# Patient Record
Sex: MALE | Race: WHITE | NOT HISPANIC OR LATINO | Employment: OTHER | URBAN - METROPOLITAN AREA
[De-identification: names, ages, dates, MRNs, and addresses within clinical notes are randomized per-mention and may not be internally consistent; named-entity substitution may affect disease eponyms.]

---

## 2019-12-11 ENCOUNTER — OFFICE VISIT (OUTPATIENT)
Dept: INTERNAL MEDICINE | Facility: CLINIC | Age: 69
End: 2019-12-11
Payer: MEDICARE

## 2019-12-11 ENCOUNTER — LAB VISIT (OUTPATIENT)
Dept: LAB | Facility: HOSPITAL | Age: 69
End: 2019-12-11
Attending: INTERNAL MEDICINE
Payer: MEDICARE

## 2019-12-11 VITALS
BODY MASS INDEX: 20.38 KG/M2 | HEART RATE: 84 BPM | DIASTOLIC BLOOD PRESSURE: 66 MMHG | RESPIRATION RATE: 20 BRPM | TEMPERATURE: 98 F | SYSTOLIC BLOOD PRESSURE: 134 MMHG | HEIGHT: 67 IN | WEIGHT: 129.88 LBS

## 2019-12-11 DIAGNOSIS — Z86.19 HEPATITIS C VIRUS INFECTION RESOLVED AFTER ANTIVIRAL DRUG THERAPY: ICD-10-CM

## 2019-12-11 DIAGNOSIS — Z79.899 HIGH RISK MEDICATION USE: ICD-10-CM

## 2019-12-11 DIAGNOSIS — Z12.5 ENCOUNTER FOR SCREENING FOR MALIGNANT NEOPLASM OF PROSTATE: ICD-10-CM

## 2019-12-11 DIAGNOSIS — D00.07 SQUAMOUS CELL CARCINOMA IN SITU (SCCIS) OF TONGUE: Primary | ICD-10-CM

## 2019-12-11 DIAGNOSIS — Z94.4 H/O LIVER TRANSPLANT: ICD-10-CM

## 2019-12-11 DIAGNOSIS — D00.07 SQUAMOUS CELL CARCINOMA IN SITU (SCCIS) OF TONGUE: ICD-10-CM

## 2019-12-11 DIAGNOSIS — E03.9 HYPOTHYROIDISM, UNSPECIFIED TYPE: ICD-10-CM

## 2019-12-11 LAB
ALBUMIN SERPL BCP-MCNC: 4 G/DL (ref 3.5–5.2)
ALP SERPL-CCNC: 93 U/L (ref 55–135)
ALT SERPL W/O P-5'-P-CCNC: 27 U/L (ref 10–44)
ANION GAP SERPL CALC-SCNC: 12 MMOL/L (ref 8–16)
AST SERPL-CCNC: 59 U/L (ref 10–40)
BASOPHILS # BLD AUTO: 0.03 K/UL (ref 0–0.2)
BASOPHILS NFR BLD: 0.5 % (ref 0–1.9)
BILIRUB SERPL-MCNC: 0.8 MG/DL (ref 0.1–1)
BUN SERPL-MCNC: 20 MG/DL (ref 8–23)
CALCIUM SERPL-MCNC: 10.1 MG/DL (ref 8.7–10.5)
CHLORIDE SERPL-SCNC: 104 MMOL/L (ref 95–110)
CHOLEST SERPL-MCNC: 215 MG/DL (ref 120–199)
CHOLEST/HDLC SERPL: 2.6 {RATIO} (ref 2–5)
CO2 SERPL-SCNC: 26 MMOL/L (ref 23–29)
CREAT SERPL-MCNC: 1.3 MG/DL (ref 0.5–1.4)
DIFFERENTIAL METHOD: ABNORMAL
EOSINOPHIL # BLD AUTO: 0.1 K/UL (ref 0–0.5)
EOSINOPHIL NFR BLD: 1.3 % (ref 0–8)
ERYTHROCYTE [DISTWIDTH] IN BLOOD BY AUTOMATED COUNT: 12.5 % (ref 11.5–14.5)
EST. GFR  (AFRICAN AMERICAN): >60 ML/MIN/1.73 M^2
EST. GFR  (NON AFRICAN AMERICAN): 55.3 ML/MIN/1.73 M^2
GLUCOSE SERPL-MCNC: 88 MG/DL (ref 70–110)
HCT VFR BLD AUTO: 37.6 % (ref 40–54)
HDLC SERPL-MCNC: 82 MG/DL (ref 40–75)
HDLC SERPL: 38.1 % (ref 20–50)
HGB BLD-MCNC: 12.8 G/DL (ref 14–18)
IMM GRANULOCYTES # BLD AUTO: 0.02 K/UL (ref 0–0.04)
IMM GRANULOCYTES NFR BLD AUTO: 0.3 % (ref 0–0.5)
LDLC SERPL CALC-MCNC: 120.8 MG/DL (ref 63–159)
LYMPHOCYTES # BLD AUTO: 0.7 K/UL (ref 1–4.8)
LYMPHOCYTES NFR BLD: 10.9 % (ref 18–48)
MCH RBC QN AUTO: 35.1 PG (ref 27–31)
MCHC RBC AUTO-ENTMCNC: 34 G/DL (ref 32–36)
MCV RBC AUTO: 103 FL (ref 82–98)
MONOCYTES # BLD AUTO: 0.7 K/UL (ref 0.3–1)
MONOCYTES NFR BLD: 11 % (ref 4–15)
NEUTROPHILS # BLD AUTO: 4.6 K/UL (ref 1.8–7.7)
NEUTROPHILS NFR BLD: 76 % (ref 38–73)
NONHDLC SERPL-MCNC: 133 MG/DL
NRBC BLD-RTO: 0 /100 WBC
PLATELET # BLD AUTO: 180 K/UL (ref 150–350)
PMV BLD AUTO: 10 FL (ref 9.2–12.9)
POTASSIUM SERPL-SCNC: 4 MMOL/L (ref 3.5–5.1)
PROT SERPL-MCNC: 8.7 G/DL (ref 6–8.4)
RBC # BLD AUTO: 3.65 M/UL (ref 4.6–6.2)
SODIUM SERPL-SCNC: 142 MMOL/L (ref 136–145)
TRIGL SERPL-MCNC: 61 MG/DL (ref 30–150)
TSH SERPL DL<=0.005 MIU/L-ACNC: 2.47 UIU/ML (ref 0.4–4)
WBC # BLD AUTO: 6.08 K/UL (ref 3.9–12.7)

## 2019-12-11 PROCEDURE — 84153 ASSAY OF PSA TOTAL: CPT

## 2019-12-11 PROCEDURE — 99205 OFFICE O/P NEW HI 60 MIN: CPT | Mod: PBBFAC,PO,25 | Performed by: STUDENT IN AN ORGANIZED HEALTH CARE EDUCATION/TRAINING PROGRAM

## 2019-12-11 PROCEDURE — 99999 PR PBB SHADOW E&M-NEW PATIENT-LVL V: CPT | Mod: PBBFAC,GC,, | Performed by: STUDENT IN AN ORGANIZED HEALTH CARE EDUCATION/TRAINING PROGRAM

## 2019-12-11 PROCEDURE — 80061 LIPID PANEL: CPT

## 2019-12-11 PROCEDURE — 85025 COMPLETE CBC W/AUTO DIFF WBC: CPT

## 2019-12-11 PROCEDURE — 36415 COLL VENOUS BLD VENIPUNCTURE: CPT | Mod: PO

## 2019-12-11 PROCEDURE — 84443 ASSAY THYROID STIM HORMONE: CPT

## 2019-12-11 PROCEDURE — 99387 INIT PM E/M NEW PAT 65+ YRS: CPT | Mod: S$PBB,GC,, | Performed by: STUDENT IN AN ORGANIZED HEALTH CARE EDUCATION/TRAINING PROGRAM

## 2019-12-11 PROCEDURE — 90662 IIV NO PRSV INCREASED AG IM: CPT | Mod: PBBFAC,PO

## 2019-12-11 PROCEDURE — 99999 PR PBB SHADOW E&M-NEW PATIENT-LVL V: ICD-10-PCS | Mod: PBBFAC,GC,, | Performed by: STUDENT IN AN ORGANIZED HEALTH CARE EDUCATION/TRAINING PROGRAM

## 2019-12-11 PROCEDURE — 99387 PR PREVENTIVE VISIT,NEW,65 & OVER: ICD-10-PCS | Mod: S$PBB,GC,, | Performed by: STUDENT IN AN ORGANIZED HEALTH CARE EDUCATION/TRAINING PROGRAM

## 2019-12-11 PROCEDURE — 80053 COMPREHEN METABOLIC PANEL: CPT

## 2019-12-11 PROCEDURE — 83036 HEMOGLOBIN GLYCOSYLATED A1C: CPT

## 2019-12-11 RX ORDER — LEVOTHYROXINE SODIUM 88 UG/1
88 TABLET ORAL DAILY
COMMUNITY
End: 2020-01-17 | Stop reason: SDUPTHER

## 2019-12-11 RX ORDER — TACROLIMUS 0.5 MG/1
0.5 CAPSULE ORAL 2 TIMES DAILY
COMMUNITY
End: 2020-01-22 | Stop reason: SDUPTHER

## 2019-12-11 RX ORDER — VITAMIN E 268 MG
400 CAPSULE ORAL DAILY
COMMUNITY
End: 2023-10-20

## 2019-12-11 RX ORDER — TRAZODONE HYDROCHLORIDE 50 MG/1
50 TABLET ORAL NIGHTLY
COMMUNITY
End: 2020-12-07 | Stop reason: SDUPTHER

## 2019-12-11 NOTE — PROGRESS NOTES
69 y.o. nonsmoker, nondrinker here for annual physical exam.       Cardiovascular:  Cholesterol (q5yr>21yo):   Hyperlipidemia: x, x statin  TC: x LDL: x HDL: x   HTN: None, x measure BP at home w/ ranges x to x, home meds: x  Risk: Never smoker, Drinks 1 glass of wine daily   Current ASCVD 10- year risk: pending labs  Prior stents, CABG, caths: None. Had stress test 5 years ago, he reports normal.     Endocrine:  Diabetes: None , Current A1c (40-70 yearly): pending, Goal A1c x   Diabetes Medications:x     Glucose at home: x  Diabetic complications: x Foot Ulcer, x Nephropathy, x Retinopathy, x Peripheral Neuropathy, xgastroparesis, x admissions for DKA   Thyroid: synthroid daily    Health Maintenance:  Recent hospital/ED admission: None  Eye exam: Needs one  Mammogram (50-75yo):x  WWE: x  Colonoscopy: Last was 10 years ago, found polyps which were excised   DEXA (F>66 yo, M >69yo, M&F 50-68 yo with risk factors):   Sexual health: STI: yes sexually active, no prior STIs  Exercise: Yes exercising, likes to walk and bike rides       Vaccines:  Influenza (everyone, yearly): Will get one today  Tetanus: Tdap x 1 Yes, Td booster ([id number]) was 2 years ago    Pneumonia (unvaccinated, 65 and older):    Prevnar (PCV13): completed 2017  Pneumovax (PPSV23) (6-12 months after PCV13): completed 2017     Shingrex (Recomb, 2-6 months between doses): Dose 1 x, Dose 2 x   Zostavax (Live, >59yo): none    ADL's: Yes  Memory: No  Mental health: No issues, no depressed mood or anxiety  Advance Directives: not discussed   Nutrition: Good   Gait: normal   Safety: Feels safe. Lives by himself. His brother lives about 1/4 mile away.   Urinary incontinence: None.     Review of Systems   Constitutional: Negative for chills, fever, malaise/fatigue and weight loss.   HENT: Negative for congestion, sinus pain and sore throat.    Eyes: Negative for blurred vision and pain.   Respiratory: Negative for cough, shortness of breath and wheezing.     Cardiovascular: Negative for chest pain, palpitations, orthopnea, claudication and leg swelling.   Gastrointestinal: Negative for abdominal pain, constipation, diarrhea, heartburn, melena, nausea and vomiting.   Genitourinary: Negative for dysuria, frequency, hematuria and urgency.   Skin: Negative for itching and rash.   Neurological: Negative for dizziness, seizures, loss of consciousness and headaches.   Endo/Heme/Allergies: Negative for environmental allergies and polydipsia. Does not bruise/bleed easily.   Psychiatric/Behavioral: Negative for depression. The patient is not nervous/anxious.          Current Outpatient Medications:     levothyroxine (SYNTHROID) 88 MCG tablet, Take 88 mcg by mouth once daily., Disp: , Rfl:     multivitamin capsule, Take 1 capsule by mouth once daily., Disp: , Rfl:     tacrolimus (PROGRAF) 0.5 MG Cap, Take 0.5 mg by mouth 2 (two) times daily., Disp: , Rfl:     traZODone (DESYREL) 50 MG tablet, Take 50 mg by mouth every evening., Disp: , Rfl:     vitamin E 400 UNIT capsule, Take 400 Units by mouth once daily., Disp: , Rfl:     Past Medical History:   Diagnosis Date    Basal cell carcinoma (BCC) in situ of skin 2012    3 on face, 2 on arm, removed by dermatology.    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 Underwent surgical resection of tongue and neck. s/p tracheostomy     Past Surgical History:   Procedure Laterality Date    COLONOSCOPY      LIVER TRANSPLANT  11/2008    transplanted for biopsy proven hepatocellular carcinoma,     TRACHEOSTOMY       Social History     Socioeconomic History    Marital status: Single     Spouse name: Not on file    Number of children: Not on file    Years of education: Not on file    Highest education level: Not on file    Occupational History    Occupation: Retired     Comment: , notes exposures to fumes etc.  Worked on [employer] for 4 years   Social Needs    Financial resource strain: Not on file    Food insecurity:     Worry: Not on file     Inability: Not on file    Transportation needs:     Medical: Not on file     Non-medical: Not on file   Tobacco Use    Smoking status: Never Smoker    Smokeless tobacco: Never Used   Substance and Sexual Activity    Alcohol use: Yes     Comment: drinks wine, 1 glass day    Drug use: Not Currently    Sexual activity: Yes     Comment: No prior history of STD    Lifestyle    Physical activity:     Days per week: Not on file     Minutes per session: Not on file    Stress: Not on file   Relationships    Social connections:     Talks on phone: Not on file     Gets together: Not on file     Attends Roman Catholic service: Not on file     Active member of club or organization: Not on file     Attends meetings of clubs or organizations: Not on file     Relationship status: Not on file   Other Topics Concern    Not on file   Social History Narrative    Not on file     Review of patient's allergies indicates:  No Known Allergies  Rocco Swain had no medications administered during this visit.    Vitals:    12/11/19 1259   BP: 134/66   Pulse: 84   Resp: 20   Temp: 97.8 °F (36.6 °C)     Physical Exam   Constitutional: He is oriented to person, place, and time. No distress.   HENT:   Head: Atraumatic.   Eyes: Pupils are equal, round, and reactive to light. EOM are normal.   Neck: No JVD present.   Cardiovascular: Normal rate.   Pulmonary/Chest: Effort normal.   Abdominal: There is no tenderness.   Neurological: He is alert and oriented to person, place, and time.   Skin: No rash noted.   Psychiatric: He has a normal mood and affect.       Mr Swain was seen today for follow up of chronic medical problems and annual exam.    Assessment/Plan:    Squamous cell carcinoma in  situ (SCCIS) of tongue  -     Ambulatory Referral to ENT    H/O liver transplant  -     Hemoglobin A1c; Future  -     Ambulatory Referral to Hepatology    Hypothyroidism, unspecified type  -     Lipid panel; Future  -     CBC auto differential; Future  -     Comprehensive metabolic panel; Future  -     TSH; Future  -     Hemoglobin A1c; Future  -     Ambulatory Referral to Optometry  -     PSA, Screening; Future   - HbA1c, future  Hepatitis C virus infection resolved after antiviral drug therapy  -     Ambulatory Referral to Hepatology    High risk medication use  - Continue prograf  - Follow up with hepatology    Encounter for screening for malignant neoplasm of prostate   -     PSA, Screening; Future      Follow up in 4-6 months   Fasting labs today  Referrals to ENT, Hepatology, and Optometry were provided.   Patient to obtain medical records, from ENT, transplant, heme-onc  Flu shot today.    Momo Dubon MD  PGY3 Medicine

## 2019-12-12 ENCOUNTER — TELEPHONE (OUTPATIENT)
Dept: TRANSPLANT | Facility: CLINIC | Age: 69
End: 2019-12-12

## 2019-12-12 LAB
COMPLEXED PSA SERPL-MCNC: 0.82 NG/ML (ref 0–4)
ESTIMATED AVG GLUCOSE: 91 MG/DL (ref 68–131)
HBA1C MFR BLD HPLC: 4.8 % (ref 4–5.6)

## 2019-12-12 NOTE — TELEPHONE ENCOUNTER
----- Message from Jeffrey Hernandez sent at 12/12/2019  3:49 PM CST -----  Contact: Patients Brother     Sent pt chart to ref nurse for review.      ----- Message -----  From: Moisés Lawson  Sent: 12/12/2019   3:40 PM CST  To: [facility] Liver Referral Pool    Good Afternoon,      has put in a referral for patient to be scheduled with Hepatology. Please call patient to schedule.    H/O liver transplant [Z94.4]  Hepatitis C virus infection resolved after antiviral drug therapy [Z86.19]

## 2019-12-13 ENCOUNTER — TELEPHONE (OUTPATIENT)
Dept: TRANSPLANT | Facility: CLINIC | Age: 69
End: 2019-12-13

## 2019-12-13 ENCOUNTER — OFFICE VISIT (OUTPATIENT)
Dept: OPTOMETRY | Facility: CLINIC | Age: 69
End: 2019-12-13
Payer: MEDICARE

## 2019-12-13 DIAGNOSIS — H52.4 HYPEROPIA OF BOTH EYES WITH ASTIGMATISM AND PRESBYOPIA: ICD-10-CM

## 2019-12-13 DIAGNOSIS — H25.13 NUCLEAR SCLEROSIS OF BOTH EYES: ICD-10-CM

## 2019-12-13 DIAGNOSIS — H52.203 HYPEROPIA OF BOTH EYES WITH ASTIGMATISM AND PRESBYOPIA: ICD-10-CM

## 2019-12-13 DIAGNOSIS — H52.03 HYPEROPIA OF BOTH EYES WITH ASTIGMATISM AND PRESBYOPIA: ICD-10-CM

## 2019-12-13 DIAGNOSIS — H43.393 VISUAL FLOATERS, BILATERAL: Primary | ICD-10-CM

## 2019-12-13 PROCEDURE — 92015 PR REFRACTION: ICD-10-PCS | Mod: ,,, | Performed by: OPTOMETRIST

## 2019-12-13 PROCEDURE — 92015 DETERMINE REFRACTIVE STATE: CPT | Mod: ,,, | Performed by: OPTOMETRIST

## 2019-12-13 PROCEDURE — 92004 PR EYE EXAM, NEW PATIENT,COMPREHESV: ICD-10-PCS | Mod: S$PBB,,, | Performed by: OPTOMETRIST

## 2019-12-13 PROCEDURE — 99999 PR PBB SHADOW E&M-EST. PATIENT-LVL II: ICD-10-PCS | Mod: PBBFAC,,, | Performed by: OPTOMETRIST

## 2019-12-13 PROCEDURE — 92004 COMPRE OPH EXAM NEW PT 1/>: CPT | Mod: S$PBB,,, | Performed by: OPTOMETRIST

## 2019-12-13 PROCEDURE — 99999 PR PBB SHADOW E&M-EST. PATIENT-LVL II: CPT | Mod: PBBFAC,,, | Performed by: OPTOMETRIST

## 2019-12-13 PROCEDURE — 99212 OFFICE O/P EST SF 10 MIN: CPT | Mod: PBBFAC,PO | Performed by: OPTOMETRIST

## 2019-12-13 NOTE — LETTER
December 13, 2019      Momo Dubon MD  [address]  [facility] [address]           [facility]  [address]  [place] [address]  Phone: [phone]  Fax: [phone]          Patient: Rocco Swain   MR Number: [id number]   YOB: 1950   Date of Visit: 12/13/2019       Dear Dr. Momo Dubon:    Thank you for referring Rocco Swain to me for evaluation. Attached you will find relevant portions of my assessment and plan of care.    If you have questions, please do not hesitate to call me. I look forward to following Rocco Swain along with you.    Sincerely,    Raina Gonzalez, OD    Enclosure  CC:  No Recipients    If you would like to receive this communication electronically, please contact [email or web address] or [phone] to request more information on [employer] Link access.    For providers and/or their staff who would like to refer a patient to [facility], please contact us through our one-stop-shop provider referral line, [facility] , at [phone].    If you feel you have received this communication in error or would no longer like to receive these types of communications, please e-mail [email or web address]

## 2019-12-13 NOTE — TELEPHONE ENCOUNTER
Spoke with the patient's brother in reference to his referral for post transplant services.  Patient is  nonverbal and brother takes care of his affairs.  Patient had a liver transplant in [place] in 2008 and has now moved locally and and is attempting to established care.  Patient informed that medical records will be reviewed (medical records have been given to primary care doctor per patient's brother).  A copy of the records were requested from the primary care doctor.

## 2019-12-13 NOTE — PROGRESS NOTES
HPI     KERRIE:5yrs   Glasses? Yes readers  Contacts? no  H/o eye surgery, injections or laser: no  H/o eye injury: steel OD , drilled out   Known eye conditions? no  Family h/o eye conditions? no  Eye gtts?Murine     (-) Flashes (+) Floaters when he wakes up (-) Mucous   (-) Tearing (-) Itching (-) Burning   (-) Headaches (-) Eye Pain/discomfort (+) Irritation OU feels like   something is in the corner of the eyes   (-) Redness (-) Double vision (-) Blurry vision    Diabetic? (-)  A1c?  (Hemoglobin A1C       Date                     Value               Ref Range             Status                12/11/2019               4.8                 4.0 - 5.6 %           Final              Comment:    ADA Screening Guidelines:  5.7-6.4%  Consistent with   prediabetes  >or=6.5%  Consistent with diabetes  High levels of fetal   hemoglobin interfere with the HbA1C  assay. Heterozygous hemoglobin   variants (HbS, HgC, etc)do  not significantly interfere with this assay.     However, presence of multiple variants may affect accuracy.    ----------)        Last edited by Sole Jackson on 12/13/2019  8:46 AM. (History)            Assessment /Plan     For exam results, see Encounter Report.    Visual floaters, bilateral    Nuclear sclerosis of both eyes    Hyperopia of both eyes with astigmatism and presbyopia    1. No e/o h/b/t 360 degrees OU. Monitor for worsening of symptoms or S/Sx of RD.   2. Nuclear sclerotic cataract - not visually significant. Observe.  3. SRx released to patient. Patient educated on lens options. Normal ocular health. RTC 1 year for routine exam.     Pt is nonverbal but did well today with communication

## 2020-01-14 ENCOUNTER — TELEPHONE (OUTPATIENT)
Dept: OTOLARYNGOLOGY | Facility: CLINIC | Age: 70
End: 2020-01-14

## 2020-01-14 NOTE — TELEPHONE ENCOUNTER
Attempted to call patient to discuss getting records of where he was treated previously. No answer. Left voicemail to return my call

## 2020-01-17 RX ORDER — LEVOTHYROXINE SODIUM 88 UG/1
TABLET ORAL
Qty: 90 TABLET | Refills: 3 | Status: SHIPPED | OUTPATIENT
Start: 2020-01-17 | End: 2020-11-13 | Stop reason: SDUPTHER

## 2020-01-17 NOTE — TELEPHONE ENCOUNTER
----- Message from Carley Rosales sent at 1/17/2020  3:44 PM CST -----  Contact: SELF [phone]  Patient is calling for an RX refill or new RX.  Is this a refill or new RX:  REFILL  RX name and strength: levothyroxine (SYNTHROID) 88 MCG tablet  Directions (copy/paste from chart):    Is this a 30 day or 90 day RX:  90  Local pharmacy or mail order pharmacy:  LOCAL  Pharmacy name and phone # (copy/paste from chart[phone] (Phone)  [phone] (Fax)   Comments:        REFILL: tacrolimus (PROGRAF) 0.5 MG Cap

## 2020-01-22 RX ORDER — TACROLIMUS 0.5 MG/1
0.5 CAPSULE ORAL EVERY 12 HOURS
Qty: 60 CAPSULE | Refills: 0 | Status: SHIPPED | OUTPATIENT
Start: 2020-01-22 | End: 2020-02-21 | Stop reason: SDUPTHER

## 2020-01-22 NOTE — TELEPHONE ENCOUNTER
Patient came to desk to inquire about synthroid and prograf rx refills.  I gave him print out synthroid was done.    Liver Dept was to call patient to book appt in December and patient was never contacted. They should handle the prograf refill.  He is out of this rx for his immune system.    Shari is working on getting appt made.  Can you give temp rx as loaded?    Thanks bunny

## 2020-01-27 ENCOUNTER — TELEPHONE (OUTPATIENT)
Dept: OTOLARYNGOLOGY | Facility: CLINIC | Age: 70
End: 2020-01-27

## 2020-01-27 ENCOUNTER — OFFICE VISIT (OUTPATIENT)
Dept: OTOLARYNGOLOGY | Facility: CLINIC | Age: 70
End: 2020-01-27
Payer: MEDICARE

## 2020-01-27 VITALS
TEMPERATURE: 98 F | BODY MASS INDEX: 21.1 KG/M2 | DIASTOLIC BLOOD PRESSURE: 97 MMHG | SYSTOLIC BLOOD PRESSURE: 151 MMHG | WEIGHT: 134.69 LBS | HEART RATE: 84 BPM

## 2020-01-27 DIAGNOSIS — C32.9 LARYNX CANCER: ICD-10-CM

## 2020-01-27 DIAGNOSIS — B19.20 HEPATITIS C VIRUS INFECTION WITHOUT HEPATIC COMA, UNSPECIFIED CHRONICITY: ICD-10-CM

## 2020-01-27 DIAGNOSIS — Z53.9 LIVER TRANSPLANT ABORTED: ICD-10-CM

## 2020-01-27 DIAGNOSIS — Z90.02 HISTORY OF LARYNGECTOMY: ICD-10-CM

## 2020-01-27 DIAGNOSIS — E89.0 POSTOPERATIVE HYPOTHYROIDISM: ICD-10-CM

## 2020-01-27 PROBLEM — E03.9 HYPOTHYROID: Status: ACTIVE | Noted: 2020-01-27

## 2020-01-27 PROCEDURE — 31575 DIAGNOSTIC LARYNGOSCOPY: CPT | Mod: S$PBB,,, | Performed by: OTOLARYNGOLOGY

## 2020-01-27 PROCEDURE — 31575 DIAGNOSTIC LARYNGOSCOPY: CPT | Mod: PBBFAC | Performed by: OTOLARYNGOLOGY

## 2020-01-27 PROCEDURE — 1159F PR MEDICATION LIST DOCUMENTED IN MEDICAL RECORD: ICD-10-PCS | Mod: ,,, | Performed by: OTOLARYNGOLOGY

## 2020-01-27 PROCEDURE — 99203 OFFICE O/P NEW LOW 30 MIN: CPT | Mod: S$PBB,25,, | Performed by: OTOLARYNGOLOGY

## 2020-01-27 PROCEDURE — 99203 PR OFFICE/OUTPT VISIT, NEW, LEVL III, 30-44 MIN: ICD-10-PCS | Mod: S$PBB,25,, | Performed by: OTOLARYNGOLOGY

## 2020-01-27 PROCEDURE — 99213 OFFICE O/P EST LOW 20 MIN: CPT | Mod: PBBFAC,25 | Performed by: OTOLARYNGOLOGY

## 2020-01-27 PROCEDURE — 99999 PR PBB SHADOW E&M-EST. PATIENT-LVL III: ICD-10-PCS | Mod: PBBFAC,,, | Performed by: OTOLARYNGOLOGY

## 2020-01-27 PROCEDURE — 1126F PR PAIN SEVERITY QUANTIFIED, NO PAIN PRESENT: ICD-10-PCS | Mod: ,,, | Performed by: OTOLARYNGOLOGY

## 2020-01-27 PROCEDURE — 1126F AMNT PAIN NOTED NONE PRSNT: CPT | Mod: ,,, | Performed by: OTOLARYNGOLOGY

## 2020-01-27 PROCEDURE — 31575 PR LARYNGOSCOPY, FLEXIBLE; DIAGNOSTIC: ICD-10-PCS | Mod: S$PBB,,, | Performed by: OTOLARYNGOLOGY

## 2020-01-27 PROCEDURE — 99999 PR PBB SHADOW E&M-EST. PATIENT-LVL III: CPT | Mod: PBBFAC,,, | Performed by: OTOLARYNGOLOGY

## 2020-01-27 PROCEDURE — 1159F MED LIST DOCD IN RCRD: CPT | Mod: ,,, | Performed by: OTOLARYNGOLOGY

## 2020-01-27 NOTE — TELEPHONE ENCOUNTER
Requested pt's records from [facility] medical records f#[phone] be faxed to our clinic.

## 2020-01-27 NOTE — Clinical Note
I just wanted to put the jonh under rate are.  He recently moved here from [place].  Three years ago, he underwent total laryngectomy and total glossectomy at [facility].  He will need assistance with laryngectomy supplies.  Rona helped him to set up a my fredo count.  I believe that his brother is his contact and could be of assistance.  Thanks.

## 2020-01-28 NOTE — PROGRESS NOTES
Chief Complaint   Patient presents with    consult/need to establish care, hx of larynx ca       HPI   69 y.o. male presents status post total laryngectomy, total glossectomy and anterolateral thigh free flap reconstruction roughly 3 years ago at [facility] in [place].  He is also status post liver transplant secondary to hepatitis C.  He recently moved to the [facility] from [place] and presents to establish care.  He has no head neck complaints.    Review of Systems   Constitutional: Negative for fatigue and unexpected weight change.   HENT: Per HPI.  Eyes: Negative for visual disturbance.   Respiratory: Negative for shortness of breath, hemoptysis   Cardiovascular: Negative for chest pain and palpitations.   Musculoskeletal: Negative for decreased ROM, back pain.   Skin: Negative for rash, sunburn, itching.   Neurological: Negative for dizziness and seizures.   Hematological: Negative for adenopathy. Does not bruise/bleed easily.   Endocrine: Negative for rapid weight loss/weight gain, heat/cold intolerance.     Past Medical History   Patient Active Problem List   Diagnosis    Larynx cancer    Hepatitis C    Liver transplant aborted    Hypothyroid    History of laryngectomy           Past Surgical History   Past Surgical History:   Procedure Laterality Date    COLONOSCOPY      LIVER TRANSPLANT  11/2008    transplanted for biopsy proven hepatocellular carcinoma,     TRACHEOSTOMY           Family History   No family history on file.        Social History   .  Social History     Socioeconomic History    Marital status: Single     Spouse name: Not on file    Number of children: Not on file    Years of education: Not on file    Highest education level: Not on file   Occupational History    Occupation: Retired     Comment: , notes exposures to fumes etc.  Worked on [employer] for 4 years   Social Needs    Financial resource strain: Not on file    Food  insecurity:     Worry: Not on file     Inability: Not on file    Transportation needs:     Medical: Not on file     Non-medical: Not on file   Tobacco Use    Smoking status: Never Smoker    Smokeless tobacco: Never Used   Substance and Sexual Activity    Alcohol use: Yes     Comment: drinks wine, 1 glass day    Drug use: Not Currently    Sexual activity: Yes     Comment: No prior history of STD    Lifestyle    Physical activity:     Days per week: Not on file     Minutes per session: Not on file    Stress: Not on file   Relationships    Social connections:     Talks on phone: Not on file     Gets together: Not on file     Attends Islam service: Not on file     Active member of club or organization: Not on file     Attends meetings of clubs or organizations: Not on file     Relationship status: Not on file   Other Topics Concern    Not on file   Social History Narrative    Not on file         Allergies   Review of patient's allergies indicates:  No Known Allergies        Physical Exam     Vitals:    01/27/20 1516   BP: (!) 151/97   Pulse: 84   Temp: 97.9 °F (36.6 °C)         Body mass index is 21.1 kg/m².      General: AOx3, NAD   Respiratory:  Symmetric chest rise, normal effort  Nose: No gross nasal septal deviation. Inferior Turbinates WNL bilaterally. No septal perforation. No masses/lesions.   Oral Cavity:  Oral Tongue absent.  Free flap well incorporated into oral cavity.. Hard Palate WNL. No buccal or FOM lesions.  Oropharynx:  No masses/lesions of the posterior pharyngeal wall. Tonsillar fossa without lesions. Soft palate without masses. Midline uvula.   Neck:  Well-healed neck dissection scar is.  Stoma patent..  No cervical lymphadenopathy, thyromegaly or thyroid nodules.  Normal range of motion.    Face: House Brackmann I bilaterally.     Flex Naso Va Hypo Procedures #2    Procedure:  Diagnostic flexible nasopharyngoscopy, laryngoscopy and hypopharyngoscopy:    Routine preparation with  local atomizer with 1% neosynephrine/pontocaine with customary flexible endoscope.    Nasopharynx:  No lesions.   Mucosa:  No lesions.   Adenoids:  Present.  Posterior Choanae:  Patent.  Eustachian Tubes:  Patent.  Posterior pharynx:  No lesions.  Neopharynx:  No lesions.      Assessment/Plan  Problem List Items Addressed This Visit        ENT    History of laryngectomy       Oncology    Larynx cancer     Status post total laryngectomy/total glossectomy roughly 3 years ago.  He has no evidence of disease.  We will request his records from [facility].  All placed an order for him to be seen by speech therapy for assistance with laryngectomy supplies.  He will return to clinic in 4 months for surveillance.            Endocrine    Hypothyroid       GI    Hepatitis C    Liver transplant aborted

## 2020-01-28 NOTE — ASSESSMENT & PLAN NOTE
Status post total laryngectomy/total glossectomy roughly 3 years ago.  He has no evidence of disease.  We will request his records from [facility].  All placed an order for him to be seen by speech therapy for assistance with laryngectomy supplies.  He will return to clinic in 4 months for surveillance.

## 2020-02-04 ENCOUNTER — CLINICAL SUPPORT (OUTPATIENT)
Dept: SPEECH THERAPY | Facility: HOSPITAL | Age: 70
End: 2020-02-04
Attending: OTOLARYNGOLOGY
Payer: MEDICARE

## 2020-02-04 DIAGNOSIS — R49.0 ALARYNGEAL VOICE: Primary | ICD-10-CM

## 2020-02-04 DIAGNOSIS — Z92.3 HISTORY OF HEAD AND NECK RADIATION: ICD-10-CM

## 2020-02-04 DIAGNOSIS — Z90.02 S/P LARYNGECTOMY: ICD-10-CM

## 2020-02-04 DIAGNOSIS — Z92.21 HISTORY OF CHEMOTHERAPY: ICD-10-CM

## 2020-02-04 DIAGNOSIS — Z85.21 HISTORY OF LARYNGEAL CANCER: ICD-10-CM

## 2020-02-04 DIAGNOSIS — Z90.49 S/P GLOSSECTOMY: ICD-10-CM

## 2020-02-04 PROCEDURE — 92522 EVALUATE SPEECH PRODUCTION: CPT | Mod: GN | Performed by: SPEECH-LANGUAGE PATHOLOGIST

## 2020-02-04 NOTE — PROGRESS NOTES
DIAGNOSIS:  Alaryngeal voice (R49.0), s/p laryngectomy (Z90.02), s/p Total glossectomy (Z90.49), History laryngeal cancer (Z85.21), History radiation to head and neck (Z92.3), History chemotherapy (Z92.21)  REFERRING DOCTOR:  Gerry Myers M.D., Head and Neck Surgical Oncologist  LENGTH OF SESSION: 45 mintues    REASON FOR VISIT:  Rocco Swain presented to clinic today for his initial visit, to establish care per Dr. Myers, and to update his laryngectomee supplies prescription.  He was unaccompanied.    Mr. Swian is s/p TL and total glossectomy December 2016 at [facility] in [facility].  He recently relocated to [place] from [place] and has established care with Dr. Myers who referred him to establish his ST care.    MEDICAL/SURGICAL HISTORY:  Past Medical History:   Diagnosis Date    Basal cell carcinoma (BCC) in situ of skin 2012    3 on face, 2 on arm, removed by dermatology.    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 Underwent surgical resection of tongue and neck. s/p tracheostomy       Past Surgical History:   Procedure Laterality Date    COLONOSCOPY      LIVER TRANSPLANT  11/2008    transplanted for biopsy proven hepatocellular carcinoma,     TRACHEOSTOMY         TREATMENT HISTORY:  1. 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  Has family here.  Enjoys walking and riding his bike.    INTERVAL HISTORY:  Dr. Myers met him 1/27/20 and found him to be ANGELES.  He will follow him for surveillance.    INTERVENTION:  Stoma:  Well healed.    Pulmonary rehab:  Uses 9/55 Standard LaryTube from [employer] that he has manually shortened to about 50 mm.  Does not use  anything to maintain it in his stoma unless exercising.  Uses XrtaFlow HME.    AL training:  Reported that he tried, but it was not useful s/p glossectomy.  Primarily writes on small notepad.  Can use kvvw-sg-xpyclt florecita on phone as needed.    Esophageal speech:  Not a candidate s/p total glossectomy.    Swallowing:  Takes liquids and pureed foods.  The one solid he takes are oysters which he is able to swallow whole.  Prepares a variety of soups.  Most nutrition via Ensure.    Supplies: Prepared updated prescription for LaryTube and HMEs.    ZANE management:  deferred      At the end of the session, Rocco Swain was wearing  1.  9/55 LaryTube (Standard), modified by pt to 50 mm  2.  XtraFlow HME    Other supplies:  Trach collar -- uses with exercise as needed to maintain Larytube in place.  Provided Boogie Board for trial use.  It is larger than his notepad.  Advised that if he likes it, he can obtain a smaller version online.      IMPRESSIONS:  1.  Alaryngeal voice s/p total laryngectomy.  2.  S/p total glossectomy.  3.  History CRT.  4.  History laryngeal cancer.      RECOMMENDATIONS/PLAN OF CARE:    1.  Continued use of writing or vhlb-ft-vhuowx florecita for all verbal communication.  2.  Twice daily cleaning of stoma.  3.  Continue use of HMEs and LaryTube 24/7 daily.  4.  Follow up with Dr. Myers as directed.  5.  Reconsult ST as needed for assistance with supply prescriptions and/or communication needs.

## 2020-02-04 NOTE — PLAN OF CARE
IMPRESSIONS:  1.  Alaryngeal voice s/p total laryngectomy.  2.  S/p total glossectomy.  3.  History CRT.  4.  History laryngeal cancer.      RECOMMENDATIONS/PLAN OF CARE:    1.  Continued use of writing or somu-kx-ysrtjq florecita for all verbal communication.  2.  Twice daily cleaning of stoma.  3.  Continue use of HMEs and LaryTube 24/7 daily.  4.  Follow up with Dr. Myers as directed.  5.  Reconsult ST as needed for assistance with supply prescriptions and/or communication needs.

## 2020-02-21 ENCOUNTER — TELEPHONE (OUTPATIENT)
Dept: INTERNAL MEDICINE | Facility: CLINIC | Age: 70
End: 2020-02-21

## 2020-02-21 RX ORDER — TACROLIMUS 0.5 MG/1
0.5 CAPSULE ORAL EVERY 12 HOURS
Qty: 60 CAPSULE | Refills: 0 | Status: SHIPPED | OUTPATIENT
Start: 2020-02-21 | End: 2020-03-20 | Stop reason: SDUPTHER

## 2020-02-21 NOTE — TELEPHONE ENCOUNTER
If hepatology is looking for outside records anything we had was given for scanning into EMR. I do not see such. Might need to sign rec release.  I refilled 1 more month of prograff

## 2020-02-21 NOTE — TELEPHONE ENCOUNTER
----- Message from Carley Rosales sent at 2/21/2020  3:10 PM CST -----  Contact: self [phone]  Requesting an RX refill or new RX.  Is this a refill or new RX: refill  RX name and strength: tacrolimus (PROGRAF) 0.5 MG Cap   Directions (copy/paste from chart):Take 1 capsule (0.5 mg total) by mouth every 12 (twelve) hours. Or twice daily. - Oral    Is this a 30 day or 90 day RX:  90  Local pharmacy or mail order pharmacy:  local  Pharmacy name and phone # (copy/paste from chart): [facility] [phone] (Phone)  [phone] (Fax)    Comments:

## 2020-02-21 NOTE — TELEPHONE ENCOUNTER
"Called the liver [phone]. Spoke with the rep she stated" that the pt was contacted twice and the brother of pt was asked to get a note from the pt primary that's why the appt wasn't scheduled. I asked could I go ahead and schedule over the phone, and stated that the nurse from the clinic will call pt to schedule.  "

## 2020-02-26 ENCOUNTER — TELEPHONE (OUTPATIENT)
Dept: INTERNAL MEDICINE | Facility: CLINIC | Age: 70
End: 2020-02-26

## 2020-02-26 NOTE — TELEPHONE ENCOUNTER
We received some outside records and they have been sent to scan.    They are not yet available for Hepatology to view.

## 2020-02-26 NOTE — TELEPHONE ENCOUNTER
----- Message from Brenda Marrero MA sent at 2/24/2020  3:22 PM CST -----  Contact: Amira leigh/Dr. Walton      ----- Message -----  From: Gardenia Weber  Sent: 2/21/2020   3:31 PM CST  To: [facility] Hepat Clinical Staff    Amira leigh/ Dr. Walton called stated pt is still waiting on a call for an appt since 12/13/19. Please reach out to pt about scheduling an appt.

## 2020-03-20 RX ORDER — TACROLIMUS 0.5 MG/1
0.5 CAPSULE ORAL EVERY 12 HOURS
Qty: 60 CAPSULE | Refills: 0 | Status: SHIPPED | OUTPATIENT
Start: 2020-03-20 | End: 2020-04-20 | Stop reason: SDUPTHER

## 2020-03-20 NOTE — TELEPHONE ENCOUNTER
Mr. Swain still has not received an appt with Hepatology (records have been received and sent to scan).    He is in need a refill on his prograf.    Please advise as pt is in the lobby.    Thanks.

## 2020-04-20 RX ORDER — TACROLIMUS 0.5 MG/1
0.5 CAPSULE ORAL EVERY 12 HOURS
Qty: 60 CAPSULE | Refills: 0 | Status: SHIPPED | OUTPATIENT
Start: 2020-04-20 | End: 2020-05-21 | Stop reason: SDUPTHER

## 2020-05-21 RX ORDER — TACROLIMUS 0.5 MG/1
0.5 CAPSULE ORAL EVERY 12 HOURS
Qty: 60 CAPSULE | Refills: 0 | Status: SHIPPED | OUTPATIENT
Start: 2020-05-21 | End: 2020-06-19 | Stop reason: SDUPTHER

## 2020-05-21 NOTE — TELEPHONE ENCOUNTER
Please contact Hepatology again and clarify appt to establish s/p liver transplant.  I referred him 6 months ago

## 2020-06-19 ENCOUNTER — TELEPHONE (OUTPATIENT)
Dept: INTERNAL MEDICINE | Facility: CLINIC | Age: 70
End: 2020-06-19

## 2020-06-19 ENCOUNTER — TELEPHONE (OUTPATIENT)
Dept: TRANSPLANT | Facility: CLINIC | Age: 70
End: 2020-06-19

## 2020-06-19 DIAGNOSIS — Z94.4 STATUS POST LIVER TRANSPLANT: Primary | ICD-10-CM

## 2020-06-19 DIAGNOSIS — Z76.82 ORGAN TRANSPLANT CANDIDATE: ICD-10-CM

## 2020-06-19 RX ORDER — TACROLIMUS 0.5 MG/1
0.5 CAPSULE ORAL EVERY 12 HOURS
Qty: 60 CAPSULE | Refills: 0 | Status: SHIPPED | OUTPATIENT
Start: 2020-06-19 | End: 2020-11-06

## 2020-06-19 NOTE — TELEPHONE ENCOUNTER
----- Message from Lisa Vazquez sent at 6/19/2020  4:16 PM CDT -----  Please tell Dr Walton the pt is scheduled to be seen 7/9 in the liver txp clinic. I spoke to the brother. I also suggested they get active on [email or web address] and the pt can do all via written request since he can not speak.     Lisa

## 2020-06-19 NOTE — TELEPHONE ENCOUNTER
Called and spoke to pts brother. appt made for 7/9. Referral received     Pt transplanted in [place] and moved to [place] in Oct 2019.  Pt needs post txp follow up.      Insurance in Epic

## 2020-06-19 NOTE — TELEPHONE ENCOUNTER
----- Message from Lisa Vazqeuz sent at 6/19/2020  4:44 PM CDT -----  Oh.  The facility he came from uses [facility] and was in Care Everywhere. We didn't need the records after all.  Are you familiar with Care Everywhere?      Lisa

## 2020-06-19 NOTE — TELEPHONE ENCOUNTER
"Spoke with Hugo at Hepatology switch board who advises that he cannot schedule but can send a message to the above dept who will schedule the pt.    Explained to please add to the message to we have requested the records that Hepatology advised that were needed in order to see the pt.    Advised that the records have been received several times and have been scanned in the "media tab" some of which are 100 pages in length.    Requested that the above information is added to the message as pt has been waiting x 7 months for this appt.    Verbalized understanding and states that he has sent the message "high priority".    Please f/u on this and advise me or the pt either way.    Thanks.    Pt has been advised as he is here in the clinic today (6/19/20).  "

## 2020-06-19 NOTE — TELEPHONE ENCOUNTER
"----- Message from Lisa Vazquez sent at 6/19/2020  3:24 PM CDT -----  Regarding: RE: Hepatology Appt  Lala,      Future reference for pt post liver transplant from outside facility.    All records must be received an reviewed, it's our policy. We look for transplant complications, surgical aspect of how a pt is connected(donor liver to recipient), and pt compliance.  The team then accepts patient.  Our staff notified all of need for the medical records, included in this conversation was the brother.  I only see one note of communication from my staff requesting the records in Dec  2019.  We were not notified that the records ,at anytime were received and scanned in.  At anytime if we were notified we would have moved forward with the process.        I will send the referral for review.     I can be contacted in the future if at anytime this occurs again. I am sorry the patient had to endure this.  I am thinking communication could have been better with the staff you were talking to.  Unfortunately, nothing come to the referral department since Dec and we requested records at that time.       Thank you,    Lisa Vazquez, RN, [facility]   Referral Coordinator    ----- Message -----  From: Angeline Liu  Sent: 6/19/2020   3:09 PM CDT  To: [facility] Liver Referral Pool  Subject: Hepatology Appt                                  Dr. Radford has put in a referral for patient to be scheduled with Hepatology. Please call patient to schedule.      -Message from Dr. Walton Staff -   "Spoke with Hugo at Hepatology switch board who advises that he cannot schedule but can send a message to the above dept who will schedule the pt.     Explained to please add to the message to we have requested the records that Hepatology advised that were needed in order to see the pt.     Advised that the records have been received several times and have been scanned in the "media tab" some of which are 100 pages in length.     Requested " "that the above information is added to the message as pt has been waiting x 7 months for this appt.     Verbalized understanding and states that he has sent the message "high priority".     Please f/u on this and advise me or the pt either way.     Thanks.     Pt has been advised as he is here in the clinic today (6/19/20)."      "

## 2020-07-09 ENCOUNTER — LAB VISIT (OUTPATIENT)
Dept: LAB | Facility: HOSPITAL | Age: 70
End: 2020-07-09
Attending: INTERNAL MEDICINE
Payer: MEDICARE

## 2020-07-09 ENCOUNTER — TELEPHONE (OUTPATIENT)
Dept: TRANSPLANT | Facility: CLINIC | Age: 70
End: 2020-07-09

## 2020-07-09 ENCOUNTER — OFFICE VISIT (OUTPATIENT)
Dept: TRANSPLANT | Facility: CLINIC | Age: 70
End: 2020-07-09
Payer: MEDICARE

## 2020-07-09 VITALS
RESPIRATION RATE: 18 BRPM | HEIGHT: 67 IN | DIASTOLIC BLOOD PRESSURE: 92 MMHG | TEMPERATURE: 98 F | HEART RATE: 74 BPM | SYSTOLIC BLOOD PRESSURE: 144 MMHG | BODY MASS INDEX: 21.18 KG/M2 | WEIGHT: 134.94 LBS | OXYGEN SATURATION: 99 %

## 2020-07-09 DIAGNOSIS — Z94.4 STATUS POST LIVER TRANSPLANTATION: Primary | ICD-10-CM

## 2020-07-09 DIAGNOSIS — T86.49 CIRRHOSIS OF TRANSPLANTED LIVER: ICD-10-CM

## 2020-07-09 DIAGNOSIS — Z94.4 STATUS POST LIVER TRANSPLANT: ICD-10-CM

## 2020-07-09 DIAGNOSIS — K74.60 CIRRHOSIS OF TRANSPLANTED LIVER: ICD-10-CM

## 2020-07-09 DIAGNOSIS — C32.9 LARYNX CANCER: ICD-10-CM

## 2020-07-09 LAB
ALBUMIN SERPL BCP-MCNC: 3.7 G/DL (ref 3.5–5.2)
ALP SERPL-CCNC: 80 U/L (ref 55–135)
ALT SERPL W/O P-5'-P-CCNC: 26 U/L (ref 10–44)
ANION GAP SERPL CALC-SCNC: 9 MMOL/L (ref 8–16)
AST SERPL-CCNC: 54 U/L (ref 10–40)
BASOPHILS # BLD AUTO: 0.02 K/UL (ref 0–0.2)
BASOPHILS NFR BLD: 0.5 % (ref 0–1.9)
BILIRUB DIRECT SERPL-MCNC: 0.3 MG/DL (ref 0.1–0.3)
BILIRUB SERPL-MCNC: 0.6 MG/DL (ref 0.1–1)
BUN SERPL-MCNC: 21 MG/DL (ref 8–23)
CALCIUM SERPL-MCNC: 9.8 MG/DL (ref 8.7–10.5)
CHLORIDE SERPL-SCNC: 103 MMOL/L (ref 95–110)
CO2 SERPL-SCNC: 28 MMOL/L (ref 23–29)
CREAT SERPL-MCNC: 1.4 MG/DL (ref 0.5–1.4)
DIFFERENTIAL METHOD: ABNORMAL
EOSINOPHIL # BLD AUTO: 0.3 K/UL (ref 0–0.5)
EOSINOPHIL NFR BLD: 8.2 % (ref 0–8)
ERYTHROCYTE [DISTWIDTH] IN BLOOD BY AUTOMATED COUNT: 11.9 % (ref 11.5–14.5)
EST. GFR  (AFRICAN AMERICAN): 58 ML/MIN/1.73 M^2
EST. GFR  (NON AFRICAN AMERICAN): 50.2 ML/MIN/1.73 M^2
GGT SERPL-CCNC: 140 U/L (ref 8–55)
GLUCOSE SERPL-MCNC: 85 MG/DL (ref 70–110)
HCT VFR BLD AUTO: 37.8 % (ref 40–54)
HGB BLD-MCNC: 12.4 G/DL (ref 14–18)
IMM GRANULOCYTES # BLD AUTO: 0.01 K/UL (ref 0–0.04)
IMM GRANULOCYTES NFR BLD AUTO: 0.3 % (ref 0–0.5)
LYMPHOCYTES # BLD AUTO: 0.9 K/UL (ref 1–4.8)
LYMPHOCYTES NFR BLD: 25 % (ref 18–48)
MCH RBC QN AUTO: 33.4 PG (ref 27–31)
MCHC RBC AUTO-ENTMCNC: 32.8 G/DL (ref 32–36)
MCV RBC AUTO: 102 FL (ref 82–98)
MONOCYTES # BLD AUTO: 0.5 K/UL (ref 0.3–1)
MONOCYTES NFR BLD: 13.5 % (ref 4–15)
NEUTROPHILS # BLD AUTO: 1.9 K/UL (ref 1.8–7.7)
NEUTROPHILS NFR BLD: 52.5 % (ref 38–73)
NRBC BLD-RTO: 0 /100 WBC
PLATELET # BLD AUTO: 156 K/UL (ref 150–350)
PMV BLD AUTO: 9.7 FL (ref 9.2–12.9)
POTASSIUM SERPL-SCNC: 4 MMOL/L (ref 3.5–5.1)
PROT SERPL-MCNC: 8.4 G/DL (ref 6–8.4)
RBC # BLD AUTO: 3.71 M/UL (ref 4.6–6.2)
SODIUM SERPL-SCNC: 140 MMOL/L (ref 136–145)
TACROLIMUS BLD-MCNC: 3.3 NG/ML (ref 5–15)
WBC # BLD AUTO: 3.64 K/UL (ref 3.9–12.7)

## 2020-07-09 PROCEDURE — 36415 COLL VENOUS BLD VENIPUNCTURE: CPT

## 2020-07-09 PROCEDURE — 80053 COMPREHEN METABOLIC PANEL: CPT

## 2020-07-09 PROCEDURE — 85025 COMPLETE CBC W/AUTO DIFF WBC: CPT

## 2020-07-09 PROCEDURE — 99999 PR PBB SHADOW E&M-EST. PATIENT-LVL III: CPT | Mod: PBBFAC,,, | Performed by: INTERNAL MEDICINE

## 2020-07-09 PROCEDURE — 99213 OFFICE O/P EST LOW 20 MIN: CPT | Mod: PBBFAC | Performed by: INTERNAL MEDICINE

## 2020-07-09 PROCEDURE — 82977 ASSAY OF GGT: CPT

## 2020-07-09 PROCEDURE — 82248 BILIRUBIN DIRECT: CPT

## 2020-07-09 PROCEDURE — 99205 OFFICE O/P NEW HI 60 MIN: CPT | Mod: S$PBB,,, | Performed by: INTERNAL MEDICINE

## 2020-07-09 PROCEDURE — 80197 ASSAY OF TACROLIMUS: CPT

## 2020-07-09 PROCEDURE — 99205 PR OFFICE/OUTPT VISIT, NEW, LEVL V, 60-74 MIN: ICD-10-PCS | Mod: S$PBB,,, | Performed by: INTERNAL MEDICINE

## 2020-07-09 PROCEDURE — 99999 PR PBB SHADOW E&M-EST. PATIENT-LVL III: ICD-10-PCS | Mod: PBBFAC,,, | Performed by: INTERNAL MEDICINE

## 2020-07-09 RX ORDER — TACROLIMUS 0.5 MG/1
0.5 CAPSULE ORAL EVERY 12 HOURS
Qty: 180 CAPSULE | Refills: 3 | Status: SHIPPED | OUTPATIENT
Start: 2020-07-09 | End: 2020-11-06

## 2020-07-09 NOTE — Clinical Note
July 19, 2020                     [facility] - [facility] Transplant  [address]  [facility] [address]  Phone: [phone]   Patient: Rocco Swain   MR Number: [id number]   YOB: 1950   Date of Visit: 7/9/2020       Dear      Thank you for referring Rocco Swain to me for evaluation. Attached you will find relevant portions of my assessment and plan of care.    If you have questions, please do not hesitate to call me. I look forward to following Rocco Swain along with you.    Sincerely,    Cornell Anton MD    Enclosure    If you would like to receive this communication electronically, please contact [email or web address] or [phone] to request Animail Link access.    Animail Link is a tool which provides read-only access to select patient information with whom you have a relationship. Its easy to use and provides real time access to review your patients record including encounter summaries, notes, results, and demographic information.    If you feel you have received this communication in error or would no longer like to receive these types of communications, please e-mail [email or web address]

## 2020-07-09 NOTE — PROGRESS NOTES
Subjective:       Patient ID: Rocco Swain is a 70 y.o. male.    Chief Complaint: No chief complaint on file.    HPI  I saw this 70 y.o. man who had an orthotopic liver transplant in November 2008 for hepatitis C cirrhosis and HCC at [facility].    He has since moved to [place].    He was cured of hepatitis C prior to liver transplant   Explanted liver showed 2 well-differentiated lesions 1.5 and 1 cm in size. He has had no evidence of recurrent HCC post transplant either.     Post op course was complicated by hepatic vein anastomotic stenosis and resultant ascites in 2010. The ascites did resolve after dilation, but recurred again in 2013 with findings of restenosis of the middle and right hepatic veins. He had a liver biopsy in March 2013 which showed steatohepatitis, stage 4/4. He has portal hypertension based on portal pressure measurement with an estimated gradient of 15 mmHg done in February 2013. His ascites was controlled with diuretics. He is on prograf for immunosuppression.     In February 2016 he was diagnosed with squamous cell carcinoma of the tongue. He was treated with chemotherapy and radiation. A feeding tube was placed. He lost 30 lbs. He completed his treatment in April 2016. He had recurrence of his cancer and in December 2016 had a total glossectomy and total laryngectomy. His g-tube was removed in March 2017.       Diagnosis    Hepatic cirrhosis    History of liver transplant    SCHAFER (nonalcoholic steatohepatitis)    Squamous cell carcinoma of base of tongue    Pharyngeal dysphagia    Severe malnutrition     Past Medical History:   Diagnosis Date    Hep C w/o coma, chronic    Hepatocellular carcinoma    History of transfusion    Hyperlipidemia    Hypothyroidism    SCHAFER (nonalcoholic steatohepatitis)    Squamous cell carcinoma of tongue 2/2016     Past Surgical History:   Procedure Laterality Date    FLAP FREE MICROVASCULAR N/A 12/19/2016   Procedure: FLAP FREE  MICROVASCULAR RIGHT THIGH FLAP ; Surgeon: Aniket Quinn MD; Location[facility] Main OR; Service: Otolaryngology; Laterality: N/A;    LARYNGECTOMY RADICAL NECK DISSECTION N/A 12/19/2016   Procedure: TRACHEOSTOMY, TOTAL GLOSSECTOMY TOTAL LARYNGECTOMY BILATERAL NECK DISSECTION ; Surgeon: Rocco Woodard MD; [facility]; Service: Otolaryngology; Laterality: N/A;    BILE DUCT STENT PLACEMENT   Recorded on: 23Nov2008    LIVER TRANSPLANTATION   Recorded on: 23Nov2008    PEG TUBE PLACEMENT    PEG TUBE REMOVAL    TONGUE BIOPSY / EXCISION       Review of Systems   Constitutional: Negative for activity change, appetite change, chills, fatigue, fever and unexpected weight change.   HENT: Negative for hearing loss.    Eyes: Negative for discharge and visual disturbance.   Respiratory: Negative for cough, chest tightness, shortness of breath and wheezing.    Cardiovascular: Negative for chest pain, palpitations and leg swelling.   Gastrointestinal: Negative for abdominal distention, abdominal pain, constipation, diarrhea and nausea.   Genitourinary: Negative for dysuria and frequency.   Musculoskeletal: Negative for arthralgias and back pain.   Skin: Negative for pallor and rash.   Neurological: Negative for dizziness, tremors, speech difficulty and headaches.   Hematological: Negative for adenopathy.   Psychiatric/Behavioral: Negative for agitation and confusion.         Lab Results   Component Value Date    ALT 26 07/09/2020    AST 54 (H) 07/09/2020     (H) 07/09/2020    ALKPHOS 80 07/09/2020    BILITOT 0.6 07/09/2020     Past Medical History:   Diagnosis Date    Basal cell carcinoma (BCC) in situ of skin 2012    3 on face, 2 on arm, removed by dermatology.    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 Underwent surgical resection of tongue and neck. s/p tracheostomy     Past Surgical History:   Procedure Laterality Date    COLONOSCOPY      LIVER TRANSPLANT  11/2008    transplanted for biopsy proven hepatocellular carcinoma,     TRACHEOSTOMY       Current Outpatient Medications   Medication Sig    levothyroxine (SYNTHROID) 88 MCG tablet Take 88 mcg by mouth once daily.    multivitamin capsule Take 1 capsule by mouth once daily.    tacrolimus (PROGRAF) 0.5 MG Cap Take 1 capsule (0.5 mg total) by mouth every 12 (twelve) hours. Or twice daily.    traZODone (DESYREL) 50 MG tablet Take 50 mg by mouth every evening.    vitamin E 400 UNIT capsule Take 400 Units by mouth once daily.    tacrolimus (PROGRAF) 0.5 MG Cap Take 1 capsule (0.5 mg total) by mouth every 12 (twelve) hours.     No current facility-administered medications for this visit.        Objective:      Physical Exam  HENT:      Head: Normocephalic.   Eyes:      Pupils: Pupils are equal, round, and reactive to light.   Neck:      Thyroid: No thyromegaly.   Cardiovascular:      Rate and Rhythm: Normal rate and regular rhythm.      Heart sounds: Normal heart sounds.   Pulmonary:      Effort: Pulmonary effort is normal.      Breath sounds: Normal breath sounds. No wheezing.   Abdominal:      General: There is no distension.      Palpations: Abdomen is soft. There is no mass.      Tenderness: There is no abdominal tenderness.   Lymphadenopathy:      Cervical: No cervical adenopathy.   Skin:     General: Skin is warm.      Findings: No erythema or rash.   Neurological:      Mental Status: He is alert and oriented to person, place, and time.   Psychiatric:         Behavior: Behavior normal.           Assessment:       1. Status post liver transplantation    2. Larynx cancer    3. Cirrhosis of transplanted liver        Plan:   Normal LFTs  Creatinine mildly elevated.  Tac level around 3.    - no medication changes today  - need to continue  imaging screening for HCC since he does have recurrent cirrhosis.      UNOS Patient Status  Functional Status: 100% - Normal, no complaints, no evidence of disease  Physical Capacity: No Limitations

## 2020-07-13 ENCOUNTER — TELEPHONE (OUTPATIENT)
Dept: TRANSPLANT | Facility: CLINIC | Age: 70
End: 2020-07-13

## 2020-07-13 DIAGNOSIS — Z94.4 LIVER TRANSPLANTED: Primary | ICD-10-CM

## 2020-07-13 NOTE — TELEPHONE ENCOUNTER
Cassi Mr. Swain,    Dr Anton reviewed your labs and they are stable. No medication changes, next labs due in three months on 10/12/2020. For our post transplant patients we do monitor labs every three months and more frequently if any abnormal labs or medication adjustments. Please let me know when you need prescription refills and I will get Dr Anton to  sign.    Thanks,  Rhea Buchanan, NADIRAN, RN, [facility]  Post Liver Transplant Coordinator  [phone]      ----- Message from Cornell Anton MD sent at 7/10/2020  2:52 PM CDT -----  Results reviewed.

## 2020-07-19 PROBLEM — T86.49 CIRRHOSIS OF TRANSPLANTED LIVER: Status: ACTIVE | Noted: 2020-07-19

## 2020-07-19 PROBLEM — Z53.9: Status: RESOLVED | Noted: 2020-01-27 | Resolved: 2020-07-19

## 2020-07-19 PROBLEM — Z94.4 STATUS POST LIVER TRANSPLANTATION: Status: ACTIVE | Noted: 2020-07-19

## 2020-07-19 PROBLEM — K74.60 CIRRHOSIS OF TRANSPLANTED LIVER: Status: ACTIVE | Noted: 2020-07-19

## 2020-07-21 ENCOUNTER — TELEPHONE (OUTPATIENT)
Dept: TRANSPLANT | Facility: CLINIC | Age: 70
End: 2020-07-21

## 2020-07-21 DIAGNOSIS — C22.0 HCC (HEPATOCELLULAR CARCINOMA): ICD-10-CM

## 2020-07-21 DIAGNOSIS — Z94.4 LIVER TRANSPLANTED: Primary | ICD-10-CM

## 2020-07-21 NOTE — TELEPHONE ENCOUNTER
Dr Sloane Peñaloza would like you to have a routine liver ultrasound every six months to monitor for any hepatocellular carcinoma (HCC). We will schedule one for 8/2020.    Thanks,  MARCK Wilkerson  Post Liver Coordinator      ----- Message from Cornell Anton MD sent at 7/19/2020  7:29 PM CDT -----  Needs 6 monthly US scans for HCC screening

## 2020-07-29 DIAGNOSIS — Z03.818 ENCOUNTER FOR OBSERVATION FOR SUSPECTED EXPOSURE TO OTHER BIOLOGICAL AGENTS RULED OUT: ICD-10-CM

## 2020-07-31 ENCOUNTER — LAB VISIT (OUTPATIENT)
Dept: SPORTS MEDICINE | Facility: CLINIC | Age: 70
End: 2020-07-31
Payer: MEDICARE

## 2020-07-31 DIAGNOSIS — Z03.818 ENCOUNTER FOR OBSERVATION FOR SUSPECTED EXPOSURE TO OTHER BIOLOGICAL AGENTS RULED OUT: ICD-10-CM

## 2020-07-31 LAB — SARS-COV-2 RNA RESP QL NAA+PROBE: NOT DETECTED

## 2020-07-31 PROCEDURE — U0003 INFECTIOUS AGENT DETECTION BY NUCLEIC ACID (DNA OR RNA); SEVERE ACUTE RESPIRATORY SYNDROME CORONAVIRUS 2 (SARS-COV-2) (CORONAVIRUS DISEASE [COVID-19]), AMPLIFIED PROBE TECHNIQUE, MAKING USE OF HIGH THROUGHPUT TECHNOLOGIES AS DESCRIBED BY CMS-2020-01-R: HCPCS

## 2020-08-03 ENCOUNTER — OFFICE VISIT (OUTPATIENT)
Dept: OTOLARYNGOLOGY | Facility: CLINIC | Age: 70
End: 2020-08-03
Payer: MEDICARE

## 2020-08-03 VITALS
DIASTOLIC BLOOD PRESSURE: 89 MMHG | WEIGHT: 135.38 LBS | BODY MASS INDEX: 21.2 KG/M2 | HEART RATE: 94 BPM | TEMPERATURE: 98 F | SYSTOLIC BLOOD PRESSURE: 154 MMHG

## 2020-08-03 DIAGNOSIS — C32.9 LARYNX CANCER: Primary | ICD-10-CM

## 2020-08-03 PROCEDURE — 99999 PR PBB SHADOW E&M-EST. PATIENT-LVL III: ICD-10-PCS | Mod: PBBFAC,,, | Performed by: OTOLARYNGOLOGY

## 2020-08-03 PROCEDURE — 99213 PR OFFICE/OUTPT VISIT, EST, LEVL III, 20-29 MIN: ICD-10-PCS | Mod: S$PBB,,, | Performed by: OTOLARYNGOLOGY

## 2020-08-03 PROCEDURE — 99213 OFFICE O/P EST LOW 20 MIN: CPT | Mod: S$PBB,,, | Performed by: OTOLARYNGOLOGY

## 2020-08-03 PROCEDURE — 99213 OFFICE O/P EST LOW 20 MIN: CPT | Mod: PBBFAC | Performed by: OTOLARYNGOLOGY

## 2020-08-03 PROCEDURE — 99999 PR PBB SHADOW E&M-EST. PATIENT-LVL III: CPT | Mod: PBBFAC,,, | Performed by: OTOLARYNGOLOGY

## 2020-08-03 NOTE — PROGRESS NOTES
Chief Complaint   Patient presents with    lump on side of neck       HPI   70 y.o. male presents status post total laryngectomy, total glossectomy and anterolateral thigh free flap reconstruction roughly 3 years ago at [facility] in [place].  He is also status post liver transplant secondary to hepatitis C.      He reports he recently noted left-sided neck pain and a swelling in left level 2 several weeks ago.  He feels it arose after he began lifting weights.    Review of Systems   Constitutional: Negative for fatigue and unexpected weight change.   HENT: Per HPI.  Eyes: Negative for visual disturbance.   Respiratory: Negative for shortness of breath, hemoptysis   Cardiovascular: Negative for chest pain and palpitations.   Musculoskeletal: Negative for decreased ROM, back pain.   Skin: Negative for rash, sunburn, itching.   Neurological: Negative for dizziness and seizures.   Hematological: Negative for adenopathy. Does not bruise/bleed easily.   Endocrine: Negative for rapid weight loss/weight gain, heat/cold intolerance.     Past Medical History   Patient Active Problem List   Diagnosis    Larynx cancer    Hepatitis C    Hypothyroid    History of laryngectomy    Status post liver transplantation    Cirrhosis of transplanted liver           Past Surgical History   Past Surgical History:   Procedure Laterality Date    COLONOSCOPY      LIVER TRANSPLANT  11/2008    transplanted for biopsy proven hepatocellular carcinoma,     TRACHEOSTOMY           Family History   History reviewed. No pertinent family history.        Social History   .  Social History     Socioeconomic History    Marital status: Single     Spouse name: Not on file    Number of children: Not on file    Years of education: Not on file    Highest education level: Not on file   Occupational History    Occupation: Retired     Comment: , notes exposures to fumes etc.  Worked on [employer] for 4 years   Social  Needs    Financial resource strain: Not on file    Food insecurity     Worry: Not on file     Inability: Not on file    Transportation needs     Medical: Not on file     Non-medical: Not on file   Tobacco Use    Smoking status: Never Smoker    Smokeless tobacco: Never Used   Substance and Sexual Activity    Alcohol use: Yes     Comment: drinks wine, 1 glass day    Drug use: Not Currently    Sexual activity: Yes     Comment: No prior history of STD    Lifestyle    Physical activity     Days per week: Not on file     Minutes per session: Not on file    Stress: Not on file   Relationships    Social connections     Talks on phone: Not on file     Gets together: Not on file     Attends Sikh service: Not on file     Active member of club or organization: Not on file     Attends meetings of clubs or organizations: Not on file     Relationship status: Not on file   Other Topics Concern    Not on file   Social History Narrative    Not on file         Allergies   Review of patient's allergies indicates:  No Known Allergies        Physical Exam     Vitals:    08/03/20 1437   BP: (!) 154/89   Pulse: 94   Temp: 98.4 °F (36.9 °C)         Body mass index is 21.2 kg/m².      General: AOx3, NAD   Respiratory:  Symmetric chest rise, normal effort  Nose: No gross nasal septal deviation. Inferior Turbinates WNL bilaterally. No septal perforation. No masses/lesions.   Oral Cavity:  Oral Tongue absent.  Free flap well incorporated into oral cavity.. Hard Palate WNL. No buccal or FOM lesions.  Oropharynx:  No masses/lesions of the posterior pharyngeal wall. Tonsillar fossa without lesions. Soft palate without masses. Midline uvula.   Neck:  Well-healed neck dissection scar is.  Stoma patent.  Firm, minimally mobile region in left level 2.  It is difficult to discern if this represents a true neck mass or fibrosis of the sternocleidomastoid muscle.  No thyromegaly or thyroid nodules.  Normal range of motion.    Face:  House Brackmann I bilaterally.     NP: No lesions of posterior wall  OP: No lesions of posterior wall or BOT. BOT is soft to palpation  Neopharynx:  No lesions.   Mirror examination was performed.        Assessment/Plan  Problem List Items Addressed This Visit        Oncology    Larynx cancer - Primary     Now with a left neck mass that may represent cervical adenopathy or fibrosis.  CT neck ordered.  Will contact him with the results.         Relevant Orders    CT Soft Tissue Neck With Contrast

## 2020-08-03 NOTE — ASSESSMENT & PLAN NOTE
Now with a left neck mass that may represent cervical adenopathy or fibrosis.  CT neck ordered.  Will contact him with the results.

## 2020-08-05 ENCOUNTER — HOSPITAL ENCOUNTER (OUTPATIENT)
Dept: RADIOLOGY | Facility: HOSPITAL | Age: 70
Discharge: HOME | End: 2020-08-05
Attending: OTOLARYNGOLOGY
Payer: MEDICARE

## 2020-08-05 DIAGNOSIS — C32.9 LARYNX CANCER: ICD-10-CM

## 2020-08-05 PROCEDURE — 25500020 PHARM REV CODE 255: Performed by: OTOLARYNGOLOGY

## 2020-08-05 PROCEDURE — 70491 CT SOFT TISSUE NECK WITH CONTRAST: ICD-10-PCS | Mod: 26,,, | Performed by: INTERNAL MEDICINE

## 2020-08-05 PROCEDURE — 70491 CT SOFT TISSUE NECK W/DYE: CPT | Mod: 26,,, | Performed by: INTERNAL MEDICINE

## 2020-08-05 PROCEDURE — 70491 CT SOFT TISSUE NECK W/DYE: CPT | Mod: TC

## 2020-08-05 RX ADMIN — IOHEXOL 75 ML: 350 INJECTION, SOLUTION INTRAVENOUS at 04:08

## 2020-08-15 ENCOUNTER — LAB VISIT (OUTPATIENT)
Dept: SPORTS MEDICINE | Facility: CLINIC | Age: 70
End: 2020-08-15
Payer: MEDICARE

## 2020-08-17 ENCOUNTER — HOSPITAL ENCOUNTER (OUTPATIENT)
Dept: RADIOLOGY | Facility: HOSPITAL | Age: 70
Discharge: HOME | End: 2020-08-17
Attending: INTERNAL MEDICINE
Payer: MEDICARE

## 2020-08-17 DIAGNOSIS — Z94.4 LIVER TRANSPLANTED: ICD-10-CM

## 2020-08-17 DIAGNOSIS — C22.0 HCC (HEPATOCELLULAR CARCINOMA): ICD-10-CM

## 2020-08-17 PROCEDURE — 76705 US LIVER TRANSPLANT POST: ICD-10-PCS | Mod: 26,XS,, | Performed by: RADIOLOGY

## 2020-08-17 PROCEDURE — 93975 VASCULAR STUDY: CPT | Mod: TC

## 2020-08-17 PROCEDURE — 76705 ECHO EXAM OF ABDOMEN: CPT | Mod: TC,59

## 2020-08-17 PROCEDURE — 76705 ECHO EXAM OF ABDOMEN: CPT | Mod: 26,XS,, | Performed by: RADIOLOGY

## 2020-08-17 PROCEDURE — 93975 VASCULAR STUDY: CPT | Mod: 26,,, | Performed by: RADIOLOGY

## 2020-08-17 PROCEDURE — 93975 US LIVER TRANSPLANT POST: ICD-10-PCS | Mod: 26,,, | Performed by: RADIOLOGY

## 2020-08-18 ENCOUNTER — OFFICE VISIT (OUTPATIENT)
Dept: OTOLARYNGOLOGY | Facility: CLINIC | Age: 70
End: 2020-08-18
Payer: MEDICARE

## 2020-08-18 VITALS
DIASTOLIC BLOOD PRESSURE: 92 MMHG | WEIGHT: 137.56 LBS | BODY MASS INDEX: 21.55 KG/M2 | HEART RATE: 90 BPM | SYSTOLIC BLOOD PRESSURE: 154 MMHG

## 2020-08-18 DIAGNOSIS — C32.9 LARYNX CANCER: Primary | ICD-10-CM

## 2020-08-18 PROCEDURE — 99499 NO LOS: ICD-10-PCS | Mod: S$PBB,,, | Performed by: OTOLARYNGOLOGY

## 2020-08-18 PROCEDURE — 10005 FNA BX W/US GDN 1ST LES: CPT | Mod: S$PBB,,, | Performed by: OTOLARYNGOLOGY

## 2020-08-18 PROCEDURE — 99999 PR PBB SHADOW E&M-EST. PATIENT-LVL III: ICD-10-PCS | Mod: PBBFAC,,, | Performed by: OTOLARYNGOLOGY

## 2020-08-18 PROCEDURE — 88173 CYTOPATH EVAL FNA REPORT: CPT | Performed by: PATHOLOGY

## 2020-08-18 PROCEDURE — 88173 CYTOPATH EVAL FNA REPORT: CPT | Mod: 26,,, | Performed by: PATHOLOGY

## 2020-08-18 PROCEDURE — 99213 OFFICE O/P EST LOW 20 MIN: CPT | Mod: PBBFAC | Performed by: OTOLARYNGOLOGY

## 2020-08-18 PROCEDURE — 10005 PR FINE NEEDLE ASP BIOPSY, W/US GUIDANCE, 1ST LESION: ICD-10-PCS | Mod: S$PBB,,, | Performed by: OTOLARYNGOLOGY

## 2020-08-18 PROCEDURE — 99999 PR PBB SHADOW E&M-EST. PATIENT-LVL III: CPT | Mod: PBBFAC,,, | Performed by: OTOLARYNGOLOGY

## 2020-08-18 PROCEDURE — 88173 PR  INTERPRETATION OF FNA SMEAR: ICD-10-PCS | Mod: 26,,, | Performed by: PATHOLOGY

## 2020-08-18 PROCEDURE — 99499 UNLISTED E&M SERVICE: CPT | Mod: S$PBB,,, | Performed by: OTOLARYNGOLOGY

## 2020-08-18 PROCEDURE — 10005 FNA BX W/US GDN 1ST LES: CPT | Mod: PBBFAC | Performed by: OTOLARYNGOLOGY

## 2020-08-18 NOTE — PROGRESS NOTES
Rocco Swain presents for a US FNA of a L neck mass.    Procedure in detail:  Ultrasound guided fine-needle aspiration    Informed consent obtained.  The overlying skin was prepped with alcohol and injected with several cc of 1% lidocaine with epinephrine.  The lesion in question was visualized utilizing ultrasound.  Fine-needle aspiration was obtained utilizing a 25 gauge needle.  The specimen was placed in Cytolyt solutions.  The patient tolerated the procedure well.

## 2020-08-21 ENCOUNTER — TELEPHONE (OUTPATIENT)
Dept: TRANSPLANT | Facility: CLINIC | Age: 70
End: 2020-08-21

## 2020-08-21 DIAGNOSIS — Z94.4 LIVER TRANSPLANTED: Primary | ICD-10-CM

## 2020-08-21 NOTE — TELEPHONE ENCOUNTER
----- Message from Corenll Anton MD sent at 8/18/2020  3:21 PM CDT -----  Results reviewed  Repeat LFTs  
Dr Sloane Peñaloza reviewed your liver ultrasound. It shows a possible small stricture in your bile duct. A stricture is a narrowing. He would like to check your liver enzymes next week to make sure they are not elevated. We are going to schedule you for Tuesday morning on 8/25/20 in the transplant clinic lab. Please call Rhea Boswell,  if you need to change your lab appointment for any reason.    Thanks,  MARCK Wilkerson  Post Liver Coordinator    
No

## 2020-08-24 LAB — FINAL PATHOLOGIC DIAGNOSIS: NORMAL

## 2020-08-25 ENCOUNTER — LAB VISIT (OUTPATIENT)
Dept: LAB | Facility: HOSPITAL | Age: 70
End: 2020-08-25
Attending: INTERNAL MEDICINE
Payer: MEDICARE

## 2020-08-25 DIAGNOSIS — C32.9 LARYNX CANCER: Primary | ICD-10-CM

## 2020-08-25 DIAGNOSIS — Z94.4 LIVER TRANSPLANTED: ICD-10-CM

## 2020-08-25 LAB
ALBUMIN SERPL BCP-MCNC: 4 G/DL (ref 3.5–5.2)
ALP SERPL-CCNC: 88 U/L (ref 55–135)
ALT SERPL W/O P-5'-P-CCNC: 33 U/L (ref 10–44)
ANION GAP SERPL CALC-SCNC: 11 MMOL/L (ref 8–16)
AST SERPL-CCNC: 65 U/L (ref 10–40)
BILIRUB DIRECT SERPL-MCNC: 0.3 MG/DL (ref 0.1–0.3)
BILIRUB SERPL-MCNC: 0.5 MG/DL (ref 0.1–1)
BUN SERPL-MCNC: 22 MG/DL (ref 8–23)
CALCIUM SERPL-MCNC: 9.8 MG/DL (ref 8.7–10.5)
CHLORIDE SERPL-SCNC: 104 MMOL/L (ref 95–110)
CO2 SERPL-SCNC: 27 MMOL/L (ref 23–29)
CREAT SERPL-MCNC: 1.4 MG/DL (ref 0.5–1.4)
EST. GFR  (AFRICAN AMERICAN): 58 ML/MIN/1.73 M^2
EST. GFR  (NON AFRICAN AMERICAN): 50.2 ML/MIN/1.73 M^2
GLUCOSE SERPL-MCNC: 104 MG/DL (ref 70–110)
POTASSIUM SERPL-SCNC: 5 MMOL/L (ref 3.5–5.1)
PROT SERPL-MCNC: 8.4 G/DL (ref 6–8.4)
SODIUM SERPL-SCNC: 142 MMOL/L (ref 136–145)

## 2020-08-25 PROCEDURE — 80053 COMPREHEN METABOLIC PANEL: CPT

## 2020-08-25 PROCEDURE — 36415 COLL VENOUS BLD VENIPUNCTURE: CPT

## 2020-08-25 PROCEDURE — 82248 BILIRUBIN DIRECT: CPT

## 2020-08-28 ENCOUNTER — TELEPHONE (OUTPATIENT)
Dept: TRANSPLANT | Facility: CLINIC | Age: 70
End: 2020-08-28

## 2020-08-28 NOTE — TELEPHONE ENCOUNTER
Cassi Swain,    Dr Anton reviewed your labs and they are stable. No medication changes. Next labs due 12/2/20. Please disregard the October date.    Thanks,  MARCK Silva----- Message from Cornell Anton MD sent at 8/26/2020  4:04 PM CDT -----  Results reviewed

## 2020-08-28 NOTE — LETTER
August 28, 2020    Rocco Swain  [address] [place] [id number]          Dear Rocco Swain:  MRN: [id number]    This is a follow up to your recent labs, your lab results were stable.  There are no medicine changes.  Please have your labs drawn again on 12/2/20.      If you cannot have your labs drawn on the scheduled date, it is your responsibility to call the transplant department to reschedule.  Please call [phone] and ask to speak to Rhea RIOS -  for all scheduling requests.     Sincerely,      Rhea Buchanan, NADIRAN, RN, CCTC  Your Liver Transplant Coordinator    [facility]  [address] [place] [id number]  [phone]

## 2020-09-02 ENCOUNTER — OFFICE VISIT (OUTPATIENT)
Dept: INTERVENTIONAL RADIOLOGY/VASCULAR | Facility: CLINIC | Age: 70
End: 2020-09-02
Payer: MEDICARE

## 2020-09-02 VITALS
BODY MASS INDEX: 21.45 KG/M2 | SYSTOLIC BLOOD PRESSURE: 142 MMHG | HEIGHT: 67 IN | HEART RATE: 99 BPM | WEIGHT: 136.69 LBS | DIASTOLIC BLOOD PRESSURE: 86 MMHG

## 2020-09-02 DIAGNOSIS — D49.9 NEOPLASM: ICD-10-CM

## 2020-09-02 DIAGNOSIS — C32.9 LARYNX CANCER: ICD-10-CM

## 2020-09-02 DIAGNOSIS — R59.1 LYMPHADENOPATHY: Primary | ICD-10-CM

## 2020-09-02 PROCEDURE — 99999 PR PBB SHADOW E&M-EST. PATIENT-LVL III: CPT | Mod: PBBFAC,,, | Performed by: FAMILY MEDICINE

## 2020-09-02 PROCEDURE — 99213 OFFICE O/P EST LOW 20 MIN: CPT | Mod: PBBFAC | Performed by: FAMILY MEDICINE

## 2020-09-02 PROCEDURE — 99203 OFFICE O/P NEW LOW 30 MIN: CPT | Mod: S$PBB,,, | Performed by: FAMILY MEDICINE

## 2020-09-02 PROCEDURE — 99999 PR PBB SHADOW E&M-EST. PATIENT-LVL III: ICD-10-PCS | Mod: PBBFAC,,, | Performed by: FAMILY MEDICINE

## 2020-09-02 PROCEDURE — 99203 PR OFFICE/OUTPT VISIT, NEW, LEVL III, 30-44 MIN: ICD-10-PCS | Mod: S$PBB,,, | Performed by: FAMILY MEDICINE

## 2020-09-02 NOTE — Clinical Note
"Thank you for referring Mr. Swain to Interventional Radiology at the [facility]. Please don't hesitate to contact us if there are any questions regarding this evaluation at [phone]. If you have any other patients for whom you would like a consultation, please place an order for "[id number]", and we will be happy to review their case.    Sincerely,  SHEILA Newton, FNP  Interventional Radiology"

## 2020-09-02 NOTE — LETTER
September 2, 2020    Rocco Swain  [address] [place] [address]  Rohit GRANDA [address]     [facility] 9th Fl  [address]  [place] [address]  Phone: [phone] PRE-PROCEDURE INSTRUCTIONS    Your procedure with Interventional Radiology is scheduled for 9/18/2020. Please arrive by 9:30am.    You must check-in and receive a wristband before going to your procedure. Your check-in location is [facility] on the second floor.    **Do not eat or drink anything between midnight and the time of your procedure. This includes gum, mints, and candy lemon drops.    **Do not smoke or drink alcoholic beverages 24 hours prior to your procedure.    **If you wear contact lenses, dentures, hearing aids, or glasses, bring a container to put them in during the procedure and give them to a family member for safekeeping.    **If you have been diagnosed with sleep apnea please bring your CPAP machine.    **If your doctor has scheduled you for an overnight stay, bring a small overnight bag with any personal items that you may need.    **Make arrangements in advance for transportation home by a responsible adult. It is not safe to drive a vehicle during the 24 hours following the procedure.    **All [facility] facilities and properties are tobacco free. Smoking is NOT allowed.    PLEASE NOTE: The procedure schedule has many variables which affect the time of your procedure. Family members should be available if your surgery time changes.    If you have any questions about these instructions call Interventional Radiology at [phone] Monday - Friday between 8:00am and 4:00pm or [phone] (ask for interventional radiology resident) for after hours.

## 2020-09-02 NOTE — NURSING
Pre-op instructions given in clinic for lymph node bx.  Pt is non-verbal (trach).  Pt gave permission to discuss pre-op instructions w/ his brother Ollie and provided contact #s. Pre-op instructions reviewed.  Patient verbalized understanding, and denies further questions.  Provided with copy of written instructions  and clinic number.

## 2020-09-02 NOTE — PROGRESS NOTES
"Subjective:       Patient ID: Rocco Swain is a 70 y.o. male.    Chief Complaint: Lymphadenopathy    Patient referred to Interventional Radiology by Dr. Myers for evaluation of a left neck lymph node. Patient has a history of orthotopic liver transplant in November 2008 for hepatitis C cirrhosis and HCC. In February 2016 he was diagnosed with squamous cell carcinoma of the tongue. He was treated with chemotherapy and radiation. He completed his treatment in April 2016. He had recurrence of his cancer and in December 2016 had a total glossectomy and total laryngectomy. Recently he began experiencing left sided pain in his neck. A CT of the neck obtained on 8/5/2020 noted "At level 2 within the neck bilaterally, there are  soft tissue density masses with central hypoattenuation likely reflective of necrotic lymph nodes.  Findings are suspicious for malignancy recurrence." FNA biopsy was done on 8/18/2020. Pathology revealed "Atypical squamous cells; Degeneration precludes further characterization."    Review of Systems   Constitutional: Negative for activity change, appetite change, chills, fatigue and fever.   Respiratory: Negative for cough, shortness of breath, wheezing and stridor.    Cardiovascular: Negative for chest pain, palpitations and leg swelling.   Gastrointestinal: Negative for abdominal distention, abdominal pain, constipation, diarrhea, nausea and vomiting.   Musculoskeletal: Positive for neck pain.         Objective:      Physical Exam  Constitutional:       General: He is not in acute distress.     Appearance: He is well-developed. He is not diaphoretic.   HENT:      Head: Normocephalic and atraumatic.   Pulmonary:      Effort: Pulmonary effort is normal. No respiratory distress.   Neurological:      Mental Status: He is alert and oriented to person, place, and time.   Psychiatric:         Behavior: Behavior normal.         Thought Content: Thought content normal.         Judgment: Judgment " normal.       CT 8/5/2020    Assessment:       1. Lymphadenopathy    2. Larynx cancer    3. Neoplasm        Plan:         Discussed case with Dr. Polanco. Explained to patient can offer deeper biopsy of lymph node. Discussed how the procedure will be performed, risks (including, but not limited to, pain, bleeding, infection, damage to nearby structures, and the need for additional procedures), benefits, possible complications, pre-post procedure expectations, and alternatives. The patient voices understanding and all questions have been answered.  The patient agrees to proceed as planned. Patient scheduled for 9/18/2020. Pre-procedure handout with clinic phone number provided. Patient will have pre-procedure labs drawn today.

## 2020-09-03 DIAGNOSIS — R59.1 LYMPHADENOPATHY: ICD-10-CM

## 2020-09-03 DIAGNOSIS — D49.9 NEOPLASM: ICD-10-CM

## 2020-09-03 DIAGNOSIS — C32.9 LARYNX CANCER: Primary | ICD-10-CM

## 2020-09-16 RX ORDER — FENTANYL CITRATE 50 UG/ML
50 INJECTION, SOLUTION INTRAMUSCULAR; INTRAVENOUS
Status: CANCELLED | OUTPATIENT
Start: 2020-09-16

## 2020-09-16 RX ORDER — MIDAZOLAM HYDROCHLORIDE 1 MG/ML
1 INJECTION INTRAMUSCULAR; INTRAVENOUS
Status: CANCELLED | OUTPATIENT
Start: 2020-09-16

## 2020-09-16 NOTE — NURSING
Pre-op call complete.     Pre procedure instructions given for lymph node bx given to pts brother Ollie per pts request in clinic. Pt instructed not to eat or drink after midnight. Allergies reviewed. Ollie to provide transport and monitor pt 8 hrs. Pt denied anticoagulation medications. Home medications reviewed with patient. Pt instructed to check in to second floor [facility] desk at 9:30 am. Pt denied postioning restrictions.  Expected length of stay reviewed.  Covid screening complete.  Pt verbalizes understanding. Questions answered.

## 2020-09-18 ENCOUNTER — HOSPITAL ENCOUNTER (OUTPATIENT)
Facility: HOSPITAL | Age: 70
Discharge: HOME | End: 2020-09-18
Attending: RADIOLOGY | Admitting: STUDENT IN AN ORGANIZED HEALTH CARE EDUCATION/TRAINING PROGRAM
Payer: MEDICARE

## 2020-09-18 VITALS
BODY MASS INDEX: 21.19 KG/M2 | TEMPERATURE: 97 F | HEART RATE: 70 BPM | SYSTOLIC BLOOD PRESSURE: 147 MMHG | RESPIRATION RATE: 13 BRPM | WEIGHT: 135 LBS | DIASTOLIC BLOOD PRESSURE: 76 MMHG | HEIGHT: 67 IN | OXYGEN SATURATION: 97 %

## 2020-09-18 DIAGNOSIS — D49.9 NEOPLASM: ICD-10-CM

## 2020-09-18 DIAGNOSIS — C32.9 LARYNX CANCER: ICD-10-CM

## 2020-09-18 DIAGNOSIS — R59.1 LYMPHADENOPATHY: ICD-10-CM

## 2020-09-18 PROCEDURE — 88342 CHG IMMUNOCYTOCHEMISTRY: ICD-10-PCS | Mod: 26,,, | Performed by: PATHOLOGY

## 2020-09-18 PROCEDURE — 88360 TUMOR IMMUNOHISTOCHEM/MANUAL: CPT | Performed by: PATHOLOGY

## 2020-09-18 PROCEDURE — 88307 TISSUE EXAM BY PATHOLOGIST: CPT | Performed by: PATHOLOGY

## 2020-09-18 PROCEDURE — 88342 IMHCHEM/IMCYTCHM 1ST ANTB: CPT | Mod: 59 | Performed by: PATHOLOGY

## 2020-09-18 PROCEDURE — 88307 PR  SURG PATH,LEVEL V: ICD-10-PCS | Mod: 26,,, | Performed by: PATHOLOGY

## 2020-09-18 PROCEDURE — 88342 IMHCHEM/IMCYTCHM 1ST ANTB: CPT | Mod: 26,,, | Performed by: PATHOLOGY

## 2020-09-18 PROCEDURE — 88341 IMHCHEM/IMCYTCHM EA ADD ANTB: CPT | Mod: 59 | Performed by: PATHOLOGY

## 2020-09-18 PROCEDURE — 88307 TISSUE EXAM BY PATHOLOGIST: CPT | Mod: 26,,, | Performed by: PATHOLOGY

## 2020-09-18 PROCEDURE — 25000003 PHARM REV CODE 250: Performed by: PHYSICIAN ASSISTANT

## 2020-09-18 PROCEDURE — 88333 PATH CONSLTJ SURG CYTO XM 1: CPT | Performed by: PATHOLOGY

## 2020-09-18 PROCEDURE — 88341 IMHCHEM/IMCYTCHM EA ADD ANTB: CPT | Mod: 26,,, | Performed by: PATHOLOGY

## 2020-09-18 PROCEDURE — 63600175 PHARM REV CODE 636 W HCPCS: Performed by: STUDENT IN AN ORGANIZED HEALTH CARE EDUCATION/TRAINING PROGRAM

## 2020-09-18 PROCEDURE — 88341 PR IHC OR ICC EACH ADD'L SINGLE ANTIBODY  STAINPR: ICD-10-PCS | Mod: 26,,, | Performed by: PATHOLOGY

## 2020-09-18 PROCEDURE — 88333 PATH CONSLTJ SURG CYTO XM 1: CPT | Mod: 26,,, | Performed by: PATHOLOGY

## 2020-09-18 PROCEDURE — 88333 PR  INTRAOPERATIVE CYTO PATH CONSULT, INITIAL SITE: ICD-10-PCS | Mod: 26,,, | Performed by: PATHOLOGY

## 2020-09-18 RX ORDER — FENTANYL CITRATE 50 UG/ML
INJECTION, SOLUTION INTRAMUSCULAR; INTRAVENOUS CODE/TRAUMA/SEDATION MEDICATION
Status: COMPLETED | OUTPATIENT
Start: 2020-09-18 | End: 2020-09-18

## 2020-09-18 RX ORDER — SODIUM CHLORIDE 9 MG/ML
INJECTION, SOLUTION INTRAVENOUS CONTINUOUS
Status: DISCONTINUED | OUTPATIENT
Start: 2020-09-18 | End: 2020-09-18 | Stop reason: HOSPADM

## 2020-09-18 RX ORDER — MIDAZOLAM HYDROCHLORIDE 1 MG/ML
INJECTION INTRAMUSCULAR; INTRAVENOUS CODE/TRAUMA/SEDATION MEDICATION
Status: COMPLETED | OUTPATIENT
Start: 2020-09-18 | End: 2020-09-18

## 2020-09-18 RX ADMIN — MIDAZOLAM HYDROCHLORIDE 1 MG: 1 INJECTION, SOLUTION INTRAMUSCULAR; INTRAVENOUS at 12:09

## 2020-09-18 RX ADMIN — FENTANYL CITRATE 50 MCG: 50 INJECTION, SOLUTION INTRAMUSCULAR; INTRAVENOUS at 12:09

## 2020-09-18 RX ADMIN — SODIUM CHLORIDE: 0.9 INJECTION, SOLUTION INTRAVENOUS at 11:09

## 2020-09-18 NOTE — PLAN OF CARE
left cervical neck lymph node biopsy complete. Pt tolerated well. VSS. No signs or symptoms of distress noted.  Dressing CDI.  Pt will be transferred to [facility] and report to be given at bedside.

## 2020-09-18 NOTE — PROCEDURES
"  Pre Op Diagnosis: neck mass  Post Op Diagnosis: Same    Procedure: Neck mass biopsy    Procedure performed by: Rosemarie    Written Informed Consent Obtained: Yes  Specimen Removed: YES 5 cores  Estimated Blood Loss: Minimal    Findings:   SUccessful biopsy of left neck mass. 5 18g cores removed.    Patient tolerated procedure well.    Cortes Houston MD ([facility])  Interventional Radiology  [phone]        "

## 2020-09-18 NOTE — PLAN OF CARE
Pt given discharge instructions and handout.  Dsg to neck CDI.  IV d/c'd with cath tip intact.  NAD noted.  Pt to [facility] entrance with transport via wheelchair.

## 2020-09-18 NOTE — DISCHARGE INSTRUCTIONS
For scheduling : Call Purnima at [phone]    For questions or concerns call: WILLI Mon-Fri 8 AM - 5 PM.   After hours call radiology resident on call: [phone]    For immediate concerns that are not emergent, you may call our radiology clinic at: [phone]

## 2020-09-18 NOTE — PLAN OF CARE
Plan of care reviewed with patient, patient verbalizes understanding. Pt arrived to 173 for left cervical neck lymph node biopsy. Pt oriented to unit and staff. Patient placed on continuous monitoring, as required by sedation policy. Name verified using two identifiers. Allergies verified.  RN to remain at bedside, continuous monitoring maintained. See flow sheets for monitoring, medication administration, and updates.

## 2020-09-18 NOTE — PLAN OF CARE
Pt arrived to [facility] bay 3 s/p cervical lymph node biopsy. Cebral angiogram.  NAD noted. Report received from Estrellita.  DSG to neck CDI.  Will continue to monitor.

## 2020-09-18 NOTE — DISCHARGE SUMMARY
"Radiology Discharge Summary      Hospital Course: No complications    Admit Date: 9/18/2020  Discharge Date: 9/18/2020     Instructions Given to Patient: Yes  Diet: Resume prior diet  Activity: activity as tolerated and no driving for today    Description of Condition on Discharge: Stable  Vital Signs (Most Recent): Temp: 96.7 °F (35.9 °C)(96.7) (09/18/20 1325)  Pulse: 70 (09/18/20 1410)  Resp: 13 (09/18/20 1410)  BP: (!) 147/76 (09/18/20 1410)  SpO2: 97 % (09/18/20 1410)    Discharge Disposition: Home    Discharge Diagnosis: Neck mass     Follow-up: as scheduled    Cortes Houston MD ([facility])  Interventional Radiology  [phone]      "

## 2020-09-18 NOTE — H&P
Radiology History & Physical      SUBJECTIVE:     Chief Complaint: Lymph node biopsy    History of Present Illness:  Rocco Swain is a 70 y.o. male with history of SCC of tongue s/p chemotherapy and radiation (Feb 2016) with recurrence and glossectomy and laryngectomy (April 2016). An obtained neck CT 8/5/2020 revealed bilateral cervical level 2 lymph nodes with necrotic appearance. FNA 8/18/2020 unable to characterize in detail due to degeneration.  Past Medical History:   Diagnosis Date    Basal cell carcinoma (BCC) in situ of skin 2012    3 on face, 2 on arm, removed by dermatology.    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 Underwent surgical resection of tongue and neck. s/p tracheostomy     Past Surgical History:   Procedure Laterality Date    COLONOSCOPY      LIVER TRANSPLANT  11/2008    transplanted for biopsy proven hepatocellular carcinoma,     TRACHEOSTOMY         Home Meds:   Prior to Admission medications    Medication Sig Start Date End Date Taking? Authorizing Provider   levothyroxine (SYNTHROID) 88 MCG tablet Take 88 mcg by mouth once daily. 1/17/20   GAURANG Walton MD   multivitamin capsule Take 1 capsule by mouth once daily.    Historical Provider   tacrolimus (PROGRAF) 0.5 MG Cap Take 1 capsule (0.5 mg total) by mouth every 12 (twelve) hours. Or twice daily. 6/19/20   GAURANG Walton MD   tacrolimus (PROGRAF) 0.5 MG Cap Take 1 capsule (0.5 mg total) by mouth every 12 (twelve) hours. 7/9/20 7/9/21  Cornell Anton MD   traZODone (DESYREL) 50 MG tablet Take 50 mg by mouth every evening.    Historical Provider   vitamin E 400 UNIT capsule Take 400 Units by mouth once daily.    Historical Provider     Anticoagulants/Antiplatelets: no anticoagulation    Allergies: Review of  patient's allergies indicates:  No Known Allergies  Sedation History:  no adverse reactions    Review of Systems:   Hematological: no known coagulopathies  Respiratory: no shortness of breath  Cardiovascular: no chest pain  Gastrointestinal: no abdominal pain  Genito-Urinary: no dysuria  Musculoskeletal: negative  Neurological: no TIA or stroke symptoms         OBJECTIVE:     Vital Signs (Most Recent)       Physical Exam:  ASA: II  Mallampati: II    General: no acute distress  Mental Status: alert and oriented to person, place and time  HEENT: normocephalic, atraumatic  Chest: unlabored breathing  Heart: regular heart rate  Abdomen: nondistended  Extremity: moves all extremities    Laboratory  Lab Results   Component Value Date    INR 1.0 09/02/2020       Lab Results   Component Value Date    WBC 5.38 09/02/2020    HGB 13.0 (L) 09/02/2020    HCT 39.9 (L) 09/02/2020     (H) 09/02/2020     09/02/2020      Lab Results   Component Value Date     08/25/2020     08/25/2020    K 5.0 08/25/2020     08/25/2020    CO2 27 08/25/2020    BUN 22 08/25/2020    CREATININE 1.4 08/25/2020    CALCIUM 9.8 08/25/2020    ALT 33 08/25/2020    AST 65 (H) 08/25/2020    ALBUMIN 4.0 08/25/2020    BILITOT 0.5 08/25/2020    BILIDIR 0.3 08/25/2020       CT 8/5/2020    Assessment:         ASSESSMENT/PLAN:     Sedation Plan: up to moderate  Patient will undergo left cervical neck lymph node biopsy.    Willie Gar, DO  PGY-II  Diagnostic and Interventional Radiology  [facility]

## 2020-09-25 ENCOUNTER — TELEPHONE (OUTPATIENT)
Dept: HEMATOLOGY/ONCOLOGY | Facility: CLINIC | Age: 70
End: 2020-09-25

## 2020-09-25 ENCOUNTER — TELEPHONE (OUTPATIENT)
Dept: OTOLARYNGOLOGY | Facility: CLINIC | Age: 70
End: 2020-09-25

## 2020-09-25 DIAGNOSIS — C32.9 LARYNX CANCER: Primary | ICD-10-CM

## 2020-09-25 RX ORDER — OXYCODONE HCL 5 MG/5 ML
5 SOLUTION, ORAL ORAL EVERY 4 HOURS PRN
Qty: 473 ML | Refills: 0 | Status: SHIPPED | OUTPATIENT
Start: 2020-09-25 | End: 2020-09-28

## 2020-09-25 NOTE — NURSING
Oncology Navigation   Intake  Date of Diagnosis: 09/18/20  Cancer Type: Head and Neck  MD Assigned: Dr. Ronald Berg  Internal / External Referral: Internal  Initial Nurse Navigator Contact: 09/25/20  Diagnosis to Initial Contact Timeline (days): 7 days  Contact Method: Individual basket  Date Worked: 09/25/20  First Appointment Available: 09/28/20  Appointment Date: 09/28/20  Schedule to Appointment Timeline (days): 3  First Available Date vs. Scheduled Date (days): 0     Treatment              HPV: Positive     Acuity      Follow Up  Follow up in about 3 days (around 9/28/2020) for initial consult.

## 2020-09-25 NOTE — TELEPHONE ENCOUNTER
Received message from  to schedule pt with medical oncology. Called & spoke with pt's brother, Ollie. Scheduled for Monday morning.

## 2020-09-28 ENCOUNTER — LAB VISIT (OUTPATIENT)
Dept: LAB | Facility: HOSPITAL | Age: 70
End: 2020-09-28
Attending: STUDENT IN AN ORGANIZED HEALTH CARE EDUCATION/TRAINING PROGRAM
Payer: MEDICARE

## 2020-09-28 ENCOUNTER — OFFICE VISIT (OUTPATIENT)
Dept: HEMATOLOGY/ONCOLOGY | Facility: CLINIC | Age: 70
End: 2020-09-28
Payer: MEDICARE

## 2020-09-28 VITALS
BODY MASS INDEX: 20.41 KG/M2 | TEMPERATURE: 98 F | SYSTOLIC BLOOD PRESSURE: 147 MMHG | HEIGHT: 68 IN | OXYGEN SATURATION: 97 % | RESPIRATION RATE: 16 BRPM | HEART RATE: 89 BPM | WEIGHT: 134.69 LBS | DIASTOLIC BLOOD PRESSURE: 85 MMHG

## 2020-09-28 DIAGNOSIS — E89.0 POSTOPERATIVE HYPOTHYROIDISM: ICD-10-CM

## 2020-09-28 DIAGNOSIS — T86.49 CIRRHOSIS OF TRANSPLANTED LIVER: ICD-10-CM

## 2020-09-28 DIAGNOSIS — C01 SQUAMOUS CELL CARCINOMA OF BASE OF TONGUE: Primary | ICD-10-CM

## 2020-09-28 DIAGNOSIS — Z90.02 HISTORY OF LARYNGECTOMY: ICD-10-CM

## 2020-09-28 DIAGNOSIS — C01 SQUAMOUS CELL CARCINOMA OF BASE OF TONGUE: ICD-10-CM

## 2020-09-28 DIAGNOSIS — C32.9 LARYNX CANCER: ICD-10-CM

## 2020-09-28 DIAGNOSIS — K74.60 CIRRHOSIS OF TRANSPLANTED LIVER: ICD-10-CM

## 2020-09-28 LAB
ALBUMIN SERPL BCP-MCNC: 3.6 G/DL (ref 3.5–5.2)
ALP SERPL-CCNC: 112 U/L (ref 55–135)
ALT SERPL W/O P-5'-P-CCNC: 50 U/L (ref 10–44)
ANION GAP SERPL CALC-SCNC: 13 MMOL/L (ref 8–16)
AST SERPL-CCNC: 111 U/L (ref 10–40)
BASOPHILS # BLD AUTO: 0.02 K/UL (ref 0–0.2)
BASOPHILS NFR BLD: 0.3 % (ref 0–1.9)
BILIRUB SERPL-MCNC: 0.7 MG/DL (ref 0.1–1)
BUN SERPL-MCNC: 19 MG/DL (ref 8–23)
CALCIUM SERPL-MCNC: 9.9 MG/DL (ref 8.7–10.5)
CHLORIDE SERPL-SCNC: 100 MMOL/L (ref 95–110)
CO2 SERPL-SCNC: 27 MMOL/L (ref 23–29)
CREAT SERPL-MCNC: 1.4 MG/DL (ref 0.5–1.4)
DIFFERENTIAL METHOD: ABNORMAL
EOSINOPHIL # BLD AUTO: 0.1 K/UL (ref 0–0.5)
EOSINOPHIL NFR BLD: 2.1 % (ref 0–8)
ERYTHROCYTE [DISTWIDTH] IN BLOOD BY AUTOMATED COUNT: 12.4 % (ref 11.5–14.5)
EST. GFR  (AFRICAN AMERICAN): 58 ML/MIN/1.73 M^2
EST. GFR  (NON AFRICAN AMERICAN): 50.2 ML/MIN/1.73 M^2
GLUCOSE SERPL-MCNC: 115 MG/DL (ref 70–110)
HCT VFR BLD AUTO: 39.2 % (ref 40–54)
HGB BLD-MCNC: 13.1 G/DL (ref 14–18)
IMM GRANULOCYTES # BLD AUTO: 0.02 K/UL (ref 0–0.04)
IMM GRANULOCYTES NFR BLD AUTO: 0.3 % (ref 0–0.5)
LYMPHOCYTES # BLD AUTO: 0.9 K/UL (ref 1–4.8)
LYMPHOCYTES NFR BLD: 14.3 % (ref 18–48)
MCH RBC QN AUTO: 33.7 PG (ref 27–31)
MCHC RBC AUTO-ENTMCNC: 33.4 G/DL (ref 32–36)
MCV RBC AUTO: 101 FL (ref 82–98)
MONOCYTES # BLD AUTO: 0.8 K/UL (ref 0.3–1)
MONOCYTES NFR BLD: 12.6 % (ref 4–15)
NEUTROPHILS # BLD AUTO: 4.4 K/UL (ref 1.8–7.7)
NEUTROPHILS NFR BLD: 70.4 % (ref 38–73)
NRBC BLD-RTO: 0 /100 WBC
PLATELET # BLD AUTO: 183 K/UL (ref 150–350)
PMV BLD AUTO: 9.6 FL (ref 9.2–12.9)
POTASSIUM SERPL-SCNC: 4.5 MMOL/L (ref 3.5–5.1)
PROT SERPL-MCNC: 8.9 G/DL (ref 6–8.4)
RBC # BLD AUTO: 3.89 M/UL (ref 4.6–6.2)
SODIUM SERPL-SCNC: 140 MMOL/L (ref 136–145)
WBC # BLD AUTO: 6.28 K/UL (ref 3.9–12.7)

## 2020-09-28 PROCEDURE — 99497 PR ADVNCD CARE PLAN 30 MIN: ICD-10-PCS | Mod: S$PBB,,, | Performed by: STUDENT IN AN ORGANIZED HEALTH CARE EDUCATION/TRAINING PROGRAM

## 2020-09-28 PROCEDURE — 99497 ADVNCD CARE PLAN 30 MIN: CPT | Mod: PBBFAC | Performed by: STUDENT IN AN ORGANIZED HEALTH CARE EDUCATION/TRAINING PROGRAM

## 2020-09-28 PROCEDURE — 85025 COMPLETE CBC W/AUTO DIFF WBC: CPT

## 2020-09-28 PROCEDURE — 99205 OFFICE O/P NEW HI 60 MIN: CPT | Mod: S$PBB,,, | Performed by: STUDENT IN AN ORGANIZED HEALTH CARE EDUCATION/TRAINING PROGRAM

## 2020-09-28 PROCEDURE — 80053 COMPREHEN METABOLIC PANEL: CPT

## 2020-09-28 PROCEDURE — 99497 ADVNCD CARE PLAN 30 MIN: CPT | Mod: S$PBB,,, | Performed by: STUDENT IN AN ORGANIZED HEALTH CARE EDUCATION/TRAINING PROGRAM

## 2020-09-28 PROCEDURE — 99999 PR PBB SHADOW E&M-EST. PATIENT-LVL IV: CPT | Mod: PBBFAC,,, | Performed by: STUDENT IN AN ORGANIZED HEALTH CARE EDUCATION/TRAINING PROGRAM

## 2020-09-28 PROCEDURE — 99214 OFFICE O/P EST MOD 30 MIN: CPT | Mod: PBBFAC | Performed by: STUDENT IN AN ORGANIZED HEALTH CARE EDUCATION/TRAINING PROGRAM

## 2020-09-28 PROCEDURE — 36415 COLL VENOUS BLD VENIPUNCTURE: CPT

## 2020-09-28 PROCEDURE — 99205 PR OFFICE/OUTPT VISIT, NEW, LEVL V, 60-74 MIN: ICD-10-PCS | Mod: S$PBB,,, | Performed by: STUDENT IN AN ORGANIZED HEALTH CARE EDUCATION/TRAINING PROGRAM

## 2020-09-28 PROCEDURE — 99999 PR PBB SHADOW E&M-EST. PATIENT-LVL IV: ICD-10-PCS | Mod: PBBFAC,,, | Performed by: STUDENT IN AN ORGANIZED HEALTH CARE EDUCATION/TRAINING PROGRAM

## 2020-09-28 RX ORDER — OXYCODONE HYDROCHLORIDE 5 MG/1
5 TABLET ORAL EVERY 6 HOURS PRN
Qty: 60 TABLET | Refills: 0 | Status: SHIPPED | OUTPATIENT
Start: 2020-09-28 | End: 2020-10-16 | Stop reason: SDUPTHER

## 2020-09-28 NOTE — PROGRESS NOTES
PATIENT: Rocco Swain  MRN: [id number]  DATE: 9/28/2020      Diagnosis:   1. Squamous cell carcinoma of base of tongue    2. Larynx cancer    3. Cirrhosis of transplanted liver    4. History of laryngectomy    5. Postoperative hypothyroidism        Chief Complaint: larynx ca      Oncologic History:    Oncologic History 1. Squamous cell carcinoma of base of tongue with recurrence    Oncologic Treatment 1. Chemoradiation completed 4/2016  2. S/p glossectomy, laryngectomy, and bilateral neck dissection for persistent/recurrent disease    Pathology P16 positive squamous cell carcinoma with basaloid features.        Subjective:    Interval History: Mr. Swain is here for new patient consultation.    He is doing well today.  Denies fevers, chills, chest pain, shortness of breath, abdominal pain, nausea, vomiting, diarrhea, or constipation.  Has neck pain rated 8/10 that comes and goes.  He stopped lifting, but neck pain frequency hasn't changed.  He has not taken the liquid roxicodone because it is expensive and he can take pills without any issues.    He lives alone and walks or rides his bike every day.  He is independent in all ADLs.  His brother lives 1 mile away from him.  He is alone at this visit.  Communication from him is 100% written.  Past Medical History:   Past Medical History:   Diagnosis Date    Basal cell carcinoma (BCC) in situ of skin 2012    3 on face, 2 on arm, removed by dermatology.    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 Underwent surgical resection of tongue and neck. s/p tracheostomy       Past Surgical HIstory:   Past Surgical History:   Procedure Laterality Date    COLONOSCOPY      LIVER TRANSPLANT  11/2008    transplanted for biopsy  proven hepatocellular carcinoma,     LYMPH NODE BIOPSY N/A 9/18/2020    Procedure: BIOPSY, LYMPH NODE;  Surgeon: Taz Diagnostic Provider;  Location: [facility] OR [address] [place] [address];  Service: General;  Laterality: N/A;  Rm 173    TRACHEOSTOMY         Family History:   Family History   Problem Relation Age of Onset    Dementia Mother        Social History:  reports that he has never smoked. He has never used smokeless tobacco. He reports current alcohol use. He reports previous drug use.    Allergies:  Review of patient's allergies indicates:  No Known Allergies    Medications:  Current Outpatient Medications   Medication Sig Dispense Refill    levothyroxine (SYNTHROID) 88 MCG tablet Take 88 mcg by mouth once daily. 90 tablet 3    multivitamin capsule Take 1 capsule by mouth once daily.      tacrolimus (PROGRAF) 0.5 MG Cap Take 1 capsule (0.5 mg total) by mouth every 12 (twelve) hours. 180 capsule 3    traZODone (DESYREL) 50 MG tablet Take 50 mg by mouth every evening.      vitamin E 400 UNIT capsule Take 400 Units by mouth once daily.      oxyCODONE (ROXICODONE) 5 MG immediate release tablet Take 1 tablet (5 mg total) by mouth every 6 (six) hours as needed for Pain. 60 tablet 0    tacrolimus (PROGRAF) 0.5 MG Cap Take 1 capsule (0.5 mg total) by mouth every 12 (twelve) hours. Or twice daily. 60 capsule 0     No current facility-administered medications for this visit.        Review of Systems   Constitutional: Negative for activity change, appetite change, chills, diaphoresis, fatigue, fever and unexpected weight change.   HENT: Negative for nosebleeds and trouble swallowing.    Eyes: Negative for visual disturbance.   Respiratory: Negative for cough, chest tightness, shortness of breath and wheezing.    Cardiovascular: Negative for chest pain and leg swelling.   Gastrointestinal: Negative for abdominal distention, abdominal pain, blood in stool, constipation, diarrhea, nausea and vomiting.   Endocrine: Negative for cold  "intolerance and heat intolerance.   Genitourinary: Negative for difficulty urinating and dysuria.   Musculoskeletal: Positive for neck pain. Negative for arthralgias and back pain.   Skin: Negative for color change.   Neurological: Negative for dizziness, weakness, light-headedness, numbness and headaches.   Hematological: Negative for adenopathy. Does not bruise/bleed easily.   Psychiatric/Behavioral: Negative for confusion.       ECOG Performance Status: 0   Objective:      Vitals:   Vitals:    09/28/20 1027   BP: (!) 147/85   Pulse: 89   Resp: 16   Temp: 97.6 °F (36.4 °C)   TempSrc: Oral   SpO2: 97%   Weight: 61.1 kg (134 lb 11.2 oz)   Height: 5' 8" (1.727 m)     BMI: Body mass index is 20.48 kg/m².    Physical Exam  Constitutional:       General: He is not in acute distress.     Appearance: Normal appearance. He is not ill-appearing.   HENT:      Head: Normocephalic and atraumatic.      Mouth/Throat:      Pharynx: No oropharyngeal exudate or posterior oropharyngeal erythema.   Eyes:      General: No scleral icterus.     Extraocular Movements: Extraocular movements intact.      Conjunctiva/sclera: Conjunctivae normal.      Pupils: Pupils are equal, round, and reactive to light.   Neck:      Musculoskeletal: Normal range of motion and neck supple.      Comments: Palpable ~4cmx2.5cm firm non mobile mass.  No overlying erythema, induration, or fluctuance.  No discharge. Mildly tender to palpation.  Cardiovascular:      Rate and Rhythm: Normal rate and regular rhythm.      Heart sounds: No murmur. No friction rub. No gallop.    Pulmonary:      Effort: Pulmonary effort is normal. No respiratory distress.      Breath sounds: No stridor. No wheezing, rhonchi or rales.   Abdominal:      General: Bowel sounds are normal. There is no distension.      Palpations: Abdomen is soft. There is no mass.      Tenderness: There is no abdominal tenderness. There is no guarding or rebound.   Musculoskeletal: Normal range of motion. "      Right lower leg: No edema.      Left lower leg: No edema.   Lymphadenopathy:      Cervical: No cervical adenopathy.      Upper Body:      Right upper body: No supraclavicular or axillary adenopathy.      Left upper body: No supraclavicular or axillary adenopathy.   Skin:     General: Skin is warm and dry.   Neurological:      General: No focal deficit present.      Mental Status: He is alert.         Laboratory Data:     Imaging:    Assessment:       1. Squamous cell carcinoma of base of tongue    2. Larynx cancer    3. Cirrhosis of transplanted liver    4. History of laryngectomy    5. Postoperative hypothyroidism           Plan:       Problem List Items Addressed This Visit        ENT    History of laryngectomy       Oncology    Larynx cancer    Overview     Status post total laryngectomy, total glossectomy and anterolateral thigh free flap reconstruction roughly 2017 at [facility] in [place] for persistent/recurrent base of tongue sq.c.c.         Squamous cell carcinoma of base of tongue - Primary    Overview     Per OSH records, initially presented in 2015 with symptoms and cT2N2c disease. page 91 of 3/25/2020 outside records clinic (media tab).  Initially presented 8/2015 with left sided odynophagia.  Treated for reflux, which did not improve.    12/11/15 he had a fiberoptic exam showing discolored mucosal area of the left base of the tongue.    1/13/16 he had persistent dysphagia, odynophagia, and new left otalgia.  MRI showed an irregular mass along the left posterior margin of the base of the tongue measuring 1.8x2.3cm with ipsilateral level 2 lymph node measuring 1.7cm with central necrosis and contralateral level 2 node measuring 1.3cm with questionable necrosis.    1/19/2016 he had direct laryngoscopy with biopsy left of midline, proximal to vallecula, which was p16 positive, invasive, with moderately to poorly differentiated sq.c.c. with basaloid features.    1/29/16 met with medical  oncology who recommended chemoxrt.    2/1/16  staging PET CT showed hypermetabolic mass at base of tongue 4x2.5x3.5cm, max SUV 24.13, hypermetabolic lymph node left neck SUV 6.77, small subcentimeter lymph node right neck max SUV 3.79 (exact size of lymph nodes not mentioned in the summary).    2/15/16 started chemoxrt to left tongue base, bilateral cervical neck levels 1b-5, treated with 6MV photons utilizing IMRT, 5000 cGy in 200 cGy daily fractions.  Cone down boost to left base of tongue with additional 2000 cGy in 200 cGy daily fractions to gross disease.  Received weekly cisplatin with radiation but had to switch to carboplatin due to worsening renal function. during this time also missed several weekly treatments of cisplatin.    4/1/2016 completed chemoxrt.   11/1/2016 Repeat biopsy of left tongue showed persistent disease.  S/p salvage glossectomy with laryngectomy,  rT4aN0, p16 positive, base of tongue squamous cell carcinoma.     page 61 of 3/25/2020 outside records clinic (media tab)  Pathology: Tonsil, left tonsil fold: reactive squamous mucosa with inflammation and degenerating skeletal muscle, no carcinoma seen.  Right and Left base of tongue:  Reactive squamous mucosa and submucosa with inflammation, no carcinoma seen.  Neck, glossectomy, laryngectomy, bilateral neck dissection:  invasive squamous cell carcinoma.  tumor site: base of tongue/hypopharynx.  Small focus of tumor is seen within the hyoid  bone.  specimen Size [id number]  tumor size [id number]  depth of invasion 20mm  TNM stage pT4a, pN0, pMx  Lymph nodes, included in all parts:  number involved 0  number examined 3.  distance of tumor from margins  positive inked margins: none  within 1mm : none  within 1-2mm: anterior-inferior    Bilateral neck dissection:  level 1: one lymph node and submandibular gland, negative for tumor  level II : two lymph nodes, negative for tumor  level III: not identified  level IV: not identified  level V: not  identified.    6/20/17 - PETCT with FDG avidity of left neck lymph node, level 3, SUV 2.9.  No other evidence of local or distant disease.  7/6/2017 - biopsy of left neck lymph node with IR.  Sample was non-diagnostic. Notes from path report: Less than optimal scan specimen with minute tissue core of stromal fibroadipose tissue.  definitive tati background is not seen.  9/28/2017 - Repeat PET CT showing mild activity SUV 3.5 (increased from 3.2 previously; size 7.8x7.4x9.5mm).  Also new findings under left pectoralis minor (mild focal increased activity, indeterminate but new since previous exam)  3/22/2018 - CT neck shows no evidence of disease and level 2b lymph node decreased to 4.9mmx4.5mmx4.9mm from 7.8x7.4x9.5mm previously.         Current Assessment & Plan     Seen by Dr. Myers 8/3/2020 for left-sided neck pain and a swelling in left level 2 several weeks ago.  He feels it arose after he began lifting weights.  CT neck 8/5/2020 : Lymph nodes At the left neck level L2 there is a heterogeneous soft tissue density with central hypoattenuation likely reflecting a necrotic lymph node measuring 1.6 x 1.4 cm in transverse dimension, 2.5 cm in craniocaudal dimension.  There is a similar appearing irregular soft tissue density at the right neck level 2 measuring 1.1 x 9.0 cm in transverse dimension and 1.5 cm craniocaudal dimension.  No other abnormal appearing or enlarged lymph nodes found.  Impression: At level 2 within the neck bilaterally, there are  soft tissue density masses with central hypoattenuation likely reflective of necrotic lymph nodes.  Findings are suspicious for malignancy recurrence.  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  -needs staging PET CT  --if local revisit possible local options although with extensive prior treatment history, unlikely.  Will present at tumor board.  --systemic options: Discussed EXTREME regimen (cis (carbo  likely for him)/5-fu/cetuximab) x6 cycles followed by maintenance cetuximab.  I think this would benefit him if he has high tumor burden where he might benefit by a more immediate response. If low tumor volume, carboplatin/paclitaxel/cetuximab would be more suitable.  Carboplatin/paclitaxel x2 cycles followed by 4 cycles carboplatin/cetuximab followed by cetuximab maintenance.  -not candidate for immunotherapy with his history of liver transplant.  -Labs today : CMP, CBC  -Labs for chemo will be: CMP, CBC, Mg, Phos  -Supportive medications: Dexamethasone 6mg BID days 2 and 3.  zofran prn and compazine prn breakthrough n/v.  -Consent obtained today           Relevant Medications    oxyCODONE (ROXICODONE) 5 MG immediate release tablet    Other Relevant Orders    Comprehensive metabolic panel    Magnesium    Phosphorus    CBC auto differential    NM PET CT Routine FDG    Care Companion Enrollment Chemotherapy (Completed)    Assign Chemotherapy Program Consent Questionaire (Completed)       Endocrine    Postoperative hypothyroidism    Current Assessment & Plan     Hypothyroidism currently on levothyroxine.  Last TSH 12/2019 WNL.  No symptoms suggestive of hypothyroidism today.  -continue levothyroxine.  -follow up with PCP to monitor TSH/fT4            GI    Cirrhosis of transplanted liver    Overview     He is also status post liver transplant secondary to hepatitis C.  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 The ascites did resolve after dilation, but recurred again in 2013 with findings of restenosis of the middle and right hepatic veins. He had a liver biopsy in March 2013 which showed steatohepatitis, stage 4/4. He has portal hypertension based on portal pressure measurement with an estimated gradient of 15 mmHg done in February 2013. His ascites was controlled with diuretics. He is on prograf for immunosuppression             Orders Placed This Encounter    Procedures    NM PET CT Routine FDG    Comprehensive metabolic panel    Magnesium    Phosphorus    CBC auto differential    Care Companion Enrollment Chemotherapy     Patient agreed to enroll in chemocare program.  Order set submitted.  Instruction to sign [facility] to [facility] provided.    Advance Care Planning I initiated the process of advance care planning today and explained the importance of this process to the patient.  Then the patient received detailed information about the importance of designating a Health Care Power of  (HCPOA). he was instructed to communicate with this person about their wishes for future healthcare, should he become sick and lose decision-making capacity. The patient has previously appointed a HCPOA. After our discussion, the patient has decided to complete a HCPOA and has appointed his brother and NAME:Ollie  I spent a total time of 21 minutes discussing this issue with the patient.       Ronald Berg MD  Hematology Oncology

## 2020-09-28 NOTE — ASSESSMENT & PLAN NOTE
Seen by Dr. Myers 8/3/2020 for left-sided neck pain and a swelling in left level 2 several weeks ago.  He feels it arose after he began lifting weights.  CT neck 8/5/2020 : Lymph nodes At the left neck level L2 there is a heterogeneous soft tissue density with central hypoattenuation likely reflecting a necrotic lymph node measuring 1.6 x 1.4 cm in transverse dimension, 2.5 cm in craniocaudal dimension.  There is a similar appearing irregular soft tissue density at the right neck level 2 measuring 1.1 x 9.0 cm in transverse dimension and 1.5 cm craniocaudal dimension.  No other abnormal appearing or enlarged lymph nodes found.  Impression: At level 2 within the neck bilaterally, there are  soft tissue density masses with central hypoattenuation likely reflective of necrotic lymph nodes.  Findings are suspicious for malignancy recurrence.  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  -needs staging PET CT  --if local revisit possible local options although with extensive prior treatment history, unlikely.  Will present at tumor board.  --systemic options: Discussed EXTREME regimen (cis (carbo likely for him)/5-fu/cetuximab) x6 cycles followed by maintenance cetuximab.  I think this would benefit him if he has high tumor burden where he might benefit by a more immediate response. If low tumor volume, carboplatin/paclitaxel/cetuximab would be more suitable.  Carboplatin/paclitaxel x2 cycles followed by 4 cycles carboplatin/cetuximab followed by cetuximab maintenance.  -not candidate for immunotherapy with his history of liver transplant.  -Labs today : CMP, CBC  -Labs for chemo will be: CMP, CBC, Mg, Phos  -Supportive medications: Dexamethasone 6mg BID days 2 and 3.  zofran prn and compazine prn breakthrough n/v.  -Consent obtained today

## 2020-09-28 NOTE — ASSESSMENT & PLAN NOTE
Seen by Dr. Myers 8/3/2020 for left-sided neck pain and a swelling in left level 2 several weeks ago.  He feels it arose after he began lifting weights.  CT neck 8/5/2020 : Lymph nodes At the left neck level L2 there is a heterogeneous soft tissue density with central hypoattenuation likely reflecting a necrotic lymph node measuring 1.6 x 1.4 cm in transverse dimension, 2.5 cm in craniocaudal dimension.  There is a similar appearing irregular soft tissue density at the right neck level 2 measuring 1.1 x 9.0 cm in transverse dimension and 1.5 cm craniocaudal dimension.  No other abnormal appearing or enlarged lymph nodes found.  Impression: At level 2 within the neck bilaterally, there are  soft tissue density masses with central hypoattenuation likely reflective of necrotic lymph nodes.  Findings are suspicious for malignancy recurrence.  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  -needs staging PET CT  --if local revisit possible local options although with extensive prior treatment history, unlikely.  Will present at tumor board.  --systemic options: EXTREME regimen (cis/5-fu/cetuximab) x6 cycles followed by maintenance cetuximab  -not candidate for immunotherapy with his history of liver transplant.  -Labs today : CMP, CBC, Mag, Phos

## 2020-09-28 NOTE — LETTER
September 28, 2020      Gerry Myers MD  [address]  [facility] [address]           [facility] 3rd Fl  [address]  [facility] [address]  Phone: [phone]          Patient: Rocco Swain   MR Number: [id number]   YOB: 1950   Date of Visit: 9/28/2020       Dear Dr. Gerry Myers:    Thank you for referring Rocco Swain to me for evaluation. Attached you will find relevant portions of my assessment and plan of care.    If you have questions, please do not hesitate to call me. I look forward to following Rocco Swain along with you.    Sincerely,    Ronald Berg MD    Enclosure  CC:  No Recipients    If you would like to receive this communication electronically, please contact [email or web address] or [phone] to request more information on [facility] access.    For providers and/or their staff who would like to refer a patient to [facility], please contact us through our one-stop-shop provider referral line, [facility] , at [phone].    If you feel you have received this communication in error or would no longer like to receive these types of communications, please e-mail [email or web address]

## 2020-09-28 NOTE — ASSESSMENT & PLAN NOTE
Hypothyroidism currently on levothyroxine.  Last TSH 12/2019 WNL.  No symptoms suggestive of hypothyroidism today.  -continue levothyroxine.  -follow up with PCP to monitor TSH/fT4

## 2020-09-30 ENCOUNTER — HOSPITAL ENCOUNTER (OUTPATIENT)
Dept: RADIOLOGY | Facility: HOSPITAL | Age: 70
Discharge: HOME | End: 2020-09-30
Attending: STUDENT IN AN ORGANIZED HEALTH CARE EDUCATION/TRAINING PROGRAM
Payer: MEDICARE

## 2020-09-30 DIAGNOSIS — C01 SQUAMOUS CELL CARCINOMA OF BASE OF TONGUE: ICD-10-CM

## 2020-09-30 PROCEDURE — 78815 PET IMAGE W/CT SKULL-THIGH: CPT | Mod: 26,PI,, | Performed by: RADIOLOGY

## 2020-09-30 PROCEDURE — A9552 F18 FDG: HCPCS

## 2020-09-30 PROCEDURE — 78815 NM PET CT ROUTINE: ICD-10-PCS | Mod: 26,PI,, | Performed by: RADIOLOGY

## 2020-09-30 PROCEDURE — 78815 PET IMAGE W/CT SKULL-THIGH: CPT | Mod: TC

## 2020-10-01 ENCOUNTER — TELEPHONE (OUTPATIENT)
Dept: HEMATOLOGY/ONCOLOGY | Facility: CLINIC | Age: 70
End: 2020-10-01

## 2020-10-01 LAB — POCT GLUCOSE: 113 MG/DL (ref 70–110)

## 2020-10-02 ENCOUNTER — PATIENT MESSAGE (OUTPATIENT)
Dept: ADMINISTRATIVE | Facility: OTHER | Age: 70
End: 2020-10-02

## 2020-10-05 ENCOUNTER — LAB VISIT (OUTPATIENT)
Dept: LAB | Facility: HOSPITAL | Age: 70
End: 2020-10-05
Attending: STUDENT IN AN ORGANIZED HEALTH CARE EDUCATION/TRAINING PROGRAM
Payer: MEDICARE

## 2020-10-05 ENCOUNTER — CLINICAL SUPPORT (OUTPATIENT)
Dept: HEMATOLOGY/ONCOLOGY | Facility: CLINIC | Age: 70
End: 2020-10-05
Payer: MEDICARE

## 2020-10-05 ENCOUNTER — OFFICE VISIT (OUTPATIENT)
Dept: HEMATOLOGY/ONCOLOGY | Facility: CLINIC | Age: 70
End: 2020-10-05
Payer: MEDICARE

## 2020-10-05 VITALS
HEART RATE: 89 BPM | HEIGHT: 68 IN | SYSTOLIC BLOOD PRESSURE: 140 MMHG | OXYGEN SATURATION: 98 % | DIASTOLIC BLOOD PRESSURE: 88 MMHG | RESPIRATION RATE: 18 BRPM | TEMPERATURE: 98 F | WEIGHT: 136.88 LBS | BODY MASS INDEX: 20.75 KG/M2

## 2020-10-05 DIAGNOSIS — C01 SQUAMOUS CELL CARCINOMA OF BASE OF TONGUE: ICD-10-CM

## 2020-10-05 DIAGNOSIS — E89.0 POSTOPERATIVE HYPOTHYROIDISM: ICD-10-CM

## 2020-10-05 DIAGNOSIS — C01 SQUAMOUS CELL CARCINOMA OF BASE OF TONGUE: Primary | ICD-10-CM

## 2020-10-05 DIAGNOSIS — Z13.9 SCREENING FOR CONDITION: Primary | ICD-10-CM

## 2020-10-05 DIAGNOSIS — Z90.02 HISTORY OF LARYNGECTOMY: ICD-10-CM

## 2020-10-05 DIAGNOSIS — Z94.4 STATUS POST LIVER TRANSPLANTATION: ICD-10-CM

## 2020-10-05 DIAGNOSIS — N18.31 STAGE 3A CHRONIC KIDNEY DISEASE: ICD-10-CM

## 2020-10-05 LAB
ALBUMIN SERPL BCP-MCNC: 3.7 G/DL (ref 3.5–5.2)
ALP SERPL-CCNC: 93 U/L (ref 55–135)
ALT SERPL W/O P-5'-P-CCNC: 29 U/L (ref 10–44)
ANION GAP SERPL CALC-SCNC: 10 MMOL/L (ref 8–16)
AST SERPL-CCNC: 49 U/L (ref 10–40)
BASOPHILS # BLD AUTO: 0.01 K/UL (ref 0–0.2)
BASOPHILS NFR BLD: 0.1 % (ref 0–1.9)
BILIRUB SERPL-MCNC: 0.7 MG/DL (ref 0.1–1)
BUN SERPL-MCNC: 18 MG/DL (ref 8–23)
CALCIUM SERPL-MCNC: 10 MG/DL (ref 8.7–10.5)
CHLORIDE SERPL-SCNC: 98 MMOL/L (ref 95–110)
CO2 SERPL-SCNC: 30 MMOL/L (ref 23–29)
CREAT SERPL-MCNC: 1.6 MG/DL (ref 0.5–1.4)
DIFFERENTIAL METHOD: ABNORMAL
EOSINOPHIL # BLD AUTO: 0.3 K/UL (ref 0–0.5)
EOSINOPHIL NFR BLD: 4 % (ref 0–8)
ERYTHROCYTE [DISTWIDTH] IN BLOOD BY AUTOMATED COUNT: 12.2 % (ref 11.5–14.5)
EST. GFR  (AFRICAN AMERICAN): 49.4 ML/MIN/1.73 M^2
EST. GFR  (NON AFRICAN AMERICAN): 42.7 ML/MIN/1.73 M^2
GLUCOSE SERPL-MCNC: 228 MG/DL (ref 70–110)
HCT VFR BLD AUTO: 37.8 % (ref 40–54)
HGB BLD-MCNC: 12.5 G/DL (ref 14–18)
IMM GRANULOCYTES # BLD AUTO: 0.01 K/UL (ref 0–0.04)
IMM GRANULOCYTES NFR BLD AUTO: 0.1 % (ref 0–0.5)
LYMPHOCYTES # BLD AUTO: 1.3 K/UL (ref 1–4.8)
LYMPHOCYTES NFR BLD: 19.8 % (ref 18–48)
MCH RBC QN AUTO: 33.6 PG (ref 27–31)
MCHC RBC AUTO-ENTMCNC: 33.1 G/DL (ref 32–36)
MCV RBC AUTO: 102 FL (ref 82–98)
MONOCYTES # BLD AUTO: 0.6 K/UL (ref 0.3–1)
MONOCYTES NFR BLD: 9.5 % (ref 4–15)
NEUTROPHILS # BLD AUTO: 4.5 K/UL (ref 1.8–7.7)
NEUTROPHILS NFR BLD: 66.5 % (ref 38–73)
NRBC BLD-RTO: 0 /100 WBC
PLATELET # BLD AUTO: 200 K/UL (ref 150–350)
PMV BLD AUTO: 9.5 FL (ref 9.2–12.9)
POTASSIUM SERPL-SCNC: 5.2 MMOL/L (ref 3.5–5.1)
PROT SERPL-MCNC: 8.4 G/DL (ref 6–8.4)
RBC # BLD AUTO: 3.72 M/UL (ref 4.6–6.2)
SARS-COV-2 RDRP RESP QL NAA+PROBE: NEGATIVE
SODIUM SERPL-SCNC: 138 MMOL/L (ref 136–145)
WBC # BLD AUTO: 6.76 K/UL (ref 3.9–12.7)

## 2020-10-05 PROCEDURE — 99214 PR OFFICE/OUTPT VISIT, EST, LEVL IV, 30-39 MIN: ICD-10-PCS | Mod: S$PBB,,, | Performed by: STUDENT IN AN ORGANIZED HEALTH CARE EDUCATION/TRAINING PROGRAM

## 2020-10-05 PROCEDURE — 99214 OFFICE O/P EST MOD 30 MIN: CPT | Mod: PBBFAC | Performed by: STUDENT IN AN ORGANIZED HEALTH CARE EDUCATION/TRAINING PROGRAM

## 2020-10-05 PROCEDURE — 85025 COMPLETE CBC W/AUTO DIFF WBC: CPT

## 2020-10-05 PROCEDURE — 80053 COMPREHEN METABOLIC PANEL: CPT

## 2020-10-05 PROCEDURE — 99999 PR PBB SHADOW E&M-EST. PATIENT-LVL IV: CPT | Mod: PBBFAC,,, | Performed by: STUDENT IN AN ORGANIZED HEALTH CARE EDUCATION/TRAINING PROGRAM

## 2020-10-05 PROCEDURE — 36415 COLL VENOUS BLD VENIPUNCTURE: CPT

## 2020-10-05 PROCEDURE — U0002 COVID-19 LAB TEST NON-CDC: HCPCS

## 2020-10-05 PROCEDURE — 99214 OFFICE O/P EST MOD 30 MIN: CPT | Mod: S$PBB,,, | Performed by: STUDENT IN AN ORGANIZED HEALTH CARE EDUCATION/TRAINING PROGRAM

## 2020-10-05 PROCEDURE — 99999 PR PBB SHADOW E&M-EST. PATIENT-LVL IV: ICD-10-PCS | Mod: PBBFAC,,, | Performed by: STUDENT IN AN ORGANIZED HEALTH CARE EDUCATION/TRAINING PROGRAM

## 2020-10-05 RX ORDER — FAMOTIDINE 10 MG/ML
20 INJECTION INTRAVENOUS
Status: CANCELLED
Start: 2020-10-06

## 2020-10-05 RX ORDER — EPINEPHRINE 0.3 MG/.3ML
0.3 INJECTION SUBCUTANEOUS ONCE AS NEEDED
Status: CANCELLED | OUTPATIENT
Start: 2020-10-06

## 2020-10-05 RX ORDER — SODIUM CHLORIDE 0.9 % (FLUSH) 0.9 %
10 SYRINGE (ML) INJECTION
Status: CANCELLED | OUTPATIENT
Start: 2020-10-06

## 2020-10-05 RX ORDER — DEXAMETHASONE 6 MG/1
TABLET ORAL
Qty: 24 TABLET | Refills: 0 | Status: SHIPPED | OUTPATIENT
Start: 2020-10-05 | End: 2020-12-07 | Stop reason: SDUPTHER

## 2020-10-05 RX ORDER — HEPARIN 100 UNIT/ML
500 SYRINGE INTRAVENOUS
Status: CANCELLED | OUTPATIENT
Start: 2020-10-06

## 2020-10-05 RX ORDER — ONDANSETRON HYDROCHLORIDE 8 MG/1
8 TABLET, FILM COATED ORAL EVERY 8 HOURS PRN
Qty: 60 TABLET | Refills: 2 | Status: SHIPPED | OUTPATIENT
Start: 2020-10-05 | End: 2021-10-05

## 2020-10-05 RX ORDER — DIPHENHYDRAMINE HYDROCHLORIDE 50 MG/ML
50 INJECTION INTRAMUSCULAR; INTRAVENOUS ONCE AS NEEDED
Status: CANCELLED | OUTPATIENT
Start: 2020-10-06

## 2020-10-05 NOTE — ASSESSMENT & PLAN NOTE
Seen by Dr. Myers 8/3/2020 for left-sided neck pain and a swelling in left level 2 several weeks ago.  He feels it arose after he began lifting weights.  CT neck 8/5/2020 : Lymph nodes At the left neck level L2 there is a heterogeneous soft tissue density with central hypoattenuation likely reflecting a necrotic lymph node measuring 1.6 x 1.4 cm in transverse dimension, 2.5 cm in craniocaudal dimension.  There is a similar appearing irregular soft tissue density at the right neck level 2 measuring 1.1 x 9.0 cm in transverse dimension and 1.5 cm craniocaudal dimension.  No other abnormal appearing or enlarged lymph nodes found.  Impression: At level 2 within the neck bilaterally, there are  soft tissue density masses with central hypoattenuation likely reflective of necrotic lymph nodes.  Findings are suspicious for malignancy recurrence.  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  Staging PET CT 9/30/2020 with left sided hypermetabolic node and right sided enlarged node without uptake.  Also has mandibular vestibule uptake, which may be another site of disease. Discussed at tumor board.  He is not a surgical or radiation candidate.  -For low tumor volume, plan for carboplatin/paclitaxel/cetuximab.  Carboplatin/paclitaxel x2 cycles followed by 4 cycles carboplatin/cetuximab followed by cetuximab maintenance.  -not candidate for immunotherapy with his history of liver transplant.  -Labs for chemo will be: CMP, CBC, Mg, Phos  -Supportive medications: Dexamethasone 6mg BID days 2 and 3 for cycles 1-6 when he is receiving carboplatin.  zofran prn.  If needed, will prescribe compazine prn breakthrough n/v.  For pain, oxycodone prn.  He currently uses 2-3x/day.  Pain adequately controlled for him, which is about a 6 out of 10.  Bowel regimen reviewed and currently he only needs stool softeners.  -Proceed with cycle 1 10/6/20.  -Return to clinic prior to cycle 2.

## 2020-10-05 NOTE — Clinical Note
1. Cancel erbitux c1d8 and c1d15 appointments.  2. Follow up with me 10/26 with cmp, cbc.  2. Schedule chemo c2d1 10/27.  Thanks -e

## 2020-10-05 NOTE — PROGRESS NOTES
PATIENT: Rocco Swain  MRN: [id number]  DATE: 10/5/2020      Diagnosis:   1. Squamous cell carcinoma of base of tongue    2. History of laryngectomy    3. Postoperative hypothyroidism    4. Status post liver transplantation    5. Stage 3a chronic kidney disease        Chief Complaint: Screening for condition, Squamous cell carcinoma of base of tongue, Neck Pain, and chronic back pain      Oncologic History:    Oncologic History 1. Squamous cell carcinoma of base of tongue with recurrence    Oncologic Treatment 1. Chemoradiation completed 4/2016  2. S/p glossectomy, laryngectomy, and bilateral neck dissection for persistent/recurrent disease    Pathology P16 positive squamous cell carcinoma with basaloid features.        Subjective:    Interval History: Mr. Swain is here for new patient consultation.    He is doing well today.  Denies fevers, chills, chest pain, shortness of breath, abdominal pain, nausea, vomiting, diarrhea.  Since starting oxycodone, his pain improved from 8/10 to 6/10, and he thinks 6/10 is tolerable.  He takes oxycodone about 2-3x/day. He treats his opioid induced constipation with stool softeners.  He is walking but has stopped bike riding.    He is alone at this visit.  Communication from him is 100% written.  Past Medical History:   Past Medical History:   Diagnosis Date    Basal cell carcinoma (BCC) in situ of skin 2012    3 on face, 2 on arm, removed by dermatology.    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 Underwent surgical resection of tongue and neck. s/p tracheostomy       Past Surgical HIstory:   Past Surgical History:   Procedure Laterality Date    COLONOSCOPY      LIVER TRANSPLANT  11/2008    transplanted for biopsy proven  hepatocellular carcinoma,     LYMPH NODE BIOPSY N/A 9/18/2020    Procedure: BIOPSY, LYMPH NODE;  Surgeon: Taz Diagnostic Provider;  Location: [facility] OR [address] [place] [address];  Service: General;  Laterality: N/A;  Rm 173    TRACHEOSTOMY         Family History:   Family History   Problem Relation Age of Onset    Dementia Mother        Social History:  reports that he has never smoked. He has never used smokeless tobacco. He reports current alcohol use. He reports previous drug use.    Allergies:  Review of patient's allergies indicates:  No Known Allergies    Medications:  Current Outpatient Medications   Medication Sig Dispense Refill    levothyroxine (SYNTHROID) 88 MCG tablet Take 88 mcg by mouth once daily. 90 tablet 3    multivitamin capsule Take 1 capsule by mouth once daily.      oxyCODONE (ROXICODONE) 5 MG immediate release tablet Take 1 tablet (5 mg total) by mouth every 6 (six) hours as needed for Pain. 60 tablet 0    tacrolimus (PROGRAF) 0.5 MG Cap Take 1 capsule (0.5 mg total) by mouth every 12 (twelve) hours. Or twice daily. 60 capsule 0    tacrolimus (PROGRAF) 0.5 MG Cap Take 1 capsule (0.5 mg total) by mouth every 12 (twelve) hours. 180 capsule 3    traZODone (DESYREL) 50 MG tablet Take 50 mg by mouth every evening.      vitamin E 400 UNIT capsule Take 400 Units by mouth once daily.      dexAMETHasone (DECADRON) 6 MG tablet Take 1 tablet twice per day on day 2 and day 3 after chemo. 24 tablet 0    ondansetron (ZOFRAN) 8 MG tablet Take 1 tablet (8 mg total) by mouth every 8 (eight) hours as needed for Nausea. 60 tablet 2     No current facility-administered medications for this visit.        Review of Systems   Constitutional: Negative for activity change, appetite change, chills, diaphoresis, fatigue, fever and unexpected weight change.   HENT: Negative for nosebleeds and trouble swallowing.    Eyes: Negative for visual disturbance.   Respiratory: Negative for cough, chest tightness, shortness of breath and  "wheezing.    Cardiovascular: Negative for chest pain and leg swelling.   Gastrointestinal: Negative for abdominal distention, abdominal pain, blood in stool, constipation, diarrhea, nausea and vomiting.   Endocrine: Negative for cold intolerance and heat intolerance.   Genitourinary: Negative for difficulty urinating and dysuria.   Musculoskeletal: Positive for neck pain (pain radiates between both sides of his neck under his jaw.). Negative for arthralgias and back pain.   Skin: Negative for color change.   Neurological: Negative for dizziness, weakness, light-headedness, numbness and headaches.   Hematological: Negative for adenopathy. Does not bruise/bleed easily.   Psychiatric/Behavioral: Negative for confusion.       ECOG Performance Status: 0   Objective:      Vitals:   Vitals:    10/05/20 0940   BP: (!) 140/88   BP Location: Left arm   Patient Position: Sitting   BP Method: Medium (Automatic)   Pulse: 89   Resp: 18   Temp: 97.7 °F (36.5 °C)   TempSrc: Oral   SpO2: 98%   Weight: 62.1 kg (136 lb 14.5 oz)   Height: 5' 8" (1.727 m)     BMI: Body mass index is 20.82 kg/m².    Physical Exam  Constitutional:       General: He is not in acute distress.     Appearance: Normal appearance. He is not ill-appearing.   HENT:      Head: Normocephalic and atraumatic.      Mouth/Throat:      Pharynx: No oropharyngeal exudate or posterior oropharyngeal erythema.   Eyes:      General: No scleral icterus.     Extraocular Movements: Extraocular movements intact.      Conjunctiva/sclera: Conjunctivae normal.      Pupils: Pupils are equal, round, and reactive to light.   Neck:      Musculoskeletal: Normal range of motion and neck supple.      Comments: Left neck: Palpable ~4cmx2.5cm firm non mobile mass.  No overlying erythema, induration, or fluctuance.  No discharge. Not tender to palpation.  Right neck: No discrete palpable mass. Firm skin.  Non-tender.  Cardiovascular:      Rate and Rhythm: Normal rate and regular rhythm.      " Heart sounds: No murmur. No friction rub. No gallop.    Pulmonary:      Effort: Pulmonary effort is normal. No respiratory distress.      Breath sounds: No stridor. No wheezing, rhonchi or rales.   Abdominal:      General: Bowel sounds are normal. There is no distension.      Palpations: Abdomen is soft. There is no mass.      Tenderness: There is no abdominal tenderness. There is no guarding or rebound.   Musculoskeletal: Normal range of motion.      Right lower leg: No edema.      Left lower leg: No edema.   Lymphadenopathy:      Upper Body:      Right upper body: No supraclavicular or axillary adenopathy.      Left upper body: No supraclavicular or axillary adenopathy.   Skin:     General: Skin is warm and dry.   Neurological:      General: No focal deficit present.      Mental Status: He is alert.         Laboratory Data:   Recent Results (from the past 168 hour(s))   Comprehensive metabolic panel    Collection Time: 09/28/20 11:52 AM   Result Value Ref Range    Sodium 140 136 - 145 mmol/L    Potassium 4.5 3.5 - 5.1 mmol/L    Chloride 100 95 - 110 mmol/L    CO2 27 23 - 29 mmol/L    Glucose 115 (H) 70 - 110 mg/dL    BUN, Bld 19 8 - 23 mg/dL    Creatinine 1.4 0.5 - 1.4 mg/dL    Calcium 9.9 8.7 - 10.5 mg/dL    Total Protein 8.9 (H) 6.0 - 8.4 g/dL    Albumin 3.6 3.5 - 5.2 g/dL    Total Bilirubin 0.7 0.1 - 1.0 mg/dL    Alkaline Phosphatase 112 55 - 135 U/L     (H) 10 - 40 U/L    ALT 50 (H) 10 - 44 U/L    Anion Gap 13 8 - 16 mmol/L    eGFR if African American 58.0 (A) >60 mL/min/1.73 m^2    eGFR if non African American 50.2 (A) >60 mL/min/1.73 m^2   CBC auto differential    Collection Time: 09/28/20 11:52 AM   Result Value Ref Range    WBC 6.28 3.90 - 12.70 K/uL    RBC 3.89 (L) 4.60 - 6.20 M/uL    Hemoglobin 13.1 (L) 14.0 - 18.0 g/dL    Hematocrit 39.2 (L) 40.0 - 54.0 %    Mean Corpuscular Volume 101 (H) 82 - 98 fL    Mean Corpuscular Hemoglobin 33.7 (H) 27.0 - 31.0 pg    Mean Corpuscular Hemoglobin Conc 33.4  32.0 - 36.0 g/dL    RDW 12.4 11.5 - 14.5 %    Platelets 183 150 - 350 K/uL    MPV 9.6 9.2 - 12.9 fL    Immature Granulocytes 0.3 0.0 - 0.5 %    Gran # (ANC) 4.4 1.8 - 7.7 K/uL    Immature Grans (Abs) 0.02 0.00 - 0.04 K/uL    Lymph # 0.9 (L) 1.0 - 4.8 K/uL    Mono # 0.8 0.3 - 1.0 K/uL    Eos # 0.1 0.0 - 0.5 K/uL    Baso # 0.02 0.00 - 0.20 K/uL    nRBC 0 0 /100 WBC    Gran% 70.4 38.0 - 73.0 %    Lymph% 14.3 (L) 18.0 - 48.0 %    Mono% 12.6 4.0 - 15.0 %    Eosinophil% 2.1 0.0 - 8.0 %    Basophil% 0.3 0.0 - 1.9 %    Differential Method Automated    POCT glucose    Collection Time: 09/30/20  2:06 PM   Result Value Ref Range    POCT Glucose 113 (H) 70 - 110 mg/dL   CBC auto differential    Collection Time: 10/05/20  9:02 AM   Result Value Ref Range    WBC 6.76 3.90 - 12.70 K/uL    RBC 3.72 (L) 4.60 - 6.20 M/uL    Hemoglobin 12.5 (L) 14.0 - 18.0 g/dL    Hematocrit 37.8 (L) 40.0 - 54.0 %    Mean Corpuscular Volume 102 (H) 82 - 98 fL    Mean Corpuscular Hemoglobin 33.6 (H) 27.0 - 31.0 pg    Mean Corpuscular Hemoglobin Conc 33.1 32.0 - 36.0 g/dL    RDW 12.2 11.5 - 14.5 %    Platelets 200 150 - 350 K/uL    MPV 9.5 9.2 - 12.9 fL    Immature Granulocytes 0.1 0.0 - 0.5 %    Gran # (ANC) 4.5 1.8 - 7.7 K/uL    Immature Grans (Abs) 0.01 0.00 - 0.04 K/uL    Lymph # 1.3 1.0 - 4.8 K/uL    Mono # 0.6 0.3 - 1.0 K/uL    Eos # 0.3 0.0 - 0.5 K/uL    Baso # 0.01 0.00 - 0.20 K/uL    nRBC 0 0 /100 WBC    Gran% 66.5 38.0 - 73.0 %    Lymph% 19.8 18.0 - 48.0 %    Mono% 9.5 4.0 - 15.0 %    Eosinophil% 4.0 0.0 - 8.0 %    Basophil% 0.1 0.0 - 1.9 %    Differential Method Automated            Imaging:    Assessment:       1. Squamous cell carcinoma of base of tongue    2. History of laryngectomy    3. Postoperative hypothyroidism    4. Status post liver transplantation    5. Stage 3a chronic kidney disease           Plan:       Problem List Items Addressed This Visit        ENT    History of laryngectomy       Renal/    Stage 3a chronic kidney  disease    Current Assessment & Plan     Has CKD stage IIIa, creatinine baseline is ~ 1.4.  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  -monitor cmp prior to each cycle            Oncology    Squamous cell carcinoma of base of tongue - Primary    Overview     Per OSH records, initially presented in 2015 with symptoms and cT2N2c disease. page 91 of 3/25/2020 outside records clinic (media tab).  Initially presented 8/2015 with left sided odynophagia.  Treated for reflux, which did not improve.    12/11/15 he had a fiberoptic exam showing discolored mucosal area of the left base of the tongue.    1/13/16 he had persistent dysphagia, odynophagia, and new left otalgia.  MRI showed an irregular mass along the left posterior margin of the base of the tongue measuring 1.8x2.3cm with ipsilateral level 2 lymph node measuring 1.7cm with central necrosis and contralateral level 2 node measuring 1.3cm with questionable necrosis.    1/19/2016 he had direct laryngoscopy with biopsy left of midline, proximal to vallecula, which was p16 positive, invasive, with moderately to poorly differentiated sq.c.c. with basaloid features.    1/29/16 met with medical oncology who recommended chemoxrt.    2/1/16  staging PET CT showed hypermetabolic mass at base of tongue 4x2.5x3.5cm, max SUV 24.13, hypermetabolic lymph node left neck SUV 6.77, small subcentimeter lymph node right neck max SUV 3.79 (exact size of lymph nodes not mentioned in the summary).    2/15/16 started chemoxrt to left tongue base, bilateral cervical neck levels 1b-5, treated with 6MV photons utilizing IMRT, 5000 cGy in 200 cGy daily fractions.  Cone down boost to left base of tongue with additional 2000 cGy in 200 cGy daily fractions to gross disease.  Received weekly cisplatin with radiation but had to switch to carboplatin due to worsening renal function. during this time also missed several weekly treatments of cisplatin.    4/1/2016 completed chemoxrt.    11/1/2016 Repeat biopsy of left tongue showed persistent disease.  S/p salvage glossectomy with laryngectomy,  rT4aN0, p16 positive, base of tongue squamous cell carcinoma.     page 61 of 3/25/2020 outside records clinic (media tab)  Pathology: Tonsil, left tonsil fold: reactive squamous mucosa with inflammation and degenerating skeletal muscle, no carcinoma seen.  Right and Left base of tongue:  Reactive squamous mucosa and submucosa with inflammation, no carcinoma seen.  Neck, glossectomy, laryngectomy, bilateral neck dissection:  invasive squamous cell carcinoma.  tumor site: base of tongue/hypopharynx.  Small focus of tumor is seen within the hyoid  bone.  specimen Size [id number]  tumor size [id number]  depth of invasion 20mm  TNM stage pT4a, pN0, pMx  Lymph nodes, included in all parts:  number involved 0  number examined 3.  distance of tumor from margins  positive inked margins: none  within 1mm : none  within 1-2mm: anterior-inferior    Bilateral neck dissection:  level 1: one lymph node and submandibular gland, negative for tumor  level II : two lymph nodes, negative for tumor  level III: not identified  level IV: not identified  level V: not identified.    6/20/17 - PETCT with FDG avidity of left neck lymph node, level 3, SUV 2.9.  No other evidence of local or distant disease.  7/6/2017 - biopsy of left neck lymph node with IR.  Sample was non-diagnostic. Notes from path report: Less than optimal scan specimen with minute tissue core of stromal fibroadipose tissue.  definitive tati background is not seen.  9/28/2017 - Repeat PET CT showing mild activity SUV 3.5 (increased from 3.2 previously; size 7.8x7.4x9.5mm).  Also new findings under left pectoralis minor (mild focal increased activity, indeterminate but new since previous exam)  3/22/2018 - CT neck shows no evidence of disease and level 2b lymph node decreased to 4.9mmx4.5mmx4.9mm from 7.8x7.4x9.5mm previously.         Current Assessment & Plan      Seen by Dr. Myers 8/3/2020 for left-sided neck pain and a swelling in left level 2 several weeks ago.  He feels it arose after he began lifting weights.  CT neck 8/5/2020 : Lymph nodes At the left neck level L2 there is a heterogeneous soft tissue density with central hypoattenuation likely reflecting a necrotic lymph node measuring 1.6 x 1.4 cm in transverse dimension, 2.5 cm in craniocaudal dimension.  There is a similar appearing irregular soft tissue density at the right neck level 2 measuring 1.1 x 9.0 cm in transverse dimension and 1.5 cm craniocaudal dimension.  No other abnormal appearing or enlarged lymph nodes found.  Impression: At level 2 within the neck bilaterally, there are  soft tissue density masses with central hypoattenuation likely reflective of necrotic lymph nodes.  Findings are suspicious for malignancy recurrence.  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  Staging PET CT 9/30/2020 with left sided hypermetabolic node and right sided enlarged node without uptake.  Also has mandibular vestibule uptake, which may be another site of disease. Discussed at tumor board.  He is not a surgical or radiation candidate.  -For low tumor volume, plan for carboplatin/paclitaxel/cetuximab.  Carboplatin/paclitaxel x2 cycles followed by 4 cycles carboplatin/cetuximab followed by cetuximab maintenance.  -not candidate for immunotherapy with his history of liver transplant.  -Labs for chemo will be: CMP, CBC, Mg, Phos  -Supportive medications: Dexamethasone 6mg BID days 2 and 3 for cycles 1-6 when he is receiving carboplatin.  zofran prn.  If needed, will prescribe compazine prn breakthrough n/v.  For pain, oxycodone prn.  He currently uses 2-3x/day.  Pain adequately controlled for him, which is about a 6 out of 10.  Bowel regimen reviewed and currently he only needs stool softeners.  -Proceed with cycle 1 10/6/20.  -Return to clinic prior to cycle 2.            Relevant Medications    dexAMETHasone (DECADRON) 6 MG tablet    ondansetron (ZOFRAN) 8 MG tablet       Endocrine    Postoperative hypothyroidism    Current Assessment & Plan     Hypothyroidism currently on levothyroxine.  Last TSH 12/2019 WNL.  No symptoms suggestive of hypothyroidism today.  -continue levothyroxine.  -follow up with PCP to monitor TSH/fT4            GI    Status post liver transplantation    Relevant Medications    dexAMETHasone (DECADRON) 6 MG tablet        No orders of the defined types were placed in this encounter.      Advance Care Planning I initiated the process of advance care planning at his initial visit and explained the importance of this process to the patient.  Then the patient received detailed information about the importance of designating a Health Care Power of  (HCPOA). he was instructed to communicate with this person about their wishes for future healthcare, should he become sick and lose decision-making capacity. The patient has previously appointed a HCPOA. After our discussion, the patient has decided to complete a HCPOA and has appointed his brother and NAME:Ollie He returned the ACP packet to be uploaded.        Ronald Berg MD  Hematology Oncology

## 2020-10-05 NOTE — ASSESSMENT & PLAN NOTE
Has CKD stage IIIa, creatinine baseline is ~ 1.4.  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  -monitor cmp prior to each cycle

## 2020-10-06 ENCOUNTER — INFUSION (OUTPATIENT)
Dept: INFUSION THERAPY | Facility: HOSPITAL | Age: 70
End: 2020-10-06
Attending: INTERNAL MEDICINE
Payer: MEDICARE

## 2020-10-06 VITALS
DIASTOLIC BLOOD PRESSURE: 88 MMHG | RESPIRATION RATE: 18 BRPM | WEIGHT: 136.88 LBS | TEMPERATURE: 97 F | BODY MASS INDEX: 20.75 KG/M2 | HEIGHT: 68 IN | HEART RATE: 76 BPM | SYSTOLIC BLOOD PRESSURE: 161 MMHG

## 2020-10-06 DIAGNOSIS — Z94.4 LIVER TRANSPLANTED: Primary | ICD-10-CM

## 2020-10-06 DIAGNOSIS — C01 SQUAMOUS CELL CARCINOMA OF BASE OF TONGUE: Primary | ICD-10-CM

## 2020-10-06 PROCEDURE — 63600175 PHARM REV CODE 636 W HCPCS: Performed by: STUDENT IN AN ORGANIZED HEALTH CARE EDUCATION/TRAINING PROGRAM

## 2020-10-06 PROCEDURE — 63600175 PHARM REV CODE 636 W HCPCS: Performed by: INTERNAL MEDICINE

## 2020-10-06 PROCEDURE — S0028 INJECTION, FAMOTIDINE, 20 MG: HCPCS | Performed by: STUDENT IN AN ORGANIZED HEALTH CARE EDUCATION/TRAINING PROGRAM

## 2020-10-06 PROCEDURE — 25000003 PHARM REV CODE 250: Performed by: STUDENT IN AN ORGANIZED HEALTH CARE EDUCATION/TRAINING PROGRAM

## 2020-10-06 PROCEDURE — 96413 CHEMO IV INFUSION 1 HR: CPT

## 2020-10-06 PROCEDURE — 96417 CHEMO IV INFUS EACH ADDL SEQ: CPT

## 2020-10-06 PROCEDURE — 96367 TX/PROPH/DG ADDL SEQ IV INF: CPT

## 2020-10-06 PROCEDURE — 96375 TX/PRO/DX INJ NEW DRUG ADDON: CPT

## 2020-10-06 PROCEDURE — 96415 CHEMO IV INFUSION ADDL HR: CPT

## 2020-10-06 PROCEDURE — 25000003 PHARM REV CODE 250: Performed by: INTERNAL MEDICINE

## 2020-10-06 RX ORDER — EPINEPHRINE 0.3 MG/.3ML
0.3 INJECTION SUBCUTANEOUS ONCE AS NEEDED
Status: DISCONTINUED | OUTPATIENT
Start: 2020-10-06 | End: 2020-10-06 | Stop reason: HOSPADM

## 2020-10-06 RX ORDER — SODIUM CHLORIDE 0.9 % (FLUSH) 0.9 %
10 SYRINGE (ML) INJECTION
Status: DISCONTINUED | OUTPATIENT
Start: 2020-10-06 | End: 2020-10-06 | Stop reason: HOSPADM

## 2020-10-06 RX ORDER — FAMOTIDINE 10 MG/ML
20 INJECTION INTRAVENOUS
Status: COMPLETED | OUTPATIENT
Start: 2020-10-06 | End: 2020-10-06

## 2020-10-06 RX ORDER — DIPHENHYDRAMINE HYDROCHLORIDE 50 MG/ML
50 INJECTION INTRAMUSCULAR; INTRAVENOUS ONCE AS NEEDED
Status: DISCONTINUED | OUTPATIENT
Start: 2020-10-06 | End: 2020-10-06 | Stop reason: HOSPADM

## 2020-10-06 RX ORDER — HEPARIN 100 UNIT/ML
500 SYRINGE INTRAVENOUS
Status: DISCONTINUED | OUTPATIENT
Start: 2020-10-06 | End: 2020-10-06 | Stop reason: HOSPADM

## 2020-10-06 RX ADMIN — FAMOTIDINE 20 MG: 10 INJECTION INTRAVENOUS at 08:10

## 2020-10-06 RX ADMIN — SODIUM CHLORIDE: 0.9 INJECTION, SOLUTION INTRAVENOUS at 08:10

## 2020-10-06 RX ADMIN — APREPITANT 130 MG: 130 INJECTION, EMULSION INTRAVENOUS at 09:10

## 2020-10-06 RX ADMIN — CARBOPLATIN 310 MG: 10 INJECTION INTRAVENOUS at 01:10

## 2020-10-06 RX ADMIN — PACLITAXEL 300 MG: 6 INJECTION, SOLUTION INTRAVENOUS at 09:10

## 2020-10-06 RX ADMIN — DIPHENHYDRAMINE HYDROCHLORIDE 50 MG: 50 INJECTION, SOLUTION INTRAMUSCULAR; INTRAVENOUS at 08:10

## 2020-10-06 RX ADMIN — DEXAMETHASONE SODIUM PHOSPHATE: 4 INJECTION, SOLUTION INTRA-ARTICULAR; INTRALESIONAL; INTRAMUSCULAR; INTRAVENOUS; SOFT TISSUE at 08:10

## 2020-10-06 NOTE — PLAN OF CARE
Pt tolerated Taxol/Carbo without complications. VSS. No s/s of reaction. Pt non verbal but able to write needs on paper.  Instructed to contact MD with any questions. PIV removed and AVS given to patient.

## 2020-10-14 ENCOUNTER — TELEPHONE (OUTPATIENT)
Dept: HEMATOLOGY/ONCOLOGY | Facility: CLINIC | Age: 70
End: 2020-10-14

## 2020-10-14 NOTE — TELEPHONE ENCOUNTER
Spoke with brother. He notes his brother's trach being infected. He can't answer questions from nurse about redness, drainage, odor, fever/chills, etc, as the patient is not with him.  He will reach out to the patient to obtain this information---  Nurse briefly spoke with dr borden---will defer to dr houser's team to address issues, as the patient may need to be seen in clinic, cultures, abx.    Message routed to dr houser's team

## 2020-10-14 NOTE — TELEPHONE ENCOUNTER
"----- Message from Jennifer Hernandez sent at 10/14/2020 12:06 PM CDT -----  Consult/Advisory:    Name Of Caller: Ollie Brynn (brother)  Contact Preference?: [phone]    What is the nature of the call?:  Pt has an infection in his trachea.  Please contact to discuss     Additional Notes:  "Thank you for all that you do for our patients'"           "

## 2020-10-15 ENCOUNTER — PATIENT MESSAGE (OUTPATIENT)
Dept: OTOLARYNGOLOGY | Facility: CLINIC | Age: 70
End: 2020-10-15

## 2020-10-15 ENCOUNTER — HOSPITAL ENCOUNTER (OUTPATIENT)
Dept: RADIOLOGY | Facility: HOSPITAL | Age: 70
Discharge: HOME | End: 2020-10-15
Attending: NURSE PRACTITIONER
Payer: MEDICARE

## 2020-10-15 ENCOUNTER — OFFICE VISIT (OUTPATIENT)
Dept: OTOLARYNGOLOGY | Facility: CLINIC | Age: 70
End: 2020-10-15
Payer: MEDICARE

## 2020-10-15 VITALS
DIASTOLIC BLOOD PRESSURE: 82 MMHG | HEIGHT: 68 IN | BODY MASS INDEX: 20.61 KG/M2 | HEART RATE: 94 BPM | WEIGHT: 136 LBS | SYSTOLIC BLOOD PRESSURE: 131 MMHG

## 2020-10-15 DIAGNOSIS — C32.9 LARYNX CANCER: ICD-10-CM

## 2020-10-15 DIAGNOSIS — R04.2 HEMOPTYSIS: ICD-10-CM

## 2020-10-15 DIAGNOSIS — C01 SQUAMOUS CELL CARCINOMA OF BASE OF TONGUE: ICD-10-CM

## 2020-10-15 DIAGNOSIS — R04.2 HEMOPTYSIS: Primary | ICD-10-CM

## 2020-10-15 PROCEDURE — 99999 PR PBB SHADOW E&M-EST. PATIENT-LVL III: CPT | Mod: PBBFAC,,, | Performed by: NURSE PRACTITIONER

## 2020-10-15 PROCEDURE — 99999 PR PBB SHADOW E&M-EST. PATIENT-LVL III: ICD-10-PCS | Mod: PBBFAC,,, | Performed by: NURSE PRACTITIONER

## 2020-10-15 PROCEDURE — 99213 PR OFFICE/OUTPT VISIT, EST, LEVL III, 20-29 MIN: ICD-10-PCS | Mod: S$PBB,,, | Performed by: NURSE PRACTITIONER

## 2020-10-15 PROCEDURE — 99213 OFFICE O/P EST LOW 20 MIN: CPT | Mod: S$PBB,,, | Performed by: NURSE PRACTITIONER

## 2020-10-15 PROCEDURE — 71046 X-RAY EXAM CHEST 2 VIEWS: CPT | Mod: TC

## 2020-10-15 PROCEDURE — 71046 XR CHEST PA AND LATERAL: ICD-10-PCS | Mod: 26,,, | Performed by: RADIOLOGY

## 2020-10-15 PROCEDURE — 71046 X-RAY EXAM CHEST 2 VIEWS: CPT | Mod: 26,,, | Performed by: RADIOLOGY

## 2020-10-15 PROCEDURE — 99213 OFFICE O/P EST LOW 20 MIN: CPT | Mod: PBBFAC,25 | Performed by: NURSE PRACTITIONER

## 2020-10-15 NOTE — PROGRESS NOTES
Chief Complaint   Patient presents with    Other     redness and bleeding in lizet tube       HPI   70 y.o. male returns for a follow-up visit.  He reports that several days ago he developed bloody drainage from his stoma.  He states the blood is bright red and he frequently has clots.  He has also noticed some irritated mucosa in his mouth.  He denies any pain, cough, fever, or chills.  He did begin immunotherapy earlier in the month.  No other complaints.    Review of Systems   Constitutional: Negative for fatigue and unexpected weight change.   HENT: Per HPI.  Eyes: Negative for visual disturbance.   Respiratory: Negative for shortness of breath, hemoptysis   Cardiovascular: Negative for chest pain and palpitations.   Musculoskeletal: Negative for decreased ROM, back pain.   Skin: Negative for rash, sunburn, itching.   Neurological: Negative for dizziness and seizures.   Hematological: Negative for adenopathy. Does not bruise/bleed easily.   Endocrine: Negative for rapid weight loss/weight gain, heat/cold intolerance.     Past Medical History   Patient Active Problem List   Diagnosis    Larynx cancer    Hepatitis C    Postoperative hypothyroidism    History of laryngectomy    Status post liver transplantation    Cirrhosis of transplanted liver    Squamous cell carcinoma of base of tongue    Stage 3a chronic kidney disease           Past Surgical History   Past Surgical History:   Procedure Laterality Date    COLONOSCOPY      LIVER TRANSPLANT  11/2008    transplanted for biopsy proven hepatocellular carcinoma,     LYMPH NODE BIOPSY N/A 9/18/2020    Procedure: BIOPSY, LYMPH NODE;  Surgeon: Dosc Diagnostic Provider;  Location: [facility] OR [address] [place] [address];  Service: General;  Laterality: N/A;  Rm 173    TRACHEOSTOMY           Family History   Family History   Problem Relation Age of Onset    Dementia Mother            Social History   .  Social History     Socioeconomic History    Marital status: Single     Spouse  name: Not on file    Number of children: Not on file    Years of education: Not on file    Highest education level: Not on file   Occupational History    Occupation: Retired     Comment: , notes exposures to fumes etc.  Worked on [employer] for 4 years   Social Needs    Financial resource strain: Not on file    Food insecurity     Worry: Not on file     Inability: Not on file    Transportation needs     Medical: Not on file     Non-medical: Not on file   Tobacco Use    Smoking status: Never Smoker    Smokeless tobacco: Never Used   Substance and Sexual Activity    Alcohol use: Yes     Comment: drinks wine, 1 glass day    Drug use: Not Currently    Sexual activity: Yes     Comment: No prior history of STD    Lifestyle    Physical activity     Days per week: Not on file     Minutes per session: Not on file    Stress: Not on file   Relationships    Social connections     Talks on phone: Not on file     Gets together: Not on file     Attends [employer]ism service: Not on file     Active member of club or organization: Not on file     Attends meetings of clubs or organizations: Not on file     Relationship status: Not on file   Other Topics Concern    Not on file   Social History Narrative    Not on file         Allergies   Review of patient's allergies indicates:  No Known Allergies        Physical Exam     Vitals:    10/15/20 1402   BP: 131/82   Pulse: 94         Body mass index is 20.68 kg/m².      General: AOx3, NAD   Respiratory:  Symmetric chest rise, normal effort  Nose: No gross nasal septal deviation. Inferior Turbinates WNL bilaterally. No septal perforation. No masses/lesions.   Oral Cavity:  Oral Tongue absent.  Free flap well incorporated into oral cavity.. Hard Palate WNL. No buccal or FOM lesions.  Oropharynx:  No masses/lesions of the posterior pharyngeal wall. Tonsillar fossa without lesions. Soft palate without masses. Midline uvula.   Neck:  Well-healed neck  dissection scar is.  Stoma patent.  Dried mucosa to tracheal wall. Dried blood noted. No evidence of infection  Face: House Brackmann I bilaterally.         Assessment/Plan  Problem List Items Addressed This Visit        Oncology    Larynx cancer     No evidence of tracheitis.  We discussed that his tracheal mucosa appears dry and irritated.  This is possibly due to immunotherapy.  We discussed keeping his stoma least by wearing HMEs regularly and using a humidifier at night.  Questions answered.  Return to clinic as needed.         Squamous cell carcinoma of base of tongue      Other Visit Diagnoses     Hemoptysis    -  Primary    Relevant Orders    X-Ray Chest PA And Lateral (Completed)

## 2020-10-15 NOTE — ASSESSMENT & PLAN NOTE
No evidence of tracheitis.  We discussed that his tracheal mucosa appears dry and irritated.  This is possibly due to immunotherapy.  We discussed keeping his stoma least by wearing HMEs regularly and using a humidifier at night.  Questions answered.  Return to clinic as needed.

## 2020-10-16 ENCOUNTER — PATIENT MESSAGE (OUTPATIENT)
Dept: OTOLARYNGOLOGY | Facility: CLINIC | Age: 70
End: 2020-10-16

## 2020-10-16 DIAGNOSIS — C01 SQUAMOUS CELL CARCINOMA OF BASE OF TONGUE: ICD-10-CM

## 2020-10-16 DIAGNOSIS — M79.5 FOREIGN BODY (FB) IN SOFT TISSUE: Primary | ICD-10-CM

## 2020-10-16 RX ORDER — OXYCODONE HYDROCHLORIDE 5 MG/1
5 TABLET ORAL EVERY 8 HOURS PRN
Qty: 90 TABLET | Refills: 0 | Status: SHIPPED | OUTPATIENT
Start: 2020-10-16 | End: 2020-11-16 | Stop reason: SDUPTHER

## 2020-10-16 NOTE — TELEPHONE ENCOUNTER
----- Message from Karina Samayoa sent at 10/16/2020  2:27 PM CDT -----  Regarding: advice/refill  Type:  Needs Medical Advice    Who Called: Patient's brother  Reason for call: Would like to know if patient can get a flu shot  Would the patient rather a call back or a response via [facility]? callback  Best Call Back Number: [phone]  Additional Information:    Type:  RX Refill Request    Who Called: Patient's brother  Refill or New Rx:refill  RX Name and Strength:oxyCODONE (ROXICODONE) 5 MG immediate release tablet  How is the patient currently taking it? (ex. 1XDay):as needed  Is this a 30 day or 90 day RX:30  Preferred Pharmacy with phone number:[facility]  Local or Mail Order:local  Ordering Provider:above provider  Would the patient rather a call back or a response via [facility]? callback  Best Call Back Number:[phone]  Additional Information:

## 2020-10-21 ENCOUNTER — OFFICE VISIT (OUTPATIENT)
Dept: SURGERY | Facility: CLINIC | Age: 70
End: 2020-10-21
Payer: MEDICARE

## 2020-10-21 VITALS
SYSTOLIC BLOOD PRESSURE: 143 MMHG | DIASTOLIC BLOOD PRESSURE: 70 MMHG | TEMPERATURE: 98 F | BODY MASS INDEX: 20.68 KG/M2 | WEIGHT: 136 LBS | HEART RATE: 90 BPM | OXYGEN SATURATION: 95 %

## 2020-10-21 DIAGNOSIS — M79.5 FOREIGN BODY (FB) IN SOFT TISSUE: ICD-10-CM

## 2020-10-21 PROCEDURE — 99999 PR PBB SHADOW E&M-EST. PATIENT-LVL III: ICD-10-PCS | Mod: PBBFAC,,, | Performed by: SURGERY

## 2020-10-21 PROCEDURE — 99203 OFFICE O/P NEW LOW 30 MIN: CPT | Mod: S$PBB,,, | Performed by: SURGERY

## 2020-10-21 PROCEDURE — 99213 OFFICE O/P EST LOW 20 MIN: CPT | Mod: PBBFAC | Performed by: SURGERY

## 2020-10-21 PROCEDURE — 99999 PR PBB SHADOW E&M-EST. PATIENT-LVL III: CPT | Mod: PBBFAC,,, | Performed by: SURGERY

## 2020-10-21 PROCEDURE — 99203 PR OFFICE/OUTPT VISIT, NEW, LEVL III, 30-44 MIN: ICD-10-PCS | Mod: S$PBB,,, | Performed by: SURGERY

## 2020-10-21 NOTE — LETTER
November 9, 2020      Purnima West, NP  [address]  [facility] [address]           [facility]  [address]  [facility] [address]  Phone: [phone]          Patient: Rocco Swain   MR Number: [id number]   YOB: 1950   Date of Visit: 10/21/2020       Dear Purnima West:    Thank you for referring Rocco Swain to me for evaluation. Attached you will find relevant portions of my assessment and plan of care.    If you have questions, please do not hesitate to call me. I look forward to following Rocco Swain along with you.    Sincerely,    Hugo Sanchez MD    Enclosure  CC:  No Recipients    If you would like to receive this communication electronically, please contact [email or web address] or [phone] to request more information on [employer] Link access.    For providers and/or their staff who would like to refer a patient to [facility], please contact us through our one-stop-shop provider referral line, [facility], at [phone].    If you feel you have received this communication in error or would no longer like to receive these types of communications, please e-mail [email or web address]

## 2020-10-26 ENCOUNTER — OFFICE VISIT (OUTPATIENT)
Dept: HEMATOLOGY/ONCOLOGY | Facility: CLINIC | Age: 70
End: 2020-10-26
Payer: MEDICARE

## 2020-10-26 ENCOUNTER — LAB VISIT (OUTPATIENT)
Dept: LAB | Facility: HOSPITAL | Age: 70
End: 2020-10-26
Attending: STUDENT IN AN ORGANIZED HEALTH CARE EDUCATION/TRAINING PROGRAM
Payer: MEDICARE

## 2020-10-26 VITALS
DIASTOLIC BLOOD PRESSURE: 95 MMHG | HEART RATE: 87 BPM | TEMPERATURE: 98 F | WEIGHT: 130.94 LBS | HEIGHT: 68 IN | SYSTOLIC BLOOD PRESSURE: 128 MMHG | BODY MASS INDEX: 19.84 KG/M2 | RESPIRATION RATE: 18 BRPM | OXYGEN SATURATION: 98 %

## 2020-10-26 DIAGNOSIS — C01 SQUAMOUS CELL CARCINOMA OF BASE OF TONGUE: ICD-10-CM

## 2020-10-26 DIAGNOSIS — E87.5 HYPERKALEMIA: ICD-10-CM

## 2020-10-26 DIAGNOSIS — E89.0 POSTOPERATIVE HYPOTHYROIDISM: ICD-10-CM

## 2020-10-26 DIAGNOSIS — N18.31 STAGE 3A CHRONIC KIDNEY DISEASE: ICD-10-CM

## 2020-10-26 DIAGNOSIS — Z94.4 LIVER TRANSPLANTED: ICD-10-CM

## 2020-10-26 LAB
ALBUMIN SERPL BCP-MCNC: 3.5 G/DL (ref 3.5–5.2)
ALBUMIN SERPL BCP-MCNC: 3.5 G/DL (ref 3.5–5.2)
ALP SERPL-CCNC: 101 U/L (ref 55–135)
ALP SERPL-CCNC: 101 U/L (ref 55–135)
ALT SERPL W/O P-5'-P-CCNC: 29 U/L (ref 10–44)
ALT SERPL W/O P-5'-P-CCNC: 29 U/L (ref 10–44)
ANION GAP SERPL CALC-SCNC: 14 MMOL/L (ref 8–16)
ANION GAP SERPL CALC-SCNC: 14 MMOL/L (ref 8–16)
AST SERPL-CCNC: 49 U/L (ref 10–40)
AST SERPL-CCNC: 49 U/L (ref 10–40)
BASOPHILS # BLD AUTO: 0.03 K/UL (ref 0–0.2)
BASOPHILS # BLD AUTO: 0.03 K/UL (ref 0–0.2)
BASOPHILS NFR BLD: 0.5 % (ref 0–1.9)
BASOPHILS NFR BLD: 0.5 % (ref 0–1.9)
BILIRUB DIRECT SERPL-MCNC: 0.3 MG/DL (ref 0.1–0.3)
BILIRUB SERPL-MCNC: 0.7 MG/DL (ref 0.1–1)
BILIRUB SERPL-MCNC: 0.7 MG/DL (ref 0.1–1)
BUN SERPL-MCNC: 14 MG/DL (ref 8–23)
BUN SERPL-MCNC: 14 MG/DL (ref 8–23)
CALCIUM SERPL-MCNC: 9.4 MG/DL (ref 8.7–10.5)
CALCIUM SERPL-MCNC: 9.4 MG/DL (ref 8.7–10.5)
CHLORIDE SERPL-SCNC: 103 MMOL/L (ref 95–110)
CHLORIDE SERPL-SCNC: 103 MMOL/L (ref 95–110)
CO2 SERPL-SCNC: 25 MMOL/L (ref 23–29)
CO2 SERPL-SCNC: 25 MMOL/L (ref 23–29)
CREAT SERPL-MCNC: 1.7 MG/DL (ref 0.5–1.4)
CREAT SERPL-MCNC: 1.7 MG/DL (ref 0.5–1.4)
DIFFERENTIAL METHOD: ABNORMAL
DIFFERENTIAL METHOD: ABNORMAL
EOSINOPHIL # BLD AUTO: 0.4 K/UL (ref 0–0.5)
EOSINOPHIL # BLD AUTO: 0.4 K/UL (ref 0–0.5)
EOSINOPHIL NFR BLD: 6.4 % (ref 0–8)
EOSINOPHIL NFR BLD: 6.4 % (ref 0–8)
ERYTHROCYTE [DISTWIDTH] IN BLOOD BY AUTOMATED COUNT: 12.9 % (ref 11.5–14.5)
ERYTHROCYTE [DISTWIDTH] IN BLOOD BY AUTOMATED COUNT: 12.9 % (ref 11.5–14.5)
EST. GFR  (AFRICAN AMERICAN): 45.9 ML/MIN/1.73 M^2
EST. GFR  (AFRICAN AMERICAN): 45.9 ML/MIN/1.73 M^2
EST. GFR  (NON AFRICAN AMERICAN): 39.7 ML/MIN/1.73 M^2
EST. GFR  (NON AFRICAN AMERICAN): 39.7 ML/MIN/1.73 M^2
GLUCOSE SERPL-MCNC: 122 MG/DL (ref 70–110)
GLUCOSE SERPL-MCNC: 122 MG/DL (ref 70–110)
HCT VFR BLD AUTO: 38.2 % (ref 40–54)
HCT VFR BLD AUTO: 38.2 % (ref 40–54)
HGB BLD-MCNC: 13 G/DL (ref 14–18)
HGB BLD-MCNC: 13 G/DL (ref 14–18)
IMM GRANULOCYTES # BLD AUTO: 0.03 K/UL (ref 0–0.04)
IMM GRANULOCYTES # BLD AUTO: 0.03 K/UL (ref 0–0.04)
IMM GRANULOCYTES NFR BLD AUTO: 0.5 % (ref 0–0.5)
IMM GRANULOCYTES NFR BLD AUTO: 0.5 % (ref 0–0.5)
LYMPHOCYTES # BLD AUTO: 1.6 K/UL (ref 1–4.8)
LYMPHOCYTES # BLD AUTO: 1.6 K/UL (ref 1–4.8)
LYMPHOCYTES NFR BLD: 26.9 % (ref 18–48)
LYMPHOCYTES NFR BLD: 26.9 % (ref 18–48)
MAGNESIUM SERPL-MCNC: 1.6 MG/DL (ref 1.6–2.6)
MCH RBC QN AUTO: 34.8 PG (ref 27–31)
MCH RBC QN AUTO: 34.8 PG (ref 27–31)
MCHC RBC AUTO-ENTMCNC: 34 G/DL (ref 32–36)
MCHC RBC AUTO-ENTMCNC: 34 G/DL (ref 32–36)
MCV RBC AUTO: 102 FL (ref 82–98)
MCV RBC AUTO: 102 FL (ref 82–98)
MONOCYTES # BLD AUTO: 0.7 K/UL (ref 0.3–1)
MONOCYTES # BLD AUTO: 0.7 K/UL (ref 0.3–1)
MONOCYTES NFR BLD: 11.3 % (ref 4–15)
MONOCYTES NFR BLD: 11.3 % (ref 4–15)
NEUTROPHILS # BLD AUTO: 3.3 K/UL (ref 1.8–7.7)
NEUTROPHILS # BLD AUTO: 3.3 K/UL (ref 1.8–7.7)
NEUTROPHILS NFR BLD: 54.4 % (ref 38–73)
NEUTROPHILS NFR BLD: 54.4 % (ref 38–73)
NRBC BLD-RTO: 0 /100 WBC
NRBC BLD-RTO: 0 /100 WBC
PHOSPHATE SERPL-MCNC: 3.4 MG/DL (ref 2.7–4.5)
PLATELET # BLD AUTO: 257 K/UL (ref 150–350)
PLATELET # BLD AUTO: 257 K/UL (ref 150–350)
PMV BLD AUTO: 8.6 FL (ref 9.2–12.9)
PMV BLD AUTO: 8.6 FL (ref 9.2–12.9)
POTASSIUM SERPL-SCNC: 5.3 MMOL/L (ref 3.5–5.1)
POTASSIUM SERPL-SCNC: 5.3 MMOL/L (ref 3.5–5.1)
PROT SERPL-MCNC: 7.7 G/DL (ref 6–8.4)
PROT SERPL-MCNC: 7.7 G/DL (ref 6–8.4)
RBC # BLD AUTO: 3.74 M/UL (ref 4.6–6.2)
RBC # BLD AUTO: 3.74 M/UL (ref 4.6–6.2)
SODIUM SERPL-SCNC: 142 MMOL/L (ref 136–145)
SODIUM SERPL-SCNC: 142 MMOL/L (ref 136–145)
TACROLIMUS BLD-MCNC: 4.1 NG/ML (ref 5–15)
WBC # BLD AUTO: 6.09 K/UL (ref 3.9–12.7)
WBC # BLD AUTO: 6.09 K/UL (ref 3.9–12.7)

## 2020-10-26 PROCEDURE — 99214 OFFICE O/P EST MOD 30 MIN: CPT | Mod: S$PBB,,, | Performed by: STUDENT IN AN ORGANIZED HEALTH CARE EDUCATION/TRAINING PROGRAM

## 2020-10-26 PROCEDURE — 83735 ASSAY OF MAGNESIUM: CPT

## 2020-10-26 PROCEDURE — 80053 COMPREHEN METABOLIC PANEL: CPT

## 2020-10-26 PROCEDURE — 85025 COMPLETE CBC W/AUTO DIFF WBC: CPT

## 2020-10-26 PROCEDURE — 99214 OFFICE O/P EST MOD 30 MIN: CPT | Mod: PBBFAC | Performed by: STUDENT IN AN ORGANIZED HEALTH CARE EDUCATION/TRAINING PROGRAM

## 2020-10-26 PROCEDURE — 99999 PR PBB SHADOW E&M-EST. PATIENT-LVL IV: CPT | Mod: PBBFAC,,, | Performed by: STUDENT IN AN ORGANIZED HEALTH CARE EDUCATION/TRAINING PROGRAM

## 2020-10-26 PROCEDURE — 99999 PR PBB SHADOW E&M-EST. PATIENT-LVL IV: ICD-10-PCS | Mod: PBBFAC,,, | Performed by: STUDENT IN AN ORGANIZED HEALTH CARE EDUCATION/TRAINING PROGRAM

## 2020-10-26 PROCEDURE — 80197 ASSAY OF TACROLIMUS: CPT

## 2020-10-26 PROCEDURE — 99214 PR OFFICE/OUTPT VISIT, EST, LEVL IV, 30-39 MIN: ICD-10-PCS | Mod: S$PBB,,, | Performed by: STUDENT IN AN ORGANIZED HEALTH CARE EDUCATION/TRAINING PROGRAM

## 2020-10-26 PROCEDURE — 82248 BILIRUBIN DIRECT: CPT

## 2020-10-26 PROCEDURE — 84100 ASSAY OF PHOSPHORUS: CPT

## 2020-10-26 PROCEDURE — 36415 COLL VENOUS BLD VENIPUNCTURE: CPT

## 2020-10-26 RX ORDER — EPINEPHRINE 0.3 MG/.3ML
0.3 INJECTION SUBCUTANEOUS ONCE AS NEEDED
Status: CANCELLED | OUTPATIENT
Start: 2020-10-27

## 2020-10-26 RX ORDER — HEPARIN 100 UNIT/ML
500 SYRINGE INTRAVENOUS
Status: CANCELLED | OUTPATIENT
Start: 2020-10-27

## 2020-10-26 RX ORDER — SODIUM CHLORIDE 0.9 % (FLUSH) 0.9 %
10 SYRINGE (ML) INJECTION
Status: CANCELLED | OUTPATIENT
Start: 2020-10-27

## 2020-10-26 RX ORDER — CYPROHEPTADINE HYDROCHLORIDE 4 MG/1
4 TABLET ORAL 3 TIMES DAILY
Qty: 90 TABLET | Refills: 1 | Status: SHIPPED | OUTPATIENT
Start: 2020-10-26 | End: 2020-12-28 | Stop reason: SDDI

## 2020-10-26 RX ORDER — DIPHENHYDRAMINE HYDROCHLORIDE 50 MG/ML
50 INJECTION INTRAMUSCULAR; INTRAVENOUS ONCE AS NEEDED
Status: CANCELLED | OUTPATIENT
Start: 2020-10-27

## 2020-10-26 RX ORDER — FAMOTIDINE 10 MG/ML
20 INJECTION INTRAVENOUS
Status: CANCELLED
Start: 2020-10-27

## 2020-10-26 RX ORDER — LOPERAMIDE HYDROCHLORIDE 2 MG/1
2 CAPSULE ORAL 4 TIMES DAILY PRN
Qty: 30 CAPSULE | Refills: 1 | Status: SHIPPED | OUTPATIENT
Start: 2020-10-26 | End: 2021-08-09 | Stop reason: SDUPTHER

## 2020-10-26 NOTE — PROGRESS NOTES
PATIENT: Rocco Swain  MRN: [id number]  DATE: 10/26/2020      Diagnosis:   1. Squamous cell carcinoma of base of tongue    2. Stage 3a chronic kidney disease    3. Postoperative hypothyroidism    4. Hyperkalemia        Chief Complaint: Screening for condition      Oncologic History:    Oncologic History 1. Squamous cell carcinoma of base of tongue with recurrence    Oncologic Treatment 1. Chemoradiation completed 4/2016  2. S/p glossectomy, laryngectomy, and bilateral neck dissection for persistent/recurrent disease    Pathology P16 positive squamous cell carcinoma with basaloid features.        Subjective:    Interval History: Mr. Swain is here for follow up prior to cycle 2.    Side effects of cycle 1: fatigue, low appetite, small volume diarrhea, numbness/tingling, and myalgias.  Symptoms resolved after 1 week.  Diarrhea started 3 days ago, is watery, but small volume.  No associated abdominal pain.  It is controlled with OTC anti-diarrheals.  Denies fevers, chills, shortness of breath, or cough.  He denies nausea and only has low appetite.  Is making home made soup and drinking ensure but that is most of his daily intake.    Pain is well controlled with his oxycodone.    He is alone at this visit.  Communication from him is 100% written.  Past Medical History:   Past Medical History:   Diagnosis Date    Basal cell carcinoma (BCC) in situ of skin 2012    3 on face, 2 on arm, removed by dermatology.    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 Underwent surgical resection of tongue and neck. s/p tracheostomy       Past Surgical HIstory:   Past Surgical History:   Procedure Laterality Date    COLONOSCOPY      LIVER TRANSPLANT  11/2008    transplanted for  biopsy proven hepatocellular carcinoma,     LYMPH NODE BIOPSY N/A 9/18/2020    Procedure: BIOPSY, LYMPH NODE;  Surgeon: Taz Diagnostic Provider;  Location: [facility] OR [address] [place] [address];  Service: General;  Laterality: N/A;  Rm 173    TRACHEOSTOMY         Family History:   Family History   Problem Relation Age of Onset    Dementia Mother        Social History:  reports that he has never smoked. He has never used smokeless tobacco. He reports current alcohol use. He reports previous drug use.    Allergies:  Review of patient's allergies indicates:  No Known Allergies    Medications:  Current Outpatient Medications   Medication Sig Dispense Refill    dexAMETHasone (DECADRON) 6 MG tablet Take 1 tablet twice per day on day 2 and day 3 after chemo. 24 tablet 0    levothyroxine (SYNTHROID) 88 MCG tablet Take 88 mcg by mouth once daily. 90 tablet 3    multivitamin capsule Take 1 capsule by mouth once daily.      ondansetron (ZOFRAN) 8 MG tablet Take 1 tablet (8 mg total) by mouth every 8 (eight) hours as needed for Nausea. 60 tablet 2    oxyCODONE (ROXICODONE) 5 MG immediate release tablet Take 1 tablet (5 mg total) by mouth every 8 (eight) hours as needed for Pain. 90 tablet 0    tacrolimus (PROGRAF) 0.5 MG Cap Take 1 capsule (0.5 mg total) by mouth every 12 (twelve) hours. Or twice daily. 60 capsule 0    tacrolimus (PROGRAF) 0.5 MG Cap Take 1 capsule (0.5 mg total) by mouth every 12 (twelve) hours. 180 capsule 3    traZODone (DESYREL) 50 MG tablet Take 50 mg by mouth every evening.      vitamin E 400 UNIT capsule Take 400 Units by mouth once daily.      cyproheptadine (PERIACTIN) 4 mg tablet Take 1 tablet (4 mg total) by mouth 3 (three) times daily. 90 tablet 1    loperamide (IMODIUM) 2 mg capsule Take 1 capsule (2 mg total) by mouth 4 (four) times daily as needed for Diarrhea. 30 capsule 1     No current facility-administered medications for this visit.        Review of Systems   Constitutional: Positive for appetite  "change and fatigue. Negative for activity change, chills, diaphoresis, fever and unexpected weight change.   HENT: Negative for nosebleeds and trouble swallowing.    Eyes: Negative for visual disturbance.   Respiratory: Negative for cough, chest tightness, shortness of breath and wheezing.    Cardiovascular: Negative for chest pain and leg swelling.   Gastrointestinal: Positive for diarrhea. Negative for abdominal distention, abdominal pain, blood in stool, constipation, nausea and vomiting.   Endocrine: Negative for cold intolerance and heat intolerance.   Genitourinary: Negative for difficulty urinating and dysuria.   Musculoskeletal: Positive for myalgias and neck pain (pain radiates between both sides of his neck under his jaw.). Negative for arthralgias and back pain.   Skin: Negative for color change.   Neurological: Positive for numbness. Negative for dizziness, weakness, light-headedness and headaches.   Hematological: Negative for adenopathy. Does not bruise/bleed easily.   Psychiatric/Behavioral: Negative for confusion.       ECOG Performance Status: 0   Objective:      Vitals:   Vitals:    10/26/20 1037   BP: (!) 128/95   BP Location: Left arm   Patient Position: Sitting   BP Method: Medium (Automatic)   Pulse: 87   Resp: 18   Temp: 98.1 °F (36.7 °C)   TempSrc: Oral   SpO2: 98%   Weight: 59.4 kg (130 lb 15.3 oz)   Height: 5' 8" (1.727 m)     BMI: Body mass index is 19.91 kg/m².    Physical Exam  Constitutional:       General: He is not in acute distress.     Appearance: Normal appearance. He is not ill-appearing.   HENT:      Head: Normocephalic and atraumatic.      Mouth/Throat:      Pharynx: No oropharyngeal exudate or posterior oropharyngeal erythema.   Eyes:      General: No scleral icterus.     Extraocular Movements: Extraocular movements intact.      Conjunctiva/sclera: Conjunctivae normal.      Pupils: Pupils are equal, round, and reactive to light.   Neck:      Musculoskeletal: Normal range of " motion and neck supple.      Comments: Left neck: Palpable ~4cmx2.5cm firm non mobile mass.  No overlying erythema, induration, or fluctuance.  No discharge. Not tender to palpation.  Right neck: No discrete palpable mass. Firm skin.  Non-tender.  Cardiovascular:      Rate and Rhythm: Normal rate and regular rhythm.      Heart sounds: No murmur. No friction rub. No gallop.    Pulmonary:      Effort: Pulmonary effort is normal. No respiratory distress.      Breath sounds: No stridor. No wheezing, rhonchi or rales.   Abdominal:      General: Bowel sounds are normal. There is no distension.      Palpations: Abdomen is soft. There is no mass.      Tenderness: There is no abdominal tenderness. There is no guarding or rebound.   Musculoskeletal: Normal range of motion.      Right lower leg: No edema.      Left lower leg: No edema.   Lymphadenopathy:      Upper Body:      Right upper body: No supraclavicular or axillary adenopathy.      Left upper body: No supraclavicular or axillary adenopathy.   Skin:     General: Skin is warm and dry.   Neurological:      General: No focal deficit present.      Mental Status: He is alert.         Laboratory Data:   Recent Results (from the past 168 hour(s))   CBC auto differential    Collection Time: 10/26/20  9:43 AM   Result Value Ref Range    WBC 6.09 3.90 - 12.70 K/uL    RBC 3.74 (L) 4.60 - 6.20 M/uL    Hemoglobin 13.0 (L) 14.0 - 18.0 g/dL    Hematocrit 38.2 (L) 40.0 - 54.0 %    Mean Corpuscular Volume 102 (H) 82 - 98 fL    Mean Corpuscular Hemoglobin 34.8 (H) 27.0 - 31.0 pg    Mean Corpuscular Hemoglobin Conc 34.0 32.0 - 36.0 g/dL    RDW 12.9 11.5 - 14.5 %    Platelets 257 150 - 350 K/uL    MPV 8.6 (L) 9.2 - 12.9 fL    Immature Granulocytes 0.5 0.0 - 0.5 %    Gran # (ANC) 3.3 1.8 - 7.7 K/uL    Immature Grans (Abs) 0.03 0.00 - 0.04 K/uL    Lymph # 1.6 1.0 - 4.8 K/uL    Mono # 0.7 0.3 - 1.0 K/uL    Eos # 0.4 0.0 - 0.5 K/uL    Baso # 0.03 0.00 - 0.20 K/uL    nRBC 0 0 /100 WBC    Gran%  54.4 38.0 - 73.0 %    Lymph% 26.9 18.0 - 48.0 %    Mono% 11.3 4.0 - 15.0 %    Eosinophil% 6.4 0.0 - 8.0 %    Basophil% 0.5 0.0 - 1.9 %    Differential Method Automated    Comprehensive metabolic panel    Collection Time: 10/26/20  9:43 AM   Result Value Ref Range    Sodium 142 136 - 145 mmol/L    Potassium 5.3 (H) 3.5 - 5.1 mmol/L    Chloride 103 95 - 110 mmol/L    CO2 25 23 - 29 mmol/L    Glucose 122 (H) 70 - 110 mg/dL    BUN, Bld 14 8 - 23 mg/dL    Creatinine 1.7 (H) 0.5 - 1.4 mg/dL    Calcium 9.4 8.7 - 10.5 mg/dL    Total Protein 7.7 6.0 - 8.4 g/dL    Albumin 3.5 3.5 - 5.2 g/dL    Total Bilirubin 0.7 0.1 - 1.0 mg/dL    Alkaline Phosphatase 101 55 - 135 U/L    AST 49 (H) 10 - 40 U/L    ALT 29 10 - 44 U/L    Anion Gap 14 8 - 16 mmol/L    eGFR if African American 45.9 (A) >60 mL/min/1.73 m^2    eGFR if non  39.7 (A) >60 mL/min/1.73 m^2   Bilirubin, direct    Collection Time: 10/26/20  9:43 AM   Result Value Ref Range    Bilirubin, Direct 0.3 0.1 - 0.3 mg/dL   Tacrolimus level    Collection Time: 10/26/20  9:43 AM   Result Value Ref Range    Tacrolimus Lvl 4.1 (L) 5.0 - 15.0 ng/mL   Comprehensive metabolic panel    Collection Time: 10/26/20  9:43 AM   Result Value Ref Range    Sodium 142 136 - 145 mmol/L    Potassium 5.3 (H) 3.5 - 5.1 mmol/L    Chloride 103 95 - 110 mmol/L    CO2 25 23 - 29 mmol/L    Glucose 122 (H) 70 - 110 mg/dL    BUN, Bld 14 8 - 23 mg/dL    Creatinine 1.7 (H) 0.5 - 1.4 mg/dL    Calcium 9.4 8.7 - 10.5 mg/dL    Total Protein 7.7 6.0 - 8.4 g/dL    Albumin 3.5 3.5 - 5.2 g/dL    Total Bilirubin 0.7 0.1 - 1.0 mg/dL    Alkaline Phosphatase 101 55 - 135 U/L    AST 49 (H) 10 - 40 U/L    ALT 29 10 - 44 U/L    Anion Gap 14 8 - 16 mmol/L    eGFR if African American 45.9 (A) >60 mL/min/1.73 m^2    eGFR if non  39.7 (A) >60 mL/min/1.73 m^2   Magnesium    Collection Time: 10/26/20  9:43 AM   Result Value Ref Range    Magnesium 1.6 1.6 - 2.6 mg/dL   Phosphorus    Collection Time:  10/26/20  9:43 AM   Result Value Ref Range    Phosphorus 3.4 2.7 - 4.5 mg/dL   CBC auto differential    Collection Time: 10/26/20  9:43 AM   Result Value Ref Range    WBC 6.09 3.90 - 12.70 K/uL    RBC 3.74 (L) 4.60 - 6.20 M/uL    Hemoglobin 13.0 (L) 14.0 - 18.0 g/dL    Hematocrit 38.2 (L) 40.0 - 54.0 %    Mean Corpuscular Volume 102 (H) 82 - 98 fL    Mean Corpuscular Hemoglobin 34.8 (H) 27.0 - 31.0 pg    Mean Corpuscular Hemoglobin Conc 34.0 32.0 - 36.0 g/dL    RDW 12.9 11.5 - 14.5 %    Platelets 257 150 - 350 K/uL    MPV 8.6 (L) 9.2 - 12.9 fL    Immature Granulocytes 0.5 0.0 - 0.5 %    Gran # (ANC) 3.3 1.8 - 7.7 K/uL    Immature Grans (Abs) 0.03 0.00 - 0.04 K/uL    Lymph # 1.6 1.0 - 4.8 K/uL    Mono # 0.7 0.3 - 1.0 K/uL    Eos # 0.4 0.0 - 0.5 K/uL    Baso # 0.03 0.00 - 0.20 K/uL    nRBC 0 0 /100 WBC    Gran% 54.4 38.0 - 73.0 %    Lymph% 26.9 18.0 - 48.0 %    Mono% 11.3 4.0 - 15.0 %    Eosinophil% 6.4 0.0 - 8.0 %    Basophil% 0.5 0.0 - 1.9 %    Differential Method Automated            Imaging:    Assessment:       1. Squamous cell carcinoma of base of tongue    2. Stage 3a chronic kidney disease    3. Postoperative hypothyroidism    4. Hyperkalemia           Plan:       Problem List Items Addressed This Visit        Renal/    Stage 3a chronic kidney disease    Current Assessment & Plan     Has CKD stage IIIa, creatinine baseline is ~ 1.4.  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  Currently Cr ~1.7.  Suspect rise may be related to dehydration and could be worsened with tacrolimus.  Current tacrolimus level 4.1.  -Additional IVF with cycle 2.  -monitor cmp prior to each cycle.         Relevant Orders    NM PET CT Routine FDG    Hyperkalemia    Current Assessment & Plan     Mild hyperkalemia noted on labs.  Of note, patient has poor appetite and has been mostly drinking home made soup or ensure at home for his calories.  He does have small volume diarrhea as a side effect of his chemo.  Hyperkalemia may be  due to combination of low oral intake and renal dysfunction.  -Additional IVF with chemo            Oncology    Squamous cell carcinoma of base of tongue    Overview     Per OSH records, initially presented in 2015 with symptoms and cT2N2c disease. page 91 of 3/25/2020 outside records clinic (media tab).  Initially presented 8/2015 with left sided odynophagia.  Treated for reflux, which did not improve.    12/11/15 he had a fiberoptic exam showing discolored mucosal area of the left base of the tongue.    1/13/16 he had persistent dysphagia, odynophagia, and new left otalgia.  MRI showed an irregular mass along the left posterior margin of the base of the tongue measuring 1.8x2.3cm with ipsilateral level 2 lymph node measuring 1.7cm with central necrosis and contralateral level 2 node measuring 1.3cm with questionable necrosis.    1/19/2016 he had direct laryngoscopy with biopsy left of midline, proximal to vallecula, which was p16 positive, invasive, with moderately to poorly differentiated sq.c.c. with basaloid features.    1/29/16 met with medical oncology who recommended chemoxrt.    2/1/16  staging PET CT showed hypermetabolic mass at base of tongue 4x2.5x3.5cm, max SUV 24.13, hypermetabolic lymph node left neck SUV 6.77, small subcentimeter lymph node right neck max SUV 3.79 (exact size of lymph nodes not mentioned in the summary).    2/15/16 started chemoxrt to left tongue base, bilateral cervical neck levels 1b-5, treated with 6MV photons utilizing IMRT, 5000 cGy in 200 cGy daily fractions.  Cone down boost to left base of tongue with additional 2000 cGy in 200 cGy daily fractions to gross disease.  Received weekly cisplatin with radiation but had to switch to carboplatin due to worsening renal function. during this time also missed several weekly treatments of cisplatin.    4/1/2016 completed chemoxrt.   11/1/2016 Repeat biopsy of left tongue showed persistent disease.  S/p salvage glossectomy with  laryngectomy,  rT4aN0, p16 positive, base of tongue squamous cell carcinoma.     page 61 of 3/25/2020 outside records clinic (media tab)  Pathology: Tonsil, left tonsil fold: reactive squamous mucosa with inflammation and degenerating skeletal muscle, no carcinoma seen.  Right and Left base of tongue:  Reactive squamous mucosa and submucosa with inflammation, no carcinoma seen.  Neck, glossectomy, laryngectomy, bilateral neck dissection:  invasive squamous cell carcinoma.  tumor site: base of tongue/hypopharynx.  Small focus of tumor is seen within the hyoid  bone.  specimen Size [id number]  tumor size [id number]  depth of invasion 20mm  TNM stage pT4a, pN0, pMx  Lymph nodes, included in all parts:  number involved 0  number examined 3.  distance of tumor from margins  positive inked margins: none  within 1mm : none  within 1-2mm: anterior-inferior    Bilateral neck dissection:  level 1: one lymph node and submandibular gland, negative for tumor  level II : two lymph nodes, negative for tumor  level III: not identified  level IV: not identified  level V: not identified.    6/20/17 - PETCT with FDG avidity of left neck lymph node, level 3, SUV 2.9.  No other evidence of local or distant disease.  7/6/2017 - biopsy of left neck lymph node with IR.  Sample was non-diagnostic. Notes from path report: Less than optimal scan specimen with minute tissue core of stromal fibroadipose tissue.  definitive tati background is not seen.  9/28/2017 - Repeat PET CT showing mild activity SUV 3.5 (increased from 3.2 previously; size 7.8x7.4x9.5mm).  Also new findings under left pectoralis minor (mild focal increased activity, indeterminate but new since previous exam)  3/22/2018 - CT neck shows no evidence of disease and level 2b lymph node decreased to 4.9mmx4.5mmx4.9mm from 7.8x7.4x9.5mm previously.         Current Assessment & Plan     Seen by Dr. Myers 8/3/2020 for left-sided neck pain and a swelling in left level 2 several weeks  ago.  He feels it arose after he began lifting weights.  CT neck 8/5/2020 : Lymph nodes At the left neck level L2 there is a heterogeneous soft tissue density with central hypoattenuation likely reflecting a necrotic lymph node measuring 1.6 x 1.4 cm in transverse dimension, 2.5 cm in craniocaudal dimension.  There is a similar appearing irregular soft tissue density at the right neck level 2 measuring 1.1 x 9.0 cm in transverse dimension and 1.5 cm craniocaudal dimension.  No other abnormal appearing or enlarged lymph nodes found.  Impression: At level 2 within the neck bilaterally, there are  soft tissue density masses with central hypoattenuation likely reflective of necrotic lymph nodes.  Findings are suspicious for malignancy recurrence.  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  Staging PET CT 9/30/2020 with left sided hypermetabolic node and right sided enlarged node without uptake.  Also has mandibular vestibule uptake, which may be another site of disease. Discussed at tumor board.  He is not a surgical or radiation candidate.  -For low tumor volume, plan for carboplatin/paclitaxel/cetuximab.  Carboplatin/paclitaxel x2 cycles followed by 4 cycles carboplatin/cetuximab followed by cetuximab maintenance.  -not candidate for immunotherapy with his history of liver transplant.  -Labs for chemo will be: CMP, CBC, Mg, Phos  -Supportive medications: Dexamethasone 6mg BID days 2 and 3 for cycles 1-6 when he is receiving carboplatin.  zofran prn.  If needed, will prescribe compazine prn breakthrough n/v.  For pain, oxycodone prn.  He currently uses 2-3x/day.  Pain adequately controlled for him, which is about a 6 out of 10.  Bowel regimen reviewed and currently he only needs stool softeners.  Added periactin TID for poor appetite.  If this does not work, he may need nutrition consult.  -Proceed with cycle 2 10/26/20.  --Added fluids due to his poor appetite  and mild hyperkalemia.  -Repeat PET CT in 3 weeks (cannot take IV contrast due to renal dysfunction).  -Return to clinic prior to cycle 3.         Relevant Medications    cyproheptadine (PERIACTIN) 4 mg tablet    loperamide (IMODIUM) 2 mg capsule    Other Relevant Orders    NM PET CT Routine FDG       Endocrine    Postoperative hypothyroidism    Current Assessment & Plan     Hypothyroidism currently on levothyroxine.  Last TSH 12/2019 WNL.  No symptoms suggestive of hypothyroidism today.  -continue levothyroxine.  -follow up with PCP to monitor TSH/fT4             Orders Placed This Encounter   Procedures    NM PET CT Routine FDG       Advance Care Planning I initiated the process of advance care planning at his initial visit and explained the importance of this process to the patient.  Then the patient received detailed information about the importance of designating a Health Care Power of  (HCPOA). he was instructed to communicate with this person about their wishes for future healthcare, should he become sick and lose decision-making capacity. The patient has previously appointed a HCPOA. After our discussion, the patient has decided to complete a HCPOA and has appointed his brother and NAME:Ollie Berg MD  Hematology Oncology

## 2020-10-26 NOTE — ASSESSMENT & PLAN NOTE
Has CKD stage IIIa, creatinine baseline is ~ 1.4.  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  Currently Cr ~1.7.  Suspect rise may be related to dehydration and could be worsened with tacrolimus.  Current tacrolimus level 4.1.  -Additional IVF with cycle 2.  -monitor cmp prior to each cycle.

## 2020-10-26 NOTE — ASSESSMENT & PLAN NOTE
Seen by Dr. Myers 8/3/2020 for left-sided neck pain and a swelling in left level 2 several weeks ago.  He feels it arose after he began lifting weights.  CT neck 8/5/2020 : Lymph nodes At the left neck level L2 there is a heterogeneous soft tissue density with central hypoattenuation likely reflecting a necrotic lymph node measuring 1.6 x 1.4 cm in transverse dimension, 2.5 cm in craniocaudal dimension.  There is a similar appearing irregular soft tissue density at the right neck level 2 measuring 1.1 x 9.0 cm in transverse dimension and 1.5 cm craniocaudal dimension.  No other abnormal appearing or enlarged lymph nodes found.  Impression: At level 2 within the neck bilaterally, there are  soft tissue density masses with central hypoattenuation likely reflective of necrotic lymph nodes.  Findings are suspicious for malignancy recurrence.  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  Staging PET CT 9/30/2020 with left sided hypermetabolic node and right sided enlarged node without uptake.  Also has mandibular vestibule uptake, which may be another site of disease. Discussed at tumor board.  He is not a surgical or radiation candidate.  -For low tumor volume, plan for carboplatin/paclitaxel/cetuximab.  Carboplatin/paclitaxel x2 cycles followed by 4 cycles carboplatin/cetuximab followed by cetuximab maintenance.  -not candidate for immunotherapy with his history of liver transplant.  -Labs for chemo will be: CMP, CBC, Mg, Phos  -Supportive medications: Dexamethasone 6mg BID days 2 and 3 for cycles 1-6 when he is receiving carboplatin.  zofran prn.  If needed, will prescribe compazine prn breakthrough n/v.  For pain, oxycodone prn.  He currently uses 2-3x/day.  Pain adequately controlled for him, which is about a 6 out of 10.  Bowel regimen reviewed and currently he only needs stool softeners.  Added periactin TID for poor appetite.  If this does not work, he may  need nutrition consult.  -Proceed with cycle 2 10/26/20.  --Added fluids due to his poor appetite and mild hyperkalemia.  -Repeat PET CT in 3 weeks (cannot take IV contrast due to renal dysfunction).  -Return to clinic prior to cycle 3.

## 2020-10-26 NOTE — Clinical Note
1.  11/16 needs cmp, cbc, then PET CT  2. Follow up with me 11/17 to review scan and then cycle 3 day 1 chemo after.  3. Schedule c3d8 11/24, c3d15 12/1.  4. I added fluids (1liter NS) onto 10/27 chemo that may need insurance auth/approval. Thanks -e

## 2020-10-26 NOTE — ASSESSMENT & PLAN NOTE
Mild hyperkalemia noted on labs.  Of note, patient has poor appetite and has been mostly drinking home made soup or ensure at home for his calories.  He does have small volume diarrhea as a side effect of his chemo.  Hyperkalemia may be due to combination of low oral intake and renal dysfunction.  -Additional IVF with chemo

## 2020-10-27 ENCOUNTER — INFUSION (OUTPATIENT)
Dept: INFUSION THERAPY | Facility: HOSPITAL | Age: 70
End: 2020-10-27
Attending: INTERNAL MEDICINE
Payer: MEDICARE

## 2020-10-27 VITALS
SYSTOLIC BLOOD PRESSURE: 113 MMHG | TEMPERATURE: 98 F | DIASTOLIC BLOOD PRESSURE: 66 MMHG | OXYGEN SATURATION: 98 % | HEART RATE: 73 BPM | RESPIRATION RATE: 17 BRPM

## 2020-10-27 DIAGNOSIS — C01 SQUAMOUS CELL CARCINOMA OF BASE OF TONGUE: Primary | ICD-10-CM

## 2020-10-27 PROCEDURE — 25000003 PHARM REV CODE 250: Performed by: STUDENT IN AN ORGANIZED HEALTH CARE EDUCATION/TRAINING PROGRAM

## 2020-10-27 PROCEDURE — 96413 CHEMO IV INFUSION 1 HR: CPT

## 2020-10-27 PROCEDURE — 96417 CHEMO IV INFUS EACH ADDL SEQ: CPT

## 2020-10-27 PROCEDURE — 96415 CHEMO IV INFUSION ADDL HR: CPT

## 2020-10-27 PROCEDURE — 96375 TX/PRO/DX INJ NEW DRUG ADDON: CPT

## 2020-10-27 PROCEDURE — 63600175 PHARM REV CODE 636 W HCPCS: Performed by: STUDENT IN AN ORGANIZED HEALTH CARE EDUCATION/TRAINING PROGRAM

## 2020-10-27 PROCEDURE — 96367 TX/PROPH/DG ADDL SEQ IV INF: CPT

## 2020-10-27 PROCEDURE — 96361 HYDRATE IV INFUSION ADD-ON: CPT

## 2020-10-27 RX ORDER — DIPHENHYDRAMINE HYDROCHLORIDE 50 MG/ML
50 INJECTION INTRAMUSCULAR; INTRAVENOUS ONCE AS NEEDED
Status: DISCONTINUED | OUTPATIENT
Start: 2020-10-27 | End: 2020-10-27 | Stop reason: HOSPADM

## 2020-10-27 RX ORDER — FAMOTIDINE 10 MG/ML
20 INJECTION INTRAVENOUS
Status: COMPLETED | OUTPATIENT
Start: 2020-10-27 | End: 2020-10-27

## 2020-10-27 RX ORDER — EPINEPHRINE 0.3 MG/.3ML
0.3 INJECTION SUBCUTANEOUS ONCE AS NEEDED
Status: DISCONTINUED | OUTPATIENT
Start: 2020-10-27 | End: 2020-10-27 | Stop reason: HOSPADM

## 2020-10-27 RX ORDER — SODIUM CHLORIDE 0.9 % (FLUSH) 0.9 %
10 SYRINGE (ML) INJECTION
Status: DISCONTINUED | OUTPATIENT
Start: 2020-10-27 | End: 2020-10-27 | Stop reason: HOSPADM

## 2020-10-27 RX ORDER — HEPARIN 100 UNIT/ML
500 SYRINGE INTRAVENOUS
Status: DISCONTINUED | OUTPATIENT
Start: 2020-10-27 | End: 2020-10-27 | Stop reason: HOSPADM

## 2020-10-27 RX ADMIN — APREPITANT 130 MG: 130 INJECTION, EMULSION INTRAVENOUS at 08:10

## 2020-10-27 RX ADMIN — DIPHENHYDRAMINE HYDROCHLORIDE 50 MG: 50 INJECTION INTRAMUSCULAR; INTRAVENOUS at 08:10

## 2020-10-27 RX ADMIN — PACLITAXEL 300 MG: 6 INJECTION, SOLUTION INTRAVENOUS at 09:10

## 2020-10-27 RX ADMIN — PALONOSETRON: 0.25 INJECTION, SOLUTION INTRAVENOUS at 08:10

## 2020-10-27 RX ADMIN — CARBOPLATIN 300 MG: 10 INJECTION INTRAVENOUS at 12:10

## 2020-10-27 RX ADMIN — FAMOTIDINE 20 MG: 10 INJECTION INTRAVENOUS at 08:10

## 2020-10-27 RX ADMIN — SODIUM CHLORIDE 1000 ML: 0.9 INJECTION, SOLUTION INTRAVENOUS at 08:10

## 2020-10-27 NOTE — PLAN OF CARE
Pt tolerated treatment well. Had no complaints. Pt requested I call brother for due to inability to speak and unable to send text. PIV removed with catheter intact. Hemostasis achieved. Pt ambulatory from clinic in [facility].

## 2020-10-27 NOTE — PLAN OF CARE
Pt ambulatory to clinic alone this morning for Carbo/Taxol infusion C2D1. Pt has a trach with covering, non-verbal due to oral/pharyngeal resection with tongue removal. Pt communicates with writing pad. Denies any sig complaints, minimal pain to neck post surgical incision. PIV started without difficulty. NS infusing.

## 2020-11-02 ENCOUNTER — TELEPHONE (OUTPATIENT)
Dept: TRANSPLANT | Facility: CLINIC | Age: 70
End: 2020-11-02

## 2020-11-02 NOTE — TELEPHONE ENCOUNTER
Labs reviewed and stable. No med changes. Next labs due 1/25/21. Letter mailed.----- Message from Cornell Anton MD sent at 10/27/2020 11:32 AM CDT -----  Results reviewed

## 2020-11-02 NOTE — LETTER
November 2, 2020    Rocco Swain  [address] [place] [address] [place] [address]          Dear Rocco Swain:  MRN: [id number]    This is a follow up to your recent labs, your lab results were stable.  There are no medicine changes.  Please have your labs drawn again on 1/25/2021.      If you cannot have your labs drawn on the scheduled date, it is your responsibility to call the transplant department to reschedule.  Please call [phone] and ask to speak to Rhea RIOS -  for all scheduling requests.     Sincerely,      Rhea Buchanan, NADIRAN, RN, CCTC  Your Liver Transplant Coordinator    [facility]  [address] [place] [id number]  [phone]

## 2020-11-06 ENCOUNTER — PATIENT MESSAGE (OUTPATIENT)
Dept: TRANSPLANT | Facility: CLINIC | Age: 70
End: 2020-11-06

## 2020-11-06 DIAGNOSIS — Z94.4 STATUS POST LIVER TRANSPLANTATION: ICD-10-CM

## 2020-11-06 RX ORDER — TACROLIMUS 0.5 MG/1
0.5 CAPSULE ORAL DAILY
Qty: 90 CAPSULE | Refills: 3 | Status: SHIPPED | OUTPATIENT
Start: 2020-11-06 | End: 2021-01-06 | Stop reason: DRUGHIGH

## 2020-11-06 NOTE — TELEPHONE ENCOUNTER
----- Message from Cornell Anton MD sent at 11/6/2020 10:56 AM CST -----  Regarding: FW: tacrolimus goal  I think we can drop his tac dose to 0.5 mg daily    ----- Message -----  From: Ronald Berg MD  Sent: 10/26/2020  12:43 PM CST  To: Cornell Anton MD  Subject: tacrolimus goal                                  Hi,  Just wanted to touch base and make sure I'm not missing anything.  I was wondering what his tacrolimus goal range should be.  His creatinine is slightly higher than it usually is at 1.7  (base I would say is about 1.4).    Thanks in advance.    -E

## 2020-11-06 NOTE — TELEPHONE ENCOUNTER
Called patient spoke to his brother about he change below, I also sent message to patient via [employer].    ----- Message from Cornell Anton MD sent at 11/6/2020 10:56 AM CST -----  Regarding: FW: tacrolimus goal  I think we can drop his tac dose to 0.5 mg daily    ----- Message -----  From: Ronald Berg MD  Sent: 10/26/2020  12:43 PM CST  To: Cornell Anton MD  Subject: tacrolimus goal                                  Hi,  Just wanted to touch base and make sure I'm not missing anything.  I was wondering what his tacrolimus goal range should be.  His creatinine is slightly higher than it usually is at 1.7  (base I would say is about 1.4).    Thanks in advance.    -E

## 2020-11-09 NOTE — PROGRESS NOTES
History & Physical    SUBJECTIVE:     History of Present Illness:  Patient is a 70 y.o. male presents with possible foreign body in abdominal wall. Patient is asymptomatic.  Possible FB seen on plain radiograph    No chief complaint on file.      Review of patient's allergies indicates:  No Known Allergies    Current Outpatient Medications   Medication Sig Dispense Refill    cyproheptadine (PERIACTIN) 4 mg tablet Take 1 tablet (4 mg total) by mouth 3 (three) times daily. 90 tablet 1    dexAMETHasone (DECADRON) 6 MG tablet Take 1 tablet twice per day on day 2 and day 3 after chemo. 24 tablet 0    levothyroxine (SYNTHROID) 88 MCG tablet Take 88 mcg by mouth once daily. 90 tablet 3    loperamide (IMODIUM) 2 mg capsule Take 1 capsule (2 mg total) by mouth 4 (four) times daily as needed for Diarrhea. 30 capsule 1    multivitamin capsule Take 1 capsule by mouth once daily.      ondansetron (ZOFRAN) 8 MG tablet Take 1 tablet (8 mg total) by mouth every 8 (eight) hours as needed for Nausea. 60 tablet 2    oxyCODONE (ROXICODONE) 5 MG immediate release tablet Take 1 tablet (5 mg total) by mouth every 8 (eight) hours as needed for Pain. 90 tablet 0    tacrolimus (PROGRAF) 0.5 MG Cap Take 1 capsule (0.5 mg total) by mouth once daily. 90 capsule 3    traZODone (DESYREL) 50 MG tablet Take 50 mg by mouth every evening.      vitamin E 400 UNIT capsule Take 400 Units by mouth once daily.       No current facility-administered medications for this visit.        Past Medical History:   Diagnosis Date    Basal cell carcinoma (BCC) in situ of skin 2012    3 on face, 2 on arm, removed by dermatology.    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 Underwent surgical resection of  tongue and neck. s/p tracheostomy     Past Surgical History:   Procedure Laterality Date    COLONOSCOPY      LIVER TRANSPLANT  11/2008    transplanted for biopsy proven hepatocellular carcinoma,     LYMPH NODE BIOPSY N/A 9/18/2020    Procedure: BIOPSY, LYMPH NODE;  Surgeon: T[facility] Diagnostic Provider;  Location: [facility] OR [address] [place] [address];  Service: General;  Laterality: N/A;  Rm 173    TRACHEOSTOMY       Family History   Problem Relation Age of Onset    Dementia Mother      Social History     Tobacco Use    Smoking status: Never Smoker    Smokeless tobacco: Never Used   Substance Use Topics    Alcohol use: Yes     Comment: drinks wine, 1 glass day    Drug use: Not Currently        Review of Systems:  Review of Systems   Constitutional: Negative.    HENT: Negative.    Eyes: Negative.    Respiratory: Negative.    Gastrointestinal: Negative.    Genitourinary: Negative.    Musculoskeletal: Negative.    Neurological: Negative.    Hematological: Negative.    Psychiatric/Behavioral: Negative.        OBJECTIVE:     Vital Signs (Most Recent)  Temp: 97.6 °F (36.4 °C) (10/21/20 1035)  Pulse: 90 (10/21/20 1035)  BP: (!) 143/70 (10/21/20 1035)  SpO2: 95 % (10/21/20 1035)     61.7 kg (136 lb)     Physical Exam:  Physical Exam  Constitutional:       Appearance: Normal appearance.   HENT:      Head: Normocephalic and atraumatic.      Nose: Nose normal.   Eyes:      Extraocular Movements: Extraocular movements intact.      Pupils: Pupils are equal, round, and reactive to light.   Neck:      Musculoskeletal: Normal range of motion and neck supple.   Cardiovascular:      Pulses: Normal pulses.      Heart sounds: Normal heart sounds.   Pulmonary:      Effort: Pulmonary effort is normal.      Breath sounds: Normal breath sounds.   Abdominal:      General: Abdomen is flat.      Palpations: Abdomen is soft.      Comments: No palpable FB or other abnormality   Musculoskeletal: Normal range of motion.   Skin:     General: Skin is warm.    Neurological:      General: No focal deficit present.      Mental Status: He is alert and oriented to person, place, and time.   Psychiatric:         Mood and Affect: Mood normal.         Behavior: Behavior normal.         Laboratory  noen    Diagnostic Results:  X-Ray: Reviewed  possible FB abdominal wall    ASSESSMENT/PLAN:     Asymptomatic possible small FB abdominal wall    PLAN:Plan     observation

## 2020-11-10 ENCOUNTER — LAB VISIT (OUTPATIENT)
Dept: LAB | Facility: HOSPITAL | Age: 70
End: 2020-11-10
Attending: STUDENT IN AN ORGANIZED HEALTH CARE EDUCATION/TRAINING PROGRAM
Payer: MEDICARE

## 2020-11-10 DIAGNOSIS — Z94.4 LIVER TRANSPLANTED: ICD-10-CM

## 2020-11-10 DIAGNOSIS — C01 SQUAMOUS CELL CARCINOMA OF BASE OF TONGUE: ICD-10-CM

## 2020-11-10 LAB
ALBUMIN SERPL BCP-MCNC: 3.4 G/DL (ref 3.5–5.2)
ALP SERPL-CCNC: 88 U/L (ref 55–135)
ALT SERPL W/O P-5'-P-CCNC: 27 U/L (ref 10–44)
ANION GAP SERPL CALC-SCNC: 11 MMOL/L (ref 8–16)
ANISOCYTOSIS BLD QL SMEAR: SLIGHT
AST SERPL-CCNC: 41 U/L (ref 10–40)
BASO STIPL BLD QL SMEAR: ABNORMAL
BASOPHILS # BLD AUTO: 0.01 K/UL (ref 0–0.2)
BASOPHILS NFR BLD: 0.5 % (ref 0–1.9)
BILIRUB SERPL-MCNC: 0.6 MG/DL (ref 0.1–1)
BUN SERPL-MCNC: 13 MG/DL (ref 8–23)
CALCIUM SERPL-MCNC: 9.4 MG/DL (ref 8.7–10.5)
CHLORIDE SERPL-SCNC: 106 MMOL/L (ref 95–110)
CO2 SERPL-SCNC: 26 MMOL/L (ref 23–29)
CREAT SERPL-MCNC: 1.3 MG/DL (ref 0.5–1.4)
DIFFERENTIAL METHOD: ABNORMAL
EOSINOPHIL # BLD AUTO: 0.1 K/UL (ref 0–0.5)
EOSINOPHIL NFR BLD: 3 % (ref 0–8)
ERYTHROCYTE [DISTWIDTH] IN BLOOD BY AUTOMATED COUNT: 13.6 % (ref 11.5–14.5)
EST. GFR  (AFRICAN AMERICAN): >60 ML/MIN/1.73 M^2
EST. GFR  (NON AFRICAN AMERICAN): 54.9 ML/MIN/1.73 M^2
GLUCOSE SERPL-MCNC: 106 MG/DL (ref 70–110)
HCT VFR BLD AUTO: 30.7 % (ref 40–54)
HGB BLD-MCNC: 10.3 G/DL (ref 14–18)
IMM GRANULOCYTES # BLD AUTO: 0.01 K/UL (ref 0–0.04)
IMM GRANULOCYTES NFR BLD AUTO: 0.5 % (ref 0–0.5)
LYMPHOCYTES # BLD AUTO: 1.3 K/UL (ref 1–4.8)
LYMPHOCYTES NFR BLD: 62.4 % (ref 18–48)
MAGNESIUM SERPL-MCNC: 1.7 MG/DL (ref 1.6–2.6)
MCH RBC QN AUTO: 33.8 PG (ref 27–31)
MCHC RBC AUTO-ENTMCNC: 33.6 G/DL (ref 32–36)
MCV RBC AUTO: 101 FL (ref 82–98)
MONOCYTES # BLD AUTO: 0.4 K/UL (ref 0.3–1)
MONOCYTES NFR BLD: 19.3 % (ref 4–15)
NEUTROPHILS # BLD AUTO: 0.3 K/UL (ref 1.8–7.7)
NEUTROPHILS NFR BLD: 14.3 % (ref 38–73)
NRBC BLD-RTO: 0 /100 WBC
PHOSPHATE SERPL-MCNC: 3.4 MG/DL (ref 2.7–4.5)
PLATELET # BLD AUTO: 177 K/UL (ref 150–350)
PLATELET BLD QL SMEAR: ABNORMAL
PMV BLD AUTO: 9.4 FL (ref 9.2–12.9)
POLYCHROMASIA BLD QL SMEAR: ABNORMAL
POTASSIUM SERPL-SCNC: 5 MMOL/L (ref 3.5–5.1)
PROT SERPL-MCNC: 7.3 G/DL (ref 6–8.4)
RBC # BLD AUTO: 3.05 M/UL (ref 4.6–6.2)
SODIUM SERPL-SCNC: 143 MMOL/L (ref 136–145)
TACROLIMUS BLD-MCNC: 1.6 NG/ML (ref 5–15)
WBC # BLD AUTO: 2.02 K/UL (ref 3.9–12.7)

## 2020-11-10 PROCEDURE — 84100 ASSAY OF PHOSPHORUS: CPT

## 2020-11-10 PROCEDURE — 80053 COMPREHEN METABOLIC PANEL: CPT

## 2020-11-10 PROCEDURE — 36415 COLL VENOUS BLD VENIPUNCTURE: CPT

## 2020-11-10 PROCEDURE — 80197 ASSAY OF TACROLIMUS: CPT

## 2020-11-10 PROCEDURE — 83735 ASSAY OF MAGNESIUM: CPT

## 2020-11-10 PROCEDURE — 85025 COMPLETE CBC W/AUTO DIFF WBC: CPT

## 2020-11-11 ENCOUNTER — TELEPHONE (OUTPATIENT)
Dept: TRANSPLANT | Facility: CLINIC | Age: 70
End: 2020-11-11

## 2020-11-11 DIAGNOSIS — Z94.4 STATUS POST LIVER TRANSPLANTATION: Primary | ICD-10-CM

## 2020-11-11 NOTE — TELEPHONE ENCOUNTER
Pt notified via portal of stable labs and that no medication changes are needed. Repeat labs due 12/7/20.     ----- Message from Cornell Anton MD sent at 11/10/2020  4:59 PM CST -----  Results reviewed

## 2020-11-13 ENCOUNTER — PATIENT MESSAGE (OUTPATIENT)
Dept: INTERNAL MEDICINE | Facility: CLINIC | Age: 70
End: 2020-11-13

## 2020-11-13 RX ORDER — LEVOTHYROXINE SODIUM 88 UG/1
TABLET ORAL
Qty: 90 TABLET | Refills: 3 | Status: SHIPPED | OUTPATIENT
Start: 2020-11-13 | End: 2021-08-17

## 2020-11-16 ENCOUNTER — OFFICE VISIT (OUTPATIENT)
Dept: HEMATOLOGY/ONCOLOGY | Facility: CLINIC | Age: 70
End: 2020-11-16
Payer: MEDICARE

## 2020-11-16 ENCOUNTER — HOSPITAL ENCOUNTER (OUTPATIENT)
Dept: RADIOLOGY | Facility: HOSPITAL | Age: 70
Discharge: HOME | End: 2020-11-16
Attending: STUDENT IN AN ORGANIZED HEALTH CARE EDUCATION/TRAINING PROGRAM
Payer: MEDICARE

## 2020-11-16 VITALS
TEMPERATURE: 98 F | HEIGHT: 68 IN | HEART RATE: 86 BPM | DIASTOLIC BLOOD PRESSURE: 79 MMHG | SYSTOLIC BLOOD PRESSURE: 146 MMHG | WEIGHT: 131.81 LBS | BODY MASS INDEX: 19.98 KG/M2

## 2020-11-16 DIAGNOSIS — Z94.4 STATUS POST LIVER TRANSPLANTATION: ICD-10-CM

## 2020-11-16 DIAGNOSIS — C01 SQUAMOUS CELL CARCINOMA OF BASE OF TONGUE: Primary | ICD-10-CM

## 2020-11-16 DIAGNOSIS — N18.31 STAGE 3A CHRONIC KIDNEY DISEASE: ICD-10-CM

## 2020-11-16 DIAGNOSIS — E89.0 POSTOPERATIVE HYPOTHYROIDISM: ICD-10-CM

## 2020-11-16 DIAGNOSIS — C01 SQUAMOUS CELL CARCINOMA OF BASE OF TONGUE: ICD-10-CM

## 2020-11-16 DIAGNOSIS — I70.0 ATHEROSCLEROSIS OF ABDOMINAL AORTA: ICD-10-CM

## 2020-11-16 LAB — POCT GLUCOSE: 160 MG/DL (ref 70–110)

## 2020-11-16 PROCEDURE — 99213 OFFICE O/P EST LOW 20 MIN: CPT | Mod: PBBFAC,25 | Performed by: STUDENT IN AN ORGANIZED HEALTH CARE EDUCATION/TRAINING PROGRAM

## 2020-11-16 PROCEDURE — 78815 NM PET CT ROUTINE: ICD-10-PCS | Mod: 26,PS,, | Performed by: RADIOLOGY

## 2020-11-16 PROCEDURE — A9552 F18 FDG: HCPCS

## 2020-11-16 PROCEDURE — 99215 OFFICE O/P EST HI 40 MIN: CPT | Mod: S$PBB,,, | Performed by: STUDENT IN AN ORGANIZED HEALTH CARE EDUCATION/TRAINING PROGRAM

## 2020-11-16 PROCEDURE — 78815 PET IMAGE W/CT SKULL-THIGH: CPT | Mod: 26,PS,, | Performed by: RADIOLOGY

## 2020-11-16 PROCEDURE — 99999 PR PBB SHADOW E&M-EST. PATIENT-LVL III: CPT | Mod: PBBFAC,,, | Performed by: STUDENT IN AN ORGANIZED HEALTH CARE EDUCATION/TRAINING PROGRAM

## 2020-11-16 PROCEDURE — 78815 PET IMAGE W/CT SKULL-THIGH: CPT | Mod: TC

## 2020-11-16 PROCEDURE — 99999 PR PBB SHADOW E&M-EST. PATIENT-LVL III: ICD-10-PCS | Mod: PBBFAC,,, | Performed by: STUDENT IN AN ORGANIZED HEALTH CARE EDUCATION/TRAINING PROGRAM

## 2020-11-16 PROCEDURE — 99215 PR OFFICE/OUTPT VISIT, EST, LEVL V, 40-54 MIN: ICD-10-PCS | Mod: S$PBB,,, | Performed by: STUDENT IN AN ORGANIZED HEALTH CARE EDUCATION/TRAINING PROGRAM

## 2020-11-16 RX ORDER — OXYCODONE HYDROCHLORIDE 5 MG/1
5 TABLET ORAL EVERY 8 HOURS PRN
Qty: 90 TABLET | Refills: 0 | Status: SHIPPED | OUTPATIENT
Start: 2020-11-16 | End: 2020-12-18 | Stop reason: SDUPTHER

## 2020-11-16 NOTE — ASSESSMENT & PLAN NOTE
Seen by Dr. Myers 8/3/2020 for left-sided neck pain and a swelling in left level 2 several weeks ago.  He feels it arose after he began lifting weights.  CT neck 8/5/2020 : Lymph nodes At the left neck level L2 there is a heterogeneous soft tissue density with central hypoattenuation likely reflecting a necrotic lymph node measuring 1.6 x 1.4 cm in transverse dimension, 2.5 cm in craniocaudal dimension.  There is a similar appearing irregular soft tissue density at the right neck level 2 measuring 1.1 x 9.0 cm in transverse dimension and 1.5 cm craniocaudal dimension.  No other abnormal appearing or enlarged lymph nodes found.  Impression: At level 2 within the neck bilaterally, there are  soft tissue density masses with central hypoattenuation likely reflective of necrotic lymph nodes.  Findings are suspicious for malignancy recurrence.  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  Staging PET CT 9/30/2020 with left sided hypermetabolic node and right sided enlarged node without uptake.  Also has mandibular vestibule uptake, which may be another site of disease. Discussed at tumor board.  He is not a surgical or radiation candidate.  -For low tumor volume, plan for carboplatin/paclitaxel/cetuximab.  Carboplatin/paclitaxel x2 cycles followed by 4 cycles carboplatin/cetuximab followed by cetuximab maintenance.  -not candidate for immunotherapy with his history of liver transplant.  -Labs for chemo will be: CMP, CBC, Mg, Phos  -Supportive medications: Dexamethasone 6mg BID days 2 and 3 for cycles 1-6 when he is receiving carboplatin.  zofran prn.  If needed, will prescribe compazine prn breakthrough n/v.  For pain, oxycodone prn.  He currently uses 2-3x/day.  Pain adequately controlled for him, which is about a 6 out of 10.  Bowel regimen reviewed and currently he only needs stool softeners.    -Continue periactin TID for poor appetite.  If this does not work, he  may need nutrition consult.  -Preliminary personal review of PET CT shows response to therapy with reduced SUV of primary lesion.  Pending official read.   --If recovers from neutropenia, will be able to proceed with cycle 3 11/17/20.  --Patient prefers for labs to be drawn with peripheral IV access placement.  -Return to clinic prior to cycle 4.

## 2020-11-16 NOTE — ASSESSMENT & PLAN NOTE
Has CKD stage IIIa, creatinine baseline is ~ 1.4.  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  Currently Cr ~1.3.  -monitor cmp prior to each cycle.

## 2020-11-16 NOTE — Clinical Note
1. Cycle 4 day 1= 12/8, d8= 12/15, d15=12/22.  2. Follow up with me 12/7.  3. Patient prefers lab draws from infusion center on 12/8 (clinic collect cmp, cbc, mg) prior to cycle 4, day 1 (he needs these drawn 1 hour prior to chemo appointment).  Thanks -e

## 2020-11-16 NOTE — PROGRESS NOTES
PATIENT: Rocco Swain  MRN: [id number]  DATE: 11/16/2020      Diagnosis:   1. Squamous cell carcinoma of base of tongue    2. Atherosclerosis of abdominal aorta    3. Postoperative hypothyroidism    4. Stage 3a chronic kidney disease    5. Status post liver transplantation        Chief Complaint: Squamous cell carcinoma of base of tongue      Oncologic History:    Oncologic History 1. Squamous cell carcinoma of base of tongue with recurrence    Oncologic Treatment 1. Chemoradiation completed 4/2016  2. S/p glossectomy, laryngectomy, and bilateral neck dissection for persistent/recurrent disease    Pathology P16 positive squamous cell carcinoma with basaloid features.        Subjective:    Interval History: Mr. Swain is here for follow up prior to cycle 2.    Side effects of cycle 2: fatigue, no diarrhea, numbness and tingling (resolved last week).  Denies fevers, chills, shortness of breath, cough, abdominal pain, or nausea.      Pain is well controlled with his oxycodone.    He is alone at this visit.  Communication from him is 100% written.  Past Medical History:   Past Medical History:   Diagnosis Date    Basal cell carcinoma (BCC) in situ of skin 2012    3 on face, 2 on arm, removed by dermatology.    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 Underwent surgical resection of tongue and neck. s/p tracheostomy       Past Surgical HIstory:   Past Surgical History:   Procedure Laterality Date    COLONOSCOPY      LIVER TRANSPLANT  11/2008    transplanted for biopsy proven hepatocellular carcinoma,     LYMPH NODE BIOPSY N/A 9/18/2020    Procedure: BIOPSY, LYMPH NODE;  Surgeon: [facility] Diagnostic Provider;  Location: [facility] OR [address] [place] [address];  Service: General;  Laterality:  N/A;  Rm 173    TRACHEOSTOMY         Family History:   Family History   Problem Relation Age of Onset    Dementia Mother        Social History:  reports that he has never smoked. He has never used smokeless tobacco. He reports current alcohol use. He reports previous drug use.    Allergies:  Review of patient's allergies indicates:  No Known Allergies    Medications:  Current Outpatient Medications   Medication Sig Dispense Refill    cyproheptadine (PERIACTIN) 4 mg tablet Take 1 tablet (4 mg total) by mouth 3 (three) times daily. 90 tablet 1    dexAMETHasone (DECADRON) 6 MG tablet Take 1 tablet twice per day on day 2 and day 3 after chemo. 24 tablet 0    levothyroxine (SYNTHROID) 88 MCG tablet Take 88 mcg by mouth once daily. 90 tablet 3    loperamide (IMODIUM) 2 mg capsule Take 1 capsule (2 mg total) by mouth 4 (four) times daily as needed for Diarrhea. 30 capsule 1    multivitamin capsule Take 1 capsule by mouth once daily.      oxyCODONE (ROXICODONE) 5 MG immediate release tablet Take 1 tablet (5 mg total) by mouth every 8 (eight) hours as needed for Pain. 90 tablet 0    tacrolimus (PROGRAF) 0.5 MG Cap Take 1 capsule (0.5 mg total) by mouth once daily. 90 capsule 3    traZODone (DESYREL) 50 MG tablet Take 50 mg by mouth every evening.      vitamin E 400 UNIT capsule Take 400 Units by mouth once daily.      ondansetron (ZOFRAN) 8 MG tablet Take 1 tablet (8 mg total) by mouth every 8 (eight) hours as needed for Nausea. (Patient not taking: Reported on 11/16/2020) 60 tablet 2     No current facility-administered medications for this visit.        Review of Systems   Constitutional: Positive for fatigue. Negative for activity change, appetite change, chills, diaphoresis, fever and unexpected weight change.   HENT: Negative for nosebleeds and trouble swallowing.    Eyes: Negative for visual disturbance.   Respiratory: Negative for cough, chest tightness, shortness of breath and wheezing.    Cardiovascular:  "Negative for chest pain and leg swelling.   Gastrointestinal: Negative for abdominal distention, abdominal pain, blood in stool, constipation, diarrhea, nausea and vomiting.   Endocrine: Negative for cold intolerance and heat intolerance.   Genitourinary: Negative for difficulty urinating and dysuria.   Musculoskeletal: Positive for myalgias and neck pain (pain radiates between both sides of his neck under his jaw.). Negative for arthralgias and back pain.   Skin: Negative for color change.   Neurological: Positive for numbness. Negative for dizziness, weakness, light-headedness and headaches.   Hematological: Negative for adenopathy. Does not bruise/bleed easily.   Psychiatric/Behavioral: Negative for confusion.       ECOG Performance Status: 0   Objective:      Vitals:   Vitals:    11/16/20 1507   BP: (!) 146/79   Pulse: 86   Temp: 98.1 °F (36.7 °C)   TempSrc: Oral   Weight: 59.8 kg (131 lb 13.4 oz)   Height: 5' 8" (1.727 m)     BMI: Body mass index is 20.05 kg/m².    Physical Exam  Constitutional:       General: He is not in acute distress.     Appearance: Normal appearance. He is not ill-appearing.   HENT:      Head: Normocephalic and atraumatic.      Mouth/Throat:      Pharynx: No oropharyngeal exudate or posterior oropharyngeal erythema.   Eyes:      General: No scleral icterus.     Extraocular Movements: Extraocular movements intact.      Conjunctiva/sclera: Conjunctivae normal.      Pupils: Pupils are equal, round, and reactive to light.   Neck:      Musculoskeletal: Normal range of motion and neck supple.      Comments: Left neck: No palpable mass on today's exam.  Cardiovascular:      Rate and Rhythm: Normal rate and regular rhythm.      Heart sounds: No murmur. No friction rub. No gallop.    Pulmonary:      Effort: Pulmonary effort is normal. No respiratory distress.      Breath sounds: No stridor. No wheezing, rhonchi or rales.   Abdominal:      General: Bowel sounds are normal. There is no distension. "      Palpations: Abdomen is soft. There is no mass.      Tenderness: There is no abdominal tenderness. There is no guarding or rebound.   Musculoskeletal: Normal range of motion.      Right lower leg: No edema.      Left lower leg: No edema.   Lymphadenopathy:      Upper Body:      Right upper body: No supraclavicular or axillary adenopathy.      Left upper body: No supraclavicular or axillary adenopathy.   Skin:     General: Skin is warm and dry.   Neurological:      General: No focal deficit present.      Mental Status: He is alert.         Laboratory Data:   Recent Results (from the past 168 hour(s))   Comprehensive metabolic panel    Collection Time: 11/10/20  8:10 AM   Result Value Ref Range    Sodium 143 136 - 145 mmol/L    Potassium 5.0 3.5 - 5.1 mmol/L    Chloride 106 95 - 110 mmol/L    CO2 26 23 - 29 mmol/L    Glucose 106 70 - 110 mg/dL    BUN 13 8 - 23 mg/dL    Creatinine 1.3 0.5 - 1.4 mg/dL    Calcium 9.4 8.7 - 10.5 mg/dL    Total Protein 7.3 6.0 - 8.4 g/dL    Albumin 3.4 (L) 3.5 - 5.2 g/dL    Total Bilirubin 0.6 0.1 - 1.0 mg/dL    Alkaline Phosphatase 88 55 - 135 U/L    AST 41 (H) 10 - 40 U/L    ALT 27 10 - 44 U/L    Anion Gap 11 8 - 16 mmol/L    eGFR if African American >60.0 >60 mL/min/1.73 m^2    eGFR if non African American 54.9 (A) >60 mL/min/1.73 m^2   Magnesium    Collection Time: 11/10/20  8:10 AM   Result Value Ref Range    Magnesium 1.7 1.6 - 2.6 mg/dL   Phosphorus    Collection Time: 11/10/20  8:10 AM   Result Value Ref Range    Phosphorus 3.4 2.7 - 4.5 mg/dL   Tacrolimus level    Collection Time: 11/10/20  8:25 AM   Result Value Ref Range    Tacrolimus Lvl 1.6 (L) 5.0 - 15.0 ng/mL   CBC auto differential    Collection Time: 11/10/20  8:25 AM   Result Value Ref Range    WBC 2.02 (L) 3.90 - 12.70 K/uL    RBC 3.05 (L) 4.60 - 6.20 M/uL    Hemoglobin 10.3 (L) 14.0 - 18.0 g/dL    Hematocrit 30.7 (L) 40.0 - 54.0 %     (H) 82 - 98 fL    MCH 33.8 (H) 27.0 - 31.0 pg    MCHC 33.6 32.0 - 36.0 g/dL     RDW 13.6 11.5 - 14.5 %    Platelets 177 150 - 350 K/uL    MPV 9.4 9.2 - 12.9 fL    Immature Granulocytes 0.5 0.0 - 0.5 %    Gran # (ANC) 0.3 (L) 1.8 - 7.7 K/uL    Immature Grans (Abs) 0.01 0.00 - 0.04 K/uL    Lymph # 1.3 1.0 - 4.8 K/uL    Mono # 0.4 0.3 - 1.0 K/uL    Eos # 0.1 0.0 - 0.5 K/uL    Baso # 0.01 0.00 - 0.20 K/uL    nRBC 0 0 /100 WBC    Gran % 14.3 (L) 38.0 - 73.0 %    Lymph % 62.4 (H) 18.0 - 48.0 %    Mono % 19.3 (H) 4.0 - 15.0 %    Eosinophil % 3.0 0.0 - 8.0 %    Basophil % 0.5 0.0 - 1.9 %    Platelet Estimate Appears normal     Aniso Slight     Poly Occasional     Basophilic Stippling Occasional     Differential Method Automated    POCT glucose    Collection Time: 11/16/20  1:33 PM   Result Value Ref Range    POCT Glucose 160 (H) 70 - 110 mg/dL           Imaging:    Assessment:       1. Squamous cell carcinoma of base of tongue    2. Atherosclerosis of abdominal aorta    3. Postoperative hypothyroidism    4. Stage 3a chronic kidney disease    5. Status post liver transplantation           Plan:       Problem List Items Addressed This Visit        Cardiac/Vascular    Atherosclerosis of abdominal aorta    Overview     MRI Abdomen 3/2019 ([facility])         Current Assessment & Plan     Follow up with PCP            Renal/    Stage 3a chronic kidney disease    Current Assessment & Plan     Has CKD stage IIIa, creatinine baseline is ~ 1.4.  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  Currently Cr ~1.3.  -monitor cmp prior to each cycle.            Oncology    Squamous cell carcinoma of base of tongue - Primary    Overview     Per OSH records, initially presented in 2015 with symptoms and cT2N2c disease. page 91 of 3/25/2020 outside records clinic (media tab).  Initially presented 8/2015 with left sided odynophagia.  Treated for reflux, which did not improve.    12/11/15 he had a fiberoptic exam showing discolored mucosal area of the left base of the tongue.    1/13/16 he had persistent  dysphagia, odynophagia, and new left otalgia.  MRI showed an irregular mass along the left posterior margin of the base of the tongue measuring 1.8x2.3cm with ipsilateral level 2 lymph node measuring 1.7cm with central necrosis and contralateral level 2 node measuring 1.3cm with questionable necrosis.    1/19/2016 he had direct laryngoscopy with biopsy left of midline, proximal to vallecula, which was p16 positive, invasive, with moderately to poorly differentiated sq.c.c. with basaloid features.    1/29/16 met with medical oncology who recommended chemoxrt.    2/1/16  staging PET CT showed hypermetabolic mass at base of tongue 4x2.5x3.5cm, max SUV 24.13, hypermetabolic lymph node left neck SUV 6.77, small subcentimeter lymph node right neck max SUV 3.79 (exact size of lymph nodes not mentioned in the summary).    2/15/16 started chemoxrt to left tongue base, bilateral cervical neck levels 1b-5, treated with 6MV photons utilizing IMRT, 5000 cGy in 200 cGy daily fractions.  Cone down boost to left base of tongue with additional 2000 cGy in 200 cGy daily fractions to gross disease.  Received weekly cisplatin with radiation but had to switch to carboplatin due to worsening renal function. during this time also missed several weekly treatments of cisplatin.    4/1/2016 completed chemoxrt.   11/1/2016 Repeat biopsy of left tongue showed persistent disease.  S/p salvage glossectomy with laryngectomy,  rT4aN0, p16 positive, base of tongue squamous cell carcinoma.     page 61 of 3/25/2020 outside records clinic (media tab)  Pathology: Tonsil, left tonsil fold: reactive squamous mucosa with inflammation and degenerating skeletal muscle, no carcinoma seen.  Right and Left base of tongue:  Reactive squamous mucosa and submucosa with inflammation, no carcinoma seen.  Neck, glossectomy, laryngectomy, bilateral neck dissection:  invasive squamous cell carcinoma.  tumor site: base of tongue/hypopharynx.  Small focus of tumor is  seen within the hyoid  bone.  specimen Size [id number]  tumor size 4.9y1n5yd  depth of invasion 20mm  TNM stage pT4a, pN0, pMx  Lymph nodes, included in all parts:  number involved 0  number examined 3.  distance of tumor from margins  positive inked margins: none  within 1mm : none  within 1-2mm: anterior-inferior    Bilateral neck dissection:  level 1: one lymph node and submandibular gland, negative for tumor  level II : two lymph nodes, negative for tumor  level III: not identified  level IV: not identified  level V: not identified.    6/20/17 - PETCT with FDG avidity of left neck lymph node, level 3, SUV 2.9.  No other evidence of local or distant disease.  7/6/2017 - biopsy of left neck lymph node with IR.  Sample was non-diagnostic. Notes from path report: Less than optimal scan specimen with minute tissue core of stromal fibroadipose tissue.  definitive tati background is not seen.  9/28/2017 - Repeat PET CT showing mild activity SUV 3.5 (increased from 3.2 previously; size 7.8x7.4x9.5mm).  Also new findings under left pectoralis minor (mild focal increased activity, indeterminate but new since previous exam)  3/22/2018 - CT neck shows no evidence of disease and level 2b lymph node decreased to 4.9mmx4.5mmx4.9mm from 7.8x7.4x9.5mm previously.         Current Assessment & Plan     Seen by Dr. Myers 8/3/2020 for left-sided neck pain and a swelling in left level 2 several weeks ago.  He feels it arose after he began lifting weights.  CT neck 8/5/2020 : Lymph nodes At the left neck level L2 there is a heterogeneous soft tissue density with central hypoattenuation likely reflecting a necrotic lymph node measuring 1.6 x 1.4 cm in transverse dimension, 2.5 cm in craniocaudal dimension.  There is a similar appearing irregular soft tissue density at the right neck level 2 measuring 1.1 x 9.0 cm in transverse dimension and 1.5 cm craniocaudal dimension.  No other abnormal appearing or enlarged lymph nodes found.   Impression: At level 2 within the neck bilaterally, there are  soft tissue density masses with central hypoattenuation likely reflective of necrotic lymph nodes.  Findings are suspicious for malignancy recurrence.  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  Staging PET CT 9/30/2020 with left sided hypermetabolic node and right sided enlarged node without uptake.  Also has mandibular vestibule uptake, which may be another site of disease. Discussed at tumor board.  He is not a surgical or radiation candidate.  -For low tumor volume, plan for carboplatin/paclitaxel/cetuximab.  Carboplatin/paclitaxel x2 cycles followed by 4 cycles carboplatin/cetuximab followed by cetuximab maintenance.  -not candidate for immunotherapy with his history of liver transplant.  -Labs for chemo will be: CMP, CBC, Mg, Phos  -Supportive medications: Dexamethasone 6mg BID days 2 and 3 for cycles 1-6 when he is receiving carboplatin.  zofran prn.  If needed, will prescribe compazine prn breakthrough n/v.  For pain, oxycodone prn.  He currently uses 2-3x/day.  Pain adequately controlled for him, which is about a 6 out of 10.  Bowel regimen reviewed and currently he only needs stool softeners.    -Continue periactin TID for poor appetite.  If this does not work, he may need nutrition consult.  -Preliminary personal review of PET CT shows response to therapy with reduced SUV of primary lesion.  Pending official read.   --If recovers from neutropenia, will be able to proceed with cycle 3 11/17/20.  --Patient prefers for labs to be drawn with peripheral IV access placement.  -Return to clinic prior to cycle 4.         Relevant Medications    oxyCODONE (ROXICODONE) 5 MG immediate release tablet    Other Relevant Orders    Comprehensive Metabolic Panel    CBC auto differential    Magnesium       Endocrine    Postoperative hypothyroidism    Current Assessment & Plan     Hypothyroidism currently  on levothyroxine.  Last TSH 12/2019 WNL.  No symptoms suggestive of hypothyroidism today.  -continue levothyroxine.  -follow up with PCP to monitor TSH/fT4            GI    Status post liver transplantation    Current Assessment & Plan     S/p liver transplant, currently on tacrolimus 0.5mg qday.  -follows with hepatology             Orders Placed This Encounter   Procedures    Comprehensive Metabolic Panel    CBC auto differential    Magnesium       Advance Care Planning I initiated the process of advance care planning at his initial visit and explained the importance of this process to the patient.  Then the patient received detailed information about the importance of designating a Health Care Power of  (HCPOA). he was instructed to communicate with this person about their wishes for future healthcare, should he become sick and lose decision-making capacity. The patient has previously appointed a HCPOA. After our discussion, the patient has decided to complete a HCPOA and has appointed his brother and NAME:Ollie Berg MD  Hematology Oncology

## 2020-11-17 ENCOUNTER — INFUSION (OUTPATIENT)
Dept: INFUSION THERAPY | Facility: HOSPITAL | Age: 70
End: 2020-11-17
Attending: INTERNAL MEDICINE
Payer: MEDICARE

## 2020-11-17 VITALS
TEMPERATURE: 98 F | RESPIRATION RATE: 18 BRPM | SYSTOLIC BLOOD PRESSURE: 140 MMHG | HEART RATE: 80 BPM | DIASTOLIC BLOOD PRESSURE: 71 MMHG

## 2020-11-17 DIAGNOSIS — C01 SQUAMOUS CELL CARCINOMA OF BASE OF TONGUE: Primary | ICD-10-CM

## 2020-11-17 LAB
ALBUMIN SERPL BCP-MCNC: 3.4 G/DL (ref 3.5–5.2)
ALP SERPL-CCNC: 89 U/L (ref 55–135)
ALT SERPL W/O P-5'-P-CCNC: 17 U/L (ref 10–44)
ANION GAP SERPL CALC-SCNC: 11 MMOL/L (ref 8–16)
AST SERPL-CCNC: 32 U/L (ref 10–40)
BASOPHILS # BLD AUTO: 0.01 K/UL (ref 0–0.2)
BASOPHILS NFR BLD: 0.3 % (ref 0–1.9)
BILIRUB SERPL-MCNC: 0.6 MG/DL (ref 0.1–1)
BUN SERPL-MCNC: 15 MG/DL (ref 8–23)
CALCIUM SERPL-MCNC: 9.2 MG/DL (ref 8.7–10.5)
CHLORIDE SERPL-SCNC: 103 MMOL/L (ref 95–110)
CO2 SERPL-SCNC: 27 MMOL/L (ref 23–29)
CREAT SERPL-MCNC: 1.2 MG/DL (ref 0.5–1.4)
DIFFERENTIAL METHOD: ABNORMAL
EOSINOPHIL # BLD AUTO: 0.1 K/UL (ref 0–0.5)
EOSINOPHIL NFR BLD: 1.6 % (ref 0–8)
ERYTHROCYTE [DISTWIDTH] IN BLOOD BY AUTOMATED COUNT: 15.7 % (ref 11.5–14.5)
EST. GFR  (AFRICAN AMERICAN): >60 ML/MIN/1.73 M^2
EST. GFR  (NON AFRICAN AMERICAN): >60 ML/MIN/1.73 M^2
GLUCOSE SERPL-MCNC: 120 MG/DL (ref 70–110)
HCT VFR BLD AUTO: 29.3 % (ref 40–54)
HGB BLD-MCNC: 10 G/DL (ref 14–18)
IMM GRANULOCYTES # BLD AUTO: 0.01 K/UL (ref 0–0.04)
IMM GRANULOCYTES NFR BLD AUTO: 0.3 % (ref 0–0.5)
LYMPHOCYTES # BLD AUTO: 0.8 K/UL (ref 1–4.8)
LYMPHOCYTES NFR BLD: 20.8 % (ref 18–48)
MAGNESIUM SERPL-MCNC: 1.8 MG/DL (ref 1.6–2.6)
MCH RBC QN AUTO: 35.3 PG (ref 27–31)
MCHC RBC AUTO-ENTMCNC: 34.1 G/DL (ref 32–36)
MCV RBC AUTO: 104 FL (ref 82–98)
MONOCYTES # BLD AUTO: 0.4 K/UL (ref 0.3–1)
MONOCYTES NFR BLD: 10.7 % (ref 4–15)
NEUTROPHILS # BLD AUTO: 2.5 K/UL (ref 1.8–7.7)
NEUTROPHILS NFR BLD: 66.3 % (ref 38–73)
NRBC BLD-RTO: 0 /100 WBC
PLATELET # BLD AUTO: 168 K/UL (ref 150–350)
PMV BLD AUTO: 9.2 FL (ref 9.2–12.9)
POTASSIUM SERPL-SCNC: 3.7 MMOL/L (ref 3.5–5.1)
PROT SERPL-MCNC: 7.3 G/DL (ref 6–8.4)
RBC # BLD AUTO: 2.83 M/UL (ref 4.6–6.2)
SODIUM SERPL-SCNC: 141 MMOL/L (ref 136–145)
WBC # BLD AUTO: 3.75 K/UL (ref 3.9–12.7)

## 2020-11-17 PROCEDURE — 85025 COMPLETE CBC W/AUTO DIFF WBC: CPT

## 2020-11-17 PROCEDURE — 25000003 PHARM REV CODE 250: Performed by: INTERNAL MEDICINE

## 2020-11-17 PROCEDURE — 25000003 PHARM REV CODE 250: Performed by: STUDENT IN AN ORGANIZED HEALTH CARE EDUCATION/TRAINING PROGRAM

## 2020-11-17 PROCEDURE — 80053 COMPREHEN METABOLIC PANEL: CPT

## 2020-11-17 PROCEDURE — 96367 TX/PROPH/DG ADDL SEQ IV INF: CPT

## 2020-11-17 PROCEDURE — 96417 CHEMO IV INFUS EACH ADDL SEQ: CPT

## 2020-11-17 PROCEDURE — 96415 CHEMO IV INFUSION ADDL HR: CPT

## 2020-11-17 PROCEDURE — 96375 TX/PRO/DX INJ NEW DRUG ADDON: CPT

## 2020-11-17 PROCEDURE — 96413 CHEMO IV INFUSION 1 HR: CPT

## 2020-11-17 PROCEDURE — 63600175 PHARM REV CODE 636 W HCPCS: Performed by: INTERNAL MEDICINE

## 2020-11-17 PROCEDURE — 83735 ASSAY OF MAGNESIUM: CPT

## 2020-11-17 PROCEDURE — 63600175 PHARM REV CODE 636 W HCPCS: Performed by: STUDENT IN AN ORGANIZED HEALTH CARE EDUCATION/TRAINING PROGRAM

## 2020-11-17 RX ORDER — EPINEPHRINE 0.3 MG/.3ML
0.3 INJECTION SUBCUTANEOUS ONCE AS NEEDED
Status: CANCELLED | OUTPATIENT
Start: 2020-12-01

## 2020-11-17 RX ORDER — EPINEPHRINE 0.3 MG/.3ML
0.3 INJECTION SUBCUTANEOUS ONCE AS NEEDED
Status: DISCONTINUED | OUTPATIENT
Start: 2020-11-17 | End: 2020-11-17 | Stop reason: HOSPADM

## 2020-11-17 RX ORDER — SODIUM CHLORIDE 0.9 % (FLUSH) 0.9 %
10 SYRINGE (ML) INJECTION
Status: DISCONTINUED | OUTPATIENT
Start: 2020-11-17 | End: 2020-11-17 | Stop reason: HOSPADM

## 2020-11-17 RX ORDER — DIPHENHYDRAMINE HYDROCHLORIDE 50 MG/ML
50 INJECTION INTRAMUSCULAR; INTRAVENOUS ONCE AS NEEDED
Status: DISCONTINUED | OUTPATIENT
Start: 2020-11-17 | End: 2020-11-17 | Stop reason: HOSPADM

## 2020-11-17 RX ORDER — DIPHENHYDRAMINE HYDROCHLORIDE 50 MG/ML
50 INJECTION INTRAMUSCULAR; INTRAVENOUS ONCE AS NEEDED
Status: CANCELLED | OUTPATIENT
Start: 2020-11-24

## 2020-11-17 RX ORDER — SODIUM CHLORIDE 0.9 % (FLUSH) 0.9 %
10 SYRINGE (ML) INJECTION
Status: CANCELLED | OUTPATIENT
Start: 2020-12-01

## 2020-11-17 RX ORDER — EPINEPHRINE 0.3 MG/.3ML
0.3 INJECTION SUBCUTANEOUS ONCE AS NEEDED
Status: CANCELLED | OUTPATIENT
Start: 2020-11-24

## 2020-11-17 RX ORDER — HEPARIN 100 UNIT/ML
500 SYRINGE INTRAVENOUS
Status: DISCONTINUED | OUTPATIENT
Start: 2020-11-17 | End: 2020-11-17 | Stop reason: HOSPADM

## 2020-11-17 RX ORDER — HEPARIN 100 UNIT/ML
500 SYRINGE INTRAVENOUS
Status: CANCELLED | OUTPATIENT
Start: 2020-11-24

## 2020-11-17 RX ORDER — SODIUM CHLORIDE 0.9 % (FLUSH) 0.9 %
10 SYRINGE (ML) INJECTION
Status: CANCELLED | OUTPATIENT
Start: 2020-11-24

## 2020-11-17 RX ORDER — HEPARIN 100 UNIT/ML
500 SYRINGE INTRAVENOUS
Status: CANCELLED | OUTPATIENT
Start: 2020-12-01

## 2020-11-17 RX ORDER — DIPHENHYDRAMINE HYDROCHLORIDE 50 MG/ML
50 INJECTION INTRAMUSCULAR; INTRAVENOUS ONCE AS NEEDED
Status: CANCELLED | OUTPATIENT
Start: 2020-12-01

## 2020-11-17 RX ADMIN — DIPHENHYDRAMINE HYDROCHLORIDE 50 MG: 50 INJECTION, SOLUTION INTRAMUSCULAR; INTRAVENOUS at 09:11

## 2020-11-17 RX ADMIN — APREPITANT 130 MG: 130 INJECTION, EMULSION INTRAVENOUS at 09:11

## 2020-11-17 RX ADMIN — DEXAMETHASONE SODIUM PHOSPHATE: 4 INJECTION, SOLUTION INTRA-ARTICULAR; INTRALESIONAL; INTRAMUSCULAR; INTRAVENOUS; SOFT TISSUE at 09:11

## 2020-11-17 RX ADMIN — SODIUM CHLORIDE: 9 INJECTION, SOLUTION INTRAVENOUS at 09:11

## 2020-11-17 RX ADMIN — CARBOPLATIN 370 MG: 10 INJECTION INTRAVENOUS at 01:11

## 2020-11-17 RX ADMIN — CETUXIMAB 684 MG: 2 SOLUTION INTRAVENOUS at 10:11

## 2020-11-17 NOTE — PLAN OF CARE
Erbitux education given to patient. Patient tolerated Erbitux and Carbo with no complications. VSS. Pt instructed to call MD with any problems. NAD. Pt discharged home independently.

## 2020-11-24 ENCOUNTER — INFUSION (OUTPATIENT)
Dept: INFUSION THERAPY | Facility: HOSPITAL | Age: 70
End: 2020-11-24
Attending: INTERNAL MEDICINE
Payer: MEDICARE

## 2020-11-24 VITALS
TEMPERATURE: 98 F | HEIGHT: 68 IN | RESPIRATION RATE: 18 BRPM | WEIGHT: 131.81 LBS | SYSTOLIC BLOOD PRESSURE: 109 MMHG | BODY MASS INDEX: 19.98 KG/M2 | HEART RATE: 75 BPM | DIASTOLIC BLOOD PRESSURE: 65 MMHG

## 2020-11-24 DIAGNOSIS — C01 SQUAMOUS CELL CARCINOMA OF BASE OF TONGUE: Primary | ICD-10-CM

## 2020-11-24 PROCEDURE — 96367 TX/PROPH/DG ADDL SEQ IV INF: CPT

## 2020-11-24 PROCEDURE — 25000003 PHARM REV CODE 250: Performed by: INTERNAL MEDICINE

## 2020-11-24 PROCEDURE — 25000003 PHARM REV CODE 250: Performed by: STUDENT IN AN ORGANIZED HEALTH CARE EDUCATION/TRAINING PROGRAM

## 2020-11-24 PROCEDURE — 63600175 PHARM REV CODE 636 W HCPCS: Mod: JG | Performed by: STUDENT IN AN ORGANIZED HEALTH CARE EDUCATION/TRAINING PROGRAM

## 2020-11-24 PROCEDURE — 63600175 PHARM REV CODE 636 W HCPCS: Performed by: INTERNAL MEDICINE

## 2020-11-24 PROCEDURE — 96413 CHEMO IV INFUSION 1 HR: CPT

## 2020-11-24 RX ORDER — HEPARIN 100 UNIT/ML
500 SYRINGE INTRAVENOUS
Status: DISCONTINUED | OUTPATIENT
Start: 2020-11-24 | End: 2020-11-24 | Stop reason: HOSPADM

## 2020-11-24 RX ORDER — SODIUM CHLORIDE 0.9 % (FLUSH) 0.9 %
10 SYRINGE (ML) INJECTION
Status: DISCONTINUED | OUTPATIENT
Start: 2020-11-24 | End: 2020-11-24 | Stop reason: HOSPADM

## 2020-11-24 RX ORDER — DIPHENHYDRAMINE HYDROCHLORIDE 50 MG/ML
50 INJECTION INTRAMUSCULAR; INTRAVENOUS ONCE AS NEEDED
Status: DISCONTINUED | OUTPATIENT
Start: 2020-11-24 | End: 2020-11-24 | Stop reason: HOSPADM

## 2020-11-24 RX ORDER — EPINEPHRINE 0.3 MG/.3ML
0.3 INJECTION SUBCUTANEOUS ONCE AS NEEDED
Status: DISCONTINUED | OUTPATIENT
Start: 2020-11-24 | End: 2020-11-24 | Stop reason: HOSPADM

## 2020-11-24 RX ADMIN — SODIUM CHLORIDE: 9 INJECTION, SOLUTION INTRAVENOUS at 01:11

## 2020-11-24 RX ADMIN — DIPHENHYDRAMINE HYDROCHLORIDE 50 MG: 50 INJECTION, SOLUTION INTRAMUSCULAR; INTRAVENOUS at 01:11

## 2020-11-24 RX ADMIN — CETUXIMAB 400 MG: 2 SOLUTION INTRAVENOUS at 01:11

## 2020-11-24 NOTE — PLAN OF CARE
Tolerated Erbitux well.  Observed pt 1 hr post infusion, no reactions noted.  No questions or concerns at this time.  Ambulated off unit in NAD.

## 2020-12-01 ENCOUNTER — INFUSION (OUTPATIENT)
Dept: INFUSION THERAPY | Facility: HOSPITAL | Age: 70
End: 2020-12-01
Attending: INTERNAL MEDICINE
Payer: MEDICARE

## 2020-12-01 VITALS
HEART RATE: 93 BPM | OXYGEN SATURATION: 97 % | SYSTOLIC BLOOD PRESSURE: 140 MMHG | TEMPERATURE: 98 F | WEIGHT: 131.38 LBS | RESPIRATION RATE: 18 BRPM | HEIGHT: 68 IN | BODY MASS INDEX: 19.91 KG/M2 | DIASTOLIC BLOOD PRESSURE: 74 MMHG

## 2020-12-01 DIAGNOSIS — C01 SQUAMOUS CELL CARCINOMA OF BASE OF TONGUE: Primary | ICD-10-CM

## 2020-12-01 DIAGNOSIS — L27.0 DRUG-INDUCED SKIN RASH: Primary | ICD-10-CM

## 2020-12-01 PROCEDURE — 25000003 PHARM REV CODE 250: Performed by: STUDENT IN AN ORGANIZED HEALTH CARE EDUCATION/TRAINING PROGRAM

## 2020-12-01 PROCEDURE — 96367 TX/PROPH/DG ADDL SEQ IV INF: CPT

## 2020-12-01 PROCEDURE — 96413 CHEMO IV INFUSION 1 HR: CPT

## 2020-12-01 PROCEDURE — 63600175 PHARM REV CODE 636 W HCPCS: Performed by: STUDENT IN AN ORGANIZED HEALTH CARE EDUCATION/TRAINING PROGRAM

## 2020-12-01 RX ORDER — HEPARIN 100 UNIT/ML
500 SYRINGE INTRAVENOUS
Status: DISCONTINUED | OUTPATIENT
Start: 2020-12-01 | End: 2020-12-01 | Stop reason: HOSPADM

## 2020-12-01 RX ORDER — SODIUM CHLORIDE 0.9 % (FLUSH) 0.9 %
10 SYRINGE (ML) INJECTION
Status: DISCONTINUED | OUTPATIENT
Start: 2020-12-01 | End: 2020-12-01 | Stop reason: HOSPADM

## 2020-12-01 RX ORDER — DOXYCYCLINE 100 MG/1
100 CAPSULE ORAL DAILY
Qty: 28 CAPSULE | Refills: 0 | Status: SHIPPED | OUTPATIENT
Start: 2020-12-01 | End: 2020-12-28 | Stop reason: SDUPTHER

## 2020-12-01 RX ORDER — METRONIDAZOLE 7.5 MG/G
GEL TOPICAL 2 TIMES DAILY
Qty: 45 G | Refills: 1 | Status: SHIPPED | OUTPATIENT
Start: 2020-12-01 | End: 2021-01-19

## 2020-12-01 RX ADMIN — DIPHENHYDRAMINE HYDROCHLORIDE 50 MG: 50 INJECTION INTRAMUSCULAR; INTRAVENOUS at 01:12

## 2020-12-01 RX ADMIN — SODIUM CHLORIDE: 9 INJECTION, SOLUTION INTRAVENOUS at 01:12

## 2020-12-01 RX ADMIN — CETUXIMAB 400 MG: 2 SOLUTION INTRAVENOUS at 01:12

## 2020-12-01 NOTE — PLAN OF CARE
Patient complained of itching face rash from Erbitux infusion and states that he is taking steroids. Dr. Guillory notified of patient complaining of itching facial rash, new orders for antibiotics and skin ointment noted. Patient notified of new prescription. Patient meets parameters for treatment today. Patient tolerated Erbitux chemotherapy and monitored for 1 hour post infusion with no complications. Patient instructed to call clinic with any problems or concerns. Patient verbalize understanding. AVS given and patient discharged home.     Problem: Adult Inpatient Plan of Care  Goal: Plan of Care Review  Outcome: Ongoing, Progressing  Goal: Patient-Specific Goal (Individualization)  Outcome: Ongoing, Progressing  Goal: Absence of Hospital-Acquired Illness or Injury  Outcome: Ongoing, Progressing  Goal: Optimal Comfort and Wellbeing  Outcome: Ongoing, Progressing  Goal: Readiness for Transition of Care  Outcome: Ongoing, Progressing  Goal: Rounds/Family Conference  Outcome: Ongoing, Progressing     Problem: Anemia (Chemotherapy Effects)  Goal: Anemia Symptom Improvement  Outcome: Ongoing, Progressing     Problem: Urinary Bleeding Risk or Actual (Chemotherapy Effects)  Goal: Absence of Hematuria  Outcome: Ongoing, Progressing     Problem: Nausea and Vomiting (Chemotherapy Effects)  Goal: Fluid and Electrolyte Balance  Outcome: Ongoing, Progressing     Problem: Neurotoxicity (Chemotherapy Effects)  Goal: Neurotoxicity Symptom Control  Outcome: Ongoing, Progressing

## 2020-12-07 ENCOUNTER — TELEPHONE (OUTPATIENT)
Dept: HEMATOLOGY/ONCOLOGY | Facility: CLINIC | Age: 70
End: 2020-12-07

## 2020-12-07 ENCOUNTER — TELEPHONE (OUTPATIENT)
Dept: TRANSPLANT | Facility: CLINIC | Age: 70
End: 2020-12-07

## 2020-12-07 ENCOUNTER — LAB VISIT (OUTPATIENT)
Dept: LAB | Facility: HOSPITAL | Age: 70
End: 2020-12-07
Attending: INTERNAL MEDICINE
Payer: MEDICARE

## 2020-12-07 ENCOUNTER — OFFICE VISIT (OUTPATIENT)
Dept: HEMATOLOGY/ONCOLOGY | Facility: CLINIC | Age: 70
End: 2020-12-07
Payer: MEDICARE

## 2020-12-07 VITALS
SYSTOLIC BLOOD PRESSURE: 134 MMHG | HEART RATE: 97 BPM | WEIGHT: 132.06 LBS | BODY MASS INDEX: 20.01 KG/M2 | RESPIRATION RATE: 18 BRPM | HEIGHT: 68 IN | DIASTOLIC BLOOD PRESSURE: 73 MMHG | TEMPERATURE: 98 F | OXYGEN SATURATION: 99 %

## 2020-12-07 DIAGNOSIS — Z94.4 STATUS POST LIVER TRANSPLANTATION: ICD-10-CM

## 2020-12-07 DIAGNOSIS — C01 SQUAMOUS CELL CARCINOMA OF BASE OF TONGUE: Primary | ICD-10-CM

## 2020-12-07 DIAGNOSIS — C01 SQUAMOUS CELL CARCINOMA OF BASE OF TONGUE: ICD-10-CM

## 2020-12-07 DIAGNOSIS — G47.09 OTHER INSOMNIA: ICD-10-CM

## 2020-12-07 PROBLEM — E87.5 HYPERKALEMIA: Status: RESOLVED | Noted: 2020-10-26 | Resolved: 2020-12-07

## 2020-12-07 LAB
ALBUMIN SERPL BCP-MCNC: 3.6 G/DL (ref 3.5–5.2)
ALP SERPL-CCNC: 82 U/L (ref 55–135)
ALT SERPL W/O P-5'-P-CCNC: 30 U/L (ref 10–44)
ANION GAP SERPL CALC-SCNC: 13 MMOL/L (ref 8–16)
AST SERPL-CCNC: 38 U/L (ref 10–40)
BASOPHILS # BLD AUTO: 0.01 K/UL (ref 0–0.2)
BASOPHILS NFR BLD: 0.2 % (ref 0–1.9)
BILIRUB SERPL-MCNC: 1 MG/DL (ref 0.1–1)
BUN SERPL-MCNC: 18 MG/DL (ref 8–23)
CALCIUM SERPL-MCNC: 9.3 MG/DL (ref 8.7–10.5)
CHLORIDE SERPL-SCNC: 103 MMOL/L (ref 95–110)
CO2 SERPL-SCNC: 24 MMOL/L (ref 23–29)
CREAT SERPL-MCNC: 1.3 MG/DL (ref 0.5–1.4)
DIFFERENTIAL METHOD: ABNORMAL
EOSINOPHIL # BLD AUTO: 0 K/UL (ref 0–0.5)
EOSINOPHIL NFR BLD: 0.6 % (ref 0–8)
ERYTHROCYTE [DISTWIDTH] IN BLOOD BY AUTOMATED COUNT: 16.4 % (ref 11.5–14.5)
EST. GFR  (AFRICAN AMERICAN): >60 ML/MIN/1.73 M^2
EST. GFR  (NON AFRICAN AMERICAN): 54.9 ML/MIN/1.73 M^2
GLUCOSE SERPL-MCNC: 156 MG/DL (ref 70–110)
HCT VFR BLD AUTO: 30.8 % (ref 40–54)
HGB BLD-MCNC: 10.6 G/DL (ref 14–18)
IMM GRANULOCYTES # BLD AUTO: 0.02 K/UL (ref 0–0.04)
IMM GRANULOCYTES NFR BLD AUTO: 0.3 % (ref 0–0.5)
LYMPHOCYTES # BLD AUTO: 1 K/UL (ref 1–4.8)
LYMPHOCYTES NFR BLD: 14.8 % (ref 18–48)
MCH RBC QN AUTO: 36.1 PG (ref 27–31)
MCHC RBC AUTO-ENTMCNC: 34.4 G/DL (ref 32–36)
MCV RBC AUTO: 105 FL (ref 82–98)
MONOCYTES # BLD AUTO: 0.5 K/UL (ref 0.3–1)
MONOCYTES NFR BLD: 7.5 % (ref 4–15)
NEUTROPHILS # BLD AUTO: 4.9 K/UL (ref 1.8–7.7)
NEUTROPHILS NFR BLD: 76.6 % (ref 38–73)
NRBC BLD-RTO: 0 /100 WBC
PLATELET # BLD AUTO: 129 K/UL (ref 150–350)
PMV BLD AUTO: 10.1 FL (ref 9.2–12.9)
POTASSIUM SERPL-SCNC: 4 MMOL/L (ref 3.5–5.1)
PROT SERPL-MCNC: 7.7 G/DL (ref 6–8.4)
RBC # BLD AUTO: 2.94 M/UL (ref 4.6–6.2)
SODIUM SERPL-SCNC: 140 MMOL/L (ref 136–145)
TACROLIMUS BLD-MCNC: 1.7 NG/ML (ref 5–15)
WBC # BLD AUTO: 6.44 K/UL (ref 3.9–12.7)

## 2020-12-07 PROCEDURE — 99999 PR PBB SHADOW E&M-EST. PATIENT-LVL IV: CPT | Mod: PBBFAC,,, | Performed by: STUDENT IN AN ORGANIZED HEALTH CARE EDUCATION/TRAINING PROGRAM

## 2020-12-07 PROCEDURE — 83735 ASSAY OF MAGNESIUM: CPT

## 2020-12-07 PROCEDURE — 99215 OFFICE O/P EST HI 40 MIN: CPT | Mod: S$PBB,,, | Performed by: STUDENT IN AN ORGANIZED HEALTH CARE EDUCATION/TRAINING PROGRAM

## 2020-12-07 PROCEDURE — 99214 OFFICE O/P EST MOD 30 MIN: CPT | Mod: PBBFAC | Performed by: STUDENT IN AN ORGANIZED HEALTH CARE EDUCATION/TRAINING PROGRAM

## 2020-12-07 PROCEDURE — 85025 COMPLETE CBC W/AUTO DIFF WBC: CPT

## 2020-12-07 PROCEDURE — 84100 ASSAY OF PHOSPHORUS: CPT

## 2020-12-07 PROCEDURE — 80053 COMPREHEN METABOLIC PANEL: CPT

## 2020-12-07 PROCEDURE — 99999 PR PBB SHADOW E&M-EST. PATIENT-LVL IV: ICD-10-PCS | Mod: PBBFAC,,, | Performed by: STUDENT IN AN ORGANIZED HEALTH CARE EDUCATION/TRAINING PROGRAM

## 2020-12-07 PROCEDURE — 80197 ASSAY OF TACROLIMUS: CPT

## 2020-12-07 PROCEDURE — 36415 COLL VENOUS BLD VENIPUNCTURE: CPT

## 2020-12-07 PROCEDURE — 99215 PR OFFICE/OUTPT VISIT, EST, LEVL V, 40-54 MIN: ICD-10-PCS | Mod: S$PBB,,, | Performed by: STUDENT IN AN ORGANIZED HEALTH CARE EDUCATION/TRAINING PROGRAM

## 2020-12-07 RX ORDER — HEPARIN 100 UNIT/ML
500 SYRINGE INTRAVENOUS
Status: CANCELLED | OUTPATIENT
Start: 2020-12-15

## 2020-12-07 RX ORDER — TRAZODONE HYDROCHLORIDE 50 MG/1
50 TABLET ORAL NIGHTLY
Qty: 90 TABLET | Refills: 3 | Status: SHIPPED | OUTPATIENT
Start: 2020-12-07 | End: 2021-09-09

## 2020-12-07 RX ORDER — EPINEPHRINE 0.3 MG/.3ML
0.3 INJECTION SUBCUTANEOUS ONCE AS NEEDED
Status: CANCELLED | OUTPATIENT
Start: 2020-12-22

## 2020-12-07 RX ORDER — EPINEPHRINE 0.3 MG/.3ML
0.3 INJECTION SUBCUTANEOUS ONCE AS NEEDED
Status: CANCELLED | OUTPATIENT
Start: 2020-12-08

## 2020-12-07 RX ORDER — SODIUM CHLORIDE 0.9 % (FLUSH) 0.9 %
10 SYRINGE (ML) INJECTION
Status: CANCELLED | OUTPATIENT
Start: 2020-12-15

## 2020-12-07 RX ORDER — SODIUM CHLORIDE 0.9 % (FLUSH) 0.9 %
10 SYRINGE (ML) INJECTION
Status: CANCELLED | OUTPATIENT
Start: 2020-12-08

## 2020-12-07 RX ORDER — DIPHENHYDRAMINE HYDROCHLORIDE 50 MG/ML
50 INJECTION INTRAMUSCULAR; INTRAVENOUS ONCE AS NEEDED
Status: CANCELLED | OUTPATIENT
Start: 2020-12-22

## 2020-12-07 RX ORDER — DIPHENHYDRAMINE HYDROCHLORIDE 50 MG/ML
50 INJECTION INTRAMUSCULAR; INTRAVENOUS ONCE AS NEEDED
Status: CANCELLED | OUTPATIENT
Start: 2020-12-15

## 2020-12-07 RX ORDER — HEPARIN 100 UNIT/ML
500 SYRINGE INTRAVENOUS
Status: CANCELLED | OUTPATIENT
Start: 2020-12-08

## 2020-12-07 RX ORDER — HEPARIN 100 UNIT/ML
500 SYRINGE INTRAVENOUS
Status: CANCELLED | OUTPATIENT
Start: 2020-12-22

## 2020-12-07 RX ORDER — DEXAMETHASONE 6 MG/1
TABLET ORAL
Qty: 24 TABLET | Refills: 0 | Status: SHIPPED | OUTPATIENT
Start: 2020-12-07 | End: 2021-04-27 | Stop reason: ALTCHOICE

## 2020-12-07 RX ORDER — EPINEPHRINE 0.3 MG/.3ML
0.3 INJECTION SUBCUTANEOUS ONCE AS NEEDED
Status: CANCELLED | OUTPATIENT
Start: 2020-12-15

## 2020-12-07 RX ORDER — SODIUM CHLORIDE 0.9 % (FLUSH) 0.9 %
10 SYRINGE (ML) INJECTION
Status: CANCELLED | OUTPATIENT
Start: 2020-12-22

## 2020-12-07 RX ORDER — DIPHENHYDRAMINE HYDROCHLORIDE 50 MG/ML
50 INJECTION INTRAMUSCULAR; INTRAVENOUS ONCE AS NEEDED
Status: CANCELLED | OUTPATIENT
Start: 2020-12-08

## 2020-12-07 NOTE — ASSESSMENT & PLAN NOTE
Seen by Dr. Myers 8/3/2020 for left-sided neck pain and a swelling in left level 2 several weeks ago.  He feels it arose after he began lifting weights.  CT neck 8/5/2020 : Lymph nodes At the left neck level L2 there is a heterogeneous soft tissue density with central hypoattenuation likely reflecting a necrotic lymph node measuring 1.6 x 1.4 cm in transverse dimension, 2.5 cm in craniocaudal dimension.  There is a similar appearing irregular soft tissue density at the right neck level 2 measuring 1.1 x 9.0 cm in transverse dimension and 1.5 cm craniocaudal dimension.  No other abnormal appearing or enlarged lymph nodes found.  Impression: At level 2 within the neck bilaterally, there are  soft tissue density masses with central hypoattenuation likely reflective of necrotic lymph nodes.  Findings are suspicious for malignancy recurrence.  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  Staging PET CT 9/30/2020 with left sided hypermetabolic node and right sided enlarged node without uptake.  Also has mandibular vestibule uptake, which may be another site of disease. Discussed at tumor board.  He is not a surgical or radiation candidate.  -For low tumor volume, plan for carboplatin/paclitaxel/cetuximab.  Carboplatin/paclitaxel x2 cycles followed by 4 cycles carboplatin/cetuximab followed by cetuximab maintenance.  -not candidate for immunotherapy with his history of liver transplant.  -Labs for chemo will be: CMP, CBC, Mg, Phos  -Supportive medications: Dexamethasone 6mg BID days 2 and 3 for cycles 1-6 when he is receiving carboplatin.  zofran prn.  If needed, will prescribe compazine prn breakthrough n/v.  For pain, oxycodone prn.  He currently uses 2-3x/day.  Pain adequately controlled for him, which is about a 6 out of 10.  Bowel regimen reviewed and currently he only needs stool softeners.    -Continue periactin TID.  Weight is stable.  -Grade 2 skin rash with  cetuximab. Treated with doxycycline and topical metronidazole and rash is resolving.  Will require slower cetuximab infusions indefinitely.  --Patient prefers for labs to be drawn with peripheral IV access placement.  -Return to clinic prior to cycle 5 (12/29/20, 1/5/21, 1/12/21).

## 2020-12-07 NOTE — Clinical Note
1. Return to clinic 12/28/20.   2. Schedule cycle 5 (12/29/20, 1/5/21, 1/12/21) [2 hour cetuximab infusion].  3. Labs 12/29/20 at least one hour prior to his chemo. He likes labs drawn the same time he gets peripheral IV.  Thanks -e

## 2020-12-07 NOTE — TELEPHONE ENCOUNTER
Pt notified via portal of stable labs and that no medication changes are needed. Repeat labs due 1/4/21.     ----- Message from Cornell Anton MD sent at 12/7/2020  3:24 PM CST -----  Monthly is okay    ----- Message -----  From: Jackie Johnson RN  Sent: 12/7/2020   3:02 PM CST  To: Cornell Anton MD    How often should he have lab given his cancer treatment and low tac level?  Monthly?    Jackie  ----- Message -----  From: Cornell Anton MD  Sent: 12/7/2020   2:59 PM CST  To: [facility] Post-Liver Transplant Clinical    Results reviewed

## 2020-12-07 NOTE — TELEPHONE ENCOUNTER
----- Message from Ronald Berg MD sent at 12/7/2020 10:40 AM CST -----  1. Return to clinic 12/28/20.   2. Schedule cycle 5 (12/29/20, 1/5/21, 1/12/21) [2 hour cetuximab infusion].  3. Labs 12/29/20 at least one hour prior to his chemo. He likes labs drawn the same time he gets peripheral IV.  Thanks -e

## 2020-12-07 NOTE — PROGRESS NOTES
PATIENT: Rocco Swain  MRN: [id number]  DATE: 12/7/2020      Diagnosis:   1. Squamous cell carcinoma of base of tongue    2. Other insomnia        Chief Complaint: Squamous cell carcinoma of base of tongue and Neck Pain      Oncologic History:    Oncologic History 1. Squamous cell carcinoma of base of tongue with recurrence    Oncologic Treatment 1. Chemoradiation completed 4/2016  2. S/p glossectomy, laryngectomy, and bilateral neck dissection for persistent/recurrent disease    Pathology P16 positive squamous cell carcinoma with basaloid features.        Subjective:    Interval History: Mr. Swain is here for follow up prior to cycle 4.    Side effects of cycle 3: Pruritic and acneiform rash on face and scalp with cetuximab.  He reports it is much improved since receiving antibiotics and topical metronidazole.  Denies any fevers, chills, or cough.  Denies any numbness with chemotherapy.  Denies abdominal pain, nausea, vomiting, or diarrhea.    Overall, left neck pain is much improved.  Pain is well controlled with his oxycodone.  His appetite and weight are stable.  He is able to swallow liquid food and pills without difficulty.    He needs refill on trazodone, which he takes nightly prn to help him sleep.      He is alone at this visit.  Communication from him is 100% written.  Past Medical History:   Past Medical History:   Diagnosis Date    Basal cell carcinoma (BCC) in situ of skin 2012    3 on face, 2 on arm, removed by dermatology.    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 Underwent surgical resection of tongue and neck. s/p tracheostomy       Past Surgical HIstory:   Past Surgical History:   Procedure Laterality Date    COLONOSCOPY      LIVER  TRANSPLANT  11/2008    transplanted for biopsy proven hepatocellular carcinoma,     LYMPH NODE BIOPSY N/A 9/18/2020    Procedure: BIOPSY, LYMPH NODE;  Surgeon: [facility] Provider;  Location: [facility] OR [address] [place] [address];  Service: General;  Laterality: N/A;  Rm 173    TRACHEOSTOMY         Family History:   Family History   Problem Relation Age of Onset    Dementia Mother        Social History:  reports that he has never smoked. He has never used smokeless tobacco. He reports current alcohol use. He reports previous drug use.    Allergies:  Review of patient's allergies indicates:  No Known Allergies    Medications:  Current Outpatient Medications   Medication Sig Dispense Refill    cyproheptadine (PERIACTIN) 4 mg tablet Take 1 tablet (4 mg total) by mouth 3 (three) times daily. 90 tablet 1    dexAMETHasone (DECADRON) 6 MG tablet Take 1 tablet twice per day on day 2 and day 3 after chemo. 24 tablet 0    doxycycline (VIBRAMYCIN) 100 MG Cap Take 1 capsule (100 mg total) by mouth once daily. 28 capsule 0    levothyroxine (SYNTHROID) 88 MCG tablet Take 88 mcg by mouth once daily. 90 tablet 3    loperamide (IMODIUM) 2 mg capsule Take 1 capsule (2 mg total) by mouth 4 (four) times daily as needed for Diarrhea. 30 capsule 1    metroNIDAZOLE (METROGEL) 0.75 % gel Apply topically 2 (two) times daily. 45 g 1    multivitamin capsule Take 1 capsule by mouth once daily.      oxyCODONE (ROXICODONE) 5 MG immediate release tablet Take 1 tablet (5 mg total) by mouth every 8 (eight) hours as needed for Pain. 90 tablet 0    tacrolimus (PROGRAF) 0.5 MG Cap Take 1 capsule (0.5 mg total) by mouth once daily. 90 capsule 3    traZODone (DESYREL) 50 MG tablet Take 1 tablet (50 mg total) by mouth every evening. 90 tablet 3    vitamin E 400 UNIT capsule Take 400 Units by mouth once daily.      ondansetron (ZOFRAN) 8 MG tablet Take 1 tablet (8 mg total) by mouth every 8 (eight) hours as needed for Nausea. (Patient not taking: Reported  "on 11/16/2020) 60 tablet 2     No current facility-administered medications for this visit.        Review of Systems   Constitutional: Positive for fatigue. Negative for activity change, appetite change, chills, diaphoresis, fever and unexpected weight change.   HENT: Negative for nosebleeds and trouble swallowing.    Eyes: Negative for visual disturbance.   Respiratory: Negative for cough, chest tightness, shortness of breath and wheezing.    Cardiovascular: Negative for chest pain and leg swelling.   Gastrointestinal: Negative for abdominal distention, abdominal pain, blood in stool, constipation, diarrhea, nausea and vomiting.   Endocrine: Negative for cold intolerance and heat intolerance.   Genitourinary: Negative for difficulty urinating and dysuria.   Musculoskeletal: Positive for myalgias and neck pain (pain radiates between both sides of his neck under his jaw.). Negative for arthralgias and back pain.   Skin: Negative for color change.   Neurological: Negative for dizziness, weakness, light-headedness, numbness and headaches.   Hematological: Negative for adenopathy. Does not bruise/bleed easily.   Psychiatric/Behavioral: Negative for confusion.       ECOG Performance Status: 0   Objective:      Vitals:   Vitals:    12/07/20 0909   BP: 134/73   BP Location: Left arm   Patient Position: Sitting   BP Method: Medium (Automatic)   Pulse: 97   Resp: 18   Temp: 97.7 °F (36.5 °C)   TempSrc: Oral   SpO2: 99%   Weight: 59.9 kg (132 lb 0.9 oz)   Height: 5' 8" (1.727 m)     BMI: Body mass index is 20.08 kg/m².    Physical Exam  Constitutional:       General: He is not in acute distress.     Appearance: Normal appearance. He is not ill-appearing.   HENT:      Head: Normocephalic and atraumatic.      Mouth/Throat:      Pharynx: No oropharyngeal exudate or posterior oropharyngeal erythema.   Eyes:      General: No scleral icterus.     Extraocular Movements: Extraocular movements intact.      Conjunctiva/sclera: " Conjunctivae normal.      Pupils: Pupils are equal, round, and reactive to light.   Neck:      Musculoskeletal: Normal range of motion and neck supple.      Comments: Left neck: No palpable mass on today's exam.  Cardiovascular:      Rate and Rhythm: Normal rate and regular rhythm.      Heart sounds: No murmur. No friction rub. No gallop.    Pulmonary:      Effort: Pulmonary effort is normal. No respiratory distress.      Breath sounds: No stridor. No wheezing, rhonchi or rales.   Abdominal:      General: Bowel sounds are normal. There is no distension.      Palpations: Abdomen is soft. There is no mass.      Tenderness: There is no abdominal tenderness. There is no guarding or rebound.   Musculoskeletal: Normal range of motion.      Right lower leg: No edema.      Left lower leg: No edema.   Lymphadenopathy:      Upper Body:      Right upper body: No supraclavicular or axillary adenopathy.      Left upper body: No supraclavicular or axillary adenopathy.   Skin:     General: Skin is warm and dry.   Neurological:      General: No focal deficit present.      Mental Status: He is alert.         Laboratory Data:   Recent Results (from the past 168 hour(s))   CBC Auto Differential    Collection Time: 12/07/20  9:09 AM   Result Value Ref Range    WBC 6.44 3.90 - 12.70 K/uL    RBC 2.94 (L) 4.60 - 6.20 M/uL    Hemoglobin 10.6 (L) 14.0 - 18.0 g/dL    Hematocrit 30.8 (L) 40.0 - 54.0 %     (H) 82 - 98 fL    MCH 36.1 (H) 27.0 - 31.0 pg    MCHC 34.4 32.0 - 36.0 g/dL    RDW 16.4 (H) 11.5 - 14.5 %    Platelets 129 (L) 150 - 350 K/uL    MPV 10.1 9.2 - 12.9 fL    Immature Granulocytes 0.3 0.0 - 0.5 %    Gran # (ANC) 4.9 1.8 - 7.7 K/uL    Immature Grans (Abs) 0.02 0.00 - 0.04 K/uL    Lymph # 1.0 1.0 - 4.8 K/uL    Mono # 0.5 0.3 - 1.0 K/uL    Eos # 0.0 0.0 - 0.5 K/uL    Baso # 0.01 0.00 - 0.20 K/uL    nRBC 0 0 /100 WBC    Gran % 76.6 (H) 38.0 - 73.0 %    Lymph % 14.8 (L) 18.0 - 48.0 %    Mono % 7.5 4.0 - 15.0 %    Eosinophil %  0.6 0.0 - 8.0 %    Basophil % 0.2 0.0 - 1.9 %    Differential Method Automated    Comprehensive Metabolic Panel    Collection Time: 12/07/20  9:09 AM   Result Value Ref Range    Sodium 140 136 - 145 mmol/L    Potassium 4.0 3.5 - 5.1 mmol/L    Chloride 103 95 - 110 mmol/L    CO2 24 23 - 29 mmol/L    Glucose 156 (H) 70 - 110 mg/dL    BUN 18 8 - 23 mg/dL    Creatinine 1.3 0.5 - 1.4 mg/dL    Calcium 9.3 8.7 - 10.5 mg/dL    Total Protein 7.7 6.0 - 8.4 g/dL    Albumin 3.6 3.5 - 5.2 g/dL    Total Bilirubin 1.0 0.1 - 1.0 mg/dL    Alkaline Phosphatase 82 55 - 135 U/L    AST 38 10 - 40 U/L    ALT 30 10 - 44 U/L    Anion Gap 13 8 - 16 mmol/L    eGFR if African American >60.0 >60 mL/min/1.73 m^2    eGFR if non African American 54.9 (A) >60 mL/min/1.73 m^2           Imaging:    Assessment:       1. Squamous cell carcinoma of base of tongue    2. Other insomnia           Plan:       Problem List Items Addressed This Visit        Oncology    Squamous cell carcinoma of base of tongue - Primary    Overview     Per OSH records, initially presented in 2015 with symptoms and cT2N2c disease. page 91 of 3/25/2020 outside records clinic (media tab).  Initially presented 8/2015 with left sided odynophagia.  Treated for reflux, which did not improve.    12/11/15 he had a fiberoptic exam showing discolored mucosal area of the left base of the tongue.    1/13/16 he had persistent dysphagia, odynophagia, and new left otalgia.  MRI showed an irregular mass along the left posterior margin of the base of the tongue measuring 1.8x2.3cm with ipsilateral level 2 lymph node measuring 1.7cm with central necrosis and contralateral level 2 node measuring 1.3cm with questionable necrosis.    1/19/2016 he had direct laryngoscopy with biopsy left of midline, proximal to vallecula, which was p16 positive, invasive, with moderately to poorly differentiated sq.c.c. with basaloid features.    1/29/16 met with medical oncology who recommended chemoxrt.     2/1/16  staging PET CT showed hypermetabolic mass at base of tongue 4x2.5x3.5cm, max SUV 24.13, hypermetabolic lymph node left neck SUV 6.77, small subcentimeter lymph node right neck max SUV 3.79 (exact size of lymph nodes not mentioned in the summary).    2/15/16 started chemoxrt to left tongue base, bilateral cervical neck levels 1b-5, treated with 6MV photons utilizing IMRT, 5000 cGy in 200 cGy daily fractions.  Cone down boost to left base of tongue with additional 2000 cGy in 200 cGy daily fractions to gross disease.  Received weekly cisplatin with radiation but had to switch to carboplatin due to worsening renal function. during this time also missed several weekly treatments of cisplatin.    4/1/2016 completed chemoxrt.   11/1/2016 Repeat biopsy of left tongue showed persistent disease.  S/p salvage glossectomy with laryngectomy,  rT4aN0, p16 positive, base of tongue squamous cell carcinoma.     page 61 of 3/25/2020 outside records clinic (media tab)  Pathology: Tonsil, left tonsil fold: reactive squamous mucosa with inflammation and degenerating skeletal muscle, no carcinoma seen.  Right and Left base of tongue:  Reactive squamous mucosa and submucosa with inflammation, no carcinoma seen.  Neck, glossectomy, laryngectomy, bilateral neck dissection:  invasive squamous cell carcinoma.  tumor site: base of tongue/hypopharynx.  Small focus of tumor is seen within the hyoid  bone.  specimen Size [id number]  tumor size [id number]  depth of invasion 20mm  TNM stage pT4a, pN0, pMx  Lymph nodes, included in all parts:  number involved 0  number examined 3.  distance of tumor from margins  positive inked margins: none  within 1mm : none  within 1-2mm: anterior-inferior    Bilateral neck dissection:  level 1: one lymph node and submandibular gland, negative for tumor  level II : two lymph nodes, negative for tumor  level III: not identified  level IV: not identified  level V: not identified.    6/20/17 - PETCT with  FDG avidity of left neck lymph node, level 3, SUV 2.9.  No other evidence of local or distant disease.  7/6/2017 - biopsy of left neck lymph node with IR.  Sample was non-diagnostic. Notes from path report: Less than optimal scan specimen with minute tissue core of stromal fibroadipose tissue.  definitive tati background is not seen.  9/28/2017 - Repeat PET CT showing mild activity SUV 3.5 (increased from 3.2 previously; size 7.8x7.4x9.5mm).  Also new findings under left pectoralis minor (mild focal increased activity, indeterminate but new since previous exam)  3/22/2018 - CT neck shows no evidence of disease and level 2b lymph node decreased to 4.9mmx4.5mmx4.9mm from 7.8x7.4x9.5mm previously.         Current Assessment & Plan     Seen by Dr. Myers 8/3/2020 for left-sided neck pain and a swelling in left level 2 several weeks ago.  He feels it arose after he began lifting weights.  CT neck 8/5/2020 : Lymph nodes At the left neck level L2 there is a heterogeneous soft tissue density with central hypoattenuation likely reflecting a necrotic lymph node measuring 1.6 x 1.4 cm in transverse dimension, 2.5 cm in craniocaudal dimension.  There is a similar appearing irregular soft tissue density at the right neck level 2 measuring 1.1 x 9.0 cm in transverse dimension and 1.5 cm craniocaudal dimension.  No other abnormal appearing or enlarged lymph nodes found.  Impression: At level 2 within the neck bilaterally, there are  soft tissue density masses with central hypoattenuation likely reflective of necrotic lymph nodes.  Findings are suspicious for malignancy recurrence.  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  Staging PET CT 9/30/2020 with left sided hypermetabolic node and right sided enlarged node without uptake.  Also has mandibular vestibule uptake, which may be another site of disease. Discussed at tumor board.  He is not a surgical or radiation  candidate.  -For low tumor volume, plan for carboplatin/paclitaxel/cetuximab.  Carboplatin/paclitaxel x2 cycles followed by 4 cycles carboplatin/cetuximab followed by cetuximab maintenance.  -not candidate for immunotherapy with his history of liver transplant.  -Labs for chemo will be: CMP, CBC, Mg, Phos  -Supportive medications: Dexamethasone 6mg BID days 2 and 3 for cycles 1-6 when he is receiving carboplatin.  zofran prn.  If needed, will prescribe compazine prn breakthrough n/v.  For pain, oxycodone prn.  He currently uses 2-3x/day.  Pain adequately controlled for him, which is about a 6 out of 10.  Bowel regimen reviewed and currently he only needs stool softeners.    -Continue periactin TID.  Weight is stable.  -Grade 2 skin rash with cetuximab. Treated with doxycycline and topical metronidazole and rash is resolving.  Will require slower cetuximab infusions indefinitely.  --Patient prefers for labs to be drawn with peripheral IV access placement.  -Return to clinic prior to cycle 5 (12/29/20, 1/5/21, 1/12/21).           Other Visit Diagnoses     Other insomnia        Relevant Medications    traZODone (DESYREL) 50 MG tablet        No orders of the defined types were placed in this encounter.      Advance Care Planning I initiated the process of advance care planning at his initial visit and explained the importance of this process to the patient.  Then the patient received detailed information about the importance of designating a Health Care Power of  (HCPOA). he was instructed to communicate with this person about their wishes for future healthcare, should he become sick and lose decision-making capacity. The patient has previously appointed a HCPOA. After our discussion, the patient has decided to complete a HCPOA and has appointed his brother and NAME:Ollie Berg MD  Hematology Oncology

## 2020-12-08 ENCOUNTER — INFUSION (OUTPATIENT)
Dept: INFUSION THERAPY | Facility: HOSPITAL | Age: 70
End: 2020-12-08
Attending: INTERNAL MEDICINE
Payer: MEDICARE

## 2020-12-08 VITALS
BODY MASS INDEX: 20.01 KG/M2 | WEIGHT: 132.06 LBS | HEART RATE: 89 BPM | HEIGHT: 68 IN | TEMPERATURE: 99 F | SYSTOLIC BLOOD PRESSURE: 123 MMHG | DIASTOLIC BLOOD PRESSURE: 57 MMHG | RESPIRATION RATE: 18 BRPM

## 2020-12-08 DIAGNOSIS — N18.31 STAGE 3A CHRONIC KIDNEY DISEASE: ICD-10-CM

## 2020-12-08 DIAGNOSIS — Z94.4 STATUS POST LIVER TRANSPLANTATION: ICD-10-CM

## 2020-12-08 DIAGNOSIS — C01 SQUAMOUS CELL CARCINOMA OF BASE OF TONGUE: Primary | ICD-10-CM

## 2020-12-08 LAB
MAGNESIUM SERPL-MCNC: 1.4 MG/DL (ref 1.6–2.6)
PHOSPHATE SERPL-MCNC: 3.2 MG/DL (ref 2.7–4.5)

## 2020-12-08 PROCEDURE — 63600175 PHARM REV CODE 636 W HCPCS: Performed by: STUDENT IN AN ORGANIZED HEALTH CARE EDUCATION/TRAINING PROGRAM

## 2020-12-08 PROCEDURE — 96415 CHEMO IV INFUSION ADDL HR: CPT

## 2020-12-08 PROCEDURE — 96367 TX/PROPH/DG ADDL SEQ IV INF: CPT

## 2020-12-08 PROCEDURE — 96413 CHEMO IV INFUSION 1 HR: CPT

## 2020-12-08 PROCEDURE — 25000003 PHARM REV CODE 250: Performed by: INTERNAL MEDICINE

## 2020-12-08 PROCEDURE — 96417 CHEMO IV INFUS EACH ADDL SEQ: CPT

## 2020-12-08 PROCEDURE — 25000003 PHARM REV CODE 250: Performed by: STUDENT IN AN ORGANIZED HEALTH CARE EDUCATION/TRAINING PROGRAM

## 2020-12-08 PROCEDURE — 63600175 PHARM REV CODE 636 W HCPCS: Performed by: INTERNAL MEDICINE

## 2020-12-08 PROCEDURE — 96376 TX/PRO/DX INJ SAME DRUG ADON: CPT

## 2020-12-08 RX ORDER — HEPARIN 100 UNIT/ML
500 SYRINGE INTRAVENOUS
Status: CANCELLED | OUTPATIENT
Start: 2020-12-08

## 2020-12-08 RX ORDER — DIPHENHYDRAMINE HYDROCHLORIDE 50 MG/ML
50 INJECTION INTRAMUSCULAR; INTRAVENOUS ONCE AS NEEDED
Status: DISCONTINUED | OUTPATIENT
Start: 2020-12-08 | End: 2020-12-08 | Stop reason: HOSPADM

## 2020-12-08 RX ORDER — HEPARIN 100 UNIT/ML
500 SYRINGE INTRAVENOUS
Status: DISCONTINUED | OUTPATIENT
Start: 2020-12-08 | End: 2020-12-08 | Stop reason: HOSPADM

## 2020-12-08 RX ORDER — SODIUM CHLORIDE 0.9 % (FLUSH) 0.9 %
10 SYRINGE (ML) INJECTION
Status: DISCONTINUED | OUTPATIENT
Start: 2020-12-08 | End: 2020-12-08 | Stop reason: HOSPADM

## 2020-12-08 RX ORDER — EPINEPHRINE 0.3 MG/.3ML
0.3 INJECTION SUBCUTANEOUS ONCE AS NEEDED
Status: DISCONTINUED | OUTPATIENT
Start: 2020-12-08 | End: 2020-12-08 | Stop reason: HOSPADM

## 2020-12-08 RX ORDER — TACROLIMUS 0.5 MG/1
0.5 CAPSULE ORAL
COMMUNITY
End: 2021-01-19 | Stop reason: DRUGHIGH

## 2020-12-08 RX ORDER — MULTIVITAMIN
1 TABLET ORAL
COMMUNITY
End: 2021-02-09 | Stop reason: SDUPTHER

## 2020-12-08 RX ORDER — LEVOTHYROXINE SODIUM 88 UG/1
88 TABLET ORAL
COMMUNITY
End: 2021-02-09 | Stop reason: SDUPTHER

## 2020-12-08 RX ORDER — TRAZODONE HYDROCHLORIDE 50 MG/1
TABLET ORAL
COMMUNITY
Start: 2020-04-14 | End: 2021-04-27 | Stop reason: SDUPTHER

## 2020-12-08 RX ORDER — SODIUM CHLORIDE 0.9 % (FLUSH) 0.9 %
10 SYRINGE (ML) INJECTION
Status: CANCELLED | OUTPATIENT
Start: 2020-12-08

## 2020-12-08 RX ORDER — IBUPROFEN 200 MG
200 TABLET ORAL
COMMUNITY
End: 2023-10-20

## 2020-12-08 RX ADMIN — DIPHENHYDRAMINE HYDROCHLORIDE 50 MG: 50 INJECTION, SOLUTION INTRAMUSCULAR; INTRAVENOUS at 10:12

## 2020-12-08 RX ADMIN — APREPITANT 130 MG: 130 INJECTION, EMULSION INTRAVENOUS at 10:12

## 2020-12-08 RX ADMIN — DEXAMETHASONE SODIUM PHOSPHATE 0.25 MG: 4 INJECTION, SOLUTION INTRA-ARTICULAR; INTRALESIONAL; INTRAMUSCULAR; INTRAVENOUS; SOFT TISSUE at 10:12

## 2020-12-08 RX ADMIN — CARBOPLATIN 350 MG: 10 INJECTION INTRAVENOUS at 01:12

## 2020-12-08 RX ADMIN — CETUXIMAB 400 MG: 2 SOLUTION INTRAVENOUS at 10:12

## 2020-12-08 NOTE — NURSING
Pt arrived for labs from port.  Pt does not have a port and had labs drawn yesterday.  Clinic states needed to add on a phos and mag.  I called the lab and spoke to Joel, states he can add mag and phos onto yesterday labs--notified Tarsha Gallagher in clinic. Also report to [facility] infusion nurse.  Pt now going to infusion chair for chemo.  Notified  Bailey to fix future lab appts.

## 2020-12-15 ENCOUNTER — INFUSION (OUTPATIENT)
Dept: INFUSION THERAPY | Facility: HOSPITAL | Age: 70
End: 2020-12-15
Attending: INTERNAL MEDICINE
Payer: MEDICARE

## 2020-12-15 VITALS
BODY MASS INDEX: 20.01 KG/M2 | RESPIRATION RATE: 18 BRPM | TEMPERATURE: 98 F | WEIGHT: 132.06 LBS | HEIGHT: 68 IN | SYSTOLIC BLOOD PRESSURE: 117 MMHG | HEART RATE: 75 BPM | OXYGEN SATURATION: 100 % | DIASTOLIC BLOOD PRESSURE: 68 MMHG

## 2020-12-15 DIAGNOSIS — C01 SQUAMOUS CELL CARCINOMA OF BASE OF TONGUE: Primary | ICD-10-CM

## 2020-12-15 PROCEDURE — 96413 CHEMO IV INFUSION 1 HR: CPT

## 2020-12-15 PROCEDURE — 96367 TX/PROPH/DG ADDL SEQ IV INF: CPT

## 2020-12-15 PROCEDURE — 63600175 PHARM REV CODE 636 W HCPCS: Performed by: INTERNAL MEDICINE

## 2020-12-15 PROCEDURE — 25000003 PHARM REV CODE 250: Performed by: STUDENT IN AN ORGANIZED HEALTH CARE EDUCATION/TRAINING PROGRAM

## 2020-12-15 PROCEDURE — 25000003 PHARM REV CODE 250: Performed by: INTERNAL MEDICINE

## 2020-12-15 PROCEDURE — 96415 CHEMO IV INFUSION ADDL HR: CPT

## 2020-12-15 PROCEDURE — 63600175 PHARM REV CODE 636 W HCPCS: Mod: JG | Performed by: STUDENT IN AN ORGANIZED HEALTH CARE EDUCATION/TRAINING PROGRAM

## 2020-12-15 RX ORDER — SODIUM CHLORIDE 0.9 % (FLUSH) 0.9 %
10 SYRINGE (ML) INJECTION
Status: DISCONTINUED | OUTPATIENT
Start: 2020-12-15 | End: 2020-12-15 | Stop reason: HOSPADM

## 2020-12-15 RX ORDER — EPINEPHRINE 0.3 MG/.3ML
0.3 INJECTION SUBCUTANEOUS ONCE AS NEEDED
Status: DISCONTINUED | OUTPATIENT
Start: 2020-12-15 | End: 2020-12-15 | Stop reason: HOSPADM

## 2020-12-15 RX ORDER — DIPHENHYDRAMINE HYDROCHLORIDE 50 MG/ML
50 INJECTION INTRAMUSCULAR; INTRAVENOUS ONCE AS NEEDED
Status: DISCONTINUED | OUTPATIENT
Start: 2020-12-15 | End: 2020-12-15 | Stop reason: HOSPADM

## 2020-12-15 RX ORDER — HEPARIN 100 UNIT/ML
500 SYRINGE INTRAVENOUS
Status: DISCONTINUED | OUTPATIENT
Start: 2020-12-15 | End: 2020-12-15 | Stop reason: HOSPADM

## 2020-12-15 RX ADMIN — DIPHENHYDRAMINE HYDROCHLORIDE 50 MG: 50 INJECTION, SOLUTION INTRAMUSCULAR; INTRAVENOUS at 12:12

## 2020-12-15 RX ADMIN — CETUXIMAB 400 MG: 2 SOLUTION INTRAVENOUS at 12:12

## 2020-12-15 RX ADMIN — SODIUM CHLORIDE: 9 INJECTION, SOLUTION INTRAVENOUS at 12:12

## 2020-12-15 NOTE — PLAN OF CARE
Tolerated Erbitux well. Observed pt 1 hour post infusion, no reactions noted.  No questions or concerns at this time.  Ambulated off unit in NAD.

## 2020-12-18 DIAGNOSIS — C01 SQUAMOUS CELL CARCINOMA OF BASE OF TONGUE: ICD-10-CM

## 2020-12-18 RX ORDER — OXYCODONE HYDROCHLORIDE 5 MG/1
5 TABLET ORAL EVERY 8 HOURS PRN
Qty: 90 TABLET | Refills: 0 | Status: SHIPPED | OUTPATIENT
Start: 2020-12-18 | End: 2021-01-19 | Stop reason: SDUPTHER

## 2020-12-18 NOTE — TELEPHONE ENCOUNTER
----- Message from Josefina Silva sent at 12/18/2020  3:57 PM CST -----  Refill or New Rx: Refill    RX Name and Strength: oxyCODONE (ROXICODONE) 5 MG immediate release tablet    Preferred Pharmacy with phone number:    [id number] - [place] [address] AT [facility] & [address]  [place] [address]  Phone: [phone] Fax: [phone]        Additional Information:

## 2020-12-22 ENCOUNTER — INFUSION (OUTPATIENT)
Dept: INFUSION THERAPY | Facility: HOSPITAL | Age: 70
End: 2020-12-22
Attending: INTERNAL MEDICINE
Payer: MEDICARE

## 2020-12-22 VITALS
RESPIRATION RATE: 18 BRPM | HEART RATE: 79 BPM | OXYGEN SATURATION: 98 % | BODY MASS INDEX: 20.01 KG/M2 | SYSTOLIC BLOOD PRESSURE: 107 MMHG | WEIGHT: 132.06 LBS | DIASTOLIC BLOOD PRESSURE: 57 MMHG | TEMPERATURE: 98 F | HEIGHT: 68 IN

## 2020-12-22 DIAGNOSIS — C01 SQUAMOUS CELL CARCINOMA OF BASE OF TONGUE: Primary | ICD-10-CM

## 2020-12-22 PROCEDURE — 96367 TX/PROPH/DG ADDL SEQ IV INF: CPT

## 2020-12-22 PROCEDURE — 63600175 PHARM REV CODE 636 W HCPCS: Performed by: INTERNAL MEDICINE

## 2020-12-22 PROCEDURE — 63600175 PHARM REV CODE 636 W HCPCS: Mod: JG | Performed by: STUDENT IN AN ORGANIZED HEALTH CARE EDUCATION/TRAINING PROGRAM

## 2020-12-22 PROCEDURE — 25000003 PHARM REV CODE 250: Performed by: INTERNAL MEDICINE

## 2020-12-22 PROCEDURE — 25000003 PHARM REV CODE 250: Performed by: STUDENT IN AN ORGANIZED HEALTH CARE EDUCATION/TRAINING PROGRAM

## 2020-12-22 PROCEDURE — 96413 CHEMO IV INFUSION 1 HR: CPT

## 2020-12-22 RX ORDER — EPINEPHRINE 0.3 MG/.3ML
0.3 INJECTION SUBCUTANEOUS ONCE AS NEEDED
Status: DISCONTINUED | OUTPATIENT
Start: 2020-12-22 | End: 2020-12-22 | Stop reason: HOSPADM

## 2020-12-22 RX ORDER — SODIUM CHLORIDE 0.9 % (FLUSH) 0.9 %
10 SYRINGE (ML) INJECTION
Status: DISCONTINUED | OUTPATIENT
Start: 2020-12-22 | End: 2020-12-22 | Stop reason: HOSPADM

## 2020-12-22 RX ORDER — DIPHENHYDRAMINE HYDROCHLORIDE 50 MG/ML
50 INJECTION INTRAMUSCULAR; INTRAVENOUS ONCE AS NEEDED
Status: DISCONTINUED | OUTPATIENT
Start: 2020-12-22 | End: 2020-12-22 | Stop reason: HOSPADM

## 2020-12-22 RX ORDER — HEPARIN 100 UNIT/ML
500 SYRINGE INTRAVENOUS
Status: DISCONTINUED | OUTPATIENT
Start: 2020-12-22 | End: 2020-12-22 | Stop reason: HOSPADM

## 2020-12-22 RX ADMIN — CETUXIMAB 400 MG: 2 SOLUTION INTRAVENOUS at 12:12

## 2020-12-22 RX ADMIN — DIPHENHYDRAMINE HYDROCHLORIDE 50 MG: 50 INJECTION, SOLUTION INTRAMUSCULAR; INTRAVENOUS at 11:12

## 2020-12-22 RX ADMIN — SODIUM CHLORIDE: 9 INJECTION, SOLUTION INTRAVENOUS at 11:12

## 2020-12-22 NOTE — PLAN OF CARE
Tolerted erbitux well.  Observed pt 1 hour post infusion, no reactions noted.  No questions or concerns at this time.  Ambulated off unit in NAD.

## 2020-12-28 ENCOUNTER — OFFICE VISIT (OUTPATIENT)
Dept: HEMATOLOGY/ONCOLOGY | Facility: CLINIC | Age: 70
End: 2020-12-28
Payer: MEDICARE

## 2020-12-28 ENCOUNTER — LAB VISIT (OUTPATIENT)
Dept: LAB | Facility: HOSPITAL | Age: 70
End: 2020-12-28
Attending: STUDENT IN AN ORGANIZED HEALTH CARE EDUCATION/TRAINING PROGRAM
Payer: MEDICARE

## 2020-12-28 VITALS
BODY MASS INDEX: 19.51 KG/M2 | SYSTOLIC BLOOD PRESSURE: 126 MMHG | HEART RATE: 104 BPM | DIASTOLIC BLOOD PRESSURE: 72 MMHG | HEIGHT: 68 IN | TEMPERATURE: 98 F | WEIGHT: 128.75 LBS

## 2020-12-28 DIAGNOSIS — C01 SQUAMOUS CELL CARCINOMA OF BASE OF TONGUE: ICD-10-CM

## 2020-12-28 DIAGNOSIS — D63.8 ANEMIA, CHRONIC DISEASE: ICD-10-CM

## 2020-12-28 DIAGNOSIS — E03.2 HYPOTHYROIDISM DUE TO MEDICAMENTS AND OTHER EXOGENOUS SUBSTANCES: ICD-10-CM

## 2020-12-28 DIAGNOSIS — C01 SQUAMOUS CELL CARCINOMA OF BASE OF TONGUE: Primary | ICD-10-CM

## 2020-12-28 DIAGNOSIS — N18.31 STAGE 3A CHRONIC KIDNEY DISEASE: ICD-10-CM

## 2020-12-28 DIAGNOSIS — E46 PROTEIN-CALORIE MALNUTRITION, UNSPECIFIED SEVERITY: ICD-10-CM

## 2020-12-28 DIAGNOSIS — L27.0 DRUG-INDUCED SKIN RASH: ICD-10-CM

## 2020-12-28 LAB
ALBUMIN SERPL BCP-MCNC: 3.6 G/DL (ref 3.5–5.2)
ALP SERPL-CCNC: 72 U/L (ref 55–135)
ALT SERPL W/O P-5'-P-CCNC: 26 U/L (ref 10–44)
ANION GAP SERPL CALC-SCNC: 10 MMOL/L (ref 8–16)
AST SERPL-CCNC: 35 U/L (ref 10–40)
BILIRUB SERPL-MCNC: 0.7 MG/DL (ref 0.1–1)
BUN SERPL-MCNC: 20 MG/DL (ref 8–23)
CALCIUM SERPL-MCNC: 9.4 MG/DL (ref 8.7–10.5)
CHLORIDE SERPL-SCNC: 102 MMOL/L (ref 95–110)
CO2 SERPL-SCNC: 28 MMOL/L (ref 23–29)
CREAT SERPL-MCNC: 1.4 MG/DL (ref 0.5–1.4)
ERYTHROCYTE [DISTWIDTH] IN BLOOD BY AUTOMATED COUNT: 15.8 % (ref 11.5–14.5)
EST. GFR  (AFRICAN AMERICAN): 58 ML/MIN/1.73 M^2
EST. GFR  (NON AFRICAN AMERICAN): 50.2 ML/MIN/1.73 M^2
GLUCOSE SERPL-MCNC: 186 MG/DL (ref 70–110)
HCT VFR BLD AUTO: 25 % (ref 40–54)
HGB BLD-MCNC: 8.6 G/DL (ref 14–18)
IMM GRANULOCYTES # BLD AUTO: 0.02 K/UL (ref 0–0.04)
MAGNESIUM SERPL-MCNC: 1.4 MG/DL (ref 1.6–2.6)
MCH RBC QN AUTO: 37.2 PG (ref 27–31)
MCHC RBC AUTO-ENTMCNC: 34.4 G/DL (ref 32–36)
MCV RBC AUTO: 108 FL (ref 82–98)
NEUTROPHILS # BLD AUTO: 2.4 K/UL (ref 1.8–7.7)
PLATELET # BLD AUTO: 171 K/UL (ref 150–350)
PMV BLD AUTO: 9.8 FL (ref 9.2–12.9)
POTASSIUM SERPL-SCNC: 3.9 MMOL/L (ref 3.5–5.1)
PROT SERPL-MCNC: 7.4 G/DL (ref 6–8.4)
RBC # BLD AUTO: 2.31 M/UL (ref 4.6–6.2)
SODIUM SERPL-SCNC: 140 MMOL/L (ref 136–145)
WBC # BLD AUTO: 4.41 K/UL (ref 3.9–12.7)

## 2020-12-28 PROCEDURE — 99215 OFFICE O/P EST HI 40 MIN: CPT | Mod: S$PBB,,, | Performed by: STUDENT IN AN ORGANIZED HEALTH CARE EDUCATION/TRAINING PROGRAM

## 2020-12-28 PROCEDURE — 99999 PR PBB SHADOW E&M-EST. PATIENT-LVL III: ICD-10-PCS | Mod: PBBFAC,,, | Performed by: STUDENT IN AN ORGANIZED HEALTH CARE EDUCATION/TRAINING PROGRAM

## 2020-12-28 PROCEDURE — 85027 COMPLETE CBC AUTOMATED: CPT

## 2020-12-28 PROCEDURE — 99999 PR PBB SHADOW E&M-EST. PATIENT-LVL III: CPT | Mod: PBBFAC,,, | Performed by: STUDENT IN AN ORGANIZED HEALTH CARE EDUCATION/TRAINING PROGRAM

## 2020-12-28 PROCEDURE — 99213 OFFICE O/P EST LOW 20 MIN: CPT | Mod: PBBFAC | Performed by: STUDENT IN AN ORGANIZED HEALTH CARE EDUCATION/TRAINING PROGRAM

## 2020-12-28 PROCEDURE — 80053 COMPREHEN METABOLIC PANEL: CPT

## 2020-12-28 PROCEDURE — 36415 COLL VENOUS BLD VENIPUNCTURE: CPT

## 2020-12-28 PROCEDURE — 99215 PR OFFICE/OUTPT VISIT, EST, LEVL V, 40-54 MIN: ICD-10-PCS | Mod: S$PBB,,, | Performed by: STUDENT IN AN ORGANIZED HEALTH CARE EDUCATION/TRAINING PROGRAM

## 2020-12-28 PROCEDURE — 83735 ASSAY OF MAGNESIUM: CPT

## 2020-12-28 RX ORDER — HEPARIN 100 UNIT/ML
500 SYRINGE INTRAVENOUS
Status: CANCELLED | OUTPATIENT
Start: 2021-01-12

## 2020-12-28 RX ORDER — SODIUM CHLORIDE 0.9 % (FLUSH) 0.9 %
10 SYRINGE (ML) INJECTION
Status: CANCELLED | OUTPATIENT
Start: 2021-01-12

## 2020-12-28 RX ORDER — HEPARIN 100 UNIT/ML
500 SYRINGE INTRAVENOUS
Status: CANCELLED | OUTPATIENT
Start: 2021-01-05

## 2020-12-28 RX ORDER — EPINEPHRINE 0.3 MG/.3ML
0.3 INJECTION SUBCUTANEOUS ONCE AS NEEDED
Status: CANCELLED | OUTPATIENT
Start: 2020-12-29

## 2020-12-28 RX ORDER — DIPHENHYDRAMINE HYDROCHLORIDE 50 MG/ML
50 INJECTION INTRAMUSCULAR; INTRAVENOUS ONCE AS NEEDED
Status: CANCELLED | OUTPATIENT
Start: 2021-01-05

## 2020-12-28 RX ORDER — DIPHENHYDRAMINE HYDROCHLORIDE 50 MG/ML
50 INJECTION INTRAMUSCULAR; INTRAVENOUS ONCE AS NEEDED
Status: CANCELLED | OUTPATIENT
Start: 2021-01-12

## 2020-12-28 RX ORDER — EPINEPHRINE 0.3 MG/.3ML
0.3 INJECTION SUBCUTANEOUS ONCE AS NEEDED
Status: CANCELLED | OUTPATIENT
Start: 2021-01-05

## 2020-12-28 RX ORDER — SODIUM CHLORIDE 0.9 % (FLUSH) 0.9 %
10 SYRINGE (ML) INJECTION
Status: CANCELLED | OUTPATIENT
Start: 2021-01-05

## 2020-12-28 RX ORDER — DIPHENHYDRAMINE HYDROCHLORIDE 50 MG/ML
50 INJECTION INTRAMUSCULAR; INTRAVENOUS ONCE AS NEEDED
Status: CANCELLED | OUTPATIENT
Start: 2020-12-29

## 2020-12-28 RX ORDER — EPINEPHRINE 0.3 MG/.3ML
0.3 INJECTION SUBCUTANEOUS ONCE AS NEEDED
Status: CANCELLED | OUTPATIENT
Start: 2021-01-12

## 2020-12-28 RX ORDER — DOXYCYCLINE 100 MG/1
100 CAPSULE ORAL DAILY
Qty: 28 CAPSULE | Refills: 0 | Status: SHIPPED | OUTPATIENT
Start: 2020-12-28 | End: 2021-01-19 | Stop reason: SDUPTHER

## 2020-12-28 RX ORDER — HEPARIN 100 UNIT/ML
500 SYRINGE INTRAVENOUS
Status: CANCELLED | OUTPATIENT
Start: 2020-12-29

## 2020-12-28 RX ORDER — SODIUM CHLORIDE 0.9 % (FLUSH) 0.9 %
10 SYRINGE (ML) INJECTION
Status: CANCELLED | OUTPATIENT
Start: 2020-12-29

## 2020-12-28 NOTE — ASSESSMENT & PLAN NOTE
Has CKD stage IIIa, creatinine baseline is ~ 1.4.  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  Currently Cr ~1.4.  -monitor cmp prior to each cycle.

## 2020-12-28 NOTE — PROGRESS NOTES
PATIENT: Rocco Swain  MRN: [id number]  DATE: 12/28/2020      Diagnosis:   1. Squamous cell carcinoma of base of tongue    2. Drug-induced skin rash    3. Anemia, chronic disease    4. 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 Protein-calorie malnutrition, unspecified severity     6. Stage 3a chronic kidney disease        Chief Complaint: Squamous cell carcinoma of base of tongue      Oncologic History:    Oncologic History 1. Squamous cell carcinoma of base of tongue with recurrence    Oncologic Treatment 1. Chemoradiation completed 4/2016  2. S/p glossectomy, laryngectomy, and bilateral neck dissection for persistent/recurrent disease    Pathology P16 positive squamous cell carcinoma with basaloid features.        Subjective:    Interval History: Mr. Swain is here for follow up prior to cycle 5.    Side effects of cycle 3:  Still has rash on face and scalp but improved compared to last visit.  Pruritis is very mild and acne is still visible.  Denies any fevers, chills, or cough.  Denies any numbness with chemotherapy.  Denies abdominal pain, nausea, vomiting, or diarrhea.  Appetite is low and he is losing weight.  He says he has early satiety these days but he is taking in at least 3-4 ensure per day.  He knows he is full because if he bends over after he eats, sometimes that is enough to trigger him to vomit.  He also has had orthostatic symptoms when changing from seated to standing position so these days he knows to take it slow.    Overall, left neck pain is stable and pain is well controlled with his oxycodone.    He needs refill on trazodone, which he takes nightly prn to help him sleep.      He is alone at this visit.  Communication from him is 100% written.  Past Medical History:   Past Medical History:   Diagnosis Date    Basal cell carcinoma (BCC) in situ of skin 2012    3 on face, 2 on arm, removed by dermatology.    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 Underwent surgical resection of tongue and neck. s/p tracheostomy       Past Surgical HIstory:   Past Surgical History:   Procedure Laterality Date    COLONOSCOPY      LIVER TRANSPLANT  11/2008    transplanted for biopsy proven hepatocellular carcinoma,     LYMPH NODE BIOPSY N/A 9/18/2020    Procedure: BIOPSY, LYMPH NODE;  Surgeon: [facility] Provider;  Location: [facility] OR [address] [place] [address];  Service: General;  Laterality: N/A;  Rm 173    TRACHEOSTOMY         Family History:   Family History   Problem Relation Age of Onset    Dementia Mother        Social History:  reports that he has never smoked. He has never used smokeless tobacco. He reports current alcohol use. He reports previous drug use.    Allergies:  Review of patient's allergies indicates:  No Known Allergies    Medications:  Current Outpatient Medications   Medication Sig Dispense Refill    dexAMETHasone (DECADRON) 6 MG tablet Take 1 tablet twice per day on day 2 and day 3 after chemo. 24 tablet 0    doxycycline (VIBRAMYCIN) 100 MG Cap Take 1 capsule (100 mg total) by mouth once daily. 28 capsule 0    ibuprofen (MOTRIN IB) 200 MG tablet Take 200 mg by mouth.      levothyroxine (SYNTHROID) 88 MCG tablet Take 88 mcg by mouth once daily. 90 tablet 3    loperamide (IMODIUM) 2 mg capsule Take 1 capsule (2 mg total) by mouth 4 (four) times daily as needed for Diarrhea. 30 capsule 1    metroNIDAZOLE (METROGEL) 0.75 % gel Apply topically 2 (two) times daily. 45 g 1    multivit-min/FA/lycopen/lutein (CENTRUM SILVER MEN ORAL) Take 1 tablet by mouth.      multivitamin (THERAGRAN) per tablet Take 1 tablet by mouth.      multivitamin capsule Take 1 capsule by mouth once daily.      ondansetron (ZOFRAN) 8 MG tablet Take  1 tablet (8 mg total) by mouth every 8 (eight) hours as needed for Nausea. 60 tablet 2    oxyCODONE (ROXICODONE) 5 MG immediate release tablet Take 1 tablet (5 mg total) by mouth every 8 (eight) hours as needed for Pain. 90 tablet 0    tacrolimus (PROGRAF) 0.5 MG Cap Take 1 capsule (0.5 mg total) by mouth once daily. 90 capsule 3    tacrolimus (PROGRAF) 0.5 MG Cap Take 0.5 mg by mouth.      traZODone (DESYREL) 50 MG tablet Take 1 tablet (50 mg total) by mouth every evening. 90 tablet 3    vitamin E 400 UNIT capsule Take 400 Units by mouth once daily.      water Liqd 150 mL with MILK OF MAGNESIA 400 mg/5 mL Susp 400 mg, diphenhydrAMINE 12.5 mg/5 mL Elix 60 mg, nystatin 100,000 unit/mL Susp 500,000 Units Take by mouth.      levothyroxine (SYNTHROID) 88 MCG tablet Take 88 mcg by mouth.      traZODone (DESYREL) 50 MG tablet TAKE 1 TABLET BY MOUTH AT BEDTIME       No current facility-administered medications for this visit.        Review of Systems   Constitutional: Positive for appetite change, fatigue and unexpected weight change. Negative for activity change, chills, diaphoresis and fever.   HENT: Negative for nosebleeds and trouble swallowing.    Eyes: Negative for visual disturbance.   Respiratory: Negative for cough, chest tightness, shortness of breath and wheezing.    Cardiovascular: Negative for chest pain and leg swelling.   Gastrointestinal: Negative for abdominal distention, abdominal pain, blood in stool, constipation, diarrhea, nausea and vomiting.   Endocrine: Negative for cold intolerance and heat intolerance.   Genitourinary: Negative for difficulty urinating and dysuria.   Musculoskeletal: Positive for myalgias and neck pain (pain radiates between both sides of his neck under his jaw.). Negative for arthralgias and back pain.   Skin: Negative for color change.   Neurological: Positive for light-headedness. Negative for dizziness, weakness, numbness and headaches.   Hematological: Negative for  "adenopathy. Does not bruise/bleed easily.   Psychiatric/Behavioral: Negative for confusion.       ECOG Performance Status:    1  Objective:      Vitals:   Vitals:    12/28/20 1114   BP: 126/72   Pulse: 104   Temp: 98.1 °F (36.7 °C)   TempSrc: Temporal   Weight: 58.4 kg (128 lb 12 oz)   Height: 5' 8" (1.727 m)     BMI: Body mass index is 19.58 kg/m².    Physical Exam  Constitutional:       General: He is not in acute distress.     Appearance: Normal appearance. He is not ill-appearing.   HENT:      Head: Normocephalic and atraumatic.      Mouth/Throat:      Pharynx: No oropharyngeal exudate or posterior oropharyngeal erythema.   Eyes:      General: No scleral icterus.     Extraocular Movements: Extraocular movements intact.      Conjunctiva/sclera: Conjunctivae normal.      Pupils: Pupils are equal, round, and reactive to light.   Neck:      Musculoskeletal: Normal range of motion and neck supple.      Comments: Left neck: No palpable mass on today's exam.  Cardiovascular:      Rate and Rhythm: Normal rate and regular rhythm.      Heart sounds: No murmur. No friction rub. No gallop.    Pulmonary:      Effort: Pulmonary effort is normal. No respiratory distress.      Breath sounds: No stridor. No wheezing, rhonchi or rales.   Abdominal:      General: Bowel sounds are normal. There is no distension.      Palpations: Abdomen is soft. There is no mass.      Tenderness: There is no abdominal tenderness. There is no guarding or rebound.   Musculoskeletal: Normal range of motion.      Right lower leg: No edema.      Left lower leg: No edema.   Lymphadenopathy:      Upper Body:      Right upper body: No supraclavicular or axillary adenopathy.      Left upper body: No supraclavicular or axillary adenopathy.   Skin:     General: Skin is warm and dry.   Neurological:      General: No focal deficit present.      Mental Status: He is alert.         Laboratory Data:   Recent Results (from the past 168 hour(s))   CBC Oncology    " Collection Time: 12/28/20 10:42 AM   Result Value Ref Range    WBC 4.41 3.90 - 12.70 K/uL    RBC 2.31 (L) 4.60 - 6.20 M/uL    Hemoglobin 8.6 (L) 14.0 - 18.0 g/dL    Hematocrit 25.0 (L) 40.0 - 54.0 %     (H) 82 - 98 fL    MCH 37.2 (H) 27.0 - 31.0 pg    MCHC 34.4 32.0 - 36.0 g/dL    RDW 15.8 (H) 11.5 - 14.5 %    Platelets 171 150 - 350 K/uL    MPV 9.8 9.2 - 12.9 fL    Gran # (ANC) 2.4 1.8 - 7.7 K/uL    Immature Grans (Abs) 0.02 0.00 - 0.04 K/uL   Comprehensive Metabolic Panel    Collection Time: 12/28/20 10:42 AM   Result Value Ref Range    Sodium 140 136 - 145 mmol/L    Potassium 3.9 3.5 - 5.1 mmol/L    Chloride 102 95 - 110 mmol/L    CO2 28 23 - 29 mmol/L    Glucose 186 (H) 70 - 110 mg/dL    BUN 20 8 - 23 mg/dL    Creatinine 1.4 0.5 - 1.4 mg/dL    Calcium 9.4 8.7 - 10.5 mg/dL    Total Protein 7.4 6.0 - 8.4 g/dL    Albumin 3.6 3.5 - 5.2 g/dL    Total Bilirubin 0.7 0.1 - 1.0 mg/dL    Alkaline Phosphatase 72 55 - 135 U/L    AST 35 10 - 40 U/L    ALT 26 10 - 44 U/L    Anion Gap 10 8 - 16 mmol/L    eGFR if African American 58.0 (A) >60 mL/min/1.73 m^2    eGFR if non African American 50.2 (A) >60 mL/min/1.73 m^2   Magnesium    Collection Time: 12/28/20 10:42 AM   Result Value Ref Range    Magnesium 1.4 (L) 1.6 - 2.6 mg/dL           Imaging:    Assessment:       1. Squamous cell carcinoma of base of tongue    2. Drug-induced skin rash    3. Anemia, chronic disease    4. 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 Protein-calorie malnutrition, unspecified severity     6. Stage 3a chronic kidney disease           Plan:       Problem List Items Addressed This Visit        Renal/    Stage 3a chronic kidney disease    Current Assessment & Plan     Has CKD stage IIIa, creatinine baseline is ~ 1.4.  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  Currently Cr ~1.4.  -monitor cmp prior to each cycle.            Oncology    Squamous cell carcinoma of base of tongue - Primary    Overview      Per OSH records, initially presented in 2015 with symptoms and cT2N2c disease. page 91 of 3/25/2020 outside records clinic (media tab).  Initially presented 8/2015 with left sided odynophagia.  Treated for reflux, which did not improve.    12/11/15 he had a fiberoptic exam showing discolored mucosal area of the left base of the tongue.    1/13/16 he had persistent dysphagia, odynophagia, and new left otalgia.  MRI showed an irregular mass along the left posterior margin of the base of the tongue measuring 1.8x2.3cm with ipsilateral level 2 lymph node measuring 1.7cm with central necrosis and contralateral level 2 node measuring 1.3cm with questionable necrosis.    1/19/2016 he had direct laryngoscopy with biopsy left of midline, proximal to vallecula, which was p16 positive, invasive, with moderately to poorly differentiated sq.c.c. with basaloid features.    1/29/16 met with medical oncology who recommended chemoxrt.    2/1/16  staging PET CT showed hypermetabolic mass at base of tongue 4x2.5x3.5cm, max SUV 24.13, hypermetabolic lymph node left neck SUV 6.77, small subcentimeter lymph node right neck max SUV 3.79 (exact size of lymph nodes not mentioned in the summary).    2/15/16 started chemoxrt to left tongue base, bilateral cervical neck levels 1b-5, treated with 6MV photons utilizing IMRT, 5000 cGy in 200 cGy daily fractions.  Cone down boost to left base of tongue with additional 2000 cGy in 200 cGy daily fractions to gross disease.  Received weekly cisplatin with radiation but had to switch to carboplatin due to worsening renal function. during this time also missed several weekly treatments of cisplatin.    4/1/2016 completed chemoxrt.   11/1/2016 Repeat biopsy of left tongue showed persistent disease.  S/p salvage glossectomy with laryngectomy,  rT4aN0, p16 positive, base of tongue squamous cell carcinoma.     page 61 of 3/25/2020 outside records clinic (media tab)  Pathology: Tonsil, left tonsil fold:  reactive squamous mucosa with inflammation and degenerating skeletal muscle, no carcinoma seen.  Right and Left base of tongue:  Reactive squamous mucosa and submucosa with inflammation, no carcinoma seen.  Neck, glossectomy, laryngectomy, bilateral neck dissection:  invasive squamous cell carcinoma.  tumor site: base of tongue/hypopharynx.  Small focus of tumor is seen within the hyoid  bone.  specimen Size [id number]  tumor size [id number]  depth of invasion 20mm  TNM stage pT4a, pN0, pMx  Lymph nodes, included in all parts:  number involved 0  number examined 3.  distance of tumor from margins  positive inked margins: none  within 1mm : none  within 1-2mm: anterior-inferior    Bilateral neck dissection:  level 1: one lymph node and submandibular gland, negative for tumor  level II : two lymph nodes, negative for tumor  level III: not identified  level IV: not identified  level V: not identified.    6/20/17 - PETCT with FDG avidity of left neck lymph node, level 3, SUV 2.9.  No other evidence of local or distant disease.  7/6/2017 - biopsy of left neck lymph node with IR.  Sample was non-diagnostic. Notes from path report: Less than optimal scan specimen with minute tissue core of stromal fibroadipose tissue.  definitive tati background is not seen.  9/28/2017 - Repeat PET CT showing mild activity SUV 3.5 (increased from 3.2 previously; size 7.8x7.4x9.5mm).  Also new findings under left pectoralis minor (mild focal increased activity, indeterminate but new since previous exam)  3/22/2018 - CT neck shows no evidence of disease and level 2b lymph node decreased to 4.9mmx4.5mmx4.9mm from 7.8x7.4x9.5mm previously.         Current Assessment & Plan     Seen by Dr. Myers 8/3/2020 for left-sided neck pain and a swelling in left level 2 several weeks ago.  He feels it arose after he began lifting weights.  CT neck 8/5/2020 : Lymph nodes At the left neck level L2 there is a heterogeneous soft tissue density with central  hypoattenuation likely reflecting a necrotic lymph node measuring 1.6 x 1.4 cm in transverse dimension, 2.5 cm in craniocaudal dimension.  There is a similar appearing irregular soft tissue density at the right neck level 2 measuring 1.1 x 9.0 cm in transverse dimension and 1.5 cm craniocaudal dimension.  No other abnormal appearing or enlarged lymph nodes found.  Impression: At level 2 within the neck bilaterally, there are  soft tissue density masses with central hypoattenuation likely reflective of necrotic lymph nodes.  Findings are suspicious for malignancy recurrence.  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  Staging PET CT 9/30/2020 with left sided hypermetabolic node and right sided enlarged node without uptake.  Also has mandibular vestibule uptake, which may be another site of disease. Discussed at tumor board.  He is not a surgical or radiation candidate.  -For low tumor volume, plan for carboplatin/paclitaxel/cetuximab.  Carboplatin/paclitaxel x2 cycles followed by 4 cycles carboplatin/cetuximab followed by cetuximab maintenance.  -not candidate for immunotherapy with his history of liver transplant.  -Labs for chemo will be: CMP, CBC, Mg, Phos  -Supportive medications: Dexamethasone 6mg BID days 2 and 3 for cycles 1-6 when he is receiving carboplatin.  zofran prn.  If needed, will prescribe compazine prn breakthrough n/v.  For pain, oxycodone prn.  He currently uses 2-3x/day.  Pain adequately controlled for him, which is about a 6 out of 10.  Bowel regimen reviewed and currently he only needs stool softeners.    -Continue periactin TID.  Weight is stable.  -Grade 2 skin rash with cetuximab. Treated with doxycycline and topical metronidazole and rash is resolving.  Will require slower cetuximab infusions indefinitely.  --Skin rash still visible, Grade 1-2. Continue doxycycline and metronidazole.  --Patient prefers for labs to be drawn with  peripheral IV access placement.  -Proceed with cycle 5.  -Return to clinic prior to cycle 6 (1/19/20, 1/26/21, 2/2/21).           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  Then the patient received detailed information about the importance of designating a Health Care Power of  (HCPOA). he was instructed to communicate with this person about their wishes for future healthcare, should he become sick and lose decision-making capacity. The patient has previously appointed a HCPOA. After our discussion, the patient has decided to complete a HCPOA and has appointed his brother and NAME:Ollie Berg MD  Hematology Oncology

## 2020-12-28 NOTE — ASSESSMENT & PLAN NOTE
Seen by Dr. Myers 8/3/2020 for left-sided neck pain and a swelling in left level 2 several weeks ago.  He feels it arose after he began lifting weights.  CT neck 8/5/2020 : Lymph nodes At the left neck level L2 there is a heterogeneous soft tissue density with central hypoattenuation likely reflecting a necrotic lymph node measuring 1.6 x 1.4 cm in transverse dimension, 2.5 cm in craniocaudal dimension.  There is a similar appearing irregular soft tissue density at the right neck level 2 measuring 1.1 x 9.0 cm in transverse dimension and 1.5 cm craniocaudal dimension.  No other abnormal appearing or enlarged lymph nodes found.  Impression: At level 2 within the neck bilaterally, there are  soft tissue density masses with central hypoattenuation likely reflective of necrotic lymph nodes.  Findings are suspicious for malignancy recurrence.  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  Staging PET CT 9/30/2020 with left sided hypermetabolic node and right sided enlarged node without uptake.  Also has mandibular vestibule uptake, which may be another site of disease. Discussed at tumor board.  He is not a surgical or radiation candidate.  -For low tumor volume, plan for carboplatin/paclitaxel/cetuximab.  Carboplatin/paclitaxel x2 cycles followed by 4 cycles carboplatin/cetuximab followed by cetuximab maintenance.  -not candidate for immunotherapy with his history of liver transplant.  -Labs for chemo will be: CMP, CBC, Mg, Phos  -Supportive medications: Dexamethasone 6mg BID days 2 and 3 for cycles 1-6 when he is receiving carboplatin.  zofran prn.  If needed, will prescribe compazine prn breakthrough n/v.  For pain, oxycodone prn.  He currently uses 2-3x/day.  Pain adequately controlled for him, which is about a 6 out of 10.  Bowel regimen reviewed and currently he only needs stool softeners.    -Continue periactin TID.  Weight is stable.  -Grade 2 skin rash with  cetuximab. Treated with doxycycline and topical metronidazole and rash is resolving.  Will require slower cetuximab infusions indefinitely.  --Skin rash still visible, Grade 1-2. Continue doxycycline and metronidazole.  --Patient prefers for labs to be drawn with peripheral IV access placement.  -Proceed with cycle 5.  -Return to clinic prior to cycle 6 (1/19/20, 1/26/21, 2/2/21).

## 2020-12-28 NOTE — Clinical Note
1. Add lab draw with infusion 5th floor 12/29 before his chemo for labs: tsh, vitamin b12, folate, tibc iron, ferritin, and soluble transferrin.  2. Change 1/21 appointment to 1/12 (infusion).  3. Follow up with me 1/18 with cmp, cbc.  4. Cycle 5 1/19, 1/26, 2/2.  Thanks -e

## 2020-12-29 ENCOUNTER — INFUSION (OUTPATIENT)
Dept: INFUSION THERAPY | Facility: HOSPITAL | Age: 70
End: 2020-12-29
Attending: INTERNAL MEDICINE
Payer: MEDICARE

## 2020-12-29 ENCOUNTER — INFUSION (OUTPATIENT)
Dept: INFUSION THERAPY | Facility: HOSPITAL | Age: 70
End: 2020-12-29
Attending: STUDENT IN AN ORGANIZED HEALTH CARE EDUCATION/TRAINING PROGRAM
Payer: MEDICARE

## 2020-12-29 VITALS
HEART RATE: 77 BPM | DIASTOLIC BLOOD PRESSURE: 63 MMHG | SYSTOLIC BLOOD PRESSURE: 108 MMHG | TEMPERATURE: 98 F | RESPIRATION RATE: 18 BRPM

## 2020-12-29 DIAGNOSIS — N18.31 STAGE 3A CHRONIC KIDNEY DISEASE: ICD-10-CM

## 2020-12-29 DIAGNOSIS — E46 PROTEIN-CALORIE MALNUTRITION, UNSPECIFIED SEVERITY: ICD-10-CM

## 2020-12-29 DIAGNOSIS — C01 SQUAMOUS CELL CARCINOMA OF BASE OF TONGUE: Primary | ICD-10-CM

## 2020-12-29 DIAGNOSIS — D63.8 ANEMIA, CHRONIC DISEASE: Primary | ICD-10-CM

## 2020-12-29 DIAGNOSIS — E03.2 HYPOTHYROIDISM DUE TO MEDICAMENTS AND OTHER EXOGENOUS SUBSTANCES: ICD-10-CM

## 2020-12-29 DIAGNOSIS — C01 SQUAMOUS CELL CARCINOMA OF BASE OF TONGUE: ICD-10-CM

## 2020-12-29 LAB
FERRITIN SERPL-MCNC: 464 NG/ML (ref 20–300)
FOLATE SERPL-MCNC: 17.2 NG/ML (ref 4–24)
IRON SERPL-MCNC: 238 UG/DL (ref 45–160)
SATURATED IRON: 67 % (ref 20–50)
T4 FREE SERPL-MCNC: 1.12 NG/DL (ref 0.71–1.51)
TOTAL IRON BINDING CAPACITY: 354 UG/DL (ref 250–450)
TRANSFERRIN SERPL-MCNC: 239 MG/DL (ref 200–375)
TSH SERPL DL<=0.005 MIU/L-ACNC: 18.66 UIU/ML (ref 0.4–4)
VIT B12 SERPL-MCNC: 446 PG/ML (ref 210–950)

## 2020-12-29 PROCEDURE — 63600175 PHARM REV CODE 636 W HCPCS: Performed by: STUDENT IN AN ORGANIZED HEALTH CARE EDUCATION/TRAINING PROGRAM

## 2020-12-29 PROCEDURE — 96367 TX/PROPH/DG ADDL SEQ IV INF: CPT

## 2020-12-29 PROCEDURE — 84238 ASSAY NONENDOCRINE RECEPTOR: CPT

## 2020-12-29 PROCEDURE — 96417 CHEMO IV INFUS EACH ADDL SEQ: CPT

## 2020-12-29 PROCEDURE — 96413 CHEMO IV INFUSION 1 HR: CPT

## 2020-12-29 PROCEDURE — 25000003 PHARM REV CODE 250: Performed by: STUDENT IN AN ORGANIZED HEALTH CARE EDUCATION/TRAINING PROGRAM

## 2020-12-29 PROCEDURE — 84443 ASSAY THYROID STIM HORMONE: CPT

## 2020-12-29 PROCEDURE — 63600175 PHARM REV CODE 636 W HCPCS: Performed by: INTERNAL MEDICINE

## 2020-12-29 PROCEDURE — 83540 ASSAY OF IRON: CPT

## 2020-12-29 PROCEDURE — 84439 ASSAY OF FREE THYROXINE: CPT

## 2020-12-29 PROCEDURE — 82607 VITAMIN B-12: CPT

## 2020-12-29 PROCEDURE — 96415 CHEMO IV INFUSION ADDL HR: CPT

## 2020-12-29 PROCEDURE — 82728 ASSAY OF FERRITIN: CPT

## 2020-12-29 PROCEDURE — 25000003 PHARM REV CODE 250: Performed by: INTERNAL MEDICINE

## 2020-12-29 PROCEDURE — 82746 ASSAY OF FOLIC ACID SERUM: CPT

## 2020-12-29 PROCEDURE — 96375 TX/PRO/DX INJ NEW DRUG ADDON: CPT

## 2020-12-29 RX ORDER — EPINEPHRINE 0.3 MG/.3ML
0.3 INJECTION SUBCUTANEOUS ONCE AS NEEDED
Status: DISCONTINUED | OUTPATIENT
Start: 2020-12-29 | End: 2020-12-29 | Stop reason: HOSPADM

## 2020-12-29 RX ORDER — SODIUM CHLORIDE 0.9 % (FLUSH) 0.9 %
10 SYRINGE (ML) INJECTION
Status: CANCELLED | OUTPATIENT
Start: 2020-12-29

## 2020-12-29 RX ORDER — SODIUM CHLORIDE 0.9 % (FLUSH) 0.9 %
10 SYRINGE (ML) INJECTION
Status: DISCONTINUED | OUTPATIENT
Start: 2020-12-29 | End: 2020-12-29 | Stop reason: HOSPADM

## 2020-12-29 RX ORDER — HEPARIN 100 UNIT/ML
500 SYRINGE INTRAVENOUS
Status: CANCELLED | OUTPATIENT
Start: 2020-12-29

## 2020-12-29 RX ORDER — HEPARIN 100 UNIT/ML
500 SYRINGE INTRAVENOUS
Status: DISCONTINUED | OUTPATIENT
Start: 2020-12-29 | End: 2020-12-29 | Stop reason: HOSPADM

## 2020-12-29 RX ORDER — DIPHENHYDRAMINE HYDROCHLORIDE 50 MG/ML
50 INJECTION INTRAMUSCULAR; INTRAVENOUS ONCE AS NEEDED
Status: DISCONTINUED | OUTPATIENT
Start: 2020-12-29 | End: 2020-12-29 | Stop reason: HOSPADM

## 2020-12-29 RX ADMIN — APREPITANT 130 MG: 130 INJECTION, EMULSION INTRAVENOUS at 02:12

## 2020-12-29 RX ADMIN — DIPHENHYDRAMINE HYDROCHLORIDE 50 MG: 50 INJECTION, SOLUTION INTRAMUSCULAR; INTRAVENOUS at 01:12

## 2020-12-29 RX ADMIN — CARBOPLATIN 335 MG: 10 INJECTION INTRAVENOUS at 05:12

## 2020-12-29 RX ADMIN — DEXAMETHASONE SODIUM PHOSPHATE 0.25 MG: 4 INJECTION, SOLUTION INTRA-ARTICULAR; INTRALESIONAL; INTRAMUSCULAR; INTRAVENOUS; SOFT TISSUE at 01:12

## 2020-12-29 RX ADMIN — CETUXIMAB 427.6 MG: 2 SOLUTION INTRAVENOUS at 02:12

## 2020-12-29 NOTE — NURSING
Per Dr. Berg patient is to have Erbitux administered over 2 hours indefinitly due to Grade 2 skin rash. See Dr. Berg's  note of 12/28/20.

## 2020-12-29 NOTE — NURSING
Pt does not have Port (appt note stated). PIV started for Chemo appt. Labs drawn peripherally and sent off to lab.pt discharged to next appt.

## 2020-12-30 LAB — STFR SERPL-MCNC: 2.6 MG/L (ref 1.8–4.6)

## 2021-01-01 NOTE — TELEPHONE ENCOUNTER
----- Message from Debo Amaya sent at 10/14/2020  1:41 PM CDT -----  Regarding: PT  Contact: PT's Brother Ollie  PT's brother Ollie returned your call. Please call back     Callback: [phone]     Coping Well

## 2021-01-04 ENCOUNTER — LAB VISIT (OUTPATIENT)
Dept: LAB | Facility: HOSPITAL | Age: 71
End: 2021-01-04
Attending: INTERNAL MEDICINE
Payer: MEDICARE

## 2021-01-04 DIAGNOSIS — Z94.4 STATUS POST LIVER TRANSPLANTATION: ICD-10-CM

## 2021-01-04 LAB
ALBUMIN SERPL BCP-MCNC: 3.5 G/DL (ref 3.5–5.2)
ALP SERPL-CCNC: 65 U/L (ref 55–135)
ALT SERPL W/O P-5'-P-CCNC: 29 U/L (ref 10–44)
ANION GAP SERPL CALC-SCNC: 13 MMOL/L (ref 8–16)
AST SERPL-CCNC: 43 U/L (ref 10–40)
BASOPHILS # BLD AUTO: 0.02 K/UL (ref 0–0.2)
BASOPHILS NFR BLD: 0.3 % (ref 0–1.9)
BILIRUB SERPL-MCNC: 1 MG/DL (ref 0.1–1)
BUN SERPL-MCNC: 23 MG/DL (ref 8–23)
CALCIUM SERPL-MCNC: 8.9 MG/DL (ref 8.7–10.5)
CHLORIDE SERPL-SCNC: 105 MMOL/L (ref 95–110)
CO2 SERPL-SCNC: 24 MMOL/L (ref 23–29)
CREAT SERPL-MCNC: 1.4 MG/DL (ref 0.5–1.4)
DIFFERENTIAL METHOD: ABNORMAL
EOSINOPHIL # BLD AUTO: 0 K/UL (ref 0–0.5)
EOSINOPHIL NFR BLD: 0.5 % (ref 0–8)
ERYTHROCYTE [DISTWIDTH] IN BLOOD BY AUTOMATED COUNT: 15.9 % (ref 11.5–14.5)
EST. GFR  (AFRICAN AMERICAN): 58 ML/MIN/1.73 M^2
EST. GFR  (NON AFRICAN AMERICAN): 50.2 ML/MIN/1.73 M^2
GLUCOSE SERPL-MCNC: 134 MG/DL (ref 70–110)
HCT VFR BLD AUTO: 25.1 % (ref 40–54)
HGB BLD-MCNC: 8.9 G/DL (ref 14–18)
IMM GRANULOCYTES # BLD AUTO: 0.07 K/UL (ref 0–0.04)
IMM GRANULOCYTES NFR BLD AUTO: 1.2 % (ref 0–0.5)
LYMPHOCYTES # BLD AUTO: 1.2 K/UL (ref 1–4.8)
LYMPHOCYTES NFR BLD: 20 % (ref 18–48)
MCH RBC QN AUTO: 38.4 PG (ref 27–31)
MCHC RBC AUTO-ENTMCNC: 35.5 G/DL (ref 32–36)
MCV RBC AUTO: 108 FL (ref 82–98)
MONOCYTES # BLD AUTO: 0.6 K/UL (ref 0.3–1)
MONOCYTES NFR BLD: 10.2 % (ref 4–15)
NEUTROPHILS # BLD AUTO: 3.9 K/UL (ref 1.8–7.7)
NEUTROPHILS NFR BLD: 67.8 % (ref 38–73)
NRBC BLD-RTO: 0 /100 WBC
PLATELET # BLD AUTO: 219 K/UL (ref 150–350)
PMV BLD AUTO: 9.6 FL (ref 9.2–12.9)
POTASSIUM SERPL-SCNC: 5.3 MMOL/L (ref 3.5–5.1)
PROT SERPL-MCNC: 7.2 G/DL (ref 6–8.4)
RBC # BLD AUTO: 2.32 M/UL (ref 4.6–6.2)
SODIUM SERPL-SCNC: 142 MMOL/L (ref 136–145)
TACROLIMUS BLD-MCNC: <1.5 NG/ML (ref 5–15)
WBC # BLD AUTO: 5.76 K/UL (ref 3.9–12.7)

## 2021-01-04 PROCEDURE — 36415 COLL VENOUS BLD VENIPUNCTURE: CPT

## 2021-01-04 PROCEDURE — 80053 COMPREHEN METABOLIC PANEL: CPT

## 2021-01-04 PROCEDURE — 80197 ASSAY OF TACROLIMUS: CPT

## 2021-01-04 PROCEDURE — 85025 COMPLETE CBC W/AUTO DIFF WBC: CPT

## 2021-01-05 ENCOUNTER — INFUSION (OUTPATIENT)
Dept: INFUSION THERAPY | Facility: HOSPITAL | Age: 71
End: 2021-01-05
Attending: STUDENT IN AN ORGANIZED HEALTH CARE EDUCATION/TRAINING PROGRAM
Payer: MEDICARE

## 2021-01-05 VITALS
HEIGHT: 68 IN | TEMPERATURE: 98 F | WEIGHT: 128.31 LBS | RESPIRATION RATE: 18 BRPM | BODY MASS INDEX: 19.45 KG/M2 | DIASTOLIC BLOOD PRESSURE: 60 MMHG | SYSTOLIC BLOOD PRESSURE: 99 MMHG | HEART RATE: 81 BPM

## 2021-01-05 DIAGNOSIS — C01 SQUAMOUS CELL CARCINOMA OF BASE OF TONGUE: Primary | ICD-10-CM

## 2021-01-05 PROCEDURE — 96367 TX/PROPH/DG ADDL SEQ IV INF: CPT

## 2021-01-05 PROCEDURE — 25000003 PHARM REV CODE 250: Performed by: STUDENT IN AN ORGANIZED HEALTH CARE EDUCATION/TRAINING PROGRAM

## 2021-01-05 PROCEDURE — 96413 CHEMO IV INFUSION 1 HR: CPT

## 2021-01-05 PROCEDURE — 63600175 PHARM REV CODE 636 W HCPCS: Mod: JG | Performed by: STUDENT IN AN ORGANIZED HEALTH CARE EDUCATION/TRAINING PROGRAM

## 2021-01-05 RX ORDER — DIPHENHYDRAMINE HYDROCHLORIDE 50 MG/ML
50 INJECTION INTRAMUSCULAR; INTRAVENOUS ONCE AS NEEDED
Status: DISCONTINUED | OUTPATIENT
Start: 2021-01-05 | End: 2021-01-05 | Stop reason: HOSPADM

## 2021-01-05 RX ORDER — SODIUM CHLORIDE 0.9 % (FLUSH) 0.9 %
10 SYRINGE (ML) INJECTION
Status: DISCONTINUED | OUTPATIENT
Start: 2021-01-05 | End: 2021-01-05 | Stop reason: HOSPADM

## 2021-01-05 RX ORDER — HEPARIN 100 UNIT/ML
500 SYRINGE INTRAVENOUS
Status: DISCONTINUED | OUTPATIENT
Start: 2021-01-05 | End: 2021-01-05 | Stop reason: HOSPADM

## 2021-01-05 RX ORDER — EPINEPHRINE 0.3 MG/.3ML
0.3 INJECTION SUBCUTANEOUS ONCE AS NEEDED
Status: DISCONTINUED | OUTPATIENT
Start: 2021-01-05 | End: 2021-01-05 | Stop reason: HOSPADM

## 2021-01-05 RX ADMIN — SODIUM CHLORIDE: 9 INJECTION, SOLUTION INTRAVENOUS at 01:01

## 2021-01-05 RX ADMIN — DIPHENHYDRAMINE HYDROCHLORIDE 50 MG: 50 INJECTION, SOLUTION INTRAMUSCULAR; INTRAVENOUS at 01:01

## 2021-01-05 RX ADMIN — CETUXIMAB 427.6 MG: 2 SOLUTION INTRAVENOUS at 02:01

## 2021-01-06 DIAGNOSIS — Z94.4 STATUS POST LIVER TRANSPLANTATION: ICD-10-CM

## 2021-01-06 RX ORDER — TACROLIMUS 0.5 MG/1
0.5 CAPSULE ORAL EVERY 12 HOURS
Qty: 180 CAPSULE | Refills: 3 | Status: SHIPPED | OUTPATIENT
Start: 2021-01-06 | End: 2021-01-07 | Stop reason: SDUPTHER

## 2021-01-07 ENCOUNTER — PATIENT MESSAGE (OUTPATIENT)
Dept: TRANSPLANT | Facility: CLINIC | Age: 71
End: 2021-01-07

## 2021-01-07 DIAGNOSIS — Z94.4 STATUS POST LIVER TRANSPLANTATION: ICD-10-CM

## 2021-01-08 ENCOUNTER — PATIENT MESSAGE (OUTPATIENT)
Dept: TRANSPLANT | Facility: CLINIC | Age: 71
End: 2021-01-08

## 2021-01-08 RX ORDER — TACROLIMUS 0.5 MG/1
0.5 CAPSULE ORAL EVERY 12 HOURS
Qty: 180 CAPSULE | Refills: 3 | Status: SHIPPED | OUTPATIENT
Start: 2021-01-08 | End: 2021-07-16 | Stop reason: SDUPTHER

## 2021-01-11 ENCOUNTER — HOSPITAL ENCOUNTER (OUTPATIENT)
Dept: RADIOLOGY | Facility: HOSPITAL | Age: 71
Discharge: HOME | End: 2021-01-11
Attending: INTERNAL MEDICINE
Payer: MEDICARE

## 2021-01-11 DIAGNOSIS — Z94.4 STATUS POST LIVER TRANSPLANTATION: ICD-10-CM

## 2021-01-11 PROCEDURE — 93975 US LIVER TRANSPLANT POST: ICD-10-PCS | Mod: 26,,, | Performed by: RADIOLOGY

## 2021-01-11 PROCEDURE — 76705 ECHO EXAM OF ABDOMEN: CPT | Mod: 26,XS,, | Performed by: RADIOLOGY

## 2021-01-11 PROCEDURE — 93975 VASCULAR STUDY: CPT | Mod: TC

## 2021-01-11 PROCEDURE — 93975 VASCULAR STUDY: CPT | Mod: 26,,, | Performed by: RADIOLOGY

## 2021-01-11 PROCEDURE — 76705 US LIVER TRANSPLANT POST: ICD-10-PCS | Mod: 26,XS,, | Performed by: RADIOLOGY

## 2021-01-12 ENCOUNTER — INFUSION (OUTPATIENT)
Dept: INFUSION THERAPY | Facility: HOSPITAL | Age: 71
End: 2021-01-12
Attending: STUDENT IN AN ORGANIZED HEALTH CARE EDUCATION/TRAINING PROGRAM
Payer: MEDICARE

## 2021-01-12 ENCOUNTER — TELEPHONE (OUTPATIENT)
Dept: TRANSPLANT | Facility: CLINIC | Age: 71
End: 2021-01-12

## 2021-01-12 VITALS
WEIGHT: 127.88 LBS | HEART RATE: 90 BPM | HEIGHT: 62 IN | RESPIRATION RATE: 18 BRPM | SYSTOLIC BLOOD PRESSURE: 106 MMHG | BODY MASS INDEX: 23.53 KG/M2 | DIASTOLIC BLOOD PRESSURE: 74 MMHG | TEMPERATURE: 97 F

## 2021-01-12 DIAGNOSIS — C01 SQUAMOUS CELL CARCINOMA OF BASE OF TONGUE: Primary | ICD-10-CM

## 2021-01-12 PROCEDURE — 96415 CHEMO IV INFUSION ADDL HR: CPT

## 2021-01-12 PROCEDURE — 63600175 PHARM REV CODE 636 W HCPCS: Performed by: STUDENT IN AN ORGANIZED HEALTH CARE EDUCATION/TRAINING PROGRAM

## 2021-01-12 PROCEDURE — 96367 TX/PROPH/DG ADDL SEQ IV INF: CPT

## 2021-01-12 PROCEDURE — 96413 CHEMO IV INFUSION 1 HR: CPT

## 2021-01-12 PROCEDURE — A4216 STERILE WATER/SALINE, 10 ML: HCPCS | Performed by: STUDENT IN AN ORGANIZED HEALTH CARE EDUCATION/TRAINING PROGRAM

## 2021-01-12 PROCEDURE — 25000003 PHARM REV CODE 250: Performed by: STUDENT IN AN ORGANIZED HEALTH CARE EDUCATION/TRAINING PROGRAM

## 2021-01-12 RX ORDER — HEPARIN 100 UNIT/ML
500 SYRINGE INTRAVENOUS
Status: DISCONTINUED | OUTPATIENT
Start: 2021-01-12 | End: 2021-01-12 | Stop reason: HOSPADM

## 2021-01-12 RX ORDER — EPINEPHRINE 0.3 MG/.3ML
0.3 INJECTION SUBCUTANEOUS ONCE AS NEEDED
Status: DISCONTINUED | OUTPATIENT
Start: 2021-01-12 | End: 2021-01-12 | Stop reason: HOSPADM

## 2021-01-12 RX ORDER — SODIUM CHLORIDE 0.9 % (FLUSH) 0.9 %
10 SYRINGE (ML) INJECTION
Status: DISCONTINUED | OUTPATIENT
Start: 2021-01-12 | End: 2021-01-12 | Stop reason: HOSPADM

## 2021-01-12 RX ORDER — DIPHENHYDRAMINE HYDROCHLORIDE 50 MG/ML
50 INJECTION INTRAMUSCULAR; INTRAVENOUS ONCE AS NEEDED
Status: DISCONTINUED | OUTPATIENT
Start: 2021-01-12 | End: 2021-01-12 | Stop reason: HOSPADM

## 2021-01-12 RX ADMIN — DIPHENHYDRAMINE HYDROCHLORIDE 50 MG: 50 INJECTION, SOLUTION INTRAMUSCULAR; INTRAVENOUS at 01:01

## 2021-01-12 RX ADMIN — CETUXIMAB 400 MG: 2 SOLUTION INTRAVENOUS at 01:01

## 2021-01-12 RX ADMIN — Medication 10 ML: at 04:01

## 2021-01-12 RX ADMIN — SODIUM CHLORIDE: 0.9 INJECTION, SOLUTION INTRAVENOUS at 01:01

## 2021-01-19 ENCOUNTER — INFUSION (OUTPATIENT)
Dept: INFUSION THERAPY | Facility: HOSPITAL | Age: 71
End: 2021-01-19
Attending: STUDENT IN AN ORGANIZED HEALTH CARE EDUCATION/TRAINING PROGRAM
Payer: MEDICARE

## 2021-01-19 ENCOUNTER — OFFICE VISIT (OUTPATIENT)
Dept: HEMATOLOGY/ONCOLOGY | Facility: CLINIC | Age: 71
End: 2021-01-19
Payer: MEDICARE

## 2021-01-19 ENCOUNTER — TELEPHONE (OUTPATIENT)
Dept: TRANSPLANT | Facility: CLINIC | Age: 71
End: 2021-01-19

## 2021-01-19 VITALS
WEIGHT: 126.31 LBS | TEMPERATURE: 98 F | BODY MASS INDEX: 19.14 KG/M2 | RESPIRATION RATE: 18 BRPM | HEIGHT: 68 IN | HEART RATE: 78 BPM | OXYGEN SATURATION: 100 % | SYSTOLIC BLOOD PRESSURE: 122 MMHG | DIASTOLIC BLOOD PRESSURE: 68 MMHG

## 2021-01-19 VITALS
RESPIRATION RATE: 18 BRPM | DIASTOLIC BLOOD PRESSURE: 61 MMHG | HEART RATE: 100 BPM | SYSTOLIC BLOOD PRESSURE: 103 MMHG | HEIGHT: 68 IN | OXYGEN SATURATION: 100 % | WEIGHT: 126.31 LBS | BODY MASS INDEX: 19.14 KG/M2

## 2021-01-19 DIAGNOSIS — Z94.4 STATUS POST LIVER TRANSPLANTATION: ICD-10-CM

## 2021-01-19 DIAGNOSIS — C01 SQUAMOUS CELL CARCINOMA OF BASE OF TONGUE: Primary | ICD-10-CM

## 2021-01-19 DIAGNOSIS — L27.0 DRUG-INDUCED SKIN RASH: ICD-10-CM

## 2021-01-19 DIAGNOSIS — C01 SQUAMOUS CELL CARCINOMA OF BASE OF TONGUE: ICD-10-CM

## 2021-01-19 PROCEDURE — 99215 OFFICE O/P EST HI 40 MIN: CPT | Mod: S$PBB,,, | Performed by: STUDENT IN AN ORGANIZED HEALTH CARE EDUCATION/TRAINING PROGRAM

## 2021-01-19 PROCEDURE — 99215 PR OFFICE/OUTPT VISIT, EST, LEVL V, 40-54 MIN: ICD-10-PCS | Mod: S$PBB,,, | Performed by: STUDENT IN AN ORGANIZED HEALTH CARE EDUCATION/TRAINING PROGRAM

## 2021-01-19 PROCEDURE — 96413 CHEMO IV INFUSION 1 HR: CPT

## 2021-01-19 PROCEDURE — 63600175 PHARM REV CODE 636 W HCPCS: Mod: TB | Performed by: STUDENT IN AN ORGANIZED HEALTH CARE EDUCATION/TRAINING PROGRAM

## 2021-01-19 PROCEDURE — 99214 OFFICE O/P EST MOD 30 MIN: CPT | Mod: PBBFAC | Performed by: STUDENT IN AN ORGANIZED HEALTH CARE EDUCATION/TRAINING PROGRAM

## 2021-01-19 PROCEDURE — 96375 TX/PRO/DX INJ NEW DRUG ADDON: CPT

## 2021-01-19 PROCEDURE — 99999 PR PBB SHADOW E&M-EST. PATIENT-LVL IV: CPT | Mod: PBBFAC,,, | Performed by: STUDENT IN AN ORGANIZED HEALTH CARE EDUCATION/TRAINING PROGRAM

## 2021-01-19 PROCEDURE — 96367 TX/PROPH/DG ADDL SEQ IV INF: CPT

## 2021-01-19 PROCEDURE — 96417 CHEMO IV INFUS EACH ADDL SEQ: CPT

## 2021-01-19 PROCEDURE — 25000003 PHARM REV CODE 250: Performed by: STUDENT IN AN ORGANIZED HEALTH CARE EDUCATION/TRAINING PROGRAM

## 2021-01-19 PROCEDURE — 99999 PR PBB SHADOW E&M-EST. PATIENT-LVL IV: ICD-10-PCS | Mod: PBBFAC,,, | Performed by: STUDENT IN AN ORGANIZED HEALTH CARE EDUCATION/TRAINING PROGRAM

## 2021-01-19 RX ORDER — DIPHENHYDRAMINE HYDROCHLORIDE 50 MG/ML
50 INJECTION INTRAMUSCULAR; INTRAVENOUS ONCE AS NEEDED
Status: CANCELLED | OUTPATIENT
Start: 2021-02-02

## 2021-01-19 RX ORDER — DIPHENHYDRAMINE HYDROCHLORIDE 50 MG/ML
50 INJECTION INTRAMUSCULAR; INTRAVENOUS ONCE AS NEEDED
Status: CANCELLED | OUTPATIENT
Start: 2021-01-19

## 2021-01-19 RX ORDER — OXYCODONE HYDROCHLORIDE 5 MG/1
5 TABLET ORAL EVERY 8 HOURS PRN
Qty: 90 TABLET | Refills: 0 | Status: SHIPPED | OUTPATIENT
Start: 2021-01-19 | End: 2021-02-09 | Stop reason: SDUPTHER

## 2021-01-19 RX ORDER — HEPARIN 100 UNIT/ML
500 SYRINGE INTRAVENOUS
Status: CANCELLED | OUTPATIENT
Start: 2021-02-02

## 2021-01-19 RX ORDER — HEPARIN 100 UNIT/ML
500 SYRINGE INTRAVENOUS
Status: DISCONTINUED | OUTPATIENT
Start: 2021-01-19 | End: 2021-01-19 | Stop reason: HOSPADM

## 2021-01-19 RX ORDER — EPINEPHRINE 0.3 MG/.3ML
0.3 INJECTION SUBCUTANEOUS ONCE AS NEEDED
Status: CANCELLED | OUTPATIENT
Start: 2021-01-26

## 2021-01-19 RX ORDER — METRONIDAZOLE 7.5 MG/G
GEL TOPICAL 2 TIMES DAILY
Qty: 45 G | Refills: 1 | Status: SHIPPED | OUTPATIENT
Start: 2021-01-19 | End: 2021-06-11 | Stop reason: SDUPTHER

## 2021-01-19 RX ORDER — HEPARIN 100 UNIT/ML
500 SYRINGE INTRAVENOUS
Status: CANCELLED | OUTPATIENT
Start: 2021-01-19

## 2021-01-19 RX ORDER — HEPARIN 100 UNIT/ML
500 SYRINGE INTRAVENOUS
Status: CANCELLED | OUTPATIENT
Start: 2021-01-26

## 2021-01-19 RX ORDER — SODIUM CHLORIDE 0.9 % (FLUSH) 0.9 %
10 SYRINGE (ML) INJECTION
Status: CANCELLED | OUTPATIENT
Start: 2021-01-19

## 2021-01-19 RX ORDER — MAGNESIUM SULFATE HEPTAHYDRATE 40 MG/ML
2 INJECTION, SOLUTION INTRAVENOUS ONCE
Status: COMPLETED | OUTPATIENT
Start: 2021-01-19 | End: 2021-01-19

## 2021-01-19 RX ORDER — SODIUM CHLORIDE 0.9 % (FLUSH) 0.9 %
10 SYRINGE (ML) INJECTION
Status: CANCELLED | OUTPATIENT
Start: 2021-01-26

## 2021-01-19 RX ORDER — SODIUM CHLORIDE 0.9 % (FLUSH) 0.9 %
10 SYRINGE (ML) INJECTION
Status: DISCONTINUED | OUTPATIENT
Start: 2021-01-19 | End: 2021-01-19 | Stop reason: HOSPADM

## 2021-01-19 RX ORDER — EPINEPHRINE 0.3 MG/.3ML
0.3 INJECTION SUBCUTANEOUS ONCE AS NEEDED
Status: CANCELLED | OUTPATIENT
Start: 2021-01-19

## 2021-01-19 RX ORDER — DIPHENHYDRAMINE HYDROCHLORIDE 50 MG/ML
50 INJECTION INTRAMUSCULAR; INTRAVENOUS ONCE AS NEEDED
Status: CANCELLED | OUTPATIENT
Start: 2021-01-26

## 2021-01-19 RX ORDER — DOXYCYCLINE 100 MG/1
100 CAPSULE ORAL DAILY
Qty: 28 CAPSULE | Refills: 0 | Status: SHIPPED | OUTPATIENT
Start: 2021-01-19 | End: 2021-03-02

## 2021-01-19 RX ORDER — DEXAMETHASONE 6 MG/1
TABLET ORAL
Qty: 24 TABLET | Refills: 0 | Status: CANCELLED | OUTPATIENT
Start: 2021-01-19

## 2021-01-19 RX ORDER — SODIUM CHLORIDE 0.9 % (FLUSH) 0.9 %
10 SYRINGE (ML) INJECTION
Status: CANCELLED | OUTPATIENT
Start: 2021-02-02

## 2021-01-19 RX ORDER — EPINEPHRINE 0.3 MG/.3ML
0.3 INJECTION SUBCUTANEOUS ONCE AS NEEDED
Status: CANCELLED | OUTPATIENT
Start: 2021-02-02

## 2021-01-19 RX ORDER — POTASSIUM CHLORIDE 750 MG/1
40 TABLET, EXTENDED RELEASE ORAL ONCE
Status: CANCELLED
Start: 2021-01-26

## 2021-01-19 RX ADMIN — CETUXIMAB 400 MG: 2 SOLUTION INTRAVENOUS at 04:01

## 2021-01-19 RX ADMIN — APREPITANT 130 MG: 130 INJECTION, EMULSION INTRAVENOUS at 02:01

## 2021-01-19 RX ADMIN — MAGNESIUM SULFATE HEPTAHYDRATE 2 G: 40 INJECTION, SOLUTION INTRAVENOUS at 02:01

## 2021-01-19 RX ADMIN — DIPHENHYDRAMINE HYDROCHLORIDE 50 MG: 50 INJECTION, SOLUTION INTRAMUSCULAR; INTRAVENOUS at 03:01

## 2021-01-19 RX ADMIN — PALONOSETRON HYDROCHLORIDE: 0.25 INJECTION INTRAVENOUS at 03:01

## 2021-01-19 RX ADMIN — SODIUM CHLORIDE: 0.9 INJECTION, SOLUTION INTRAVENOUS at 02:01

## 2021-01-19 RX ADMIN — CARBOPLATIN 320 MG: 10 INJECTION INTRAVENOUS at 05:01

## 2021-01-26 ENCOUNTER — INFUSION (OUTPATIENT)
Dept: INFUSION THERAPY | Facility: HOSPITAL | Age: 71
End: 2021-01-26
Payer: MEDICARE

## 2021-01-26 VITALS
WEIGHT: 124.88 LBS | TEMPERATURE: 98 F | BODY MASS INDEX: 18.92 KG/M2 | HEART RATE: 86 BPM | HEIGHT: 68 IN | RESPIRATION RATE: 18 BRPM | SYSTOLIC BLOOD PRESSURE: 119 MMHG | DIASTOLIC BLOOD PRESSURE: 60 MMHG

## 2021-01-26 DIAGNOSIS — C01 SQUAMOUS CELL CARCINOMA OF BASE OF TONGUE: Primary | ICD-10-CM

## 2021-01-26 PROCEDURE — 96413 CHEMO IV INFUSION 1 HR: CPT

## 2021-01-26 PROCEDURE — 63600175 PHARM REV CODE 636 W HCPCS: Performed by: STUDENT IN AN ORGANIZED HEALTH CARE EDUCATION/TRAINING PROGRAM

## 2021-01-26 PROCEDURE — 96415 CHEMO IV INFUSION ADDL HR: CPT

## 2021-01-26 PROCEDURE — 96367 TX/PROPH/DG ADDL SEQ IV INF: CPT

## 2021-01-26 PROCEDURE — 25000003 PHARM REV CODE 250: Performed by: STUDENT IN AN ORGANIZED HEALTH CARE EDUCATION/TRAINING PROGRAM

## 2021-01-26 RX ORDER — HEPARIN 100 UNIT/ML
500 SYRINGE INTRAVENOUS
Status: DISCONTINUED | OUTPATIENT
Start: 2021-01-26 | End: 2021-01-26 | Stop reason: HOSPADM

## 2021-01-26 RX ORDER — POTASSIUM CHLORIDE 20 MEQ/1
40 TABLET, EXTENDED RELEASE ORAL ONCE
Status: COMPLETED | OUTPATIENT
Start: 2021-01-26 | End: 2021-01-26

## 2021-01-26 RX ORDER — EPINEPHRINE 0.3 MG/.3ML
0.3 INJECTION SUBCUTANEOUS ONCE AS NEEDED
Status: DISCONTINUED | OUTPATIENT
Start: 2021-01-26 | End: 2021-01-26 | Stop reason: HOSPADM

## 2021-01-26 RX ORDER — SODIUM CHLORIDE 0.9 % (FLUSH) 0.9 %
10 SYRINGE (ML) INJECTION
Status: DISCONTINUED | OUTPATIENT
Start: 2021-01-26 | End: 2021-01-26 | Stop reason: HOSPADM

## 2021-01-26 RX ORDER — DIPHENHYDRAMINE HYDROCHLORIDE 50 MG/ML
50 INJECTION INTRAMUSCULAR; INTRAVENOUS ONCE AS NEEDED
Status: DISCONTINUED | OUTPATIENT
Start: 2021-01-26 | End: 2021-01-26 | Stop reason: HOSPADM

## 2021-01-26 RX ADMIN — CETUXIMAB 427.6 MG: 2 SOLUTION INTRAVENOUS at 02:01

## 2021-01-26 RX ADMIN — DIPHENHYDRAMINE HYDROCHLORIDE 50 MG: 50 INJECTION INTRAMUSCULAR; INTRAVENOUS at 02:01

## 2021-01-26 RX ADMIN — SODIUM CHLORIDE: 0.9 INJECTION, SOLUTION INTRAVENOUS at 02:01

## 2021-01-26 RX ADMIN — POTASSIUM CHLORIDE 40 MEQ: 1500 TABLET, EXTENDED RELEASE ORAL at 02:01

## 2021-02-02 ENCOUNTER — INFUSION (OUTPATIENT)
Dept: INFUSION THERAPY | Facility: HOSPITAL | Age: 71
End: 2021-02-02
Attending: STUDENT IN AN ORGANIZED HEALTH CARE EDUCATION/TRAINING PROGRAM
Payer: MEDICARE

## 2021-02-02 VITALS
TEMPERATURE: 98 F | HEART RATE: 67 BPM | DIASTOLIC BLOOD PRESSURE: 70 MMHG | SYSTOLIC BLOOD PRESSURE: 111 MMHG | RESPIRATION RATE: 18 BRPM

## 2021-02-02 DIAGNOSIS — C01 SQUAMOUS CELL CARCINOMA OF BASE OF TONGUE: Primary | ICD-10-CM

## 2021-02-02 PROCEDURE — 96415 CHEMO IV INFUSION ADDL HR: CPT

## 2021-02-02 PROCEDURE — 96413 CHEMO IV INFUSION 1 HR: CPT

## 2021-02-02 PROCEDURE — 96367 TX/PROPH/DG ADDL SEQ IV INF: CPT

## 2021-02-02 PROCEDURE — 63600175 PHARM REV CODE 636 W HCPCS: Performed by: STUDENT IN AN ORGANIZED HEALTH CARE EDUCATION/TRAINING PROGRAM

## 2021-02-02 PROCEDURE — 25000003 PHARM REV CODE 250: Performed by: STUDENT IN AN ORGANIZED HEALTH CARE EDUCATION/TRAINING PROGRAM

## 2021-02-02 RX ORDER — DIPHENHYDRAMINE HYDROCHLORIDE 50 MG/ML
50 INJECTION INTRAMUSCULAR; INTRAVENOUS ONCE AS NEEDED
Status: DISCONTINUED | OUTPATIENT
Start: 2021-02-02 | End: 2021-02-02 | Stop reason: HOSPADM

## 2021-02-02 RX ORDER — HEPARIN 100 UNIT/ML
500 SYRINGE INTRAVENOUS
Status: DISCONTINUED | OUTPATIENT
Start: 2021-02-02 | End: 2021-02-02 | Stop reason: HOSPADM

## 2021-02-02 RX ORDER — EPINEPHRINE 0.3 MG/.3ML
0.3 INJECTION SUBCUTANEOUS ONCE AS NEEDED
Status: DISCONTINUED | OUTPATIENT
Start: 2021-02-02 | End: 2021-02-02 | Stop reason: HOSPADM

## 2021-02-02 RX ORDER — SODIUM CHLORIDE 0.9 % (FLUSH) 0.9 %
10 SYRINGE (ML) INJECTION
Status: DISCONTINUED | OUTPATIENT
Start: 2021-02-02 | End: 2021-02-02 | Stop reason: HOSPADM

## 2021-02-02 RX ADMIN — CETUXIMAB 400 MG: 2 SOLUTION INTRAVENOUS at 12:02

## 2021-02-02 RX ADMIN — DIPHENHYDRAMINE HYDROCHLORIDE 50 MG: 50 INJECTION, SOLUTION INTRAMUSCULAR; INTRAVENOUS at 12:02

## 2021-02-02 RX ADMIN — SODIUM CHLORIDE: 9 INJECTION, SOLUTION INTRAVENOUS at 12:02

## 2021-02-08 ENCOUNTER — HOSPITAL ENCOUNTER (OUTPATIENT)
Dept: RADIOLOGY | Facility: HOSPITAL | Age: 71
Discharge: HOME | End: 2021-02-08
Attending: STUDENT IN AN ORGANIZED HEALTH CARE EDUCATION/TRAINING PROGRAM
Payer: MEDICARE

## 2021-02-08 DIAGNOSIS — C01 SQUAMOUS CELL CARCINOMA OF BASE OF TONGUE: ICD-10-CM

## 2021-02-08 LAB — POCT GLUCOSE: 156 MG/DL (ref 70–110)

## 2021-02-08 PROCEDURE — 78815 PET IMAGE W/CT SKULL-THIGH: CPT | Mod: 26,PS,, | Performed by: RADIOLOGY

## 2021-02-08 PROCEDURE — 78815 NM PET CT ROUTINE: ICD-10-PCS | Mod: 26,PS,, | Performed by: RADIOLOGY

## 2021-02-08 PROCEDURE — 78815 PET IMAGE W/CT SKULL-THIGH: CPT | Mod: TC

## 2021-02-09 ENCOUNTER — TELEPHONE (OUTPATIENT)
Dept: TRANSPLANT | Facility: CLINIC | Age: 71
End: 2021-02-09

## 2021-02-09 ENCOUNTER — OFFICE VISIT (OUTPATIENT)
Dept: HEMATOLOGY/ONCOLOGY | Facility: CLINIC | Age: 71
End: 2021-02-09
Payer: MEDICARE

## 2021-02-09 ENCOUNTER — INFUSION (OUTPATIENT)
Dept: INFUSION THERAPY | Facility: HOSPITAL | Age: 71
End: 2021-02-09
Attending: STUDENT IN AN ORGANIZED HEALTH CARE EDUCATION/TRAINING PROGRAM
Payer: MEDICARE

## 2021-02-09 VITALS
DIASTOLIC BLOOD PRESSURE: 65 MMHG | SYSTOLIC BLOOD PRESSURE: 102 MMHG | TEMPERATURE: 98 F | HEART RATE: 82 BPM | RESPIRATION RATE: 18 BRPM

## 2021-02-09 VITALS
SYSTOLIC BLOOD PRESSURE: 109 MMHG | HEIGHT: 68 IN | RESPIRATION RATE: 20 BRPM | DIASTOLIC BLOOD PRESSURE: 59 MMHG | HEART RATE: 95 BPM | OXYGEN SATURATION: 100 % | BODY MASS INDEX: 19.12 KG/M2 | WEIGHT: 126.13 LBS

## 2021-02-09 DIAGNOSIS — N18.31 STAGE 3A CHRONIC KIDNEY DISEASE: ICD-10-CM

## 2021-02-09 DIAGNOSIS — C01 SQUAMOUS CELL CARCINOMA OF BASE OF TONGUE: ICD-10-CM

## 2021-02-09 DIAGNOSIS — C01 SQUAMOUS CELL CARCINOMA OF BASE OF TONGUE: Primary | ICD-10-CM

## 2021-02-09 DIAGNOSIS — T86.49 CIRRHOSIS OF TRANSPLANTED LIVER: ICD-10-CM

## 2021-02-09 DIAGNOSIS — E89.0 POSTOPERATIVE HYPOTHYROIDISM: ICD-10-CM

## 2021-02-09 DIAGNOSIS — Z94.4 STATUS POST LIVER TRANSPLANTATION: ICD-10-CM

## 2021-02-09 DIAGNOSIS — E46 PROTEIN-CALORIE MALNUTRITION, UNSPECIFIED SEVERITY: ICD-10-CM

## 2021-02-09 DIAGNOSIS — C77.0 SECONDARY MALIGNANT NEOPLASM OF CERVICAL LYMPH NODE: ICD-10-CM

## 2021-02-09 DIAGNOSIS — I70.0 ATHEROSCLEROSIS OF ABDOMINAL AORTA: ICD-10-CM

## 2021-02-09 DIAGNOSIS — K74.60 CIRRHOSIS OF TRANSPLANTED LIVER: ICD-10-CM

## 2021-02-09 DIAGNOSIS — C32.9 LARYNX CANCER: ICD-10-CM

## 2021-02-09 DIAGNOSIS — D53.9 MACROCYTIC ANEMIA: ICD-10-CM

## 2021-02-09 LAB
FERRITIN SERPL-MCNC: 417 NG/ML (ref 20–300)
FOLATE SERPL-MCNC: 29.2 NG/ML (ref 4–24)
IRON SERPL-MCNC: 172 UG/DL (ref 45–160)
PREALB SERPL-MCNC: 23 MG/DL (ref 20–43)
SATURATED IRON: 48 % (ref 20–50)
T4 FREE SERPL-MCNC: 1.37 NG/DL (ref 0.71–1.51)
TOTAL IRON BINDING CAPACITY: 357 UG/DL (ref 250–450)
TRANSFERRIN SERPL-MCNC: 241 MG/DL (ref 200–375)
TSH SERPL DL<=0.005 MIU/L-ACNC: 10.49 UIU/ML (ref 0.4–4)
VIT B12 SERPL-MCNC: 605 PG/ML (ref 210–950)

## 2021-02-09 PROCEDURE — 82525 ASSAY OF COPPER: CPT

## 2021-02-09 PROCEDURE — 82728 ASSAY OF FERRITIN: CPT

## 2021-02-09 PROCEDURE — 82746 ASSAY OF FOLIC ACID SERUM: CPT

## 2021-02-09 PROCEDURE — 82668 ASSAY OF ERYTHROPOIETIN: CPT

## 2021-02-09 PROCEDURE — 99999 PR PBB SHADOW E&M-EST. PATIENT-LVL IV: CPT | Mod: PBBFAC,,, | Performed by: STUDENT IN AN ORGANIZED HEALTH CARE EDUCATION/TRAINING PROGRAM

## 2021-02-09 PROCEDURE — 99214 OFFICE O/P EST MOD 30 MIN: CPT | Mod: PBBFAC,25 | Performed by: STUDENT IN AN ORGANIZED HEALTH CARE EDUCATION/TRAINING PROGRAM

## 2021-02-09 PROCEDURE — 96413 CHEMO IV INFUSION 1 HR: CPT

## 2021-02-09 PROCEDURE — 99999 PR PBB SHADOW E&M-EST. PATIENT-LVL IV: ICD-10-PCS | Mod: PBBFAC,,, | Performed by: STUDENT IN AN ORGANIZED HEALTH CARE EDUCATION/TRAINING PROGRAM

## 2021-02-09 PROCEDURE — 82607 VITAMIN B-12: CPT

## 2021-02-09 PROCEDURE — 99215 PR OFFICE/OUTPT VISIT, EST, LEVL V, 40-54 MIN: ICD-10-PCS | Mod: S$PBB,,, | Performed by: STUDENT IN AN ORGANIZED HEALTH CARE EDUCATION/TRAINING PROGRAM

## 2021-02-09 PROCEDURE — 96415 CHEMO IV INFUSION ADDL HR: CPT

## 2021-02-09 PROCEDURE — 25000003 PHARM REV CODE 250: Performed by: STUDENT IN AN ORGANIZED HEALTH CARE EDUCATION/TRAINING PROGRAM

## 2021-02-09 PROCEDURE — 99215 OFFICE O/P EST HI 40 MIN: CPT | Mod: S$PBB,,, | Performed by: STUDENT IN AN ORGANIZED HEALTH CARE EDUCATION/TRAINING PROGRAM

## 2021-02-09 PROCEDURE — 63600175 PHARM REV CODE 636 W HCPCS: Mod: JG | Performed by: STUDENT IN AN ORGANIZED HEALTH CARE EDUCATION/TRAINING PROGRAM

## 2021-02-09 PROCEDURE — 83540 ASSAY OF IRON: CPT

## 2021-02-09 PROCEDURE — 36415 COLL VENOUS BLD VENIPUNCTURE: CPT

## 2021-02-09 PROCEDURE — 84134 ASSAY OF PREALBUMIN: CPT

## 2021-02-09 PROCEDURE — 96367 TX/PROPH/DG ADDL SEQ IV INF: CPT

## 2021-02-09 PROCEDURE — 84439 ASSAY OF FREE THYROXINE: CPT

## 2021-02-09 PROCEDURE — 84443 ASSAY THYROID STIM HORMONE: CPT

## 2021-02-09 RX ORDER — MAGNESIUM SULFATE HEPTAHYDRATE 40 MG/ML
2 INJECTION, SOLUTION INTRAVENOUS ONCE
Status: CANCELLED
Start: 2021-02-16

## 2021-02-09 RX ORDER — SODIUM CHLORIDE 0.9 % (FLUSH) 0.9 %
10 SYRINGE (ML) INJECTION
Status: CANCELLED | OUTPATIENT
Start: 2021-02-09

## 2021-02-09 RX ORDER — EPINEPHRINE 0.3 MG/.3ML
0.3 INJECTION SUBCUTANEOUS ONCE AS NEEDED
Status: DISCONTINUED | OUTPATIENT
Start: 2021-02-09 | End: 2021-02-09 | Stop reason: HOSPADM

## 2021-02-09 RX ORDER — HEPARIN 100 UNIT/ML
500 SYRINGE INTRAVENOUS
Status: CANCELLED | OUTPATIENT
Start: 2021-02-16

## 2021-02-09 RX ORDER — OXYCODONE HYDROCHLORIDE 5 MG/1
5 TABLET ORAL EVERY 8 HOURS PRN
Qty: 90 TABLET | Refills: 0 | Status: SHIPPED | OUTPATIENT
Start: 2021-02-16 | End: 2021-03-15 | Stop reason: SDUPTHER

## 2021-02-09 RX ORDER — HEPARIN 100 UNIT/ML
500 SYRINGE INTRAVENOUS
Status: CANCELLED | OUTPATIENT
Start: 2021-02-23

## 2021-02-09 RX ORDER — EPINEPHRINE 0.3 MG/.3ML
0.3 INJECTION SUBCUTANEOUS ONCE AS NEEDED
Status: CANCELLED | OUTPATIENT
Start: 2021-02-16

## 2021-02-09 RX ORDER — DIPHENHYDRAMINE HYDROCHLORIDE 50 MG/ML
50 INJECTION INTRAMUSCULAR; INTRAVENOUS ONCE AS NEEDED
Status: CANCELLED | OUTPATIENT
Start: 2021-02-16

## 2021-02-09 RX ORDER — SODIUM CHLORIDE 0.9 % (FLUSH) 0.9 %
10 SYRINGE (ML) INJECTION
Status: DISCONTINUED | OUTPATIENT
Start: 2021-02-09 | End: 2021-02-09 | Stop reason: HOSPADM

## 2021-02-09 RX ORDER — DIPHENHYDRAMINE HYDROCHLORIDE 50 MG/ML
50 INJECTION INTRAMUSCULAR; INTRAVENOUS ONCE AS NEEDED
Status: DISCONTINUED | OUTPATIENT
Start: 2021-02-09 | End: 2021-02-09 | Stop reason: HOSPADM

## 2021-02-09 RX ORDER — SODIUM CHLORIDE 0.9 % (FLUSH) 0.9 %
10 SYRINGE (ML) INJECTION
Status: CANCELLED | OUTPATIENT
Start: 2021-02-23

## 2021-02-09 RX ORDER — DIPHENHYDRAMINE HYDROCHLORIDE 50 MG/ML
50 INJECTION INTRAMUSCULAR; INTRAVENOUS ONCE AS NEEDED
Status: CANCELLED | OUTPATIENT
Start: 2021-02-09

## 2021-02-09 RX ORDER — HEPARIN 100 UNIT/ML
500 SYRINGE INTRAVENOUS
Status: CANCELLED | OUTPATIENT
Start: 2021-02-09

## 2021-02-09 RX ORDER — HEPARIN 100 UNIT/ML
500 SYRINGE INTRAVENOUS
Status: DISCONTINUED | OUTPATIENT
Start: 2021-02-09 | End: 2021-02-09 | Stop reason: HOSPADM

## 2021-02-09 RX ORDER — SODIUM CHLORIDE 0.9 % (FLUSH) 0.9 %
10 SYRINGE (ML) INJECTION
Status: CANCELLED | OUTPATIENT
Start: 2021-02-16

## 2021-02-09 RX ORDER — DIPHENHYDRAMINE HYDROCHLORIDE 50 MG/ML
50 INJECTION INTRAMUSCULAR; INTRAVENOUS ONCE AS NEEDED
Status: CANCELLED | OUTPATIENT
Start: 2021-02-23

## 2021-02-09 RX ORDER — EPINEPHRINE 0.3 MG/.3ML
0.3 INJECTION SUBCUTANEOUS ONCE AS NEEDED
Status: CANCELLED | OUTPATIENT
Start: 2021-02-23

## 2021-02-09 RX ORDER — EPINEPHRINE 0.3 MG/.3ML
0.3 INJECTION SUBCUTANEOUS ONCE AS NEEDED
Status: CANCELLED | OUTPATIENT
Start: 2021-02-09

## 2021-02-09 RX ADMIN — CETUXIMAB 400 MG: 2 SOLUTION INTRAVENOUS at 11:02

## 2021-02-09 RX ADMIN — SODIUM CHLORIDE: 0.9 INJECTION, SOLUTION INTRAVENOUS at 11:02

## 2021-02-09 RX ADMIN — DIPHENHYDRAMINE HYDROCHLORIDE 50 MG: 50 INJECTION, SOLUTION INTRAMUSCULAR; INTRAVENOUS at 11:02

## 2021-02-11 ENCOUNTER — IMMUNIZATION (OUTPATIENT)
Dept: PHARMACY | Facility: CLINIC | Age: 71
End: 2021-02-11
Payer: MEDICARE

## 2021-02-11 DIAGNOSIS — Z23 NEED FOR VACCINATION: Primary | ICD-10-CM

## 2021-02-11 DIAGNOSIS — E89.0 POSTOPERATIVE HYPOTHYROIDISM: Primary | ICD-10-CM

## 2021-02-11 LAB
COPPER SERPL-MCNC: 1538 UG/L (ref 665–1480)
EPO SERPL-ACNC: 197.9 MIU/ML (ref 2.6–18.5)

## 2021-02-17 ENCOUNTER — CLINICAL SUPPORT (OUTPATIENT)
Dept: HEMATOLOGY/ONCOLOGY | Facility: CLINIC | Age: 71
End: 2021-02-17
Payer: MEDICARE

## 2021-02-17 ENCOUNTER — INFUSION (OUTPATIENT)
Dept: INFUSION THERAPY | Facility: HOSPITAL | Age: 71
End: 2021-02-17
Attending: STUDENT IN AN ORGANIZED HEALTH CARE EDUCATION/TRAINING PROGRAM
Payer: MEDICARE

## 2021-02-17 VITALS
SYSTOLIC BLOOD PRESSURE: 111 MMHG | WEIGHT: 126.13 LBS | BODY MASS INDEX: 19.12 KG/M2 | HEIGHT: 68 IN | RESPIRATION RATE: 16 BRPM | OXYGEN SATURATION: 99 % | TEMPERATURE: 98 F | DIASTOLIC BLOOD PRESSURE: 55 MMHG | HEART RATE: 72 BPM

## 2021-02-17 VITALS — WEIGHT: 126 LBS | BODY MASS INDEX: 19.1 KG/M2 | HEIGHT: 68 IN

## 2021-02-17 DIAGNOSIS — C01 SQUAMOUS CELL CARCINOMA OF BASE OF TONGUE: Primary | ICD-10-CM

## 2021-02-17 DIAGNOSIS — Z71.3 NUTRITIONAL COUNSELING: Primary | ICD-10-CM

## 2021-02-17 DIAGNOSIS — E46 PROTEIN-CALORIE MALNUTRITION, UNSPECIFIED SEVERITY: ICD-10-CM

## 2021-02-17 DIAGNOSIS — C01 SQUAMOUS CELL CARCINOMA OF BASE OF TONGUE: ICD-10-CM

## 2021-02-17 PROCEDURE — 96366 THER/PROPH/DIAG IV INF ADDON: CPT

## 2021-02-17 PROCEDURE — 99212 OFFICE O/P EST SF 10 MIN: CPT | Mod: PBBFAC | Performed by: DIETITIAN, REGISTERED

## 2021-02-17 PROCEDURE — 97802 MEDICAL NUTRITION INDIV IN: CPT | Mod: PBBFAC | Performed by: DIETITIAN, REGISTERED

## 2021-02-17 PROCEDURE — 99999 PR PBB SHADOW E&M-EST. PATIENT-LVL II: CPT | Mod: PBBFAC,,, | Performed by: DIETITIAN, REGISTERED

## 2021-02-17 PROCEDURE — 63600175 PHARM REV CODE 636 W HCPCS: Performed by: STUDENT IN AN ORGANIZED HEALTH CARE EDUCATION/TRAINING PROGRAM

## 2021-02-17 PROCEDURE — 99999 PR PBB SHADOW E&M-EST. PATIENT-LVL II: ICD-10-PCS | Mod: PBBFAC,,, | Performed by: DIETITIAN, REGISTERED

## 2021-02-17 PROCEDURE — 25000003 PHARM REV CODE 250: Performed by: STUDENT IN AN ORGANIZED HEALTH CARE EDUCATION/TRAINING PROGRAM

## 2021-02-17 PROCEDURE — 96367 TX/PROPH/DG ADDL SEQ IV INF: CPT

## 2021-02-17 PROCEDURE — 96413 CHEMO IV INFUSION 1 HR: CPT

## 2021-02-17 RX ORDER — EPINEPHRINE 0.3 MG/.3ML
0.3 INJECTION SUBCUTANEOUS ONCE AS NEEDED
Status: DISCONTINUED | OUTPATIENT
Start: 2021-02-17 | End: 2021-02-17 | Stop reason: HOSPADM

## 2021-02-17 RX ORDER — DIPHENHYDRAMINE HYDROCHLORIDE 50 MG/ML
50 INJECTION INTRAMUSCULAR; INTRAVENOUS ONCE AS NEEDED
Status: DISCONTINUED | OUTPATIENT
Start: 2021-02-17 | End: 2021-02-17 | Stop reason: HOSPADM

## 2021-02-17 RX ORDER — SODIUM CHLORIDE 0.9 % (FLUSH) 0.9 %
10 SYRINGE (ML) INJECTION
Status: DISCONTINUED | OUTPATIENT
Start: 2021-02-17 | End: 2021-02-17 | Stop reason: HOSPADM

## 2021-02-17 RX ORDER — MAGNESIUM SULFATE HEPTAHYDRATE 40 MG/ML
2 INJECTION, SOLUTION INTRAVENOUS ONCE
Status: COMPLETED | OUTPATIENT
Start: 2021-02-17 | End: 2021-02-17

## 2021-02-17 RX ORDER — HEPARIN 100 UNIT/ML
500 SYRINGE INTRAVENOUS
Status: DISCONTINUED | OUTPATIENT
Start: 2021-02-17 | End: 2021-02-17 | Stop reason: HOSPADM

## 2021-02-17 RX ADMIN — DIPHENHYDRAMINE HYDROCHLORIDE 50 MG: 50 INJECTION, SOLUTION INTRAMUSCULAR; INTRAVENOUS at 01:02

## 2021-02-17 RX ADMIN — MAGNESIUM SULFATE HEPTAHYDRATE 2 G: 40 INJECTION, SOLUTION INTRAVENOUS at 11:02

## 2021-02-17 RX ADMIN — CETUXIMAB 400 MG: 2 SOLUTION INTRAVENOUS at 02:02

## 2021-02-23 ENCOUNTER — INFUSION (OUTPATIENT)
Dept: INFUSION THERAPY | Facility: HOSPITAL | Age: 71
End: 2021-02-23
Payer: MEDICARE

## 2021-02-23 VITALS
SYSTOLIC BLOOD PRESSURE: 120 MMHG | OXYGEN SATURATION: 99 % | RESPIRATION RATE: 18 BRPM | HEART RATE: 98 BPM | TEMPERATURE: 98 F | DIASTOLIC BLOOD PRESSURE: 65 MMHG

## 2021-02-23 DIAGNOSIS — C01 SQUAMOUS CELL CARCINOMA OF BASE OF TONGUE: Primary | ICD-10-CM

## 2021-02-23 PROCEDURE — 63600175 PHARM REV CODE 636 W HCPCS: Mod: JG | Performed by: STUDENT IN AN ORGANIZED HEALTH CARE EDUCATION/TRAINING PROGRAM

## 2021-02-23 PROCEDURE — 96413 CHEMO IV INFUSION 1 HR: CPT

## 2021-02-23 PROCEDURE — 96415 CHEMO IV INFUSION ADDL HR: CPT

## 2021-02-23 PROCEDURE — 96367 TX/PROPH/DG ADDL SEQ IV INF: CPT

## 2021-02-23 PROCEDURE — 25000003 PHARM REV CODE 250: Performed by: STUDENT IN AN ORGANIZED HEALTH CARE EDUCATION/TRAINING PROGRAM

## 2021-02-23 RX ORDER — SODIUM CHLORIDE 0.9 % (FLUSH) 0.9 %
10 SYRINGE (ML) INJECTION
Status: DISCONTINUED | OUTPATIENT
Start: 2021-02-23 | End: 2021-02-23 | Stop reason: HOSPADM

## 2021-02-23 RX ORDER — DIPHENHYDRAMINE HYDROCHLORIDE 50 MG/ML
50 INJECTION INTRAMUSCULAR; INTRAVENOUS ONCE AS NEEDED
Status: DISCONTINUED | OUTPATIENT
Start: 2021-02-23 | End: 2021-02-23 | Stop reason: HOSPADM

## 2021-02-23 RX ORDER — EPINEPHRINE 0.3 MG/.3ML
0.3 INJECTION SUBCUTANEOUS ONCE AS NEEDED
Status: DISCONTINUED | OUTPATIENT
Start: 2021-02-23 | End: 2021-02-23 | Stop reason: HOSPADM

## 2021-02-23 RX ORDER — HEPARIN 100 UNIT/ML
500 SYRINGE INTRAVENOUS
Status: DISCONTINUED | OUTPATIENT
Start: 2021-02-23 | End: 2021-02-23 | Stop reason: HOSPADM

## 2021-02-23 RX ADMIN — DIPHENHYDRAMINE HYDROCHLORIDE 50 MG: 50 INJECTION, SOLUTION INTRAMUSCULAR; INTRAVENOUS at 11:02

## 2021-02-23 RX ADMIN — CETUXIMAB 400 MG: 2 SOLUTION INTRAVENOUS at 11:02

## 2021-02-23 RX ADMIN — SODIUM CHLORIDE: 9 INJECTION, SOLUTION INTRAVENOUS at 11:02

## 2021-03-02 ENCOUNTER — TELEPHONE (OUTPATIENT)
Dept: SURGERY | Facility: CLINIC | Age: 71
End: 2021-03-02

## 2021-03-02 ENCOUNTER — TELEPHONE (OUTPATIENT)
Dept: HEMATOLOGY/ONCOLOGY | Facility: CLINIC | Age: 71
End: 2021-03-02

## 2021-03-02 ENCOUNTER — INFUSION (OUTPATIENT)
Dept: INFUSION THERAPY | Facility: HOSPITAL | Age: 71
End: 2021-03-02
Attending: INTERNAL MEDICINE
Payer: MEDICARE

## 2021-03-02 ENCOUNTER — OFFICE VISIT (OUTPATIENT)
Dept: HEMATOLOGY/ONCOLOGY | Facility: CLINIC | Age: 71
End: 2021-03-02
Payer: MEDICARE

## 2021-03-02 VITALS
HEART RATE: 78 BPM | RESPIRATION RATE: 18 BRPM | SYSTOLIC BLOOD PRESSURE: 145 MMHG | DIASTOLIC BLOOD PRESSURE: 64 MMHG | TEMPERATURE: 98 F

## 2021-03-02 VITALS
HEIGHT: 68 IN | DIASTOLIC BLOOD PRESSURE: 68 MMHG | RESPIRATION RATE: 18 BRPM | BODY MASS INDEX: 19.51 KG/M2 | SYSTOLIC BLOOD PRESSURE: 105 MMHG | OXYGEN SATURATION: 100 % | WEIGHT: 128.75 LBS | HEART RATE: 90 BPM

## 2021-03-02 DIAGNOSIS — D84.821 IMMUNODEFICIENCY DUE TO DRUGS: ICD-10-CM

## 2021-03-02 DIAGNOSIS — J43.2 CENTRILOBULAR EMPHYSEMA: ICD-10-CM

## 2021-03-02 DIAGNOSIS — D53.9 MACROCYTIC ANEMIA: ICD-10-CM

## 2021-03-02 DIAGNOSIS — C32.9 LARYNX CANCER: ICD-10-CM

## 2021-03-02 DIAGNOSIS — C01 SQUAMOUS CELL CARCINOMA OF BASE OF TONGUE: Primary | ICD-10-CM

## 2021-03-02 DIAGNOSIS — Z79.899 IMMUNODEFICIENCY DUE TO DRUGS: ICD-10-CM

## 2021-03-02 DIAGNOSIS — C77.0 SECONDARY MALIGNANT NEOPLASM OF CERVICAL LYMPH NODE: ICD-10-CM

## 2021-03-02 DIAGNOSIS — L27.0 DRUG-INDUCED SKIN RASH: ICD-10-CM

## 2021-03-02 DIAGNOSIS — E89.0 POSTOPERATIVE HYPOTHYROIDISM: ICD-10-CM

## 2021-03-02 DIAGNOSIS — Z93.0 TRACHEOSTOMY STATUS: ICD-10-CM

## 2021-03-02 PROCEDURE — 25000003 PHARM REV CODE 250: Performed by: STUDENT IN AN ORGANIZED HEALTH CARE EDUCATION/TRAINING PROGRAM

## 2021-03-02 PROCEDURE — 25000003 PHARM REV CODE 250: Performed by: INTERNAL MEDICINE

## 2021-03-02 PROCEDURE — 63600175 PHARM REV CODE 636 W HCPCS: Mod: JG | Performed by: STUDENT IN AN ORGANIZED HEALTH CARE EDUCATION/TRAINING PROGRAM

## 2021-03-02 PROCEDURE — 63600175 PHARM REV CODE 636 W HCPCS: Performed by: INTERNAL MEDICINE

## 2021-03-02 PROCEDURE — 96413 CHEMO IV INFUSION 1 HR: CPT

## 2021-03-02 PROCEDURE — 99215 PR OFFICE/OUTPT VISIT, EST, LEVL V, 40-54 MIN: ICD-10-PCS | Mod: S$PBB,,, | Performed by: STUDENT IN AN ORGANIZED HEALTH CARE EDUCATION/TRAINING PROGRAM

## 2021-03-02 PROCEDURE — 99999 PR PBB SHADOW E&M-EST. PATIENT-LVL IV: ICD-10-PCS | Mod: PBBFAC,,, | Performed by: STUDENT IN AN ORGANIZED HEALTH CARE EDUCATION/TRAINING PROGRAM

## 2021-03-02 PROCEDURE — 96367 TX/PROPH/DG ADDL SEQ IV INF: CPT

## 2021-03-02 PROCEDURE — 99215 OFFICE O/P EST HI 40 MIN: CPT | Mod: S$PBB,,, | Performed by: STUDENT IN AN ORGANIZED HEALTH CARE EDUCATION/TRAINING PROGRAM

## 2021-03-02 PROCEDURE — 99214 OFFICE O/P EST MOD 30 MIN: CPT | Mod: PBBFAC,25 | Performed by: STUDENT IN AN ORGANIZED HEALTH CARE EDUCATION/TRAINING PROGRAM

## 2021-03-02 PROCEDURE — 99999 PR PBB SHADOW E&M-EST. PATIENT-LVL IV: CPT | Mod: PBBFAC,,, | Performed by: STUDENT IN AN ORGANIZED HEALTH CARE EDUCATION/TRAINING PROGRAM

## 2021-03-02 RX ORDER — EPINEPHRINE 0.3 MG/.3ML
0.3 INJECTION SUBCUTANEOUS ONCE AS NEEDED
Status: DISCONTINUED | OUTPATIENT
Start: 2021-03-02 | End: 2021-03-02 | Stop reason: HOSPADM

## 2021-03-02 RX ORDER — SODIUM CHLORIDE 0.9 % (FLUSH) 0.9 %
10 SYRINGE (ML) INJECTION
Status: CANCELLED | OUTPATIENT
Start: 2021-03-02

## 2021-03-02 RX ORDER — DIPHENHYDRAMINE HYDROCHLORIDE 50 MG/ML
50 INJECTION INTRAMUSCULAR; INTRAVENOUS ONCE AS NEEDED
Status: DISCONTINUED | OUTPATIENT
Start: 2021-03-02 | End: 2021-03-02 | Stop reason: HOSPADM

## 2021-03-02 RX ORDER — EPINEPHRINE 0.3 MG/.3ML
0.3 INJECTION SUBCUTANEOUS ONCE AS NEEDED
Status: CANCELLED | OUTPATIENT
Start: 2021-03-02

## 2021-03-02 RX ORDER — DIPHENHYDRAMINE HYDROCHLORIDE 50 MG/ML
50 INJECTION INTRAMUSCULAR; INTRAVENOUS ONCE AS NEEDED
Status: CANCELLED | OUTPATIENT
Start: 2021-03-02

## 2021-03-02 RX ORDER — HEPARIN 100 UNIT/ML
500 SYRINGE INTRAVENOUS
Status: CANCELLED | OUTPATIENT
Start: 2021-03-02

## 2021-03-02 RX ORDER — DOXYCYCLINE 100 MG/1
100 CAPSULE ORAL EVERY 12 HOURS
Qty: 60 CAPSULE | Refills: 0 | Status: SHIPPED | OUTPATIENT
Start: 2021-03-02 | End: 2021-05-11

## 2021-03-02 RX ADMIN — DIPHENHYDRAMINE HYDROCHLORIDE 50 MG: 50 INJECTION, SOLUTION INTRAMUSCULAR; INTRAVENOUS at 10:03

## 2021-03-02 RX ADMIN — CETUXIMAB 400 MG: 2 SOLUTION INTRAVENOUS at 11:03

## 2021-03-02 RX ADMIN — SODIUM CHLORIDE: 9 INJECTION, SOLUTION INTRAVENOUS at 10:03

## 2021-03-05 ENCOUNTER — LAB VISIT (OUTPATIENT)
Dept: INTERNAL MEDICINE | Facility: CLINIC | Age: 71
End: 2021-03-05
Payer: MEDICARE

## 2021-03-05 ENCOUNTER — TELEPHONE (OUTPATIENT)
Dept: SURGERY | Facility: CLINIC | Age: 71
End: 2021-03-05

## 2021-03-05 DIAGNOSIS — C32.9 LARYNX CANCER: Primary | ICD-10-CM

## 2021-03-05 DIAGNOSIS — Z01.818 PRE-OP TESTING: ICD-10-CM

## 2021-03-05 PROCEDURE — U0003 INFECTIOUS AGENT DETECTION BY NUCLEIC ACID (DNA OR RNA); SEVERE ACUTE RESPIRATORY SYNDROME CORONAVIRUS 2 (SARS-COV-2) (CORONAVIRUS DISEASE [COVID-19]), AMPLIFIED PROBE TECHNIQUE, MAKING USE OF HIGH THROUGHPUT TECHNOLOGIES AS DESCRIBED BY CMS-2020-01-R: HCPCS | Performed by: SURGERY

## 2021-03-05 PROCEDURE — U0005 INFEC AGEN DETEC AMPLI PROBE: HCPCS | Performed by: SURGERY

## 2021-03-05 RX ORDER — LIDOCAINE HYDROCHLORIDE 10 MG/ML
1 INJECTION, SOLUTION EPIDURAL; INFILTRATION; INTRACAUDAL; PERINEURAL ONCE
Status: CANCELLED | OUTPATIENT
Start: 2021-03-05 | End: 2021-03-05

## 2021-03-05 RX ORDER — SODIUM CHLORIDE 9 MG/ML
INJECTION, SOLUTION INTRAVENOUS CONTINUOUS
Status: CANCELLED | OUTPATIENT
Start: 2021-03-05

## 2021-03-05 RX ORDER — CEFAZOLIN SODIUM 2 G/50ML
2 SOLUTION INTRAVENOUS
Status: CANCELLED | OUTPATIENT
Start: 2021-03-08

## 2021-03-06 LAB — SARS-COV-2 RNA RESP QL NAA+PROBE: NOT DETECTED

## 2021-03-08 ENCOUNTER — ANESTHESIA EVENT (OUTPATIENT)
Dept: SURGERY | Facility: HOSPITAL | Age: 71
End: 2021-03-08
Payer: MEDICARE

## 2021-03-08 ENCOUNTER — ANESTHESIA (OUTPATIENT)
Dept: SURGERY | Facility: HOSPITAL | Age: 71
End: 2021-03-08
Payer: MEDICARE

## 2021-03-08 ENCOUNTER — HOSPITAL ENCOUNTER (OUTPATIENT)
Facility: HOSPITAL | Age: 71
Discharge: HOME | End: 2021-03-08
Attending: SURGERY | Admitting: SURGERY
Payer: MEDICARE

## 2021-03-08 VITALS
TEMPERATURE: 98 F | RESPIRATION RATE: 20 BRPM | BODY MASS INDEX: 19.46 KG/M2 | WEIGHT: 128 LBS | OXYGEN SATURATION: 100 % | DIASTOLIC BLOOD PRESSURE: 68 MMHG | SYSTOLIC BLOOD PRESSURE: 132 MMHG | HEART RATE: 83 BPM

## 2021-03-08 DIAGNOSIS — J43.2 CENTRILOBULAR EMPHYSEMA: Primary | ICD-10-CM

## 2021-03-08 DIAGNOSIS — C32.9 LARYNX CANCER: ICD-10-CM

## 2021-03-08 PROCEDURE — 77001 CHG FLUOROGUIDE CNTRL VEN ACCESS,PLACE,REPLACE,REMOVE: ICD-10-PCS | Mod: 26,GC,, | Performed by: SURGERY

## 2021-03-08 PROCEDURE — 36561 INSERT TUNNELED CV CATH: CPT | Mod: RT,GC,, | Performed by: SURGERY

## 2021-03-08 PROCEDURE — 63600175 PHARM REV CODE 636 W HCPCS: Performed by: NURSE ANESTHETIST, CERTIFIED REGISTERED

## 2021-03-08 PROCEDURE — 37000008 HC ANESTHESIA 1ST 15 MINUTES: Performed by: SURGERY

## 2021-03-08 PROCEDURE — 37000009 HC ANESTHESIA EA ADD 15 MINS: Performed by: SURGERY

## 2021-03-08 PROCEDURE — 77001 FLUOROGUIDE FOR VEIN DEVICE: CPT | Mod: 26,GC,, | Performed by: SURGERY

## 2021-03-08 PROCEDURE — 36000707: Performed by: SURGERY

## 2021-03-08 PROCEDURE — D9220A PRA ANESTHESIA: Mod: ANES,,, | Performed by: ANESTHESIOLOGY

## 2021-03-08 PROCEDURE — 63600175 PHARM REV CODE 636 W HCPCS: Performed by: SURGERY

## 2021-03-08 PROCEDURE — 71000044 HC DOSC ROUTINE RECOVERY FIRST HOUR: Performed by: SURGERY

## 2021-03-08 PROCEDURE — C1788 PORT, INDWELLING, IMP: HCPCS | Performed by: SURGERY

## 2021-03-08 PROCEDURE — 25000003 PHARM REV CODE 250: Performed by: SURGERY

## 2021-03-08 PROCEDURE — 36561 PR INSERT TUNNELED CV CATH WITH PORT: ICD-10-PCS | Mod: RT,GC,, | Performed by: SURGERY

## 2021-03-08 PROCEDURE — 25000003 PHARM REV CODE 250: Performed by: NURSE ANESTHETIST, CERTIFIED REGISTERED

## 2021-03-08 PROCEDURE — D9220A PRA ANESTHESIA: ICD-10-PCS | Mod: CRNA,,, | Performed by: NURSE ANESTHETIST, CERTIFIED REGISTERED

## 2021-03-08 PROCEDURE — D9220A PRA ANESTHESIA: ICD-10-PCS | Mod: ANES,,, | Performed by: ANESTHESIOLOGY

## 2021-03-08 PROCEDURE — 36000706: Performed by: SURGERY

## 2021-03-08 PROCEDURE — 71000015 HC POSTOP RECOV 1ST HR: Performed by: SURGERY

## 2021-03-08 PROCEDURE — D9220A PRA ANESTHESIA: Mod: CRNA,,, | Performed by: NURSE ANESTHETIST, CERTIFIED REGISTERED

## 2021-03-08 DEVICE — KIT POWERPORT SINGLE 8FR: Type: IMPLANTABLE DEVICE | Site: NECK | Status: FUNCTIONAL

## 2021-03-08 RX ORDER — MIDAZOLAM HYDROCHLORIDE 1 MG/ML
INJECTION, SOLUTION INTRAMUSCULAR; INTRAVENOUS
Status: DISCONTINUED | OUTPATIENT
Start: 2021-03-08 | End: 2021-03-08

## 2021-03-08 RX ORDER — OXYCODONE AND ACETAMINOPHEN 5; 325 MG/1; MG/1
1 TABLET ORAL EVERY 4 HOURS PRN
Qty: 5 TABLET | Refills: 0 | Status: SHIPPED | OUTPATIENT
Start: 2021-03-08 | End: 2021-04-27 | Stop reason: SDUPTHER

## 2021-03-08 RX ORDER — OXYCODONE HYDROCHLORIDE 5 MG/1
10 TABLET ORAL EVERY 4 HOURS PRN
Status: DISCONTINUED | OUTPATIENT
Start: 2021-03-08 | End: 2021-03-08 | Stop reason: HOSPADM

## 2021-03-08 RX ORDER — FENTANYL CITRATE 50 UG/ML
INJECTION, SOLUTION INTRAMUSCULAR; INTRAVENOUS
Status: DISCONTINUED | OUTPATIENT
Start: 2021-03-08 | End: 2021-03-08

## 2021-03-08 RX ORDER — LIDOCAINE HYDROCHLORIDE 10 MG/ML
INJECTION, SOLUTION EPIDURAL; INFILTRATION; INTRACAUDAL; PERINEURAL
Status: DISCONTINUED | OUTPATIENT
Start: 2021-03-08 | End: 2021-03-08 | Stop reason: HOSPADM

## 2021-03-08 RX ORDER — PROPOFOL 10 MG/ML
VIAL (ML) INTRAVENOUS CONTINUOUS PRN
Status: DISCONTINUED | OUTPATIENT
Start: 2021-03-08 | End: 2021-03-08

## 2021-03-08 RX ORDER — HEPARIN 100 UNIT/ML
SYRINGE INTRAVENOUS
Status: DISCONTINUED | OUTPATIENT
Start: 2021-03-08 | End: 2021-03-08 | Stop reason: HOSPADM

## 2021-03-08 RX ORDER — SODIUM CHLORIDE 9 MG/ML
INJECTION, SOLUTION INTRAVENOUS CONTINUOUS
Status: DISCONTINUED | OUTPATIENT
Start: 2021-03-08 | End: 2021-03-08 | Stop reason: HOSPADM

## 2021-03-08 RX ORDER — LIDOCAINE HYDROCHLORIDE 10 MG/ML
1 INJECTION, SOLUTION EPIDURAL; INFILTRATION; INTRACAUDAL; PERINEURAL ONCE
Status: COMPLETED | OUTPATIENT
Start: 2021-03-08 | End: 2021-03-08

## 2021-03-08 RX ORDER — LIDOCAINE HYDROCHLORIDE 20 MG/ML
INJECTION, SOLUTION EPIDURAL; INFILTRATION; INTRACAUDAL; PERINEURAL
Status: DISCONTINUED | OUTPATIENT
Start: 2021-03-08 | End: 2021-03-08

## 2021-03-08 RX ORDER — FENTANYL CITRATE 50 UG/ML
25 INJECTION, SOLUTION INTRAMUSCULAR; INTRAVENOUS EVERY 5 MIN PRN
Status: DISCONTINUED | OUTPATIENT
Start: 2021-03-08 | End: 2021-03-08 | Stop reason: HOSPADM

## 2021-03-08 RX ORDER — PROPOFOL 10 MG/ML
VIAL (ML) INTRAVENOUS
Status: DISCONTINUED | OUTPATIENT
Start: 2021-03-08 | End: 2021-03-08

## 2021-03-08 RX ORDER — SODIUM CHLORIDE 0.9 % (FLUSH) 0.9 %
10 SYRINGE (ML) INJECTION
Status: DISCONTINUED | OUTPATIENT
Start: 2021-03-08 | End: 2021-03-08 | Stop reason: HOSPADM

## 2021-03-08 RX ORDER — PHENYLEPHRINE HCL IN 0.9% NACL 1 MG/10 ML
SYRINGE (ML) INTRAVENOUS
Status: DISCONTINUED | OUTPATIENT
Start: 2021-03-08 | End: 2021-03-08

## 2021-03-08 RX ORDER — ONDANSETRON 2 MG/ML
INJECTION INTRAMUSCULAR; INTRAVENOUS
Status: DISCONTINUED | OUTPATIENT
Start: 2021-03-08 | End: 2021-03-08

## 2021-03-08 RX ORDER — OXYCODONE HYDROCHLORIDE 5 MG/1
5 TABLET ORAL EVERY 4 HOURS PRN
Status: DISCONTINUED | OUTPATIENT
Start: 2021-03-08 | End: 2021-03-08 | Stop reason: HOSPADM

## 2021-03-08 RX ORDER — KETAMINE HCL IN 0.9 % NACL 50 MG/5 ML
SYRINGE (ML) INTRAVENOUS
Status: DISCONTINUED | OUTPATIENT
Start: 2021-03-08 | End: 2021-03-08

## 2021-03-08 RX ORDER — ONDANSETRON 2 MG/ML
4 INJECTION INTRAMUSCULAR; INTRAVENOUS ONCE AS NEEDED
Status: DISCONTINUED | OUTPATIENT
Start: 2021-03-08 | End: 2021-03-08 | Stop reason: HOSPADM

## 2021-03-08 RX ORDER — CEFAZOLIN SODIUM 1 G/3ML
2 INJECTION, POWDER, FOR SOLUTION INTRAMUSCULAR; INTRAVENOUS
Status: COMPLETED | OUTPATIENT
Start: 2021-03-08 | End: 2021-03-08

## 2021-03-08 RX ORDER — PROCHLORPERAZINE EDISYLATE 5 MG/ML
5 INJECTION INTRAMUSCULAR; INTRAVENOUS EVERY 30 MIN PRN
Status: DISCONTINUED | OUTPATIENT
Start: 2021-03-08 | End: 2021-03-08 | Stop reason: HOSPADM

## 2021-03-08 RX ADMIN — LIDOCAINE HYDROCHLORIDE 80 MG: 20 INJECTION, SOLUTION EPIDURAL; INFILTRATION; INTRACAUDAL at 09:03

## 2021-03-08 RX ADMIN — Medication 150 MCG: at 09:03

## 2021-03-08 RX ADMIN — FENTANYL CITRATE 50 MCG: 50 INJECTION INTRAMUSCULAR; INTRAVENOUS at 09:03

## 2021-03-08 RX ADMIN — ONDANSETRON 4 MG: 2 INJECTION INTRAMUSCULAR; INTRAVENOUS at 10:03

## 2021-03-08 RX ADMIN — PROPOFOL 75 MCG/KG/MIN: 10 INJECTION, EMULSION INTRAVENOUS at 10:03

## 2021-03-08 RX ADMIN — CEFAZOLIN 2 G: 330 INJECTION, POWDER, FOR SOLUTION INTRAMUSCULAR; INTRAVENOUS at 10:03

## 2021-03-08 RX ADMIN — Medication 150 MCG: at 10:03

## 2021-03-08 RX ADMIN — PROPOFOL 50 MG: 10 INJECTION, EMULSION INTRAVENOUS at 10:03

## 2021-03-08 RX ADMIN — GLYCOPYRROLATE 0.2 MG: 0.2 INJECTION, SOLUTION INTRAMUSCULAR; INTRAVITREAL at 10:03

## 2021-03-08 RX ADMIN — Medication 100 MCG: at 10:03

## 2021-03-08 RX ADMIN — PROPOFOL 100 MG: 10 INJECTION, EMULSION INTRAVENOUS at 09:03

## 2021-03-08 RX ADMIN — MIDAZOLAM HYDROCHLORIDE 2 MG: 1 INJECTION, SOLUTION INTRAMUSCULAR; INTRAVENOUS at 09:03

## 2021-03-08 RX ADMIN — SODIUM CHLORIDE: 0.9 INJECTION, SOLUTION INTRAVENOUS at 09:03

## 2021-03-08 RX ADMIN — Medication 10 MG: at 10:03

## 2021-03-08 RX ADMIN — LIDOCAINE HYDROCHLORIDE 2 MG: 10 INJECTION, SOLUTION EPIDURAL; INFILTRATION; INTRACAUDAL; PERINEURAL at 09:03

## 2021-03-09 ENCOUNTER — INFUSION (OUTPATIENT)
Dept: INFUSION THERAPY | Facility: HOSPITAL | Age: 71
End: 2021-03-09
Attending: INTERNAL MEDICINE
Payer: MEDICARE

## 2021-03-09 VITALS
RESPIRATION RATE: 18 BRPM | SYSTOLIC BLOOD PRESSURE: 135 MMHG | HEIGHT: 68 IN | HEART RATE: 84 BPM | DIASTOLIC BLOOD PRESSURE: 68 MMHG | BODY MASS INDEX: 19.51 KG/M2 | TEMPERATURE: 97 F | WEIGHT: 128.75 LBS

## 2021-03-09 DIAGNOSIS — C01 SQUAMOUS CELL CARCINOMA OF BASE OF TONGUE: Primary | ICD-10-CM

## 2021-03-09 PROCEDURE — 25000003 PHARM REV CODE 250: Performed by: STUDENT IN AN ORGANIZED HEALTH CARE EDUCATION/TRAINING PROGRAM

## 2021-03-09 PROCEDURE — 63600175 PHARM REV CODE 636 W HCPCS: Performed by: INTERNAL MEDICINE

## 2021-03-09 PROCEDURE — 96413 CHEMO IV INFUSION 1 HR: CPT

## 2021-03-09 PROCEDURE — 25000003 PHARM REV CODE 250: Performed by: INTERNAL MEDICINE

## 2021-03-09 PROCEDURE — 63600175 PHARM REV CODE 636 W HCPCS: Performed by: STUDENT IN AN ORGANIZED HEALTH CARE EDUCATION/TRAINING PROGRAM

## 2021-03-09 PROCEDURE — 96367 TX/PROPH/DG ADDL SEQ IV INF: CPT

## 2021-03-09 PROCEDURE — 96415 CHEMO IV INFUSION ADDL HR: CPT

## 2021-03-09 RX ORDER — EPINEPHRINE 0.3 MG/.3ML
0.3 INJECTION SUBCUTANEOUS ONCE AS NEEDED
Status: CANCELLED | OUTPATIENT
Start: 2021-03-09

## 2021-03-09 RX ORDER — HEPARIN 100 UNIT/ML
500 SYRINGE INTRAVENOUS
Status: DISCONTINUED | OUTPATIENT
Start: 2021-03-09 | End: 2021-03-23 | Stop reason: HOSPADM

## 2021-03-09 RX ORDER — SODIUM CHLORIDE 0.9 % (FLUSH) 0.9 %
10 SYRINGE (ML) INJECTION
Status: DISCONTINUED | OUTPATIENT
Start: 2021-03-09 | End: 2021-03-23 | Stop reason: HOSPADM

## 2021-03-09 RX ORDER — DIPHENHYDRAMINE HYDROCHLORIDE 50 MG/ML
50 INJECTION INTRAMUSCULAR; INTRAVENOUS ONCE AS NEEDED
Status: DISCONTINUED | OUTPATIENT
Start: 2021-03-09 | End: 2021-03-23 | Stop reason: HOSPADM

## 2021-03-09 RX ORDER — EPINEPHRINE 0.3 MG/.3ML
0.3 INJECTION SUBCUTANEOUS ONCE AS NEEDED
Status: DISCONTINUED | OUTPATIENT
Start: 2021-03-09 | End: 2021-03-23 | Stop reason: HOSPADM

## 2021-03-09 RX ORDER — HEPARIN 100 UNIT/ML
500 SYRINGE INTRAVENOUS
Status: CANCELLED | OUTPATIENT
Start: 2021-03-09

## 2021-03-09 RX ORDER — SODIUM CHLORIDE 0.9 % (FLUSH) 0.9 %
10 SYRINGE (ML) INJECTION
Status: CANCELLED | OUTPATIENT
Start: 2021-03-09

## 2021-03-09 RX ORDER — MAGNESIUM SULFATE HEPTAHYDRATE 40 MG/ML
2 INJECTION, SOLUTION INTRAVENOUS ONCE
Status: CANCELLED
Start: 2021-03-09 | End: 2021-03-09

## 2021-03-09 RX ORDER — DIPHENHYDRAMINE HYDROCHLORIDE 50 MG/ML
50 INJECTION INTRAMUSCULAR; INTRAVENOUS ONCE AS NEEDED
Status: CANCELLED | OUTPATIENT
Start: 2021-03-09

## 2021-03-09 RX ORDER — MAGNESIUM SULFATE HEPTAHYDRATE 40 MG/ML
2 INJECTION, SOLUTION INTRAVENOUS ONCE
Status: COMPLETED | OUTPATIENT
Start: 2021-03-09 | End: 2021-03-09

## 2021-03-09 RX ADMIN — SODIUM CHLORIDE: 0.9 INJECTION, SOLUTION INTRAVENOUS at 01:03

## 2021-03-09 RX ADMIN — CETUXIMAB 427.6 MG: 2 SOLUTION INTRAVENOUS at 02:03

## 2021-03-09 RX ADMIN — DIPHENHYDRAMINE HYDROCHLORIDE 50 MG: 50 INJECTION, SOLUTION INTRAMUSCULAR; INTRAVENOUS at 02:03

## 2021-03-09 RX ADMIN — MAGNESIUM SULFATE HEPTAHYDRATE 2 G: 40 INJECTION, SOLUTION INTRAVENOUS at 01:03

## 2021-03-10 DIAGNOSIS — Z94.4 LIVER TRANSPLANTED: ICD-10-CM

## 2021-03-10 DIAGNOSIS — K74.60 HEPATIC CIRRHOSIS, UNSPECIFIED HEPATIC CIRRHOSIS TYPE, UNSPECIFIED WHETHER ASCITES PRESENT: Primary | ICD-10-CM

## 2021-03-10 DIAGNOSIS — Z85.05 PERSONAL HISTORY OF MALIGNANT NEOPLASM OF LIVER: ICD-10-CM

## 2021-03-11 ENCOUNTER — IMMUNIZATION (OUTPATIENT)
Dept: PHARMACY | Facility: CLINIC | Age: 71
End: 2021-03-11
Payer: MEDICARE

## 2021-03-11 DIAGNOSIS — Z23 NEED FOR VACCINATION: Primary | ICD-10-CM

## 2021-03-15 DIAGNOSIS — C01 SQUAMOUS CELL CARCINOMA OF BASE OF TONGUE: ICD-10-CM

## 2021-03-15 RX ORDER — OXYCODONE HYDROCHLORIDE 5 MG/1
5 TABLET ORAL EVERY 8 HOURS PRN
Qty: 90 TABLET | Refills: 0 | Status: SHIPPED | OUTPATIENT
Start: 2021-03-15 | End: 2021-04-16 | Stop reason: SDUPTHER

## 2021-03-16 ENCOUNTER — INFUSION (OUTPATIENT)
Dept: INFUSION THERAPY | Facility: HOSPITAL | Age: 71
End: 2021-03-16
Payer: MEDICARE

## 2021-03-16 VITALS
BODY MASS INDEX: 19.45 KG/M2 | WEIGHT: 128.31 LBS | SYSTOLIC BLOOD PRESSURE: 118 MMHG | RESPIRATION RATE: 18 BRPM | DIASTOLIC BLOOD PRESSURE: 75 MMHG | HEIGHT: 68 IN | HEART RATE: 78 BPM

## 2021-03-16 DIAGNOSIS — C01 SQUAMOUS CELL CARCINOMA OF BASE OF TONGUE: ICD-10-CM

## 2021-03-16 DIAGNOSIS — C01 SQUAMOUS CELL CARCINOMA OF BASE OF TONGUE: Primary | ICD-10-CM

## 2021-03-16 PROCEDURE — 63600175 PHARM REV CODE 636 W HCPCS: Mod: JG | Performed by: INTERNAL MEDICINE

## 2021-03-16 PROCEDURE — 25000003 PHARM REV CODE 250: Performed by: INTERNAL MEDICINE

## 2021-03-16 PROCEDURE — 96413 CHEMO IV INFUSION 1 HR: CPT

## 2021-03-16 PROCEDURE — 96415 CHEMO IV INFUSION ADDL HR: CPT

## 2021-03-16 PROCEDURE — 96367 TX/PROPH/DG ADDL SEQ IV INF: CPT

## 2021-03-16 RX ORDER — DIPHENHYDRAMINE HYDROCHLORIDE 50 MG/ML
50 INJECTION INTRAMUSCULAR; INTRAVENOUS ONCE AS NEEDED
Status: DISCONTINUED | OUTPATIENT
Start: 2021-03-16 | End: 2021-03-16 | Stop reason: HOSPADM

## 2021-03-16 RX ORDER — EPINEPHRINE 0.3 MG/.3ML
0.3 INJECTION SUBCUTANEOUS ONCE AS NEEDED
Status: CANCELLED | OUTPATIENT
Start: 2021-03-16

## 2021-03-16 RX ORDER — HEPARIN 100 UNIT/ML
500 SYRINGE INTRAVENOUS
Status: DISCONTINUED | OUTPATIENT
Start: 2021-03-16 | End: 2021-03-16 | Stop reason: HOSPADM

## 2021-03-16 RX ORDER — DIPHENHYDRAMINE HYDROCHLORIDE 50 MG/ML
50 INJECTION INTRAMUSCULAR; INTRAVENOUS ONCE AS NEEDED
Status: CANCELLED | OUTPATIENT
Start: 2021-03-16

## 2021-03-16 RX ORDER — SODIUM CHLORIDE 0.9 % (FLUSH) 0.9 %
10 SYRINGE (ML) INJECTION
Status: CANCELLED | OUTPATIENT
Start: 2021-03-16

## 2021-03-16 RX ORDER — MAGNESIUM SULFATE HEPTAHYDRATE 40 MG/ML
2 INJECTION, SOLUTION INTRAVENOUS ONCE
Status: COMPLETED | OUTPATIENT
Start: 2021-03-16 | End: 2021-03-16

## 2021-03-16 RX ORDER — EPINEPHRINE 0.3 MG/.3ML
0.3 INJECTION SUBCUTANEOUS ONCE AS NEEDED
Status: DISCONTINUED | OUTPATIENT
Start: 2021-03-16 | End: 2021-03-16 | Stop reason: HOSPADM

## 2021-03-16 RX ORDER — OXYCODONE HYDROCHLORIDE 5 MG/1
5 TABLET ORAL EVERY 8 HOURS PRN
Qty: 90 TABLET | Refills: 0 | Status: CANCELLED | OUTPATIENT
Start: 2021-03-16

## 2021-03-16 RX ORDER — MAGNESIUM SULFATE HEPTAHYDRATE 40 MG/ML
2 INJECTION, SOLUTION INTRAVENOUS ONCE
Status: CANCELLED
Start: 2021-03-16 | End: 2021-03-16

## 2021-03-16 RX ORDER — HEPARIN 100 UNIT/ML
500 SYRINGE INTRAVENOUS
Status: CANCELLED | OUTPATIENT
Start: 2021-03-16

## 2021-03-16 RX ORDER — SODIUM CHLORIDE 0.9 % (FLUSH) 0.9 %
10 SYRINGE (ML) INJECTION
Status: DISCONTINUED | OUTPATIENT
Start: 2021-03-16 | End: 2021-03-16 | Stop reason: HOSPADM

## 2021-03-16 RX ADMIN — SODIUM CHLORIDE: 0.9 INJECTION, SOLUTION INTRAVENOUS at 11:03

## 2021-03-16 RX ADMIN — DIPHENHYDRAMINE HYDROCHLORIDE 50 MG: 50 INJECTION INTRAMUSCULAR; INTRAVENOUS at 12:03

## 2021-03-16 RX ADMIN — CETUXIMAB 427.6 MG: 2 SOLUTION INTRAVENOUS at 12:03

## 2021-03-16 RX ADMIN — MAGNESIUM SULFATE HEPTAHYDRATE 2 G: 40 INJECTION, SOLUTION INTRAVENOUS at 11:03

## 2021-03-16 RX ADMIN — HEPARIN 500 UNITS: 100 SYRINGE at 03:03

## 2021-03-22 ENCOUNTER — TELEPHONE (OUTPATIENT)
Dept: TRANSPLANT | Facility: CLINIC | Age: 71
End: 2021-03-22

## 2021-03-22 ENCOUNTER — LAB VISIT (OUTPATIENT)
Dept: LAB | Facility: HOSPITAL | Age: 71
End: 2021-03-22
Attending: INTERNAL MEDICINE
Payer: MEDICARE

## 2021-03-22 DIAGNOSIS — Z94.4 STATUS POST LIVER TRANSPLANTATION: ICD-10-CM

## 2021-03-22 DIAGNOSIS — Z94.4 LIVER TRANSPLANTED: ICD-10-CM

## 2021-03-22 DIAGNOSIS — K74.60 HEPATIC CIRRHOSIS, UNSPECIFIED HEPATIC CIRRHOSIS TYPE, UNSPECIFIED WHETHER ASCITES PRESENT: ICD-10-CM

## 2021-03-22 DIAGNOSIS — Z85.05 PERSONAL HISTORY OF MALIGNANT NEOPLASM OF LIVER: ICD-10-CM

## 2021-03-22 LAB
AFP SERPL-MCNC: 1.8 NG/ML (ref 0–8.4)
ALBUMIN SERPL BCP-MCNC: 3.5 G/DL (ref 3.5–5.2)
ALP SERPL-CCNC: 76 U/L (ref 55–135)
ALT SERPL W/O P-5'-P-CCNC: 17 U/L (ref 10–44)
ANION GAP SERPL CALC-SCNC: 13 MMOL/L (ref 8–16)
AST SERPL-CCNC: 50 U/L (ref 10–40)
BASOPHILS # BLD AUTO: 0.02 K/UL (ref 0–0.2)
BASOPHILS NFR BLD: 0.4 % (ref 0–1.9)
BILIRUB SERPL-MCNC: 0.7 MG/DL (ref 0.1–1)
BUN SERPL-MCNC: 12 MG/DL (ref 8–23)
CALCIUM SERPL-MCNC: 9.1 MG/DL (ref 8.7–10.5)
CHLORIDE SERPL-SCNC: 104 MMOL/L (ref 95–110)
CO2 SERPL-SCNC: 26 MMOL/L (ref 23–29)
CREAT SERPL-MCNC: 1.4 MG/DL (ref 0.5–1.4)
DIFFERENTIAL METHOD: ABNORMAL
EOSINOPHIL # BLD AUTO: 0.4 K/UL (ref 0–0.5)
EOSINOPHIL NFR BLD: 7.3 % (ref 0–8)
ERYTHROCYTE [DISTWIDTH] IN BLOOD BY AUTOMATED COUNT: 11.3 % (ref 11.5–14.5)
EST. GFR  (AFRICAN AMERICAN): 57.6 ML/MIN/1.73 M^2
EST. GFR  (NON AFRICAN AMERICAN): 49.8 ML/MIN/1.73 M^2
GLUCOSE SERPL-MCNC: 95 MG/DL (ref 70–110)
HCT VFR BLD AUTO: 36.2 % (ref 40–54)
HGB BLD-MCNC: 12.3 G/DL (ref 14–18)
IMM GRANULOCYTES # BLD AUTO: 0.02 K/UL (ref 0–0.04)
IMM GRANULOCYTES NFR BLD AUTO: 0.4 % (ref 0–0.5)
LYMPHOCYTES # BLD AUTO: 1.3 K/UL (ref 1–4.8)
LYMPHOCYTES NFR BLD: 26.5 % (ref 18–48)
MCH RBC QN AUTO: 37.7 PG (ref 27–31)
MCHC RBC AUTO-ENTMCNC: 34 G/DL (ref 32–36)
MCV RBC AUTO: 111 FL (ref 82–98)
MONOCYTES # BLD AUTO: 0.6 K/UL (ref 0.3–1)
MONOCYTES NFR BLD: 11.9 % (ref 4–15)
NEUTROPHILS # BLD AUTO: 2.7 K/UL (ref 1.8–7.7)
NEUTROPHILS NFR BLD: 53.5 % (ref 38–73)
NRBC BLD-RTO: 0 /100 WBC
PLATELET # BLD AUTO: 171 K/UL (ref 150–350)
PMV BLD AUTO: 10.5 FL (ref 9.2–12.9)
POTASSIUM SERPL-SCNC: 3.5 MMOL/L (ref 3.5–5.1)
PROT SERPL-MCNC: 7.8 G/DL (ref 6–8.4)
RBC # BLD AUTO: 3.26 M/UL (ref 4.6–6.2)
SODIUM SERPL-SCNC: 143 MMOL/L (ref 136–145)
TACROLIMUS BLD-MCNC: 3.1 NG/ML (ref 5–15)
WBC # BLD AUTO: 5.06 K/UL (ref 3.9–12.7)

## 2021-03-22 PROCEDURE — 80053 COMPREHEN METABOLIC PANEL: CPT | Performed by: INTERNAL MEDICINE

## 2021-03-22 PROCEDURE — 80197 ASSAY OF TACROLIMUS: CPT | Performed by: INTERNAL MEDICINE

## 2021-03-22 PROCEDURE — 82105 ALPHA-FETOPROTEIN SERUM: CPT | Performed by: INTERNAL MEDICINE

## 2021-03-22 PROCEDURE — 85025 COMPLETE CBC W/AUTO DIFF WBC: CPT | Performed by: INTERNAL MEDICINE

## 2021-03-22 PROCEDURE — 36415 COLL VENOUS BLD VENIPUNCTURE: CPT | Performed by: INTERNAL MEDICINE

## 2021-03-23 ENCOUNTER — INFUSION (OUTPATIENT)
Dept: INFUSION THERAPY | Facility: HOSPITAL | Age: 71
End: 2021-03-23
Payer: MEDICARE

## 2021-03-23 VITALS
TEMPERATURE: 98 F | SYSTOLIC BLOOD PRESSURE: 101 MMHG | HEART RATE: 86 BPM | RESPIRATION RATE: 18 BRPM | DIASTOLIC BLOOD PRESSURE: 58 MMHG

## 2021-03-23 DIAGNOSIS — C01 SQUAMOUS CELL CARCINOMA OF BASE OF TONGUE: Primary | ICD-10-CM

## 2021-03-23 PROCEDURE — 96415 CHEMO IV INFUSION ADDL HR: CPT

## 2021-03-23 PROCEDURE — 96413 CHEMO IV INFUSION 1 HR: CPT

## 2021-03-23 PROCEDURE — A4216 STERILE WATER/SALINE, 10 ML: HCPCS | Performed by: STUDENT IN AN ORGANIZED HEALTH CARE EDUCATION/TRAINING PROGRAM

## 2021-03-23 PROCEDURE — 96365 THER/PROPH/DIAG IV INF INIT: CPT

## 2021-03-23 PROCEDURE — 25000003 PHARM REV CODE 250: Performed by: STUDENT IN AN ORGANIZED HEALTH CARE EDUCATION/TRAINING PROGRAM

## 2021-03-23 PROCEDURE — 63600175 PHARM REV CODE 636 W HCPCS: Performed by: STUDENT IN AN ORGANIZED HEALTH CARE EDUCATION/TRAINING PROGRAM

## 2021-03-23 PROCEDURE — 96367 TX/PROPH/DG ADDL SEQ IV INF: CPT

## 2021-03-23 RX ORDER — MAGNESIUM SULFATE HEPTAHYDRATE 40 MG/ML
2 INJECTION, SOLUTION INTRAVENOUS ONCE
Status: COMPLETED | OUTPATIENT
Start: 2021-03-23 | End: 2021-03-23

## 2021-03-23 RX ORDER — HEPARIN 100 UNIT/ML
500 SYRINGE INTRAVENOUS
Status: DISCONTINUED | OUTPATIENT
Start: 2021-03-23 | End: 2021-03-23 | Stop reason: HOSPADM

## 2021-03-23 RX ORDER — SODIUM CHLORIDE 0.9 % (FLUSH) 0.9 %
10 SYRINGE (ML) INJECTION
Status: DISCONTINUED | OUTPATIENT
Start: 2021-03-23 | End: 2021-03-23 | Stop reason: HOSPADM

## 2021-03-23 RX ORDER — EPINEPHRINE 0.3 MG/.3ML
0.3 INJECTION SUBCUTANEOUS ONCE AS NEEDED
Status: DISCONTINUED | OUTPATIENT
Start: 2021-03-23 | End: 2021-03-23 | Stop reason: HOSPADM

## 2021-03-23 RX ORDER — DIPHENHYDRAMINE HYDROCHLORIDE 50 MG/ML
50 INJECTION INTRAMUSCULAR; INTRAVENOUS ONCE AS NEEDED
Status: DISCONTINUED | OUTPATIENT
Start: 2021-03-23 | End: 2021-03-23 | Stop reason: HOSPADM

## 2021-03-23 RX ADMIN — DIPHENHYDRAMINE HYDROCHLORIDE 50 MG: 50 INJECTION INTRAMUSCULAR; INTRAVENOUS at 11:03

## 2021-03-23 RX ADMIN — HEPARIN 500 UNITS: 100 SYRINGE at 02:03

## 2021-03-23 RX ADMIN — Medication 10 ML: at 11:03

## 2021-03-23 RX ADMIN — CETUXIMAB 400 MG: 2 SOLUTION INTRAVENOUS at 11:03

## 2021-03-23 RX ADMIN — MAGNESIUM SULFATE HEPTAHYDRATE 2 G: 40 INJECTION, SOLUTION INTRAVENOUS at 01:03

## 2021-03-23 RX ADMIN — SODIUM CHLORIDE: 0.9 INJECTION, SOLUTION INTRAVENOUS at 11:03

## 2021-03-23 RX ADMIN — Medication 10 ML: at 02:03

## 2021-03-26 DIAGNOSIS — C01 SQUAMOUS CELL CARCINOMA OF BASE OF TONGUE: Primary | ICD-10-CM

## 2021-03-26 RX ORDER — LIDOCAINE AND PRILOCAINE 25; 25 MG/G; MG/G
CREAM TOPICAL
Qty: 30 G | Refills: 2 | Status: SHIPPED | OUTPATIENT
Start: 2021-03-26 | End: 2023-10-20

## 2021-03-29 ENCOUNTER — TELEPHONE (OUTPATIENT)
Dept: HEMATOLOGY/ONCOLOGY | Facility: CLINIC | Age: 71
End: 2021-03-29

## 2021-03-30 ENCOUNTER — OFFICE VISIT (OUTPATIENT)
Dept: HEMATOLOGY/ONCOLOGY | Facility: CLINIC | Age: 71
End: 2021-03-30
Payer: MEDICARE

## 2021-03-30 ENCOUNTER — INFUSION (OUTPATIENT)
Dept: INFUSION THERAPY | Facility: HOSPITAL | Age: 71
End: 2021-03-30
Attending: STUDENT IN AN ORGANIZED HEALTH CARE EDUCATION/TRAINING PROGRAM
Payer: MEDICARE

## 2021-03-30 ENCOUNTER — INFUSION (OUTPATIENT)
Dept: INFUSION THERAPY | Facility: HOSPITAL | Age: 71
End: 2021-03-30
Payer: MEDICARE

## 2021-03-30 VITALS
RESPIRATION RATE: 18 BRPM | DIASTOLIC BLOOD PRESSURE: 67 MMHG | SYSTOLIC BLOOD PRESSURE: 103 MMHG | BODY MASS INDEX: 18.64 KG/M2 | HEART RATE: 84 BPM | TEMPERATURE: 98 F | HEIGHT: 68 IN | WEIGHT: 123 LBS | OXYGEN SATURATION: 99 %

## 2021-03-30 VITALS
HEART RATE: 74 BPM | TEMPERATURE: 98 F | OXYGEN SATURATION: 98 % | WEIGHT: 123.88 LBS | HEIGHT: 68 IN | DIASTOLIC BLOOD PRESSURE: 70 MMHG | BODY MASS INDEX: 18.77 KG/M2 | SYSTOLIC BLOOD PRESSURE: 121 MMHG | RESPIRATION RATE: 18 BRPM

## 2021-03-30 DIAGNOSIS — C01 SQUAMOUS CELL CARCINOMA OF BASE OF TONGUE: Primary | ICD-10-CM

## 2021-03-30 DIAGNOSIS — N18.31 STAGE 3A CHRONIC KIDNEY DISEASE: ICD-10-CM

## 2021-03-30 DIAGNOSIS — C77.0 SECONDARY MALIGNANT NEOPLASM OF CERVICAL LYMPH NODE: ICD-10-CM

## 2021-03-30 DIAGNOSIS — D53.9 MACROCYTIC ANEMIA: ICD-10-CM

## 2021-03-30 LAB
ALBUMIN SERPL BCP-MCNC: 3.3 G/DL (ref 3.5–5.2)
ALP SERPL-CCNC: 67 U/L (ref 55–135)
ALT SERPL W/O P-5'-P-CCNC: 15 U/L (ref 10–44)
ANION GAP SERPL CALC-SCNC: 10 MMOL/L (ref 8–16)
AST SERPL-CCNC: 36 U/L (ref 10–40)
BASOPHILS # BLD AUTO: 0.02 K/UL (ref 0–0.2)
BASOPHILS NFR BLD: 0.4 % (ref 0–1.9)
BILIRUB SERPL-MCNC: 0.5 MG/DL (ref 0.1–1)
BUN SERPL-MCNC: 14 MG/DL (ref 8–23)
CALCIUM SERPL-MCNC: 8.9 MG/DL (ref 8.7–10.5)
CHLORIDE SERPL-SCNC: 105 MMOL/L (ref 95–110)
CO2 SERPL-SCNC: 26 MMOL/L (ref 23–29)
CREAT SERPL-MCNC: 1.4 MG/DL (ref 0.5–1.4)
DIFFERENTIAL METHOD: ABNORMAL
EOSINOPHIL # BLD AUTO: 0.4 K/UL (ref 0–0.5)
EOSINOPHIL NFR BLD: 9.1 % (ref 0–8)
ERYTHROCYTE [DISTWIDTH] IN BLOOD BY AUTOMATED COUNT: 10.9 % (ref 11.5–14.5)
EST. GFR  (AFRICAN AMERICAN): 57.6 ML/MIN/1.73 M^2
EST. GFR  (NON AFRICAN AMERICAN): 49.8 ML/MIN/1.73 M^2
GLUCOSE SERPL-MCNC: 116 MG/DL (ref 70–110)
HCT VFR BLD AUTO: 33.3 % (ref 40–54)
HGB BLD-MCNC: 11.4 G/DL (ref 14–18)
IMM GRANULOCYTES # BLD AUTO: 0.01 K/UL (ref 0–0.04)
IMM GRANULOCYTES NFR BLD AUTO: 0.2 % (ref 0–0.5)
LYMPHOCYTES # BLD AUTO: 1 K/UL (ref 1–4.8)
LYMPHOCYTES NFR BLD: 20.9 % (ref 18–48)
MAGNESIUM SERPL-MCNC: 1 MG/DL (ref 1.6–2.6)
MCH RBC QN AUTO: 36.3 PG (ref 27–31)
MCHC RBC AUTO-ENTMCNC: 34.2 G/DL (ref 32–36)
MCV RBC AUTO: 106 FL (ref 82–98)
MONOCYTES # BLD AUTO: 0.6 K/UL (ref 0.3–1)
MONOCYTES NFR BLD: 11.8 % (ref 4–15)
NEUTROPHILS # BLD AUTO: 2.8 K/UL (ref 1.8–7.7)
NEUTROPHILS NFR BLD: 57.6 % (ref 38–73)
NRBC BLD-RTO: 0 /100 WBC
PLATELET # BLD AUTO: 165 K/UL (ref 150–450)
PMV BLD AUTO: 10.2 FL (ref 9.2–12.9)
POTASSIUM SERPL-SCNC: 3.3 MMOL/L (ref 3.5–5.1)
PROT SERPL-MCNC: 7.3 G/DL (ref 6–8.4)
RBC # BLD AUTO: 3.14 M/UL (ref 4.6–6.2)
SODIUM SERPL-SCNC: 141 MMOL/L (ref 136–145)
WBC # BLD AUTO: 4.84 K/UL (ref 3.9–12.7)

## 2021-03-30 PROCEDURE — 96367 TX/PROPH/DG ADDL SEQ IV INF: CPT

## 2021-03-30 PROCEDURE — 99215 PR OFFICE/OUTPT VISIT, EST, LEVL V, 40-54 MIN: ICD-10-PCS | Mod: S$PBB,,, | Performed by: STUDENT IN AN ORGANIZED HEALTH CARE EDUCATION/TRAINING PROGRAM

## 2021-03-30 PROCEDURE — 96413 CHEMO IV INFUSION 1 HR: CPT

## 2021-03-30 PROCEDURE — 99215 OFFICE O/P EST HI 40 MIN: CPT | Mod: S$PBB,,, | Performed by: STUDENT IN AN ORGANIZED HEALTH CARE EDUCATION/TRAINING PROGRAM

## 2021-03-30 PROCEDURE — A4216 STERILE WATER/SALINE, 10 ML: HCPCS | Performed by: STUDENT IN AN ORGANIZED HEALTH CARE EDUCATION/TRAINING PROGRAM

## 2021-03-30 PROCEDURE — 83735 ASSAY OF MAGNESIUM: CPT | Performed by: STUDENT IN AN ORGANIZED HEALTH CARE EDUCATION/TRAINING PROGRAM

## 2021-03-30 PROCEDURE — 99214 OFFICE O/P EST MOD 30 MIN: CPT | Mod: PBBFAC,25 | Performed by: STUDENT IN AN ORGANIZED HEALTH CARE EDUCATION/TRAINING PROGRAM

## 2021-03-30 PROCEDURE — 99999 PR PBB SHADOW E&M-EST. PATIENT-LVL IV: CPT | Mod: PBBFAC,,, | Performed by: STUDENT IN AN ORGANIZED HEALTH CARE EDUCATION/TRAINING PROGRAM

## 2021-03-30 PROCEDURE — 80053 COMPREHEN METABOLIC PANEL: CPT | Performed by: STUDENT IN AN ORGANIZED HEALTH CARE EDUCATION/TRAINING PROGRAM

## 2021-03-30 PROCEDURE — 99999 PR PBB SHADOW E&M-EST. PATIENT-LVL IV: ICD-10-PCS | Mod: PBBFAC,,, | Performed by: STUDENT IN AN ORGANIZED HEALTH CARE EDUCATION/TRAINING PROGRAM

## 2021-03-30 PROCEDURE — 25000003 PHARM REV CODE 250: Performed by: STUDENT IN AN ORGANIZED HEALTH CARE EDUCATION/TRAINING PROGRAM

## 2021-03-30 PROCEDURE — 63600175 PHARM REV CODE 636 W HCPCS: Performed by: STUDENT IN AN ORGANIZED HEALTH CARE EDUCATION/TRAINING PROGRAM

## 2021-03-30 PROCEDURE — 85025 COMPLETE CBC W/AUTO DIFF WBC: CPT | Performed by: STUDENT IN AN ORGANIZED HEALTH CARE EDUCATION/TRAINING PROGRAM

## 2021-03-30 RX ORDER — SODIUM CHLORIDE 0.9 % (FLUSH) 0.9 %
10 SYRINGE (ML) INJECTION
Status: CANCELLED | OUTPATIENT
Start: 2021-03-30

## 2021-03-30 RX ORDER — HEPARIN 100 UNIT/ML
500 SYRINGE INTRAVENOUS
Status: DISCONTINUED | OUTPATIENT
Start: 2021-03-30 | End: 2021-03-30 | Stop reason: HOSPADM

## 2021-03-30 RX ORDER — EPINEPHRINE 0.3 MG/.3ML
0.3 INJECTION SUBCUTANEOUS ONCE AS NEEDED
Status: CANCELLED | OUTPATIENT
Start: 2021-03-30

## 2021-03-30 RX ORDER — SODIUM CHLORIDE 0.9 % (FLUSH) 0.9 %
10 SYRINGE (ML) INJECTION
Status: DISCONTINUED | OUTPATIENT
Start: 2021-03-30 | End: 2021-03-30 | Stop reason: HOSPADM

## 2021-03-30 RX ORDER — POTASSIUM CHLORIDE 20 MEQ/1
40 TABLET, EXTENDED RELEASE ORAL
Status: CANCELLED
Start: 2021-03-30 | End: 2021-03-30

## 2021-03-30 RX ORDER — DIPHENHYDRAMINE HYDROCHLORIDE 50 MG/ML
50 INJECTION INTRAMUSCULAR; INTRAVENOUS ONCE AS NEEDED
Status: CANCELLED | OUTPATIENT
Start: 2021-03-30

## 2021-03-30 RX ORDER — HEPARIN 100 UNIT/ML
500 SYRINGE INTRAVENOUS
Status: CANCELLED | OUTPATIENT
Start: 2021-03-30

## 2021-03-30 RX ORDER — MAGNESIUM SULFATE HEPTAHYDRATE 40 MG/ML
2 INJECTION, SOLUTION INTRAVENOUS ONCE
Status: COMPLETED | OUTPATIENT
Start: 2021-03-30 | End: 2021-03-30

## 2021-03-30 RX ORDER — DIPHENHYDRAMINE HYDROCHLORIDE 50 MG/ML
50 INJECTION INTRAMUSCULAR; INTRAVENOUS ONCE AS NEEDED
Status: DISCONTINUED | OUTPATIENT
Start: 2021-03-30 | End: 2021-03-30 | Stop reason: HOSPADM

## 2021-03-30 RX ORDER — POTASSIUM CHLORIDE 20 MEQ/1
40 TABLET, EXTENDED RELEASE ORAL
Status: COMPLETED | OUTPATIENT
Start: 2021-03-30 | End: 2021-03-30

## 2021-03-30 RX ORDER — MAGNESIUM SULFATE HEPTAHYDRATE 40 MG/ML
2 INJECTION, SOLUTION INTRAVENOUS ONCE
Status: CANCELLED
Start: 2021-03-30 | End: 2021-03-30

## 2021-03-30 RX ORDER — EPINEPHRINE 0.3 MG/.3ML
0.3 INJECTION SUBCUTANEOUS ONCE AS NEEDED
Status: DISCONTINUED | OUTPATIENT
Start: 2021-03-30 | End: 2021-03-30 | Stop reason: HOSPADM

## 2021-03-30 RX ADMIN — Medication 10 ML: at 08:03

## 2021-03-30 RX ADMIN — MAGNESIUM SULFATE HEPTAHYDRATE 2 G: 40 INJECTION, SOLUTION INTRAVENOUS at 10:03

## 2021-03-30 RX ADMIN — DIPHENHYDRAMINE HYDROCHLORIDE 50 MG: 50 INJECTION, SOLUTION INTRAMUSCULAR; INTRAVENOUS at 11:03

## 2021-03-30 RX ADMIN — Medication 10 ML: at 12:03

## 2021-03-30 RX ADMIN — HEPARIN 500 UNITS: 100 SYRINGE at 12:03

## 2021-03-30 RX ADMIN — POTASSIUM CHLORIDE 40 MEQ: 1500 TABLET, EXTENDED RELEASE ORAL at 10:03

## 2021-03-30 RX ADMIN — SODIUM CHLORIDE: 0.9 INJECTION, SOLUTION INTRAVENOUS at 09:03

## 2021-03-30 RX ADMIN — HEPARIN 500 UNITS: 100 SYRINGE at 08:03

## 2021-03-30 RX ADMIN — CETUXIMAB 400 MG: 2 SOLUTION INTRAVENOUS at 11:03

## 2021-04-06 ENCOUNTER — INFUSION (OUTPATIENT)
Dept: INFUSION THERAPY | Facility: HOSPITAL | Age: 71
End: 2021-04-06
Payer: MEDICARE

## 2021-04-06 VITALS
HEIGHT: 68 IN | WEIGHT: 123.44 LBS | SYSTOLIC BLOOD PRESSURE: 137 MMHG | RESPIRATION RATE: 18 BRPM | DIASTOLIC BLOOD PRESSURE: 72 MMHG | BODY MASS INDEX: 18.71 KG/M2 | HEART RATE: 80 BPM

## 2021-04-06 DIAGNOSIS — C01 SQUAMOUS CELL CARCINOMA OF BASE OF TONGUE: Primary | ICD-10-CM

## 2021-04-06 PROCEDURE — 25000003 PHARM REV CODE 250: Performed by: STUDENT IN AN ORGANIZED HEALTH CARE EDUCATION/TRAINING PROGRAM

## 2021-04-06 PROCEDURE — 96413 CHEMO IV INFUSION 1 HR: CPT

## 2021-04-06 PROCEDURE — 96367 TX/PROPH/DG ADDL SEQ IV INF: CPT

## 2021-04-06 PROCEDURE — A4216 STERILE WATER/SALINE, 10 ML: HCPCS | Performed by: STUDENT IN AN ORGANIZED HEALTH CARE EDUCATION/TRAINING PROGRAM

## 2021-04-06 PROCEDURE — 63600175 PHARM REV CODE 636 W HCPCS: Performed by: STUDENT IN AN ORGANIZED HEALTH CARE EDUCATION/TRAINING PROGRAM

## 2021-04-06 RX ORDER — SODIUM CHLORIDE 0.9 % (FLUSH) 0.9 %
10 SYRINGE (ML) INJECTION
Status: DISCONTINUED | OUTPATIENT
Start: 2021-04-06 | End: 2021-04-06 | Stop reason: HOSPADM

## 2021-04-06 RX ORDER — DIPHENHYDRAMINE HYDROCHLORIDE 50 MG/ML
50 INJECTION INTRAMUSCULAR; INTRAVENOUS ONCE AS NEEDED
Status: DISCONTINUED | OUTPATIENT
Start: 2021-04-06 | End: 2021-04-06 | Stop reason: HOSPADM

## 2021-04-06 RX ORDER — HEPARIN 100 UNIT/ML
500 SYRINGE INTRAVENOUS
Status: CANCELLED | OUTPATIENT
Start: 2021-04-13

## 2021-04-06 RX ORDER — MAGNESIUM SULFATE HEPTAHYDRATE 40 MG/ML
2 INJECTION, SOLUTION INTRAVENOUS ONCE
Status: COMPLETED | OUTPATIENT
Start: 2021-04-06 | End: 2021-04-06

## 2021-04-06 RX ORDER — HEPARIN 100 UNIT/ML
500 SYRINGE INTRAVENOUS
Status: DISCONTINUED | OUTPATIENT
Start: 2021-04-06 | End: 2021-04-06 | Stop reason: HOSPADM

## 2021-04-06 RX ORDER — EPINEPHRINE 0.3 MG/.3ML
0.3 INJECTION SUBCUTANEOUS ONCE AS NEEDED
Status: DISCONTINUED | OUTPATIENT
Start: 2021-04-06 | End: 2021-04-06 | Stop reason: HOSPADM

## 2021-04-06 RX ORDER — SODIUM CHLORIDE 0.9 % (FLUSH) 0.9 %
10 SYRINGE (ML) INJECTION
Status: CANCELLED | OUTPATIENT
Start: 2021-04-13

## 2021-04-06 RX ORDER — MAGNESIUM SULFATE HEPTAHYDRATE 40 MG/ML
2 INJECTION, SOLUTION INTRAVENOUS ONCE
Status: CANCELLED
Start: 2021-04-13 | End: 2021-04-13

## 2021-04-06 RX ORDER — DIPHENHYDRAMINE HYDROCHLORIDE 50 MG/ML
50 INJECTION INTRAMUSCULAR; INTRAVENOUS ONCE AS NEEDED
Status: CANCELLED | OUTPATIENT
Start: 2021-04-13

## 2021-04-06 RX ORDER — EPINEPHRINE 0.3 MG/.3ML
0.3 INJECTION SUBCUTANEOUS ONCE AS NEEDED
Status: CANCELLED | OUTPATIENT
Start: 2021-04-13

## 2021-04-06 RX ADMIN — CETUXIMAB 400 MG: 2 SOLUTION INTRAVENOUS at 11:04

## 2021-04-06 RX ADMIN — HEPARIN 500 UNITS: 100 SYRINGE at 02:04

## 2021-04-06 RX ADMIN — MAGNESIUM SULFATE HEPTAHYDRATE 2 G: 40 INJECTION, SOLUTION INTRAVENOUS at 12:04

## 2021-04-06 RX ADMIN — Medication 10 ML: at 02:04

## 2021-04-06 RX ADMIN — SODIUM CHLORIDE: 0.9 INJECTION, SOLUTION INTRAVENOUS at 11:04

## 2021-04-13 ENCOUNTER — INFUSION (OUTPATIENT)
Dept: INFUSION THERAPY | Facility: HOSPITAL | Age: 71
End: 2021-04-13
Payer: MEDICARE

## 2021-04-13 VITALS
WEIGHT: 123.44 LBS | OXYGEN SATURATION: 99 % | DIASTOLIC BLOOD PRESSURE: 76 MMHG | RESPIRATION RATE: 16 BRPM | HEART RATE: 80 BPM | HEIGHT: 68 IN | SYSTOLIC BLOOD PRESSURE: 132 MMHG | BODY MASS INDEX: 18.71 KG/M2 | TEMPERATURE: 98 F

## 2021-04-13 DIAGNOSIS — C01 SQUAMOUS CELL CARCINOMA OF BASE OF TONGUE: Primary | ICD-10-CM

## 2021-04-13 PROCEDURE — 96413 CHEMO IV INFUSION 1 HR: CPT

## 2021-04-13 PROCEDURE — 96366 THER/PROPH/DIAG IV INF ADDON: CPT

## 2021-04-13 PROCEDURE — 25000003 PHARM REV CODE 250: Performed by: STUDENT IN AN ORGANIZED HEALTH CARE EDUCATION/TRAINING PROGRAM

## 2021-04-13 PROCEDURE — 96365 THER/PROPH/DIAG IV INF INIT: CPT

## 2021-04-13 PROCEDURE — 96367 TX/PROPH/DG ADDL SEQ IV INF: CPT

## 2021-04-13 PROCEDURE — 96415 CHEMO IV INFUSION ADDL HR: CPT

## 2021-04-13 PROCEDURE — 63600175 PHARM REV CODE 636 W HCPCS: Performed by: STUDENT IN AN ORGANIZED HEALTH CARE EDUCATION/TRAINING PROGRAM

## 2021-04-13 PROCEDURE — A4216 STERILE WATER/SALINE, 10 ML: HCPCS | Performed by: STUDENT IN AN ORGANIZED HEALTH CARE EDUCATION/TRAINING PROGRAM

## 2021-04-13 RX ORDER — SODIUM CHLORIDE 0.9 % (FLUSH) 0.9 %
10 SYRINGE (ML) INJECTION
Status: DISCONTINUED | OUTPATIENT
Start: 2021-04-13 | End: 2021-04-13 | Stop reason: HOSPADM

## 2021-04-13 RX ORDER — HEPARIN 100 UNIT/ML
500 SYRINGE INTRAVENOUS
Status: DISCONTINUED | OUTPATIENT
Start: 2021-04-13 | End: 2021-04-13 | Stop reason: HOSPADM

## 2021-04-13 RX ORDER — EPINEPHRINE 0.3 MG/.3ML
0.3 INJECTION SUBCUTANEOUS ONCE AS NEEDED
Status: DISCONTINUED | OUTPATIENT
Start: 2021-04-13 | End: 2021-04-13 | Stop reason: HOSPADM

## 2021-04-13 RX ORDER — MAGNESIUM SULFATE HEPTAHYDRATE 40 MG/ML
2 INJECTION, SOLUTION INTRAVENOUS ONCE
Status: COMPLETED | OUTPATIENT
Start: 2021-04-13 | End: 2021-04-13

## 2021-04-13 RX ORDER — DIPHENHYDRAMINE HYDROCHLORIDE 50 MG/ML
50 INJECTION INTRAMUSCULAR; INTRAVENOUS ONCE AS NEEDED
Status: DISCONTINUED | OUTPATIENT
Start: 2021-04-13 | End: 2021-04-13 | Stop reason: HOSPADM

## 2021-04-13 RX ADMIN — CETUXIMAB 427.6 MG: 2 SOLUTION INTRAVENOUS at 02:04

## 2021-04-13 RX ADMIN — Medication 10 ML: at 04:04

## 2021-04-13 RX ADMIN — MAGNESIUM SULFATE HEPTAHYDRATE 2 G: 40 INJECTION, SOLUTION INTRAVENOUS at 11:04

## 2021-04-13 RX ADMIN — SODIUM CHLORIDE: 0.9 INJECTION, SOLUTION INTRAVENOUS at 11:04

## 2021-04-13 RX ADMIN — HEPARIN 500 UNITS: 100 SYRINGE at 04:04

## 2021-04-13 RX ADMIN — DIPHENHYDRAMINE HYDROCHLORIDE 50 MG: 50 INJECTION, SOLUTION INTRAMUSCULAR; INTRAVENOUS at 02:04

## 2021-04-15 DIAGNOSIS — C01 SQUAMOUS CELL CARCINOMA OF BASE OF TONGUE: ICD-10-CM

## 2021-04-20 ENCOUNTER — INFUSION (OUTPATIENT)
Dept: INFUSION THERAPY | Facility: HOSPITAL | Age: 71
End: 2021-04-20
Payer: MEDICARE

## 2021-04-20 VITALS — RESPIRATION RATE: 18 BRPM | HEART RATE: 95 BPM | DIASTOLIC BLOOD PRESSURE: 69 MMHG | SYSTOLIC BLOOD PRESSURE: 133 MMHG

## 2021-04-20 DIAGNOSIS — C01 SQUAMOUS CELL CARCINOMA OF BASE OF TONGUE: Primary | ICD-10-CM

## 2021-04-20 DIAGNOSIS — C77.0 SECONDARY MALIGNANT NEOPLASM OF CERVICAL LYMPH NODE: ICD-10-CM

## 2021-04-20 LAB
ALBUMIN SERPL BCP-MCNC: 3.3 G/DL (ref 3.5–5.2)
ALP SERPL-CCNC: 62 U/L (ref 55–135)
ALT SERPL W/O P-5'-P-CCNC: 11 U/L (ref 10–44)
ANION GAP SERPL CALC-SCNC: 9 MMOL/L (ref 8–16)
AST SERPL-CCNC: 34 U/L (ref 10–40)
BILIRUB SERPL-MCNC: 0.5 MG/DL (ref 0.1–1)
BUN SERPL-MCNC: 13 MG/DL (ref 8–23)
CALCIUM SERPL-MCNC: 8.8 MG/DL (ref 8.7–10.5)
CHLORIDE SERPL-SCNC: 103 MMOL/L (ref 95–110)
CO2 SERPL-SCNC: 26 MMOL/L (ref 23–29)
CREAT SERPL-MCNC: 1.4 MG/DL (ref 0.5–1.4)
EST. GFR  (AFRICAN AMERICAN): 57.6 ML/MIN/1.73 M^2
EST. GFR  (NON AFRICAN AMERICAN): 49.8 ML/MIN/1.73 M^2
GLUCOSE SERPL-MCNC: 129 MG/DL (ref 70–110)
MAGNESIUM SERPL-MCNC: 1.1 MG/DL (ref 1.6–2.6)
POTASSIUM SERPL-SCNC: 3.5 MMOL/L (ref 3.5–5.1)
PROT SERPL-MCNC: 7.5 G/DL (ref 6–8.4)
SODIUM SERPL-SCNC: 138 MMOL/L (ref 136–145)

## 2021-04-20 PROCEDURE — 96367 TX/PROPH/DG ADDL SEQ IV INF: CPT

## 2021-04-20 PROCEDURE — 36415 COLL VENOUS BLD VENIPUNCTURE: CPT | Performed by: STUDENT IN AN ORGANIZED HEALTH CARE EDUCATION/TRAINING PROGRAM

## 2021-04-20 PROCEDURE — 63600175 PHARM REV CODE 636 W HCPCS: Performed by: STUDENT IN AN ORGANIZED HEALTH CARE EDUCATION/TRAINING PROGRAM

## 2021-04-20 PROCEDURE — 25000003 PHARM REV CODE 250: Performed by: STUDENT IN AN ORGANIZED HEALTH CARE EDUCATION/TRAINING PROGRAM

## 2021-04-20 PROCEDURE — 83735 ASSAY OF MAGNESIUM: CPT | Performed by: STUDENT IN AN ORGANIZED HEALTH CARE EDUCATION/TRAINING PROGRAM

## 2021-04-20 PROCEDURE — 80053 COMPREHEN METABOLIC PANEL: CPT | Performed by: STUDENT IN AN ORGANIZED HEALTH CARE EDUCATION/TRAINING PROGRAM

## 2021-04-20 PROCEDURE — 96413 CHEMO IV INFUSION 1 HR: CPT

## 2021-04-20 PROCEDURE — A4216 STERILE WATER/SALINE, 10 ML: HCPCS | Performed by: STUDENT IN AN ORGANIZED HEALTH CARE EDUCATION/TRAINING PROGRAM

## 2021-04-20 PROCEDURE — 96415 CHEMO IV INFUSION ADDL HR: CPT

## 2021-04-20 RX ORDER — SODIUM CHLORIDE 0.9 % (FLUSH) 0.9 %
10 SYRINGE (ML) INJECTION
Status: CANCELLED | OUTPATIENT
Start: 2021-04-20

## 2021-04-20 RX ORDER — DIPHENHYDRAMINE HYDROCHLORIDE 50 MG/ML
50 INJECTION INTRAMUSCULAR; INTRAVENOUS ONCE AS NEEDED
Status: DISCONTINUED | OUTPATIENT
Start: 2021-04-20 | End: 2021-04-20 | Stop reason: HOSPADM

## 2021-04-20 RX ORDER — HEPARIN 100 UNIT/ML
500 SYRINGE INTRAVENOUS
Status: CANCELLED | OUTPATIENT
Start: 2021-04-20

## 2021-04-20 RX ORDER — SODIUM CHLORIDE 0.9 % (FLUSH) 0.9 %
10 SYRINGE (ML) INJECTION
Status: DISCONTINUED | OUTPATIENT
Start: 2021-04-20 | End: 2021-04-20 | Stop reason: HOSPADM

## 2021-04-20 RX ORDER — MAGNESIUM SULFATE HEPTAHYDRATE 40 MG/ML
2 INJECTION, SOLUTION INTRAVENOUS ONCE
Status: COMPLETED | OUTPATIENT
Start: 2021-04-20 | End: 2021-04-20

## 2021-04-20 RX ORDER — HEPARIN 100 UNIT/ML
500 SYRINGE INTRAVENOUS
Status: DISCONTINUED | OUTPATIENT
Start: 2021-04-20 | End: 2021-04-20 | Stop reason: HOSPADM

## 2021-04-20 RX ORDER — EPINEPHRINE 0.3 MG/.3ML
0.3 INJECTION SUBCUTANEOUS ONCE AS NEEDED
Status: DISCONTINUED | OUTPATIENT
Start: 2021-04-20 | End: 2021-04-20 | Stop reason: HOSPADM

## 2021-04-20 RX ADMIN — DIPHENHYDRAMINE HYDROCHLORIDE 50 MG: 50 INJECTION INTRAMUSCULAR; INTRAVENOUS at 12:04

## 2021-04-20 RX ADMIN — MAGNESIUM SULFATE HEPTAHYDRATE 2 G: 40 INJECTION, SOLUTION INTRAVENOUS at 11:04

## 2021-04-20 RX ADMIN — CETUXIMAB 427.6 MG: 2 SOLUTION INTRAVENOUS at 01:04

## 2021-04-20 RX ADMIN — HEPARIN 500 UNITS: 100 SYRINGE at 10:04

## 2021-04-20 RX ADMIN — SODIUM CHLORIDE: 0.9 INJECTION, SOLUTION INTRAVENOUS at 11:04

## 2021-04-20 RX ADMIN — HEPARIN 500 UNITS: 100 SYRINGE at 03:04

## 2021-04-20 RX ADMIN — Medication 10 ML: at 10:04

## 2021-04-27 ENCOUNTER — INFUSION (OUTPATIENT)
Dept: INFUSION THERAPY | Facility: HOSPITAL | Age: 71
End: 2021-04-27
Payer: MEDICARE

## 2021-04-27 ENCOUNTER — OFFICE VISIT (OUTPATIENT)
Dept: HEMATOLOGY/ONCOLOGY | Facility: CLINIC | Age: 71
End: 2021-04-27
Payer: MEDICARE

## 2021-04-27 ENCOUNTER — INFUSION (OUTPATIENT)
Dept: INFUSION THERAPY | Facility: HOSPITAL | Age: 71
End: 2021-04-27
Attending: STUDENT IN AN ORGANIZED HEALTH CARE EDUCATION/TRAINING PROGRAM
Payer: MEDICARE

## 2021-04-27 VITALS
HEART RATE: 84 BPM | HEIGHT: 68 IN | RESPIRATION RATE: 18 BRPM | SYSTOLIC BLOOD PRESSURE: 131 MMHG | SYSTOLIC BLOOD PRESSURE: 122 MMHG | OXYGEN SATURATION: 100 % | DIASTOLIC BLOOD PRESSURE: 74 MMHG | HEART RATE: 91 BPM | BODY MASS INDEX: 18.55 KG/M2 | TEMPERATURE: 98 F | OXYGEN SATURATION: 100 % | WEIGHT: 122.38 LBS | DIASTOLIC BLOOD PRESSURE: 73 MMHG

## 2021-04-27 DIAGNOSIS — N18.31 STAGE 3A CHRONIC KIDNEY DISEASE: ICD-10-CM

## 2021-04-27 DIAGNOSIS — K12.30 MUCOSITIS: ICD-10-CM

## 2021-04-27 DIAGNOSIS — C01 SQUAMOUS CELL CARCINOMA OF BASE OF TONGUE: Primary | ICD-10-CM

## 2021-04-27 DIAGNOSIS — E83.42 HYPOMAGNESEMIA: ICD-10-CM

## 2021-04-27 DIAGNOSIS — D63.1 ANEMIA IN STAGE 3A CHRONIC KIDNEY DISEASE: ICD-10-CM

## 2021-04-27 DIAGNOSIS — N18.31 ANEMIA IN STAGE 3A CHRONIC KIDNEY DISEASE: ICD-10-CM

## 2021-04-27 DIAGNOSIS — C77.0 SECONDARY MALIGNANT NEOPLASM OF CERVICAL LYMPH NODE: ICD-10-CM

## 2021-04-27 LAB
ALBUMIN SERPL BCP-MCNC: 3.2 G/DL (ref 3.5–5.2)
ALP SERPL-CCNC: 62 U/L (ref 55–135)
ALT SERPL W/O P-5'-P-CCNC: 16 U/L (ref 10–44)
ANION GAP SERPL CALC-SCNC: 11 MMOL/L (ref 8–16)
AST SERPL-CCNC: 41 U/L (ref 10–40)
BASOPHILS # BLD AUTO: 0.02 K/UL (ref 0–0.2)
BASOPHILS NFR BLD: 0.5 % (ref 0–1.9)
BILIRUB SERPL-MCNC: 0.6 MG/DL (ref 0.1–1)
BUN SERPL-MCNC: 13 MG/DL (ref 8–23)
CALCIUM SERPL-MCNC: 8.9 MG/DL (ref 8.7–10.5)
CHLORIDE SERPL-SCNC: 104 MMOL/L (ref 95–110)
CO2 SERPL-SCNC: 26 MMOL/L (ref 23–29)
CREAT SERPL-MCNC: 1.4 MG/DL (ref 0.5–1.4)
DIFFERENTIAL METHOD: ABNORMAL
EOSINOPHIL # BLD AUTO: 0.2 K/UL (ref 0–0.5)
EOSINOPHIL NFR BLD: 4.8 % (ref 0–8)
ERYTHROCYTE [DISTWIDTH] IN BLOOD BY AUTOMATED COUNT: 11.1 % (ref 11.5–14.5)
EST. GFR  (AFRICAN AMERICAN): 57.6 ML/MIN/1.73 M^2
EST. GFR  (NON AFRICAN AMERICAN): 49.8 ML/MIN/1.73 M^2
GLUCOSE SERPL-MCNC: 149 MG/DL (ref 70–110)
HCT VFR BLD AUTO: 31.1 % (ref 40–54)
HGB BLD-MCNC: 11.2 G/DL (ref 14–18)
IMM GRANULOCYTES # BLD AUTO: 0.02 K/UL (ref 0–0.04)
IMM GRANULOCYTES NFR BLD AUTO: 0.5 % (ref 0–0.5)
LYMPHOCYTES # BLD AUTO: 0.7 K/UL (ref 1–4.8)
LYMPHOCYTES NFR BLD: 16 % (ref 18–48)
MAGNESIUM SERPL-MCNC: 1.1 MG/DL (ref 1.6–2.6)
MCH RBC QN AUTO: 34.5 PG (ref 27–31)
MCHC RBC AUTO-ENTMCNC: 36 G/DL (ref 32–36)
MCV RBC AUTO: 96 FL (ref 82–98)
MONOCYTES # BLD AUTO: 0.4 K/UL (ref 0.3–1)
MONOCYTES NFR BLD: 9.4 % (ref 4–15)
NEUTROPHILS # BLD AUTO: 2.8 K/UL (ref 1.8–7.7)
NEUTROPHILS NFR BLD: 68.8 % (ref 38–73)
NRBC BLD-RTO: 0 /100 WBC
PLATELET # BLD AUTO: 172 K/UL (ref 150–450)
PMV BLD AUTO: 9.3 FL (ref 9.2–12.9)
POTASSIUM SERPL-SCNC: 3.3 MMOL/L (ref 3.5–5.1)
PROT SERPL-MCNC: 7.2 G/DL (ref 6–8.4)
RBC # BLD AUTO: 3.25 M/UL (ref 4.6–6.2)
SODIUM SERPL-SCNC: 141 MMOL/L (ref 136–145)
WBC # BLD AUTO: 4.13 K/UL (ref 3.9–12.7)

## 2021-04-27 PROCEDURE — 83735 ASSAY OF MAGNESIUM: CPT | Performed by: STUDENT IN AN ORGANIZED HEALTH CARE EDUCATION/TRAINING PROGRAM

## 2021-04-27 PROCEDURE — 63600175 PHARM REV CODE 636 W HCPCS: Performed by: STUDENT IN AN ORGANIZED HEALTH CARE EDUCATION/TRAINING PROGRAM

## 2021-04-27 PROCEDURE — 80053 COMPREHEN METABOLIC PANEL: CPT | Performed by: STUDENT IN AN ORGANIZED HEALTH CARE EDUCATION/TRAINING PROGRAM

## 2021-04-27 PROCEDURE — 99999 PR PBB SHADOW E&M-EST. PATIENT-LVL IV: ICD-10-PCS | Mod: PBBFAC,,, | Performed by: STUDENT IN AN ORGANIZED HEALTH CARE EDUCATION/TRAINING PROGRAM

## 2021-04-27 PROCEDURE — 85025 COMPLETE CBC W/AUTO DIFF WBC: CPT | Performed by: STUDENT IN AN ORGANIZED HEALTH CARE EDUCATION/TRAINING PROGRAM

## 2021-04-27 PROCEDURE — 99215 PR OFFICE/OUTPT VISIT, EST, LEVL V, 40-54 MIN: ICD-10-PCS | Mod: S$PBB,,, | Performed by: STUDENT IN AN ORGANIZED HEALTH CARE EDUCATION/TRAINING PROGRAM

## 2021-04-27 PROCEDURE — 96366 THER/PROPH/DIAG IV INF ADDON: CPT

## 2021-04-27 PROCEDURE — 25000003 PHARM REV CODE 250: Performed by: STUDENT IN AN ORGANIZED HEALTH CARE EDUCATION/TRAINING PROGRAM

## 2021-04-27 PROCEDURE — 99214 OFFICE O/P EST MOD 30 MIN: CPT | Mod: PBBFAC,25 | Performed by: STUDENT IN AN ORGANIZED HEALTH CARE EDUCATION/TRAINING PROGRAM

## 2021-04-27 PROCEDURE — 96365 THER/PROPH/DIAG IV INF INIT: CPT

## 2021-04-27 PROCEDURE — A4216 STERILE WATER/SALINE, 10 ML: HCPCS | Performed by: STUDENT IN AN ORGANIZED HEALTH CARE EDUCATION/TRAINING PROGRAM

## 2021-04-27 PROCEDURE — 99999 PR PBB SHADOW E&M-EST. PATIENT-LVL IV: CPT | Mod: PBBFAC,,, | Performed by: STUDENT IN AN ORGANIZED HEALTH CARE EDUCATION/TRAINING PROGRAM

## 2021-04-27 PROCEDURE — 99215 OFFICE O/P EST HI 40 MIN: CPT | Mod: S$PBB,,, | Performed by: STUDENT IN AN ORGANIZED HEALTH CARE EDUCATION/TRAINING PROGRAM

## 2021-04-27 RX ORDER — SODIUM CHLORIDE 0.9 % (FLUSH) 0.9 %
10 SYRINGE (ML) INJECTION
Status: CANCELLED | OUTPATIENT
Start: 2021-04-27

## 2021-04-27 RX ORDER — LORAZEPAM/0.9% SODIUM CHLORIDE 100MG/0.1L
2 PLASTIC BAG, INJECTION (ML) INTRAVENOUS
Status: CANCELLED | OUTPATIENT
Start: 2021-04-27

## 2021-04-27 RX ORDER — MAGNESIUM SULFATE HEPTAHYDRATE 40 MG/ML
4 INJECTION, SOLUTION INTRAVENOUS ONCE
Status: DISCONTINUED | OUTPATIENT
Start: 2021-04-27 | End: 2021-04-27

## 2021-04-27 RX ORDER — SODIUM CHLORIDE 0.9 % (FLUSH) 0.9 %
10 SYRINGE (ML) INJECTION
Status: DISCONTINUED | OUTPATIENT
Start: 2021-04-27 | End: 2021-04-27 | Stop reason: HOSPADM

## 2021-04-27 RX ORDER — SODIUM CHLORIDE 0.9 % (FLUSH) 0.9 %
10 SYRINGE (ML) INJECTION
Status: DISCONTINUED | OUTPATIENT
Start: 2021-04-27 | End: 2021-04-27

## 2021-04-27 RX ORDER — HEPARIN 100 UNIT/ML
500 SYRINGE INTRAVENOUS
Status: DISCONTINUED | OUTPATIENT
Start: 2021-04-27 | End: 2021-04-27 | Stop reason: HOSPADM

## 2021-04-27 RX ORDER — MAGNESIUM SULFATE HEPTAHYDRATE 40 MG/ML
4 INJECTION, SOLUTION INTRAVENOUS
Status: COMPLETED | OUTPATIENT
Start: 2021-04-27 | End: 2021-04-27

## 2021-04-27 RX ORDER — HEPARIN 100 UNIT/ML
500 SYRINGE INTRAVENOUS
Status: DISCONTINUED | OUTPATIENT
Start: 2021-04-27 | End: 2021-04-27

## 2021-04-27 RX ORDER — MAGNESIUM SULFATE/D5W 2 G/50 ML
4 INTRAVENOUS SOLUTION, PIGGYBACK (ML) INTRAVENOUS ONCE
Status: CANCELLED
Start: 2021-04-27

## 2021-04-27 RX ORDER — HEPARIN 100 UNIT/ML
500 SYRINGE INTRAVENOUS
Status: CANCELLED | OUTPATIENT
Start: 2021-04-27

## 2021-04-27 RX ADMIN — Medication 10 ML: at 08:04

## 2021-04-27 RX ADMIN — MAGNESIUM SULFATE HEPTAHYDRATE 2 G: 40 INJECTION, SOLUTION INTRAVENOUS at 10:04

## 2021-04-27 RX ADMIN — HEPARIN 500 UNITS: 100 SYRINGE at 01:04

## 2021-04-27 RX ADMIN — HEPARIN 500 UNITS: 100 SYRINGE at 08:04

## 2021-04-27 RX ADMIN — Medication 10 ML: at 01:04

## 2021-04-27 RX ADMIN — SODIUM CHLORIDE: 0.9 INJECTION, SOLUTION INTRAVENOUS at 10:04

## 2021-04-27 RX ADMIN — MAGNESIUM SULFATE HEPTAHYDRATE 2 G: 40 INJECTION, SOLUTION INTRAVENOUS at 12:04

## 2021-04-29 ENCOUNTER — PATIENT MESSAGE (OUTPATIENT)
Dept: HEMATOLOGY/ONCOLOGY | Facility: CLINIC | Age: 71
End: 2021-04-29

## 2021-04-30 ENCOUNTER — TELEPHONE (OUTPATIENT)
Dept: OTOLARYNGOLOGY | Facility: CLINIC | Age: 71
End: 2021-04-30

## 2021-05-03 ENCOUNTER — INFUSION (OUTPATIENT)
Dept: INFUSION THERAPY | Facility: HOSPITAL | Age: 71
End: 2021-05-03
Attending: INTERNAL MEDICINE
Payer: MEDICARE

## 2021-05-03 ENCOUNTER — OFFICE VISIT (OUTPATIENT)
Dept: OTOLARYNGOLOGY | Facility: CLINIC | Age: 71
End: 2021-05-03
Payer: MEDICARE

## 2021-05-03 ENCOUNTER — HOSPITAL ENCOUNTER (OUTPATIENT)
Dept: RADIOLOGY | Facility: HOSPITAL | Age: 71
Discharge: HOME | End: 2021-05-03
Attending: STUDENT IN AN ORGANIZED HEALTH CARE EDUCATION/TRAINING PROGRAM
Payer: MEDICARE

## 2021-05-03 VITALS
BODY MASS INDEX: 18.44 KG/M2 | HEART RATE: 45 BPM | DIASTOLIC BLOOD PRESSURE: 63 MMHG | WEIGHT: 121.25 LBS | SYSTOLIC BLOOD PRESSURE: 99 MMHG

## 2021-05-03 DIAGNOSIS — C01 SQUAMOUS CELL CARCINOMA OF BASE OF TONGUE: Primary | ICD-10-CM

## 2021-05-03 DIAGNOSIS — N18.31 STAGE 3A CHRONIC KIDNEY DISEASE: ICD-10-CM

## 2021-05-03 DIAGNOSIS — C77.0 SECONDARY MALIGNANT NEOPLASM OF CERVICAL LYMPH NODE: ICD-10-CM

## 2021-05-03 DIAGNOSIS — B19.20 HEPATITIS C VIRUS INFECTION WITHOUT HEPATIC COMA, UNSPECIFIED CHRONICITY: ICD-10-CM

## 2021-05-03 DIAGNOSIS — C01 SQUAMOUS CELL CARCINOMA OF BASE OF TONGUE: ICD-10-CM

## 2021-05-03 DIAGNOSIS — Z94.4 STATUS POST LIVER TRANSPLANTATION: Primary | ICD-10-CM

## 2021-05-03 LAB
ALBUMIN SERPL BCP-MCNC: 3.3 G/DL (ref 3.5–5.2)
ALP SERPL-CCNC: 62 U/L (ref 55–135)
ALT SERPL W/O P-5'-P-CCNC: 16 U/L (ref 10–44)
ANION GAP SERPL CALC-SCNC: 12 MMOL/L (ref 8–16)
AST SERPL-CCNC: 37 U/L (ref 10–40)
BASOPHILS # BLD AUTO: 0.01 K/UL (ref 0–0.2)
BASOPHILS NFR BLD: 0.3 % (ref 0–1.9)
BILIRUB SERPL-MCNC: 0.6 MG/DL (ref 0.1–1)
BUN SERPL-MCNC: 12 MG/DL (ref 8–23)
CALCIUM SERPL-MCNC: 9.2 MG/DL (ref 8.7–10.5)
CHLORIDE SERPL-SCNC: 103 MMOL/L (ref 95–110)
CO2 SERPL-SCNC: 25 MMOL/L (ref 23–29)
CREAT SERPL-MCNC: 1.4 MG/DL (ref 0.5–1.4)
DIFFERENTIAL METHOD: ABNORMAL
EOSINOPHIL # BLD AUTO: 0.3 K/UL (ref 0–0.5)
EOSINOPHIL NFR BLD: 6.9 % (ref 0–8)
ERYTHROCYTE [DISTWIDTH] IN BLOOD BY AUTOMATED COUNT: 11.5 % (ref 11.5–14.5)
EST. GFR  (AFRICAN AMERICAN): 57.6 ML/MIN/1.73 M^2
EST. GFR  (NON AFRICAN AMERICAN): 49.8 ML/MIN/1.73 M^2
GLUCOSE SERPL-MCNC: 114 MG/DL (ref 70–110)
HCT VFR BLD AUTO: 33.7 % (ref 40–54)
HGB BLD-MCNC: 11.4 G/DL (ref 14–18)
IMM GRANULOCYTES # BLD AUTO: 0.01 K/UL (ref 0–0.04)
IMM GRANULOCYTES NFR BLD AUTO: 0.3 % (ref 0–0.5)
LYMPHOCYTES # BLD AUTO: 0.7 K/UL (ref 1–4.8)
LYMPHOCYTES NFR BLD: 20.4 % (ref 18–48)
MCH RBC QN AUTO: 34 PG (ref 27–31)
MCHC RBC AUTO-ENTMCNC: 33.8 G/DL (ref 32–36)
MCV RBC AUTO: 101 FL (ref 82–98)
MONOCYTES # BLD AUTO: 0.3 K/UL (ref 0.3–1)
MONOCYTES NFR BLD: 9.4 % (ref 4–15)
NEUTROPHILS # BLD AUTO: 2.3 K/UL (ref 1.8–7.7)
NEUTROPHILS NFR BLD: 62.7 % (ref 38–73)
NRBC BLD-RTO: 0 /100 WBC
PLATELET # BLD AUTO: 177 K/UL (ref 150–450)
PMV BLD AUTO: 9.8 FL (ref 9.2–12.9)
POCT GLUCOSE: 95 MG/DL (ref 70–110)
POTASSIUM SERPL-SCNC: 3.5 MMOL/L (ref 3.5–5.1)
PROT SERPL-MCNC: 7.3 G/DL (ref 6–8.4)
RBC # BLD AUTO: 3.35 M/UL (ref 4.6–6.2)
SODIUM SERPL-SCNC: 140 MMOL/L (ref 136–145)
TACROLIMUS BLD-MCNC: 2.3 NG/ML (ref 5–15)
WBC # BLD AUTO: 3.63 K/UL (ref 3.9–12.7)

## 2021-05-03 PROCEDURE — 85025 COMPLETE CBC W/AUTO DIFF WBC: CPT | Performed by: INTERNAL MEDICINE

## 2021-05-03 PROCEDURE — 80053 COMPREHEN METABOLIC PANEL: CPT | Performed by: INTERNAL MEDICINE

## 2021-05-03 PROCEDURE — 63600175 PHARM REV CODE 636 W HCPCS: Performed by: STUDENT IN AN ORGANIZED HEALTH CARE EDUCATION/TRAINING PROGRAM

## 2021-05-03 PROCEDURE — 99999 PR PBB SHADOW E&M-EST. PATIENT-LVL III: CPT | Mod: PBBFAC,,, | Performed by: OTOLARYNGOLOGY

## 2021-05-03 PROCEDURE — 78815 NM PET CT ROUTINE: ICD-10-PCS | Mod: 26,PS,, | Performed by: RADIOLOGY

## 2021-05-03 PROCEDURE — 99213 PR OFFICE/OUTPT VISIT, EST, LEVL III, 20-29 MIN: ICD-10-PCS | Mod: S$PBB,,, | Performed by: OTOLARYNGOLOGY

## 2021-05-03 PROCEDURE — 99213 OFFICE O/P EST LOW 20 MIN: CPT | Mod: S$PBB,,, | Performed by: OTOLARYNGOLOGY

## 2021-05-03 PROCEDURE — 99999 PR PBB SHADOW E&M-EST. PATIENT-LVL III: ICD-10-PCS | Mod: PBBFAC,,, | Performed by: OTOLARYNGOLOGY

## 2021-05-03 PROCEDURE — A4216 STERILE WATER/SALINE, 10 ML: HCPCS | Performed by: STUDENT IN AN ORGANIZED HEALTH CARE EDUCATION/TRAINING PROGRAM

## 2021-05-03 PROCEDURE — 36591 DRAW BLOOD OFF VENOUS DEVICE: CPT

## 2021-05-03 PROCEDURE — 99213 OFFICE O/P EST LOW 20 MIN: CPT | Mod: PBBFAC,25 | Performed by: OTOLARYNGOLOGY

## 2021-05-03 PROCEDURE — 78815 PET IMAGE W/CT SKULL-THIGH: CPT | Mod: 26,PS,, | Performed by: RADIOLOGY

## 2021-05-03 PROCEDURE — 80197 ASSAY OF TACROLIMUS: CPT | Performed by: INTERNAL MEDICINE

## 2021-05-03 PROCEDURE — 25000003 PHARM REV CODE 250: Performed by: STUDENT IN AN ORGANIZED HEALTH CARE EDUCATION/TRAINING PROGRAM

## 2021-05-03 PROCEDURE — 78815 PET IMAGE W/CT SKULL-THIGH: CPT | Mod: TC,PS

## 2021-05-03 PROCEDURE — 84425 ASSAY OF VITAMIN B-1: CPT | Performed by: STUDENT IN AN ORGANIZED HEALTH CARE EDUCATION/TRAINING PROGRAM

## 2021-05-03 RX ORDER — HEPARIN 100 UNIT/ML
500 SYRINGE INTRAVENOUS
Status: CANCELLED | OUTPATIENT
Start: 2021-05-03

## 2021-05-03 RX ORDER — HEPARIN 100 UNIT/ML
500 SYRINGE INTRAVENOUS
Status: DISCONTINUED | OUTPATIENT
Start: 2021-05-03 | End: 2021-05-03 | Stop reason: HOSPADM

## 2021-05-03 RX ORDER — SODIUM CHLORIDE 0.9 % (FLUSH) 0.9 %
10 SYRINGE (ML) INJECTION
Status: CANCELLED | OUTPATIENT
Start: 2021-05-03

## 2021-05-03 RX ORDER — SODIUM CHLORIDE 0.9 % (FLUSH) 0.9 %
10 SYRINGE (ML) INJECTION
Status: DISCONTINUED | OUTPATIENT
Start: 2021-05-03 | End: 2021-05-03 | Stop reason: HOSPADM

## 2021-05-03 RX ADMIN — HEPARIN 500 UNITS: 100 SYRINGE at 08:05

## 2021-05-03 RX ADMIN — Medication 10 ML: at 08:05

## 2021-05-06 ENCOUNTER — TELEPHONE (OUTPATIENT)
Dept: TRANSPLANT | Facility: CLINIC | Age: 71
End: 2021-05-06

## 2021-05-06 LAB — VIT B1 BLD-MCNC: 59 UG/L (ref 38–122)

## 2021-05-07 ENCOUNTER — INFUSION (OUTPATIENT)
Dept: INFUSION THERAPY | Facility: HOSPITAL | Age: 71
End: 2021-05-07
Payer: MEDICARE

## 2021-05-07 VITALS
TEMPERATURE: 98 F | HEIGHT: 68 IN | WEIGHT: 121.5 LBS | HEART RATE: 88 BPM | RESPIRATION RATE: 16 BRPM | OXYGEN SATURATION: 100 % | DIASTOLIC BLOOD PRESSURE: 69 MMHG | SYSTOLIC BLOOD PRESSURE: 133 MMHG | BODY MASS INDEX: 18.41 KG/M2

## 2021-05-07 DIAGNOSIS — E83.42 HYPOMAGNESEMIA: Primary | ICD-10-CM

## 2021-05-07 PROCEDURE — A4216 STERILE WATER/SALINE, 10 ML: HCPCS | Performed by: STUDENT IN AN ORGANIZED HEALTH CARE EDUCATION/TRAINING PROGRAM

## 2021-05-07 PROCEDURE — 25000003 PHARM REV CODE 250: Performed by: STUDENT IN AN ORGANIZED HEALTH CARE EDUCATION/TRAINING PROGRAM

## 2021-05-07 PROCEDURE — 63600175 PHARM REV CODE 636 W HCPCS: Performed by: STUDENT IN AN ORGANIZED HEALTH CARE EDUCATION/TRAINING PROGRAM

## 2021-05-07 PROCEDURE — 96365 THER/PROPH/DIAG IV INF INIT: CPT

## 2021-05-07 RX ORDER — SODIUM CHLORIDE 0.9 % (FLUSH) 0.9 %
10 SYRINGE (ML) INJECTION
Status: DISCONTINUED | OUTPATIENT
Start: 2021-05-07 | End: 2021-05-07 | Stop reason: HOSPADM

## 2021-05-07 RX ORDER — HEPARIN 100 UNIT/ML
500 SYRINGE INTRAVENOUS
Status: DISCONTINUED | OUTPATIENT
Start: 2021-05-07 | End: 2021-05-07 | Stop reason: HOSPADM

## 2021-05-07 RX ORDER — MAGNESIUM SULFATE HEPTAHYDRATE 40 MG/ML
2 INJECTION, SOLUTION INTRAVENOUS
Status: DISPENSED | OUTPATIENT
Start: 2021-05-07 | End: 2021-05-07

## 2021-05-07 RX ADMIN — MAGNESIUM SULFATE HEPTAHYDRATE 2 G: 40 INJECTION, SOLUTION INTRAVENOUS at 03:05

## 2021-05-07 RX ADMIN — HEPARIN 500 UNITS: 100 SYRINGE at 04:05

## 2021-05-07 RX ADMIN — Medication 10 ML: at 04:05

## 2021-05-11 ENCOUNTER — OFFICE VISIT (OUTPATIENT)
Dept: OTOLARYNGOLOGY | Facility: CLINIC | Age: 71
End: 2021-05-11
Payer: MEDICARE

## 2021-05-11 ENCOUNTER — INFUSION (OUTPATIENT)
Dept: INFUSION THERAPY | Facility: HOSPITAL | Age: 71
End: 2021-05-11
Attending: INTERNAL MEDICINE
Payer: MEDICARE

## 2021-05-11 ENCOUNTER — OFFICE VISIT (OUTPATIENT)
Dept: HEMATOLOGY/ONCOLOGY | Facility: CLINIC | Age: 71
End: 2021-05-11
Payer: MEDICARE

## 2021-05-11 ENCOUNTER — INFUSION (OUTPATIENT)
Dept: INFUSION THERAPY | Facility: HOSPITAL | Age: 71
End: 2021-05-11
Payer: MEDICARE

## 2021-05-11 VITALS
DIASTOLIC BLOOD PRESSURE: 73 MMHG | RESPIRATION RATE: 16 BRPM | HEART RATE: 66 BPM | TEMPERATURE: 98 F | SYSTOLIC BLOOD PRESSURE: 122 MMHG

## 2021-05-11 VITALS
WEIGHT: 123 LBS | DIASTOLIC BLOOD PRESSURE: 73 MMHG | HEART RATE: 66 BPM | SYSTOLIC BLOOD PRESSURE: 122 MMHG | BODY MASS INDEX: 18.7 KG/M2

## 2021-05-11 VITALS
DIASTOLIC BLOOD PRESSURE: 64 MMHG | WEIGHT: 123.25 LBS | OXYGEN SATURATION: 99 % | HEIGHT: 68 IN | TEMPERATURE: 98 F | SYSTOLIC BLOOD PRESSURE: 116 MMHG | BODY MASS INDEX: 18.68 KG/M2 | HEART RATE: 94 BPM | RESPIRATION RATE: 18 BRPM

## 2021-05-11 DIAGNOSIS — C01 SQUAMOUS CELL CARCINOMA OF BASE OF TONGUE: ICD-10-CM

## 2021-05-11 DIAGNOSIS — K13.0 LIP LESION: ICD-10-CM

## 2021-05-11 DIAGNOSIS — K12.32 DRUG-INDUCED MUCOSITIS: Primary | ICD-10-CM

## 2021-05-11 DIAGNOSIS — C01 SQUAMOUS CELL CARCINOMA OF BASE OF TONGUE: Primary | ICD-10-CM

## 2021-05-11 DIAGNOSIS — C32.9 LARYNX CANCER: Primary | ICD-10-CM

## 2021-05-11 DIAGNOSIS — K12.30 MUCOSITIS: Primary | ICD-10-CM

## 2021-05-11 DIAGNOSIS — N18.31 STAGE 3A CHRONIC KIDNEY DISEASE: ICD-10-CM

## 2021-05-11 LAB
ALBUMIN SERPL BCP-MCNC: 3.4 G/DL (ref 3.5–5.2)
ALP SERPL-CCNC: 64 U/L (ref 55–135)
ALT SERPL W/O P-5'-P-CCNC: 14 U/L (ref 10–44)
ANION GAP SERPL CALC-SCNC: 10 MMOL/L (ref 8–16)
AST SERPL-CCNC: 38 U/L (ref 10–40)
BASOPHILS # BLD AUTO: 0.01 K/UL (ref 0–0.2)
BASOPHILS NFR BLD: 0.3 % (ref 0–1.9)
BILIRUB SERPL-MCNC: 0.6 MG/DL (ref 0.1–1)
BUN SERPL-MCNC: 15 MG/DL (ref 8–23)
CALCIUM SERPL-MCNC: 9.3 MG/DL (ref 8.7–10.5)
CHLORIDE SERPL-SCNC: 103 MMOL/L (ref 95–110)
CO2 SERPL-SCNC: 26 MMOL/L (ref 23–29)
CREAT SERPL-MCNC: 1.5 MG/DL (ref 0.5–1.4)
DIFFERENTIAL METHOD: ABNORMAL
EOSINOPHIL # BLD AUTO: 0.2 K/UL (ref 0–0.5)
EOSINOPHIL NFR BLD: 6.5 % (ref 0–8)
ERYTHROCYTE [DISTWIDTH] IN BLOOD BY AUTOMATED COUNT: 11.7 % (ref 11.5–14.5)
EST. GFR  (AFRICAN AMERICAN): 53 ML/MIN/1.73 M^2
EST. GFR  (NON AFRICAN AMERICAN): 45.9 ML/MIN/1.73 M^2
GLUCOSE SERPL-MCNC: 115 MG/DL (ref 70–110)
HCT VFR BLD AUTO: 33.9 % (ref 40–54)
HGB BLD-MCNC: 11.5 G/DL (ref 14–18)
IMM GRANULOCYTES # BLD AUTO: 0.01 K/UL (ref 0–0.04)
IMM GRANULOCYTES NFR BLD AUTO: 0.3 % (ref 0–0.5)
LYMPHOCYTES # BLD AUTO: 0.9 K/UL (ref 1–4.8)
LYMPHOCYTES NFR BLD: 23.5 % (ref 18–48)
MAGNESIUM SERPL-MCNC: 1.2 MG/DL (ref 1.6–2.6)
MCH RBC QN AUTO: 33.8 PG (ref 27–31)
MCHC RBC AUTO-ENTMCNC: 33.9 G/DL (ref 32–36)
MCV RBC AUTO: 100 FL (ref 82–98)
MONOCYTES # BLD AUTO: 0.5 K/UL (ref 0.3–1)
MONOCYTES NFR BLD: 12.1 % (ref 4–15)
NEUTROPHILS # BLD AUTO: 2.1 K/UL (ref 1.8–7.7)
NEUTROPHILS NFR BLD: 57.3 % (ref 38–73)
NRBC BLD-RTO: 0 /100 WBC
PLATELET # BLD AUTO: 164 K/UL (ref 150–450)
PMV BLD AUTO: 10 FL (ref 9.2–12.9)
POTASSIUM SERPL-SCNC: 3.7 MMOL/L (ref 3.5–5.1)
PROT SERPL-MCNC: 7.6 G/DL (ref 6–8.4)
RBC # BLD AUTO: 3.4 M/UL (ref 4.6–6.2)
SODIUM SERPL-SCNC: 139 MMOL/L (ref 136–145)
WBC # BLD AUTO: 3.71 K/UL (ref 3.9–12.7)

## 2021-05-11 PROCEDURE — 83735 ASSAY OF MAGNESIUM: CPT | Performed by: STUDENT IN AN ORGANIZED HEALTH CARE EDUCATION/TRAINING PROGRAM

## 2021-05-11 PROCEDURE — A4216 STERILE WATER/SALINE, 10 ML: HCPCS | Performed by: STUDENT IN AN ORGANIZED HEALTH CARE EDUCATION/TRAINING PROGRAM

## 2021-05-11 PROCEDURE — 88364 INSITU HYBRIDIZATION (FISH): CPT | Performed by: PATHOLOGY

## 2021-05-11 PROCEDURE — 88350 IMFLUOR EA ADDL 1ANTB STN PX: CPT | Performed by: PATHOLOGY

## 2021-05-11 PROCEDURE — 25000003 PHARM REV CODE 250: Performed by: STUDENT IN AN ORGANIZED HEALTH CARE EDUCATION/TRAINING PROGRAM

## 2021-05-11 PROCEDURE — 88312 PR  SPECIAL STAINS,GROUP I: ICD-10-PCS | Mod: 26,,, | Performed by: PATHOLOGY

## 2021-05-11 PROCEDURE — 88341 IMHCHEM/IMCYTCHM EA ADD ANTB: CPT | Performed by: PATHOLOGY

## 2021-05-11 PROCEDURE — 99213 OFFICE O/P EST LOW 20 MIN: CPT | Mod: PBBFAC,25 | Performed by: STUDENT IN AN ORGANIZED HEALTH CARE EDUCATION/TRAINING PROGRAM

## 2021-05-11 PROCEDURE — 96367 TX/PROPH/DG ADDL SEQ IV INF: CPT | Mod: 59

## 2021-05-11 PROCEDURE — 99213 OFFICE O/P EST LOW 20 MIN: CPT | Mod: PBBFAC,25,27 | Performed by: OTOLARYNGOLOGY

## 2021-05-11 PROCEDURE — 99999 PR PBB SHADOW E&M-EST. PATIENT-LVL III: ICD-10-PCS | Mod: PBBFAC,,, | Performed by: STUDENT IN AN ORGANIZED HEALTH CARE EDUCATION/TRAINING PROGRAM

## 2021-05-11 PROCEDURE — 88342 IMHCHEM/IMCYTCHM 1ST ANTB: CPT | Performed by: PATHOLOGY

## 2021-05-11 PROCEDURE — 36415 COLL VENOUS BLD VENIPUNCTURE: CPT | Performed by: STUDENT IN AN ORGANIZED HEALTH CARE EDUCATION/TRAINING PROGRAM

## 2021-05-11 PROCEDURE — 88305 TISSUE EXAM BY PATHOLOGIST: CPT | Performed by: PATHOLOGY

## 2021-05-11 PROCEDURE — 88365 INSITU HYBRIDIZATION (FISH): CPT | Mod: 26,,, | Performed by: PATHOLOGY

## 2021-05-11 PROCEDURE — 40490 BIOPSY OF LIP: CPT | Mod: S$PBB,,, | Performed by: OTOLARYNGOLOGY

## 2021-05-11 PROCEDURE — 99213 PR OFFICE/OUTPT VISIT, EST, LEVL III, 20-29 MIN: ICD-10-PCS | Mod: 25,S$PBB,, | Performed by: OTOLARYNGOLOGY

## 2021-05-11 PROCEDURE — 96366 THER/PROPH/DIAG IV INF ADDON: CPT

## 2021-05-11 PROCEDURE — 99999 PR PBB SHADOW E&M-EST. PATIENT-LVL III: ICD-10-PCS | Mod: PBBFAC,,, | Performed by: OTOLARYNGOLOGY

## 2021-05-11 PROCEDURE — 88365 INSITU HYBRIDIZATION (FISH): CPT | Performed by: PATHOLOGY

## 2021-05-11 PROCEDURE — 88305 TISSUE EXAM BY PATHOLOGIST: CPT | Mod: 26,,, | Performed by: PATHOLOGY

## 2021-05-11 PROCEDURE — 88342 IMHCHEM/IMCYTCHM 1ST ANTB: CPT | Mod: 26,,, | Performed by: PATHOLOGY

## 2021-05-11 PROCEDURE — 40490 BIOPSY OF LIP: CPT | Mod: PBBFAC | Performed by: OTOLARYNGOLOGY

## 2021-05-11 PROCEDURE — 85025 COMPLETE CBC W/AUTO DIFF WBC: CPT | Performed by: STUDENT IN AN ORGANIZED HEALTH CARE EDUCATION/TRAINING PROGRAM

## 2021-05-11 PROCEDURE — 88312 SPECIAL STAINS GROUP 1: CPT | Mod: 26,,, | Performed by: PATHOLOGY

## 2021-05-11 PROCEDURE — 88341 PR IHC OR ICC EACH ADD'L SINGLE ANTIBODY  STAINPR: ICD-10-PCS | Mod: 26,,, | Performed by: PATHOLOGY

## 2021-05-11 PROCEDURE — 88342 CHG IMMUNOCYTOCHEMISTRY: ICD-10-PCS | Mod: 26,,, | Performed by: PATHOLOGY

## 2021-05-11 PROCEDURE — 99215 OFFICE O/P EST HI 40 MIN: CPT | Mod: S$PBB,,, | Performed by: STUDENT IN AN ORGANIZED HEALTH CARE EDUCATION/TRAINING PROGRAM

## 2021-05-11 PROCEDURE — 88342 IMHCHEM/IMCYTCHM 1ST ANTB: CPT | Mod: 59 | Performed by: PATHOLOGY

## 2021-05-11 PROCEDURE — 99999 PR PBB SHADOW E&M-EST. PATIENT-LVL III: CPT | Mod: PBBFAC,,, | Performed by: OTOLARYNGOLOGY

## 2021-05-11 PROCEDURE — 88346 IMFLUOR 1ST 1ANTB STAIN PX: CPT | Mod: 59 | Performed by: PATHOLOGY

## 2021-05-11 PROCEDURE — 84425 ASSAY OF VITAMIN B-1: CPT | Performed by: STUDENT IN AN ORGANIZED HEALTH CARE EDUCATION/TRAINING PROGRAM

## 2021-05-11 PROCEDURE — 88365 PR  TISSUE HYBRIDIZATION: ICD-10-PCS | Mod: 26,,, | Performed by: PATHOLOGY

## 2021-05-11 PROCEDURE — 96413 CHEMO IV INFUSION 1 HR: CPT

## 2021-05-11 PROCEDURE — 80053 COMPREHEN METABOLIC PANEL: CPT | Performed by: STUDENT IN AN ORGANIZED HEALTH CARE EDUCATION/TRAINING PROGRAM

## 2021-05-11 PROCEDURE — 99999 PR PBB SHADOW E&M-EST. PATIENT-LVL III: CPT | Mod: PBBFAC,,, | Performed by: STUDENT IN AN ORGANIZED HEALTH CARE EDUCATION/TRAINING PROGRAM

## 2021-05-11 PROCEDURE — 99215 PR OFFICE/OUTPT VISIT, EST, LEVL V, 40-54 MIN: ICD-10-PCS | Mod: S$PBB,,, | Performed by: STUDENT IN AN ORGANIZED HEALTH CARE EDUCATION/TRAINING PROGRAM

## 2021-05-11 PROCEDURE — 99213 OFFICE O/P EST LOW 20 MIN: CPT | Mod: 25,S$PBB,, | Performed by: OTOLARYNGOLOGY

## 2021-05-11 PROCEDURE — 63600175 PHARM REV CODE 636 W HCPCS: Performed by: STUDENT IN AN ORGANIZED HEALTH CARE EDUCATION/TRAINING PROGRAM

## 2021-05-11 PROCEDURE — 40490 PR BIOPSY OF LIP: ICD-10-PCS | Mod: S$PBB,,, | Performed by: OTOLARYNGOLOGY

## 2021-05-11 PROCEDURE — 88305 TISSUE EXAM BY PATHOLOGIST: ICD-10-PCS | Mod: 26,,, | Performed by: PATHOLOGY

## 2021-05-11 PROCEDURE — 88312 SPECIAL STAINS GROUP 1: CPT | Mod: 59 | Performed by: PATHOLOGY

## 2021-05-11 PROCEDURE — 88341 IMHCHEM/IMCYTCHM EA ADD ANTB: CPT | Mod: 26,,, | Performed by: PATHOLOGY

## 2021-05-11 RX ORDER — DIPHENHYDRAMINE HYDROCHLORIDE 50 MG/ML
50 INJECTION INTRAMUSCULAR; INTRAVENOUS ONCE AS NEEDED
Status: CANCELLED | OUTPATIENT
Start: 2021-05-25

## 2021-05-11 RX ORDER — EPINEPHRINE 0.3 MG/.3ML
0.3 INJECTION SUBCUTANEOUS ONCE AS NEEDED
Status: CANCELLED | OUTPATIENT
Start: 2021-05-18

## 2021-05-11 RX ORDER — SODIUM CHLORIDE 0.9 % (FLUSH) 0.9 %
10 SYRINGE (ML) INJECTION
Status: DISCONTINUED | OUTPATIENT
Start: 2021-05-11 | End: 2021-05-11 | Stop reason: HOSPADM

## 2021-05-11 RX ORDER — HEPARIN 100 UNIT/ML
500 SYRINGE INTRAVENOUS
Status: DISCONTINUED | OUTPATIENT
Start: 2021-05-11 | End: 2021-05-11 | Stop reason: HOSPADM

## 2021-05-11 RX ORDER — MAGNESIUM SULFATE/D5W 2 G/50 ML
4 INTRAVENOUS SOLUTION, PIGGYBACK (ML) INTRAVENOUS ONCE
Status: CANCELLED
Start: 2021-05-18 | End: 2021-05-18

## 2021-05-11 RX ORDER — SODIUM CHLORIDE 0.9 % (FLUSH) 0.9 %
10 SYRINGE (ML) INJECTION
Status: CANCELLED | OUTPATIENT
Start: 2021-05-25

## 2021-05-11 RX ORDER — EPINEPHRINE 0.3 MG/.3ML
0.3 INJECTION SUBCUTANEOUS ONCE AS NEEDED
Status: CANCELLED | OUTPATIENT
Start: 2021-05-11

## 2021-05-11 RX ORDER — SODIUM CHLORIDE 0.9 % (FLUSH) 0.9 %
10 SYRINGE (ML) INJECTION
Status: CANCELLED | OUTPATIENT
Start: 2021-05-11

## 2021-05-11 RX ORDER — DEXAMETHASONE 0.5 MG/5ML
1 SOLUTION ORAL EVERY 12 HOURS
Qty: 200 ML | Refills: 0 | Status: SHIPPED | OUTPATIENT
Start: 2021-05-11 | End: 2021-05-21

## 2021-05-11 RX ORDER — SODIUM CHLORIDE 0.9 % (FLUSH) 0.9 %
10 SYRINGE (ML) INJECTION
Status: CANCELLED | OUTPATIENT
Start: 2021-05-18

## 2021-05-11 RX ORDER — MAGNESIUM SULFATE HEPTAHYDRATE 40 MG/ML
4 INJECTION, SOLUTION INTRAVENOUS ONCE
Status: COMPLETED | OUTPATIENT
Start: 2021-05-11 | End: 2021-05-11

## 2021-05-11 RX ORDER — DIPHENHYDRAMINE HYDROCHLORIDE 50 MG/ML
50 INJECTION INTRAMUSCULAR; INTRAVENOUS ONCE AS NEEDED
Status: DISCONTINUED | OUTPATIENT
Start: 2021-05-11 | End: 2021-05-11 | Stop reason: HOSPADM

## 2021-05-11 RX ORDER — DIPHENHYDRAMINE HYDROCHLORIDE 50 MG/ML
50 INJECTION INTRAMUSCULAR; INTRAVENOUS ONCE AS NEEDED
Status: CANCELLED | OUTPATIENT
Start: 2021-05-11

## 2021-05-11 RX ORDER — MAGNESIUM SULFATE/D5W 2 G/50 ML
4 INTRAVENOUS SOLUTION, PIGGYBACK (ML) INTRAVENOUS ONCE
Status: CANCELLED
Start: 2021-05-25 | End: 2021-05-25

## 2021-05-11 RX ORDER — EPINEPHRINE 0.3 MG/.3ML
0.3 INJECTION SUBCUTANEOUS ONCE AS NEEDED
Status: DISCONTINUED | OUTPATIENT
Start: 2021-05-11 | End: 2021-05-11 | Stop reason: HOSPADM

## 2021-05-11 RX ORDER — OXYCODONE HYDROCHLORIDE 5 MG/1
5 TABLET ORAL EVERY 6 HOURS PRN
Qty: 120 TABLET | Refills: 0 | Status: SHIPPED | OUTPATIENT
Start: 2021-05-11 | End: 2021-05-31 | Stop reason: SDUPTHER

## 2021-05-11 RX ORDER — HEPARIN 100 UNIT/ML
500 SYRINGE INTRAVENOUS
Status: CANCELLED | OUTPATIENT
Start: 2021-05-11

## 2021-05-11 RX ORDER — MAGNESIUM SULFATE/D5W 2 G/50 ML
4 INTRAVENOUS SOLUTION, PIGGYBACK (ML) INTRAVENOUS ONCE
Status: CANCELLED | OUTPATIENT
Start: 2021-05-11 | End: 2021-05-11

## 2021-05-11 RX ORDER — EPINEPHRINE 0.3 MG/.3ML
0.3 INJECTION SUBCUTANEOUS ONCE AS NEEDED
Status: CANCELLED | OUTPATIENT
Start: 2021-05-25

## 2021-05-11 RX ORDER — HEPARIN 100 UNIT/ML
500 SYRINGE INTRAVENOUS
Status: CANCELLED | OUTPATIENT
Start: 2021-05-25

## 2021-05-11 RX ORDER — DIPHENHYDRAMINE HYDROCHLORIDE 50 MG/ML
50 INJECTION INTRAMUSCULAR; INTRAVENOUS ONCE AS NEEDED
Status: CANCELLED | OUTPATIENT
Start: 2021-05-18

## 2021-05-11 RX ORDER — HEPARIN 100 UNIT/ML
500 SYRINGE INTRAVENOUS
Status: CANCELLED | OUTPATIENT
Start: 2021-05-18

## 2021-05-11 RX ADMIN — HEPARIN 500 UNITS: 100 SYRINGE at 02:05

## 2021-05-11 RX ADMIN — SODIUM CHLORIDE: 0.9 INJECTION, SOLUTION INTRAVENOUS at 10:05

## 2021-05-11 RX ADMIN — Medication 10 ML: at 02:05

## 2021-05-11 RX ADMIN — MAGNESIUM SULFATE HEPTAHYDRATE 4 G: 40 INJECTION, SOLUTION INTRAVENOUS at 12:05

## 2021-05-11 RX ADMIN — HEPARIN 500 UNITS: 100 SYRINGE at 08:05

## 2021-05-11 RX ADMIN — Medication 10 ML: at 08:05

## 2021-05-11 RX ADMIN — CETUXIMAB 342 MG: 2 SOLUTION INTRAVENOUS at 10:05

## 2021-05-11 RX ADMIN — DIPHENHYDRAMINE HYDROCHLORIDE 50 MG: 50 INJECTION, SOLUTION INTRAMUSCULAR; INTRAVENOUS at 10:05

## 2021-05-13 PROBLEM — K13.0 LIP LESION: Status: ACTIVE | Noted: 2021-05-13

## 2021-05-14 LAB — VIT B1 BLD-MCNC: 57 UG/L (ref 38–122)

## 2021-05-18 ENCOUNTER — INFUSION (OUTPATIENT)
Dept: INFUSION THERAPY | Facility: HOSPITAL | Age: 71
End: 2021-05-18
Payer: MEDICARE

## 2021-05-18 VITALS
RESPIRATION RATE: 18 BRPM | DIASTOLIC BLOOD PRESSURE: 76 MMHG | SYSTOLIC BLOOD PRESSURE: 126 MMHG | HEART RATE: 83 BPM | TEMPERATURE: 98 F

## 2021-05-18 DIAGNOSIS — C01 SQUAMOUS CELL CARCINOMA OF BASE OF TONGUE: Primary | ICD-10-CM

## 2021-05-18 PROCEDURE — 96367 TX/PROPH/DG ADDL SEQ IV INF: CPT

## 2021-05-18 PROCEDURE — 63600175 PHARM REV CODE 636 W HCPCS: Performed by: STUDENT IN AN ORGANIZED HEALTH CARE EDUCATION/TRAINING PROGRAM

## 2021-05-18 PROCEDURE — 25000003 PHARM REV CODE 250: Performed by: STUDENT IN AN ORGANIZED HEALTH CARE EDUCATION/TRAINING PROGRAM

## 2021-05-18 PROCEDURE — 96415 CHEMO IV INFUSION ADDL HR: CPT

## 2021-05-18 PROCEDURE — 96413 CHEMO IV INFUSION 1 HR: CPT

## 2021-05-18 RX ORDER — MAGNESIUM SULFATE HEPTAHYDRATE 40 MG/ML
4 INJECTION, SOLUTION INTRAVENOUS ONCE
Status: COMPLETED | OUTPATIENT
Start: 2021-05-18 | End: 2021-05-18

## 2021-05-18 RX ORDER — DIPHENHYDRAMINE HYDROCHLORIDE 50 MG/ML
50 INJECTION INTRAMUSCULAR; INTRAVENOUS ONCE AS NEEDED
Status: DISCONTINUED | OUTPATIENT
Start: 2021-05-18 | End: 2021-05-18 | Stop reason: HOSPADM

## 2021-05-18 RX ORDER — HEPARIN 100 UNIT/ML
500 SYRINGE INTRAVENOUS
Status: DISCONTINUED | OUTPATIENT
Start: 2021-05-18 | End: 2021-05-18 | Stop reason: HOSPADM

## 2021-05-18 RX ORDER — EPINEPHRINE 0.3 MG/.3ML
0.3 INJECTION SUBCUTANEOUS ONCE AS NEEDED
Status: DISCONTINUED | OUTPATIENT
Start: 2021-05-18 | End: 2021-05-18 | Stop reason: HOSPADM

## 2021-05-18 RX ORDER — SODIUM CHLORIDE 0.9 % (FLUSH) 0.9 %
10 SYRINGE (ML) INJECTION
Status: DISCONTINUED | OUTPATIENT
Start: 2021-05-18 | End: 2021-05-18 | Stop reason: HOSPADM

## 2021-05-18 RX ADMIN — CETUXIMAB 342 MG: 2 SOLUTION INTRAVENOUS at 11:05

## 2021-05-18 RX ADMIN — SODIUM CHLORIDE: 0.9 INJECTION, SOLUTION INTRAVENOUS at 09:05

## 2021-05-18 RX ADMIN — DIPHENHYDRAMINE HYDROCHLORIDE 50 MG: 50 INJECTION, SOLUTION INTRAMUSCULAR; INTRAVENOUS at 11:05

## 2021-05-18 RX ADMIN — HEPARIN 500 UNITS: 100 SYRINGE at 01:05

## 2021-05-18 RX ADMIN — MAGNESIUM SULFATE HEPTAHYDRATE 4 G: 40 INJECTION, SOLUTION INTRAVENOUS at 09:05

## 2021-05-25 ENCOUNTER — INFUSION (OUTPATIENT)
Dept: INFUSION THERAPY | Facility: HOSPITAL | Age: 71
End: 2021-05-25
Payer: MEDICARE

## 2021-05-25 VITALS
DIASTOLIC BLOOD PRESSURE: 74 MMHG | RESPIRATION RATE: 18 BRPM | HEIGHT: 68 IN | SYSTOLIC BLOOD PRESSURE: 134 MMHG | BODY MASS INDEX: 18.09 KG/M2 | WEIGHT: 119.38 LBS | TEMPERATURE: 98 F | HEART RATE: 62 BPM

## 2021-05-25 DIAGNOSIS — C01 SQUAMOUS CELL CARCINOMA OF BASE OF TONGUE: Primary | ICD-10-CM

## 2021-05-25 PROCEDURE — 96366 THER/PROPH/DIAG IV INF ADDON: CPT

## 2021-05-25 PROCEDURE — 63600175 PHARM REV CODE 636 W HCPCS: Mod: JG | Performed by: STUDENT IN AN ORGANIZED HEALTH CARE EDUCATION/TRAINING PROGRAM

## 2021-05-25 PROCEDURE — 25000003 PHARM REV CODE 250: Performed by: STUDENT IN AN ORGANIZED HEALTH CARE EDUCATION/TRAINING PROGRAM

## 2021-05-25 PROCEDURE — A4216 STERILE WATER/SALINE, 10 ML: HCPCS | Performed by: STUDENT IN AN ORGANIZED HEALTH CARE EDUCATION/TRAINING PROGRAM

## 2021-05-25 PROCEDURE — 96367 TX/PROPH/DG ADDL SEQ IV INF: CPT

## 2021-05-25 PROCEDURE — 96413 CHEMO IV INFUSION 1 HR: CPT

## 2021-05-25 RX ORDER — SODIUM CHLORIDE 0.9 % (FLUSH) 0.9 %
10 SYRINGE (ML) INJECTION
Status: DISCONTINUED | OUTPATIENT
Start: 2021-05-25 | End: 2021-05-25 | Stop reason: HOSPADM

## 2021-05-25 RX ORDER — HEPARIN 100 UNIT/ML
500 SYRINGE INTRAVENOUS
Status: DISCONTINUED | OUTPATIENT
Start: 2021-05-25 | End: 2021-05-25 | Stop reason: HOSPADM

## 2021-05-25 RX ORDER — DIPHENHYDRAMINE HYDROCHLORIDE 50 MG/ML
50 INJECTION INTRAMUSCULAR; INTRAVENOUS ONCE AS NEEDED
Status: DISCONTINUED | OUTPATIENT
Start: 2021-05-25 | End: 2021-05-25 | Stop reason: HOSPADM

## 2021-05-25 RX ORDER — EPINEPHRINE 0.3 MG/.3ML
0.3 INJECTION SUBCUTANEOUS ONCE AS NEEDED
Status: DISCONTINUED | OUTPATIENT
Start: 2021-05-25 | End: 2021-05-25 | Stop reason: HOSPADM

## 2021-05-25 RX ORDER — MAGNESIUM SULFATE HEPTAHYDRATE 40 MG/ML
4 INJECTION, SOLUTION INTRAVENOUS ONCE
Status: COMPLETED | OUTPATIENT
Start: 2021-05-25 | End: 2021-05-25

## 2021-05-25 RX ADMIN — Medication 10 ML: at 01:05

## 2021-05-25 RX ADMIN — CETUXIMAB 342 MG: 2 SOLUTION INTRAVENOUS at 09:05

## 2021-05-25 RX ADMIN — SODIUM CHLORIDE: 0.9 INJECTION, SOLUTION INTRAVENOUS at 09:05

## 2021-05-25 RX ADMIN — DIPHENHYDRAMINE HYDROCHLORIDE 50 MG: 50 INJECTION, SOLUTION INTRAMUSCULAR; INTRAVENOUS at 09:05

## 2021-05-25 RX ADMIN — MAGNESIUM SULFATE HEPTAHYDRATE 2 G: 40 INJECTION, SOLUTION INTRAVENOUS at 10:05

## 2021-05-25 RX ADMIN — HEPARIN 500 UNITS: 100 SYRINGE at 01:05

## 2021-05-31 ENCOUNTER — OFFICE VISIT (OUTPATIENT)
Dept: HEMATOLOGY/ONCOLOGY | Facility: CLINIC | Age: 71
End: 2021-05-31
Payer: MEDICARE

## 2021-05-31 ENCOUNTER — INFUSION (OUTPATIENT)
Dept: INFUSION THERAPY | Facility: HOSPITAL | Age: 71
End: 2021-05-31
Attending: STUDENT IN AN ORGANIZED HEALTH CARE EDUCATION/TRAINING PROGRAM
Payer: MEDICARE

## 2021-05-31 ENCOUNTER — PATIENT MESSAGE (OUTPATIENT)
Dept: OTOLARYNGOLOGY | Facility: CLINIC | Age: 71
End: 2021-05-31

## 2021-05-31 VITALS
HEART RATE: 92 BPM | WEIGHT: 119.69 LBS | TEMPERATURE: 98 F | RESPIRATION RATE: 18 BRPM | HEIGHT: 68 IN | SYSTOLIC BLOOD PRESSURE: 139 MMHG | DIASTOLIC BLOOD PRESSURE: 73 MMHG | BODY MASS INDEX: 18.14 KG/M2

## 2021-05-31 VITALS
HEIGHT: 68 IN | BODY MASS INDEX: 18.14 KG/M2 | RESPIRATION RATE: 20 BRPM | OXYGEN SATURATION: 100 % | DIASTOLIC BLOOD PRESSURE: 63 MMHG | SYSTOLIC BLOOD PRESSURE: 117 MMHG | WEIGHT: 119.69 LBS | HEART RATE: 106 BPM | TEMPERATURE: 98 F

## 2021-05-31 DIAGNOSIS — C77.0 SECONDARY MALIGNANT NEOPLASM OF CERVICAL LYMPH NODE: ICD-10-CM

## 2021-05-31 DIAGNOSIS — C01 SQUAMOUS CELL CARCINOMA OF BASE OF TONGUE: Primary | ICD-10-CM

## 2021-05-31 DIAGNOSIS — N18.31 STAGE 3A CHRONIC KIDNEY DISEASE: ICD-10-CM

## 2021-05-31 LAB
ALBUMIN SERPL BCP-MCNC: 3.4 G/DL (ref 3.5–5.2)
ALP SERPL-CCNC: 75 U/L (ref 55–135)
ALT SERPL W/O P-5'-P-CCNC: 18 U/L (ref 10–44)
ANION GAP SERPL CALC-SCNC: 14 MMOL/L (ref 8–16)
AST SERPL-CCNC: 40 U/L (ref 10–40)
BASOPHILS # BLD AUTO: 0.02 K/UL (ref 0–0.2)
BASOPHILS NFR BLD: 0.3 % (ref 0–1.9)
BILIRUB SERPL-MCNC: 0.9 MG/DL (ref 0.1–1)
BUN SERPL-MCNC: 16 MG/DL (ref 8–23)
CALCIUM SERPL-MCNC: 9.6 MG/DL (ref 8.7–10.5)
CHLORIDE SERPL-SCNC: 104 MMOL/L (ref 95–110)
CO2 SERPL-SCNC: 24 MMOL/L (ref 23–29)
CREAT SERPL-MCNC: 1.5 MG/DL (ref 0.5–1.4)
DIFFERENTIAL METHOD: ABNORMAL
EOSINOPHIL # BLD AUTO: 0.2 K/UL (ref 0–0.5)
EOSINOPHIL NFR BLD: 3.1 % (ref 0–8)
ERYTHROCYTE [DISTWIDTH] IN BLOOD BY AUTOMATED COUNT: 13.2 % (ref 11.5–14.5)
EST. GFR  (AFRICAN AMERICAN): 53 ML/MIN/1.73 M^2
EST. GFR  (NON AFRICAN AMERICAN): 45.9 ML/MIN/1.73 M^2
GLUCOSE SERPL-MCNC: 116 MG/DL (ref 70–110)
HCT VFR BLD AUTO: 34.9 % (ref 40–54)
HGB BLD-MCNC: 12.2 G/DL (ref 14–18)
IMM GRANULOCYTES # BLD AUTO: 0.02 K/UL (ref 0–0.04)
IMM GRANULOCYTES NFR BLD AUTO: 0.3 % (ref 0–0.5)
LYMPHOCYTES # BLD AUTO: 1 K/UL (ref 1–4.8)
LYMPHOCYTES NFR BLD: 15.6 % (ref 18–48)
MAGNESIUM SERPL-MCNC: 1.1 MG/DL (ref 1.6–2.6)
MCH RBC QN AUTO: 33.9 PG (ref 27–31)
MCHC RBC AUTO-ENTMCNC: 35 G/DL (ref 32–36)
MCV RBC AUTO: 97 FL (ref 82–98)
MONOCYTES # BLD AUTO: 0.6 K/UL (ref 0.3–1)
MONOCYTES NFR BLD: 9.4 % (ref 4–15)
NEUTROPHILS # BLD AUTO: 4.4 K/UL (ref 1.8–7.7)
NEUTROPHILS NFR BLD: 71.3 % (ref 38–73)
NRBC BLD-RTO: 0 /100 WBC
PLATELET # BLD AUTO: 144 K/UL (ref 150–450)
PMV BLD AUTO: 9.8 FL (ref 9.2–12.9)
POTASSIUM SERPL-SCNC: 3.6 MMOL/L (ref 3.5–5.1)
PROT SERPL-MCNC: 7.5 G/DL (ref 6–8.4)
RBC # BLD AUTO: 3.6 M/UL (ref 4.6–6.2)
SODIUM SERPL-SCNC: 142 MMOL/L (ref 136–145)
WBC # BLD AUTO: 6.16 K/UL (ref 3.9–12.7)

## 2021-05-31 PROCEDURE — 63600175 PHARM REV CODE 636 W HCPCS: Performed by: STUDENT IN AN ORGANIZED HEALTH CARE EDUCATION/TRAINING PROGRAM

## 2021-05-31 PROCEDURE — 83735 ASSAY OF MAGNESIUM: CPT | Performed by: STUDENT IN AN ORGANIZED HEALTH CARE EDUCATION/TRAINING PROGRAM

## 2021-05-31 PROCEDURE — 25000003 PHARM REV CODE 250: Performed by: STUDENT IN AN ORGANIZED HEALTH CARE EDUCATION/TRAINING PROGRAM

## 2021-05-31 PROCEDURE — 96413 CHEMO IV INFUSION 1 HR: CPT

## 2021-05-31 PROCEDURE — 99999 PR PBB SHADOW E&M-EST. PATIENT-LVL III: CPT | Mod: PBBFAC,,, | Performed by: STUDENT IN AN ORGANIZED HEALTH CARE EDUCATION/TRAINING PROGRAM

## 2021-05-31 PROCEDURE — 99215 OFFICE O/P EST HI 40 MIN: CPT | Mod: S$PBB,,, | Performed by: STUDENT IN AN ORGANIZED HEALTH CARE EDUCATION/TRAINING PROGRAM

## 2021-05-31 PROCEDURE — 99215 PR OFFICE/OUTPT VISIT, EST, LEVL V, 40-54 MIN: ICD-10-PCS | Mod: S$PBB,,, | Performed by: STUDENT IN AN ORGANIZED HEALTH CARE EDUCATION/TRAINING PROGRAM

## 2021-05-31 PROCEDURE — 96365 THER/PROPH/DIAG IV INF INIT: CPT

## 2021-05-31 PROCEDURE — 85025 COMPLETE CBC W/AUTO DIFF WBC: CPT | Performed by: STUDENT IN AN ORGANIZED HEALTH CARE EDUCATION/TRAINING PROGRAM

## 2021-05-31 PROCEDURE — 96366 THER/PROPH/DIAG IV INF ADDON: CPT

## 2021-05-31 PROCEDURE — 96367 TX/PROPH/DG ADDL SEQ IV INF: CPT

## 2021-05-31 PROCEDURE — 99213 OFFICE O/P EST LOW 20 MIN: CPT | Mod: PBBFAC | Performed by: STUDENT IN AN ORGANIZED HEALTH CARE EDUCATION/TRAINING PROGRAM

## 2021-05-31 PROCEDURE — 80053 COMPREHEN METABOLIC PANEL: CPT | Performed by: STUDENT IN AN ORGANIZED HEALTH CARE EDUCATION/TRAINING PROGRAM

## 2021-05-31 PROCEDURE — A4216 STERILE WATER/SALINE, 10 ML: HCPCS | Performed by: STUDENT IN AN ORGANIZED HEALTH CARE EDUCATION/TRAINING PROGRAM

## 2021-05-31 PROCEDURE — 96415 CHEMO IV INFUSION ADDL HR: CPT

## 2021-05-31 PROCEDURE — 99999 PR PBB SHADOW E&M-EST. PATIENT-LVL III: ICD-10-PCS | Mod: PBBFAC,,, | Performed by: STUDENT IN AN ORGANIZED HEALTH CARE EDUCATION/TRAINING PROGRAM

## 2021-05-31 RX ORDER — HEPARIN 100 UNIT/ML
500 SYRINGE INTRAVENOUS
Status: CANCELLED | OUTPATIENT
Start: 2021-05-31

## 2021-05-31 RX ORDER — HEPARIN 100 UNIT/ML
500 SYRINGE INTRAVENOUS
Status: DISCONTINUED | OUTPATIENT
Start: 2021-05-31 | End: 2021-05-31 | Stop reason: HOSPADM

## 2021-05-31 RX ORDER — SODIUM CHLORIDE 0.9 % (FLUSH) 0.9 %
10 SYRINGE (ML) INJECTION
Status: CANCELLED | OUTPATIENT
Start: 2021-05-31

## 2021-05-31 RX ORDER — SODIUM CHLORIDE 0.9 % (FLUSH) 0.9 %
10 SYRINGE (ML) INJECTION
Status: DISCONTINUED | OUTPATIENT
Start: 2021-05-31 | End: 2021-05-31 | Stop reason: HOSPADM

## 2021-05-31 RX ORDER — MAGNESIUM SULFATE HEPTAHYDRATE 40 MG/ML
2 INJECTION, SOLUTION INTRAVENOUS ONCE
Status: CANCELLED
Start: 2021-05-31 | End: 2021-05-31

## 2021-05-31 RX ORDER — EPINEPHRINE 0.3 MG/.3ML
0.3 INJECTION SUBCUTANEOUS ONCE AS NEEDED
Status: CANCELLED | OUTPATIENT
Start: 2021-05-31

## 2021-05-31 RX ORDER — DIPHENHYDRAMINE HYDROCHLORIDE 50 MG/ML
50 INJECTION INTRAMUSCULAR; INTRAVENOUS ONCE AS NEEDED
Status: CANCELLED | OUTPATIENT
Start: 2021-05-31

## 2021-05-31 RX ORDER — DOXYCYCLINE 100 MG/1
100 CAPSULE ORAL DAILY
Qty: 30 CAPSULE | Refills: 3 | Status: SHIPPED | OUTPATIENT
Start: 2021-05-31 | End: 2021-10-14

## 2021-05-31 RX ORDER — DOXYCYCLINE 100 MG/1
100 CAPSULE ORAL DAILY
Qty: 30 CAPSULE | Refills: 3 | Status: SHIPPED | OUTPATIENT
Start: 2021-05-31 | End: 2021-05-31 | Stop reason: SDUPTHER

## 2021-05-31 RX ORDER — MAGNESIUM SULFATE HEPTAHYDRATE 40 MG/ML
2 INJECTION, SOLUTION INTRAVENOUS ONCE
Status: COMPLETED | OUTPATIENT
Start: 2021-05-31 | End: 2021-05-31

## 2021-05-31 RX ORDER — TIZANIDINE 2 MG/1
2 TABLET ORAL NIGHTLY PRN
Qty: 30 TABLET | Refills: 0 | Status: SHIPPED | OUTPATIENT
Start: 2021-05-31 | End: 2021-09-23 | Stop reason: SDUPTHER

## 2021-05-31 RX ORDER — DIPHENHYDRAMINE HYDROCHLORIDE 50 MG/ML
50 INJECTION INTRAMUSCULAR; INTRAVENOUS ONCE AS NEEDED
Status: DISCONTINUED | OUTPATIENT
Start: 2021-05-31 | End: 2021-05-31 | Stop reason: HOSPADM

## 2021-05-31 RX ORDER — OXYCODONE HYDROCHLORIDE 5 MG/1
5 TABLET ORAL EVERY 6 HOURS PRN
Qty: 120 TABLET | Refills: 0 | Status: SHIPPED | OUTPATIENT
Start: 2021-06-11 | End: 2021-07-09 | Stop reason: SDUPTHER

## 2021-05-31 RX ORDER — EPINEPHRINE 0.3 MG/.3ML
0.3 INJECTION SUBCUTANEOUS ONCE AS NEEDED
Status: DISCONTINUED | OUTPATIENT
Start: 2021-05-31 | End: 2021-05-31 | Stop reason: HOSPADM

## 2021-05-31 RX ADMIN — CETUXIMAB 400 MG: 2 SOLUTION INTRAVENOUS at 01:05

## 2021-05-31 RX ADMIN — Medication 10 ML: at 03:05

## 2021-05-31 RX ADMIN — HEPARIN 500 UNITS: 100 SYRINGE at 03:05

## 2021-05-31 RX ADMIN — SODIUM CHLORIDE: 0.9 INJECTION, SOLUTION INTRAVENOUS at 11:05

## 2021-05-31 RX ADMIN — HEPARIN 500 UNITS: 100 SYRINGE at 08:05

## 2021-05-31 RX ADMIN — DIPHENHYDRAMINE HYDROCHLORIDE 50 MG: 50 INJECTION, SOLUTION INTRAMUSCULAR; INTRAVENOUS at 01:05

## 2021-05-31 RX ADMIN — MAGNESIUM SULFATE HEPTAHYDRATE 2 G: 40 INJECTION, SOLUTION INTRAVENOUS at 11:05

## 2021-05-31 RX ADMIN — Medication 10 ML: at 08:05

## 2021-06-07 ENCOUNTER — INFUSION (OUTPATIENT)
Dept: INFUSION THERAPY | Facility: HOSPITAL | Age: 71
End: 2021-06-07
Payer: MEDICARE

## 2021-06-07 VITALS
BODY MASS INDEX: 18.11 KG/M2 | DIASTOLIC BLOOD PRESSURE: 77 MMHG | RESPIRATION RATE: 18 BRPM | WEIGHT: 119.5 LBS | HEIGHT: 68 IN | HEART RATE: 71 BPM | TEMPERATURE: 98 F | SYSTOLIC BLOOD PRESSURE: 123 MMHG

## 2021-06-07 DIAGNOSIS — C01 SQUAMOUS CELL CARCINOMA OF BASE OF TONGUE: Primary | ICD-10-CM

## 2021-06-07 PROCEDURE — 96375 TX/PRO/DX INJ NEW DRUG ADDON: CPT

## 2021-06-07 PROCEDURE — 25000003 PHARM REV CODE 250: Performed by: STUDENT IN AN ORGANIZED HEALTH CARE EDUCATION/TRAINING PROGRAM

## 2021-06-07 PROCEDURE — 96415 CHEMO IV INFUSION ADDL HR: CPT

## 2021-06-07 PROCEDURE — 63600175 PHARM REV CODE 636 W HCPCS: Performed by: STUDENT IN AN ORGANIZED HEALTH CARE EDUCATION/TRAINING PROGRAM

## 2021-06-07 PROCEDURE — 96413 CHEMO IV INFUSION 1 HR: CPT

## 2021-06-07 RX ORDER — HEPARIN 100 UNIT/ML
500 SYRINGE INTRAVENOUS
Status: DISCONTINUED | OUTPATIENT
Start: 2021-06-07 | End: 2021-06-07 | Stop reason: HOSPADM

## 2021-06-07 RX ORDER — DIPHENHYDRAMINE HYDROCHLORIDE 50 MG/ML
50 INJECTION INTRAMUSCULAR; INTRAVENOUS ONCE AS NEEDED
Status: DISCONTINUED | OUTPATIENT
Start: 2021-06-07 | End: 2021-06-07 | Stop reason: HOSPADM

## 2021-06-07 RX ORDER — MAGNESIUM SULFATE HEPTAHYDRATE 40 MG/ML
4 INJECTION, SOLUTION INTRAVENOUS ONCE
Status: COMPLETED | OUTPATIENT
Start: 2021-06-07 | End: 2021-06-07

## 2021-06-07 RX ORDER — EPINEPHRINE 0.3 MG/.3ML
0.3 INJECTION SUBCUTANEOUS ONCE AS NEEDED
Status: DISCONTINUED | OUTPATIENT
Start: 2021-06-07 | End: 2021-06-07 | Stop reason: HOSPADM

## 2021-06-07 RX ORDER — SODIUM CHLORIDE 0.9 % (FLUSH) 0.9 %
10 SYRINGE (ML) INJECTION
Status: DISCONTINUED | OUTPATIENT
Start: 2021-06-07 | End: 2021-06-07 | Stop reason: HOSPADM

## 2021-06-07 RX ADMIN — SODIUM CHLORIDE: 0.9 INJECTION, SOLUTION INTRAVENOUS at 11:06

## 2021-06-07 RX ADMIN — HEPARIN 500 UNITS: 100 SYRINGE at 03:06

## 2021-06-07 RX ADMIN — MAGNESIUM SULFATE HEPTAHYDRATE 4 G: 40 INJECTION, SOLUTION INTRAVENOUS at 11:06

## 2021-06-07 RX ADMIN — CETUXIMAB 400 MG: 2 SOLUTION INTRAVENOUS at 01:06

## 2021-06-07 RX ADMIN — DIPHENHYDRAMINE HYDROCHLORIDE 50 MG: 50 INJECTION, SOLUTION INTRAMUSCULAR; INTRAVENOUS at 01:06

## 2021-06-09 RX ORDER — DIPHENHYDRAMINE HYDROCHLORIDE 50 MG/ML
50 INJECTION INTRAMUSCULAR; INTRAVENOUS ONCE AS NEEDED
Status: CANCELLED | OUTPATIENT
Start: 2021-06-14

## 2021-06-09 RX ORDER — HEPARIN 100 UNIT/ML
500 SYRINGE INTRAVENOUS
Status: CANCELLED | OUTPATIENT
Start: 2021-06-14

## 2021-06-09 RX ORDER — MAGNESIUM SULFATE/D5W 2 G/50 ML
4 INTRAVENOUS SOLUTION, PIGGYBACK (ML) INTRAVENOUS ONCE
Status: CANCELLED
Start: 2021-06-14 | End: 2021-06-14

## 2021-06-09 RX ORDER — SODIUM CHLORIDE 0.9 % (FLUSH) 0.9 %
10 SYRINGE (ML) INJECTION
Status: CANCELLED | OUTPATIENT
Start: 2021-06-14

## 2021-06-09 RX ORDER — EPINEPHRINE 0.3 MG/.3ML
0.3 INJECTION SUBCUTANEOUS ONCE AS NEEDED
Status: CANCELLED | OUTPATIENT
Start: 2021-06-14

## 2021-06-14 ENCOUNTER — INFUSION (OUTPATIENT)
Dept: INFUSION THERAPY | Facility: HOSPITAL | Age: 71
End: 2021-06-14
Payer: MEDICARE

## 2021-06-14 VITALS
RESPIRATION RATE: 18 BRPM | TEMPERATURE: 98 F | HEART RATE: 70 BPM | SYSTOLIC BLOOD PRESSURE: 145 MMHG | DIASTOLIC BLOOD PRESSURE: 70 MMHG | OXYGEN SATURATION: 96 %

## 2021-06-14 DIAGNOSIS — C01 SQUAMOUS CELL CARCINOMA OF BASE OF TONGUE: Primary | ICD-10-CM

## 2021-06-14 PROCEDURE — 96413 CHEMO IV INFUSION 1 HR: CPT

## 2021-06-14 PROCEDURE — 96415 CHEMO IV INFUSION ADDL HR: CPT

## 2021-06-14 PROCEDURE — 63600175 PHARM REV CODE 636 W HCPCS: Performed by: STUDENT IN AN ORGANIZED HEALTH CARE EDUCATION/TRAINING PROGRAM

## 2021-06-14 PROCEDURE — 25000003 PHARM REV CODE 250: Performed by: STUDENT IN AN ORGANIZED HEALTH CARE EDUCATION/TRAINING PROGRAM

## 2021-06-14 PROCEDURE — 96367 TX/PROPH/DG ADDL SEQ IV INF: CPT

## 2021-06-14 RX ORDER — DIPHENHYDRAMINE HYDROCHLORIDE 50 MG/ML
50 INJECTION INTRAMUSCULAR; INTRAVENOUS ONCE AS NEEDED
Status: DISCONTINUED | OUTPATIENT
Start: 2021-06-14 | End: 2021-06-14 | Stop reason: HOSPADM

## 2021-06-14 RX ORDER — MAGNESIUM SULFATE HEPTAHYDRATE 40 MG/ML
2 INJECTION, SOLUTION INTRAVENOUS
Status: DISPENSED | OUTPATIENT
Start: 2021-06-14 | End: 2021-06-14

## 2021-06-14 RX ORDER — HEPARIN 100 UNIT/ML
500 SYRINGE INTRAVENOUS
Status: DISCONTINUED | OUTPATIENT
Start: 2021-06-14 | End: 2021-06-14 | Stop reason: HOSPADM

## 2021-06-14 RX ORDER — EPINEPHRINE 0.3 MG/.3ML
0.3 INJECTION SUBCUTANEOUS ONCE AS NEEDED
Status: DISCONTINUED | OUTPATIENT
Start: 2021-06-14 | End: 2021-06-14 | Stop reason: HOSPADM

## 2021-06-14 RX ORDER — SODIUM CHLORIDE 0.9 % (FLUSH) 0.9 %
10 SYRINGE (ML) INJECTION
Status: DISCONTINUED | OUTPATIENT
Start: 2021-06-14 | End: 2021-06-14 | Stop reason: HOSPADM

## 2021-06-14 RX ADMIN — SODIUM CHLORIDE: 9 INJECTION, SOLUTION INTRAVENOUS at 11:06

## 2021-06-14 RX ADMIN — DIPHENHYDRAMINE HYDROCHLORIDE 50 MG: 50 INJECTION, SOLUTION INTRAMUSCULAR; INTRAVENOUS at 12:06

## 2021-06-14 RX ADMIN — HEPARIN 500 UNITS: 100 SYRINGE at 02:06

## 2021-06-14 RX ADMIN — MAGNESIUM SULFATE HEPTAHYDRATE 2 G: 40 INJECTION, SOLUTION INTRAVENOUS at 11:06

## 2021-06-14 RX ADMIN — CETUXIMAB 400 MG: 2 SOLUTION INTRAVENOUS at 12:06

## 2021-06-21 ENCOUNTER — INFUSION (OUTPATIENT)
Dept: INFUSION THERAPY | Facility: HOSPITAL | Age: 71
End: 2021-06-21
Payer: MEDICARE

## 2021-06-21 VITALS
RESPIRATION RATE: 18 BRPM | HEART RATE: 72 BPM | OXYGEN SATURATION: 97 % | TEMPERATURE: 98 F | SYSTOLIC BLOOD PRESSURE: 104 MMHG | DIASTOLIC BLOOD PRESSURE: 59 MMHG

## 2021-06-21 DIAGNOSIS — C22.0 HCC (HEPATOCELLULAR CARCINOMA): ICD-10-CM

## 2021-06-21 DIAGNOSIS — Z94.4 LIVER TRANSPLANTED: Primary | ICD-10-CM

## 2021-06-21 DIAGNOSIS — C01 SQUAMOUS CELL CARCINOMA OF BASE OF TONGUE: Primary | ICD-10-CM

## 2021-06-21 PROCEDURE — 96365 THER/PROPH/DIAG IV INF INIT: CPT

## 2021-06-21 PROCEDURE — 96367 TX/PROPH/DG ADDL SEQ IV INF: CPT

## 2021-06-21 PROCEDURE — 63600175 PHARM REV CODE 636 W HCPCS: Performed by: STUDENT IN AN ORGANIZED HEALTH CARE EDUCATION/TRAINING PROGRAM

## 2021-06-21 PROCEDURE — A4216 STERILE WATER/SALINE, 10 ML: HCPCS | Performed by: STUDENT IN AN ORGANIZED HEALTH CARE EDUCATION/TRAINING PROGRAM

## 2021-06-21 PROCEDURE — 96366 THER/PROPH/DIAG IV INF ADDON: CPT

## 2021-06-21 PROCEDURE — 96413 CHEMO IV INFUSION 1 HR: CPT

## 2021-06-21 PROCEDURE — 25000003 PHARM REV CODE 250: Performed by: STUDENT IN AN ORGANIZED HEALTH CARE EDUCATION/TRAINING PROGRAM

## 2021-06-21 RX ORDER — HEPARIN 100 UNIT/ML
500 SYRINGE INTRAVENOUS
Status: DISCONTINUED | OUTPATIENT
Start: 2021-06-21 | End: 2021-06-21 | Stop reason: HOSPADM

## 2021-06-21 RX ORDER — SODIUM CHLORIDE 0.9 % (FLUSH) 0.9 %
10 SYRINGE (ML) INJECTION
Status: DISCONTINUED | OUTPATIENT
Start: 2021-06-21 | End: 2021-06-21 | Stop reason: HOSPADM

## 2021-06-21 RX ORDER — DIPHENHYDRAMINE HYDROCHLORIDE 50 MG/ML
50 INJECTION INTRAMUSCULAR; INTRAVENOUS ONCE AS NEEDED
Status: DISCONTINUED | OUTPATIENT
Start: 2021-06-21 | End: 2021-06-21 | Stop reason: HOSPADM

## 2021-06-21 RX ORDER — MAGNESIUM SULFATE HEPTAHYDRATE 40 MG/ML
4 INJECTION, SOLUTION INTRAVENOUS ONCE
Status: COMPLETED | OUTPATIENT
Start: 2021-06-21 | End: 2021-06-21

## 2021-06-21 RX ORDER — EPINEPHRINE 0.3 MG/.3ML
0.3 INJECTION SUBCUTANEOUS ONCE AS NEEDED
Status: DISCONTINUED | OUTPATIENT
Start: 2021-06-21 | End: 2021-06-21 | Stop reason: HOSPADM

## 2021-06-21 RX ADMIN — Medication 10 ML: at 03:06

## 2021-06-21 RX ADMIN — HEPARIN 500 UNITS: 100 SYRINGE at 03:06

## 2021-06-21 RX ADMIN — MAGNESIUM SULFATE HEPTAHYDRATE 4 G: 40 INJECTION, SOLUTION INTRAVENOUS at 11:06

## 2021-06-21 RX ADMIN — CETUXIMAB 400 MG: 2 SOLUTION INTRAVENOUS at 02:06

## 2021-06-21 RX ADMIN — SODIUM CHLORIDE: 0.9 INJECTION, SOLUTION INTRAVENOUS at 11:06

## 2021-06-21 RX ADMIN — DIPHENHYDRAMINE HYDROCHLORIDE 50 MG: 50 INJECTION, SOLUTION INTRAMUSCULAR; INTRAVENOUS at 01:06

## 2021-06-21 RX ADMIN — Medication 10 ML: at 11:06

## 2021-06-21 NOTE — PROGRESS NOTES
COVID Screening Specimen Collected    POSTIVE for the following symptoms:  None    NEGATIVE for the following symptoms:  Fever  Cough  Shortness of breath  Difficulty breathing  GI symptoms such as diarrhea or nausea  Loss of taste  Loss of smell    Patient was given:  1.Instructions for Patients Awaiting COVID-19 Test Results  2. CDC: What to do if you are sick with coronavirus disease 2019 (COVID-19)   5-Fu Counseling: 5-Fluorouracil Counseling:  I discussed with the patient the risks of 5-fluorouracil including but not limited to erythema, scaling, itching, weeping, crusting, and pain.

## 2021-06-28 ENCOUNTER — INFUSION (OUTPATIENT)
Dept: INFUSION THERAPY | Facility: HOSPITAL | Age: 71
End: 2021-06-28
Attending: STUDENT IN AN ORGANIZED HEALTH CARE EDUCATION/TRAINING PROGRAM
Payer: MEDICARE

## 2021-06-28 ENCOUNTER — OFFICE VISIT (OUTPATIENT)
Dept: HEMATOLOGY/ONCOLOGY | Facility: CLINIC | Age: 71
End: 2021-06-28
Payer: MEDICARE

## 2021-06-28 ENCOUNTER — INFUSION (OUTPATIENT)
Dept: INFUSION THERAPY | Facility: HOSPITAL | Age: 71
End: 2021-06-28
Payer: MEDICARE

## 2021-06-28 VITALS
WEIGHT: 120.81 LBS | BODY MASS INDEX: 18.31 KG/M2 | RESPIRATION RATE: 20 BRPM | HEART RATE: 94 BPM | HEIGHT: 68 IN | SYSTOLIC BLOOD PRESSURE: 118 MMHG | DIASTOLIC BLOOD PRESSURE: 58 MMHG | OXYGEN SATURATION: 98 %

## 2021-06-28 VITALS — SYSTOLIC BLOOD PRESSURE: 125 MMHG | DIASTOLIC BLOOD PRESSURE: 70 MMHG | HEART RATE: 72 BPM | RESPIRATION RATE: 18 BRPM

## 2021-06-28 DIAGNOSIS — N18.31 STAGE 3A CHRONIC KIDNEY DISEASE: ICD-10-CM

## 2021-06-28 DIAGNOSIS — E89.0 POSTOPERATIVE HYPOTHYROIDISM: ICD-10-CM

## 2021-06-28 DIAGNOSIS — C77.0 SECONDARY MALIGNANT NEOPLASM OF CERVICAL LYMPH NODE: ICD-10-CM

## 2021-06-28 DIAGNOSIS — C01 SQUAMOUS CELL CARCINOMA OF BASE OF TONGUE: Primary | ICD-10-CM

## 2021-06-28 DIAGNOSIS — D63.1 ANEMIA IN STAGE 3A CHRONIC KIDNEY DISEASE: ICD-10-CM

## 2021-06-28 DIAGNOSIS — N18.31 ANEMIA IN STAGE 3A CHRONIC KIDNEY DISEASE: ICD-10-CM

## 2021-06-28 DIAGNOSIS — E83.42 HYPOMAGNESEMIA: ICD-10-CM

## 2021-06-28 LAB
ALBUMIN SERPL BCP-MCNC: 3.4 G/DL (ref 3.5–5.2)
ALP SERPL-CCNC: 62 U/L (ref 55–135)
ALT SERPL W/O P-5'-P-CCNC: 13 U/L (ref 10–44)
ANION GAP SERPL CALC-SCNC: 13 MMOL/L (ref 8–16)
AST SERPL-CCNC: 31 U/L (ref 10–40)
BASOPHILS # BLD AUTO: 0.02 K/UL (ref 0–0.2)
BASOPHILS NFR BLD: 0.4 % (ref 0–1.9)
BILIRUB SERPL-MCNC: 0.7 MG/DL (ref 0.1–1)
BUN SERPL-MCNC: 17 MG/DL (ref 8–23)
CALCIUM SERPL-MCNC: 9.5 MG/DL (ref 8.7–10.5)
CHLORIDE SERPL-SCNC: 104 MMOL/L (ref 95–110)
CO2 SERPL-SCNC: 24 MMOL/L (ref 23–29)
CREAT SERPL-MCNC: 1.4 MG/DL (ref 0.5–1.4)
DIFFERENTIAL METHOD: ABNORMAL
EOSINOPHIL # BLD AUTO: 0.1 K/UL (ref 0–0.5)
EOSINOPHIL NFR BLD: 2 % (ref 0–8)
ERYTHROCYTE [DISTWIDTH] IN BLOOD BY AUTOMATED COUNT: 13.6 % (ref 11.5–14.5)
EST. GFR  (AFRICAN AMERICAN): 57.6 ML/MIN/1.73 M^2
EST. GFR  (NON AFRICAN AMERICAN): 49.8 ML/MIN/1.73 M^2
GLUCOSE SERPL-MCNC: 139 MG/DL (ref 70–110)
HCT VFR BLD AUTO: 33.2 % (ref 40–54)
HGB BLD-MCNC: 11.7 G/DL (ref 14–18)
IMM GRANULOCYTES # BLD AUTO: 0.02 K/UL (ref 0–0.04)
IMM GRANULOCYTES NFR BLD AUTO: 0.4 % (ref 0–0.5)
LYMPHOCYTES # BLD AUTO: 0.9 K/UL (ref 1–4.8)
LYMPHOCYTES NFR BLD: 17.3 % (ref 18–48)
MAGNESIUM SERPL-MCNC: 1.1 MG/DL (ref 1.6–2.6)
MCH RBC QN AUTO: 33.4 PG (ref 27–31)
MCHC RBC AUTO-ENTMCNC: 35.2 G/DL (ref 32–36)
MCV RBC AUTO: 95 FL (ref 82–98)
MONOCYTES # BLD AUTO: 0.6 K/UL (ref 0.3–1)
MONOCYTES NFR BLD: 11.3 % (ref 4–15)
NEUTROPHILS # BLD AUTO: 3.4 K/UL (ref 1.8–7.7)
NEUTROPHILS NFR BLD: 68.6 % (ref 38–73)
NRBC BLD-RTO: 0 /100 WBC
PLATELET # BLD AUTO: 153 K/UL (ref 150–450)
PMV BLD AUTO: 9.7 FL (ref 9.2–12.9)
POTASSIUM SERPL-SCNC: 3.7 MMOL/L (ref 3.5–5.1)
PROT SERPL-MCNC: 7.1 G/DL (ref 6–8.4)
RBC # BLD AUTO: 3.5 M/UL (ref 4.6–6.2)
SODIUM SERPL-SCNC: 141 MMOL/L (ref 136–145)
WBC # BLD AUTO: 4.97 K/UL (ref 3.9–12.7)

## 2021-06-28 PROCEDURE — 99215 OFFICE O/P EST HI 40 MIN: CPT | Mod: S$PBB,,, | Performed by: STUDENT IN AN ORGANIZED HEALTH CARE EDUCATION/TRAINING PROGRAM

## 2021-06-28 PROCEDURE — 99999 PR PBB SHADOW E&M-EST. PATIENT-LVL IV: CPT | Mod: PBBFAC,,, | Performed by: STUDENT IN AN ORGANIZED HEALTH CARE EDUCATION/TRAINING PROGRAM

## 2021-06-28 PROCEDURE — 85025 COMPLETE CBC W/AUTO DIFF WBC: CPT | Performed by: STUDENT IN AN ORGANIZED HEALTH CARE EDUCATION/TRAINING PROGRAM

## 2021-06-28 PROCEDURE — 96365 THER/PROPH/DIAG IV INF INIT: CPT

## 2021-06-28 PROCEDURE — 96413 CHEMO IV INFUSION 1 HR: CPT

## 2021-06-28 PROCEDURE — A4216 STERILE WATER/SALINE, 10 ML: HCPCS | Performed by: STUDENT IN AN ORGANIZED HEALTH CARE EDUCATION/TRAINING PROGRAM

## 2021-06-28 PROCEDURE — 96367 TX/PROPH/DG ADDL SEQ IV INF: CPT

## 2021-06-28 PROCEDURE — 63600175 PHARM REV CODE 636 W HCPCS: Performed by: STUDENT IN AN ORGANIZED HEALTH CARE EDUCATION/TRAINING PROGRAM

## 2021-06-28 PROCEDURE — 25000003 PHARM REV CODE 250: Performed by: STUDENT IN AN ORGANIZED HEALTH CARE EDUCATION/TRAINING PROGRAM

## 2021-06-28 PROCEDURE — 80053 COMPREHEN METABOLIC PANEL: CPT | Performed by: STUDENT IN AN ORGANIZED HEALTH CARE EDUCATION/TRAINING PROGRAM

## 2021-06-28 PROCEDURE — 83735 ASSAY OF MAGNESIUM: CPT | Performed by: STUDENT IN AN ORGANIZED HEALTH CARE EDUCATION/TRAINING PROGRAM

## 2021-06-28 PROCEDURE — 99999 PR PBB SHADOW E&M-EST. PATIENT-LVL IV: ICD-10-PCS | Mod: PBBFAC,,, | Performed by: STUDENT IN AN ORGANIZED HEALTH CARE EDUCATION/TRAINING PROGRAM

## 2021-06-28 PROCEDURE — 99215 PR OFFICE/OUTPT VISIT, EST, LEVL V, 40-54 MIN: ICD-10-PCS | Mod: S$PBB,,, | Performed by: STUDENT IN AN ORGANIZED HEALTH CARE EDUCATION/TRAINING PROGRAM

## 2021-06-28 PROCEDURE — 99214 OFFICE O/P EST MOD 30 MIN: CPT | Mod: PBBFAC,25 | Performed by: STUDENT IN AN ORGANIZED HEALTH CARE EDUCATION/TRAINING PROGRAM

## 2021-06-28 PROCEDURE — 96415 CHEMO IV INFUSION ADDL HR: CPT

## 2021-06-28 RX ORDER — HEPARIN 100 UNIT/ML
500 SYRINGE INTRAVENOUS
Status: CANCELLED | OUTPATIENT
Start: 2021-06-28

## 2021-06-28 RX ORDER — SODIUM CHLORIDE 0.9 % (FLUSH) 0.9 %
10 SYRINGE (ML) INJECTION
Status: CANCELLED | OUTPATIENT
Start: 2021-06-28

## 2021-06-28 RX ORDER — SODIUM CHLORIDE 0.9 % (FLUSH) 0.9 %
10 SYRINGE (ML) INJECTION
Status: DISCONTINUED | OUTPATIENT
Start: 2021-06-28 | End: 2021-06-28 | Stop reason: HOSPADM

## 2021-06-28 RX ORDER — HEPARIN 100 UNIT/ML
500 SYRINGE INTRAVENOUS
Status: DISCONTINUED | OUTPATIENT
Start: 2021-06-28 | End: 2021-06-28 | Stop reason: HOSPADM

## 2021-06-28 RX ORDER — MAGNESIUM SULFATE HEPTAHYDRATE 40 MG/ML
2 INJECTION, SOLUTION INTRAVENOUS ONCE
Status: CANCELLED
Start: 2021-06-28 | End: 2021-06-28

## 2021-06-28 RX ORDER — MAGNESIUM SULFATE HEPTAHYDRATE 40 MG/ML
2 INJECTION, SOLUTION INTRAVENOUS ONCE
Status: COMPLETED | OUTPATIENT
Start: 2021-06-28 | End: 2021-06-28

## 2021-06-28 RX ORDER — DIPHENHYDRAMINE HYDROCHLORIDE 50 MG/ML
50 INJECTION INTRAMUSCULAR; INTRAVENOUS ONCE AS NEEDED
Status: DISCONTINUED | OUTPATIENT
Start: 2021-06-28 | End: 2021-06-28 | Stop reason: HOSPADM

## 2021-06-28 RX ORDER — DIPHENHYDRAMINE HYDROCHLORIDE 50 MG/ML
50 INJECTION INTRAMUSCULAR; INTRAVENOUS ONCE AS NEEDED
Status: CANCELLED | OUTPATIENT
Start: 2021-06-28

## 2021-06-28 RX ORDER — EPINEPHRINE 0.3 MG/.3ML
0.3 INJECTION SUBCUTANEOUS ONCE AS NEEDED
Status: CANCELLED | OUTPATIENT
Start: 2021-06-28

## 2021-06-28 RX ORDER — EPINEPHRINE 0.3 MG/.3ML
0.3 INJECTION SUBCUTANEOUS ONCE AS NEEDED
Status: DISCONTINUED | OUTPATIENT
Start: 2021-06-28 | End: 2021-06-28 | Stop reason: HOSPADM

## 2021-06-28 RX ADMIN — CETUXIMAB 342 MG: 2 SOLUTION INTRAVENOUS at 12:06

## 2021-06-28 RX ADMIN — HEPARIN 500 UNITS: 100 SYRINGE at 02:06

## 2021-06-28 RX ADMIN — Medication 10 ML: at 08:06

## 2021-06-28 RX ADMIN — MAGNESIUM SULFATE HEPTAHYDRATE 2 G: 40 INJECTION, SOLUTION INTRAVENOUS at 10:06

## 2021-06-28 RX ADMIN — HEPARIN 500 UNITS: 100 SYRINGE at 08:06

## 2021-06-28 RX ADMIN — DIPHENHYDRAMINE HYDROCHLORIDE 50 MG: 50 INJECTION INTRAMUSCULAR; INTRAVENOUS at 11:06

## 2021-06-28 RX ADMIN — Medication 10 ML: at 02:06

## 2021-06-29 DIAGNOSIS — E89.0 POSTOPERATIVE HYPOTHYROIDISM: Primary | ICD-10-CM

## 2021-07-05 RX ORDER — SODIUM CHLORIDE 0.9 % (FLUSH) 0.9 %
10 SYRINGE (ML) INJECTION
Status: CANCELLED | OUTPATIENT
Start: 2021-07-20

## 2021-07-05 RX ORDER — HEPARIN 100 UNIT/ML
500 SYRINGE INTRAVENOUS
Status: CANCELLED | OUTPATIENT
Start: 2021-07-13

## 2021-07-05 RX ORDER — DIPHENHYDRAMINE HYDROCHLORIDE 50 MG/ML
50 INJECTION INTRAMUSCULAR; INTRAVENOUS ONCE AS NEEDED
Status: CANCELLED | OUTPATIENT
Start: 2021-07-06

## 2021-07-05 RX ORDER — HEPARIN 100 UNIT/ML
500 SYRINGE INTRAVENOUS
Status: CANCELLED | OUTPATIENT
Start: 2021-07-20

## 2021-07-05 RX ORDER — SODIUM CHLORIDE 0.9 % (FLUSH) 0.9 %
10 SYRINGE (ML) INJECTION
Status: CANCELLED | OUTPATIENT
Start: 2021-07-13

## 2021-07-05 RX ORDER — EPINEPHRINE 0.3 MG/.3ML
0.3 INJECTION SUBCUTANEOUS ONCE AS NEEDED
Status: CANCELLED | OUTPATIENT
Start: 2021-07-20

## 2021-07-05 RX ORDER — EPINEPHRINE 0.3 MG/.3ML
0.3 INJECTION SUBCUTANEOUS ONCE AS NEEDED
Status: CANCELLED | OUTPATIENT
Start: 2021-07-13

## 2021-07-05 RX ORDER — EPINEPHRINE 0.3 MG/.3ML
0.3 INJECTION SUBCUTANEOUS ONCE AS NEEDED
Status: CANCELLED | OUTPATIENT
Start: 2021-07-06

## 2021-07-05 RX ORDER — SODIUM CHLORIDE 0.9 % (FLUSH) 0.9 %
10 SYRINGE (ML) INJECTION
Status: CANCELLED | OUTPATIENT
Start: 2021-07-06

## 2021-07-05 RX ORDER — HEPARIN 100 UNIT/ML
500 SYRINGE INTRAVENOUS
Status: CANCELLED | OUTPATIENT
Start: 2021-07-06

## 2021-07-05 RX ORDER — DIPHENHYDRAMINE HYDROCHLORIDE 50 MG/ML
50 INJECTION INTRAMUSCULAR; INTRAVENOUS ONCE AS NEEDED
Status: CANCELLED | OUTPATIENT
Start: 2021-07-20

## 2021-07-05 RX ORDER — DIPHENHYDRAMINE HYDROCHLORIDE 50 MG/ML
50 INJECTION INTRAMUSCULAR; INTRAVENOUS ONCE AS NEEDED
Status: CANCELLED | OUTPATIENT
Start: 2021-07-13

## 2021-07-06 ENCOUNTER — INFUSION (OUTPATIENT)
Dept: INFUSION THERAPY | Facility: HOSPITAL | Age: 71
End: 2021-07-06
Payer: MEDICARE

## 2021-07-06 VITALS
RESPIRATION RATE: 18 BRPM | DIASTOLIC BLOOD PRESSURE: 77 MMHG | OXYGEN SATURATION: 99 % | SYSTOLIC BLOOD PRESSURE: 143 MMHG | HEART RATE: 74 BPM | TEMPERATURE: 98 F

## 2021-07-06 DIAGNOSIS — C01 SQUAMOUS CELL CARCINOMA OF BASE OF TONGUE: Primary | ICD-10-CM

## 2021-07-06 PROCEDURE — A4216 STERILE WATER/SALINE, 10 ML: HCPCS | Performed by: STUDENT IN AN ORGANIZED HEALTH CARE EDUCATION/TRAINING PROGRAM

## 2021-07-06 PROCEDURE — 96413 CHEMO IV INFUSION 1 HR: CPT

## 2021-07-06 PROCEDURE — 96367 TX/PROPH/DG ADDL SEQ IV INF: CPT

## 2021-07-06 PROCEDURE — 96366 THER/PROPH/DIAG IV INF ADDON: CPT

## 2021-07-06 PROCEDURE — 96415 CHEMO IV INFUSION ADDL HR: CPT

## 2021-07-06 PROCEDURE — 25000003 PHARM REV CODE 250: Performed by: STUDENT IN AN ORGANIZED HEALTH CARE EDUCATION/TRAINING PROGRAM

## 2021-07-06 PROCEDURE — 63600175 PHARM REV CODE 636 W HCPCS: Performed by: STUDENT IN AN ORGANIZED HEALTH CARE EDUCATION/TRAINING PROGRAM

## 2021-07-06 RX ORDER — DIPHENHYDRAMINE HYDROCHLORIDE 50 MG/ML
50 INJECTION INTRAMUSCULAR; INTRAVENOUS ONCE AS NEEDED
Status: DISCONTINUED | OUTPATIENT
Start: 2021-07-06 | End: 2021-07-06 | Stop reason: HOSPADM

## 2021-07-06 RX ORDER — EPINEPHRINE 0.3 MG/.3ML
0.3 INJECTION SUBCUTANEOUS ONCE AS NEEDED
Status: DISCONTINUED | OUTPATIENT
Start: 2021-07-06 | End: 2021-07-06 | Stop reason: HOSPADM

## 2021-07-06 RX ORDER — SODIUM CHLORIDE 0.9 % (FLUSH) 0.9 %
10 SYRINGE (ML) INJECTION
Status: DISCONTINUED | OUTPATIENT
Start: 2021-07-06 | End: 2021-07-06 | Stop reason: HOSPADM

## 2021-07-06 RX ORDER — HEPARIN 100 UNIT/ML
500 SYRINGE INTRAVENOUS
Status: DISCONTINUED | OUTPATIENT
Start: 2021-07-06 | End: 2021-07-06 | Stop reason: HOSPADM

## 2021-07-06 RX ORDER — MAGNESIUM SULFATE HEPTAHYDRATE 40 MG/ML
2 INJECTION, SOLUTION INTRAVENOUS ONCE
Status: COMPLETED | OUTPATIENT
Start: 2021-07-06 | End: 2021-07-06

## 2021-07-06 RX ADMIN — SODIUM CHLORIDE: 0.9 INJECTION, SOLUTION INTRAVENOUS at 07:07

## 2021-07-06 RX ADMIN — HEPARIN 500 UNITS: 100 SYRINGE at 12:07

## 2021-07-06 RX ADMIN — Medication 10 ML: at 12:07

## 2021-07-06 RX ADMIN — CETUXIMAB 342 MG: 2 SOLUTION INTRAVENOUS at 10:07

## 2021-07-06 RX ADMIN — DIPHENHYDRAMINE HYDROCHLORIDE 50 MG: 50 INJECTION INTRAMUSCULAR; INTRAVENOUS at 10:07

## 2021-07-06 RX ADMIN — MAGNESIUM SULFATE HEPTAHYDRATE 2 G: 40 INJECTION, SOLUTION INTRAVENOUS at 07:07

## 2021-07-09 DIAGNOSIS — C01 SQUAMOUS CELL CARCINOMA OF BASE OF TONGUE: ICD-10-CM

## 2021-07-09 RX ORDER — OXYCODONE HYDROCHLORIDE 5 MG/1
5 TABLET ORAL EVERY 6 HOURS PRN
Qty: 120 TABLET | Refills: 0 | Status: SHIPPED | OUTPATIENT
Start: 2021-07-09 | End: 2021-08-11 | Stop reason: SDUPTHER

## 2021-07-13 ENCOUNTER — INFUSION (OUTPATIENT)
Dept: INFUSION THERAPY | Facility: HOSPITAL | Age: 71
End: 2021-07-13
Payer: MEDICARE

## 2021-07-13 VITALS
HEIGHT: 68 IN | SYSTOLIC BLOOD PRESSURE: 113 MMHG | DIASTOLIC BLOOD PRESSURE: 55 MMHG | WEIGHT: 118.19 LBS | BODY MASS INDEX: 17.91 KG/M2 | RESPIRATION RATE: 18 BRPM | HEART RATE: 106 BPM | TEMPERATURE: 98 F

## 2021-07-13 DIAGNOSIS — C01 SQUAMOUS CELL CARCINOMA OF BASE OF TONGUE: Primary | ICD-10-CM

## 2021-07-13 PROCEDURE — 96415 CHEMO IV INFUSION ADDL HR: CPT

## 2021-07-13 PROCEDURE — 96367 TX/PROPH/DG ADDL SEQ IV INF: CPT

## 2021-07-13 PROCEDURE — 63600175 PHARM REV CODE 636 W HCPCS: Mod: JG | Performed by: STUDENT IN AN ORGANIZED HEALTH CARE EDUCATION/TRAINING PROGRAM

## 2021-07-13 PROCEDURE — 96413 CHEMO IV INFUSION 1 HR: CPT

## 2021-07-13 PROCEDURE — 96366 THER/PROPH/DIAG IV INF ADDON: CPT

## 2021-07-13 PROCEDURE — A4216 STERILE WATER/SALINE, 10 ML: HCPCS | Performed by: STUDENT IN AN ORGANIZED HEALTH CARE EDUCATION/TRAINING PROGRAM

## 2021-07-13 PROCEDURE — 25000003 PHARM REV CODE 250: Performed by: STUDENT IN AN ORGANIZED HEALTH CARE EDUCATION/TRAINING PROGRAM

## 2021-07-13 RX ORDER — HEPARIN 100 UNIT/ML
500 SYRINGE INTRAVENOUS
Status: DISCONTINUED | OUTPATIENT
Start: 2021-07-13 | End: 2021-07-13 | Stop reason: HOSPADM

## 2021-07-13 RX ORDER — SODIUM CHLORIDE 0.9 % (FLUSH) 0.9 %
10 SYRINGE (ML) INJECTION
Status: DISCONTINUED | OUTPATIENT
Start: 2021-07-13 | End: 2021-07-13 | Stop reason: HOSPADM

## 2021-07-13 RX ORDER — MAGNESIUM SULFATE HEPTAHYDRATE 40 MG/ML
2 INJECTION, SOLUTION INTRAVENOUS ONCE
Status: COMPLETED | OUTPATIENT
Start: 2021-07-13 | End: 2021-07-13

## 2021-07-13 RX ORDER — EPINEPHRINE 0.3 MG/.3ML
0.3 INJECTION SUBCUTANEOUS ONCE AS NEEDED
Status: DISCONTINUED | OUTPATIENT
Start: 2021-07-13 | End: 2021-07-13 | Stop reason: HOSPADM

## 2021-07-13 RX ORDER — DIPHENHYDRAMINE HYDROCHLORIDE 50 MG/ML
50 INJECTION INTRAMUSCULAR; INTRAVENOUS ONCE AS NEEDED
Status: DISCONTINUED | OUTPATIENT
Start: 2021-07-13 | End: 2021-07-13 | Stop reason: HOSPADM

## 2021-07-13 RX ADMIN — HEPARIN 500 UNITS: 100 SYRINGE at 12:07

## 2021-07-13 RX ADMIN — Medication 10 ML: at 12:07

## 2021-07-13 RX ADMIN — MAGNESIUM SULFATE HEPTAHYDRATE 2 G: 40 INJECTION, SOLUTION INTRAVENOUS at 09:07

## 2021-07-13 RX ADMIN — SODIUM CHLORIDE: 0.9 INJECTION, SOLUTION INTRAVENOUS at 09:07

## 2021-07-13 RX ADMIN — CETUXIMAB 342 MG: 2 SOLUTION INTRAVENOUS at 10:07

## 2021-07-13 RX ADMIN — DIPHENHYDRAMINE HYDROCHLORIDE 50 MG: 50 INJECTION INTRAMUSCULAR; INTRAVENOUS at 09:07

## 2021-07-16 DIAGNOSIS — Z94.4 STATUS POST LIVER TRANSPLANTATION: ICD-10-CM

## 2021-07-19 DIAGNOSIS — Z94.4 STATUS POST LIVER TRANSPLANTATION: ICD-10-CM

## 2021-07-19 RX ORDER — TACROLIMUS 0.5 MG/1
0.5 CAPSULE ORAL EVERY 12 HOURS
Qty: 180 CAPSULE | Refills: 3 | Status: CANCELLED | OUTPATIENT
Start: 2021-07-19 | End: 2022-07-19

## 2021-07-20 ENCOUNTER — INFUSION (OUTPATIENT)
Dept: INFUSION THERAPY | Facility: HOSPITAL | Age: 71
End: 2021-07-20
Payer: MEDICARE

## 2021-07-20 VITALS
HEART RATE: 73 BPM | BODY MASS INDEX: 18.35 KG/M2 | DIASTOLIC BLOOD PRESSURE: 65 MMHG | TEMPERATURE: 98 F | SYSTOLIC BLOOD PRESSURE: 124 MMHG | HEIGHT: 68 IN | WEIGHT: 121.06 LBS | RESPIRATION RATE: 18 BRPM

## 2021-07-20 DIAGNOSIS — C01 SQUAMOUS CELL CARCINOMA OF BASE OF TONGUE: Primary | ICD-10-CM

## 2021-07-20 PROCEDURE — 63600175 PHARM REV CODE 636 W HCPCS: Mod: JG | Performed by: STUDENT IN AN ORGANIZED HEALTH CARE EDUCATION/TRAINING PROGRAM

## 2021-07-20 PROCEDURE — 25000003 PHARM REV CODE 250: Performed by: STUDENT IN AN ORGANIZED HEALTH CARE EDUCATION/TRAINING PROGRAM

## 2021-07-20 PROCEDURE — 96365 THER/PROPH/DIAG IV INF INIT: CPT

## 2021-07-20 PROCEDURE — 96367 TX/PROPH/DG ADDL SEQ IV INF: CPT

## 2021-07-20 PROCEDURE — 96413 CHEMO IV INFUSION 1 HR: CPT

## 2021-07-20 PROCEDURE — 96415 CHEMO IV INFUSION ADDL HR: CPT

## 2021-07-20 RX ORDER — DIPHENHYDRAMINE HYDROCHLORIDE 50 MG/ML
50 INJECTION INTRAMUSCULAR; INTRAVENOUS ONCE AS NEEDED
Status: DISCONTINUED | OUTPATIENT
Start: 2021-07-20 | End: 2021-07-20 | Stop reason: HOSPADM

## 2021-07-20 RX ORDER — SODIUM CHLORIDE 0.9 % (FLUSH) 0.9 %
10 SYRINGE (ML) INJECTION
Status: DISCONTINUED | OUTPATIENT
Start: 2021-07-20 | End: 2021-07-20 | Stop reason: HOSPADM

## 2021-07-20 RX ORDER — MAGNESIUM SULFATE HEPTAHYDRATE 40 MG/ML
2 INJECTION, SOLUTION INTRAVENOUS ONCE
Status: COMPLETED | OUTPATIENT
Start: 2021-07-20 | End: 2021-07-20

## 2021-07-20 RX ORDER — TACROLIMUS 0.5 MG/1
0.5 CAPSULE ORAL EVERY 12 HOURS
Qty: 180 CAPSULE | Refills: 3 | Status: SHIPPED | OUTPATIENT
Start: 2021-07-20 | End: 2022-05-18 | Stop reason: SDUPTHER

## 2021-07-20 RX ORDER — EPINEPHRINE 0.3 MG/.3ML
0.3 INJECTION SUBCUTANEOUS ONCE AS NEEDED
Status: DISCONTINUED | OUTPATIENT
Start: 2021-07-20 | End: 2021-07-20 | Stop reason: HOSPADM

## 2021-07-20 RX ORDER — HEPARIN 100 UNIT/ML
500 SYRINGE INTRAVENOUS
Status: DISCONTINUED | OUTPATIENT
Start: 2021-07-20 | End: 2021-07-20 | Stop reason: HOSPADM

## 2021-07-20 RX ADMIN — MAGNESIUM SULFATE HEPTAHYDRATE 2 G: 40 INJECTION, SOLUTION INTRAVENOUS at 09:07

## 2021-07-20 RX ADMIN — DIPHENHYDRAMINE HYDROCHLORIDE 50 MG: 50 INJECTION INTRAMUSCULAR; INTRAVENOUS at 09:07

## 2021-07-20 RX ADMIN — CETUXIMAB 342 MG: 2 SOLUTION INTRAVENOUS at 10:07

## 2021-07-20 RX ADMIN — SODIUM CHLORIDE: 0.9 INJECTION, SOLUTION INTRAVENOUS at 09:07

## 2021-07-23 ENCOUNTER — INFUSION (OUTPATIENT)
Dept: INFUSION THERAPY | Facility: HOSPITAL | Age: 71
End: 2021-07-23
Attending: STUDENT IN AN ORGANIZED HEALTH CARE EDUCATION/TRAINING PROGRAM
Payer: MEDICARE

## 2021-07-23 ENCOUNTER — HOSPITAL ENCOUNTER (OUTPATIENT)
Dept: RADIOLOGY | Facility: HOSPITAL | Age: 71
Discharge: HOME | End: 2021-07-23
Attending: STUDENT IN AN ORGANIZED HEALTH CARE EDUCATION/TRAINING PROGRAM
Payer: MEDICARE

## 2021-07-23 DIAGNOSIS — N18.31 STAGE 3A CHRONIC KIDNEY DISEASE: ICD-10-CM

## 2021-07-23 DIAGNOSIS — C01 SQUAMOUS CELL CARCINOMA OF BASE OF TONGUE: Primary | ICD-10-CM

## 2021-07-23 DIAGNOSIS — C01 SQUAMOUS CELL CARCINOMA OF BASE OF TONGUE: ICD-10-CM

## 2021-07-23 DIAGNOSIS — C77.0 SECONDARY MALIGNANT NEOPLASM OF CERVICAL LYMPH NODE: ICD-10-CM

## 2021-07-23 DIAGNOSIS — E89.0 POSTOPERATIVE HYPOTHYROIDISM: ICD-10-CM

## 2021-07-23 LAB
ALBUMIN SERPL BCP-MCNC: 3.7 G/DL (ref 3.5–5.2)
ALP SERPL-CCNC: 74 U/L (ref 55–135)
ALT SERPL W/O P-5'-P-CCNC: 16 U/L (ref 10–44)
ANION GAP SERPL CALC-SCNC: 13 MMOL/L (ref 8–16)
AST SERPL-CCNC: 43 U/L (ref 10–40)
BASOPHILS # BLD AUTO: 0.01 K/UL (ref 0–0.2)
BASOPHILS NFR BLD: 0.2 % (ref 0–1.9)
BILIRUB SERPL-MCNC: 1 MG/DL (ref 0.1–1)
BUN SERPL-MCNC: 12 MG/DL (ref 8–23)
CALCIUM SERPL-MCNC: 9.6 MG/DL (ref 8.7–10.5)
CHLORIDE SERPL-SCNC: 107 MMOL/L (ref 95–110)
CO2 SERPL-SCNC: 23 MMOL/L (ref 23–29)
CREAT SERPL-MCNC: 1.5 MG/DL (ref 0.5–1.4)
DIFFERENTIAL METHOD: ABNORMAL
EOSINOPHIL # BLD AUTO: 0.2 K/UL (ref 0–0.5)
EOSINOPHIL NFR BLD: 3.5 % (ref 0–8)
ERYTHROCYTE [DISTWIDTH] IN BLOOD BY AUTOMATED COUNT: 13.1 % (ref 11.5–14.5)
EST. GFR  (AFRICAN AMERICAN): 53 ML/MIN/1.73 M^2
EST. GFR  (NON AFRICAN AMERICAN): 45.9 ML/MIN/1.73 M^2
GLUCOSE SERPL-MCNC: 97 MG/DL (ref 70–110)
HCT VFR BLD AUTO: 35.3 % (ref 40–54)
HGB BLD-MCNC: 12.4 G/DL (ref 14–18)
IMM GRANULOCYTES # BLD AUTO: 0.02 K/UL (ref 0–0.04)
IMM GRANULOCYTES NFR BLD AUTO: 0.4 % (ref 0–0.5)
LYMPHOCYTES # BLD AUTO: 0.8 K/UL (ref 1–4.8)
LYMPHOCYTES NFR BLD: 16.6 % (ref 18–48)
MAGNESIUM SERPL-MCNC: 1 MG/DL (ref 1.6–2.6)
MCH RBC QN AUTO: 33 PG (ref 27–31)
MCHC RBC AUTO-ENTMCNC: 35.1 G/DL (ref 32–36)
MCV RBC AUTO: 94 FL (ref 82–98)
MONOCYTES # BLD AUTO: 0.5 K/UL (ref 0.3–1)
MONOCYTES NFR BLD: 10.6 % (ref 4–15)
NEUTROPHILS # BLD AUTO: 3.1 K/UL (ref 1.8–7.7)
NEUTROPHILS NFR BLD: 68.7 % (ref 38–73)
NRBC BLD-RTO: 0 /100 WBC
PLATELET # BLD AUTO: 149 K/UL (ref 150–450)
PMV BLD AUTO: 9.7 FL (ref 9.2–12.9)
POCT GLUCOSE: 105 MG/DL (ref 70–110)
POTASSIUM SERPL-SCNC: 3.5 MMOL/L (ref 3.5–5.1)
PROT SERPL-MCNC: 7.8 G/DL (ref 6–8.4)
RBC # BLD AUTO: 3.76 M/UL (ref 4.6–6.2)
SODIUM SERPL-SCNC: 143 MMOL/L (ref 136–145)
T4 FREE SERPL-MCNC: 1.3 NG/DL (ref 0.71–1.51)
TSH SERPL DL<=0.005 MIU/L-ACNC: 0.23 UIU/ML (ref 0.4–4)
WBC # BLD AUTO: 4.52 K/UL (ref 3.9–12.7)

## 2021-07-23 PROCEDURE — 83735 ASSAY OF MAGNESIUM: CPT | Performed by: STUDENT IN AN ORGANIZED HEALTH CARE EDUCATION/TRAINING PROGRAM

## 2021-07-23 PROCEDURE — 84443 ASSAY THYROID STIM HORMONE: CPT | Performed by: STUDENT IN AN ORGANIZED HEALTH CARE EDUCATION/TRAINING PROGRAM

## 2021-07-23 PROCEDURE — 25000003 PHARM REV CODE 250: Performed by: STUDENT IN AN ORGANIZED HEALTH CARE EDUCATION/TRAINING PROGRAM

## 2021-07-23 PROCEDURE — 63600175 PHARM REV CODE 636 W HCPCS: Performed by: STUDENT IN AN ORGANIZED HEALTH CARE EDUCATION/TRAINING PROGRAM

## 2021-07-23 PROCEDURE — 84439 ASSAY OF FREE THYROXINE: CPT | Performed by: STUDENT IN AN ORGANIZED HEALTH CARE EDUCATION/TRAINING PROGRAM

## 2021-07-23 PROCEDURE — 80053 COMPREHEN METABOLIC PANEL: CPT | Performed by: STUDENT IN AN ORGANIZED HEALTH CARE EDUCATION/TRAINING PROGRAM

## 2021-07-23 PROCEDURE — 78815 NM PET CT ROUTINE: ICD-10-PCS | Mod: 26,PS,GC, | Performed by: RADIOLOGY

## 2021-07-23 PROCEDURE — 36591 DRAW BLOOD OFF VENOUS DEVICE: CPT

## 2021-07-23 PROCEDURE — 78815 PET IMAGE W/CT SKULL-THIGH: CPT | Mod: 26,PS,GC, | Performed by: RADIOLOGY

## 2021-07-23 PROCEDURE — 85025 COMPLETE CBC W/AUTO DIFF WBC: CPT | Performed by: STUDENT IN AN ORGANIZED HEALTH CARE EDUCATION/TRAINING PROGRAM

## 2021-07-23 PROCEDURE — 78815 PET IMAGE W/CT SKULL-THIGH: CPT | Mod: TC,PS

## 2021-07-23 PROCEDURE — A4216 STERILE WATER/SALINE, 10 ML: HCPCS | Performed by: STUDENT IN AN ORGANIZED HEALTH CARE EDUCATION/TRAINING PROGRAM

## 2021-07-23 RX ORDER — SODIUM CHLORIDE 0.9 % (FLUSH) 0.9 %
10 SYRINGE (ML) INJECTION
Status: CANCELLED | OUTPATIENT
Start: 2021-07-23

## 2021-07-23 RX ORDER — DOXYCYCLINE HYCLATE 20 MG
20 TABLET ORAL 2 TIMES DAILY WITH MEALS
COMMUNITY
Start: 2021-05-13 | End: 2021-10-14

## 2021-07-23 RX ORDER — SULFACETAMIDE SODIUM, SULFUR 100; 50 MG/G; MG/G
EMULSION TOPICAL
COMMUNITY
Start: 2021-03-05

## 2021-07-23 RX ORDER — AZELAIC ACID 0.15 G/G
GEL TOPICAL
COMMUNITY
Start: 2021-03-05 | End: 2022-08-22 | Stop reason: SDUPTHER

## 2021-07-23 RX ORDER — SODIUM CHLORIDE 0.9 % (FLUSH) 0.9 %
10 SYRINGE (ML) INJECTION
Status: DISCONTINUED | OUTPATIENT
Start: 2021-07-23 | End: 2021-07-23 | Stop reason: HOSPADM

## 2021-07-23 RX ORDER — HEPARIN 100 UNIT/ML
500 SYRINGE INTRAVENOUS
Status: CANCELLED | OUTPATIENT
Start: 2021-07-23

## 2021-07-23 RX ORDER — HEPARIN 100 UNIT/ML
500 SYRINGE INTRAVENOUS
Status: DISCONTINUED | OUTPATIENT
Start: 2021-07-23 | End: 2021-07-23 | Stop reason: HOSPADM

## 2021-07-23 RX ADMIN — Medication 10 ML: at 08:07

## 2021-07-23 RX ADMIN — HEPARIN 500 UNITS: 100 SYRINGE at 08:07

## 2021-07-26 ENCOUNTER — HOSPITAL ENCOUNTER (OUTPATIENT)
Dept: RADIOLOGY | Facility: HOSPITAL | Age: 71
Discharge: HOME | End: 2021-07-26
Attending: INTERNAL MEDICINE
Payer: MEDICARE

## 2021-07-26 DIAGNOSIS — Z94.4 STATUS POST LIVER TRANSPLANTATION: ICD-10-CM

## 2021-07-26 PROCEDURE — 93975 VASCULAR STUDY: CPT | Mod: TC

## 2021-07-26 PROCEDURE — 76705 ECHO EXAM OF ABDOMEN: CPT | Mod: 26,XS,, | Performed by: INTERNAL MEDICINE

## 2021-07-26 PROCEDURE — 93975 VASCULAR STUDY: CPT | Mod: 26,,, | Performed by: INTERNAL MEDICINE

## 2021-07-26 PROCEDURE — 93975 US LIVER TRANSPLANT POST: ICD-10-PCS | Mod: 26,,, | Performed by: INTERNAL MEDICINE

## 2021-07-26 PROCEDURE — 76705 US LIVER TRANSPLANT POST: ICD-10-PCS | Mod: 26,XS,, | Performed by: INTERNAL MEDICINE

## 2021-07-27 ENCOUNTER — OFFICE VISIT (OUTPATIENT)
Dept: HEMATOLOGY/ONCOLOGY | Facility: CLINIC | Age: 71
End: 2021-07-27
Payer: MEDICARE

## 2021-07-27 ENCOUNTER — INFUSION (OUTPATIENT)
Dept: INFUSION THERAPY | Facility: HOSPITAL | Age: 71
End: 2021-07-27
Payer: MEDICARE

## 2021-07-27 VITALS
RESPIRATION RATE: 18 BRPM | DIASTOLIC BLOOD PRESSURE: 64 MMHG | TEMPERATURE: 98 F | HEART RATE: 70 BPM | SYSTOLIC BLOOD PRESSURE: 118 MMHG

## 2021-07-27 VITALS
OXYGEN SATURATION: 99 % | WEIGHT: 119.5 LBS | DIASTOLIC BLOOD PRESSURE: 63 MMHG | HEART RATE: 50 BPM | SYSTOLIC BLOOD PRESSURE: 113 MMHG | BODY MASS INDEX: 18.11 KG/M2 | HEIGHT: 68 IN | RESPIRATION RATE: 18 BRPM

## 2021-07-27 DIAGNOSIS — C01 SQUAMOUS CELL CARCINOMA OF BASE OF TONGUE: Primary | ICD-10-CM

## 2021-07-27 DIAGNOSIS — C77.0 SECONDARY MALIGNANT NEOPLASM OF CERVICAL LYMPH NODE: ICD-10-CM

## 2021-07-27 PROCEDURE — 99215 OFFICE O/P EST HI 40 MIN: CPT | Mod: S$PBB,,, | Performed by: STUDENT IN AN ORGANIZED HEALTH CARE EDUCATION/TRAINING PROGRAM

## 2021-07-27 PROCEDURE — 25000003 PHARM REV CODE 250: Performed by: INTERNAL MEDICINE

## 2021-07-27 PROCEDURE — 96415 CHEMO IV INFUSION ADDL HR: CPT

## 2021-07-27 PROCEDURE — 63600175 PHARM REV CODE 636 W HCPCS: Performed by: STUDENT IN AN ORGANIZED HEALTH CARE EDUCATION/TRAINING PROGRAM

## 2021-07-27 PROCEDURE — 63600175 PHARM REV CODE 636 W HCPCS: Performed by: INTERNAL MEDICINE

## 2021-07-27 PROCEDURE — 96413 CHEMO IV INFUSION 1 HR: CPT

## 2021-07-27 PROCEDURE — 25000003 PHARM REV CODE 250: Performed by: STUDENT IN AN ORGANIZED HEALTH CARE EDUCATION/TRAINING PROGRAM

## 2021-07-27 PROCEDURE — 96367 TX/PROPH/DG ADDL SEQ IV INF: CPT

## 2021-07-27 PROCEDURE — 99999 PR PBB SHADOW E&M-EST. PATIENT-LVL IV: CPT | Mod: PBBFAC,,, | Performed by: STUDENT IN AN ORGANIZED HEALTH CARE EDUCATION/TRAINING PROGRAM

## 2021-07-27 PROCEDURE — 99214 OFFICE O/P EST MOD 30 MIN: CPT | Mod: PBBFAC,25 | Performed by: STUDENT IN AN ORGANIZED HEALTH CARE EDUCATION/TRAINING PROGRAM

## 2021-07-27 PROCEDURE — 99215 PR OFFICE/OUTPT VISIT, EST, LEVL V, 40-54 MIN: ICD-10-PCS | Mod: S$PBB,,, | Performed by: STUDENT IN AN ORGANIZED HEALTH CARE EDUCATION/TRAINING PROGRAM

## 2021-07-27 PROCEDURE — 99999 PR PBB SHADOW E&M-EST. PATIENT-LVL IV: ICD-10-PCS | Mod: PBBFAC,,, | Performed by: STUDENT IN AN ORGANIZED HEALTH CARE EDUCATION/TRAINING PROGRAM

## 2021-07-27 PROCEDURE — A4216 STERILE WATER/SALINE, 10 ML: HCPCS | Performed by: STUDENT IN AN ORGANIZED HEALTH CARE EDUCATION/TRAINING PROGRAM

## 2021-07-27 RX ORDER — DIPHENHYDRAMINE HYDROCHLORIDE 50 MG/ML
50 INJECTION INTRAMUSCULAR; INTRAVENOUS ONCE AS NEEDED
Status: DISCONTINUED | OUTPATIENT
Start: 2021-07-27 | End: 2021-07-27 | Stop reason: HOSPADM

## 2021-07-27 RX ORDER — MAGNESIUM SULFATE HEPTAHYDRATE 40 MG/ML
2 INJECTION, SOLUTION INTRAVENOUS ONCE
Status: COMPLETED | OUTPATIENT
Start: 2021-07-27 | End: 2021-07-27

## 2021-07-27 RX ORDER — EPINEPHRINE 0.3 MG/.3ML
0.3 INJECTION SUBCUTANEOUS ONCE AS NEEDED
Status: DISCONTINUED | OUTPATIENT
Start: 2021-07-27 | End: 2021-07-27 | Stop reason: HOSPADM

## 2021-07-27 RX ORDER — SODIUM CHLORIDE 0.9 % (FLUSH) 0.9 %
10 SYRINGE (ML) INJECTION
Status: DISCONTINUED | OUTPATIENT
Start: 2021-07-27 | End: 2021-07-27 | Stop reason: HOSPADM

## 2021-07-27 RX ORDER — HEPARIN 100 UNIT/ML
500 SYRINGE INTRAVENOUS
Status: DISCONTINUED | OUTPATIENT
Start: 2021-07-27 | End: 2021-07-27 | Stop reason: HOSPADM

## 2021-07-27 RX ADMIN — DIPHENHYDRAMINE HYDROCHLORIDE 50 MG: 50 INJECTION, SOLUTION INTRAMUSCULAR; INTRAVENOUS at 10:07

## 2021-07-27 RX ADMIN — HEPARIN 500 UNITS: 100 SYRINGE at 01:07

## 2021-07-27 RX ADMIN — MAGNESIUM SULFATE HEPTAHYDRATE 2 G: 40 INJECTION, SOLUTION INTRAVENOUS at 09:07

## 2021-07-27 RX ADMIN — SODIUM CHLORIDE: 0.9 INJECTION, SOLUTION INTRAVENOUS at 09:07

## 2021-07-27 RX ADMIN — Medication 10 ML: at 01:07

## 2021-07-27 RX ADMIN — CETUXIMAB 400 MG: 2 SOLUTION INTRAVENOUS at 10:07

## 2021-08-02 ENCOUNTER — TELEPHONE (OUTPATIENT)
Dept: TRANSPLANT | Facility: CLINIC | Age: 71
End: 2021-08-02

## 2021-08-02 ENCOUNTER — TELEPHONE (OUTPATIENT)
Dept: HEMATOLOGY/ONCOLOGY | Facility: CLINIC | Age: 71
End: 2021-08-02

## 2021-08-09 DIAGNOSIS — C01 SQUAMOUS CELL CARCINOMA OF BASE OF TONGUE: ICD-10-CM

## 2021-08-09 DIAGNOSIS — Z94.4 STATUS POST LIVER TRANSPLANTATION: Primary | ICD-10-CM

## 2021-08-09 RX ORDER — LOPERAMIDE HYDROCHLORIDE 2 MG/1
2 CAPSULE ORAL 4 TIMES DAILY PRN
Qty: 30 CAPSULE | Refills: 1 | Status: SHIPPED | OUTPATIENT
Start: 2021-08-09 | End: 2022-01-27 | Stop reason: SDUPTHER

## 2021-08-09 RX ORDER — OXYCODONE HYDROCHLORIDE 5 MG/1
5 TABLET ORAL EVERY 6 HOURS PRN
Qty: 120 TABLET | Refills: 0 | Status: CANCELLED | OUTPATIENT
Start: 2021-08-09

## 2021-08-10 ENCOUNTER — INFUSION (OUTPATIENT)
Dept: INFUSION THERAPY | Facility: HOSPITAL | Age: 71
End: 2021-08-10
Payer: MEDICARE

## 2021-08-10 VITALS — DIASTOLIC BLOOD PRESSURE: 63 MMHG | RESPIRATION RATE: 18 BRPM | SYSTOLIC BLOOD PRESSURE: 98 MMHG | HEART RATE: 100 BPM

## 2021-08-10 DIAGNOSIS — C01 SQUAMOUS CELL CARCINOMA OF BASE OF TONGUE: Primary | ICD-10-CM

## 2021-08-10 PROCEDURE — 63600175 PHARM REV CODE 636 W HCPCS: Performed by: INTERNAL MEDICINE

## 2021-08-10 PROCEDURE — 96413 CHEMO IV INFUSION 1 HR: CPT

## 2021-08-10 PROCEDURE — 25000003 PHARM REV CODE 250: Performed by: INTERNAL MEDICINE

## 2021-08-10 PROCEDURE — 96415 CHEMO IV INFUSION ADDL HR: CPT

## 2021-08-10 PROCEDURE — 96367 TX/PROPH/DG ADDL SEQ IV INF: CPT

## 2021-08-10 RX ORDER — EPINEPHRINE 0.3 MG/.3ML
0.3 INJECTION SUBCUTANEOUS ONCE AS NEEDED
Status: CANCELLED | OUTPATIENT
Start: 2021-08-10

## 2021-08-10 RX ORDER — MAGNESIUM SULFATE HEPTAHYDRATE 40 MG/ML
2 INJECTION, SOLUTION INTRAVENOUS ONCE
Status: CANCELLED
Start: 2021-08-10 | End: 2021-08-10

## 2021-08-10 RX ORDER — SODIUM CHLORIDE 0.9 % (FLUSH) 0.9 %
10 SYRINGE (ML) INJECTION
Status: DISCONTINUED | OUTPATIENT
Start: 2021-08-10 | End: 2021-08-10 | Stop reason: HOSPADM

## 2021-08-10 RX ORDER — SODIUM CHLORIDE 0.9 % (FLUSH) 0.9 %
10 SYRINGE (ML) INJECTION
Status: CANCELLED | OUTPATIENT
Start: 2021-08-10

## 2021-08-10 RX ORDER — HEPARIN 100 UNIT/ML
500 SYRINGE INTRAVENOUS
Status: CANCELLED | OUTPATIENT
Start: 2021-08-10

## 2021-08-10 RX ORDER — DIPHENHYDRAMINE HYDROCHLORIDE 50 MG/ML
50 INJECTION INTRAMUSCULAR; INTRAVENOUS ONCE AS NEEDED
Status: DISCONTINUED | OUTPATIENT
Start: 2021-08-10 | End: 2021-08-10 | Stop reason: HOSPADM

## 2021-08-10 RX ORDER — MAGNESIUM SULFATE HEPTAHYDRATE 40 MG/ML
2 INJECTION, SOLUTION INTRAVENOUS ONCE
Status: COMPLETED | OUTPATIENT
Start: 2021-08-10 | End: 2021-08-10

## 2021-08-10 RX ORDER — DIPHENHYDRAMINE HYDROCHLORIDE 50 MG/ML
50 INJECTION INTRAMUSCULAR; INTRAVENOUS ONCE AS NEEDED
Status: CANCELLED | OUTPATIENT
Start: 2021-08-10

## 2021-08-10 RX ORDER — HEPARIN 100 UNIT/ML
500 SYRINGE INTRAVENOUS
Status: DISCONTINUED | OUTPATIENT
Start: 2021-08-10 | End: 2021-08-10 | Stop reason: HOSPADM

## 2021-08-10 RX ORDER — EPINEPHRINE 0.3 MG/.3ML
0.3 INJECTION SUBCUTANEOUS ONCE AS NEEDED
Status: DISCONTINUED | OUTPATIENT
Start: 2021-08-10 | End: 2021-08-10 | Stop reason: HOSPADM

## 2021-08-10 RX ADMIN — HEPARIN 500 UNITS: 100 SYRINGE at 02:08

## 2021-08-10 RX ADMIN — SODIUM CHLORIDE: 0.9 INJECTION, SOLUTION INTRAVENOUS at 11:08

## 2021-08-10 RX ADMIN — DIPHENHYDRAMINE HYDROCHLORIDE 50 MG: 50 INJECTION, SOLUTION INTRAMUSCULAR; INTRAVENOUS at 12:08

## 2021-08-10 RX ADMIN — MAGNESIUM SULFATE IN WATER 2 G: 40 INJECTION, SOLUTION INTRAVENOUS at 11:08

## 2021-08-10 RX ADMIN — CETUXIMAB 400 MG: 2 SOLUTION INTRAVENOUS at 12:08

## 2021-08-11 ENCOUNTER — PATIENT MESSAGE (OUTPATIENT)
Dept: HEMATOLOGY/ONCOLOGY | Facility: CLINIC | Age: 71
End: 2021-08-11

## 2021-08-11 DIAGNOSIS — C01 SQUAMOUS CELL CARCINOMA OF BASE OF TONGUE: ICD-10-CM

## 2021-08-12 ENCOUNTER — PATIENT MESSAGE (OUTPATIENT)
Dept: HEMATOLOGY/ONCOLOGY | Facility: CLINIC | Age: 71
End: 2021-08-12

## 2021-08-12 RX ORDER — OXYCODONE HYDROCHLORIDE 5 MG/1
5 TABLET ORAL EVERY 6 HOURS PRN
Qty: 120 TABLET | Refills: 0 | OUTPATIENT
Start: 2021-08-12

## 2021-08-12 RX ORDER — OXYCODONE HYDROCHLORIDE 5 MG/1
5 TABLET ORAL EVERY 6 HOURS PRN
Qty: 120 TABLET | Refills: 0 | Status: SHIPPED | OUTPATIENT
Start: 2021-08-12 | End: 2021-09-09 | Stop reason: SDUPTHER

## 2021-08-17 ENCOUNTER — INFUSION (OUTPATIENT)
Dept: INFUSION THERAPY | Facility: HOSPITAL | Age: 71
End: 2021-08-17
Payer: MEDICARE

## 2021-08-17 VITALS
DIASTOLIC BLOOD PRESSURE: 73 MMHG | TEMPERATURE: 97 F | BODY MASS INDEX: 18.19 KG/M2 | HEART RATE: 68 BPM | SYSTOLIC BLOOD PRESSURE: 139 MMHG | WEIGHT: 120 LBS | RESPIRATION RATE: 18 BRPM | OXYGEN SATURATION: 100 % | HEIGHT: 68 IN

## 2021-08-17 DIAGNOSIS — C01 SQUAMOUS CELL CARCINOMA OF BASE OF TONGUE: Primary | ICD-10-CM

## 2021-08-17 PROCEDURE — 96367 TX/PROPH/DG ADDL SEQ IV INF: CPT

## 2021-08-17 PROCEDURE — 96413 CHEMO IV INFUSION 1 HR: CPT

## 2021-08-17 PROCEDURE — 63600175 PHARM REV CODE 636 W HCPCS: Mod: JG | Performed by: STUDENT IN AN ORGANIZED HEALTH CARE EDUCATION/TRAINING PROGRAM

## 2021-08-17 PROCEDURE — 96366 THER/PROPH/DIAG IV INF ADDON: CPT

## 2021-08-17 PROCEDURE — 25000003 PHARM REV CODE 250: Performed by: STUDENT IN AN ORGANIZED HEALTH CARE EDUCATION/TRAINING PROGRAM

## 2021-08-17 PROCEDURE — A4216 STERILE WATER/SALINE, 10 ML: HCPCS | Performed by: STUDENT IN AN ORGANIZED HEALTH CARE EDUCATION/TRAINING PROGRAM

## 2021-08-17 PROCEDURE — 96415 CHEMO IV INFUSION ADDL HR: CPT

## 2021-08-17 PROCEDURE — 96365 THER/PROPH/DIAG IV INF INIT: CPT

## 2021-08-17 RX ORDER — HEPARIN 100 UNIT/ML
500 SYRINGE INTRAVENOUS
Status: DISCONTINUED | OUTPATIENT
Start: 2021-08-17 | End: 2021-08-17 | Stop reason: HOSPADM

## 2021-08-17 RX ORDER — SODIUM CHLORIDE 0.9 % (FLUSH) 0.9 %
10 SYRINGE (ML) INJECTION
Status: DISCONTINUED | OUTPATIENT
Start: 2021-08-17 | End: 2021-08-17 | Stop reason: HOSPADM

## 2021-08-17 RX ORDER — MAGNESIUM SULFATE HEPTAHYDRATE 40 MG/ML
2 INJECTION, SOLUTION INTRAVENOUS ONCE
Status: COMPLETED | OUTPATIENT
Start: 2021-08-17 | End: 2021-08-17

## 2021-08-17 RX ORDER — DIPHENHYDRAMINE HYDROCHLORIDE 50 MG/ML
50 INJECTION INTRAMUSCULAR; INTRAVENOUS ONCE AS NEEDED
Status: DISCONTINUED | OUTPATIENT
Start: 2021-08-17 | End: 2021-08-17 | Stop reason: HOSPADM

## 2021-08-17 RX ORDER — EPINEPHRINE 0.3 MG/.3ML
0.3 INJECTION SUBCUTANEOUS ONCE AS NEEDED
Status: DISCONTINUED | OUTPATIENT
Start: 2021-08-17 | End: 2021-08-17 | Stop reason: HOSPADM

## 2021-08-17 RX ADMIN — CETUXIMAB 400 MG: 2 SOLUTION INTRAVENOUS at 11:08

## 2021-08-17 RX ADMIN — SODIUM CHLORIDE, PRESERVATIVE FREE 10 ML: 5 INJECTION INTRAVENOUS at 09:08

## 2021-08-17 RX ADMIN — SODIUM CHLORIDE, PRESERVATIVE FREE 10 ML: 5 INJECTION INTRAVENOUS at 01:08

## 2021-08-17 RX ADMIN — HEPARIN 500 UNITS: 100 SYRINGE at 01:08

## 2021-08-17 RX ADMIN — DIPHENHYDRAMINE HYDROCHLORIDE 50 MG: 50 INJECTION, SOLUTION INTRAMUSCULAR; INTRAVENOUS at 11:08

## 2021-08-17 RX ADMIN — SODIUM CHLORIDE: 0.9 INJECTION, SOLUTION INTRAVENOUS at 09:08

## 2021-08-17 RX ADMIN — MAGNESIUM SULFATE IN WATER 2 G: 40 INJECTION, SOLUTION INTRAVENOUS at 09:08

## 2021-08-23 ENCOUNTER — INFUSION (OUTPATIENT)
Dept: INFUSION THERAPY | Facility: HOSPITAL | Age: 71
End: 2021-08-23
Attending: STUDENT IN AN ORGANIZED HEALTH CARE EDUCATION/TRAINING PROGRAM
Payer: MEDICARE

## 2021-08-23 DIAGNOSIS — N18.31 STAGE 3A CHRONIC KIDNEY DISEASE: ICD-10-CM

## 2021-08-23 DIAGNOSIS — C01 SQUAMOUS CELL CARCINOMA OF BASE OF TONGUE: Primary | ICD-10-CM

## 2021-08-23 LAB
ALBUMIN SERPL BCP-MCNC: 3.4 G/DL (ref 3.5–5.2)
ALP SERPL-CCNC: 57 U/L (ref 55–135)
ALT SERPL W/O P-5'-P-CCNC: 15 U/L (ref 10–44)
ANION GAP SERPL CALC-SCNC: 12 MMOL/L (ref 8–16)
AST SERPL-CCNC: 32 U/L (ref 10–40)
BASOPHILS # BLD AUTO: 0.01 K/UL (ref 0–0.2)
BASOPHILS NFR BLD: 0.2 % (ref 0–1.9)
BILIRUB SERPL-MCNC: 0.6 MG/DL (ref 0.1–1)
BUN SERPL-MCNC: 16 MG/DL (ref 8–23)
CALCIUM SERPL-MCNC: 9 MG/DL (ref 8.7–10.5)
CHLORIDE SERPL-SCNC: 106 MMOL/L (ref 95–110)
CO2 SERPL-SCNC: 24 MMOL/L (ref 23–29)
CREAT SERPL-MCNC: 1.4 MG/DL (ref 0.5–1.4)
DIFFERENTIAL METHOD: ABNORMAL
EOSINOPHIL # BLD AUTO: 0.1 K/UL (ref 0–0.5)
EOSINOPHIL NFR BLD: 1.1 % (ref 0–8)
ERYTHROCYTE [DISTWIDTH] IN BLOOD BY AUTOMATED COUNT: 12.7 % (ref 11.5–14.5)
EST. GFR  (AFRICAN AMERICAN): 57.6 ML/MIN/1.73 M^2
EST. GFR  (NON AFRICAN AMERICAN): 49.8 ML/MIN/1.73 M^2
GLUCOSE SERPL-MCNC: 166 MG/DL (ref 70–110)
HCT VFR BLD AUTO: 32.8 % (ref 40–54)
HGB BLD-MCNC: 11.3 G/DL (ref 14–18)
IMM GRANULOCYTES # BLD AUTO: 0.01 K/UL (ref 0–0.04)
IMM GRANULOCYTES NFR BLD AUTO: 0.2 % (ref 0–0.5)
LYMPHOCYTES # BLD AUTO: 0.6 K/UL (ref 1–4.8)
LYMPHOCYTES NFR BLD: 13.4 % (ref 18–48)
MAGNESIUM SERPL-MCNC: 1 MG/DL (ref 1.6–2.6)
MCH RBC QN AUTO: 33.8 PG (ref 27–31)
MCHC RBC AUTO-ENTMCNC: 34.5 G/DL (ref 32–36)
MCV RBC AUTO: 98 FL (ref 82–98)
MONOCYTES # BLD AUTO: 0.4 K/UL (ref 0.3–1)
MONOCYTES NFR BLD: 8.7 % (ref 4–15)
NEUTROPHILS # BLD AUTO: 3.6 K/UL (ref 1.8–7.7)
NEUTROPHILS NFR BLD: 76.4 % (ref 38–73)
NRBC BLD-RTO: 0 /100 WBC
PLATELET # BLD AUTO: 162 K/UL (ref 150–450)
PMV BLD AUTO: 9.9 FL (ref 9.2–12.9)
POTASSIUM SERPL-SCNC: 3.6 MMOL/L (ref 3.5–5.1)
PROT SERPL-MCNC: 6.9 G/DL (ref 6–8.4)
RBC # BLD AUTO: 3.34 M/UL (ref 4.6–6.2)
SODIUM SERPL-SCNC: 142 MMOL/L (ref 136–145)
WBC # BLD AUTO: 4.71 K/UL (ref 3.9–12.7)

## 2021-08-23 PROCEDURE — 80053 COMPREHEN METABOLIC PANEL: CPT | Performed by: STUDENT IN AN ORGANIZED HEALTH CARE EDUCATION/TRAINING PROGRAM

## 2021-08-23 PROCEDURE — 25000003 PHARM REV CODE 250: Performed by: STUDENT IN AN ORGANIZED HEALTH CARE EDUCATION/TRAINING PROGRAM

## 2021-08-23 PROCEDURE — 83735 ASSAY OF MAGNESIUM: CPT | Performed by: STUDENT IN AN ORGANIZED HEALTH CARE EDUCATION/TRAINING PROGRAM

## 2021-08-23 PROCEDURE — 36591 DRAW BLOOD OFF VENOUS DEVICE: CPT

## 2021-08-23 PROCEDURE — 85025 COMPLETE CBC W/AUTO DIFF WBC: CPT | Performed by: STUDENT IN AN ORGANIZED HEALTH CARE EDUCATION/TRAINING PROGRAM

## 2021-08-23 PROCEDURE — 63600175 PHARM REV CODE 636 W HCPCS: Performed by: STUDENT IN AN ORGANIZED HEALTH CARE EDUCATION/TRAINING PROGRAM

## 2021-08-23 PROCEDURE — A4216 STERILE WATER/SALINE, 10 ML: HCPCS | Performed by: STUDENT IN AN ORGANIZED HEALTH CARE EDUCATION/TRAINING PROGRAM

## 2021-08-23 RX ORDER — HEPARIN 100 UNIT/ML
500 SYRINGE INTRAVENOUS
Status: DISCONTINUED | OUTPATIENT
Start: 2021-08-23 | End: 2021-08-23 | Stop reason: HOSPADM

## 2021-08-23 RX ORDER — SODIUM CHLORIDE 0.9 % (FLUSH) 0.9 %
10 SYRINGE (ML) INJECTION
Status: DISCONTINUED | OUTPATIENT
Start: 2021-08-23 | End: 2021-08-23 | Stop reason: HOSPADM

## 2021-08-23 RX ORDER — SODIUM CHLORIDE 0.9 % (FLUSH) 0.9 %
10 SYRINGE (ML) INJECTION
Status: CANCELLED | OUTPATIENT
Start: 2021-08-23

## 2021-08-23 RX ORDER — HEPARIN 100 UNIT/ML
500 SYRINGE INTRAVENOUS
Status: CANCELLED | OUTPATIENT
Start: 2021-08-23

## 2021-08-23 RX ADMIN — Medication 10 ML: at 10:08

## 2021-08-23 RX ADMIN — HEPARIN 500 UNITS: 100 SYRINGE at 10:08

## 2021-08-24 ENCOUNTER — PATIENT MESSAGE (OUTPATIENT)
Dept: TRANSPLANT | Facility: CLINIC | Age: 71
End: 2021-08-24

## 2021-08-24 ENCOUNTER — OFFICE VISIT (OUTPATIENT)
Dept: HEMATOLOGY/ONCOLOGY | Facility: CLINIC | Age: 71
End: 2021-08-24
Payer: MEDICARE

## 2021-08-24 ENCOUNTER — TELEPHONE (OUTPATIENT)
Dept: OTOLARYNGOLOGY | Facility: CLINIC | Age: 71
End: 2021-08-24

## 2021-08-24 VITALS
BODY MASS INDEX: 17.98 KG/M2 | SYSTOLIC BLOOD PRESSURE: 112 MMHG | HEART RATE: 90 BPM | HEIGHT: 68 IN | TEMPERATURE: 98 F | WEIGHT: 118.63 LBS | DIASTOLIC BLOOD PRESSURE: 69 MMHG | RESPIRATION RATE: 17 BRPM | OXYGEN SATURATION: 98 %

## 2021-08-24 DIAGNOSIS — C77.0 SECONDARY MALIGNANT NEOPLASM OF CERVICAL LYMPH NODE: ICD-10-CM

## 2021-08-24 DIAGNOSIS — K12.31 MUCOSITIS DUE TO CHEMOTHERAPY: ICD-10-CM

## 2021-08-24 DIAGNOSIS — C01 SQUAMOUS CELL CARCINOMA OF BASE OF TONGUE: Primary | ICD-10-CM

## 2021-08-24 DIAGNOSIS — C32.9 LARYNX CANCER: ICD-10-CM

## 2021-08-24 PROCEDURE — 99999 PR PBB SHADOW E&M-EST. PATIENT-LVL IV: CPT | Mod: PBBFAC,,, | Performed by: STUDENT IN AN ORGANIZED HEALTH CARE EDUCATION/TRAINING PROGRAM

## 2021-08-24 PROCEDURE — 99215 OFFICE O/P EST HI 40 MIN: CPT | Mod: S$PBB,,, | Performed by: STUDENT IN AN ORGANIZED HEALTH CARE EDUCATION/TRAINING PROGRAM

## 2021-08-24 PROCEDURE — 99417 PROLNG OP E/M EACH 15 MIN: CPT | Mod: S$PBB,,, | Performed by: STUDENT IN AN ORGANIZED HEALTH CARE EDUCATION/TRAINING PROGRAM

## 2021-08-24 PROCEDURE — 99999 PR PBB SHADOW E&M-EST. PATIENT-LVL IV: ICD-10-PCS | Mod: PBBFAC,,, | Performed by: STUDENT IN AN ORGANIZED HEALTH CARE EDUCATION/TRAINING PROGRAM

## 2021-08-24 PROCEDURE — 99215 PR OFFICE/OUTPT VISIT, EST, LEVL V, 40-54 MIN: ICD-10-PCS | Mod: S$PBB,,, | Performed by: STUDENT IN AN ORGANIZED HEALTH CARE EDUCATION/TRAINING PROGRAM

## 2021-08-24 PROCEDURE — 99417 PR PROLONGED SVC, OUTPT, W/WO DIRECT PT CONTACT,  EA ADDTL 15 MIN: ICD-10-PCS | Mod: S$PBB,,, | Performed by: STUDENT IN AN ORGANIZED HEALTH CARE EDUCATION/TRAINING PROGRAM

## 2021-08-24 PROCEDURE — 99214 OFFICE O/P EST MOD 30 MIN: CPT | Mod: PBBFAC | Performed by: STUDENT IN AN ORGANIZED HEALTH CARE EDUCATION/TRAINING PROGRAM

## 2021-08-24 RX ORDER — LIDOCAINE HYDROCHLORIDE 20 MG/ML
SOLUTION OROPHARYNGEAL EVERY 8 HOURS PRN
Qty: 100 ML | Refills: 3 | Status: SHIPPED | OUTPATIENT
Start: 2021-08-24

## 2021-09-09 ENCOUNTER — INFUSION (OUTPATIENT)
Dept: INFUSION THERAPY | Facility: HOSPITAL | Age: 71
End: 2021-09-09
Attending: STUDENT IN AN ORGANIZED HEALTH CARE EDUCATION/TRAINING PROGRAM
Payer: MEDICARE

## 2021-09-09 ENCOUNTER — OFFICE VISIT (OUTPATIENT)
Dept: HEMATOLOGY/ONCOLOGY | Facility: CLINIC | Age: 71
End: 2021-09-09
Payer: MEDICARE

## 2021-09-09 VITALS
TEMPERATURE: 98 F | HEART RATE: 100 BPM | WEIGHT: 119.5 LBS | OXYGEN SATURATION: 99 % | BODY MASS INDEX: 18.11 KG/M2 | RESPIRATION RATE: 16 BRPM | HEIGHT: 68 IN | DIASTOLIC BLOOD PRESSURE: 68 MMHG | SYSTOLIC BLOOD PRESSURE: 145 MMHG

## 2021-09-09 DIAGNOSIS — Z94.4 STATUS POST LIVER TRANSPLANTATION: ICD-10-CM

## 2021-09-09 DIAGNOSIS — E89.0 POSTOPERATIVE HYPOTHYROIDISM: ICD-10-CM

## 2021-09-09 DIAGNOSIS — N18.31 STAGE 3A CHRONIC KIDNEY DISEASE: ICD-10-CM

## 2021-09-09 DIAGNOSIS — C77.0 SECONDARY MALIGNANT NEOPLASM OF CERVICAL LYMPH NODE: ICD-10-CM

## 2021-09-09 DIAGNOSIS — D63.1 ANEMIA IN STAGE 3A CHRONIC KIDNEY DISEASE: ICD-10-CM

## 2021-09-09 DIAGNOSIS — C01 SQUAMOUS CELL CARCINOMA OF BASE OF TONGUE: Primary | ICD-10-CM

## 2021-09-09 DIAGNOSIS — C01 SQUAMOUS CELL CARCINOMA OF BASE OF TONGUE: ICD-10-CM

## 2021-09-09 DIAGNOSIS — N18.31 STAGE 3A CHRONIC KIDNEY DISEASE: Primary | ICD-10-CM

## 2021-09-09 DIAGNOSIS — N18.31 ANEMIA IN STAGE 3A CHRONIC KIDNEY DISEASE: ICD-10-CM

## 2021-09-09 LAB
ALBUMIN SERPL BCP-MCNC: 3.6 G/DL (ref 3.5–5.2)
ALP SERPL-CCNC: 70 U/L (ref 55–135)
ALT SERPL W/O P-5'-P-CCNC: 14 U/L (ref 10–44)
ANION GAP SERPL CALC-SCNC: 11 MMOL/L (ref 8–16)
AST SERPL-CCNC: 37 U/L (ref 10–40)
BASOPHILS # BLD AUTO: 0.02 K/UL (ref 0–0.2)
BASOPHILS NFR BLD: 0.4 % (ref 0–1.9)
BILIRUB SERPL-MCNC: 0.8 MG/DL (ref 0.1–1)
BUN SERPL-MCNC: 16 MG/DL (ref 8–23)
CALCIUM SERPL-MCNC: 9.1 MG/DL (ref 8.7–10.5)
CHLORIDE SERPL-SCNC: 102 MMOL/L (ref 95–110)
CO2 SERPL-SCNC: 27 MMOL/L (ref 23–29)
CREAT SERPL-MCNC: 1.3 MG/DL (ref 0.5–1.4)
DIFFERENTIAL METHOD: ABNORMAL
EOSINOPHIL # BLD AUTO: 0.1 K/UL (ref 0–0.5)
EOSINOPHIL NFR BLD: 2.2 % (ref 0–8)
ERYTHROCYTE [DISTWIDTH] IN BLOOD BY AUTOMATED COUNT: 12.6 % (ref 11.5–14.5)
EST. GFR  (AFRICAN AMERICAN): >60 ML/MIN/1.73 M^2
EST. GFR  (NON AFRICAN AMERICAN): 54.5 ML/MIN/1.73 M^2
GLUCOSE SERPL-MCNC: 128 MG/DL (ref 70–110)
HCT VFR BLD AUTO: 34.6 % (ref 40–54)
HGB BLD-MCNC: 12.3 G/DL (ref 14–18)
IMM GRANULOCYTES # BLD AUTO: 0.01 K/UL (ref 0–0.04)
IMM GRANULOCYTES NFR BLD AUTO: 0.2 % (ref 0–0.5)
LYMPHOCYTES # BLD AUTO: 0.9 K/UL (ref 1–4.8)
LYMPHOCYTES NFR BLD: 16.7 % (ref 18–48)
MAGNESIUM SERPL-MCNC: 1 MG/DL (ref 1.6–2.6)
MCH RBC QN AUTO: 33.5 PG (ref 27–31)
MCHC RBC AUTO-ENTMCNC: 35.5 G/DL (ref 32–36)
MCV RBC AUTO: 94 FL (ref 82–98)
MONOCYTES # BLD AUTO: 0.5 K/UL (ref 0.3–1)
MONOCYTES NFR BLD: 10.4 % (ref 4–15)
NEUTROPHILS # BLD AUTO: 3.6 K/UL (ref 1.8–7.7)
NEUTROPHILS NFR BLD: 70.1 % (ref 38–73)
NRBC BLD-RTO: 0 /100 WBC
PHOSPHATE SERPL-MCNC: 3.1 MG/DL (ref 2.7–4.5)
PLATELET # BLD AUTO: 161 K/UL (ref 150–450)
PMV BLD AUTO: 9.7 FL (ref 9.2–12.9)
POTASSIUM SERPL-SCNC: 3.6 MMOL/L (ref 3.5–5.1)
PROT SERPL-MCNC: 7.4 G/DL (ref 6–8.4)
RBC # BLD AUTO: 3.67 M/UL (ref 4.6–6.2)
SODIUM SERPL-SCNC: 140 MMOL/L (ref 136–145)
WBC # BLD AUTO: 5.08 K/UL (ref 3.9–12.7)

## 2021-09-09 PROCEDURE — A4216 STERILE WATER/SALINE, 10 ML: HCPCS | Performed by: STUDENT IN AN ORGANIZED HEALTH CARE EDUCATION/TRAINING PROGRAM

## 2021-09-09 PROCEDURE — 80053 COMPREHEN METABOLIC PANEL: CPT | Performed by: STUDENT IN AN ORGANIZED HEALTH CARE EDUCATION/TRAINING PROGRAM

## 2021-09-09 PROCEDURE — 99999 PR PBB SHADOW E&M-EST. PATIENT-LVL IV: ICD-10-PCS | Mod: PBBFAC,,, | Performed by: STUDENT IN AN ORGANIZED HEALTH CARE EDUCATION/TRAINING PROGRAM

## 2021-09-09 PROCEDURE — 99215 OFFICE O/P EST HI 40 MIN: CPT | Mod: S$PBB,,, | Performed by: STUDENT IN AN ORGANIZED HEALTH CARE EDUCATION/TRAINING PROGRAM

## 2021-09-09 PROCEDURE — 36591 DRAW BLOOD OFF VENOUS DEVICE: CPT

## 2021-09-09 PROCEDURE — 99214 OFFICE O/P EST MOD 30 MIN: CPT | Mod: PBBFAC | Performed by: STUDENT IN AN ORGANIZED HEALTH CARE EDUCATION/TRAINING PROGRAM

## 2021-09-09 PROCEDURE — 99999 PR PBB SHADOW E&M-EST. PATIENT-LVL IV: CPT | Mod: PBBFAC,,, | Performed by: STUDENT IN AN ORGANIZED HEALTH CARE EDUCATION/TRAINING PROGRAM

## 2021-09-09 PROCEDURE — 99215 PR OFFICE/OUTPT VISIT, EST, LEVL V, 40-54 MIN: ICD-10-PCS | Mod: S$PBB,,, | Performed by: STUDENT IN AN ORGANIZED HEALTH CARE EDUCATION/TRAINING PROGRAM

## 2021-09-09 PROCEDURE — 84100 ASSAY OF PHOSPHORUS: CPT | Performed by: STUDENT IN AN ORGANIZED HEALTH CARE EDUCATION/TRAINING PROGRAM

## 2021-09-09 PROCEDURE — 85025 COMPLETE CBC W/AUTO DIFF WBC: CPT | Performed by: STUDENT IN AN ORGANIZED HEALTH CARE EDUCATION/TRAINING PROGRAM

## 2021-09-09 PROCEDURE — 63600175 PHARM REV CODE 636 W HCPCS: Performed by: STUDENT IN AN ORGANIZED HEALTH CARE EDUCATION/TRAINING PROGRAM

## 2021-09-09 PROCEDURE — 83735 ASSAY OF MAGNESIUM: CPT | Performed by: STUDENT IN AN ORGANIZED HEALTH CARE EDUCATION/TRAINING PROGRAM

## 2021-09-09 PROCEDURE — 25000003 PHARM REV CODE 250: Performed by: STUDENT IN AN ORGANIZED HEALTH CARE EDUCATION/TRAINING PROGRAM

## 2021-09-09 RX ORDER — EPINEPHRINE 0.3 MG/.3ML
0.3 INJECTION SUBCUTANEOUS ONCE AS NEEDED
Status: CANCELLED | OUTPATIENT
Start: 2021-09-13

## 2021-09-09 RX ORDER — SODIUM CHLORIDE 0.9 % (FLUSH) 0.9 %
10 SYRINGE (ML) INJECTION
Status: CANCELLED | OUTPATIENT
Start: 2021-09-17

## 2021-09-09 RX ORDER — DIPHENHYDRAMINE HYDROCHLORIDE 50 MG/ML
50 INJECTION INTRAMUSCULAR; INTRAVENOUS ONCE AS NEEDED
Status: CANCELLED | OUTPATIENT
Start: 2021-09-13

## 2021-09-09 RX ORDER — OXYCODONE HYDROCHLORIDE 5 MG/1
5 TABLET ORAL EVERY 6 HOURS PRN
Qty: 120 TABLET | Refills: 0 | Status: SHIPPED | OUTPATIENT
Start: 2021-09-09 | End: 2021-09-30

## 2021-09-09 RX ORDER — DEXAMETHASONE 4 MG/1
TABLET ORAL
Qty: 16 TABLET | Refills: 0 | Status: SHIPPED | OUTPATIENT
Start: 2021-09-09 | End: 2021-09-30

## 2021-09-09 RX ORDER — HEPARIN 100 UNIT/ML
500 SYRINGE INTRAVENOUS
Status: CANCELLED | OUTPATIENT
Start: 2021-09-13

## 2021-09-09 RX ORDER — SODIUM CHLORIDE 0.9 % (FLUSH) 0.9 %
10 SYRINGE (ML) INJECTION
Status: CANCELLED | OUTPATIENT
Start: 2021-09-13

## 2021-09-09 RX ORDER — SODIUM CHLORIDE 0.9 % (FLUSH) 0.9 %
10 SYRINGE (ML) INJECTION
Status: CANCELLED | OUTPATIENT
Start: 2021-09-09

## 2021-09-09 RX ORDER — OXYCODONE HYDROCHLORIDE 5 MG/1
5 TABLET ORAL EVERY 6 HOURS PRN
Qty: 120 TABLET | Refills: 0 | Status: CANCELLED | OUTPATIENT
Start: 2021-09-09

## 2021-09-09 RX ORDER — MAGNESIUM SULFATE HEPTAHYDRATE 40 MG/ML
4 INJECTION, SOLUTION INTRAVENOUS ONCE
Status: CANCELLED
Start: 2021-09-17 | End: 2021-09-17

## 2021-09-09 RX ORDER — HEPARIN 100 UNIT/ML
500 SYRINGE INTRAVENOUS
Status: CANCELLED | OUTPATIENT
Start: 2021-09-17

## 2021-09-09 RX ORDER — HEPARIN 100 UNIT/ML
500 SYRINGE INTRAVENOUS
Status: CANCELLED | OUTPATIENT
Start: 2021-09-09

## 2021-09-09 RX ORDER — HEPARIN 100 UNIT/ML
500 SYRINGE INTRAVENOUS
Status: DISCONTINUED | OUTPATIENT
Start: 2021-09-09 | End: 2021-09-09 | Stop reason: HOSPADM

## 2021-09-09 RX ORDER — SODIUM CHLORIDE 0.9 % (FLUSH) 0.9 %
10 SYRINGE (ML) INJECTION
Status: DISCONTINUED | OUTPATIENT
Start: 2021-09-09 | End: 2021-09-09 | Stop reason: HOSPADM

## 2021-09-09 RX ADMIN — Medication 10 ML: at 08:09

## 2021-09-09 RX ADMIN — HEPARIN 500 UNITS: 100 SYRINGE at 08:09

## 2021-09-13 ENCOUNTER — INFUSION (OUTPATIENT)
Dept: INFUSION THERAPY | Facility: HOSPITAL | Age: 71
End: 2021-09-13
Payer: MEDICARE

## 2021-09-13 VITALS
SYSTOLIC BLOOD PRESSURE: 149 MMHG | TEMPERATURE: 98 F | WEIGHT: 119.25 LBS | RESPIRATION RATE: 16 BRPM | HEART RATE: 73 BPM | HEIGHT: 68 IN | DIASTOLIC BLOOD PRESSURE: 81 MMHG | BODY MASS INDEX: 18.07 KG/M2

## 2021-09-13 DIAGNOSIS — C77.0 SECONDARY MALIGNANT NEOPLASM OF CERVICAL LYMPH NODE: Primary | ICD-10-CM

## 2021-09-13 DIAGNOSIS — C01 SQUAMOUS CELL CARCINOMA OF BASE OF TONGUE: ICD-10-CM

## 2021-09-13 PROCEDURE — 96416 CHEMO PROLONG INFUSE W/PUMP: CPT

## 2021-09-13 PROCEDURE — 96375 TX/PRO/DX INJ NEW DRUG ADDON: CPT

## 2021-09-13 PROCEDURE — 25000003 PHARM REV CODE 250: Performed by: STUDENT IN AN ORGANIZED HEALTH CARE EDUCATION/TRAINING PROGRAM

## 2021-09-13 PROCEDURE — 96413 CHEMO IV INFUSION 1 HR: CPT

## 2021-09-13 PROCEDURE — 63600175 PHARM REV CODE 636 W HCPCS: Mod: JG | Performed by: STUDENT IN AN ORGANIZED HEALTH CARE EDUCATION/TRAINING PROGRAM

## 2021-09-13 PROCEDURE — 96417 CHEMO IV INFUS EACH ADDL SEQ: CPT

## 2021-09-13 PROCEDURE — 96367 TX/PROPH/DG ADDL SEQ IV INF: CPT

## 2021-09-13 RX ORDER — EPINEPHRINE 0.3 MG/.3ML
0.3 INJECTION SUBCUTANEOUS ONCE AS NEEDED
Status: DISCONTINUED | OUTPATIENT
Start: 2021-09-13 | End: 2021-09-13 | Stop reason: HOSPADM

## 2021-09-13 RX ORDER — DIPHENHYDRAMINE HYDROCHLORIDE 50 MG/ML
50 INJECTION INTRAMUSCULAR; INTRAVENOUS ONCE AS NEEDED
Status: DISCONTINUED | OUTPATIENT
Start: 2021-09-13 | End: 2021-09-13 | Stop reason: HOSPADM

## 2021-09-13 RX ORDER — SODIUM CHLORIDE 0.9 % (FLUSH) 0.9 %
10 SYRINGE (ML) INJECTION
Status: DISCONTINUED | OUTPATIENT
Start: 2021-09-13 | End: 2021-09-13 | Stop reason: HOSPADM

## 2021-09-13 RX ORDER — HEPARIN 100 UNIT/ML
500 SYRINGE INTRAVENOUS
Status: DISCONTINUED | OUTPATIENT
Start: 2021-09-13 | End: 2021-09-13 | Stop reason: HOSPADM

## 2021-09-13 RX ADMIN — FLUOROURACIL 6440 MG: 50 INJECTION, SOLUTION INTRAVENOUS at 03:09

## 2021-09-13 RX ADMIN — SODIUM CHLORIDE: 9 INJECTION, SOLUTION INTRAVENOUS at 01:09

## 2021-09-13 RX ADMIN — APREPITANT 130 MG: 130 INJECTION, EMULSION INTRAVENOUS at 02:09

## 2021-09-13 RX ADMIN — CARBOPLATIN 320 MG: 10 INJECTION INTRAVENOUS at 02:09

## 2021-09-13 RX ADMIN — PALONOSETRON: 0.25 INJECTION, SOLUTION INTRAVENOUS at 02:09

## 2021-09-13 RX ADMIN — SODIUM CHLORIDE 200 MG: 9 INJECTION, SOLUTION INTRAVENOUS at 01:09

## 2021-09-16 DIAGNOSIS — C01 SQUAMOUS CELL CARCINOMA OF BASE OF TONGUE: Primary | ICD-10-CM

## 2021-09-17 ENCOUNTER — INFUSION (OUTPATIENT)
Dept: INFUSION THERAPY | Facility: HOSPITAL | Age: 71
End: 2021-09-17
Payer: MEDICARE

## 2021-09-17 VITALS — DIASTOLIC BLOOD PRESSURE: 74 MMHG | HEART RATE: 92 BPM | RESPIRATION RATE: 18 BRPM | SYSTOLIC BLOOD PRESSURE: 127 MMHG

## 2021-09-17 DIAGNOSIS — C77.0 SECONDARY MALIGNANT NEOPLASM OF CERVICAL LYMPH NODE: ICD-10-CM

## 2021-09-17 DIAGNOSIS — C01 SQUAMOUS CELL CARCINOMA OF BASE OF TONGUE: ICD-10-CM

## 2021-09-17 DIAGNOSIS — E83.42 HYPOMAGNESEMIA: Primary | ICD-10-CM

## 2021-09-17 PROCEDURE — 96366 THER/PROPH/DIAG IV INF ADDON: CPT

## 2021-09-17 PROCEDURE — 63600175 PHARM REV CODE 636 W HCPCS: Performed by: STUDENT IN AN ORGANIZED HEALTH CARE EDUCATION/TRAINING PROGRAM

## 2021-09-17 PROCEDURE — 96365 THER/PROPH/DIAG IV INF INIT: CPT

## 2021-09-17 RX ORDER — MAGNESIUM SULFATE HEPTAHYDRATE 40 MG/ML
4 INJECTION, SOLUTION INTRAVENOUS ONCE
Status: COMPLETED | OUTPATIENT
Start: 2021-09-17 | End: 2021-09-17

## 2021-09-17 RX ORDER — HEPARIN 100 UNIT/ML
500 SYRINGE INTRAVENOUS
Status: DISCONTINUED | OUTPATIENT
Start: 2021-09-17 | End: 2021-09-17 | Stop reason: HOSPADM

## 2021-09-17 RX ORDER — SODIUM CHLORIDE 0.9 % (FLUSH) 0.9 %
10 SYRINGE (ML) INJECTION
Status: DISCONTINUED | OUTPATIENT
Start: 2021-09-17 | End: 2021-09-17 | Stop reason: HOSPADM

## 2021-09-17 RX ADMIN — HEPARIN 500 UNITS: 100 SYRINGE at 04:09

## 2021-09-17 RX ADMIN — MAGNESIUM SULFATE IN WATER 4 G: 40 INJECTION, SOLUTION INTRAVENOUS at 02:09

## 2021-09-23 DIAGNOSIS — C01 SQUAMOUS CELL CARCINOMA OF BASE OF TONGUE: ICD-10-CM

## 2021-09-23 DIAGNOSIS — C77.0 SECONDARY MALIGNANT NEOPLASM OF CERVICAL LYMPH NODE: ICD-10-CM

## 2021-09-24 RX ORDER — TIZANIDINE 2 MG/1
2 TABLET ORAL NIGHTLY PRN
Qty: 30 TABLET | Refills: 0 | Status: SHIPPED | OUTPATIENT
Start: 2021-09-24 | End: 2022-02-06 | Stop reason: SDUPTHER

## 2021-09-30 ENCOUNTER — DOCUMENTATION ONLY (OUTPATIENT)
Dept: HEMATOLOGY/ONCOLOGY | Facility: CLINIC | Age: 71
End: 2021-09-30

## 2021-09-30 ENCOUNTER — TELEPHONE (OUTPATIENT)
Dept: TRANSPLANT | Facility: CLINIC | Age: 71
End: 2021-09-30

## 2021-09-30 ENCOUNTER — LAB VISIT (OUTPATIENT)
Dept: LAB | Facility: HOSPITAL | Age: 71
End: 2021-09-30
Attending: STUDENT IN AN ORGANIZED HEALTH CARE EDUCATION/TRAINING PROGRAM
Payer: MEDICARE

## 2021-09-30 ENCOUNTER — OFFICE VISIT (OUTPATIENT)
Dept: HEMATOLOGY/ONCOLOGY | Facility: CLINIC | Age: 71
End: 2021-09-30
Payer: MEDICARE

## 2021-09-30 VITALS
WEIGHT: 119.5 LBS | RESPIRATION RATE: 18 BRPM | HEART RATE: 83 BPM | OXYGEN SATURATION: 100 % | TEMPERATURE: 99 F | SYSTOLIC BLOOD PRESSURE: 139 MMHG | HEIGHT: 68 IN | BODY MASS INDEX: 18.11 KG/M2 | DIASTOLIC BLOOD PRESSURE: 68 MMHG

## 2021-09-30 DIAGNOSIS — C01 SQUAMOUS CELL CARCINOMA OF BASE OF TONGUE: Primary | ICD-10-CM

## 2021-09-30 DIAGNOSIS — N18.31 STAGE 3A CHRONIC KIDNEY DISEASE: ICD-10-CM

## 2021-09-30 DIAGNOSIS — E03.2 HYPOTHYROIDISM DUE TO MEDICAMENTS AND OTHER EXOGENOUS SUBSTANCES: ICD-10-CM

## 2021-09-30 DIAGNOSIS — D61.810 PANCYTOPENIA DUE TO CHEMOTHERAPY: ICD-10-CM

## 2021-09-30 DIAGNOSIS — C01 SQUAMOUS CELL CARCINOMA OF BASE OF TONGUE: ICD-10-CM

## 2021-09-30 DIAGNOSIS — K12.31 MUCOSITIS DUE TO CHEMOTHERAPY: ICD-10-CM

## 2021-09-30 LAB
ALBUMIN SERPL BCP-MCNC: 3.1 G/DL (ref 3.5–5.2)
ALP SERPL-CCNC: 72 U/L (ref 55–135)
ALT SERPL W/O P-5'-P-CCNC: 12 U/L (ref 10–44)
ANION GAP SERPL CALC-SCNC: 10 MMOL/L (ref 8–16)
ANISOCYTOSIS BLD QL SMEAR: SLIGHT
AST SERPL-CCNC: 28 U/L (ref 10–40)
BASOPHILS NFR BLD: 0 % (ref 0–1.9)
BILIRUB SERPL-MCNC: 0.3 MG/DL (ref 0.1–1)
BUN SERPL-MCNC: 10 MG/DL (ref 8–23)
CALCIUM SERPL-MCNC: 10 MG/DL (ref 8.7–10.5)
CHLORIDE SERPL-SCNC: 97 MMOL/L (ref 95–110)
CO2 SERPL-SCNC: 31 MMOL/L (ref 23–29)
CREAT SERPL-MCNC: 1.5 MG/DL (ref 0.5–1.4)
DIFFERENTIAL METHOD: ABNORMAL
EOSINOPHIL NFR BLD: 0 % (ref 0–8)
ERYTHROCYTE [DISTWIDTH] IN BLOOD BY AUTOMATED COUNT: 11.8 % (ref 11.5–14.5)
EST. GFR  (AFRICAN AMERICAN): 53 ML/MIN/1.73 M^2
EST. GFR  (NON AFRICAN AMERICAN): 45.9 ML/MIN/1.73 M^2
GLUCOSE SERPL-MCNC: 107 MG/DL (ref 70–110)
HCT VFR BLD AUTO: 26 % (ref 40–54)
HGB BLD-MCNC: 9.2 G/DL (ref 14–18)
IMM GRANULOCYTES # BLD AUTO: ABNORMAL K/UL (ref 0–0.04)
IMM GRANULOCYTES NFR BLD AUTO: ABNORMAL % (ref 0–0.5)
LYMPHOCYTES NFR BLD: 63 % (ref 18–48)
MAGNESIUM SERPL-MCNC: 1.4 MG/DL (ref 1.6–2.6)
MCH RBC QN AUTO: 34.1 PG (ref 27–31)
MCHC RBC AUTO-ENTMCNC: 35.4 G/DL (ref 32–36)
MCV RBC AUTO: 96 FL (ref 82–98)
MONOCYTES NFR BLD: 14 % (ref 4–15)
NEUTROPHILS NFR BLD: 23 % (ref 38–73)
NRBC BLD-RTO: 0 /100 WBC
PLATELET # BLD AUTO: 91 K/UL (ref 150–450)
PLATELET BLD QL SMEAR: ABNORMAL
PMV BLD AUTO: 9.9 FL (ref 9.2–12.9)
POTASSIUM SERPL-SCNC: 4.1 MMOL/L (ref 3.5–5.1)
PROT SERPL-MCNC: 6.8 G/DL (ref 6–8.4)
RBC # BLD AUTO: 2.7 M/UL (ref 4.6–6.2)
SODIUM SERPL-SCNC: 138 MMOL/L (ref 136–145)
WBC # BLD AUTO: 1.8 K/UL (ref 3.9–12.7)

## 2021-09-30 PROCEDURE — 99215 PR OFFICE/OUTPT VISIT, EST, LEVL V, 40-54 MIN: ICD-10-PCS | Mod: S$PBB,,, | Performed by: STUDENT IN AN ORGANIZED HEALTH CARE EDUCATION/TRAINING PROGRAM

## 2021-09-30 PROCEDURE — 85007 BL SMEAR W/DIFF WBC COUNT: CPT | Performed by: STUDENT IN AN ORGANIZED HEALTH CARE EDUCATION/TRAINING PROGRAM

## 2021-09-30 PROCEDURE — 83735 ASSAY OF MAGNESIUM: CPT | Performed by: STUDENT IN AN ORGANIZED HEALTH CARE EDUCATION/TRAINING PROGRAM

## 2021-09-30 PROCEDURE — 36415 COLL VENOUS BLD VENIPUNCTURE: CPT | Performed by: STUDENT IN AN ORGANIZED HEALTH CARE EDUCATION/TRAINING PROGRAM

## 2021-09-30 PROCEDURE — 85027 COMPLETE CBC AUTOMATED: CPT | Performed by: STUDENT IN AN ORGANIZED HEALTH CARE EDUCATION/TRAINING PROGRAM

## 2021-09-30 PROCEDURE — 99215 OFFICE O/P EST HI 40 MIN: CPT | Mod: S$PBB,,, | Performed by: STUDENT IN AN ORGANIZED HEALTH CARE EDUCATION/TRAINING PROGRAM

## 2021-09-30 PROCEDURE — 80053 COMPREHEN METABOLIC PANEL: CPT | Performed by: STUDENT IN AN ORGANIZED HEALTH CARE EDUCATION/TRAINING PROGRAM

## 2021-09-30 PROCEDURE — 99999 PR PBB SHADOW E&M-EST. PATIENT-LVL IV: CPT | Mod: PBBFAC,,, | Performed by: STUDENT IN AN ORGANIZED HEALTH CARE EDUCATION/TRAINING PROGRAM

## 2021-09-30 PROCEDURE — 99999 PR PBB SHADOW E&M-EST. PATIENT-LVL IV: ICD-10-PCS | Mod: PBBFAC,,, | Performed by: STUDENT IN AN ORGANIZED HEALTH CARE EDUCATION/TRAINING PROGRAM

## 2021-09-30 PROCEDURE — 99214 OFFICE O/P EST MOD 30 MIN: CPT | Mod: PBBFAC | Performed by: STUDENT IN AN ORGANIZED HEALTH CARE EDUCATION/TRAINING PROGRAM

## 2021-09-30 RX ORDER — OXYCODONE HYDROCHLORIDE 10 MG/1
10 TABLET ORAL EVERY 6 HOURS PRN
Qty: 120 TABLET | Refills: 0 | Status: SHIPPED | OUTPATIENT
Start: 2021-09-30 | End: 2021-10-15 | Stop reason: SDUPTHER

## 2021-09-30 RX ORDER — OXYCODONE HYDROCHLORIDE 10 MG/1
5 TABLET ORAL EVERY 6 HOURS PRN
Qty: 120 TABLET | Refills: 0 | Status: SHIPPED | OUTPATIENT
Start: 2021-09-30 | End: 2021-09-30

## 2021-10-01 PROBLEM — D61.810 PANCYTOPENIA DUE TO CHEMOTHERAPY: Status: ACTIVE | Noted: 2021-10-01

## 2021-10-04 ENCOUNTER — OFFICE VISIT (OUTPATIENT)
Dept: TRANSPLANT | Facility: CLINIC | Age: 71
End: 2021-10-04
Payer: MEDICARE

## 2021-10-04 ENCOUNTER — HOSPITAL ENCOUNTER (OUTPATIENT)
Dept: RADIOLOGY | Facility: HOSPITAL | Age: 71
Discharge: HOME | End: 2021-10-04
Attending: INTERNAL MEDICINE
Payer: MEDICARE

## 2021-10-04 VITALS
RESPIRATION RATE: 16 BRPM | HEIGHT: 68 IN | SYSTOLIC BLOOD PRESSURE: 128 MMHG | WEIGHT: 117.94 LBS | TEMPERATURE: 97 F | DIASTOLIC BLOOD PRESSURE: 69 MMHG | HEART RATE: 85 BPM | BODY MASS INDEX: 17.88 KG/M2 | OXYGEN SATURATION: 96 %

## 2021-10-04 DIAGNOSIS — Z94.4 STATUS POST LIVER TRANSPLANTATION: Primary | ICD-10-CM

## 2021-10-04 DIAGNOSIS — Z94.4 STATUS POST LIVER TRANSPLANTATION: ICD-10-CM

## 2021-10-04 DIAGNOSIS — N18.31 STAGE 3A CHRONIC KIDNEY DISEASE: ICD-10-CM

## 2021-10-04 DIAGNOSIS — C01 SQUAMOUS CELL CARCINOMA OF BASE OF TONGUE: ICD-10-CM

## 2021-10-04 DIAGNOSIS — Z93.0 TRACHEOSTOMY STATUS: ICD-10-CM

## 2021-10-04 PROCEDURE — 99214 OFFICE O/P EST MOD 30 MIN: CPT | Mod: PBBFAC,25 | Performed by: INTERNAL MEDICINE

## 2021-10-04 PROCEDURE — 93975 VASCULAR STUDY: CPT | Mod: 26,,, | Performed by: STUDENT IN AN ORGANIZED HEALTH CARE EDUCATION/TRAINING PROGRAM

## 2021-10-04 PROCEDURE — 99999 PR PBB SHADOW E&M-EST. PATIENT-LVL IV: CPT | Mod: PBBFAC,,, | Performed by: INTERNAL MEDICINE

## 2021-10-04 PROCEDURE — 99215 PR OFFICE/OUTPT VISIT, EST, LEVL V, 40-54 MIN: ICD-10-PCS | Mod: S$PBB,,, | Performed by: INTERNAL MEDICINE

## 2021-10-04 PROCEDURE — 76705 ECHO EXAM OF ABDOMEN: CPT | Mod: 26,59,, | Performed by: STUDENT IN AN ORGANIZED HEALTH CARE EDUCATION/TRAINING PROGRAM

## 2021-10-04 PROCEDURE — 99215 OFFICE O/P EST HI 40 MIN: CPT | Mod: S$PBB,,, | Performed by: INTERNAL MEDICINE

## 2021-10-04 PROCEDURE — 76705 US LIVER TRANSPLANT POST: ICD-10-PCS | Mod: 26,59,, | Performed by: STUDENT IN AN ORGANIZED HEALTH CARE EDUCATION/TRAINING PROGRAM

## 2021-10-04 PROCEDURE — 99999 PR PBB SHADOW E&M-EST. PATIENT-LVL IV: ICD-10-PCS | Mod: PBBFAC,,, | Performed by: INTERNAL MEDICINE

## 2021-10-04 PROCEDURE — 93975 VASCULAR STUDY: CPT | Mod: TC

## 2021-10-04 PROCEDURE — 93975 US LIVER TRANSPLANT POST: ICD-10-PCS | Mod: 26,,, | Performed by: STUDENT IN AN ORGANIZED HEALTH CARE EDUCATION/TRAINING PROGRAM

## 2021-10-07 ENCOUNTER — TELEPHONE (OUTPATIENT)
Dept: HEMATOLOGY/ONCOLOGY | Facility: CLINIC | Age: 71
End: 2021-10-07

## 2021-10-14 ENCOUNTER — OFFICE VISIT (OUTPATIENT)
Dept: HEMATOLOGY/ONCOLOGY | Facility: CLINIC | Age: 71
End: 2021-10-14
Payer: MEDICARE

## 2021-10-14 ENCOUNTER — LAB VISIT (OUTPATIENT)
Dept: LAB | Facility: HOSPITAL | Age: 71
End: 2021-10-14
Attending: STUDENT IN AN ORGANIZED HEALTH CARE EDUCATION/TRAINING PROGRAM
Payer: MEDICARE

## 2021-10-14 VITALS
TEMPERATURE: 98 F | WEIGHT: 117 LBS | RESPIRATION RATE: 18 BRPM | HEIGHT: 64 IN | BODY MASS INDEX: 19.97 KG/M2 | DIASTOLIC BLOOD PRESSURE: 64 MMHG | SYSTOLIC BLOOD PRESSURE: 120 MMHG | OXYGEN SATURATION: 100 % | HEART RATE: 89 BPM

## 2021-10-14 DIAGNOSIS — E89.0 POSTOPERATIVE HYPOTHYROIDISM: ICD-10-CM

## 2021-10-14 DIAGNOSIS — K12.31 MUCOSITIS DUE TO CHEMOTHERAPY: ICD-10-CM

## 2021-10-14 DIAGNOSIS — C01 SQUAMOUS CELL CARCINOMA OF BASE OF TONGUE: ICD-10-CM

## 2021-10-14 DIAGNOSIS — C01 SQUAMOUS CELL CARCINOMA OF BASE OF TONGUE: Primary | ICD-10-CM

## 2021-10-14 DIAGNOSIS — D61.810 PANCYTOPENIA DUE TO CHEMOTHERAPY: ICD-10-CM

## 2021-10-14 DIAGNOSIS — Z94.4 STATUS POST LIVER TRANSPLANTATION: ICD-10-CM

## 2021-10-14 DIAGNOSIS — D63.1 ANEMIA IN STAGE 3A CHRONIC KIDNEY DISEASE: ICD-10-CM

## 2021-10-14 DIAGNOSIS — C77.0 SECONDARY MALIGNANT NEOPLASM OF CERVICAL LYMPH NODE: ICD-10-CM

## 2021-10-14 DIAGNOSIS — N18.31 ANEMIA IN STAGE 3A CHRONIC KIDNEY DISEASE: ICD-10-CM

## 2021-10-14 DIAGNOSIS — N18.31 STAGE 3A CHRONIC KIDNEY DISEASE: ICD-10-CM

## 2021-10-14 LAB
ALBUMIN SERPL BCP-MCNC: 3.3 G/DL (ref 3.5–5.2)
ALP SERPL-CCNC: 85 U/L (ref 55–135)
ALT SERPL W/O P-5'-P-CCNC: 14 U/L (ref 10–44)
ANION GAP SERPL CALC-SCNC: 14 MMOL/L (ref 8–16)
AST SERPL-CCNC: 35 U/L (ref 10–40)
BASOPHILS # BLD AUTO: 0.02 K/UL (ref 0–0.2)
BASOPHILS NFR BLD: 0.4 % (ref 0–1.9)
BILIRUB SERPL-MCNC: 0.4 MG/DL (ref 0.1–1)
BUN SERPL-MCNC: 11 MG/DL (ref 8–23)
CALCIUM SERPL-MCNC: 9.7 MG/DL (ref 8.7–10.5)
CHLORIDE SERPL-SCNC: 104 MMOL/L (ref 95–110)
CO2 SERPL-SCNC: 21 MMOL/L (ref 23–29)
CREAT SERPL-MCNC: 1.5 MG/DL (ref 0.5–1.4)
DIFFERENTIAL METHOD: ABNORMAL
EOSINOPHIL # BLD AUTO: 0 K/UL (ref 0–0.5)
EOSINOPHIL NFR BLD: 0.2 % (ref 0–8)
ERYTHROCYTE [DISTWIDTH] IN BLOOD BY AUTOMATED COUNT: 16 % (ref 11.5–14.5)
EST. GFR  (AFRICAN AMERICAN): 53 ML/MIN/1.73 M^2
EST. GFR  (NON AFRICAN AMERICAN): 45.9 ML/MIN/1.73 M^2
GLUCOSE SERPL-MCNC: 110 MG/DL (ref 70–110)
HCT VFR BLD AUTO: 31.2 % (ref 40–54)
HGB BLD-MCNC: 10.4 G/DL (ref 14–18)
IMM GRANULOCYTES # BLD AUTO: 0.03 K/UL (ref 0–0.04)
IMM GRANULOCYTES NFR BLD AUTO: 0.7 % (ref 0–0.5)
LYMPHOCYTES # BLD AUTO: 1.3 K/UL (ref 1–4.8)
LYMPHOCYTES NFR BLD: 29.1 % (ref 18–48)
MCH RBC QN AUTO: 33.8 PG (ref 27–31)
MCHC RBC AUTO-ENTMCNC: 33.3 G/DL (ref 32–36)
MCV RBC AUTO: 101 FL (ref 82–98)
MONOCYTES # BLD AUTO: 0.6 K/UL (ref 0.3–1)
MONOCYTES NFR BLD: 13.9 % (ref 4–15)
NEUTROPHILS # BLD AUTO: 2.5 K/UL (ref 1.8–7.7)
NEUTROPHILS NFR BLD: 55.7 % (ref 38–73)
NRBC BLD-RTO: 0 /100 WBC
PLATELET # BLD AUTO: 214 K/UL (ref 150–450)
PMV BLD AUTO: 9.5 FL (ref 9.2–12.9)
POTASSIUM SERPL-SCNC: 4.6 MMOL/L (ref 3.5–5.1)
PROT SERPL-MCNC: 7.4 G/DL (ref 6–8.4)
RBC # BLD AUTO: 3.08 M/UL (ref 4.6–6.2)
SODIUM SERPL-SCNC: 139 MMOL/L (ref 136–145)
WBC # BLD AUTO: 4.54 K/UL (ref 3.9–12.7)

## 2021-10-14 PROCEDURE — 99999 PR PBB SHADOW E&M-EST. PATIENT-LVL IV: ICD-10-PCS | Mod: PBBFAC,,, | Performed by: STUDENT IN AN ORGANIZED HEALTH CARE EDUCATION/TRAINING PROGRAM

## 2021-10-14 PROCEDURE — 80053 COMPREHEN METABOLIC PANEL: CPT | Performed by: STUDENT IN AN ORGANIZED HEALTH CARE EDUCATION/TRAINING PROGRAM

## 2021-10-14 PROCEDURE — 99214 OFFICE O/P EST MOD 30 MIN: CPT | Mod: PBBFAC | Performed by: STUDENT IN AN ORGANIZED HEALTH CARE EDUCATION/TRAINING PROGRAM

## 2021-10-14 PROCEDURE — 99999 PR PBB SHADOW E&M-EST. PATIENT-LVL IV: CPT | Mod: PBBFAC,,, | Performed by: STUDENT IN AN ORGANIZED HEALTH CARE EDUCATION/TRAINING PROGRAM

## 2021-10-14 PROCEDURE — 36415 COLL VENOUS BLD VENIPUNCTURE: CPT | Performed by: STUDENT IN AN ORGANIZED HEALTH CARE EDUCATION/TRAINING PROGRAM

## 2021-10-14 PROCEDURE — 99215 OFFICE O/P EST HI 40 MIN: CPT | Mod: S$PBB,,, | Performed by: STUDENT IN AN ORGANIZED HEALTH CARE EDUCATION/TRAINING PROGRAM

## 2021-10-14 PROCEDURE — 85025 COMPLETE CBC W/AUTO DIFF WBC: CPT | Performed by: STUDENT IN AN ORGANIZED HEALTH CARE EDUCATION/TRAINING PROGRAM

## 2021-10-14 PROCEDURE — 99215 PR OFFICE/OUTPT VISIT, EST, LEVL V, 40-54 MIN: ICD-10-PCS | Mod: S$PBB,,, | Performed by: STUDENT IN AN ORGANIZED HEALTH CARE EDUCATION/TRAINING PROGRAM

## 2021-10-14 RX ORDER — TACROLIMUS 0.5 MG/1
0.5 CAPSULE ORAL EVERY 12 HOURS
Qty: 180 CAPSULE | Refills: 3 | Status: SHIPPED | OUTPATIENT
Start: 2021-10-14 | End: 2022-05-18 | Stop reason: SDUPTHER

## 2021-10-15 RX ORDER — OXYCODONE HYDROCHLORIDE 10 MG/1
10 TABLET ORAL EVERY 6 HOURS PRN
Qty: 120 TABLET | Refills: 0 | Status: SHIPPED | OUTPATIENT
Start: 2021-10-28 | End: 2021-12-01 | Stop reason: SDUPTHER

## 2021-10-18 ENCOUNTER — INFUSION (OUTPATIENT)
Dept: INFUSION THERAPY | Facility: HOSPITAL | Age: 71
End: 2021-10-18
Payer: MEDICARE

## 2021-10-18 VITALS
DIASTOLIC BLOOD PRESSURE: 63 MMHG | SYSTOLIC BLOOD PRESSURE: 134 MMHG | RESPIRATION RATE: 16 BRPM | TEMPERATURE: 98 F | HEART RATE: 86 BPM

## 2021-10-18 DIAGNOSIS — E89.0 POSTOPERATIVE HYPOTHYROIDISM: Primary | ICD-10-CM

## 2021-10-18 DIAGNOSIS — C01 SQUAMOUS CELL CARCINOMA OF BASE OF TONGUE: ICD-10-CM

## 2021-10-18 DIAGNOSIS — C77.0 SECONDARY MALIGNANT NEOPLASM OF CERVICAL LYMPH NODE: ICD-10-CM

## 2021-10-18 LAB
T4 FREE SERPL-MCNC: 1.13 NG/DL (ref 0.71–1.51)
TSH SERPL DL<=0.005 MIU/L-ACNC: 0.14 UIU/ML (ref 0.4–4)

## 2021-10-18 PROCEDURE — 84443 ASSAY THYROID STIM HORMONE: CPT | Performed by: STUDENT IN AN ORGANIZED HEALTH CARE EDUCATION/TRAINING PROGRAM

## 2021-10-18 PROCEDURE — 96413 CHEMO IV INFUSION 1 HR: CPT

## 2021-10-18 PROCEDURE — 63600175 PHARM REV CODE 636 W HCPCS: Mod: JG | Performed by: STUDENT IN AN ORGANIZED HEALTH CARE EDUCATION/TRAINING PROGRAM

## 2021-10-18 PROCEDURE — 25000003 PHARM REV CODE 250: Performed by: STUDENT IN AN ORGANIZED HEALTH CARE EDUCATION/TRAINING PROGRAM

## 2021-10-18 PROCEDURE — A4216 STERILE WATER/SALINE, 10 ML: HCPCS | Performed by: STUDENT IN AN ORGANIZED HEALTH CARE EDUCATION/TRAINING PROGRAM

## 2021-10-18 PROCEDURE — 84439 ASSAY OF FREE THYROXINE: CPT | Performed by: STUDENT IN AN ORGANIZED HEALTH CARE EDUCATION/TRAINING PROGRAM

## 2021-10-18 RX ORDER — HEPARIN 100 UNIT/ML
500 SYRINGE INTRAVENOUS
Status: DISCONTINUED | OUTPATIENT
Start: 2021-10-18 | End: 2021-10-18 | Stop reason: HOSPADM

## 2021-10-18 RX ORDER — SODIUM CHLORIDE 0.9 % (FLUSH) 0.9 %
10 SYRINGE (ML) INJECTION
Status: DISCONTINUED | OUTPATIENT
Start: 2021-10-18 | End: 2021-10-18 | Stop reason: HOSPADM

## 2021-10-18 RX ADMIN — Medication 10 ML: at 11:10

## 2021-10-18 RX ADMIN — SODIUM CHLORIDE: 9 INJECTION, SOLUTION INTRAVENOUS at 09:10

## 2021-10-18 RX ADMIN — HEPARIN 500 UNITS: 100 SYRINGE at 11:10

## 2021-10-18 RX ADMIN — SODIUM CHLORIDE 200 MG: 9 INJECTION, SOLUTION INTRAVENOUS at 10:10

## 2021-10-22 ENCOUNTER — IMMUNIZATION (OUTPATIENT)
Dept: INTERNAL MEDICINE | Facility: CLINIC | Age: 71
End: 2021-10-22
Payer: MEDICARE

## 2021-10-22 DIAGNOSIS — Z23 NEED FOR VACCINATION: Primary | ICD-10-CM

## 2021-10-22 PROCEDURE — 0013A COVID-19, MRNA, LNP-S, PF, 100 MCG/0.5 ML DOSE VACCINE: CPT | Mod: PBBFAC,PO

## 2021-10-22 PROCEDURE — 91301 COVID-19, MRNA, LNP-S, PF, 100 MCG/0.5 ML DOSE VACCINE: CPT | Mod: PBBFAC,PO

## 2021-11-01 ENCOUNTER — LAB VISIT (OUTPATIENT)
Dept: LAB | Facility: HOSPITAL | Age: 71
End: 2021-11-01
Attending: INTERNAL MEDICINE
Payer: MEDICARE

## 2021-11-01 DIAGNOSIS — Z94.4 STATUS POST LIVER TRANSPLANTATION: ICD-10-CM

## 2021-11-01 LAB
ALBUMIN SERPL BCP-MCNC: 3.2 G/DL (ref 3.5–5.2)
ALP SERPL-CCNC: 76 U/L (ref 55–135)
ALT SERPL W/O P-5'-P-CCNC: 11 U/L (ref 10–44)
ANION GAP SERPL CALC-SCNC: 10 MMOL/L (ref 8–16)
AST SERPL-CCNC: 35 U/L (ref 10–40)
BASOPHILS # BLD AUTO: 0.02 K/UL (ref 0–0.2)
BASOPHILS NFR BLD: 0.6 % (ref 0–1.9)
BILIRUB SERPL-MCNC: 0.5 MG/DL (ref 0.1–1)
BUN SERPL-MCNC: 11 MG/DL (ref 8–23)
CALCIUM SERPL-MCNC: 9.5 MG/DL (ref 8.7–10.5)
CHLORIDE SERPL-SCNC: 104 MMOL/L (ref 95–110)
CO2 SERPL-SCNC: 28 MMOL/L (ref 23–29)
CREAT SERPL-MCNC: 1.3 MG/DL (ref 0.5–1.4)
DIFFERENTIAL METHOD: ABNORMAL
EOSINOPHIL # BLD AUTO: 0.4 K/UL (ref 0–0.5)
EOSINOPHIL NFR BLD: 9.7 % (ref 0–8)
ERYTHROCYTE [DISTWIDTH] IN BLOOD BY AUTOMATED COUNT: 15.2 % (ref 11.5–14.5)
EST. GFR  (AFRICAN AMERICAN): >60 ML/MIN/1.73 M^2
EST. GFR  (NON AFRICAN AMERICAN): 54.5 ML/MIN/1.73 M^2
GLUCOSE SERPL-MCNC: 96 MG/DL (ref 70–110)
HCT VFR BLD AUTO: 33.1 % (ref 40–54)
HGB BLD-MCNC: 11.1 G/DL (ref 14–18)
IMM GRANULOCYTES # BLD AUTO: 0.01 K/UL (ref 0–0.04)
IMM GRANULOCYTES NFR BLD AUTO: 0.3 % (ref 0–0.5)
LYMPHOCYTES # BLD AUTO: 0.8 K/UL (ref 1–4.8)
LYMPHOCYTES NFR BLD: 22.9 % (ref 18–48)
MCH RBC QN AUTO: 34.5 PG (ref 27–31)
MCHC RBC AUTO-ENTMCNC: 33.5 G/DL (ref 32–36)
MCV RBC AUTO: 103 FL (ref 82–98)
MONOCYTES # BLD AUTO: 0.5 K/UL (ref 0.3–1)
MONOCYTES NFR BLD: 13 % (ref 4–15)
NEUTROPHILS # BLD AUTO: 1.9 K/UL (ref 1.8–7.7)
NEUTROPHILS NFR BLD: 53.5 % (ref 38–73)
NRBC BLD-RTO: 0 /100 WBC
PLATELET # BLD AUTO: 146 K/UL (ref 150–450)
PMV BLD AUTO: 9.6 FL (ref 9.2–12.9)
POTASSIUM SERPL-SCNC: 4.3 MMOL/L (ref 3.5–5.1)
PROT SERPL-MCNC: 7.1 G/DL (ref 6–8.4)
RBC # BLD AUTO: 3.22 M/UL (ref 4.6–6.2)
SODIUM SERPL-SCNC: 142 MMOL/L (ref 136–145)
TACROLIMUS BLD-MCNC: 2.6 NG/ML (ref 5–15)
WBC # BLD AUTO: 3.62 K/UL (ref 3.9–12.7)

## 2021-11-01 PROCEDURE — 80053 COMPREHEN METABOLIC PANEL: CPT | Performed by: INTERNAL MEDICINE

## 2021-11-01 PROCEDURE — 36415 COLL VENOUS BLD VENIPUNCTURE: CPT | Performed by: INTERNAL MEDICINE

## 2021-11-01 PROCEDURE — 80197 ASSAY OF TACROLIMUS: CPT | Performed by: INTERNAL MEDICINE

## 2021-11-01 PROCEDURE — 85025 COMPLETE CBC W/AUTO DIFF WBC: CPT | Performed by: INTERNAL MEDICINE

## 2021-11-03 ENCOUNTER — TELEPHONE (OUTPATIENT)
Dept: TRANSPLANT | Facility: CLINIC | Age: 71
End: 2021-11-03
Payer: MEDICARE

## 2021-11-03 DIAGNOSIS — T86.49 CIRRHOSIS OF TRANSPLANTED LIVER: ICD-10-CM

## 2021-11-03 DIAGNOSIS — Z94.4 STATUS POST LIVER TRANSPLANTATION: Primary | ICD-10-CM

## 2021-11-03 DIAGNOSIS — C22.0 HCC (HEPATOCELLULAR CARCINOMA): ICD-10-CM

## 2021-11-03 DIAGNOSIS — K74.60 CIRRHOSIS OF TRANSPLANTED LIVER: ICD-10-CM

## 2021-11-08 ENCOUNTER — TELEPHONE (OUTPATIENT)
Dept: HEMATOLOGY/ONCOLOGY | Facility: CLINIC | Age: 71
End: 2021-11-08
Payer: MEDICARE

## 2021-11-12 ENCOUNTER — OFFICE VISIT (OUTPATIENT)
Dept: HEMATOLOGY/ONCOLOGY | Facility: CLINIC | Age: 71
End: 2021-11-12
Payer: MEDICARE

## 2021-11-12 ENCOUNTER — LAB VISIT (OUTPATIENT)
Dept: LAB | Facility: HOSPITAL | Age: 71
End: 2021-11-12
Attending: STUDENT IN AN ORGANIZED HEALTH CARE EDUCATION/TRAINING PROGRAM
Payer: MEDICARE

## 2021-11-12 ENCOUNTER — INFUSION (OUTPATIENT)
Dept: INFUSION THERAPY | Facility: HOSPITAL | Age: 71
End: 2021-11-12
Attending: STUDENT IN AN ORGANIZED HEALTH CARE EDUCATION/TRAINING PROGRAM
Payer: MEDICARE

## 2021-11-12 VITALS
DIASTOLIC BLOOD PRESSURE: 77 MMHG | RESPIRATION RATE: 16 BRPM | WEIGHT: 117.5 LBS | HEART RATE: 76 BPM | OXYGEN SATURATION: 100 % | TEMPERATURE: 99 F | BODY MASS INDEX: 17.81 KG/M2 | HEIGHT: 68 IN | SYSTOLIC BLOOD PRESSURE: 132 MMHG

## 2021-11-12 VITALS
BODY MASS INDEX: 17.81 KG/M2 | WEIGHT: 117.5 LBS | HEIGHT: 68 IN | DIASTOLIC BLOOD PRESSURE: 71 MMHG | SYSTOLIC BLOOD PRESSURE: 141 MMHG | HEART RATE: 83 BPM | TEMPERATURE: 98 F

## 2021-11-12 DIAGNOSIS — C01 SQUAMOUS CELL CARCINOMA OF BASE OF TONGUE: ICD-10-CM

## 2021-11-12 DIAGNOSIS — E89.0 POSTOPERATIVE HYPOTHYROIDISM: ICD-10-CM

## 2021-11-12 DIAGNOSIS — C77.0 SECONDARY MALIGNANT NEOPLASM OF CERVICAL LYMPH NODE: Primary | ICD-10-CM

## 2021-11-12 LAB
ALBUMIN SERPL BCP-MCNC: 3.6 G/DL (ref 3.5–5.2)
ALP SERPL-CCNC: 69 U/L (ref 55–135)
ALT SERPL W/O P-5'-P-CCNC: 13 U/L (ref 10–44)
ANION GAP SERPL CALC-SCNC: 10 MMOL/L (ref 8–16)
AST SERPL-CCNC: 31 U/L (ref 10–40)
BASOPHILS # BLD AUTO: 0.04 K/UL (ref 0–0.2)
BASOPHILS NFR BLD: 0.7 % (ref 0–1.9)
BILIRUB SERPL-MCNC: 0.6 MG/DL (ref 0.1–1)
BUN SERPL-MCNC: 11 MG/DL (ref 8–23)
CALCIUM SERPL-MCNC: 9.8 MG/DL (ref 8.7–10.5)
CHLORIDE SERPL-SCNC: 101 MMOL/L (ref 95–110)
CO2 SERPL-SCNC: 30 MMOL/L (ref 23–29)
CREAT SERPL-MCNC: 1.4 MG/DL (ref 0.5–1.4)
DIFFERENTIAL METHOD: ABNORMAL
EOSINOPHIL # BLD AUTO: 0.8 K/UL (ref 0–0.5)
EOSINOPHIL NFR BLD: 12.8 % (ref 0–8)
ERYTHROCYTE [DISTWIDTH] IN BLOOD BY AUTOMATED COUNT: 14 % (ref 11.5–14.5)
EST. GFR  (AFRICAN AMERICAN): 57.6 ML/MIN/1.73 M^2
EST. GFR  (NON AFRICAN AMERICAN): 49.8 ML/MIN/1.73 M^2
GLUCOSE SERPL-MCNC: 118 MG/DL (ref 70–110)
HCT VFR BLD AUTO: 35.3 % (ref 40–54)
HGB BLD-MCNC: 11.6 G/DL (ref 14–18)
IMM GRANULOCYTES # BLD AUTO: 0.04 K/UL (ref 0–0.04)
IMM GRANULOCYTES NFR BLD AUTO: 0.7 % (ref 0–0.5)
LYMPHOCYTES # BLD AUTO: 1.7 K/UL (ref 1–4.8)
LYMPHOCYTES NFR BLD: 27.1 % (ref 18–48)
MCH RBC QN AUTO: 34.3 PG (ref 27–31)
MCHC RBC AUTO-ENTMCNC: 32.9 G/DL (ref 32–36)
MCV RBC AUTO: 104 FL (ref 82–98)
MONOCYTES # BLD AUTO: 0.7 K/UL (ref 0.3–1)
MONOCYTES NFR BLD: 11.3 % (ref 4–15)
NEUTROPHILS # BLD AUTO: 2.9 K/UL (ref 1.8–7.7)
NEUTROPHILS NFR BLD: 47.4 % (ref 38–73)
NRBC BLD-RTO: 0 /100 WBC
PLATELET # BLD AUTO: 159 K/UL (ref 150–450)
PMV BLD AUTO: 9.7 FL (ref 9.2–12.9)
POTASSIUM SERPL-SCNC: 4.4 MMOL/L (ref 3.5–5.1)
PROT SERPL-MCNC: 7.5 G/DL (ref 6–8.4)
RBC # BLD AUTO: 3.38 M/UL (ref 4.6–6.2)
SODIUM SERPL-SCNC: 141 MMOL/L (ref 136–145)
TSH SERPL DL<=0.005 MIU/L-ACNC: 0.64 UIU/ML (ref 0.4–4)
WBC # BLD AUTO: 6.09 K/UL (ref 3.9–12.7)

## 2021-11-12 PROCEDURE — 85025 COMPLETE CBC W/AUTO DIFF WBC: CPT | Performed by: STUDENT IN AN ORGANIZED HEALTH CARE EDUCATION/TRAINING PROGRAM

## 2021-11-12 PROCEDURE — 25000003 PHARM REV CODE 250: Performed by: STUDENT IN AN ORGANIZED HEALTH CARE EDUCATION/TRAINING PROGRAM

## 2021-11-12 PROCEDURE — 80053 COMPREHEN METABOLIC PANEL: CPT | Performed by: STUDENT IN AN ORGANIZED HEALTH CARE EDUCATION/TRAINING PROGRAM

## 2021-11-12 PROCEDURE — 99999 PR PBB SHADOW E&M-EST. PATIENT-LVL IV: CPT | Mod: PBBFAC,,, | Performed by: STUDENT IN AN ORGANIZED HEALTH CARE EDUCATION/TRAINING PROGRAM

## 2021-11-12 PROCEDURE — 36415 COLL VENOUS BLD VENIPUNCTURE: CPT | Performed by: STUDENT IN AN ORGANIZED HEALTH CARE EDUCATION/TRAINING PROGRAM

## 2021-11-12 PROCEDURE — 96413 CHEMO IV INFUSION 1 HR: CPT

## 2021-11-12 PROCEDURE — 84443 ASSAY THYROID STIM HORMONE: CPT | Performed by: STUDENT IN AN ORGANIZED HEALTH CARE EDUCATION/TRAINING PROGRAM

## 2021-11-12 PROCEDURE — A4216 STERILE WATER/SALINE, 10 ML: HCPCS | Performed by: STUDENT IN AN ORGANIZED HEALTH CARE EDUCATION/TRAINING PROGRAM

## 2021-11-12 PROCEDURE — 99999 PR PBB SHADOW E&M-EST. PATIENT-LVL IV: ICD-10-PCS | Mod: PBBFAC,,, | Performed by: STUDENT IN AN ORGANIZED HEALTH CARE EDUCATION/TRAINING PROGRAM

## 2021-11-12 PROCEDURE — 99214 OFFICE O/P EST MOD 30 MIN: CPT | Mod: PBBFAC,25 | Performed by: STUDENT IN AN ORGANIZED HEALTH CARE EDUCATION/TRAINING PROGRAM

## 2021-11-12 PROCEDURE — 63600175 PHARM REV CODE 636 W HCPCS: Performed by: STUDENT IN AN ORGANIZED HEALTH CARE EDUCATION/TRAINING PROGRAM

## 2021-11-12 PROCEDURE — 99215 OFFICE O/P EST HI 40 MIN: CPT | Mod: S$PBB,,, | Performed by: STUDENT IN AN ORGANIZED HEALTH CARE EDUCATION/TRAINING PROGRAM

## 2021-11-12 PROCEDURE — 99215 PR OFFICE/OUTPT VISIT, EST, LEVL V, 40-54 MIN: ICD-10-PCS | Mod: S$PBB,,, | Performed by: STUDENT IN AN ORGANIZED HEALTH CARE EDUCATION/TRAINING PROGRAM

## 2021-11-12 RX ORDER — SODIUM CHLORIDE 0.9 % (FLUSH) 0.9 %
10 SYRINGE (ML) INJECTION
Status: CANCELLED | OUTPATIENT
Start: 2021-11-12

## 2021-11-12 RX ORDER — HEPARIN 100 UNIT/ML
500 SYRINGE INTRAVENOUS
Status: CANCELLED | OUTPATIENT
Start: 2021-11-12

## 2021-11-12 RX ORDER — SODIUM CHLORIDE 0.9 % (FLUSH) 0.9 %
10 SYRINGE (ML) INJECTION
Status: DISCONTINUED | OUTPATIENT
Start: 2021-11-12 | End: 2021-11-12 | Stop reason: HOSPADM

## 2021-11-12 RX ORDER — HEPARIN 100 UNIT/ML
500 SYRINGE INTRAVENOUS
Status: DISCONTINUED | OUTPATIENT
Start: 2021-11-12 | End: 2021-11-12 | Stop reason: HOSPADM

## 2021-11-12 RX ADMIN — Medication 10 ML: at 10:11

## 2021-11-12 RX ADMIN — SODIUM CHLORIDE 200 MG: 9 INJECTION, SOLUTION INTRAVENOUS at 09:11

## 2021-11-12 RX ADMIN — SODIUM CHLORIDE: 0.9 INJECTION, SOLUTION INTRAVENOUS at 09:11

## 2021-11-12 RX ADMIN — HEPARIN 500 UNITS: 100 SYRINGE at 10:11

## 2021-12-01 ENCOUNTER — INFUSION (OUTPATIENT)
Dept: INFUSION THERAPY | Facility: HOSPITAL | Age: 71
End: 2021-12-01
Attending: STUDENT IN AN ORGANIZED HEALTH CARE EDUCATION/TRAINING PROGRAM
Payer: MEDICARE

## 2021-12-01 ENCOUNTER — HOSPITAL ENCOUNTER (OUTPATIENT)
Dept: RADIOLOGY | Facility: HOSPITAL | Age: 71
Discharge: HOME | End: 2021-12-01
Attending: STUDENT IN AN ORGANIZED HEALTH CARE EDUCATION/TRAINING PROGRAM
Payer: MEDICARE

## 2021-12-01 DIAGNOSIS — C01 SQUAMOUS CELL CARCINOMA OF BASE OF TONGUE: Primary | ICD-10-CM

## 2021-12-01 DIAGNOSIS — C77.0 SECONDARY MALIGNANT NEOPLASM OF CERVICAL LYMPH NODE: ICD-10-CM

## 2021-12-01 DIAGNOSIS — C01 SQUAMOUS CELL CARCINOMA OF BASE OF TONGUE: ICD-10-CM

## 2021-12-01 DIAGNOSIS — N18.31 STAGE 3A CHRONIC KIDNEY DISEASE: ICD-10-CM

## 2021-12-01 LAB
ALBUMIN SERPL BCP-MCNC: 3.3 G/DL (ref 3.5–5.2)
ALP SERPL-CCNC: 83 U/L (ref 55–135)
ALT SERPL W/O P-5'-P-CCNC: 12 U/L (ref 10–44)
ANION GAP SERPL CALC-SCNC: 10 MMOL/L (ref 8–16)
AST SERPL-CCNC: 40 U/L (ref 10–40)
BASOPHILS # BLD AUTO: 0.02 K/UL (ref 0–0.2)
BASOPHILS NFR BLD: 0.7 % (ref 0–1.9)
BILIRUB SERPL-MCNC: 0.5 MG/DL (ref 0.1–1)
BUN SERPL-MCNC: 11 MG/DL (ref 8–23)
CALCIUM SERPL-MCNC: 9.1 MG/DL (ref 8.7–10.5)
CHLORIDE SERPL-SCNC: 109 MMOL/L (ref 95–110)
CO2 SERPL-SCNC: 25 MMOL/L (ref 23–29)
CREAT SERPL-MCNC: 1.2 MG/DL (ref 0.5–1.4)
DIFFERENTIAL METHOD: ABNORMAL
EOSINOPHIL # BLD AUTO: 0.3 K/UL (ref 0–0.5)
EOSINOPHIL NFR BLD: 9.7 % (ref 0–8)
ERYTHROCYTE [DISTWIDTH] IN BLOOD BY AUTOMATED COUNT: 12.3 % (ref 11.5–14.5)
EST. GFR  (AFRICAN AMERICAN): >60 ML/MIN/1.73 M^2
EST. GFR  (NON AFRICAN AMERICAN): >60 ML/MIN/1.73 M^2
GLUCOSE SERPL-MCNC: 91 MG/DL (ref 70–110)
HCT VFR BLD AUTO: 32 % (ref 40–54)
HGB BLD-MCNC: 11 G/DL (ref 14–18)
IMM GRANULOCYTES # BLD AUTO: 0.01 K/UL (ref 0–0.04)
IMM GRANULOCYTES NFR BLD AUTO: 0.3 % (ref 0–0.5)
LYMPHOCYTES # BLD AUTO: 0.8 K/UL (ref 1–4.8)
LYMPHOCYTES NFR BLD: 28.5 % (ref 18–48)
MCH RBC QN AUTO: 35.1 PG (ref 27–31)
MCHC RBC AUTO-ENTMCNC: 34.4 G/DL (ref 32–36)
MCV RBC AUTO: 102 FL (ref 82–98)
MONOCYTES # BLD AUTO: 0.4 K/UL (ref 0.3–1)
MONOCYTES NFR BLD: 12.5 % (ref 4–15)
NEUTROPHILS # BLD AUTO: 1.4 K/UL (ref 1.8–7.7)
NEUTROPHILS NFR BLD: 48.3 % (ref 38–73)
NRBC BLD-RTO: 0 /100 WBC
PLATELET # BLD AUTO: 133 K/UL (ref 150–450)
PMV BLD AUTO: 9.6 FL (ref 9.2–12.9)
POCT GLUCOSE: 81 MG/DL (ref 70–110)
POTASSIUM SERPL-SCNC: 3.6 MMOL/L (ref 3.5–5.1)
PROT SERPL-MCNC: 6.9 G/DL (ref 6–8.4)
RBC # BLD AUTO: 3.13 M/UL (ref 4.6–6.2)
SODIUM SERPL-SCNC: 144 MMOL/L (ref 136–145)
WBC # BLD AUTO: 2.88 K/UL (ref 3.9–12.7)

## 2021-12-01 PROCEDURE — 78815 PET IMAGE W/CT SKULL-THIGH: CPT | Mod: TC,PS

## 2021-12-01 PROCEDURE — 80053 COMPREHEN METABOLIC PANEL: CPT | Performed by: STUDENT IN AN ORGANIZED HEALTH CARE EDUCATION/TRAINING PROGRAM

## 2021-12-01 PROCEDURE — 36591 DRAW BLOOD OFF VENOUS DEVICE: CPT

## 2021-12-01 PROCEDURE — 78815 PET IMAGE W/CT SKULL-THIGH: CPT | Mod: 26,PS,GC, | Performed by: STUDENT IN AN ORGANIZED HEALTH CARE EDUCATION/TRAINING PROGRAM

## 2021-12-01 PROCEDURE — 25000003 PHARM REV CODE 250: Performed by: STUDENT IN AN ORGANIZED HEALTH CARE EDUCATION/TRAINING PROGRAM

## 2021-12-01 PROCEDURE — 85025 COMPLETE CBC W/AUTO DIFF WBC: CPT | Performed by: STUDENT IN AN ORGANIZED HEALTH CARE EDUCATION/TRAINING PROGRAM

## 2021-12-01 PROCEDURE — 78815 NM PET CT ROUTINE: ICD-10-PCS | Mod: 26,PS,GC, | Performed by: STUDENT IN AN ORGANIZED HEALTH CARE EDUCATION/TRAINING PROGRAM

## 2021-12-01 PROCEDURE — 63600175 PHARM REV CODE 636 W HCPCS: Performed by: STUDENT IN AN ORGANIZED HEALTH CARE EDUCATION/TRAINING PROGRAM

## 2021-12-01 PROCEDURE — A4216 STERILE WATER/SALINE, 10 ML: HCPCS | Performed by: STUDENT IN AN ORGANIZED HEALTH CARE EDUCATION/TRAINING PROGRAM

## 2021-12-01 RX ORDER — HEPARIN 100 UNIT/ML
500 SYRINGE INTRAVENOUS
Status: DISCONTINUED | OUTPATIENT
Start: 2021-12-01 | End: 2021-12-01 | Stop reason: HOSPADM

## 2021-12-01 RX ORDER — SODIUM CHLORIDE 0.9 % (FLUSH) 0.9 %
10 SYRINGE (ML) INJECTION
Status: DISCONTINUED | OUTPATIENT
Start: 2021-12-01 | End: 2021-12-01 | Stop reason: HOSPADM

## 2021-12-01 RX ORDER — HEPARIN 100 UNIT/ML
500 SYRINGE INTRAVENOUS
Status: CANCELLED | OUTPATIENT
Start: 2021-12-01

## 2021-12-01 RX ORDER — SODIUM CHLORIDE 0.9 % (FLUSH) 0.9 %
10 SYRINGE (ML) INJECTION
Status: CANCELLED | OUTPATIENT
Start: 2021-12-01

## 2021-12-01 RX ORDER — OXYCODONE HYDROCHLORIDE 10 MG/1
10 TABLET ORAL EVERY 6 HOURS PRN
Qty: 120 TABLET | Refills: 0 | Status: SHIPPED | OUTPATIENT
Start: 2021-12-01 | End: 2021-12-30 | Stop reason: SDUPTHER

## 2021-12-01 RX ADMIN — HEPARIN 500 UNITS: 100 SYRINGE at 08:12

## 2021-12-01 RX ADMIN — Medication 10 ML: at 08:12

## 2021-12-02 ENCOUNTER — OFFICE VISIT (OUTPATIENT)
Dept: HEMATOLOGY/ONCOLOGY | Facility: CLINIC | Age: 71
End: 2021-12-02
Payer: MEDICARE

## 2021-12-02 VITALS
SYSTOLIC BLOOD PRESSURE: 118 MMHG | DIASTOLIC BLOOD PRESSURE: 68 MMHG | BODY MASS INDEX: 18.07 KG/M2 | HEART RATE: 92 BPM | HEIGHT: 68 IN | WEIGHT: 119.25 LBS

## 2021-12-02 DIAGNOSIS — K12.31 MUCOSITIS DUE TO CHEMOTHERAPY: ICD-10-CM

## 2021-12-02 DIAGNOSIS — C77.0 SECONDARY MALIGNANT NEOPLASM OF CERVICAL LYMPH NODE: Primary | ICD-10-CM

## 2021-12-02 DIAGNOSIS — E89.0 POSTOPERATIVE HYPOTHYROIDISM: ICD-10-CM

## 2021-12-02 DIAGNOSIS — N18.31 STAGE 3A CHRONIC KIDNEY DISEASE: ICD-10-CM

## 2021-12-02 DIAGNOSIS — C01 SQUAMOUS CELL CARCINOMA OF BASE OF TONGUE: ICD-10-CM

## 2021-12-02 DIAGNOSIS — N18.31 ANEMIA IN STAGE 3A CHRONIC KIDNEY DISEASE: ICD-10-CM

## 2021-12-02 DIAGNOSIS — D63.1 ANEMIA IN STAGE 3A CHRONIC KIDNEY DISEASE: ICD-10-CM

## 2021-12-02 DIAGNOSIS — E03.2 HYPOTHYROIDISM DUE TO MEDICAMENTS AND OTHER EXOGENOUS SUBSTANCES: ICD-10-CM

## 2021-12-02 PROCEDURE — 99999 PR PBB SHADOW E&M-EST. PATIENT-LVL III: ICD-10-PCS | Mod: PBBFAC,,, | Performed by: STUDENT IN AN ORGANIZED HEALTH CARE EDUCATION/TRAINING PROGRAM

## 2021-12-02 PROCEDURE — 99215 PR OFFICE/OUTPT VISIT, EST, LEVL V, 40-54 MIN: ICD-10-PCS | Mod: S$PBB,,, | Performed by: STUDENT IN AN ORGANIZED HEALTH CARE EDUCATION/TRAINING PROGRAM

## 2021-12-02 PROCEDURE — 99213 OFFICE O/P EST LOW 20 MIN: CPT | Mod: PBBFAC | Performed by: STUDENT IN AN ORGANIZED HEALTH CARE EDUCATION/TRAINING PROGRAM

## 2021-12-02 PROCEDURE — 99999 PR PBB SHADOW E&M-EST. PATIENT-LVL III: CPT | Mod: PBBFAC,,, | Performed by: STUDENT IN AN ORGANIZED HEALTH CARE EDUCATION/TRAINING PROGRAM

## 2021-12-02 PROCEDURE — 99215 OFFICE O/P EST HI 40 MIN: CPT | Mod: S$PBB,,, | Performed by: STUDENT IN AN ORGANIZED HEALTH CARE EDUCATION/TRAINING PROGRAM

## 2021-12-23 ENCOUNTER — INFUSION (OUTPATIENT)
Dept: INFUSION THERAPY | Facility: HOSPITAL | Age: 71
End: 2021-12-23
Payer: MEDICARE

## 2021-12-23 ENCOUNTER — INFUSION (OUTPATIENT)
Dept: INFUSION THERAPY | Facility: HOSPITAL | Age: 71
End: 2021-12-23
Attending: STUDENT IN AN ORGANIZED HEALTH CARE EDUCATION/TRAINING PROGRAM
Payer: MEDICARE

## 2021-12-23 VITALS
RESPIRATION RATE: 16 BRPM | SYSTOLIC BLOOD PRESSURE: 127 MMHG | WEIGHT: 117.06 LBS | BODY MASS INDEX: 17.74 KG/M2 | DIASTOLIC BLOOD PRESSURE: 75 MMHG | HEART RATE: 81 BPM | HEIGHT: 68 IN | TEMPERATURE: 99 F

## 2021-12-23 DIAGNOSIS — C01 SQUAMOUS CELL CARCINOMA OF BASE OF TONGUE: Primary | ICD-10-CM

## 2021-12-23 DIAGNOSIS — N18.31 STAGE 3A CHRONIC KIDNEY DISEASE: ICD-10-CM

## 2021-12-23 DIAGNOSIS — C77.0 SECONDARY MALIGNANT NEOPLASM OF CERVICAL LYMPH NODE: Primary | ICD-10-CM

## 2021-12-23 DIAGNOSIS — E03.2 HYPOTHYROIDISM DUE TO MEDICAMENTS AND OTHER EXOGENOUS SUBSTANCES: ICD-10-CM

## 2021-12-23 DIAGNOSIS — C01 SQUAMOUS CELL CARCINOMA OF BASE OF TONGUE: ICD-10-CM

## 2021-12-23 LAB
ALBUMIN SERPL BCP-MCNC: 3.6 G/DL (ref 3.5–5.2)
ALP SERPL-CCNC: 81 U/L (ref 55–135)
ALT SERPL W/O P-5'-P-CCNC: 14 U/L (ref 10–44)
ANION GAP SERPL CALC-SCNC: 10 MMOL/L (ref 8–16)
AST SERPL-CCNC: 39 U/L (ref 10–40)
BASOPHILS # BLD AUTO: 0.01 K/UL (ref 0–0.2)
BASOPHILS NFR BLD: 0.2 % (ref 0–1.9)
BILIRUB SERPL-MCNC: 0.5 MG/DL (ref 0.1–1)
BUN SERPL-MCNC: 13 MG/DL (ref 8–23)
CALCIUM SERPL-MCNC: 9.6 MG/DL (ref 8.7–10.5)
CHLORIDE SERPL-SCNC: 104 MMOL/L (ref 95–110)
CO2 SERPL-SCNC: 27 MMOL/L (ref 23–29)
CREAT SERPL-MCNC: 1.2 MG/DL (ref 0.5–1.4)
DIFFERENTIAL METHOD: ABNORMAL
EOSINOPHIL # BLD AUTO: 0.2 K/UL (ref 0–0.5)
EOSINOPHIL NFR BLD: 3.9 % (ref 0–8)
ERYTHROCYTE [DISTWIDTH] IN BLOOD BY AUTOMATED COUNT: 11.7 % (ref 11.5–14.5)
EST. GFR  (AFRICAN AMERICAN): >60 ML/MIN/1.73 M^2
EST. GFR  (NON AFRICAN AMERICAN): >60 ML/MIN/1.73 M^2
GLUCOSE SERPL-MCNC: 101 MG/DL (ref 70–110)
HCT VFR BLD AUTO: 34.1 % (ref 40–54)
HGB BLD-MCNC: 11.7 G/DL (ref 14–18)
IMM GRANULOCYTES # BLD AUTO: 0.01 K/UL (ref 0–0.04)
IMM GRANULOCYTES NFR BLD AUTO: 0.2 % (ref 0–0.5)
LYMPHOCYTES # BLD AUTO: 0.9 K/UL (ref 1–4.8)
LYMPHOCYTES NFR BLD: 19.8 % (ref 18–48)
MCH RBC QN AUTO: 34.1 PG (ref 27–31)
MCHC RBC AUTO-ENTMCNC: 34.3 G/DL (ref 32–36)
MCV RBC AUTO: 99 FL (ref 82–98)
MONOCYTES # BLD AUTO: 0.5 K/UL (ref 0.3–1)
MONOCYTES NFR BLD: 11.8 % (ref 4–15)
NEUTROPHILS # BLD AUTO: 2.8 K/UL (ref 1.8–7.7)
NEUTROPHILS NFR BLD: 64.1 % (ref 38–73)
NRBC BLD-RTO: 0 /100 WBC
PLATELET # BLD AUTO: 144 K/UL (ref 150–450)
PMV BLD AUTO: 9.6 FL (ref 9.2–12.9)
POTASSIUM SERPL-SCNC: 3.8 MMOL/L (ref 3.5–5.1)
PROT SERPL-MCNC: 7.4 G/DL (ref 6–8.4)
RBC # BLD AUTO: 3.43 M/UL (ref 4.6–6.2)
SODIUM SERPL-SCNC: 141 MMOL/L (ref 136–145)
T4 FREE SERPL-MCNC: 1.4 NG/DL (ref 0.71–1.51)
TSH SERPL DL<=0.005 MIU/L-ACNC: 0.19 UIU/ML (ref 0.4–4)
WBC # BLD AUTO: 4.4 K/UL (ref 3.9–12.7)

## 2021-12-23 PROCEDURE — 25000003 PHARM REV CODE 250: Performed by: STUDENT IN AN ORGANIZED HEALTH CARE EDUCATION/TRAINING PROGRAM

## 2021-12-23 PROCEDURE — 63600175 PHARM REV CODE 636 W HCPCS: Performed by: STUDENT IN AN ORGANIZED HEALTH CARE EDUCATION/TRAINING PROGRAM

## 2021-12-23 PROCEDURE — 96367 TX/PROPH/DG ADDL SEQ IV INF: CPT

## 2021-12-23 PROCEDURE — 96365 THER/PROPH/DIAG IV INF INIT: CPT

## 2021-12-23 PROCEDURE — A4216 STERILE WATER/SALINE, 10 ML: HCPCS | Performed by: STUDENT IN AN ORGANIZED HEALTH CARE EDUCATION/TRAINING PROGRAM

## 2021-12-23 PROCEDURE — 84443 ASSAY THYROID STIM HORMONE: CPT | Performed by: STUDENT IN AN ORGANIZED HEALTH CARE EDUCATION/TRAINING PROGRAM

## 2021-12-23 PROCEDURE — 85025 COMPLETE CBC W/AUTO DIFF WBC: CPT | Performed by: STUDENT IN AN ORGANIZED HEALTH CARE EDUCATION/TRAINING PROGRAM

## 2021-12-23 PROCEDURE — 96416 CHEMO PROLONG INFUSE W/PUMP: CPT

## 2021-12-23 PROCEDURE — 96375 TX/PRO/DX INJ NEW DRUG ADDON: CPT

## 2021-12-23 PROCEDURE — 80053 COMPREHEN METABOLIC PANEL: CPT | Performed by: STUDENT IN AN ORGANIZED HEALTH CARE EDUCATION/TRAINING PROGRAM

## 2021-12-23 PROCEDURE — 84439 ASSAY OF FREE THYROXINE: CPT | Performed by: STUDENT IN AN ORGANIZED HEALTH CARE EDUCATION/TRAINING PROGRAM

## 2021-12-23 PROCEDURE — 96413 CHEMO IV INFUSION 1 HR: CPT

## 2021-12-23 PROCEDURE — 96417 CHEMO IV INFUS EACH ADDL SEQ: CPT

## 2021-12-23 RX ORDER — SODIUM CHLORIDE 0.9 % (FLUSH) 0.9 %
10 SYRINGE (ML) INJECTION
Status: DISCONTINUED | OUTPATIENT
Start: 2021-12-23 | End: 2021-12-23 | Stop reason: HOSPADM

## 2021-12-23 RX ORDER — DIPHENHYDRAMINE HYDROCHLORIDE 50 MG/ML
50 INJECTION INTRAMUSCULAR; INTRAVENOUS ONCE AS NEEDED
Status: CANCELLED | OUTPATIENT
Start: 2021-12-23

## 2021-12-23 RX ORDER — EPINEPHRINE 0.3 MG/.3ML
0.3 INJECTION SUBCUTANEOUS ONCE AS NEEDED
Status: CANCELLED | OUTPATIENT
Start: 2021-12-23

## 2021-12-23 RX ORDER — HEPARIN 100 UNIT/ML
500 SYRINGE INTRAVENOUS
Status: DISCONTINUED | OUTPATIENT
Start: 2021-12-23 | End: 2021-12-23 | Stop reason: HOSPADM

## 2021-12-23 RX ORDER — DIPHENHYDRAMINE HYDROCHLORIDE 50 MG/ML
50 INJECTION INTRAMUSCULAR; INTRAVENOUS ONCE AS NEEDED
Status: DISCONTINUED | OUTPATIENT
Start: 2021-12-23 | End: 2021-12-23 | Stop reason: HOSPADM

## 2021-12-23 RX ORDER — SODIUM CHLORIDE 0.9 % (FLUSH) 0.9 %
10 SYRINGE (ML) INJECTION
Status: CANCELLED | OUTPATIENT
Start: 2021-12-23

## 2021-12-23 RX ORDER — EPINEPHRINE 0.3 MG/.3ML
0.3 INJECTION SUBCUTANEOUS ONCE AS NEEDED
Status: DISCONTINUED | OUTPATIENT
Start: 2021-12-23 | End: 2021-12-23 | Stop reason: HOSPADM

## 2021-12-23 RX ORDER — DEXAMETHASONE 4 MG/1
4 TABLET ORAL EVERY 12 HOURS
Qty: 4 TABLET | Refills: 0 | Status: SHIPPED | OUTPATIENT
Start: 2021-12-23 | End: 2021-12-25

## 2021-12-23 RX ORDER — HEPARIN 100 UNIT/ML
500 SYRINGE INTRAVENOUS
Status: CANCELLED | OUTPATIENT
Start: 2021-12-23

## 2021-12-23 RX ADMIN — SODIUM CHLORIDE 200 MG: 9 INJECTION, SOLUTION INTRAVENOUS at 11:12

## 2021-12-23 RX ADMIN — CARBOPLATIN 335 MG: 10 INJECTION INTRAVENOUS at 11:12

## 2021-12-23 RX ADMIN — APREPITANT 130 MG: 130 INJECTION, EMULSION INTRAVENOUS at 10:12

## 2021-12-23 RX ADMIN — PALONOSETRON HYDROCHLORIDE 0.25 MG: 0.25 INJECTION, SOLUTION INTRAVENOUS at 10:12

## 2021-12-23 RX ADMIN — HEPARIN 500 UNITS: 100 SYRINGE at 09:12

## 2021-12-23 RX ADMIN — FLUOROURACIL 6440 MG: 50 INJECTION, SOLUTION INTRAVENOUS at 12:12

## 2021-12-23 RX ADMIN — Medication 10 ML: at 09:12

## 2021-12-23 RX ADMIN — SODIUM CHLORIDE: 9 INJECTION, SOLUTION INTRAVENOUS at 10:12

## 2021-12-27 ENCOUNTER — INFUSION (OUTPATIENT)
Dept: INFUSION THERAPY | Facility: HOSPITAL | Age: 71
End: 2021-12-27
Payer: MEDICARE

## 2021-12-27 VITALS
TEMPERATURE: 98 F | RESPIRATION RATE: 18 BRPM | SYSTOLIC BLOOD PRESSURE: 140 MMHG | HEART RATE: 78 BPM | OXYGEN SATURATION: 100 % | DIASTOLIC BLOOD PRESSURE: 70 MMHG

## 2021-12-27 DIAGNOSIS — C77.0 SECONDARY MALIGNANT NEOPLASM OF CERVICAL LYMPH NODE: Primary | ICD-10-CM

## 2021-12-27 DIAGNOSIS — C01 SQUAMOUS CELL CARCINOMA OF BASE OF TONGUE: ICD-10-CM

## 2021-12-27 PROCEDURE — 63600175 PHARM REV CODE 636 W HCPCS: Performed by: STUDENT IN AN ORGANIZED HEALTH CARE EDUCATION/TRAINING PROGRAM

## 2021-12-27 PROCEDURE — 99211 OFF/OP EST MAY X REQ PHY/QHP: CPT

## 2021-12-27 PROCEDURE — 25000003 PHARM REV CODE 250: Performed by: STUDENT IN AN ORGANIZED HEALTH CARE EDUCATION/TRAINING PROGRAM

## 2021-12-27 PROCEDURE — A4216 STERILE WATER/SALINE, 10 ML: HCPCS | Performed by: STUDENT IN AN ORGANIZED HEALTH CARE EDUCATION/TRAINING PROGRAM

## 2021-12-27 RX ORDER — SODIUM CHLORIDE 0.9 % (FLUSH) 0.9 %
10 SYRINGE (ML) INJECTION
Status: DISCONTINUED | OUTPATIENT
Start: 2021-12-27 | End: 2021-12-27 | Stop reason: HOSPADM

## 2021-12-27 RX ORDER — HEPARIN 100 UNIT/ML
500 SYRINGE INTRAVENOUS
Status: DISCONTINUED | OUTPATIENT
Start: 2021-12-27 | End: 2021-12-27 | Stop reason: HOSPADM

## 2021-12-27 RX ADMIN — Medication 10 ML: at 12:12

## 2021-12-27 RX ADMIN — HEPARIN 500 UNITS: 100 SYRINGE at 12:12

## 2021-12-30 DIAGNOSIS — C01 SQUAMOUS CELL CARCINOMA OF BASE OF TONGUE: ICD-10-CM

## 2021-12-30 RX ORDER — OXYCODONE HYDROCHLORIDE 10 MG/1
10 TABLET ORAL EVERY 6 HOURS PRN
Qty: 120 TABLET | Refills: 0 | Status: SHIPPED | OUTPATIENT
Start: 2021-12-30 | End: 2022-01-27 | Stop reason: SDUPTHER

## 2022-01-12 DIAGNOSIS — L27.0 DRUG-INDUCED SKIN RASH: ICD-10-CM

## 2022-01-12 DIAGNOSIS — C01 SQUAMOUS CELL CARCINOMA OF BASE OF TONGUE: ICD-10-CM

## 2022-01-13 RX ORDER — METRONIDAZOLE 7.5 MG/G
GEL TOPICAL 2 TIMES DAILY
Qty: 45 G | Refills: 1 | Status: SHIPPED | OUTPATIENT
Start: 2022-01-13 | End: 2022-08-22 | Stop reason: SDUPTHER

## 2022-01-24 ENCOUNTER — TELEPHONE (OUTPATIENT)
Dept: INTERNAL MEDICINE | Facility: CLINIC | Age: 72
End: 2022-01-24
Payer: MEDICARE

## 2022-01-24 DIAGNOSIS — Z12.5 PROSTATE CANCER SCREENING: Primary | ICD-10-CM

## 2022-01-24 DIAGNOSIS — E03.9 HYPOTHYROIDISM, UNSPECIFIED TYPE: ICD-10-CM

## 2022-01-24 NOTE — TELEPHONE ENCOUNTER
Pt has medicare.    Unable to link diagnosis for routine lab orders.    Please review and sign pended orders.    Thank you.

## 2022-01-27 ENCOUNTER — INFUSION (OUTPATIENT)
Dept: INFUSION THERAPY | Facility: HOSPITAL | Age: 72
End: 2022-01-27
Payer: MEDICARE

## 2022-01-27 ENCOUNTER — INFUSION (OUTPATIENT)
Dept: INFUSION THERAPY | Facility: HOSPITAL | Age: 72
End: 2022-01-27
Attending: STUDENT IN AN ORGANIZED HEALTH CARE EDUCATION/TRAINING PROGRAM
Payer: MEDICARE

## 2022-01-27 ENCOUNTER — OFFICE VISIT (OUTPATIENT)
Dept: HEMATOLOGY/ONCOLOGY | Facility: CLINIC | Age: 72
End: 2022-01-27
Payer: MEDICARE

## 2022-01-27 VITALS
WEIGHT: 120.38 LBS | HEART RATE: 83 BPM | BODY MASS INDEX: 18.24 KG/M2 | OXYGEN SATURATION: 99 % | DIASTOLIC BLOOD PRESSURE: 62 MMHG | RESPIRATION RATE: 16 BRPM | SYSTOLIC BLOOD PRESSURE: 115 MMHG | HEIGHT: 68 IN | TEMPERATURE: 98 F

## 2022-01-27 DIAGNOSIS — E89.0 POSTOPERATIVE HYPOTHYROIDISM: ICD-10-CM

## 2022-01-27 DIAGNOSIS — N18.31 STAGE 3A CHRONIC KIDNEY DISEASE: ICD-10-CM

## 2022-01-27 DIAGNOSIS — T45.1X5A CHEMOTHERAPY INDUCED DIARRHEA: ICD-10-CM

## 2022-01-27 DIAGNOSIS — I70.0 ATHEROSCLEROSIS OF ABDOMINAL AORTA: ICD-10-CM

## 2022-01-27 DIAGNOSIS — C01 SQUAMOUS CELL CARCINOMA OF BASE OF TONGUE: ICD-10-CM

## 2022-01-27 DIAGNOSIS — C01 SQUAMOUS CELL CARCINOMA OF BASE OF TONGUE: Primary | ICD-10-CM

## 2022-01-27 DIAGNOSIS — Z79.899 IMMUNODEFICIENCY DUE TO DRUGS: ICD-10-CM

## 2022-01-27 DIAGNOSIS — K74.60 CIRRHOSIS OF TRANSPLANTED LIVER: ICD-10-CM

## 2022-01-27 DIAGNOSIS — C77.0 SECONDARY MALIGNANT NEOPLASM OF CERVICAL LYMPH NODE: Primary | ICD-10-CM

## 2022-01-27 DIAGNOSIS — D63.1 ANEMIA IN STAGE 3A CHRONIC KIDNEY DISEASE: ICD-10-CM

## 2022-01-27 DIAGNOSIS — N18.31 ANEMIA IN STAGE 3A CHRONIC KIDNEY DISEASE: ICD-10-CM

## 2022-01-27 DIAGNOSIS — Z93.0 TRACHEOSTOMY STATUS: ICD-10-CM

## 2022-01-27 DIAGNOSIS — J43.2 CENTRILOBULAR EMPHYSEMA: ICD-10-CM

## 2022-01-27 DIAGNOSIS — C77.0 SECONDARY MALIGNANT NEOPLASM OF CERVICAL LYMPH NODE: ICD-10-CM

## 2022-01-27 DIAGNOSIS — Z94.4 STATUS POST LIVER TRANSPLANTATION: ICD-10-CM

## 2022-01-27 DIAGNOSIS — D61.810 PANCYTOPENIA DUE TO CHEMOTHERAPY: ICD-10-CM

## 2022-01-27 DIAGNOSIS — D84.821 IMMUNODEFICIENCY DUE TO DRUGS: ICD-10-CM

## 2022-01-27 DIAGNOSIS — T86.49 CIRRHOSIS OF TRANSPLANTED LIVER: ICD-10-CM

## 2022-01-27 DIAGNOSIS — K52.1 CHEMOTHERAPY INDUCED DIARRHEA: ICD-10-CM

## 2022-01-27 LAB
ALBUMIN SERPL BCP-MCNC: 3.3 G/DL (ref 3.5–5.2)
ALP SERPL-CCNC: 65 U/L (ref 55–135)
ALT SERPL W/O P-5'-P-CCNC: 10 U/L (ref 10–44)
ANION GAP SERPL CALC-SCNC: 9 MMOL/L (ref 8–16)
AST SERPL-CCNC: 32 U/L (ref 10–40)
BASOPHILS # BLD AUTO: 0.02 K/UL (ref 0–0.2)
BASOPHILS NFR BLD: 0.5 % (ref 0–1.9)
BILIRUB SERPL-MCNC: 0.5 MG/DL (ref 0.1–1)
BUN SERPL-MCNC: 14 MG/DL (ref 8–23)
CALCIUM SERPL-MCNC: 9.9 MG/DL (ref 8.7–10.5)
CHLORIDE SERPL-SCNC: 104 MMOL/L (ref 95–110)
CO2 SERPL-SCNC: 27 MMOL/L (ref 23–29)
CREAT SERPL-MCNC: 1.3 MG/DL (ref 0.5–1.4)
DIFFERENTIAL METHOD: ABNORMAL
EOSINOPHIL # BLD AUTO: 0 K/UL (ref 0–0.5)
EOSINOPHIL NFR BLD: 1 % (ref 0–8)
ERYTHROCYTE [DISTWIDTH] IN BLOOD BY AUTOMATED COUNT: 14.2 % (ref 11.5–14.5)
EST. GFR  (AFRICAN AMERICAN): >60 ML/MIN/1.73 M^2
EST. GFR  (NON AFRICAN AMERICAN): 54.5 ML/MIN/1.73 M^2
GLUCOSE SERPL-MCNC: 102 MG/DL (ref 70–110)
HCT VFR BLD AUTO: 30.9 % (ref 40–54)
HGB BLD-MCNC: 10.4 G/DL (ref 14–18)
IMM GRANULOCYTES # BLD AUTO: 0.04 K/UL (ref 0–0.04)
IMM GRANULOCYTES NFR BLD AUTO: 1 % (ref 0–0.5)
LYMPHOCYTES # BLD AUTO: 1 K/UL (ref 1–4.8)
LYMPHOCYTES NFR BLD: 24.8 % (ref 18–48)
MCH RBC QN AUTO: 33.5 PG (ref 27–31)
MCHC RBC AUTO-ENTMCNC: 33.7 G/DL (ref 32–36)
MCV RBC AUTO: 100 FL (ref 82–98)
MONOCYTES # BLD AUTO: 0.6 K/UL (ref 0.3–1)
MONOCYTES NFR BLD: 15.7 % (ref 4–15)
NEUTROPHILS # BLD AUTO: 2.3 K/UL (ref 1.8–7.7)
NEUTROPHILS NFR BLD: 57 % (ref 38–73)
NRBC BLD-RTO: 0 /100 WBC
PLATELET # BLD AUTO: 204 K/UL (ref 150–450)
PMV BLD AUTO: 9.8 FL (ref 9.2–12.9)
POTASSIUM SERPL-SCNC: 4 MMOL/L (ref 3.5–5.1)
PROT SERPL-MCNC: 7.8 G/DL (ref 6–8.4)
RBC # BLD AUTO: 3.1 M/UL (ref 4.6–6.2)
SODIUM SERPL-SCNC: 140 MMOL/L (ref 136–145)
WBC # BLD AUTO: 4.07 K/UL (ref 3.9–12.7)

## 2022-01-27 PROCEDURE — 99215 OFFICE O/P EST HI 40 MIN: CPT | Mod: S$PBB,,, | Performed by: STUDENT IN AN ORGANIZED HEALTH CARE EDUCATION/TRAINING PROGRAM

## 2022-01-27 PROCEDURE — 99215 PR OFFICE/OUTPT VISIT, EST, LEVL V, 40-54 MIN: ICD-10-PCS | Mod: S$PBB,,, | Performed by: STUDENT IN AN ORGANIZED HEALTH CARE EDUCATION/TRAINING PROGRAM

## 2022-01-27 PROCEDURE — 63600175 PHARM REV CODE 636 W HCPCS: Performed by: STUDENT IN AN ORGANIZED HEALTH CARE EDUCATION/TRAINING PROGRAM

## 2022-01-27 PROCEDURE — A4216 STERILE WATER/SALINE, 10 ML: HCPCS | Performed by: STUDENT IN AN ORGANIZED HEALTH CARE EDUCATION/TRAINING PROGRAM

## 2022-01-27 PROCEDURE — 99213 OFFICE O/P EST LOW 20 MIN: CPT | Mod: PBBFAC,25 | Performed by: STUDENT IN AN ORGANIZED HEALTH CARE EDUCATION/TRAINING PROGRAM

## 2022-01-27 PROCEDURE — 96365 THER/PROPH/DIAG IV INF INIT: CPT

## 2022-01-27 PROCEDURE — 99999 PR PBB SHADOW E&M-EST. PATIENT-LVL III: CPT | Mod: PBBFAC,,, | Performed by: STUDENT IN AN ORGANIZED HEALTH CARE EDUCATION/TRAINING PROGRAM

## 2022-01-27 PROCEDURE — 25000003 PHARM REV CODE 250: Performed by: STUDENT IN AN ORGANIZED HEALTH CARE EDUCATION/TRAINING PROGRAM

## 2022-01-27 PROCEDURE — 96417 CHEMO IV INFUS EACH ADDL SEQ: CPT

## 2022-01-27 PROCEDURE — 96375 TX/PRO/DX INJ NEW DRUG ADDON: CPT

## 2022-01-27 PROCEDURE — 99999 PR PBB SHADOW E&M-EST. PATIENT-LVL III: ICD-10-PCS | Mod: PBBFAC,,, | Performed by: STUDENT IN AN ORGANIZED HEALTH CARE EDUCATION/TRAINING PROGRAM

## 2022-01-27 PROCEDURE — 96416 CHEMO PROLONG INFUSE W/PUMP: CPT

## 2022-01-27 PROCEDURE — 96413 CHEMO IV INFUSION 1 HR: CPT

## 2022-01-27 PROCEDURE — 85025 COMPLETE CBC W/AUTO DIFF WBC: CPT | Performed by: STUDENT IN AN ORGANIZED HEALTH CARE EDUCATION/TRAINING PROGRAM

## 2022-01-27 PROCEDURE — 96367 TX/PROPH/DG ADDL SEQ IV INF: CPT

## 2022-01-27 PROCEDURE — 80053 COMPREHEN METABOLIC PANEL: CPT | Performed by: STUDENT IN AN ORGANIZED HEALTH CARE EDUCATION/TRAINING PROGRAM

## 2022-01-27 RX ORDER — DIPHENHYDRAMINE HYDROCHLORIDE 50 MG/ML
50 INJECTION INTRAMUSCULAR; INTRAVENOUS ONCE AS NEEDED
Status: CANCELLED | OUTPATIENT
Start: 2022-01-27

## 2022-01-27 RX ORDER — HEPARIN 100 UNIT/ML
500 SYRINGE INTRAVENOUS
Status: DISCONTINUED | OUTPATIENT
Start: 2022-01-27 | End: 2022-01-27 | Stop reason: HOSPADM

## 2022-01-27 RX ORDER — DIPHENOXYLATE HYDROCHLORIDE AND ATROPINE SULFATE 2.5; .025 MG/1; MG/1
1 TABLET ORAL 4 TIMES DAILY PRN
Qty: 30 TABLET | Refills: 0 | Status: SHIPPED | OUTPATIENT
Start: 2022-01-27 | End: 2023-01-27

## 2022-01-27 RX ORDER — SODIUM CHLORIDE 0.9 % (FLUSH) 0.9 %
10 SYRINGE (ML) INJECTION
Status: CANCELLED | OUTPATIENT
Start: 2022-01-31

## 2022-01-27 RX ORDER — EPINEPHRINE 0.3 MG/.3ML
0.3 INJECTION SUBCUTANEOUS ONCE AS NEEDED
Status: DISCONTINUED | OUTPATIENT
Start: 2022-01-27 | End: 2022-01-27 | Stop reason: HOSPADM

## 2022-01-27 RX ORDER — LOPERAMIDE HYDROCHLORIDE 2 MG/1
2 CAPSULE ORAL 4 TIMES DAILY PRN
Qty: 30 CAPSULE | Refills: 1 | Status: ON HOLD | OUTPATIENT
Start: 2022-01-27 | End: 2023-08-24 | Stop reason: ALTCHOICE

## 2022-01-27 RX ORDER — SODIUM CHLORIDE 0.9 % (FLUSH) 0.9 %
10 SYRINGE (ML) INJECTION
Status: DISCONTINUED | OUTPATIENT
Start: 2022-01-27 | End: 2022-01-27 | Stop reason: HOSPADM

## 2022-01-27 RX ORDER — SODIUM CHLORIDE 0.9 % (FLUSH) 0.9 %
10 SYRINGE (ML) INJECTION
Status: CANCELLED | OUTPATIENT
Start: 2022-01-27

## 2022-01-27 RX ORDER — EPINEPHRINE 0.3 MG/.3ML
0.3 INJECTION SUBCUTANEOUS ONCE AS NEEDED
Status: CANCELLED | OUTPATIENT
Start: 2022-01-27

## 2022-01-27 RX ORDER — HEPARIN 100 UNIT/ML
500 SYRINGE INTRAVENOUS
Status: CANCELLED | OUTPATIENT
Start: 2022-01-27

## 2022-01-27 RX ORDER — DIPHENHYDRAMINE HYDROCHLORIDE 50 MG/ML
50 INJECTION INTRAMUSCULAR; INTRAVENOUS ONCE AS NEEDED
Status: DISCONTINUED | OUTPATIENT
Start: 2022-01-27 | End: 2022-01-27 | Stop reason: HOSPADM

## 2022-01-27 RX ORDER — HEPARIN 100 UNIT/ML
500 SYRINGE INTRAVENOUS
Status: CANCELLED | OUTPATIENT
Start: 2022-01-31

## 2022-01-27 RX ORDER — OXYCODONE HYDROCHLORIDE 10 MG/1
10 TABLET ORAL EVERY 6 HOURS PRN
Qty: 120 TABLET | Refills: 0 | Status: SHIPPED | OUTPATIENT
Start: 2022-02-03 | End: 2022-03-02 | Stop reason: SDUPTHER

## 2022-01-27 RX ADMIN — Medication 10 ML: at 08:01

## 2022-01-27 RX ADMIN — FLUOROURACIL 6480 MG: 50 INJECTION, SOLUTION INTRAVENOUS at 12:01

## 2022-01-27 RX ADMIN — SODIUM CHLORIDE: 9 INJECTION, SOLUTION INTRAVENOUS at 10:01

## 2022-01-27 RX ADMIN — CARBOPLATIN 325 MG: 10 INJECTION INTRAVENOUS at 11:01

## 2022-01-27 RX ADMIN — HEPARIN 500 UNITS: 100 SYRINGE at 08:01

## 2022-01-27 RX ADMIN — SODIUM CHLORIDE 200 MG: 9 INJECTION, SOLUTION INTRAVENOUS at 11:01

## 2022-01-27 RX ADMIN — APREPITANT 130 MG: 130 INJECTION, EMULSION INTRAVENOUS at 10:01

## 2022-01-27 NOTE — PLAN OF CARE
Pt arrived AAOx3, VSS labs reviewed and orders signed by Dr Berg in appt. Pt tolerated Keytruda/ Carbo without inciden. Pt discharged with 5FO Cadd pump infusing into port with descending volume noted. He will return Monday at 1200.

## 2022-01-27 NOTE — PROGRESS NOTES
PATIENT: Rocco Swain  MRN: [id number]  DATE: 1/27/2022      Diagnosis:   1. Squamous cell carcinoma of base of tongue    2. Chemotherapy induced diarrhea    3. Secondary malignant neoplasm of cervical lymph node    4. Cirrhosis of transplanted liver    5. Pancytopenia due to chemotherapy    6. Tracheostomy status    7. Atherosclerosis of abdominal aorta    8. Centrilobular emphysema    9. Status post liver transplantation    10. Stage 3a chronic kidney disease    11. Immunodeficiency due to drugs    12. Anemia in stage 3a chronic kidney disease        Chief Complaint: Secondary malignant neoplasm of cervical lymph node      Oncologic History:    Oncologic History 1. Squamous cell carcinoma of base of tongue with recurrence    Oncologic Treatment 1. Chemoradiation completed 4/2016  2. S/p glossectomy, laryngectomy, and bilateral neck dissection for persistent/recurrent disease  3. 10/6/2020-8/24/21 PCC  4. 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        Subjective:    Interval History: Mr. Swain is here for follow up    Side effects with last cycle: diarrhea and mucositis.  Did not get udenyca due to bad day with diarrhea specifically for that day.  Otherwise he says it was manageable with loperamide.  He feels neck pain is well managed with his current pain regimen and that the left neck mass feels smaller.  He is having issues with swallowing and feels that his throat is getting narrower. Pills are difficult to swallow and he uses a plastic tool to push food down his throat when it is stuck.  He eats foods of soft texture (ie. Mashed potatoes).    Overall he feels well and is ready for chemotherapy.      He is alone at this visit.  Communication from him is 100% written.  Past Medical History:   Past Medical History:   Diagnosis Date    Basal cell carcinoma (BCC) in situ of skin 2012     3 on face, 2 on arm, removed by dermatology.    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 Underwent surgical resection of tongue and neck. s/p tracheostomy       Past Surgical HIstory:   Past Surgical History:   Procedure Laterality Date    COLONOSCOPY      INSERTION OF VENOUS ACCESS PORT Right 3/8/2021    Procedure: INSERTION, VENOUS ACCESS PORT;  Surgeon: Jesus Rendon MD;  Location: [facility] OR [address] [place] [address];  Service: General;  Laterality: Right;    LIVER TRANSPLANT  11/2008    transplanted for biopsy proven hepatocellular carcinoma,     LYMPH NODE BIOPSY N/A 9/18/2020    Procedure: BIOPSY, LYMPH NODE;  Surgeon: Taz Diagnostic Provider;  Location: [facility] OR [address] [place] [address];  Service: General;  Laterality: N/A;  Rm 173    TRACHEOSTOMY         Family History:   Family History   Problem Relation Age of Onset    Dementia Mother        Social History:  reports that he has never smoked. He has never used smokeless tobacco. He reports current alcohol use. He reports previous drug use.    Allergies:  Review of patient's allergies indicates:  No Known Allergies    Medications:  Current Outpatient Medications   Medication Sig Dispense Refill    azelaic acid (AZELEX) 15 % gel APPLY TOPICALLY TO AFFECTED AREA IN THE MORNING      ibuprofen (ADVIL,MOTRIN) 200 MG tablet Take 200 mg by mouth.      levothyroxine (SYNTHROID) 88 MCG tablet TAKE 1 TABLET BY MOUTH ONCE DAILY 90 tablet 3    LIDOcaine HCl 2% (LIDOCAINE VISCOUS) 2 % Soln Swish and spit 15 mls every 8 (eight) hours as needed (mouth sore). 100 mL 3    LIDOcaine-prilocaine (EMLA) cream Apply generously to port site 30-60 min prior to chemo and then cover with saran wrap. 30 g 2    magic mouthwash diphen/antac/lidoc/nysta Take  10 mLs by mouth 4 (four) times daily. 120 mL 4    multivit-min/FA/lycopen/lutein (CENTRUM SILVER MEN ORAL) Take 1 tablet by mouth.      multivitamin capsule Take 1 capsule by mouth once daily.      tacrolimus (PROGRAF) 0.5 MG Cap Take 1 capsule (0.5 mg total) by mouth every 12 (twelve) hours. 60 capsule 11    tacrolimus (PROGRAF) 0.5 MG Cap Take 1 capsule (0.5 mg total) by mouth every 12 (twelve) hours. 180 capsule 3    tacrolimus (PROGRAF) 0.5 MG Cap Take 1 capsule (0.5 mg total) by mouth every 12 (twelve) hours. 180 capsule 3    traZODone (DESYREL) 50 MG tablet TAKE 1 TABLET BY MOUTH IN  THE EVENING 90 tablet 3    vitamin E 400 UNIT capsule Take 400 Units by mouth once daily.      diphenoxylate-atropine 2.5-0.025 mg (LOMOTIL) 2.5-0.025 mg per tablet Take 1 tablet by mouth 4 (four) times daily as needed for Diarrhea (use when loperamide/imodium does not work). 30 tablet 0    loperamide (IMODIUM) 2 mg capsule Take 1 capsule (2 mg total) by mouth 4 (four) times daily as needed for Diarrhea. 30 capsule 1    metroNIDAZOLE (METROGEL) 0.75 % gel Apply topically to affected area 2 (two) times daily. (Patient not taking: Reported on 1/27/2022) 45 g 1    [START ON 2/3/2022] oxyCODONE (ROXICODONE) 10 mg Tab immediate release tablet Take 1 tablet (10 mg total) by mouth every 6 (six) hours as needed for Pain. 120 tablet 0    sulfacetamide sodium-sulfur 10-5 % (w/w) Clsr USE TO WASH AFFECTED AREA DAILY      tiZANidine (ZANAFLEX) 2 MG tablet Take 1 tablet (2 mg total) by mouth nightly as needed (neck muscle strain). (Patient not taking: Reported on 1/27/2022) 30 tablet 0     No current facility-administered medications for this visit.     Facility-Administered Medications Ordered in Other Visits   Medication Dose Route Frequency Provider Last Rate Last Admin    alteplase injection 2 mg  2 mg Intra-Catheter PRN Ronald Berg MD        diphenhydrAMINE injection 50 mg  50 mg Intravenous Once PRN Ronald Berg MD         EPINEPHrine (EPIPEN) 0.3 mg/0.3 mL pen injection 0.3 mg  0.3 mg Intramuscular Once PRN Ronald Berg MD        fluorouraciL 1,000 mg/m2/day = 6,480 mg in sodium chloride 0.9% 240 mL chemo infusion  1,000 mg/m2/day (Treatment Plan Recorded) Intravenous over 96 hr Ronald Berg MD   6,480 mg at 01/27/22 1218    heparin, porcine (PF) 100 unit/mL injection flush 500 Units  500 Units Intravenous PRN Ronald Berg MD        heparin, porcine (PF) 100 unit/mL injection flush 500 Units  500 Units Intravenous PRN Ronald Berg MD        hydrocortisone sodium succinate injection 100 mg  100 mg Intravenous Once PRN Ronald Berg MD        sodium chloride 0.9% flush 10 mL  10 mL Intravenous PRN Ronald Berg MD        sodium chloride 0.9% flush 10 mL  10 mL Intravenous PRN Ronald Berg MD           Review of Systems   Constitutional: Negative for activity change, appetite change, chills, diaphoresis, fatigue, fever and unexpected weight change.   HENT: Positive for mouth sores and trouble swallowing. Negative for nosebleeds.    Eyes: Negative for visual disturbance.   Respiratory: Negative for cough, chest tightness, shortness of breath and wheezing.    Cardiovascular: Negative for chest pain and leg swelling.   Gastrointestinal: Positive for diarrhea. Negative for abdominal distention, abdominal pain, blood in stool, constipation, nausea and vomiting.   Endocrine: Negative for cold intolerance and heat intolerance.   Genitourinary: Negative for difficulty urinating and dysuria.   Musculoskeletal: Positive for myalgias and neck pain (pain radiates between both sides of his neck under his jaw.). Negative for arthralgias and back pain.   Skin: Negative for color change and rash.   Neurological: Negative for dizziness, weakness, light-headedness, numbness and headaches.   Hematological: Negative for adenopathy. Does not bruise/bleed easily.   Psychiatric/Behavioral: Negative for confusion.       ECOG Performance Status:      ECOG SCORE   "  1 - Restricted in strenuous activity-ambulatory and able to carry out work of a light nature         Objective:      Vitals:   Vitals:    01/27/22 0904   BP: 115/62   BP Location: Left arm   Patient Position: Sitting   BP Method: Medium (Automatic)   Pulse: 83   Resp: 16   Temp: 97.8 °F (36.6 °C)   TempSrc: Oral   SpO2: 99%   Weight: 54.6 kg (120 lb 5.9 oz)   Height: 5' 8" (1.727 m)     BMI: Body mass index is 18.3 kg/m².  Wt Readings from Last 10 Encounters:   01/27/22 54.6 kg (120 lb 5.9 oz)   12/23/21 53.1 kg (117 lb 1 oz)   12/02/21 54.1 kg (119 lb 4.3 oz)   11/12/21 53.3 kg (117 lb 8.1 oz)   11/12/21 53.3 kg (117 lb 8.1 oz)   10/14/21 53.1 kg (117 lb)   10/04/21 53.5 kg (117 lb 15.1 oz)   09/30/21 54.2 kg (119 lb 7.8 oz)   09/13/21 54.1 kg (119 lb 4.3 oz)   09/09/21 54.2 kg (119 lb 7.8 oz)       Physical Exam  Constitutional:       General: He is not in acute distress.     Appearance: Normal appearance. He is not ill-appearing.   HENT:      Head: Normocephalic and atraumatic.      Mouth/Throat:      Mouth: Mucous membranes are moist.      Pharynx: No oropharyngeal exudate or posterior oropharyngeal erythema.      Comments: Mucositis and lower lip blisters completely resolved  Eyes:      General: No scleral icterus.     Extraocular Movements: Extraocular movements intact.      Conjunctiva/sclera: Conjunctivae normal.      Pupils: Pupils are equal, round, and reactive to light.   Cardiovascular:      Rate and Rhythm: Normal rate and regular rhythm.      Heart sounds: No murmur heard.  No friction rub. No gallop.    Pulmonary:      Effort: Pulmonary effort is normal. No respiratory distress.      Breath sounds: No stridor. No wheezing, rhonchi or rales.   Chest:   Breasts:      Right: No axillary adenopathy or supraclavicular adenopathy.      Left: No axillary adenopathy or supraclavicular adenopathy.       Abdominal:      General: Bowel sounds are normal. There is no distension.      Palpations: Abdomen is " soft. There is no mass.      Tenderness: There is no abdominal tenderness. There is no guarding or rebound.   Musculoskeletal:         General: Normal range of motion.      Cervical back: Normal range of motion and neck supple. No tenderness.      Right lower leg: No edema.      Left lower leg: No edema.   Lymphadenopathy:      Cervical: Cervical adenopathy (smaller/flattened left neck adenopathy) present.      Upper Body:      Right upper body: No supraclavicular or axillary adenopathy.      Left upper body: No supraclavicular or axillary adenopathy.   Skin:     General: Skin is warm and dry.   Neurological:      General: No focal deficit present.      Mental Status: He is alert.         Laboratory Data:   Recent Results (from the past 168 hour(s))   CBC Auto Differential    Collection Time: 01/27/22  8:42 AM   Result Value Ref Range    WBC 4.07 3.90 - 12.70 K/uL    RBC 3.10 (L) 4.60 - 6.20 M/uL    Hemoglobin 10.4 (L) 14.0 - 18.0 g/dL    Hematocrit 30.9 (L) 40.0 - 54.0 %     (H) 82 - 98 fL    MCH 33.5 (H) 27.0 - 31.0 pg    MCHC 33.7 32.0 - 36.0 g/dL    RDW 14.2 11.5 - 14.5 %    Platelets 204 150 - 450 K/uL    MPV 9.8 9.2 - 12.9 fL    Immature Granulocytes 1.0 (H) 0.0 - 0.5 %    Gran # (ANC) 2.3 1.8 - 7.7 K/uL    Immature Grans (Abs) 0.04 0.00 - 0.04 K/uL    Lymph # 1.0 1.0 - 4.8 K/uL    Mono # 0.6 0.3 - 1.0 K/uL    Eos # 0.0 0.0 - 0.5 K/uL    Baso # 0.02 0.00 - 0.20 K/uL    nRBC 0 0 /100 WBC    Gran % 57.0 38.0 - 73.0 %    Lymph % 24.8 18.0 - 48.0 %    Mono % 15.7 (H) 4.0 - 15.0 %    Eosinophil % 1.0 0.0 - 8.0 %    Basophil % 0.5 0.0 - 1.9 %    Differential Method Automated    Comprehensive Metabolic Panel    Collection Time: 01/27/22  8:42 AM   Result Value Ref Range    Sodium 140 136 - 145 mmol/L    Potassium 4.0 3.5 - 5.1 mmol/L    Chloride 104 95 - 110 mmol/L    CO2 27 23 - 29 mmol/L    Glucose 102 70 - 110 mg/dL    BUN 14 8 - 23 mg/dL    Creatinine 1.3 0.5 - 1.4 mg/dL    Calcium 9.9 8.7 - 10.5 mg/dL     Total Protein 7.8 6.0 - 8.4 g/dL    Albumin 3.3 (L) 3.5 - 5.2 g/dL    Total Bilirubin 0.5 0.1 - 1.0 mg/dL    Alkaline Phosphatase 65 55 - 135 U/L    AST 32 10 - 40 U/L    ALT 10 10 - 44 U/L    Anion Gap 9 8 - 16 mmol/L    eGFR if African American >60.0 >60 mL/min/1.73 m^2    eGFR if non African American 54.5 (A) >60 mL/min/1.73 m^2           Imaging:         Assessment:       1. Squamous cell carcinoma of base of tongue    2. Chemotherapy induced diarrhea    3. Secondary malignant neoplasm of cervical lymph node    4. Cirrhosis of transplanted liver    5. Pancytopenia due to chemotherapy    6. Tracheostomy status    7. Atherosclerosis of abdominal aorta    8. Centrilobular emphysema    9. Status post liver transplantation    10. Stage 3a chronic kidney disease    11. Immunodeficiency due to drugs    12. Anemia in stage 3a chronic kidney disease           Plan:       Problem List Items Addressed This Visit        ENT    Tracheostomy status    Overview     Tracheostomy present.  No acute issues at this time.  -continue to monitor            Pulmonary    Centrilobular emphysema    Overview     PET CT findings with centrilobular emphysematous changes.  Not currently symptomatic. Lungs clear on exam.  -continue to monitor            Cardiac/Vascular    Atherosclerosis of abdominal aorta    Overview     MRI Abdomen 3/2019 ([facility])         Current Assessment & Plan     bp adequately controlled. Asymptomatic.  -continue medical management            Renal/    Stage 3a chronic kidney disease    Current Assessment & Plan     Creatinine within his baseline range  -continue monitoring with [facility]            Oncology    Squamous cell carcinoma of base of tongue - Primary    Overview     Per OSH records, initially presented in 2015 with symptoms and cT2N2c disease. page 91 of 3/25/2020 outside records clinic (media tab).  Initially presented 8/2015 with left sided odynophagia.  Treated for reflux, which did not improve.     12/11/15 he had a fiberoptic exam showing discolored mucosal area of the left base of the tongue.    1/13/16 he had persistent dysphagia, odynophagia, and new left otalgia.  MRI showed an irregular mass along the left posterior margin of the base of the tongue measuring 1.8x2.3cm with ipsilateral level 2 lymph node measuring 1.7cm with central necrosis and contralateral level 2 node measuring 1.3cm with questionable necrosis.    1/19/2016 he had direct laryngoscopy with biopsy left of midline, proximal to vallecula, which was p16 positive, invasive, with moderately to poorly differentiated sq.c.c. with basaloid features.    1/29/16 met with medical oncology who recommended chemoxrt.    2/1/16  staging PET CT showed hypermetabolic mass at base of tongue 4x2.5x3.5cm, max SUV 24.13, hypermetabolic lymph node left neck SUV 6.77, small subcentimeter lymph node right neck max SUV 3.79 (exact size of lymph nodes not mentioned in the summary).    2/15/16 started chemoxrt to left tongue base, bilateral cervical neck levels 1b-5, treated with 6MV photons utilizing IMRT, 5000 cGy in 200 cGy daily fractions.  Cone down boost to left base of tongue with additional 2000 cGy in 200 cGy daily fractions to gross disease.  Received weekly cisplatin with radiation but had to switch to carboplatin due to worsening renal function. during this time also missed several weekly treatments of cisplatin.    4/1/2016 completed chemoxrt.   11/1/2016 Repeat biopsy of left tongue showed persistent disease.  S/p salvage glossectomy with laryngectomy,  rT4aN0, p16 positive, base of tongue squamous cell carcinoma.     page 61 of 3/25/2020 outside records clinic (media tab)  Pathology: Tonsil, left tonsil fold: reactive squamous mucosa with inflammation and degenerating skeletal muscle, no carcinoma seen.  Right and Left base of tongue:  Reactive squamous mucosa and submucosa with inflammation, no carcinoma seen.  Neck, glossectomy, laryngectomy,  bilateral neck dissection:  invasive squamous cell carcinoma.  tumor site: base of tongue/hypopharynx.  Small focus of tumor is seen within the hyoid  bone.  specimen Size [id number]  tumor size [id number]  depth of invasion 20mm  TNM stage pT4a, pN0, pMx  Lymph nodes, included in all parts:  number involved 0  number examined 3.  distance of tumor from margins  positive inked margins: none  within 1mm : none  within 1-2mm: anterior-inferior    Bilateral neck dissection:  level 1: one lymph node and submandibular gland, negative for tumor  level II : two lymph nodes, negative for tumor  level III: not identified  level IV: not identified  level V: not identified.    6/20/17 - PETCT with FDG avidity of left neck lymph node, level 3, SUV 2.9.  No other evidence of local or distant disease.  7/6/2017 - biopsy of left neck lymph node with IR.  Sample was non-diagnostic. Notes from path report: Less than optimal scan specimen with minute tissue core of stromal fibroadipose tissue.  definitive tati background is not seen.  9/28/2017 - Repeat PET CT showing mild activity SUV 3.5 (increased from 3.2 previously; size 7.8x7.4x9.5mm).  Also new findings under left pectoralis minor (mild focal increased activity, indeterminate but new since previous exam)  3/22/2018 - CT neck shows no evidence of disease and level 2b lymph node decreased to 4.9mmx4.5mmx4.9mm from 7.8x7.4x9.5mm previously.    Seen by Dr. Myers 8/3/2020 for left-sided neck pain and a swelling in left level 2 several weeks ago.  He feels it arose after he began lifting weights.  CT neck 8/5/2020 : Lymph nodes At the left neck level L2 there is a heterogeneous soft tissue density with central hypoattenuation likely reflecting a necrotic lymph node measuring 1.6 x 1.4 cm in transverse dimension, 2.5 cm in craniocaudal dimension.  There is a similar appearing irregular soft tissue density at the right neck level 2 measuring 1.1 x 9.0 cm in transverse dimension and  1.5 cm craniocaudal dimension.  No other abnormal appearing or enlarged lymph nodes found.  Impression: At level 2 within the neck bilaterally, there are  soft tissue density masses with central hypoattenuation likely reflective of necrotic lymph nodes.  Findings are suspicious for malignancy recurrence.  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  Staging PET CT 9/30/2020 with left sided hypermetabolic node and right sided enlarged node without uptake.  Also has mandibular vestibule uptake, which may be another site of disease. Discussed at tumor board.  He is not a surgical or radiation candidate.  -Started on PCC 10/6/2020 followed by maintenance cetuximab until 8/24/21 (discontinued due to progression of disease; continued increase in SUV uptake, no new lesions).         Current Assessment & Plan     He experienced expected side effects of diarrhea and mucositis with the last cycle (of note, he did not get udenyca due to diarrhea that day).  Physical exam with interval decrease and flattening of left neck mass.  -for his dysphagia, he needs to follow up with SLP  -labs reviewed; ok to proceed with treatment  --reviewed with patient to get gcsf.  Also ok to use imodium and lomotil prn diarrhea.  -port draw labs prior to chemo (carboplatin/5-FU/pembrolizumab) and then follow up with me prior to treatment.  -per transplant, continue current dose of tacrolimus  -Supportive medications: zofran prn.  If needed, will prescribe compazine prn breakthrough n/v. For neck pain, oxycodone prn, continue dose 10 mg. Pain adequately controlled for him, which is about a 5 out of 10. tizanadine prn muscle aches.  Bowel regimen reviewed and currently he only needs stool softeners.  Continue periactin TID.   --Skin rash resolved  --Patient has port and EMLA cream.         Relevant Medications    loperamide (IMODIUM) 2 mg capsule    oxyCODONE (ROXICODONE) 10 mg Tab immediate  release tablet (Start on 2/3/2022)    Secondary malignant neoplasm of cervical lymph node    Overview     See squamous cell carcinoma         Pancytopenia due to chemotherapy    Current Assessment & Plan     Currently resolved.  -monitor cbc and need gcsf with this cycle            GI    Status post liver transplantation    Current Assessment & Plan     S/p liver transplant, currently on tacrolimus  -continue tacrolimus  -continue follow up with transplant team         Cirrhosis of transplanted liver    Overview     He is also status post liver transplant secondary to hepatitis C.  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 The ascites did resolve after dilation, but recurred again in 2013 with findings of restenosis of the middle and right hepatic veins. He had a liver biopsy in March 2013 which showed steatohepatitis, stage 4/4. He has portal hypertension based on portal pressure measurement with an estimated gradient of 15 mmHg done in February 2013. His ascites was controlled with diuretics. He is on prograf for immunosuppression            Other    Anemia in stage 3a chronic kidney disease    Current Assessment & Plan     Stable, asymptomatic  -continue to monitor with cbc         Immunodeficiency due to drugs    Overview     S/p liver transplant and is currently on tacrolimus.    -management per liver transplant team.  -monitor CBC monthly  -gcsf to prevent chemo induced neutropenia           Other Visit Diagnoses     Chemotherapy induced diarrhea        Relevant Medications    diphenoxylate-atropine 2.5-0.025 mg (LOMOTIL) 2.5-0.025 mg per tablet        No orders of the defined types were placed in this encounter.      Advance Care Planning I initiated the process of advance care planning at his initial visit and explained the importance of this process to the patient.  Then the patient received detailed information about the importance of designating a  Health Care Power of  (HCPOA). he was instructed to communicate with this person about their wishes for future healthcare, should he become sick and lose decision-making capacity. The patient has previously appointed a HCPOA. After our discussion, the patient has decided to complete a HCPOA and has appointed his brother and NAME:Ollie Berg MD  Hematology Oncology

## 2022-01-27 NOTE — ASSESSMENT & PLAN NOTE
S/p liver transplant, currently on tacrolimus  -continue tacrolimus  -continue follow up with transplant team

## 2022-01-27 NOTE — NURSING
Patient's PAC accessed for lab draw. Line flushed, heparinized, and needle left in place for scheduled treatment.  Patient tolerated procedure well.  Specimens sent to lab.

## 2022-01-27 NOTE — ASSESSMENT & PLAN NOTE
He experienced expected side effects of diarrhea and mucositis with the last cycle (of note, he did not get udenyca due to diarrhea that day).  Physical exam with interval decrease and flattening of left neck mass.  -for his dysphagia, he needs to follow up with SLP  -labs reviewed; ok to proceed with treatment  --reviewed with patient to get gcsf.  Also ok to use imodium and lomotil prn diarrhea.  -port draw labs prior to chemo (carboplatin/5-FU/pembrolizumab) and then follow up with me prior to treatment.  -per transplant, continue current dose of tacrolimus  -Supportive medications: zofran prn.  If needed, will prescribe compazine prn breakthrough n/v. For neck pain, oxycodone prn, continue dose 10 mg. Pain adequately controlled for him, which is about a 5 out of 10. tizanadine prn muscle aches.  Bowel regimen reviewed and currently he only needs stool softeners.  Continue periactin TID.   --Skin rash resolved  --Patient has port and EMLA cream.

## 2022-01-27 NOTE — Clinical Note
When to follow up: 3/3 prior to chemo Labs needed: port draw 3/3 CBC, Comprehensive Metabolic Panel, and TSH  Chemotherapy Regimen:  (3/7 pump disconnect, 3/8 udenyca) 3/3/2022      CARBOplatin (PARAPLATIN) 325 mg in sodium chloride 0.9% 500 mL chemo infusion      fluorouraciL 1,000 mg/m2/day = 6,480 mg in sodium chloride 0.9% 240 mL chemo infusion Provider: Morena

## 2022-01-28 ENCOUNTER — TELEPHONE (OUTPATIENT)
Dept: SPEECH THERAPY | Facility: HOSPITAL | Age: 72
End: 2022-01-28
Payer: MEDICARE

## 2022-01-28 DIAGNOSIS — R13.10 DYSPHAGIA, UNSPECIFIED TYPE: ICD-10-CM

## 2022-01-28 DIAGNOSIS — C01 SQUAMOUS CELL CARCINOMA OF BASE OF TONGUE: Primary | ICD-10-CM

## 2022-01-31 ENCOUNTER — INFUSION (OUTPATIENT)
Dept: INFUSION THERAPY | Facility: HOSPITAL | Age: 72
End: 2022-01-31
Attending: STUDENT IN AN ORGANIZED HEALTH CARE EDUCATION/TRAINING PROGRAM
Payer: MEDICARE

## 2022-01-31 VITALS
OXYGEN SATURATION: 98 % | DIASTOLIC BLOOD PRESSURE: 62 MMHG | HEART RATE: 88 BPM | TEMPERATURE: 98 F | RESPIRATION RATE: 16 BRPM | SYSTOLIC BLOOD PRESSURE: 114 MMHG

## 2022-01-31 DIAGNOSIS — C01 SQUAMOUS CELL CARCINOMA OF BASE OF TONGUE: ICD-10-CM

## 2022-01-31 DIAGNOSIS — C77.0 SECONDARY MALIGNANT NEOPLASM OF CERVICAL LYMPH NODE: Primary | ICD-10-CM

## 2022-01-31 PROCEDURE — 63600175 PHARM REV CODE 636 W HCPCS: Performed by: STUDENT IN AN ORGANIZED HEALTH CARE EDUCATION/TRAINING PROGRAM

## 2022-01-31 PROCEDURE — A4216 STERILE WATER/SALINE, 10 ML: HCPCS | Performed by: STUDENT IN AN ORGANIZED HEALTH CARE EDUCATION/TRAINING PROGRAM

## 2022-01-31 PROCEDURE — 25000003 PHARM REV CODE 250: Performed by: STUDENT IN AN ORGANIZED HEALTH CARE EDUCATION/TRAINING PROGRAM

## 2022-01-31 PROCEDURE — 99211 OFF/OP EST MAY X REQ PHY/QHP: CPT

## 2022-01-31 RX ORDER — SODIUM CHLORIDE 0.9 % (FLUSH) 0.9 %
10 SYRINGE (ML) INJECTION
Status: DISCONTINUED | OUTPATIENT
Start: 2022-01-31 | End: 2022-01-31 | Stop reason: HOSPADM

## 2022-01-31 RX ORDER — HEPARIN 100 UNIT/ML
500 SYRINGE INTRAVENOUS
Status: DISCONTINUED | OUTPATIENT
Start: 2022-01-31 | End: 2022-01-31 | Stop reason: HOSPADM

## 2022-01-31 RX ADMIN — HEPARIN 500 UNITS: 100 SYRINGE at 01:01

## 2022-01-31 RX ADMIN — Medication 10 ML: at 01:01

## 2022-01-31 NOTE — NURSING
Pt here for pump d/c.  Assessment complete and VSS. No new symptoms since pt was placed on pump. Flushed PAC with NS and heparin prior to de-accessing.  No questions or concerns.  Pt ambulated out of unit unassisted.

## 2022-02-01 ENCOUNTER — INFUSION (OUTPATIENT)
Dept: INFUSION THERAPY | Facility: HOSPITAL | Age: 72
End: 2022-02-01
Attending: STUDENT IN AN ORGANIZED HEALTH CARE EDUCATION/TRAINING PROGRAM
Payer: MEDICARE

## 2022-02-01 DIAGNOSIS — C77.0 SECONDARY MALIGNANT NEOPLASM OF CERVICAL LYMPH NODE: Primary | ICD-10-CM

## 2022-02-01 DIAGNOSIS — C01 SQUAMOUS CELL CARCINOMA OF BASE OF TONGUE: ICD-10-CM

## 2022-02-01 PROCEDURE — 63600175 PHARM REV CODE 636 W HCPCS: Mod: TB | Performed by: STUDENT IN AN ORGANIZED HEALTH CARE EDUCATION/TRAINING PROGRAM

## 2022-02-01 PROCEDURE — 96372 THER/PROPH/DIAG INJ SC/IM: CPT

## 2022-02-01 RX ADMIN — PEGFILGRASTIM-CBQV 6 MG: 6 INJECTION, SOLUTION SUBCUTANEOUS at 02:02

## 2022-02-01 NOTE — NURSING
Pt arrived for Udenyca.  Pt tolerated injection SQ to RLA.  This is pt's 1st Udenyca, medication information given and reviewed with pt, states understanding.  Pt discharged to home.

## 2022-02-03 ENCOUNTER — CLINICAL SUPPORT (OUTPATIENT)
Dept: SPEECH THERAPY | Facility: HOSPITAL | Age: 72
End: 2022-02-03
Attending: STUDENT IN AN ORGANIZED HEALTH CARE EDUCATION/TRAINING PROGRAM
Payer: MEDICARE

## 2022-02-03 DIAGNOSIS — Z92.3 HISTORY OF HEAD AND NECK RADIATION: ICD-10-CM

## 2022-02-03 DIAGNOSIS — Z90.49 S/P GLOSSECTOMY: ICD-10-CM

## 2022-02-03 DIAGNOSIS — R13.12 DYSPHAGIA, OROPHARYNGEAL: Primary | ICD-10-CM

## 2022-02-03 DIAGNOSIS — R13.10 DYSPHAGIA, UNSPECIFIED TYPE: ICD-10-CM

## 2022-02-03 DIAGNOSIS — Z92.21 HISTORY OF CHEMOTHERAPY: ICD-10-CM

## 2022-02-03 DIAGNOSIS — C01 SQUAMOUS CELL CARCINOMA OF BASE OF TONGUE: ICD-10-CM

## 2022-02-03 DIAGNOSIS — Z90.02 S/P LARYNGECTOMY: ICD-10-CM

## 2022-02-03 PROCEDURE — 92610 EVALUATE SWALLOWING FUNCTION: CPT | Mod: GN | Performed by: SPEECH-LANGUAGE PATHOLOGIST

## 2022-02-03 NOTE — TELEPHONE ENCOUNTER
The only labs he will need from us will be--psa, u/a, lipids----entered  Other labs up to date with oncology.

## 2022-02-03 NOTE — PROGRESS NOTES
REFERRING PHYSICIAN:  Ronald Berg M.D.,Medical Oncologist  LENGTH OF VISIT:  1 hour    REASON FOR REFERRAL:  Rocco Swain, age 71, was referred by Dr. Ronald eBrg, medical oncologist, for assessment re: dysphagia.  He was unaccompanied.    Mr. 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  He has had a recurrence in his L neck and is currently in palliative therapy with Dr. Berg.  He is also s/p liver transplant secondary to Hep C.    Mr. Swain reported that he currently has an infusion 1x/month over the course of 5 days and typically has about a week of mucositis and lip sores afterwards.    He reported that after his TL and total glossectomy in 2016, he was able to consume small bits of sliced meats (e.g., ham, turkey) which he guided to the posterior oral cavity then washed down.  Since his recurrence, he can only eat pureed foods and liquids.  Pills also stick in his throat (indicated the submental area and central neck).  He reported that he has lost about 15 lbs over 18 months.  He takes 4 Ensures (350-tracey)/day (1400 calories) plus consumes milk shakes, smoothies (with fruit and protein which he makes), ice cream, yogurt, and OJ.  Other foods include mashed potatoes and soups.  He is unsure of how many calories he make take per day.    MEDICAL HISTORY:  Past Medical History:   Diagnosis Date    Basal cell carcinoma (BCC) in situ of skin 2012    3 on face, 2 on arm, removed by dermatology.    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 Underwent surgical resection of tongue and neck. s/p tracheostomy       SURGICAL HISTORY:  Past  Surgical History:   Procedure Laterality Date    COLONOSCOPY      INSERTION OF VENOUS ACCESS PORT Right 3/8/2021    Procedure: INSERTION, VENOUS ACCESS PORT;  Surgeon: Jesus Rendon MD;  [facility];  Service: General;  Laterality: Right;    LIVER TRANSPLANT  11/2008    transplanted for biopsy proven hepatocellular carcinoma,     LYMPH NODE BIOPSY N/A 9/18/2020    Procedure: BIOPSY, LYMPH NODE;  Surgeon: Taz Diagnostic Provider;  [facility]: [facility] OR [facility];  Service: General;  Laterality: N/A;  Rm 173    TRACHEOSTOMY         ONCOLOGIC and TREATMENT HISTORY of problem for which patient is referred:  Oncology History   Squamous cell carcinoma of base of tongue   9/28/2020 Initial Diagnosis    Squamous cell carcinoma of base of tongue     10/6/2020 - 8/17/2021 Chemotherapy    Treatment Summary   Plan Name: OP HEAD NECK CARBOPLATIN PACLITAXEL C1-2 FOLLOWED BY CETUXIMAB CARBOPLATIN C3-6 FOLLOWED BY CETUXIMAB MAINTENANCE WEEKLY  Treatment Goal: Control  Status: Inactive  Start Date: 10/6/2020  End Date: 8/17/2021  Provider: Ronald Berg MD  Chemotherapy: cetuximab (ERBITUX) 100 mg/50 mL chemo infusion 684 mg, 400 mg/m2 = 684 mg (100 % of original dose 400 mg/m2), Intravenous, Clinic[facility] 1 time, 29 of 38 cycles  Dose modification: 500 mg/m2 (original dose 400 mg/m2, Cycle 3), 250 mg/m2 (original dose 400 mg/m2, Cycle 3), 400 mg/m2 (original dose 400 mg/m2, Cycle 3), 250 mg/m2 (original dose 250 mg/m2, Cycle 7), 200 mg/m2 (original dose 250 mg/m2, Cycle 18), 200 mg/m2 (original dose 250 mg/m2, Cycle 19), 250 mg/m2 (original dose 250 mg/m2, Cycle 4), 200 mg/m2 (original dose 250 mg/m2, Cycle 25), 200 mg/m2 (original dose 250 mg/m2, Cycle 28)  Administration: 684 mg (11/17/2020), 400 mg (11/24/2020), 400 mg (2/9/2021), 400 mg (2/17/2021), 427.6 mg (3/9/2021), 400 mg (3/30/2021), 400 mg (3/23/2021), 400 mg (4/6/2021), 427.6 mg (4/13/2021), 427.6 mg (4/20/2021), 342 mg (5/11/2021), 342 mg (5/18/2021), 342  mg (5/25/2021), 400 mg (5/31/2021), 400 mg (6/7/2021), 400 mg (6/14/2021), 400 mg (12/8/2020), 427.6 mg (12/29/2020), 400 mg (12/1/2020), 400 mg (12/15/2020), 400 mg (12/22/2020), 427.6 mg (1/5/2021), 400 mg (1/12/2021), 400 mg (1/19/2021), 427.6 mg (1/26/2021), 400 mg (2/2/2021), 400 mg (2/23/2021), 400 mg (3/2/2021), 427.6 mg (3/16/2021), 400 mg (6/21/2021), 342 mg (6/28/2021), 342 mg (7/6/2021), 342 mg (7/13/2021), 342 mg (7/20/2021), 400 mg (7/27/2021), 400 mg (8/10/2021), 400 mg (8/17/2021)  CARBOplatin (PARAPLATIN) 310 mg in sodium chloride 0.9% 500 mL chemo infusion, 310 mg (92.2 % of original dose 334.5 mg), Intravenous, Clinic/[facility] 1 time, 6 of 6 cycles  Dose modification:   (original dose 334.5 mg, Cycle 1)  Administration: 310 mg (10/6/2020), 370 mg (11/17/2020), 300 mg (10/27/2020), 350 mg (12/8/2020), 335 mg (12/29/2020), 320 mg (1/19/2021)  PACLitaxeL (TAXOL) 175 mg/m2 = 300 mg in sodium chloride 0.9% 500 mL chemo infusion, 175 mg/m2 = 300 mg (100 % of original dose 175 mg/m2), Intravenous, Clinic/[facility] 1 time, 2 of 2 cycles  Dose modification: 175 mg/m2 (original dose 175 mg/m2, Cycle 1)  Administration: 300 mg (10/6/2020), 300 mg (10/27/2020)     4/29/2021 Tumor Conference       His case was discussed at the Multidisciplinary Head and Neck Team Planning Meeting.    Representatives from Medical Oncology, Radiation Oncology, Head and Neck Surgical Oncology, Psychosocial Oncology, and Speech and Language Pathology discussed the case with the following recommendations:    1) biopsy         7/29/2021 Tumor Conference       His case was discussed at the Multidisciplinary Head and Neck Team Planning Meeting.    Representatives from Medical Oncology, Radiation Oncology, Head and Neck Surgical Oncology, Psychosocial Oncology, and Speech and Language Pathology discussed the case with the following recommendations:    1) Head and neck clinic follow up  2) consider Keytruda (discuss with transplant)  3) consider  palliative referral         9/13/2021 -  Chemotherapy    Treatment Summary   Plan Name: OP HEAD NECK PEMBROLIZUMAB CARBOPLATIN FLUOROURACIL (C1 ONLY RECEIVED) FOLLOWED BY PEMBROLIZUMAB MAINTENANCE  Treatment Goal: Palliative  Status: Active  Start Date: 9/13/2021  End Date: 12/14/2023 (Planned)  Provider: Ronald Berg MD  Chemotherapy: CARBOplatin (PARAPLATIN) 320 mg in sodium chloride 0.9% 500 mL chemo infusion, 320 mg (100 % of original dose 320.5 mg), Intravenous, Clinic/[facility] 1 time, 3 of 6 cycles  Dose modification:   (original dose 320.5 mg, Cycle 1)  Administration: 320 mg (9/13/2021), 335 mg (12/23/2021), 325 mg (1/27/2022)  fluorouraciL 1,000 mg/m2/day = 6,440 mg in sodium chloride 0.9% 240 mL chemo infusion, 1,000 mg/m2/day = 6,440 mg, Intravenous, Over 96 hours, 3 of 6 cycles  Administration: 6,440 mg (9/13/2021), 6,440 mg (12/23/2021), 6,480 mg (1/27/2022)  pembrolizumab (KEYTRUDA) 200 mg in sodium chloride 0.9% 108 mL infusion, 200 mg, Intravenous, Clinic/[facility] 1 time, 5 of 35 cycles  Administration: 200 mg (9/13/2021), 200 mg (10/18/2021), 200 mg (11/12/2021), 200 mg (12/23/2021), 200 mg (1/27/2022)     Secondary malignant neoplasm of cervical lymph node   2/9/2021 Initial Diagnosis    Secondary malignant neoplasm of cervical lymph node     9/13/2021 -  Chemotherapy    Treatment Summary   Plan Name: OP HEAD NECK PEMBROLIZUMAB CARBOPLATIN FLUOROURACIL (C1 ONLY RECEIVED) FOLLOWED BY PEMBROLIZUMAB MAINTENANCE  Treatment Goal: Palliative  Status: Active  Start Date: 9/13/2021  End Date: 12/14/2023 (Planned)  Provider: Ronald Berg MD  Chemotherapy: CARBOplatin (PARAPLATIN) 320 mg in sodium chloride 0.9% 500 mL chemo infusion, 320 mg (100 % of original dose 320.5 mg), Intravenous, Clinic/[facility] 1 time, 3 of 6 cycles  Dose modification:   (original dose 320.5 mg, Cycle 1)  Administration: 320 mg (9/13/2021), 335 mg (12/23/2021), 325 mg (1/27/2022)  fluorouraciL 1,000 mg/m2/day = 6,440 mg in sodium chloride 0.9% 240  mL chemo infusion, 1,000 mg/m2/day = 6,440 mg, Intravenous, Over 96 hours, 3 of 6 cycles  Administration: 6,440 mg (2021), 6,440 mg (2021), 6,480 mg (2022)  pembrolizumab (KEYTRUDA) 200 mg in sodium chloride 0.9% 108 mL infusion, 200 mg, Intravenous, Clinic/[facility] 1 time, 5 of 35 cycles  Administration: 200 mg (2021), 200 mg (10/18/2021), 200 mg (2021), 200 mg (2021), 200 mg (2022)           Possible side effects related to treatment:  Xerostomia:   Negative; feels he has excess saliva.    Irritation:  Positive for spicy things and hot temperature foods.  Also with episodic mucositis as above after an infusion.  Changes in taste: Naturally reduced after TL and TG, but reported that he perceives taste a little and it is normal (no altered taste).  Radiation fibrosis: Present, particularly in submental area and bilateral necks.  Lymphedema:  Negative.    SWALLOWING HISTORY:  As above.    Met with Nutritionist Lina Goncalves about a year ago (21) and was advised to target the followin7661-1661 Kcals (30-35kcal/kg)  68 g protein (1.2g/kg)   3390-5510 mL fluid (30-35mL/kg)  He is unsure if he is meeting this.    FAMILY HISTORY:  Family History   Problem Relation Age of Onset    Dementia Mother        SOCIAL HISTORY:  Mr. Swain was living in [place] at the time of his early treatment and surgery in 2016.  He moved to [facility], having lived her for about 10 years earlier in his life; he has family here.  He is single, lives in [place], and is retired from iron work per the EMR.    Tobacco use:  Never smoker per EMR.  ETOH use:  Yes, 1 glass wine/day per EMR.    BEHAVIOR:  Mr. Swain remained a very pleasant man whose affect and social interaction were appropriate for situation and setting.  He was fully cooperative for the assessment. Results are considered indicative of his current levels of speech and swallowing functioning.    HEARING:  Subjectively, within normal  limits.    ASSESSMENT:  Oral Peripheral:     Mandible: Subjectively, within normal limits for swallowing purposes.   Lips:  Symmetric with normal functioning for rounding, spreading, and occluding.   Tongue:  Absent s/p total glossectomy.    Velum and Hard Palate:  within normal limits.   Reflexes:  Gag: n/a Swallow: reduced.   Oropharynx: within normal limits     Speech:  Able to generate brief bursts of quiet esophageal sound, but no words.  He initiated topic of trying to develop speech after dysphagia is addressed.  Provided education re: limitations of this without a tongue, but did not rule it out.  Communicates via writing as well as some gestures.    Swallowing:  No neck muscles movement able to be appreciated on palpation.  To swallow liquids, he took a bolus, put his head back, then performed an open mouth maneuver that likely help created negative pressure and progressed the bolus.   He used a similar pattern to swallow thin puree, but some oral residue persisted.  He took a very small bolus of liquid, held his head face down and shook it to move the water around in his mouth, the put is back and swallowed as above to clear the oral residue.   He declined to try a cracker saying that he would have to digitally remove oral residue with his fingers.    Voice/Resonance:  Alaryngeal.  Able to create brief and quiet esophageal sound as above.  No velopharyngeal dysfunction suspected.    COUNSELING:  Advised Mr. Swain that I would like to perform a MBSS with him to assess what is happening when he is attempting solids, in particular.  Possible etiologies for his dysphagia include, but are not limited to, stenosis and mass effect.  He was amenable to this.      Additionally, I suggested that he consult with Dr. Myers re: what may be done to help him manage his secretions better.      IMPRESSIONS:   This 70 yo man appears to present with   1.  Relatively new onset pharyngeal dysphagia (different from his  original post-TL/TG baseline) characterized by difficulty progressing solid and medication boluses.  2.  Difficulty managing saliva.  3.  Recurrence of SCC; currently in palliative treatment with Dr. Berg.  4.  History TL and TG 12/2016.  5.  History CRT.  6.  History BOT cancer.    RECOMMENDATIONS/PLAN OF CARE:  It is felt that Mr. Swain would benefit from  1.  Continuation of his current pureed consistency diet with thin liquids.  2.  MBSS; will request order from Dr. Berg.  3.  Follow up with Dr. Myers re: options to facilitate improvement management of saliva.

## 2022-02-04 DIAGNOSIS — C01 SQUAMOUS CELL CARCINOMA OF BASE OF TONGUE: ICD-10-CM

## 2022-02-04 DIAGNOSIS — R13.10 DYSPHAGIA, UNSPECIFIED TYPE: Primary | ICD-10-CM

## 2022-02-04 NOTE — PLAN OF CARE
IMPRESSIONS:   This 70 yo man appears to present with   1.  Relatively new onset pharyngeal dysphagia (different from his original post-TL/TG baseline) characterized by difficulty progressing solid and medication boluses.  2.  Difficulty managing saliva.  3.  Recurrence of SCC; currently in palliative treatment with Dr. Berg.  4.  History TL and TG 12/2016.  5.  History CRT.  6.  History BOT cancer.    RECOMMENDATIONS/PLAN OF CARE:  It is felt that Mr. Swain would benefit from  1.  Continuation of his current pureed consistency diet with thin liquids.  2.  MBSS; will request order from Dr. Berg.  3.  Follow up with Dr. Myers re: options to facilitate improvement management of saliva.

## 2022-02-06 DIAGNOSIS — C01 SQUAMOUS CELL CARCINOMA OF BASE OF TONGUE: ICD-10-CM

## 2022-02-06 DIAGNOSIS — C77.0 SECONDARY MALIGNANT NEOPLASM OF CERVICAL LYMPH NODE: ICD-10-CM

## 2022-02-07 ENCOUNTER — TELEPHONE (OUTPATIENT)
Dept: SPEECH THERAPY | Facility: HOSPITAL | Age: 72
End: 2022-02-07
Payer: MEDICARE

## 2022-02-07 RX ORDER — TIZANIDINE 2 MG/1
2 TABLET ORAL NIGHTLY PRN
Qty: 30 TABLET | Refills: 0 | Status: SHIPPED | OUTPATIENT
Start: 2022-02-07 | End: 2023-03-23 | Stop reason: SDUPTHER

## 2022-02-07 NOTE — TELEPHONE ENCOUNTER
Spoke with pt brother to inform of mbss 2/23@2pm. Informed to fast 2hrs before test; 2nd fl radiology, states understanding.

## 2022-02-14 ENCOUNTER — PATIENT MESSAGE (OUTPATIENT)
Dept: TRANSPLANT | Facility: CLINIC | Age: 72
End: 2022-02-14
Payer: MEDICARE

## 2022-02-14 ENCOUNTER — HOSPITAL ENCOUNTER (OUTPATIENT)
Dept: RADIOLOGY | Facility: HOSPITAL | Age: 72
Discharge: HOME | End: 2022-02-14
Attending: INTERNAL MEDICINE
Payer: MEDICARE

## 2022-02-14 ENCOUNTER — PATIENT MESSAGE (OUTPATIENT)
Dept: OTOLARYNGOLOGY | Facility: CLINIC | Age: 72
End: 2022-02-14
Payer: MEDICARE

## 2022-02-14 DIAGNOSIS — K74.60 CIRRHOSIS OF TRANSPLANTED LIVER: ICD-10-CM

## 2022-02-14 DIAGNOSIS — C22.0 HCC (HEPATOCELLULAR CARCINOMA): ICD-10-CM

## 2022-02-14 DIAGNOSIS — Z94.4 STATUS POST LIVER TRANSPLANTATION: ICD-10-CM

## 2022-02-14 DIAGNOSIS — T86.49 CIRRHOSIS OF TRANSPLANTED LIVER: ICD-10-CM

## 2022-02-14 PROCEDURE — 76705 US LIVER TRANSPLANT POST: ICD-10-PCS | Mod: 26,XS,, | Performed by: RADIOLOGY

## 2022-02-14 PROCEDURE — 76705 ECHO EXAM OF ABDOMEN: CPT | Mod: 26,XS,, | Performed by: RADIOLOGY

## 2022-02-14 PROCEDURE — 93976 US LIVER TRANSPLANT POST: ICD-10-PCS | Mod: 26,,, | Performed by: RADIOLOGY

## 2022-02-14 PROCEDURE — 93976 VASCULAR STUDY: CPT | Mod: 26,,, | Performed by: RADIOLOGY

## 2022-02-14 PROCEDURE — 93976 VASCULAR STUDY: CPT | Mod: TC

## 2022-02-14 PROCEDURE — 76705 ECHO EXAM OF ABDOMEN: CPT | Mod: TC

## 2022-02-16 ENCOUNTER — PATIENT MESSAGE (OUTPATIENT)
Dept: TRANSPLANT | Facility: CLINIC | Age: 72
End: 2022-02-16
Payer: MEDICARE

## 2022-02-16 ENCOUNTER — TELEPHONE (OUTPATIENT)
Dept: TRANSPLANT | Facility: CLINIC | Age: 72
End: 2022-02-16
Payer: MEDICARE

## 2022-02-16 NOTE — TELEPHONE ENCOUNTER
Letter sent to patient stating: Your Liver Ultrasound and labs have been reviewed by your Transplant physician, no action required. Next labs due 5/16/22        ----- Message from Cornell Anton MD sent at 2/16/2022  3:01 PM CST -----  Results reviewed

## 2022-02-16 NOTE — LETTER
February 16, 2022    Rocco Swain  [address] [place] [address] [place] [address]          Dear Rocco Swain:  MRN: [id number]    This is a follow up to your recent labs, your lab results were stable.  There are no medicine changes.  Please have your labs drawn again on 5/16/22.      If you cannot have your labs drawn on the scheduled date, it is your responsibility to call the transplant department to reschedule.  Please call [phone] and ask to speak to Rhea RIOS   for all scheduling requests.     Sincerely,    Shari WADDELLN, RN      Your Liver Transplant Coordinator    [facility]  [address] [place] [id number]  [phone]

## 2022-02-18 DIAGNOSIS — Z94.4 STATUS POST LIVER TRANSPLANTATION: Primary | ICD-10-CM

## 2022-02-18 DIAGNOSIS — C22.0 HCC (HEPATOCELLULAR CARCINOMA): ICD-10-CM

## 2022-02-18 DIAGNOSIS — T86.49 CIRRHOSIS OF TRANSPLANTED LIVER: ICD-10-CM

## 2022-02-18 DIAGNOSIS — K74.60 CIRRHOSIS OF TRANSPLANTED LIVER: ICD-10-CM

## 2022-02-21 ENCOUNTER — OFFICE VISIT (OUTPATIENT)
Dept: OTOLARYNGOLOGY | Facility: CLINIC | Age: 72
End: 2022-02-21
Payer: MEDICARE

## 2022-02-21 ENCOUNTER — TELEPHONE (OUTPATIENT)
Dept: TRANSPLANT | Facility: CLINIC | Age: 72
End: 2022-02-21
Payer: MEDICARE

## 2022-02-21 ENCOUNTER — PATIENT MESSAGE (OUTPATIENT)
Dept: TRANSPLANT | Facility: CLINIC | Age: 72
End: 2022-02-21
Payer: MEDICARE

## 2022-02-21 VITALS
WEIGHT: 120 LBS | HEART RATE: 88 BPM | DIASTOLIC BLOOD PRESSURE: 78 MMHG | BODY MASS INDEX: 18.25 KG/M2 | SYSTOLIC BLOOD PRESSURE: 113 MMHG

## 2022-02-21 DIAGNOSIS — C01 SQUAMOUS CELL CARCINOMA OF BASE OF TONGUE: Primary | ICD-10-CM

## 2022-02-21 PROCEDURE — 99213 OFFICE O/P EST LOW 20 MIN: CPT | Mod: PBBFAC,25 | Performed by: OTOLARYNGOLOGY

## 2022-02-21 PROCEDURE — 64611 CHEMODENERV SALIV GLANDS: CPT | Mod: PBBFAC | Performed by: OTOLARYNGOLOGY

## 2022-02-21 PROCEDURE — 99213 OFFICE O/P EST LOW 20 MIN: CPT | Mod: 25,S$PBB,, | Performed by: OTOLARYNGOLOGY

## 2022-02-21 PROCEDURE — 76942 PR U/S GUIDANCE FOR NEEDLE GUIDANCE: ICD-10-PCS | Mod: 26,S$PBB,, | Performed by: OTOLARYNGOLOGY

## 2022-02-21 PROCEDURE — 76942 ECHO GUIDE FOR BIOPSY: CPT | Mod: 26,S$PBB,, | Performed by: OTOLARYNGOLOGY

## 2022-02-21 PROCEDURE — 99999 PR PBB SHADOW E&M-EST. PATIENT-LVL III: CPT | Mod: PBBFAC,,, | Performed by: OTOLARYNGOLOGY

## 2022-02-21 PROCEDURE — 64611 CHEMODENERV SALIV GLANDS: CPT | Mod: S$PBB,,, | Performed by: OTOLARYNGOLOGY

## 2022-02-21 PROCEDURE — 99999 PR PBB SHADOW E&M-EST. PATIENT-LVL III: ICD-10-PCS | Mod: PBBFAC,,, | Performed by: OTOLARYNGOLOGY

## 2022-02-21 PROCEDURE — 64611 PR CHEMODENERVATION PAROTID/SUBMANDIBULAR SALIVARY GLANDS,BILATERAL: ICD-10-PCS | Mod: S$PBB,,, | Performed by: OTOLARYNGOLOGY

## 2022-02-21 PROCEDURE — 99213 PR OFFICE/OUTPT VISIT, EST, LEVL III, 20-29 MIN: ICD-10-PCS | Mod: 25,S$PBB,, | Performed by: OTOLARYNGOLOGY

## 2022-02-21 NOTE — PROGRESS NOTES
Chief Complaint   Patient presents with    excessive saliva/possible botox       HPI   71 y.o. male presents status post total laryngectomy, total glossectomy and anterolateral thigh free flap reconstruction several years ago at [facility] in [place].  He is also status post liver transplant secondary to hepatitis C.      He is currently receiving palliative chemotherapy for metastatic disease in his neck.  He presents today to discuss his options regarding secretion management.  He reports a several year history of dysphagia.  More recently, he feels as if his secretions are excessive and difficult to clear.  No other complaints.        Review of Systems   Constitutional: Negative for fatigue and unexpected weight change.   HENT: Per HPI.  Eyes: Negative for visual disturbance.   Respiratory: Negative for shortness of breath, hemoptysis   Cardiovascular: Negative for chest pain and palpitations.   Musculoskeletal: Negative for decreased ROM, back pain.   Skin: Negative for rash, sunburn, itching.   Neurological: Negative for dizziness and seizures.   Hematological: Negative for adenopathy. Does not bruise/bleed easily.   Endocrine: Negative for rapid weight loss/weight gain, heat/cold intolerance.     Past Medical History   Patient Active Problem List   Diagnosis    Larynx cancer    Hepatitis C    Postoperative hypothyroidism    History of laryngectomy    Status post liver transplantation    Cirrhosis of transplanted liver    Squamous cell carcinoma of base of tongue    Stage 3a chronic kidney disease    Atherosclerosis of abdominal aorta    Anemia in stage 3a chronic kidney disease    Secondary malignant neoplasm of cervical lymph node    Immunodeficiency due to drugs    Tracheostomy status    Centrilobular emphysema    Hypomagnesemia    Lip lesion    Mucositis due to chemotherapy    Pancytopenia due to chemotherapy           Past Surgical History   Past Surgical History:   Procedure  Laterality Date    COLONOSCOPY      INSERTION OF VENOUS ACCESS PORT Right 3/8/2021    Procedure: INSERTION, VENOUS ACCESS PORT;  Surgeon: Jesus Rendon MD;  [facility];  Service: General;  Laterality: Right;    LIVER TRANSPLANT  11/2008    transplanted for biopsy proven hepatocellular carcinoma,     LYMPH NODE BIOPSY N/A 9/18/2020    Procedure: BIOPSY, LYMPH NODE;  Surgeon: Taz Diagnostic Provider;  Location: [facility] OR 2ND FLR;  Service: General;  Laterality: N/A;  Rm 173    TRACHEOSTOMY           Family History   Family History   Problem Relation Age of Onset    Dementia Mother            Social History   .  Social History     Socioeconomic History    Marital status: Single   Occupational History    Occupation: Retired     Comment: , notes exposures to fumes etc.  Worked on [employer] for 4 years   Tobacco Use    Smoking status: Never Smoker    Smokeless tobacco: Never Used   Substance and Sexual Activity    Alcohol use: Yes     Comment: drinks wine, 1 glass day    Drug use: Not Currently    Sexual activity: Yes     Comment: No prior history of STD          Allergies   Review of patient's allergies indicates:  No Known Allergies        Physical Exam     Vitals:    02/21/22 0932   BP: 113/78   Pulse: 88         Body mass index is 18.25 kg/m².      General: AOx3, NAD   Respiratory:  Symmetric chest rise, normal effort  Nose: No gross nasal septal deviation. Inferior Turbinates WNL bilaterally. No septal perforation. No masses/lesions.   Oral Cavity:  Oral Tongue absent.  Free flap well incorporated into oral cavity.. Hard Palate WNL. No buccal or FOM lesions.  Oropharynx:  No masses/lesions of the posterior pharyngeal wall. Tonsillar fossa without lesions. Soft palate without masses. Midline uvula.   Neck:  Well-healed neck dissection scar is.  Stoma patent.  No significant adenopathy.  No thyromegaly or thyroid nodules.  Normal range of motion.    Face: House  Brackmann I bilaterally.     NP: No lesions of posterior wall  OP: No lesions of posterior wall or BOT. BOT is soft to palpation  Eliud pharynx:  Difficult to visualize.  Mirror examination was performed.      Procedure:  Ultrasound-guided injection of Botox into the bilateral parotid glands.  Under ultrasound guidance, 25 units of Botox were injected into each parotid gland.  He tolerated the procedure well.      Assessment/Plan  Problem List Items Addressed This Visit        Oncology    Squamous cell carcinoma of base of tongue - Primary     With excessive saliva secondary to treatment related dysphagia.  Botox administered to bilateral parotid glands today.  He is return p.r.n.

## 2022-02-21 NOTE — TELEPHONE ENCOUNTER
Patient made aware of below message from Txp physician, writer added on [facility] labs to patients lab apt he has on 3/2      ----- Message from Cornell Anton MD sent at 2/18/2022  9:16 AM CST -----  Okay- if he is on chemo, I don't mind a very low level/dose  Let's keep monitoring every 2 weeks    ----- Message -----  From: Shari Stark RN  Sent: 2/14/2022   3:59 PM CST  To: Cornell Anton MD    Received this feedback from patient when I checked to see if he had missed any tacro doses leading up to his labs:  My dosage is 0.5 . after my last chemo therapy I was also given a shot of udenyca to boost my white blood cells. 1st time I've had this shot. Maybe has something to do with tacromulus level?

## 2022-02-21 NOTE — ASSESSMENT & PLAN NOTE
With excessive saliva secondary to treatment related dysphagia.  Botox administered to bilateral parotid glands today.  He is return p.r.n.

## 2022-02-22 DIAGNOSIS — D84.9 IMMUNOSUPPRESSED STATUS: ICD-10-CM

## 2022-02-23 ENCOUNTER — CLINICAL SUPPORT (OUTPATIENT)
Dept: SPEECH THERAPY | Facility: HOSPITAL | Age: 72
End: 2022-02-23
Attending: STUDENT IN AN ORGANIZED HEALTH CARE EDUCATION/TRAINING PROGRAM
Payer: MEDICARE

## 2022-02-23 ENCOUNTER — HOSPITAL ENCOUNTER (OUTPATIENT)
Dept: RADIOLOGY | Facility: HOSPITAL | Age: 72
Discharge: HOME | End: 2022-02-23
Attending: STUDENT IN AN ORGANIZED HEALTH CARE EDUCATION/TRAINING PROGRAM
Payer: MEDICARE

## 2022-02-23 DIAGNOSIS — C01 SQUAMOUS CELL CARCINOMA OF BASE OF TONGUE: ICD-10-CM

## 2022-02-23 DIAGNOSIS — R13.12 DYSPHAGIA, OROPHARYNGEAL: Primary | ICD-10-CM

## 2022-02-23 DIAGNOSIS — R13.10 DYSPHAGIA, UNSPECIFIED TYPE: ICD-10-CM

## 2022-02-23 DIAGNOSIS — Z90.49 S/P GLOSSECTOMY: ICD-10-CM

## 2022-02-23 DIAGNOSIS — Z90.02 S/P LARYNGECTOMY: ICD-10-CM

## 2022-02-23 PROCEDURE — A9698 NON-RAD CONTRAST MATERIALNOC: HCPCS | Performed by: STUDENT IN AN ORGANIZED HEALTH CARE EDUCATION/TRAINING PROGRAM

## 2022-02-23 PROCEDURE — 74230 X-RAY XM SWLNG FUNCJ C+: CPT | Mod: 26,,, | Performed by: STUDENT IN AN ORGANIZED HEALTH CARE EDUCATION/TRAINING PROGRAM

## 2022-02-23 PROCEDURE — 92611 MOTION FLUOROSCOPY/SWALLOW: CPT | Mod: GN | Performed by: SPEECH-LANGUAGE PATHOLOGIST

## 2022-02-23 PROCEDURE — 25500020 PHARM REV CODE 255: Performed by: STUDENT IN AN ORGANIZED HEALTH CARE EDUCATION/TRAINING PROGRAM

## 2022-02-23 PROCEDURE — 74230 X-RAY XM SWLNG FUNCJ C+: CPT | Mod: TC

## 2022-02-23 PROCEDURE — 74230 FL MODIFIED BARIUM SWALLOW SPEECH STUDY: ICD-10-PCS | Mod: 26,,, | Performed by: STUDENT IN AN ORGANIZED HEALTH CARE EDUCATION/TRAINING PROGRAM

## 2022-02-23 RX ADMIN — BARIUM SULFATE 40 ML: 0.81 POWDER, FOR SUSPENSION ORAL at 02:02

## 2022-02-23 NOTE — PROGRESS NOTES
MODIFIED BARIUM SWALLOW STUDY    REASON FOR REFERRAL:  Rocco Swain, age 71, was referred by Dr. Ronald Berg, medical oncologist, for a Modified Barium Swallow Study to rule out aspiration, assess his overall swallowing function, and determine safest consistencies for oral intake. He was unaccompanied.    Mr. 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 in [place].  He has had a recurrence in his L neck and is currently in palliative therapy with Dr. Berg.  He is also s/p liver transplant secondary to Hep C.    Mr. Swain reported that he currently has an infusion 1x/month over the course of 5 days and typically has about a week of mucositis and lip sores afterwards.    When he presented in my clinic 2/3/22, Mr. Swain reported that after his TL and total glossectomy in 2016, he was able to consume small bits of sliced meats (e.g., ham, turkey) which he guided to the posterior oral cavity then washed down.  Since his recurrence, he can only eat pureed foods and liquids.  Pills also stick in his throat (indicated the submental area and central neck).  He reported that he has lost about 15 lbs over 18 months.  He takes 4 Ensures (350-tracey)/day (1400 calories) plus consumes milk shakes, smoothies (with fruit and protein which he makes), ice cream, yogurt, and OJ.  Other foods include mashed potatoes and soups.  He is unsure of how many calories he make take per day.    Today, he presented with an Yankauer suction head with something like dental floss attached as a tether and reported that he uses it to push food down when it is stuck in his throat.  He wraps the string around a finger when he uses it as a safety measure.      MEDICAL HISTORY:  Past Medical History:   Diagnosis Date    Basal cell carcinoma (BCC) in situ of skin 2012    3 on face, 2 on arm, removed by dermatology.    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 Underwent surgical resection of tongue and neck. s/p tracheostomy        SURGICAL HISTORY:  Past Surgical History:   Procedure Laterality Date    COLONOSCOPY      INSERTION OF VENOUS ACCESS PORT Right 3/8/2021    Procedure: INSERTION, VENOUS ACCESS PORT;  Surgeon: Jesus Rendon MD;  Location: [facility] OR [address] [place] [address];  Service: General;  Laterality: Right;    LIVER TRANSPLANT  11/2008    transplanted for biopsy proven hepatocellular carcinoma,     LYMPH NODE BIOPSY N/A 9/18/2020    Procedure: BIOPSY, LYMPH NODE;  [facility];  Location: [facility] OR [address] [place] [address];  Service: General;  Laterality: N/A;  Rm 173    TRACHEOSTOMY         ONCOLOGIC and TREATMENT HISTORY of problem for which patient is referred:  Oncology History   Squamous cell carcinoma of base of tongue   9/28/2020 Initial Diagnosis     Squamous cell carcinoma of base of tongue      10/6/2020 - 8/17/2021 Chemotherapy     Treatment Summary   Plan Name: OP HEAD NECK CARBOPLATIN PACLITAXEL C1-2 FOLLOWED BY CETUXIMAB CARBOPLATIN C3-6 FOLLOWED BY CETUXIMAB MAINTENANCE WEEKLY  Treatment Goal: Control  Status: Inactive  Start Date: 10/6/2020  End Date: 8/17/2021  Provider: Ronald Berg MD  Chemotherapy: cetuximab (ERBITUX) 100 mg/50 mL chemo infusion 684 mg, 400 mg/m2 = 684 mg (100 % of original dose 400 mg/m2), Intravenous, Clinic/HOD 1 time, 29 of 38 cycles  Dose modification: 500 mg/m2 (original dose 400 mg/m2, Cycle 3), 250 mg/m2 (original dose 400 mg/m2, Cycle 3), 400 mg/m2 (original dose 400 mg/m2, Cycle 3), 250 mg/m2 (original dose 250 mg/m2, Cycle 7), 200 mg/m2 (original dose 250 mg/m2, Cycle 18), 200 mg/m2 (original dose 250 mg/m2, Cycle 19), 250 mg/m2 (original  dose 250 mg/m2, Cycle 4), 200 mg/m2 (original dose 250 mg/m2, Cycle 25), 200 mg/m2 (original dose 250 mg/m2, Cycle 28)  Administration: 684 mg (11/17/2020), 400 mg (11/24/2020), 400 mg (2/9/2021), 400 mg (2/17/2021), 427.6 mg (3/9/2021), 400 mg (3/30/2021), 400 mg (3/23/2021), 400 mg (4/6/2021), 427.6 mg (4/13/2021), 427.6 mg (4/20/2021), 342 mg (5/11/2021), 342 mg (5/18/2021), 342 mg (5/25/2021), 400 mg (5/31/2021), 400 mg (6/7/2021), 400 mg (6/14/2021), 400 mg (12/8/2020), 427.6 mg (12/29/2020), 400 mg (12/1/2020), 400 mg (12/15/2020), 400 mg (12/22/2020), 427.6 mg (1/5/2021), 400 mg (1/12/2021), 400 mg (1/19/2021), 427.6 mg (1/26/2021), 400 mg (2/2/2021), 400 mg (2/23/2021), 400 mg (3/2/2021), 427.6 mg (3/16/2021), 400 mg (6/21/2021), 342 mg (6/28/2021), 342 mg (7/6/2021), 342 mg (7/13/2021), 342 mg (7/20/2021), 400 mg (7/27/2021), 400 mg (8/10/2021), 400 mg (8/17/2021)  CARBOplatin (PARAPLATIN) 310 mg in sodium chloride 0.9% 500 mL chemo infusion, 310 mg (92.2 % of original dose 334.5 mg), Intravenous, Clinic/hospitals 1 time, 6 of 6 cycles  Dose modification:   (original dose 334.5 mg, Cycle 1)  Administration: 310 mg (10/6/2020), 370 mg (11/17/2020), 300 mg (10/27/2020), 350 mg (12/8/2020), 335 mg (12/29/2020), 320 mg (1/19/2021)  PACLitaxeL (TAXOL) 175 mg/m2 = 300 mg in sodium chloride 0.9% 500 mL chemo infusion, 175 mg/m2 = 300 mg (100 % of original dose 175 mg/m2), Intravenous, Clinic/HOD 1 time, 2 of 2 cycles  Dose modification: 175 mg/m2 (original dose 175 mg/m2, Cycle 1)  Administration: 300 mg (10/6/2020), 300 mg (10/27/2020)      4/29/2021 Tumor Conference        His case was discussed at the Multidisciplinary Head and Neck Team Planning Meeting.     Representatives from Medical Oncology, Radiation Oncology, Head and Neck Surgical Oncology, Psychosocial Oncology, and Speech and Language Pathology discussed the case with the following recommendations:     1) biopsy            7/29/2021 Tumor Conference         His case was discussed at the Multidisciplinary Head and Neck Team Planning Meeting.     Representatives from Medical Oncology, Radiation Oncology, Head and Neck Surgical Oncology, Psychosocial Oncology, and Speech and Language Pathology discussed the case with the following recommendations:     1) Head and neck clinic follow up  2) consider Keytruda (discuss with transplant)  3) consider palliative referral            9/13/2021 -  Chemotherapy     Treatment Summary   Plan Name: OP HEAD NECK PEMBROLIZUMAB CARBOPLATIN FLUOROURACIL (C1 ONLY RECEIVED) FOLLOWED BY PEMBROLIZUMAB MAINTENANCE  Treatment Goal: Palliative  Status: Active  Start Date: 9/13/2021  End Date: 12/14/2023 (Planned)  Provider: Ronald Berg MD  Chemotherapy: CARBOplatin (PARAPLATIN) 320 mg in sodium chloride 0.9% 500 mL chemo infusion, 320 mg (100 % of original dose 320.5 mg), Intravenous, Clinic/HOD 1 time, 3 of 6 cycles  Dose modification:   (original dose 320.5 mg, Cycle 1)  Administration: 320 mg (9/13/2021), 335 mg (12/23/2021), 325 mg (1/27/2022)  fluorouraciL 1,000 mg/m2/day = 6,440 mg in sodium chloride 0.9% 240 mL chemo infusion, 1,000 mg/m2/day = 6,440 mg, Intravenous, Over 96 hours, 3 of 6 cycles  Administration: 6,440 mg (9/13/2021), 6,440 mg (12/23/2021), 6,480 mg (1/27/2022)  pembrolizumab (KEYTRUDA) 200 mg in sodium chloride 0.9% 108 mL infusion, 200 mg, Intravenous, Clinic/HOD 1 time, 5 of 35 cycles  Administration: 200 mg (9/13/2021), 200 mg (10/18/2021), 200 mg (11/12/2021), 200 mg (12/23/2021), 200 mg (1/27/2022)      Secondary malignant neoplasm of cervical lymph node   2/9/2021 Initial Diagnosis     Secondary malignant neoplasm of cervical lymph node      9/13/2021 -  Chemotherapy     Treatment Summary   Plan Name: OP HEAD NECK PEMBROLIZUMAB CARBOPLATIN FLUOROURACIL (C1 ONLY RECEIVED) FOLLOWED BY PEMBROLIZUMAB MAINTENANCE  Treatment Goal: Palliative  Status: Active  Start Date: 9/13/2021  End Date: 12/14/2023  (Planned)  Provider: Ronald Berg MD  Chemotherapy: CARBOplatin (PARAPLATIN) 320 mg in sodium chloride 0.9% 500 mL chemo infusion, 320 mg (100 % of original dose 320.5 mg), Intravenous, Clinic/HOD 1 time, 3 of 6 cycles  Dose modification:   (original dose 320.5 mg, Cycle 1)  Administration: 320 mg (2021), 335 mg (2021), 325 mg (2022)  fluorouraciL 1,000 mg/m2/day = 6,440 mg in sodium chloride 0.9% 240 mL chemo infusion, 1,000 mg/m2/day = 6,440 mg, Intravenous, Over 96 hours, 3 of 6 cycles  Administration: 6,440 mg (2021), 6,440 mg (2021), 6,480 mg (2022)  pembrolizumab (KEYTRUDA) 200 mg in sodium chloride 0.9% 108 mL infusion, 200 mg, Intravenous, Clinic/HOD 1 time, 5 of 35 cycles  Administration: 200 mg (2021), 200 mg (10/18/2021), 200 mg (2021), 200 mg (2021), 200 mg (2022)               Possible side effects related to treatment (per 2/3/22 visit):  Xerostomia:   Negative; feels he has excess saliva.  Had Botox injections to bilateral parotid glands 22 with Dr. Myers.  Irritation:  Positive for spicy things and hot temperature foods.  Also with episodic mucositis as above after an infusion.  Changes in taste: Naturally reduced after TL and TG, but reported that he perceives taste a little and it is normal (no altered taste).  Radiation fibrosis: Present, particularly in submental area and bilateral necks.  Lymphedema:  Negative.     SWALLOWING HISTORY:  As above.    Met with Nutritionist Lina Goncalves about a year ago (21) and was advised to target the followin6283-5963 Kcals (30-35kcal/kg)  68 g protein (1.2g/kg)   5736-5241 mL fluid (30-35mL/kg)  He is unsure if he is meeting this.     FAMILY HISTORY:  Family History   Problem Relation Age of Onset    Dementia Mother           SOCIAL HISTORY:  Mr. Swain was living in [place] at the time of his early treatment and surgery in 2016.  He moved to [facility], having lived her for about 10 years  earlier in his life; he has family here.  He is single, lives in [place], and is retired from iron work per the EMR.     Tobacco use:  Never smoker per EMR.  ETOH use:  Yes, 1 glass wine/day per EMR.     BEHAVIOR:  Mr. Swain remained a very pleasant man whose affect and social interaction were appropriate for situation and setting.  He was fully cooperative for the assessment. Results are considered indicative of his current levels of speech and swallowing functioning.     HEARING:  Subjectively, within normal limits.    ORAL PERIPHERAL (per 2/3/22 eval; unchanged):              Mandible: Subjectively, within normal limits for swallowing purposes.              Lips:  Symmetric with normal functioning for rounding, spreading, and occluding.              Tongue:  Absent s/p total glossectomy.               Velum and Hard Palate:  within normal limits.              Reflexes:  Gag: n/a     Swallow: reduced.              Oropharynx: within normal limits    Dentition:  Irregular, but sufficient for mastication with aid moving bolus around (in the absence of tongue).    TEST FINDINGS:   Mr. Swain was seen in Radiology with the Radiologist for a Modified Barium Swallow Study.  He was seated on a stool for a left lateral videofluoroscopic view.      Consistencies assessed using radiopaque barium contrast:  thin (single swallows via open cup),   thin puree (1-teaspoon bolus) via spoon,   thick puree (1-teaspoon bolus) via spoon and   solid (half cracker boluses) by Mr. Swain's hand.  NOTE:  Barium tablet was deferred b/c Mr. Swain reported that he crushes or cuts or opens his medications.    Strategies:  Head-tilt back -- promoted use of gravity to progress boluses into and through pharynx.  Forward bend, then head-tilt back -- spontaneous maneuver performed with denser solids (e.g., thick puree or masticated solids), when liquid wash was taken to facilitate clearance; unclear if there is any significant benefit from  forward bend (chin tuck with bending of torso); may gather some increased force/rate  Liquid wash -- effective in progressing thin puree and thick puree (though with greater number of boluses needed to accomplish clearance), but not effective in clearing masticated solid.    Phases:  Oral:  Mr. Swain was well obtain liquid and strip utensils well with no loss of material from the oral cavity. He accomplished mastication by moving the solid bolus between his arches digitally.  He moved liquid and pureed boluses through the oral cavity with gravity and appropriate transit time. Solid required liquid wash to aid progression into pharynx. There was no pooling of liquids in the mouth interior to his arches, but he had some excess saliva in his labial sulci.  Swallow reflex was absent in the absence of key structures and there was no to minimal evidence of muscle contraction until approximately the level of about C6-7.    Pharyngeal: Oral/BOT and all laryngeal structures were absent.  Boluses moved through the pharyngeal phase with various patterns of clearance dependent on their viscosity, but even liquids were briefly inhibited by a narrowing of the tract at the level of about C6-7 and appeared related, at least in part, to the curvature of the cervical spine.(vs subtle spinal prominence vs both).    - Timely transition from oral cavity into pharynx for liquids and pureed foods with use of gravity alone; delayed progression of solids with gravity alone, but liquid wash paired with head-tilt back facilitated progression.  - Adequate soft palate elevation  - Absent larynx  - Absent BOT  - Absent epiglottis  - N/a laryngeal vestibular closure (absent larynx)  - Absent pharyngeal stripping wave  - Reduced janine-PE segment opening.  -  No pharyngeal residue with thin liquids, minimal with thin or thick puree once primary bolus was cleared, and significant with masticated solid despite use of liquid washes.    Cervical  Esophageal:  Boluses entered the upper esophagus piecemeal due to narrowing at about C6-7.  See images below.      Area of narrowing at about C6-7; thin liquid bolus.  Drained in 1-2 seconds.      Thin puree residue; cleared with liquid wash x2.      Thick puree residue; cleared with liquid wash x1.      Solid residue (S) with remnant of liquid wash (L) after at least 3 attempts to clear with liquid wash.  Unable to progress the solid bolus.      Mr. Swain asked to use his Yankauer tool to progress the solid stasis, but after visualizing where the material was located and how far he would have to insert the Yankauer, clinician was disinclined to allow this during the study out of concerns for safety.  Feel confident that he likely administered this remedy himself once elsewhere as he endorsed that that is what he typically does.    Amy 8-point Penetration-Aspiration Scale:  N/a; no connection between airway and digestive tract s/p TL.      IMPRESSIONS:   This 71 y.o. man appears to present with  1.  Pharyngoesophageal dysphagia characterized by narrowing of the tract at about C6-7 with increasing inhibition of bolus passage as viscosity increased and with poor progression of masticated solids.  2.  History CRT 2016 ([place]).  3.  History TL and TG 2016 ([place]).  4.  Recurrence in his L neck; is currently in palliative therapy with Dr. Berg      RECOMMENDATIONS/PLAN OF CARE:   It is felt that Mr. Rendon would benefit from  1.  Continuation of his pureed and mechanical soft consistency diet with thin liquids using the following strategies and common aspiration precautions, including, but not limited to   A.  Appropriate upright seating for all eating and drinking.   B.  Use of his usual maneuvers to facilitate transport of bolus from oral cavity to pharynx.   C.  Pre-processing mechanical soft and regular masticated solids to pureed or minced forms to facilitate better clearance through  neopharynx and reduce effort involved in mastication (which requires his digital manipulation of boluses and appears less efficient in breaking down solids than pureeing or mincing).  Ideally, this would also reduce the volume of liquid required to progress boluses and inhibit satiation due to liquids rather than solids.   D.  Liquid washes as needed to progress solids.     E.  Avoid dense and/or dry solids that are only broken down via oral mastication.  2.  Continue use of supplemental nutrition to ensure that he is meeting his daily caloric/protein goals per Nutrition.  3.  Clinician will review images with Dr. Myers.  4.  Continued f/u with Dr. Berg as directed.  5.  Repeat MBSS as needed.  6.  Consult ST as needed for assistance with laryngectomy supplies.

## 2022-02-23 NOTE — Clinical Note
Sinan, I put some images in here and I have the disk to show.  I actually think it's the situation of his spine at about C6-7 that is the issue.

## 2022-02-24 NOTE — PLAN OF CARE
IMPRESSIONS:   This 71 y.o. man appears to present with  1.  Pharyngoesophageal dysphagia characterized by narrowing of the tract at about C6-7 with increasing inhibition of bolus passage as viscosity increased and with poor progression of masticated solids.  2.  History CRT 2016 ([place]).  3.  History TL and TG 2016 ([place]).  4.  Recurrence in his L neck; is currently in palliative therapy with Dr. Berg      RECOMMENDATIONS/PLAN OF CARE:   It is felt that Mr. Rendon would benefit from  1.  Continuation of his pureed and mechanical soft consistency diet with thin liquids using the following strategies and common aspiration precautions, including, but not limited to   A.  Appropriate upright seating for all eating and drinking.   B.  Use of his usual maneuvers to facilitate transport of bolus from oral cavity to pharynx.   C.  Pre-processing mechanical soft and regular masticated solids to pureed or minced forms to facilitate better clearance through neopharynx and reduce effort involved in mastication (which requires his digital manipulation of boluses and appears less efficient in breaking down solids than pureeing or mincing).  Ideally, this would also reduce the volume of liquid required to progress boluses and inhibit satiation due to liquids rather than solids.   D.  Liquid washes as needed to progress solids.     E.  Avoid dense and/or dry solids that are only broken down via oral mastication.  2.  Continue use of supplemental nutrition to ensure that he is meeting his daily caloric/protein goals per Nutrition.  3.  Clinician will review images with Dr. Myers.  4.  Continued f/u with Dr. Berg as directed.  5.  Repeat MBSS as needed.  6.  Consult ST as needed for assistance with laryngectomy supplies.

## 2022-02-28 DIAGNOSIS — C01 SQUAMOUS CELL CARCINOMA OF BASE OF TONGUE: Primary | ICD-10-CM

## 2022-03-02 ENCOUNTER — TELEPHONE (OUTPATIENT)
Dept: TRANSPLANT | Facility: CLINIC | Age: 72
End: 2022-03-02
Payer: MEDICARE

## 2022-03-02 ENCOUNTER — LAB VISIT (OUTPATIENT)
Dept: LAB | Facility: HOSPITAL | Age: 72
End: 2022-03-02
Attending: STUDENT IN AN ORGANIZED HEALTH CARE EDUCATION/TRAINING PROGRAM
Payer: MEDICARE

## 2022-03-02 ENCOUNTER — OFFICE VISIT (OUTPATIENT)
Dept: HEMATOLOGY/ONCOLOGY | Facility: CLINIC | Age: 72
End: 2022-03-02
Payer: MEDICARE

## 2022-03-02 ENCOUNTER — PATIENT MESSAGE (OUTPATIENT)
Dept: TRANSPLANT | Facility: CLINIC | Age: 72
End: 2022-03-02
Payer: MEDICARE

## 2022-03-02 VITALS
BODY MASS INDEX: 18.5 KG/M2 | WEIGHT: 121.69 LBS | HEART RATE: 75 BPM | DIASTOLIC BLOOD PRESSURE: 60 MMHG | TEMPERATURE: 98 F | SYSTOLIC BLOOD PRESSURE: 139 MMHG

## 2022-03-02 DIAGNOSIS — Z79.899 IMMUNODEFICIENCY DUE TO DRUGS: ICD-10-CM

## 2022-03-02 DIAGNOSIS — C01 SQUAMOUS CELL CARCINOMA OF BASE OF TONGUE: ICD-10-CM

## 2022-03-02 DIAGNOSIS — N18.31 STAGE 3A CHRONIC KIDNEY DISEASE: ICD-10-CM

## 2022-03-02 DIAGNOSIS — E89.0 POSTOPERATIVE HYPOTHYROIDISM: ICD-10-CM

## 2022-03-02 DIAGNOSIS — C77.0 SECONDARY MALIGNANT NEOPLASM OF CERVICAL LYMPH NODE: ICD-10-CM

## 2022-03-02 DIAGNOSIS — Z94.4 STATUS POST LIVER TRANSPLANTATION: ICD-10-CM

## 2022-03-02 DIAGNOSIS — D63.1 ANEMIA IN STAGE 3A CHRONIC KIDNEY DISEASE: ICD-10-CM

## 2022-03-02 DIAGNOSIS — C01 SQUAMOUS CELL CARCINOMA OF BASE OF TONGUE: Primary | ICD-10-CM

## 2022-03-02 DIAGNOSIS — D84.821 IMMUNODEFICIENCY DUE TO DRUGS: ICD-10-CM

## 2022-03-02 DIAGNOSIS — N18.31 ANEMIA IN STAGE 3A CHRONIC KIDNEY DISEASE: ICD-10-CM

## 2022-03-02 LAB
ALBUMIN SERPL BCP-MCNC: 3.7 G/DL (ref 3.5–5.2)
ALBUMIN SERPL BCP-MCNC: 3.7 G/DL (ref 3.5–5.2)
ALP SERPL-CCNC: 85 U/L (ref 55–135)
ALP SERPL-CCNC: 85 U/L (ref 55–135)
ALT SERPL W/O P-5'-P-CCNC: 11 U/L (ref 10–44)
ALT SERPL W/O P-5'-P-CCNC: 11 U/L (ref 10–44)
ANION GAP SERPL CALC-SCNC: 11 MMOL/L (ref 8–16)
ANION GAP SERPL CALC-SCNC: 11 MMOL/L (ref 8–16)
AST SERPL-CCNC: 35 U/L (ref 10–40)
AST SERPL-CCNC: 35 U/L (ref 10–40)
BASOPHILS # BLD AUTO: 0.04 K/UL (ref 0–0.2)
BASOPHILS # BLD AUTO: 0.04 K/UL (ref 0–0.2)
BASOPHILS NFR BLD: 1 % (ref 0–1.9)
BASOPHILS NFR BLD: 1 % (ref 0–1.9)
BILIRUB SERPL-MCNC: 0.6 MG/DL (ref 0.1–1)
BILIRUB SERPL-MCNC: 0.6 MG/DL (ref 0.1–1)
BUN SERPL-MCNC: 12 MG/DL (ref 8–23)
BUN SERPL-MCNC: 12 MG/DL (ref 8–23)
CALCIUM SERPL-MCNC: 9.9 MG/DL (ref 8.7–10.5)
CALCIUM SERPL-MCNC: 9.9 MG/DL (ref 8.7–10.5)
CHLORIDE SERPL-SCNC: 103 MMOL/L (ref 95–110)
CHLORIDE SERPL-SCNC: 103 MMOL/L (ref 95–110)
CO2 SERPL-SCNC: 26 MMOL/L (ref 23–29)
CO2 SERPL-SCNC: 26 MMOL/L (ref 23–29)
CREAT SERPL-MCNC: 1.4 MG/DL (ref 0.5–1.4)
CREAT SERPL-MCNC: 1.4 MG/DL (ref 0.5–1.4)
DIFFERENTIAL METHOD: ABNORMAL
DIFFERENTIAL METHOD: ABNORMAL
EOSINOPHIL # BLD AUTO: 0.1 K/UL (ref 0–0.5)
EOSINOPHIL # BLD AUTO: 0.1 K/UL (ref 0–0.5)
EOSINOPHIL NFR BLD: 1.3 % (ref 0–8)
EOSINOPHIL NFR BLD: 1.3 % (ref 0–8)
ERYTHROCYTE [DISTWIDTH] IN BLOOD BY AUTOMATED COUNT: 16.3 % (ref 11.5–14.5)
ERYTHROCYTE [DISTWIDTH] IN BLOOD BY AUTOMATED COUNT: 16.3 % (ref 11.5–14.5)
EST. GFR  (AFRICAN AMERICAN): 57.6 ML/MIN/1.73 M^2
EST. GFR  (AFRICAN AMERICAN): 57.6 ML/MIN/1.73 M^2
EST. GFR  (NON AFRICAN AMERICAN): 49.8 ML/MIN/1.73 M^2
EST. GFR  (NON AFRICAN AMERICAN): 49.8 ML/MIN/1.73 M^2
GLUCOSE SERPL-MCNC: 121 MG/DL (ref 70–110)
GLUCOSE SERPL-MCNC: 121 MG/DL (ref 70–110)
HCT VFR BLD AUTO: 30.7 % (ref 40–54)
HCT VFR BLD AUTO: 30.7 % (ref 40–54)
HGB BLD-MCNC: 10.1 G/DL (ref 14–18)
HGB BLD-MCNC: 10.1 G/DL (ref 14–18)
IMM GRANULOCYTES # BLD AUTO: 0.01 K/UL (ref 0–0.04)
IMM GRANULOCYTES # BLD AUTO: 0.01 K/UL (ref 0–0.04)
IMM GRANULOCYTES NFR BLD AUTO: 0.3 % (ref 0–0.5)
IMM GRANULOCYTES NFR BLD AUTO: 0.3 % (ref 0–0.5)
LYMPHOCYTES # BLD AUTO: 1.4 K/UL (ref 1–4.8)
LYMPHOCYTES # BLD AUTO: 1.4 K/UL (ref 1–4.8)
LYMPHOCYTES NFR BLD: 35 % (ref 18–48)
LYMPHOCYTES NFR BLD: 35 % (ref 18–48)
MCH RBC QN AUTO: 35.1 PG (ref 27–31)
MCH RBC QN AUTO: 35.1 PG (ref 27–31)
MCHC RBC AUTO-ENTMCNC: 32.9 G/DL (ref 32–36)
MCHC RBC AUTO-ENTMCNC: 32.9 G/DL (ref 32–36)
MCV RBC AUTO: 107 FL (ref 82–98)
MCV RBC AUTO: 107 FL (ref 82–98)
MONOCYTES # BLD AUTO: 0.8 K/UL (ref 0.3–1)
MONOCYTES # BLD AUTO: 0.8 K/UL (ref 0.3–1)
MONOCYTES NFR BLD: 19.4 % (ref 4–15)
MONOCYTES NFR BLD: 19.4 % (ref 4–15)
NEUTROPHILS # BLD AUTO: 1.7 K/UL (ref 1.8–7.7)
NEUTROPHILS # BLD AUTO: 1.7 K/UL (ref 1.8–7.7)
NEUTROPHILS NFR BLD: 43 % (ref 38–73)
NEUTROPHILS NFR BLD: 43 % (ref 38–73)
NRBC BLD-RTO: 0 /100 WBC
NRBC BLD-RTO: 0 /100 WBC
PLATELET # BLD AUTO: 196 K/UL (ref 150–450)
PLATELET # BLD AUTO: 196 K/UL (ref 150–450)
PMV BLD AUTO: 9.7 FL (ref 9.2–12.9)
PMV BLD AUTO: 9.7 FL (ref 9.2–12.9)
POTASSIUM SERPL-SCNC: 4.6 MMOL/L (ref 3.5–5.1)
POTASSIUM SERPL-SCNC: 4.6 MMOL/L (ref 3.5–5.1)
PROT SERPL-MCNC: 7.7 G/DL (ref 6–8.4)
PROT SERPL-MCNC: 7.7 G/DL (ref 6–8.4)
RBC # BLD AUTO: 2.88 M/UL (ref 4.6–6.2)
RBC # BLD AUTO: 2.88 M/UL (ref 4.6–6.2)
SODIUM SERPL-SCNC: 140 MMOL/L (ref 136–145)
SODIUM SERPL-SCNC: 140 MMOL/L (ref 136–145)
T4 FREE SERPL-MCNC: 1.31 NG/DL (ref 0.71–1.51)
TACROLIMUS BLD-MCNC: 4.8 NG/ML (ref 5–15)
TSH SERPL DL<=0.005 MIU/L-ACNC: 0.27 UIU/ML (ref 0.4–4)
WBC # BLD AUTO: 3.91 K/UL (ref 3.9–12.7)
WBC # BLD AUTO: 3.91 K/UL (ref 3.9–12.7)

## 2022-03-02 PROCEDURE — 99215 PR OFFICE/OUTPT VISIT, EST, LEVL V, 40-54 MIN: ICD-10-PCS | Mod: S$PBB,,, | Performed by: STUDENT IN AN ORGANIZED HEALTH CARE EDUCATION/TRAINING PROGRAM

## 2022-03-02 PROCEDURE — 99215 OFFICE O/P EST HI 40 MIN: CPT | Mod: S$PBB,,, | Performed by: STUDENT IN AN ORGANIZED HEALTH CARE EDUCATION/TRAINING PROGRAM

## 2022-03-02 PROCEDURE — 85025 COMPLETE CBC W/AUTO DIFF WBC: CPT | Performed by: STUDENT IN AN ORGANIZED HEALTH CARE EDUCATION/TRAINING PROGRAM

## 2022-03-02 PROCEDURE — 99999 PR PBB SHADOW E&M-EST. PATIENT-LVL III: CPT | Mod: PBBFAC,,, | Performed by: STUDENT IN AN ORGANIZED HEALTH CARE EDUCATION/TRAINING PROGRAM

## 2022-03-02 PROCEDURE — 99999 PR PBB SHADOW E&M-EST. PATIENT-LVL III: ICD-10-PCS | Mod: PBBFAC,,, | Performed by: STUDENT IN AN ORGANIZED HEALTH CARE EDUCATION/TRAINING PROGRAM

## 2022-03-02 PROCEDURE — 84443 ASSAY THYROID STIM HORMONE: CPT | Performed by: STUDENT IN AN ORGANIZED HEALTH CARE EDUCATION/TRAINING PROGRAM

## 2022-03-02 PROCEDURE — 36415 COLL VENOUS BLD VENIPUNCTURE: CPT | Performed by: STUDENT IN AN ORGANIZED HEALTH CARE EDUCATION/TRAINING PROGRAM

## 2022-03-02 PROCEDURE — 80053 COMPREHEN METABOLIC PANEL: CPT | Performed by: STUDENT IN AN ORGANIZED HEALTH CARE EDUCATION/TRAINING PROGRAM

## 2022-03-02 PROCEDURE — 84439 ASSAY OF FREE THYROXINE: CPT | Performed by: STUDENT IN AN ORGANIZED HEALTH CARE EDUCATION/TRAINING PROGRAM

## 2022-03-02 PROCEDURE — 99213 OFFICE O/P EST LOW 20 MIN: CPT | Mod: PBBFAC | Performed by: STUDENT IN AN ORGANIZED HEALTH CARE EDUCATION/TRAINING PROGRAM

## 2022-03-02 PROCEDURE — 80197 ASSAY OF TACROLIMUS: CPT | Performed by: INTERNAL MEDICINE

## 2022-03-02 RX ORDER — EPINEPHRINE 0.3 MG/.3ML
0.3 INJECTION SUBCUTANEOUS ONCE AS NEEDED
Status: CANCELLED | OUTPATIENT
Start: 2022-03-03

## 2022-03-02 RX ORDER — HEPARIN 100 UNIT/ML
500 SYRINGE INTRAVENOUS
Status: CANCELLED | OUTPATIENT
Start: 2022-03-07

## 2022-03-02 RX ORDER — SODIUM CHLORIDE 0.9 % (FLUSH) 0.9 %
10 SYRINGE (ML) INJECTION
Status: CANCELLED | OUTPATIENT
Start: 2022-03-03

## 2022-03-02 RX ORDER — HEPARIN 100 UNIT/ML
500 SYRINGE INTRAVENOUS
Status: CANCELLED | OUTPATIENT
Start: 2022-03-03

## 2022-03-02 RX ORDER — OXYCODONE HYDROCHLORIDE 10 MG/1
10 TABLET ORAL EVERY 6 HOURS PRN
Qty: 120 TABLET | Refills: 0 | Status: SHIPPED | OUTPATIENT
Start: 2022-03-02 | End: 2022-04-02 | Stop reason: SDUPTHER

## 2022-03-02 RX ORDER — DIPHENHYDRAMINE HYDROCHLORIDE 50 MG/ML
50 INJECTION INTRAMUSCULAR; INTRAVENOUS ONCE AS NEEDED
Status: CANCELLED | OUTPATIENT
Start: 2022-03-03

## 2022-03-02 RX ORDER — SODIUM CHLORIDE 0.9 % (FLUSH) 0.9 %
10 SYRINGE (ML) INJECTION
Status: CANCELLED | OUTPATIENT
Start: 2022-03-07

## 2022-03-02 NOTE — ASSESSMENT & PLAN NOTE
No significant change with diarrhea and mucositis with the prior cycle despite adding gcsf.  Mild neutropenia noted.  Subjective improvement of neck mass size reduction.  -dose reduce carboplatin to AUC4 and 5-FU to 800 mg/m2/day  -follows with SLP for dysphagia and is s/p botox injections to parotid gland for excess saliva.  --can try glycopyrrolate if botox does not work  -labs reviewed; ok to proceed with treatment  --For [employer] [place] will adjust and delay treatment plan if needed.  --reviewed with patient to get gcsf.  Also ok to use imodium and lomotil prn diarrhea.  -port draw labs, scans, and follow up prior to next cycle  -per transplant, continue current dose of tacrolimus  -Supportive medications: zofran prn.  If needed, will prescribe compazine prn breakthrough n/v. For neck pain, oxycodone prn, continue dose 10 mg. Pain adequately controlled for him, which is about a 5 out of 10. tizanadine prn muscle aches.  Bowel regimen reviewed and currently he only needs stool softeners.  Continue periactin TID.   -Patient has port and EMLA cream.

## 2022-03-02 NOTE — PROGRESS NOTES
"PATIENT: Rocco Swain  MRN: [id number]  DATE: 3/2/2022      Diagnosis:   1. Squamous cell carcinoma of base of tongue    2. Secondary malignant neoplasm of cervical lymph node    3. Immunodeficiency due to drugs    4. Anemia in stage 3a chronic kidney disease    5. Stage 3a chronic kidney disease        Chief Complaint: Squamous cell carcinoma of base of tongue      Oncologic History:    Oncologic History 1. Squamous cell carcinoma of base of tongue with recurrence    Oncologic Treatment 1. Chemoradiation completed 4/2016  2. S/p glossectomy, laryngectomy, and bilateral neck dissection for persistent/recurrent disease  3. 10/6/2020-8/24/21 PCC  4. 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        Subjective:    Interval History: Mr. Swain is here for follow up    Side effects with last cycle: still with intermittent diarrhea and mucositis.  Severity of cycle still thought to be "rough" despite GCSF.  Neck pain is controlled; subjectively he feels it continues to shrink.  Was seen by SLP and ENT regarding dysphagia thought to be due to excess saliva.  S/p botox injections to parotid glands.    Overall he feels well and is ready for chemotherapy.    He is alone at this visit.  Communication from him is 100% written.  Past Medical History:   Past Medical History:   Diagnosis Date    Basal cell carcinoma (BCC) in situ of skin 2012    3 on face, 2 on arm, removed by dermatology.    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 Underwent surgical resection of tongue and neck. s/p tracheostomy       Past Surgical HIstory:   Past Surgical History: "   Procedure Laterality Date    COLONOSCOPY      INSERTION OF VENOUS ACCESS PORT Right 3/8/2021    Procedure: INSERTION, VENOUS ACCESS PORT;  Surgeon: Jesus Rendon MD;  [facility];  Service: General;  Laterality: Right;    LIVER TRANSPLANT  11/2008    transplanted for biopsy proven hepatocellular carcinoma,     LYMPH NODE BIOPSY N/A 9/18/2020    Procedure: BIOPSY, LYMPH NODE;  Surgeon: Taz Diagnostic Provider;  Location: [facility] OR 2ND FLR;  Service: General;  Laterality: N/A;  Rm 173    TRACHEOSTOMY         Family History:   Family History   Problem Relation Age of Onset    Dementia Mother        Social History:  reports that he has never smoked. He has never used smokeless tobacco. He reports current alcohol use. He reports previous drug use.    Allergies:  Review of patient's allergies indicates:  No Known Allergies    Medications:  Current Outpatient Medications   Medication Sig Dispense Refill    azelaic acid (AZELEX) 15 % gel APPLY TOPICALLY TO AFFECTED AREA IN THE MORNING      diphenoxylate-atropine 2.5-0.025 mg (LOMOTIL) 2.5-0.025 mg per tablet Take 1 tablet by mouth 4 (four) times daily as needed for Diarrhea (use when loperamide/imodium does not work). 30 tablet 0    ibuprofen (ADVIL,MOTRIN) 200 MG tablet Take 200 mg by mouth.      levothyroxine (SYNTHROID) 88 MCG tablet TAKE 1 TABLET BY MOUTH ONCE DAILY 90 tablet 3    LIDOcaine HCl 2% (LIDOCAINE VISCOUS) 2 % Soln Swish and spit 15 mls every 8 (eight) hours as needed (mouth sore). 100 mL 3    LIDOcaine-prilocaine (EMLA) cream Apply generously to port site 30-60 min prior to chemo and then cover with saran wrap. 30 g 2    loperamide (IMODIUM) 2 mg capsule Take 1 capsule (2 mg total) by mouth 4 (four) times daily as needed for Diarrhea. 30 capsule 1    magic mouthwash diphen/antac/lidoc/nysta Take 10 mLs by mouth 4 (four) times daily. 120 mL 4    metroNIDAZOLE (METROGEL) 0.75 % gel Apply topically to affected area 2 (two)  times daily. (Patient not taking: Reported on 1/27/2022) 45 g 1    multivit-min/FA/lycopen/lutein (CENTRUM SILVER MEN ORAL) Take 1 tablet by mouth.      multivitamin capsule Take 1 capsule by mouth once daily.      oxyCODONE (ROXICODONE) 10 mg Tab immediate release tablet Take 1 tablet (10 mg total) by mouth every 6 (six) hours as needed for Pain. 120 tablet 0    sulfacetamide sodium-sulfur 10-5 % (w/w) Clsr USE TO WASH AFFECTED AREA DAILY      tacrolimus (PROGRAF) 0.5 MG Cap Take 1 capsule (0.5 mg total) by mouth every 12 (twelve) hours. 60 capsule 11    tacrolimus (PROGRAF) 0.5 MG Cap Take 1 capsule (0.5 mg total) by mouth every 12 (twelve) hours. 180 capsule 3    tacrolimus (PROGRAF) 0.5 MG Cap Take 1 capsule (0.5 mg total) by mouth every 12 (twelve) hours. 180 capsule 3    tiZANidine (ZANAFLEX) 2 MG tablet Take 1 tablet (2 mg total) by mouth nightly as needed (neck muscle strain). 30 tablet 0    traZODone (DESYREL) 50 MG tablet TAKE 1 TABLET BY MOUTH IN  THE EVENING 90 tablet 3    vitamin E 400 UNIT capsule Take 400 Units by mouth once daily.       No current facility-administered medications for this visit.     Facility-Administered Medications Ordered in Other Visits   Medication Dose Route Frequency Provider Last Rate Last Admin    heparin, porcine (PF) 100 unit/mL injection flush 500 Units  500 Units Intravenous PRN Ronald Berg MD        sodium chloride 0.9% flush 10 mL  10 mL Intravenous PRN Ronald Berg MD           Review of Systems   Constitutional: Negative for activity change, appetite change, chills, diaphoresis, fatigue, fever and unexpected weight change.   HENT: Positive for mouth sores and trouble swallowing. Negative for nosebleeds.    Eyes: Negative for visual disturbance.   Respiratory: Negative for cough, chest tightness, shortness of breath and wheezing.    Cardiovascular: Negative for chest pain and leg swelling.   Gastrointestinal: Positive for diarrhea. Negative for abdominal  distention, abdominal pain, blood in stool, constipation, nausea and vomiting.   Endocrine: Negative for cold intolerance and heat intolerance.   Genitourinary: Negative for difficulty urinating and dysuria.   Musculoskeletal: Positive for myalgias and neck pain (pain radiates between both sides of his neck under his jaw.). Negative for arthralgias and back pain.   Skin: Negative for color change and rash.   Neurological: Negative for dizziness, weakness, light-headedness, numbness and headaches.   Hematological: Negative for adenopathy. Does not bruise/bleed easily.   Psychiatric/Behavioral: Negative for confusion.       ECOG Performance Status:      ECOG SCORE    1 - Restricted in strenuous activity-ambulatory and able to carry out work of a light nature         Objective:      Vitals:   Vitals:    03/02/22 0826   BP: 139/60   Pulse: 75   Temp: 98.1 °F (36.7 °C)   TempSrc: Temporal   Weight: 55.2 kg (121 lb 11.1 oz)     BMI: Body mass index is 18.5 kg/m².  Wt Readings from Last 10 Encounters:   03/02/22 55.2 kg (121 lb 11.1 oz)   02/21/22 54.4 kg (120 lb)   01/27/22 54.6 kg (120 lb 5.9 oz)   12/23/21 53.1 kg (117 lb 1 oz)   12/02/21 54.1 kg (119 lb 4.3 oz)   11/12/21 53.3 kg (117 lb 8.1 oz)   11/12/21 53.3 kg (117 lb 8.1 oz)   10/14/21 53.1 kg (117 lb)   10/04/21 53.5 kg (117 lb 15.1 oz)   09/30/21 54.2 kg (119 lb 7.8 oz)       Physical Exam  Constitutional:       General: He is not in acute distress.     Appearance: Normal appearance. He is not ill-appearing.   HENT:      Head: Normocephalic and atraumatic.      Mouth/Throat:      Mouth: Mucous membranes are moist.      Pharynx: Posterior oropharyngeal erythema present. No oropharyngeal exudate.   Eyes:      General: No scleral icterus.     Extraocular Movements: Extraocular movements intact.      Conjunctiva/sclera: Conjunctivae normal.      Pupils: Pupils are equal, round, and reactive to light.   Cardiovascular:      Rate and Rhythm: Normal rate and regular  rhythm.      Heart sounds: No murmur heard.    No friction rub. No gallop.   Pulmonary:      Effort: Pulmonary effort is normal. No respiratory distress.      Breath sounds: No stridor. No wheezing, rhonchi or rales.   Chest:   Breasts:      Right: No axillary adenopathy or supraclavicular adenopathy.      Left: No axillary adenopathy or supraclavicular adenopathy.       Abdominal:      General: Bowel sounds are normal. There is no distension.      Palpations: Abdomen is soft. There is no mass.      Tenderness: There is no abdominal tenderness. There is no guarding or rebound.   Musculoskeletal:         General: Normal range of motion.      Cervical back: Normal range of motion and neck supple. No tenderness.      Right lower leg: No edema.      Left lower leg: No edema.   Lymphadenopathy:      Cervical: No cervical adenopathy.      Upper Body:      Right upper body: No supraclavicular or axillary adenopathy.      Left upper body: No supraclavicular or axillary adenopathy.   Skin:     General: Skin is warm and dry.   Neurological:      General: No focal deficit present.      Mental Status: He is alert.         Laboratory Data:   Recent Results (from the past 168 hour(s))   Comprehensive Metabolic Panel    Collection Time: 03/02/22  7:40 AM   Result Value Ref Range    Sodium 140 136 - 145 mmol/L    Potassium 4.6 3.5 - 5.1 mmol/L    Chloride 103 95 - 110 mmol/L    CO2 26 23 - 29 mmol/L    Glucose 121 (H) 70 - 110 mg/dL    BUN 12 8 - 23 mg/dL    Creatinine 1.4 0.5 - 1.4 mg/dL    Calcium 9.9 8.7 - 10.5 mg/dL    Total Protein 7.7 6.0 - 8.4 g/dL    Albumin 3.7 3.5 - 5.2 g/dL    Total Bilirubin 0.6 0.1 - 1.0 mg/dL    Alkaline Phosphatase 85 55 - 135 U/L    AST 35 10 - 40 U/L    ALT 11 10 - 44 U/L    Anion Gap 11 8 - 16 mmol/L    eGFR if African American 57.6 (A) >60 mL/min/1.73 m^2    eGFR if non  49.8 (A) >60 mL/min/1.73 m^2   Comprehensive Metabolic Panel    Collection Time: 03/02/22  7:40 AM   Result  Value Ref Range    Sodium 140 136 - 145 mmol/L    Potassium 4.6 3.5 - 5.1 mmol/L    Chloride 103 95 - 110 mmol/L    CO2 26 23 - 29 mmol/L    Glucose 121 (H) 70 - 110 mg/dL    BUN 12 8 - 23 mg/dL    Creatinine 1.4 0.5 - 1.4 mg/dL    Calcium 9.9 8.7 - 10.5 mg/dL    Total Protein 7.7 6.0 - 8.4 g/dL    Albumin 3.7 3.5 - 5.2 g/dL    Total Bilirubin 0.6 0.1 - 1.0 mg/dL    Alkaline Phosphatase 85 55 - 135 U/L    AST 35 10 - 40 U/L    ALT 11 10 - 44 U/L    Anion Gap 11 8 - 16 mmol/L    eGFR if African American 57.6 (A) >60 mL/min/1.73 m^2    eGFR if non  49.8 (A) >60 mL/min/1.73 m^2   TSH    Collection Time: 03/02/22  7:40 AM   Result Value Ref Range    TSH 0.268 (L) 0.400 - 4.000 uIU/mL   CBC Auto Differential    Collection Time: 03/02/22  8:10 AM   Result Value Ref Range    WBC 3.91 3.90 - 12.70 K/uL    RBC 2.88 (L) 4.60 - 6.20 M/uL    Hemoglobin 10.1 (L) 14.0 - 18.0 g/dL    Hematocrit 30.7 (L) 40.0 - 54.0 %     (H) 82 - 98 fL    MCH 35.1 (H) 27.0 - 31.0 pg    MCHC 32.9 32.0 - 36.0 g/dL    RDW 16.3 (H) 11.5 - 14.5 %    Platelets 196 150 - 450 K/uL    MPV 9.7 9.2 - 12.9 fL    Immature Granulocytes 0.3 0.0 - 0.5 %    Gran # (ANC) 1.7 (L) 1.8 - 7.7 K/uL    Immature Grans (Abs) 0.01 0.00 - 0.04 K/uL    Lymph # 1.4 1.0 - 4.8 K/uL    Mono # 0.8 0.3 - 1.0 K/uL    Eos # 0.1 0.0 - 0.5 K/uL    Baso # 0.04 0.00 - 0.20 K/uL    nRBC 0 0 /100 WBC    Gran % 43.0 38.0 - 73.0 %    Lymph % 35.0 18.0 - 48.0 %    Mono % 19.4 (H) 4.0 - 15.0 %    Eosinophil % 1.3 0.0 - 8.0 %    Basophil % 1.0 0.0 - 1.9 %    Differential Method Automated    CBC Auto Differential    Collection Time: 03/02/22  8:10 AM   Result Value Ref Range    WBC 3.91 3.90 - 12.70 K/uL    RBC 2.88 (L) 4.60 - 6.20 M/uL    Hemoglobin 10.1 (L) 14.0 - 18.0 g/dL    Hematocrit 30.7 (L) 40.0 - 54.0 %     (H) 82 - 98 fL    MCH 35.1 (H) 27.0 - 31.0 pg    MCHC 32.9 32.0 - 36.0 g/dL    RDW 16.3 (H) 11.5 - 14.5 %    Platelets 196 150 - 450 K/uL    MPV 9.7 9.2  - 12.9 fL    Immature Granulocytes 0.3 0.0 - 0.5 %    Gran # (ANC) 1.7 (L) 1.8 - 7.7 K/uL    Immature Grans (Abs) 0.01 0.00 - 0.04 K/uL    Lymph # 1.4 1.0 - 4.8 K/uL    Mono # 0.8 0.3 - 1.0 K/uL    Eos # 0.1 0.0 - 0.5 K/uL    Baso # 0.04 0.00 - 0.20 K/uL    nRBC 0 0 /100 WBC    Gran % 43.0 38.0 - 73.0 %    Lymph % 35.0 18.0 - 48.0 %    Mono % 19.4 (H) 4.0 - 15.0 %    Eosinophil % 1.3 0.0 - 8.0 %    Basophil % 1.0 0.0 - 1.9 %    Differential Method Automated            Imaging:         Assessment:       1. Squamous cell carcinoma of base of tongue    2. Secondary malignant neoplasm of cervical lymph node    3. Immunodeficiency due to drugs    4. Anemia in stage 3a chronic kidney disease    5. Stage 3a chronic kidney disease           Plan:       Problem List Items Addressed This Visit        Renal/    Stage 3a chronic kidney disease    Current Assessment & Plan     Creatinine stable  -monitor cmp              Oncology    Squamous cell carcinoma of base of tongue - Primary    Overview     Per OSH records, initially presented in 2015 with symptoms and cT2N2c disease. page 91 of 3/25/2020 outside records clinic (media tab).  Initially presented 8/2015 with left sided odynophagia.  Treated for reflux, which did not improve.    12/11/15 he had a fiberoptic exam showing discolored mucosal area of the left base of the tongue.    1/13/16 he had persistent dysphagia, odynophagia, and new left otalgia.  MRI showed an irregular mass along the left posterior margin of the base of the tongue measuring 1.8x2.3cm with ipsilateral level 2 lymph node measuring 1.7cm with central necrosis and contralateral level 2 node measuring 1.3cm with questionable necrosis.    1/19/2016 he had direct laryngoscopy with biopsy left of midline, proximal to vallecula, which was p16 positive, invasive, with moderately to poorly differentiated sq.c.c. with basaloid features.    1/29/16 met with medical oncology who recommended chemoxrt.    2/1/16   staging PET CT showed hypermetabolic mass at base of tongue 4x2.5x3.5cm, max SUV 24.13, hypermetabolic lymph node left neck SUV 6.77, small subcentimeter lymph node right neck max SUV 3.79 (exact size of lymph nodes not mentioned in the summary).    2/15/16 started chemoxrt to left tongue base, bilateral cervical neck levels 1b-5, treated with 6MV photons utilizing IMRT, 5000 cGy in 200 cGy daily fractions.  Cone down boost to left base of tongue with additional 2000 cGy in 200 cGy daily fractions to gross disease.  Received weekly cisplatin with radiation but had to switch to carboplatin due to worsening renal function. during this time also missed several weekly treatments of cisplatin.    4/1/2016 completed chemoxrt.   11/1/2016 Repeat biopsy of left tongue showed persistent disease.  S/p salvage glossectomy with laryngectomy,  rT4aN0, p16 positive, base of tongue squamous cell carcinoma.     page 61 of 3/25/2020 outside records clinic (media tab)  Pathology: Tonsil, left tonsil fold: reactive squamous mucosa with inflammation and degenerating skeletal muscle, no carcinoma seen.  Right and Left base of tongue:  Reactive squamous mucosa and submucosa with inflammation, no carcinoma seen.  Neck, glossectomy, laryngectomy, bilateral neck dissection:  invasive squamous cell carcinoma.  tumor site: base of tongue/hypopharynx.  Small focus of tumor is seen within the hyoid  bone.  specimen Size [id number]  tumor size [id number]  depth of invasion 20mm  TNM stage pT4a, pN0, pMx  Lymph nodes, included in all parts:  number involved 0  number examined 3.  distance of tumor from margins  positive inked margins: none  within 1mm : none  within 1-2mm: anterior-inferior    Bilateral neck dissection:  level 1: one lymph node and submandibular gland, negative for tumor  level II : two lymph nodes, negative for tumor  level III: not identified  level IV: not identified  level V: not identified.    6/20/17 - PETCT with FDG avidity  of left neck lymph node, level 3, SUV 2.9.  No other evidence of local or distant disease.  7/6/2017 - biopsy of left neck lymph node with IR.  Sample was non-diagnostic. Notes from path report: Less than optimal scan specimen with minute tissue core of stromal fibroadipose tissue.  definitive tati background is not seen.  9/28/2017 - Repeat PET CT showing mild activity SUV 3.5 (increased from 3.2 previously; size 7.8x7.4x9.5mm).  Also new findings under left pectoralis minor (mild focal increased activity, indeterminate but new since previous exam)  3/22/2018 - CT neck shows no evidence of disease and level 2b lymph node decreased to 4.9mmx4.5mmx4.9mm from 7.8x7.4x9.5mm previously.    Seen by Dr. Myers 8/3/2020 for left-sided neck pain and a swelling in left level 2 several weeks ago.  He feels it arose after he began lifting weights.  CT neck 8/5/2020 : Lymph nodes At the left neck level L2 there is a heterogeneous soft tissue density with central hypoattenuation likely reflecting a necrotic lymph node measuring 1.6 x 1.4 cm in transverse dimension, 2.5 cm in craniocaudal dimension.  There is a similar appearing irregular soft tissue density at the right neck level 2 measuring 1.1 x 9.0 cm in transverse dimension and 1.5 cm craniocaudal dimension.  No other abnormal appearing or enlarged lymph nodes found.  Impression: At level 2 within the neck bilaterally, there are  soft tissue density masses with central hypoattenuation likely reflective of necrotic lymph nodes.  Findings are suspicious for malignancy recurrence.  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  Staging PET CT 9/30/2020 with left sided hypermetabolic node and right sided enlarged node without uptake.  Also has mandibular vestibule uptake, which may be another site of disease. Discussed at tumor board.  He is not a surgical or radiation candidate.  -Started on PCC 10/6/2020 followed by  maintenance cetuximab until 8/24/21 (discontinued due to progression of disease; continued increase in SUV uptake, no new lesions).           Current Assessment & Plan     No significant change with diarrhea and mucositis with the prior cycle despite adding gcsf.  Mild neutropenia noted.  Subjective improvement of neck mass size reduction.  -dose reduce carboplatin to AUC4 and 5-FU to 800 mg/m2/day  -follows with SLP for dysphagia and is s/p botox injections to parotid gland for excess saliva.  --can try glycopyrrolate if botox does not work  -labs reviewed; ok to proceed with treatment  --For [employer] will adjust and delay treatment plan if needed.  --reviewed with patient to get gcsf.  Also ok to use imodium and lomotil prn diarrhea.  -port draw labs prior to chemo (carboplatin/5-FU/pembrolizumab) and then follow up with me prior to treatment.  -per transplant, continue current dose of tacrolimus  -Supportive medications: zofran prn.  If needed, will prescribe compazine prn breakthrough n/v. For neck pain, oxycodone prn, continue dose 10 mg. Pain adequately controlled for him, which is about a 5 out of 10. tizanadine prn muscle aches.  Bowel regimen reviewed and currently he only needs stool softeners.  Continue periactin TID.   -Patient has port and EMLA cream.           Secondary malignant neoplasm of cervical lymph node    Overview     See squamous cell carcinoma              Other    Anemia in stage 3a chronic kidney disease    Current Assessment & Plan     Improved since last visit  -monitor cbc           Immunodeficiency due to drugs    Overview     S/p liver transplant and is currently on tacrolimus.  ANC slightly low but sufficient for treatment  -management per liver transplant team.  -monitor CBC monthly  -gcsf to prevent chemo induced neutropenia               No orders of the defined types were placed in this encounter.      Advance Care Planning I initiated the process of advance care planning at his  initial visit and explained the importance of this process to the patient.  Then the patient received detailed information about the importance of designating a Health Care Power of  (HCPOA). he was instructed to communicate with this person about their wishes for future healthcare, should he become sick and lose decision-making capacity. The patient has previously appointed a HCPOA. After our discussion, the patient has decided to complete a HCPOA and has appointed his brother and NAME:Ollie          Route Chart for Scheduling    Med Onc Chart Routing      Follow up with physician Other. 4/7  (ok to book during hospital service, book at 730 or 8AM) prior to chemo.  needs 4/11 pump disconnect and 4/12 gcsf scheduled too.   Follow up with [facility]    Labs CBC, CMP and TSH   Lab interval: every 3 weeks  3/28 port draw creatinine only, leave access for CT scan 4/7 port draw cmp, cbc, tsh prior to follow up and chemo   Imaging Other   3/28 ct neck after he has labs drawn from port   Pharmacy appointment No pharmacy appointment needed      Other referrals No additional referrals needed         Treatment Plan Information   OP HEAD NECK PEMBROLIZUMAB CARBOPLATIN FLUOROURACIL (C1 ONLY RECEIVED) FOLLOWED BY PEMBROLIZUMAB MAINTENANCE   Ronald Berg MD   Upcoming Treatment Dates - OP HEAD NECK PEMBROLIZUMAB CARBOPLATIN FLUOROURACIL (C1 ONLY RECEIVED) FOLLOWED BY PEMBROLIZUMAB MAINTENANCE    3/3/2022       Chemotherapy       CARBOplatin (PARAPLATIN) 245 mg in sodium chloride 0.9% 500 mL chemo infusion       fluorouraciL 800 mg/m2/day = 5,185 mg in sodium chloride 0.9% 240 mL chemo infusion  4/7/2022       Chemotherapy       CARBOplatin (PARAPLATIN) 325 mg in sodium chloride 0.9% 500 mL chemo infusion       fluorouraciL 1,000 mg/m2/day = 6,480 mg in sodium chloride 0.9% 240 mL chemo infusion  5/12/2022       Chemotherapy       CARBOplatin (PARAPLATIN) 325 mg in sodium chloride 0.9% 500 mL chemo infusion       fluorouraciL 1,000  mg/m2/day = 6,480 mg in sodium chloride 0.9% 240 mL chemo infusion    Supportive Plan Information  IV FLUIDS AND ELECTROLYTES   Ronald Berg MD   Upcoming Treatment Dates - IV FLUIDS AND ELECTROLYTES    No upcoming days in selected categories.    Therapy Plan Information  Flushes  heparin, porcine (PF) 100 unit/mL injection flush 500 Units  500 Units, Intravenous, Every visit  sodium chloride 0.9% flush 10 mL  10 mL, Intravenous, Every visit      Ronald Berg MD  Hematology Oncology

## 2022-03-03 ENCOUNTER — INFUSION (OUTPATIENT)
Dept: INFUSION THERAPY | Facility: HOSPITAL | Age: 72
End: 2022-03-03
Payer: MEDICARE

## 2022-03-03 VITALS
BODY MASS INDEX: 18.44 KG/M2 | TEMPERATURE: 98 F | RESPIRATION RATE: 16 BRPM | OXYGEN SATURATION: 99 % | SYSTOLIC BLOOD PRESSURE: 123 MMHG | DIASTOLIC BLOOD PRESSURE: 64 MMHG | HEART RATE: 84 BPM | WEIGHT: 121.69 LBS | HEIGHT: 68 IN

## 2022-03-03 DIAGNOSIS — C77.0 SECONDARY MALIGNANT NEOPLASM OF CERVICAL LYMPH NODE: Primary | ICD-10-CM

## 2022-03-03 DIAGNOSIS — C01 SQUAMOUS CELL CARCINOMA OF BASE OF TONGUE: ICD-10-CM

## 2022-03-03 PROCEDURE — 96416 CHEMO PROLONG INFUSE W/PUMP: CPT

## 2022-03-03 PROCEDURE — 63600175 PHARM REV CODE 636 W HCPCS: Performed by: STUDENT IN AN ORGANIZED HEALTH CARE EDUCATION/TRAINING PROGRAM

## 2022-03-03 PROCEDURE — 96417 CHEMO IV INFUS EACH ADDL SEQ: CPT

## 2022-03-03 PROCEDURE — 96367 TX/PROPH/DG ADDL SEQ IV INF: CPT

## 2022-03-03 PROCEDURE — 25000003 PHARM REV CODE 250: Performed by: STUDENT IN AN ORGANIZED HEALTH CARE EDUCATION/TRAINING PROGRAM

## 2022-03-03 PROCEDURE — 96413 CHEMO IV INFUSION 1 HR: CPT

## 2022-03-03 PROCEDURE — 96375 TX/PRO/DX INJ NEW DRUG ADDON: CPT

## 2022-03-03 RX ORDER — DIPHENHYDRAMINE HYDROCHLORIDE 50 MG/ML
50 INJECTION INTRAMUSCULAR; INTRAVENOUS ONCE AS NEEDED
Status: DISCONTINUED | OUTPATIENT
Start: 2022-03-03 | End: 2022-03-03 | Stop reason: HOSPADM

## 2022-03-03 RX ORDER — HEPARIN 100 UNIT/ML
500 SYRINGE INTRAVENOUS
Status: DISCONTINUED | OUTPATIENT
Start: 2022-03-03 | End: 2022-03-03 | Stop reason: HOSPADM

## 2022-03-03 RX ORDER — EPINEPHRINE 0.3 MG/.3ML
0.3 INJECTION SUBCUTANEOUS ONCE AS NEEDED
Status: DISCONTINUED | OUTPATIENT
Start: 2022-03-03 | End: 2022-03-03 | Stop reason: HOSPADM

## 2022-03-03 RX ORDER — SODIUM CHLORIDE 0.9 % (FLUSH) 0.9 %
10 SYRINGE (ML) INJECTION
Status: DISCONTINUED | OUTPATIENT
Start: 2022-03-03 | End: 2022-03-03 | Stop reason: HOSPADM

## 2022-03-03 RX ADMIN — PALONOSETRON HYDROCHLORIDE 0.25 MG: 0.25 INJECTION, SOLUTION INTRAVENOUS at 11:03

## 2022-03-03 RX ADMIN — CARBOPLATIN 245 MG: 10 INJECTION INTRAVENOUS at 11:03

## 2022-03-03 RX ADMIN — FLUOROURACIL 5000 MG: 50 INJECTION, SOLUTION INTRAVENOUS at 01:03

## 2022-03-03 RX ADMIN — SODIUM CHLORIDE 200 MG: 9 INJECTION, SOLUTION INTRAVENOUS at 10:03

## 2022-03-03 RX ADMIN — APREPITANT 130 MG: 130 INJECTION, EMULSION INTRAVENOUS at 11:03

## 2022-03-03 RX ADMIN — SODIUM CHLORIDE: 9 INJECTION, SOLUTION INTRAVENOUS at 10:03

## 2022-03-03 NOTE — PLAN OF CARE
1330 Pt tolerated Keytruda/Carbo infusion well today, no complaints or complications. Connected to CADD pump for home infusion, connection sites secured, pump infusing properly at time of discharge. Pt aware to call provider with any questions or concerns, knows to return to clinic at approx 1330 for pump d/c, verbalized understanding. Pt ambulatory from clinic with steady gait, no distress noted.

## 2022-03-07 ENCOUNTER — INFUSION (OUTPATIENT)
Dept: INFUSION THERAPY | Facility: HOSPITAL | Age: 72
End: 2022-03-07
Payer: MEDICARE

## 2022-03-07 VITALS
TEMPERATURE: 98 F | SYSTOLIC BLOOD PRESSURE: 113 MMHG | HEART RATE: 88 BPM | DIASTOLIC BLOOD PRESSURE: 73 MMHG | RESPIRATION RATE: 18 BRPM

## 2022-03-07 DIAGNOSIS — C77.0 SECONDARY MALIGNANT NEOPLASM OF CERVICAL LYMPH NODE: Primary | ICD-10-CM

## 2022-03-07 DIAGNOSIS — C01 SQUAMOUS CELL CARCINOMA OF BASE OF TONGUE: ICD-10-CM

## 2022-03-07 PROCEDURE — 25000003 PHARM REV CODE 250: Performed by: STUDENT IN AN ORGANIZED HEALTH CARE EDUCATION/TRAINING PROGRAM

## 2022-03-07 PROCEDURE — 63600175 PHARM REV CODE 636 W HCPCS: Performed by: STUDENT IN AN ORGANIZED HEALTH CARE EDUCATION/TRAINING PROGRAM

## 2022-03-07 PROCEDURE — A4216 STERILE WATER/SALINE, 10 ML: HCPCS | Performed by: STUDENT IN AN ORGANIZED HEALTH CARE EDUCATION/TRAINING PROGRAM

## 2022-03-07 PROCEDURE — 99211 OFF/OP EST MAY X REQ PHY/QHP: CPT

## 2022-03-07 RX ORDER — SODIUM CHLORIDE 0.9 % (FLUSH) 0.9 %
10 SYRINGE (ML) INJECTION
Status: DISCONTINUED | OUTPATIENT
Start: 2022-03-07 | End: 2022-03-07 | Stop reason: HOSPADM

## 2022-03-07 RX ORDER — HEPARIN 100 UNIT/ML
500 SYRINGE INTRAVENOUS
Status: DISCONTINUED | OUTPATIENT
Start: 2022-03-07 | End: 2022-03-07 | Stop reason: HOSPADM

## 2022-03-07 RX ADMIN — HEPARIN 500 UNITS: 100 SYRINGE at 01:03

## 2022-03-07 RX ADMIN — Medication 10 ML: at 01:03

## 2022-03-07 NOTE — PLAN OF CARE
1320 pt here for pump d/c, infusion complete, port flushed per policy and deaaccessed without issue, no distress noted upon d/c to home

## 2022-03-08 ENCOUNTER — INFUSION (OUTPATIENT)
Dept: INFUSION THERAPY | Facility: HOSPITAL | Age: 72
End: 2022-03-08
Payer: MEDICARE

## 2022-03-08 VITALS
TEMPERATURE: 98 F | SYSTOLIC BLOOD PRESSURE: 131 MMHG | RESPIRATION RATE: 18 BRPM | DIASTOLIC BLOOD PRESSURE: 63 MMHG | HEART RATE: 71 BPM

## 2022-03-08 DIAGNOSIS — C01 SQUAMOUS CELL CARCINOMA OF BASE OF TONGUE: ICD-10-CM

## 2022-03-08 DIAGNOSIS — C77.0 SECONDARY MALIGNANT NEOPLASM OF CERVICAL LYMPH NODE: Primary | ICD-10-CM

## 2022-03-08 PROCEDURE — 96372 THER/PROPH/DIAG INJ SC/IM: CPT

## 2022-03-08 PROCEDURE — 63600175 PHARM REV CODE 636 W HCPCS: Mod: TB | Performed by: STUDENT IN AN ORGANIZED HEALTH CARE EDUCATION/TRAINING PROGRAM

## 2022-03-08 RX ADMIN — PEGFILGRASTIM-CBQV 6 MG: 6 INJECTION, SOLUTION SUBCUTANEOUS at 12:03

## 2022-03-08 NOTE — NURSING
1233  Pt arrived for Udenyca injection, non-verbal but able to answer assessment questions, reports no new complaints or concerns at present and tolerating treatment; injection administered, pt tolerated; discussed possible SE of injection, how to treat, when to contact MD, when to report to ER; AVS declined, pt verbalized understanding of all discussed and when to report next

## 2022-03-18 ENCOUNTER — LAB VISIT (OUTPATIENT)
Dept: LAB | Facility: HOSPITAL | Age: 72
End: 2022-03-18
Attending: INTERNAL MEDICINE
Payer: MEDICARE

## 2022-03-18 DIAGNOSIS — E03.9 HYPOTHYROIDISM, UNSPECIFIED TYPE: ICD-10-CM

## 2022-03-18 LAB
BILIRUB UR QL STRIP: NEGATIVE
CLARITY UR REFRACT.AUTO: CLEAR
COLOR UR AUTO: YELLOW
GLUCOSE UR QL STRIP: NEGATIVE
HGB UR QL STRIP: NEGATIVE
KETONES UR QL STRIP: NEGATIVE
LEUKOCYTE ESTERASE UR QL STRIP: NEGATIVE
NITRITE UR QL STRIP: NEGATIVE
PH UR STRIP: 6 [PH] (ref 5–8)
PROT UR QL STRIP: NEGATIVE
SP GR UR STRIP: 1 (ref 1–1.03)
URN SPEC COLLECT METH UR: NORMAL

## 2022-03-18 PROCEDURE — 81003 URINALYSIS AUTO W/O SCOPE: CPT | Performed by: INTERNAL MEDICINE

## 2022-03-25 ENCOUNTER — OFFICE VISIT (OUTPATIENT)
Dept: INTERNAL MEDICINE | Facility: CLINIC | Age: 72
End: 2022-03-25
Payer: MEDICARE

## 2022-03-25 VITALS
RESPIRATION RATE: 17 BRPM | HEIGHT: 68 IN | DIASTOLIC BLOOD PRESSURE: 62 MMHG | TEMPERATURE: 98 F | BODY MASS INDEX: 17.74 KG/M2 | WEIGHT: 117.06 LBS | HEART RATE: 85 BPM | SYSTOLIC BLOOD PRESSURE: 100 MMHG | OXYGEN SATURATION: 97 %

## 2022-03-25 DIAGNOSIS — M65.30 TRIGGER FINGER OF RIGHT HAND, UNSPECIFIED FINGER: ICD-10-CM

## 2022-03-25 DIAGNOSIS — Z86.010 HISTORY OF COLON POLYPS: ICD-10-CM

## 2022-03-25 DIAGNOSIS — Z86.19 HX OF HEPATITIS C: ICD-10-CM

## 2022-03-25 DIAGNOSIS — Z94.4 STATUS POST LIVER TRANSPLANTATION: ICD-10-CM

## 2022-03-25 DIAGNOSIS — C01 SQUAMOUS CELL CARCINOMA OF BASE OF TONGUE: ICD-10-CM

## 2022-03-25 DIAGNOSIS — T86.49 CIRRHOSIS OF TRANSPLANTED LIVER: ICD-10-CM

## 2022-03-25 DIAGNOSIS — K74.60 CIRRHOSIS OF TRANSPLANTED LIVER: ICD-10-CM

## 2022-03-25 DIAGNOSIS — E03.9 HYPOTHYROIDISM, UNSPECIFIED TYPE: Primary | ICD-10-CM

## 2022-03-25 DIAGNOSIS — Z90.02 HISTORY OF LARYNGECTOMY: ICD-10-CM

## 2022-03-25 DIAGNOSIS — N18.31 STAGE 3A CHRONIC KIDNEY DISEASE: ICD-10-CM

## 2022-03-25 PROCEDURE — 99214 OFFICE O/P EST MOD 30 MIN: CPT | Mod: S$PBB,,, | Performed by: INTERNAL MEDICINE

## 2022-03-25 PROCEDURE — 99214 OFFICE O/P EST MOD 30 MIN: CPT | Mod: PBBFAC,PO | Performed by: INTERNAL MEDICINE

## 2022-03-25 PROCEDURE — 99214 PR OFFICE/OUTPT VISIT, EST, LEVL IV, 30-39 MIN: ICD-10-PCS | Mod: S$PBB,,, | Performed by: INTERNAL MEDICINE

## 2022-03-25 PROCEDURE — 99999 PR PBB SHADOW E&M-EST. PATIENT-LVL IV: ICD-10-PCS | Mod: PBBFAC,,, | Performed by: INTERNAL MEDICINE

## 2022-03-25 PROCEDURE — 99999 PR PBB SHADOW E&M-EST. PATIENT-LVL IV: CPT | Mod: PBBFAC,,, | Performed by: INTERNAL MEDICINE

## 2022-03-25 NOTE — PROGRESS NOTES
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  He is followed by hepatology, Hematology-Oncology  Currently reports things are fairly stable.  He has had some ongoing issues with dry mouth due to lack of saliva production.  He is also followed by ENT.  Weight has been relatively stable.    Current medications:  All medications are noted and reviewed.      Review of systems:  General: no fever, chills, generalized body aches. No unexpected weight loss.  Eyes:  No visual disturbances.  HEENT:  No URI symptoms.  Respiratory:  No cough, no shortness of breath.  Cardiovascular: no chest pain, palpitations, cough, exertional limb pain. No edema.  GI: no nausea, vomiting.  No abdominal pain. No change in bowel habits.  No melena, no hematochezia.  : no dysuria. No change in the color or character of the urine. No urinary frequency.  Musculoskeletal: no joint pain or swelling.  More recently bothered by triggering of his right 4th finger.  Neurologic:  No focal neurological complaints.  No headaches.  Skin:  No rashes or other concerns.  Psych:  No new emotional issues.    Health screenings:  He has not had updated colonoscopy.  He has had the COVID vaccines.  He has had the pneumococcal vaccines.      Physical examination:  GENERAL:  Alert, appropriately groomed, no acute distress.  VS:  Blood pressure 100 over 62  EYES: sclerae white ,nonicteric. PERRL.  HEENT:  Normocephalic. Ear canals and tympanic membranes normal.  Tracheostomy in place.  LUNGS:  Clear to ascultation and normal to percussion.  CARDIOVASCULAR:  Normal heart sounds.  No significant murmur. Carotids full bilaterally without bruit.  Pedal pulses intact .  No abdominal  bruit.  No peripheral extremity edema.  GI: the abdomen is soft, no distension. No masses , tenderness, organomegaly.   : scrotum, testicles and penis normal.   LYMPHATIC:  No axillary, inguinal , cervical adenopathy.  MUSCULOSKELETAL:  Range of motion, stability and strength of the right and left upper and lower extremities normal. No swollen or tender joints  NEUROLOGIC:  DTR's normal. No gross motor or sensory deficits apparent, gait normal.  SKIN:  No rashes.   MS:  Alert, oriented , affect and mood all appropriate      Data:  Reviewed recent lab data all generally reasonable.        Impression:  72-year-old gentleman with a number of chronic medical issues.    Hypothyroidism on replacement therapy.    Status post liver transplant due to hepatitis-C, HCC.  Current cirrhosis.    CKD three.    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    History of hepatitis-C.    History of colon polyps due for updated colonoscopy.    Trigger finger right 4th finger        Plan:  Referral to Hand Orthopedics.  Screening colonoscopy if okay with Hematology Oncology.  Continue all current follow-up in pharmacologic regimens.  Return clinic six months.

## 2022-03-28 ENCOUNTER — HOSPITAL ENCOUNTER (OUTPATIENT)
Dept: RADIOLOGY | Facility: HOSPITAL | Age: 72
Discharge: HOME | End: 2022-03-28
Attending: STUDENT IN AN ORGANIZED HEALTH CARE EDUCATION/TRAINING PROGRAM
Payer: MEDICARE

## 2022-03-28 ENCOUNTER — INFUSION (OUTPATIENT)
Dept: INFUSION THERAPY | Facility: HOSPITAL | Age: 72
End: 2022-03-28
Attending: STUDENT IN AN ORGANIZED HEALTH CARE EDUCATION/TRAINING PROGRAM
Payer: MEDICARE

## 2022-03-28 DIAGNOSIS — N18.31 STAGE 3A CHRONIC KIDNEY DISEASE: ICD-10-CM

## 2022-03-28 DIAGNOSIS — C01 SQUAMOUS CELL CARCINOMA OF BASE OF TONGUE: ICD-10-CM

## 2022-03-28 DIAGNOSIS — C01 SQUAMOUS CELL CARCINOMA OF BASE OF TONGUE: Primary | ICD-10-CM

## 2022-03-28 LAB
CREAT SERPL-MCNC: 1.2 MG/DL (ref 0.5–1.4)
EST. GFR  (AFRICAN AMERICAN): >60 ML/MIN/1.73 M^2
EST. GFR  (NON AFRICAN AMERICAN): 59.6 ML/MIN/1.73 M^2

## 2022-03-28 PROCEDURE — 70491 CT SOFT TISSUE NECK WITH CONTRAST: ICD-10-PCS | Mod: 26,,, | Performed by: RADIOLOGY

## 2022-03-28 PROCEDURE — 70491 CT SOFT TISSUE NECK W/DYE: CPT | Mod: 26,,, | Performed by: RADIOLOGY

## 2022-03-28 PROCEDURE — 82565 ASSAY OF CREATININE: CPT | Performed by: STUDENT IN AN ORGANIZED HEALTH CARE EDUCATION/TRAINING PROGRAM

## 2022-03-28 PROCEDURE — 25500020 PHARM REV CODE 255: Performed by: STUDENT IN AN ORGANIZED HEALTH CARE EDUCATION/TRAINING PROGRAM

## 2022-03-28 PROCEDURE — 63600175 PHARM REV CODE 636 W HCPCS: Performed by: STUDENT IN AN ORGANIZED HEALTH CARE EDUCATION/TRAINING PROGRAM

## 2022-03-28 PROCEDURE — A4216 STERILE WATER/SALINE, 10 ML: HCPCS | Performed by: STUDENT IN AN ORGANIZED HEALTH CARE EDUCATION/TRAINING PROGRAM

## 2022-03-28 PROCEDURE — 36591 DRAW BLOOD OFF VENOUS DEVICE: CPT

## 2022-03-28 PROCEDURE — 70491 CT SOFT TISSUE NECK W/DYE: CPT | Mod: TC

## 2022-03-28 PROCEDURE — 25000003 PHARM REV CODE 250: Performed by: STUDENT IN AN ORGANIZED HEALTH CARE EDUCATION/TRAINING PROGRAM

## 2022-03-28 RX ORDER — HEPARIN 100 UNIT/ML
500 SYRINGE INTRAVENOUS
Status: DISCONTINUED | OUTPATIENT
Start: 2022-03-28 | End: 2022-03-28 | Stop reason: HOSPADM

## 2022-03-28 RX ORDER — HEPARIN 100 UNIT/ML
SYRINGE INTRAVENOUS
Status: DISPENSED
Start: 2022-03-28 | End: 2022-03-28

## 2022-03-28 RX ORDER — HEPARIN 100 UNIT/ML
5 SYRINGE INTRAVENOUS ONCE
Status: COMPLETED | OUTPATIENT
Start: 2022-03-28 | End: 2022-03-28

## 2022-03-28 RX ORDER — SODIUM CHLORIDE 0.9 % (FLUSH) 0.9 %
10 SYRINGE (ML) INJECTION
Status: DISCONTINUED | OUTPATIENT
Start: 2022-03-28 | End: 2022-03-28 | Stop reason: HOSPADM

## 2022-03-28 RX ADMIN — HEPARIN SODIUM (PORCINE) LOCK FLUSH IV SOLN 100 UNIT/ML 500 UNITS: 100 SOLUTION at 11:03

## 2022-03-28 RX ADMIN — Medication 10 ML: at 08:03

## 2022-03-28 RX ADMIN — IOHEXOL 75 ML: 350 INJECTION, SOLUTION INTRAVENOUS at 09:03

## 2022-03-28 NOTE — NURSING
Pt here for lab draw and port access for CT scan. Port flushed and ns locked and power talbert needle left in place for CT scan. Pt tolerated procedure without difficulty.

## 2022-03-28 NOTE — CARE UPDATE
Patient already accessed in infusion clinic prir to arrival to [facility] CT. PAC flushed with 10cc of NS and attached to injector. Patient tolerated exam well and line flushed with Heparin and port de-accessed using sterile technique. Patient ambulated back to [facility] with staff.

## 2022-03-29 ENCOUNTER — PATIENT MESSAGE (OUTPATIENT)
Dept: ADMINISTRATIVE | Facility: HOSPITAL | Age: 72
End: 2022-03-29
Payer: MEDICARE

## 2022-03-29 DIAGNOSIS — Z12.11 SCREENING FOR COLON CANCER: ICD-10-CM

## 2022-03-29 PROBLEM — Z86.010 HISTORY OF COLON POLYPS: Status: ACTIVE | Noted: 2022-03-29

## 2022-03-29 PROBLEM — Z86.19 HX OF HEPATITIS C: Status: ACTIVE | Noted: 2022-03-29

## 2022-03-29 PROBLEM — Z86.0100 HISTORY OF COLON POLYPS: Status: ACTIVE | Noted: 2022-03-29

## 2022-04-02 DIAGNOSIS — C01 SQUAMOUS CELL CARCINOMA OF BASE OF TONGUE: ICD-10-CM

## 2022-04-04 RX ORDER — OXYCODONE HYDROCHLORIDE 10 MG/1
10 TABLET ORAL EVERY 6 HOURS PRN
Qty: 120 TABLET | Refills: 0 | Status: SHIPPED | OUTPATIENT
Start: 2022-04-04 | End: 2022-05-04 | Stop reason: SDUPTHER

## 2022-04-07 ENCOUNTER — OFFICE VISIT (OUTPATIENT)
Dept: HEMATOLOGY/ONCOLOGY | Facility: CLINIC | Age: 72
End: 2022-04-07
Payer: MEDICARE

## 2022-04-07 ENCOUNTER — INFUSION (OUTPATIENT)
Dept: INFUSION THERAPY | Facility: HOSPITAL | Age: 72
End: 2022-04-07
Attending: STUDENT IN AN ORGANIZED HEALTH CARE EDUCATION/TRAINING PROGRAM
Payer: MEDICARE

## 2022-04-07 ENCOUNTER — INFUSION (OUTPATIENT)
Dept: INFUSION THERAPY | Facility: HOSPITAL | Age: 72
End: 2022-04-07
Payer: MEDICARE

## 2022-04-07 VITALS
SYSTOLIC BLOOD PRESSURE: 134 MMHG | DIASTOLIC BLOOD PRESSURE: 63 MMHG | HEIGHT: 68 IN | OXYGEN SATURATION: 97 % | BODY MASS INDEX: 17.72 KG/M2 | TEMPERATURE: 98 F | WEIGHT: 116.88 LBS | RESPIRATION RATE: 20 BRPM | HEART RATE: 81 BPM

## 2022-04-07 VITALS
HEIGHT: 68 IN | TEMPERATURE: 98 F | RESPIRATION RATE: 20 BRPM | SYSTOLIC BLOOD PRESSURE: 144 MMHG | OXYGEN SATURATION: 100 % | WEIGHT: 116.88 LBS | DIASTOLIC BLOOD PRESSURE: 63 MMHG | HEART RATE: 68 BPM | BODY MASS INDEX: 17.72 KG/M2

## 2022-04-07 DIAGNOSIS — C01 SQUAMOUS CELL CARCINOMA OF BASE OF TONGUE: Primary | ICD-10-CM

## 2022-04-07 DIAGNOSIS — C77.0 SECONDARY MALIGNANT NEOPLASM OF CERVICAL LYMPH NODE: Primary | ICD-10-CM

## 2022-04-07 DIAGNOSIS — C77.0 SECONDARY MALIGNANT NEOPLASM OF CERVICAL LYMPH NODE: ICD-10-CM

## 2022-04-07 DIAGNOSIS — N18.31 STAGE 3A CHRONIC KIDNEY DISEASE: ICD-10-CM

## 2022-04-07 DIAGNOSIS — Z79.899 IMMUNODEFICIENCY DUE TO DRUGS: ICD-10-CM

## 2022-04-07 DIAGNOSIS — D63.1 ANEMIA IN STAGE 3A CHRONIC KIDNEY DISEASE: ICD-10-CM

## 2022-04-07 DIAGNOSIS — C01 SQUAMOUS CELL CARCINOMA OF BASE OF TONGUE: ICD-10-CM

## 2022-04-07 DIAGNOSIS — E03.2 HYPOTHYROIDISM DUE TO MEDICAMENTS AND OTHER EXOGENOUS SUBSTANCES: ICD-10-CM

## 2022-04-07 DIAGNOSIS — N18.31 ANEMIA IN STAGE 3A CHRONIC KIDNEY DISEASE: ICD-10-CM

## 2022-04-07 DIAGNOSIS — D61.810 PANCYTOPENIA DUE TO CHEMOTHERAPY: ICD-10-CM

## 2022-04-07 DIAGNOSIS — D84.821 IMMUNODEFICIENCY DUE TO DRUGS: ICD-10-CM

## 2022-04-07 LAB
ALBUMIN SERPL BCP-MCNC: 3.4 G/DL (ref 3.5–5.2)
ALP SERPL-CCNC: 84 U/L (ref 55–135)
ALT SERPL W/O P-5'-P-CCNC: 8 U/L (ref 10–44)
ANION GAP SERPL CALC-SCNC: 10 MMOL/L (ref 8–16)
AST SERPL-CCNC: 30 U/L (ref 10–40)
BASOPHILS # BLD AUTO: 0.02 K/UL (ref 0–0.2)
BASOPHILS NFR BLD: 0.6 % (ref 0–1.9)
BILIRUB SERPL-MCNC: 0.4 MG/DL (ref 0.1–1)
BUN SERPL-MCNC: 17 MG/DL (ref 8–23)
CALCIUM SERPL-MCNC: 9.4 MG/DL (ref 8.7–10.5)
CHLORIDE SERPL-SCNC: 107 MMOL/L (ref 95–110)
CO2 SERPL-SCNC: 26 MMOL/L (ref 23–29)
CREAT SERPL-MCNC: 1.3 MG/DL (ref 0.5–1.4)
DIFFERENTIAL METHOD: ABNORMAL
EOSINOPHIL # BLD AUTO: 0.1 K/UL (ref 0–0.5)
EOSINOPHIL NFR BLD: 3 % (ref 0–8)
ERYTHROCYTE [DISTWIDTH] IN BLOOD BY AUTOMATED COUNT: 13.1 % (ref 11.5–14.5)
EST. GFR  (AFRICAN AMERICAN): >60 ML/MIN/1.73 M^2
EST. GFR  (NON AFRICAN AMERICAN): 54.1 ML/MIN/1.73 M^2
GLUCOSE SERPL-MCNC: 114 MG/DL (ref 70–110)
HCT VFR BLD AUTO: 27.4 % (ref 40–54)
HGB BLD-MCNC: 9.3 G/DL (ref 14–18)
IMM GRANULOCYTES # BLD AUTO: 0.01 K/UL (ref 0–0.04)
IMM GRANULOCYTES NFR BLD AUTO: 0.3 % (ref 0–0.5)
LYMPHOCYTES # BLD AUTO: 0.5 K/UL (ref 1–4.8)
LYMPHOCYTES NFR BLD: 16.3 % (ref 18–48)
MCH RBC QN AUTO: 35.1 PG (ref 27–31)
MCHC RBC AUTO-ENTMCNC: 33.9 G/DL (ref 32–36)
MCV RBC AUTO: 103 FL (ref 82–98)
MONOCYTES # BLD AUTO: 0.5 K/UL (ref 0.3–1)
MONOCYTES NFR BLD: 14.2 % (ref 4–15)
NEUTROPHILS # BLD AUTO: 2.2 K/UL (ref 1.8–7.7)
NEUTROPHILS NFR BLD: 65.6 % (ref 38–73)
NRBC BLD-RTO: 0 /100 WBC
PLATELET # BLD AUTO: 145 K/UL (ref 150–450)
PMV BLD AUTO: 9.2 FL (ref 9.2–12.9)
POTASSIUM SERPL-SCNC: 3.7 MMOL/L (ref 3.5–5.1)
PROT SERPL-MCNC: 7 G/DL (ref 6–8.4)
RBC # BLD AUTO: 2.65 M/UL (ref 4.6–6.2)
SODIUM SERPL-SCNC: 143 MMOL/L (ref 136–145)
T4 FREE SERPL-MCNC: 1.11 NG/DL (ref 0.71–1.51)
TSH SERPL DL<=0.005 MIU/L-ACNC: 0.19 UIU/ML (ref 0.4–4)
WBC # BLD AUTO: 3.31 K/UL (ref 3.9–12.7)

## 2022-04-07 PROCEDURE — 85025 COMPLETE CBC W/AUTO DIFF WBC: CPT | Performed by: STUDENT IN AN ORGANIZED HEALTH CARE EDUCATION/TRAINING PROGRAM

## 2022-04-07 PROCEDURE — A4216 STERILE WATER/SALINE, 10 ML: HCPCS | Performed by: STUDENT IN AN ORGANIZED HEALTH CARE EDUCATION/TRAINING PROGRAM

## 2022-04-07 PROCEDURE — 96413 CHEMO IV INFUSION 1 HR: CPT

## 2022-04-07 PROCEDURE — 99215 OFFICE O/P EST HI 40 MIN: CPT | Mod: S$PBB,,, | Performed by: STUDENT IN AN ORGANIZED HEALTH CARE EDUCATION/TRAINING PROGRAM

## 2022-04-07 PROCEDURE — 99999 PR PBB SHADOW E&M-EST. PATIENT-LVL III: CPT | Mod: PBBFAC,,, | Performed by: STUDENT IN AN ORGANIZED HEALTH CARE EDUCATION/TRAINING PROGRAM

## 2022-04-07 PROCEDURE — 25000003 PHARM REV CODE 250: Performed by: STUDENT IN AN ORGANIZED HEALTH CARE EDUCATION/TRAINING PROGRAM

## 2022-04-07 PROCEDURE — 84439 ASSAY OF FREE THYROXINE: CPT | Performed by: STUDENT IN AN ORGANIZED HEALTH CARE EDUCATION/TRAINING PROGRAM

## 2022-04-07 PROCEDURE — 99999 PR PBB SHADOW E&M-EST. PATIENT-LVL III: ICD-10-PCS | Mod: PBBFAC,,, | Performed by: STUDENT IN AN ORGANIZED HEALTH CARE EDUCATION/TRAINING PROGRAM

## 2022-04-07 PROCEDURE — 99215 PR OFFICE/OUTPT VISIT, EST, LEVL V, 40-54 MIN: ICD-10-PCS | Mod: S$PBB,,, | Performed by: STUDENT IN AN ORGANIZED HEALTH CARE EDUCATION/TRAINING PROGRAM

## 2022-04-07 PROCEDURE — 96375 TX/PRO/DX INJ NEW DRUG ADDON: CPT

## 2022-04-07 PROCEDURE — 96417 CHEMO IV INFUS EACH ADDL SEQ: CPT

## 2022-04-07 PROCEDURE — 99213 OFFICE O/P EST LOW 20 MIN: CPT | Mod: PBBFAC,25 | Performed by: STUDENT IN AN ORGANIZED HEALTH CARE EDUCATION/TRAINING PROGRAM

## 2022-04-07 PROCEDURE — 96367 TX/PROPH/DG ADDL SEQ IV INF: CPT

## 2022-04-07 PROCEDURE — 63600175 PHARM REV CODE 636 W HCPCS: Performed by: STUDENT IN AN ORGANIZED HEALTH CARE EDUCATION/TRAINING PROGRAM

## 2022-04-07 PROCEDURE — 80053 COMPREHEN METABOLIC PANEL: CPT | Performed by: STUDENT IN AN ORGANIZED HEALTH CARE EDUCATION/TRAINING PROGRAM

## 2022-04-07 PROCEDURE — 96416 CHEMO PROLONG INFUSE W/PUMP: CPT

## 2022-04-07 PROCEDURE — 84443 ASSAY THYROID STIM HORMONE: CPT | Performed by: STUDENT IN AN ORGANIZED HEALTH CARE EDUCATION/TRAINING PROGRAM

## 2022-04-07 RX ORDER — DIPHENHYDRAMINE HYDROCHLORIDE 50 MG/ML
50 INJECTION INTRAMUSCULAR; INTRAVENOUS ONCE AS NEEDED
Status: DISCONTINUED | OUTPATIENT
Start: 2022-04-07 | End: 2022-04-07 | Stop reason: HOSPADM

## 2022-04-07 RX ORDER — HEPARIN 100 UNIT/ML
500 SYRINGE INTRAVENOUS
Status: DISCONTINUED | OUTPATIENT
Start: 2022-04-07 | End: 2022-04-07 | Stop reason: HOSPADM

## 2022-04-07 RX ORDER — EPINEPHRINE 0.3 MG/.3ML
0.3 INJECTION SUBCUTANEOUS ONCE AS NEEDED
Status: DISCONTINUED | OUTPATIENT
Start: 2022-04-07 | End: 2022-04-07 | Stop reason: HOSPADM

## 2022-04-07 RX ORDER — SODIUM CHLORIDE 0.9 % (FLUSH) 0.9 %
10 SYRINGE (ML) INJECTION
Status: DISCONTINUED | OUTPATIENT
Start: 2022-04-07 | End: 2022-04-07 | Stop reason: HOSPADM

## 2022-04-07 RX ADMIN — CARBOPLATIN 255 MG: 10 INJECTION INTRAVENOUS at 12:04

## 2022-04-07 RX ADMIN — SODIUM CHLORIDE 200 MG: 9 INJECTION, SOLUTION INTRAVENOUS at 11:04

## 2022-04-07 RX ADMIN — SODIUM CHLORIDE: 0.9 INJECTION, SOLUTION INTRAVENOUS at 10:04

## 2022-04-07 RX ADMIN — PALONOSETRON HYDROCHLORIDE 0.25 MG: 0.25 INJECTION, SOLUTION INTRAVENOUS at 12:04

## 2022-04-07 RX ADMIN — APREPITANT 130 MG: 130 INJECTION, EMULSION INTRAVENOUS at 12:04

## 2022-04-07 RX ADMIN — Medication 10 ML: at 08:04

## 2022-04-07 RX ADMIN — FLUOROURACIL 5000 MG: 50 INJECTION, SOLUTION INTRAVENOUS at 01:04

## 2022-04-07 NOTE — NURSING
Pt here for lab draw from port. Labs drawn, port flushed and ns lock. Needle left in as pt is to go home on a pump. Tolerated procedure without difficulty.

## 2022-04-07 NOTE — PLAN OF CARE
"Pt received Keytruda, Carboplatin and 5FU pump applied. Tolerated well. Pt educated about Keytruda, Carboplatin and 5FU pump (trouble shooting, not getting pump wet and handling pump w/ care. Pt instructed to check pump twice a day to ensure that pump says "RUN" and volume is counting down. Pt acknowleged and demonstrated understanding. Aware of phone # for [employer] pump company. VSS. 5FU pump connected and infusing without difficulty to established ACW port, blood return noted. Port site secured. Pat to return to clinic 4-11-22 for 1345 for pump DC. Pt discharged with no distress noted and ambulated off unit, w/o event, via self, pleased.  "

## 2022-04-08 NOTE — ASSESSMENT & PLAN NOTE
Doing well with treatment.  No more severe diarrhea or mucositis although he reports development of lower extremity edema and dry skin.  3/28/22 CT scan with interval improvement.  -dry skin, including feet likely mild toxicity of 5-FU  --encouraged moisturizer with lotion or cream  -continue with dose reduced chemotherapy with carboplatin AUC4 and 5- mg/m2/day  -labs reviewed; ok to proceed with cycle 7  --cycles are 5 weeks long to allow for recovery  -follow up prior to cycle 8.  -depending on cytopenias may need to transition back to maintenance immunotherapy  -continue supportive medications

## 2022-04-08 NOTE — PROGRESS NOTES
PATIENT: Rocco Swain  MRN: [id number]  DATE: 4/8/2022      Diagnosis:   1. Squamous cell carcinoma of base of tongue    2. Secondary malignant neoplasm of cervical lymph node    3. Pancytopenia due to chemotherapy    4. Immunodeficiency due to drugs    5. Anemia in stage 3a chronic kidney disease        Chief Complaint: Squamous cell carcinoma of base of tongue      Oncologic History:    Oncologic History 1. Squamous cell carcinoma of base of tongue with recurrence    Oncologic Treatment 1. Chemoradiation completed 4/2016  2. S/p glossectomy, laryngectomy, and bilateral neck dissection for persistent/recurrent disease  3. 10/6/2020-8/24/21 PCC  4. 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        Subjective:    Interval History: Mr. Swain is here for follow up    Side effects with last cycle: Dry skin and mild leg swelling.  He denies any severe diarrhea or mucositis.  Neck pain controlled.    Overall he feels well and is ready for chemotherapy.    He is alone at this visit.  Communication from him is 100% written.  Past Medical History:   Past Medical History:   Diagnosis Date    Basal cell carcinoma (BCC) in situ of skin 2012    3 on face, 2 on arm, removed by dermatology.    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 Underwent surgical resection of tongue and neck. s/p tracheostomy       Past Surgical HIstory:   Past Surgical History:   Procedure Laterality Date    COLONOSCOPY      INSERTION OF VENOUS ACCESS PORT Right 3/8/2021    Procedure: INSERTION, VENOUS ACCESS PORT;  Surgeon: Jesus Rendon MD;  Location: [facility] OR [address] [place] [address];   Service: General;  Laterality: Right;    LIVER TRANSPLANT  11/2008    transplanted for biopsy proven hepatocellular carcinoma,     LYMPH NODE BIOPSY N/A 9/18/2020    Procedure: BIOPSY, LYMPH NODE;  Surgeon: Taz Diagnostic Provider;  Location: [facility] OR [address] [place] [address];  Service: General;  Laterality: N/A;  Rm 173    TRACHEOSTOMY         Family History:   Family History   Problem Relation Age of Onset    Dementia Mother        Social History:  reports that he has never smoked. He has never used smokeless tobacco. He reports current alcohol use. He reports previous drug use.    Allergies:  Review of patient's allergies indicates:  No Known Allergies    Medications:  Current Outpatient Medications   Medication Sig Dispense Refill    azelaic acid (AZELEX) 15 % gel APPLY TOPICALLY TO AFFECTED AREA IN THE MORNING      diphenoxylate-atropine 2.5-0.025 mg (LOMOTIL) 2.5-0.025 mg per tablet Take 1 tablet by mouth 4 (four) times daily as needed for Diarrhea (use when loperamide/imodium does not work). 30 tablet 0    ibuprofen (ADVIL,MOTRIN) 200 MG tablet Take 200 mg by mouth.      levothyroxine (SYNTHROID) 88 MCG tablet TAKE 1 TABLET BY MOUTH ONCE DAILY 90 tablet 3    LIDOcaine HCl 2% (LIDOCAINE VISCOUS) 2 % Soln Swish and spit 15 mls every 8 (eight) hours as needed (mouth sore). 100 mL 3    LIDOcaine-prilocaine (EMLA) cream Apply generously to port site 30-60 min prior to chemo and then cover with saran wrap. 30 g 2    loperamide (IMODIUM) 2 mg capsule Take 1 capsule (2 mg total) by mouth 4 (four) times daily as needed for Diarrhea. 30 capsule 1    magic mouthwash diphen/antac/lidoc/nysta Take 10 mLs by mouth 4 (four) times daily. 120 mL 4    multivit-min/FA/lycopen/lutein (CENTRUM SILVER MEN ORAL) Take 1 tablet by mouth.      multivitamin capsule Take 1 capsule by mouth once daily.      oxyCODONE (ROXICODONE) 10 mg Tab immediate release tablet Take 1 tablet (10 mg total) by mouth every 6 (six) hours as needed for Pain.  120 tablet 0    sulfacetamide sodium-sulfur 10-5 % (w/w) Clsr USE TO WASH AFFECTED AREA DAILY      tacrolimus (PROGRAF) 0.5 MG Cap Take 1 capsule (0.5 mg total) by mouth every 12 (twelve) hours. 60 capsule 11    tacrolimus (PROGRAF) 0.5 MG Cap Take 1 capsule (0.5 mg total) by mouth every 12 (twelve) hours. 180 capsule 3    tacrolimus (PROGRAF) 0.5 MG Cap Take 1 capsule (0.5 mg total) by mouth every 12 (twelve) hours. 180 capsule 3    tiZANidine (ZANAFLEX) 2 MG tablet Take 1 tablet (2 mg total) by mouth nightly as needed (neck muscle strain). 30 tablet 0    traZODone (DESYREL) 50 MG tablet TAKE 1 TABLET BY MOUTH IN  THE EVENING 90 tablet 3    vitamin E 400 UNIT capsule Take 400 Units by mouth once daily.      metroNIDAZOLE (METROGEL) 0.75 % gel Apply topically to affected area 2 (two) times daily. (Patient not taking: No sig reported) 45 g 1     No current facility-administered medications for this visit.     Facility-Administered Medications Ordered in Other Visits   Medication Dose Route Frequency Provider Last Rate Last Admin    heparin, porcine (PF) 100 unit/mL injection flush 500 Units  500 Units Intravenous PRN Ronald Berg MD        sodium chloride 0.9% flush 10 mL  10 mL Intravenous PRN Ronald Berg MD           Review of Systems   Constitutional: Negative for activity change, appetite change, chills, diaphoresis, fatigue, fever and unexpected weight change.   HENT: Positive for mouth sores (minimal) and trouble swallowing. Negative for nosebleeds.    Eyes: Negative for visual disturbance.   Respiratory: Negative for cough, chest tightness, shortness of breath and wheezing.    Cardiovascular: Positive for leg swelling. Negative for chest pain.   Gastrointestinal: Negative for abdominal distention, abdominal pain, blood in stool, constipation, diarrhea, nausea and vomiting.   Endocrine: Negative for cold intolerance and heat intolerance.   Genitourinary: Negative for difficulty urinating and dysuria.  "  Musculoskeletal: Positive for myalgias and neck pain (pain radiates between both sides of his neck under his jaw.). Negative for arthralgias and back pain.   Skin: Negative for color change and rash.   Neurological: Negative for dizziness, weakness, light-headedness, numbness and headaches.   Hematological: Negative for adenopathy. Does not bruise/bleed easily.   Psychiatric/Behavioral: Negative for confusion.       ECOG Performance Status:      ECOG SCORE    1 - Restricted in strenuous activity-ambulatory and able to carry out work of a light nature         Objective:      Vitals:   Vitals:    04/07/22 0824   BP: 134/63   BP Location: Right arm   Patient Position: Sitting   BP Method: Medium (Automatic)   Pulse: 81   Resp: 20   Temp: 98.3 °F (36.8 °C)   TempSrc: Oral   SpO2: 97%   Weight: 53 kg (116 lb 13.5 oz)   Height: 5' 8" (1.727 m)     BMI: Body mass index is 17.77 kg/m².  Wt Readings from Last 10 Encounters:   04/07/22 53 kg (116 lb 13.5 oz)   04/07/22 53 kg (116 lb 13.5 oz)   03/25/22 53.1 kg (117 lb 1 oz)   03/03/22 55.2 kg (121 lb 11.1 oz)   03/02/22 55.2 kg (121 lb 11.1 oz)   02/21/22 54.4 kg (120 lb)   01/27/22 54.6 kg (120 lb 5.9 oz)   12/23/21 53.1 kg (117 lb 1 oz)   12/02/21 54.1 kg (119 lb 4.3 oz)   11/12/21 53.3 kg (117 lb 8.1 oz)       Physical Exam  Constitutional:       General: He is not in acute distress.     Appearance: Normal appearance. He is not ill-appearing.   HENT:      Head: Normocephalic and atraumatic.      Mouth/Throat:      Mouth: Mucous membranes are moist.      Pharynx: Posterior oropharyngeal erythema present. No oropharyngeal exudate.   Eyes:      General: No scleral icterus.     Extraocular Movements: Extraocular movements intact.      Conjunctiva/sclera: Conjunctivae normal.      Pupils: Pupils are equal, round, and reactive to light.   Cardiovascular:      Rate and Rhythm: Normal rate and regular rhythm.      Heart sounds: No murmur heard.    No friction rub. No gallop. "   Pulmonary:      Effort: Pulmonary effort is normal. No respiratory distress.      Breath sounds: No stridor. No wheezing, rhonchi or rales.   Chest:   Breasts:      Right: No axillary adenopathy or supraclavicular adenopathy.      Left: No axillary adenopathy or supraclavicular adenopathy.       Abdominal:      General: Bowel sounds are normal. There is no distension.      Palpations: Abdomen is soft. There is no mass.      Tenderness: There is no abdominal tenderness. There is no guarding or rebound.   Musculoskeletal:         General: Normal range of motion.      Cervical back: Normal range of motion and neck supple. No tenderness.      Right lower leg: Edema present.      Left lower leg: Edema present.   Lymphadenopathy:      Cervical: No cervical adenopathy.      Upper Body:      Right upper body: No supraclavicular or axillary adenopathy.      Left upper body: No supraclavicular or axillary adenopathy.   Skin:     General: Skin is warm and dry.   Neurological:      General: No focal deficit present.      Mental Status: He is alert.         Laboratory Data:   Recent Results (from the past 168 hour(s))   CBC Auto Differential    Collection Time: 04/07/22  9:10 AM   Result Value Ref Range    WBC 3.31 (L) 3.90 - 12.70 K/uL    RBC 2.65 (L) 4.60 - 6.20 M/uL    Hemoglobin 9.3 (L) 14.0 - 18.0 g/dL    Hematocrit 27.4 (L) 40.0 - 54.0 %     (H) 82 - 98 fL    MCH 35.1 (H) 27.0 - 31.0 pg    MCHC 33.9 32.0 - 36.0 g/dL    RDW 13.1 11.5 - 14.5 %    Platelets 145 (L) 150 - 450 K/uL    MPV 9.2 9.2 - 12.9 fL    Immature Granulocytes 0.3 0.0 - 0.5 %    Gran # (ANC) 2.2 1.8 - 7.7 K/uL    Immature Grans (Abs) 0.01 0.00 - 0.04 K/uL    Lymph # 0.5 (L) 1.0 - 4.8 K/uL    Mono # 0.5 0.3 - 1.0 K/uL    Eos # 0.1 0.0 - 0.5 K/uL    Baso # 0.02 0.00 - 0.20 K/uL    nRBC 0 0 /100 WBC    Gran % 65.6 38.0 - 73.0 %    Lymph % 16.3 (L) 18.0 - 48.0 %    Mono % 14.2 4.0 - 15.0 %    Eosinophil % 3.0 0.0 - 8.0 %    Basophil % 0.6 0.0 - 1.9 %     Differential Method Automated    Comprehensive Metabolic Panel    Collection Time: 04/07/22  9:10 AM   Result Value Ref Range    Sodium 143 136 - 145 mmol/L    Potassium 3.7 3.5 - 5.1 mmol/L    Chloride 107 95 - 110 mmol/L    CO2 26 23 - 29 mmol/L    Glucose 114 (H) 70 - 110 mg/dL    BUN 17 8 - 23 mg/dL    Creatinine 1.3 0.5 - 1.4 mg/dL    Calcium 9.4 8.7 - 10.5 mg/dL    Total Protein 7.0 6.0 - 8.4 g/dL    Albumin 3.4 (L) 3.5 - 5.2 g/dL    Total Bilirubin 0.4 0.1 - 1.0 mg/dL    Alkaline Phosphatase 84 55 - 135 U/L    AST 30 10 - 40 U/L    ALT 8 (L) 10 - 44 U/L    Anion Gap 10 8 - 16 mmol/L    eGFR if African American >60.0 >60 mL/min/1.73 m^2    eGFR if non  54.1 (A) >60 mL/min/1.73 m^2   TSH    Collection Time: 04/07/22  9:10 AM   Result Value Ref Range    TSH 0.189 (L) 0.400 - 4.000 uIU/mL   T4, Free    Collection Time: 04/07/22  9:10 AM   Result Value Ref Range    Free T4 1.11 0.71 - 1.51 ng/dL           Imaging:     CT Soft Tissue Neck With Contrast    Result Date: 3/28/2022  EXAMINATION: CT SOFT TISSUE NECK WITH CONTRAST CLINICAL HISTORY: locally recurrent head neck cancer, on chemo, monitor treatment response; Malignant neoplasm of base of tongue TECHNIQUE: Axial CT images obtained throughout the region of the neck after the administration of 75 ml omnipaque 350 intravenous contrast. Axial, sagittal and coronal reconstructions were performed. COMPARISON: CT soft tissue neck 08/05/2020, FDG PET-CT 12/01/2021 FINDINGS: History of squamous cell carcinoma base of tongue with cervical metastatic disease.  Operative change of glossectomy, laryngectomy, and bilateral neck dissection with flap reconstruction.  Right-sided chest port with distal catheter tip terminating in the SVC. Soft tissues at the operative bed are stable.  No convincing evidence of locally recurrent mass lesion. Ill-defined soft tissue at the site of previously identified left jugular chain adenopathy is noted however the  appearance is improved from the prior study without discrete mass.  Stable subcentimeter lymph node right upper jugular chain.  No new adenopathy in the neck. There is new edema identified associated with the left levator scapula muscle nonspecific but without discrete mass. Tracheostomy tube in place.  Layering secretions near the distal aspect of the tracheostomy tube. Major vascular structures of the neck and proximal intracranial vascular structures are patent mother than for prior left internal jugular vein presumed resection..  Mild left posterior ethmoid mucosal thickening.  Mastoid air cells are otherwise clear. Similar appearance borderline in size 10 mm right paratracheal lymph nodes. Lung apices are clear.  No osseous destructive process.     Operative changes detailed above for base of tongue squamous cell carcinoma.  No evidence of recurrent mass at the operative bed. Improved appearance of the previously identified left-sided necrotic adenopathy with residual ill-defined soft tissue but prominently improved from the prior study.  Stable subcentimeter right upper jugular chain lymph node.  No new adenopathy since the prior study. There is nonspecific new edema associated with the left levator scapula muscle (axial image 111) without discrete enhancing mass.  Continued follow-up recommended. Stable borderline in size right paratracheal lymph nodes. This report was flagged in Epic as abnormal. Electronically signed by resident: Óscar Foster MD Date:    03/28/2022 Time:    09:38 Electronically signed by: Rocco Daley Date:    03/28/2022 Time:    10:55      Assessment:       1. Squamous cell carcinoma of base of tongue    2. Secondary malignant neoplasm of cervical lymph node    3. Pancytopenia due to chemotherapy    4. Immunodeficiency due to drugs    5. Anemia in stage 3a chronic kidney disease           Plan:       Problem List Items Addressed This Visit        Oncology    Squamous cell carcinoma of  base of tongue - Primary    Overview     Per OSH records, initially presented in 2015 with symptoms and cT2N2c disease. page 91 of 3/25/2020 outside records clinic (media tab).  Initially presented 8/2015 with left sided odynophagia.  Treated for reflux, which did not improve.    12/11/15 he had a fiberoptic exam showing discolored mucosal area of the left base of the tongue.    1/13/16 he had persistent dysphagia, odynophagia, and new left otalgia.  MRI showed an irregular mass along the left posterior margin of the base of the tongue measuring 1.8x2.3cm with ipsilateral level 2 lymph node measuring 1.7cm with central necrosis and contralateral level 2 node measuring 1.3cm with questionable necrosis.    1/19/2016 he had direct laryngoscopy with biopsy left of midline, proximal to vallecula, which was p16 positive, invasive, with moderately to poorly differentiated sq.c.c. with basaloid features.    1/29/16 met with medical oncology who recommended chemoxrt.    2/1/16  staging PET CT showed hypermetabolic mass at base of tongue 4x2.5x3.5cm, max SUV 24.13, hypermetabolic lymph node left neck SUV 6.77, small subcentimeter lymph node right neck max SUV 3.79 (exact size of lymph nodes not mentioned in the summary).    2/15/16 started chemoxrt to left tongue base, bilateral cervical neck levels 1b-5, treated with 6MV photons utilizing IMRT, 5000 cGy in 200 cGy daily fractions.  Cone down boost to left base of tongue with additional 2000 cGy in 200 cGy daily fractions to gross disease.  Received weekly cisplatin with radiation but had to switch to carboplatin due to worsening renal function. during this time also missed several weekly treatments of cisplatin.    4/1/2016 completed chemoxrt.   11/1/2016 Repeat biopsy of left tongue showed persistent disease.  S/p salvage glossectomy with laryngectomy,  rT4aN0, p16 positive, base of tongue squamous cell carcinoma.     page 61 of 3/25/2020 outside records clinic (media  tab)  Pathology: Tonsil, left tonsil fold: reactive squamous mucosa with inflammation and degenerating skeletal muscle, no carcinoma seen.  Right and Left base of tongue:  Reactive squamous mucosa and submucosa with inflammation, no carcinoma seen.  Neck, glossectomy, laryngectomy, bilateral neck dissection:  invasive squamous cell carcinoma.  tumor site: base of tongue/hypopharynx.  Small focus of tumor is seen within the hyoid  bone.  specimen Size [id number]  tumor size [id number]  depth of invasion 20mm  TNM stage pT4a, pN0, pMx  Lymph nodes, included in all parts:  number involved 0  number examined 3.  distance of tumor from margins  positive inked margins: none  within 1mm : none  within 1-2mm: anterior-inferior    Bilateral neck dissection:  level 1: one lymph node and submandibular gland, negative for tumor  level II : two lymph nodes, negative for tumor  level III: not identified  level IV: not identified  level V: not identified.    6/20/17 - PETCT with FDG avidity of left neck lymph node, level 3, SUV 2.9.  No other evidence of local or distant disease.  7/6/2017 - biopsy of left neck lymph node with IR.  Sample was non-diagnostic. Notes from path report: Less than optimal scan specimen with minute tissue core of stromal fibroadipose tissue.  definitive tati background is not seen.  9/28/2017 - Repeat PET CT showing mild activity SUV 3.5 (increased from 3.2 previously; size 7.8x7.4x9.5mm).  Also new findings under left pectoralis minor (mild focal increased activity, indeterminate but new since previous exam)  3/22/2018 - CT neck shows no evidence of disease and level 2b lymph node decreased to 4.9mmx4.5mmx4.9mm from 7.8x7.4x9.5mm previously.    Seen by Dr. Myers 8/3/2020 for left-sided neck pain and a swelling in left level 2 several weeks ago.  He feels it arose after he began lifting weights.  CT neck 8/5/2020 : Lymph nodes At the left neck level L2 there is a heterogeneous soft tissue density with  central hypoattenuation likely reflecting a necrotic lymph node measuring 1.6 x 1.4 cm in transverse dimension, 2.5 cm in craniocaudal dimension.  There is a similar appearing irregular soft tissue density at the right neck level 2 measuring 1.1 x 9.0 cm in transverse dimension and 1.5 cm craniocaudal dimension.  No other abnormal appearing or enlarged lymph nodes found.  Impression: At level 2 within the neck bilaterally, there are  soft tissue density masses with central hypoattenuation likely reflective of necrotic lymph nodes.  Findings are suspicious for malignancy recurrence.  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  Staging PET CT 9/30/2020 with left sided hypermetabolic node and right sided enlarged node without uptake.  Also has mandibular vestibule uptake, which may be another site of disease. Discussed at tumor board.  He is not a surgical or radiation candidate.  -Started on PCC 10/6/2020 followed by maintenance cetuximab until 8/24/21 (discontinued due to progression of disease; continued increase in SUV uptake, no new lesions).  9/2021 started on carbo/5-FU/pembrolizumab; due to toxicity went to pembrolizumab monotherapy starting with cycle 2.  Progression of disease noted after cycle 3 so added chemotherapy back in with cycle 4.           Current Assessment & Plan     Doing well with treatment.  No more severe diarrhea or mucositis although he reports development of lower extremity edema and dry skin.  3/28/22 CT scan with interval improvement.  -dry skin, including feet likely mild toxicity of 5-FU  --encouraged moisturizer with lotion or cream  -continue with dose reduced chemotherapy with carboplatin AUC4 and 5- mg/m2/day  -labs reviewed; ok to proceed with cycle 7  --cycles are 5 weeks long to allow for recovery  -follow up prior to cycle 8.  -depending on cytopenias may need to transition back to maintenance immunotherapy  -continue  supportive medications           Secondary malignant neoplasm of cervical lymph node    Overview     See squamous cell carcinoma           Pancytopenia due to chemotherapy    Current Assessment & Plan     Pancytopenic secondary to chemotherapy. Thrombocytopenia asymptomatic.  -monitor cbc              Other    Anemia in stage 3a chronic kidney disease    Immunodeficiency due to drugs    Overview     S/p liver transplant and is currently on tacrolimus.  Afebrile, ANC sufficient for treatment  -gcsf to prevent chemo induced neutropenia               No orders of the defined types were placed in this encounter.      Advance Care Planning I initiated the process of advance care planning at his initial visit and explained the importance of this process to the patient.  Then the patient received detailed information about the importance of designating a Health Care Power of  (HCPOA). he was instructed to communicate with this person about their wishes for future healthcare, should he become sick and lose decision-making capacity. The patient has previously appointed a HCPOA. After our discussion, the patient has decided to complete a HCPOA and has appointed his brother and NAME:Ollie          Route Chart for Scheduling    Med Onc Chart Routing      Follow up with physician Other. 5/12 prior to chemo   Follow up with [facility]    Labs CBC, CMP, TSH and other   Lab interval: every 3 weeks  5/12 port draw labs cmp, cbc, tsh prior to appointment   Imaging None      Pharmacy appointment No pharmacy appointment needed      Other referrals No additional referrals needed         Treatment Plan Information   OP HEAD NECK PEMBROLIZUMAB CARBOPLATIN FLUOROURACIL (C1 ONLY RECEIVED) FOLLOWED BY PEMBROLIZUMAB MAINTENANCE   Ronald Berg MD   Upcoming Treatment Dates - OP HEAD NECK PEMBROLIZUMAB CARBOPLATIN FLUOROURACIL (C1 ONLY RECEIVED) FOLLOWED BY PEMBROLIZUMAB MAINTENANCE    5/12/2022       Chemotherapy       CARBOplatin (PARAPLATIN)  325 mg in sodium chloride 0.9% 500 mL chemo infusion       fluorouraciL 1,000 mg/m2/day = 6,480 mg in sodium chloride 0.9% 240 mL chemo infusion    Supportive Plan Information  IV FLUIDS AND ELECTROLYTES   Ronald Berg MD   Upcoming Treatment Dates - IV FLUIDS AND ELECTROLYTES    No upcoming days in selected categories.    Therapy Plan Information  Flushes  heparin, porcine (PF) 100 unit/mL injection flush 500 Units  500 Units, Intravenous, Every visit  sodium chloride 0.9% flush 10 mL  10 mL, Intravenous, Every visit      Ronald Berg MD  Hematology Oncology

## 2022-04-11 ENCOUNTER — INFUSION (OUTPATIENT)
Dept: INFUSION THERAPY | Facility: HOSPITAL | Age: 72
End: 2022-04-11
Payer: MEDICARE

## 2022-04-11 VITALS
SYSTOLIC BLOOD PRESSURE: 110 MMHG | DIASTOLIC BLOOD PRESSURE: 62 MMHG | TEMPERATURE: 98 F | RESPIRATION RATE: 18 BRPM | HEART RATE: 99 BPM

## 2022-04-11 DIAGNOSIS — C77.0 SECONDARY MALIGNANT NEOPLASM OF CERVICAL LYMPH NODE: Primary | ICD-10-CM

## 2022-04-11 DIAGNOSIS — C01 SQUAMOUS CELL CARCINOMA OF BASE OF TONGUE: ICD-10-CM

## 2022-04-11 PROCEDURE — A4216 STERILE WATER/SALINE, 10 ML: HCPCS | Performed by: STUDENT IN AN ORGANIZED HEALTH CARE EDUCATION/TRAINING PROGRAM

## 2022-04-11 PROCEDURE — 63600175 PHARM REV CODE 636 W HCPCS: Performed by: STUDENT IN AN ORGANIZED HEALTH CARE EDUCATION/TRAINING PROGRAM

## 2022-04-11 PROCEDURE — 25000003 PHARM REV CODE 250: Performed by: STUDENT IN AN ORGANIZED HEALTH CARE EDUCATION/TRAINING PROGRAM

## 2022-04-11 PROCEDURE — 99211 OFF/OP EST MAY X REQ PHY/QHP: CPT

## 2022-04-11 RX ORDER — HEPARIN 100 UNIT/ML
500 SYRINGE INTRAVENOUS
Status: DISCONTINUED | OUTPATIENT
Start: 2022-04-11 | End: 2022-04-11 | Stop reason: HOSPADM

## 2022-04-11 RX ORDER — SODIUM CHLORIDE 0.9 % (FLUSH) 0.9 %
10 SYRINGE (ML) INJECTION
Status: DISCONTINUED | OUTPATIENT
Start: 2022-04-11 | End: 2022-04-11 | Stop reason: HOSPADM

## 2022-04-11 RX ADMIN — HEPARIN 500 UNITS: 100 SYRINGE at 02:04

## 2022-04-11 RX ADMIN — Medication 10 ML: at 02:04

## 2022-04-11 NOTE — NURSING
1427- Patient arrived ambulatory to the unit for pump dc with infusion to portable pump already complete.  NAD noted, patient has no new complaints.  Port was flushed and heparin locked, then de-accessed.  Patient given AVS on discharge, instructed him to contact MD with any issues or concerns, patient will return to clinic tomorrow for injection.

## 2022-04-12 ENCOUNTER — INFUSION (OUTPATIENT)
Dept: INFUSION THERAPY | Facility: HOSPITAL | Age: 72
End: 2022-04-12
Payer: MEDICARE

## 2022-04-12 DIAGNOSIS — C01 SQUAMOUS CELL CARCINOMA OF BASE OF TONGUE: ICD-10-CM

## 2022-04-12 DIAGNOSIS — C77.0 SECONDARY MALIGNANT NEOPLASM OF CERVICAL LYMPH NODE: Primary | ICD-10-CM

## 2022-04-12 PROCEDURE — 96372 THER/PROPH/DIAG INJ SC/IM: CPT

## 2022-04-12 PROCEDURE — 63600175 PHARM REV CODE 636 W HCPCS: Mod: TB | Performed by: STUDENT IN AN ORGANIZED HEALTH CARE EDUCATION/TRAINING PROGRAM

## 2022-04-12 RX ADMIN — PEGFILGRASTIM-CBQV 6 MG: 6 INJECTION, SOLUTION SUBCUTANEOUS at 11:04

## 2022-04-15 LAB — HEMOCCULT STL QL IA: NEGATIVE

## 2022-04-21 ENCOUNTER — TELEPHONE (OUTPATIENT)
Dept: HEMATOLOGY/ONCOLOGY | Facility: CLINIC | Age: 72
End: 2022-04-21
Payer: MEDICARE

## 2022-04-21 NOTE — TELEPHONE ENCOUNTER
----- Message from Ronald Berg MD sent at 4/20/2022  2:11 PM CDT -----  Regarding: reschedule  Please move port draw labs/follow up to Wednesday 5/11, keep chemo on Thursday 5/12    If he asks why, it's because my clinic will be closed Thursday so we can't do everything on the same day    Thanks  -E

## 2022-04-22 DIAGNOSIS — M79.642 LEFT HAND PAIN: Primary | ICD-10-CM

## 2022-04-26 ENCOUNTER — HOSPITAL ENCOUNTER (OUTPATIENT)
Dept: RADIOLOGY | Facility: HOSPITAL | Age: 72
Discharge: HOME | End: 2022-04-26
Attending: PHYSICIAN ASSISTANT
Payer: MEDICARE

## 2022-04-26 ENCOUNTER — OFFICE VISIT (OUTPATIENT)
Dept: ORTHOPEDICS | Facility: CLINIC | Age: 72
End: 2022-04-26
Payer: MEDICARE

## 2022-04-26 DIAGNOSIS — M79.641 HAND PAIN, RIGHT: ICD-10-CM

## 2022-04-26 DIAGNOSIS — M79.642 LEFT HAND PAIN: ICD-10-CM

## 2022-04-26 DIAGNOSIS — M72.0 DUPUYTREN'S CONTRACTURE OF BOTH HANDS: Primary | ICD-10-CM

## 2022-04-26 PROCEDURE — 99999 PR PBB SHADOW E&M-EST. PATIENT-LVL III: ICD-10-PCS | Mod: PBBFAC,,, | Performed by: PHYSICIAN ASSISTANT

## 2022-04-26 PROCEDURE — 73130 X-RAY EXAM OF HAND: CPT | Mod: 26,50,, | Performed by: RADIOLOGY

## 2022-04-26 PROCEDURE — 99203 PR OFFICE/OUTPT VISIT, NEW, LEVL III, 30-44 MIN: ICD-10-PCS | Mod: S$PBB,,, | Performed by: PHYSICIAN ASSISTANT

## 2022-04-26 PROCEDURE — 99203 OFFICE O/P NEW LOW 30 MIN: CPT | Mod: S$PBB,,, | Performed by: PHYSICIAN ASSISTANT

## 2022-04-26 PROCEDURE — 73130 X-RAY EXAM OF HAND: CPT | Mod: TC,50

## 2022-04-26 PROCEDURE — 99999 PR PBB SHADOW E&M-EST. PATIENT-LVL III: CPT | Mod: PBBFAC,,, | Performed by: PHYSICIAN ASSISTANT

## 2022-04-26 PROCEDURE — 73130 XR HAND COMPLETE 3 VIEWS BILATERAL: ICD-10-PCS | Mod: 26,50,, | Performed by: RADIOLOGY

## 2022-04-26 PROCEDURE — 99213 OFFICE O/P EST LOW 20 MIN: CPT | Mod: PBBFAC | Performed by: PHYSICIAN ASSISTANT

## 2022-04-26 NOTE — PROGRESS NOTES
[facility]  History & Physical  Orthopedics    SUBJECTIVE:      COVID-19 attestation:  This patient was treated during the COVID-19 pandemic.  This was discussed with the patient, they are aware of our current policies and procedures, were given the option of delaying their visit and or switching to a virtual visit, delaying their surgery when applicable, and they elect to proceed.    Chief Complaint: Bilateral hand contractures    Referring Provider: GAURANG Walton MD     History of Present Illness:  Patient is a 72 y.o. right hand dominant male who presents today with complaints of bilateral finger contractures. History of right ring and small dupuytrens release about 15 years ago performed in [place], [place]. He tells me that the surgery helped for about 1 year, then the contractures slowly started to return. Left small finger contracture present for about 15 years. He is not interested in surgery due to the extensive laryngeal surgery and liver transplant.    Patient is non-verbal     The patient is a/an retired.    Onset of symptoms/DOI was 15 years.    Symptoms are aggravated by movement.    Symptoms are alleviated by none.    Symptoms consist of decreased ROM.    The patient rates their pain as a 0/10.    Attempted treatment(s) and/or interventions include activity modifications, rest, surgical intervention.     The patient denies any fevers, chills, N/V, D/C and presents for evaluation.       Past Medical History:   Diagnosis Date    Basal cell carcinoma (BCC) in situ of skin 2012    3 on face, 2 on arm, removed by dermatology.    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 Underwent surgical resection of tongue and neck. s/p tracheostomy      Past Surgical History:   Procedure Laterality Date    COLONOSCOPY      INSERTION OF VENOUS ACCESS PORT Right 3/8/2021    Procedure: INSERTION, VENOUS ACCESS PORT;  Surgeon: Jesus Rendon MD;  Location: [facility] OR [address] [place] [address];  Service: General;  Laterality: Right;    LIVER TRANSPLANT  11/2008    transplanted for biopsy proven hepatocellular carcinoma,     LYMPH NODE BIOPSY N/A 9/18/2020    Procedure: BIOPSY, LYMPH NODE;  Surgeon: Taz Diagnostic Provider;  [facility];  Service: General;  Laterality: N/A;  Rm 173    TRACHEOSTOMY       Review of patient's allergies indicates:  No Known Allergies  Social History     Social History Narrative    Not on file     Family History   Problem Relation Age of Onset    Dementia Mother          Current Outpatient Medications:     azelaic acid (AZELEX) 15 % gel, APPLY TOPICALLY TO AFFECTED AREA IN THE MORNING, Disp: , Rfl:     diphenoxylate-atropine 2.5-0.025 mg (LOMOTIL) 2.5-0.025 mg per tablet, Take 1 tablet by mouth 4 (four) times daily as needed for Diarrhea (use when loperamide/imodium does not work)., Disp: 30 tablet, Rfl: 0    ibuprofen (ADVIL,MOTRIN) 200 MG tablet, Take 200 mg by mouth., Disp: , Rfl:     levothyroxine (SYNTHROID) 88 MCG tablet, TAKE 1 TABLET BY MOUTH ONCE DAILY, Disp: 90 tablet, Rfl: 3    LIDOcaine HCl 2% (LIDOCAINE VISCOUS) 2 % Soln, Swish and spit 15 mls every 8 (eight) hours as needed (mouth sore)., Disp: 100 mL, Rfl: 3    LIDOcaine-prilocaine (EMLA) cream, Apply generously to port site 30-60 min prior to chemo and then cover with saran wrap., Disp: 30 g, Rfl: 2    loperamide (IMODIUM) 2 mg capsule, Take 1 capsule (2 mg total) by mouth 4 (four) times daily as needed for Diarrhea., Disp: 30 capsule, Rfl: 1    magic mouthwash diphen/antac/lidoc/nysta, Take 10 mLs by mouth 4 (four) times daily., Disp: 120 mL, Rfl: 4    metroNIDAZOLE (METROGEL) 0.75 % gel, Apply topically to affected area 2 (two) times daily. (Patient not  taking: No sig reported), Disp: 45 g, Rfl: 1    multivit-min/FA/lycopen/lutein (CENTRUM SILVER MEN ORAL), Take 1 tablet by mouth., Disp: , Rfl:     multivitamin capsule, Take 1 capsule by mouth once daily., Disp: , Rfl:     oxyCODONE (ROXICODONE) 10 mg Tab immediate release tablet, Take 1 tablet (10 mg total) by mouth every 6 (six) hours as needed for Pain., Disp: 120 tablet, Rfl: 0    sulfacetamide sodium-sulfur 10-5 % (w/w) Clsr, USE TO WASH AFFECTED AREA DAILY, Disp: , Rfl:     tacrolimus (PROGRAF) 0.5 MG Cap, Take 1 capsule (0.5 mg total) by mouth every 12 (twelve) hours., Disp: 60 capsule, Rfl: 11    tacrolimus (PROGRAF) 0.5 MG Cap, Take 1 capsule (0.5 mg total) by mouth every 12 (twelve) hours., Disp: 180 capsule, Rfl: 3    tacrolimus (PROGRAF) 0.5 MG Cap, Take 1 capsule (0.5 mg total) by mouth every 12 (twelve) hours., Disp: 180 capsule, Rfl: 3    tiZANidine (ZANAFLEX) 2 MG tablet, Take 1 tablet (2 mg total) by mouth nightly as needed (neck muscle strain)., Disp: 30 tablet, Rfl: 0    traZODone (DESYREL) 50 MG tablet, TAKE 1 TABLET BY MOUTH IN  THE EVENING, Disp: 90 tablet, Rfl: 3    vitamin E 400 UNIT capsule, Take 400 Units by mouth once daily., Disp: , Rfl:   No current facility-administered medications for this visit.    Facility-Administered Medications Ordered in Other Visits:     heparin, porcine (PF) 100 unit/mL injection flush 500 Units, 500 Units, Intravenous, PRN, Ronald Berg MD    sodium chloride 0.9% flush 10 mL, 10 mL, Intravenous, PRN, Ronald Berg MD      Review of Systems:  Constitutional: no fever or chills  Eyes: no visual changes  ENT: no nasal congestion or sore throat  Respiratory: no cough or shortness of breath  Cardiovascular: no chest pain  Gastrointestinal: no nausea or vomiting, tolerating diet  Musculoskeletal: decreased ROM    OBJECTIVE:      Vital Signs (Most Recent):  There were no vitals filed for this visit.  There is no height or weight on file to calculate  BMI.      Physical Exam:  Constitutional: The patient appears well-developed and well-nourished. No distress.   Skin: No lesions appreciated  Head: Normocephalic and atraumatic.   Nose: Nose normal.   Ears: No deformities seen  Eyes: Conjunctivae and EOM are normal.   Neck: No tracheal deviation present.   Cardiovascular: Normal rate and intact distal pulses.    Pulmonary/Chest: Effort normal. No respiratory distress.   Abdominal: There is no guarding.   Neurological: The patient is alert.   Psychiatric: The patient has a normal mood and affect.     Bilateral Hand/Wrist Examination:    Observation/Inspection:  Swelling  none    Deformity  Multiple arthritic joints, contractures noted  Discoloration  none     Scars   Healed surgical    Atrophy  none    HAND/WRIST EXAMINATION:  Finkelstein's Test   Neg  WHAT Test    Neg  Snuff box tenderness   Neg  Edwards's Test    Neg  Hook of Hamate Tenderness  Neg  CMC grind    Neg  Circumduction test   Neg  Right Ring PIP 90 degree contracture, small PIP 45 degree contracture  Left Ring PIP 30 degree contracture, small PIP 45 degree contracture    Neurovascular Exam:  Digits WWP, brisk CR < 3s throughout  NVI motor/LTS to M/R/U nerves, radial pulse 2+    ROM hand full painless fist, see above for contractures    ROM wrist full, painless    ROM elbow full, painless    Abdomen not guarded  Respirations nonlabored  Perfusion intact    Diagnostic Results:     Imaging - I independently viewed the patient's imaging as well as the radiology report.  Xrays of the patient's bilateral hand demonstrate no evidence of any acute fractures or dislocations, positive for significant degenerative changes, and ring and small finger contractures noted bilaterally    EMG - none    ASSESSMENT/PLAN:      72 y.o. yo male with Bilateral hand Dupuytrens contractures    Plan: The patient and I had a thorough discussion today.  We discussed the working diagnosis as well as several other potential  alternative diagnoses.  Treatment options were discussed, both conservative and surgical.  Conservative treatment options would include things such as activity modifications, workplace modifications, a period of rest, oral vs topical OTC and prescription anti-inflammatory medications, occupational therapy, splinting/bracing, immobilization, corticosteroid injections, and others.  Surgical options were discussed as well.     At this time, the patient is not interested in surgery. I discuss the Xiaflex option with him which he is interested in being evaluated for. I will arrange for him to see Dr. Martínez to discuss this option further. I discuss that the right ring contracture may be too advanced for this option.    Should the patient's symptoms worsen, persist, or fail to improve they should return for reevaluation and I would be happy to see them back anytime.           Please do not hesitate to reach out to us via email, phone, or MyChart with any questions, concerns, or feedback.

## 2022-05-04 DIAGNOSIS — C01 SQUAMOUS CELL CARCINOMA OF BASE OF TONGUE: ICD-10-CM

## 2022-05-04 RX ORDER — OXYCODONE HYDROCHLORIDE 10 MG/1
10 TABLET ORAL EVERY 6 HOURS PRN
Qty: 120 TABLET | Refills: 0 | Status: SHIPPED | OUTPATIENT
Start: 2022-05-04 | End: 2022-06-02 | Stop reason: SDUPTHER

## 2022-05-09 ENCOUNTER — INFUSION (OUTPATIENT)
Dept: INFUSION THERAPY | Facility: HOSPITAL | Age: 72
End: 2022-05-09
Attending: INTERNAL MEDICINE
Payer: MEDICARE

## 2022-05-09 ENCOUNTER — PES CALL (OUTPATIENT)
Dept: ADMINISTRATIVE | Facility: CLINIC | Age: 72
End: 2022-05-09
Payer: MEDICARE

## 2022-05-09 ENCOUNTER — OFFICE VISIT (OUTPATIENT)
Dept: HEMATOLOGY/ONCOLOGY | Facility: CLINIC | Age: 72
End: 2022-05-09
Payer: MEDICARE

## 2022-05-09 VITALS
WEIGHT: 115.31 LBS | HEIGHT: 68 IN | BODY MASS INDEX: 17.47 KG/M2 | TEMPERATURE: 98 F | RESPIRATION RATE: 18 BRPM | SYSTOLIC BLOOD PRESSURE: 122 MMHG | OXYGEN SATURATION: 98 % | HEART RATE: 78 BPM | DIASTOLIC BLOOD PRESSURE: 67 MMHG

## 2022-05-09 DIAGNOSIS — Z94.4 STATUS POST LIVER TRANSPLANTATION: Primary | ICD-10-CM

## 2022-05-09 DIAGNOSIS — N18.31 STAGE 3A CHRONIC KIDNEY DISEASE: ICD-10-CM

## 2022-05-09 DIAGNOSIS — C77.0 SECONDARY MALIGNANT NEOPLASM OF CERVICAL LYMPH NODE: ICD-10-CM

## 2022-05-09 DIAGNOSIS — D84.821 IMMUNODEFICIENCY DUE TO DRUGS: ICD-10-CM

## 2022-05-09 DIAGNOSIS — D69.6 THROMBOCYTOPENIA, UNSPECIFIED: ICD-10-CM

## 2022-05-09 DIAGNOSIS — Z79.899 IMMUNODEFICIENCY DUE TO DRUGS: ICD-10-CM

## 2022-05-09 DIAGNOSIS — C22.0 HCC (HEPATOCELLULAR CARCINOMA): ICD-10-CM

## 2022-05-09 DIAGNOSIS — C01 SQUAMOUS CELL CARCINOMA OF BASE OF TONGUE: Primary | ICD-10-CM

## 2022-05-09 DIAGNOSIS — E89.0 POSTOPERATIVE HYPOTHYROIDISM: ICD-10-CM

## 2022-05-09 DIAGNOSIS — E03.2 HYPOTHYROIDISM DUE TO MEDICAMENTS AND OTHER EXOGENOUS SUBSTANCES: ICD-10-CM

## 2022-05-09 LAB
AFP SERPL-MCNC: <2 NG/ML (ref 0–8.4)
ALBUMIN SERPL BCP-MCNC: 3.5 G/DL (ref 3.5–5.2)
ALP SERPL-CCNC: 95 U/L (ref 55–135)
ALT SERPL W/O P-5'-P-CCNC: 12 U/L (ref 10–44)
ANION GAP SERPL CALC-SCNC: 9 MMOL/L (ref 8–16)
AST SERPL-CCNC: 41 U/L (ref 10–40)
BASOPHILS # BLD AUTO: 0.02 K/UL (ref 0–0.2)
BASOPHILS NFR BLD: 0.4 % (ref 0–1.9)
BILIRUB SERPL-MCNC: 0.4 MG/DL (ref 0.1–1)
BUN SERPL-MCNC: 17 MG/DL (ref 8–23)
CALCIUM SERPL-MCNC: 9 MG/DL (ref 8.7–10.5)
CHLORIDE SERPL-SCNC: 102 MMOL/L (ref 95–110)
CO2 SERPL-SCNC: 29 MMOL/L (ref 23–29)
CREAT SERPL-MCNC: 1.3 MG/DL (ref 0.5–1.4)
DIFFERENTIAL METHOD: ABNORMAL
EOSINOPHIL # BLD AUTO: 0.1 K/UL (ref 0–0.5)
EOSINOPHIL NFR BLD: 1 % (ref 0–8)
ERYTHROCYTE [DISTWIDTH] IN BLOOD BY AUTOMATED COUNT: 13.4 % (ref 11.5–14.5)
EST. GFR  (AFRICAN AMERICAN): >60 ML/MIN/1.73 M^2
EST. GFR  (NON AFRICAN AMERICAN): 54.1 ML/MIN/1.73 M^2
GLUCOSE SERPL-MCNC: 104 MG/DL (ref 70–110)
HCT VFR BLD AUTO: 26.8 % (ref 40–54)
HGB BLD-MCNC: 9.1 G/DL (ref 14–18)
IMM GRANULOCYTES # BLD AUTO: 0.01 K/UL (ref 0–0.04)
IMM GRANULOCYTES NFR BLD AUTO: 0.2 % (ref 0–0.5)
LYMPHOCYTES # BLD AUTO: 0.6 K/UL (ref 1–4.8)
LYMPHOCYTES NFR BLD: 11.8 % (ref 18–48)
MCH RBC QN AUTO: 35.3 PG (ref 27–31)
MCHC RBC AUTO-ENTMCNC: 34 G/DL (ref 32–36)
MCV RBC AUTO: 104 FL (ref 82–98)
MONOCYTES # BLD AUTO: 0.5 K/UL (ref 0.3–1)
MONOCYTES NFR BLD: 10.7 % (ref 4–15)
NEUTROPHILS # BLD AUTO: 3.9 K/UL (ref 1.8–7.7)
NEUTROPHILS NFR BLD: 75.9 % (ref 38–73)
NRBC BLD-RTO: 0 /100 WBC
PLATELET # BLD AUTO: 133 K/UL (ref 150–450)
PMV BLD AUTO: 9.7 FL (ref 9.2–12.9)
POTASSIUM SERPL-SCNC: 3.9 MMOL/L (ref 3.5–5.1)
PROT SERPL-MCNC: 6.7 G/DL (ref 6–8.4)
RBC # BLD AUTO: 2.58 M/UL (ref 4.6–6.2)
SODIUM SERPL-SCNC: 140 MMOL/L (ref 136–145)
T4 FREE SERPL-MCNC: 1.11 NG/DL (ref 0.71–1.51)
TSH SERPL DL<=0.005 MIU/L-ACNC: 0.15 UIU/ML (ref 0.4–4)
WBC # BLD AUTO: 5.07 K/UL (ref 3.9–12.7)

## 2022-05-09 PROCEDURE — 99999 PR PBB SHADOW E&M-EST. PATIENT-LVL IV: CPT | Mod: PBBFAC,,, | Performed by: STUDENT IN AN ORGANIZED HEALTH CARE EDUCATION/TRAINING PROGRAM

## 2022-05-09 PROCEDURE — 63600175 PHARM REV CODE 636 W HCPCS: Performed by: STUDENT IN AN ORGANIZED HEALTH CARE EDUCATION/TRAINING PROGRAM

## 2022-05-09 PROCEDURE — 99999 PR PBB SHADOW E&M-EST. PATIENT-LVL IV: ICD-10-PCS | Mod: PBBFAC,,, | Performed by: STUDENT IN AN ORGANIZED HEALTH CARE EDUCATION/TRAINING PROGRAM

## 2022-05-09 PROCEDURE — 84443 ASSAY THYROID STIM HORMONE: CPT | Performed by: STUDENT IN AN ORGANIZED HEALTH CARE EDUCATION/TRAINING PROGRAM

## 2022-05-09 PROCEDURE — 99214 OFFICE O/P EST MOD 30 MIN: CPT | Mod: PBBFAC | Performed by: STUDENT IN AN ORGANIZED HEALTH CARE EDUCATION/TRAINING PROGRAM

## 2022-05-09 PROCEDURE — 99215 PR OFFICE/OUTPT VISIT, EST, LEVL V, 40-54 MIN: ICD-10-PCS | Mod: S$PBB,,, | Performed by: STUDENT IN AN ORGANIZED HEALTH CARE EDUCATION/TRAINING PROGRAM

## 2022-05-09 PROCEDURE — 25000003 PHARM REV CODE 250: Performed by: STUDENT IN AN ORGANIZED HEALTH CARE EDUCATION/TRAINING PROGRAM

## 2022-05-09 PROCEDURE — 36591 DRAW BLOOD OFF VENOUS DEVICE: CPT

## 2022-05-09 PROCEDURE — A4216 STERILE WATER/SALINE, 10 ML: HCPCS | Performed by: STUDENT IN AN ORGANIZED HEALTH CARE EDUCATION/TRAINING PROGRAM

## 2022-05-09 PROCEDURE — 99215 OFFICE O/P EST HI 40 MIN: CPT | Mod: S$PBB,,, | Performed by: STUDENT IN AN ORGANIZED HEALTH CARE EDUCATION/TRAINING PROGRAM

## 2022-05-09 PROCEDURE — 82105 ALPHA-FETOPROTEIN SERUM: CPT | Performed by: INTERNAL MEDICINE

## 2022-05-09 PROCEDURE — 85025 COMPLETE CBC W/AUTO DIFF WBC: CPT | Performed by: INTERNAL MEDICINE

## 2022-05-09 PROCEDURE — 80053 COMPREHEN METABOLIC PANEL: CPT | Performed by: INTERNAL MEDICINE

## 2022-05-09 PROCEDURE — 84439 ASSAY OF FREE THYROXINE: CPT | Performed by: STUDENT IN AN ORGANIZED HEALTH CARE EDUCATION/TRAINING PROGRAM

## 2022-05-09 RX ORDER — HEPARIN 100 UNIT/ML
500 SYRINGE INTRAVENOUS
Status: CANCELLED | OUTPATIENT
Start: 2022-05-12

## 2022-05-09 RX ORDER — DIPHENHYDRAMINE HYDROCHLORIDE 50 MG/ML
50 INJECTION INTRAMUSCULAR; INTRAVENOUS ONCE AS NEEDED
Status: CANCELLED | OUTPATIENT
Start: 2022-05-12

## 2022-05-09 RX ORDER — SODIUM CHLORIDE 0.9 % (FLUSH) 0.9 %
10 SYRINGE (ML) INJECTION
Status: CANCELLED | OUTPATIENT
Start: 2022-05-12

## 2022-05-09 RX ORDER — EPINEPHRINE 0.3 MG/.3ML
0.3 INJECTION SUBCUTANEOUS ONCE AS NEEDED
Status: CANCELLED | OUTPATIENT
Start: 2022-05-12

## 2022-05-09 RX ORDER — SODIUM CHLORIDE 0.9 % (FLUSH) 0.9 %
10 SYRINGE (ML) INJECTION
Status: DISCONTINUED | OUTPATIENT
Start: 2022-05-09 | End: 2022-05-09 | Stop reason: HOSPADM

## 2022-05-09 RX ORDER — HEPARIN 100 UNIT/ML
500 SYRINGE INTRAVENOUS
Status: CANCELLED | OUTPATIENT
Start: 2022-05-16

## 2022-05-09 RX ORDER — HEPARIN 100 UNIT/ML
500 SYRINGE INTRAVENOUS
Status: DISCONTINUED | OUTPATIENT
Start: 2022-05-09 | End: 2022-05-09 | Stop reason: HOSPADM

## 2022-05-09 RX ORDER — SODIUM CHLORIDE 0.9 % (FLUSH) 0.9 %
10 SYRINGE (ML) INJECTION
Status: CANCELLED | OUTPATIENT
Start: 2022-05-16

## 2022-05-09 RX ADMIN — HEPARIN SODIUM (PORCINE) LOCK FLUSH IV SOLN 100 UNIT/ML 500 UNITS: 100 SOLUTION at 01:05

## 2022-05-09 RX ADMIN — SODIUM CHLORIDE, PRESERVATIVE FREE 10 ML: 5 INJECTION INTRAVENOUS at 01:05

## 2022-05-09 NOTE — ASSESSMENT & PLAN NOTE
Grade 1 thrombocytopenia secondary to chemotherapy, asymptomatic. ddx includes liver disease.  -monitor cbc

## 2022-05-09 NOTE — ASSESSMENT & PLAN NOTE
Doing well with treatment.  Chemo side effect such as mucositis are not as severe. Still has alternating diarrhea and constipation, which has not worsened.  -continue with dose reduced chemotherapy with carboplatin AUC4 and 5- mg/m2/day  -labs reviewed; ok to proceed with cycle 8  --cycles are 5 weeks long to allow for recovery  -follow up prior to cycle 9 (pembrolizumab only)  -continue supportive medications

## 2022-05-09 NOTE — PROGRESS NOTES
PATIENT: Rocco Swain  MRN: [id number]  DATE: 5/9/2022      Diagnosis:   1. Thrombocytopenia, unspecified    2. Immunodeficiency due to drugs    3. Squamous cell carcinoma of base of tongue    4. Secondary malignant neoplasm of cervical lymph node    5. Stage 3a chronic kidney disease        Chief Complaint: Squamous cell carcinoma of base of tongue      Oncologic History:    Oncologic History 1. Squamous cell carcinoma of base of tongue with recurrence    Oncologic Treatment 1. Chemoradiation completed 4/2016  2. S/p glossectomy, laryngectomy, and bilateral neck dissection for persistent/recurrent disease  3. 10/6/2020-8/24/21 PCC  4. 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        Subjective:    Interval History: Mr. Swain is here for follow up    Doing well since I last saw him.  Severity of mucositis is less.  No changes to alternating diarrhea or constipation. No new leg swelling or dry skin issues.  Neck pain controlled.    Overall he feels well and is ready for chemotherapy.    He is alone at this visit.  Communication from him is 100% written.  Past Medical History:   Past Medical History:   Diagnosis Date    Basal cell carcinoma (BCC) in situ of skin 2012    3 on face, 2 on arm, removed by dermatology.    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 Underwent surgical resection of tongue and neck. s/p tracheostomy       Past Surgical HIstory:   Past Surgical History:   Procedure Laterality Date    COLONOSCOPY      INSERTION OF VENOUS ACCESS PORT Right 3/8/2021    Procedure: INSERTION, VENOUS ACCESS PORT;  Surgeon: Jesus Rendon,  MD;  Location: [facility] OR [address] [place] [address];  Service: General;  Laterality: Right;    LIVER TRANSPLANT  11/2008    transplanted for biopsy proven hepatocellular carcinoma,     LYMPH NODE BIOPSY N/A 9/18/2020    Procedure: BIOPSY, LYMPH NODE;  Surgeon: Taz Diagnostic Provider;  Location: [facility] OR [address] [place] [address];  Service: General;  Laterality: N/A;  Rm 173    TRACHEOSTOMY         Family History:   Family History   Problem Relation Age of Onset    Dementia Mother        Social History:  reports that he has never smoked. He has never used smokeless tobacco. He reports current alcohol use. He reports previous drug use.    Allergies:  Review of patient's allergies indicates:  No Known Allergies    Medications:  Current Outpatient Medications   Medication Sig Dispense Refill    azelaic acid (AZELEX) 15 % gel APPLY TOPICALLY TO AFFECTED AREA IN THE MORNING      diphenoxylate-atropine 2.5-0.025 mg (LOMOTIL) 2.5-0.025 mg per tablet Take 1 tablet by mouth 4 (four) times daily as needed for Diarrhea (use when loperamide/imodium does not work). 30 tablet 0    ibuprofen (ADVIL,MOTRIN) 200 MG tablet Take 200 mg by mouth.      levothyroxine (SYNTHROID) 88 MCG tablet TAKE 1 TABLET BY MOUTH ONCE DAILY 90 tablet 3    LIDOcaine HCl 2% (LIDOCAINE VISCOUS) 2 % Soln Swish and spit 15 mls every 8 (eight) hours as needed (mouth sore). 100 mL 3    LIDOcaine-prilocaine (EMLA) cream Apply generously to port site 30-60 min prior to chemo and then cover with saran wrap. 30 g 2    loperamide (IMODIUM) 2 mg capsule Take 1 capsule (2 mg total) by mouth 4 (four) times daily as needed for Diarrhea. 30 capsule 1    magic mouthwash diphen/antac/lidoc/nysta Take 10 mLs by mouth 4 (four) times daily. 120 mL 4    metroNIDAZOLE (METROGEL) 0.75 % gel Apply topically to affected area 2 (two) times daily. 45 g 1    multivit-min/FA/lycopen/lutein (CENTRUM SILVER MEN ORAL) Take 1 tablet by mouth.      multivitamin capsule Take 1 capsule by mouth once daily.       oxyCODONE (ROXICODONE) 10 mg Tab immediate release tablet Take 1 tablet (10 mg total) by mouth every 6 (six) hours as needed for Pain. 120 tablet 0    sulfacetamide sodium-sulfur 10-5 % (w/w) Clsr USE TO WASH AFFECTED AREA DAILY      tacrolimus (PROGRAF) 0.5 MG Cap Take 1 capsule (0.5 mg total) by mouth every 12 (twelve) hours. 60 capsule 11    tacrolimus (PROGRAF) 0.5 MG Cap Take 1 capsule (0.5 mg total) by mouth every 12 (twelve) hours. 180 capsule 3    tacrolimus (PROGRAF) 0.5 MG Cap Take 1 capsule (0.5 mg total) by mouth every 12 (twelve) hours. 180 capsule 3    tiZANidine (ZANAFLEX) 2 MG tablet Take 1 tablet (2 mg total) by mouth nightly as needed (neck muscle strain). 30 tablet 0    traZODone (DESYREL) 50 MG tablet TAKE 1 TABLET BY MOUTH IN  THE EVENING 90 tablet 3    vitamin E 400 UNIT capsule Take 400 Units by mouth once daily.       No current facility-administered medications for this visit.     Facility-Administered Medications Ordered in Other Visits   Medication Dose Route Frequency Provider Last Rate Last Admin    heparin, porcine (PF) 100 unit/mL injection flush 500 Units  500 Units Intravenous PRSTEVIE Berg MD        heparin, porcine (PF) 100 unit/mL injection flush 500 Units  500 Units Intravenous PRSTEVIE Berg MD   500 Units at 05/09/22 1350    sodium chloride 0.9% flush 10 mL  10 mL Intravenous PRSTEVIE Berg MD        sodium chloride 0.9% flush 10 mL  10 mL Intravenous PRSTEVIE Berg MD   10 mL at 05/09/22 1350       Review of Systems   Constitutional: Negative for activity change, appetite change, chills, diaphoresis, fatigue, fever and unexpected weight change.   HENT: Positive for mouth sores (minimal) and trouble swallowing. Negative for nosebleeds.    Eyes: Negative for visual disturbance.   Respiratory: Negative for cough, chest tightness, shortness of breath and wheezing.    Cardiovascular: Negative for chest pain and leg swelling.   Gastrointestinal: Negative for  "abdominal distention, abdominal pain, blood in stool, constipation, diarrhea, nausea and vomiting.   Endocrine: Negative for cold intolerance and heat intolerance.   Genitourinary: Negative for difficulty urinating and dysuria.   Musculoskeletal: Positive for myalgias and neck pain (pain radiates between both sides of his neck under his jaw.). Negative for arthralgias and back pain.   Skin: Negative for color change and rash.   Neurological: Negative for dizziness, weakness, light-headedness, numbness and headaches.   Hematological: Negative for adenopathy. Does not bruise/bleed easily.   Psychiatric/Behavioral: Negative for confusion.       ECOG Performance Status:      ECOG SCORE    1 - Restricted in strenuous activity-ambulatory and able to carry out work of a light nature         Objective:      Vitals:   Vitals:    05/09/22 1416   BP: 122/67   BP Location: Right arm   Patient Position: Sitting   BP Method: Large (Automatic)   Pulse: 78   Resp: 18   Temp: 98.4 °F (36.9 °C)   TempSrc: Oral   SpO2: 98%   Weight: 52.3 kg (115 lb 4.8 oz)   Height: 5' 8" (1.727 m)     BMI: Body mass index is 17.53 kg/m².  Wt Readings from Last 10 Encounters:   05/09/22 52.3 kg (115 lb 4.8 oz)   04/07/22 53 kg (116 lb 13.5 oz)   04/07/22 53 kg (116 lb 13.5 oz)   03/25/22 53.1 kg (117 lb 1 oz)   03/03/22 55.2 kg (121 lb 11.1 oz)   03/02/22 55.2 kg (121 lb 11.1 oz)   02/21/22 54.4 kg (120 lb)   01/27/22 54.6 kg (120 lb 5.9 oz)   12/23/21 53.1 kg (117 lb 1 oz)   12/02/21 54.1 kg (119 lb 4.3 oz)       Physical Exam  Constitutional:       General: He is not in acute distress.     Appearance: Normal appearance. He is not ill-appearing.   HENT:      Head: Normocephalic and atraumatic.      Mouth/Throat:      Mouth: Mucous membranes are moist.      Pharynx: No oropharyngeal exudate or posterior oropharyngeal erythema.   Eyes:      General: No scleral icterus.     Extraocular Movements: Extraocular movements intact.      Conjunctiva/sclera: " Conjunctivae normal.      Pupils: Pupils are equal, round, and reactive to light.   Cardiovascular:      Rate and Rhythm: Normal rate and regular rhythm.      Heart sounds: No murmur heard.    No friction rub. No gallop.   Pulmonary:      Effort: Pulmonary effort is normal. No respiratory distress.      Breath sounds: No stridor. No wheezing, rhonchi or rales.   Chest:   Breasts:      Right: No axillary adenopathy or supraclavicular adenopathy.      Left: No axillary adenopathy or supraclavicular adenopathy.       Abdominal:      General: Bowel sounds are normal. There is no distension.      Palpations: Abdomen is soft. There is no mass.      Tenderness: There is no abdominal tenderness. There is no guarding or rebound.   Musculoskeletal:         General: Normal range of motion.      Cervical back: Normal range of motion and neck supple. No tenderness.      Right lower leg: No edema.      Left lower leg: No edema.   Lymphadenopathy:      Cervical: No cervical adenopathy.      Upper Body:      Right upper body: No supraclavicular or axillary adenopathy.      Left upper body: No supraclavicular or axillary adenopathy.   Skin:     General: Skin is warm and dry.   Neurological:      General: No focal deficit present.      Mental Status: He is alert.         Laboratory Data:   Recent Results (from the past 168 hour(s))   CBC Auto Differential    Collection Time: 05/09/22  1:46 PM   Result Value Ref Range    WBC 5.07 3.90 - 12.70 K/uL    RBC 2.58 (L) 4.60 - 6.20 M/uL    Hemoglobin 9.1 (L) 14.0 - 18.0 g/dL    Hematocrit 26.8 (L) 40.0 - 54.0 %     (H) 82 - 98 fL    MCH 35.3 (H) 27.0 - 31.0 pg    MCHC 34.0 32.0 - 36.0 g/dL    RDW 13.4 11.5 - 14.5 %    Platelets 133 (L) 150 - 450 K/uL    MPV 9.7 9.2 - 12.9 fL    Immature Granulocytes 0.2 0.0 - 0.5 %    Gran # (ANC) 3.9 1.8 - 7.7 K/uL    Immature Grans (Abs) 0.01 0.00 - 0.04 K/uL    Lymph # 0.6 (L) 1.0 - 4.8 K/uL    Mono # 0.5 0.3 - 1.0 K/uL    Eos # 0.1 0.0 - 0.5 K/uL     Baso # 0.02 0.00 - 0.20 K/uL    nRBC 0 0 /100 WBC    Gran % 75.9 (H) 38.0 - 73.0 %    Lymph % 11.8 (L) 18.0 - 48.0 %    Mono % 10.7 4.0 - 15.0 %    Eosinophil % 1.0 0.0 - 8.0 %    Basophil % 0.4 0.0 - 1.9 %    Differential Method Automated    Comprehensive Metabolic Panel    Collection Time: 05/09/22  1:46 PM   Result Value Ref Range    Sodium 140 136 - 145 mmol/L    Potassium 3.9 3.5 - 5.1 mmol/L    Chloride 102 95 - 110 mmol/L    CO2 29 23 - 29 mmol/L    Glucose 104 70 - 110 mg/dL    BUN 17 8 - 23 mg/dL    Creatinine 1.3 0.5 - 1.4 mg/dL    Calcium 9.0 8.7 - 10.5 mg/dL    Total Protein 6.7 6.0 - 8.4 g/dL    Albumin 3.5 3.5 - 5.2 g/dL    Total Bilirubin 0.4 0.1 - 1.0 mg/dL    Alkaline Phosphatase 95 55 - 135 U/L    AST 41 (H) 10 - 40 U/L    ALT 12 10 - 44 U/L    Anion Gap 9 8 - 16 mmol/L    eGFR if African American >60.0 >60 mL/min/1.73 m^2    eGFR if non  54.1 (A) >60 mL/min/1.73 m^2   AFP Tumor Marker    Collection Time: 05/09/22  1:46 PM   Result Value Ref Range    AFP <2.0 0.0 - 8.4 ng/mL   TSH    Collection Time: 05/09/22  1:46 PM   Result Value Ref Range    TSH 0.150 (L) 0.400 - 4.000 uIU/mL   T4, Free    Collection Time: 05/09/22  1:46 PM   Result Value Ref Range    Free T4 1.11 0.71 - 1.51 ng/dL           Imaging:     CT Soft Tissue Neck With Contrast    Result Date: 3/28/2022  EXAMINATION: CT SOFT TISSUE NECK WITH CONTRAST CLINICAL HISTORY: locally recurrent head neck cancer, on chemo, monitor treatment response; Malignant neoplasm of base of tongue TECHNIQUE: Axial CT images obtained throughout the region of the neck after the administration of 75 ml omnipaque 350 intravenous contrast. Axial, sagittal and coronal reconstructions were performed. COMPARISON: CT soft tissue neck 08/05/2020, FDG PET-CT 12/01/2021 FINDINGS: History of squamous cell carcinoma base of tongue with cervical metastatic disease.  Operative change of glossectomy, laryngectomy, and bilateral neck dissection with flap  reconstruction.  Right-sided chest port with distal catheter tip terminating in the SVC. Soft tissues at the operative bed are stable.  No convincing evidence of locally recurrent mass lesion. Ill-defined soft tissue at the site of previously identified left jugular chain adenopathy is noted however the appearance is improved from the prior study without discrete mass.  Stable subcentimeter lymph node right upper jugular chain.  No new adenopathy in the neck. There is new edema identified associated with the left levator scapula muscle nonspecific but without discrete mass. Tracheostomy tube in place.  Layering secretions near the distal aspect of the tracheostomy tube. Major vascular structures of the neck and proximal intracranial vascular structures are patent mother than for prior left internal jugular vein presumed resection..  Mild left posterior ethmoid mucosal thickening.  Mastoid air cells are otherwise clear. Similar appearance borderline in size 10 mm right paratracheal lymph nodes. Lung apices are clear.  No osseous destructive process.     Operative changes detailed above for base of tongue squamous cell carcinoma.  No evidence of recurrent mass at the operative bed. Improved appearance of the previously identified left-sided necrotic adenopathy with residual ill-defined soft tissue but prominently improved from the prior study.  Stable subcentimeter right upper jugular chain lymph node.  No new adenopathy since the prior study. There is nonspecific new edema associated with the left levator scapula muscle (axial image 111) without discrete enhancing mass.  Continued follow-up recommended. Stable borderline in size right paratracheal lymph nodes. This report was flagged in Epic as abnormal. Electronically signed by resident: Óscar Foster MD Date:    03/28/2022 Time:    09:38 Electronically signed by: Rocco Daley Date:    03/28/2022 Time:    10:55      Assessment:       1. Thrombocytopenia,  unspecified    2. Immunodeficiency due to drugs    3. Squamous cell carcinoma of base of tongue    4. Secondary malignant neoplasm of cervical lymph node    5. Stage 3a chronic kidney disease           Plan:       Problem List Items Addressed This Visit        Renal/    Stage 3a chronic kidney disease    Current Assessment & Plan     Creatinine stable  -carboplatin renally adjusted              Immunology/Multi System    Immunodeficiency due to drugs    Overview     S/p liver transplant and is currently on tacrolimus.  Afebrile, ANC sufficient for treatment  -gcsf to prevent chemo induced neutropenia              Hematology    Thrombocytopenia, unspecified - Primary    Current Assessment & Plan     Grade 1 thrombocytopenia secondary to chemotherapy, asymptomatic. ddx includes liver disease.  -monitor cbc              Oncology    Squamous cell carcinoma of base of tongue    Overview     Per OSH records, initially presented in 2015 with symptoms and cT2N2c disease. page 91 of 3/25/2020 outside records clinic (media tab).  Initially presented 8/2015 with left sided odynophagia.  Treated for reflux, which did not improve.    12/11/15 he had a fiberoptic exam showing discolored mucosal area of the left base of the tongue.    1/13/16 he had persistent dysphagia, odynophagia, and new left otalgia.  MRI showed an irregular mass along the left posterior margin of the base of the tongue measuring 1.8x2.3cm with ipsilateral level 2 lymph node measuring 1.7cm with central necrosis and contralateral level 2 node measuring 1.3cm with questionable necrosis.    1/19/2016 he had direct laryngoscopy with biopsy left of midline, proximal to vallecula, which was p16 positive, invasive, with moderately to poorly differentiated sq.c.c. with basaloid features.    1/29/16 met with medical oncology who recommended chemoxrt.    2/1/16  staging PET CT showed hypermetabolic mass at base of tongue 4x2.5x3.5cm, max SUV 24.13, hypermetabolic  lymph node left neck SUV 6.77, small subcentimeter lymph node right neck max SUV 3.79 (exact size of lymph nodes not mentioned in the summary).    2/15/16 started chemoxrt to left tongue base, bilateral cervical neck levels 1b-5, treated with 6MV photons utilizing IMRT, 5000 cGy in 200 cGy daily fractions.  Cone down boost to left base of tongue with additional 2000 cGy in 200 cGy daily fractions to gross disease.  Received weekly cisplatin with radiation but had to switch to carboplatin due to worsening renal function. during this time also missed several weekly treatments of cisplatin.    4/1/2016 completed chemoxrt.   11/1/2016 Repeat biopsy of left tongue showed persistent disease.  S/p salvage glossectomy with laryngectomy,  rT4aN0, p16 positive, base of tongue squamous cell carcinoma.     page 61 of 3/25/2020 outside records clinic (media tab)  Pathology: Tonsil, left tonsil fold: reactive squamous mucosa with inflammation and degenerating skeletal muscle, no carcinoma seen.  Right and Left base of tongue:  Reactive squamous mucosa and submucosa with inflammation, no carcinoma seen.  Neck, glossectomy, laryngectomy, bilateral neck dissection:  invasive squamous cell carcinoma.  tumor site: base of tongue/hypopharynx.  Small focus of tumor is seen within the hyoid  bone.  specimen Size [id number]  tumor size [id number]  depth of invasion 20mm  TNM stage pT4a, pN0, pMx  Lymph nodes, included in all parts:  number involved 0  number examined 3.  distance of tumor from margins  positive inked margins: none  within 1mm : none  within 1-2mm: anterior-inferior    Bilateral neck dissection:  level 1: one lymph node and submandibular gland, negative for tumor  level II : two lymph nodes, negative for tumor  level III: not identified  level IV: not identified  level V: not identified.    6/20/17 - PETCT with FDG avidity of left neck lymph node, level 3, SUV 2.9.  No other evidence of local or distant disease.  7/6/2017 -  biopsy of left neck lymph node with IR.  Sample was non-diagnostic. Notes from path report: Less than optimal scan specimen with minute tissue core of stromal fibroadipose tissue.  definitive tati background is not seen.  9/28/2017 - Repeat PET CT showing mild activity SUV 3.5 (increased from 3.2 previously; size 7.8x7.4x9.5mm).  Also new findings under left pectoralis minor (mild focal increased activity, indeterminate but new since previous exam)  3/22/2018 - CT neck shows no evidence of disease and level 2b lymph node decreased to 4.9mmx4.5mmx4.9mm from 7.8x7.4x9.5mm previously.    Seen by Dr. Myers 8/3/2020 for left-sided neck pain and a swelling in left level 2 several weeks ago.  He feels it arose after he began lifting weights.  CT neck 8/5/2020 : Lymph nodes At the left neck level L2 there is a heterogeneous soft tissue density with central hypoattenuation likely reflecting a necrotic lymph node measuring 1.6 x 1.4 cm in transverse dimension, 2.5 cm in craniocaudal dimension.  There is a similar appearing irregular soft tissue density at the right neck level 2 measuring 1.1 x 9.0 cm in transverse dimension and 1.5 cm craniocaudal dimension.  No other abnormal appearing or enlarged lymph nodes found.  Impression: At level 2 within the neck bilaterally, there are  soft tissue density masses with central hypoattenuation likely reflective of necrotic lymph nodes.  Findings are suspicious for malignancy recurrence.  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  Staging PET CT 9/30/2020 with left sided hypermetabolic node and right sided enlarged node without uptake.  Also has mandibular vestibule uptake, which may be another site of disease. Discussed at tumor board.  He is not a surgical or radiation candidate.  -Started on PCC 10/6/2020 followed by maintenance cetuximab until 8/24/21 (discontinued due to progression of disease; continued increase in SUV  uptake, no new lesions).  9/2021 started on carbo/5-FU/pembrolizumab; due to toxicity went to pembrolizumab monotherapy starting with cycle 2.  Progression of disease noted after cycle 3 so added chemotherapy back in with cycle 4.           Current Assessment & Plan     Doing well with treatment.  Chemo side effect such as mucositis are not as severe. Still has alternating diarrhea and constipation, which has not worsened.  -continue with dose reduced chemotherapy with carboplatin AUC4 and 5- mg/m2/day  -labs reviewed; ok to proceed with cycle 8  --cycles are 5 weeks long to allow for recovery  -follow up prior to cycle 9  -depending on cytopenias may need to transition back to maintenance immunotherapy  -continue supportive medications           Secondary malignant neoplasm of cervical lymph node    Overview     See squamous cell carcinoma               No orders of the defined types were placed in this encounter.      Advance Care Planning I initiated the process of advance care planning at his initial visit and explained the importance of this process to the patient.  Then the patient received detailed information about the importance of designating a Health Care Power of  (HCPOA). he was instructed to communicate with this person about their wishes for future healthcare, should he become sick and lose decision-making capacity. The patient has previously appointed a HCPOA. After our discussion, the patient has decided to complete a HCPOA and has appointed his brother and NAME:Ollie          Route Chart for Scheduling    Med Onc Chart Routing      Follow up with physician Other. 6/16 prior to chemo   Follow up with [facility]    Labs CBC, CMP and TSH   Lab interval:  6/14 port draw labs cmp, cbc, tsh prior to scans, leave port accessed for scan   Imaging Other   6/14 ct neck chest   Pharmacy appointment No pharmacy appointment needed      Other referrals No additional referrals needed     6/16/22 chemo  pembrolizumab only    Treatment Plan Information   OP HEAD NECK PEMBROLIZUMAB CARBOPLATIN FLUOROURACIL (C1 ONLY RECEIVED) FOLLOWED BY PEMBROLIZUMAB MAINTENANCE   Ronald Berg MD   Upcoming Treatment Dates - OP HEAD NECK PEMBROLIZUMAB CARBOPLATIN FLUOROURACIL (C1 ONLY RECEIVED) FOLLOWED BY PEMBROLIZUMAB MAINTENANCE    5/12/2022       Chemotherapy       CARBOplatin (PARAPLATIN) 255 mg in sodium chloride 0.9% 500 mL chemo infusion       fluorouraciL 5,000 mg in sodium chloride 0.9% 240 mL chemo infusion    Supportive Plan Information  IV FLUIDS AND ELECTROLYTES   Ronald Berg MD   Upcoming Treatment Dates - IV FLUIDS AND ELECTROLYTES    No upcoming days in selected categories.    Therapy Plan Information  Flushes  heparin, porcine (PF) 100 unit/mL injection flush 500 Units  500 Units, Intravenous, Every visit  sodium chloride 0.9% flush 10 mL  10 mL, Intravenous, Every visit      Ronald Berg MD  Hematology Oncology

## 2022-05-11 ENCOUNTER — TELEPHONE (OUTPATIENT)
Dept: TRANSPLANT | Facility: CLINIC | Age: 72
End: 2022-05-11
Payer: MEDICARE

## 2022-05-11 ENCOUNTER — PATIENT MESSAGE (OUTPATIENT)
Dept: RESEARCH | Facility: CLINIC | Age: 72
End: 2022-05-11
Payer: MEDICARE

## 2022-05-11 NOTE — LETTER
May 11, 2022    Rocco Swain  [address] [place] [address] [place] [address]          Dear Rocco Swain:  MRN: [id number]    This is a follow up to your recent labs, your lab results were stable.  There are no medicine changes.  Please have your labs drawn again on 8/15/22.      If you cannot have your labs drawn on the scheduled date, it is your responsibility to call the transplant department to reschedule.  Please call [phone] and ask to speak to Rhea RIOS   for all scheduling requests.     Sincerely,  Shari WADDELLN, RN          Your Liver Transplant Coordinator    [facility]  [address] [place] [id number]  [phone]

## 2022-05-12 ENCOUNTER — INFUSION (OUTPATIENT)
Dept: INFUSION THERAPY | Facility: HOSPITAL | Age: 72
End: 2022-05-12
Payer: MEDICARE

## 2022-05-12 VITALS
BODY MASS INDEX: 17.53 KG/M2 | HEART RATE: 98 BPM | DIASTOLIC BLOOD PRESSURE: 62 MMHG | WEIGHT: 115.31 LBS | SYSTOLIC BLOOD PRESSURE: 106 MMHG | RESPIRATION RATE: 16 BRPM | TEMPERATURE: 98 F | OXYGEN SATURATION: 98 %

## 2022-05-12 DIAGNOSIS — C77.0 SECONDARY MALIGNANT NEOPLASM OF CERVICAL LYMPH NODE: Primary | ICD-10-CM

## 2022-05-12 DIAGNOSIS — C01 SQUAMOUS CELL CARCINOMA OF BASE OF TONGUE: ICD-10-CM

## 2022-05-12 PROCEDURE — 63600175 PHARM REV CODE 636 W HCPCS: Mod: JG | Performed by: STUDENT IN AN ORGANIZED HEALTH CARE EDUCATION/TRAINING PROGRAM

## 2022-05-12 PROCEDURE — 25000003 PHARM REV CODE 250: Performed by: STUDENT IN AN ORGANIZED HEALTH CARE EDUCATION/TRAINING PROGRAM

## 2022-05-12 PROCEDURE — 96413 CHEMO IV INFUSION 1 HR: CPT

## 2022-05-12 PROCEDURE — 96417 CHEMO IV INFUS EACH ADDL SEQ: CPT

## 2022-05-12 PROCEDURE — 96367 TX/PROPH/DG ADDL SEQ IV INF: CPT

## 2022-05-12 PROCEDURE — 96416 CHEMO PROLONG INFUSE W/PUMP: CPT

## 2022-05-12 PROCEDURE — 96375 TX/PRO/DX INJ NEW DRUG ADDON: CPT

## 2022-05-12 RX ORDER — EPINEPHRINE 0.3 MG/.3ML
0.3 INJECTION SUBCUTANEOUS ONCE AS NEEDED
Status: DISCONTINUED | OUTPATIENT
Start: 2022-05-12 | End: 2022-05-12 | Stop reason: HOSPADM

## 2022-05-12 RX ORDER — DIPHENHYDRAMINE HYDROCHLORIDE 50 MG/ML
50 INJECTION INTRAMUSCULAR; INTRAVENOUS ONCE AS NEEDED
Status: DISCONTINUED | OUTPATIENT
Start: 2022-05-12 | End: 2022-05-12 | Stop reason: HOSPADM

## 2022-05-12 RX ADMIN — APREPITANT 130 MG: 130 INJECTION, EMULSION INTRAVENOUS at 11:05

## 2022-05-12 RX ADMIN — SODIUM CHLORIDE: 9 INJECTION, SOLUTION INTRAVENOUS at 10:05

## 2022-05-12 RX ADMIN — PALONOSETRON HYDROCHLORIDE 0.25 MG: 0.25 INJECTION, SOLUTION INTRAVENOUS at 11:05

## 2022-05-12 RX ADMIN — SODIUM CHLORIDE 200 MG: 9 INJECTION, SOLUTION INTRAVENOUS at 10:05

## 2022-05-12 RX ADMIN — FLUOROURACIL 5000 MG: 50 INJECTION, SOLUTION INTRAVENOUS at 12:05

## 2022-05-12 RX ADMIN — CARBOPLATIN 255 MG: 10 INJECTION INTRAVENOUS at 11:05

## 2022-05-12 NOTE — PLAN OF CARE
Pt here for Keytruda/Carbo/5 FU.  Assessment complete and labs reviewed.  VSS.  Accessed PAC using sterile technique-biopatch in place.  Pt tolerated treatment well, no reaction suspected.  Connect pt to CADD pump; pump infusing without difficulty prior to dc.  Pt to RTC 5/16/22 for pump d/c.  No questions or concerns.  Pt ambulated out of unit unassisted.

## 2022-05-13 ENCOUNTER — TELEPHONE (OUTPATIENT)
Dept: HEMATOLOGY/ONCOLOGY | Facility: CLINIC | Age: 72
End: 2022-05-13
Payer: MEDICARE

## 2022-05-13 DIAGNOSIS — C77.0 SECONDARY MALIGNANT NEOPLASM OF CERVICAL LYMPH NODE: ICD-10-CM

## 2022-05-13 DIAGNOSIS — C01 SQUAMOUS CELL CARCINOMA OF BASE OF TONGUE: Primary | ICD-10-CM

## 2022-05-13 NOTE — TELEPHONE ENCOUNTER
----- Message from Tarsha Gallagher RN sent at 5/13/2022  1:48 PM CDT -----    ----- Message -----  From: Ronald Berg MD  Sent: 5/13/2022   1:22 PM CDT  To: Tarsha Gallagher RN    New order ct without contrast is in, thanks -e  ----- Message -----  From: Tarsha Gallagher RN  Sent: 5/13/2022   1:12 PM CDT  To: Ronald Berg MD      ----- Message -----  From: Renata Campbell  Sent: 5/13/2022  12:57 PM CDT  To: Morena Tello    Good afternoon,  Due to the contrast shortage, I am unable to schedule this pt for his CT scan. How would you like me to proceed?    Dr. Berg  Other. 6/16 prior to chemo    CBC, CMP and TSH   Lab interval:  6/14 port draw labs cmp, cbc, tsh prior to scans, leave port accessed for scan    6/14 ct neck chest        Thanks,  Renata Campbell

## 2022-05-16 ENCOUNTER — PATIENT MESSAGE (OUTPATIENT)
Dept: TRANSPLANT | Facility: CLINIC | Age: 72
End: 2022-05-16
Payer: MEDICARE

## 2022-05-16 ENCOUNTER — INFUSION (OUTPATIENT)
Dept: INFUSION THERAPY | Facility: HOSPITAL | Age: 72
End: 2022-05-16
Payer: MEDICARE

## 2022-05-16 VITALS
TEMPERATURE: 98 F | RESPIRATION RATE: 17 BRPM | DIASTOLIC BLOOD PRESSURE: 68 MMHG | SYSTOLIC BLOOD PRESSURE: 115 MMHG | HEART RATE: 81 BPM

## 2022-05-16 DIAGNOSIS — C01 SQUAMOUS CELL CARCINOMA OF BASE OF TONGUE: ICD-10-CM

## 2022-05-16 DIAGNOSIS — C77.0 SECONDARY MALIGNANT NEOPLASM OF CERVICAL LYMPH NODE: Primary | ICD-10-CM

## 2022-05-16 PROCEDURE — 25000003 PHARM REV CODE 250: Performed by: STUDENT IN AN ORGANIZED HEALTH CARE EDUCATION/TRAINING PROGRAM

## 2022-05-16 PROCEDURE — 63600175 PHARM REV CODE 636 W HCPCS: Performed by: STUDENT IN AN ORGANIZED HEALTH CARE EDUCATION/TRAINING PROGRAM

## 2022-05-16 PROCEDURE — 99211 OFF/OP EST MAY X REQ PHY/QHP: CPT

## 2022-05-16 PROCEDURE — A4216 STERILE WATER/SALINE, 10 ML: HCPCS | Performed by: STUDENT IN AN ORGANIZED HEALTH CARE EDUCATION/TRAINING PROGRAM

## 2022-05-16 RX ORDER — HEPARIN 100 UNIT/ML
500 SYRINGE INTRAVENOUS
Status: DISCONTINUED | OUTPATIENT
Start: 2022-05-16 | End: 2022-05-16 | Stop reason: HOSPADM

## 2022-05-16 RX ORDER — SODIUM CHLORIDE 0.9 % (FLUSH) 0.9 %
10 SYRINGE (ML) INJECTION
Status: DISCONTINUED | OUTPATIENT
Start: 2022-05-16 | End: 2022-05-16 | Stop reason: HOSPADM

## 2022-05-16 RX ADMIN — Medication 10 ML: at 01:05

## 2022-05-16 RX ADMIN — HEPARIN 500 UNITS: 100 SYRINGE at 01:05

## 2022-05-16 NOTE — PLAN OF CARE
Pt arrived AAOx3, VSS, empty 5 FU cadd pump disconnected and port deaccessed. Pt had no complaints and was discharged in stable conditio to home

## 2022-05-17 ENCOUNTER — INFUSION (OUTPATIENT)
Dept: INFUSION THERAPY | Facility: HOSPITAL | Age: 72
End: 2022-05-17
Payer: MEDICARE

## 2022-05-17 DIAGNOSIS — C77.0 SECONDARY MALIGNANT NEOPLASM OF CERVICAL LYMPH NODE: Primary | ICD-10-CM

## 2022-05-17 DIAGNOSIS — C01 SQUAMOUS CELL CARCINOMA OF BASE OF TONGUE: ICD-10-CM

## 2022-05-17 PROCEDURE — 63600175 PHARM REV CODE 636 W HCPCS: Mod: TB | Performed by: STUDENT IN AN ORGANIZED HEALTH CARE EDUCATION/TRAINING PROGRAM

## 2022-05-17 PROCEDURE — 96372 THER/PROPH/DIAG INJ SC/IM: CPT

## 2022-05-17 RX ADMIN — PEGFILGRASTIM-CBQV 6 MG: 6 INJECTION, SOLUTION SUBCUTANEOUS at 01:05

## 2022-05-18 DIAGNOSIS — Z94.4 STATUS POST LIVER TRANSPLANTATION: Primary | ICD-10-CM

## 2022-05-18 NOTE — TELEPHONE ENCOUNTER
Patient made aware of tacro dose increase, follow up labs added to his visit he has already scheduled to do labs on 6/17/22      ----- Message from Cornell Anton MD sent at 5/18/2022  2:40 PM CDT -----  Okay- let's do 1/0.5    ----- Message -----  From: Shari Stark RN  Sent: 5/17/2022   4:52 PM CDT  To: Cornell Anton MD    He is on 0.5mg every 12hrs.

## 2022-05-19 RX ORDER — TACROLIMUS 0.5 MG/1
CAPSULE ORAL
Qty: 270 CAPSULE | Refills: 3 | Status: SHIPPED | OUTPATIENT
Start: 2022-05-19 | End: 2022-05-31

## 2022-05-20 ENCOUNTER — TELEPHONE (OUTPATIENT)
Dept: ORTHOPEDICS | Facility: CLINIC | Age: 72
End: 2022-05-20
Payer: MEDICARE

## 2022-05-20 NOTE — TELEPHONE ENCOUNTER
Spoke to patient to confirm his appointment for Tuesday, May 24th with Dr. Martínez at 8:00 am at [facility]. Patient verbalized understanding of his appointment date, time and location.

## 2022-05-24 ENCOUNTER — OFFICE VISIT (OUTPATIENT)
Dept: ORTHOPEDICS | Facility: CLINIC | Age: 72
End: 2022-05-24
Payer: MEDICARE

## 2022-05-24 ENCOUNTER — DOCUMENTATION ONLY (OUTPATIENT)
Dept: ORTHOPEDICS | Facility: CLINIC | Age: 72
End: 2022-05-24

## 2022-05-24 VITALS
WEIGHT: 115 LBS | SYSTOLIC BLOOD PRESSURE: 121 MMHG | DIASTOLIC BLOOD PRESSURE: 74 MMHG | BODY MASS INDEX: 17.43 KG/M2 | HEIGHT: 68 IN | HEART RATE: 87 BPM

## 2022-05-24 DIAGNOSIS — M72.0 DUPUYTREN'S CONTRACTURE OF BOTH HANDS: Primary | ICD-10-CM

## 2022-05-24 PROCEDURE — 99999 PR PBB SHADOW E&M-EST. PATIENT-LVL III: CPT | Mod: PBBFAC,,, | Performed by: ORTHOPAEDIC SURGERY

## 2022-05-24 PROCEDURE — 99213 OFFICE O/P EST LOW 20 MIN: CPT | Mod: PBBFAC | Performed by: ORTHOPAEDIC SURGERY

## 2022-05-24 PROCEDURE — 99999 PR PBB SHADOW E&M-EST. PATIENT-LVL III: ICD-10-PCS | Mod: PBBFAC,,, | Performed by: ORTHOPAEDIC SURGERY

## 2022-05-24 PROCEDURE — 99214 PR OFFICE/OUTPT VISIT, EST, LEVL IV, 30-39 MIN: ICD-10-PCS | Mod: S$PBB,,, | Performed by: ORTHOPAEDIC SURGERY

## 2022-05-24 PROCEDURE — 99214 OFFICE O/P EST MOD 30 MIN: CPT | Mod: S$PBB,,, | Performed by: ORTHOPAEDIC SURGERY

## 2022-05-24 NOTE — PROGRESS NOTES
Xiaflex orders and pt face sheet information faxed, with confirmation, 05/24/22 to [phone]    Lev

## 2022-05-24 NOTE — PROGRESS NOTES
[facility]  History & Physical  Orthopedics    SUBJECTIVE:      Chief Complaint: bilateral hand contractures    Referring Provider: Munir Cunningham MD     History of Present Illness:  Patient is a 72 y.o. right hand dominant male who presents today with complaints of bilateral hand contractures. Reports surgery on right ring and small fingers for dupuytrens release at OSH >12 years ago. Reports contractures have recurred. Patient communicated via writing as he is unable to speak due to trach.        Past Medical History:   Diagnosis Date    Basal cell carcinoma (BCC) in situ of skin 2012    3 on face, 2 on arm, removed by dermatology.    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 Underwent surgical resection of tongue and neck. s/p tracheostomy     Past Surgical History:   Procedure Laterality Date    COLONOSCOPY      INSERTION OF VENOUS ACCESS PORT Right 3/8/2021    Procedure: INSERTION, VENOUS ACCESS PORT;  Surgeon: Jesus Rendon MD;  Location: [facility] OR [address] [place] [address];  Service: General;  Laterality: Right;    LIVER TRANSPLANT  11/2008    transplanted for biopsy proven hepatocellular carcinoma,     LYMPH NODE BIOPSY N/A 9/18/2020    Procedure: BIOPSY, LYMPH NODE;  Surgeon: [facility] Provider;  Location: [facility] OR [address] [place] [address];  Service: General;  Laterality: N/A;  Rm 173    TRACHEOSTOMY       Review of patient's allergies indicates:  No Known Allergies  Social History     Social History Narrative    Not on file     Family History   Problem Relation Age of Onset    Dementia Mother          Current Outpatient Medications:     azelaic acid (AZELEX) 15 % gel, APPLY TOPICALLY TO AFFECTED AREA IN THE MORNING, Disp: , Rfl:      diphenoxylate-atropine 2.5-0.025 mg (LOMOTIL) 2.5-0.025 mg per tablet, Take 1 tablet by mouth 4 (four) times daily as needed for Diarrhea (use when loperamide/imodium does not work)., Disp: 30 tablet, Rfl: 0    ibuprofen (ADVIL,MOTRIN) 200 MG tablet, Take 200 mg by mouth., Disp: , Rfl:     levothyroxine (SYNTHROID) 88 MCG tablet, TAKE 1 TABLET BY MOUTH ONCE DAILY, Disp: 90 tablet, Rfl: 3    LIDOcaine HCl 2% (LIDOCAINE VISCOUS) 2 % Soln, Swish and spit 15 mls every 8 (eight) hours as needed (mouth sore)., Disp: 100 mL, Rfl: 3    LIDOcaine-prilocaine (EMLA) cream, Apply generously to port site 30-60 min prior to chemo and then cover with saran wrap., Disp: 30 g, Rfl: 2    loperamide (IMODIUM) 2 mg capsule, Take 1 capsule (2 mg total) by mouth 4 (four) times daily as needed for Diarrhea., Disp: 30 capsule, Rfl: 1    magic mouthwash diphen/antac/lidoc/nysta, Take 10 mLs by mouth 4 (four) times daily., Disp: 120 mL, Rfl: 4    metroNIDAZOLE (METROGEL) 0.75 % gel, Apply topically to affected area 2 (two) times daily., Disp: 45 g, Rfl: 1    multivit-min/FA/lycopen/lutein (CENTRUM SILVER MEN ORAL), Take 1 tablet by mouth., Disp: , Rfl:     multivitamin capsule, Take 1 capsule by mouth once daily., Disp: , Rfl:     oxyCODONE (ROXICODONE) 10 mg Tab immediate release tablet, Take 1 tablet (10 mg total) by mouth every 6 (six) hours as needed for Pain., Disp: 120 tablet, Rfl: 0    sulfacetamide sodium-sulfur 10-5 % (w/w) Clsr, USE TO WASH AFFECTED AREA DAILY, Disp: , Rfl:     tacrolimus (PROGRAF) 0.5 MG Cap, Take 2 capsules (1 mg total) by mouth every morning AND 1 capsule (0.5 mg total) every evening., Disp: 270 capsule, Rfl: 3    tiZANidine (ZANAFLEX) 2 MG tablet, Take 1 tablet (2 mg total) by mouth nightly as needed (neck muscle strain)., Disp: 30 tablet, Rfl: 0    traZODone (DESYREL) 50 MG tablet, TAKE 1 TABLET BY MOUTH IN  THE EVENING, Disp: 90 tablet, Rfl: 3    vitamin E 400 UNIT capsule, Take 400 Units by  "mouth once daily., Disp: , Rfl:   No current facility-administered medications for this visit.    Facility-Administered Medications Ordered in Other Visits:     heparin, porcine (PF) 100 unit/mL injection flush 500 Units, 500 Units, Intravenous, PRN, Ronald Berg MD    sodium chloride 0.9% flush 10 mL, 10 mL, Intravenous, PRN, Ronald Berg MD      Review of Systems:  As per HPI otherwise noncontributory    OBJECTIVE:      Vital Signs (Most Recent):  Vitals:    05/24/22 0809   BP: 121/74   Pulse: 87   Weight: 52.2 kg (115 lb)   Height: 5' 8" (1.727 m)     Body mass index is 17.49 kg/m².      Physical Exam:  Constitutional: The patient appears well-developed and well-nourished. No distress.   Skin: No lesions appreciated  Head: Normocephalic and atraumatic.   Nose: Nose normal.   Ears: No deformities seen  Eyes: Conjunctivae and EOM are normal.   Neck: No tracheal deviation present.   Cardiovascular: Normal rate and intact distal pulses.    Pulmonary/Chest: Effort normal. No respiratory distress.   Abdominal: There is no guarding.   Neurological: The patient is alert.   Psychiatric: The patient has a normal mood and affect.     Right Hand/Wrist Examination:    Observation/Inspection:  Swelling  none    Deformity  Present. Right ring 90 degree flexion contracture of PIP as well as bilateral small finger PIP contractures  Discoloration  none     Scars   none    Atrophy  none      HAND/WRIST EXAMINATION:  Finkelstein's Test   Neg  WHAT Test    Neg  Snuff box tenderness   Neg  Edwards's Test    Neg  Hook of Hamate Tenderness  Neg  CMC grind    Neg  Circumduction test   Neg    Neurovascular Exam:  Digits WWP, brisk CR < 3s throughout  NVI motor/LTS to M/R/U nerves, radial pulse 2+  Tinel's Test - Carpal Tunnel  Neg  Tinel's Test - Cubital Tunnel  Neg  Phalen's Test    Neg  Median Nerve Compression Test Neg    ROM hand full, painless    ROM wrist full, painless    ROM elbow full, painless    Abdomen not guarded  Respirations " nonlabored  Perfusion intact    Diagnostic Results:     Imaging - I independently viewed the patient's imaging as well as the radiology report.  Xrays of the patient's right hand  demonstrate no evidence of any acute fractures or dislocations or significant degenerative changes. They do demonstrate contractures of the right ring and small and left small fingers    EMG - none    ASSESSMENT/PLAN:      72 y.o. yo male with right small and ring finger dupuytrens contractures of the right ring and small fingers as well as the left small finger    Plan: Xiaflex. Will have to do his 3 fingers on separate encounters. Discussed this with the patient. He is interested in pursuing this treatment plan. Filled out xiaflex form in clinic today. Return to clinic for injections.

## 2022-05-24 NOTE — PROGRESS NOTES
I have personally taken the history and examined this patient. I agree with the resident's note as stated above.   patient has Dupuytren's that affects his right ring finger significantly possibly the small finger and the left small finger PIP joint the ring finger on the right he actually has a 90 degree contracture MCP PIP he has a history of a Dupuytren's release in [place] many years ago but reformed patient is patient is nonverbal but he could write out questions and answers he has failed the table top test bilaterally left side he has a small finger PIP joint contractures about 30° we discussed Xiaflex and and surgery patient is opting for Xiaflex risks and benefits were explained to the patient in clinic consents were signed in clinic in addition on the right he has a laceration in scar over the small fingers Brad sure if this is Dupuytren's verses scar from the laceration to minimal contracture so we will watch that

## 2022-05-26 ENCOUNTER — TELEPHONE (OUTPATIENT)
Dept: ORTHOPEDICS | Facility: CLINIC | Age: 72
End: 2022-05-26
Payer: MEDICARE

## 2022-05-26 NOTE — TELEPHONE ENCOUNTER
[facility] for Karina informing her that the pt would begin his treatment with the Right hand.   Requested a call back to the [facility] at [phone] with any questions or concerns.      ----- Message from Loretta Burrows sent at 5/26/2022  2:29 PM CDT -----  Name of Who is Calling: Karina (endo sue)         What is the request in detail:Karina is calling to speak to the nurse in regards xiaflex medication and also find out which hand will be injected first. Please advise          Can the clinic reply by [facility]: No          [facility]: [phone] ext [phone]

## 2022-05-31 ENCOUNTER — PATIENT MESSAGE (OUTPATIENT)
Dept: TRANSPLANT | Facility: CLINIC | Age: 72
End: 2022-05-31
Payer: MEDICARE

## 2022-05-31 ENCOUNTER — PATIENT MESSAGE (OUTPATIENT)
Dept: ORTHOPEDICS | Facility: CLINIC | Age: 72
End: 2022-05-31
Payer: MEDICARE

## 2022-05-31 NOTE — TELEPHONE ENCOUNTER
Received below request from patient, scripts split into two /changed to meet his requests:    Rocco Swain  You 35 minutes ago (3:56 PM)          Shari Lui, could you do a 1mg. Scrip at [facility] pharmacy. Guess for 90 days since I test again in August. Thank you.        You  Rocco Swain 13 days ago    MD      Of course, i'll give you an alert that I will be adding our labs to a lab visit you already have so you know.     I just received communication from Dr Anton, he is going to increase your tacrolimus dosage as your liver enzymes are increasing:     Increase to taking 1mg orally every morning but stay at 0.5mg every evening, you may start this tomorrow please.      Please confirm receipt of this message.

## 2022-06-01 RX ORDER — TACROLIMUS 1 MG/1
1 CAPSULE ORAL EVERY MORNING
Qty: 90 CAPSULE | Refills: 3 | Status: SHIPPED | OUTPATIENT
Start: 2022-06-01 | End: 2023-01-09 | Stop reason: SDUPTHER

## 2022-06-01 RX ORDER — TACROLIMUS 0.5 MG/1
0.5 CAPSULE ORAL NIGHTLY
Qty: 90 CAPSULE | Refills: 3
Start: 2022-06-01 | End: 2023-01-09 | Stop reason: SDUPTHER

## 2022-06-02 DIAGNOSIS — C01 SQUAMOUS CELL CARCINOMA OF BASE OF TONGUE: ICD-10-CM

## 2022-06-02 RX ORDER — OXYCODONE HYDROCHLORIDE 10 MG/1
10 TABLET ORAL EVERY 6 HOURS PRN
Qty: 120 TABLET | Refills: 0 | Status: SHIPPED | OUTPATIENT
Start: 2022-06-02 | End: 2022-07-05

## 2022-06-17 ENCOUNTER — INFUSION (OUTPATIENT)
Dept: INFUSION THERAPY | Facility: HOSPITAL | Age: 72
End: 2022-06-17
Attending: STUDENT IN AN ORGANIZED HEALTH CARE EDUCATION/TRAINING PROGRAM
Payer: MEDICARE

## 2022-06-17 ENCOUNTER — HOSPITAL ENCOUNTER (OUTPATIENT)
Dept: RADIOLOGY | Facility: HOSPITAL | Age: 72
Discharge: HOME | End: 2022-06-17
Attending: STUDENT IN AN ORGANIZED HEALTH CARE EDUCATION/TRAINING PROGRAM
Payer: MEDICARE

## 2022-06-17 DIAGNOSIS — C77.0 SECONDARY MALIGNANT NEOPLASM OF CERVICAL LYMPH NODE: ICD-10-CM

## 2022-06-17 DIAGNOSIS — C01 SQUAMOUS CELL CARCINOMA OF BASE OF TONGUE: Primary | ICD-10-CM

## 2022-06-17 DIAGNOSIS — E03.2 HYPOTHYROIDISM DUE TO MEDICAMENTS AND OTHER EXOGENOUS SUBSTANCES: ICD-10-CM

## 2022-06-17 DIAGNOSIS — N18.31 STAGE 3A CHRONIC KIDNEY DISEASE: ICD-10-CM

## 2022-06-17 DIAGNOSIS — Z94.4 STATUS POST LIVER TRANSPLANTATION: ICD-10-CM

## 2022-06-17 DIAGNOSIS — C01 SQUAMOUS CELL CARCINOMA OF BASE OF TONGUE: ICD-10-CM

## 2022-06-17 LAB
ALBUMIN SERPL BCP-MCNC: 3.6 G/DL (ref 3.5–5.2)
ALBUMIN SERPL BCP-MCNC: 3.6 G/DL (ref 3.5–5.2)
ALP SERPL-CCNC: 83 U/L (ref 55–135)
ALP SERPL-CCNC: 83 U/L (ref 55–135)
ALT SERPL W/O P-5'-P-CCNC: 9 U/L (ref 10–44)
ALT SERPL W/O P-5'-P-CCNC: 9 U/L (ref 10–44)
ANION GAP SERPL CALC-SCNC: 10 MMOL/L (ref 8–16)
ANION GAP SERPL CALC-SCNC: 10 MMOL/L (ref 8–16)
AST SERPL-CCNC: 30 U/L (ref 10–40)
AST SERPL-CCNC: 30 U/L (ref 10–40)
BASOPHILS # BLD AUTO: 0.02 K/UL (ref 0–0.2)
BASOPHILS # BLD AUTO: 0.02 K/UL (ref 0–0.2)
BASOPHILS NFR BLD: 0.5 % (ref 0–1.9)
BASOPHILS NFR BLD: 0.5 % (ref 0–1.9)
BILIRUB SERPL-MCNC: 0.5 MG/DL (ref 0.1–1)
BILIRUB SERPL-MCNC: 0.5 MG/DL (ref 0.1–1)
BUN SERPL-MCNC: 14 MG/DL (ref 8–23)
BUN SERPL-MCNC: 14 MG/DL (ref 8–23)
CALCIUM SERPL-MCNC: 9.5 MG/DL (ref 8.7–10.5)
CALCIUM SERPL-MCNC: 9.5 MG/DL (ref 8.7–10.5)
CHLORIDE SERPL-SCNC: 106 MMOL/L (ref 95–110)
CHLORIDE SERPL-SCNC: 106 MMOL/L (ref 95–110)
CO2 SERPL-SCNC: 24 MMOL/L (ref 23–29)
CO2 SERPL-SCNC: 24 MMOL/L (ref 23–29)
CREAT SERPL-MCNC: 1.4 MG/DL (ref 0.5–1.4)
CREAT SERPL-MCNC: 1.4 MG/DL (ref 0.5–1.4)
DIFFERENTIAL METHOD: ABNORMAL
DIFFERENTIAL METHOD: ABNORMAL
EOSINOPHIL # BLD AUTO: 0.1 K/UL (ref 0–0.5)
EOSINOPHIL # BLD AUTO: 0.1 K/UL (ref 0–0.5)
EOSINOPHIL NFR BLD: 1.3 % (ref 0–8)
EOSINOPHIL NFR BLD: 1.3 % (ref 0–8)
ERYTHROCYTE [DISTWIDTH] IN BLOOD BY AUTOMATED COUNT: 14.9 % (ref 11.5–14.5)
ERYTHROCYTE [DISTWIDTH] IN BLOOD BY AUTOMATED COUNT: 14.9 % (ref 11.5–14.5)
EST. GFR  (AFRICAN AMERICAN): 57.2 ML/MIN/1.73 M^2
EST. GFR  (AFRICAN AMERICAN): 57.2 ML/MIN/1.73 M^2
EST. GFR  (NON AFRICAN AMERICAN): 49.5 ML/MIN/1.73 M^2
EST. GFR  (NON AFRICAN AMERICAN): 49.5 ML/MIN/1.73 M^2
GLUCOSE SERPL-MCNC: 130 MG/DL (ref 70–110)
GLUCOSE SERPL-MCNC: 130 MG/DL (ref 70–110)
HCT VFR BLD AUTO: 28.4 % (ref 40–54)
HCT VFR BLD AUTO: 28.4 % (ref 40–54)
HGB BLD-MCNC: 9.8 G/DL (ref 14–18)
HGB BLD-MCNC: 9.8 G/DL (ref 14–18)
IMM GRANULOCYTES # BLD AUTO: 0.01 K/UL (ref 0–0.04)
IMM GRANULOCYTES # BLD AUTO: 0.01 K/UL (ref 0–0.04)
IMM GRANULOCYTES NFR BLD AUTO: 0.3 % (ref 0–0.5)
IMM GRANULOCYTES NFR BLD AUTO: 0.3 % (ref 0–0.5)
LYMPHOCYTES # BLD AUTO: 0.7 K/UL (ref 1–4.8)
LYMPHOCYTES # BLD AUTO: 0.7 K/UL (ref 1–4.8)
LYMPHOCYTES NFR BLD: 17.2 % (ref 18–48)
LYMPHOCYTES NFR BLD: 17.2 % (ref 18–48)
MCH RBC QN AUTO: 36 PG (ref 27–31)
MCH RBC QN AUTO: 36 PG (ref 27–31)
MCHC RBC AUTO-ENTMCNC: 34.5 G/DL (ref 32–36)
MCHC RBC AUTO-ENTMCNC: 34.5 G/DL (ref 32–36)
MCV RBC AUTO: 104 FL (ref 82–98)
MCV RBC AUTO: 104 FL (ref 82–98)
MONOCYTES # BLD AUTO: 0.6 K/UL (ref 0.3–1)
MONOCYTES # BLD AUTO: 0.6 K/UL (ref 0.3–1)
MONOCYTES NFR BLD: 14.2 % (ref 4–15)
MONOCYTES NFR BLD: 14.2 % (ref 4–15)
NEUTROPHILS # BLD AUTO: 2.6 K/UL (ref 1.8–7.7)
NEUTROPHILS # BLD AUTO: 2.6 K/UL (ref 1.8–7.7)
NEUTROPHILS NFR BLD: 66.5 % (ref 38–73)
NEUTROPHILS NFR BLD: 66.5 % (ref 38–73)
NRBC BLD-RTO: 0 /100 WBC
NRBC BLD-RTO: 0 /100 WBC
PLATELET # BLD AUTO: 143 K/UL (ref 150–450)
PLATELET # BLD AUTO: 143 K/UL (ref 150–450)
PMV BLD AUTO: 9.3 FL (ref 9.2–12.9)
PMV BLD AUTO: 9.3 FL (ref 9.2–12.9)
POTASSIUM SERPL-SCNC: 3.8 MMOL/L (ref 3.5–5.1)
POTASSIUM SERPL-SCNC: 3.8 MMOL/L (ref 3.5–5.1)
PROT SERPL-MCNC: 7.2 G/DL (ref 6–8.4)
PROT SERPL-MCNC: 7.2 G/DL (ref 6–8.4)
RBC # BLD AUTO: 2.72 M/UL (ref 4.6–6.2)
RBC # BLD AUTO: 2.72 M/UL (ref 4.6–6.2)
SODIUM SERPL-SCNC: 140 MMOL/L (ref 136–145)
SODIUM SERPL-SCNC: 140 MMOL/L (ref 136–145)
TACROLIMUS BLD-MCNC: 8.4 NG/ML (ref 5–15)
TSH SERPL DL<=0.005 MIU/L-ACNC: 0.5 UIU/ML (ref 0.4–4)
WBC # BLD AUTO: 3.95 K/UL (ref 3.9–12.7)
WBC # BLD AUTO: 3.95 K/UL (ref 3.9–12.7)

## 2022-06-17 PROCEDURE — 80053 COMPREHEN METABOLIC PANEL: CPT | Performed by: STUDENT IN AN ORGANIZED HEALTH CARE EDUCATION/TRAINING PROGRAM

## 2022-06-17 PROCEDURE — 70490 CT SOFT TISSUE NECK W/O DYE: CPT | Mod: 26,,, | Performed by: RADIOLOGY

## 2022-06-17 PROCEDURE — 70490 CT SOFT TISSUE NECK W/O DYE: CPT | Mod: TC

## 2022-06-17 PROCEDURE — 70490 CT SOFT TISSUE NECK WITHOUT CONTRAST: ICD-10-PCS | Mod: 26,,, | Performed by: RADIOLOGY

## 2022-06-17 PROCEDURE — 80197 ASSAY OF TACROLIMUS: CPT | Performed by: INTERNAL MEDICINE

## 2022-06-17 PROCEDURE — 71250 CT CHEST WITHOUT CONTRAST: ICD-10-PCS | Mod: 26,,, | Performed by: RADIOLOGY

## 2022-06-17 PROCEDURE — 63600175 PHARM REV CODE 636 W HCPCS: Performed by: STUDENT IN AN ORGANIZED HEALTH CARE EDUCATION/TRAINING PROGRAM

## 2022-06-17 PROCEDURE — 25000003 PHARM REV CODE 250: Performed by: STUDENT IN AN ORGANIZED HEALTH CARE EDUCATION/TRAINING PROGRAM

## 2022-06-17 PROCEDURE — A4216 STERILE WATER/SALINE, 10 ML: HCPCS | Performed by: STUDENT IN AN ORGANIZED HEALTH CARE EDUCATION/TRAINING PROGRAM

## 2022-06-17 PROCEDURE — 71250 CT THORAX DX C-: CPT | Mod: TC

## 2022-06-17 PROCEDURE — 84443 ASSAY THYROID STIM HORMONE: CPT | Performed by: STUDENT IN AN ORGANIZED HEALTH CARE EDUCATION/TRAINING PROGRAM

## 2022-06-17 PROCEDURE — 85025 COMPLETE CBC W/AUTO DIFF WBC: CPT | Performed by: STUDENT IN AN ORGANIZED HEALTH CARE EDUCATION/TRAINING PROGRAM

## 2022-06-17 PROCEDURE — 71250 CT THORAX DX C-: CPT | Mod: 26,,, | Performed by: RADIOLOGY

## 2022-06-17 PROCEDURE — 36591 DRAW BLOOD OFF VENOUS DEVICE: CPT

## 2022-06-17 RX ORDER — SODIUM CHLORIDE 0.9 % (FLUSH) 0.9 %
10 SYRINGE (ML) INJECTION
Status: DISCONTINUED | OUTPATIENT
Start: 2022-06-17 | End: 2022-06-17 | Stop reason: HOSPADM

## 2022-06-17 RX ORDER — HEPARIN 100 UNIT/ML
500 SYRINGE INTRAVENOUS
Status: DISCONTINUED | OUTPATIENT
Start: 2022-06-17 | End: 2022-06-17 | Stop reason: HOSPADM

## 2022-06-17 RX ADMIN — HEPARIN 500 UNITS: 100 SYRINGE at 09:06

## 2022-06-17 RX ADMIN — Medication 10 ML: at 09:06

## 2022-06-17 NOTE — NURSING
Pt here for labs from PAC.  Accessed PAC with Power Holden for CT scan today.  Dada ordered blood work and sent to lab.  PAC left accessed. No questions or concerns. Pt ambulated out of unit unassisted.

## 2022-06-17 NOTE — NURSING
Pt returned for PAC de-access. PAC not used for CT. Removed Power Holden from PAC. Pt ambulated out of unit unassisted.

## 2022-06-20 ENCOUNTER — PATIENT MESSAGE (OUTPATIENT)
Dept: TRANSPLANT | Facility: CLINIC | Age: 72
End: 2022-06-20
Payer: MEDICARE

## 2022-06-20 ENCOUNTER — TELEPHONE (OUTPATIENT)
Dept: TRANSPLANT | Facility: CLINIC | Age: 72
End: 2022-06-20
Payer: MEDICARE

## 2022-06-20 ENCOUNTER — INFUSION (OUTPATIENT)
Dept: INFUSION THERAPY | Facility: HOSPITAL | Age: 72
End: 2022-06-20
Payer: MEDICARE

## 2022-06-20 ENCOUNTER — OFFICE VISIT (OUTPATIENT)
Dept: HEMATOLOGY/ONCOLOGY | Facility: CLINIC | Age: 72
End: 2022-06-20
Payer: MEDICARE

## 2022-06-20 VITALS
WEIGHT: 117.75 LBS | HEART RATE: 91 BPM | OXYGEN SATURATION: 96 % | HEIGHT: 68 IN | DIASTOLIC BLOOD PRESSURE: 59 MMHG | BODY MASS INDEX: 17.85 KG/M2 | SYSTOLIC BLOOD PRESSURE: 104 MMHG | RESPIRATION RATE: 16 BRPM

## 2022-06-20 VITALS — HEART RATE: 84 BPM | RESPIRATION RATE: 18 BRPM | SYSTOLIC BLOOD PRESSURE: 111 MMHG | DIASTOLIC BLOOD PRESSURE: 72 MMHG

## 2022-06-20 DIAGNOSIS — C77.0 SECONDARY MALIGNANT NEOPLASM OF CERVICAL LYMPH NODE: Primary | ICD-10-CM

## 2022-06-20 DIAGNOSIS — D69.6 THROMBOCYTOPENIA, UNSPECIFIED: ICD-10-CM

## 2022-06-20 DIAGNOSIS — C01 SQUAMOUS CELL CARCINOMA OF BASE OF TONGUE: ICD-10-CM

## 2022-06-20 DIAGNOSIS — C77.0 SECONDARY MALIGNANT NEOPLASM OF CERVICAL LYMPH NODE: ICD-10-CM

## 2022-06-20 DIAGNOSIS — C01 SQUAMOUS CELL CARCINOMA OF BASE OF TONGUE: Primary | ICD-10-CM

## 2022-06-20 DIAGNOSIS — D84.821 IMMUNODEFICIENCY DUE TO DRUGS: ICD-10-CM

## 2022-06-20 DIAGNOSIS — N18.31 STAGE 3A CHRONIC KIDNEY DISEASE: ICD-10-CM

## 2022-06-20 DIAGNOSIS — J43.2 CENTRILOBULAR EMPHYSEMA: ICD-10-CM

## 2022-06-20 DIAGNOSIS — D63.1 ANEMIA IN STAGE 3A CHRONIC KIDNEY DISEASE: ICD-10-CM

## 2022-06-20 DIAGNOSIS — N18.31 ANEMIA IN STAGE 3A CHRONIC KIDNEY DISEASE: ICD-10-CM

## 2022-06-20 DIAGNOSIS — Z79.899 IMMUNODEFICIENCY DUE TO DRUGS: ICD-10-CM

## 2022-06-20 PROBLEM — K12.31 MUCOSITIS DUE TO CHEMOTHERAPY: Status: RESOLVED | Noted: 2021-09-30 | Resolved: 2022-06-20

## 2022-06-20 PROCEDURE — A4216 STERILE WATER/SALINE, 10 ML: HCPCS | Performed by: STUDENT IN AN ORGANIZED HEALTH CARE EDUCATION/TRAINING PROGRAM

## 2022-06-20 PROCEDURE — 99213 OFFICE O/P EST LOW 20 MIN: CPT | Mod: PBBFAC,25 | Performed by: STUDENT IN AN ORGANIZED HEALTH CARE EDUCATION/TRAINING PROGRAM

## 2022-06-20 PROCEDURE — 96413 CHEMO IV INFUSION 1 HR: CPT

## 2022-06-20 PROCEDURE — 99999 PR PBB SHADOW E&M-EST. PATIENT-LVL III: ICD-10-PCS | Mod: PBBFAC,,, | Performed by: STUDENT IN AN ORGANIZED HEALTH CARE EDUCATION/TRAINING PROGRAM

## 2022-06-20 PROCEDURE — 63600175 PHARM REV CODE 636 W HCPCS: Mod: JG | Performed by: STUDENT IN AN ORGANIZED HEALTH CARE EDUCATION/TRAINING PROGRAM

## 2022-06-20 PROCEDURE — 99215 OFFICE O/P EST HI 40 MIN: CPT | Mod: S$PBB,,, | Performed by: STUDENT IN AN ORGANIZED HEALTH CARE EDUCATION/TRAINING PROGRAM

## 2022-06-20 PROCEDURE — 99215 PR OFFICE/OUTPT VISIT, EST, LEVL V, 40-54 MIN: ICD-10-PCS | Mod: S$PBB,,, | Performed by: STUDENT IN AN ORGANIZED HEALTH CARE EDUCATION/TRAINING PROGRAM

## 2022-06-20 PROCEDURE — 25000003 PHARM REV CODE 250: Performed by: STUDENT IN AN ORGANIZED HEALTH CARE EDUCATION/TRAINING PROGRAM

## 2022-06-20 PROCEDURE — 99999 PR PBB SHADOW E&M-EST. PATIENT-LVL III: CPT | Mod: PBBFAC,,, | Performed by: STUDENT IN AN ORGANIZED HEALTH CARE EDUCATION/TRAINING PROGRAM

## 2022-06-20 RX ORDER — HEPARIN 100 UNIT/ML
500 SYRINGE INTRAVENOUS
Status: DISCONTINUED | OUTPATIENT
Start: 2022-06-20 | End: 2022-06-20 | Stop reason: HOSPADM

## 2022-06-20 RX ORDER — SODIUM CHLORIDE 0.9 % (FLUSH) 0.9 %
10 SYRINGE (ML) INJECTION
Status: CANCELLED | OUTPATIENT
Start: 2022-06-20

## 2022-06-20 RX ORDER — HEPARIN 100 UNIT/ML
500 SYRINGE INTRAVENOUS
Status: CANCELLED | OUTPATIENT
Start: 2022-06-20

## 2022-06-20 RX ORDER — SODIUM CHLORIDE 0.9 % (FLUSH) 0.9 %
10 SYRINGE (ML) INJECTION
Status: DISCONTINUED | OUTPATIENT
Start: 2022-06-20 | End: 2022-06-20 | Stop reason: HOSPADM

## 2022-06-20 RX ADMIN — SODIUM CHLORIDE 200 MG: 9 INJECTION, SOLUTION INTRAVENOUS at 10:06

## 2022-06-20 RX ADMIN — HEPARIN 500 UNITS: 100 SYRINGE at 11:06

## 2022-06-20 RX ADMIN — SODIUM CHLORIDE: 9 INJECTION, SOLUTION INTRAVENOUS at 10:06

## 2022-06-20 RX ADMIN — Medication 10 ML: at 11:06

## 2022-06-20 NOTE — ASSESSMENT & PLAN NOTE
6/2022 CT scans reviewed; no evidence of progression.  He has completed 8 cycles of chemotherapy and is ready to move onto maintenance therapy.  -plans to vacation 7/16/22-8/16/22 in [place].  -labs reviewed; ok to proceed with cycle 9  --will submit for 400 mg dose for cycle 10 right before he leaves [facility].  --reviewed with him increased risk for immunotherapy related side effects.  -revert back to 200 mg dose for cycle 11  -labs and follow up in 3 weeks prior to cycle 10.

## 2022-06-20 NOTE — TELEPHONE ENCOUNTER
My chart message sent to patient, no action required with next labs 8/15/22        ----- Message from Cornell Anton MD sent at 6/20/2022  9:22 AM CDT -----  Results reviewed

## 2022-06-20 NOTE — PROGRESS NOTES
PATIENT: Rocco Swain  MRN: [id number]  DATE: 6/20/2022      Diagnosis:   1. Squamous cell carcinoma of base of tongue    2. Secondary malignant neoplasm of cervical lymph node    3. Stage 3a chronic kidney disease    4. Anemia in stage 3a chronic kidney disease    5. Centrilobular emphysema    6. Immunodeficiency due to drugs    7. Thrombocytopenia, unspecified        Chief Complaint: No chief complaint on file.      Oncologic History:    Oncologic History 1. Squamous cell carcinoma of base of tongue with recurrence    Oncologic Treatment 1. Chemoradiation completed 4/2016  2. S/p glossectomy, laryngectomy, and bilateral neck dissection for persistent/recurrent disease  3. 10/6/2020-8/24/21 PCC  4. 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        Subjective:    Interval History: Mr. Swain is here for follow up    He continues to do well.  No mucositis or worsening neck pain.  No new symptoms to report. He plans to go to [place] 7/16/22-8/16/22 for an annual family reunion and to also go [employer].  Neck pain controlled.    He is alone at this visit.  Communication from him is 100% written.  Past Medical History:   Past Medical History:   Diagnosis Date    Basal cell carcinoma (BCC) in situ of skin 2012    3 on face, 2 on arm, removed by dermatology.    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 Underwent surgical resection of tongue and neck. s/p tracheostomy       Past Surgical HIstory:   Past Surgical History:   Procedure Laterality Date    COLONOSCOPY      INSERTION OF VENOUS ACCESS PORT Right 3/8/2021    Procedure: INSERTION, VENOUS  ACCESS PORT;  Surgeon: Jesus Rendon MD;  Location: [facility] OR [address] [place] [address];  Service: General;  Laterality: Right;    LIVER TRANSPLANT  11/2008    transplanted for biopsy proven hepatocellular carcinoma,     LYMPH NODE BIOPSY N/A 9/18/2020    Procedure: BIOPSY, LYMPH NODE;  Surgeon: Taz Diagnostic Provider;  [facility];  Service: General;  Laterality: N/A;  Rm 173    TRACHEOSTOMY         Family History:   Family History   Problem Relation Age of Onset    Dementia Mother        Social History:  reports that he has never smoked. He has never used smokeless tobacco. He reports current alcohol use. He reports previous drug use.    Allergies:  Review of patient's allergies indicates:  No Known Allergies    Medications:  Current Outpatient Medications   Medication Sig Dispense Refill    azelaic acid (AZELEX) 15 % gel APPLY TOPICALLY TO AFFECTED AREA IN THE MORNING      diphenoxylate-atropine 2.5-0.025 mg (LOMOTIL) 2.5-0.025 mg per tablet Take 1 tablet by mouth 4 (four) times daily as needed for Diarrhea (use when loperamide/imodium does not work). 30 tablet 0    ibuprofen (ADVIL,MOTRIN) 200 MG tablet Take 200 mg by mouth.      levothyroxine (SYNTHROID) 88 MCG tablet TAKE 1 TABLET BY MOUTH ONCE DAILY 90 tablet 3    LIDOcaine HCl 2% (LIDOCAINE VISCOUS) 2 % Soln Swish and spit 15 mls every 8 (eight) hours as needed (mouth sore). 100 mL 3    LIDOcaine-prilocaine (EMLA) cream Apply generously to port site 30-60 min prior to chemo and then cover with saran wrap. 30 g 2    loperamide (IMODIUM) 2 mg capsule Take 1 capsule (2 mg total) by mouth 4 (four) times daily as needed for Diarrhea. 30 capsule 1    magic mouthwash diphen/antac/lidoc/nysta Take 10 mLs by mouth 4 (four) times daily. 120 mL 4    metroNIDAZOLE (METROGEL) 0.75 % gel Apply topically to affected area 2 (two) times daily. 45 g 1    multivit-min/FA/lycopen/lutein (CENTRUM SILVER MEN ORAL) Take 1 tablet by mouth.      multivitamin capsule  Take 1 capsule by mouth once daily.      oxyCODONE (ROXICODONE) 10 mg Tab immediate release tablet Take 1 tablet (10 mg total) by mouth every 6 (six) hours as needed for Pain. 120 tablet 0    sulfacetamide sodium-sulfur 10-5 % (w/w) Clsr USE TO WASH AFFECTED AREA DAILY      tacrolimus (PROGRAF) 0.5 MG Cap Take 1 capsule (0.5 mg total) by mouth every evening. Use the 1mg capsule for your morning dose 90 capsule 3    tacrolimus (PROGRAF) 1 MG Cap Take 1 capsule (1 mg total) by mouth every morning. Use the tacrolimus 0.5mg capsule for your evening dose as directed. 90 capsule 3    tiZANidine (ZANAFLEX) 2 MG tablet Take 1 tablet (2 mg total) by mouth nightly as needed (neck muscle strain). 30 tablet 0    traZODone (DESYREL) 50 MG tablet TAKE 1 TABLET BY MOUTH IN  THE EVENING 90 tablet 3    vitamin E 400 UNIT capsule Take 400 Units by mouth once daily.       No current facility-administered medications for this visit.     Facility-Administered Medications Ordered in Other Visits   Medication Dose Route Frequency Provider Last Rate Last Admin    alteplase injection 2 mg  2 mg Intra-Catheter PRN Ronald Berg MD        heparin, porcine (PF) 100 unit/mL injection flush 500 Units  500 Units Intravenous PRSTEVIE Berg MD        heparin, porcine (PF) 100 unit/mL injection flush 500 Units  500 Units Intravenous PRSTEVIE Berg MD   500 Units at 06/20/22 1123    sodium chloride 0.9% flush 10 mL  10 mL Intravenous PRSTEVIE Berg MD        sodium chloride 0.9% flush 10 mL  10 mL Intravenous PRSTEVIE Berg MD   10 mL at 06/20/22 1123       Review of Systems   Constitutional: Negative for activity change, appetite change, chills, diaphoresis, fatigue, fever and unexpected weight change.   HENT: Positive for trouble swallowing. Negative for mouth sores and nosebleeds.    Eyes: Negative for visual disturbance.   Respiratory: Negative for cough, chest tightness, shortness of breath and wheezing.    Cardiovascular: Negative  "for chest pain and leg swelling.   Gastrointestinal: Negative for abdominal distention, abdominal pain, blood in stool, constipation, diarrhea, nausea and vomiting.   Endocrine: Negative for cold intolerance and heat intolerance.   Genitourinary: Negative for difficulty urinating and dysuria.   Musculoskeletal: Positive for myalgias and neck pain (pain radiates between both sides of his neck under his jaw.). Negative for arthralgias and back pain.   Skin: Negative for color change and rash.   Neurological: Negative for dizziness, weakness, light-headedness, numbness and headaches.   Hematological: Negative for adenopathy. Does not bruise/bleed easily.   Psychiatric/Behavioral: Negative for confusion.       ECOG Performance Status:      ECOG SCORE    1 - Restricted in strenuous activity-ambulatory and able to carry out work of a light nature         Objective:      Vitals:   Vitals:    06/20/22 0940   BP: (!) 104/59   Pulse: 91   Resp: 16   SpO2: 96%   Weight: 53.4 kg (117 lb 11.6 oz)   Height: 5' 8" (1.727 m)     BMI: Body mass index is 17.9 kg/m².  Wt Readings from Last 10 Encounters:   06/20/22 53.4 kg (117 lb 11.6 oz)   05/24/22 52.2 kg (115 lb)   05/12/22 52.3 kg (115 lb 4.8 oz)   05/09/22 52.3 kg (115 lb 4.8 oz)   04/07/22 53 kg (116 lb 13.5 oz)   04/07/22 53 kg (116 lb 13.5 oz)   03/25/22 53.1 kg (117 lb 1 oz)   03/03/22 55.2 kg (121 lb 11.1 oz)   03/02/22 55.2 kg (121 lb 11.1 oz)   02/21/22 54.4 kg (120 lb)       Physical Exam  Constitutional:       General: He is not in acute distress.     Appearance: Normal appearance. He is not ill-appearing.   HENT:      Head: Normocephalic and atraumatic.      Mouth/Throat:      Mouth: Mucous membranes are moist.      Pharynx: No oropharyngeal exudate or posterior oropharyngeal erythema.   Eyes:      General: No scleral icterus.     Extraocular Movements: Extraocular movements intact.      Conjunctiva/sclera: Conjunctivae normal.      Pupils: Pupils are equal, round, and " reactive to light.   Cardiovascular:      Rate and Rhythm: Normal rate and regular rhythm.      Heart sounds: No murmur heard.    No friction rub. No gallop.   Pulmonary:      Effort: Pulmonary effort is normal. No respiratory distress.      Breath sounds: No stridor. No wheezing, rhonchi or rales.   Chest:   Breasts:      Right: No axillary adenopathy or supraclavicular adenopathy.      Left: No axillary adenopathy or supraclavicular adenopathy.       Abdominal:      General: Bowel sounds are normal. There is no distension.      Palpations: Abdomen is soft. There is no mass.      Tenderness: There is no abdominal tenderness. There is no guarding or rebound.   Musculoskeletal:         General: Normal range of motion.      Cervical back: Normal range of motion and neck supple. No tenderness.      Right lower leg: No edema.      Left lower leg: No edema.   Lymphadenopathy:      Cervical: No cervical adenopathy.      Upper Body:      Right upper body: No supraclavicular or axillary adenopathy.      Left upper body: No supraclavicular or axillary adenopathy.   Skin:     General: Skin is warm and dry.   Neurological:      General: No focal deficit present.      Mental Status: He is alert.         Laboratory Data:   Recent Results (from the past 168 hour(s))   CBC Auto Differential    Collection Time: 06/17/22  9:17 AM   Result Value Ref Range    WBC 3.95 3.90 - 12.70 K/uL    RBC 2.72 (L) 4.60 - 6.20 M/uL    Hemoglobin 9.8 (L) 14.0 - 18.0 g/dL    Hematocrit 28.4 (L) 40.0 - 54.0 %     (H) 82 - 98 fL    MCH 36.0 (H) 27.0 - 31.0 pg    MCHC 34.5 32.0 - 36.0 g/dL    RDW 14.9 (H) 11.5 - 14.5 %    Platelets 143 (L) 150 - 450 K/uL    MPV 9.3 9.2 - 12.9 fL    Immature Granulocytes 0.3 0.0 - 0.5 %    Gran # (ANC) 2.6 1.8 - 7.7 K/uL    Immature Grans (Abs) 0.01 0.00 - 0.04 K/uL    Lymph # 0.7 (L) 1.0 - 4.8 K/uL    Mono # 0.6 0.3 - 1.0 K/uL    Eos # 0.1 0.0 - 0.5 K/uL    Baso # 0.02 0.00 - 0.20 K/uL    nRBC 0 0 /100 WBC     Gran % 66.5 38.0 - 73.0 %    Lymph % 17.2 (L) 18.0 - 48.0 %    Mono % 14.2 4.0 - 15.0 %    Eosinophil % 1.3 0.0 - 8.0 %    Basophil % 0.5 0.0 - 1.9 %    Differential Method Automated    Comprehensive Metabolic Panel    Collection Time: 06/17/22  9:17 AM   Result Value Ref Range    Sodium 140 136 - 145 mmol/L    Potassium 3.8 3.5 - 5.1 mmol/L    Chloride 106 95 - 110 mmol/L    CO2 24 23 - 29 mmol/L    Glucose 130 (H) 70 - 110 mg/dL    BUN 14 8 - 23 mg/dL    Creatinine 1.4 0.5 - 1.4 mg/dL    Calcium 9.5 8.7 - 10.5 mg/dL    Total Protein 7.2 6.0 - 8.4 g/dL    Albumin 3.6 3.5 - 5.2 g/dL    Total Bilirubin 0.5 0.1 - 1.0 mg/dL    Alkaline Phosphatase 83 55 - 135 U/L    AST 30 10 - 40 U/L    ALT 9 (L) 10 - 44 U/L    Anion Gap 10 8 - 16 mmol/L    eGFR if African American 57.2 (A) >60 mL/min/1.73 m^2    eGFR if non African American 49.5 (A) >60 mL/min/1.73 m^2   Comprehensive Metabolic Panel    Collection Time: 06/17/22  9:17 AM   Result Value Ref Range    Sodium 140 136 - 145 mmol/L    Potassium 3.8 3.5 - 5.1 mmol/L    Chloride 106 95 - 110 mmol/L    CO2 24 23 - 29 mmol/L    Glucose 130 (H) 70 - 110 mg/dL    BUN 14 8 - 23 mg/dL    Creatinine 1.4 0.5 - 1.4 mg/dL    Calcium 9.5 8.7 - 10.5 mg/dL    Total Protein 7.2 6.0 - 8.4 g/dL    Albumin 3.6 3.5 - 5.2 g/dL    Total Bilirubin 0.5 0.1 - 1.0 mg/dL    Alkaline Phosphatase 83 55 - 135 U/L    AST 30 10 - 40 U/L    ALT 9 (L) 10 - 44 U/L    Anion Gap 10 8 - 16 mmol/L    eGFR if African American 57.2 (A) >60 mL/min/1.73 m^2    eGFR if non African American 49.5 (A) >60 mL/min/1.73 m^2   Tacrolimus Level    Collection Time: 06/17/22  9:17 AM   Result Value Ref Range    Tacrolimus Lvl 8.4 5.0 - 15.0 ng/mL   CBC Auto Differential    Collection Time: 06/17/22  9:17 AM   Result Value Ref Range    WBC 3.95 3.90 - 12.70 K/uL    RBC 2.72 (L) 4.60 - 6.20 M/uL    Hemoglobin 9.8 (L) 14.0 - 18.0 g/dL    Hematocrit 28.4 (L) 40.0 - 54.0 %     (H) 82 - 98 fL    MCH 36.0 (H) 27.0 - 31.0 pg     MCHC 34.5 32.0 - 36.0 g/dL    RDW 14.9 (H) 11.5 - 14.5 %    Platelets 143 (L) 150 - 450 K/uL    MPV 9.3 9.2 - 12.9 fL    Immature Granulocytes 0.3 0.0 - 0.5 %    Gran # (ANC) 2.6 1.8 - 7.7 K/uL    Immature Grans (Abs) 0.01 0.00 - 0.04 K/uL    Lymph # 0.7 (L) 1.0 - 4.8 K/uL    Mono # 0.6 0.3 - 1.0 K/uL    Eos # 0.1 0.0 - 0.5 K/uL    Baso # 0.02 0.00 - 0.20 K/uL    nRBC 0 0 /100 WBC    Gran % 66.5 38.0 - 73.0 %    Lymph % 17.2 (L) 18.0 - 48.0 %    Mono % 14.2 4.0 - 15.0 %    Eosinophil % 1.3 0.0 - 8.0 %    Basophil % 0.5 0.0 - 1.9 %    Differential Method Automated    TSH    Collection Time: 06/17/22  9:17 AM   Result Value Ref Range    TSH 0.498 0.400 - 4.000 uIU/mL           Imaging:     CT Soft Tissue Neck WO Contrast    Result Date: 6/17/2022  EXAMINATION: CT SOFT TISSUE NECK WITHOUT CONTRAST CLINICAL HISTORY: recurrent head neck cancer on chemo, eval response.;Malignant neoplasm of base of tongue TECHNIQUE: Low dose axial images, sagittal and coronal reformations were performed from the skull base to the level of the clavicles.  Contrast was not administered. COMPARISON: CT 03/28/2022 and 08/05/2020 and PET CT 12/01/2021 FINDINGS: Study is slightly limited by lack of contrast.  Redemonstration of extensiveoperative change from glossectomy, laryngectomy with myocutaneous flap reconstruction, tracheostomy with tracheal cannula and bilateral neck dissections. Right-side Port-A-Cath again identified No new soft tissue attenuation throughout the operative bed to suggest definite worsening residual recurrent disease.  Progressively reduced soft tissue fullness at site of previous adenopathy of proximal cervical chain.  No evidence for new or increased soft tissue fullness to suggest new or worsening adenopathy. There is no consolidation the visualized lungs with trace interstitial opacity within the posterior aspect of the right upper lobe suggestive for scarring and atelectasis similar to prior..  No evidence for  acute fracture or subluxation cervical spine.     Allowing for noncontrast technique there is no significant change from prior specifically without evidence for soft tissue fullness or definite new adenopathy throughout the neck to suggest worsening residual recurrent disease.  Clinical correlation and further evaluation with PET CT as warranted. Please see above for additional details. Electronically signed by: J Carlos Dutton DO Date:    06/17/2022 Time:    11:35    CT Chest Without Contrast    Result Date: 6/17/2022  EXAMINATION: CT CHEST WITHOUT CONTRAST CLINICAL HISTORY: recurrent head neck cancer on chemo, eval response.; Malignant neoplasm of base of tongue TECHNIQUE: Using low dose technique the chest was surveyed from above the pulmonary apices through the posterior costophrenic angles.  Data was reconstructed for multiplanar images in axial, sagittal and coronal planes and for maximal intensity projection images in the the axial, sagittal and coronal planes. COMPARISON: Chest x-ray 03/08/2021. FINDINGS: Base of Neck: Thyroid not well visualized and is either atrophic or removed.  Postoperative change of tracheostomy placement. Aorta: Left-sided aortic arch with 3 arterial branches. The aorta maintains normal caliber, contour and course. There are mild calcifications of the thoracic aorta. Heart/pericardium: Normal size.  There are severe calcifications of the coronary arteries.  No pericardial effusion or calcification. Pulmonary vasculature: Normal diameter of the pulmonary trunk. Hailey/Mediastinum: No pathologic tati enlargement. Esophagus: No significant abnormality. Thoracic soft tissues: Gynecomastia.  Left chest wall port with tip terminating at the cavoatrial junction. Upper Abdomen: Multiple punctate calcifications within the hepatic parenchyma, suggestive prior granulomatous disease. Bones: Degenerative changes of the spine.  No acute fracture. No suspicious lytic or sclerotic lesion. Airways:  Tracheostomy tube terminates approximately 6.6 cm above the martina.  Airways are patent. Lungs/Pleura: Subpleural reticulations most prominently along the dependent portion of the lungs.  Left lower lobe calcified granuloma.  Part solid nodule in the right upper lobe measuring 0.5 cm (axial series 4, image 190).  Left lower lobe solid pulmonary nodule measuring 0.4 cm (axial series 4, image 29). No focal consolidation. No pleural effusion.  No pneumothorax.  No pleural calcification.     1. Part solid pulmonary nodule in the right upper lobe measuring 0.5 cm and solid pulmonary nodule measuring 0.4 cm in the left lower lobe.  Attention on follow-up. 2. Subpleural reticulations most prominently along the dependent portion the lungs.  Although some of this may represent atelectasis given predominant distribution along the dependent portions of the lung, early fibrotic changes cannot be completely excluded. Electronically signed by resident: Beth Panchal Date:    06/17/2022 Time:    10:07 Electronically signed by: Lee Isbell MD Date:    06/17/2022 Time:    10:23      Assessment:       1. Squamous cell carcinoma of base of tongue    2. Secondary malignant neoplasm of cervical lymph node    3. Stage 3a chronic kidney disease    4. Anemia in stage 3a chronic kidney disease    5. Centrilobular emphysema    6. Immunodeficiency due to drugs    7. Thrombocytopenia, unspecified           Plan:       Problem List Items Addressed This Visit        Pulmonary    Centrilobular emphysema    Overview     PET CT findings with centrilobular emphysematous changes.  Not currently symptomatic. Lungs clear on exam.  -continue to monitor              Renal/    Stage 3a chronic kidney disease    Current Assessment & Plan     Creatinine stable  -monitor cmp              Immunology/Multi System    Immunodeficiency due to drugs    Overview     S/p liver transplant and is currently on tacrolimus.  Afebrile, ANC sufficient for  treatment  -monitor cbc              Hematology    Thrombocytopenia, unspecified    Current Assessment & Plan     Pancytopenic secondary to chemotherapy. Thrombocytopenia asymptomatic.  -monitor cbc              Oncology    Squamous cell carcinoma of base of tongue - Primary    Overview     Per OSH records, initially presented in 2015 with symptoms and cT2N2c disease. page 91 of 3/25/2020 outside records clinic (media tab).  Initially presented 8/2015 with left sided odynophagia.  Treated for reflux, which did not improve.    12/11/15 he had a fiberoptic exam showing discolored mucosal area of the left base of the tongue.    1/13/16 he had persistent dysphagia, odynophagia, and new left otalgia.  MRI showed an irregular mass along the left posterior margin of the base of the tongue measuring 1.8x2.3cm with ipsilateral level 2 lymph node measuring 1.7cm with central necrosis and contralateral level 2 node measuring 1.3cm with questionable necrosis.    1/19/2016 he had direct laryngoscopy with biopsy left of midline, proximal to vallecula, which was p16 positive, invasive, with moderately to poorly differentiated sq.c.c. with basaloid features.    1/29/16 met with medical oncology who recommended chemoxrt.    2/1/16  staging PET CT showed hypermetabolic mass at base of tongue 4x2.5x3.5cm, max SUV 24.13, hypermetabolic lymph node left neck SUV 6.77, small subcentimeter lymph node right neck max SUV 3.79 (exact size of lymph nodes not mentioned in the summary).    2/15/16 started chemoxrt to left tongue base, bilateral cervical neck levels 1b-5, treated with 6MV photons utilizing IMRT, 5000 cGy in 200 cGy daily fractions.  Cone down boost to left base of tongue with additional 2000 cGy in 200 cGy daily fractions to gross disease.  Received weekly cisplatin with radiation but had to switch to carboplatin due to worsening renal function. during this time also missed several weekly treatments of cisplatin.    4/1/2016  completed chemoxrt.   11/1/2016 Repeat biopsy of left tongue showed persistent disease.  S/p salvage glossectomy with laryngectomy,  [id number], p16 positive, base of tongue squamous cell carcinoma.     page 61 of 3/25/2020 outside records clinic (media tab)  Pathology: Tonsil, left tonsil fold: reactive squamous mucosa with inflammation and degenerating skeletal muscle, no carcinoma seen.  Right and Left base of tongue:  Reactive squamous mucosa and submucosa with inflammation, no carcinoma seen.  Neck, glossectomy, laryngectomy, bilateral neck dissection:  invasive squamous cell carcinoma.  tumor site: base of tongue/hypopharynx.  Small focus of tumor is seen within the hyoid  bone.  specimen Size [id number]  tumor size [id number]  depth of invasion 20mm  TNM stage pT4a, pN0, pMx  Lymph nodes, included in all parts:  number involved 0  number examined 3.  distance of tumor from margins  positive inked margins: none  within 1mm : none  within 1-2mm: anterior-inferior    Bilateral neck dissection:  level 1: one lymph node and submandibular gland, negative for tumor  level II : two lymph nodes, negative for tumor  level III: not identified  level IV: not identified  level V: not identified.    6/20/17 - PETCT with FDG avidity of left neck lymph node, level 3, SUV 2.9.  No other evidence of local or distant disease.  7/6/2017 - biopsy of left neck lymph node with IR.  Sample was non-diagnostic. Notes from path report: Less than optimal scan specimen with minute tissue core of stromal fibroadipose tissue.  definitive tati background is not seen.  9/28/2017 - Repeat PET CT showing mild activity SUV 3.5 (increased from 3.2 previously; size 7.8x7.4x9.5mm).  Also new findings under left pectoralis minor (mild focal increased activity, indeterminate but new since previous exam)  3/22/2018 - CT neck shows no evidence of disease and level 2b lymph node decreased to 4.9mmx4.5mmx4.9mm from 7.8x7.4x9.5mm previously.    Seen by   Louis 8/3/2020 for left-sided neck pain and a swelling in left level 2 several weeks ago.  He feels it arose after he began lifting weights.  CT neck 8/5/2020 : Lymph nodes At the left neck level L2 there is a heterogeneous soft tissue density with central hypoattenuation likely reflecting a necrotic lymph node measuring 1.6 x 1.4 cm in transverse dimension, 2.5 cm in craniocaudal dimension.  There is a similar appearing irregular soft tissue density at the right neck level 2 measuring 1.1 x 9.0 cm in transverse dimension and 1.5 cm craniocaudal dimension.  No other abnormal appearing or enlarged lymph nodes found.  Impression: At level 2 within the neck bilaterally, there are  soft tissue density masses with central hypoattenuation likely reflective of necrotic lymph nodes.  Findings are suspicious for malignancy recurrence.  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  Staging PET CT 9/30/2020 with left sided hypermetabolic node and right sided enlarged node without uptake.  Also has mandibular vestibule uptake, which may be another site of disease. Discussed at tumor board.  He is not a surgical or radiation candidate.  -Started on PCC 10/6/2020 followed by maintenance cetuximab until 8/24/21 (discontinued due to progression of disease; continued increase in SUV uptake, no new lesions).  9/2021 started on carbo/5-FU/pembrolizumab; due to toxicity went to pembrolizumab monotherapy starting with cycle 2.  Progression of disease noted after cycle 3 so added chemotherapy back in with cycle 4.           Current Assessment & Plan     6/2022 CT scans reviewed; no evidence of progression.  He has completed 8 cycles of chemotherapy and is ready to move onto maintenance therapy.  -plans to vacation 7/16/22-8/16/22 in [place].  -labs reviewed; ok to proceed with cycle 9  --will submit for 400 mg dose for cycle 10 right before he leaves [facility].  --reviewed with him  increased risk for immunotherapy related side effects.  -revert back to 200 mg dose for cycle 11  -labs and follow up in 3 weeks prior to cycle 10.           Anemia in stage 3a chronic kidney disease    Current Assessment & Plan     Hg last visit may have been an overestimate; hg stable and in his usual range  -monitor cbc           Secondary malignant neoplasm of cervical lymph node    Overview     See squamous cell carcinoma               No orders of the defined types were placed in this encounter.      Advance Care Planning I initiated the process of advance care planning at his initial visit and explained the importance of this process to the patient.  Then the patient received detailed information about the importance of designating a Health Care Power of  (HCPOA). he was instructed to communicate with this person about their wishes for future healthcare, should he become sick and lose decision-making capacity. The patient has previously appointed a HCPOA. After our discussion, the patient has decided to complete a HCPOA and has appointed his brother and NAME:Ollie          Route Chart for Scheduling    Med Onc Chart Routing      Follow up with physician Other. 7/11 prior to chemo (400mg pembro dose needs auth just for one cycle)   Follow up with [facility]    Labs CBC, CMP and TSH   Lab interval:  7/11 port draw labs cmp, cbc, tsh prior to chemo   Imaging None      Pharmacy appointment No pharmacy appointment needed      Other referrals No additional referrals needed     7/11/22 chemo pembrolizumab only    Treatment Plan Information   OP HEAD NECK PEMBROLIZUMAB CARBOPLATIN FLUOROURACIL (C1 ONLY RECEIVED) FOLLOWED BY PEMBROLIZUMAB MAINTENANCE   Ronald Berg MD   Upcoming Treatment Dates - OP HEAD NECK PEMBROLIZUMAB CARBOPLATIN FLUOROURACIL (C1 ONLY RECEIVED) FOLLOWED BY PEMBROLIZUMAB MAINTENANCE    No upcoming days in selected categories.    Supportive Plan Information  IV FLUIDS AND ELECTROLYTES   Ronald Berg  MD   Upcoming Treatment Dates - IV FLUIDS AND ELECTROLYTES    No upcoming days in selected categories.    Therapy Plan Information  Flushes  heparin, porcine (PF) 100 unit/mL injection flush 500 Units  500 Units, Intravenous, Every visit  sodium chloride 0.9% flush 10 mL  10 mL, Intravenous, Every visit      Ronald Berg MD  Hematology Oncology

## 2022-06-20 NOTE — PLAN OF CARE
1130 Pt tolerated Keytruda infusion well today, no complaints or complications,. VSS through duration of treatment. Pt aware to call provider with any questions or concerns and is aware of upcoming appts. Pt ambulatory from clinic with steady gait, no distress noted.

## 2022-06-20 NOTE — PLAN OF CARE
1000 Labs, hx, and medications reviewed, pt meets parameters for treatment today. Assessment completed and plan of care reviewed. Pt verbalized understanding. PAC accessed with no complications. Pt voices no new complaints or concerns, will continue to monitor for safety.

## 2022-06-29 DIAGNOSIS — C01 SQUAMOUS CELL CARCINOMA OF BASE OF TONGUE: Primary | ICD-10-CM

## 2022-06-29 DIAGNOSIS — R53.1 WEAKNESS: ICD-10-CM

## 2022-07-01 DIAGNOSIS — C01 SQUAMOUS CELL CARCINOMA OF BASE OF TONGUE: ICD-10-CM

## 2022-07-01 RX ORDER — OXYCODONE HYDROCHLORIDE 10 MG/1
10 TABLET ORAL EVERY 6 HOURS PRN
Qty: 120 TABLET | Refills: 0 | Status: CANCELLED | OUTPATIENT
Start: 2022-07-01

## 2022-07-08 ENCOUNTER — INFUSION (OUTPATIENT)
Dept: INFUSION THERAPY | Facility: HOSPITAL | Age: 72
End: 2022-07-08
Payer: MEDICARE

## 2022-07-08 DIAGNOSIS — R53.1 WEAKNESS: ICD-10-CM

## 2022-07-08 DIAGNOSIS — C01 SQUAMOUS CELL CARCINOMA OF BASE OF TONGUE: Primary | ICD-10-CM

## 2022-07-08 DIAGNOSIS — N18.31 STAGE 3A CHRONIC KIDNEY DISEASE: ICD-10-CM

## 2022-07-08 LAB
ALBUMIN SERPL BCP-MCNC: 3.6 G/DL (ref 3.5–5.2)
ALP SERPL-CCNC: 77 U/L (ref 55–135)
ALT SERPL W/O P-5'-P-CCNC: 12 U/L (ref 10–44)
ANION GAP SERPL CALC-SCNC: 11 MMOL/L (ref 8–16)
AST SERPL-CCNC: 32 U/L (ref 10–40)
BILIRUB SERPL-MCNC: 0.5 MG/DL (ref 0.1–1)
BUN SERPL-MCNC: 15 MG/DL (ref 8–23)
CALCIUM SERPL-MCNC: 9.7 MG/DL (ref 8.7–10.5)
CHLORIDE SERPL-SCNC: 104 MMOL/L (ref 95–110)
CO2 SERPL-SCNC: 26 MMOL/L (ref 23–29)
CREAT SERPL-MCNC: 1.7 MG/DL (ref 0.5–1.4)
ERYTHROCYTE [DISTWIDTH] IN BLOOD BY AUTOMATED COUNT: 12.3 % (ref 11.5–14.5)
EST. GFR  (AFRICAN AMERICAN): 45.2 ML/MIN/1.73 M^2
EST. GFR  (NON AFRICAN AMERICAN): 39.1 ML/MIN/1.73 M^2
GLUCOSE SERPL-MCNC: 132 MG/DL (ref 70–110)
HCT VFR BLD AUTO: 30.3 % (ref 40–54)
HGB BLD-MCNC: 10.4 G/DL (ref 14–18)
IMM GRANULOCYTES # BLD AUTO: 0.01 K/UL (ref 0–0.04)
MCH RBC QN AUTO: 34.7 PG (ref 27–31)
MCHC RBC AUTO-ENTMCNC: 34.3 G/DL (ref 32–36)
MCV RBC AUTO: 101 FL (ref 82–98)
NEUTROPHILS # BLD AUTO: 3.1 K/UL (ref 1.8–7.7)
PLATELET # BLD AUTO: 129 K/UL (ref 150–450)
PMV BLD AUTO: 9.6 FL (ref 9.2–12.9)
POTASSIUM SERPL-SCNC: 4 MMOL/L (ref 3.5–5.1)
PROT SERPL-MCNC: 7.2 G/DL (ref 6–8.4)
RBC # BLD AUTO: 3 M/UL (ref 4.6–6.2)
SODIUM SERPL-SCNC: 141 MMOL/L (ref 136–145)
TSH SERPL DL<=0.005 MIU/L-ACNC: 0.4 UIU/ML (ref 0.4–4)
WBC # BLD AUTO: 4.44 K/UL (ref 3.9–12.7)

## 2022-07-08 PROCEDURE — 84443 ASSAY THYROID STIM HORMONE: CPT | Performed by: STUDENT IN AN ORGANIZED HEALTH CARE EDUCATION/TRAINING PROGRAM

## 2022-07-08 PROCEDURE — 25000003 PHARM REV CODE 250: Performed by: STUDENT IN AN ORGANIZED HEALTH CARE EDUCATION/TRAINING PROGRAM

## 2022-07-08 PROCEDURE — 36591 DRAW BLOOD OFF VENOUS DEVICE: CPT

## 2022-07-08 PROCEDURE — 85027 COMPLETE CBC AUTOMATED: CPT | Performed by: STUDENT IN AN ORGANIZED HEALTH CARE EDUCATION/TRAINING PROGRAM

## 2022-07-08 PROCEDURE — A4216 STERILE WATER/SALINE, 10 ML: HCPCS | Performed by: STUDENT IN AN ORGANIZED HEALTH CARE EDUCATION/TRAINING PROGRAM

## 2022-07-08 PROCEDURE — 80053 COMPREHEN METABOLIC PANEL: CPT | Performed by: STUDENT IN AN ORGANIZED HEALTH CARE EDUCATION/TRAINING PROGRAM

## 2022-07-08 PROCEDURE — 63600175 PHARM REV CODE 636 W HCPCS: Performed by: STUDENT IN AN ORGANIZED HEALTH CARE EDUCATION/TRAINING PROGRAM

## 2022-07-08 RX ORDER — HEPARIN 100 UNIT/ML
500 SYRINGE INTRAVENOUS
Status: DISCONTINUED | OUTPATIENT
Start: 2022-07-08 | End: 2022-07-08 | Stop reason: HOSPADM

## 2022-07-08 RX ORDER — SODIUM CHLORIDE 0.9 % (FLUSH) 0.9 %
10 SYRINGE (ML) INJECTION
Status: DISCONTINUED | OUTPATIENT
Start: 2022-07-08 | End: 2022-07-08 | Stop reason: HOSPADM

## 2022-07-08 RX ADMIN — HEPARIN 500 UNITS: 100 SYRINGE at 11:07

## 2022-07-08 RX ADMIN — Medication 10 ML: at 11:07

## 2022-07-08 NOTE — NURSING
Pt here for lab draw from PAC.  Accessed PAC using sterile technique.  Dada ordered blood work and sent to lab.  Flushed PAC with NS and heparin prior to de-accessing. No questions or concerns. Pt ambulated out of unit unassisted.

## 2022-07-11 ENCOUNTER — TELEPHONE (OUTPATIENT)
Dept: HEMATOLOGY/ONCOLOGY | Facility: CLINIC | Age: 72
End: 2022-07-11

## 2022-07-11 ENCOUNTER — INFUSION (OUTPATIENT)
Dept: INFUSION THERAPY | Facility: HOSPITAL | Age: 72
End: 2022-07-11
Payer: MEDICARE

## 2022-07-11 ENCOUNTER — OFFICE VISIT (OUTPATIENT)
Dept: HEMATOLOGY/ONCOLOGY | Facility: CLINIC | Age: 72
End: 2022-07-11
Payer: MEDICARE

## 2022-07-11 VITALS
WEIGHT: 117.06 LBS | HEART RATE: 85 BPM | DIASTOLIC BLOOD PRESSURE: 70 MMHG | SYSTOLIC BLOOD PRESSURE: 130 MMHG | RESPIRATION RATE: 20 BRPM | OXYGEN SATURATION: 99 % | BODY MASS INDEX: 17.74 KG/M2 | TEMPERATURE: 98 F | HEIGHT: 68 IN

## 2022-07-11 VITALS
SYSTOLIC BLOOD PRESSURE: 102 MMHG | HEART RATE: 105 BPM | OXYGEN SATURATION: 98 % | DIASTOLIC BLOOD PRESSURE: 61 MMHG | BODY MASS INDEX: 17.8 KG/M2 | WEIGHT: 117.06 LBS | RESPIRATION RATE: 20 BRPM

## 2022-07-11 DIAGNOSIS — Z79.899 IMMUNODEFICIENCY DUE TO DRUGS: ICD-10-CM

## 2022-07-11 DIAGNOSIS — D84.821 IMMUNODEFICIENCY DUE TO DRUGS: ICD-10-CM

## 2022-07-11 DIAGNOSIS — Z94.4 STATUS POST LIVER TRANSPLANTATION: ICD-10-CM

## 2022-07-11 DIAGNOSIS — N17.9 AKI (ACUTE KIDNEY INJURY): Primary | ICD-10-CM

## 2022-07-11 DIAGNOSIS — C01 SQUAMOUS CELL CARCINOMA OF BASE OF TONGUE: ICD-10-CM

## 2022-07-11 DIAGNOSIS — C77.0 SECONDARY MALIGNANT NEOPLASM OF CERVICAL LYMPH NODE: ICD-10-CM

## 2022-07-11 DIAGNOSIS — D69.6 THROMBOCYTOPENIA, UNSPECIFIED: ICD-10-CM

## 2022-07-11 DIAGNOSIS — D61.810 PANCYTOPENIA DUE TO CHEMOTHERAPY: ICD-10-CM

## 2022-07-11 DIAGNOSIS — N18.31 STAGE 3A CHRONIC KIDNEY DISEASE: ICD-10-CM

## 2022-07-11 DIAGNOSIS — C77.0 SECONDARY MALIGNANT NEOPLASM OF CERVICAL LYMPH NODE: Primary | ICD-10-CM

## 2022-07-11 PROCEDURE — A4216 STERILE WATER/SALINE, 10 ML: HCPCS | Performed by: STUDENT IN AN ORGANIZED HEALTH CARE EDUCATION/TRAINING PROGRAM

## 2022-07-11 PROCEDURE — 96413 CHEMO IV INFUSION 1 HR: CPT

## 2022-07-11 PROCEDURE — 99999 PR PBB SHADOW E&M-EST. PATIENT-LVL II: ICD-10-PCS | Mod: PBBFAC,,, | Performed by: STUDENT IN AN ORGANIZED HEALTH CARE EDUCATION/TRAINING PROGRAM

## 2022-07-11 PROCEDURE — 63600175 PHARM REV CODE 636 W HCPCS: Mod: JG | Performed by: STUDENT IN AN ORGANIZED HEALTH CARE EDUCATION/TRAINING PROGRAM

## 2022-07-11 PROCEDURE — 25000003 PHARM REV CODE 250: Performed by: STUDENT IN AN ORGANIZED HEALTH CARE EDUCATION/TRAINING PROGRAM

## 2022-07-11 PROCEDURE — 99999 PR PBB SHADOW E&M-EST. PATIENT-LVL II: CPT | Mod: PBBFAC,,, | Performed by: STUDENT IN AN ORGANIZED HEALTH CARE EDUCATION/TRAINING PROGRAM

## 2022-07-11 PROCEDURE — 99215 OFFICE O/P EST HI 40 MIN: CPT | Mod: S$PBB,,, | Performed by: STUDENT IN AN ORGANIZED HEALTH CARE EDUCATION/TRAINING PROGRAM

## 2022-07-11 PROCEDURE — 99212 OFFICE O/P EST SF 10 MIN: CPT | Mod: PBBFAC,25 | Performed by: STUDENT IN AN ORGANIZED HEALTH CARE EDUCATION/TRAINING PROGRAM

## 2022-07-11 PROCEDURE — 99215 PR OFFICE/OUTPT VISIT, EST, LEVL V, 40-54 MIN: ICD-10-PCS | Mod: S$PBB,,, | Performed by: STUDENT IN AN ORGANIZED HEALTH CARE EDUCATION/TRAINING PROGRAM

## 2022-07-11 RX ORDER — SODIUM CHLORIDE 0.9 % (FLUSH) 0.9 %
10 SYRINGE (ML) INJECTION
Status: DISCONTINUED | OUTPATIENT
Start: 2022-07-11 | End: 2022-07-11 | Stop reason: HOSPADM

## 2022-07-11 RX ORDER — HEPARIN 100 UNIT/ML
500 SYRINGE INTRAVENOUS
Status: DISCONTINUED | OUTPATIENT
Start: 2022-07-11 | End: 2022-07-11 | Stop reason: HOSPADM

## 2022-07-11 RX ORDER — HEPARIN 100 UNIT/ML
500 SYRINGE INTRAVENOUS
Status: CANCELLED | OUTPATIENT
Start: 2022-07-11

## 2022-07-11 RX ORDER — SODIUM CHLORIDE 0.9 % (FLUSH) 0.9 %
10 SYRINGE (ML) INJECTION
Status: CANCELLED | OUTPATIENT
Start: 2022-07-11

## 2022-07-11 RX ADMIN — Medication 10 ML: at 10:07

## 2022-07-11 RX ADMIN — SODIUM CHLORIDE: 0.9 INJECTION, SOLUTION INTRAVENOUS at 09:07

## 2022-07-11 RX ADMIN — HEPARIN 500 UNITS: 100 SYRINGE at 10:07

## 2022-07-11 RX ADMIN — SODIUM CHLORIDE 400 MG: 9 INJECTION, SOLUTION INTRAVENOUS at 09:07

## 2022-07-11 NOTE — ASSESSMENT & PLAN NOTE
Elevated creatinine, TSERING.  Recently had tacrolimus dose adjusted (1mg AM, 0.5mg PM)  -encourage hydration, repeat bmp and check tacro level later this week

## 2022-07-11 NOTE — ASSESSMENT & PLAN NOTE
6/2022 CT scans reviewed; no evidence of progression.  Completed 9 cycles of treatment; no new symptoms since his last treatment.  physical exam left neck softer.  -labs reviewed; ok to proceed with q6w dose  -plans to vacation 7/16/22-8/16/22 in [place].  -labs and follow up with me prior to cycle 11  -revert back to 200 mg dose for cycle 11  --reviewed with him increased risk for immunotherapy related side effects.  -clear for dental procedures

## 2022-07-11 NOTE — PLAN OF CARE
Pt received Keytruda today and tolerated well, without complications. Educated patient about Keytruda (indications, side effects, possible reactions, chemotherapy precautions) and verbalized understanding.  VSS. CW port positive for blood return, saline flushed, Heparin flush instilled to dwell and de accessed prior to DC. Pt DC with no distress noted, ambulated off unit w/o event, via self, pleased.

## 2022-07-11 NOTE — PROGRESS NOTES
PATIENT: Rocco Swain  MRN: [id number]  DATE: 7/11/2022      Diagnosis:   1. TSERING (acute kidney injury)    2. Squamous cell carcinoma of base of tongue    3. Secondary malignant neoplasm of cervical lymph node    4. Immunodeficiency due to drugs    5. Stage 3a chronic kidney disease    6. Pancytopenia due to chemotherapy    7. Status post liver transplantation    8. Thrombocytopenia, unspecified        Chief Complaint: Squamous cell carcinoma of base of tongue      Oncologic History:    Oncologic History 1. Squamous cell carcinoma of base of tongue with recurrence    Oncologic Treatment 1. Chemoradiation completed 4/2016  2. S/p glossectomy, laryngectomy, and bilateral neck dissection for persistent/recurrent disease  3. 10/6/2020-8/24/21 PCC  4. 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        Subjective:    Interval History: Mr. Swain is here for follow up    Since I last saw him, he is doing well.  Neck pain stable.  He has been waking up with runny nose in the morning and experiencing intermittent ear pain (bilaterally). No discharge.  3 weeks ago changed his tacrolimus to 1 mg/0.5mg.      He is alone at this visit.  Communication from him is 100% written.  Past Medical History:   Past Medical History:   Diagnosis Date    Basal cell carcinoma (BCC) in situ of skin 2012    3 on face, 2 on arm, removed by dermatology.    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 Underwent surgical resection of tongue and neck. s/p tracheostomy       Past Surgical HIstory:   Past Surgical History:   Procedure Laterality Date    COLONOSCOPY      INSERTION OF  VENOUS ACCESS PORT Right 3/8/2021    Procedure: INSERTION, VENOUS ACCESS PORT;  Surgeon: Jesus Rendon MD;  [facility];  Service: General;  Laterality: Right;    LIVER TRANSPLANT  11/2008    transplanted for biopsy proven hepatocellular carcinoma,     LYMPH NODE BIOPSY N/A 9/18/2020    Procedure: BIOPSY, LYMPH NODE;  Surgeon: Taz Diagnostic Provider;  Location: [facility] OR 2ND [facility];  Service: General;  Laterality: N/A;  Rm 173    TRACHEOSTOMY         Family History:   Family History   Problem Relation Age of Onset    Dementia Mother        Social History:  reports that he has never smoked. He has never used smokeless tobacco. He reports current alcohol use. He reports previous drug use.    Allergies:  Review of patient's allergies indicates:  No Known Allergies    Medications:  Current Outpatient Medications   Medication Sig Dispense Refill    azelaic acid (AZELEX) 15 % gel APPLY TOPICALLY TO AFFECTED AREA IN THE MORNING      diphenoxylate-atropine 2.5-0.025 mg (LOMOTIL) 2.5-0.025 mg per tablet Take 1 tablet by mouth 4 (four) times daily as needed for Diarrhea (use when loperamide/imodium does not work). 30 tablet 0    ibuprofen (ADVIL,MOTRIN) 200 MG tablet Take 200 mg by mouth.      levothyroxine (SYNTHROID) 88 MCG tablet TAKE 1 TABLET BY MOUTH ONCE DAILY 90 tablet 3    LIDOcaine HCl 2% (LIDOCAINE VISCOUS) 2 % Soln Swish and spit 15 mls every 8 (eight) hours as needed (mouth sore). 100 mL 3    LIDOcaine-prilocaine (EMLA) cream Apply generously to port site 30-60 min prior to chemo and then cover with saran wrap. 30 g 2    loperamide (IMODIUM) 2 mg capsule Take 1 capsule (2 mg total) by mouth 4 (four) times daily as needed for Diarrhea. 30 capsule 1    magic mouthwash diphen/antac/lidoc/nysta Take 10 mLs by mouth 4 (four) times daily. 120 mL 4    metroNIDAZOLE (METROGEL) 0.75 % gel Apply topically to affected area 2 (two) times daily. 45 g 1    multivit-min/FA/lycopen/lutein (CENTRUM  SILVER MEN ORAL) Take 1 tablet by mouth.      multivitamin capsule Take 1 capsule by mouth once daily.      oxyCODONE (ROXICODONE) 10 mg Tab immediate release tablet Take 1 tablet (10 mg total) by mouth every 6 (six) hours as needed for Pain. 120 tablet 0    sulfacetamide sodium-sulfur 10-5 % (w/w) Clsr USE TO WASH AFFECTED AREA DAILY      tacrolimus (PROGRAF) 0.5 MG Cap Take 1 capsule (0.5 mg total) by mouth every evening. Use the 1mg capsule for your morning dose 90 capsule 3    tacrolimus (PROGRAF) 1 MG Cap Take 1 capsule (1 mg total) by mouth every morning. Use the tacrolimus 0.5mg capsule for your evening dose as directed. 90 capsule 3    tiZANidine (ZANAFLEX) 2 MG tablet Take 1 tablet (2 mg total) by mouth nightly as needed (neck muscle strain). 30 tablet 0    traZODone (DESYREL) 50 MG tablet TAKE 1 TABLET BY MOUTH IN  THE EVENING 90 tablet 3    vitamin E 400 UNIT capsule Take 400 Units by mouth once daily.       No current facility-administered medications for this visit.     Facility-Administered Medications Ordered in Other Visits   Medication Dose Route Frequency Provider Last Rate Last Admin    alteplase injection 2 mg  2 mg Intra-Catheter PRN Ronald Berg MD        heparin, porcine (PF) 100 unit/mL injection flush 500 Units  500 Units Intravenous PRN Ronald Berg MD        heparin, porcine (PF) 100 unit/mL injection flush 500 Units  500 Units Intravenous PRN Ronald Berg MD        pembrolizumab (KEYTRUDA) 400 mg in sodium chloride 0.9% 116 mL infusion  400 mg Intravenous 1 time in Clinic/HOD Ronald Berg MD        sodium chloride 0.9% 100 mL flush bag   Intravenous 1 time in Clinic/HOD Ronald Berg MD        sodium chloride 0.9% flush 10 mL  10 mL Intravenous PRN Ronald Berg MD        sodium chloride 0.9% flush 10 mL  10 mL Intravenous PRN Ronald Berg MD           Review of Systems   Constitutional: Negative for activity change, appetite change, chills, diaphoresis, fatigue, fever and  unexpected weight change.   HENT: Positive for ear pain (intermittent) and trouble swallowing. Negative for ear discharge, mouth sores and nosebleeds.    Eyes: Negative for visual disturbance.   Respiratory: Negative for cough, chest tightness, shortness of breath and wheezing.    Cardiovascular: Negative for chest pain and leg swelling.   Gastrointestinal: Negative for abdominal distention, abdominal pain, blood in stool, constipation, diarrhea, nausea and vomiting.   Endocrine: Negative for cold intolerance and heat intolerance.   Genitourinary: Negative for difficulty urinating and dysuria.   Musculoskeletal: Positive for myalgias and neck pain (pain radiates between both sides of his neck under his jaw.). Negative for arthralgias and back pain.   Skin: Negative for color change and rash.   Neurological: Negative for dizziness, weakness, light-headedness, numbness and headaches.   Hematological: Negative for adenopathy. Does not bruise/bleed easily.   Psychiatric/Behavioral: Negative for confusion.       ECOG Performance Status:      ECOG SCORE    1 - Restricted in strenuous activity-ambulatory and able to carry out work of a light nature         Objective:      Vitals:   Vitals:    07/11/22 0824   BP: 102/61   Pulse: 105   Resp: 20   SpO2: 98%   Weight: 53.1 kg (117 lb 1 oz)     BMI: Body mass index is 17.8 kg/m².  Wt Readings from Last 10 Encounters:   07/11/22 53.1 kg (117 lb 1 oz)   07/11/22 53.1 kg (117 lb 1 oz)   06/20/22 53.4 kg (117 lb 11.6 oz)   05/24/22 52.2 kg (115 lb)   05/12/22 52.3 kg (115 lb 4.8 oz)   05/09/22 52.3 kg (115 lb 4.8 oz)   04/07/22 53 kg (116 lb 13.5 oz)   04/07/22 53 kg (116 lb 13.5 oz)   03/25/22 53.1 kg (117 lb 1 oz)   03/03/22 55.2 kg (121 lb 11.1 oz)       Physical Exam  Constitutional:       General: He is not in acute distress.     Appearance: Normal appearance. He is not ill-appearing.   HENT:      Head: Normocephalic and atraumatic.      Mouth/Throat:      Mouth: Mucous  membranes are moist.      Pharynx: No oropharyngeal exudate or posterior oropharyngeal erythema.   Eyes:      General: No scleral icterus.     Extraocular Movements: Extraocular movements intact.      Conjunctiva/sclera: Conjunctivae normal.      Pupils: Pupils are equal, round, and reactive to light.   Cardiovascular:      Rate and Rhythm: Normal rate and regular rhythm.      Heart sounds: No murmur heard.    No friction rub. No gallop.   Pulmonary:      Effort: Pulmonary effort is normal. No respiratory distress.      Breath sounds: No stridor. No wheezing, rhonchi or rales.   Chest:   Breasts:      Right: No axillary adenopathy or supraclavicular adenopathy.      Left: No axillary adenopathy or supraclavicular adenopathy.       Abdominal:      General: Bowel sounds are normal. There is no distension.      Palpations: Abdomen is soft. There is no mass.      Tenderness: There is no abdominal tenderness. There is no guarding or rebound.   Musculoskeletal:         General: Normal range of motion.      Cervical back: Normal range of motion and neck supple. No tenderness.      Right lower leg: No edema.      Left lower leg: No edema.   Lymphadenopathy:      Cervical: No cervical adenopathy.      Upper Body:      Right upper body: No supraclavicular or axillary adenopathy.      Left upper body: No supraclavicular or axillary adenopathy.   Skin:     General: Skin is warm and dry.   Neurological:      General: No focal deficit present.      Mental Status: He is alert.         Laboratory Data:   Recent Results (from the past 168 hour(s))   CBC Oncology    Collection Time: 07/08/22 11:48 AM   Result Value Ref Range    WBC 4.44 3.90 - 12.70 K/uL    RBC 3.00 (L) 4.60 - 6.20 M/uL    Hemoglobin 10.4 (L) 14.0 - 18.0 g/dL    Hematocrit 30.3 (L) 40.0 - 54.0 %     (H) 82 - 98 fL    MCH 34.7 (H) 27.0 - 31.0 pg    MCHC 34.3 32.0 - 36.0 g/dL    RDW 12.3 11.5 - 14.5 %    Platelets 129 (L) 150 - 450 K/uL    MPV 9.6 9.2 - 12.9 fL     Gran # (ANC) 3.1 1.8 - 7.7 K/uL    Immature Grans (Abs) 0.01 0.00 - 0.04 K/uL   CMP    Collection Time: 07/08/22 11:48 AM   Result Value Ref Range    Sodium 141 136 - 145 mmol/L    Potassium 4.0 3.5 - 5.1 mmol/L    Chloride 104 95 - 110 mmol/L    CO2 26 23 - 29 mmol/L    Glucose 132 (H) 70 - 110 mg/dL    BUN 15 8 - 23 mg/dL    Creatinine 1.7 (H) 0.5 - 1.4 mg/dL    Calcium 9.7 8.7 - 10.5 mg/dL    Total Protein 7.2 6.0 - 8.4 g/dL    Albumin 3.6 3.5 - 5.2 g/dL    Total Bilirubin 0.5 0.1 - 1.0 mg/dL    Alkaline Phosphatase 77 55 - 135 U/L    AST 32 10 - 40 U/L    ALT 12 10 - 44 U/L    Anion Gap 11 8 - 16 mmol/L    eGFR if African American 45.2 (A) >60 mL/min/1.73 m^2    eGFR if non  39.1 (A) >60 mL/min/1.73 m^2   TSH    Collection Time: 07/08/22 11:48 AM   Result Value Ref Range    TSH 0.404 0.400 - 4.000 uIU/mL           Imaging:     CT Soft Tissue Neck WO Contrast    Result Date: 6/17/2022  EXAMINATION: CT SOFT TISSUE NECK WITHOUT CONTRAST CLINICAL HISTORY: recurrent head neck cancer on chemo, eval response.;Malignant neoplasm of base of tongue TECHNIQUE: Low dose axial images, sagittal and coronal reformations were performed from the skull base to the level of the clavicles.  Contrast was not administered. COMPARISON: CT 03/28/2022 and 08/05/2020 and PET CT 12/01/2021 FINDINGS: Study is slightly limited by lack of contrast.  Redemonstration of extensiveoperative change from glossectomy, laryngectomy with myocutaneous flap reconstruction, tracheostomy with tracheal cannula and bilateral neck dissections. Right-side Port-A-Cath again identified No new soft tissue attenuation throughout the operative bed to suggest definite worsening residual recurrent disease.  Progressively reduced soft tissue fullness at site of previous adenopathy of proximal cervical chain.  No evidence for new or increased soft tissue fullness to suggest new or worsening adenopathy. There is no consolidation the visualized lungs  with trace interstitial opacity within the posterior aspect of the right upper lobe suggestive for scarring and atelectasis similar to prior..  No evidence for acute fracture or subluxation cervical spine.     Allowing for noncontrast technique there is no significant change from prior specifically without evidence for soft tissue fullness or definite new adenopathy throughout the neck to suggest worsening residual recurrent disease.  Clinical correlation and further evaluation with PET CT as warranted. Please see above for additional details. Electronically signed by: J Carlos Dutton DO Date:    06/17/2022 Time:    11:35    CT Chest Without Contrast    Result Date: 6/17/2022  EXAMINATION: CT CHEST WITHOUT CONTRAST CLINICAL HISTORY: recurrent head neck cancer on chemo, eval response.; Malignant neoplasm of base of tongue TECHNIQUE: Using low dose technique the chest was surveyed from above the pulmonary apices through the posterior costophrenic angles.  Data was reconstructed for multiplanar images in axial, sagittal and coronal planes and for maximal intensity projection images in the the axial, sagittal and coronal planes. COMPARISON: Chest x-ray 03/08/2021. FINDINGS: Base of Neck: Thyroid not well visualized and is either atrophic or removed.  Postoperative change of tracheostomy placement. Aorta: Left-sided aortic arch with 3 arterial branches. The aorta maintains normal caliber, contour and course. There are mild calcifications of the thoracic aorta. Heart/pericardium: Normal size.  There are severe calcifications of the coronary arteries.  No pericardial effusion or calcification. Pulmonary vasculature: Normal diameter of the pulmonary trunk. Hailey/Mediastinum: No pathologic tati enlargement. Esophagus: No significant abnormality. Thoracic soft tissues: Gynecomastia.  Left chest wall port with tip terminating at the cavoatrial junction. Upper Abdomen: Multiple punctate calcifications within the hepatic  parenchyma, suggestive prior granulomatous disease. Bones: Degenerative changes of the spine.  No acute fracture. No suspicious lytic or sclerotic lesion. Airways: Tracheostomy tube terminates approximately 6.6 cm above the martina.  Airways are patent. Lungs/Pleura: Subpleural reticulations most prominently along the dependent portion of the lungs.  Left lower lobe calcified granuloma.  Part solid nodule in the right upper lobe measuring 0.5 cm (axial series 4, image 190).  Left lower lobe solid pulmonary nodule measuring 0.4 cm (axial series 4, image 29). No focal consolidation. No pleural effusion.  No pneumothorax.  No pleural calcification.     1. Part solid pulmonary nodule in the right upper lobe measuring 0.5 cm and solid pulmonary nodule measuring 0.4 cm in the left lower lobe.  Attention on follow-up. 2. Subpleural reticulations most prominently along the dependent portion the lungs.  Although some of this may represent atelectasis given predominant distribution along the dependent portions of the lung, early fibrotic changes cannot be completely excluded. Electronically signed by resident: Beth Panchal Date:    06/17/2022 Time:    10:07 Electronically signed by: Lee Isbell MD Date:    06/17/2022 Time:    10:23      Assessment:       1. TSERING (acute kidney injury)    2. Squamous cell carcinoma of base of tongue    3. Secondary malignant neoplasm of cervical lymph node    4. Immunodeficiency due to drugs    5. Stage 3a chronic kidney disease    6. Pancytopenia due to chemotherapy    7. Status post liver transplantation    8. Thrombocytopenia, unspecified           Plan:       Problem List Items Addressed This Visit        Renal/    Stage 3a chronic kidney disease    Current Assessment & Plan     Elevated creatinine, TSERING.  Recently had tacrolimus dose adjusted (1mg AM, 0.5mg PM)  -encourage hydration, repeat bmp and check tacro level later this week              Immunology/Multi System    Immunodeficiency  due to drugs    Overview     S/p liver transplant and is currently on tacrolimus.  Afebrile, ANC sufficient for treatment  -monitor cbc              Hematology    Thrombocytopenia, unspecified    Current Assessment & Plan     Grade 1, stable, asymptomatic.  -monitor cbc              Oncology    Squamous cell carcinoma of base of tongue    Overview     Per OSH records, initially presented in 2015 with symptoms and cT2N2c disease. page 91 of 3/25/2020 outside records clinic (media tab).  Initially presented 8/2015 with left sided odynophagia.  Treated for reflux, which did not improve.    12/11/15 he had a fiberoptic exam showing discolored mucosal area of the left base of the tongue.    1/13/16 he had persistent dysphagia, odynophagia, and new left otalgia.  MRI showed an irregular mass along the left posterior margin of the base of the tongue measuring 1.8x2.3cm with ipsilateral level 2 lymph node measuring 1.7cm with central necrosis and contralateral level 2 node measuring 1.3cm with questionable necrosis.    1/19/2016 he had direct laryngoscopy with biopsy left of midline, proximal to vallecula, which was p16 positive, invasive, with moderately to poorly differentiated sq.c.c. with basaloid features.    1/29/16 met with medical oncology who recommended chemoxrt.    2/1/16  staging PET CT showed hypermetabolic mass at base of tongue 4x2.5x3.5cm, max SUV 24.13, hypermetabolic lymph node left neck SUV 6.77, small subcentimeter lymph node right neck max SUV 3.79 (exact size of lymph nodes not mentioned in the summary).    2/15/16 started chemoxrt to left tongue base, bilateral cervical neck levels 1b-5, treated with 6MV photons utilizing IMRT, 5000 cGy in 200 cGy daily fractions.  Cone down boost to left base of tongue with additional 2000 cGy in 200 cGy daily fractions to gross disease.  Received weekly cisplatin with radiation but had to switch to carboplatin due to worsening renal function. during this time also  missed several weekly treatments of cisplatin.    4/1/2016 completed chemoxrt.   11/1/2016 Repeat biopsy of left tongue showed persistent disease.  S/p salvage glossectomy with laryngectomy,  [id number], p16 positive, base of tongue squamous cell carcinoma.     page 61 of 3/25/2020 outside records clinic (media tab)  Pathology: Tonsil, left tonsil fold: reactive squamous mucosa with inflammation and degenerating skeletal muscle, no carcinoma seen.  Right and Left base of tongue:  Reactive squamous mucosa and submucosa with inflammation, no carcinoma seen.  Neck, glossectomy, laryngectomy, bilateral neck dissection:  invasive squamous cell carcinoma.  tumor site: base of tongue/hypopharynx.  Small focus of tumor is seen within the hyoid  bone.  specimen Size [id number]  tumor size [id number]  depth of invasion 20mm  TNM stage pT4a, pN0, pMx  Lymph nodes, included in all parts:  number involved 0  number examined 3.  distance of tumor from margins  positive inked margins: none  within 1mm : none  within 1-2mm: anterior-inferior    Bilateral neck dissection:  level 1: one lymph node and submandibular gland, negative for tumor  level II : two lymph nodes, negative for tumor  level III: not identified  level IV: not identified  level V: not identified.    6/20/17 - PETCT with FDG avidity of left neck lymph node, level 3, SUV 2.9.  No other evidence of local or distant disease.  7/6/2017 - biopsy of left neck lymph node with IR.  Sample was non-diagnostic. Notes from path report: Less than optimal scan specimen with minute tissue core of stromal fibroadipose tissue.  definitive tati background is not seen.  9/28/2017 - Repeat PET CT showing mild activity SUV 3.5 (increased from 3.2 previously; size 7.8x7.4x9.5mm).  Also new findings under left pectoralis minor (mild focal increased activity, indeterminate but new since previous exam)  3/22/2018 - CT neck shows no evidence of disease and level 2b lymph node decreased to  4.9mmx4.5mmx4.9mm from 7.8x7.4x9.5mm previously.    Seen by Dr. Myers 8/3/2020 for left-sided neck pain and a swelling in left level 2 several weeks ago.  He feels it arose after he began lifting weights.  CT neck 8/5/2020 : Lymph nodes At the left neck level L2 there is a heterogeneous soft tissue density with central hypoattenuation likely reflecting a necrotic lymph node measuring 1.6 x 1.4 cm in transverse dimension, 2.5 cm in craniocaudal dimension.  There is a similar appearing irregular soft tissue density at the right neck level 2 measuring 1.1 x 9.0 cm in transverse dimension and 1.5 cm craniocaudal dimension.  No other abnormal appearing or enlarged lymph nodes found.  Impression: At level 2 within the neck bilaterally, there are  soft tissue density masses with central hypoattenuation likely reflective of necrotic lymph nodes.  Findings are suspicious for malignancy recurrence.  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  Staging PET CT 9/30/2020 with left sided hypermetabolic node and right sided enlarged node without uptake.  Also has mandibular vestibule uptake, which may be another site of disease. Discussed at tumor board.  He is not a surgical or radiation candidate.  -Started on PCC 10/6/2020 followed by maintenance cetuximab until 8/24/21 (discontinued due to progression of disease; continued increase in SUV uptake, no new lesions).  9/2021 started on carbo/5-FU/pembrolizumab; due to toxicity went to pembrolizumab monotherapy starting with cycle 2.  Progression of disease noted after cycle 3 so added chemotherapy back in with cycle 4.           Current Assessment & Plan     6/2022 CT scans reviewed; no evidence of progression.  Completed 9 cycles of treatment; no new symptoms since his last treatment.  physical exam left neck softer.  -labs reviewed; ok to proceed with q6w dose  -plans to vacation 7/16/22-8/16/22 in [place].  -labs and  follow up with me prior to cycle 11  -revert back to 200 mg dose for cycle 11  --reviewed with him increased risk for immunotherapy related side effects.  -clear for dental procedures           Secondary malignant neoplasm of cervical lymph node    Overview     See squamous cell carcinoma           Pancytopenia due to chemotherapy       GI    Status post liver transplantation    Relevant Orders    TACROLIMUS LEVEL      Other Visit Diagnoses     TSERING (acute kidney injury)    -  Primary    Relevant Orders    BASIC METABOLIC PANEL        Orders Placed This Encounter   Procedures    BASIC METABOLIC PANEL    TACROLIMUS LEVEL       Advance Care Planning I initiated the process of advance care planning at his initial visit and explained the importance of this process to the patient.  Then the patient received detailed information about the importance of designating a Health Care Power of  (HCPOA). he was instructed to communicate with this person about their wishes for future healthcare, should he become sick and lose decision-making capacity. The patient has previously appointed a HCPOA. After our discussion, the patient has decided to complete a HCPOA and has appointed his brother and NAME:Ollie          Route Chart for Scheduling    Med Onc Chart Routing  Urgent    Follow up with physician Other. 8/22 prior to chemo (pembrolizumab only)   Follow up with [facility]    Infusion scheduling note    Injection scheduling note    Labs CBC, CMP and TSH   Lab interval:  7/14 port draw labs tacrolimus level and bmp 8/22 port draw labs cmp, cbc, tsh prior to chemo   Imaging None      Pharmacy appointment No pharmacy appointment needed      Other referrals No additional referrals needed     8/22/22 chemo pembrolizumab only    Treatment Plan Information   OP HEAD NECK PEMBROLIZUMAB CARBOPLATIN FLUOROURACIL (C1 ONLY RECEIVED) FOLLOWED BY PEMBROLIZUMAB MAINTENANCE   Ronald Berg MD   Upcoming Treatment Dates - OP HEAD NECK  PEMBROLIZUMAB CARBOPLATIN FLUOROURACIL (C1 ONLY RECEIVED) FOLLOWED BY PEMBROLIZUMAB MAINTENANCE    8/22/2022       Immunotherapy       pembrolizumab (KEYTRUDA) 200 mg in sodium chloride 0.9% 108 mL infusion  9/12/2022       Immunotherapy       pembrolizumab (KEYTRUDA) 200 mg in sodium chloride 0.9% 108 mL infusion  10/3/2022       Immunotherapy       pembrolizumab (KEYTRUDA) 200 mg in sodium chloride 0.9% 108 mL infusion  10/24/2022       Immunotherapy       pembrolizumab (KEYTRUDA) 200 mg in sodium chloride 0.9% 108 mL infusion    Therapy Plan Information  Flushes  heparin, porcine (PF) 100 unit/mL injection flush 500 Units  500 Units, Intravenous, Every visit  sodium chloride 0.9% flush 10 mL  10 mL, Intravenous, Every visit      Ronald Berg MD  Hematology Oncology

## 2022-07-14 ENCOUNTER — INFUSION (OUTPATIENT)
Dept: INFUSION THERAPY | Facility: HOSPITAL | Age: 72
End: 2022-07-14
Attending: STUDENT IN AN ORGANIZED HEALTH CARE EDUCATION/TRAINING PROGRAM
Payer: MEDICARE

## 2022-07-14 DIAGNOSIS — N17.9 AKI (ACUTE KIDNEY INJURY): Primary | ICD-10-CM

## 2022-07-14 DIAGNOSIS — N18.31 STAGE 3A CHRONIC KIDNEY DISEASE: ICD-10-CM

## 2022-07-14 DIAGNOSIS — Z94.4 STATUS POST LIVER TRANSPLANTATION: ICD-10-CM

## 2022-07-14 LAB
ANION GAP SERPL CALC-SCNC: 8 MMOL/L (ref 8–16)
BUN SERPL-MCNC: 16 MG/DL (ref 8–23)
CALCIUM SERPL-MCNC: 9.4 MG/DL (ref 8.7–10.5)
CHLORIDE SERPL-SCNC: 106 MMOL/L (ref 95–110)
CO2 SERPL-SCNC: 27 MMOL/L (ref 23–29)
CREAT SERPL-MCNC: 1.5 MG/DL (ref 0.5–1.4)
EST. GFR  (AFRICAN AMERICAN): 52.6 ML/MIN/1.73 M^2
EST. GFR  (NON AFRICAN AMERICAN): 45.5 ML/MIN/1.73 M^2
GLUCOSE SERPL-MCNC: 117 MG/DL (ref 70–110)
POTASSIUM SERPL-SCNC: 4.1 MMOL/L (ref 3.5–5.1)
SODIUM SERPL-SCNC: 141 MMOL/L (ref 136–145)
TACROLIMUS BLD-MCNC: 7.4 NG/ML (ref 5–15)

## 2022-07-14 PROCEDURE — 63600175 PHARM REV CODE 636 W HCPCS: Performed by: STUDENT IN AN ORGANIZED HEALTH CARE EDUCATION/TRAINING PROGRAM

## 2022-07-14 PROCEDURE — 25000003 PHARM REV CODE 250: Performed by: STUDENT IN AN ORGANIZED HEALTH CARE EDUCATION/TRAINING PROGRAM

## 2022-07-14 PROCEDURE — A4216 STERILE WATER/SALINE, 10 ML: HCPCS | Performed by: STUDENT IN AN ORGANIZED HEALTH CARE EDUCATION/TRAINING PROGRAM

## 2022-07-14 PROCEDURE — 36591 DRAW BLOOD OFF VENOUS DEVICE: CPT

## 2022-07-14 PROCEDURE — 80048 BASIC METABOLIC PNL TOTAL CA: CPT | Performed by: STUDENT IN AN ORGANIZED HEALTH CARE EDUCATION/TRAINING PROGRAM

## 2022-07-14 PROCEDURE — 80197 ASSAY OF TACROLIMUS: CPT | Performed by: STUDENT IN AN ORGANIZED HEALTH CARE EDUCATION/TRAINING PROGRAM

## 2022-07-14 RX ORDER — SODIUM CHLORIDE 0.9 % (FLUSH) 0.9 %
10 SYRINGE (ML) INJECTION
Status: DISCONTINUED | OUTPATIENT
Start: 2022-07-14 | End: 2022-07-14 | Stop reason: HOSPADM

## 2022-07-14 RX ORDER — HEPARIN 100 UNIT/ML
500 SYRINGE INTRAVENOUS
Status: DISCONTINUED | OUTPATIENT
Start: 2022-07-14 | End: 2022-07-14 | Stop reason: HOSPADM

## 2022-07-14 RX ADMIN — Medication 10 ML: at 11:07

## 2022-07-14 RX ADMIN — HEPARIN SODIUM (PORCINE) LOCK FLUSH IV SOLN 100 UNIT/ML 500 UNITS: 100 SOLUTION at 11:07

## 2022-07-18 ENCOUNTER — PATIENT MESSAGE (OUTPATIENT)
Dept: ORTHOPEDICS | Facility: CLINIC | Age: 72
End: 2022-07-18
Payer: MEDICARE

## 2022-07-19 DIAGNOSIS — M72.0 DUPUYTREN'S CONTRACTURE OF BOTH HANDS: Primary | ICD-10-CM

## 2022-08-01 DIAGNOSIS — C01 SQUAMOUS CELL CARCINOMA OF BASE OF TONGUE: ICD-10-CM

## 2022-08-02 RX ORDER — OXYCODONE HYDROCHLORIDE 10 MG/1
10 TABLET ORAL EVERY 6 HOURS PRN
Qty: 120 TABLET | Refills: 0 | Status: SHIPPED | OUTPATIENT
Start: 2022-08-02 | End: 2022-08-31 | Stop reason: SDUPTHER

## 2022-08-04 DIAGNOSIS — C01 SQUAMOUS CELL CARCINOMA OF BASE OF TONGUE: ICD-10-CM

## 2022-08-15 ENCOUNTER — TELEPHONE (OUTPATIENT)
Dept: TRANSPLANT | Facility: CLINIC | Age: 72
End: 2022-08-15
Payer: MEDICARE

## 2022-08-15 ENCOUNTER — HOSPITAL ENCOUNTER (OUTPATIENT)
Dept: RADIOLOGY | Facility: HOSPITAL | Age: 72
Discharge: HOME | End: 2022-08-15
Attending: INTERNAL MEDICINE
Payer: MEDICARE

## 2022-08-15 DIAGNOSIS — T86.49 CIRRHOSIS OF TRANSPLANTED LIVER: ICD-10-CM

## 2022-08-15 DIAGNOSIS — C22.0 HCC (HEPATOCELLULAR CARCINOMA): Primary | ICD-10-CM

## 2022-08-15 DIAGNOSIS — C22.0 HCC (HEPATOCELLULAR CARCINOMA): ICD-10-CM

## 2022-08-15 DIAGNOSIS — Z94.4 LIVER TRANSPLANTED: ICD-10-CM

## 2022-08-15 DIAGNOSIS — Z94.4 STATUS POST LIVER TRANSPLANTATION: ICD-10-CM

## 2022-08-15 DIAGNOSIS — K74.60 CIRRHOSIS OF TRANSPLANTED LIVER: ICD-10-CM

## 2022-08-15 PROCEDURE — 93976 VASCULAR STUDY: CPT | Mod: TC

## 2022-08-15 PROCEDURE — 93976 US DOPPLER LIVER TRANSPLANT POST (XPD): ICD-10-PCS | Mod: 26,,, | Performed by: RADIOLOGY

## 2022-08-15 PROCEDURE — 76705 ECHO EXAM OF ABDOMEN: CPT | Mod: 26,59,, | Performed by: RADIOLOGY

## 2022-08-15 PROCEDURE — 76705 US DOPPLER LIVER TRANSPLANT POST (XPD): ICD-10-PCS | Mod: 26,59,, | Performed by: RADIOLOGY

## 2022-08-15 PROCEDURE — 93976 VASCULAR STUDY: CPT | Mod: 26,,, | Performed by: RADIOLOGY

## 2022-08-15 PROCEDURE — 76705 ECHO EXAM OF ABDOMEN: CPT | Mod: TC

## 2022-08-15 NOTE — LETTER
August 15, 2022    Rocco Swain  [address] [place] [address] [place] [address]          Dear Rocco Swain:  MRN: [id number]    This is a follow up to your recent labs, your lab results were stable.  There are no medicine changes.  Please have your labs drawn again on 11/7/22.      If you cannot have your labs drawn on the scheduled date, it is your responsibility to call the transplant department to reschedule.  Please call [phone] and ask to speak to Rhea RIOS   for all scheduling requests.     Sincerely,    Shari WADDELLN, RN        Your Liver Transplant Coordinator    [facility]  [address] [place] [id number]  [phone]

## 2022-08-15 NOTE — TELEPHONE ENCOUNTER
Letter sent to patient stating: Your labs have been reviewed by your Transplant physician, no action required. Next labs due 11/7/22      ----- Message from Cornell Anton MD sent at 8/15/2022  2:20 PM CDT -----  Results reviewed

## 2022-08-22 ENCOUNTER — INFUSION (OUTPATIENT)
Dept: INFUSION THERAPY | Facility: HOSPITAL | Age: 72
End: 2022-08-22
Attending: STUDENT IN AN ORGANIZED HEALTH CARE EDUCATION/TRAINING PROGRAM
Payer: MEDICARE

## 2022-08-22 ENCOUNTER — OFFICE VISIT (OUTPATIENT)
Dept: HEMATOLOGY/ONCOLOGY | Facility: CLINIC | Age: 72
End: 2022-08-22
Payer: MEDICARE

## 2022-08-22 VITALS
DIASTOLIC BLOOD PRESSURE: 73 MMHG | TEMPERATURE: 98 F | WEIGHT: 116.19 LBS | DIASTOLIC BLOOD PRESSURE: 83 MMHG | RESPIRATION RATE: 18 BRPM | TEMPERATURE: 98 F | BODY MASS INDEX: 17.61 KG/M2 | HEIGHT: 68 IN | RESPIRATION RATE: 18 BRPM | HEART RATE: 74 BPM | BODY MASS INDEX: 17.61 KG/M2 | SYSTOLIC BLOOD PRESSURE: 125 MMHG | HEART RATE: 84 BPM | OXYGEN SATURATION: 97 % | WEIGHT: 116.19 LBS | HEIGHT: 68 IN | SYSTOLIC BLOOD PRESSURE: 136 MMHG

## 2022-08-22 DIAGNOSIS — L27.0 DRUG-INDUCED SKIN RASH: ICD-10-CM

## 2022-08-22 DIAGNOSIS — C01 SQUAMOUS CELL CARCINOMA OF BASE OF TONGUE: Primary | ICD-10-CM

## 2022-08-22 DIAGNOSIS — D69.6 THROMBOCYTOPENIA: ICD-10-CM

## 2022-08-22 DIAGNOSIS — N18.4 CHRONIC KIDNEY DISEASE (CKD), STAGE IV (SEVERE): ICD-10-CM

## 2022-08-22 DIAGNOSIS — C77.0 SECONDARY MALIGNANT NEOPLASM OF CERVICAL LYMPH NODE: ICD-10-CM

## 2022-08-22 DIAGNOSIS — N18.31 STAGE 3A CHRONIC KIDNEY DISEASE: ICD-10-CM

## 2022-08-22 DIAGNOSIS — C01 SQUAMOUS CELL CARCINOMA OF BASE OF TONGUE: ICD-10-CM

## 2022-08-22 DIAGNOSIS — C77.0 SECONDARY MALIGNANT NEOPLASM OF CERVICAL LYMPH NODE: Primary | ICD-10-CM

## 2022-08-22 DIAGNOSIS — R53.1 WEAKNESS: ICD-10-CM

## 2022-08-22 DIAGNOSIS — C32.9 LARYNX CANCER: ICD-10-CM

## 2022-08-22 LAB
ALBUMIN SERPL BCP-MCNC: 3.7 G/DL (ref 3.5–5.2)
ALP SERPL-CCNC: 79 U/L (ref 55–135)
ALT SERPL W/O P-5'-P-CCNC: 15 U/L (ref 10–44)
ANION GAP SERPL CALC-SCNC: 9 MMOL/L (ref 8–16)
AST SERPL-CCNC: 38 U/L (ref 10–40)
BILIRUB SERPL-MCNC: 0.5 MG/DL (ref 0.1–1)
BUN SERPL-MCNC: 15 MG/DL (ref 8–23)
CALCIUM SERPL-MCNC: 9.7 MG/DL (ref 8.7–10.5)
CHLORIDE SERPL-SCNC: 102 MMOL/L (ref 95–110)
CO2 SERPL-SCNC: 29 MMOL/L (ref 23–29)
CREAT SERPL-MCNC: 1.7 MG/DL (ref 0.5–1.4)
ERYTHROCYTE [DISTWIDTH] IN BLOOD BY AUTOMATED COUNT: 11.9 % (ref 11.5–14.5)
EST. GFR  (NO RACE VARIABLE): 42 ML/MIN/1.73 M^2
GLUCOSE SERPL-MCNC: 141 MG/DL (ref 70–110)
HCT VFR BLD AUTO: 32 % (ref 40–54)
HGB BLD-MCNC: 11.1 G/DL (ref 14–18)
IMM GRANULOCYTES # BLD AUTO: 0.01 K/UL (ref 0–0.04)
MCH RBC QN AUTO: 34.4 PG (ref 27–31)
MCHC RBC AUTO-ENTMCNC: 34.7 G/DL (ref 32–36)
MCV RBC AUTO: 99 FL (ref 82–98)
NEUTROPHILS # BLD AUTO: 3.5 K/UL (ref 1.8–7.7)
PLATELET # BLD AUTO: 123 K/UL (ref 150–450)
PMV BLD AUTO: 9.4 FL (ref 9.2–12.9)
POTASSIUM SERPL-SCNC: 3.9 MMOL/L (ref 3.5–5.1)
PROT SERPL-MCNC: 7.2 G/DL (ref 6–8.4)
RBC # BLD AUTO: 3.23 M/UL (ref 4.6–6.2)
SODIUM SERPL-SCNC: 140 MMOL/L (ref 136–145)
T4 FREE SERPL-MCNC: 1.12 NG/DL (ref 0.71–1.51)
TSH SERPL DL<=0.005 MIU/L-ACNC: 0.22 UIU/ML (ref 0.4–4)
WBC # BLD AUTO: 4.69 K/UL (ref 3.9–12.7)

## 2022-08-22 PROCEDURE — 96413 CHEMO IV INFUSION 1 HR: CPT

## 2022-08-22 PROCEDURE — A4216 STERILE WATER/SALINE, 10 ML: HCPCS | Performed by: STUDENT IN AN ORGANIZED HEALTH CARE EDUCATION/TRAINING PROGRAM

## 2022-08-22 PROCEDURE — 99215 PR OFFICE/OUTPT VISIT, EST, LEVL V, 40-54 MIN: ICD-10-PCS | Mod: S$PBB,,, | Performed by: STUDENT IN AN ORGANIZED HEALTH CARE EDUCATION/TRAINING PROGRAM

## 2022-08-22 PROCEDURE — 84443 ASSAY THYROID STIM HORMONE: CPT | Performed by: STUDENT IN AN ORGANIZED HEALTH CARE EDUCATION/TRAINING PROGRAM

## 2022-08-22 PROCEDURE — 99999 PR PBB SHADOW E&M-EST. PATIENT-LVL IV: ICD-10-PCS | Mod: PBBFAC,,, | Performed by: STUDENT IN AN ORGANIZED HEALTH CARE EDUCATION/TRAINING PROGRAM

## 2022-08-22 PROCEDURE — 99215 OFFICE O/P EST HI 40 MIN: CPT | Mod: S$PBB,,, | Performed by: STUDENT IN AN ORGANIZED HEALTH CARE EDUCATION/TRAINING PROGRAM

## 2022-08-22 PROCEDURE — 99999 PR PBB SHADOW E&M-EST. PATIENT-LVL IV: CPT | Mod: PBBFAC,,, | Performed by: STUDENT IN AN ORGANIZED HEALTH CARE EDUCATION/TRAINING PROGRAM

## 2022-08-22 PROCEDURE — 25000003 PHARM REV CODE 250: Performed by: STUDENT IN AN ORGANIZED HEALTH CARE EDUCATION/TRAINING PROGRAM

## 2022-08-22 PROCEDURE — 85027 COMPLETE CBC AUTOMATED: CPT | Performed by: STUDENT IN AN ORGANIZED HEALTH CARE EDUCATION/TRAINING PROGRAM

## 2022-08-22 PROCEDURE — 63600175 PHARM REV CODE 636 W HCPCS: Mod: JG | Performed by: STUDENT IN AN ORGANIZED HEALTH CARE EDUCATION/TRAINING PROGRAM

## 2022-08-22 PROCEDURE — 84439 ASSAY OF FREE THYROXINE: CPT | Performed by: STUDENT IN AN ORGANIZED HEALTH CARE EDUCATION/TRAINING PROGRAM

## 2022-08-22 PROCEDURE — 80053 COMPREHEN METABOLIC PANEL: CPT | Performed by: STUDENT IN AN ORGANIZED HEALTH CARE EDUCATION/TRAINING PROGRAM

## 2022-08-22 PROCEDURE — 63600175 PHARM REV CODE 636 W HCPCS: Performed by: STUDENT IN AN ORGANIZED HEALTH CARE EDUCATION/TRAINING PROGRAM

## 2022-08-22 PROCEDURE — 99214 OFFICE O/P EST MOD 30 MIN: CPT | Mod: PBBFAC,25 | Performed by: STUDENT IN AN ORGANIZED HEALTH CARE EDUCATION/TRAINING PROGRAM

## 2022-08-22 RX ORDER — HEPARIN 100 UNIT/ML
500 SYRINGE INTRAVENOUS
Status: CANCELLED | OUTPATIENT
Start: 2022-08-22

## 2022-08-22 RX ORDER — HEPARIN 100 UNIT/ML
500 SYRINGE INTRAVENOUS
Status: DISCONTINUED | OUTPATIENT
Start: 2022-08-22 | End: 2022-08-22 | Stop reason: HOSPADM

## 2022-08-22 RX ORDER — SODIUM CHLORIDE 0.9 % (FLUSH) 0.9 %
10 SYRINGE (ML) INJECTION
Status: CANCELLED | OUTPATIENT
Start: 2022-08-22

## 2022-08-22 RX ORDER — SODIUM CHLORIDE 0.9 % (FLUSH) 0.9 %
10 SYRINGE (ML) INJECTION
Status: DISCONTINUED | OUTPATIENT
Start: 2022-08-22 | End: 2022-08-22 | Stop reason: HOSPADM

## 2022-08-22 RX ORDER — METRONIDAZOLE 7.5 MG/G
GEL TOPICAL 2 TIMES DAILY
Qty: 45 G | Refills: 1 | Status: SHIPPED | OUTPATIENT
Start: 2022-08-22 | End: 2023-11-17 | Stop reason: SDUPTHER

## 2022-08-22 RX ORDER — AZELAIC ACID 0.15 G/G
GEL TOPICAL
Qty: 50 G | Refills: 3 | Status: SHIPPED | OUTPATIENT
Start: 2022-08-22 | End: 2023-10-18 | Stop reason: SDUPTHER

## 2022-08-22 RX ADMIN — SODIUM CHLORIDE: 9 INJECTION, SOLUTION INTRAVENOUS at 02:08

## 2022-08-22 RX ADMIN — Medication 10 ML: at 03:08

## 2022-08-22 RX ADMIN — HEPARIN 500 UNITS: 100 SYRINGE at 03:08

## 2022-08-22 RX ADMIN — SODIUM CHLORIDE, PRESERVATIVE FREE 10 ML: 5 INJECTION INTRAVENOUS at 01:08

## 2022-08-22 RX ADMIN — SODIUM CHLORIDE 200 MG: 9 INJECTION, SOLUTION INTRAVENOUS at 02:08

## 2022-08-22 RX ADMIN — HEPARIN 500 UNITS: 100 SYRINGE at 01:08

## 2022-08-22 NOTE — PLAN OF CARE
Problem: Adult Inpatient Plan of Care  Goal: Optimal Comfort and Wellbeing  Intervention: Provide Person-Centered Care  Flowsheets (Taken 8/22/2022 1440)  Trust Relationship/Rapport:   care explained   questions answered   choices provided   questions encouraged   reassurance provided   empathic listening provided   emotional support provided   thoughts/feelings acknowledged

## 2022-08-22 NOTE — PLAN OF CARE
Patient tolerated keytruda well today. Port + blood return present, flushed, hep locked and deaccessed. AVS given. Discharged home, ambulated independently.

## 2022-08-23 ENCOUNTER — OFFICE VISIT (OUTPATIENT)
Dept: ORTHOPEDICS | Facility: CLINIC | Age: 72
End: 2022-08-23
Payer: MEDICARE

## 2022-08-23 VITALS
HEIGHT: 68 IN | SYSTOLIC BLOOD PRESSURE: 132 MMHG | HEART RATE: 76 BPM | WEIGHT: 116 LBS | DIASTOLIC BLOOD PRESSURE: 85 MMHG | BODY MASS INDEX: 17.58 KG/M2

## 2022-08-23 DIAGNOSIS — M72.0 DUPUYTREN'S CONTRACTURE: Primary | ICD-10-CM

## 2022-08-23 PROBLEM — N18.4 CHRONIC KIDNEY DISEASE (CKD), STAGE IV (SEVERE): Status: ACTIVE | Noted: 2020-10-05

## 2022-08-23 PROCEDURE — 99999 PR PBB SHADOW E&M-EST. PATIENT-LVL III: ICD-10-PCS | Mod: PBBFAC,,, | Performed by: ORTHOPAEDIC SURGERY

## 2022-08-23 PROCEDURE — 99499 UNLISTED E&M SERVICE: CPT | Mod: S$PBB,,, | Performed by: ORTHOPAEDIC SURGERY

## 2022-08-23 PROCEDURE — 99999 PR PBB SHADOW E&M-EST. PATIENT-LVL III: CPT | Mod: PBBFAC,,, | Performed by: ORTHOPAEDIC SURGERY

## 2022-08-23 PROCEDURE — 20527 NJX NZM PALMAR FASCIAL CORD: CPT | Mod: PBBFAC,RT | Performed by: ORTHOPAEDIC SURGERY

## 2022-08-23 PROCEDURE — 20527 NJX NZM PALMAR FASCIAL CORD: CPT | Mod: S$PBB,RT,, | Performed by: ORTHOPAEDIC SURGERY

## 2022-08-23 PROCEDURE — 20527 PR INJ DUPUYTREN CORD W/ENZYME: ICD-10-PCS | Mod: S$PBB,RT,, | Performed by: ORTHOPAEDIC SURGERY

## 2022-08-23 PROCEDURE — 99213 OFFICE O/P EST LOW 20 MIN: CPT | Mod: PBBFAC | Performed by: ORTHOPAEDIC SURGERY

## 2022-08-23 PROCEDURE — 99499 NO LOS: ICD-10-PCS | Mod: S$PBB,,, | Performed by: ORTHOPAEDIC SURGERY

## 2022-08-23 NOTE — ASSESSMENT & PLAN NOTE
No worsening of chronic neck pain since his last visit.  Intermittent left sided otalgia.  Tolerated q6w dose well.  -labs reviewed; proceed with cycle 11   -labs, scans, and follow up prior to cycle 12

## 2022-08-23 NOTE — PROGRESS NOTES
PATIENT: Rocco Swain  MRN: [id number]  DATE: 8/23/2022      Diagnosis:   1. Squamous cell carcinoma of base of tongue    2. Drug-induced skin rash    3. Secondary malignant neoplasm of cervical lymph node    4. Larynx cancer    5. Thrombocytopenia    6. Chronic kidney disease (CKD), stage IV (severe)        Chief Complaint: Squamous cell carcinoma of base of tongue      Oncologic History:    Oncologic History 1. Squamous cell carcinoma of base of tongue with recurrence    Oncologic Treatment 1. Chemoradiation completed 4/2016  2. S/p glossectomy, laryngectomy, and bilateral neck dissection for persistent/recurrent disease  3. 10/6/2020-8/24/21 PCC  4. 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        Subjective:    Interval History: Mr. Swain is here for follow up    He is doing well since his last visit. Tolerated q6w dose but is ready for q3w dose again.  No new symptoms. Chronic neck pain and intermittent left sided otalgia unchanged.    He is alone at this visit.  Communication from him is 100% written.  Past Medical History:   Past Medical History:   Diagnosis Date    Basal cell carcinoma (BCC) in situ of skin 2012    3 on face, 2 on arm, removed by dermatology.    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 Underwent surgical resection of tongue and neck. s/p tracheostomy       Past Surgical HIstory:   Past Surgical History:   Procedure Laterality Date    COLONOSCOPY      INSERTION OF VENOUS ACCESS PORT Right 3/8/2021    Procedure: INSERTION, VENOUS ACCESS PORT;  Surgeon: Jesus Rendon MD;  Location: [facility] OR [address] [place] [address];  Service:  General;  Laterality: Right;    LIVER TRANSPLANT  11/2008    transplanted for biopsy proven hepatocellular carcinoma,     LYMPH NODE BIOPSY N/A 9/18/2020    Procedure: BIOPSY, LYMPH NODE;  Surgeon: Taz Diagnostic Provider;  Location: [facility] OR [address] [place] [address];  Service: General;  Laterality: N/A;  Rm 173    TRACHEOSTOMY         Family History:   Family History   Problem Relation Age of Onset    Dementia Mother        Social History:  reports that he has never smoked. He has never used smokeless tobacco. He reports current alcohol use. He reports previous drug use.    Allergies:  Review of patient's allergies indicates:  No Known Allergies    Medications:  Current Outpatient Medications   Medication Sig Dispense Refill    diphenoxylate-atropine 2.5-0.025 mg (LOMOTIL) 2.5-0.025 mg per tablet Take 1 tablet by mouth 4 (four) times daily as needed for Diarrhea (use when loperamide/imodium does not work). 30 tablet 0    ibuprofen (ADVIL,MOTRIN) 200 MG tablet Take 200 mg by mouth.      levothyroxine (SYNTHROID) 88 MCG tablet TAKE 1 TABLET BY MOUTH ONCE DAILY 90 tablet 3    LIDOcaine HCl 2% (LIDOCAINE VISCOUS) 2 % Soln Swish and spit 15 mls every 8 (eight) hours as needed (mouth sore). 100 mL 3    LIDOcaine-prilocaine (EMLA) cream Apply generously to port site 30-60 min prior to chemo and then cover with saran wrap. 30 g 2    loperamide (IMODIUM) 2 mg capsule Take 1 capsule (2 mg total) by mouth 4 (four) times daily as needed for Diarrhea. 30 capsule 1    magic mouthwash diphen/antac/lidoc/nysta Take 10 mLs by mouth 4 (four) times daily. 120 mL 4    multivit-min/FA/lycopen/lutein (CENTRUM SILVER MEN ORAL) Take 1 tablet by mouth.      multivitamin capsule Take 1 capsule by mouth once daily.      oxyCODONE (ROXICODONE) 10 mg Tab immediate release tablet Take 1 tablet (10 mg total) by mouth every 6 (six) hours as needed for Pain. 120 tablet 0    sulfacetamide sodium-sulfur 10-5 % (w/w) Clsr USE TO WASH AFFECTED AREA  DAILY      tacrolimus (PROGRAF) 0.5 MG Cap Take 1 capsule (0.5 mg total) by mouth every evening. Use the 1mg capsule for your morning dose 90 capsule 3    tacrolimus (PROGRAF) 1 MG Cap Take 1 capsule (1 mg total) by mouth every morning. Use the tacrolimus 0.5mg capsule for your evening dose as directed. 90 capsule 3    tiZANidine (ZANAFLEX) 2 MG tablet Take 1 tablet (2 mg total) by mouth nightly as needed (neck muscle strain). 30 tablet 0    traZODone (DESYREL) 50 MG tablet TAKE 1 TABLET BY MOUTH IN  THE EVENING 90 tablet 3    vitamin E 400 UNIT capsule Take 400 Units by mouth once daily.      azelaic acid (AZELEX) 15 % gel APPLY TOPICALLY TO AFFECTED AREA IN THE MORNING 50 g 3    metroNIDAZOLE (METROGEL) 0.75 % gel Apply topically to affected area 2 (two) times daily. 45 g 1     No current facility-administered medications for this visit.     Facility-Administered Medications Ordered in Other Visits   Medication Dose Route Frequency Provider Last Rate Last Admin    heparin, porcine (PF) 100 unit/mL injection flush 500 Units  500 Units Intravenous PRN Ronald Berg MD        sodium chloride 0.9% flush 10 mL  10 mL Intravenous PRN Ronald Berg MD           Review of Systems   Constitutional: Negative for activity change, appetite change, chills, diaphoresis, fatigue, fever and unexpected weight change.   HENT: Positive for ear pain (intermittent) and trouble swallowing. Negative for ear discharge, mouth sores and nosebleeds.    Eyes: Negative for visual disturbance.   Respiratory: Negative for cough, chest tightness, shortness of breath and wheezing.    Cardiovascular: Negative for chest pain and leg swelling.   Gastrointestinal: Negative for abdominal distention, abdominal pain, blood in stool, constipation, diarrhea, nausea and vomiting.   Endocrine: Negative for cold intolerance and heat intolerance.   Genitourinary: Negative for difficulty urinating and dysuria.   Musculoskeletal: Positive for myalgias and  "neck pain (pain radiates between both sides of his neck under his jaw.). Negative for arthralgias and back pain.   Skin: Negative for color change and rash.   Neurological: Negative for dizziness, weakness, light-headedness, numbness and headaches.   Hematological: Negative for adenopathy. Bruises/bleeds easily.   Psychiatric/Behavioral: Negative for confusion.       ECOG Performance Status:      ECOG SCORE    1 - Restricted in strenuous activity-ambulatory and able to carry out work of a light nature         Objective:      Vitals:   Vitals:    08/22/22 1405   BP: 125/73   BP Location: Left arm   Patient Position: Sitting   BP Method: Medium (Automatic)   Pulse: 74   Resp: 18   Temp: 98.3 °F (36.8 °C)   TempSrc: Oral   SpO2: 97%   Weight: 52.7 kg (116 lb 2.9 oz)   Height: 5' 8" (1.727 m)     BMI: Body mass index is 17.67 kg/m².  Wt Readings from Last 10 Encounters:   08/22/22 52.7 kg (116 lb 2.9 oz)   08/22/22 52.7 kg (116 lb 2.9 oz)   07/11/22 53.1 kg (117 lb 1 oz)   07/11/22 53.1 kg (117 lb 1 oz)   06/20/22 53.4 kg (117 lb 11.6 oz)   05/24/22 52.2 kg (115 lb)   05/12/22 52.3 kg (115 lb 4.8 oz)   05/09/22 52.3 kg (115 lb 4.8 oz)   04/07/22 53 kg (116 lb 13.5 oz)   04/07/22 53 kg (116 lb 13.5 oz)       Physical Exam  Constitutional:       General: He is not in acute distress.     Appearance: Normal appearance. He is not ill-appearing.   HENT:      Head: Normocephalic and atraumatic.      Mouth/Throat:      Mouth: Mucous membranes are moist.      Pharynx: No oropharyngeal exudate or posterior oropharyngeal erythema.   Eyes:      General: No scleral icterus.     Extraocular Movements: Extraocular movements intact.      Conjunctiva/sclera: Conjunctivae normal.      Pupils: Pupils are equal, round, and reactive to light.   Cardiovascular:      Rate and Rhythm: Normal rate and regular rhythm.      Heart sounds: No murmur heard.    No friction rub. No gallop.   Pulmonary:      Effort: Pulmonary effort is normal. No " respiratory distress.      Breath sounds: No stridor. No wheezing, rhonchi or rales.   Chest:   Breasts:      Right: No axillary adenopathy or supraclavicular adenopathy.      Left: No axillary adenopathy or supraclavicular adenopathy.       Abdominal:      General: Bowel sounds are normal. There is no distension.      Palpations: Abdomen is soft. There is no mass.      Tenderness: There is no abdominal tenderness. There is no guarding or rebound.   Musculoskeletal:         General: Normal range of motion.      Cervical back: Normal range of motion and neck supple. No tenderness.      Right lower leg: No edema.      Left lower leg: No edema.   Lymphadenopathy:      Cervical: No cervical adenopathy.      Upper Body:      Right upper body: No supraclavicular or axillary adenopathy.      Left upper body: No supraclavicular or axillary adenopathy.   Skin:     General: Skin is warm and dry.   Neurological:      General: No focal deficit present.      Mental Status: He is alert.         Laboratory Data:   Recent Results (from the past 168 hour(s))   CBC Oncology    Collection Time: 08/22/22  1:40 PM   Result Value Ref Range    WBC 4.69 3.90 - 12.70 K/uL    RBC 3.23 (L) 4.60 - 6.20 M/uL    Hemoglobin 11.1 (L) 14.0 - 18.0 g/dL    Hematocrit 32.0 (L) 40.0 - 54.0 %    MCV 99 (H) 82 - 98 fL    MCH 34.4 (H) 27.0 - 31.0 pg    MCHC 34.7 32.0 - 36.0 g/dL    RDW 11.9 11.5 - 14.5 %    Platelets 123 (L) 150 - 450 K/uL    MPV 9.4 9.2 - 12.9 fL    Gran # (ANC) 3.5 1.8 - 7.7 K/uL    Immature Grans (Abs) 0.01 0.00 - 0.04 K/uL   CMP    Collection Time: 08/22/22  1:40 PM   Result Value Ref Range    Sodium 140 136 - 145 mmol/L    Potassium 3.9 3.5 - 5.1 mmol/L    Chloride 102 95 - 110 mmol/L    CO2 29 23 - 29 mmol/L    Glucose 141 (H) 70 - 110 mg/dL    BUN 15 8 - 23 mg/dL    Creatinine 1.7 (H) 0.5 - 1.4 mg/dL    Calcium 9.7 8.7 - 10.5 mg/dL    Total Protein 7.2 6.0 - 8.4 g/dL    Albumin 3.7 3.5 - 5.2 g/dL    Total Bilirubin 0.5 0.1 - 1.0  mg/dL    Alkaline Phosphatase 79 55 - 135 U/L    AST 38 10 - 40 U/L    ALT 15 10 - 44 U/L    Anion Gap 9 8 - 16 mmol/L    eGFR 42.0 (A) >60 mL/min/1.73 m^2   TSH    Collection Time: 08/22/22  1:40 PM   Result Value Ref Range    TSH 0.216 (L) 0.400 - 4.000 uIU/mL   T4, Free    Collection Time: 08/22/22  1:40 PM   Result Value Ref Range    Free T4 1.12 0.71 - 1.51 ng/dL           Imaging:     CT Soft Tissue Neck WO Contrast    Result Date: 6/17/2022  EXAMINATION: CT SOFT TISSUE NECK WITHOUT CONTRAST CLINICAL HISTORY: recurrent head neck cancer on chemo, eval response.;Malignant neoplasm of base of tongue TECHNIQUE: Low dose axial images, sagittal and coronal reformations were performed from the skull base to the level of the clavicles.  Contrast was not administered. COMPARISON: CT 03/28/2022 and 08/05/2020 and PET CT 12/01/2021 FINDINGS: Study is slightly limited by lack of contrast.  Redemonstration of extensiveoperative change from glossectomy, laryngectomy with myocutaneous flap reconstruction, tracheostomy with tracheal cannula and bilateral neck dissections. Right-side Port-A-Cath again identified No new soft tissue attenuation throughout the operative bed to suggest definite worsening residual recurrent disease.  Progressively reduced soft tissue fullness at site of previous adenopathy of proximal cervical chain.  No evidence for new or increased soft tissue fullness to suggest new or worsening adenopathy. There is no consolidation the visualized lungs with trace interstitial opacity within the posterior aspect of the right upper lobe suggestive for scarring and atelectasis similar to prior..  No evidence for acute fracture or subluxation cervical spine.     Allowing for noncontrast technique there is no significant change from prior specifically without evidence for soft tissue fullness or definite new adenopathy throughout the neck to suggest worsening residual recurrent disease.  Clinical correlation and  further evaluation with PET CT as warranted. Please see above for additional details. Electronically signed by: J Carlos uDtton DO Date:    06/17/2022 Time:    11:35    CT Chest Without Contrast    Result Date: 6/17/2022  EXAMINATION: CT CHEST WITHOUT CONTRAST CLINICAL HISTORY: recurrent head neck cancer on chemo, eval response.; Malignant neoplasm of base of tongue TECHNIQUE: Using low dose technique the chest was surveyed from above the pulmonary apices through the posterior costophrenic angles.  Data was reconstructed for multiplanar images in axial, sagittal and coronal planes and for maximal intensity projection images in the the axial, sagittal and coronal planes. COMPARISON: Chest x-ray 03/08/2021. FINDINGS: Base of Neck: Thyroid not well visualized and is either atrophic or removed.  Postoperative change of tracheostomy placement. Aorta: Left-sided aortic arch with 3 arterial branches. The aorta maintains normal caliber, contour and course. There are mild calcifications of the thoracic aorta. Heart/pericardium: Normal size.  There are severe calcifications of the coronary arteries.  No pericardial effusion or calcification. Pulmonary vasculature: Normal diameter of the pulmonary trunk. Hailey/Mediastinum: No pathologic tati enlargement. Esophagus: No significant abnormality. Thoracic soft tissues: Gynecomastia.  Left chest wall port with tip terminating at the cavoatrial junction. Upper Abdomen: Multiple punctate calcifications within the hepatic parenchyma, suggestive prior granulomatous disease. Bones: Degenerative changes of the spine.  No acute fracture. No suspicious lytic or sclerotic lesion. Airways: Tracheostomy tube terminates approximately 6.6 cm above the martina.  Airways are patent. Lungs/Pleura: Subpleural reticulations most prominently along the dependent portion of the lungs.  Left lower lobe calcified granuloma.  Part solid nodule in the right upper lobe measuring 0.5 cm (axial series 4, image  190).  Left lower lobe solid pulmonary nodule measuring 0.4 cm (axial series 4, image 29). No focal consolidation. No pleural effusion.  No pneumothorax.  No pleural calcification.     1. Part solid pulmonary nodule in the right upper lobe measuring 0.5 cm and solid pulmonary nodule measuring 0.4 cm in the left lower lobe.  Attention on follow-up. 2. Subpleural reticulations most prominently along the dependent portion the lungs.  Although some of this may represent atelectasis given predominant distribution along the dependent portions of the lung, early fibrotic changes cannot be completely excluded. Electronically signed by resident: Beth Panchal Date:    06/17/2022 Time:    10:07 Electronically signed by: Lee Isbell MD Date:    06/17/2022 Time:    10:23      Assessment:       1. Squamous cell carcinoma of base of tongue    2. Drug-induced skin rash    3. Secondary malignant neoplasm of cervical lymph node    4. Larynx cancer    5. Thrombocytopenia    6. Chronic kidney disease (CKD), stage IV (severe)           Plan:       Problem List Items Addressed This Visit        Renal/    Chronic kidney disease (CKD), stage IV (severe)    Current Assessment & Plan     Cr cl 28 ml/min  Has been persistent and may be his new baseline              Hematology    Thrombocytopenia    Current Assessment & Plan     Grade 1, asymptomatic. May be related to transplanted liver.  -monitor cbc              Oncology    Larynx cancer    Overview     Status post total laryngectomy, total glossectomy and anterolateral thigh free flap reconstruction roughly 2017 at [facility] for persistent/recurrent base of tongue sq.c.c.           Squamous cell carcinoma of base of tongue - Primary    Overview     Per OSH records, initially presented in 2015 with symptoms and cT2N2c disease. page 91 of 3/25/2020 outside records clinic (media tab).  Initially presented 8/2015 with left sided odynophagia.  Treated for reflux, which  did not improve.    12/11/15 he had a fiberoptic exam showing discolored mucosal area of the left base of the tongue.    1/13/16 he had persistent dysphagia, odynophagia, and new left otalgia.  MRI showed an irregular mass along the left posterior margin of the base of the tongue measuring 1.8x2.3cm with ipsilateral level 2 lymph node measuring 1.7cm with central necrosis and contralateral level 2 node measuring 1.3cm with questionable necrosis.    1/19/2016 he had direct laryngoscopy with biopsy left of midline, proximal to vallecula, which was p16 positive, invasive, with moderately to poorly differentiated sq.c.c. with basaloid features.    1/29/16 met with medical oncology who recommended chemoxrt.    2/1/16  staging PET CT showed hypermetabolic mass at base of tongue 4x2.5x3.5cm, max SUV 24.13, hypermetabolic lymph node left neck SUV 6.77, small subcentimeter lymph node right neck max SUV 3.79 (exact size of lymph nodes not mentioned in the summary).    2/15/16 started chemoxrt to left tongue base, bilateral cervical neck levels 1b-5, treated with 6MV photons utilizing IMRT, 5000 cGy in 200 cGy daily fractions.  Cone down boost to left base of tongue with additional 2000 cGy in 200 cGy daily fractions to gross disease.  Received weekly cisplatin with radiation but had to switch to carboplatin due to worsening renal function. during this time also missed several weekly treatments of cisplatin.    4/1/2016 completed chemoxrt.   11/1/2016 Repeat biopsy of left tongue showed persistent disease.  S/p salvage glossectomy with laryngectomy,  rT4aN0, p16 positive, base of tongue squamous cell carcinoma.     page 61 of 3/25/2020 outside records clinic (media tab)  Pathology: Tonsil, left tonsil fold: reactive squamous mucosa with inflammation and degenerating skeletal muscle, no carcinoma seen.  Right and Left base of tongue:  Reactive squamous mucosa and submucosa with inflammation, no carcinoma seen.  Neck,  glossectomy, laryngectomy, bilateral neck dissection:  invasive squamous cell carcinoma.  tumor site: base of tongue/hypopharynx.  Small focus of tumor is seen within the hyoid  bone.  specimen Size [id number]  tumor size [id number]  depth of invasion 20mm  TNM stage pT4a, pN0, pMx  Lymph nodes, included in all parts:  number involved 0  number examined 3.  distance of tumor from margins  positive inked margins: none  within 1mm : none  within 1-2mm: anterior-inferior    Bilateral neck dissection:  level 1: one lymph node and submandibular gland, negative for tumor  level II : two lymph nodes, negative for tumor  level III: not identified  level IV: not identified  level V: not identified.    6/20/17 - PETCT with FDG avidity of left neck lymph node, level 3, SUV 2.9.  No other evidence of local or distant disease.  7/6/2017 - biopsy of left neck lymph node with IR.  Sample was non-diagnostic. Notes from path report: Less than optimal scan specimen with minute tissue core of stromal fibroadipose tissue.  definitive tati background is not seen.  9/28/2017 - Repeat PET CT showing mild activity SUV 3.5 (increased from 3.2 previously; size 7.8x7.4x9.5mm).  Also new findings under left pectoralis minor (mild focal increased activity, indeterminate but new since previous exam)  3/22/2018 - CT neck shows no evidence of disease and level 2b lymph node decreased to 4.9mmx4.5mmx4.9mm from 7.8x7.4x9.5mm previously.    Seen by Dr. Myers 8/3/2020 for left-sided neck pain and a swelling in left level 2 several weeks ago.  He feels it arose after he began lifting weights.  CT neck 8/5/2020 : Lymph nodes At the left neck level L2 there is a heterogeneous soft tissue density with central hypoattenuation likely reflecting a necrotic lymph node measuring 1.6 x 1.4 cm in transverse dimension, 2.5 cm in craniocaudal dimension.  There is a similar appearing irregular soft tissue density at the right neck level 2 measuring 1.1 x 9.0 cm in  transverse dimension and 1.5 cm craniocaudal dimension.  No other abnormal appearing or enlarged lymph nodes found.  Impression: At level 2 within the neck bilaterally, there are  soft tissue density masses with central hypoattenuation likely reflective of necrotic lymph nodes.  Findings are suspicious for malignancy recurrence.  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  Staging PET CT 9/30/2020 with left sided hypermetabolic node and right sided enlarged node without uptake.  Also has mandibular vestibule uptake, which may be another site of disease. Discussed at tumor board.  He is not a surgical or radiation candidate.  -Started on PCC 10/6/2020 followed by maintenance cetuximab until 8/24/21 (discontinued due to progression of disease; continued increase in SUV uptake, no new lesions).  9/2021 started on carbo/5-FU/pembrolizumab; due to toxicity went to pembrolizumab monotherapy starting with cycle 2.  Progression of disease noted after cycle 3 so added chemotherapy back in with cycle 4.           Current Assessment & Plan     No worsening of chronic neck pain since his last visit.  Intermittent left sided otalgia.  Tolerated q6w dose well.  -labs reviewed; proceed with cycle 11   -labs, scans, and follow up prior to cycle 12           Relevant Orders    NM PET CT Routine FDG    Secondary malignant neoplasm of cervical lymph node    Overview     See squamous cell carcinoma             Other Visit Diagnoses     Drug-induced skin rash        Relevant Medications    azelaic acid (AZELEX) 15 % gel        Orders Placed This Encounter   Procedures    NM PET CT Routine FDG       Advance Care Planning I initiated the process of advance care planning at his initial visit and explained the importance of this process to the patient.  Then the patient received detailed information about the importance of designating a Health Care Power of  (HCPOA). he was  instructed to communicate with this person about their wishes for future healthcare, should he become sick and lose decision-making capacity. The patient has previously appointed a HCPOA. After our discussion, the patient has decided to complete a HCPOA and has appointed his brother and NAME:Ollie          Route Chart for Scheduling    Med Onc Chart Routing  Urgent    Follow up with physician Other. 9/12 prior to chemo (pembrolizumab only)   Follow up with [facility]    Infusion scheduling note 9/12 pembrolizumab   Injection scheduling note    Labs CBC and CMP   Lab interval:  9/12 port draw labs cmp, cbc prior to chemo and scan   Imaging PET scan   9/12 pet ct   Pharmacy appointment No pharmacy appointment needed      Other referrals No additional referrals needed         Treatment Plan Information   OP HEAD NECK PEMBROLIZUMAB CARBOPLATIN FLUOROURACIL (C1 ONLY RECEIVED) FOLLOWED BY PEMBROLIZUMAB MAINTENANCE   Ronald Berg MD   Upcoming Treatment Dates - OP HEAD NECK PEMBROLIZUMAB CARBOPLATIN FLUOROURACIL (C1 ONLY RECEIVED) FOLLOWED BY PEMBROLIZUMAB MAINTENANCE    9/12/2022       Immunotherapy       pembrolizumab (KEYTRUDA) 200 mg in sodium chloride 0.9% 108 mL infusion  10/3/2022       Immunotherapy       pembrolizumab (KEYTRUDA) 200 mg in sodium chloride 0.9% 108 mL infusion  10/24/2022       Immunotherapy       pembrolizumab (KEYTRUDA) 200 mg in sodium chloride 0.9% 108 mL infusion  11/14/2022       Immunotherapy       pembrolizumab (KEYTRUDA) 200 mg in sodium chloride 0.9% 108 mL infusion    Therapy Plan Information  Flushes  heparin, porcine (PF) 100 unit/mL injection flush 500 Units  500 Units, Intravenous, Every visit  sodium chloride 0.9% flush 10 mL  10 mL, Intravenous, Every visit      Ronald Berg MD  Hematology Oncology

## 2022-08-25 ENCOUNTER — CLINICAL SUPPORT (OUTPATIENT)
Dept: REHABILITATION | Facility: HOSPITAL | Age: 72
End: 2022-08-25
Attending: ORTHOPAEDIC SURGERY
Payer: MEDICARE

## 2022-08-25 ENCOUNTER — OFFICE VISIT (OUTPATIENT)
Dept: ORTHOPEDICS | Facility: CLINIC | Age: 72
End: 2022-08-25
Payer: MEDICARE

## 2022-08-25 VITALS — BODY MASS INDEX: 17.58 KG/M2 | WEIGHT: 116 LBS | HEIGHT: 68 IN

## 2022-08-25 DIAGNOSIS — M72.0 DUPUYTREN'S CONTRACTURE OF BOTH HANDS: ICD-10-CM

## 2022-08-25 DIAGNOSIS — M20.001 DEVIATION OF FINGER OF RIGHT HAND: ICD-10-CM

## 2022-08-25 DIAGNOSIS — M72.0 DUPUYTREN'S CONTRACTURE: Primary | ICD-10-CM

## 2022-08-25 DIAGNOSIS — M25.60 RANGE OF MOTION DEFICIT: ICD-10-CM

## 2022-08-25 PROBLEM — M20.009 FINGER DEFORMITY: Status: ACTIVE | Noted: 2022-08-25

## 2022-08-25 PROCEDURE — 99214 PR OFFICE/OUTPT VISIT, EST, LEVL IV, 30-39 MIN: ICD-10-PCS | Mod: 25,S$PBB,, | Performed by: ORTHOPAEDIC SURGERY

## 2022-08-25 PROCEDURE — 99999 PR PBB SHADOW E&M-EST. PATIENT-LVL III: ICD-10-PCS | Mod: PBBFAC,,, | Performed by: ORTHOPAEDIC SURGERY

## 2022-08-25 PROCEDURE — 26341 MANIPULAT PALM CORD POST INJ: CPT | Mod: S$PBB,F8,, | Performed by: ORTHOPAEDIC SURGERY

## 2022-08-25 PROCEDURE — 99214 OFFICE O/P EST MOD 30 MIN: CPT | Mod: 25,S$PBB,, | Performed by: ORTHOPAEDIC SURGERY

## 2022-08-25 PROCEDURE — 99213 OFFICE O/P EST LOW 20 MIN: CPT | Mod: PBBFAC,25 | Performed by: ORTHOPAEDIC SURGERY

## 2022-08-25 PROCEDURE — 99999 PR PBB SHADOW E&M-EST. PATIENT-LVL III: CPT | Mod: PBBFAC,,, | Performed by: ORTHOPAEDIC SURGERY

## 2022-08-25 PROCEDURE — L3906 WHO W/O JOINTS CF: HCPCS

## 2022-08-25 PROCEDURE — 26341 PR MANIPULAT PALM CORD POST INJ: ICD-10-PCS | Mod: S$PBB,F8,, | Performed by: ORTHOPAEDIC SURGERY

## 2022-08-25 PROCEDURE — 26341 MANIPULAT PALM CORD POST INJ: CPT | Mod: PBBFAC,F8 | Performed by: ORTHOPAEDIC SURGERY

## 2022-08-25 NOTE — PROGRESS NOTES
OCCUPATIONAL THERAPY --- ORTHOSIS  EVALUATION - FITTING - TRAINING     Patient Name: Rocco Swain  MRN: [id number]    Date: 8/25/2022  Physician: Dr. IFEANYI Snider   Primary Diagnosis: R dupuytren's contracture of ring finger following xiaflex injection and manipulation   Treatment Diagnosis:   1. Dupuytren's contracture of both hands  Ambulatory referral/consult to Physical/Occupational Therapy   2. Range of motion deficit     3. Deviation of finger of right hand       (and resulting condition that requires a custom orthosis)     Start time: 11  End Time: 12  Total Time: 60     SUBJECTIVE:   HISTORY/OCCUPATIONAL PROFILE:   Patient is a 72  year old, Right handed male  who presents this day for orthotic fabrication/fitting/training.      Patient's chief complaint is inability to straighten ring finger  and reports difficulty with opening hand     Occupation: retired       Pain: 4-6 out of 10     Date of Onset/Injury/Surgery: 8/23/22 xiaflex injection   8/25/22 xiaflex manipulation       OBJECTIVE:     Observations:  Moderate  tenderness and edema throughout R hand , Open skin tear noted on volar MP crease and small tear at PIP crease level.  Wounds bleeding, applied prism , gauze and coban to decrease bleeding and to promote wound healing.     Sensation:  Intact in all autonomous zones     Range of Motion : deferred     Strength (in pounds): Deferred       Circumferential Edema Measurements (in cm): Deferred       ADLs/Function:  deferred    ASSESSMENT:   Type of custom fabricated Orthosis:   Static,WHFO,     L-code: 3913    Purpose of orthosis:   To protect healing tendon following manipulation   To decrease deformity/joint contracture  For support of soft tissue/fascia injury during healing phase.     Initial OT Orthosis evaluation completed.  Fabricated custom static volar WHF orthotic ()  per MD orders for the purpose listed above.  Fitted patient in digit extension as able, unable to maintain neutral  position secondary to adjacent contractures and open wounds and pain.  Will re-assess next week for adjustment as needed. Patient/caregiver were provided written and verbal  instructions on orthosis purpose, wear schedule, care and precautions to monitor for increased pain/edema, pressure points, skin breakdown or redness/skin irritation.  Patient was IND in donning/doffing of orthosis while in clinic.  Patient/caregiver to contact clinic for adjustments as needed.  Patient may require skilled OT services for orthotic adjustments/modifications as needed. Provided prism, gauze, coban for home wound care, instructed in cleaning with soap/water only, no peroxide.  Prism 1x daily as needed for wound closure.  Will re-assess early next week and adjust orthosis as able.     Quality of Fit of orthotic fabricated:       Good Fit achieved  May require adjustments as needed to accommodate for reduction in edema, wound healing, fracture healing    Rehab Potential: good    Short Term Goals (in 4 wks)  1) Patient to be IND with orthotic use,donning/doffing,  wear and care precautions and modalities for pain/edema management.     PLAN:    Recommend continued  Occupational therapy for 1-4 visits during the  Certification period of 8/25/2022  to 9/25/2022 in pursuit of OT goals and for orthosis modification/adjustments as needed.      Treatment to include orthotic fabrication/fitting/training, orthotic modification/adjustment as needed, HEP, instruction in modalities for pain/edema management,  and any other treatment deemed necessary.     I CERTIFY THE NEED FOR THESE SERVICES FURNISHED UNDER THIS PLAN OF TREATMENT AND WHILE UNDER MY CARE    Physician's comments: _____________________________________________________________________      Physician's Name: ___________________  Date ______________________________    ARNOLD Caraballo, ISADORAT

## 2022-08-25 NOTE — PROGRESS NOTES
Pt is here for post-xiaflex injection. He tolerated the procedure well. He does not complain of numbness/tingling.   Exam: The patient  does have swelling, does have ecchymosis.    Procedure: Under sterile conditions a digital block was performed on the right ring finger finger. The cordwas ruptured. Patient was placed in a well padded splint in extension.  Pt had a skin tear, he has cord involvement of the long finger  His contracture was over 90 degrees

## 2022-08-25 NOTE — PLAN OF CARE
OCCUPATIONAL THERAPY --- ORTHOSIS  EVALUATION - FITTING - TRAINING     Patient Name: Rocco Swain  MRN: [id number]    Date: 8/25/2022  Physician: Dr. IFEANYI Snider   Primary Diagnosis: R dupuytren's contracture of ring finger following xiaflex injection and manipulation   Treatment Diagnosis:   1. Dupuytren's contracture of both hands  Ambulatory referral/consult to Physical/Occupational Therapy   2. Range of motion deficit     3. Deviation of finger of right hand       (and resulting condition that requires a custom orthosis)     Start time: 11  End Time: 12  Total Time: 60     SUBJECTIVE:   HISTORY/OCCUPATIONAL PROFILE:   Patient is a 72  year old, Right handed male  who presents this day for orthotic fabrication/fitting/training.      Patient's chief complaint is inability to straighten ring finger  and reports difficulty with opening hand     Occupation: retired       Pain: 4-6 out of 10     Date of Onset/Injury/Surgery: 8/23/22 xiaflex injection   8/25/22 xiaflex manipulation       OBJECTIVE:     Observations:  Moderate  tenderness and edema throughout R hand , Open skin tear noted on volar MP crease and small tear at PIP crease level.  Wounds bleeding, applied prism , gauze and coban to decrease bleeding and to promote wound healing.     Sensation:  Intact in all autonomous zones     Range of Motion : deferred     Strength (in pounds): Deferred       Circumferential Edema Measurements (in cm): Deferred       ADLs/Function:  deferred    ASSESSMENT:   Type of custom fabricated Orthosis:   Static,WHFO,     L-code: 3906    Purpose of orthosis:   To protect healing tendon following manipulation   To decrease deformity/joint contracture  For support of soft tissue/fascia injury during healing phase.     Initial OT Orthosis evaluation completed.  Fabricated custom static volar WHF orthotic ()  per MD orders for the purpose listed above.  Fitted patient in digit extension as able, unable to maintain neutral  position secondary to adjacent contractures and open wounds and pain.  Will re-assess next week for adjustment as needed. Patient/caregiver were provided written and verbal  instructions on orthosis purpose, wear schedule, care and precautions to monitor for increased pain/edema, pressure points, skin breakdown or redness/skin irritation.  Patient was IND in donning/doffing of orthosis while in clinic.  Patient/caregiver to contact clinic for adjustments as needed.  Patient may require skilled OT services for orthotic adjustments/modifications as needed. Provided prism, gauze, coban for home wound care, instructed in cleaning with soap/water only, no peroxide.  Prism 1x daily as needed for wound closure.  Will re-assess early next week and adjust orthosis as able.     Quality of Fit of orthotic fabricated:       Good Fit achieved  May require adjustments as needed to accommodate for reduction in edema, wound healing, fracture healing    Rehab Potential: good    Short Term Goals (in 4 wks)  1) Patient to be IND with orthotic use,donning/doffing,  wear and care precautions and modalities for pain/edema management.     PLAN:    Recommend continued  Occupational therapy for 1-4 visits during the  Certification period of 8/25/2022  to 9/25/2022 in pursuit of OT goals and for orthosis modification/adjustments as needed.      Treatment to include orthotic fabrication/fitting/training, orthotic modification/adjustment as needed, HEP, instruction in modalities for pain/edema management,  and any other treatment deemed necessary.     I CERTIFY THE NEED FOR THESE SERVICES FURNISHED UNDER THIS PLAN OF TREATMENT AND WHILE UNDER MY CARE    Physician's comments: _____________________________________________________________________      Physician's Name: ___________________  Date ______________________________    ARNOLD Caraballo, ISADORAT

## 2022-08-26 RX ADMIN — COLLAGENASE CLOSTRIDIUM HISTOLYTICUM 0.58 MG: KIT at 07:08

## 2022-08-26 NOTE — PROGRESS NOTES
The patient is here for Dupuytren's contracture. The patient has been evaluated in the past and treatment options discussed. The patient has opted for xiaflex injection. Risks and benefits were explained to the patient at a prior visit. Consents were signed at a prior visit. Insurance had approved of this injection.    Exam: The patient has a cord which involves right ring finger joint. Pt has significant contracture involving ring finger- MCP and PIP. Pt has over 90 degree contracture    Plan: Procedure: Under sterile conditions the xiaflex was reconstituted with 0.39ml sterile water. The solution was mixed. A timeout was performed.  Under sterile conditions the cord was injected with 0.25ml reconstituted xiaflex. Sterile soft dressing was applied.

## 2022-08-30 ENCOUNTER — CLINICAL SUPPORT (OUTPATIENT)
Dept: REHABILITATION | Facility: HOSPITAL | Age: 72
End: 2022-08-30
Attending: ORTHOPAEDIC SURGERY
Payer: MEDICARE

## 2022-08-30 DIAGNOSIS — M25.60 RANGE OF MOTION DEFICIT: Primary | ICD-10-CM

## 2022-08-30 DIAGNOSIS — M20.001 DEVIATION OF FINGER OF RIGHT HAND: ICD-10-CM

## 2022-08-30 PROCEDURE — 97110 THERAPEUTIC EXERCISES: CPT

## 2022-08-30 NOTE — PROGRESS NOTES
[facility]  Occupational Therapy Treatment Note    Date: 8/30/2022  Name: Rocco Swain  Clinic Number: [id number]    Physician: Dr. IFEANYI Snider   Primary Diagnosis: R dupuytren's contracture of ring finger following xiaflex injection and manipulation   Treatment Diagnosis:   1. Dupuytren's contracture of both hands  Ambulatory referral/consult to Physical/Occupational Therapy   2. Range of motion deficit      3. Deviation of finger of right hand         (and resulting condition that requires a custom orthosis)       Time In: 3 pm   Time Out: 345 pm   Total Billable Time: 45 minutes    SUBJECTIVE     Pt reports: Per written note - I put sponge on wound, its doing better, but hurts in middle joint.   He was compliant with home exercise program given last session.   Response to previous treatment:wound healing   Functional change: none     Pain: 3/10  Location: right ring finger       OBJECTIVE     Objective Measures updated at progress report unless specified.    Treatment     Rocco received the treatments listed below:     Supervised modalities  for 10 min after being cleared for contradictions: Hot Pack - to promote tissue elongation prior to orthotic adjustment     Therapeutic exercises to develop strength, endurance, ROM, and flexibility for 35 minutes, including:  - instructed in HEP including blocking , isolated FDS glide, hook, wave, flat and full fist, place and hold for digit extension, ab/ad x 10 reps each, 3-5 x daily   - adjusted orthosis for comfort and fit and to increase digit extension.   - Will re-assess in 2 weeks for wound healing   - cleaned re-dressed wound with bacitracin, bradford and coban.   - continue orthotic use full time for 1 week, then night use as tolerated for additional 2-4+ weeks as needed to maintain digit extension; remove for HEP, bathing, hygiene.       Patient Education and Home Exercises      Education provided:   - Cont HEP   -- Progress towards  goals     Written Home Exercises Provided: Patient instructed to cont prior HEP.  Exercises were reviewed and Rocco was able to demonstrate them prior to the end of the session.  Rocco demonstrated good  understanding of the HEP provided. See EMR under Patient Instructions for exercises provided during therapy sessions.       Assessment     Rocco is progressing well towards his goals and there are no updates to goals at this time. Pt prognosis is Good. Wound continues to heal, new granulation tissue noted with no significant drainage.  Generalized joint stiffness remains. Will re-assess in 2 weeks for orthotic adjustment and wound assessment.     Pt will continue to benefit from skilled outpatient occupational therapy to address the deficits listed in the problem list on initial evaluation provide pt/family education and to maximize pt's level of independence in the home and community environment.     Pt's spiritual, cultural and educational needs considered and pt agreeable to plan of care and goals.    Anticipated barriers to occupational therapy: non verbal secondary to tracheotomy     Short Term Goals (in 4 wks)  1) Patient to be IND with orthotic use,donning/doffing,  wear and care precautions and modalities for pain/edema management.      PLAN:    Recommend continued  Occupational therapy for 1-4 visits during the  Certification period of 8/25/2022  to 9/25/2022 in pursuit of OT goals and for orthosis modification/adjustments as needed.       Treatment to include orthotic fabrication/fitting/training, orthotic modification/adjustment as needed, HEP, instruction in modalities for pain/edema management,  and any other treatment deemed necessary.         Bri Rush, OT  CHT

## 2022-08-30 NOTE — PATIENT INSTRUCTIONS
"[facility] - OCCUPATIONAL THERAPY  HOME EXERCISE PROGRAM     Complete the following exercises with 10 repetitions each, 3-5 x/day.     AROM: DIP Flexion / Extension  Pinch middle knuckle to prevent bending. Bend end knuckle until stretch is felt.   Hold 3 seconds. Relax. Straighten finger as far as possible.    AROM: PIP Flexion / Extension  Pinch bottom knuckle  to prevent bending. Actively bend middle knuckle until stretch is felt.   Hold 3 seconds. Relax. Straighten finger as far as possible.    AROM: Isolated PIP Flexion  Bend only middle joint of your finger, keeping other fingers straight with other hand.    AROM: Isolated MCP Flexion / Extension ("Wave")   Bend only your large, bottom knuckles. Hold 3 seconds. Keep the tips of your fingers straight. Straighten fingers.    AROM: Isolated IPJ Flexion / Extension ("Hook")  Bend only your middle and end knuckles. Hold 3 seconds.   Straighten your fingers.     AROM: MCP and PIP Flexion / Extension ("Straight Fist")  Bend your bottom and middle knuckles, keeping the tips of your fingers straight. Try to touch the pads of your fingers on your palm. Hold 3 seconds. Straighten your fingers.     AROM: Composite Flexion / Extension ("Full Fist")  Bend every joint in your hand into a fist. Hold 3 seconds. Straighten your fingers.     AROM: Composte Extension ("Finger Lifts")  Lift your finger off of the table one at a time. Hold 3 seconds. Relax your finger.    AROM: Abduction / Adduction  With hand flat on table, spread all fingers apart, then bring them together as close as possible.    Copyright © I. All rights reserved.     Therapist: Mirela Stone, RODNEY/TYLER    "

## 2022-08-31 DIAGNOSIS — C01 SQUAMOUS CELL CARCINOMA OF BASE OF TONGUE: ICD-10-CM

## 2022-09-01 RX ORDER — OXYCODONE HYDROCHLORIDE 10 MG/1
10 TABLET ORAL EVERY 6 HOURS PRN
Qty: 120 TABLET | Refills: 0 | Status: SHIPPED | OUTPATIENT
Start: 2022-09-01 | End: 2022-10-03 | Stop reason: SDUPTHER

## 2022-09-08 ENCOUNTER — PATIENT MESSAGE (OUTPATIENT)
Dept: HEMATOLOGY/ONCOLOGY | Facility: CLINIC | Age: 72
End: 2022-09-08
Payer: MEDICARE

## 2022-09-12 ENCOUNTER — INFUSION (OUTPATIENT)
Dept: INFUSION THERAPY | Facility: HOSPITAL | Age: 72
End: 2022-09-12
Attending: STUDENT IN AN ORGANIZED HEALTH CARE EDUCATION/TRAINING PROGRAM
Payer: MEDICARE

## 2022-09-12 ENCOUNTER — OFFICE VISIT (OUTPATIENT)
Dept: HEMATOLOGY/ONCOLOGY | Facility: CLINIC | Age: 72
End: 2022-09-12
Payer: MEDICARE

## 2022-09-12 VITALS
TEMPERATURE: 98 F | RESPIRATION RATE: 18 BRPM | WEIGHT: 117.06 LBS | BODY MASS INDEX: 17.74 KG/M2 | HEIGHT: 68 IN | OXYGEN SATURATION: 99 % | DIASTOLIC BLOOD PRESSURE: 71 MMHG | HEART RATE: 83 BPM | SYSTOLIC BLOOD PRESSURE: 133 MMHG

## 2022-09-12 DIAGNOSIS — D84.821 IMMUNODEFICIENCY DUE TO DRUGS: ICD-10-CM

## 2022-09-12 DIAGNOSIS — C01 SQUAMOUS CELL CARCINOMA OF BASE OF TONGUE: Primary | ICD-10-CM

## 2022-09-12 DIAGNOSIS — D69.6 THROMBOCYTOPENIA: ICD-10-CM

## 2022-09-12 DIAGNOSIS — C01 SQUAMOUS CELL CARCINOMA OF BASE OF TONGUE: ICD-10-CM

## 2022-09-12 DIAGNOSIS — C77.0 SECONDARY MALIGNANT NEOPLASM OF CERVICAL LYMPH NODE: ICD-10-CM

## 2022-09-12 DIAGNOSIS — C32.9 LARYNX CANCER: ICD-10-CM

## 2022-09-12 DIAGNOSIS — Z79.899 IMMUNODEFICIENCY DUE TO DRUGS: ICD-10-CM

## 2022-09-12 DIAGNOSIS — N18.4 CHRONIC KIDNEY DISEASE (CKD), STAGE IV (SEVERE): ICD-10-CM

## 2022-09-12 DIAGNOSIS — H10.31 ACUTE CONJUNCTIVITIS OF RIGHT EYE, UNSPECIFIED ACUTE CONJUNCTIVITIS TYPE: ICD-10-CM

## 2022-09-12 DIAGNOSIS — C77.0 SECONDARY MALIGNANT NEOPLASM OF CERVICAL LYMPH NODE: Primary | ICD-10-CM

## 2022-09-12 LAB
ALBUMIN SERPL BCP-MCNC: 3.9 G/DL (ref 3.5–5.2)
ALP SERPL-CCNC: 69 U/L (ref 55–135)
ALT SERPL W/O P-5'-P-CCNC: 16 U/L (ref 10–44)
ANION GAP SERPL CALC-SCNC: 10 MMOL/L (ref 8–16)
AST SERPL-CCNC: 41 U/L (ref 10–40)
BILIRUB SERPL-MCNC: 0.5 MG/DL (ref 0.1–1)
BUN SERPL-MCNC: 15 MG/DL (ref 8–23)
CALCIUM SERPL-MCNC: 9.7 MG/DL (ref 8.7–10.5)
CHLORIDE SERPL-SCNC: 105 MMOL/L (ref 95–110)
CO2 SERPL-SCNC: 27 MMOL/L (ref 23–29)
CREAT SERPL-MCNC: 1.5 MG/DL (ref 0.5–1.4)
ERYTHROCYTE [DISTWIDTH] IN BLOOD BY AUTOMATED COUNT: 12.6 % (ref 11.5–14.5)
EST. GFR  (NO RACE VARIABLE): 48.9 ML/MIN/1.73 M^2
GLUCOSE SERPL-MCNC: 101 MG/DL (ref 70–110)
HCT VFR BLD AUTO: 32.2 % (ref 40–54)
HGB BLD-MCNC: 11.5 G/DL (ref 14–18)
IMM GRANULOCYTES # BLD AUTO: 0.01 K/UL (ref 0–0.04)
MCH RBC QN AUTO: 34.3 PG (ref 27–31)
MCHC RBC AUTO-ENTMCNC: 35.7 G/DL (ref 32–36)
MCV RBC AUTO: 96 FL (ref 82–98)
NEUTROPHILS # BLD AUTO: 2.3 K/UL (ref 1.8–7.7)
PLATELET # BLD AUTO: 115 K/UL (ref 150–450)
PMV BLD AUTO: 9.2 FL (ref 9.2–12.9)
POTASSIUM SERPL-SCNC: 3.5 MMOL/L (ref 3.5–5.1)
PROT SERPL-MCNC: 7.4 G/DL (ref 6–8.4)
RBC # BLD AUTO: 3.35 M/UL (ref 4.6–6.2)
SODIUM SERPL-SCNC: 142 MMOL/L (ref 136–145)
WBC # BLD AUTO: 3.51 K/UL (ref 3.9–12.7)

## 2022-09-12 PROCEDURE — 99214 OFFICE O/P EST MOD 30 MIN: CPT | Mod: PBBFAC | Performed by: STUDENT IN AN ORGANIZED HEALTH CARE EDUCATION/TRAINING PROGRAM

## 2022-09-12 PROCEDURE — 99999 PR PBB SHADOW E&M-EST. PATIENT-LVL IV: ICD-10-PCS | Mod: PBBFAC,,, | Performed by: STUDENT IN AN ORGANIZED HEALTH CARE EDUCATION/TRAINING PROGRAM

## 2022-09-12 PROCEDURE — 63600175 PHARM REV CODE 636 W HCPCS: Performed by: STUDENT IN AN ORGANIZED HEALTH CARE EDUCATION/TRAINING PROGRAM

## 2022-09-12 PROCEDURE — 99999 PR PBB SHADOW E&M-EST. PATIENT-LVL IV: CPT | Mod: PBBFAC,,, | Performed by: STUDENT IN AN ORGANIZED HEALTH CARE EDUCATION/TRAINING PROGRAM

## 2022-09-12 PROCEDURE — 99215 OFFICE O/P EST HI 40 MIN: CPT | Mod: S$PBB,,, | Performed by: STUDENT IN AN ORGANIZED HEALTH CARE EDUCATION/TRAINING PROGRAM

## 2022-09-12 PROCEDURE — A4216 STERILE WATER/SALINE, 10 ML: HCPCS | Performed by: STUDENT IN AN ORGANIZED HEALTH CARE EDUCATION/TRAINING PROGRAM

## 2022-09-12 PROCEDURE — 80053 COMPREHEN METABOLIC PANEL: CPT | Performed by: STUDENT IN AN ORGANIZED HEALTH CARE EDUCATION/TRAINING PROGRAM

## 2022-09-12 PROCEDURE — 25000003 PHARM REV CODE 250: Performed by: STUDENT IN AN ORGANIZED HEALTH CARE EDUCATION/TRAINING PROGRAM

## 2022-09-12 PROCEDURE — 85027 COMPLETE CBC AUTOMATED: CPT | Performed by: STUDENT IN AN ORGANIZED HEALTH CARE EDUCATION/TRAINING PROGRAM

## 2022-09-12 PROCEDURE — 99215 PR OFFICE/OUTPT VISIT, EST, LEVL V, 40-54 MIN: ICD-10-PCS | Mod: S$PBB,,, | Performed by: STUDENT IN AN ORGANIZED HEALTH CARE EDUCATION/TRAINING PROGRAM

## 2022-09-12 PROCEDURE — 96413 CHEMO IV INFUSION 1 HR: CPT

## 2022-09-12 RX ORDER — HEPARIN 100 UNIT/ML
500 SYRINGE INTRAVENOUS
Status: CANCELLED | OUTPATIENT
Start: 2022-09-12

## 2022-09-12 RX ORDER — SODIUM CHLORIDE 0.9 % (FLUSH) 0.9 %
10 SYRINGE (ML) INJECTION
Status: DISCONTINUED | OUTPATIENT
Start: 2022-09-12 | End: 2022-09-12 | Stop reason: HOSPADM

## 2022-09-12 RX ORDER — HEPARIN 100 UNIT/ML
500 SYRINGE INTRAVENOUS
Status: DISCONTINUED | OUTPATIENT
Start: 2022-09-12 | End: 2022-09-12 | Stop reason: HOSPADM

## 2022-09-12 RX ORDER — SODIUM CHLORIDE 0.9 % (FLUSH) 0.9 %
10 SYRINGE (ML) INJECTION
Status: CANCELLED | OUTPATIENT
Start: 2022-09-12

## 2022-09-12 RX ADMIN — Medication 10 ML: at 08:09

## 2022-09-12 RX ADMIN — HEPARIN SODIUM (PORCINE) LOCK FLUSH IV SOLN 100 UNIT/ML 500 UNITS: 100 SOLUTION at 08:09

## 2022-09-12 RX ADMIN — SODIUM CHLORIDE 200 MG: 9 INJECTION, SOLUTION INTRAVENOUS at 10:09

## 2022-09-12 RX ADMIN — SODIUM CHLORIDE: 0.9 INJECTION, SOLUTION INTRAVENOUS at 10:09

## 2022-09-12 RX ADMIN — Medication 10 ML: at 11:09

## 2022-09-12 RX ADMIN — HEPARIN SODIUM (PORCINE) LOCK FLUSH IV SOLN 100 UNIT/ML 500 UNITS: 100 SOLUTION at 11:09

## 2022-09-12 NOTE — ASSESSMENT & PLAN NOTE
Persistent, refractory to OTC eye drops.  Denies itching, pain, or blurry vision.  -referral to optometry for further evaluation as he is a liver transplant patient on immunosuppressants.

## 2022-09-12 NOTE — PLAN OF CARE
[id number] Pt tolerated Keytruda infusion well today, no complaints or complications,. VSS through duration of treatment. Pt aware to call provider with any questions or concerns and is aware of upcoming appts. Pt ambulatory from clinic with steady gait, no distress noted.     no

## 2022-09-12 NOTE — PROGRESS NOTES
PATIENT: Rocco Swain  MRN: [id number]  DATE: 9/12/2022      Diagnosis:   1. Squamous cell carcinoma of base of tongue    2. Acute conjunctivitis of right eye, unspecified acute conjunctivitis type    3. Secondary malignant neoplasm of cervical lymph node    4. Larynx cancer    5. Thrombocytopenia    6. Immunodeficiency due to drugs        Chief Complaint: Squamous cell carcinoma of base of tongue      Oncologic History:    Oncologic History 1. Squamous cell carcinoma of base of tongue with recurrence    Oncologic Treatment 1. Chemoradiation completed 4/2016  2. S/p glossectomy, laryngectomy, and bilateral neck dissection for persistent/recurrent disease  3. 10/6/2020-8/24/21 PCC  4. 9/13/21 start carboplatin/5-FU/pembrolizumab; C2 pembrolizumab only; C4 restarted chemoIO    Pathology P16 positive squamous cell carcinoma with basaloid features.        Subjective:    Interval History: Mr. Swain is here for follow up    Since his last visit he is doing well.  New symptom of right eye conjunctivitis.  Persistent despite OTC eye drops. Fortunately denies itching, pain, or blurry vision.  Left sided otalgia resolved after using ear drops.  Missed PET CT due to diarrhea; PET CT rescheduled to the end of this month.  Chronic neck pain stable.  He is alone at this visit.  Communication from him is 100% written.  Past Medical History:   Past Medical History:   Diagnosis Date    Basal cell carcinoma (BCC) in situ of skin 2012    3 on face, 2 on arm, removed by dermatology.     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 Underwent surgical resection of tongue and neck. s/p tracheostomy       Past Surgical HIstory:   Past Surgical History:   Procedure Laterality Date    COLONOSCOPY       INSERTION OF VENOUS ACCESS PORT Right 3/8/2021    Procedure: INSERTION, VENOUS ACCESS PORT;  Surgeon: Jesus Rendon MD;  [facility];  Service: General;  Laterality: Right;    LIVER TRANSPLANT  11/2008    transplanted for biopsy proven hepatocellular carcinoma,     LYMPH NODE BIOPSY N/A 9/18/2020    Procedure: BIOPSY, LYMPH NODE;  Surgeon: Taz Diagnostic Provider;  Location: [facility] OR [facility];  Service: General;  Laterality: N/A;  Rm 173    TRACHEOSTOMY         Family History:   Family History   Problem Relation Age of Onset    Dementia Mother        Social History:  reports that he has never smoked. He has never used smokeless tobacco. He reports current alcohol use. He reports that he does not currently use drugs.    Allergies:  Review of patient's allergies indicates:  No Known Allergies    Medications:  Current Outpatient Medications   Medication Sig Dispense Refill    azelaic acid (AZELEX) 15 % gel APPLY TOPICALLY TO AFFECTED AREA IN THE MORNING 50 g 3    diphenoxylate-atropine 2.5-0.025 mg (LOMOTIL) 2.5-0.025 mg per tablet Take 1 tablet by mouth 4 (four) times daily as needed for Diarrhea (use when loperamide/imodium does not work). 30 tablet 0    ibuprofen (ADVIL,MOTRIN) 200 MG tablet Take 200 mg by mouth.      levothyroxine (SYNTHROID) 88 MCG tablet TAKE 1 TABLET BY MOUTH ONCE DAILY 90 tablet 3    LIDOcaine HCl 2% (LIDOCAINE VISCOUS) 2 % Soln Swish and spit 15 mls every 8 (eight) hours as needed (mouth sore). 100 mL 3    LIDOcaine-prilocaine (EMLA) cream Apply generously to port site 30-60 min prior to chemo and then cover with saran wrap. 30 g 2    loperamide (IMODIUM) 2 mg capsule Take 1 capsule (2 mg total) by mouth 4 (four) times daily as needed for Diarrhea. 30 capsule 1    magic mouthwash diphen/antac/lidoc/nysta Take 10 mLs by mouth 4 (four) times daily. 120 mL 4    metroNIDAZOLE (METROGEL) 0.75 % gel Apply topically to affected area 2 (two) times daily. 45 g 1     multivit-min/FA/lycopen/lutein (CENTRUM SILVER MEN ORAL) Take 1 tablet by mouth.      multivitamin capsule Take 1 capsule by mouth once daily.      oxyCODONE (ROXICODONE) 10 mg Tab immediate release tablet Take 1 tablet (10 mg total) by mouth every 6 (six) hours as needed for Pain. 120 tablet 0    sulfacetamide sodium-sulfur 10-5 % (w/w) Clsr USE TO WASH AFFECTED AREA DAILY      tacrolimus (PROGRAF) 0.5 MG Cap Take 1 capsule (0.5 mg total) by mouth every evening. Use the 1mg capsule for your morning dose 90 capsule 3    tacrolimus (PROGRAF) 1 MG Cap Take 1 capsule (1 mg total) by mouth every morning. Use the tacrolimus 0.5mg capsule for your evening dose as directed. 90 capsule 3    tiZANidine (ZANAFLEX) 2 MG tablet Take 1 tablet (2 mg total) by mouth nightly as needed (neck muscle strain). 30 tablet 0    traZODone (DESYREL) 50 MG tablet TAKE 1 TABLET BY MOUTH IN  THE EVENING 90 tablet 3    vitamin E 400 UNIT capsule Take 400 Units by mouth once daily.       No current facility-administered medications for this visit.     Facility-Administered Medications Ordered in Other Visits   Medication Dose Route Frequency Provider Last Rate Last Admin    heparin, porcine (PF) 100 unit/mL injection flush 500 Units  500 Units Intravenous PRSTEVIE Berg MD        heparin, porcine (PF) 100 unit/mL injection flush 500 Units  500 Units Intravenous PRSTEVIE Berg MD   500 Units at 09/12/22 0838    sodium chloride 0.9% flush 10 mL  10 mL Intravenous MAEVE Berg MD        sodium chloride 0.9% flush 10 mL  10 mL Intravenous MAEVE Berg MD   10 mL at 09/12/22 0838       Review of Systems   Constitutional:  Negative for activity change, appetite change, chills, diaphoresis, fatigue, fever and unexpected weight change.   HENT:  Positive for ear pain (intermittent) and trouble swallowing. Negative for ear discharge, mouth sores and nosebleeds.    Eyes:  Negative for visual disturbance.   Respiratory:  Negative for cough, chest  "tightness, shortness of breath and wheezing.    Cardiovascular:  Negative for chest pain and leg swelling.   Gastrointestinal:  Negative for abdominal distention, abdominal pain, blood in stool, constipation, diarrhea, nausea and vomiting.   Endocrine: Negative for cold intolerance and heat intolerance.   Genitourinary:  Negative for difficulty urinating and dysuria.   Musculoskeletal:  Positive for myalgias and neck pain (pain radiates between both sides of his neck under his jaw.). Negative for arthralgias and back pain.   Skin:  Negative for color change and rash.   Neurological:  Negative for dizziness, weakness, light-headedness, numbness and headaches.   Hematological:  Negative for adenopathy. Bruises/bleeds easily.   Psychiatric/Behavioral:  Negative for confusion.      ECOG Performance Status:      ECOG SCORE    1 - Restricted in strenuous activity-ambulatory and able to carry out work of a light nature         Objective:      Vitals:   Vitals:    09/12/22 0851   BP: 133/71   BP Location: Left arm   Patient Position: Sitting   BP Method: Medium (Automatic)   Pulse: 83   Resp: 18   Temp: 98.3 °F (36.8 °C)   TempSrc: Oral   SpO2: 99%   Weight: 53.1 kg (117 lb 1 oz)   Height: 5' 8" (1.727 m)     BMI: Body mass index is 17.8 kg/m².  Wt Readings from Last 10 Encounters:   09/12/22 53.1 kg (117 lb 1 oz)   08/25/22 52.6 kg (116 lb)   08/23/22 52.6 kg (116 lb)   08/22/22 52.7 kg (116 lb 2.9 oz)   08/22/22 52.7 kg (116 lb 2.9 oz)   07/11/22 53.1 kg (117 lb 1 oz)   07/11/22 53.1 kg (117 lb 1 oz)   06/20/22 53.4 kg (117 lb 11.6 oz)   05/24/22 52.2 kg (115 lb)   05/12/22 52.3 kg (115 lb 4.8 oz)       Physical Exam  Constitutional:       General: He is not in acute distress.     Appearance: Normal appearance. He is not ill-appearing.   HENT:      Head: Normocephalic and atraumatic.      Mouth/Throat:      Mouth: Mucous membranes are moist.      Pharynx: No oropharyngeal exudate or posterior oropharyngeal erythema. "   Eyes:      General: No scleral icterus.     Extraocular Movements: Extraocular movements intact.      Conjunctiva/sclera: Conjunctivae normal.      Pupils: Pupils are equal, round, and reactive to light.   Cardiovascular:      Rate and Rhythm: Normal rate and regular rhythm.      Heart sounds: No murmur heard.    No friction rub. No gallop.   Pulmonary:      Effort: Pulmonary effort is normal. No respiratory distress.      Breath sounds: No stridor. No wheezing, rhonchi or rales.   Abdominal:      General: Bowel sounds are normal. There is no distension.      Palpations: Abdomen is soft. There is no mass.      Tenderness: There is no abdominal tenderness. There is no guarding or rebound.   Musculoskeletal:         General: Normal range of motion.      Cervical back: Normal range of motion and neck supple. No tenderness.      Right lower leg: No edema.      Left lower leg: No edema.   Lymphadenopathy:      Cervical: No cervical adenopathy.      Upper Body:      Right upper body: No supraclavicular or axillary adenopathy.      Left upper body: No supraclavicular or axillary adenopathy.   Skin:     General: Skin is warm and dry.   Neurological:      General: No focal deficit present.      Mental Status: He is alert.       Laboratory Data:   Recent Results (from the past 168 hour(s))   CBC Oncology    Collection Time: 09/12/22  8:24 AM   Result Value Ref Range    WBC 3.51 (L) 3.90 - 12.70 K/uL    RBC 3.35 (L) 4.60 - 6.20 M/uL    Hemoglobin 11.5 (L) 14.0 - 18.0 g/dL    Hematocrit 32.2 (L) 40.0 - 54.0 %    MCV 96 82 - 98 fL    MCH 34.3 (H) 27.0 - 31.0 pg    MCHC 35.7 32.0 - 36.0 g/dL    RDW 12.6 11.5 - 14.5 %    Platelets 115 (L) 150 - 450 K/uL    MPV 9.2 9.2 - 12.9 fL    Gran # (ANC) 2.3 1.8 - 7.7 K/uL    Immature Grans (Abs) 0.01 0.00 - 0.04 K/uL   CMP    Collection Time: 09/12/22  8:24 AM   Result Value Ref Range    Sodium 142 136 - 145 mmol/L    Potassium 3.5 3.5 - 5.1 mmol/L    Chloride 105 95 - 110 mmol/L    CO2  27 23 - 29 mmol/L    Glucose 101 70 - 110 mg/dL    BUN 15 8 - 23 mg/dL    Creatinine 1.5 (H) 0.5 - 1.4 mg/dL    Calcium 9.7 8.7 - 10.5 mg/dL    Total Protein 7.4 6.0 - 8.4 g/dL    Albumin 3.9 3.5 - 5.2 g/dL    Total Bilirubin 0.5 0.1 - 1.0 mg/dL    Alkaline Phosphatase 69 55 - 135 U/L    AST 41 (H) 10 - 40 U/L    ALT 16 10 - 44 U/L    Anion Gap 10 8 - 16 mmol/L    eGFR 48.9 (A) >60 mL/min/1.73 m^2             Imaging:         Assessment:       1. Squamous cell carcinoma of base of tongue    2. Acute conjunctivitis of right eye, unspecified acute conjunctivitis type    3. Secondary malignant neoplasm of cervical lymph node    4. Larynx cancer    5. Thrombocytopenia    6. Immunodeficiency due to drugs           Plan:       Problem List Items Addressed This Visit          Ophtho    Acute conjunctivitis of right eye    Current Assessment & Plan     Persistent, refractory to OTC eye drops.  Denies itching, pain, or blurry vision.  -referral to optometry for further evaluation as he is a liver transplant patient on immunosuppressants.         Relevant Orders    Ambulatory referral/consult to Optometry       Immunology/Multi System    Immunodeficiency due to drugs    Overview     S/p liver transplant and is currently on tacrolimus.  Afebrile, ANC sufficient for treatment  -monitor cbc            Hematology    Thrombocytopenia    Current Assessment & Plan     Grade 1, asymptomatic  -monitor cbc            Oncology    Larynx cancer    Overview     Status post total laryngectomy, total glossectomy and anterolateral thigh free flap reconstruction roughly 2017 at [facility] in [place] for persistent/recurrent base of tongue sq.c.c.         Squamous cell carcinoma of base of tongue - Primary    Overview     Per OSH records, initially presented in 2015 with symptoms and cT2N2c disease. page 91 of 3/25/2020 outside records clinic (media tab).  Initially presented 8/2015 with left sided odynophagia.  Treated for reflux,  which did not improve.    12/11/15 he had a fiberoptic exam showing discolored mucosal area of the left base of the tongue.    1/13/16 he had persistent dysphagia, odynophagia, and new left otalgia.  MRI showed an irregular mass along the left posterior margin of the base of the tongue measuring 1.8x2.3cm with ipsilateral level 2 lymph node measuring 1.7cm with central necrosis and contralateral level 2 node measuring 1.3cm with questionable necrosis.    1/19/2016 he had direct laryngoscopy with biopsy left of midline, proximal to vallecula, which was p16 positive, invasive, with moderately to poorly differentiated sq.c.c. with basaloid features.    1/29/16 met with medical oncology who recommended chemoxrt.    2/1/16  staging PET CT showed hypermetabolic mass at base of tongue 4x2.5x3.5cm, max SUV 24.13, hypermetabolic lymph node left neck SUV 6.77, small subcentimeter lymph node right neck max SUV 3.79 (exact size of lymph nodes not mentioned in the summary).    2/15/16 started chemoxrt to left tongue base, bilateral cervical neck levels 1b-5, treated with 6MV photons utilizing IMRT, 5000 cGy in 200 cGy daily fractions.  Cone down boost to left base of tongue with additional 2000 cGy in 200 cGy daily fractions to gross disease.  Received weekly cisplatin with radiation but had to switch to carboplatin due to worsening renal function. during this time also missed several weekly treatments of cisplatin.    4/1/2016 completed chemoxrt.   11/1/2016 Repeat biopsy of left tongue showed persistent disease.  S/p salvage glossectomy with laryngectomy,  rT4aN0, p16 positive, base of tongue squamous cell carcinoma.     page 61 of 3/25/2020 outside records clinic (media tab)  Pathology: Tonsil, left tonsil fold: reactive squamous mucosa with inflammation and degenerating skeletal muscle, no carcinoma seen.  Right and Left base of tongue:  Reactive squamous mucosa and submucosa with inflammation, no carcinoma seen.  Neck,  glossectomy, laryngectomy, bilateral neck dissection:  invasive squamous cell carcinoma.  tumor site: base of tongue/hypopharynx.  Small focus of tumor is seen within the hyoid  bone.  specimen Size [id number]  tumor size 4.2w2m1lr  depth of invasion 20mm  TNM stage pT4a, pN0, pMx  Lymph nodes, included in all parts:  number involved 0  number examined 3.  distance of tumor from margins  positive inked margins: none  within 1mm : none  within 1-2mm: anterior-inferior    Bilateral neck dissection:  level 1: one lymph node and submandibular gland, negative for tumor  level II : two lymph nodes, negative for tumor  level III: not identified  level IV: not identified  level V: not identified.    6/20/17 - PETCT with FDG avidity of left neck lymph node, level 3, SUV 2.9.  No other evidence of local or distant disease.  7/6/2017 - biopsy of left neck lymph node with IR.  Sample was non-diagnostic. Notes from path report: Less than optimal scan specimen with minute tissue core of stromal fibroadipose tissue.  definitive tati background is not seen.  9/28/2017 - Repeat PET CT showing mild activity SUV 3.5 (increased from 3.2 previously; size 7.8x7.4x9.5mm).  Also new findings under left pectoralis minor (mild focal increased activity, indeterminate but new since previous exam)  3/22/2018 - CT neck shows no evidence of disease and level 2b lymph node decreased to 4.9mmx4.5mmx4.9mm from 7.8x7.4x9.5mm previously.    Seen by Dr. Myers 8/3/2020 for left-sided neck pain and a swelling in left level 2 several weeks ago.  He feels it arose after he began lifting weights.  CT neck 8/5/2020 : Lymph nodes At the left neck level L2 there is a heterogeneous soft tissue density with central hypoattenuation likely reflecting a necrotic lymph node measuring 1.6 x 1.4 cm in transverse dimension, 2.5 cm in craniocaudal dimension.  There is a similar appearing irregular soft tissue density at the right neck level 2 measuring 1.1 x 9.0 cm in  transverse dimension and 1.5 cm craniocaudal dimension.  No other abnormal appearing or enlarged lymph nodes found.  Impression: At level 2 within the neck bilaterally, there are  soft tissue density masses with central hypoattenuation likely reflective of necrotic lymph nodes.  Findings are suspicious for malignancy recurrence.  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  Staging PET CT 9/30/2020 with left sided hypermetabolic node and right sided enlarged node without uptake.  Also has mandibular vestibule uptake, which may be another site of disease. Discussed at tumor board.  He is not a surgical or radiation candidate.  -Started on PCC 10/6/2020 followed by maintenance cetuximab until 8/24/21 (discontinued due to progression of disease; continued increase in SUV uptake, no new lesions).  9/2021 started on carbo/5-FU/pembrolizumab; due to toxicity went to pembrolizumab monotherapy starting with cycle 2.  Progression of disease noted after cycle 3 so added chemotherapy back in with cycle 4.         Current Assessment & Plan     Stable chronic neck pain.  Left sided otalgia resolved after he put ear drops.   -labs reviewed; ok to proceed with cycle 12 (q6w dose)  -PET CT rescheduled to later this month  -labs and follow up prior to cycle 13         Secondary malignant neoplasm of cervical lymph node    Overview     See squamous cell carcinoma          Orders Placed This Encounter   Procedures    Ambulatory referral/consult to Optometry       Advance Care Planning I initiated the process of advance care planning at his initial visit and explained the importance of this process to the patient.  Then the patient received detailed information about the importance of designating a Health Care Power of  (HCPOA). he was instructed to communicate with this person about their wishes for future healthcare, should he become sick and lose decision-making  capacity. The patient has previously appointed a HCPOA. After our discussion, the patient has decided to complete a HCPOA and has appointed his brother and NAME:Ollie          Route Chart for Scheduling    Med Onc Chart Routing  Urgent    Follow up with physician 6 weeks. 10/24 prior to chemo (pembrolizumab only)   Follow up with [facility] 3 weeks. 10/3 prior to chemo (pembrolizumab only)   Infusion scheduling note 10/3 and 10/24 pembrolizumab only   Injection scheduling note    Labs CBC and CMP   Lab interval:  10/3 and 10/24 port draw labs cmp, cbc prior to chemo   Imaging None      Pharmacy appointment No pharmacy appointment needed      Other referrals Additional referrals needed   Urgent referral to Optometry    Treatment Plan Information   OP HEAD NECK PEMBROLIZUMAB CARBOPLATIN FLUOROURACIL (C1 ONLY RECEIVED) FOLLOWED BY PEMBROLIZUMAB MAINTENANCE   Ronald Berg MD   Upcoming Treatment Dates - OP HEAD NECK PEMBROLIZUMAB CARBOPLATIN FLUOROURACIL (C1 ONLY RECEIVED) FOLLOWED BY PEMBROLIZUMAB MAINTENANCE    9/12/2022       Immunotherapy       pembrolizumab (KEYTRUDA) 200 mg in sodium chloride 0.9% 108 mL infusion  10/3/2022       Immunotherapy       pembrolizumab (KEYTRUDA) 200 mg in sodium chloride 0.9% 108 mL infusion  10/24/2022       Immunotherapy       pembrolizumab (KEYTRUDA) 200 mg in sodium chloride 0.9% 108 mL infusion  11/14/2022       Immunotherapy       pembrolizumab (KEYTRUDA) 200 mg in sodium chloride 0.9% 108 mL infusion    Therapy Plan Information  Flushes  heparin, porcine (PF) 100 unit/mL injection flush 500 Units  500 Units, Intravenous, Every visit  sodium chloride 0.9% flush 10 mL  10 mL, Intravenous, Every visit      Ronald Berg MD  Hematology Oncology

## 2022-09-12 NOTE — ASSESSMENT & PLAN NOTE
Stable chronic neck pain.  Left sided otalgia resolved after he put ear drops.   -labs reviewed; ok to proceed with cycle 12 (q3w dose)  -PET CT rescheduled to later this month  -labs and follow up prior to cycle 13

## 2022-09-13 ENCOUNTER — OFFICE VISIT (OUTPATIENT)
Dept: OPTOMETRY | Facility: CLINIC | Age: 72
End: 2022-09-13
Payer: MEDICARE

## 2022-09-13 DIAGNOSIS — H10.31 ACUTE CONJUNCTIVITIS OF RIGHT EYE, UNSPECIFIED ACUTE CONJUNCTIVITIS TYPE: ICD-10-CM

## 2022-09-13 PROCEDURE — 99999 PR PBB SHADOW E&M-EST. PATIENT-LVL III: ICD-10-PCS | Mod: PBBFAC,,, | Performed by: OPTOMETRIST

## 2022-09-13 PROCEDURE — 92012 PR EYE EXAM, EST PATIENT,INTERMED: ICD-10-PCS | Mod: S$PBB,,, | Performed by: OPTOMETRIST

## 2022-09-13 PROCEDURE — 99213 OFFICE O/P EST LOW 20 MIN: CPT | Mod: PBBFAC,PO | Performed by: OPTOMETRIST

## 2022-09-13 PROCEDURE — 99999 PR PBB SHADOW E&M-EST. PATIENT-LVL III: CPT | Mod: PBBFAC,,, | Performed by: OPTOMETRIST

## 2022-09-13 PROCEDURE — 92012 INTRM OPH EXAM EST PATIENT: CPT | Mod: S$PBB,,, | Performed by: OPTOMETRIST

## 2022-09-13 RX ORDER — LOTEPREDNOL ETABONATE 5 MG/ML
1 SUSPENSION/ DROPS OPHTHALMIC 4 TIMES DAILY
Qty: 5 ML | Refills: 1 | Status: ON HOLD | OUTPATIENT
Start: 2022-09-13 | End: 2023-09-18 | Stop reason: HOSPADM

## 2022-09-13 NOTE — PROGRESS NOTES
"HPI    71 Y/o male is here with C/o conjunctivitis of OD pt states he OD has been   bloodshoot red for about two weeks no pain pt states he feels like there   is something always in his eye. Pt OD has a has pus like bump on left   inside eyeball   Pt states he feels a little pain and discomfort   No f/f    Eye med:  Visine OD BID   Last edited by Ursula Phan MA on 9/13/2022  1:54 PM.        ROS    Negative for: Constitutional, Gastrointestinal, Neurological, Skin,   Genitourinary, Musculoskeletal, HENT, Endocrine, Cardiovascular, Eyes,   Respiratory, Psychiatric, Allergic/Imm, Heme/Lymph  Last edited by Abraham Velarde, OD on 9/13/2022  2:08 PM.        Assessment /Plan     For exam results, see Encounter Report.    Acute conjunctivitis of right eye, unspecified acute conjunctivitis type  -     Ambulatory referral/consult to Optometry  -     loteprednol (LOTEMAX) 0.5 % ophthalmic suspension; Place 1 drop into the right eye 4 (four) times daily.  Dispense: 5 mL; Refill: 1      Pt non-verbal--understands well and communicates with notes    Pt presents today w 2 week hx "redness" nasally OS.  Appears to have a large lobulated inflammed conj lesion.  Not sure if it is an atypical pingueculitis or another conj lesion.  Pt DOES have squamous cell carcinoma hx    PLAN:    LOTEMAX QID OD  Scheduled pt to see Dr Saba next week for follow up, in case pt needs the lesion biopsied                   "

## 2022-09-16 ENCOUNTER — PATIENT MESSAGE (OUTPATIENT)
Dept: REHABILITATION | Facility: HOSPITAL | Age: 72
End: 2022-09-16
Payer: MEDICARE

## 2022-09-27 ENCOUNTER — PATIENT MESSAGE (OUTPATIENT)
Dept: OTOLARYNGOLOGY | Facility: CLINIC | Age: 72
End: 2022-09-27
Payer: MEDICARE

## 2022-09-28 ENCOUNTER — OFFICE VISIT (OUTPATIENT)
Dept: OPHTHALMOLOGY | Facility: CLINIC | Age: 72
End: 2022-09-28
Payer: MEDICARE

## 2022-09-28 DIAGNOSIS — H11.9 CONJUNCTIVAL LESION: Primary | ICD-10-CM

## 2022-09-28 DIAGNOSIS — H25.13 NUCLEAR SCLEROSIS OF BOTH EYES: ICD-10-CM

## 2022-09-28 PROCEDURE — 99212 OFFICE O/P EST SF 10 MIN: CPT | Mod: PBBFAC,PO | Performed by: OPHTHALMOLOGY

## 2022-09-28 PROCEDURE — 99999 PR PBB SHADOW E&M-EST. PATIENT-LVL II: CPT | Mod: PBBFAC,,, | Performed by: OPHTHALMOLOGY

## 2022-09-28 PROCEDURE — 92002 PR EYE EXAM, NEW PATIENT,INTERMED: ICD-10-PCS | Mod: S$PBB,,, | Performed by: OPHTHALMOLOGY

## 2022-09-28 PROCEDURE — 99999 PR PBB SHADOW E&M-EST. PATIENT-LVL II: ICD-10-PCS | Mod: PBBFAC,,, | Performed by: OPHTHALMOLOGY

## 2022-09-28 PROCEDURE — 92002 INTRM OPH EXAM NEW PATIENT: CPT | Mod: S$PBB,,, | Performed by: OPHTHALMOLOGY

## 2022-09-28 NOTE — PROGRESS NOTES
Subjective:       Patient ID: Rocco Swain is a 72 y.o. male.    Chief Complaint: Lesion (Rocco Swain is a 73 y/o male)    HPI     Lesion     Additional comments: Rocco Swain is a 73 y/o male           Comments    Pt here for Conj. Lesion per Dr. Velarde  Pt state conjunctivitis of OD pt states he OD has been bloodshoot red for   about two weeks no pain pt states he feels like there is something always   in his eye. Pt OD has a has pus like bump on left inside eyeball.    Eye med:  Visine OD BID           Last edited by Teri Zelaya on 9/28/2022  2:19 PM.             Assessment:       1. Conjunctival lesion    2. Nuclear sclerosis of both eyes          Plan:       Conjunctival lesion OD-Appears inflammatory in nature. Needs excision to improve comfort and submit to pathology.    Cataracts- Not visually significant .      Will call Pt to schedule excision of conj lesion OD.

## 2022-09-30 ENCOUNTER — TELEPHONE (OUTPATIENT)
Dept: OPHTHALMOLOGY | Facility: CLINIC | Age: 72
End: 2022-09-30
Payer: MEDICARE

## 2022-09-30 ENCOUNTER — PATIENT MESSAGE (OUTPATIENT)
Dept: OPHTHALMOLOGY | Facility: CLINIC | Age: 72
End: 2022-09-30
Payer: MEDICARE

## 2022-09-30 ENCOUNTER — OFFICE VISIT (OUTPATIENT)
Dept: OTOLARYNGOLOGY | Facility: CLINIC | Age: 72
End: 2022-09-30
Payer: MEDICARE

## 2022-09-30 ENCOUNTER — HOSPITAL ENCOUNTER (OUTPATIENT)
Dept: RADIOLOGY | Facility: HOSPITAL | Age: 72
Discharge: HOME | End: 2022-09-30
Attending: STUDENT IN AN ORGANIZED HEALTH CARE EDUCATION/TRAINING PROGRAM
Payer: MEDICARE

## 2022-09-30 VITALS
WEIGHT: 118.63 LBS | SYSTOLIC BLOOD PRESSURE: 124 MMHG | DIASTOLIC BLOOD PRESSURE: 78 MMHG | BODY MASS INDEX: 18.03 KG/M2 | HEART RATE: 90 BPM

## 2022-09-30 DIAGNOSIS — C01 SQUAMOUS CELL CARCINOMA OF BASE OF TONGUE: ICD-10-CM

## 2022-09-30 DIAGNOSIS — H11.9 CONJUNCTIVAL LESION: Primary | ICD-10-CM

## 2022-09-30 DIAGNOSIS — C01 SQUAMOUS CELL CARCINOMA OF BASE OF TONGUE: Primary | ICD-10-CM

## 2022-09-30 LAB — POCT GLUCOSE: 105 MG/DL (ref 70–110)

## 2022-09-30 PROCEDURE — 99999 PR PBB SHADOW E&M-EST. PATIENT-LVL IV: CPT | Mod: PBBFAC,,, | Performed by: OTOLARYNGOLOGY

## 2022-09-30 PROCEDURE — 99213 PR OFFICE/OUTPT VISIT, EST, LEVL III, 20-29 MIN: ICD-10-PCS | Mod: S$PBB,,, | Performed by: OTOLARYNGOLOGY

## 2022-09-30 PROCEDURE — 78815 NM PET CT ROUTINE: ICD-10-PCS | Mod: 26,PS,, | Performed by: RADIOLOGY

## 2022-09-30 PROCEDURE — 99999 PR PBB SHADOW E&M-EST. PATIENT-LVL IV: ICD-10-PCS | Mod: PBBFAC,,, | Performed by: OTOLARYNGOLOGY

## 2022-09-30 PROCEDURE — 78815 PET IMAGE W/CT SKULL-THIGH: CPT | Mod: 26,PS,, | Performed by: RADIOLOGY

## 2022-09-30 PROCEDURE — 99214 OFFICE O/P EST MOD 30 MIN: CPT | Mod: PBBFAC,25 | Performed by: OTOLARYNGOLOGY

## 2022-09-30 PROCEDURE — 99213 OFFICE O/P EST LOW 20 MIN: CPT | Mod: S$PBB,,, | Performed by: OTOLARYNGOLOGY

## 2022-09-30 PROCEDURE — 78815 PET IMAGE W/CT SKULL-THIGH: CPT | Mod: TC

## 2022-09-30 RX ORDER — OXYCODONE HYDROCHLORIDE 10 MG/1
10 TABLET ORAL EVERY 6 HOURS PRN
Qty: 120 TABLET | Refills: 0 | Status: CANCELLED | OUTPATIENT
Start: 2022-09-30

## 2022-10-01 NOTE — PROGRESS NOTES
Chief Complaint   Patient presents with    red spot on scalp and knot on right side of face by ear       HPI   72 y.o. male presents status post total laryngectomy, total glossectomy and anterolateral thigh free flap reconstruction several years ago at [facility] in [place].  He is also status post liver transplant secondary to hepatitis C.      He is currently receiving palliative chemotherapy for metastatic disease in his neck.  He presents requesting additional Botox injections to the salivary glands.  He also was concerned about 2 lesions of the brow and right cheek.        Review of Systems   Constitutional: Negative for fatigue and unexpected weight change.   HENT: Per HPI.  Eyes: Negative for visual disturbance.   Respiratory: Negative for shortness of breath, hemoptysis   Cardiovascular: Negative for chest pain and palpitations.   Musculoskeletal: Negative for decreased ROM, back pain.   Skin: Negative for rash, sunburn, itching.   Neurological: Negative for dizziness and seizures.   Hematological: Negative for adenopathy. Does not bruise/bleed easily.   Endocrine: Negative for rapid weight loss/weight gain, heat/cold intolerance.     Past Medical History   Patient Active Problem List   Diagnosis    Larynx cancer    Hepatitis C    Postoperative hypothyroidism    History of laryngectomy    Status post liver transplantation    Cirrhosis of transplanted liver    Squamous cell carcinoma of base of tongue    Chronic kidney disease (CKD), stage IV (severe)    Atherosclerosis of abdominal aorta    Anemia in stage 3a chronic kidney disease    Secondary malignant neoplasm of cervical lymph node    Immunodeficiency due to drugs    Tracheostomy status    Centrilobular emphysema    Hypomagnesemia    Lip lesion    Pancytopenia due to chemotherapy    Hx of hepatitis C    History of colon polyps    Thrombocytopenia    Range of motion deficit    Finger deformity    Acute conjunctivitis of right eye           Past  Surgical History   Past Surgical History:   Procedure Laterality Date    COLONOSCOPY      INSERTION OF VENOUS ACCESS PORT Right 3/8/2021    Procedure: INSERTION, VENOUS ACCESS PORT;  Surgeon: Jesus Rendon MD;  Location: [facility] OR [address] [place] [address];  Service: General;  Laterality: Right;    LIVER TRANSPLANT  11/2008    transplanted for biopsy proven hepatocellular carcinoma,     LYMPH NODE BIOPSY N/A 9/18/2020    Procedure: BIOPSY, LYMPH NODE;  Surgeon: Taz Diagnostic Provider;  [facility];  Service: General;  Laterality: N/A;  Rm 173    TRACHEOSTOMY           Family History   Family History   Problem Relation Age of Onset    Dementia Mother            Social History   .  Social History     Socioeconomic History    Marital status: Single   Occupational History    Occupation: Retired     Comment: , notes exposures to fumes etc.  Worked on "[employer] for 4 years   Tobacco Use    Smoking status: Never    Smokeless tobacco: Never   Substance and Sexual Activity    Alcohol use: Yes     Comment: drinks wine, 1 glass day    Drug use: Not Currently    Sexual activity: Yes     Comment: No prior history of STD          Allergies   Review of patient's allergies indicates:  No Known Allergies        Physical Exam     Vitals:    09/30/22 1308   BP: 124/78   Pulse: 90         Body mass index is 18.03 kg/m².      General: AOx3, NAD   Respiratory:  Symmetric chest rise, normal effort  Nose: No gross nasal septal deviation. Inferior Turbinates WNL bilaterally. No septal perforation. No masses/lesions.   Oral Cavity:  Oral Tongue absent.  Free flap well incorporated into oral cavity.. Hard Palate WNL. No buccal or FOM lesions.  Oropharynx:  No masses/lesions of the posterior pharyngeal wall. Tonsillar fossa without lesions. Soft palate without masses. Midline uvula.   Neck:  Well-healed neck dissection scar is.  Stoma patent.  No significant adenopathy.  No thyromegaly or thyroid nodules.  Normal range  of motion.    Face: House Brackmann I bilaterally.  Crusted skin lesions of right brow and right lateral cheek.      Assessment/Plan  Problem List Items Addressed This Visit          Oncology    Squamous cell carcinoma of base of tongue - Primary     Refer to derm for skin lesions.  RTC 3 weeks for repeat Botox.          Relevant Orders    Ambulatory referral/consult to Dermatology

## 2022-10-03 ENCOUNTER — OFFICE VISIT (OUTPATIENT)
Dept: HEMATOLOGY/ONCOLOGY | Facility: CLINIC | Age: 72
End: 2022-10-03
Payer: MEDICARE

## 2022-10-03 ENCOUNTER — INFUSION (OUTPATIENT)
Dept: INFUSION THERAPY | Facility: HOSPITAL | Age: 72
End: 2022-10-03
Attending: STUDENT IN AN ORGANIZED HEALTH CARE EDUCATION/TRAINING PROGRAM
Payer: MEDICARE

## 2022-10-03 VITALS
HEIGHT: 68 IN | BODY MASS INDEX: 17.61 KG/M2 | OXYGEN SATURATION: 98 % | DIASTOLIC BLOOD PRESSURE: 83 MMHG | HEART RATE: 80 BPM | SYSTOLIC BLOOD PRESSURE: 147 MMHG | RESPIRATION RATE: 18 BRPM | WEIGHT: 116.19 LBS | TEMPERATURE: 98 F

## 2022-10-03 VITALS
HEART RATE: 81 BPM | TEMPERATURE: 98 F | SYSTOLIC BLOOD PRESSURE: 125 MMHG | OXYGEN SATURATION: 98 % | RESPIRATION RATE: 17 BRPM | DIASTOLIC BLOOD PRESSURE: 70 MMHG

## 2022-10-03 DIAGNOSIS — Z79.899 IMMUNODEFICIENCY DUE TO DRUGS: ICD-10-CM

## 2022-10-03 DIAGNOSIS — G89.3 CANCER ASSOCIATED PAIN: ICD-10-CM

## 2022-10-03 DIAGNOSIS — N18.4 CHRONIC KIDNEY DISEASE (CKD), STAGE IV (SEVERE): ICD-10-CM

## 2022-10-03 DIAGNOSIS — D69.6 THROMBOCYTOPENIA: ICD-10-CM

## 2022-10-03 DIAGNOSIS — C01 SQUAMOUS CELL CARCINOMA OF BASE OF TONGUE: Primary | ICD-10-CM

## 2022-10-03 DIAGNOSIS — C32.9 LARYNX CANCER: ICD-10-CM

## 2022-10-03 DIAGNOSIS — C77.0 SECONDARY MALIGNANT NEOPLASM OF CERVICAL LYMPH NODE: Primary | ICD-10-CM

## 2022-10-03 DIAGNOSIS — C77.0 SECONDARY MALIGNANT NEOPLASM OF CERVICAL LYMPH NODE: ICD-10-CM

## 2022-10-03 DIAGNOSIS — C01 SQUAMOUS CELL CARCINOMA OF BASE OF TONGUE: ICD-10-CM

## 2022-10-03 DIAGNOSIS — H10.31 ACUTE CONJUNCTIVITIS OF RIGHT EYE, UNSPECIFIED ACUTE CONJUNCTIVITIS TYPE: ICD-10-CM

## 2022-10-03 DIAGNOSIS — D84.821 IMMUNODEFICIENCY DUE TO DRUGS: ICD-10-CM

## 2022-10-03 LAB
ALBUMIN SERPL BCP-MCNC: 3.8 G/DL (ref 3.5–5.2)
ALP SERPL-CCNC: 73 U/L (ref 55–135)
ALT SERPL W/O P-5'-P-CCNC: 13 U/L (ref 10–44)
ANION GAP SERPL CALC-SCNC: 12 MMOL/L (ref 8–16)
AST SERPL-CCNC: 36 U/L (ref 10–40)
BILIRUB SERPL-MCNC: 0.8 MG/DL (ref 0.1–1)
BUN SERPL-MCNC: 16 MG/DL (ref 8–23)
CALCIUM SERPL-MCNC: 9.6 MG/DL (ref 8.7–10.5)
CHLORIDE SERPL-SCNC: 103 MMOL/L (ref 95–110)
CO2 SERPL-SCNC: 24 MMOL/L (ref 23–29)
CREAT SERPL-MCNC: 1.5 MG/DL (ref 0.5–1.4)
ERYTHROCYTE [DISTWIDTH] IN BLOOD BY AUTOMATED COUNT: 12.8 % (ref 11.5–14.5)
EST. GFR  (NO RACE VARIABLE): 48.9 ML/MIN/1.73 M^2
GLUCOSE SERPL-MCNC: 127 MG/DL (ref 70–110)
HCT VFR BLD AUTO: 32.6 % (ref 40–54)
HGB BLD-MCNC: 11.4 G/DL (ref 14–18)
IMM GRANULOCYTES # BLD AUTO: 0 K/UL (ref 0–0.04)
MCH RBC QN AUTO: 33.9 PG (ref 27–31)
MCHC RBC AUTO-ENTMCNC: 35 G/DL (ref 32–36)
MCV RBC AUTO: 97 FL (ref 82–98)
NEUTROPHILS # BLD AUTO: 2.5 K/UL (ref 1.8–7.7)
PLATELET # BLD AUTO: 110 K/UL (ref 150–450)
PMV BLD AUTO: 9.3 FL (ref 9.2–12.9)
POTASSIUM SERPL-SCNC: 3.8 MMOL/L (ref 3.5–5.1)
PROT SERPL-MCNC: 7.3 G/DL (ref 6–8.4)
RBC # BLD AUTO: 3.36 M/UL (ref 4.6–6.2)
SODIUM SERPL-SCNC: 139 MMOL/L (ref 136–145)
WBC # BLD AUTO: 3.86 K/UL (ref 3.9–12.7)

## 2022-10-03 PROCEDURE — 99215 PR OFFICE/OUTPT VISIT, EST, LEVL V, 40-54 MIN: ICD-10-PCS | Mod: S$PBB,,, | Performed by: NURSE PRACTITIONER

## 2022-10-03 PROCEDURE — 99999 PR PBB SHADOW E&M-EST. PATIENT-LVL III: ICD-10-PCS | Mod: PBBFAC,,, | Performed by: NURSE PRACTITIONER

## 2022-10-03 PROCEDURE — 85027 COMPLETE CBC AUTOMATED: CPT | Performed by: STUDENT IN AN ORGANIZED HEALTH CARE EDUCATION/TRAINING PROGRAM

## 2022-10-03 PROCEDURE — 25000003 PHARM REV CODE 250: Performed by: STUDENT IN AN ORGANIZED HEALTH CARE EDUCATION/TRAINING PROGRAM

## 2022-10-03 PROCEDURE — A4216 STERILE WATER/SALINE, 10 ML: HCPCS | Performed by: STUDENT IN AN ORGANIZED HEALTH CARE EDUCATION/TRAINING PROGRAM

## 2022-10-03 PROCEDURE — 99215 OFFICE O/P EST HI 40 MIN: CPT | Mod: S$PBB,,, | Performed by: NURSE PRACTITIONER

## 2022-10-03 PROCEDURE — 63600175 PHARM REV CODE 636 W HCPCS: Performed by: NURSE PRACTITIONER

## 2022-10-03 PROCEDURE — 96413 CHEMO IV INFUSION 1 HR: CPT

## 2022-10-03 PROCEDURE — 99213 OFFICE O/P EST LOW 20 MIN: CPT | Mod: PBBFAC,25 | Performed by: NURSE PRACTITIONER

## 2022-10-03 PROCEDURE — A4216 STERILE WATER/SALINE, 10 ML: HCPCS | Performed by: NURSE PRACTITIONER

## 2022-10-03 PROCEDURE — 36591 DRAW BLOOD OFF VENOUS DEVICE: CPT

## 2022-10-03 PROCEDURE — 80053 COMPREHEN METABOLIC PANEL: CPT | Performed by: STUDENT IN AN ORGANIZED HEALTH CARE EDUCATION/TRAINING PROGRAM

## 2022-10-03 PROCEDURE — 99999 PR PBB SHADOW E&M-EST. PATIENT-LVL III: CPT | Mod: PBBFAC,,, | Performed by: NURSE PRACTITIONER

## 2022-10-03 PROCEDURE — 63600175 PHARM REV CODE 636 W HCPCS: Performed by: STUDENT IN AN ORGANIZED HEALTH CARE EDUCATION/TRAINING PROGRAM

## 2022-10-03 PROCEDURE — 25000003 PHARM REV CODE 250: Performed by: NURSE PRACTITIONER

## 2022-10-03 RX ORDER — HEPARIN 100 UNIT/ML
500 SYRINGE INTRAVENOUS
Status: CANCELLED | OUTPATIENT
Start: 2022-10-03

## 2022-10-03 RX ORDER — SODIUM CHLORIDE 0.9 % (FLUSH) 0.9 %
10 SYRINGE (ML) INJECTION
Status: CANCELLED | OUTPATIENT
Start: 2022-10-03

## 2022-10-03 RX ORDER — HEPARIN 100 UNIT/ML
500 SYRINGE INTRAVENOUS
Status: DISCONTINUED | OUTPATIENT
Start: 2022-10-03 | End: 2022-10-03 | Stop reason: HOSPADM

## 2022-10-03 RX ORDER — SODIUM CHLORIDE 0.9 % (FLUSH) 0.9 %
10 SYRINGE (ML) INJECTION
Status: DISCONTINUED | OUTPATIENT
Start: 2022-10-03 | End: 2022-10-03 | Stop reason: HOSPADM

## 2022-10-03 RX ORDER — OXYCODONE HYDROCHLORIDE 10 MG/1
10 TABLET ORAL EVERY 6 HOURS PRN
Qty: 120 TABLET | Refills: 0 | Status: SHIPPED | OUTPATIENT
Start: 2022-10-03 | End: 2022-10-24

## 2022-10-03 RX ADMIN — SODIUM CHLORIDE 200 MG: 9 INJECTION, SOLUTION INTRAVENOUS at 10:10

## 2022-10-03 RX ADMIN — HEPARIN SODIUM (PORCINE) LOCK FLUSH IV SOLN 100 UNIT/ML 500 UNITS: 100 SOLUTION at 11:10

## 2022-10-03 RX ADMIN — Medication 10 ML: at 11:10

## 2022-10-03 RX ADMIN — SODIUM CHLORIDE: 9 INJECTION, SOLUTION INTRAVENOUS at 10:10

## 2022-10-03 RX ADMIN — Medication 10 ML: at 08:10

## 2022-10-03 RX ADMIN — HEPARIN SODIUM (PORCINE) LOCK FLUSH IV SOLN 100 UNIT/ML 500 UNITS: 100 SOLUTION at 08:10

## 2022-10-03 NOTE — PLAN OF CARE
1135  Patient completed Keytruda infusion, tolerated well. Port de accessed without issue, flushed, heparin locked.  RTC 10/24/22   Patient ambulated off floor independently, NAD

## 2022-10-03 NOTE — PROGRESS NOTES
ONCOLOGY FOLLOW UP VISIT    Subjective:      Patient ID: Rocco Swain    Chief Complaint: Squamous cell carcinoma of base of tongue    HPI  Rocco Swain is a 72 y.o. male, patient of Dr. Berg, who returns to clinic for evaluation and management of  P16 positive squamous cell carcinoma. Since his last visit he is doing well.  Still has right eye irritation. Will have a lesion removed on the 18th and has continued steroid eye drops. Fortunately denies itching, pain, or blurry vision. Has intermittent constipation and diarrhea. Intermittent rashes controlled with topical steroid cream. Chronic neck pain stable.    ECOG Performance status: 1 - Symptomatic but completely ambulatoryHe is alone at this visit.  Communication from him is 100% written.     Cancer Staging   No matching staging information was found for the patient.    Oncologic History:  Oncology History   Squamous cell carcinoma of base of tongue   9/28/2020 Initial Diagnosis    Squamous cell carcinoma of base of tongue     10/6/2020 - 8/17/2021 Chemotherapy    Treatment Summary   Plan Name: OP HEAD NECK CARBOPLATIN PACLITAXEL C1-2 FOLLOWED BY CETUXIMAB CARBOPLATIN C3-6 FOLLOWED BY CETUXIMAB MAINTENANCE WEEKLY  Treatment Goal: Control  Status: Inactive  Start Date: 10/6/2020  End Date: 8/17/2021  Provider: Ronald Berg MD  Chemotherapy: cetuximab (ERBITUX) 100 mg/50 mL chemo infusion 684 mg, 400 mg/m2 = 684 mg (100 % of original dose 400 mg/m2), Intravenous, Clinic/HOD 1 time, 29 of 38 cycles  Dose modification: 500 mg/m2 (original dose 400 mg/m2, Cycle 3), 250 mg/m2 (original dose 400 mg/m2, Cycle 3), 400 mg/m2 (original dose 400 mg/m2, Cycle 3), 250 mg/m2 (original dose 250 mg/m2, Cycle 7), 200 mg/m2 (original dose 250 mg/m2, Cycle 18), 200 mg/m2 (original dose 250 mg/m2, Cycle 19), 250 mg/m2 (original dose 250 mg/m2, Cycle 4), 200 mg/m2 (original dose 250 mg/m2, Cycle 25), 200 mg/m2 (original dose 250 mg/m2, Cycle 28)  Administration: 684 mg  (11/17/2020), 400 mg (11/24/2020), 400 mg (2/9/2021), 400 mg (2/17/2021), 427.6 mg (3/9/2021), 400 mg (3/30/2021), 400 mg (3/23/2021), 400 mg (4/6/2021), 427.6 mg (4/13/2021), 427.6 mg (4/20/2021), 342 mg (5/11/2021), 342 mg (5/18/2021), 342 mg (5/25/2021), 400 mg (5/31/2021), 400 mg (6/7/2021), 400 mg (6/14/2021), 400 mg (12/8/2020), 427.6 mg (12/29/2020), 400 mg (12/1/2020), 400 mg (12/15/2020), 400 mg (12/22/2020), 427.6 mg (1/5/2021), 400 mg (1/12/2021), 400 mg (1/19/2021), 427.6 mg (1/26/2021), 400 mg (2/2/2021), 400 mg (2/23/2021), 400 mg (3/2/2021), 427.6 mg (3/16/2021), 400 mg (6/21/2021), 342 mg (6/28/2021), 342 mg (7/6/2021), 342 mg (7/13/2021), 342 mg (7/20/2021), 400 mg (7/27/2021), 400 mg (8/10/2021), 400 mg (8/17/2021)  CARBOplatin (PARAPLATIN) 310 mg in sodium chloride 0.9% 500 mL chemo infusion, 310 mg (92.2 % of original dose 334.5 mg), Intravenous, Clinic/[facility] 1 time, 6 of 6 cycles  Dose modification:   (original dose 334.5 mg, Cycle 1)  Administration: 310 mg (10/6/2020), 370 mg (11/17/2020), 300 mg (10/27/2020), 350 mg (12/8/2020), 335 mg (12/29/2020), 320 mg (1/19/2021)  PACLitaxeL (TAXOL) 175 mg/m2 = 300 mg in sodium chloride 0.9% 500 mL chemo infusion, 175 mg/m2 = 300 mg (100 % of original dose 175 mg/m2), Intravenous, Clinic/[facility] 1 time, 2 of 2 cycles  Dose modification: 175 mg/m2 (original dose 175 mg/m2, Cycle 1)  Administration: 300 mg (10/6/2020), 300 mg (10/27/2020)       4/29/2021 Tumor Conference       His case was discussed at the Multidisciplinary Head and Neck Team Planning Meeting.    Representatives from Medical Oncology, Radiation Oncology, Head and Neck Surgical Oncology, Psychosocial Oncology, and Speech and Language Pathology discussed the case with the following recommendations:    1) biopsy         7/29/2021 Tumor Conference       His case was discussed at the Multidisciplinary Head and Neck Team Planning Meeting.    Representatives from Medical Oncology, Radiation Oncology,  Head and Neck Surgical Oncology, Psychosocial Oncology, and Speech and Language Pathology discussed the case with the following recommendations:    1) Head and neck clinic follow up  2) consider Keytruda (discuss with transplant)  3) consider palliative referral         9/13/2021 -  Chemotherapy    Treatment Summary   Plan Name: OP HEAD NECK PEMBROLIZUMAB CARBOPLATIN FLUOROURACIL (C1 ONLY RECEIVED) FOLLOWED BY PEMBROLIZUMAB MAINTENANCE  Treatment Goal: Palliative  Status: Active  Start Date: 9/13/2021  End Date: 1/8/2024 (Planned)  Provider: Ronald Berg MD  Chemotherapy: CARBOplatin (PARAPLATIN) 320 mg in sodium chloride 0.9% 500 mL chemo infusion, 320 mg (100 % of original dose 320.5 mg), Intravenous, Clinic/[facility] 1 time, 6 of 6 cycles  Dose modification:   (original dose 320.5 mg, Cycle 1)  Administration: 320 mg (9/13/2021), 335 mg (12/23/2021), 325 mg (1/27/2022), 245 mg (3/3/2022), 255 mg (5/12/2022), 255 mg (4/7/2022)  fluorouraciL 1,000 mg/m2/day = 6,440 mg in sodium chloride 0.9% 240 mL chemo infusion, 1,000 mg/m2/day = 6,440 mg, Intravenous, Over 96 hours, 6 of 6 cycles  Dose modification: 800 mg/m2/day (original dose 1,000 mg/m2/day, Cycle 6), 5,000 mg (original dose 1,000 mg/m2/day, Cycle 8)  Administration: 6,440 mg (9/13/2021), 6,440 mg (12/23/2021), 6,480 mg (1/27/2022), 5,000 mg (3/3/2022), 5,000 mg (4/7/2022), 5,000 mg (5/12/2022)       Secondary malignant neoplasm of cervical lymph node   2/9/2021 Initial Diagnosis    Secondary malignant neoplasm of cervical lymph node     9/13/2021 -  Chemotherapy    Treatment Summary   Plan Name: OP HEAD NECK PEMBROLIZUMAB CARBOPLATIN FLUOROURACIL (C1 ONLY RECEIVED) FOLLOWED BY PEMBROLIZUMAB MAINTENANCE  Treatment Goal: Palliative  Status: Active  Start Date: 9/13/2021  End Date: 1/8/2024 (Planned)  Provider: Ronald Berg MD  Chemotherapy: CARBOplatin (PARAPLATIN) 320 mg in sodium chloride 0.9% 500 mL chemo infusion, 320 mg (100 % of original dose 320.5 mg),  Intravenous, Clinic/[facility] 1 time, 6 of 6 cycles  Dose modification:   (original dose 320.5 mg, Cycle 1)  Administration: 320 mg (9/13/2021), 335 mg (12/23/2021), 325 mg (1/27/2022), 245 mg (3/3/2022), 255 mg (5/12/2022), 255 mg (4/7/2022)  fluorouraciL 1,000 mg/m2/day = 6,440 mg in sodium chloride 0.9% 240 mL chemo infusion, 1,000 mg/m2/day = 6,440 mg, Intravenous, Over 96 hours, 6 of 6 cycles  Dose modification: 800 mg/m2/day (original dose 1,000 mg/m2/day, Cycle 6), 5,000 mg (original dose 1,000 mg/m2/day, Cycle 8)  Administration: 6,440 mg (9/13/2021), 6,440 mg (12/23/2021), 6,480 mg (1/27/2022), 5,000 mg (3/3/2022), 5,000 mg (4/7/2022), 5,000 mg (5/12/2022)           Review of Systems   Constitutional:  Positive for appetite change (early satiety). Negative for activity change, chills, fatigue, fever and unexpected weight change.   HENT:  Negative for ear pain, facial swelling, hearing loss, mouth sores, nosebleeds, sore throat, trouble swallowing and voice change.         No verbal communication   Eyes:  Positive for redness (right eye). Negative for pain, discharge and visual disturbance.   Respiratory:  Negative for cough, choking, chest tightness and shortness of breath.    Cardiovascular:  Negative for chest pain, palpitations and leg swelling.   Gastrointestinal:  Negative for abdominal distention, abdominal pain, blood in stool, constipation, diarrhea, nausea and vomiting.   Endocrine: Negative for cold intolerance and heat intolerance.   Genitourinary:  Negative for difficulty urinating, dysuria, frequency and urgency.   Musculoskeletal:  Negative for arthralgias, gait problem, leg pain and myalgias.   Integumentary:  Positive for rash (intermittent). Negative for pallor and wound.   Allergic/Immunologic: Negative for frequent infections.   Neurological:  Negative for dizziness, tremors, weakness, light-headedness, numbness and headaches.   Hematological:  Negative for adenopathy. Does not bruise/bleed  easily.   Psychiatric/Behavioral:  Negative for agitation, confusion, dysphoric mood and sleep disturbance. The patient is not nervous/anxious.       Allergies:  Review of patient's allergies indicates:  No Known Allergies    Medications:  Current Outpatient Medications   Medication Sig Dispense Refill    azelaic acid (AZELEX) 15 % gel APPLY TOPICALLY TO AFFECTED AREA IN THE MORNING 50 g 3    diphenoxylate-atropine 2.5-0.025 mg (LOMOTIL) 2.5-0.025 mg per tablet Take 1 tablet by mouth 4 (four) times daily as needed for Diarrhea (use when loperamide/imodium does not work). 30 tablet 0    ibuprofen (ADVIL,MOTRIN) 200 MG tablet Take 200 mg by mouth.      levothyroxine (SYNTHROID) 88 MCG tablet TAKE 1 TABLET BY MOUTH ONCE DAILY 90 tablet 3    LIDOcaine HCl 2% (LIDOCAINE VISCOUS) 2 % Soln Swish and spit 15 mls every 8 (eight) hours as needed (mouth sore). 100 mL 3    LIDOcaine-prilocaine (EMLA) cream Apply generously to port site 30-60 min prior to chemo and then cover with saran wrap. 30 g 2    loperamide (IMODIUM) 2 mg capsule Take 1 capsule (2 mg total) by mouth 4 (four) times daily as needed for Diarrhea. 30 capsule 1    loteprednol (LOTEMAX) 0.5 % ophthalmic suspension Place 1 drop into the right eye 4 (four) times daily. 5 mL 1    magic mouthwash diphen/antac/lidoc/nysta Take 10 mLs by mouth 4 (four) times daily. 120 mL 4    metroNIDAZOLE (METROGEL) 0.75 % gel Apply topically to affected area 2 (two) times daily. 45 g 1    multivit-min/FA/lycopen/lutein (CENTRUM SILVER MEN ORAL) Take 1 tablet by mouth.      multivitamin capsule Take 1 capsule by mouth once daily.      sulfacetamide sodium-sulfur 10-5 % (w/w) Clsr USE TO WASH AFFECTED AREA DAILY      tacrolimus (PROGRAF) 0.5 MG Cap Take 1 capsule (0.5 mg total) by mouth every evening. Use the 1mg capsule for your morning dose 90 capsule 3    tacrolimus (PROGRAF) 1 MG Cap Take 1 capsule (1 mg total) by mouth every morning. Use the tacrolimus 0.5mg capsule for your  evening dose as directed. 90 capsule 3    tiZANidine (ZANAFLEX) 2 MG tablet Take 1 tablet (2 mg total) by mouth nightly as needed (neck muscle strain). 30 tablet 0    traZODone (DESYREL) 50 MG tablet TAKE 1 TABLET BY MOUTH IN  THE EVENING 90 tablet 3    vitamin E 400 UNIT capsule Take 400 Units by mouth once daily.      oxyCODONE (ROXICODONE) 10 mg Tab immediate release tablet Take 1 tablet (10 mg total) by mouth every 6 (six) hours as needed for Pain. 120 tablet 0     No current facility-administered medications for this visit.     Facility-Administered Medications Ordered in Other Visits   Medication Dose Route Frequency Provider Last Rate Last Admin    alteplase injection 2 mg  2 mg Intra-Catheter PRN Ct Francis NP        heparin, porcine (PF) 100 unit/mL injection flush 500 Units  500 Units Intravenous PRN Ronald Berg MD        heparin, porcine (PF) 100 unit/mL injection flush 500 Units  500 Units Intravenous PRN Ct Francis NP        sodium chloride 0.9% 100 mL flush bag   Intravenous 1 time in Clinic/HOD Ct Francis NP        sodium chloride 0.9% flush 10 mL  10 mL Intravenous PRN Ronald Berg MD        sodium chloride 0.9% flush 10 mL  10 mL Intravenous PRN Ct Francis NP           PMH:  Past Medical History:   Diagnosis Date    Basal cell carcinoma (BCC) in situ of skin 2012    3 on face, 2 on arm, removed by dermatology.     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 Underwent surgical resection of tongue and neck. s/p tracheostomy       PSH:  Past Surgical History:   Procedure Laterality Date    COLONOSCOPY      INSERTION OF VENOUS ACCESS PORT Right 3/8/2021    Procedure: INSERTION, VENOUS ACCESS PORT;  Surgeon: Jesus Rendon MD;  Location:  "[facility] OR 2ND FLR;  Service: General;  Laterality: Right;    LIVER TRANSPLANT  11/2008    transplanted for biopsy proven hepatocellular carcinoma,     LYMPH NODE BIOPSY N/A 9/18/2020    Procedure: BIOPSY, LYMPH NODE;  Surgeon: Taz Diagnostic Provider;  Location: [facility] OR 2ND FLR;  Service: General;  Laterality: N/A;  Rm 173    TRACHEOSTOMY         FamHx:  Family History   Problem Relation Age of Onset    Dementia Mother        SocHx:  Social History     Socioeconomic History    Marital status: Single   Occupational History    Occupation: Retired     Comment: , notes exposures to fumes etc.  Worked on [employer] for 4 years   Tobacco Use    Smoking status: Never    Smokeless tobacco: Never   Substance and Sexual Activity    Alcohol use: Yes     Comment: drinks wine, 1 glass day    Drug use: Not Currently    Sexual activity: Yes     Comment: No prior history of STD        Distress Score    Distress Score: 0 - No Distress        Objective:      Vitals:    10/03/22 0931   BP: (!) 147/83   BP Location: Right arm   Patient Position: Sitting   BP Method: Medium (Automatic)   Pulse: 80   Resp: 18   Temp: 97.9 °F (36.6 °C)   TempSrc: Oral   SpO2: 98%   Weight: 52.7 kg (116 lb 2.9 oz)   Height: 5' 8" (1.727 m)      Physical Exam  Vitals and nursing note reviewed.   Constitutional:       General: He is not in acute distress.     Appearance: Normal appearance. He is well-developed and underweight.   HENT:      Head: Normocephalic and atraumatic.   Eyes:      Conjunctiva/sclera: Conjunctivae normal.      Pupils: Pupils are equal, round, and reactive to light.   Neck:      Trachea: Tracheostomy present.   Pulmonary:      Effort: Pulmonary effort is normal. No respiratory distress.   Abdominal:      General: There is no distension.      Palpations: Abdomen is soft.   Musculoskeletal:         General: No swelling. Normal range of motion.      Cervical back: Normal range of motion and neck supple.   Skin:     " General: Skin is warm and dry.   Neurological:      General: No focal deficit present.      Mental Status: He is alert and oriented to person, place, and time.   Psychiatric:         Mood and Affect: Mood normal.         Behavior: Behavior normal.         Thought Content: Thought content normal.         Judgment: Judgment normal.         LABS:  WBC   Date Value Ref Range Status   10/03/2022 3.86 (L) 3.90 - 12.70 K/uL Final     Hemoglobin   Date Value Ref Range Status   10/03/2022 11.4 (L) 14.0 - 18.0 g/dL Final     Hematocrit   Date Value Ref Range Status   10/03/2022 32.6 (L) 40.0 - 54.0 % Final     Platelets   Date Value Ref Range Status   10/03/2022 110 (L) 150 - 450 K/uL Final       Chemistry        Component Value Date/Time     10/03/2022 0843    K 3.8 10/03/2022 0843     10/03/2022 0843    CO2 24 10/03/2022 0843    BUN 16 10/03/2022 0843    CREATININE 1.5 (H) 10/03/2022 0843     (H) 10/03/2022 0843        Component Value Date/Time    CALCIUM 9.6 10/03/2022 0843    ALKPHOS 73 10/03/2022 0843    AST 36 10/03/2022 0843    ALT 13 10/03/2022 0843    BILITOT 0.8 10/03/2022 0843    ESTGFRAFRICA 52.6 (A) 07/14/2022 1119    EGFRNONAA 45.5 (A) 07/14/2022 1119              Assessment:       1. Squamous cell carcinoma of base of tongue    2. Secondary malignant neoplasm of cervical lymph node    3. Larynx cancer    4. Acute conjunctivitis of right eye, unspecified acute conjunctivitis type    5. Immunodeficiency due to drugs    6. Thrombocytopenia    7. Cancer associated pain          Plan:       1,2. 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 He is not a surgical or radiation candidate.  -Started on PCC 10/6/2020 followed by maintenance cetuximab until 8/24/21 (discontinued due to progression of disease; continued increase in SUV uptake, no new lesions).  - 9/2021 started on carbo/5-FU/pembrolizumab; due to  toxicity went to pembrolizumab monotherapy starting with cycle 2.  Progression of disease noted after cycle 3 so added chemotherapy back in with cycle 4. Keytruda monotherapy starting with C9.   - Labs acceptable to proceed with Keytruda. PET scan reviewed with patient - shows no evidence of hypermetabolic disease. Continue current treatment.     3. Status post total laryngectomy, total glossectomy and anterolateral thigh free flap reconstruction roughly 2017 at [facility] in [place] for persistent/recurrent base of tongue sq.c.c.    4. Following with ophthalmology. Diagnosed with inflammatory conjunctival lesion. Plan for excision on the 18th. Continue loteprednol drops.     5.  S/p liver transplant and is currently on tacrolimus.  Afebrile, ANC sufficient for treatment    6. Grade 1. Will continue to monitor.     7. Refilled Oxy IR. Consider palliative care involvement.     he will return to clinic in 3 weeks, but knows to call in the interim if symptoms change or should a problem arise.     Patient is in agreement with the proposed treatment plan. All questions were answered to the patient's satisfaction. Pt knows to call clinic if anything is needed before the next clinic visit.    Ct Francis, MSN, APRN, FNP-C  Hematology and Medical Oncology  Nurse Practitioner to Dr. Martín Hernandez  Nurse Practitioner, [facility]          Route Chart for Scheduling    Med Onc Chart Routing      Follow up with physician 3 weeks. see Dr. Berg prior to Keytruda - as scheduled   Follow up with CORY    Infusion scheduling note    Injection scheduling note    Labs    Imaging    Pharmacy appointment    Other referrals        Treatment Plan Information   OP HEAD NECK PEMBROLIZUMAB CARBOPLATIN FLUOROURACIL (C1 ONLY RECEIVED) FOLLOWED BY PEMBROLIZUMAB MAINTENANCE   Ronald Berg MD   Upcoming Treatment Dates - OP HEAD NECK PEMBROLIZUMAB CARBOPLATIN FLUOROURACIL (C1 ONLY RECEIVED) FOLLOWED BY  PEMBROLIZUMAB MAINTENANCE    10/24/2022       Immunotherapy       pembrolizumab (KEYTRUDA) 200 mg in sodium chloride 0.9% 108 mL infusion  11/14/2022       Immunotherapy       pembrolizumab (KEYTRUDA) 200 mg in sodium chloride 0.9% 108 mL infusion  12/5/2022       Immunotherapy       pembrolizumab (KEYTRUDA) 200 mg in sodium chloride 0.9% 108 mL infusion  12/26/2022       Immunotherapy       pembrolizumab (KEYTRUDA) 200 mg in sodium chloride 0.9% 108 mL infusion    Therapy Plan Information  Flushes  heparin, porcine (PF) 100 unit/mL injection flush 500 Units  500 Units, Intravenous, Every visit  sodium chloride 0.9% flush 10 mL  10 mL, Intravenous, Every visit

## 2022-10-03 NOTE — PLAN OF CARE
1010  Patient seated in chair. VSS, assessment done.  Port previously accessed , flushed, blood return noted.  Started NS @ 25 cc/hr KVO while waiting for Keytruda from pharmacy.  [facility] for safety

## 2022-10-04 ENCOUNTER — TELEPHONE (OUTPATIENT)
Dept: OPHTHALMOLOGY | Facility: CLINIC | Age: 72
End: 2022-10-04
Payer: MEDICARE

## 2022-10-10 NOTE — H&P
[facility] ER  History & Physical    Subjective:      Chief Complaint/Reason for Admission:     Rocco Swain is a 72 y.o. male.    Past Medical History:   Diagnosis Date    Basal cell carcinoma (BCC) in situ of skin 2012    3 on face, 2 on arm, removed by dermatology.     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 Underwent surgical resection of tongue and neck. s/p tracheostomy     Past Surgical History:   Procedure Laterality Date    COLONOSCOPY      INSERTION OF VENOUS ACCESS PORT Right 3/8/2021    Procedure: INSERTION, VENOUS ACCESS PORT;  Surgeon: Jesus Rendon MD;  Location: [facility] OR [address] [place] [address];  Service: General;  Laterality: Right;    LIVER TRANSPLANT  11/2008    transplanted for biopsy proven hepatocellular carcinoma,     LYMPH NODE BIOPSY N/A 9/18/2020    Procedure: BIOPSY, LYMPH NODE;  Surgeon: Taz Diagnostic Provider;  Location: [facility] OR [address] [place] [address];  Service: General;  Laterality: N/A;  Rm 173    TRACHEOSTOMY       Family History   Problem Relation Age of Onset    Dementia Mother      Social History     Tobacco Use    Smoking status: Never    Smokeless tobacco: Never   Substance Use Topics    Alcohol use: Yes     Comment: drinks wine, 1 glass day    Drug use: Not Currently       No medications prior to admission.     Review of patient's allergies indicates:  No Known Allergies     Review of Systems   Eyes:  Positive for blurred vision.   All other systems reviewed and are negative.    Objective:      Vital Signs (Most Recent)       Vital Signs Range (Last 24H):       Physical Exam  Constitutional:       Appearance: He is well-developed.   HENT:      Head: Normocephalic.   Eyes:      Conjunctiva/sclera: Conjunctivae normal.      Pupils: Pupils are  equal, round, and reactive to light.   Cardiovascular:      Rate and Rhythm: Normal rate.   Pulmonary:      Effort: Pulmonary effort is normal.      Breath sounds: Normal breath sounds.   Abdominal:      General: Bowel sounds are normal.      Palpations: Abdomen is soft.   Musculoskeletal:         General: Normal range of motion.      Cervical back: Normal range of motion and neck supple.   Skin:     General: Skin is warm.   Neurological:      Mental Status: He is alert and oriented to person, place, and time.       Data Review:    ECG:     Assessment:      Conjunctival lesion of right eye.      Plan:    Excision of conjunctival lesion of right eye.

## 2022-10-11 ENCOUNTER — HOSPITAL ENCOUNTER (OUTPATIENT)
Facility: OTHER | Age: 72
Discharge: HOME | End: 2022-10-11
Attending: OPHTHALMOLOGY | Admitting: OPHTHALMOLOGY
Payer: MEDICARE

## 2022-10-11 ENCOUNTER — ANESTHESIA EVENT (OUTPATIENT)
Dept: SURGERY | Facility: OTHER | Age: 72
End: 2022-10-11
Payer: MEDICARE

## 2022-10-11 ENCOUNTER — ANESTHESIA (OUTPATIENT)
Dept: SURGERY | Facility: OTHER | Age: 72
End: 2022-10-11
Payer: MEDICARE

## 2022-10-11 VITALS
OXYGEN SATURATION: 99 % | BODY MASS INDEX: 17.43 KG/M2 | WEIGHT: 115 LBS | RESPIRATION RATE: 16 BRPM | HEART RATE: 79 BPM | DIASTOLIC BLOOD PRESSURE: 70 MMHG | SYSTOLIC BLOOD PRESSURE: 125 MMHG | TEMPERATURE: 99 F | HEIGHT: 68 IN

## 2022-10-11 DIAGNOSIS — H11.9 CONJUNCTIVAL LESION: Primary | ICD-10-CM

## 2022-10-11 PROCEDURE — 88304 PR  SURG PATH,LEVEL III: ICD-10-PCS | Mod: 26,,, | Performed by: STUDENT IN AN ORGANIZED HEALTH CARE EDUCATION/TRAINING PROGRAM

## 2022-10-11 PROCEDURE — 88341 IMHCHEM/IMCYTCHM EA ADD ANTB: CPT | Mod: 26,,, | Performed by: STUDENT IN AN ORGANIZED HEALTH CARE EDUCATION/TRAINING PROGRAM

## 2022-10-11 PROCEDURE — 88341 IMHCHEM/IMCYTCHM EA ADD ANTB: CPT | Performed by: STUDENT IN AN ORGANIZED HEALTH CARE EDUCATION/TRAINING PROGRAM

## 2022-10-11 PROCEDURE — 36000706: Performed by: OPHTHALMOLOGY

## 2022-10-11 PROCEDURE — 63600175 PHARM REV CODE 636 W HCPCS: Performed by: OPHTHALMOLOGY

## 2022-10-11 PROCEDURE — 25000003 PHARM REV CODE 250: Performed by: OPHTHALMOLOGY

## 2022-10-11 PROCEDURE — 68110 EXC LES CONJUNCTIVA <1 CM: CPT | Mod: RT,,, | Performed by: OPHTHALMOLOGY

## 2022-10-11 PROCEDURE — 27201423 OPTIME MED/SURG SUP & DEVICES STERILE SUPPLY: Performed by: OPHTHALMOLOGY

## 2022-10-11 PROCEDURE — 88342 IMHCHEM/IMCYTCHM 1ST ANTB: CPT | Mod: 26,,, | Performed by: STUDENT IN AN ORGANIZED HEALTH CARE EDUCATION/TRAINING PROGRAM

## 2022-10-11 PROCEDURE — 68110 PR EXCIS CONJUNC LESN,=<1 CM: ICD-10-PCS | Mod: RT,,, | Performed by: OPHTHALMOLOGY

## 2022-10-11 PROCEDURE — 71000015 HC POSTOP RECOV 1ST HR: Performed by: OPHTHALMOLOGY

## 2022-10-11 PROCEDURE — 88342 CHG IMMUNOCYTOCHEMISTRY: ICD-10-PCS | Mod: 26,,, | Performed by: STUDENT IN AN ORGANIZED HEALTH CARE EDUCATION/TRAINING PROGRAM

## 2022-10-11 PROCEDURE — 25000003 PHARM REV CODE 250

## 2022-10-11 PROCEDURE — 36000707: Performed by: OPHTHALMOLOGY

## 2022-10-11 PROCEDURE — 88304 TISSUE EXAM BY PATHOLOGIST: CPT | Mod: 26,,, | Performed by: STUDENT IN AN ORGANIZED HEALTH CARE EDUCATION/TRAINING PROGRAM

## 2022-10-11 PROCEDURE — 63600175 PHARM REV CODE 636 W HCPCS: Performed by: NURSE ANESTHETIST, CERTIFIED REGISTERED

## 2022-10-11 PROCEDURE — 37000009 HC ANESTHESIA EA ADD 15 MINS: Performed by: OPHTHALMOLOGY

## 2022-10-11 PROCEDURE — 88341 PR IHC OR ICC EACH ADD'L SINGLE ANTIBODY  STAINPR: ICD-10-PCS | Mod: 26,,, | Performed by: STUDENT IN AN ORGANIZED HEALTH CARE EDUCATION/TRAINING PROGRAM

## 2022-10-11 PROCEDURE — 88304 TISSUE EXAM BY PATHOLOGIST: CPT | Performed by: STUDENT IN AN ORGANIZED HEALTH CARE EDUCATION/TRAINING PROGRAM

## 2022-10-11 PROCEDURE — 37000008 HC ANESTHESIA 1ST 15 MINUTES: Performed by: OPHTHALMOLOGY

## 2022-10-11 PROCEDURE — 88342 IMHCHEM/IMCYTCHM 1ST ANTB: CPT | Performed by: STUDENT IN AN ORGANIZED HEALTH CARE EDUCATION/TRAINING PROGRAM

## 2022-10-11 RX ORDER — LIDOCAINE HYDROCHLORIDE 40 MG/ML
INJECTION, SOLUTION RETROBULBAR
Status: DISCONTINUED | OUTPATIENT
Start: 2022-10-11 | End: 2022-10-11 | Stop reason: HOSPADM

## 2022-10-11 RX ORDER — PHENYLEPHRINE HYDROCHLORIDE 25 MG/ML
1 SOLUTION/ DROPS OPHTHALMIC
Status: COMPLETED | OUTPATIENT
Start: 2022-10-11 | End: 2022-10-11

## 2022-10-11 RX ORDER — PREDNISOLONE ACETATE 10 MG/ML
SUSPENSION/ DROPS OPHTHALMIC
Status: DISCONTINUED | OUTPATIENT
Start: 2022-10-11 | End: 2022-10-11 | Stop reason: HOSPADM

## 2022-10-11 RX ORDER — TETRACAINE HYDROCHLORIDE 5 MG/ML
SOLUTION OPHTHALMIC
Status: DISCONTINUED | OUTPATIENT
Start: 2022-10-11 | End: 2022-10-11 | Stop reason: HOSPADM

## 2022-10-11 RX ORDER — SODIUM CHLORIDE 0.9 % (FLUSH) 0.9 %
10 SYRINGE (ML) INJECTION
Status: ACTIVE | OUTPATIENT
Start: 2022-10-11

## 2022-10-11 RX ORDER — HYDROCODONE BITARTRATE AND ACETAMINOPHEN 5; 325 MG/1; MG/1
1 TABLET ORAL EVERY 4 HOURS PRN
Status: DISCONTINUED | OUTPATIENT
Start: 2022-10-11 | End: 2022-10-11 | Stop reason: HOSPADM

## 2022-10-11 RX ORDER — OFLOXACIN 3 MG/ML
1 SOLUTION/ DROPS OPHTHALMIC
Status: DISPENSED | OUTPATIENT
Start: 2022-10-11

## 2022-10-11 RX ORDER — FENTANYL CITRATE 50 UG/ML
INJECTION, SOLUTION INTRAMUSCULAR; INTRAVENOUS
Status: DISCONTINUED | OUTPATIENT
Start: 2022-10-11 | End: 2022-10-11

## 2022-10-11 RX ORDER — ACETAMINOPHEN 325 MG/1
650 TABLET ORAL EVERY 4 HOURS PRN
Status: DISCONTINUED | OUTPATIENT
Start: 2022-10-11 | End: 2022-10-11 | Stop reason: HOSPADM

## 2022-10-11 RX ORDER — TETRACAINE HYDROCHLORIDE 5 MG/ML
1 SOLUTION OPHTHALMIC
Status: COMPLETED | OUTPATIENT
Start: 2022-10-11 | End: 2022-10-11

## 2022-10-11 RX ORDER — MIDAZOLAM HYDROCHLORIDE 1 MG/ML
INJECTION INTRAMUSCULAR; INTRAVENOUS
Status: DISCONTINUED | OUTPATIENT
Start: 2022-10-11 | End: 2022-10-11

## 2022-10-11 RX ORDER — EPINEPHRINE 1 MG/ML
INJECTION, SOLUTION INTRACARDIAC; INTRAMUSCULAR; INTRAVENOUS; SUBCUTANEOUS
Status: DISCONTINUED | OUTPATIENT
Start: 2022-10-11 | End: 2022-10-11 | Stop reason: HOSPADM

## 2022-10-11 RX ORDER — LIDOCAINE HCL/EPINEPHRINE/PF 2%-1:200K
VIAL (ML) INJECTION
Status: DISCONTINUED | OUTPATIENT
Start: 2022-10-11 | End: 2022-10-11 | Stop reason: HOSPADM

## 2022-10-11 RX ADMIN — FENTANYL CITRATE 25 MCG: 50 INJECTION, SOLUTION INTRAMUSCULAR; INTRAVENOUS at 07:10

## 2022-10-11 RX ADMIN — FENTANYL CITRATE 50 MCG: 50 INJECTION, SOLUTION INTRAMUSCULAR; INTRAVENOUS at 08:10

## 2022-10-11 RX ADMIN — FENTANYL CITRATE 12.5 MCG: 50 INJECTION, SOLUTION INTRAMUSCULAR; INTRAVENOUS at 07:10

## 2022-10-11 RX ADMIN — OFLOXACIN 1 DROP: 3 SOLUTION OPHTHALMIC at 06:10

## 2022-10-11 RX ADMIN — PHENYLEPHRINE HYDROCHLORIDE 1 DROP: 25 SOLUTION/ DROPS OPHTHALMIC at 06:10

## 2022-10-11 RX ADMIN — TETRACAINE HYDROCHLORIDE 1 DROP: 5 SOLUTION OPHTHALMIC at 06:10

## 2022-10-11 RX ADMIN — HYDROCODONE BITARTRATE AND ACETAMINOPHEN 1 TABLET: 5; 325 TABLET ORAL at 09:10

## 2022-10-11 RX ADMIN — FENTANYL CITRATE 50 MCG: 50 INJECTION, SOLUTION INTRAMUSCULAR; INTRAVENOUS at 07:10

## 2022-10-11 RX ADMIN — MIDAZOLAM HYDROCHLORIDE 1 MG: 1 INJECTION, SOLUTION INTRAMUSCULAR; INTRAVENOUS at 07:10

## 2022-10-11 NOTE — BRIEF OP NOTE
[facility] Surgery ([place])  Brief Operative Note    Surgery Date: 10/11/2022     Surgeon(s) and Role:     * Sonam Olivares MD - Primary    Assisting Surgeon: None    Pre-op Diagnosis:  Conjunctival lesion [H11.9]    Post-op Diagnosis:  Post-Op Diagnosis Codes:     * Conjunctival lesion [H11.9]    Procedure(s) (LRB):  BIOPSY, CONJUNCTIVA (Right)    Anesthesia: Local MAC    Operative Findings: Conjunctival lesion of right eye    Estimated Blood Loss: Minimal         Specimens: Conjunctival lesion, right eye  Specimen (24h ago, onward)       Start     Ordered    10/11/22 0907  Specimen to Pathology, Surgery Ophthalmology  Once        Comments: Pre-op Diagnosis: Conjunctival lesion [H11.9]Procedure(s):BIOPSY, CONJUNCTIVA Number of specimens: 1Name of specimens: 1. Right conjunctival lesion     References:    Click here for ordering Quick Tip   Question Answer Comment   Procedure Type: Ophthalmology    Specimen Class: Routine/Screening    Which provider would you like to cc? SONAM OLIVARES    Release to patient Immediate        10/11/22 0907                      Discharge Note    OUTCOME: Patient tolerated treatment/procedure well without complication and is now ready for discharge.    DISPOSITION: Home or Self Care    FINAL DIAGNOSIS:  Conjunctival lesion    FOLLOWUP: In clinic    DISCHARGE INSTRUCTIONS:    Discharge Procedure Orders   Other restrictions (specify):   Order Comments: No heavy lifting or bending for 1 week.

## 2022-10-11 NOTE — PLAN OF CARE
Rocco Brynn has met all discharge criteria from Phase II. Vital Signs are stable, ambulating  without difficulty. Discharge instructions given, patient verbalized understanding. Discharged from facility via wheelchair in stable condition.

## 2022-10-11 NOTE — OP NOTE
Surgery Date: 10/11/2022    Surgeon: Victor M Vasques M.D.    Pre-op Diagnosis: Conjunctival lesion, Right eye.    Post-op Diagnosis: Same    Procedure: Excisional Biopsy of Conjunctival Lesion, Right eye.    Anesthesia: Local MAC    Specimen: Conjunctival Lesion, Right eye.    Estimated Blood Loss: Minimal.    Complications: None    Procedure in Detail: The patient was brought to the operating room. The right periorbital region was prepped and draped in the usual sterile ophthalmic fashion. A lid speculum was placed onto the right eye. Several drops of 2% Lidocaine with epinephrine were dropped onto the right eye. The patient also had several drops of 4% Lidocaine instilled onto the eye during the procedure. The Rome scissors & 0.1 mm forceps were used to take down the conjunctiva. The lesion was removed from the right eye and sent as a specimen to pathology. Cautery was used for hemostasis. The conjunctiva was closed with 5 interrupted 8-0 Vicryl sutures. The lid speculum was removed along with the drape. The patient then received I drop of Ofloxacin & Prednisolone Acetate 1% suspension. Maxitrol ointment was then instilled onto the right eye. A squires shield was placed over the right eye. The patient tolerated the procedure well without any complications. The patient was instructed to return to see me tomorrow in the clinic.

## 2022-10-11 NOTE — ANESTHESIA PREPROCEDURE EVALUATION
10/11/2022  Rocco Swain is a 72 y.o., male.      Pre-op Assessment    I have reviewed the Patient Summary Reports.     I have reviewed the Nursing Notes.    I have reviewed the Medications.     Review of Systems  Anesthesia Hx:  Denies Family Hx of Anesthesia complications.   Denies Personal Hx of Anesthesia complications.   Social:  Non-Smoker    Hematology/Oncology:  Hematology Normal   Oncology Normal     EENT/Dental:EENT/Dental Normal   Cardiovascular:  Cardiovascular Normal     Pulmonary:   COPD    Renal/:   Chronic Renal Disease    Hepatic/GI:   Hepatitis, C Liver transplanted   Musculoskeletal:  Musculoskeletal Normal    Neurological:  Neurology Normal    Endocrine:   Hypothyroidism    Dermatological:  Skin Normal    Psych:  Psychiatric Normal              Anesthesia Plan  Type of Anesthesia, risks & benefits discussed:    Anesthesia Type: MAC  Intra-op Monitoring Plan: Standard ASA Monitors  Post Op Pain Control Plan: multimodal analgesia  Informed Consent: Informed consent signed with the Patient and all parties understand the risks and agree with anesthesia plan.  All questions answered.   ASA Score: 3    Ready For Surgery From Anesthesia Perspective.     .

## 2022-10-11 NOTE — ANESTHESIA POSTPROCEDURE EVALUATION
Anesthesia Post Evaluation    Patient: Rocco Swain    Procedure(s) Performed: Procedure(s) (LRB):  BIOPSY, CONJUNCTIVA (Right)    Final Anesthesia Type: MAC      Patient location during evaluation: [facility]  Patient participation: Yes- Able to Participate  Level of consciousness: awake and alert  Post-procedure vital signs: reviewed and stable  Pain management: adequate  Airway patency: patent    PONV status at discharge: No PONV  Anesthetic complications: no      Cardiovascular status: blood pressure returned to baseline  Respiratory status: unassisted  Hydration status: euvolemic  Follow-up not needed.          Vitals Value Taken Time   /80 10/11/22 0634   Temp 37.1 °C (98.7 °F) 10/11/22 0619   Pulse 84 10/11/22 0619   Resp 18 10/11/22 0619   SpO2 97 % 10/11/22 0619         No case tracking events are documented in the log.      Pain/Katya Score: No data recorded

## 2022-10-12 ENCOUNTER — OFFICE VISIT (OUTPATIENT)
Dept: OPHTHALMOLOGY | Facility: CLINIC | Age: 72
End: 2022-10-12
Payer: MEDICARE

## 2022-10-12 DIAGNOSIS — Z98.890 POST-OPERATIVE STATE: Primary | ICD-10-CM

## 2022-10-12 PROCEDURE — 99999 PR PBB SHADOW E&M-EST. PATIENT-LVL III: ICD-10-PCS | Mod: PBBFAC,,, | Performed by: OPHTHALMOLOGY

## 2022-10-12 PROCEDURE — 99024 PR POST-OP FOLLOW-UP VISIT: ICD-10-PCS | Mod: POP,,, | Performed by: OPHTHALMOLOGY

## 2022-10-12 PROCEDURE — 99213 OFFICE O/P EST LOW 20 MIN: CPT | Mod: PBBFAC,PO | Performed by: OPHTHALMOLOGY

## 2022-10-12 PROCEDURE — 99999 PR PBB SHADOW E&M-EST. PATIENT-LVL III: CPT | Mod: PBBFAC,,, | Performed by: OPHTHALMOLOGY

## 2022-10-12 PROCEDURE — 99024 POSTOP FOLLOW-UP VISIT: CPT | Mod: POP,,, | Performed by: OPHTHALMOLOGY

## 2022-10-12 NOTE — PROGRESS NOTES
Subjective:       Patient ID: Rocco Swain is a 72 y.o. male.    Chief Complaint: Post-op Evaluation    HPI    73 Y/o male is here for post op OD excision, conjuntival lesion   Pt states that his OD is in a little pain pt feels like a foreign object   is in OD, pt also says that vision in OD is blurry   Pt was unable to get Rx eye drops after Sx states no one gave them too him     Pt denies pain and discomfort   Occasional floaters     Eye med:   Last edited by Ursula Phan MA on 10/12/2022  3:14 PM.             Assessment:       1. Post-operative state          Plan:       S/p excisional bx of conj lesion OD-Doing well. Awaiting path report.      Start Ciprofloxacin gtts OD qid x 1 wk.    Start PF OD qid & taper off over 4 wks.  Start Maxitrol alex qhs OD.  RTC 2 wks.

## 2022-10-12 NOTE — LETTER
DATE OF SERVICE : 10/12/2022    DATE OF ADMISSION: 10/12/2022    NEUROLOGICAL CONSULTATION    REQUESTED BY Dr. Sarmiento, Cindi Arriola,*    SOURCE OF INFORMATION:Patient and  EHR    REASON FOR CONSULTATION:  Seizure     HISTORY OF PRESENT ILLNESS:   He is a 24 years old man presenting with a seizure.  Patient's family called EMS and patient became unresponsive had jerking movements lasting for a minute.  His last seizure was 2 years ago. He was on Topamax for seizure management.  He follows with neurologist at [facility]. In ED he had witnessed spell he went rigid, head turned to right and right leg lifted up had repeated jerking and right arm over crossed over his body, lasting x 1 min, post ictal . Received Keppra 1500 mg x 1. CT head - no acute pathology.    Further history .-Patient is reports that he has not taken seizure medications in the last 2 years.  Patient thinks changing the air moving to [place] can help with his seizures.    When asked about his seizure history he does reports of aura feeling like a rising sensation followed with a loss of consciousness and tonic-clonic activity.    Past Medical History:   Diagnosis Date     Epilepsia (H)        No medications prior to admission.       Current Facility-Administered Medications   Medication Dose Route Frequency     sodium chloride (PF)  3 mL Intracatheter Q8H     topiramate  25 mg Oral Q12H [facility] (08/20)     Current Facility-Administered Medications   Medication Dose Route Frequency     acetaminophen  650 mg Oral Q6H PRN    Or     acetaminophen  650 mg Rectal Q6H PRN     HYDROmorphone  0.2 mg Intravenous Q2H PRN     lidocaine 4%   Topical Q1H PRN     lidocaine (buffered or not buffered)  0.1-1 mL Other Q1H PRN     LORazepam  2 mg Intravenous Q3 Min PRN     melatonin  3 mg Oral At Bedtime PRN     ondansetron  4 mg Oral Q6H PRN    Or     ondansetron  4 mg Intravenous Q6H PRN     senna-docusate  1 tablet Oral BID PRN    Or     senna-docusate  2 tablet Oral BID PRN  July 13, 2020    Rocco Brynn  [address] [place] [id number]          Dear Rocco Swain:  MRN: [id number]    This is a follow up to your recent labs, your lab results were stable.  There are no medicine changes.  Please have your labs drawn again on 10/12/2020.      If you cannot have your labs drawn on the scheduled date, it is your responsibility to call the transplant department to reschedule.  Please call [phone] and ask to speak to Rhea RIOS -  for all scheduling requests.     Sincerely,      Rhea Buchanan, NADIRAN, RN, CCTC  Your Liver Transplant Coordinator    [facility]  [address] [place] [id number]  [phone]            sodium chloride (PF)  3 mL Intracatheter q1 min prn        No Known Allergies      SocHx:  reports that he has been smoking. He does not have any smokeless tobacco history on file. He reports current drug use. Drug: Marijuana. He reports that he does not drink alcohol.    Refer to H&P- ROS /Family history   PHYSICAL EXAM  /60 (BP Location: Right arm)   Pulse 70   Temp 98.2  F (36.8  C) (Oral)   Resp 16   SpO2 99%     He appears to be lethargic able to follow commands  No labored breathing normal mood and affect no clubbing cyanosis or pedal edema  Alert and oriented to current situation fund of knowledge is intact spontaneous patient comprehension is normal no dysarthria  Pupils equal and round reacting to light visual fields are full extraocular movements are intact face is symmetric hearing normal to conversation tongue is in midline  Able to move all 4 limbs against gravity  Reflexes are normal and symmetrical plantars are flexors no clonus sensations are grossly intact no tremors or involuntary movements  Gait is deferred.    Lab and X-ray:   Recent Labs   Lab Test 10/12/22  0053   WBC 7.7   HGB 15.0      POTASSIUM 3.8     Recent Labs   Lab Test 10/12/22  0053 10/07/20  0748   POTASSIUM 3.8 3.6   CHLORIDE 109 113*   BUN 11 12     Recent Labs   Lab Test 10/12/22  0053 10/07/20  0748   WBC 7.7 7.6   HGB 15.0 15.7   MCV 87 88    317     Recent Labs   Lab Test 07/18/20  0608   AST 17   ALT 25   ALKPHOS 57     No lab results found.    Invalid input(s): TRIGLYCERIDES  No lab results found.    Laboratory results were personally interpreted and reviewed in detail.  Imaging studies reviewed and interpreted in detail      Summary: List Problems:   Patient Active Problem List   Diagnosis     Recurrent seizures (H)       ASSESSMENT/PLAN      Partial seizures with secondary generalization-breakthrough seizure activity in the setting of medication noncompliance.      He was restarted on topiramate  25 mg twice daily 1 week then increase to 50 mg BID x 1 week then increase to 75 mg BID x 1 week then increase to 100 mg BID ( Until Topiramate reach therapeutic level if okay with pt can consider Vimpat 100 mg BID as bridging therapy, sometimes vimpat can not be covered by insurance, If his insurance covers Vimpat he can take Vimpat instead of topiramate  )-     He is at high risk for breakthrough seizures, medication compliance strongly encouraged    No driving     Seizure risk precautions  Neurochecks per protocol call us with any worsening or new neuro changes follow-up with neurology as an outpatient    Thank you for the opportunity to provide consultation on Sissy Renteria MD  [facility] Neurology  Office Phone [phone]

## 2022-10-24 ENCOUNTER — OFFICE VISIT (OUTPATIENT)
Dept: HEMATOLOGY/ONCOLOGY | Facility: CLINIC | Age: 72
End: 2022-10-24
Payer: MEDICARE

## 2022-10-24 ENCOUNTER — INFUSION (OUTPATIENT)
Dept: INFUSION THERAPY | Facility: HOSPITAL | Age: 72
End: 2022-10-24
Attending: STUDENT IN AN ORGANIZED HEALTH CARE EDUCATION/TRAINING PROGRAM
Payer: MEDICARE

## 2022-10-24 ENCOUNTER — OFFICE VISIT (OUTPATIENT)
Dept: OTOLARYNGOLOGY | Facility: CLINIC | Age: 72
End: 2022-10-24
Payer: MEDICARE

## 2022-10-24 ENCOUNTER — PATIENT MESSAGE (OUTPATIENT)
Dept: TRANSPLANT | Facility: CLINIC | Age: 72
End: 2022-10-24
Payer: MEDICARE

## 2022-10-24 VITALS
SYSTOLIC BLOOD PRESSURE: 123 MMHG | OXYGEN SATURATION: 94 % | HEART RATE: 94 BPM | RESPIRATION RATE: 18 BRPM | HEIGHT: 68 IN | BODY MASS INDEX: 17.64 KG/M2 | DIASTOLIC BLOOD PRESSURE: 75 MMHG | TEMPERATURE: 98 F | WEIGHT: 116.38 LBS

## 2022-10-24 VITALS
RESPIRATION RATE: 18 BRPM | BODY MASS INDEX: 17.64 KG/M2 | HEIGHT: 68 IN | WEIGHT: 116.38 LBS | TEMPERATURE: 99 F | SYSTOLIC BLOOD PRESSURE: 140 MMHG | HEART RATE: 71 BPM | DIASTOLIC BLOOD PRESSURE: 73 MMHG

## 2022-10-24 VITALS — HEART RATE: 88 BPM | SYSTOLIC BLOOD PRESSURE: 130 MMHG | DIASTOLIC BLOOD PRESSURE: 76 MMHG

## 2022-10-24 DIAGNOSIS — C01 SQUAMOUS CELL CARCINOMA OF BASE OF TONGUE: Primary | ICD-10-CM

## 2022-10-24 DIAGNOSIS — C32.9 LARYNX CANCER: ICD-10-CM

## 2022-10-24 DIAGNOSIS — C01 SQUAMOUS CELL CARCINOMA OF BASE OF TONGUE: ICD-10-CM

## 2022-10-24 DIAGNOSIS — D84.821 IMMUNODEFICIENCY DUE TO DRUGS: ICD-10-CM

## 2022-10-24 DIAGNOSIS — C77.0 SECONDARY MALIGNANT NEOPLASM OF CERVICAL LYMPH NODE: ICD-10-CM

## 2022-10-24 DIAGNOSIS — D69.6 THROMBOCYTOPENIA: ICD-10-CM

## 2022-10-24 DIAGNOSIS — Z79.899 IMMUNODEFICIENCY DUE TO DRUGS: ICD-10-CM

## 2022-10-24 DIAGNOSIS — C77.0 SECONDARY MALIGNANT NEOPLASM OF CERVICAL LYMPH NODE: Primary | ICD-10-CM

## 2022-10-24 DIAGNOSIS — H92.02 LEFT EAR PAIN: ICD-10-CM

## 2022-10-24 DIAGNOSIS — N18.4 CHRONIC KIDNEY DISEASE (CKD), STAGE IV (SEVERE): ICD-10-CM

## 2022-10-24 DIAGNOSIS — E89.0 POSTOPERATIVE HYPOTHYROIDISM: ICD-10-CM

## 2022-10-24 PROBLEM — D61.810 PANCYTOPENIA DUE TO CHEMOTHERAPY: Status: RESOLVED | Noted: 2021-10-01 | Resolved: 2022-10-24

## 2022-10-24 LAB
ALBUMIN SERPL BCP-MCNC: 3.8 G/DL (ref 3.5–5.2)
ALP SERPL-CCNC: 79 U/L (ref 55–135)
ALT SERPL W/O P-5'-P-CCNC: 12 U/L (ref 10–44)
ANION GAP SERPL CALC-SCNC: 14 MMOL/L (ref 8–16)
AST SERPL-CCNC: 33 U/L (ref 10–40)
BILIRUB SERPL-MCNC: 0.7 MG/DL (ref 0.1–1)
BUN SERPL-MCNC: 14 MG/DL (ref 8–23)
CALCIUM SERPL-MCNC: 9.6 MG/DL (ref 8.7–10.5)
CHLORIDE SERPL-SCNC: 104 MMOL/L (ref 95–110)
CO2 SERPL-SCNC: 24 MMOL/L (ref 23–29)
CREAT SERPL-MCNC: 1.4 MG/DL (ref 0.5–1.4)
ERYTHROCYTE [DISTWIDTH] IN BLOOD BY AUTOMATED COUNT: 13 % (ref 11.5–14.5)
EST. GFR  (NO RACE VARIABLE): 53.1 ML/MIN/1.73 M^2
GLUCOSE SERPL-MCNC: 129 MG/DL (ref 70–110)
HCT VFR BLD AUTO: 34.2 % (ref 40–54)
HGB BLD-MCNC: 11.6 G/DL (ref 14–18)
IMM GRANULOCYTES # BLD AUTO: 0.01 K/UL (ref 0–0.04)
MCH RBC QN AUTO: 33.9 PG (ref 27–31)
MCHC RBC AUTO-ENTMCNC: 33.9 G/DL (ref 32–36)
MCV RBC AUTO: 100 FL (ref 82–98)
NEUTROPHILS # BLD AUTO: 2.8 K/UL (ref 1.8–7.7)
PLATELET # BLD AUTO: 129 K/UL (ref 150–450)
PMV BLD AUTO: 9.7 FL (ref 9.2–12.9)
POTASSIUM SERPL-SCNC: 3.5 MMOL/L (ref 3.5–5.1)
PROT SERPL-MCNC: 7.6 G/DL (ref 6–8.4)
RBC # BLD AUTO: 3.42 M/UL (ref 4.6–6.2)
SODIUM SERPL-SCNC: 142 MMOL/L (ref 136–145)
WBC # BLD AUTO: 4.26 K/UL (ref 3.9–12.7)

## 2022-10-24 PROCEDURE — A4216 STERILE WATER/SALINE, 10 ML: HCPCS | Performed by: STUDENT IN AN ORGANIZED HEALTH CARE EDUCATION/TRAINING PROGRAM

## 2022-10-24 PROCEDURE — 99214 OFFICE O/P EST MOD 30 MIN: CPT | Mod: PBBFAC,25 | Performed by: STUDENT IN AN ORGANIZED HEALTH CARE EDUCATION/TRAINING PROGRAM

## 2022-10-24 PROCEDURE — 25000003 PHARM REV CODE 250: Performed by: STUDENT IN AN ORGANIZED HEALTH CARE EDUCATION/TRAINING PROGRAM

## 2022-10-24 PROCEDURE — 64611 PR CHEMODENERVATION PAROTID/SUBMANDIBULAR SALIVARY GLANDS,BILATERAL: ICD-10-PCS | Mod: S$PBB,,, | Performed by: OTOLARYNGOLOGY

## 2022-10-24 PROCEDURE — 99213 PR OFFICE/OUTPT VISIT, EST, LEVL III, 20-29 MIN: ICD-10-PCS | Mod: S$PBB,25,, | Performed by: OTOLARYNGOLOGY

## 2022-10-24 PROCEDURE — 96413 CHEMO IV INFUSION 1 HR: CPT

## 2022-10-24 PROCEDURE — 99215 OFFICE O/P EST HI 40 MIN: CPT | Mod: S$PBB,,, | Performed by: STUDENT IN AN ORGANIZED HEALTH CARE EDUCATION/TRAINING PROGRAM

## 2022-10-24 PROCEDURE — 99999 PR PBB SHADOW E&M-EST. PATIENT-LVL III: ICD-10-PCS | Mod: PBBFAC,,, | Performed by: OTOLARYNGOLOGY

## 2022-10-24 PROCEDURE — 64611 CHEMODENERV SALIV GLANDS: CPT | Mod: PBBFAC | Performed by: OTOLARYNGOLOGY

## 2022-10-24 PROCEDURE — 99213 OFFICE O/P EST LOW 20 MIN: CPT | Mod: S$PBB,25,, | Performed by: OTOLARYNGOLOGY

## 2022-10-24 PROCEDURE — 64611 CHEMODENERV SALIV GLANDS: CPT | Mod: S$PBB,,, | Performed by: OTOLARYNGOLOGY

## 2022-10-24 PROCEDURE — 80053 COMPREHEN METABOLIC PANEL: CPT | Performed by: STUDENT IN AN ORGANIZED HEALTH CARE EDUCATION/TRAINING PROGRAM

## 2022-10-24 PROCEDURE — 99999 PR PBB SHADOW E&M-EST. PATIENT-LVL III: CPT | Mod: PBBFAC,,, | Performed by: OTOLARYNGOLOGY

## 2022-10-24 PROCEDURE — 99215 PR OFFICE/OUTPT VISIT, EST, LEVL V, 40-54 MIN: ICD-10-PCS | Mod: S$PBB,,, | Performed by: STUDENT IN AN ORGANIZED HEALTH CARE EDUCATION/TRAINING PROGRAM

## 2022-10-24 PROCEDURE — 99999 PR PBB SHADOW E&M-EST. PATIENT-LVL IV: CPT | Mod: PBBFAC,,, | Performed by: STUDENT IN AN ORGANIZED HEALTH CARE EDUCATION/TRAINING PROGRAM

## 2022-10-24 PROCEDURE — 99999 PR PBB SHADOW E&M-EST. PATIENT-LVL IV: ICD-10-PCS | Mod: PBBFAC,,, | Performed by: STUDENT IN AN ORGANIZED HEALTH CARE EDUCATION/TRAINING PROGRAM

## 2022-10-24 PROCEDURE — 85027 COMPLETE CBC AUTOMATED: CPT | Performed by: STUDENT IN AN ORGANIZED HEALTH CARE EDUCATION/TRAINING PROGRAM

## 2022-10-24 PROCEDURE — 63600175 PHARM REV CODE 636 W HCPCS: Performed by: STUDENT IN AN ORGANIZED HEALTH CARE EDUCATION/TRAINING PROGRAM

## 2022-10-24 PROCEDURE — 99213 OFFICE O/P EST LOW 20 MIN: CPT | Mod: PBBFAC,25,27 | Performed by: OTOLARYNGOLOGY

## 2022-10-24 RX ORDER — HEPARIN 100 UNIT/ML
500 SYRINGE INTRAVENOUS
Status: DISCONTINUED | OUTPATIENT
Start: 2022-10-24 | End: 2022-10-24 | Stop reason: HOSPADM

## 2022-10-24 RX ORDER — SODIUM CHLORIDE 0.9 % (FLUSH) 0.9 %
10 SYRINGE (ML) INJECTION
Status: CANCELLED | OUTPATIENT
Start: 2022-10-24

## 2022-10-24 RX ORDER — OXYCODONE HYDROCHLORIDE 10 MG/1
10 TABLET ORAL EVERY 6 HOURS PRN
Qty: 120 TABLET | Refills: 0 | Status: SHIPPED | OUTPATIENT
Start: 2022-10-31 | End: 2022-11-30 | Stop reason: SDUPTHER

## 2022-10-24 RX ORDER — SODIUM CHLORIDE 0.9 % (FLUSH) 0.9 %
10 SYRINGE (ML) INJECTION
Status: DISCONTINUED | OUTPATIENT
Start: 2022-10-24 | End: 2022-10-24 | Stop reason: HOSPADM

## 2022-10-24 RX ORDER — HEPARIN 100 UNIT/ML
500 SYRINGE INTRAVENOUS
Status: CANCELLED | OUTPATIENT
Start: 2022-10-24

## 2022-10-24 RX ADMIN — SODIUM CHLORIDE 200 MG: 9 INJECTION, SOLUTION INTRAVENOUS at 10:10

## 2022-10-24 RX ADMIN — SODIUM CHLORIDE: 9 INJECTION, SOLUTION INTRAVENOUS at 09:10

## 2022-10-24 RX ADMIN — HEPARIN 500 UNITS: 100 SYRINGE at 11:10

## 2022-10-24 RX ADMIN — Medication 10 ML: at 08:10

## 2022-10-24 RX ADMIN — Medication 10 ML: at 11:10

## 2022-10-24 NOTE — PROGRESS NOTES
PATIENT: Rocco Swain  MRN: [id number]  DATE: 10/24/2022      Diagnosis:   1. Squamous cell carcinoma of base of tongue    2. Left ear pain    3. Secondary malignant neoplasm of cervical lymph node    4. Thrombocytopenia    5. Immunodeficiency due to drugs    6. Postoperative hypothyroidism        Chief Complaint: Squamous cell carcinoma of base of tongue      Oncologic History:    Oncologic History 1. Squamous cell carcinoma of base of tongue with recurrence    Oncologic Treatment 1. Chemoradiation completed 4/2016  2. S/p glossectomy, laryngectomy, and bilateral neck dissection for persistent/recurrent disease  3. 10/6/2020-8/24/21 PCC  4. 9/13/21 start carboplatin/5-FU/pembrolizumab; C2 pembrolizumab only; C4 restarted chemoIO    Pathology P16 positive squamous cell carcinoma with basaloid features.        Subjective:    Interval History: Mr. Swain is here for follow up    Since his last visit he is doing well. Had surgery on his right eye; conjunctivitis improved.  Has had worsening intermittent otalgia (now 3x/day for the past few months). No known provoking factors.  Chronic neck pain stable.  He is alone at this visit.  Communication from him is 100% written.  Past Medical History:   Past Medical History:   Diagnosis Date    Basal cell carcinoma (BCC) in situ of skin 2012    3 on face, 2 on arm, removed by dermatology.     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 Underwent surgical resection of tongue and neck. s/p tracheostomy       Past Surgical HIstory:   Past Surgical History:   Procedure Laterality Date    COLONOSCOPY      CONJUNCTIVA BIOPSY Right 10/11/2022    Procedure: BIOPSY, CONJUNCTIVA;  Surgeon: Victor M Vasques MD;  Location: [facility] OR;  Service:  Ophthalmology;  Laterality: Right;    INSERTION OF VENOUS ACCESS PORT Right 3/8/2021    Procedure: INSERTION, VENOUS ACCESS PORT;  Surgeon: Jesus Rendon MD;  Location: [facility] OR [address] [place] [address];  Service: General;  Laterality: Right;    LIVER TRANSPLANT  11/2008    transplanted for biopsy proven hepatocellular carcinoma,     LYMPH NODE BIOPSY N/A 9/18/2020    Procedure: BIOPSY, LYMPH NODE;  Surgeon: Taz Diagnostic Provider;  Location: [facility] OR [facility] FLR;  Service: General;  Laterality: N/A;  Rm 173    TRACHEOSTOMY         Family History:   Family History   Problem Relation Age of Onset    Dementia Mother        Social History:  reports that he has never smoked. He has never used smokeless tobacco. He reports current alcohol use. He reports that he does not currently use drugs.    Allergies:  Review of patient's allergies indicates:  No Known Allergies    Medications:  Current Outpatient Medications   Medication Sig Dispense Refill    azelaic acid (AZELEX) 15 % gel APPLY TOPICALLY TO AFFECTED AREA IN THE MORNING 50 g 3    diphenoxylate-atropine 2.5-0.025 mg (LOMOTIL) 2.5-0.025 mg per tablet Take 1 tablet by mouth 4 (four) times daily as needed for Diarrhea (use when loperamide/imodium does not work). 30 tablet 0    ibuprofen (ADVIL,MOTRIN) 200 MG tablet Take 200 mg by mouth.      levothyroxine (SYNTHROID) 88 MCG tablet TAKE 1 TABLET BY MOUTH ONCE DAILY 90 tablet 3    LIDOcaine HCl 2% (LIDOCAINE VISCOUS) 2 % Soln Swish and spit 15 mls every 8 (eight) hours as needed (mouth sore). 100 mL 3    LIDOcaine-prilocaine (EMLA) cream Apply generously to port site 30-60 min prior to chemo and then cover with saran wrap. 30 g 2    loperamide (IMODIUM) 2 mg capsule Take 1 capsule (2 mg total) by mouth 4 (four) times daily as needed for Diarrhea. 30 capsule 1    loteprednol (LOTEMAX) 0.5 % ophthalmic suspension Place 1 drop into the right eye 4 (four) times daily. 5 mL 1    magic mouthwash diphen/antac/lidoc/nysta Take 10 mLs by mouth 4  (four) times daily. 120 mL 4    metroNIDAZOLE (METROGEL) 0.75 % gel Apply topically to affected area 2 (two) times daily. 45 g 1    multivit-min/FA/lycopen/lutein (CENTRUM SILVER MEN ORAL) Take 1 tablet by mouth.      multivitamin capsule Take 1 capsule by mouth once daily.      sulfacetamide sodium-sulfur 10-5 % (w/w) Clsr USE TO WASH AFFECTED AREA DAILY      tacrolimus (PROGRAF) 0.5 MG Cap Take 1 capsule (0.5 mg total) by mouth every evening. Use the 1mg capsule for your morning dose 90 capsule 3    tacrolimus (PROGRAF) 1 MG Cap Take 1 capsule (1 mg total) by mouth every morning. Use the tacrolimus 0.5mg capsule for your evening dose as directed. 90 capsule 3    tiZANidine (ZANAFLEX) 2 MG tablet Take 1 tablet (2 mg total) by mouth nightly as needed (neck muscle strain). 30 tablet 0    traZODone (DESYREL) 50 MG tablet TAKE 1 TABLET BY MOUTH IN  THE EVENING 90 tablet 3    vitamin E 400 UNIT capsule Take 400 Units by mouth once daily.      [START ON 10/31/2022] oxyCODONE (ROXICODONE) 10 mg Tab immediate release tablet Take 1 tablet (10 mg total) by mouth every 6 (six) hours as needed for Pain. 120 tablet 0     No current facility-administered medications for this visit.     Facility-Administered Medications Ordered in Other Visits   Medication Dose Route Frequency Provider Last Rate Last Admin    heparin, porcine (PF) 100 unit/mL injection flush 500 Units  500 Units Intravenous PRN Ronald Berg MD        heparin, porcine (PF) 100 unit/mL injection flush 500 Units  500 Units Intravenous PRN Ronald Berg MD        ofloxacin 0.3 % ophthalmic solution 1 drop  1 drop Right Eye On Call Procedure Victor M Vasques MD   2 drop at 10/11/22 0745    sodium chloride 0.9% flush 10 mL  10 mL Intravenous PRN Ronald Berg MD        sodium chloride 0.9% flush 10 mL  10 mL Intravenous PRN Victor M Vasques MD        sodium chloride 0.9% flush 10 mL  10 mL Intravenous PRN Ronald Berg MD   10 mL at 10/24/22 0835       Review of  "Systems   Constitutional:  Negative for activity change, appetite change, chills, diaphoresis, fatigue, fever and unexpected weight change.   HENT:  Positive for ear pain (intermittent) and trouble swallowing. Negative for ear discharge, mouth sores and nosebleeds.    Eyes:  Negative for visual disturbance.   Respiratory:  Negative for cough, chest tightness, shortness of breath and wheezing.    Cardiovascular:  Negative for chest pain and leg swelling.   Gastrointestinal:  Negative for abdominal distention, abdominal pain, blood in stool, constipation, diarrhea, nausea and vomiting.   Endocrine: Negative for cold intolerance and heat intolerance.   Genitourinary:  Negative for difficulty urinating and dysuria.   Musculoskeletal:  Positive for myalgias and neck pain (pain radiates between both sides of his neck under his jaw.). Negative for arthralgias and back pain.   Skin:  Negative for color change and rash.   Neurological:  Negative for dizziness, weakness, light-headedness, numbness and headaches.   Hematological:  Negative for adenopathy. Bruises/bleeds easily.   Psychiatric/Behavioral:  Negative for confusion.      ECOG Performance Status:      ECOG SCORE    1 - Restricted in strenuous activity-ambulatory and able to carry out work of a light nature         Objective:      Vitals:   Vitals:    10/24/22 0854   BP: 123/75   BP Location: Left arm   Patient Position: Sitting   BP Method: Medium (Automatic)   Pulse: 94   Resp: 18   Temp: 98.1 °F (36.7 °C)   TempSrc: Oral   SpO2: (!) 94%   Weight: 52.8 kg (116 lb 6.5 oz)   Height: 5' 8" (1.727 m)     BMI: Body mass index is 17.7 kg/m².  Wt Readings from Last 10 Encounters:   10/24/22 52.8 kg (116 lb 6.5 oz)   10/05/22 52.2 kg (115 lb)   10/03/22 52.7 kg (116 lb 2.9 oz)   09/30/22 53.8 kg (118 lb 9.7 oz)   09/12/22 53.1 kg (117 lb 1 oz)   08/25/22 52.6 kg (116 lb)   08/23/22 52.6 kg (116 lb)   08/22/22 52.7 kg (116 lb 2.9 oz)   08/22/22 52.7 kg (116 lb 2.9 oz) "   07/11/22 53.1 kg (117 lb 1 oz)       Physical Exam  Constitutional:       General: He is not in acute distress.     Appearance: Normal appearance. He is not ill-appearing.   HENT:      Head: Normocephalic and atraumatic.      Mouth/Throat:      Mouth: Mucous membranes are moist.      Pharynx: No oropharyngeal exudate or posterior oropharyngeal erythema.   Eyes:      General: No scleral icterus.     Extraocular Movements: Extraocular movements intact.      Conjunctiva/sclera: Conjunctivae normal.      Pupils: Pupils are equal, round, and reactive to light.   Cardiovascular:      Rate and Rhythm: Normal rate and regular rhythm.      Heart sounds: No murmur heard.    No friction rub. No gallop.   Pulmonary:      Effort: Pulmonary effort is normal. No respiratory distress.      Breath sounds: No stridor. No wheezing, rhonchi or rales.   Abdominal:      General: Bowel sounds are normal. There is no distension.      Palpations: Abdomen is soft. There is no mass.      Tenderness: There is no abdominal tenderness. There is no guarding or rebound.   Musculoskeletal:         General: Normal range of motion.      Cervical back: Normal range of motion and neck supple. No tenderness.      Right lower leg: No edema.      Left lower leg: No edema.   Lymphadenopathy:      Cervical: No cervical adenopathy.      Upper Body:      Right upper body: No supraclavicular or axillary adenopathy.      Left upper body: No supraclavicular or axillary adenopathy.   Skin:     General: Skin is warm and dry.   Neurological:      General: No focal deficit present.      Mental Status: He is alert.       Laboratory Data:   Recent Results (from the past 168 hour(s))   CBC Oncology    Collection Time: 10/24/22  8:43 AM   Result Value Ref Range    WBC 4.26 3.90 - 12.70 K/uL    RBC 3.42 (L) 4.60 - 6.20 M/uL    Hemoglobin 11.6 (L) 14.0 - 18.0 g/dL    Hematocrit 34.2 (L) 40.0 - 54.0 %     (H) 82 - 98 fL    MCH 33.9 (H) 27.0 - 31.0 pg    MCHC 33.9  32.0 - 36.0 g/dL    RDW 13.0 11.5 - 14.5 %    Platelets 129 (L) 150 - 450 K/uL    MPV 9.7 9.2 - 12.9 fL    Gran # (ANC) 2.8 1.8 - 7.7 K/uL    Immature Grans (Abs) 0.01 0.00 - 0.04 K/uL   CMP    Collection Time: 10/24/22  8:43 AM   Result Value Ref Range    Sodium 142 136 - 145 mmol/L    Potassium 3.5 3.5 - 5.1 mmol/L    Chloride 104 95 - 110 mmol/L    CO2 24 23 - 29 mmol/L    Glucose 129 (H) 70 - 110 mg/dL    BUN 14 8 - 23 mg/dL    Creatinine 1.4 0.5 - 1.4 mg/dL    Calcium 9.6 8.7 - 10.5 mg/dL    Total Protein 7.6 6.0 - 8.4 g/dL    Albumin 3.8 3.5 - 5.2 g/dL    Total Bilirubin 0.7 0.1 - 1.0 mg/dL    Alkaline Phosphatase 79 55 - 135 U/L    AST 33 10 - 40 U/L    ALT 12 10 - 44 U/L    Anion Gap 14 8 - 16 mmol/L    eGFR 53.1 (A) >60 mL/min/1.73 m^2             Imaging:         Assessment:       1. Squamous cell carcinoma of base of tongue    2. Left ear pain    3. Secondary malignant neoplasm of cervical lymph node    4. Thrombocytopenia    5. Immunodeficiency due to drugs    6. Postoperative hypothyroidism           Plan:       Problem List Items Addressed This Visit          ENT    Left ear pain    Current Assessment & Plan     Intermittent otalgia, 3x/day, sharp pain, self-limited (lasting only a brief second or two).  Has been having it for a couple of months.  No known provoking factors.  -will get him seen by ENT for further evaluation            Immunology/Multi System    Immunodeficiency due to drugs    Overview     S/p liver transplant and is currently on tacrolimus.  Afebrile, ANC sufficient for treatment  -monitor cbc         Current Assessment & Plan     Afebrile, ANC sufficient for treatment  -monitor cbc            Hematology    Thrombocytopenia    Current Assessment & Plan     Grade 1, stable, asymptomatic  -monitor cbc            Oncology    Squamous cell carcinoma of base of tongue - Primary    Overview     Per OSH records, initially presented in 2015 with symptoms and cT2N2c disease. page 91 of  3/25/2020 outside records clinic (media tab).  Initially presented 8/2015 with left sided odynophagia.  Treated for reflux, which did not improve.    12/11/15 he had a fiberoptic exam showing discolored mucosal area of the left base of the tongue.    1/13/16 he had persistent dysphagia, odynophagia, and new left otalgia.  MRI showed an irregular mass along the left posterior margin of the base of the tongue measuring 1.8x2.3cm with ipsilateral level 2 lymph node measuring 1.7cm with central necrosis and contralateral level 2 node measuring 1.3cm with questionable necrosis.    1/19/2016 he had direct laryngoscopy with biopsy left of midline, proximal to vallecula, which was p16 positive, invasive, with moderately to poorly differentiated sq.c.c. with basaloid features.    1/29/16 met with medical oncology who recommended chemoxrt.    2/1/16  staging PET CT showed hypermetabolic mass at base of tongue 4x2.5x3.5cm, max SUV 24.13, hypermetabolic lymph node left neck SUV 6.77, small subcentimeter lymph node right neck max SUV 3.79 (exact size of lymph nodes not mentioned in the summary).    2/15/16 started chemoxrt to left tongue base, bilateral cervical neck levels 1b-5, treated with 6MV photons utilizing IMRT, 5000 cGy in 200 cGy daily fractions.  Cone down boost to left base of tongue with additional 2000 cGy in 200 cGy daily fractions to gross disease.  Received weekly cisplatin with radiation but had to switch to carboplatin due to worsening renal function. during this time also missed several weekly treatments of cisplatin.    4/1/2016 completed chemoxrt.   11/1/2016 Repeat biopsy of left tongue showed persistent disease.  S/p salvage glossectomy with laryngectomy,  [id number], p16 positive, base of tongue squamous cell carcinoma.     page 61 of 3/25/2020 outside records clinic (media tab)  Pathology: Tonsil, left tonsil fold: reactive squamous mucosa with inflammation and degenerating skeletal muscle, no carcinoma  seen.  Right and Left base of tongue:  Reactive squamous mucosa and submucosa with inflammation, no carcinoma seen.  Neck, glossectomy, laryngectomy, bilateral neck dissection:  invasive squamous cell carcinoma.  tumor site: base of tongue/hypopharynx.  Small focus of tumor is seen within the hyoid  bone.  specimen Size [id number]  tumor size [id number]  depth of invasion 20mm  TNM stage pT4a, pN0, pMx  Lymph nodes, included in all parts:  number involved 0  number examined 3.  distance of tumor from margins  positive inked margins: none  within 1mm : none  within 1-2mm: anterior-inferior    Bilateral neck dissection:  level 1: one lymph node and submandibular gland, negative for tumor  level II : two lymph nodes, negative for tumor  level III: not identified  level IV: not identified  level V: not identified.    6/20/17 - PETCT with FDG avidity of left neck lymph node, level 3, SUV 2.9.  No other evidence of local or distant disease.  7/6/2017 - biopsy of left neck lymph node with IR.  Sample was non-diagnostic. Notes from path report: Less than optimal scan specimen with minute tissue core of stromal fibroadipose tissue.  definitive tati background is not seen.  9/28/2017 - Repeat PET CT showing mild activity SUV 3.5 (increased from 3.2 previously; size 7.8x7.4x9.5mm).  Also new findings under left pectoralis minor (mild focal increased activity, indeterminate but new since previous exam)  3/22/2018 - CT neck shows no evidence of disease and level 2b lymph node decreased to 4.9mmx4.5mmx4.9mm from 7.8x7.4x9.5mm previously.    Seen by Dr. Myers 8/3/2020 for left-sided neck pain and a swelling in left level 2 several weeks ago.  He feels it arose after he began lifting weights.  CT neck 8/5/2020 : Lymph nodes At the left neck level L2 there is a heterogeneous soft tissue density with central hypoattenuation likely reflecting a necrotic lymph node measuring 1.6 x 1.4 cm in transverse dimension, 2.5 cm in craniocaudal  dimension.  There is a similar appearing irregular soft tissue density at the right neck level 2 measuring 1.1 x 9.0 cm in transverse dimension and 1.5 cm craniocaudal dimension.  No other abnormal appearing or enlarged lymph nodes found.  Impression: At level 2 within the neck bilaterally, there are  soft tissue density masses with central hypoattenuation likely reflective of necrotic lymph nodes.  Findings are suspicious for malignancy recurrence.  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  Staging PET CT 9/30/2020 with left sided hypermetabolic node and right sided enlarged node without uptake.  Also has mandibular vestibule uptake, which may be another site of disease. Discussed at tumor board.  He is not a surgical or radiation candidate.  -Started on PCC 10/6/2020 followed by maintenance cetuximab until 8/24/21 (discontinued due to progression of disease; continued increase in SUV uptake, no new lesions).  9/2021 started on carbo/5-FU/pembrolizumab; due to toxicity went to pembrolizumab monotherapy starting with cycle 2.  Progression of disease noted after cycle 3 so added chemotherapy back in with cycle 4.         Current Assessment & Plan     No worsening of chronic neck pain since his last visit.  Intermittent left sided otalgia worsening the past few months.  No new symptoms otherwise. Physical exam unchanged.  -labs reviewed; proceed with cycle 14  -labs and follow up prior to cycle 15         Relevant Medications    oxyCODONE (ROXICODONE) 10 mg Tab immediate release tablet (Start on 10/31/2022)    Secondary malignant neoplasm of cervical lymph node    Overview     See squamous cell carcinoma            Endocrine    Postoperative hypothyroidism    Current Assessment & Plan     No symptoms of hypothyroidism or hyperthyroidism.  -Continue levothyroxine  -recheck tsh at next visit         Relevant Orders    TSH     Orders Placed This Encounter    Procedures    TSH       Advance Care Planning I initiated the process of advance care planning at his initial visit and explained the importance of this process to the patient.  Then the patient received detailed information about the importance of designating a Health Care Power of  (HCPOA). he was instructed to communicate with this person about their wishes for future healthcare, should he become sick and lose decision-making capacity. The patient has previously appointed a HCPOA. After our discussion, the patient has decided to complete a HCPOA and has appointed his brother and NAME:Ollie          Route Chart for Scheduling    Med Onc Chart Routing  Urgent    Follow up with physician 3 weeks. 11/14 prior to chemo   Follow up with [facility]    Infusion scheduling note 11/14 pembrolizumab   Injection scheduling note    Labs CMP, CBC and TSH   Lab interval:  11/14 port draw cmp cbc tsh   Imaging None      Pharmacy appointment No pharmacy appointment needed      Other referrals No additional referrals needed         Treatment Plan Information   OP HEAD NECK PEMBROLIZUMAB CARBOPLATIN FLUOROURACIL (C1 ONLY RECEIVED) FOLLOWED BY PEMBROLIZUMAB MAINTENANCE   Ronald Berg MD   Upcoming Treatment Dates - OP HEAD NECK PEMBROLIZUMAB CARBOPLATIN FLUOROURACIL (C1 ONLY RECEIVED) FOLLOWED BY PEMBROLIZUMAB MAINTENANCE    10/24/2022       Immunotherapy       pembrolizumab (KEYTRUDA) 200 mg in sodium chloride 0.9% 108 mL infusion  11/14/2022       Immunotherapy       pembrolizumab (KEYTRUDA) 200 mg in sodium chloride 0.9% 108 mL infusion  12/5/2022       Immunotherapy       pembrolizumab (KEYTRUDA) 200 mg in sodium chloride 0.9% 108 mL infusion  12/26/2022       Immunotherapy       pembrolizumab (KEYTRUDA) 200 mg in sodium chloride 0.9% 108 mL infusion    Therapy Plan Information  Flushes  heparin, porcine (PF) 100 unit/mL injection flush 500 Units  500 Units, Intravenous, Every visit  sodium chloride 0.9% flush 10 mL  10 mL,  Intravenous, Every visit      Ronald Berg MD  Hematology Oncology

## 2022-10-24 NOTE — ASSESSMENT & PLAN NOTE
No worsening of chronic neck pain since his last visit.  Intermittent left sided otalgia worsening the past few months.  No new symptoms otherwise. Physical exam unchanged.  -labs reviewed; proceed with cycle 14  -labs and follow up prior to cycle 15

## 2022-10-24 NOTE — PROGRESS NOTES
Chief Complaint   Patient presents with    Follow-up     Botox       HPI   72 y.o. male presents status post total laryngectomy, total glossectomy and anterolateral thigh free flap reconstruction several years ago at [facility] in [place].  He is also status post liver transplant secondary to hepatitis C.      He is currently receiving palliative chemotherapy for metastatic disease in his neck.  He presents for additional Botox to the parotid gland.  He also reports intermittent left-sided earpain.  No other complaints      Review of Systems   Constitutional: Negative for fatigue and unexpected weight change.   HENT: Per HPI.  Eyes: Negative for visual disturbance.   Respiratory: Negative for shortness of breath, hemoptysis   Cardiovascular: Negative for chest pain and palpitations.   Musculoskeletal: Negative for decreased ROM, back pain.   Skin: Negative for rash, sunburn, itching.   Neurological: Negative for dizziness and seizures.   Hematological: Negative for adenopathy. Does not bruise/bleed easily.   Endocrine: Negative for rapid weight loss/weight gain, heat/cold intolerance.     Past Medical History   Patient Active Problem List   Diagnosis    Larynx cancer    Hepatitis C    Postoperative hypothyroidism    History of laryngectomy    Status post liver transplantation    Cirrhosis of transplanted liver    Squamous cell carcinoma of base of tongue    Chronic kidney disease (CKD), stage IV (severe)    Atherosclerosis of abdominal aorta    Anemia in stage 3a chronic kidney disease    Secondary malignant neoplasm of cervical lymph node    Immunodeficiency due to drugs    Tracheostomy status    Centrilobular emphysema    Hypomagnesemia    Lip lesion    Hx of hepatitis C    History of colon polyps    Thrombocytopenia    Range of motion deficit    Finger deformity    Acute conjunctivitis of right eye    Conjunctival lesion    Left ear pain           Past Surgical History   Past Surgical History:   Procedure  Laterality Date    COLONOSCOPY      CONJUNCTIVA BIOPSY Right 10/11/2022    Procedure: BIOPSY, CONJUNCTIVA;  Surgeon: Victor M Vasques MD;  Location: [facility];  Service: Ophthalmology;  Laterality: Right;    INSERTION OF VENOUS ACCESS PORT Right 3/8/2021    Procedure: INSERTION, VENOUS ACCESS PORT;  Surgeon: Jesus Rendon MD;  Location: [facility] OR [address] [place] [address];  Service: General;  Laterality: Right;    LIVER TRANSPLANT  11/2008    transplanted for biopsy proven hepatocellular carcinoma,     LYMPH NODE BIOPSY N/A 9/18/2020    Procedure: BIOPSY, LYMPH NODE;  Surgeon: Dosiam Diagnostic Provider;  [facility];  Service: General;  Laterality: N/A;  Rm 173    TRACHEOSTOMY           Family History   Family History   Problem Relation Age of Onset    Dementia Mother            Social History   .  Social History     Socioeconomic History    Marital status: Single   Occupational History    Occupation: Retired     Comment: , notes exposures to fumes etc.  Worked on [employer] for 4 years   Tobacco Use    Smoking status: Never    Smokeless tobacco: Never   Substance and Sexual Activity    Alcohol use: Yes     Comment: drinks wine, 1 glass day    Drug use: Not Currently    Sexual activity: Yes     Comment: No prior history of STD          Allergies   Review of patient's allergies indicates:  No Known Allergies        Physical Exam     Vitals:    10/24/22 1311   BP: 130/76   Pulse: 88         There is no height or weight on file to calculate BMI.      General: AOx3, NAD   Respiratory:  Symmetric chest rise, normal effort  Ears:  Pinnae, external auditory canal, tympanic membrane clear bilaterally.  Left TMJ tender to palpation.  Oral Cavity:  Oral Tongue absent.  Free flap well incorporated into oral cavity.. Hard Palate WNL. No buccal or FOM lesions.  Oropharynx:  No masses/lesions of the posterior pharyngeal wall. Tonsillar fossa without lesions. Soft palate without masses. Midline uvula.    Neck:  Well-healed neck dissection scar is.  Stoma patent.  No significant adenopathy.  No thyromegaly or thyroid nodules.  Normal range of motion.    Face: House Brackmann I bilaterally.  Crusted skin lesions of right brow and right lateral cheek.    50 units of Botox injected into each parotid gland.  He tolerated the procedure well.    Assessment/Plan  Problem List Items Addressed This Visit          Oncology    Larynx cancer     Botox injected today.  Tolerated the procedure well.  NSAIDs for TMJ.           Secondary malignant neoplasm of cervical lymph node    Squamous cell carcinoma of base of tongue - Primary

## 2022-10-24 NOTE — PLAN OF CARE
1111  Infusion completed, pt tolerated well; pt instructed to increase water hydration daily; discussed when to contact MD, when to report to ER; AVS declined, next appt not yet scheduled, pt demonstrated understanding of all discussed

## 2022-10-24 NOTE — NURSING
Pt arrived for labs from port.  Port with good blood return, labs sent.  Port left access for theatment today.  Pt now going to next appt

## 2022-10-24 NOTE — ASSESSMENT & PLAN NOTE
Intermittent otalgia, 3x/day, sharp pain, self-limited (lasting only a brief second or two).  Has been having it for a couple of months.  No known provoking factors.  -will get him seen by ENT for further evaluation

## 2022-10-24 NOTE — ASSESSMENT & PLAN NOTE
No symptoms of hypothyroidism or hyperthyroidism.  -Continue levothyroxine  -recheck tsh at next visit

## 2022-10-24 NOTE — NURSING
0939  Pt here for Keytruda infusion, pt has no new complaints or concerns at present; discussed treatment plan for today, all questions answered and pt agrees to proceed

## 2022-10-26 ENCOUNTER — OFFICE VISIT (OUTPATIENT)
Dept: OPHTHALMOLOGY | Facility: CLINIC | Age: 72
End: 2022-10-26
Payer: MEDICARE

## 2022-10-26 DIAGNOSIS — D49.89 CONJUNCTIVAL INTRAEPITHELIAL NEOPLASM: ICD-10-CM

## 2022-10-26 DIAGNOSIS — Z98.890 POST-OPERATIVE STATE: Primary | ICD-10-CM

## 2022-10-26 DIAGNOSIS — H25.13 NUCLEAR SCLEROSIS OF BOTH EYES: ICD-10-CM

## 2022-10-26 PROCEDURE — 99024 POSTOP FOLLOW-UP VISIT: CPT | Mod: POP,,, | Performed by: OPHTHALMOLOGY

## 2022-10-26 PROCEDURE — 99024 PR POST-OP FOLLOW-UP VISIT: ICD-10-PCS | Mod: POP,,, | Performed by: OPHTHALMOLOGY

## 2022-10-26 PROCEDURE — 99999 PR PBB SHADOW E&M-EST. PATIENT-LVL III: ICD-10-PCS | Mod: PBBFAC,,, | Performed by: OPHTHALMOLOGY

## 2022-10-26 PROCEDURE — 99999 PR PBB SHADOW E&M-EST. PATIENT-LVL III: CPT | Mod: PBBFAC,,, | Performed by: OPHTHALMOLOGY

## 2022-10-26 PROCEDURE — 99213 OFFICE O/P EST LOW 20 MIN: CPT | Mod: PBBFAC,PO | Performed by: OPHTHALMOLOGY

## 2022-10-26 NOTE — PROGRESS NOTES
Subjective:       Patient ID: Rocco Swain is a 72 y.o. male.    Chief Complaint: Post-op Evaluation (Rocco Swain is a 71 y/o male )    HPI     Post-op Evaluation     Additional comments: Rocco Swain is a 71 y/o male            Comments    Pt here for 2 week PO conjunctival excision OD  Pt state OD is doing well     GTTS:PF OD QID   Maxitrol QHS OD           Last edited by Teri Zelaya on 10/26/2022 11:19 AM.             Assessment:       1. Post-operative state    2. Conjunctival intraepithelial neoplasm    3. Nuclear sclerosis of both eyes          Plan:       S/p excision of VIK OD-Pathology reported VIK.  Doing well.  Cataracts- Not visually significant.      Taper off PF over 4 wks.  D/c Maxitrol alex.  RTC 3 wks.     Will increase frequency to 3 times per week for 6 doses, then weekly for 4 doses, then back to every other week.    Patient scheduled for the first two weeks.

## 2022-10-28 ENCOUNTER — OFFICE VISIT (OUTPATIENT)
Dept: DERMATOLOGY | Facility: CLINIC | Age: 72
End: 2022-10-28
Payer: MEDICARE

## 2022-10-28 DIAGNOSIS — C01 SQUAMOUS CELL CARCINOMA OF BASE OF TONGUE: ICD-10-CM

## 2022-10-28 DIAGNOSIS — D48.5 NEOPLASM OF UNCERTAIN BEHAVIOR OF SKIN: Primary | ICD-10-CM

## 2022-10-28 DIAGNOSIS — L57.0 AK (ACTINIC KERATOSIS): ICD-10-CM

## 2022-10-28 PROCEDURE — 99999 PR PBB SHADOW E&M-EST. PATIENT-LVL IV: ICD-10-PCS | Mod: PBBFAC,,, | Performed by: DERMATOLOGY

## 2022-10-28 PROCEDURE — 99999 PR PBB SHADOW E&M-EST. PATIENT-LVL IV: CPT | Mod: PBBFAC,,, | Performed by: DERMATOLOGY

## 2022-10-28 PROCEDURE — 99499 UNLISTED E&M SERVICE: CPT | Mod: S$PBB,,, | Performed by: DERMATOLOGY

## 2022-10-28 PROCEDURE — 17003 DESTRUCT PREMALG LES 2-14: CPT | Mod: 59,S$PBB,, | Performed by: DERMATOLOGY

## 2022-10-28 PROCEDURE — 99499 NO LOS: ICD-10-PCS | Mod: S$PBB,,, | Performed by: DERMATOLOGY

## 2022-10-28 PROCEDURE — 88305 TISSUE EXAM BY PATHOLOGIST: ICD-10-PCS | Mod: 26,,, | Performed by: PATHOLOGY

## 2022-10-28 PROCEDURE — 17003 DESTRUCTION, PREMALIGNANT LESIONS; SECOND THROUGH 14 LESIONS: ICD-10-PCS | Mod: 59,S$PBB,, | Performed by: DERMATOLOGY

## 2022-10-28 PROCEDURE — 11102 TANGNTL BX SKIN SINGLE LES: CPT | Mod: PBBFAC | Performed by: DERMATOLOGY

## 2022-10-28 PROCEDURE — 17000 DESTRUCT PREMALG LESION: CPT | Mod: 59,S$PBB,, | Performed by: DERMATOLOGY

## 2022-10-28 PROCEDURE — 88305 TISSUE EXAM BY PATHOLOGIST: CPT | Mod: 26,,, | Performed by: PATHOLOGY

## 2022-10-28 PROCEDURE — 17000 PR DESTRUCTION(LASER SURGERY,CRYOSURGERY,CHEMOSURGERY),PREMALIGNANT LESIONS,FIRST LESION: ICD-10-PCS | Mod: 59,S$PBB,, | Performed by: DERMATOLOGY

## 2022-10-28 PROCEDURE — 88305 TISSUE EXAM BY PATHOLOGIST: CPT | Performed by: PATHOLOGY

## 2022-10-28 PROCEDURE — 99214 OFFICE O/P EST MOD 30 MIN: CPT | Mod: PBBFAC | Performed by: DERMATOLOGY

## 2022-10-28 PROCEDURE — 11103 TANGNTL BX SKIN EA SEP/ADDL: CPT | Mod: S$PBB,,, | Performed by: DERMATOLOGY

## 2022-10-28 PROCEDURE — 11103 PR TANGENTIAL BIOPSY, SKIN, EA ADDTL LESION: ICD-10-PCS | Mod: S$PBB,,, | Performed by: DERMATOLOGY

## 2022-10-28 PROCEDURE — 11102 TANGNTL BX SKIN SINGLE LES: CPT | Mod: S$PBB,,, | Performed by: DERMATOLOGY

## 2022-10-28 PROCEDURE — 17003 DESTRUCT PREMALG LES 2-14: CPT | Mod: 59,PBBFAC | Performed by: DERMATOLOGY

## 2022-10-28 PROCEDURE — 11103 TANGNTL BX SKIN EA SEP/ADDL: CPT | Mod: PBBFAC | Performed by: DERMATOLOGY

## 2022-10-28 PROCEDURE — 17000 DESTRUCT PREMALG LESION: CPT | Mod: 59,PBBFAC | Performed by: DERMATOLOGY

## 2022-10-28 PROCEDURE — 11102 PR TANGENTIAL BIOPSY, SKIN, SINGLE LESION: ICD-10-PCS | Mod: S$PBB,,, | Performed by: DERMATOLOGY

## 2022-10-28 NOTE — PATIENT INSTRUCTIONS
Shave Biopsy Wound Care    Your doctor has performed a shave biopsy today.  A band aid and vaseline ointment has been placed over the site.  This should remain in place for NO LONGER THAN 48 hours.  It is fine to remove the bandaid after 24 hours, if the area is no longer bleeding. It is recommended that you keep the area dry (do not wet)) for the first 24 hours.  After 24 hours, wash the area with warm soap and water and apply Vaseline jelly.  Many patients prefer to use Neosporin or Bacitracin ointment.  This is acceptable; however, know that you can develop an allergy to this medication even if you have used it safely for years.  It is important to keep the area moist.  Letting it dry out and get air slows healing time, and will worsen the scar.        If you notice increasing redness, tenderness, pain, or yellow drainage at the biopsy site, please notify your doctor.  These are signs of an infection.    If your biopsy site is bleeding, apply firm pressure for 15 minutes straight.  Repeat for another 15 minutes, if it is still bleeding.   If the surgical site continues to bleed, then please contact your doctor.      For [facility] users:   You will receive your biopsy results in [facility] as soon as they are available. Please be assured that your physician/provider will review your results and will then determine what further treatment, evaluation, or planning is required. You should be contacted by your physician's/provider's office within 5 business days of receiving your results; If not, please reach out to directly. This is one more way [facility] is putting you first.     [facility] [place] [address]/ [phone] FAX/ [email or web address]     Sun Protection      The [facility] would like to remind you of the importance of sun protection all year round and particularly during the summer when the suns rays are the strongest. It has been proven that both acute  and chronic sun exposure damages our cells and leads to skin cancer. Beyond skin cancer, the sun causes 90% of the symptoms of premature skin aging, including wrinkles, lentigines (brown spots), and thin, easily bruised skin. Proper sun protection can help prevent these unwanted conditions.    Many patients report that they dont go in the sun. It has been shown that the average person receives 18 hours of incidental sun exposure per week during activities such as walking through parking lots, driving, or sitting next to windows. This accumulates to several bad sunburns per year!    In choosing sunscreen, you want one that protects against both UVA and UVB rays (broad spectrum). It is recommended that you use one of SPF 30 or higher. It is important to apply the sunscreen about 20 minutes prior to sun exposure. Most sunscreens are chemical sunscreens and a reaction must take place in the skin so that they are effective. If they are applied and then you are immediately exposed to the sun or start sweating, this reaction has not had time to take place and you are therefore unprotected. Sunscreen needs to be reapplied every 2 hours if you are participating in water sports or sweating. We recommend Elta MD or CeraVe sunscreens for daily use; however there are many options and it is most important for you to find one that you will use on a consistent basis.    If you have sensitive skin, you may do best with a sunscreen that contains only physical blockers in the active ingredient section. The only physical blockers available in the [place] currently are titanium dioxide or zinc oxide. These are typically thicker and harder to apply, however they afford very good protection. Neutrogena Sensitive Skin, Blue Lizard Sensitive Skin (pink top) or Neutrogena Pure and Free are popular ones.     Aside from sunscreen, clothes with UV protection (UPF), wide brimmed hats, and sunglasses are other means of sun protection that we  recommend.      Based on a recent study (6/2021) and out of an abundance of caution, we are recommending that you AVOID the following sunscreens as they may contain the carcinogen, benzene:    Spray and gel sunscreens  Any [employer] or Wal[employer]reens brands as well as Max Block and TopCare brands   Neutrogena Ultra Sheer Dry-touch Water Resistant Sunscreen LOTION SPF 70   Neutrogena Sheer Zinc Dry-touch Face Sunscreen LOTION SPF 50   5.   Aveeno Baby Continuous Protection Sensitive Skin Sunscreen LOTION - Broad Spectrum SPF 50    Please note that Benzene is not an ingredient or the degradation product of any ingredient in any sunscreen. This study suggested that the findings are a result of contamination in the manufacturing process. At this point, we don't know how effectively Benzene gets through the skin, if it gets absorbed systemically, and what effects it may have.     We do know that ultraviolet radiation is a well-established carcinogen. Please use daily sun protection/avoidance and use of at least SPF 30, broad-spectrum sunscreen not listed above.                       [facility] [place]  [address]  [facility] [address]  Dept: [phone]  Dept Fax: [phone]

## 2022-10-28 NOTE — PROGRESS NOTES
Subjective:       Patient ID:  Rocco Swain is a 72 y.o. male who presents for   Chief Complaint   Patient presents with    Lesion     HPI  72 y.o. male with hx of SCC base of tongue status post total laryngectomy, total glossectomy and anterolateral thigh free flap reconstruction several years ago at [facility] in [place].  He has also had a liver transplant secondary to hepatitis C and is on tacrolimus.       He is currently receiving palliative chemotherapy for metastatic disease in his neck - on Keytruda x 3 months, chemo x 2 yrs total.     Would like to have all his doctors in one place.  Here in derm clinic today for 3 lesions:  On chin - removed 3 yrs ago at [facility] in [place], but recurred about a yr later and has been frozen since. Painful when shaves over it  On upper forehead - removed 3 yrs ago in [place], but recurred about a yr later and has been frozen since  Behind R ear- 3 months, painful    Has had numerous lesions frozen off his skin in the past.    Review of Systems   Constitutional:  Negative for fever, chills and fatigue.   Skin:  Negative for daily sunscreen use, activity-related sunscreen use and recent sunburn.   Hematologic/Lymphatic: Bruises/bleeds easily.      Objective:    Physical Exam   Constitutional: He appears well-developed and well-nourished. No distress.   Neurological: He is alert and oriented to person, place, and time. He is not disoriented.   Psychiatric: He has a normal mood and affect.   Skin:   Areas Examined (abnormalities noted in diagram):   Scalp / Hair Palpated and Inspected  Head / Face Inspection Performed                                      Diagram Legend     Erythematous scaling macule/papule c/w actinic keratosis       Vascular papule c/w angioma      Pigmented verrucoid papule/plaque c/w seborrheic keratosis      Yellow umbilicated papule c/w sebaceous hyperplasia      Irregularly shaped tan macule c/w lentigo     1-2 mm smooth white papules  consistent with Milia      Movable subcutaneous cyst with punctum c/w epidermal inclusion cyst      Subcutaneous movable cyst c/w pilar cyst      Firm pink to brown papule c/w dermatofibroma      Pedunculated fleshy papule(s) c/w skin tag(s)      Evenly pigmented macule c/w junctional nevus     Mildly variegated pigmented, slightly irregular-bordered macule c/w mildly atypical nevus      Flesh colored to evenly pigmented papule c/w intradermal nevus       Pink pearly papule/plaque c/w basal cell carcinoma      Erythematous hyperkeratotic cursted plaque c/w SCC      Surgical scar with no sign of skin cancer recurrence      Open and closed comedones      Inflammatory papules and pustules      Verrucoid papule consistent consistent with wart     Erythematous eczematous patches and plaques     Dystrophic onycholytic nail with subungual debris c/w onychomycosis     Umbilicated papule    Erythematous-base heme-crusted tan verrucoid plaque consistent with inflamed seborrheic keratosis     Erythematous Silvery Scaling Plaque c/w Psoriasis     See annotation      Assessment / Plan:    Neoplasm of uncertain behavior of skin  Shave biopsy procedure note:    Shave biopsy performed after verbal consent including risk of infection, scar, recurrence, need for additional treatment of site. Area prepped with alcohol, anesthetized with approximately 1.0cc of 1% lidocaine with epinephrine. Lesional tissue shaved with razor blade. Hemostasis achieved with application of aluminum chloride followed by hyfrecation. No complications. Dressing applied. Wound care explained.    -     Specimen to Pathology, Dermatology  Pathology Orders:       Normal Orders This Visit    Specimen to Pathology, Dermatology     Comments:    Number of Specimens:->4  ------------------------->-------------------------  Spec 1 Procedure:->Biopsy  Spec 1 Clinical Impression:->r/o SCC vs other  Spec 1 Source:->frontal  scalp  ------------------------->-------------------------  Spec 2 Procedure:->Biopsy  Spec 2 Clinical Impression:->r/o SCC vs other  Spec 2 Source:->R lateral jawline  ------------------------->-------------------------  Spec 3 Procedure:->Biopsy  Spec 3 Clinical Impression:->r/o SCC vs other  Spec 3 Source:->L lateral canthus  ------------------------->-------------------------  Spec 4 Procedure:->Biopsy  Spec 4 Clinical Impression:->r/o SCC vs BCC vs other  Spec 4 Source:->chin    Questions:    Procedure Type: Dermatology and skin neoplasms    Number of Specimens: 4    ------------------------: -------------------------    Spec 1 Procedure: Biopsy    Spec 1 Clinical Impression: r/o SCC vs other    Spec 1 Source: frontal scalp    ------------------------: -------------------------    Spec 2 Procedure: Biopsy    Spec 2 Clinical Impression: r/o SCC vs other    Spec 2 Source: R lateral jawline    ------------------------: -------------------------    Spec 3 Procedure: Biopsy    Spec 3 Clinical Impression: r/o SCC vs other    Spec 3 Source: L lateral canthus    ------------------------: -------------------------    Spec 4 Procedure: Biopsy    Spec 4 Clinical Impression: r/o SCC vs BCC vs other    Spec 4 Source: chin    Release to patient:             AK (actinic keratosis)  Cryosurgery Procedure Note    Verbal consent from the patient is obtained including, but not limited to, risk of hypopigmentation/hyperpigmentation, scar, recurrence of lesion. The patient is aware of the precancerous quality and need for treatment of these lesions. Liquid nitrogen cryosurgery is applied to the 5 actinic keratoses, as detailed in the physical exam, to produce a freeze injury. The patient is aware that blisters may form and is instructed on wound care with gentle cleansing and use of vaseline ointment to keep moist until healed. The patient is supplied a handout on cryosurgery and is instructed to call if lesions do not completely  resolve.    **Pt has had a laryngectomy and prefers to communicate about biopsy results via the portal.**    Follow up in about 3 months (around 1/28/2023) for skin check or sooner pending biopsy results.

## 2022-11-04 ENCOUNTER — PATIENT MESSAGE (OUTPATIENT)
Dept: DERMATOLOGY | Facility: CLINIC | Age: 72
End: 2022-11-04
Payer: MEDICARE

## 2022-11-04 DIAGNOSIS — D09.9 SQUAMOUS CELL CARCINOMA IN SITU (SCCIS): Primary | ICD-10-CM

## 2022-11-04 RX ORDER — IMIQUIMOD 12.5 MG/.25G
CREAM TOPICAL
Qty: 24 PACKET | Refills: 3 | Status: SHIPPED | OUTPATIENT
Start: 2022-11-04 | End: 2022-11-12 | Stop reason: SDUPTHER

## 2022-11-07 ENCOUNTER — LAB VISIT (OUTPATIENT)
Dept: LAB | Facility: HOSPITAL | Age: 72
End: 2022-11-07
Attending: INTERNAL MEDICINE
Payer: MEDICARE

## 2022-11-07 ENCOUNTER — TELEPHONE (OUTPATIENT)
Dept: TRANSPLANT | Facility: CLINIC | Age: 72
End: 2022-11-07
Payer: MEDICARE

## 2022-11-07 DIAGNOSIS — Z94.4 STATUS POST LIVER TRANSPLANTATION: ICD-10-CM

## 2022-11-07 LAB
ALBUMIN SERPL BCP-MCNC: 3.9 G/DL (ref 3.5–5.2)
ALP SERPL-CCNC: 78 U/L (ref 55–135)
ALT SERPL W/O P-5'-P-CCNC: 19 U/L (ref 10–44)
ANION GAP SERPL CALC-SCNC: 13 MMOL/L (ref 8–16)
AST SERPL-CCNC: 43 U/L (ref 10–40)
BASOPHILS # BLD AUTO: 0.01 K/UL (ref 0–0.2)
BASOPHILS NFR BLD: 0.2 % (ref 0–1.9)
BILIRUB SERPL-MCNC: 0.7 MG/DL (ref 0.1–1)
BUN SERPL-MCNC: 16 MG/DL (ref 8–23)
CALCIUM SERPL-MCNC: 10.3 MG/DL (ref 8.7–10.5)
CHLORIDE SERPL-SCNC: 103 MMOL/L (ref 95–110)
CO2 SERPL-SCNC: 24 MMOL/L (ref 23–29)
CREAT SERPL-MCNC: 1.5 MG/DL (ref 0.5–1.4)
DIFFERENTIAL METHOD: ABNORMAL
EOSINOPHIL # BLD AUTO: 0.2 K/UL (ref 0–0.5)
EOSINOPHIL NFR BLD: 4.9 % (ref 0–8)
ERYTHROCYTE [DISTWIDTH] IN BLOOD BY AUTOMATED COUNT: 13.1 % (ref 11.5–14.5)
EST. GFR  (NO RACE VARIABLE): 48.9 ML/MIN/1.73 M^2
GLUCOSE SERPL-MCNC: 133 MG/DL (ref 70–110)
HCT VFR BLD AUTO: 34.3 % (ref 40–54)
HGB BLD-MCNC: 11.8 G/DL (ref 14–18)
IMM GRANULOCYTES # BLD AUTO: 0.02 K/UL (ref 0–0.04)
IMM GRANULOCYTES NFR BLD AUTO: 0.5 % (ref 0–0.5)
LYMPHOCYTES # BLD AUTO: 0.7 K/UL (ref 1–4.8)
LYMPHOCYTES NFR BLD: 17 % (ref 18–48)
MCH RBC QN AUTO: 33.8 PG (ref 27–31)
MCHC RBC AUTO-ENTMCNC: 34.4 G/DL (ref 32–36)
MCV RBC AUTO: 98 FL (ref 82–98)
MONOCYTES # BLD AUTO: 0.6 K/UL (ref 0.3–1)
MONOCYTES NFR BLD: 13.9 % (ref 4–15)
NEUTROPHILS # BLD AUTO: 2.6 K/UL (ref 1.8–7.7)
NEUTROPHILS NFR BLD: 63.5 % (ref 38–73)
NRBC BLD-RTO: 0 /100 WBC
PLATELET # BLD AUTO: 136 K/UL (ref 150–450)
PMV BLD AUTO: 9.4 FL (ref 9.2–12.9)
POTASSIUM SERPL-SCNC: 4.9 MMOL/L (ref 3.5–5.1)
PROT SERPL-MCNC: 7.8 G/DL (ref 6–8.4)
RBC # BLD AUTO: 3.49 M/UL (ref 4.6–6.2)
SODIUM SERPL-SCNC: 140 MMOL/L (ref 136–145)
TACROLIMUS BLD-MCNC: 6.6 NG/ML (ref 5–15)
WBC # BLD AUTO: 4.11 K/UL (ref 3.9–12.7)

## 2022-11-07 PROCEDURE — 80053 COMPREHEN METABOLIC PANEL: CPT | Performed by: INTERNAL MEDICINE

## 2022-11-07 PROCEDURE — 85025 COMPLETE CBC W/AUTO DIFF WBC: CPT | Performed by: INTERNAL MEDICINE

## 2022-11-07 PROCEDURE — 80197 ASSAY OF TACROLIMUS: CPT | Performed by: INTERNAL MEDICINE

## 2022-11-07 PROCEDURE — 36415 COLL VENOUS BLD VENIPUNCTURE: CPT | Performed by: INTERNAL MEDICINE

## 2022-11-07 NOTE — TELEPHONE ENCOUNTER
Letter sent to patient stating: Your labs have been reviewed by your Transplant physician, no action required. Next labs due 2/6/2023          ----- Message from Cornell Anton MD sent at 11/7/2022 11:40 AM CST -----  Results reviewed

## 2022-11-07 NOTE — LETTER
November 7, 2022    Rocco Swain  [address] [place] [address] [place] [address]          Dear Rocco Swain:  MRN: [id number]    This is a follow up to your recent labs, your lab results were stable.  There are no medicine changes.  Please have your labs drawn again on 2/6/2023.      If you cannot have your labs drawn on the scheduled date, it is your responsibility to call the transplant department to reschedule.  Please call [phone] and ask to speak to Rhea RIOS   for all scheduling requests.     Sincerely,  Shari WADDELLN, RN          Your Liver Transplant Coordinator    [facility]  [address] [place] [id number]  [phone]

## 2022-11-14 ENCOUNTER — INFUSION (OUTPATIENT)
Dept: INFUSION THERAPY | Facility: HOSPITAL | Age: 72
End: 2022-11-14
Attending: STUDENT IN AN ORGANIZED HEALTH CARE EDUCATION/TRAINING PROGRAM
Payer: MEDICARE

## 2022-11-14 ENCOUNTER — OFFICE VISIT (OUTPATIENT)
Dept: HEMATOLOGY/ONCOLOGY | Facility: CLINIC | Age: 72
End: 2022-11-14
Payer: MEDICARE

## 2022-11-14 ENCOUNTER — PATIENT MESSAGE (OUTPATIENT)
Dept: DERMATOLOGY | Facility: CLINIC | Age: 72
End: 2022-11-14
Payer: MEDICARE

## 2022-11-14 VITALS
WEIGHT: 116.38 LBS | BODY MASS INDEX: 17.7 KG/M2 | TEMPERATURE: 98 F | SYSTOLIC BLOOD PRESSURE: 146 MMHG | RESPIRATION RATE: 18 BRPM | DIASTOLIC BLOOD PRESSURE: 76 MMHG | HEART RATE: 82 BPM

## 2022-11-14 VITALS
RESPIRATION RATE: 17 BRPM | HEIGHT: 68 IN | SYSTOLIC BLOOD PRESSURE: 153 MMHG | WEIGHT: 116.38 LBS | HEART RATE: 79 BPM | TEMPERATURE: 98 F | OXYGEN SATURATION: 98 % | BODY MASS INDEX: 17.64 KG/M2 | DIASTOLIC BLOOD PRESSURE: 74 MMHG

## 2022-11-14 DIAGNOSIS — C01 SQUAMOUS CELL CARCINOMA OF BASE OF TONGUE: Primary | ICD-10-CM

## 2022-11-14 DIAGNOSIS — N18.4 CHRONIC KIDNEY DISEASE (CKD), STAGE IV (SEVERE): ICD-10-CM

## 2022-11-14 DIAGNOSIS — C32.9 LARYNX CANCER: ICD-10-CM

## 2022-11-14 DIAGNOSIS — Z79.899 IMMUNODEFICIENCY DUE TO DRUGS: ICD-10-CM

## 2022-11-14 DIAGNOSIS — D84.821 IMMUNODEFICIENCY DUE TO DRUGS: ICD-10-CM

## 2022-11-14 DIAGNOSIS — H92.02 LEFT EAR PAIN: ICD-10-CM

## 2022-11-14 DIAGNOSIS — C77.0 SECONDARY MALIGNANT NEOPLASM OF CERVICAL LYMPH NODE: ICD-10-CM

## 2022-11-14 DIAGNOSIS — L57.0 ACTINIC KERATOSES: ICD-10-CM

## 2022-11-14 DIAGNOSIS — R53.1 WEAKNESS: ICD-10-CM

## 2022-11-14 DIAGNOSIS — E89.0 POSTOPERATIVE HYPOTHYROIDISM: ICD-10-CM

## 2022-11-14 DIAGNOSIS — D69.6 THROMBOCYTOPENIA: ICD-10-CM

## 2022-11-14 DIAGNOSIS — C77.0 SECONDARY MALIGNANT NEOPLASM OF CERVICAL LYMPH NODE: Primary | ICD-10-CM

## 2022-11-14 DIAGNOSIS — C01 SQUAMOUS CELL CARCINOMA OF BASE OF TONGUE: ICD-10-CM

## 2022-11-14 LAB
ALBUMIN SERPL BCP-MCNC: 3.7 G/DL (ref 3.5–5.2)
ALP SERPL-CCNC: 79 U/L (ref 55–135)
ALT SERPL W/O P-5'-P-CCNC: 15 U/L (ref 10–44)
ANION GAP SERPL CALC-SCNC: 10 MMOL/L (ref 8–16)
AST SERPL-CCNC: 36 U/L (ref 10–40)
BILIRUB SERPL-MCNC: 0.4 MG/DL (ref 0.1–1)
BUN SERPL-MCNC: 20 MG/DL (ref 8–23)
CALCIUM SERPL-MCNC: 10 MG/DL (ref 8.7–10.5)
CHLORIDE SERPL-SCNC: 101 MMOL/L (ref 95–110)
CO2 SERPL-SCNC: 29 MMOL/L (ref 23–29)
CREAT SERPL-MCNC: 1.6 MG/DL (ref 0.5–1.4)
ERYTHROCYTE [DISTWIDTH] IN BLOOD BY AUTOMATED COUNT: 13.2 % (ref 11.5–14.5)
EST. GFR  (NO RACE VARIABLE): 45.2 ML/MIN/1.73 M^2
GLUCOSE SERPL-MCNC: 148 MG/DL (ref 70–110)
HCT VFR BLD AUTO: 31.9 % (ref 40–54)
HGB BLD-MCNC: 10.8 G/DL (ref 14–18)
IMM GRANULOCYTES # BLD AUTO: 0.01 K/UL (ref 0–0.04)
MCH RBC QN AUTO: 34.4 PG (ref 27–31)
MCHC RBC AUTO-ENTMCNC: 33.9 G/DL (ref 32–36)
MCV RBC AUTO: 102 FL (ref 82–98)
NEUTROPHILS # BLD AUTO: 2.4 K/UL (ref 1.8–7.7)
PLATELET # BLD AUTO: 135 K/UL (ref 150–450)
PMV BLD AUTO: 9.4 FL (ref 9.2–12.9)
POTASSIUM SERPL-SCNC: 3.9 MMOL/L (ref 3.5–5.1)
PROT SERPL-MCNC: 7.4 G/DL (ref 6–8.4)
RBC # BLD AUTO: 3.14 M/UL (ref 4.6–6.2)
SODIUM SERPL-SCNC: 140 MMOL/L (ref 136–145)
T4 FREE SERPL-MCNC: 1.05 NG/DL (ref 0.71–1.51)
TSH SERPL DL<=0.005 MIU/L-ACNC: 0.28 UIU/ML (ref 0.4–4)
WBC # BLD AUTO: 3.68 K/UL (ref 3.9–12.7)

## 2022-11-14 PROCEDURE — 84443 ASSAY THYROID STIM HORMONE: CPT | Performed by: STUDENT IN AN ORGANIZED HEALTH CARE EDUCATION/TRAINING PROGRAM

## 2022-11-14 PROCEDURE — A4216 STERILE WATER/SALINE, 10 ML: HCPCS | Performed by: STUDENT IN AN ORGANIZED HEALTH CARE EDUCATION/TRAINING PROGRAM

## 2022-11-14 PROCEDURE — 99215 OFFICE O/P EST HI 40 MIN: CPT | Mod: PBBFAC,25 | Performed by: PHYSICIAN ASSISTANT

## 2022-11-14 PROCEDURE — 99215 OFFICE O/P EST HI 40 MIN: CPT | Mod: S$PBB,,, | Performed by: PHYSICIAN ASSISTANT

## 2022-11-14 PROCEDURE — 99999 PR PBB SHADOW E&M-EST. PATIENT-LVL V: CPT | Mod: PBBFAC,,, | Performed by: PHYSICIAN ASSISTANT

## 2022-11-14 PROCEDURE — A4216 STERILE WATER/SALINE, 10 ML: HCPCS | Performed by: PHYSICIAN ASSISTANT

## 2022-11-14 PROCEDURE — 63600175 PHARM REV CODE 636 W HCPCS: Performed by: STUDENT IN AN ORGANIZED HEALTH CARE EDUCATION/TRAINING PROGRAM

## 2022-11-14 PROCEDURE — 99215 PR OFFICE/OUTPT VISIT, EST, LEVL V, 40-54 MIN: ICD-10-PCS | Mod: S$PBB,,, | Performed by: PHYSICIAN ASSISTANT

## 2022-11-14 PROCEDURE — 96361 HYDRATE IV INFUSION ADD-ON: CPT

## 2022-11-14 PROCEDURE — 99999 PR PBB SHADOW E&M-EST. PATIENT-LVL V: ICD-10-PCS | Mod: PBBFAC,,, | Performed by: PHYSICIAN ASSISTANT

## 2022-11-14 PROCEDURE — 85027 COMPLETE CBC AUTOMATED: CPT | Performed by: STUDENT IN AN ORGANIZED HEALTH CARE EDUCATION/TRAINING PROGRAM

## 2022-11-14 PROCEDURE — 63600175 PHARM REV CODE 636 W HCPCS: Mod: JG | Performed by: PHYSICIAN ASSISTANT

## 2022-11-14 PROCEDURE — 96413 CHEMO IV INFUSION 1 HR: CPT

## 2022-11-14 PROCEDURE — 25000003 PHARM REV CODE 250: Performed by: STUDENT IN AN ORGANIZED HEALTH CARE EDUCATION/TRAINING PROGRAM

## 2022-11-14 PROCEDURE — 80053 COMPREHEN METABOLIC PANEL: CPT | Performed by: STUDENT IN AN ORGANIZED HEALTH CARE EDUCATION/TRAINING PROGRAM

## 2022-11-14 PROCEDURE — 84439 ASSAY OF FREE THYROXINE: CPT | Performed by: STUDENT IN AN ORGANIZED HEALTH CARE EDUCATION/TRAINING PROGRAM

## 2022-11-14 PROCEDURE — 25000003 PHARM REV CODE 250: Performed by: PHYSICIAN ASSISTANT

## 2022-11-14 RX ORDER — HEPARIN 100 UNIT/ML
500 SYRINGE INTRAVENOUS
Status: DISCONTINUED | OUTPATIENT
Start: 2022-11-14 | End: 2022-11-14 | Stop reason: HOSPADM

## 2022-11-14 RX ORDER — SODIUM CHLORIDE 0.9 % (FLUSH) 0.9 %
10 SYRINGE (ML) INJECTION
Status: CANCELLED | OUTPATIENT
Start: 2022-11-14

## 2022-11-14 RX ORDER — SODIUM CHLORIDE 0.9 % (FLUSH) 0.9 %
10 SYRINGE (ML) INJECTION
Status: DISCONTINUED | OUTPATIENT
Start: 2022-11-14 | End: 2022-11-14 | Stop reason: HOSPADM

## 2022-11-14 RX ORDER — HEPARIN 100 UNIT/ML
500 SYRINGE INTRAVENOUS
Status: CANCELLED | OUTPATIENT
Start: 2022-11-14

## 2022-11-14 RX ADMIN — Medication 10 ML: at 04:11

## 2022-11-14 RX ADMIN — Medication 10 ML: at 01:11

## 2022-11-14 RX ADMIN — SODIUM CHLORIDE 1000 ML: 0.9 INJECTION, SOLUTION INTRAVENOUS at 03:11

## 2022-11-14 RX ADMIN — HEPARIN 500 UNITS: 100 SYRINGE at 01:11

## 2022-11-14 RX ADMIN — HEPARIN 500 UNITS: 100 SYRINGE at 04:11

## 2022-11-14 RX ADMIN — SODIUM CHLORIDE 200 MG: 9 INJECTION, SOLUTION INTRAVENOUS at 03:11

## 2022-11-14 NOTE — PLAN OF CARE
1620 pt tolerated 1 liter normal saline and Keytruda infusion without issue, pt to rtc 12/5/22, no distress noted upon d/c to home

## 2022-11-14 NOTE — PLAN OF CARE
1510 pt here for keytruda infusion D1C15 with 1 liter normal saline, labs, hx, meds, allergies reviewed, pt with no new complaints at this time, reclined in chair, continue to monitor

## 2022-11-14 NOTE — PROGRESS NOTES
PATIENT: Rocco Swain  MRN: [id number]  DATE: 11/15/2022      Diagnosis:   1. Squamous cell carcinoma of base of tongue    2. Secondary malignant neoplasm of cervical lymph node    3. Immunodeficiency due to drugs    4. Larynx cancer    5. Postoperative hypothyroidism    6. Thrombocytopenia    7. Chronic kidney disease (CKD), stage IV (severe)    8. Left ear pain    9. Actinic keratoses          Chief Complaint: Squamous cell carcinoma of base of tongue      Oncologic History:    Oncologic History 1. Squamous cell carcinoma of base of tongue with recurrence    Oncologic Treatment 1. Chemoradiation completed 4/2016  2. S/p glossectomy, laryngectomy, and bilateral neck dissection for persistent/recurrent disease  3. 10/6/2020-8/24/21 PCC  4. 9/13/21 start carboplatin/5-FU/pembrolizumab; C2 pembrolizumab only; C4 restarted chemoIO    Pathology P16 positive squamous cell carcinoma with basaloid features.        Subjective:    Interval History: Mr. Swain is here for follow up    Since his last visit he is doing well. Had surgery on his right eye; conjunctivitis improved.  Has had worsening intermittent otalgia (now 3x/day for the past few months). No known provoking factors.  Chronic neck pain stable. He continues to have L ear pain but was seen by Dr. Myers for this. The pain occurs more when he is chewing food. He is due to see his dentist soon. Dr. Myers thinks that he can just watch it. He is eating ok. Was also seen by Dermatology to treat actinic keratoses with Imiquimod. Overall, doing well and does not have any further concerns at this time.   He is alone at this visit.  Communication from him is 100% written.  Past Medical History:   Past Medical History:   Diagnosis Date    Basal cell carcinoma (BCC) in situ of skin 2012    3 on face, 2 on arm, removed by dermatology.     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 Underwent surgical resection of tongue and neck. s/p tracheostomy       Past Surgical HIstory:   Past Surgical History:   Procedure Laterality Date    COLONOSCOPY      CONJUNCTIVA BIOPSY Right 10/11/2022    Procedure: BIOPSY, CONJUNCTIVA;  Surgeon: Victor M Vasques MD;  Location: [facility];  Service: Ophthalmology;  Laterality: Right;    INSERTION OF VENOUS ACCESS PORT Right 3/8/2021    Procedure: INSERTION, VENOUS ACCESS PORT;  Surgeon: Jesus Rendon MD;  Location: [address];  Service: General;  Laterality: Right;    LIVER TRANSPLANT  11/2008    transplanted for biopsy proven hepatocellular carcinoma,     LYMPH NODE BIOPSY N/A 9/18/2020    Procedure: BIOPSY, LYMPH NODE;  [facility];  Location: [address];  Service: General;  Laterality: N/A;  Rm 173    TRACHEOSTOMY         Family History:   Family History   Problem Relation Age of Onset    Dementia Mother        Social History:  reports that he has never smoked. He has never used smokeless tobacco. He reports current alcohol use. He reports that he does not currently use drugs.    Allergies:  Review of patient's allergies indicates:  No Known Allergies    Medications:  Current Outpatient Medications   Medication Sig Dispense Refill    azelaic acid (AZELEX) 15 % gel APPLY TOPICALLY TO AFFECTED AREA IN THE MORNING 50 g 3    diphenoxylate-atropine 2.5-0.025 mg (LOMOTIL) 2.5-0.025 mg per tablet Take 1 tablet by mouth 4 (four) times daily as needed for Diarrhea (use when loperamide/imodium does not work). 30 tablet 0    ibuprofen (ADVIL,MOTRIN) 200 MG tablet Take 200 mg by mouth.      imiquimod (ALDARA) 5 % cream Apply to biopsy site on frontal scalp + about a centimeter of surrounding skin qhs x 16 weeks. Wash off in am and apply sunscreen. Discontinue if skin becomes blistered,  raw, bleeding, painful, etc. 24 packet 3    levothyroxine (SYNTHROID) 88 MCG tablet TAKE 1 TABLET BY MOUTH ONCE DAILY 90 tablet 3    LIDOcaine HCl 2% (LIDOCAINE VISCOUS) 2 % Soln Swish and spit 15 mls every 8 (eight) hours as needed (mouth sore). 100 mL 3    LIDOcaine-prilocaine (EMLA) cream Apply generously to port site 30-60 min prior to chemo and then cover with saran wrap. 30 g 2    loperamide (IMODIUM) 2 mg capsule Take 1 capsule (2 mg total) by mouth 4 (four) times daily as needed for Diarrhea. 30 capsule 1    loteprednol (LOTEMAX) 0.5 % ophthalmic suspension Place 1 drop into the right eye 4 (four) times daily. 5 mL 1    magic mouthwash diphen/antac/lidoc/nysta Take 10 mLs by mouth 4 (four) times daily. 120 mL 4    metroNIDAZOLE (METROGEL) 0.75 % gel Apply topically to affected area 2 (two) times daily. 45 g 1    multivit-min/FA/lycopen/lutein (CENTRUM SILVER MEN ORAL) Take 1 tablet by mouth.      multivitamin capsule Take 1 capsule by mouth once daily.      oxyCODONE (ROXICODONE) 10 mg Tab immediate release tablet Take 1 tablet (10 mg total) by mouth every 6 (six) hours as needed for Pain. 120 tablet 0    sulfacetamide sodium-sulfur 10-5 % (w/w) Clsr USE TO WASH AFFECTED AREA DAILY      tacrolimus (PROGRAF) 0.5 MG Cap Take 1 capsule (0.5 mg total) by mouth every evening. Use the 1mg capsule for your morning dose 90 capsule 3    tacrolimus (PROGRAF) 1 MG Cap Take 1 capsule (1 mg total) by mouth every morning. Use the tacrolimus 0.5mg capsule for your evening dose as directed. 90 capsule 3    tiZANidine (ZANAFLEX) 2 MG tablet Take 1 tablet (2 mg total) by mouth nightly as needed (neck muscle strain). 30 tablet 0    traZODone (DESYREL) 50 MG tablet TAKE 1 TABLET BY MOUTH IN  THE EVENING 90 tablet 3    vitamin E 400 UNIT capsule Take 400 Units by mouth once daily.       No current facility-administered medications for this visit.     Facility-Administered Medications Ordered in Other Visits   Medication Dose  "Route Frequency Provider Last Rate Last Admin    heparin, porcine (PF) 100 unit/mL injection flush 500 Units  500 Units Intravenous PRN Ronald Berg MD        ofloxacin 0.3 % ophthalmic solution 1 drop  1 drop Right Eye On Call Procedure Victor M Vasques MD   2 drop at 10/11/22 0745    sodium chloride 0.9% flush 10 mL  10 mL Intravenous PRN Ronald Berg MD        sodium chloride 0.9% flush 10 mL  10 mL Intravenous PRN Victor M Vasques MD           Review of Systems   Constitutional:  Negative for activity change, appetite change, chills, diaphoresis, fatigue, fever and unexpected weight change.   HENT:  Positive for ear pain (intermittent) and trouble swallowing. Negative for ear discharge, mouth sores and nosebleeds.    Eyes:  Negative for visual disturbance.   Respiratory:  Negative for cough, chest tightness, shortness of breath and wheezing.    Cardiovascular:  Negative for chest pain and leg swelling.   Gastrointestinal:  Negative for abdominal distention, abdominal pain, blood in stool, constipation, diarrhea, nausea and vomiting.   Endocrine: Negative for cold intolerance and heat intolerance.   Genitourinary:  Negative for difficulty urinating and dysuria.   Musculoskeletal:  Positive for myalgias and neck pain (pain radiates between both sides of his neck under his jaw.). Negative for arthralgias and back pain.   Skin:  Negative for color change and rash.   Neurological:  Negative for dizziness, weakness, light-headedness, numbness and headaches.   Hematological:  Negative for adenopathy. Bruises/bleeds easily.   Psychiatric/Behavioral:  Negative for confusion.      ECOG Performance Status:      ECOG SCORE             Objective:      Vitals:   Vitals:    11/14/22 1439   BP: (!) 153/74   Pulse: 79   Resp: 17   Temp: 98 °F (36.7 °C)   TempSrc: Oral   SpO2: 98%   Weight: 52.8 kg (116 lb 6.5 oz)   Height: 5' 8" (1.727 m)     BMI: Body mass index is 17.7 kg/m².  Wt Readings from Last 10 Encounters: "   11/14/22 52.8 kg (116 lb 6.5 oz)   11/14/22 52.8 kg (116 lb 6.5 oz)   10/24/22 52.8 kg (116 lb 6.5 oz)   10/24/22 52.8 kg (116 lb 6.5 oz)   10/05/22 52.2 kg (115 lb)   10/03/22 52.7 kg (116 lb 2.9 oz)   09/30/22 53.8 kg (118 lb 9.7 oz)   09/12/22 53.1 kg (117 lb 1 oz)   08/25/22 52.6 kg (116 lb)   08/23/22 52.6 kg (116 lb)       Physical Exam  Constitutional:       General: He is not in acute distress.     Appearance: Normal appearance. He is not ill-appearing.   HENT:      Head: Normocephalic and atraumatic.      Mouth/Throat:      Mouth: Mucous membranes are moist.      Pharynx: No oropharyngeal exudate or posterior oropharyngeal erythema.   Eyes:      General: No scleral icterus.     Extraocular Movements: Extraocular movements intact.      Conjunctiva/sclera: Conjunctivae normal.      Pupils: Pupils are equal, round, and reactive to light.   Cardiovascular:      Rate and Rhythm: Normal rate and regular rhythm.      Heart sounds: No murmur heard.    No friction rub. No gallop.   Pulmonary:      Effort: Pulmonary effort is normal. No respiratory distress.      Breath sounds: No stridor. No wheezing, rhonchi or rales.   Abdominal:      General: Bowel sounds are normal. There is no distension.      Palpations: Abdomen is soft. There is no mass.      Tenderness: There is no abdominal tenderness. There is no guarding or rebound.   Musculoskeletal:         General: Normal range of motion.      Cervical back: Normal range of motion and neck supple. No tenderness.      Right lower leg: No edema.      Left lower leg: No edema.   Lymphadenopathy:      Cervical: No cervical adenopathy.      Upper Body:      Right upper body: No supraclavicular or axillary adenopathy.      Left upper body: No supraclavicular or axillary adenopathy.   Skin:     General: Skin is warm and dry.   Neurological:      General: No focal deficit present.      Mental Status: He is alert.       Laboratory Data:   Recent Results (from the past 168  hour(s))   CBC Oncology    Collection Time: 11/14/22  1:34 PM   Result Value Ref Range    WBC 3.68 (L) 3.90 - 12.70 K/uL    RBC 3.14 (L) 4.60 - 6.20 M/uL    Hemoglobin 10.8 (L) 14.0 - 18.0 g/dL    Hematocrit 31.9 (L) 40.0 - 54.0 %     (H) 82 - 98 fL    MCH 34.4 (H) 27.0 - 31.0 pg    MCHC 33.9 32.0 - 36.0 g/dL    RDW 13.2 11.5 - 14.5 %    Platelets 135 (L) 150 - 450 K/uL    MPV 9.4 9.2 - 12.9 fL    Gran # (ANC) 2.4 1.8 - 7.7 K/uL    Immature Grans (Abs) 0.01 0.00 - 0.04 K/uL   CMP    Collection Time: 11/14/22  1:34 PM   Result Value Ref Range    Sodium 140 136 - 145 mmol/L    Potassium 3.9 3.5 - 5.1 mmol/L    Chloride 101 95 - 110 mmol/L    CO2 29 23 - 29 mmol/L    Glucose 148 (H) 70 - 110 mg/dL    BUN 20 8 - 23 mg/dL    Creatinine 1.6 (H) 0.5 - 1.4 mg/dL    Calcium 10.0 8.7 - 10.5 mg/dL    Total Protein 7.4 6.0 - 8.4 g/dL    Albumin 3.7 3.5 - 5.2 g/dL    Total Bilirubin 0.4 0.1 - 1.0 mg/dL    Alkaline Phosphatase 79 55 - 135 U/L    AST 36 10 - 40 U/L    ALT 15 10 - 44 U/L    Anion Gap 10 8 - 16 mmol/L    eGFR 45.2 (A) >60 mL/min/1.73 m^2   TSH    Collection Time: 11/14/22  1:34 PM   Result Value Ref Range    TSH 0.283 (L) 0.400 - 4.000 uIU/mL   T4, Free    Collection Time: 11/14/22  1:34 PM   Result Value Ref Range    Free T4 1.05 0.71 - 1.51 ng/dL             Imaging:         Assessment:       1. Squamous cell carcinoma of base of tongue    2. Secondary malignant neoplasm of cervical lymph node    3. Immunodeficiency due to drugs    4. Larynx cancer    5. Postoperative hypothyroidism    6. Thrombocytopenia    7. Chronic kidney disease (CKD), stage IV (severe)    8. Left ear pain    9. Actinic keratoses    1-4. Pt doing well overall, and continues to tolerate treatment well. Lab work has been reviewed and adequate for treatment.   -Proceed with next cycle of Pembro  -RTC in 3 weeks for next cycle, lab work and PET/CT with clinic visit with Dr. Berg.     5. TSH low, free T4 normal. Will continue to monitor  while on Pembro    6. TCP stable.     7. Cr 1.6. Will give IVF today with chemotherapy. Advised to increase water hydration with at least 64 oz of water per day    8. Will continue to monitor. Advised patient to discuss jaw and ear pain with Dentist, given that it worsens with chewing. He may need to see a maxilla-facial surgeon as well if he has TMJ. Pt is agreeable.     9. Continue to f/u with Dermatology    RTC in 3 weeks.        Plan:       Problem List Items Addressed This Visit          ENT    Left ear pain       Renal/    Chronic kidney disease (CKD), stage IV (severe)       Immunology/Multi System    Immunodeficiency due to drugs    Overview     S/p liver transplant and is currently on tacrolimus.  Afebrile, ANC sufficient for treatment  -monitor cbc            Hematology    Thrombocytopenia       Oncology    Larynx cancer    Overview     Status post total laryngectomy, total glossectomy and anterolateral thigh free flap reconstruction roughly 2017 at [facility] in [place] for persistent/recurrent base of tongue sq.c.c.         Squamous cell carcinoma of base of tongue - Primary    Overview     Per OSH records, initially presented in 2015 with symptoms and cT2N2c disease. page 91 of 3/25/2020 outside records clinic (media tab).  Initially presented 8/2015 with left sided odynophagia.  Treated for reflux, which did not improve.    12/11/15 he had a fiberoptic exam showing discolored mucosal area of the left base of the tongue.    1/13/16 he had persistent dysphagia, odynophagia, and new left otalgia.  MRI showed an irregular mass along the left posterior margin of the base of the tongue measuring 1.8x2.3cm with ipsilateral level 2 lymph node measuring 1.7cm with central necrosis and contralateral level 2 node measuring 1.3cm with questionable necrosis.    1/19/2016 he had direct laryngoscopy with biopsy left of midline, proximal to vallecula, which was p16 positive, invasive, with moderately to  poorly differentiated sq.c.c. with basaloid features.    1/29/16 met with medical oncology who recommended chemoxrt.    2/1/16  staging PET CT showed hypermetabolic mass at base of tongue 4x2.5x3.5cm, max SUV 24.13, hypermetabolic lymph node left neck SUV 6.77, small subcentimeter lymph node right neck max SUV 3.79 (exact size of lymph nodes not mentioned in the summary).    2/15/16 started chemoxrt to left tongue base, bilateral cervical neck levels 1b-5, treated with 6MV photons utilizing IMRT, 5000 cGy in 200 cGy daily fractions.  Cone down boost to left base of tongue with additional 2000 cGy in 200 cGy daily fractions to gross disease.  Received weekly cisplatin with radiation but had to switch to carboplatin due to worsening renal function. during this time also missed several weekly treatments of cisplatin.    4/1/2016 completed chemoxrt.   11/1/2016 Repeat biopsy of left tongue showed persistent disease.  S/p salvage glossectomy with laryngectomy,  rT4aN0, p16 positive, base of tongue squamous cell carcinoma.     page 61 of 3/25/2020 outside records clinic (media tab)  Pathology: Tonsil, left tonsil fold: reactive squamous mucosa with inflammation and degenerating skeletal muscle, no carcinoma seen.  Right and Left base of tongue:  Reactive squamous mucosa and submucosa with inflammation, no carcinoma seen.  Neck, glossectomy, laryngectomy, bilateral neck dissection:  invasive squamous cell carcinoma.  tumor site: base of tongue/hypopharynx.  Small focus of tumor is seen within the hyoid  bone.  specimen Size [id number]  tumor size [id number]  depth of invasion 20mm  TNM stage pT4a, pN0, pMx  Lymph nodes, included in all parts:  number involved 0  number examined 3.  distance of tumor from margins  positive inked margins: none  within 1mm : none  within 1-2mm: anterior-inferior    Bilateral neck dissection:  level 1: one lymph node and submandibular gland, negative for tumor  level II : two lymph nodes,  negative for tumor  level III: not identified  level IV: not identified  level V: not identified.    6/20/17 - PETCT with FDG avidity of left neck lymph node, level 3, SUV 2.9.  No other evidence of local or distant disease.  7/6/2017 - biopsy of left neck lymph node with IR.  Sample was non-diagnostic. Notes from path report: Less than optimal scan specimen with minute tissue core of stromal fibroadipose tissue.  definitive tati background is not seen.  9/28/2017 - Repeat PET CT showing mild activity SUV 3.5 (increased from 3.2 previously; size 7.8x7.4x9.5mm).  Also new findings under left pectoralis minor (mild focal increased activity, indeterminate but new since previous exam)  3/22/2018 - CT neck shows no evidence of disease and level 2b lymph node decreased to 4.9mmx4.5mmx4.9mm from 7.8x7.4x9.5mm previously.    Seen by Dr. Myers 8/3/2020 for left-sided neck pain and a swelling in left level 2 several weeks ago.  He feels it arose after he began lifting weights.  CT neck 8/5/2020 : Lymph nodes At the left neck level L2 there is a heterogeneous soft tissue density with central hypoattenuation likely reflecting a necrotic lymph node measuring 1.6 x 1.4 cm in transverse dimension, 2.5 cm in craniocaudal dimension.  There is a similar appearing irregular soft tissue density at the right neck level 2 measuring 1.1 x 9.0 cm in transverse dimension and 1.5 cm craniocaudal dimension.  No other abnormal appearing or enlarged lymph nodes found.  Impression: At level 2 within the neck bilaterally, there are  soft tissue density masses with central hypoattenuation likely reflective of necrotic lymph nodes.  Findings are suspicious for malignancy recurrence.  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  Staging PET CT 9/30/2020 with left sided hypermetabolic node and right sided enlarged node without uptake.  Also has mandibular vestibule uptake, which may be another  site of disease. Discussed at tumor board.  He is not a surgical or radiation candidate.  -Started on PCC 10/6/2020 followed by maintenance cetuximab until 8/24/21 (discontinued due to progression of disease; continued increase in SUV uptake, no new lesions).  9/2021 started on carbo/5-FU/pembrolizumab; due to toxicity went to pembrolizumab monotherapy starting with cycle 2.  Progression of disease noted after cycle 3 so added chemotherapy back in with cycle 4.         Relevant Orders    CBC Oncology    Comprehensive Metabolic Panel    NM PET CT Routine Skull to Mid Thigh    Secondary malignant neoplasm of cervical lymph node    Overview     See squamous cell carcinoma            Endocrine    Postoperative hypothyroidism    Relevant Orders    TSH    T4, Free     Other Visit Diagnoses       Actinic keratoses              Orders Placed This Encounter   Procedures    NM PET CT Routine Skull to Mid Thigh    CBC Oncology    Comprehensive Metabolic Panel    TSH    T4, Free     Pte and family members displayed understanding of the above encounter and treatment plan. All thoughtful questions were answered to their satisfaction. Pte was advised to notify the care team or proceed to the ER if signs and symptoms worsen.       ANA Marie, PA-C  Physician Assistant Certified  Dept of Hematology/Oncology  PA-C to Dr. Lovelace, Dr. Nieves and Dr. Bull         Route Chart for Scheduling    Med Onc Chart Routing      Follow up with physician 3 weeks. See Dr. Berg in 3 weeks with cbc, cmp, TSH, T4 free, PET/CT and next cycle of Pembro   Follow up with [facility]    Infusion scheduling note    Injection scheduling note    Labs CBC, CMP, TSH and free T4   Lab interval: every 3 weeks     Imaging PET scan   Please schedule in 3 weeks prior to next cycle   Pharmacy appointment    Other referrals          Treatment Plan Information   OP HEAD NECK PEMBROLIZUMAB CARBOPLATIN FLUOROURACIL (C1 ONLY RECEIVED) FOLLOWED BY PEMBROLIZUMAB MAINTENANCE    Ronald Berg MD   Upcoming Treatment Dates - OP HEAD NECK PEMBROLIZUMAB CARBOPLATIN FLUOROURACIL (C1 ONLY RECEIVED) FOLLOWED BY PEMBROLIZUMAB MAINTENANCE    12/5/2022       Immunotherapy       pembrolizumab (KEYTRUDA) 200 mg in sodium chloride 0.9% 108 mL infusion  12/26/2022       Immunotherapy       pembrolizumab (KEYTRUDA) 200 mg in sodium chloride 0.9% 108 mL infusion  1/16/2023       Immunotherapy       pembrolizumab (KEYTRUDA) 200 mg in sodium chloride 0.9% 108 mL infusion  2/6/2023       Immunotherapy       pembrolizumab (KEYTRUDA) 200 mg in sodium chloride 0.9% 108 mL infusion    Therapy Plan Information  Flushes  heparin, porcine (PF) 100 unit/mL injection flush 500 Units  500 Units, Intravenous, Every visit  sodium chloride 0.9% flush 10 mL  10 mL, Intravenous, Every visit

## 2022-11-15 DIAGNOSIS — C01 SQUAMOUS CELL CARCINOMA OF BASE OF TONGUE: Primary | ICD-10-CM

## 2022-11-15 DIAGNOSIS — R53.1 WEAKNESS: ICD-10-CM

## 2022-11-17 ENCOUNTER — PATIENT MESSAGE (OUTPATIENT)
Dept: DERMATOLOGY | Facility: CLINIC | Age: 72
End: 2022-11-17
Payer: MEDICARE

## 2022-11-30 DIAGNOSIS — C01 SQUAMOUS CELL CARCINOMA OF BASE OF TONGUE: ICD-10-CM

## 2022-11-30 RX ORDER — OXYCODONE HYDROCHLORIDE 10 MG/1
10 TABLET ORAL EVERY 6 HOURS PRN
Qty: 120 TABLET | Refills: 0 | Status: SHIPPED | OUTPATIENT
Start: 2022-11-30 | End: 2022-12-27

## 2022-12-02 ENCOUNTER — HOSPITAL ENCOUNTER (OUTPATIENT)
Dept: RADIOLOGY | Facility: HOSPITAL | Age: 72
Discharge: HOME | End: 2022-12-02
Attending: PHYSICIAN ASSISTANT
Payer: MEDICARE

## 2022-12-02 DIAGNOSIS — C01 SQUAMOUS CELL CARCINOMA OF BASE OF TONGUE: ICD-10-CM

## 2022-12-02 PROCEDURE — 78815 PET IMAGE W/CT SKULL-THIGH: CPT | Mod: 26,PS,, | Performed by: STUDENT IN AN ORGANIZED HEALTH CARE EDUCATION/TRAINING PROGRAM

## 2022-12-02 PROCEDURE — 78815 NM PET CT ROUTINE: ICD-10-PCS | Mod: 26,PS,, | Performed by: STUDENT IN AN ORGANIZED HEALTH CARE EDUCATION/TRAINING PROGRAM

## 2022-12-02 PROCEDURE — 78815 PET IMAGE W/CT SKULL-THIGH: CPT | Mod: TC,PS

## 2022-12-05 ENCOUNTER — INFUSION (OUTPATIENT)
Dept: INFUSION THERAPY | Facility: HOSPITAL | Age: 72
End: 2022-12-05
Payer: MEDICARE

## 2022-12-05 ENCOUNTER — OFFICE VISIT (OUTPATIENT)
Dept: HEMATOLOGY/ONCOLOGY | Facility: CLINIC | Age: 72
End: 2022-12-05
Payer: MEDICARE

## 2022-12-05 ENCOUNTER — INFUSION (OUTPATIENT)
Dept: INFUSION THERAPY | Facility: HOSPITAL | Age: 72
End: 2022-12-05
Attending: STUDENT IN AN ORGANIZED HEALTH CARE EDUCATION/TRAINING PROGRAM
Payer: MEDICARE

## 2022-12-05 VITALS
HEART RATE: 74 BPM | RESPIRATION RATE: 18 BRPM | HEIGHT: 68 IN | TEMPERATURE: 98 F | DIASTOLIC BLOOD PRESSURE: 75 MMHG | BODY MASS INDEX: 17.81 KG/M2 | OXYGEN SATURATION: 99 % | SYSTOLIC BLOOD PRESSURE: 145 MMHG | WEIGHT: 117.5 LBS

## 2022-12-05 VITALS
DIASTOLIC BLOOD PRESSURE: 64 MMHG | SYSTOLIC BLOOD PRESSURE: 138 MMHG | WEIGHT: 117.5 LBS | OXYGEN SATURATION: 98 % | RESPIRATION RATE: 18 BRPM | TEMPERATURE: 98 F | HEIGHT: 68 IN | HEART RATE: 77 BPM | BODY MASS INDEX: 17.81 KG/M2

## 2022-12-05 DIAGNOSIS — C01 SQUAMOUS CELL CARCINOMA OF BASE OF TONGUE: ICD-10-CM

## 2022-12-05 DIAGNOSIS — G62.89 OTHER POLYNEUROPATHY: ICD-10-CM

## 2022-12-05 DIAGNOSIS — E89.0 POSTOPERATIVE HYPOTHYROIDISM: Primary | ICD-10-CM

## 2022-12-05 DIAGNOSIS — C01 SQUAMOUS CELL CARCINOMA OF BASE OF TONGUE: Primary | ICD-10-CM

## 2022-12-05 DIAGNOSIS — Z94.4 STATUS POST LIVER TRANSPLANTATION: ICD-10-CM

## 2022-12-05 DIAGNOSIS — T86.49 CIRRHOSIS OF TRANSPLANTED LIVER: ICD-10-CM

## 2022-12-05 DIAGNOSIS — C77.0 SECONDARY MALIGNANT NEOPLASM OF CERVICAL LYMPH NODE: ICD-10-CM

## 2022-12-05 DIAGNOSIS — C32.9 LARYNX CANCER: ICD-10-CM

## 2022-12-05 DIAGNOSIS — C77.0 SECONDARY MALIGNANT NEOPLASM OF CERVICAL LYMPH NODE: Primary | ICD-10-CM

## 2022-12-05 DIAGNOSIS — E89.0 POSTOPERATIVE HYPOTHYROIDISM: ICD-10-CM

## 2022-12-05 DIAGNOSIS — N18.4 CHRONIC KIDNEY DISEASE (CKD), STAGE IV (SEVERE): ICD-10-CM

## 2022-12-05 DIAGNOSIS — K74.60 CIRRHOSIS OF TRANSPLANTED LIVER: ICD-10-CM

## 2022-12-05 DIAGNOSIS — D69.6 THROMBOCYTOPENIA: ICD-10-CM

## 2022-12-05 LAB
ALBUMIN SERPL BCP-MCNC: 3.8 G/DL (ref 3.5–5.2)
ALP SERPL-CCNC: 74 U/L (ref 55–135)
ALT SERPL W/O P-5'-P-CCNC: 22 U/L (ref 10–44)
ANION GAP SERPL CALC-SCNC: 10 MMOL/L (ref 8–16)
AST SERPL-CCNC: 51 U/L (ref 10–40)
BILIRUB SERPL-MCNC: 0.6 MG/DL (ref 0.1–1)
BUN SERPL-MCNC: 18 MG/DL (ref 8–23)
CALCIUM SERPL-MCNC: 9.6 MG/DL (ref 8.7–10.5)
CHLORIDE SERPL-SCNC: 104 MMOL/L (ref 95–110)
CO2 SERPL-SCNC: 27 MMOL/L (ref 23–29)
CREAT SERPL-MCNC: 1.8 MG/DL (ref 0.5–1.4)
ERYTHROCYTE [DISTWIDTH] IN BLOOD BY AUTOMATED COUNT: 12.8 % (ref 11.5–14.5)
EST. GFR  (NO RACE VARIABLE): 39.3 ML/MIN/1.73 M^2
GLUCOSE SERPL-MCNC: 163 MG/DL (ref 70–110)
HCT VFR BLD AUTO: 33.4 % (ref 40–54)
HGB BLD-MCNC: 11.4 G/DL (ref 14–18)
IMM GRANULOCYTES # BLD AUTO: 0.01 K/UL (ref 0–0.04)
MCH RBC QN AUTO: 34.1 PG (ref 27–31)
MCHC RBC AUTO-ENTMCNC: 34.1 G/DL (ref 32–36)
MCV RBC AUTO: 100 FL (ref 82–98)
NEUTROPHILS # BLD AUTO: 1.5 K/UL (ref 1.8–7.7)
PLATELET # BLD AUTO: 105 K/UL (ref 150–450)
PMV BLD AUTO: 10 FL (ref 9.2–12.9)
POTASSIUM SERPL-SCNC: 4.1 MMOL/L (ref 3.5–5.1)
PROT SERPL-MCNC: 7.4 G/DL (ref 6–8.4)
RBC # BLD AUTO: 3.34 M/UL (ref 4.6–6.2)
SODIUM SERPL-SCNC: 141 MMOL/L (ref 136–145)
T4 FREE SERPL-MCNC: 1.14 NG/DL (ref 0.71–1.51)
TSH SERPL DL<=0.005 MIU/L-ACNC: 0.23 UIU/ML (ref 0.4–4)
WBC # BLD AUTO: 2.43 K/UL (ref 3.9–12.7)

## 2022-12-05 PROCEDURE — 99999 PR PBB SHADOW E&M-EST. PATIENT-LVL IV: ICD-10-PCS | Mod: PBBFAC,,, | Performed by: STUDENT IN AN ORGANIZED HEALTH CARE EDUCATION/TRAINING PROGRAM

## 2022-12-05 PROCEDURE — 36415 COLL VENOUS BLD VENIPUNCTURE: CPT | Performed by: STUDENT IN AN ORGANIZED HEALTH CARE EDUCATION/TRAINING PROGRAM

## 2022-12-05 PROCEDURE — 25000003 PHARM REV CODE 250: Performed by: STUDENT IN AN ORGANIZED HEALTH CARE EDUCATION/TRAINING PROGRAM

## 2022-12-05 PROCEDURE — 85027 COMPLETE CBC AUTOMATED: CPT | Performed by: STUDENT IN AN ORGANIZED HEALTH CARE EDUCATION/TRAINING PROGRAM

## 2022-12-05 PROCEDURE — 99214 OFFICE O/P EST MOD 30 MIN: CPT | Mod: PBBFAC,25 | Performed by: STUDENT IN AN ORGANIZED HEALTH CARE EDUCATION/TRAINING PROGRAM

## 2022-12-05 PROCEDURE — A4216 STERILE WATER/SALINE, 10 ML: HCPCS | Performed by: STUDENT IN AN ORGANIZED HEALTH CARE EDUCATION/TRAINING PROGRAM

## 2022-12-05 PROCEDURE — 96413 CHEMO IV INFUSION 1 HR: CPT

## 2022-12-05 PROCEDURE — 99215 PR OFFICE/OUTPT VISIT, EST, LEVL V, 40-54 MIN: ICD-10-PCS | Mod: S$PBB,,, | Performed by: STUDENT IN AN ORGANIZED HEALTH CARE EDUCATION/TRAINING PROGRAM

## 2022-12-05 PROCEDURE — 99215 OFFICE O/P EST HI 40 MIN: CPT | Mod: S$PBB,,, | Performed by: STUDENT IN AN ORGANIZED HEALTH CARE EDUCATION/TRAINING PROGRAM

## 2022-12-05 PROCEDURE — 84439 ASSAY OF FREE THYROXINE: CPT | Performed by: STUDENT IN AN ORGANIZED HEALTH CARE EDUCATION/TRAINING PROGRAM

## 2022-12-05 PROCEDURE — 80053 COMPREHEN METABOLIC PANEL: CPT | Performed by: STUDENT IN AN ORGANIZED HEALTH CARE EDUCATION/TRAINING PROGRAM

## 2022-12-05 PROCEDURE — 63600175 PHARM REV CODE 636 W HCPCS: Performed by: STUDENT IN AN ORGANIZED HEALTH CARE EDUCATION/TRAINING PROGRAM

## 2022-12-05 PROCEDURE — 99999 PR PBB SHADOW E&M-EST. PATIENT-LVL IV: CPT | Mod: PBBFAC,,, | Performed by: STUDENT IN AN ORGANIZED HEALTH CARE EDUCATION/TRAINING PROGRAM

## 2022-12-05 PROCEDURE — 84443 ASSAY THYROID STIM HORMONE: CPT | Performed by: STUDENT IN AN ORGANIZED HEALTH CARE EDUCATION/TRAINING PROGRAM

## 2022-12-05 PROCEDURE — 36591 DRAW BLOOD OFF VENOUS DEVICE: CPT

## 2022-12-05 PROCEDURE — 63600175 PHARM REV CODE 636 W HCPCS: Mod: JG | Performed by: STUDENT IN AN ORGANIZED HEALTH CARE EDUCATION/TRAINING PROGRAM

## 2022-12-05 RX ORDER — HEPARIN 100 UNIT/ML
500 SYRINGE INTRAVENOUS
Status: CANCELLED | OUTPATIENT
Start: 2022-12-05

## 2022-12-05 RX ORDER — GABAPENTIN 300 MG/1
300 CAPSULE ORAL NIGHTLY
Qty: 30 CAPSULE | Refills: 11 | Status: SHIPPED | OUTPATIENT
Start: 2022-12-05 | End: 2022-12-05

## 2022-12-05 RX ORDER — HEPARIN 100 UNIT/ML
500 SYRINGE INTRAVENOUS
Status: DISCONTINUED | OUTPATIENT
Start: 2022-12-05 | End: 2022-12-05 | Stop reason: HOSPADM

## 2022-12-05 RX ORDER — GABAPENTIN 300 MG/1
300 CAPSULE ORAL NIGHTLY
Qty: 30 CAPSULE | Refills: 11 | Status: SHIPPED | OUTPATIENT
Start: 2022-12-05 | End: 2023-12-05

## 2022-12-05 RX ORDER — SODIUM CHLORIDE 0.9 % (FLUSH) 0.9 %
10 SYRINGE (ML) INJECTION
Status: DISCONTINUED | OUTPATIENT
Start: 2022-12-05 | End: 2022-12-05 | Stop reason: HOSPADM

## 2022-12-05 RX ORDER — SODIUM CHLORIDE 0.9 % (FLUSH) 0.9 %
10 SYRINGE (ML) INJECTION
Status: CANCELLED | OUTPATIENT
Start: 2022-12-05

## 2022-12-05 RX ADMIN — Medication 10 ML: at 03:12

## 2022-12-05 RX ADMIN — HEPARIN 500 UNITS: 100 SYRINGE at 03:12

## 2022-12-05 RX ADMIN — HEPARIN 500 UNITS: 100 SYRINGE at 11:12

## 2022-12-05 RX ADMIN — SODIUM CHLORIDE 200 MG: 9 INJECTION, SOLUTION INTRAVENOUS at 02:12

## 2022-12-05 RX ADMIN — Medication 10 ML: at 11:12

## 2022-12-05 RX ADMIN — SODIUM CHLORIDE: 9 INJECTION, SOLUTION INTRAVENOUS at 02:12

## 2022-12-05 NOTE — PLAN OF CARE
Pt received Keytruda today and tolerated well, without complications. Educated patient about Keytruda (indications, side effects, possible reactions, chemotherapy precautions) and acknowledged understanding.  VSS. CW port positive for blood return, saline flushed, Heparin flush instilled to dwell and de accessed prior to DC. Pt DC with no distress noted ambulated off unit w/o event, via self, pleased.

## 2022-12-05 NOTE — PROGRESS NOTES
PATIENT: Rocco Swain  MRN: [id number]  DATE: 12/5/2022      Diagnosis:   1. Squamous cell carcinoma of base of tongue    2. Other polyneuropathy    3. Secondary malignant neoplasm of cervical lymph node    4. Larynx cancer    5. Postoperative hypothyroidism    6. Status post liver transplantation    7. Cirrhosis of transplanted liver    8. Thrombocytopenia        Chief Complaint: Squamous cell carcinoma of base of tongue      Oncologic History:    Oncologic History 1. Squamous cell carcinoma of base of tongue with recurrence    Oncologic Treatment 1. Chemoradiation completed 4/2016  2. S/p glossectomy, laryngectomy, and bilateral neck dissection for persistent/recurrent disease  3. 10/6/2020-8/24/21 PCC  4. 9/13/21 start carboplatin/5-FU/pembrolizumab; C2 pembrolizumab only; C4 restarted chemoIO    Pathology P16 positive squamous cell carcinoma with basaloid features.        Subjective:    Interval History: Mr. Swain is here for follow up    Since his last visit he has been going to dermatology and getting treatment with imiquimod for lesions on his scalp  Has developed intermittent neuropathy of feet  Otherwise is doing well; pain is well controlled.  No new changes to tacrolimus.  He is alone at this visit.  Communication from him is 100% written.  Past Medical History:   Past Medical History:   Diagnosis Date    Basal cell carcinoma (BCC) in situ of skin 2012    3 on face, 2 on arm, removed by dermatology.     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 Underwent surgical resection of tongue and neck. s/p tracheostomy       Past Surgical HIstory:   Past Surgical History:   Procedure Laterality Date    COLONOSCOPY      CONJUNCTIVA BIOPSY Right 10/11/2022    Procedure:  BIOPSY, CONJUNCTIVA;  Surgeon: Victor M Vasques MD;  Location: [facility];  Service: Ophthalmology;  Laterality: Right;    INSERTION OF VENOUS ACCESS PORT Right 3/8/2021    Procedure: INSERTION, VENOUS ACCESS PORT;  Surgeon: Jesus Rendon MD;  [facility];  Service: General;  Laterality: Right;    LIVER TRANSPLANT  11/2008    transplanted for biopsy proven hepatocellular carcinoma,     LYMPH NODE BIOPSY N/A 9/18/2020    Procedure: BIOPSY, LYMPH NODE;  Sur[facility];  [facility];  Service: General;  Laterality: N/A;  Rm 173    TRACHEOSTOMY         Family History:   Family History   Problem Relation Age of Onset    Dementia Mother        Social History:  reports that he has never smoked. He has never used smokeless tobacco. He reports current alcohol use. He reports that he does not currently use drugs.    Allergies:  Review of patient's allergies indicates:  No Known Allergies    Medications:  Current Outpatient Medications   Medication Sig Dispense Refill    azelaic acid (AZELEX) 15 % gel APPLY TOPICALLY TO AFFECTED AREA IN THE MORNING 50 g 3    diphenoxylate-atropine 2.5-0.025 mg (LOMOTIL) 2.5-0.025 mg per tablet Take 1 tablet by mouth 4 (four) times daily as needed for Diarrhea (use when loperamide/imodium does not work). 30 tablet 0    ibuprofen (ADVIL,MOTRIN) 200 MG tablet Take 200 mg by mouth.      imiquimod (ALDARA) 5 % cream Apply to biopsy site on frontal scalp + about a centimeter of surrounding skin qhs x 16 weeks. Wash off in am and apply sunscreen. Discontinue if skin becomes blistered, raw, bleeding, painful, etc. 24 packet 3    levothyroxine (SYNTHROID) 88 MCG tablet TAKE 1 TABLET BY MOUTH ONCE DAILY 90 tablet 3    LIDOcaine HCl 2% (LIDOCAINE VISCOUS) 2 % Soln Swish and spit 15 mls every 8 (eight) hours as needed (mouth sore). 100 mL 3    LIDOcaine-prilocaine (EMLA) cream Apply generously to port site 30-60 min prior to chemo and then cover with  darryleris wrap. 30 g 2    loperamide (IMODIUM) 2 mg capsule Take 1 capsule (2 mg total) by mouth 4 (four) times daily as needed for Diarrhea. 30 capsule 1    loteprednol (LOTEMAX) 0.5 % ophthalmic suspension Place 1 drop into the right eye 4 (four) times daily. 5 mL 1    magic mouthwash diphen/antac/lidoc/nysta Take 10 mLs by mouth 4 (four) times daily. 120 mL 4    metroNIDAZOLE (METROGEL) 0.75 % gel Apply topically to affected area 2 (two) times daily. 45 g 1    multivit-min/FA/lycopen/lutein (CENTRUM SILVER MEN ORAL) Take 1 tablet by mouth.      multivitamin capsule Take 1 capsule by mouth once daily.      oxyCODONE (ROXICODONE) 10 mg Tab immediate release tablet Take 1 tablet (10 mg total) by mouth every 6 (six) hours as needed for Pain. 120 tablet 0    sulfacetamide sodium-sulfur 10-5 % (w/w) Clsr USE TO WASH AFFECTED AREA DAILY      tacrolimus (PROGRAF) 0.5 MG Cap Take 1 capsule (0.5 mg total) by mouth every evening. Use the 1mg capsule for your morning dose 90 capsule 3    tacrolimus (PROGRAF) 1 MG Cap Take 1 capsule (1 mg total) by mouth every morning. Use the tacrolimus 0.5mg capsule for your evening dose as directed. 90 capsule 3    tiZANidine (ZANAFLEX) 2 MG tablet Take 1 tablet (2 mg total) by mouth nightly as needed (neck muscle strain). 30 tablet 0    traZODone (DESYREL) 50 MG tablet TAKE 1 TABLET BY MOUTH IN  THE EVENING 90 tablet 3    vitamin E 400 UNIT capsule Take 400 Units by mouth once daily.      gabapentin (NEURONTIN) 300 MG capsule Take 1 capsule (300 mg total) by mouth every evening. 30 capsule 11     No current facility-administered medications for this visit.     Facility-Administered Medications Ordered in Other Visits   Medication Dose Route Frequency Provider Last Rate Last Admin    heparin, porcine (PF) 100 unit/mL injection flush 500 Units  500 Units Intravenous PRN Ronald Berg MD        ofloxacin 0.3 % ophthalmic solution 1 drop  1 drop Right Eye On Call Procedure Victor M Vasques MD  "  2 drop at 10/11/22 0745    sodium chloride 0.9% flush 10 mL  10 mL Intravenous PRN Ronald Berg MD        sodium chloride 0.9% flush 10 mL  10 mL Intravenous PRN Victor M Vasques MD           Review of Systems   Constitutional:  Negative for activity change, appetite change, chills, diaphoresis, fatigue, fever and unexpected weight change.   HENT:  Positive for ear pain (intermittent) and trouble swallowing. Negative for ear discharge, mouth sores and nosebleeds.    Eyes:  Negative for visual disturbance.   Respiratory:  Negative for cough, chest tightness, shortness of breath and wheezing.    Cardiovascular:  Negative for chest pain and leg swelling.   Gastrointestinal:  Negative for abdominal distention, abdominal pain, blood in stool, constipation, diarrhea, nausea and vomiting.   Endocrine: Negative for cold intolerance and heat intolerance.   Genitourinary:  Negative for difficulty urinating and dysuria.   Musculoskeletal:  Positive for myalgias and neck pain (pain radiates between both sides of his neck under his jaw.). Negative for arthralgias and back pain.   Skin:  Positive for wound (scalp). Negative for color change.   Neurological:  Positive for numbness. Negative for dizziness, weakness, light-headedness and headaches.   Hematological:  Negative for adenopathy. Bruises/bleeds easily.   Psychiatric/Behavioral:  Negative for confusion.      ECOG Performance Status:      ECOG SCORE    1 - Restricted in strenuous activity-ambulatory and able to carry out work of a light nature         Objective:      Vitals:   Vitals:    12/05/22 1326   BP: 138/64   BP Location: Left arm   Patient Position: Sitting   BP Method: Large (Automatic)   Pulse: 77   Resp: 18   Temp: 97.9 °F (36.6 °C)   TempSrc: Oral   SpO2: 98%   Weight: 53.3 kg (117 lb 8.1 oz)   Height: 5' 8" (1.727 m)     BMI: Body mass index is 17.87 kg/m².  Wt Readings from Last 10 Encounters:   12/05/22 53.3 kg (117 lb 8.1 oz)   11/14/22 52.8 kg (116 " lb 6.5 oz)   11/14/22 52.8 kg (116 lb 6.5 oz)   10/24/22 52.8 kg (116 lb 6.5 oz)   10/24/22 52.8 kg (116 lb 6.5 oz)   10/05/22 52.2 kg (115 lb)   10/03/22 52.7 kg (116 lb 2.9 oz)   09/30/22 53.8 kg (118 lb 9.7 oz)   09/12/22 53.1 kg (117 lb 1 oz)   08/25/22 52.6 kg (116 lb)       Physical Exam  Constitutional:       General: He is not in acute distress.     Appearance: Normal appearance. He is not ill-appearing.   HENT:      Head: Normocephalic and atraumatic.      Mouth/Throat:      Mouth: Mucous membranes are moist.      Pharynx: No oropharyngeal exudate or posterior oropharyngeal erythema.   Eyes:      General: No scleral icterus.     Extraocular Movements: Extraocular movements intact.      Conjunctiva/sclera: Conjunctivae normal.      Pupils: Pupils are equal, round, and reactive to light.   Cardiovascular:      Rate and Rhythm: Normal rate and regular rhythm.      Heart sounds: No murmur heard.    No friction rub. No gallop.   Pulmonary:      Effort: Pulmonary effort is normal. No respiratory distress.      Breath sounds: No stridor. No wheezing, rhonchi or rales.   Abdominal:      General: Bowel sounds are normal. There is no distension.      Palpations: Abdomen is soft. There is no mass.      Tenderness: There is no abdominal tenderness. There is no guarding or rebound.   Musculoskeletal:         General: Normal range of motion.      Cervical back: Normal range of motion and neck supple. No tenderness.      Right lower leg: No edema.      Left lower leg: No edema.   Lymphadenopathy:      Cervical: No cervical adenopathy.      Upper Body:      Right upper body: No supraclavicular or axillary adenopathy.      Left upper body: No supraclavicular or axillary adenopathy.   Skin:     General: Skin is warm and dry.      Findings: Rash (scalp with areas of erythema and dried blood) present.   Neurological:      General: No focal deficit present.      Mental Status: He is alert.       Laboratory Data:   Recent  Results (from the past 168 hour(s))   TSH    Collection Time: 12/05/22 11:47 AM   Result Value Ref Range    TSH 0.226 (L) 0.400 - 4.000 uIU/mL   CBC Oncology    Collection Time: 12/05/22 11:47 AM   Result Value Ref Range    WBC 2.43 (L) 3.90 - 12.70 K/uL    RBC 3.34 (L) 4.60 - 6.20 M/uL    Hemoglobin 11.4 (L) 14.0 - 18.0 g/dL    Hematocrit 33.4 (L) 40.0 - 54.0 %     (H) 82 - 98 fL    MCH 34.1 (H) 27.0 - 31.0 pg    MCHC 34.1 32.0 - 36.0 g/dL    RDW 12.8 11.5 - 14.5 %    Platelets 105 (L) 150 - 450 K/uL    MPV 10.0 9.2 - 12.9 fL    Gran # (ANC) 1.5 (L) 1.8 - 7.7 K/uL    Immature Grans (Abs) 0.01 0.00 - 0.04 K/uL   CMP    Collection Time: 12/05/22 11:47 AM   Result Value Ref Range    Sodium 141 136 - 145 mmol/L    Potassium 4.1 3.5 - 5.1 mmol/L    Chloride 104 95 - 110 mmol/L    CO2 27 23 - 29 mmol/L    Glucose 163 (H) 70 - 110 mg/dL    BUN 18 8 - 23 mg/dL    Creatinine 1.8 (H) 0.5 - 1.4 mg/dL    Calcium 9.6 8.7 - 10.5 mg/dL    Total Protein 7.4 6.0 - 8.4 g/dL    Albumin 3.8 3.5 - 5.2 g/dL    Total Bilirubin 0.6 0.1 - 1.0 mg/dL    Alkaline Phosphatase 74 55 - 135 U/L    AST 51 (H) 10 - 40 U/L    ALT 22 10 - 44 U/L    Anion Gap 10 8 - 16 mmol/L    eGFR 39.3 (A) >60 mL/min/1.73 m^2   T4, Free    Collection Time: 12/05/22 11:47 AM   Result Value Ref Range    Free T4 1.14 0.71 - 1.51 ng/dL             Imaging:     NM PET CT Routine Skull to Mid Thigh    Result Date: 12/2/2022  EXAMINATION: NM PET CT ROUTINE CLINICAL HISTORY: treatment response; Malignant neoplasm of base of tongue TECHNIQUE: 11.04 mCi of F18-FDG was administered intravenously in the right antecubital fossa.  After an approximately 60 min distribution time, PET/CT images were acquired from the vertex to mid thigh.  Transmission images were acquired to correct for attenuation using a whole body low-dose CT scan without IV contrast with the arms positioned along the side of the torso. Glycemia at the time of injection was 84 mg/dL. COMPARISON: FDG  PET/CT from 09/30/2022.  Multiple additional prior exams. FINDINGS: Quality of the study: Adequate. In the head and neck, there are no hypermetabolic lesions worrisome for malignancy. There are postoperative changes status post laryngectomy with reconstruction and tracheostomy. There are no hypermetabolic mucosal lesions, and there are no pathologically enlarged or hypermetabolic lymph nodes. In the chest, there are no hypermetabolic lesions worrisome for malignancy.  There are no concerning pulmonary nodules or masses, and there are no pathologically enlarged or hypermetabolic lymph nodes. Stable positioning of right chest port with catheter tip in the SVC.  Persistent bibasilar dependent subsegmental atelectatic changes.  Punctate calcified granuloma in the left upper lobe of the lung. In the abdomen and pelvis, there is physiologic tracer distribution within the abdominal organs and excretion into the genitourinary system. In the bones, there are no hypermetabolic lesions worrisome for malignancy. In the extremities, there are no hypermetabolic lesions worrisome for malignancy.     No significant detrimental change compared to previous exam.  No evidence of FDG avid tumor in patient with squamous cell carcinoma of the base of the tongue status post resection.  No new pathologically enlarged or FDG avid lymph nodes. Electronically signed by: Elliott Collins Date:    12/02/2022 Time:    15:03       Assessment:       1. Squamous cell carcinoma of base of tongue    2. Other polyneuropathy    3. Secondary malignant neoplasm of cervical lymph node    4. Larynx cancer    5. Postoperative hypothyroidism    6. Status post liver transplantation    7. Cirrhosis of transplanted liver    8. Thrombocytopenia           Plan:       Problem List Items Addressed This Visit          Hematology    Thrombocytopenia    Current Assessment & Plan     Stable  -monitor cbc            Oncology    Larynx cancer    Overview     Status post  total laryngectomy, total glossectomy and anterolateral thigh free flap reconstruction roughly 2017 at [facility] in [place] for persistent/recurrent base of tongue sq.c.c.         Squamous cell carcinoma of base of tongue - Primary    Overview     Per OSH records, initially presented in 2015 with symptoms and cT2N2c disease. page 91 of 3/25/2020 outside records clinic (media tab).  Initially presented 8/2015 with left sided odynophagia.  Treated for reflux, which did not improve.    12/11/15 he had a fiberoptic exam showing discolored mucosal area of the left base of the tongue.    1/13/16 he had persistent dysphagia, odynophagia, and new left otalgia.  MRI showed an irregular mass along the left posterior margin of the base of the tongue measuring 1.8x2.3cm with ipsilateral level 2 lymph node measuring 1.7cm with central necrosis and contralateral level 2 node measuring 1.3cm with questionable necrosis.    1/19/2016 he had direct laryngoscopy with biopsy left of midline, proximal to vallecula, which was p16 positive, invasive, with moderately to poorly differentiated sq.c.c. with basaloid features.    1/29/16 met with medical oncology who recommended chemoxrt.    2/1/16  staging PET CT showed hypermetabolic mass at base of tongue 4x2.5x3.5cm, max SUV 24.13, hypermetabolic lymph node left neck SUV 6.77, small subcentimeter lymph node right neck max SUV 3.79 (exact size of lymph nodes not mentioned in the summary).    2/15/16 started chemoxrt to left tongue base, bilateral cervical neck levels 1b-5, treated with 6MV photons utilizing IMRT, 5000 cGy in 200 cGy daily fractions.  Cone down boost to left base of tongue with additional 2000 cGy in 200 cGy daily fractions to gross disease.  Received weekly cisplatin with radiation but had to switch to carboplatin due to worsening renal function. during this time also missed several weekly treatments of cisplatin.    4/1/2016 completed chemoxrt.   11/1/2016 Repeat  biopsy of left tongue showed persistent disease.  S/p salvage glossectomy with laryngectomy,  rT4aN0, p16 positive, base of tongue squamous cell carcinoma.     page 61 of 3/25/2020 outside records clinic (media tab)  Pathology: Tonsil, left tonsil fold: reactive squamous mucosa with inflammation and degenerating skeletal muscle, no carcinoma seen.  Right and Left base of tongue:  Reactive squamous mucosa and submucosa with inflammation, no carcinoma seen.  Neck, glossectomy, laryngectomy, bilateral neck dissection:  invasive squamous cell carcinoma.  tumor site: base of tongue/hypopharynx.  Small focus of tumor is seen within the hyoid  bone.  specimen Size [id number]  tumor size [id number]  depth of invasion 20mm  TNM stage pT4a, pN0, pMx  Lymph nodes, included in all parts:  number involved 0  number examined 3.  distance of tumor from margins  positive inked margins: none  within 1mm : none  within 1-2mm: anterior-inferior    Bilateral neck dissection:  level 1: one lymph node and submandibular gland, negative for tumor  level II : two lymph nodes, negative for tumor  level III: not identified  level IV: not identified  level V: not identified.    6/20/17 - PETCT with FDG avidity of left neck lymph node, level 3, SUV 2.9.  No other evidence of local or distant disease.  7/6/2017 - biopsy of left neck lymph node with IR.  Sample was non-diagnostic. Notes from path report: Less than optimal scan specimen with minute tissue core of stromal fibroadipose tissue.  definitive tati background is not seen.  9/28/2017 - Repeat PET CT showing mild activity SUV 3.5 (increased from 3.2 previously; size 7.8x7.4x9.5mm).  Also new findings under left pectoralis minor (mild focal increased activity, indeterminate but new since previous exam)  3/22/2018 - CT neck shows no evidence of disease and level 2b lymph node decreased to 4.9mmx4.5mmx4.9mm from 7.8x7.4x9.5mm previously.    Seen by Dr. Myers 8/3/2020 for left-sided neck pain  and a swelling in left level 2 several weeks ago.  He feels it arose after he began lifting weights.  CT neck 8/5/2020 : Lymph nodes At the left neck level L2 there is a heterogeneous soft tissue density with central hypoattenuation likely reflecting a necrotic lymph node measuring 1.6 x 1.4 cm in transverse dimension, 2.5 cm in craniocaudal dimension.  There is a similar appearing irregular soft tissue density at the right neck level 2 measuring 1.1 x 9.0 cm in transverse dimension and 1.5 cm craniocaudal dimension.  No other abnormal appearing or enlarged lymph nodes found.  Impression: At level 2 within the neck bilaterally, there are  soft tissue density masses with central hypoattenuation likely reflective of necrotic lymph nodes.  Findings are suspicious for malignancy recurrence.  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  Staging PET CT 9/30/2020 with left sided hypermetabolic node and right sided enlarged node without uptake.  Also has mandibular vestibule uptake, which may be another site of disease. Discussed at tumor board.  He is not a surgical or radiation candidate.  -Started on PCC 10/6/2020 followed by maintenance cetuximab until 8/24/21 (discontinued due to progression of disease; continued increase in SUV uptake, no new lesions).  9/2021 started on carbo/5-FU/pembrolizumab; due to toxicity went to pembrolizumab monotherapy starting with cycle 2.  Progression of disease noted after cycle 3 so added chemotherapy back in with cycle 4.         Current Assessment & Plan     12/2/22 PET CT without hypermetabolic uptake.  Currently has skin changes on scalp; managed with dermatology and is being treated with imiquimod.  Has new peripheral neuropathy of feet.  -labs reviewed; ok to proceed with treatment  -labs and follow up prior to next cycle  -start gabapentin for neuropathy           Secondary malignant neoplasm of cervical lymph node     Overview     See squamous cell carcinoma            Endocrine    Postoperative hypothyroidism    Current Assessment & Plan     fT4 wnl.  -continue current dose levothyroxine            GI    Status post liver transplantation    Cirrhosis of transplanted liver    Overview     He is also status post liver transplant secondary to hepatitis C.  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 The ascites did resolve after dilation, but recurred again in 2013 with findings of restenosis of the middle and right hepatic veins. He had a liver biopsy in March 2013 which showed steatohepatitis, stage 4/4. He has portal hypertension based on portal pressure measurement with an estimated gradient of 15 mmHg done in February 2013. His ascites was controlled with diuretics. He is on prograf for immunosuppression         Current Assessment & Plan     Mild elevation in AST, asymptomatic  -monitor LFTs          Other Visit Diagnoses       Other polyneuropathy        Relevant Medications    gabapentin (NEURONTIN) 300 MG capsule          No orders of the defined types were placed in this encounter.      Advance Care Planning I initiated the process of advance care planning at his initial visit and explained the importance of this process to the patient.  Then the patient received detailed information about the importance of designating a Health Care Power of  (HCPOA). he was instructed to communicate with this person about their wishes for future healthcare, should he become sick and lose decision-making capacity. The patient has previously appointed a HCPOA. After our discussion, the patient has decided to complete a HCPOA and has appointed his brother and NAME:Ollie          Route Chart for Scheduling    Med Onc Chart Routing  Urgent    Follow up with physician 3 weeks. 12/27 prior to treatment   Follow up with [facility]    Infusion scheduling note 12/27 pembrolizumab   Injection  scheduling note    Labs CMP and CBC   Lab interval:  port draw labs 12/27 cmp cbc   Imaging None      Pharmacy appointment No pharmacy appointment needed      Other referrals No additional referrals needed         Treatment Plan Information   OP HEAD NECK PEMBROLIZUMAB CARBOPLATIN FLUOROURACIL (C1 ONLY RECEIVED) FOLLOWED BY PEMBROLIZUMAB MAINTENANCE   Ronald Berg MD   Upcoming Treatment Dates - OP HEAD NECK PEMBROLIZUMAB CARBOPLATIN FLUOROURACIL (C1 ONLY RECEIVED) FOLLOWED BY PEMBROLIZUMAB MAINTENANCE    12/5/2022       Immunotherapy       pembrolizumab (KEYTRUDA) 200 mg in sodium chloride 0.9% 108 mL infusion  12/27/2022       Immunotherapy       pembrolizumab (KEYTRUDA) 200 mg in sodium chloride 0.9% 108 mL infusion  1/17/2023       Immunotherapy       pembrolizumab (KEYTRUDA) 200 mg in sodium chloride 0.9% 108 mL infusion  2/7/2023       Immunotherapy       pembrolizumab (KEYTRUDA) 200 mg in sodium chloride 0.9% 108 mL infusion    Therapy Plan Information  Flushes  heparin, porcine (PF) 100 unit/mL injection flush 500 Units  500 Units, Intravenous, Every visit  sodium chloride 0.9% flush 10 mL  10 mL, Intravenous, Every visit      Ronald Berg MD  Hematology Oncology

## 2022-12-05 NOTE — NURSING
Patient here for labs. Labs collected and sent without issue. VSS. Pt instructed to call MD with any problems. Pt discharged to next appointment.

## 2022-12-05 NOTE — ASSESSMENT & PLAN NOTE
12/2/22 PET CT without hypermetabolic uptake.  Currently has skin changes on scalp; managed with dermatology and is being treated with imiquimod.  Has new peripheral neuropathy of feet.  -labs reviewed; ok to proceed with treatment  -labs and follow up prior to next cycle  -start gabapentin for neuropathy

## 2022-12-27 ENCOUNTER — INFUSION (OUTPATIENT)
Dept: INFUSION THERAPY | Facility: HOSPITAL | Age: 72
End: 2022-12-27
Attending: STUDENT IN AN ORGANIZED HEALTH CARE EDUCATION/TRAINING PROGRAM
Payer: MEDICARE

## 2022-12-27 ENCOUNTER — OFFICE VISIT (OUTPATIENT)
Dept: HEMATOLOGY/ONCOLOGY | Facility: CLINIC | Age: 72
End: 2022-12-27
Payer: MEDICARE

## 2022-12-27 VITALS
SYSTOLIC BLOOD PRESSURE: 125 MMHG | OXYGEN SATURATION: 99 % | RESPIRATION RATE: 20 BRPM | DIASTOLIC BLOOD PRESSURE: 77 MMHG | HEIGHT: 68 IN | HEART RATE: 73 BPM | DIASTOLIC BLOOD PRESSURE: 67 MMHG | BODY MASS INDEX: 18.31 KG/M2 | RESPIRATION RATE: 18 BRPM | HEART RATE: 68 BPM | WEIGHT: 120.81 LBS | SYSTOLIC BLOOD PRESSURE: 131 MMHG

## 2022-12-27 DIAGNOSIS — E89.0 POSTOPERATIVE HYPOTHYROIDISM: ICD-10-CM

## 2022-12-27 DIAGNOSIS — C01 SQUAMOUS CELL CARCINOMA OF BASE OF TONGUE: ICD-10-CM

## 2022-12-27 DIAGNOSIS — C77.0 SECONDARY MALIGNANT NEOPLASM OF CERVICAL LYMPH NODE: Primary | ICD-10-CM

## 2022-12-27 DIAGNOSIS — D69.6 THROMBOCYTOPENIA: ICD-10-CM

## 2022-12-27 DIAGNOSIS — N18.31 ANEMIA IN STAGE 3A CHRONIC KIDNEY DISEASE: ICD-10-CM

## 2022-12-27 DIAGNOSIS — D63.1 ANEMIA IN STAGE 3A CHRONIC KIDNEY DISEASE: ICD-10-CM

## 2022-12-27 DIAGNOSIS — Z79.899 IMMUNODEFICIENCY DUE TO DRUGS: ICD-10-CM

## 2022-12-27 DIAGNOSIS — N18.4 CHRONIC KIDNEY DISEASE (CKD), STAGE IV (SEVERE): ICD-10-CM

## 2022-12-27 DIAGNOSIS — D84.821 IMMUNODEFICIENCY DUE TO DRUGS: ICD-10-CM

## 2022-12-27 DIAGNOSIS — C77.0 SECONDARY MALIGNANT NEOPLASM OF CERVICAL LYMPH NODE: ICD-10-CM

## 2022-12-27 DIAGNOSIS — C01 SQUAMOUS CELL CARCINOMA OF BASE OF TONGUE: Primary | ICD-10-CM

## 2022-12-27 DIAGNOSIS — J43.2 CENTRILOBULAR EMPHYSEMA: ICD-10-CM

## 2022-12-27 LAB
ALBUMIN SERPL BCP-MCNC: 3.7 G/DL (ref 3.5–5.2)
ALP SERPL-CCNC: 78 U/L (ref 55–135)
ALT SERPL W/O P-5'-P-CCNC: 24 U/L (ref 10–44)
ANION GAP SERPL CALC-SCNC: 10 MMOL/L (ref 8–16)
AST SERPL-CCNC: 55 U/L (ref 10–40)
BILIRUB SERPL-MCNC: 0.5 MG/DL (ref 0.1–1)
BUN SERPL-MCNC: 17 MG/DL (ref 8–23)
CALCIUM SERPL-MCNC: 9.5 MG/DL (ref 8.7–10.5)
CHLORIDE SERPL-SCNC: 101 MMOL/L (ref 95–110)
CO2 SERPL-SCNC: 30 MMOL/L (ref 23–29)
CREAT SERPL-MCNC: 1.6 MG/DL (ref 0.5–1.4)
ERYTHROCYTE [DISTWIDTH] IN BLOOD BY AUTOMATED COUNT: 12.2 % (ref 11.5–14.5)
EST. GFR  (NO RACE VARIABLE): 45.2 ML/MIN/1.73 M^2
GLUCOSE SERPL-MCNC: 124 MG/DL (ref 70–110)
HCT VFR BLD AUTO: 32.6 % (ref 40–54)
HGB BLD-MCNC: 11 G/DL (ref 14–18)
IMM GRANULOCYTES # BLD AUTO: 0 K/UL (ref 0–0.04)
MCH RBC QN AUTO: 33.6 PG (ref 27–31)
MCHC RBC AUTO-ENTMCNC: 33.7 G/DL (ref 32–36)
MCV RBC AUTO: 100 FL (ref 82–98)
NEUTROPHILS # BLD AUTO: 1.7 K/UL (ref 1.8–7.7)
PLATELET # BLD AUTO: 95 K/UL (ref 150–450)
PMV BLD AUTO: 10 FL (ref 9.2–12.9)
POTASSIUM SERPL-SCNC: 4.3 MMOL/L (ref 3.5–5.1)
PROT SERPL-MCNC: 7.2 G/DL (ref 6–8.4)
RBC # BLD AUTO: 3.27 M/UL (ref 4.6–6.2)
SODIUM SERPL-SCNC: 141 MMOL/L (ref 136–145)
WBC # BLD AUTO: 2.73 K/UL (ref 3.9–12.7)

## 2022-12-27 PROCEDURE — A4216 STERILE WATER/SALINE, 10 ML: HCPCS | Performed by: STUDENT IN AN ORGANIZED HEALTH CARE EDUCATION/TRAINING PROGRAM

## 2022-12-27 PROCEDURE — 25000003 PHARM REV CODE 250: Performed by: STUDENT IN AN ORGANIZED HEALTH CARE EDUCATION/TRAINING PROGRAM

## 2022-12-27 PROCEDURE — 99215 OFFICE O/P EST HI 40 MIN: CPT | Mod: S$PBB,,, | Performed by: STUDENT IN AN ORGANIZED HEALTH CARE EDUCATION/TRAINING PROGRAM

## 2022-12-27 PROCEDURE — 99999 PR PBB SHADOW E&M-EST. PATIENT-LVL III: CPT | Mod: PBBFAC,,, | Performed by: STUDENT IN AN ORGANIZED HEALTH CARE EDUCATION/TRAINING PROGRAM

## 2022-12-27 PROCEDURE — 85027 COMPLETE CBC AUTOMATED: CPT | Performed by: PHYSICIAN ASSISTANT

## 2022-12-27 PROCEDURE — 96413 CHEMO IV INFUSION 1 HR: CPT

## 2022-12-27 PROCEDURE — 63600175 PHARM REV CODE 636 W HCPCS: Performed by: STUDENT IN AN ORGANIZED HEALTH CARE EDUCATION/TRAINING PROGRAM

## 2022-12-27 PROCEDURE — 99213 OFFICE O/P EST LOW 20 MIN: CPT | Mod: PBBFAC,25 | Performed by: STUDENT IN AN ORGANIZED HEALTH CARE EDUCATION/TRAINING PROGRAM

## 2022-12-27 PROCEDURE — 80053 COMPREHEN METABOLIC PANEL: CPT | Performed by: PHYSICIAN ASSISTANT

## 2022-12-27 PROCEDURE — 99215 PR OFFICE/OUTPT VISIT, EST, LEVL V, 40-54 MIN: ICD-10-PCS | Mod: S$PBB,,, | Performed by: STUDENT IN AN ORGANIZED HEALTH CARE EDUCATION/TRAINING PROGRAM

## 2022-12-27 PROCEDURE — 99999 PR PBB SHADOW E&M-EST. PATIENT-LVL III: ICD-10-PCS | Mod: PBBFAC,,, | Performed by: STUDENT IN AN ORGANIZED HEALTH CARE EDUCATION/TRAINING PROGRAM

## 2022-12-27 RX ORDER — SODIUM CHLORIDE 0.9 % (FLUSH) 0.9 %
10 SYRINGE (ML) INJECTION
Status: CANCELLED | OUTPATIENT
Start: 2022-12-27

## 2022-12-27 RX ORDER — HEPARIN 100 UNIT/ML
500 SYRINGE INTRAVENOUS
Status: CANCELLED | OUTPATIENT
Start: 2022-12-27

## 2022-12-27 RX ORDER — HEPARIN 100 UNIT/ML
500 SYRINGE INTRAVENOUS
Status: DISCONTINUED | OUTPATIENT
Start: 2022-12-27 | End: 2022-12-27 | Stop reason: HOSPADM

## 2022-12-27 RX ORDER — SODIUM CHLORIDE 0.9 % (FLUSH) 0.9 %
10 SYRINGE (ML) INJECTION
Status: DISCONTINUED | OUTPATIENT
Start: 2022-12-27 | End: 2022-12-27 | Stop reason: HOSPADM

## 2022-12-27 RX ORDER — OXYCODONE HYDROCHLORIDE 10 MG/1
10 TABLET ORAL EVERY 6 HOURS PRN
Qty: 120 TABLET | Refills: 0 | Status: SHIPPED | OUTPATIENT
Start: 2022-12-30 | End: 2023-02-09 | Stop reason: SDUPTHER

## 2022-12-27 RX ADMIN — SODIUM CHLORIDE 200 MG: 9 INJECTION, SOLUTION INTRAVENOUS at 04:12

## 2022-12-27 RX ADMIN — SODIUM CHLORIDE: 9 INJECTION, SOLUTION INTRAVENOUS at 04:12

## 2022-12-27 RX ADMIN — Medication 10 ML: at 05:12

## 2022-12-27 RX ADMIN — HEPARIN 500 UNITS: 100 SYRINGE at 05:12

## 2022-12-27 RX ADMIN — Medication 10 ML: at 01:12

## 2022-12-27 NOTE — ASSESSMENT & PLAN NOTE
Right eye irritated; he will be seeing eye doctor tomorrow.  Neuropathy of feet resolved without using gabapentin.  Has 10 weeks of imiquimod left for his scalp.  Physical exam significant for scabbed scalp.  -labs reviewed; ok to proceed with treatment  -labs and follow up prior to next cycle

## 2022-12-27 NOTE — PROGRESS NOTES
PATIENT: Rocco Swain  MRN: [id number]  DATE: 12/27/2022      Diagnosis:   1. Squamous cell carcinoma of base of tongue    2. Secondary malignant neoplasm of cervical lymph node    3. Immunodeficiency due to drugs    4. Thrombocytopenia    5. Anemia in stage 3a chronic kidney disease    6. Postoperative hypothyroidism    7. Centrilobular emphysema        Chief Complaint: Squamous cell carcinoma of base of tongue        Oncologic History:    Oncologic History 1. Squamous cell carcinoma of base of tongue with recurrence    Oncologic Treatment 1. Chemoradiation completed 4/2016  2. S/p glossectomy, laryngectomy, and bilateral neck dissection for persistent/recurrent disease  3. 10/6/2020-8/24/21 PCC  4. 9/13/21 start carboplatin/5-FU/pembrolizumab; C2 pembrolizumab only; C4 restarted chemoIO    Pathology P16 positive squamous cell carcinoma with basaloid features.        Subjective:    Interval History: Mr. Swain is here for follow up    Right eye irritated; plans to seeing ophthalmology tomorrow.  Continues with imiquimod for scalp lesions (10 weeks left)  Neuropathy of feet resolved without using gabapentin  Otherwise is doing well; pain is well controlled.  No new changes to tacrolimus.  He is alone at this visit.  Communication from him is 100% written.  Past Medical History:   Past Medical History:   Diagnosis Date    Basal cell carcinoma (BCC) in situ of skin 2012    3 on face, 2 on arm, removed by dermatology.     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 Underwent surgical resection of tongue and neck. s/p tracheostomy       Past Surgical HIstory:   Past Surgical History:   Procedure Laterality Date    COLONOSCOPY      CONJUNCTIVA BIOPSY Right 10/11/2022    Procedure:  BIOPSY, CONJUNCTIVA;  Surgeon: Victor M Vasques MD;  Location: [facility];  Service: Ophthalmology;  Laterality: Right;    INSERTION OF VENOUS ACCESS PORT Right 3/8/2021    Procedure: INSERTION, VENOUS ACCESS PORT;  Surgeon: Jesus Rendon MD;  [facility];  Service: General;  Laterality: Right;    LIVER TRANSPLANT  11/2008    transplanted for biopsy proven hepatocellular carcinoma,     LYMPH NODE BIOPSY N/A 9/18/2020    Procedure: BIOPSY, LYMPH NODE;  [facility];  [facility];  Service: General;  Laterality: N/A;  Rm 173    TRACHEOSTOMY         Family History:   Family History   Problem Relation Age of Onset    Dementia Mother        Social History:  reports that he has never smoked. He has never used smokeless tobacco. He reports current alcohol use. He reports that he does not currently use drugs.    Allergies:  Review of patient's allergies indicates:  No Known Allergies    Medications:  Current Outpatient Medications   Medication Sig Dispense Refill    azelaic acid (AZELEX) 15 % gel APPLY TOPICALLY TO AFFECTED AREA IN THE MORNING 50 g 3    diphenoxylate-atropine 2.5-0.025 mg (LOMOTIL) 2.5-0.025 mg per tablet Take 1 tablet by mouth 4 (four) times daily as needed for Diarrhea (use when loperamide/imodium does not work). 30 tablet 0    gabapentin (NEURONTIN) 300 MG capsule Take 1 capsule (300 mg total) by mouth every evening. 30 capsule 11    ibuprofen (ADVIL,MOTRIN) 200 MG tablet Take 200 mg by mouth.      imiquimod (ALDARA) 5 % cream Apply to biopsy site on frontal scalp + about a centimeter of surrounding skin qhs x 16 weeks. Wash off in am and apply sunscreen. Discontinue if skin becomes blistered, raw, bleeding, painful, etc. 24 packet 3    levothyroxine (SYNTHROID) 88 MCG tablet TAKE 1 TABLET BY MOUTH ONCE DAILY 90 tablet 3    LIDOcaine HCl 2% (LIDOCAINE VISCOUS) 2 % Soln Swish and spit 15 mls every 8 (eight) hours as needed (mouth sore). 100 mL 3     LIDOcaine-prilocaine (EMLA) cream Apply generously to port site 30-60 min prior to chemo and then cover with saran wrap. 30 g 2    loperamide (IMODIUM) 2 mg capsule Take 1 capsule (2 mg total) by mouth 4 (four) times daily as needed for Diarrhea. 30 capsule 1    loteprednol (LOTEMAX) 0.5 % ophthalmic suspension Place 1 drop into the right eye 4 (four) times daily. 5 mL 1    magic mouthwash diphen/antac/lidoc/nysta Take 10 mLs by mouth 4 (four) times daily. 120 mL 4    metroNIDAZOLE (METROGEL) 0.75 % gel Apply topically to affected area 2 (two) times daily. 45 g 1    multivit-min/FA/lycopen/lutein (CENTRUM SILVER MEN ORAL) Take 1 tablet by mouth.      multivitamin capsule Take 1 capsule by mouth once daily.      [START ON 12/30/2022] oxyCODONE (ROXICODONE) 10 mg Tab immediate release tablet Take 1 tablet (10 mg total) by mouth every 6 (six) hours as needed for Pain. 120 tablet 0    sulfacetamide sodium-sulfur 10-5 % (w/w) Clsr USE TO WASH AFFECTED AREA DAILY      tacrolimus (PROGRAF) 0.5 MG Cap Take 1 capsule (0.5 mg total) by mouth every evening. Use the 1mg capsule for your morning dose 90 capsule 3    tacrolimus (PROGRAF) 1 MG Cap Take 1 capsule (1 mg total) by mouth every morning. Use the tacrolimus 0.5mg capsule for your evening dose as directed. 90 capsule 3    tiZANidine (ZANAFLEX) 2 MG tablet Take 1 tablet (2 mg total) by mouth nightly as needed (neck muscle strain). 30 tablet 0    traZODone (DESYREL) 50 MG tablet TAKE 1 TABLET BY MOUTH IN  THE EVENING 90 tablet 3    vitamin E 400 UNIT capsule Take 400 Units by mouth once daily.       No current facility-administered medications for this visit.     Facility-Administered Medications Ordered in Other Visits   Medication Dose Route Frequency Provider Last Rate Last Admin    alteplase injection 2 mg  2 mg Intra-Catheter PRN Ronald Berg MD        heparin, porcine (PF) 100 unit/mL injection flush 500 Units  500 Units Intravenous PRN Ronald Berg MD        heparin,  porcine (PF) 100 unit/mL injection flush 500 Units  500 Units Intravenous PRN Ronald Berg MD        heparin, porcine (PF) 100 unit/mL injection flush 500 Units  500 Units Intravenous PRN Ronald Berg MD        ofloxacin 0.3 % ophthalmic solution 1 drop  1 drop Right Eye On Call Procedure Victor M Vasques MD   2 drop at 10/11/22 0745    pembrolizumab (KEYTRUDA) 200 mg in sodium chloride 0.9% SolP 123 mL infusion  200 mg Intravenous 1 time in Clinic/HOD Ronald Berg MD        sodium chloride 0.9% 100 mL flush bag   Intravenous 1 time in Clinic/HOD Ronald Berg MD        sodium chloride 0.9% flush 10 mL  10 mL Intravenous PRN Ronald Berg MD        sodium chloride 0.9% flush 10 mL  10 mL Intravenous PRN Victor M Vasques MD        sodium chloride 0.9% flush 10 mL  10 mL Intravenous PRN Ronald Berg MD   10 mL at 12/27/22 1356    sodium chloride 0.9% flush 10 mL  10 mL Intravenous PRN Ronald Berg MD           Review of Systems   Constitutional:  Negative for activity change, appetite change, chills, diaphoresis, fatigue, fever and unexpected weight change.   HENT:  Positive for ear pain (intermittent) and trouble swallowing. Negative for ear discharge, mouth sores and nosebleeds.    Eyes:  Negative for visual disturbance.   Respiratory:  Negative for cough, chest tightness, shortness of breath and wheezing.    Cardiovascular:  Negative for chest pain and leg swelling.   Gastrointestinal:  Negative for abdominal distention, abdominal pain, blood in stool, constipation, diarrhea, nausea and vomiting.   Endocrine: Negative for cold intolerance and heat intolerance.   Genitourinary:  Negative for difficulty urinating and dysuria.   Musculoskeletal:  Positive for myalgias and neck pain (pain radiates between both sides of his neck under his jaw.). Negative for arthralgias and back pain.   Skin:  Positive for wound (scalp). Negative for color change.   Neurological:  Positive for numbness. Negative for dizziness,  "weakness, light-headedness and headaches.   Hematological:  Negative for adenopathy. Bruises/bleeds easily.   Psychiatric/Behavioral:  Negative for confusion.      ECOG Performance Status:      ECOG SCORE    1 - Restricted in strenuous activity-ambulatory and able to carry out work of a light nature         Objective:      Vitals:   Vitals:    12/27/22 1522   BP: 125/67   BP Location: Left arm   Patient Position: Sitting   BP Method: Medium (Automatic)   Pulse: 73   Resp: 20   SpO2: 99%   Weight: 54.8 kg (120 lb 13 oz)   Height: 5' 8" (1.727 m)     BMI: Body mass index is 18.37 kg/m².  Wt Readings from Last 10 Encounters:   12/27/22 54.8 kg (120 lb 13 oz)   12/05/22 53.3 kg (117 lb 8.1 oz)   12/05/22 53.3 kg (117 lb 8.1 oz)   11/14/22 52.8 kg (116 lb 6.5 oz)   11/14/22 52.8 kg (116 lb 6.5 oz)   10/24/22 52.8 kg (116 lb 6.5 oz)   10/24/22 52.8 kg (116 lb 6.5 oz)   10/05/22 52.2 kg (115 lb)   10/03/22 52.7 kg (116 lb 2.9 oz)   09/30/22 53.8 kg (118 lb 9.7 oz)       Physical Exam  Constitutional:       General: He is not in acute distress.     Appearance: Normal appearance. He is not ill-appearing.   HENT:      Head: Normocephalic.        Comments: Scabbed right frontal scalp     Mouth/Throat:      Mouth: Mucous membranes are moist.      Pharynx: No oropharyngeal exudate or posterior oropharyngeal erythema.   Eyes:      General: No scleral icterus.     Extraocular Movements: Extraocular movements intact.      Conjunctiva/sclera: Conjunctivae normal.      Pupils: Pupils are equal, round, and reactive to light.   Cardiovascular:      Rate and Rhythm: Normal rate and regular rhythm.      Heart sounds: No murmur heard.    No friction rub. No gallop.      Comments: +right chest wall port  Pulmonary:      Effort: Pulmonary effort is normal. No respiratory distress.      Breath sounds: No stridor. No wheezing, rhonchi or rales.   Abdominal:      General: Bowel sounds are normal. There is no distension.      Palpations: " Abdomen is soft. There is no mass.      Tenderness: There is no abdominal tenderness. There is no guarding or rebound.   Musculoskeletal:         General: Normal range of motion.      Cervical back: Normal range of motion and neck supple. No tenderness.      Right lower leg: No edema.      Left lower leg: No edema.   Lymphadenopathy:      Cervical: No cervical adenopathy.      Upper Body:      Right upper body: No supraclavicular or axillary adenopathy.      Left upper body: No supraclavicular or axillary adenopathy.   Skin:     General: Skin is warm and dry.      Findings: Rash (scalp with areas of erythema and dried blood) present.   Neurological:      General: No focal deficit present.      Mental Status: He is alert.       Laboratory Data:   Recent Results (from the past 168 hour(s))   CBC Oncology    Collection Time: 12/27/22  1:49 PM   Result Value Ref Range    WBC 2.73 (L) 3.90 - 12.70 K/uL    RBC 3.27 (L) 4.60 - 6.20 M/uL    Hemoglobin 11.0 (L) 14.0 - 18.0 g/dL    Hematocrit 32.6 (L) 40.0 - 54.0 %     (H) 82 - 98 fL    MCH 33.6 (H) 27.0 - 31.0 pg    MCHC 33.7 32.0 - 36.0 g/dL    RDW 12.2 11.5 - 14.5 %    Platelets 95 (L) 150 - 450 K/uL    MPV 10.0 9.2 - 12.9 fL    Gran # (ANC) 1.7 (L) 1.8 - 7.7 K/uL    Immature Grans (Abs) 0.00 0.00 - 0.04 K/uL   Comprehensive Metabolic Panel    Collection Time: 12/27/22  1:49 PM   Result Value Ref Range    Sodium 141 136 - 145 mmol/L    Potassium 4.3 3.5 - 5.1 mmol/L    Chloride 101 95 - 110 mmol/L    CO2 30 (H) 23 - 29 mmol/L    Glucose 124 (H) 70 - 110 mg/dL    BUN 17 8 - 23 mg/dL    Creatinine 1.6 (H) 0.5 - 1.4 mg/dL    Calcium 9.5 8.7 - 10.5 mg/dL    Total Protein 7.2 6.0 - 8.4 g/dL    Albumin 3.7 3.5 - 5.2 g/dL    Total Bilirubin 0.5 0.1 - 1.0 mg/dL    Alkaline Phosphatase 78 55 - 135 U/L    AST 55 (H) 10 - 40 U/L    ALT 24 10 - 44 U/L    Anion Gap 10 8 - 16 mmol/L    eGFR 45.2 (A) >60 mL/min/1.73 m^2             Imaging:     NM PET CT Routine Skull to Mid  Thigh    Result Date: 12/2/2022  EXAMINATION: NM PET CT ROUTINE CLINICAL HISTORY: treatment response; Malignant neoplasm of base of tongue TECHNIQUE: 11.04 mCi of F18-FDG was administered intravenously in the right antecubital fossa.  After an approximately 60 min distribution time, PET/CT images were acquired from the vertex to mid thigh.  Transmission images were acquired to correct for attenuation using a whole body low-dose CT scan without IV contrast with the arms positioned along the side of the torso. Glycemia at the time of injection was 84 mg/dL. COMPARISON: FDG PET/CT from 09/30/2022.  Multiple additional prior exams. FINDINGS: Quality of the study: Adequate. In the head and neck, there are no hypermetabolic lesions worrisome for malignancy. There are postoperative changes status post laryngectomy with reconstruction and tracheostomy. There are no hypermetabolic mucosal lesions, and there are no pathologically enlarged or hypermetabolic lymph nodes. In the chest, there are no hypermetabolic lesions worrisome for malignancy.  There are no concerning pulmonary nodules or masses, and there are no pathologically enlarged or hypermetabolic lymph nodes. Stable positioning of right chest port with catheter tip in the SVC.  Persistent bibasilar dependent subsegmental atelectatic changes.  Punctate calcified granuloma in the left upper lobe of the lung. In the abdomen and pelvis, there is physiologic tracer distribution within the abdominal organs and excretion into the genitourinary system. In the bones, there are no hypermetabolic lesions worrisome for malignancy. In the extremities, there are no hypermetabolic lesions worrisome for malignancy.     No significant detrimental change compared to previous exam.  No evidence of FDG avid tumor in patient with squamous cell carcinoma of the base of the tongue status post resection.  No new pathologically enlarged or FDG avid lymph nodes. Electronically signed  by: Elliott Collins Date:    12/02/2022 Time:    15:03       Assessment:       1. Squamous cell carcinoma of base of tongue    2. Secondary malignant neoplasm of cervical lymph node    3. Immunodeficiency due to drugs    4. Thrombocytopenia    5. Anemia in stage 3a chronic kidney disease    6. Postoperative hypothyroidism    7. Centrilobular emphysema           Plan:       Problem List Items Addressed This Visit          Pulmonary    Centrilobular emphysema    Overview     PET CT findings with centrilobular emphysematous changes.  Not currently symptomatic. Lungs clear on exam.  -continue to monitor            Immunology/Multi System    Immunodeficiency due to drugs    Overview     S/p liver transplant and is currently on tacrolimus.  Afebrile, ANC sufficient for treatment  -monitor cbc            Hematology    Thrombocytopenia    Current Assessment & Plan     Chronic but slightly lower.  May be secondary to imiquimod vs consumptive.  -monitor cbc            Oncology    Squamous cell carcinoma of base of tongue - Primary    Overview     Per OSH records, initially presented in 2015 with symptoms and cT2N2c disease. page 91 of 3/25/2020 outside records clinic (media tab).  Initially presented 8/2015 with left sided odynophagia.  Treated for reflux, which did not improve.    12/11/15 he had a fiberoptic exam showing discolored mucosal area of the left base of the tongue.    1/13/16 he had persistent dysphagia, odynophagia, and new left otalgia.  MRI showed an irregular mass along the left posterior margin of the base of the tongue measuring 1.8x2.3cm with ipsilateral level 2 lymph node measuring 1.7cm with central necrosis and contralateral level 2 node measuring 1.3cm with questionable necrosis.    1/19/2016 he had direct laryngoscopy with biopsy left of midline, proximal to vallecula, which was p16 positive, invasive, with moderately to poorly differentiated sq.c.c. with basaloid features.    1/29/16 met with  medical oncology who recommended chemoxrt.    2/1/16  staging PET CT showed hypermetabolic mass at base of tongue 4x2.5x3.5cm, max SUV 24.13, hypermetabolic lymph node left neck SUV 6.77, small subcentimeter lymph node right neck max SUV 3.79 (exact size of lymph nodes not mentioned in the summary).    2/15/16 started chemoxrt to left tongue base, bilateral cervical neck levels 1b-5, treated with 6MV photons utilizing IMRT, 5000 cGy in 200 cGy daily fractions.  Cone down boost to left base of tongue with additional 2000 cGy in 200 cGy daily fractions to gross disease.  Received weekly cisplatin with radiation but had to switch to carboplatin due to worsening renal function. during this time also missed several weekly treatments of cisplatin.    4/1/2016 completed chemoxrt.   11/1/2016 Repeat biopsy of left tongue showed persistent disease.  S/p salvage glossectomy with laryngectomy,  rT4aN0, p16 positive, base of tongue squamous cell carcinoma.     page 61 of 3/25/2020 outside records clinic (media tab)  Pathology: Tonsil, left tonsil fold: reactive squamous mucosa with inflammation and degenerating skeletal muscle, no carcinoma seen.  Right and Left base of tongue:  Reactive squamous mucosa and submucosa with inflammation, no carcinoma seen.  Neck, glossectomy, laryngectomy, bilateral neck dissection:  invasive squamous cell carcinoma.  tumor site: base of tongue/hypopharynx.  Small focus of tumor is seen within the hyoid  bone.  specimen Size 26x49p7bp  tumor size [id number]  depth of invasion 20mm  TNM stage pT4a, pN0, pMx  Lymph nodes, included in all parts:  number involved 0  number examined 3.  distance of tumor from margins  positive inked margins: none  within 1mm : none  within 1-2mm: anterior-inferior    Bilateral neck dissection:  level 1: one lymph node and submandibular gland, negative for tumor  level II : two lymph nodes, negative for tumor  level III: not identified  level IV: not identified  level  V: not identified.    6/20/17 - PETCT with FDG avidity of left neck lymph node, level 3, SUV 2.9.  No other evidence of local or distant disease.  7/6/2017 - biopsy of left neck lymph node with IR.  Sample was non-diagnostic. Notes from path report: Less than optimal scan specimen with minute tissue core of stromal fibroadipose tissue.  definitive tati background is not seen.  9/28/2017 - Repeat PET CT showing mild activity SUV 3.5 (increased from 3.2 previously; size 7.8x7.4x9.5mm).  Also new findings under left pectoralis minor (mild focal increased activity, indeterminate but new since previous exam)  3/22/2018 - CT neck shows no evidence of disease and level 2b lymph node decreased to 4.9mmx4.5mmx4.9mm from 7.8x7.4x9.5mm previously.    Seen by Dr. Myers 8/3/2020 for left-sided neck pain and a swelling in left level 2 several weeks ago.  He feels it arose after he began lifting weights.  CT neck 8/5/2020 : Lymph nodes At the left neck level L2 there is a heterogeneous soft tissue density with central hypoattenuation likely reflecting a necrotic lymph node measuring 1.6 x 1.4 cm in transverse dimension, 2.5 cm in craniocaudal dimension.  There is a similar appearing irregular soft tissue density at the right neck level 2 measuring 1.1 x 9.0 cm in transverse dimension and 1.5 cm craniocaudal dimension.  No other abnormal appearing or enlarged lymph nodes found.  Impression: At level 2 within the neck bilaterally, there are  soft tissue density masses with central hypoattenuation likely reflective of necrotic lymph nodes.  Findings are suspicious for malignancy recurrence.  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  Staging PET CT 9/30/2020 with left sided hypermetabolic node and right sided enlarged node without uptake.  Also has mandibular vestibule uptake, which may be another site of disease. Discussed at tumor board.  He is not a surgical or radiation  candidate.  -Started on PCC 10/6/2020 followed by maintenance cetuximab until 8/24/21 (discontinued due to progression of disease; continued increase in SUV uptake, no new lesions).  9/2021 started on carbo/5-FU/pembrolizumab; due to toxicity went to pembrolizumab monotherapy starting with cycle 2.  Progression of disease noted after cycle 3 so added chemotherapy back in with cycle 4.    Has been on maintenance pembrolizumab since cycle 9.         Current Assessment & Plan     Right eye irritated; he will be seeing eye doctor tomorrow.  Neuropathy of feet resolved without using gabapentin.  Has 10 weeks of imiquimod left for his scalp.  Physical exam significant for scabbed scalp.  -labs reviewed; ok to proceed with treatment  -labs and follow up prior to next cycle           Relevant Medications    oxyCODONE (ROXICODONE) 10 mg Tab immediate release tablet (Start on 12/30/2022)    Anemia in stage 3a chronic kidney disease    Current Assessment & Plan     Stable  -monitor cbc         Secondary malignant neoplasm of cervical lymph node    Overview     See squamous cell carcinoma            Endocrine    Postoperative hypothyroidism    Current Assessment & Plan     Continue levothyroxine  Port draw lab tsh at next visit         Relevant Orders    TSH     Orders Placed This Encounter   Procedures    TSH         Advance Care Planning I initiated the process of advance care planning at his initial visit and explained the importance of this process to the patient.  Then the patient received detailed information about the importance of designating a Health Care Power of  (HCPOA). he was instructed to communicate with this person about their wishes for future healthcare, should he become sick and lose decision-making capacity. The patient has previously appointed a HCPOA. After our discussion, the patient has decided to complete a HCPOA and has appointed his brother and NAME:Ollie          Ge[facility] Chart for  Scheduling    Med Onc Chart Routing  Urgent    Follow up with physician 3 weeks. 1/17 with Dr. Hernandez/Tito NP   Follow up with [facility]    Infusion scheduling note 1/17 pembrolizumab   Injection scheduling note    Labs CMP, CBC and TSH   Lab interval:  port draw labs 1/17  TSH CMP CBC   Imaging None      Pharmacy appointment No pharmacy appointment needed      Other referrals No additional referrals needed         Treatment Plan Information   OP HEAD NECK PEMBROLIZUMAB CARBOPLATIN FLUOROURACIL (C1 ONLY RECEIVED) FOLLOWED BY PEMBROLIZUMAB MAINTENANCE   Ronald Berg MD   Upcoming Treatment Dates - OP HEAD NECK PEMBROLIZUMAB CARBOPLATIN FLUOROURACIL (C1 ONLY RECEIVED) FOLLOWED BY PEMBROLIZUMAB MAINTENANCE    1/17/2023       Immunotherapy       pembrolizumab (KEYTRUDA) 200 mg in sodium chloride 0.9% SolP 108 mL infusion  2/7/2023       Immunotherapy       pembrolizumab (KEYTRUDA) 200 mg in sodium chloride 0.9% SolP 108 mL infusion  2/28/2023       Immunotherapy       pembrolizumab (KEYTRUDA) 200 mg in sodium chloride 0.9% SolP 108 mL infusion  3/21/2023       Immunotherapy       pembrolizumab (KEYTRUDA) 200 mg in sodium chloride 0.9% SolP 108 mL infusion    Therapy Plan Information  Flushes  heparin, porcine (PF) 100 unit/mL injection flush 500 Units  500 Units, Intravenous, Every visit  sodium chloride 0.9% flush 10 mL  10 mL, Intravenous, Every visit      Ronald Berg MD  Hematology Oncology

## 2022-12-28 ENCOUNTER — OFFICE VISIT (OUTPATIENT)
Dept: OPHTHALMOLOGY | Facility: CLINIC | Age: 72
End: 2022-12-28
Payer: MEDICARE

## 2022-12-28 DIAGNOSIS — D49.89 CONJUNCTIVAL INTRAEPITHELIAL NEOPLASM: ICD-10-CM

## 2022-12-28 DIAGNOSIS — H25.13 NUCLEAR SCLEROSIS OF BOTH EYES: ICD-10-CM

## 2022-12-28 DIAGNOSIS — Z98.890 POST-OPERATIVE STATE: Primary | ICD-10-CM

## 2022-12-28 PROCEDURE — 99024 POSTOP FOLLOW-UP VISIT: CPT | Mod: POP,,, | Performed by: OPHTHALMOLOGY

## 2022-12-28 PROCEDURE — 99999 PR PBB SHADOW E&M-EST. PATIENT-LVL III: CPT | Mod: PBBFAC,,, | Performed by: OPHTHALMOLOGY

## 2022-12-28 PROCEDURE — 99999 PR PBB SHADOW E&M-EST. PATIENT-LVL III: ICD-10-PCS | Mod: PBBFAC,,, | Performed by: OPHTHALMOLOGY

## 2022-12-28 PROCEDURE — 99024 PR POST-OP FOLLOW-UP VISIT: ICD-10-PCS | Mod: POP,,, | Performed by: OPHTHALMOLOGY

## 2022-12-28 PROCEDURE — 99213 OFFICE O/P EST LOW 20 MIN: CPT | Mod: PBBFAC,PO | Performed by: OPHTHALMOLOGY

## 2022-12-28 NOTE — PROGRESS NOTES
Subjective:       Patient ID: Rocco Swain is a 72 y.o. male.    Chief Complaint: Post-op Evaluation (Patient Rocco Swain is a 72 year old male.)    HPI     Post-op Evaluation     Additional comments: Patient Rocco Swain is a 72 year old male.           Comments    Pt here for post-op follow up visit. Pt states that OD is occasionally   painful with blurry VA and light sensitivity. Pt states that OS is good.     DLS: 10/26/2022 with Dr. Vasques  S/p Conjunctival lesion biopsy OD: 10/11/2022    Gtts: None    POHx:  1. Post-operative state, right   2. Conjunctival intraepithelial neoplasm   3. Nuclear sclerosis of both eyes           Last edited by Martha Gonsalez on 12/28/2022  9:05 AM.             Assessment:       1. Post-operative state    2. Conjunctival intraepithelial neoplasm    3. Nuclear sclerosis of both eyes          Plan:       S/p excision of VIK OD-Pt has tissue extending onto sup/nasal area of cornea.  Cataracts- Not visually significant.        Start EES alex qhs OD x 3 days to help with fbs.  AT's.  Refer to Dr Brambila for cornea eval/tx  OD. ? VIK extending onto cornea OD.

## 2023-01-03 RX ORDER — TACROLIMUS 0.5 MG/1
0.5 CAPSULE ORAL NIGHTLY
Qty: 90 CAPSULE | Refills: 3 | OUTPATIENT
Start: 2023-01-03

## 2023-01-09 ENCOUNTER — PATIENT MESSAGE (OUTPATIENT)
Dept: HEMATOLOGY/ONCOLOGY | Facility: CLINIC | Age: 73
End: 2023-01-09
Payer: MEDICARE

## 2023-01-09 NOTE — TELEPHONE ENCOUNTER
As per my chart communication, refills requested.            FW: Tacrolimus 0.5 MG. Needs to be renewed immediately. Down to 10 days supply  left. Prescription  is through  [facility]  online  mail service.   Thanks,  Rocco Aquino MA  Sent: Mon January 09, 2023 10:49 AM  To: P [facility] Post-Liver Transplant Clinical    Tacrolimus 0.5 MG. Needs to be renewed immediately. Down to 10 days supply  left. Prescription  is through  [facility]  online  mail service.   Thanks,  Rocco Swain   (Newest Message First)  Lisa Aquino MA routed conversation to [facility] Post-Liver Transplant Clinical 16 minutes ago (10:49 AM)    Tarsha Gallagher RN routed conversation to [facility] Cornell Staff 18 minutes ago (10:47 AM)    Tarsha Gallagher RN Robert Lindsey 18 minutes ago (10:47 AM)    MT      I sent this to the transplant team for you-  ~Tarsha        This Patient Portal message has not been read.    Rocco Cardenas Staff (supporting Ronald Berg MD) 21 minutes ago (10:44 AM)          Pharmacy # is [phone] ( rx.# ).

## 2023-01-10 RX ORDER — TACROLIMUS 1 MG/1
1 CAPSULE ORAL EVERY MORNING
Qty: 90 CAPSULE | Refills: 3 | Status: SHIPPED | OUTPATIENT
Start: 2023-01-10 | End: 2023-01-20 | Stop reason: SDUPTHER

## 2023-01-10 RX ORDER — TACROLIMUS 0.5 MG/1
0.5 CAPSULE ORAL NIGHTLY
Qty: 90 CAPSULE | Refills: 3
Start: 2023-01-10 | End: 2023-01-16 | Stop reason: SDUPTHER

## 2023-01-16 RX ORDER — TACROLIMUS 0.5 MG/1
0.5 CAPSULE ORAL NIGHTLY
Qty: 90 CAPSULE | Refills: 3 | Status: CANCELLED
Start: 2023-01-16

## 2023-01-17 RX ORDER — TACROLIMUS 0.5 MG/1
0.5 CAPSULE ORAL NIGHTLY
Qty: 90 CAPSULE | Refills: 3 | Status: CANCELLED
Start: 2023-01-17

## 2023-01-19 ENCOUNTER — OFFICE VISIT (OUTPATIENT)
Dept: HEMATOLOGY/ONCOLOGY | Facility: CLINIC | Age: 73
End: 2023-01-19
Payer: MEDICARE

## 2023-01-19 ENCOUNTER — INFUSION (OUTPATIENT)
Dept: INFUSION THERAPY | Facility: HOSPITAL | Age: 73
End: 2023-01-19
Attending: STUDENT IN AN ORGANIZED HEALTH CARE EDUCATION/TRAINING PROGRAM
Payer: MEDICARE

## 2023-01-19 VITALS
HEIGHT: 68 IN | HEART RATE: 83 BPM | BODY MASS INDEX: 17.98 KG/M2 | DIASTOLIC BLOOD PRESSURE: 75 MMHG | SYSTOLIC BLOOD PRESSURE: 148 MMHG | OXYGEN SATURATION: 97 % | TEMPERATURE: 98 F | RESPIRATION RATE: 18 BRPM | WEIGHT: 118.63 LBS

## 2023-01-19 VITALS
HEIGHT: 68 IN | RESPIRATION RATE: 17 BRPM | SYSTOLIC BLOOD PRESSURE: 158 MMHG | TEMPERATURE: 98 F | OXYGEN SATURATION: 97 % | BODY MASS INDEX: 17.98 KG/M2 | HEART RATE: 76 BPM | WEIGHT: 118.63 LBS | DIASTOLIC BLOOD PRESSURE: 81 MMHG

## 2023-01-19 DIAGNOSIS — C77.0 SECONDARY MALIGNANT NEOPLASM OF CERVICAL LYMPH NODE: Primary | ICD-10-CM

## 2023-01-19 DIAGNOSIS — C32.9 LARYNX CANCER: ICD-10-CM

## 2023-01-19 DIAGNOSIS — Z79.899 IMMUNODEFICIENCY DUE TO DRUGS: ICD-10-CM

## 2023-01-19 DIAGNOSIS — G89.3 CANCER ASSOCIATED PAIN: ICD-10-CM

## 2023-01-19 DIAGNOSIS — Z94.4 STATUS POST LIVER TRANSPLANTATION: ICD-10-CM

## 2023-01-19 DIAGNOSIS — D61.818 PANCYTOPENIA: ICD-10-CM

## 2023-01-19 DIAGNOSIS — C77.0 SECONDARY MALIGNANT NEOPLASM OF CERVICAL LYMPH NODE: ICD-10-CM

## 2023-01-19 DIAGNOSIS — C01 SQUAMOUS CELL CARCINOMA OF BASE OF TONGUE: ICD-10-CM

## 2023-01-19 DIAGNOSIS — H10.31 ACUTE CONJUNCTIVITIS OF RIGHT EYE, UNSPECIFIED ACUTE CONJUNCTIVITIS TYPE: ICD-10-CM

## 2023-01-19 DIAGNOSIS — D84.821 IMMUNODEFICIENCY DUE TO DRUGS: ICD-10-CM

## 2023-01-19 DIAGNOSIS — C01 SQUAMOUS CELL CARCINOMA OF BASE OF TONGUE: Primary | ICD-10-CM

## 2023-01-19 PROCEDURE — 99215 OFFICE O/P EST HI 40 MIN: CPT | Mod: S$PBB,,, | Performed by: INTERNAL MEDICINE

## 2023-01-19 PROCEDURE — 99214 OFFICE O/P EST MOD 30 MIN: CPT | Mod: PBBFAC,25 | Performed by: INTERNAL MEDICINE

## 2023-01-19 PROCEDURE — 99999 PR PBB SHADOW E&M-EST. PATIENT-LVL IV: ICD-10-PCS | Mod: PBBFAC,,, | Performed by: INTERNAL MEDICINE

## 2023-01-19 PROCEDURE — 99215 PR OFFICE/OUTPT VISIT, EST, LEVL V, 40-54 MIN: ICD-10-PCS | Mod: S$PBB,,, | Performed by: INTERNAL MEDICINE

## 2023-01-19 PROCEDURE — 99999 PR PBB SHADOW E&M-EST. PATIENT-LVL IV: CPT | Mod: PBBFAC,,, | Performed by: INTERNAL MEDICINE

## 2023-01-19 PROCEDURE — 25000003 PHARM REV CODE 250: Performed by: INTERNAL MEDICINE

## 2023-01-19 PROCEDURE — 96413 CHEMO IV INFUSION 1 HR: CPT

## 2023-01-19 PROCEDURE — A4216 STERILE WATER/SALINE, 10 ML: HCPCS | Performed by: INTERNAL MEDICINE

## 2023-01-19 PROCEDURE — 63600175 PHARM REV CODE 636 W HCPCS: Performed by: INTERNAL MEDICINE

## 2023-01-19 RX ORDER — HEPARIN 100 UNIT/ML
500 SYRINGE INTRAVENOUS
Status: CANCELLED | OUTPATIENT
Start: 2023-01-19

## 2023-01-19 RX ORDER — SODIUM CHLORIDE 0.9 % (FLUSH) 0.9 %
10 SYRINGE (ML) INJECTION
Status: DISCONTINUED | OUTPATIENT
Start: 2023-01-19 | End: 2023-01-19 | Stop reason: HOSPADM

## 2023-01-19 RX ORDER — SODIUM CHLORIDE 0.9 % (FLUSH) 0.9 %
10 SYRINGE (ML) INJECTION
Status: CANCELLED | OUTPATIENT
Start: 2023-01-19

## 2023-01-19 RX ORDER — HEPARIN 100 UNIT/ML
500 SYRINGE INTRAVENOUS
Status: DISCONTINUED | OUTPATIENT
Start: 2023-01-19 | End: 2023-01-19 | Stop reason: HOSPADM

## 2023-01-19 RX ADMIN — SODIUM CHLORIDE: 9 INJECTION, SOLUTION INTRAVENOUS at 02:01

## 2023-01-19 RX ADMIN — SODIUM CHLORIDE 200 MG: 9 INJECTION, SOLUTION INTRAVENOUS at 03:01

## 2023-01-19 RX ADMIN — HEPARIN 500 UNITS: 100 SYRINGE at 03:01

## 2023-01-19 RX ADMIN — Medication 10 ML: at 03:01

## 2023-01-19 NOTE — PLAN OF CARE
1558  Patient completed Keytruda infusion, tolerated well.  Port de accessed without issue, flushed, blood return noted, flushed, heparin locked.  RTC not yet scheduled.  Patient ambulated off floor independently, NAD

## 2023-01-19 NOTE — PLAN OF CARE
1330  Patient seated in chair, vss, assessment done.  Port accessed without issue, flushed, blood return noted.  Started NS @ 25 cc/hr KVO while waiting for Keytruda from Pharmacy.  [facility] for safety

## 2023-01-19 NOTE — PROGRESS NOTES
ONCOLOGY FOLLOW UP VISIT    Subjective:      Patient ID: Rocco Swain    Chief Complaint: Squamous cell carcinoma of base of tongue      HPI  Rocco Swain is a 72 y.o. male, patient of Dr. Berg, who returns to clinic for evaluation and management of  P16 positive squamous cell carcinoma. Since his last visit he is doing well. Reports joint pains that come and go: ankles, keens, elbows, wrists. Pain associated with stiffness. This has been going on since starting chemo. Takes Oxy IR 3-4x a day to control the pain. Chronic neck pain stable.    ECOG Performance status: 1 - Symptomatic but completely ambulatoryHe is alone at this visit.  Communication from him is 100% written.     Cancer Staging   Squamous cell carcinoma of base of tongue  Staging form: Pharynx - HPV-Mediated Oropharynx, AJCC 8th Edition  - Clinical stage from 9/18/2020: Stage III (rcT4, cN1, cM0, p16+) - Signed by Ronald Berg MD on 12/27/2022      Oncologic History:  Oncology History   Squamous cell carcinoma of base of tongue   9/18/2020 Cancer Staged    Staging form: Pharynx - HPV-Mediated Oropharynx, AJCC 8th Edition  - Clinical stage from 9/18/2020: Stage III (rcT4, cN1, cM0, p16+)       9/28/2020 Initial Diagnosis    Squamous cell carcinoma of base of tongue     10/6/2020 - 8/17/2021 Chemotherapy    Treatment Summary   Plan Name: OP HEAD NECK CARBOPLATIN PACLITAXEL C1-2 FOLLOWED BY CETUXIMAB CARBOPLATIN C3-6 FOLLOWED BY CETUXIMAB MAINTENANCE WEEKLY  Treatment Goal: Control  Status: Inactive  Start Date: 10/6/2020  End Date: 8/17/2021  Provider: Ronald Berg MD  Chemotherapy: cetuximab (ERBITUX) 100 mg/50 mL chemo infusion 684 mg, 400 mg/m2 = 684 mg (100 % of original dose 400 mg/m2), Intravenous, Clinic/HOD 1 time, 29 of 38 cycles  Dose modification: 500 mg/m2 (original dose 400 mg/m2, Cycle 3), 250 mg/m2 (original dose 400 mg/m2, Cycle 3), 400 mg/m2 (original dose 400 mg/m2, Cycle 3), 250 mg/m2 (original dose 250 mg/m2, Cycle 7), 200 mg/m2  (original dose 250 mg/m2, Cycle 18), 200 mg/m2 (original dose 250 mg/m2, Cycle 19), 250 mg/m2 (original dose 250 mg/m2, Cycle 4), 200 mg/m2 (original dose 250 mg/m2, Cycle 25), 200 mg/m2 (original dose 250 mg/m2, Cycle 28)  Administration: 684 mg (11/17/2020), 400 mg (11/24/2020), 400 mg (2/9/2021), 400 mg (2/17/2021), 427.6 mg (3/9/2021), 400 mg (3/30/2021), 400 mg (3/23/2021), 400 mg (4/6/2021), 427.6 mg (4/13/2021), 427.6 mg (4/20/2021), 342 mg (5/11/2021), 342 mg (5/18/2021), 342 mg (5/25/2021), 400 mg (5/31/2021), 400 mg (6/7/2021), 400 mg (6/14/2021), 400 mg (12/8/2020), 427.6 mg (12/29/2020), 400 mg (12/1/2020), 400 mg (12/15/2020), 400 mg (12/22/2020), 427.6 mg (1/5/2021), 400 mg (1/12/2021), 400 mg (1/19/2021), 427.6 mg (1/26/2021), 400 mg (2/2/2021), 400 mg (2/23/2021), 400 mg (3/2/2021), 427.6 mg (3/16/2021), 400 mg (6/21/2021), 342 mg (6/28/2021), 342 mg (7/6/2021), 342 mg (7/13/2021), 342 mg (7/20/2021), 400 mg (7/27/2021), 400 mg (8/10/2021), 400 mg (8/17/2021)  CARBOplatin (PARAPLATIN) 310 mg in sodium chloride 0.9% 500 mL chemo infusion, 310 mg (92.2 % of original dose 334.5 mg), Intravenous, Clinic/HOD 1 time, 6 of 6 cycles  Dose modification:   (original dose 334.5 mg, Cycle 1)  Administration: 310 mg (10/6/2020), 370 mg (11/17/2020), 300 mg (10/27/2020), 350 mg (12/8/2020), 335 mg (12/29/2020), 320 mg (1/19/2021)  PACLitaxeL (TAXOL) 175 mg/m2 = 300 mg in sodium chloride 0.9% 500 mL chemo infusion, 175 mg/m2 = 300 mg (100 % of original dose 175 mg/m2), Intravenous, Clinic/HOD 1 time, 2 of 2 cycles  Dose modification: 175 mg/m2 (original dose 175 mg/m2, Cycle 1)  Administration: 300 mg (10/6/2020), 300 mg (10/27/2020)       4/29/2021 Tumor Conference       His case was discussed at the Multidisciplinary Head and Neck Team Planning Meeting.    Representatives from Medical Oncology, Radiation Oncology, Head and Neck Surgical Oncology, Psychosocial Oncology, and Speech and Language Pathology discussed  the case with the following recommendations:    1) biopsy         7/29/2021 Tumor Conference       His case was discussed at the Multidisciplinary Head and Neck Team Planning Meeting.    Representatives from Medical Oncology, Radiation Oncology, Head and Neck Surgical Oncology, Psychosocial Oncology, and Speech and Language Pathology discussed the case with the following recommendations:    1) Head and neck clinic follow up  2) consider Keytruda (discuss with transplant)  3) consider palliative referral         9/13/2021 -  Chemotherapy    Treatment Summary   Plan Name: OP HEAD NECK PEMBROLIZUMAB CARBOPLATIN FLUOROURACIL (C1 ONLY RECEIVED) FOLLOWED BY PEMBROLIZUMAB MAINTENANCE  Treatment Goal: Palliative  Status: Active  Start Date: 9/13/2021  End Date: 1/9/2024 (Planned)  Provider: Ronald Berg MD  Chemotherapy: CARBOplatin (PARAPLATIN) 320 mg in sodium chloride 0.9% 500 mL chemo infusion, 320 mg (100 % of original dose 320.5 mg), Intravenous, Clinic/HOD 1 time, 6 of 6 cycles  Dose modification:   (original dose 320.5 mg, Cycle 1)  Administration: 320 mg (9/13/2021), 335 mg (12/23/2021), 325 mg (1/27/2022), 245 mg (3/3/2022), 255 mg (5/12/2022), 255 mg (4/7/2022)  fluorouraciL 1,000 mg/m2/day = 6,440 mg in sodium chloride 0.9% 240 mL chemo infusion, 1,000 mg/m2/day = 6,440 mg, Intravenous, Over 96 hours, 6 of 6 cycles  Dose modification: 800 mg/m2/day (original dose 1,000 mg/m2/day, Cycle 6), 5,000 mg (original dose 1,000 mg/m2/day, Cycle 8)  Administration: 6,440 mg (9/13/2021), 6,440 mg (12/23/2021), 6,480 mg (1/27/2022), 5,000 mg (3/3/2022), 5,000 mg (4/7/2022), 5,000 mg (5/12/2022)       Secondary malignant neoplasm of cervical lymph node   2/9/2021 Initial Diagnosis    Secondary malignant neoplasm of cervical lymph node     9/13/2021 -  Chemotherapy    Treatment Summary   Plan Name: OP HEAD NECK PEMBROLIZUMAB CARBOPLATIN FLUOROURACIL (C1 ONLY RECEIVED) FOLLOWED BY PEMBROLIZUMAB MAINTENANCE  Treatment Goal:  Palliative  Status: Active  Start Date: 9/13/2021  End Date: 1/9/2024 (Planned)  Provider: Ronald Berg MD  Chemotherapy: CARBOplatin (PARAPLATIN) 320 mg in sodium chloride 0.9% 500 mL chemo infusion, 320 mg (100 % of original dose 320.5 mg), Intravenous, Clinic/hospitals 1 time, 6 of 6 cycles  Dose modification:   (original dose 320.5 mg, Cycle 1)  Administration: 320 mg (9/13/2021), 335 mg (12/23/2021), 325 mg (1/27/2022), 245 mg (3/3/2022), 255 mg (5/12/2022), 255 mg (4/7/2022)  fluorouraciL 1,000 mg/m2/day = 6,440 mg in sodium chloride 0.9% 240 mL chemo infusion, 1,000 mg/m2/day = 6,440 mg, Intravenous, Over 96 hours, 6 of 6 cycles  Dose modification: 800 mg/m2/day (original dose 1,000 mg/m2/day, Cycle 6), 5,000 mg (original dose 1,000 mg/m2/day, Cycle 8)  Administration: 6,440 mg (9/13/2021), 6,440 mg (12/23/2021), 6,480 mg (1/27/2022), 5,000 mg (3/3/2022), 5,000 mg (4/7/2022), 5,000 mg (5/12/2022)           Review of Systems   Constitutional:  Positive for appetite change (early satiety). Negative for activity change, chills, fatigue, fever and unexpected weight change.   HENT:  Negative for ear pain, facial swelling, hearing loss, mouth sores, nosebleeds, sore throat, trouble swallowing and voice change.         No verbal communication   Eyes:  Positive for redness (right eye). Negative for pain, discharge and visual disturbance.   Respiratory:  Negative for cough, choking, chest tightness and shortness of breath.    Cardiovascular:  Negative for chest pain, palpitations and leg swelling.   Gastrointestinal:  Negative for abdominal distention, abdominal pain, blood in stool, constipation, diarrhea, nausea and vomiting.   Endocrine: Negative for cold intolerance and heat intolerance.   Genitourinary:  Negative for difficulty urinating, dysuria, frequency and urgency.   Musculoskeletal:  Negative for arthralgias, gait problem, leg pain and myalgias.   Integumentary:  Positive for rash (intermittent). Negative for  pallor and wound.   Allergic/Immunologic: Negative for frequent infections.   Neurological:  Negative for dizziness, tremors, weakness, light-headedness, numbness and headaches.   Hematological:  Negative for adenopathy. Does not bruise/bleed easily.   Psychiatric/Behavioral:  Negative for agitation, confusion, dysphoric mood and sleep disturbance. The patient is not nervous/anxious.       Allergies:  Review of patient's allergies indicates:  No Known Allergies    Medications:  Current Outpatient Medications   Medication Sig Dispense Refill    azelaic acid (AZELEX) 15 % gel APPLY TOPICALLY TO AFFECTED AREA IN THE MORNING 50 g 3    diphenoxylate-atropine 2.5-0.025 mg (LOMOTIL) 2.5-0.025 mg per tablet Take 1 tablet by mouth 4 (four) times daily as needed for Diarrhea (use when loperamide/imodium does not work). 30 tablet 0    gabapentin (NEURONTIN) 300 MG capsule Take 1 capsule (300 mg total) by mouth every evening. 30 capsule 11    ibuprofen (ADVIL,MOTRIN) 200 MG tablet Take 200 mg by mouth.      imiquimod (ALDARA) 5 % cream Apply to biopsy site on frontal scalp + about a centimeter of surrounding skin qhs x 16 weeks. Wash off in am and apply sunscreen. Discontinue if skin becomes blistered, raw, bleeding, painful, etc. 24 packet 3    levothyroxine (SYNTHROID) 88 MCG tablet TAKE 1 TABLET BY MOUTH ONCE DAILY 90 tablet 3    LIDOcaine HCl 2% (LIDOCAINE VISCOUS) 2 % Soln Swish and spit 15 mls every 8 (eight) hours as needed (mouth sore). 100 mL 3    LIDOcaine-prilocaine (EMLA) cream Apply generously to port site 30-60 min prior to chemo and then cover with saran wrap. 30 g 2    loperamide (IMODIUM) 2 mg capsule Take 1 capsule (2 mg total) by mouth 4 (four) times daily as needed for Diarrhea. 30 capsule 1    magic mouthwash diphen/antac/lidoc/nysta Take 10 mLs by mouth 4 (four) times daily. 120 mL 4    metroNIDAZOLE (METROGEL) 0.75 % gel Apply topically to affected area 2 (two) times daily. 45 g 1     multivit-min/FA/lycopen/lutein (CENTRUM SILVER MEN ORAL) Take 1 tablet by mouth.      multivitamin capsule Take 1 capsule by mouth once daily.      oxyCODONE (ROXICODONE) 10 mg Tab immediate release tablet Take 1 tablet (10 mg total) by mouth every 6 (six) hours as needed for Pain. 120 tablet 0    sulfacetamide sodium-sulfur 10-5 % (w/w) Clsr USE TO WASH AFFECTED AREA DAILY      tacrolimus (PROGRAF) 0.5 MG Cap Take 1 capsule (0.5 mg total) by mouth every evening. Use the 1mg capsule for your morning dose 90 capsule 3    tacrolimus (PROGRAF) 1 MG Cap Take 1 capsule (1 mg total) by mouth every morning. Use the tacrolimus 0.5mg capsule for your evening dose as directed. 90 capsule 3    tiZANidine (ZANAFLEX) 2 MG tablet Take 1 tablet (2 mg total) by mouth nightly as needed (neck muscle strain). 30 tablet 0    traZODone (DESYREL) 50 MG tablet TAKE 1 TABLET BY MOUTH IN  THE EVENING 90 tablet 3    vitamin E 400 UNIT capsule Take 400 Units by mouth once daily.      loteprednol (LOTEMAX) 0.5 % ophthalmic suspension Place 1 drop into the right eye 4 (four) times daily. (Patient not taking: Reported on 12/28/2022) 5 mL 1     No current facility-administered medications for this visit.     Facility-Administered Medications Ordered in Other Visits   Medication Dose Route Frequency Provider Last Rate Last Admin    heparin, porcine (PF) 100 unit/mL injection flush 500 Units  500 Units Intravenous PRN Ronald Berg MD        ofloxacin 0.3 % ophthalmic solution 1 drop  1 drop Right Eye On Call Procedure Victor M Vasques MD   2 drop at 10/11/22 0745    sodium chloride 0.9% flush 10 mL  10 mL Intravenous PRN Ronald Berg MD        sodium chloride 0.9% flush 10 mL  10 mL Intravenous PRN Victor M Vasques MD           PMH:  Past Medical History:   Diagnosis Date    Basal cell carcinoma (BCC) in situ of skin 2012    3 on face, 2 on arm, removed by dermatology.     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 Underwent surgical resection of tongue and neck. s/p tracheostomy       PSH:  Past Surgical History:   Procedure Laterality Date    COLONOSCOPY      CONJUNCTIVA BIOPSY Right 10/11/2022    Procedure: BIOPSY, CONJUNCTIVA;  Surgeon: Victor M Vasques MD;  Location: [facility];  Service: Ophthalmology;  Laterality: Right;    INSERTION OF VENOUS ACCESS PORT Right 3/8/2021    Procedure: INSERTION, VENOUS ACCESS PORT;  Surgeon: eJsus Rendon MD;  Location: [facility] OR [address] [place] [address];  Service: General;  Laterality: Right;    LIVER TRANSPLANT  11/2008    transplanted for biopsy proven hepatocellular carcinoma,     LYMPH NODE BIOPSY N/A 9/18/2020    Procedure: BIOPSY, LYMPH NODE;  Surgeon: Taz Diagnostic Provider;  Location: [facility] OR [address] [place] [address];  Service: General;  Laterality: N/A;  Rm 173    TRACHEOSTOMY         FamHx:  Family History   Problem Relation Age of Onset    Dementia Mother        SocHx:  Social History     Socioeconomic History    Marital status: Single   Occupational History    Occupation: Retired     Comment: , notes exposures to fumes etc.  Worked on [employer] for 4 years   Tobacco Use    Smoking status: Never    Smokeless tobacco: Never   Substance and Sexual Activity    Alcohol use: Yes     Comment: drinks wine, 1 glass day    Drug use: Not Currently    Sexual activity: Yes     Comment: No prior history of STD        Distress Score    Distress Score: 0 - No Distress        Objective:      Vitals:    01/19/23 1258   BP: (!) 148/75   BP Location: Left arm   Patient Position: Sitting   BP Method: Medium (Automatic)   Pulse: 83   Resp: 18   Temp: 98.1 °F (36.7 °C)   TempSrc: Oral   SpO2: 97%   Weight: 53.8 kg (118 lb 9.7 oz)   Height: 5' 8" (1.727 m)      Physical Exam  Vitals " and nursing note reviewed.   Constitutional:       General: He is not in acute distress.     Appearance: Normal appearance. He is well-developed and underweight.   HENT:      Head: Normocephalic and atraumatic.   Eyes:      Conjunctiva/sclera: Conjunctivae normal.      Pupils: Pupils are equal, round, and reactive to light.   Neck:      Trachea: Tracheostomy present.   Pulmonary:      Effort: Pulmonary effort is normal. No respiratory distress.   Abdominal:      General: There is no distension.      Palpations: Abdomen is soft.   Musculoskeletal:         General: No swelling. Normal range of motion.      Cervical back: Normal range of motion and neck supple.   Skin:     General: Skin is warm and dry.   Neurological:      General: No focal deficit present.      Mental Status: He is alert and oriented to person, place, and time.   Psychiatric:         Mood and Affect: Mood normal.         Behavior: Behavior normal.         Thought Content: Thought content normal.         Judgment: Judgment normal.         LABS:  WBC   Date Value Ref Range Status   01/19/2023 2.82 (L) 3.90 - 12.70 K/uL Final     Hemoglobin   Date Value Ref Range Status   01/19/2023 11.4 (L) 14.0 - 18.0 g/dL Final     Hematocrit   Date Value Ref Range Status   01/19/2023 33.6 (L) 40.0 - 54.0 % Final     Platelets   Date Value Ref Range Status   01/19/2023 102 (L) 150 - 450 K/uL Final       Chemistry        Component Value Date/Time     01/19/2023 1158    K 4.5 01/19/2023 1158     01/19/2023 1158    CO2 28 01/19/2023 1158    BUN 17 01/19/2023 1158    CREATININE 1.5 (H) 01/19/2023 1158     (H) 01/19/2023 1158        Component Value Date/Time    CALCIUM 10.3 01/19/2023 1158    ALKPHOS 89 01/19/2023 1158    AST 63 (H) 01/19/2023 1158    ALT 26 01/19/2023 1158    BILITOT 0.7 01/19/2023 1158    ESTGFRAFRICA 52.6 (A) 07/14/2022 1119    EGFRNONAA 45.5 (A) 07/14/2022 1119              Assessment:       1. Squamous cell carcinoma of base of  tongue    2. Secondary malignant neoplasm of cervical lymph node    3. Larynx cancer    4. Acute conjunctivitis of right eye, unspecified acute conjunctivitis type    5. Cancer associated pain    6. 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 Pancytopenia            Plan:       1,2. 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 He is not a surgical or radiation candidate.  -Started on PCC 10/6/2020 followed by maintenance cetuximab until 8/24/21 (discontinued due to progression of disease; continued increase in SUV uptake, no new lesions).  - 9/2021 started on carbo/5-FU/pembrolizumab; due to toxicity went to pembrolizumab monotherapy starting with cycle 2.  Progression of disease noted after cycle 3 so added chemotherapy back in with cycle 4. Keytruda monotherapy starting with C9.   - Labs acceptable to proceed with Keytruda. December 2022 PET scan reviewed with patient - shows no evidence of hypermetabolic disease. Continue current treatment. Re-staging scans in March.     3. Status post total laryngectomy, total glossectomy and anterolateral thigh free flap reconstruction roughly 2017 at [facility] in [place] for persistent/recurrent base of tongue sq.c.c.    4. Following with ophthalmology. Diagnosed with inflammatory conjunctival lesion. Plan for excision on the 18th. Continue loteprednol drops.     5. Continue Oxy IR. Consider palliative care involvement. Not a candidate for Celebrex d/t liver tx and kidney dysfunction.     6-8. S/p liver transplant and is currently on tacrolimus.  Afebrile, ANC sufficient for treatment. Grade 1. Will continue to monitor.       he will return to clinic in 3 weeks, but knows to call in the interim if symptoms change or should a problem arise.     Patient was also seen and examined by Dr. Hernandez. Patient is in agreement with the proposed treatment  plan. All questions were answered to the patient's satisfaction. Pt knows to call clinic if anything is needed before the next clinic visit.    Ct Francis, MSN, APRN, FNP-C  Hematology and Medical Oncology  Nurse Practitioner to Dr. Martín Hernandez  Nurse Practitioner, [facility]      I have reviewed the notes, assessments, and/or procedures performed by Ct SOSA, as above.  I have personally interviewed and examined the patient at the beside, and rounded with Ct. I concur with her assessment and plan and the documentation of Rocco Swain.    MDM includes:    - Acute or chronic illness or injury that poses a threat to life or bodily function  - Independent review and explanation of 2 results from unique tests  - Discussion of management and ordering 2 unique tests  - Extensive discussion of treatment and management  - Prescription drug management  - Drug therapy requiring intensive monitoring for toxicity    Martín Hernandez M.D.  Hematology/Oncology Attending  [facility]  [facility]         Route Chart for Scheduling    Med Onc Chart Routing      Follow up with physician    Follow up with CORY 3 weeks. prior to Keytruda   Infusion scheduling note    Injection scheduling note    Labs CBC, CMP, TSH and free T4   Lab interval:  prior to infusion   Imaging    Pharmacy appointment    Other referrals        Treatment Plan Information   OP HEAD NECK PEMBROLIZUMAB CARBOPLATIN FLUOROURACIL (C1 ONLY RECEIVED) FOLLOWED BY PEMBROLIZUMAB MAINTENANCE   Ronald Berg MD   Upcoming Treatment Dates - OP HEAD NECK PEMBROLIZUMAB CARBOPLATIN FLUOROURACIL (C1 ONLY RECEIVED) FOLLOWED BY PEMBROLIZUMAB MAINTENANCE    1/17/2023       Immunotherapy       pembrolizumab (KEYTRUDA) 200 mg in sodium chloride 0.9% SolP 108 mL infusion  2/7/2023       Immunotherapy       pembrolizumab (KEYTRUDA) 200 mg in sodium chloride 0.9% SolP 108 mL  infusion  2/28/2023       Immunotherapy       pembrolizumab (KEYTRUDA) 200 mg in sodium chloride 0.9% SolP 108 mL infusion  3/21/2023       Immunotherapy       pembrolizumab (KEYTRUDA) 200 mg in sodium chloride 0.9% SolP 108 mL infusion    Therapy Plan Information  Flushes  heparin, porcine (PF) 100 unit/mL injection flush 500 Units  500 Units, Intravenous, Every visit  sodium chloride 0.9% flush 10 mL  10 mL, Intravenous, Every visit

## 2023-01-20 ENCOUNTER — PATIENT MESSAGE (OUTPATIENT)
Dept: TRANSPLANT | Facility: CLINIC | Age: 73
End: 2023-01-20
Payer: MEDICARE

## 2023-01-20 RX ORDER — TACROLIMUS 1 MG/1
1 CAPSULE ORAL EVERY MORNING
Qty: 90 CAPSULE | Refills: 3 | Status: SHIPPED | OUTPATIENT
Start: 2023-01-20 | End: 2024-01-08 | Stop reason: SDUPTHER

## 2023-01-20 RX ORDER — TACROLIMUS 0.5 MG/1
0.5 CAPSULE ORAL NIGHTLY
Qty: 90 CAPSULE | Refills: 3 | Status: CANCELLED
Start: 2023-01-20

## 2023-01-23 ENCOUNTER — PATIENT MESSAGE (OUTPATIENT)
Dept: DERMATOLOGY | Facility: CLINIC | Age: 73
End: 2023-01-23
Payer: MEDICARE

## 2023-01-24 ENCOUNTER — PROCEDURE VISIT (OUTPATIENT)
Dept: DERMATOLOGY | Facility: CLINIC | Age: 73
End: 2023-01-24
Payer: MEDICARE

## 2023-01-24 VITALS
HEIGHT: 68 IN | BODY MASS INDEX: 17.88 KG/M2 | WEIGHT: 118 LBS | HEART RATE: 80 BPM | SYSTOLIC BLOOD PRESSURE: 166 MMHG | DIASTOLIC BLOOD PRESSURE: 91 MMHG

## 2023-01-24 DIAGNOSIS — C44.329 SQUAMOUS CELL CARCINOMA OF SKIN OF LEFT CHEEK: ICD-10-CM

## 2023-01-24 DIAGNOSIS — C44.329 SQUAMOUS CELL CARCINOMA OF SKIN OF RIGHT CHEEK: Primary | ICD-10-CM

## 2023-01-24 PROCEDURE — 17311: ICD-10-PCS | Mod: S$PBB,59,, | Performed by: DERMATOLOGY

## 2023-01-24 PROCEDURE — 99499 UNLISTED E&M SERVICE: CPT | Mod: S$PBB,,, | Performed by: DERMATOLOGY

## 2023-01-24 PROCEDURE — 13132 CMPLX RPR F/C/C/M/N/AX/G/H/F: CPT | Mod: S$PBB,,, | Performed by: DERMATOLOGY

## 2023-01-24 PROCEDURE — 99499 NO LOS: ICD-10-PCS | Mod: S$PBB,,, | Performed by: DERMATOLOGY

## 2023-01-24 PROCEDURE — 17311 MOHS 1 STAGE H/N/HF/G: CPT | Mod: PBBFAC | Performed by: DERMATOLOGY

## 2023-01-24 PROCEDURE — 13132 CMPLX RPR F/C/C/M/N/AX/G/H/F: CPT | Mod: PBBFAC | Performed by: DERMATOLOGY

## 2023-01-24 PROCEDURE — 13132 PR RECMPL WND HEAD,FAC,HAND 2.6-7.5 CM: ICD-10-PCS | Mod: S$PBB,,, | Performed by: DERMATOLOGY

## 2023-01-24 PROCEDURE — 17311 MOHS 1 STAGE H/N/HF/G: CPT | Mod: S$PBB,59,, | Performed by: DERMATOLOGY

## 2023-01-24 NOTE — PROGRESS NOTES
PROCEDURE: Mohs' Micrographic Surgery    INDICATION: Location in non-mask areas of face where maximum conservation of tumor-free tissue is needed. Biopsy-proven skin cancer of cosmetically and functionally important areas, including head, neck, genital, hand, foot, or areas known for having difficulty in healing, such as the lower anterior legs. Tumor with ill-defined borders. Tumors in immunosuppressed patients.    REFERRING PROVIDER: Estrellita Myers M.D.    CASE NUMBER:     ANESTHETIC: 2 cc 0.5% Bupivacaine with Epi 1:200,000 mixed 1:1 with plain 1% Lidocaine    SURGICAL PREP: Hibiclens    SURGEON: Dominic Haque MD    ASSISTANTS: Bee Panchal MA    PREOPERATIVE DIAGNOSIS: squamous cell carcinoma    POSTOPERATIVE DIAGNOSIS: squamous cell carcinoma    PATHOLOGIC DIAGNOSIS: squamous cell carcinoma    HISTOLOGY OF SPECIMENS IN FIRST STAGE:   Tumor Type:  No tumor seen.    STAGES OF MOHS' SURGERY PERFORMED: 1    TUMOR-FREE PLANE ACHIEVED: Yes    HEMOSTASIS: electrocoagulation     SPECIMENS: 2    LOCATION: right lateral jawline. Location verified with Dr. Myers's clinical photograph. Patient also verified location with hand held mirror.    INITIAL LESION SIZE: 0.6 x 0.6 cm    FINAL DEFECT SIZE: 1.0 x 1.1 cm    WOUND REPAIR/DISPOSITION: The patient tolerated Mohs' Micrographic Surgery for a squamous cell carcinoma very well. When the tumor was completely removed, a repair of the surgical defect was undertaken.    PROCEDURE: Complex Linear Repair    INDICATION: Status post Mohs' Micrographic Surgery for squamous cell carcinoma.    CASE NUMBER:     SURGEON: Dominic Haque MD    ASSISTANTS: Juliet Lisa PA-C    ANESTHETIC: 1 cc 0.5% Bupivacaine with Epi 1:200,000 mixed 1:1 with plain 1% Lidocaine    SURGICAL PREP: Hibiclens, prepped by Bee Panchal MA    LOCATION: right lateral jawline    DEFECT SIZE: 1.0 x 1.1 cm    WOUND REPAIR/DISPOSITION:  After the patient's carcinoma had been completely removed  "with Mohs' Micrographic Surgery, a repair of the surgical defect was undertaken. The patient was returned to the operating suite where the area of right lateral jawline was prepped, draped, and anesthetized in the usual sterile fashion. The wound was widely undermined in all directions. The wound was undermined to a distance at least the maximum width of the defect as measured perpendicular to the closure line along at least one entire edge of the defect, in this case 2 cm. Then, electrocoagulation was used to obtain meticulous hemostasis. 4-0 Vicryl buried vertical mattress sutures were placed into the subcutaneous and dermal plane to close the wound and donn the cutaneous wound edge. Bilateral dog ears were identified and were removed by a standard Burow's triangle technique. The cutaneous wound edges were closed using interrupted 5-0 Prolene suture.    The patient tolerated the procedure well.    The area was cleaned and dressed appropriately and the patient was given wound care instructions, as well as appointment for follow-up evaluation and suture removal in 7 days.    LENGTH OF REPAIR: 1.8 cm    Vitals:    01/24/23 0746 01/24/23 1022   BP: (!) 148/80 (!) 166/91   BP Location: Left arm    Patient Position: Sitting    BP Method: Small (Automatic)    Pulse: 86 80   Weight: 53.5 kg (118 lb)    Height: 5' 8" (1.727 m)        PROCEDURE: Mohs' Micrographic Surgery    INDICATION: Location in mask areas of face including central face, nose, eyelids, eyebrows, lips, chin, preauricular, temple, and ear. Biopsy-proven skin cancer of cosmetically and functionally important areas, including head, neck, genital, hand, foot, or areas known for having difficulty in healing, such as the lower anterior legs. Tumor with ill-defined borders. Tumors in immunosuppressed patients.    REFERRING PROVIDER: Estrellita Myers M.D.    CASE NUMBER:     ANESTHETIC: 2 cc 0.5% Bupivacaine with Epi 1:200,000 mixed 1:1 with plain 1% " Lidocaine    SURGICAL PREP: Hibiclens    SURGEON: Dominic Haque MD    ASSISTANTS: Bee Panchal MA    PREOPERATIVE DIAGNOSIS: squamous cell carcinoma    POSTOPERATIVE DIAGNOSIS: squamous cell carcinoma    PATHOLOGIC DIAGNOSIS: squamous cell carcinoma    HISTOLOGY OF SPECIMENS IN FIRST STAGE:   Tumor Type:  No tumor seen.    STAGES OF MOHS' SURGERY PERFORMED: 1    TUMOR-FREE PLANE ACHIEVED: Yes    HEMOSTASIS: electrocoagulation     SPECIMENS: 2     LOCATION: left lateral canthus. Location verified with Dr. Myers's clinical photograph. Patient also verified location with hand held mirror.    INITIAL LESION SIZE: 0.4 x 0.7 cm    FINAL DEFECT SIZE: 1.0 x 1.0 cm    WOUND REPAIR/DISPOSITION: The patient tolerated Mohs' Micrographic Surgery for a squamous cell carcinoma very well. When the tumor was completely removed, a repair of the surgical defect was undertaken.    PROCEDURE: Complex Linear Repair    INDICATION: Status post Mohs' Micrographic Surgery for squamous cell carcinoma.    CASE NUMBER:     SURGEON: Dominic Haque MD    ASSISTANTS: Juliet Lisa PA-C and Bee Panchal MA    ANESTHETIC: 0.5 cc 0.5% Bupivacaine with Epi 1:200,000 mixed 1:1 with plain 1% Lidocaine    SURGICAL PREP: Hibiclens, prepped by Bee Panchal MA    LOCATION: left lateral canthus    DEFECT SIZE: 1.0 x 1.0 cm    WOUND REPAIR/DISPOSITION:  After the patient's carcinoma had been completely removed with Mohs' Micrographic Surgery, a repair of the surgical defect was undertaken. The patient was returned to the operating suite where the area of left lateral canthus was prepped, draped, and anesthetized in the usual sterile fashion. The wound was widely undermined in all directions. The wound was undermined to a distance at least the maximum width of the defect as measured perpendicular to the closure line along at least one entire edge of the defect, in this case 2 cm. Then, electrocoagulation was used to obtain meticulous  "hemostasis. 5-0 Vicryl buried vertical mattress sutures were placed into the subcutaneous and dermal plane to close the wound and donn the cutaneous wound edge. Bilateral dog ears were identified and were removed by a standard Burow's triangle technique. The cutaneous wound edges were closed using interrupted 6-0 Prolene suture.    The patient tolerated the procedure well.    The area was cleaned and dressed appropriately and the patient was given wound care instructions, as well as appointment for follow-up evaluation and suture removal in 7 days.    LENGTH OF REPAIR: 2 cm    Vitals:    01/24/23 0746 01/24/23 1022   BP: (!) 148/80 (!) 166/91   BP Location: Left arm    Patient Position: Sitting    BP Method: Small (Automatic)    Pulse: 86 80   Weight: 53.5 kg (118 lb)    Height: 5' 8" (1.727 m)          "

## 2023-01-30 ENCOUNTER — PATIENT MESSAGE (OUTPATIENT)
Dept: DERMATOLOGY | Facility: CLINIC | Age: 73
End: 2023-01-30
Payer: MEDICARE

## 2023-01-31 ENCOUNTER — PROCEDURE VISIT (OUTPATIENT)
Dept: DERMATOLOGY | Facility: CLINIC | Age: 73
End: 2023-01-31
Payer: MEDICARE

## 2023-01-31 VITALS
HEART RATE: 71 BPM | HEIGHT: 68 IN | DIASTOLIC BLOOD PRESSURE: 87 MMHG | BODY MASS INDEX: 17.88 KG/M2 | SYSTOLIC BLOOD PRESSURE: 150 MMHG | WEIGHT: 118 LBS

## 2023-01-31 DIAGNOSIS — C44.319 BASAL CELL CARCINOMA OF CHIN: Primary | ICD-10-CM

## 2023-01-31 PROCEDURE — 13132 CMPLX RPR F/C/C/M/N/AX/G/H/F: CPT | Mod: PBBFAC,79 | Performed by: DERMATOLOGY

## 2023-01-31 PROCEDURE — 13132 CMPLX RPR F/C/C/M/N/AX/G/H/F: CPT | Mod: S$PBB,79,, | Performed by: DERMATOLOGY

## 2023-01-31 PROCEDURE — 17311 MOHS 1 STAGE H/N/HF/G: CPT | Mod: 79,PBBFAC | Performed by: DERMATOLOGY

## 2023-01-31 PROCEDURE — 17312: ICD-10-PCS | Mod: S$PBB,,, | Performed by: DERMATOLOGY

## 2023-01-31 PROCEDURE — 17312 MOHS ADDL STAGE: CPT | Mod: PBBFAC | Performed by: DERMATOLOGY

## 2023-01-31 PROCEDURE — 99499 NO LOS: ICD-10-PCS | Mod: S$PBB,,, | Performed by: DERMATOLOGY

## 2023-01-31 PROCEDURE — 17311 MOHS 1 STAGE H/N/HF/G: CPT | Mod: 79,S$PBB,, | Performed by: DERMATOLOGY

## 2023-01-31 PROCEDURE — 99499 UNLISTED E&M SERVICE: CPT | Mod: S$PBB,,, | Performed by: DERMATOLOGY

## 2023-01-31 PROCEDURE — 13132 PR RECMPL WND HEAD,FAC,HAND 2.6-7.5 CM: ICD-10-PCS | Mod: S$PBB,79,, | Performed by: DERMATOLOGY

## 2023-01-31 PROCEDURE — 17311: ICD-10-PCS | Mod: 79,S$PBB,, | Performed by: DERMATOLOGY

## 2023-01-31 PROCEDURE — 17312 MOHS ADDL STAGE: CPT | Mod: S$PBB,,, | Performed by: DERMATOLOGY

## 2023-01-31 NOTE — PROGRESS NOTES
PROCEDURE: Mohs' Micrographic Surgery    INDICATION: Location in mask areas of face including central face, nose, eyelids, eyebrows, lips, chin, preauricular, temple, and ear. Biopsy-proven skin cancer of cosmetically and functionally important areas, including head, neck, genital, hand, foot, or areas known for having difficulty in healing, such as the lower anterior legs. Tumor with ill-defined borders. Tumors in immunosuppressed patients.    REFERRING PROVIDER: Estrellita Myers M.D.    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  Tumor Type: Tumor seen. Superficial basal cell carcinoma: Foci of basaloid cells with peripheral palisading and focal retraction artifact arising along the dermoepidermal junction and extending into the papillary dermis.  Nodular basal cell carcinoma: Nodular tumor in dermis composed of basaloid cells exhibiting peripheral palisading and retraction artifact.   Depth of Invasion: epidermis and dermis  Perineural Invasion: No    HISTOLOGY OF SPECIMENS IN SUBSEQUENT STAGES:  Tumor Type:  No tumor seen.    STAGES OF MOHS' SURGERY PERFORMED: 2    TUMOR-FREE PLANE ACHIEVED: Yes    HEMOSTASIS: electrocoagulation     SPECIMENS: 4 (2 in stage A and 2 in stage B)    LOCATION: chin. Location verified with Dr. Myers's clinical photograph. Patient also verified location with hand held mirror.    INITIAL LESION SIZE: 0.7 x 0.8 cm    FINAL DEFECT SIZE: 1.9 x 2.1 cm    WOUND REPAIR/DISPOSITION: The patient tolerated Mohs' Micrographic  "Surgery for a basal cell carcinoma very well. When the tumor was completely removed, a repair of the surgical defect was undertaken.    PROCEDURE: Complex Linear Repair    INDICATION: Status post Mohs' Micrographic Surgery for basal cell carcinoma.    CASE NUMBER:     SURGEON: Dominic Haque MD    ASSISTANTS: Bee Panchal MA    ANESTHETIC: 2 cc 1% Lidocaine with Epinephrine 1:100,000    SURGICAL PREP: Hibiclens, prepped by Bee Panchal MA    LOCATION: chin    DEFECT SIZE: 1.9 x 2.1 cm    WOUND REPAIR/DISPOSITION:  After the patient's carcinoma had been completely removed with Mohs' Micrographic Surgery, a repair of the surgical defect was undertaken. The patient was returned to the operating suite where the area of chin was prepped, draped, and anesthetized in the usual sterile fashion. The wound was widely undermined in all directions. The wound was undermined to a distance at least the maximum width of the defect as measured perpendicular to the closure line along at least one entire edge of the defect, in this case 2 cm. Then, electrocoagulation was used to obtain meticulous hemostasis. 4-0 Vicryl buried vertical mattress sutures were placed into the subcutaneous and dermal plane to close the wound and donn the cutaneous wound edge. Bilateral dog ears were identified and were removed by a standard Burow's triangle technique. The cutaneous wound edges were closed using interrupted 5-0 Prolene suture.    The patient tolerated the procedure well.    The area was cleaned and dressed appropriately and the patient was given wound care instructions, as well as appointment for follow-up evaluation and suture removal in 7 days.    LENGTH OF REPAIR: 4 cm    Vitals:    01/31/23 1143 01/31/23 1432   BP: 110/71 (!) 150/87   BP Location: Left arm    Patient Position: Sitting    BP Method: Small (Automatic)    Pulse: 95 71   Weight: 53.5 kg (118 lb)    Height: 5' 8" (1.727 m)        "

## 2023-01-31 NOTE — PROGRESS NOTES
72 y.o. male patient is here for suture removal following Mohs' surgery.    Patient reports no problems with right lateral jawline or left lateral canthus.    WOUND PE:  The right lateral jawline and left lateral canthus sutures intact. Wound healing well. Good skin edges. No signs or symptoms of infection.    IMPRESSION:  Healing operative site from Mohs' surgery SCC, right lateral jawline and left lateral canthus s/p Mohs with CLC, postop day # 7.    PLAN:  Sutures removed today by Bee Panchal MA. Steri-strips applied.  Continue wound care.  Keep moist with Aquaphor.    RTC:  In 1 week for suture removal right chin.

## 2023-02-03 ENCOUNTER — PATIENT MESSAGE (OUTPATIENT)
Dept: HEMATOLOGY/ONCOLOGY | Facility: CLINIC | Age: 73
End: 2023-02-03
Payer: MEDICARE

## 2023-02-06 ENCOUNTER — LAB VISIT (OUTPATIENT)
Dept: LAB | Facility: HOSPITAL | Age: 73
End: 2023-02-06
Attending: INTERNAL MEDICINE
Payer: MEDICARE

## 2023-02-06 DIAGNOSIS — C22.0 HCC (HEPATOCELLULAR CARCINOMA): ICD-10-CM

## 2023-02-06 DIAGNOSIS — C01 SQUAMOUS CELL CARCINOMA OF BASE OF TONGUE: ICD-10-CM

## 2023-02-06 DIAGNOSIS — R53.1 WEAKNESS: ICD-10-CM

## 2023-02-06 DIAGNOSIS — Z94.4 STATUS POST LIVER TRANSPLANTATION: ICD-10-CM

## 2023-02-06 DIAGNOSIS — E89.0 POSTOPERATIVE HYPOTHYROIDISM: ICD-10-CM

## 2023-02-06 LAB
AFP SERPL-MCNC: <2 NG/ML (ref 0–8.4)
ALBUMIN SERPL BCP-MCNC: 4.3 G/DL (ref 3.5–5.2)
ALP SERPL-CCNC: 88 U/L (ref 55–135)
ALT SERPL W/O P-5'-P-CCNC: 26 U/L (ref 10–44)
ANION GAP SERPL CALC-SCNC: 14 MMOL/L (ref 8–16)
AST SERPL-CCNC: 71 U/L (ref 10–40)
BASOPHILS # BLD AUTO: 0.01 K/UL (ref 0–0.2)
BASOPHILS NFR BLD: 0.4 % (ref 0–1.9)
BILIRUB SERPL-MCNC: 0.7 MG/DL (ref 0.1–1)
BUN SERPL-MCNC: 13 MG/DL (ref 8–23)
CALCIUM SERPL-MCNC: 10.1 MG/DL (ref 8.7–10.5)
CHLORIDE SERPL-SCNC: 101 MMOL/L (ref 95–110)
CO2 SERPL-SCNC: 25 MMOL/L (ref 23–29)
CREAT SERPL-MCNC: 1.6 MG/DL (ref 0.5–1.4)
DIFFERENTIAL METHOD: ABNORMAL
EOSINOPHIL # BLD AUTO: 0.1 K/UL (ref 0–0.5)
EOSINOPHIL NFR BLD: 4.2 % (ref 0–8)
ERYTHROCYTE [DISTWIDTH] IN BLOOD BY AUTOMATED COUNT: 12.9 % (ref 11.5–14.5)
EST. GFR  (NO RACE VARIABLE): 45.2 ML/MIN/1.73 M^2
GLUCOSE SERPL-MCNC: 95 MG/DL (ref 70–110)
HCT VFR BLD AUTO: 36.1 % (ref 40–54)
HGB BLD-MCNC: 12.1 G/DL (ref 14–18)
IMM GRANULOCYTES # BLD AUTO: 0.01 K/UL (ref 0–0.04)
IMM GRANULOCYTES NFR BLD AUTO: 0.4 % (ref 0–0.5)
LYMPHOCYTES # BLD AUTO: 0.9 K/UL (ref 1–4.8)
LYMPHOCYTES NFR BLD: 30.9 % (ref 18–48)
MCH RBC QN AUTO: 34 PG (ref 27–31)
MCHC RBC AUTO-ENTMCNC: 33.5 G/DL (ref 32–36)
MCV RBC AUTO: 101 FL (ref 82–98)
MONOCYTES # BLD AUTO: 0.4 K/UL (ref 0.3–1)
MONOCYTES NFR BLD: 13.3 % (ref 4–15)
NEUTROPHILS # BLD AUTO: 1.5 K/UL (ref 1.8–7.7)
NEUTROPHILS NFR BLD: 50.8 % (ref 38–73)
NRBC BLD-RTO: 0 /100 WBC
PLATELET # BLD AUTO: 121 K/UL (ref 150–450)
PMV BLD AUTO: 9.6 FL (ref 9.2–12.9)
POTASSIUM SERPL-SCNC: 4.1 MMOL/L (ref 3.5–5.1)
PROT SERPL-MCNC: 8 G/DL (ref 6–8.4)
RBC # BLD AUTO: 3.56 M/UL (ref 4.6–6.2)
SODIUM SERPL-SCNC: 140 MMOL/L (ref 136–145)
T4 FREE SERPL-MCNC: 1.14 NG/DL (ref 0.71–1.51)
T4 FREE SERPL-MCNC: 1.18 NG/DL (ref 0.71–1.51)
TACROLIMUS BLD-MCNC: 3.8 NG/ML (ref 5–15)
TSH SERPL DL<=0.005 MIU/L-ACNC: 0.19 UIU/ML (ref 0.4–4)
WBC # BLD AUTO: 2.85 K/UL (ref 3.9–12.7)

## 2023-02-06 PROCEDURE — 82105 ALPHA-FETOPROTEIN SERUM: CPT | Performed by: INTERNAL MEDICINE

## 2023-02-06 PROCEDURE — 80197 ASSAY OF TACROLIMUS: CPT | Performed by: INTERNAL MEDICINE

## 2023-02-06 PROCEDURE — 80053 COMPREHEN METABOLIC PANEL: CPT | Performed by: INTERNAL MEDICINE

## 2023-02-06 PROCEDURE — 84439 ASSAY OF FREE THYROXINE: CPT | Performed by: PHYSICIAN ASSISTANT

## 2023-02-06 PROCEDURE — 84443 ASSAY THYROID STIM HORMONE: CPT | Performed by: STUDENT IN AN ORGANIZED HEALTH CARE EDUCATION/TRAINING PROGRAM

## 2023-02-06 PROCEDURE — 84439 ASSAY OF FREE THYROXINE: CPT | Mod: 91 | Performed by: STUDENT IN AN ORGANIZED HEALTH CARE EDUCATION/TRAINING PROGRAM

## 2023-02-06 PROCEDURE — 85025 COMPLETE CBC W/AUTO DIFF WBC: CPT | Performed by: INTERNAL MEDICINE

## 2023-02-07 ENCOUNTER — OFFICE VISIT (OUTPATIENT)
Dept: DERMATOLOGY | Facility: CLINIC | Age: 73
End: 2023-02-07
Payer: MEDICARE

## 2023-02-07 DIAGNOSIS — C44.319 BASAL CELL CARCINOMA OF CHIN: ICD-10-CM

## 2023-02-07 DIAGNOSIS — Z09 POSTOP CHECK: Primary | ICD-10-CM

## 2023-02-07 PROCEDURE — 99213 OFFICE O/P EST LOW 20 MIN: CPT | Mod: PBBFAC | Performed by: DERMATOLOGY

## 2023-02-07 PROCEDURE — 87070 CULTURE OTHR SPECIMN AEROBIC: CPT | Performed by: DERMATOLOGY

## 2023-02-07 PROCEDURE — 87077 CULTURE AEROBIC IDENTIFY: CPT | Mod: 59 | Performed by: DERMATOLOGY

## 2023-02-07 PROCEDURE — 99024 PR POST-OP FOLLOW-UP VISIT: ICD-10-PCS | Mod: POP,,, | Performed by: DERMATOLOGY

## 2023-02-07 PROCEDURE — 99999 PR PBB SHADOW E&M-EST. PATIENT-LVL III: CPT | Mod: PBBFAC,,, | Performed by: DERMATOLOGY

## 2023-02-07 PROCEDURE — 99024 POSTOP FOLLOW-UP VISIT: CPT | Mod: POP,,, | Performed by: DERMATOLOGY

## 2023-02-07 PROCEDURE — 87186 SC STD MICRODIL/AGAR DIL: CPT | Performed by: DERMATOLOGY

## 2023-02-07 PROCEDURE — 99999 PR PBB SHADOW E&M-EST. PATIENT-LVL III: ICD-10-PCS | Mod: PBBFAC,,, | Performed by: DERMATOLOGY

## 2023-02-07 NOTE — PROGRESS NOTES
72 y.o. male patient is here for suture removal following Mohs' surgery.    Patient reports no problems.    WOUND PE:  The chin sutures intact. Wound healing well. Good skin edges. Mild peripheral erythema and small swelling in superior aspect of incision. No abscess, purulence or warmth.     IMPRESSION:  Healing operative site from Mohs' surgery, BCC chin s/p Mohs with CLC, postop day #7.    PLAN:  Bacterial culture today to rule out infection - hold off abx pending culture results.  Sutures removed today by  Kiara Naranjo MA .   Steri-strips applied.  Keep moist with Aquaphor.  Warm wet compresses tid x 3 days  Call if worsens    RTC:  In 3 weeks.

## 2023-02-09 ENCOUNTER — OFFICE VISIT (OUTPATIENT)
Dept: HEMATOLOGY/ONCOLOGY | Facility: CLINIC | Age: 73
End: 2023-02-09
Payer: MEDICARE

## 2023-02-09 ENCOUNTER — PATIENT MESSAGE (OUTPATIENT)
Dept: DERMATOLOGY | Facility: CLINIC | Age: 73
End: 2023-02-09
Payer: MEDICARE

## 2023-02-09 ENCOUNTER — INFUSION (OUTPATIENT)
Dept: INFUSION THERAPY | Facility: HOSPITAL | Age: 73
End: 2023-02-09
Attending: STUDENT IN AN ORGANIZED HEALTH CARE EDUCATION/TRAINING PROGRAM
Payer: MEDICARE

## 2023-02-09 VITALS
SYSTOLIC BLOOD PRESSURE: 163 MMHG | HEART RATE: 87 BPM | TEMPERATURE: 98 F | RESPIRATION RATE: 20 BRPM | WEIGHT: 117.06 LBS | OXYGEN SATURATION: 98 % | BODY MASS INDEX: 17.74 KG/M2 | DIASTOLIC BLOOD PRESSURE: 84 MMHG | HEIGHT: 68 IN

## 2023-02-09 VITALS
OXYGEN SATURATION: 98 % | TEMPERATURE: 98 F | DIASTOLIC BLOOD PRESSURE: 79 MMHG | WEIGHT: 117.06 LBS | BODY MASS INDEX: 17.8 KG/M2 | RESPIRATION RATE: 18 BRPM | HEART RATE: 81 BPM | SYSTOLIC BLOOD PRESSURE: 151 MMHG

## 2023-02-09 DIAGNOSIS — G89.3 CANCER ASSOCIATED PAIN: ICD-10-CM

## 2023-02-09 DIAGNOSIS — D84.821 IMMUNODEFICIENCY DUE TO DRUGS: ICD-10-CM

## 2023-02-09 DIAGNOSIS — C77.0 SECONDARY MALIGNANT NEOPLASM OF CERVICAL LYMPH NODE: ICD-10-CM

## 2023-02-09 DIAGNOSIS — Z79.899 IMMUNODEFICIENCY DUE TO DRUGS: ICD-10-CM

## 2023-02-09 DIAGNOSIS — C77.0 SECONDARY MALIGNANT NEOPLASM OF CERVICAL LYMPH NODE: Primary | ICD-10-CM

## 2023-02-09 DIAGNOSIS — J43.2 CENTRILOBULAR EMPHYSEMA: ICD-10-CM

## 2023-02-09 DIAGNOSIS — D61.818 PANCYTOPENIA: ICD-10-CM

## 2023-02-09 DIAGNOSIS — Z94.4 STATUS POST LIVER TRANSPLANTATION: ICD-10-CM

## 2023-02-09 DIAGNOSIS — D63.1 ANEMIA IN STAGE 3A CHRONIC KIDNEY DISEASE: ICD-10-CM

## 2023-02-09 DIAGNOSIS — D69.6 THROMBOCYTOPENIA: ICD-10-CM

## 2023-02-09 DIAGNOSIS — C01 SQUAMOUS CELL CARCINOMA OF BASE OF TONGUE: Primary | ICD-10-CM

## 2023-02-09 DIAGNOSIS — C01 SQUAMOUS CELL CARCINOMA OF BASE OF TONGUE: ICD-10-CM

## 2023-02-09 DIAGNOSIS — C32.9 LARYNX CANCER: ICD-10-CM

## 2023-02-09 DIAGNOSIS — E89.0 POSTOPERATIVE HYPOTHYROIDISM: ICD-10-CM

## 2023-02-09 DIAGNOSIS — N18.31 ANEMIA IN STAGE 3A CHRONIC KIDNEY DISEASE: ICD-10-CM

## 2023-02-09 PROCEDURE — 99999 PR PBB SHADOW E&M-EST. PATIENT-LVL III: CPT | Mod: PBBFAC,,, | Performed by: INTERNAL MEDICINE

## 2023-02-09 PROCEDURE — 99215 PR OFFICE/OUTPT VISIT, EST, LEVL V, 40-54 MIN: ICD-10-PCS | Mod: S$PBB,,, | Performed by: INTERNAL MEDICINE

## 2023-02-09 PROCEDURE — 99999 PR PBB SHADOW E&M-EST. PATIENT-LVL III: ICD-10-PCS | Mod: PBBFAC,,, | Performed by: INTERNAL MEDICINE

## 2023-02-09 PROCEDURE — A4216 STERILE WATER/SALINE, 10 ML: HCPCS | Performed by: INTERNAL MEDICINE

## 2023-02-09 PROCEDURE — 63600175 PHARM REV CODE 636 W HCPCS: Performed by: INTERNAL MEDICINE

## 2023-02-09 PROCEDURE — 99213 OFFICE O/P EST LOW 20 MIN: CPT | Mod: PBBFAC,25 | Performed by: INTERNAL MEDICINE

## 2023-02-09 PROCEDURE — 99215 OFFICE O/P EST HI 40 MIN: CPT | Mod: S$PBB,,, | Performed by: INTERNAL MEDICINE

## 2023-02-09 PROCEDURE — 96413 CHEMO IV INFUSION 1 HR: CPT

## 2023-02-09 PROCEDURE — 25000003 PHARM REV CODE 250: Performed by: INTERNAL MEDICINE

## 2023-02-09 RX ORDER — SODIUM CHLORIDE 0.9 % (FLUSH) 0.9 %
10 SYRINGE (ML) INJECTION
Status: CANCELLED | OUTPATIENT
Start: 2023-02-09

## 2023-02-09 RX ORDER — OXYCODONE HYDROCHLORIDE 10 MG/1
10 TABLET ORAL EVERY 6 HOURS PRN
Qty: 120 TABLET | Refills: 0 | Status: SHIPPED | OUTPATIENT
Start: 2023-02-09 | End: 2023-03-13 | Stop reason: SDUPTHER

## 2023-02-09 RX ORDER — HEPARIN 100 UNIT/ML
500 SYRINGE INTRAVENOUS
Status: DISCONTINUED | OUTPATIENT
Start: 2023-02-09 | End: 2023-02-09 | Stop reason: HOSPADM

## 2023-02-09 RX ORDER — HEPARIN 100 UNIT/ML
500 SYRINGE INTRAVENOUS
Status: CANCELLED | OUTPATIENT
Start: 2023-02-09

## 2023-02-09 RX ORDER — DOXYCYCLINE 100 MG/1
100 CAPSULE ORAL DAILY
Qty: 20 CAPSULE | Refills: 3 | Status: SHIPPED | OUTPATIENT
Start: 2023-02-09 | End: 2023-02-19

## 2023-02-09 RX ORDER — SODIUM CHLORIDE 0.9 % (FLUSH) 0.9 %
10 SYRINGE (ML) INJECTION
Status: DISCONTINUED | OUTPATIENT
Start: 2023-02-09 | End: 2023-02-09 | Stop reason: HOSPADM

## 2023-02-09 RX ADMIN — SODIUM CHLORIDE: 9 INJECTION, SOLUTION INTRAVENOUS at 11:02

## 2023-02-09 RX ADMIN — SODIUM CHLORIDE 400 MG: 9 INJECTION, SOLUTION INTRAVENOUS at 11:02

## 2023-02-09 RX ADMIN — HEPARIN SODIUM (PORCINE) LOCK FLUSH IV SOLN 100 UNIT/ML 500 UNITS: 100 SOLUTION at 11:02

## 2023-02-09 RX ADMIN — Medication 10 ML: at 11:02

## 2023-02-09 NOTE — PROGRESS NOTES
ONCOLOGY FOLLOW UP VISIT    Subjective:      Patient ID: Rocco Swain    Chief Complaint: Squamous cell carcinoma of base of tongue      HPI  Rocco Swain is a 72 y.o. male, patient of Dr. Berg, who returns to clinic for evaluation and management of  P16 positive squamous cell carcinoma. Since his last visit he is doing well. Reports some joint pains that come and go: ankles, keens, elbows, wrists. Takes Oxy IR 3-4x a day to control the pain. Chronic neck pain stable. Patient has some fatigue, but intermittent and stable.  Appetite and weight is stable.    ECOG Performance status: 1 - Symptomatic but completely ambulatory Communication from him is 100% written.     Cancer Staging   Squamous cell carcinoma of base of tongue  Staging form: Pharynx - HPV-Mediated Oropharynx, AJCC 8th Edition  - Clinical stage from 9/18/2020: Stage III (rcT4, cN1, cM0, p16+) - Signed by Ronadl Berg MD on 12/27/2022      Oncologic History:  Oncology History   Squamous cell carcinoma of base of tongue   9/18/2020 Cancer Staged    Staging form: Pharynx - HPV-Mediated Oropharynx, AJCC 8th Edition  - Clinical stage from 9/18/2020: Stage III (rcT4, cN1, cM0, p16+)       9/28/2020 Initial Diagnosis    Squamous cell carcinoma of base of tongue     10/6/2020 - 8/17/2021 Chemotherapy    Treatment Summary   Plan Name: OP HEAD NECK CARBOPLATIN PACLITAXEL C1-2 FOLLOWED BY CETUXIMAB CARBOPLATIN C3-6 FOLLOWED BY CETUXIMAB MAINTENANCE WEEKLY  Treatment Goal: Control  Status: Inactive  Start Date: 10/6/2020  End Date: 8/17/2021  Provider: Ronald Berg MD  Chemotherapy: cetuximab (ERBITUX) 100 mg/50 mL chemo infusion 684 mg, 400 mg/m2 = 684 mg (100 % of original dose 400 mg/m2), Intravenous, Clinic/HOD 1 time, 29 of 38 cycles  Dose modification: 500 mg/m2 (original dose 400 mg/m2, Cycle 3), 250 mg/m2 (original dose 400 mg/m2, Cycle 3), 400 mg/m2 (original dose 400 mg/m2, Cycle 3), 250 mg/m2 (original dose 250 mg/m2, Cycle 7), 200 mg/m2 (original dose  250 mg/m2, Cycle 18), 200 mg/m2 (original dose 250 mg/m2, Cycle 19), 250 mg/m2 (original dose 250 mg/m2, Cycle 4), 200 mg/m2 (original dose 250 mg/m2, Cycle 25), 200 mg/m2 (original dose 250 mg/m2, Cycle 28)  Administration: 684 mg (11/17/2020), 400 mg (11/24/2020), 400 mg (2/9/2021), 400 mg (2/17/2021), 427.6 mg (3/9/2021), 400 mg (3/30/2021), 400 mg (3/23/2021), 400 mg (4/6/2021), 427.6 mg (4/13/2021), 427.6 mg (4/20/2021), 342 mg (5/11/2021), 342 mg (5/18/2021), 342 mg (5/25/2021), 400 mg (5/31/2021), 400 mg (6/7/2021), 400 mg (6/14/2021), 400 mg (12/8/2020), 427.6 mg (12/29/2020), 400 mg (12/1/2020), 400 mg (12/15/2020), 400 mg (12/22/2020), 427.6 mg (1/5/2021), 400 mg (1/12/2021), 400 mg (1/19/2021), 427.6 mg (1/26/2021), 400 mg (2/2/2021), 400 mg (2/23/2021), 400 mg (3/2/2021), 427.6 mg (3/16/2021), 400 mg (6/21/2021), 342 mg (6/28/2021), 342 mg (7/6/2021), 342 mg (7/13/2021), 342 mg (7/20/2021), 400 mg (7/27/2021), 400 mg (8/10/2021), 400 mg (8/17/2021)  CARBOplatin (PARAPLATIN) 310 mg in sodium chloride 0.9% 500 mL chemo infusion, 310 mg (92.2 % of original dose 334.5 mg), Intravenous, Clinic/HOD 1 time, 6 of 6 cycles  Dose modification:   (original dose 334.5 mg, Cycle 1)  Administration: 310 mg (10/6/2020), 370 mg (11/17/2020), 300 mg (10/27/2020), 350 mg (12/8/2020), 335 mg (12/29/2020), 320 mg (1/19/2021)  PACLitaxeL (TAXOL) 175 mg/m2 = 300 mg in sodium chloride 0.9% 500 mL chemo infusion, 175 mg/m2 = 300 mg (100 % of original dose 175 mg/m2), Intravenous, Clinic/HOD 1 time, 2 of 2 cycles  Dose modification: 175 mg/m2 (original dose 175 mg/m2, Cycle 1)  Administration: 300 mg (10/6/2020), 300 mg (10/27/2020)       4/29/2021 Tumor Conference       His case was discussed at the Multidisciplinary Head and Neck Team Planning Meeting.    Representatives from Medical Oncology, Radiation Oncology, Head and Neck Surgical Oncology, Psychosocial Oncology, and Speech and Language Pathology discussed the case with  the following recommendations:    1) biopsy         7/29/2021 Tumor Conference       His case was discussed at the Multidisciplinary Head and Neck Team Planning Meeting.    Representatives from Medical Oncology, Radiation Oncology, Head and Neck Surgical Oncology, Psychosocial Oncology, and Speech and Language Pathology discussed the case with the following recommendations:    1) Head and neck clinic follow up  2) consider Keytruda (discuss with transplant)  3) consider palliative referral         9/13/2021 -  Chemotherapy    Treatment Summary   Plan Name: OP HEAD NECK PEMBROLIZUMAB CARBOPLATIN FLUOROURACIL (C1 ONLY RECEIVED) FOLLOWED BY PEMBROLIZUMAB MAINTENANCE  Treatment Goal: Palliative  Status: Active  Start Date: 9/13/2021  End Date: 1/30/2024 (Planned)  Provider: Ronald Berg MD  Chemotherapy: CARBOplatin (PARAPLATIN) 320 mg in sodium chloride 0.9% 500 mL chemo infusion, 320 mg (100 % of original dose 320.5 mg), Intravenous, Clinic/HOD 1 time, 6 of 6 cycles  Dose modification:   (original dose 320.5 mg, Cycle 1)  Administration: 320 mg (9/13/2021), 335 mg (12/23/2021), 325 mg (1/27/2022), 245 mg (3/3/2022), 255 mg (5/12/2022), 255 mg (4/7/2022)  fluorouraciL 1,000 mg/m2/day = 6,440 mg in sodium chloride 0.9% 240 mL chemo infusion, 1,000 mg/m2/day = 6,440 mg, Intravenous, Over 96 hours, 6 of 6 cycles  Dose modification: 800 mg/m2/day (original dose 1,000 mg/m2/day, Cycle 6), 5,000 mg (original dose 1,000 mg/m2/day, Cycle 8)  Administration: 6,440 mg (9/13/2021), 6,440 mg (12/23/2021), 6,480 mg (1/27/2022), 5,000 mg (3/3/2022), 5,000 mg (4/7/2022), 5,000 mg (5/12/2022)       Secondary malignant neoplasm of cervical lymph node   2/9/2021 Initial Diagnosis    Secondary malignant neoplasm of cervical lymph node     9/13/2021 -  Chemotherapy    Treatment Summary   Plan Name: OP HEAD NECK PEMBROLIZUMAB CARBOPLATIN FLUOROURACIL (C1 ONLY RECEIVED) FOLLOWED BY PEMBROLIZUMAB MAINTENANCE  Treatment Goal:  Palliative  Status: Active  Start Date: 9/13/2021  End Date: 1/30/2024 (Planned)  Provider: Ronald Berg MD  Chemotherapy: CARBOplatin (PARAPLATIN) 320 mg in sodium chloride 0.9% 500 mL chemo infusion, 320 mg (100 % of original dose 320.5 mg), Intravenous, Clinic/[facility] 1 time, 6 of 6 cycles  Dose modification:   (original dose 320.5 mg, Cycle 1)  Administration: 320 mg (9/13/2021), 335 mg (12/23/2021), 325 mg (1/27/2022), 245 mg (3/3/2022), 255 mg (5/12/2022), 255 mg (4/7/2022)  fluorouraciL 1,000 mg/m2/day = 6,440 mg in sodium chloride 0.9% 240 mL chemo infusion, 1,000 mg/m2/day = 6,440 mg, Intravenous, Over 96 hours, 6 of 6 cycles  Dose modification: 800 mg/m2/day (original dose 1,000 mg/m2/day, Cycle 6), 5,000 mg (original dose 1,000 mg/m2/day, Cycle 8)  Administration: 6,440 mg (9/13/2021), 6,440 mg (12/23/2021), 6,480 mg (1/27/2022), 5,000 mg (3/3/2022), 5,000 mg (4/7/2022), 5,000 mg (5/12/2022)           Review of Systems   Constitutional:  Positive for appetite change (early satiety). Negative for activity change, chills, fatigue, fever and unexpected weight change.   HENT:  Negative for ear pain, facial swelling, hearing loss, mouth sores, nosebleeds, sore throat, trouble swallowing and voice change.         No verbal communication   Eyes:  Positive for redness (right eye). Negative for pain, discharge and visual disturbance.   Respiratory:  Negative for cough, choking, chest tightness and shortness of breath.    Cardiovascular:  Negative for chest pain, palpitations and leg swelling.   Gastrointestinal:  Negative for abdominal distention, abdominal pain, blood in stool, constipation, diarrhea, nausea and vomiting.   Endocrine: Negative for cold intolerance and heat intolerance.   Genitourinary:  Negative for difficulty urinating, dysuria, frequency and urgency.   Musculoskeletal:  Negative for arthralgias, gait problem, leg pain and myalgias.   Integumentary:  Positive for rash (intermittent). Negative for  pallor and wound.   Allergic/Immunologic: Negative for frequent infections.   Neurological:  Negative for dizziness, tremors, weakness, light-headedness, numbness and headaches.   Hematological:  Negative for adenopathy. Does not bruise/bleed easily.   Psychiatric/Behavioral:  Negative for agitation, confusion, dysphoric mood and sleep disturbance. The patient is not nervous/anxious.       Allergies:  Review of patient's allergies indicates:  No Known Allergies    Medications:  Current Outpatient Medications   Medication Sig Dispense Refill    azelaic acid (AZELEX) 15 % gel APPLY TOPICALLY TO AFFECTED AREA IN THE MORNING 50 g 3    gabapentin (NEURONTIN) 300 MG capsule Take 1 capsule (300 mg total) by mouth every evening. 30 capsule 11    ibuprofen (ADVIL,MOTRIN) 200 MG tablet Take 200 mg by mouth.      imiquimod (ALDARA) 5 % cream Apply to biopsy site on frontal scalp + about a centimeter of surrounding skin qhs x 16 weeks. Wash off in am and apply sunscreen. Discontinue if skin becomes blistered, raw, bleeding, painful, etc. 24 packet 3    imiquimod (ALDARA) 5 % cream Apply to biopsy site on frontal scalp + about a centimeter of surrounding skin qhs x 16 weeks. Wash off in am and apply sunscreen. Discontinue if skin becomes blistered, raw, bleeding, painful, etc. 24 packet 3    levothyroxine (SYNTHROID) 88 MCG tablet TAKE 1 TABLET BY MOUTH ONCE DAILY 90 tablet 3    LIDOcaine HCl 2% (LIDOCAINE VISCOUS) 2 % Soln Swish and spit 15 mls every 8 (eight) hours as needed (mouth sore). 100 mL 3    LIDOcaine-prilocaine (EMLA) cream Apply generously to port site 30-60 min prior to chemo and then cover with saran wrap. 30 g 2    loperamide (IMODIUM) 2 mg capsule Take 1 capsule (2 mg total) by mouth 4 (four) times daily as needed for Diarrhea. 30 capsule 1    loteprednol (LOTEMAX) 0.5 % ophthalmic suspension Place 1 drop into the right eye 4 (four) times daily. 5 mL 1    magic mouthwash diphen/antac/lidoc/nysta Take 10 mLs by  mouth 4 (four) times daily. 120 mL 4    metroNIDAZOLE (METROGEL) 0.75 % gel Apply topically to affected area 2 (two) times daily. 45 g 1    multivit-min/FA/lycopen/lutein (CENTRUM SILVER MEN ORAL) Take 1 tablet by mouth.      multivitamin capsule Take 1 capsule by mouth once daily.      oxyCODONE (ROXICODONE) 10 mg Tab immediate release tablet Take 1 tablet (10 mg total) by mouth every 6 (six) hours as needed for Pain. 120 tablet 0    sulfacetamide sodium-sulfur 10-5 % (w/w) Clsr USE TO WASH AFFECTED AREA DAILY      tacrolimus (PROGRAF) 0.5 MG Cap Take 1 capsule (0.5 mg total) by mouth every EVENING.  (Use the 1mg capsule for your morning dose) 90 capsule 3    tacrolimus (PROGRAF) 0.5 MG Cap Take 1 capsule (0.5 mg total) by mouth every evening. Use the 1mg capsule for your morning dose 90 capsule 3    tacrolimus (PROGRAF) 1 MG Cap Take 1 capsule (1 mg total) by mouth every morning. Use the tacrolimus 0.5mg capsule for your evening dose as directed. 90 capsule 3    tiZANidine (ZANAFLEX) 2 MG tablet Take 1 tablet (2 mg total) by mouth nightly as needed (neck muscle strain). 30 tablet 0    traZODone (DESYREL) 50 MG tablet TAKE 1 TABLET BY MOUTH IN  THE EVENING 90 tablet 3    vitamin E 400 UNIT capsule Take 400 Units by mouth once daily.       No current facility-administered medications for this visit.     Facility-Administered Medications Ordered in Other Visits   Medication Dose Route Frequency Provider Last Rate Last Admin    heparin, porcine (PF) 100 unit/mL injection flush 500 Units  500 Units Intravenous PRN Ronald Berg MD        ofloxacin 0.3 % ophthalmic solution 1 drop  1 drop Right Eye On Call Procedure Victor M Vasques MD   2 drop at 10/11/22 0745    sodium chloride 0.9% flush 10 mL  10 mL Intravenous PRN Ronald Berg MD        sodium chloride 0.9% flush 10 mL  10 mL Intravenous PRN Victor M Vasques MD           PMH:  Past Medical History:   Diagnosis Date    Basal cell carcinoma (BCC) in situ of  skin 2012    3 on face, 2 on arm, removed by dermatology.     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 Underwent surgical resection of tongue and neck. s/p tracheostomy       PSH:  Past Surgical History:   Procedure Laterality Date    COLONOSCOPY      CONJUNCTIVA BIOPSY Right 10/11/2022    Procedure: BIOPSY, CONJUNCTIVA;  Surgeon: Victor M Vasques MD;  Location: [facility];  Service: Ophthalmology;  Laterality: Right;    INSERTION OF VENOUS ACCESS PORT Right 3/8/2021    Procedure: INSERTION, VENOUS ACCESS PORT;  Surgeon: Jesus Rendon MD;  Location: [facility] OR [address] [place] [address];  Service: General;  Laterality: Right;    LIVER TRANSPLANT  11/2008    transplanted for biopsy proven hepatocellular carcinoma,     LYMPH NODE BIOPSY N/A 9/18/2020    Procedure: BIOPSY, LYMPH NODE;  [facility];  Location: [facility] OR [address] [place] [address];  Service: General;  Laterality: N/A;  Rm 173    TRACHEOSTOMY         FamHx:  Family History   Problem Relation Age of Onset    Dementia Mother        SocHx:  Social History     Socioeconomic History    Marital status: Single   Occupational History    Occupation: Retired     Comment: , notes exposures to fumes etc.  Worked on [employer] for 4 years   Tobacco Use    Smoking status: Never    Smokeless tobacco: Never   Substance and Sexual Activity    Alcohol use: Yes     Comment: drinks wine, 1 glass day    Drug use: Not Currently    Sexual activity: Yes     Comment: No prior history of STD        Distress Score    Distress Score: 0 - No Distress        Objective:      Vitals:    02/09/23 0913   BP: (!) 163/84   BP Location: Right arm   Patient Position: Sitting   BP Method: Small (Automatic)   Pulse: 87   Resp: 20   Temp: 97.6  "°F (36.4 °C)   TempSrc: Oral   SpO2: 98%   Weight: 53.1 kg (117 lb 1 oz)   Height: 5' 8" (1.727 m)        Physical Exam  Vitals and nursing note reviewed.   Constitutional:       General: He is not in acute distress.     Appearance: Normal appearance. He is well-developed and underweight.   HENT:      Head: Normocephalic and atraumatic.   Eyes:      Conjunctiva/sclera: Conjunctivae normal.      Pupils: Pupils are equal, round, and reactive to light.   Neck:      Trachea: Tracheostomy present.   Pulmonary:      Effort: Pulmonary effort is normal. No respiratory distress.   Abdominal:      General: There is no distension.      Palpations: Abdomen is soft.   Musculoskeletal:         General: No swelling. Normal range of motion.      Cervical back: Normal range of motion and neck supple.   Skin:     General: Skin is warm and dry.   Neurological:      General: No focal deficit present.      Mental Status: He is alert and oriented to person, place, and time.   Psychiatric:         Mood and Affect: Mood normal.         Behavior: Behavior normal.         Thought Content: Thought content normal.         Judgment: Judgment normal.         LABS:  WBC   Date Value Ref Range Status   02/06/2023 2.85 (L) 3.90 - 12.70 K/uL Final     Hemoglobin   Date Value Ref Range Status   02/06/2023 12.1 (L) 14.0 - 18.0 g/dL Final     Hematocrit   Date Value Ref Range Status   02/06/2023 36.1 (L) 40.0 - 54.0 % Final     Platelets   Date Value Ref Range Status   02/06/2023 121 (L) 150 - 450 K/uL Final       Chemistry        Component Value Date/Time     02/06/2023 0820    K 4.1 02/06/2023 0820     02/06/2023 0820    CO2 25 02/06/2023 0820    BUN 13 02/06/2023 0820    CREATININE 1.6 (H) 02/06/2023 0820    GLU 95 02/06/2023 0820        Component Value Date/Time    CALCIUM 10.1 02/06/2023 0820    ALKPHOS 88 02/06/2023 0820    AST 71 (H) 02/06/2023 0820    ALT 26 02/06/2023 0820    BILITOT 0.7 02/06/2023 0820    ESTGFRAFRICA 52.6 (A) " 07/14/2022 1119    EGFRNONAA 45.5 (A) 07/14/2022 1119              Assessment:       1. Squamous cell carcinoma of base of tongue    2. Secondary malignant neoplasm of cervical lymph node    3. Larynx cancer    4. Cancer associated pain    5. Status post liver transplantation    6. Immunodeficiency due to drugs    7. Pancytopenia    8. Thrombocytopenia    9. Anemia in stage 3a chronic kidney disease    10. Postoperative hypothyroidism    11. Centrilobular emphysema              Plan:       1,2. 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 He is not a surgical or radiation candidate.  -Started on PCC 10/6/2020 followed by maintenance cetuximab until 8/24/21 (discontinued due to progression of disease; continued increase in SUV uptake, no new lesions).  - 9/2021 started on carbo/5-FU/pembrolizumab; due to toxicity went to pembrolizumab monotherapy starting with cycle 2.  Progression of disease noted after cycle 3 so added chemotherapy back in with cycle 4. Keytruda monotherapy starting with C9.   - Labs acceptable to proceed with Keytruda. December 2022 PET scan reviewed with patient - shows no evidence of hypermetabolic disease. Continue current treatment. Re-staging scans in March. Changing to a 6 week Keytruda.    3. Status post total laryngectomy, total glossectomy and anterolateral thigh free flap reconstruction roughly 2017 at [facility] in [place] for persistent/recurrent base of tongue sq.c.c.    4. Continue Oxy IR. Consider palliative care involvement. Not a candidate for Celebrex d/t liver tx and kidney dysfunction.     6-9. S/p liver transplant and is currently on tacrolimus.  Afebrile, ANC sufficient for treatment. Grade 1. Will continue to monitor.     10 TSH low, but free T4 normal    11 Follow with PCP      he will return to clinic in 3 weeks, but knows to call in the interim if symptoms change or  should a problem arise.       MDM includes:    - Acute or chronic illness or injury that poses a threat to life or bodily function  - Independent review and explanation of 2 results from unique tests  - Discussion of management and ordering 2 unique tests  - Extensive discussion of treatment and management  - Prescription drug management  - Drug therapy requiring intensive monitoring for toxicity    Martín Hernandez M.D.  Hematology/Oncology Attending  [place] [facility]  [facility]

## 2023-02-09 NOTE — PLAN OF CARE
0950 pt here for Keytruda infusion D1C19, labs, hx, meds, allergies reviewed, pt with no new complaints at this time, reclined in chair, continue to monitor

## 2023-02-09 NOTE — PLAN OF CARE
1150 pt tolerated keytruda infusion without issue, pt to rtc 3/21/23, no distress noted upon d/c to home

## 2023-02-10 LAB
BACTERIA SPEC AEROBE CULT: ABNORMAL
BACTERIA SPEC AEROBE CULT: ABNORMAL

## 2023-02-13 ENCOUNTER — TELEPHONE (OUTPATIENT)
Dept: TRANSPLANT | Facility: CLINIC | Age: 73
End: 2023-02-13
Payer: MEDICARE

## 2023-02-13 ENCOUNTER — HOSPITAL ENCOUNTER (OUTPATIENT)
Dept: RADIOLOGY | Facility: HOSPITAL | Age: 73
Discharge: HOME | End: 2023-02-13
Attending: INTERNAL MEDICINE
Payer: MEDICARE

## 2023-02-13 DIAGNOSIS — C22.0 HCC (HEPATOCELLULAR CARCINOMA): ICD-10-CM

## 2023-02-13 DIAGNOSIS — Z94.4 LIVER TRANSPLANTED: ICD-10-CM

## 2023-02-13 DIAGNOSIS — K74.60 CIRRHOSIS OF TRANSPLANTED LIVER: ICD-10-CM

## 2023-02-13 DIAGNOSIS — T86.49 CIRRHOSIS OF TRANSPLANTED LIVER: ICD-10-CM

## 2023-02-13 PROCEDURE — 93976 US DOPPLER LIVER TRANSPLANT POST (XPD): ICD-10-PCS | Mod: 26,,, | Performed by: RADIOLOGY

## 2023-02-13 PROCEDURE — 76705 ECHO EXAM OF ABDOMEN: CPT | Mod: 26,XS,, | Performed by: RADIOLOGY

## 2023-02-13 PROCEDURE — 93976 VASCULAR STUDY: CPT | Mod: 26,,, | Performed by: RADIOLOGY

## 2023-02-13 PROCEDURE — 76705 US DOPPLER LIVER TRANSPLANT POST (XPD): ICD-10-PCS | Mod: 26,XS,, | Performed by: RADIOLOGY

## 2023-02-13 PROCEDURE — 93976 VASCULAR STUDY: CPT | Mod: TC

## 2023-02-13 NOTE — LETTER
February 13, 2023    Rocco Swain  [address] [place] [address] [place] [address]          Dear Rocco Swain:  MRN: [id number]    This is a follow up to your recent labs, your lab results were stable.  There are no medicine changes.  Please have your labs drawn again on 5/15/2023.      If you cannot have your labs drawn on the scheduled date, it is your responsibility to call the transplant department to reschedule.  Please call [phone] and ask to speak to Rhea RIOS   for all scheduling requests.     Sincerely,    Shari WADDELLN, RN      Your Liver Transplant Coordinator    [facility]  [address] [place] [id number]  [phone]

## 2023-02-13 NOTE — TELEPHONE ENCOUNTER
Letter sent to patient stating: Your labs have been reviewed by your Transplant physician, no action required. Next labs due 5/15/2023          ----- Message from Cornell Anton MD sent at 2/10/2023  8:08 AM CST -----  Results reviewed

## 2023-02-16 ENCOUNTER — PATIENT MESSAGE (OUTPATIENT)
Dept: TRANSPLANT | Facility: CLINIC | Age: 73
End: 2023-02-16
Payer: MEDICARE

## 2023-02-16 ENCOUNTER — TELEPHONE (OUTPATIENT)
Dept: TRANSPLANT | Facility: CLINIC | Age: 73
End: 2023-02-16
Payer: MEDICARE

## 2023-02-16 DIAGNOSIS — Z94.4 STATUS POST LIVER TRANSPLANTATION: Primary | ICD-10-CM

## 2023-02-16 NOTE — TELEPHONE ENCOUNTER
My chart message sent to patient, your ultrasound has been reviewed, no action required, next due in August 2023          ----- Message from Cornell Anton MD sent at 2/16/2023 12:06 PM CST -----  Results reviewed

## 2023-02-20 DIAGNOSIS — E03.2 HYPOTHYROIDISM DUE TO MEDICAMENTS AND OTHER EXOGENOUS SUBSTANCES: Primary | ICD-10-CM

## 2023-02-20 DIAGNOSIS — C32.9 LARYNX CANCER: ICD-10-CM

## 2023-02-23 ENCOUNTER — PATIENT MESSAGE (OUTPATIENT)
Dept: DERMATOLOGY | Facility: CLINIC | Age: 73
End: 2023-02-23
Payer: MEDICARE

## 2023-02-27 ENCOUNTER — OFFICE VISIT (OUTPATIENT)
Dept: DERMATOLOGY | Facility: CLINIC | Age: 73
End: 2023-02-27
Payer: MEDICARE

## 2023-02-27 DIAGNOSIS — D48.5 NEOPLASM OF UNCERTAIN BEHAVIOR OF SKIN: Primary | ICD-10-CM

## 2023-02-27 PROCEDURE — 99999 PR PBB SHADOW E&M-EST. PATIENT-LVL III: ICD-10-PCS | Mod: PBBFAC,,, | Performed by: DERMATOLOGY

## 2023-02-27 PROCEDURE — 99999 PR PBB SHADOW E&M-EST. PATIENT-LVL III: CPT | Mod: PBBFAC,,, | Performed by: DERMATOLOGY

## 2023-02-27 PROCEDURE — 99213 OFFICE O/P EST LOW 20 MIN: CPT | Mod: PBBFAC,25 | Performed by: DERMATOLOGY

## 2023-02-27 PROCEDURE — 11102 TANGNTL BX SKIN SINGLE LES: CPT | Mod: S$PBB,,, | Performed by: DERMATOLOGY

## 2023-02-27 PROCEDURE — 88305 TISSUE EXAM BY PATHOLOGIST: CPT | Mod: 26,,, | Performed by: PATHOLOGY

## 2023-02-27 PROCEDURE — 99499 UNLISTED E&M SERVICE: CPT | Mod: S$PBB,,, | Performed by: DERMATOLOGY

## 2023-02-27 PROCEDURE — 88305 TISSUE EXAM BY PATHOLOGIST: ICD-10-PCS | Mod: 26,,, | Performed by: PATHOLOGY

## 2023-02-27 PROCEDURE — 11102 TANGNTL BX SKIN SINGLE LES: CPT | Mod: PBBFAC | Performed by: DERMATOLOGY

## 2023-02-27 PROCEDURE — 11102 PR TANGENTIAL BIOPSY, SKIN, SINGLE LESION: ICD-10-PCS | Mod: S$PBB,,, | Performed by: DERMATOLOGY

## 2023-02-27 PROCEDURE — 99499 NO LOS: ICD-10-PCS | Mod: S$PBB,,, | Performed by: DERMATOLOGY

## 2023-02-27 PROCEDURE — 88305 TISSUE EXAM BY PATHOLOGIST: CPT | Performed by: PATHOLOGY

## 2023-02-27 NOTE — PROGRESS NOTES
72 y.o. male patient is here for wound check after surgery.    Patient reports no problems to the chin. However, c/o recent growth on the right anterior jawline. Has not been biopsied. No prior treatment to this site.    Physical Exam   HENT:   Head:         R anterior jawline with a 10 x 12 mm pink plaque located 4 cm anteriorly from the right inferior lobe attachment and 3.5 cm anteriorly.   The chin wound is well healed. Mild firmness and erythema of scar. No nodularity.    IMPRESSION/PLAN:  R/O SCC, R anterior jawline.  Shave bx after verbal consent.  Etoh prep, 1% lido with epi 1;100,000, 2 cc, Hibiclens prep, shave, light hyfrecation, pressure bandage, no complications.   Specimen to pathology.  Discussed wound care.  If (+) malignancy, schedule Mohs.    Healing operative site from Mohs' surgery BCC of chin s/p Mohs with CLC, postop week #4, resolved staph infection s/p doxycycline  Continue wound care. Recommended massage tid.  Reassured patient that redness and firmness will fade with time.  Daily SPF.  Regular skin checks.    RTC:  In 3-6 months with Estrellita Myers M.D. for skin check or sooner if new concern arises.    Pathology Orders:       Normal Orders This Visit    Specimen to Pathology, Dermatology     Comments:    Number of Specimens:->1  ------------------------->-------------------------  Spec 1 Procedure:->Biopsy  Spec 1 Clinical Impression:->rule out SCC  Spec 1 Source:->right anterior jawline    Questions:    Procedure Type: Dermatology and skin neoplasms    Number of Specimens: 1    ------------------------: -------------------------    Spec 1 Procedure: Biopsy    Spec 1 Clinical Impression: rule out SCC    Spec 1 Source: right anterior jawline    Release to patient:

## 2023-03-01 LAB
FINAL PATHOLOGIC DIAGNOSIS: NORMAL
GROSS: NORMAL
Lab: NORMAL
MICROSCOPIC EXAM: NORMAL

## 2023-03-02 ENCOUNTER — PATIENT MESSAGE (OUTPATIENT)
Dept: DERMATOLOGY | Facility: CLINIC | Age: 73
End: 2023-03-02
Payer: MEDICARE

## 2023-03-08 ENCOUNTER — OFFICE VISIT (OUTPATIENT)
Dept: OPHTHALMOLOGY | Facility: CLINIC | Age: 73
End: 2023-03-08
Attending: OPHTHALMOLOGY
Payer: MEDICARE

## 2023-03-08 DIAGNOSIS — D49.89 CONJUNCTIVAL INTRAEPITHELIAL NEOPLASM: Primary | ICD-10-CM

## 2023-03-08 PROCEDURE — 99214 OFFICE O/P EST MOD 30 MIN: CPT | Mod: S$PBB,,, | Performed by: OPHTHALMOLOGY

## 2023-03-08 PROCEDURE — 99999 PR PBB SHADOW E&M-EST. PATIENT-LVL III: CPT | Mod: PBBFAC,,, | Performed by: OPHTHALMOLOGY

## 2023-03-08 PROCEDURE — 99999 PR PBB SHADOW E&M-EST. PATIENT-LVL III: ICD-10-PCS | Mod: PBBFAC,,, | Performed by: OPHTHALMOLOGY

## 2023-03-08 PROCEDURE — 99214 PR OFFICE/OUTPT VISIT, EST, LEVL IV, 30-39 MIN: ICD-10-PCS | Mod: S$PBB,,, | Performed by: OPHTHALMOLOGY

## 2023-03-08 PROCEDURE — 99213 OFFICE O/P EST LOW 20 MIN: CPT | Mod: PBBFAC | Performed by: OPHTHALMOLOGY

## 2023-03-08 RX ORDER — MOXIFLOXACIN 5 MG/ML
1 SOLUTION/ DROPS OPHTHALMIC
Status: CANCELLED | OUTPATIENT
Start: 2023-03-08

## 2023-03-08 RX ORDER — PREDNISOLONE ACETATE 10 MG/ML
1 SUSPENSION/ DROPS OPHTHALMIC 4 TIMES DAILY
Qty: 10 ML | Refills: 3 | Status: SHIPPED | OUTPATIENT
Start: 2023-03-08

## 2023-03-08 RX ORDER — MOXIFLOXACIN 5 MG/ML
1 SOLUTION/ DROPS OPHTHALMIC 4 TIMES DAILY
Qty: 3 ML | Refills: 3 | Status: SHIPPED | OUTPATIENT
Start: 2023-03-08 | End: 2023-12-07

## 2023-03-08 RX ORDER — SODIUM CHLORIDE 0.9 % (FLUSH) 0.9 %
10 SYRINGE (ML) INJECTION
Status: DISCONTINUED | OUTPATIENT
Start: 2023-03-08 | End: 2023-08-24 | Stop reason: ALTCHOICE

## 2023-03-08 RX ORDER — TETRACAINE HYDROCHLORIDE 5 MG/ML
1 SOLUTION OPHTHALMIC
Status: CANCELLED | OUTPATIENT
Start: 2023-03-08

## 2023-03-08 NOTE — PROGRESS NOTES
HPI    Pt had conjunctival lesion biopsy by Dr Vasques 10/11/2022.  He has   some pain OD.  HE is here for Cornea evaluation.    DLS: 10/26/2022 with Dr. Vasques  S/p Conjunctival lesion biopsy OD: 10/11/2022    Gtts: None    POHx:  1. Post-operative state, right   2. Conjunctival intraepithelial neoplasm   3. Nuclear sclerosis of both eyes   Last edited by Arelis Stern on 3/8/2023  9:10 AM.            Assessment /Plan     For exam results, see Encounter Report.    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  Plan wide local excision with AMT reconstruction from 11:30-5:00  And adjuvent MMC topical for weekly pulses x 3 with week rest between, after few weeks po  Discussed risks, benefits, and alternatives to surgical intervention. Patient voices understanding and wishes to proceed.

## 2023-03-17 ENCOUNTER — TELEPHONE (OUTPATIENT)
Dept: OPHTHALMOLOGY | Facility: CLINIC | Age: 73
End: 2023-03-17
Payer: MEDICARE

## 2023-03-17 NOTE — TELEPHONE ENCOUNTER
----- Message from Max Salguero sent at 3/17/2023 12:37 PM CDT -----  Contact: [phone]  Pt brother is calling because he needed to reschedule his brothers procedure for later in may. Please call back to further assist.

## 2023-03-20 ENCOUNTER — HOSPITAL ENCOUNTER (OUTPATIENT)
Dept: RADIOLOGY | Facility: HOSPITAL | Age: 73
Discharge: HOME | End: 2023-03-20
Attending: INTERNAL MEDICINE
Payer: MEDICARE

## 2023-03-20 DIAGNOSIS — C01 SQUAMOUS CELL CARCINOMA OF BASE OF TONGUE: ICD-10-CM

## 2023-03-20 LAB — POCT GLUCOSE: 120 MG/DL (ref 70–110)

## 2023-03-20 PROCEDURE — 78815 NM PET CT ROUTINE: ICD-10-PCS | Mod: 26,PS,, | Performed by: RADIOLOGY

## 2023-03-20 PROCEDURE — 78815 PET IMAGE W/CT SKULL-THIGH: CPT | Mod: 26,PS,, | Performed by: RADIOLOGY

## 2023-03-20 PROCEDURE — A9552 F18 FDG: HCPCS

## 2023-03-20 PROCEDURE — 78815 PET IMAGE W/CT SKULL-THIGH: CPT | Mod: TC

## 2023-03-23 ENCOUNTER — OFFICE VISIT (OUTPATIENT)
Dept: HEMATOLOGY/ONCOLOGY | Facility: CLINIC | Age: 73
End: 2023-03-23
Payer: MEDICARE

## 2023-03-23 ENCOUNTER — INFUSION (OUTPATIENT)
Dept: INFUSION THERAPY | Facility: HOSPITAL | Age: 73
End: 2023-03-23
Attending: STUDENT IN AN ORGANIZED HEALTH CARE EDUCATION/TRAINING PROGRAM
Payer: MEDICARE

## 2023-03-23 VITALS
TEMPERATURE: 98 F | SYSTOLIC BLOOD PRESSURE: 149 MMHG | WEIGHT: 115.06 LBS | RESPIRATION RATE: 18 BRPM | BODY MASS INDEX: 17.44 KG/M2 | DIASTOLIC BLOOD PRESSURE: 78 MMHG | OXYGEN SATURATION: 97 % | HEART RATE: 82 BPM | HEIGHT: 68 IN

## 2023-03-23 VITALS
RESPIRATION RATE: 18 BRPM | HEART RATE: 68 BPM | TEMPERATURE: 98 F | DIASTOLIC BLOOD PRESSURE: 74 MMHG | OXYGEN SATURATION: 100 % | SYSTOLIC BLOOD PRESSURE: 146 MMHG

## 2023-03-23 DIAGNOSIS — K74.60 CIRRHOSIS OF TRANSPLANTED LIVER: ICD-10-CM

## 2023-03-23 DIAGNOSIS — Z93.0 TRACHEOSTOMY STATUS: ICD-10-CM

## 2023-03-23 DIAGNOSIS — C77.0 SECONDARY MALIGNANT NEOPLASM OF CERVICAL LYMPH NODE: Primary | ICD-10-CM

## 2023-03-23 DIAGNOSIS — R68.84 JAW PAIN: Primary | ICD-10-CM

## 2023-03-23 DIAGNOSIS — C77.0 SECONDARY MALIGNANT NEOPLASM OF CERVICAL LYMPH NODE: ICD-10-CM

## 2023-03-23 DIAGNOSIS — H11.9 CONJUNCTIVAL LESION: ICD-10-CM

## 2023-03-23 DIAGNOSIS — D84.821 IMMUNODEFICIENCY DUE TO DRUGS: ICD-10-CM

## 2023-03-23 DIAGNOSIS — E89.0 POSTOPERATIVE HYPOTHYROIDISM: ICD-10-CM

## 2023-03-23 DIAGNOSIS — Z79.899 IMMUNODEFICIENCY DUE TO DRUGS: ICD-10-CM

## 2023-03-23 DIAGNOSIS — T86.49 CIRRHOSIS OF TRANSPLANTED LIVER: ICD-10-CM

## 2023-03-23 DIAGNOSIS — C01 SQUAMOUS CELL CARCINOMA OF BASE OF TONGUE: ICD-10-CM

## 2023-03-23 DIAGNOSIS — Z94.4 STATUS POST LIVER TRANSPLANTATION: ICD-10-CM

## 2023-03-23 PROCEDURE — 99999 PR PBB SHADOW E&M-EST. PATIENT-LVL V: ICD-10-PCS | Mod: PBBFAC,,, | Performed by: INTERNAL MEDICINE

## 2023-03-23 PROCEDURE — 99215 OFFICE O/P EST HI 40 MIN: CPT | Mod: PBBFAC,25 | Performed by: INTERNAL MEDICINE

## 2023-03-23 PROCEDURE — A4216 STERILE WATER/SALINE, 10 ML: HCPCS | Performed by: INTERNAL MEDICINE

## 2023-03-23 PROCEDURE — 25000003 PHARM REV CODE 250: Performed by: INTERNAL MEDICINE

## 2023-03-23 PROCEDURE — 99999 PR PBB SHADOW E&M-EST. PATIENT-LVL V: CPT | Mod: PBBFAC,,, | Performed by: INTERNAL MEDICINE

## 2023-03-23 PROCEDURE — 99215 OFFICE O/P EST HI 40 MIN: CPT | Mod: S$PBB,,, | Performed by: INTERNAL MEDICINE

## 2023-03-23 PROCEDURE — 63600175 PHARM REV CODE 636 W HCPCS: Performed by: INTERNAL MEDICINE

## 2023-03-23 PROCEDURE — 99215 PR OFFICE/OUTPT VISIT, EST, LEVL V, 40-54 MIN: ICD-10-PCS | Mod: S$PBB,,, | Performed by: INTERNAL MEDICINE

## 2023-03-23 PROCEDURE — 96413 CHEMO IV INFUSION 1 HR: CPT

## 2023-03-23 RX ORDER — TIZANIDINE 2 MG/1
2 TABLET ORAL NIGHTLY PRN
Qty: 30 TABLET | Refills: 3 | Status: SHIPPED | OUTPATIENT
Start: 2023-03-23 | End: 2023-11-16 | Stop reason: SDUPTHER

## 2023-03-23 RX ORDER — HEPARIN 100 UNIT/ML
500 SYRINGE INTRAVENOUS
Status: DISCONTINUED | OUTPATIENT
Start: 2023-03-23 | End: 2023-03-23 | Stop reason: HOSPADM

## 2023-03-23 RX ORDER — HEPARIN 100 UNIT/ML
500 SYRINGE INTRAVENOUS
Status: CANCELLED | OUTPATIENT
Start: 2023-03-23

## 2023-03-23 RX ORDER — SODIUM CHLORIDE 0.9 % (FLUSH) 0.9 %
10 SYRINGE (ML) INJECTION
Status: CANCELLED | OUTPATIENT
Start: 2023-03-23

## 2023-03-23 RX ORDER — SODIUM CHLORIDE 0.9 % (FLUSH) 0.9 %
10 SYRINGE (ML) INJECTION
Status: DISCONTINUED | OUTPATIENT
Start: 2023-03-23 | End: 2023-03-23 | Stop reason: HOSPADM

## 2023-03-23 RX ADMIN — HEPARIN SODIUM (PORCINE) LOCK FLUSH IV SOLN 100 UNIT/ML 500 UNITS: 100 SOLUTION at 11:03

## 2023-03-23 RX ADMIN — SODIUM CHLORIDE 400 MG: 9 INJECTION, SOLUTION INTRAVENOUS at 11:03

## 2023-03-23 RX ADMIN — SODIUM CHLORIDE: 9 INJECTION, SOLUTION INTRAVENOUS at 10:03

## 2023-03-23 RX ADMIN — Medication 10 ML: at 11:03

## 2023-03-23 NOTE — PROGRESS NOTES
ONCOLOGY FOLLOW UP VISIT    Subjective:      Patient ID: Rocco Swain    Chief Complaint: Squamous cell carcinoma of base of tongue      HPI  Rocco Swain is a 73 y.o. male, patient of Dr. Berg, who returns to clinic for evaluation and management of  P16 positive squamous cell carcinoma. Since his last visit he is doing well. Reports some joint pains that come and go: ankles, keens, elbows, wrists. Takes Oxy 0.5 pills (5 mg) 1-2x a day to control the pain. Chronic neck pain stable. Patient has some fatigue, but intermittent and stable.  Appetite improved today and weight is stable. He is fly fishing daily. He will receive his pembrolizumab infusion today. Reviewed labs and past imaging with him    ECOG Performance status: 1 - Symptomatic but completely ambulatory Communication from him is 100% written.     Cancer Staging   Squamous cell carcinoma of base of tongue  Staging form: Pharynx - HPV-Mediated Oropharynx, AJCC 8th Edition  - Clinical stage from 9/18/2020: Stage III (rcT4, cN1, cM0, p16+) - Signed by Ronald Berg MD on 12/27/2022      Oncologic History:  Oncology History   Squamous cell carcinoma of base of tongue   9/18/2020 Cancer Staged    Staging form: Pharynx - HPV-Mediated Oropharynx, AJCC 8th Edition  - Clinical stage from 9/18/2020: Stage III (rcT4, cN1, cM0, p16+)       9/28/2020 Initial Diagnosis    Squamous cell carcinoma of base of tongue     10/6/2020 - 8/17/2021 Chemotherapy    Treatment Summary   Plan Name: OP HEAD NECK CARBOPLATIN PACLITAXEL C1-2 FOLLOWED BY CETUXIMAB CARBOPLATIN C3-6 FOLLOWED BY CETUXIMAB MAINTENANCE WEEKLY  Treatment Goal: Control  Status: Inactive  Start Date: 10/6/2020  End Date: 8/17/2021  Provider: Ronald Berg MD  Chemotherapy: cetuximab (ERBITUX) 100 mg/50 mL chemo infusion 684 mg, 400 mg/m2 = 684 mg (100 % of original dose 400 mg/m2), Intravenous, Clinic/H[facility] 1 time, 29 of 38 cycles  Dose modification: 500 mg/m2 (original dose 400 mg/m2, Cycle 3), 250 mg/m2 (original  dose 400 mg/m2, Cycle 3), 400 mg/m2 (original dose 400 mg/m2, Cycle 3), 250 mg/m2 (original dose 250 mg/m2, Cycle 7), 200 mg/m2 (original dose 250 mg/m2, Cycle 18), 200 mg/m2 (original dose 250 mg/m2, Cycle 19), 250 mg/m2 (original dose 250 mg/m2, Cycle 4), 200 mg/m2 (original dose 250 mg/m2, Cycle 25), 200 mg/m2 (original dose 250 mg/m2, Cycle 28)  Administration: 684 mg (11/17/2020), 400 mg (11/24/2020), 400 mg (2/9/2021), 400 mg (2/17/2021), 427.6 mg (3/9/2021), 400 mg (3/30/2021), 400 mg (3/23/2021), 400 mg (4/6/2021), 427.6 mg (4/13/2021), 427.6 mg (4/20/2021), 342 mg (5/11/2021), 342 mg (5/18/2021), 342 mg (5/25/2021), 400 mg (5/31/2021), 400 mg (6/7/2021), 400 mg (6/14/2021), 400 mg (12/8/2020), 427.6 mg (12/29/2020), 400 mg (12/1/2020), 400 mg (12/15/2020), 400 mg (12/22/2020), 427.6 mg (1/5/2021), 400 mg (1/12/2021), 400 mg (1/19/2021), 427.6 mg (1/26/2021), 400 mg (2/2/2021), 400 mg (2/23/2021), 400 mg (3/2/2021), 427.6 mg (3/16/2021), 400 mg (6/21/2021), 342 mg (6/28/2021), 342 mg (7/6/2021), 342 mg (7/13/2021), 342 mg (7/20/2021), 400 mg (7/27/2021), 400 mg (8/10/2021), 400 mg (8/17/2021)  CARBOplatin (PARAPLATIN) 310 mg in sodium chloride 0.9% 500 mL chemo infusion, 310 mg (92.2 % of original dose 334.5 mg), Intravenous, Clinic/H[facility] 1 time, 6 of 6 cycles  Dose modification:   (original dose 334.5 mg, Cycle 1)  Administration: 310 mg (10/6/2020), 370 mg (11/17/2020), 300 mg (10/27/2020), 350 mg (12/8/2020), 335 mg (12/29/2020), 320 mg (1/19/2021)  PACLitaxeL (TAXOL) 175 mg/m2 = 300 mg in sodium chloride 0.9% 500 mL chemo infusion, 175 mg/m2 = 300 mg (100 % of original dose 175 mg/m2), Intravenous, Clinic/H[facility] 1 time, 2 of 2 cycles  Dose modification: 175 mg/m2 (original dose 175 mg/m2, Cycle 1)  Administration: 300 mg (10/6/2020), 300 mg (10/27/2020)       4/29/2021 Tumor Conference       His case was discussed at the Multidisciplinary Head and Neck Team Planning Meeting.    Representatives from Medical  Oncology, Radiation Oncology, Head and Neck Surgical Oncology, Psychosocial Oncology, and Speech and Language Pathology discussed the case with the following recommendations:    1) biopsy         7/29/2021 Tumor Conference       His case was discussed at the Multidisciplinary Head and Neck Team Planning Meeting.    Representatives from Medical Oncology, Radiation Oncology, Head and Neck Surgical Oncology, Psychosocial Oncology, and Speech and Language Pathology discussed the case with the following recommendations:    1) Head and neck clinic follow up  2) consider Keytruda (discuss with transplant)  3) consider palliative referral         9/13/2021 -  Chemotherapy    Treatment Summary   Plan Name: OP HEAD NECK PEMBROLIZUMAB CARBOPLATIN FLUOROURACIL (C1 ONLY RECEIVED) FOLLOWED BY PEMBROLIZUMAB MAINTENANCE  Treatment Goal: Palliative  Status: Active  Start Date: 9/13/2021  End Date: 9/5/2023 (Planned)  Provider: Ronald Berg MD  Chemotherapy: CARBOplatin (PARAPLATIN) 320 mg in sodium chloride 0.9% 500 mL chemo infusion, 320 mg (100 % of original dose 320.5 mg), Intravenous, Clinic/HOD 1 time, 6 of 6 cycles  Dose modification:   (original dose 320.5 mg, Cycle 1)  Administration: 320 mg (9/13/2021), 335 mg (12/23/2021), 325 mg (1/27/2022), 245 mg (3/3/2022), 255 mg (5/12/2022), 255 mg (4/7/2022)  fluorouraciL 1,000 mg/m2/day = 6,440 mg in sodium chloride 0.9% 240 mL chemo infusion, 1,000 mg/m2/day = 6,440 mg, Intravenous, Over 96 hours, 6 of 6 cycles  Dose modification: 800 mg/m2/day (original dose 1,000 mg/m2/day, Cycle 6), 5,000 mg (original dose 1,000 mg/m2/day, Cycle 8)  Administration: 6,440 mg (9/13/2021), 6,440 mg (12/23/2021), 6,480 mg (1/27/2022), 5,000 mg (3/3/2022), 5,000 mg (4/7/2022), 5,000 mg (5/12/2022)       Secondary malignant neoplasm of cervical lymph node   2/9/2021 Initial Diagnosis    Secondary malignant neoplasm of cervical lymph node     9/13/2021 -  Chemotherapy    Treatment Summary   Plan Name:  OP HEAD NECK PEMBROLIZUMAB CARBOPLATIN FLUOROURACIL (C1 ONLY RECEIVED) FOLLOWED BY PEMBROLIZUMAB MAINTENANCE  Treatment Goal: Palliative  Status: Active  Start Date: 9/13/2021  End Date: 9/5/2023 (Planned)  Provider: Ronald Berg MD  Chemotherapy: CARBOplatin (PARAPLATIN) 320 mg in sodium chloride 0.9% 500 mL chemo infusion, 320 mg (100 % of original dose 320.5 mg), Intravenous, Clinic/[facility] 1 time, 6 of 6 cycles  Dose modification:   (original dose 320.5 mg, Cycle 1)  Administration: 320 mg (9/13/2021), 335 mg (12/23/2021), 325 mg (1/27/2022), 245 mg (3/3/2022), 255 mg (5/12/2022), 255 mg (4/7/2022)  fluorouraciL 1,000 mg/m2/day = 6,440 mg in sodium chloride 0.9% 240 mL chemo infusion, 1,000 mg/m2/day = 6,440 mg, Intravenous, Over 96 hours, 6 of 6 cycles  Dose modification: 800 mg/m2/day (original dose 1,000 mg/m2/day, Cycle 6), 5,000 mg (original dose 1,000 mg/m2/day, Cycle 8)  Administration: 6,440 mg (9/13/2021), 6,440 mg (12/23/2021), 6,480 mg (1/27/2022), 5,000 mg (3/3/2022), 5,000 mg (4/7/2022), 5,000 mg (5/12/2022)           Review of Systems   Constitutional:  Positive for appetite change (early satiety). Negative for activity change, chills, fatigue, fever and unexpected weight change.   HENT:  Negative for ear pain, facial swelling, hearing loss, mouth sores, nosebleeds, sore throat, trouble swallowing and voice change.         No verbal communication. Skin fixed and immobile from previous scaring post-neck dissection   Eyes:  Positive for redness (right eye). Negative for pain, discharge and visual disturbance.   Respiratory:  Negative for cough, choking, chest tightness and shortness of breath.    Cardiovascular:  Negative for chest pain, palpitations and leg swelling.   Gastrointestinal:  Negative for abdominal distention, abdominal pain, blood in stool, constipation, diarrhea, nausea and vomiting.   Endocrine: Negative for cold intolerance and heat intolerance.   Genitourinary:  Negative for difficulty  urinating, dysuria, frequency and urgency.   Musculoskeletal:  Negative for arthralgias, gait problem, leg pain and myalgias.   Integumentary:  Positive for rash (intermittent). Negative for pallor and wound.   Allergic/Immunologic: Negative for frequent infections.   Neurological:  Negative for dizziness, tremors, weakness, light-headedness, numbness and headaches.   Hematological:  Negative for adenopathy. Does not bruise/bleed easily.   Psychiatric/Behavioral:  Negative for agitation, confusion, dysphoric mood and sleep disturbance. The patient is not nervous/anxious.       Allergies:  Review of patient's allergies indicates:  No Known Allergies    Medications:  Current Outpatient Medications   Medication Sig Dispense Refill    azelaic acid (AZELEX) 15 % gel APPLY TOPICALLY TO AFFECTED AREA IN THE MORNING 50 g 3    gabapentin (NEURONTIN) 300 MG capsule Take 1 capsule (300 mg total) by mouth every evening. 30 capsule 11    ibuprofen (ADVIL,MOTRIN) 200 MG tablet Take 200 mg by mouth.      imiquimod (ALDARA) 5 % cream Apply to biopsy site on frontal scalp + about a centimeter of surrounding skin qhs x 16 weeks. Wash off in am and apply sunscreen. Discontinue if skin becomes blistered, raw, bleeding, painful, etc. 24 packet 3    imiquimod (ALDARA) 5 % cream Apply to biopsy site on frontal scalp + about a centimeter of surrounding skin qhs x 16 weeks. Wash off in am and apply sunscreen. Discontinue if skin becomes blistered, raw, bleeding, painful, etc. 24 packet 3    levothyroxine (SYNTHROID) 88 MCG tablet TAKE 1 TABLET BY MOUTH ONCE DAILY 90 tablet 3    LIDOcaine HCl 2% (LIDOCAINE VISCOUS) 2 % Soln Swish and spit 15 mls every 8 (eight) hours as needed (mouth sore). 100 mL 3    LIDOcaine-prilocaine (EMLA) cream Apply generously to port site 30-60 min prior to chemo and then cover with saran wrap. 30 g 2    loperamide (IMODIUM) 2 mg capsule Take 1 capsule (2 mg total) by mouth 4 (four) times daily as needed for  Diarrhea. 30 capsule 1    loteprednol (LOTEMAX) 0.5 % ophthalmic suspension Place 1 drop into the right eye 4 (four) times daily. 5 mL 1    magic mouthwash diphen/antac/lidoc/nysta Take 10 mLs by mouth 4 (four) times daily. 120 mL 4    metroNIDAZOLE (METROGEL) 0.75 % gel Apply topically to affected area 2 (two) times daily. 45 g 1    moxifloxacin (VIGAMOX) 0.5 % ophthalmic solution Place 1 drop into the right eye 4 (four) times daily. 3 mL 3    multivit-min/FA/lycopen/lutein (CENTRUM SILVER MEN ORAL) Take 1 tablet by mouth.      multivitamin capsule Take 1 capsule by mouth once daily.      oxyCODONE (ROXICODONE) 10 mg Tab immediate release tablet Take 1 tablet (10 mg total) by mouth every 6 (six) hours as needed for Pain. 80 tablet 0    prednisoLONE acetate (PRED FORTE) 1 % DrpS Place 1 drop into the right eye 4 (four) times daily. 10 mL 3    sulfacetamide sodium-sulfur 10-5 % (w/w) Clsr USE TO WASH AFFECTED AREA DAILY      tacrolimus (PROGRAF) 0.5 MG Cap Take 1 capsule (0.5 mg total) by mouth every EVENING.  (Use the 1mg capsule for your morning dose) 90 capsule 3    tacrolimus (PROGRAF) 0.5 MG Cap Take 1 capsule (0.5 mg total) by mouth every evening. Use the 1mg capsule for your morning dose 90 capsule 3    tacrolimus (PROGRAF) 1 MG Cap Take 1 capsule (1 mg total) by mouth every morning. Use the tacrolimus 0.5mg capsule for your evening dose as directed. 90 capsule 3    traZODone (DESYREL) 50 MG tablet TAKE 1 TABLET BY MOUTH IN  THE EVENING 90 tablet 3    vitamin E 400 UNIT capsule Take 400 Units by mouth once daily.      tiZANidine (ZANAFLEX) 2 MG tablet Take 1 tablet (2 mg total) by mouth nightly as needed (neck muscle strain). 30 tablet 3     Current Facility-Administered Medications   Medication Dose Route Frequency Provider Last Rate Last Admin    sodium chloride 0.9% flush 10 mL  10 mL Intravenous PRN Ting Brambila MD         Facility-Administered Medications Ordered in Other Visits   Medication Dose Route  Frequency Provider Last Rate Last Admin    alteplase injection 2 mg  2 mg Intra-Catheter PRN Martín Hernandez MD        heparin, porcine (PF) 100 unit/mL injection flush 500 Units  500 Units Intravenous PRN Ronald Berg MD        heparin, porcine (PF) 100 unit/mL injection flush 500 Units  500 Units Intravenous PRN Martín Hernandez MD   500 Units at 03/23/23 1136    ofloxacin 0.3 % ophthalmic solution 1 drop  1 drop Right Eye On Call Procedure Victor M Vasques MD   2 drop at 10/11/22 0745    sodium chloride 0.9% flush 10 mL  10 mL Intravenous PRN Ronald Berg MD        sodium chloride 0.9% flush 10 mL  10 mL Intravenous PRN Victor M Vasques MD        sodium chloride 0.9% flush 10 mL  10 mL Intravenous PRN Martín Hernandez MD   10 mL at 03/23/23 1136       PMH:  Past Medical History:   Diagnosis Date    Basal cell carcinoma (BCC) in situ of skin 2012    3 on face, 2 on arm, removed by dermatology.     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 Underwent surgical resection of tongue and neck. s/p tracheostomy       PSH:  Past Surgical History:   Procedure Laterality Date    COLONOSCOPY      CONJUNCTIVA BIOPSY Right 10/11/2022    Procedure: BIOPSY, CONJUNCTIVA;  Surgeon: Victor M Vasques MD;  Location: [facility];  Service: Ophthalmology;  Laterality: Right;    INSERTION OF VENOUS ACCESS PORT Right 3/8/2021    Procedure: INSERTION, VENOUS ACCESS PORT;  Surgeon: Jesus Rendon MD;  Location: [address];  Service: General;  Laterality: Right;    LIVER TRANSPLANT  11/2008    transplanted for biopsy proven hepatocellular carcinoma,     LYMPH NODE BIOPSY N/A 9/18/2020    Procedure: BIOPSY, LYMPH NODE;  Surgeon: [facility] Provider;  Location: [facility]  "2ND FLR;  Service: General;  Laterality: N/A;  Rm 173    TRACHEOSTOMY         FamHx:  Family History   Problem Relation Age of Onset    Dementia Mother        SocHx:  Social History     Socioeconomic History    Marital status: Single   Occupational History    Occupation: Retired     Comment: , notes exposures to fumes etc.  Worked on [employer] for 4 years   Tobacco Use    Smoking status: Never    Smokeless tobacco: Never   Substance and Sexual Activity    Alcohol use: Yes     Comment: drinks wine, 1 glass day    Drug use: Not Currently    Sexual activity: Yes     Comment: No prior history of STD        Distress Score    Distress Score: 0 - No Distress        Objective:      Vitals:    03/23/23 0918   BP: (!) 149/78   BP Location: Left arm   Patient Position: Sitting   BP Method: Medium (Automatic)   Pulse: 82   Resp: 18   Temp: 97.7 °F (36.5 °C)   TempSrc: Oral   SpO2: 97%   Weight: 52.2 kg (115 lb 1.3 oz)   Height: 5' 8" (1.727 m)        Physical Exam  Vitals reviewed.   Constitutional:       General: He is not in acute distress.     Appearance: Normal appearance. He is well-developed and underweight.   HENT:      Head: Normocephalic and atraumatic.      Mouth/Throat:      Mouth: Mucous membranes are moist.      Dentition: Abnormal dentition. Dental caries present.   Eyes:      Conjunctiva/sclera: Conjunctivae normal.      Pupils: Pupils are equal, round, and reactive to light.   Neck:      Trachea: Tracheostomy present.   Pulmonary:      Effort: Pulmonary effort is normal. No respiratory distress.      Comments: Tracheostomy  Abdominal:      General: There is no distension.      Palpations: Abdomen is soft.   Musculoskeletal:         General: No swelling. Normal range of motion.      Cervical back: Normal range of motion and neck supple.   Skin:     General: Skin is warm and dry.   Neurological:      General: No focal deficit present.      Mental Status: He is alert and oriented to person, " place, and time.   Psychiatric:         Mood and Affect: Mood normal.         Behavior: Behavior normal.         Thought Content: Thought content normal.         Judgment: Judgment normal.         LABS:  WBC   Date Value Ref Range Status   03/23/2023 3.76 (L) 3.90 - 12.70 K/uL Final     Hemoglobin   Date Value Ref Range Status   03/23/2023 11.3 (L) 14.0 - 18.0 g/dL Final     Hematocrit   Date Value Ref Range Status   03/23/2023 32.6 (L) 40.0 - 54.0 % Final     Platelets   Date Value Ref Range Status   03/23/2023 121 (L) 150 - 450 K/uL Final       Chemistry        Component Value Date/Time     03/23/2023 0920    K 4.4 03/23/2023 0920     03/23/2023 0920    CO2 32 (H) 03/23/2023 0920    BUN 18 03/23/2023 0920    CREATININE 1.4 03/23/2023 0920     (H) 03/23/2023 0920        Component Value Date/Time    CALCIUM 9.9 03/23/2023 0920    ALKPHOS 78 03/23/2023 0920    AST 55 (H) 03/23/2023 0920    ALT 27 03/23/2023 0920    BILITOT 0.7 03/23/2023 0920    ESTGFRAFRICA 52.6 (A) 07/14/2022 1119    EGFRNONAA 45.5 (A) 07/14/2022 1119              Assessment:       1. Jaw pain    2. Squamous cell carcinoma of base of tongue    3. Secondary malignant neoplasm of cervical lymph node    4. Tracheostomy status    5. Conjunctival lesion    6. Immunodeficiency due to drugs    7. Status post liver transplantation - 2008    8. Cirrhosis of transplanted liver              Plan:       2,3. 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 He is not a surgical or radiation candidate.  -Started on PCC 10/6/2020 followed by maintenance cetuximab until 8/24/21 (discontinued due to progression of disease; continued increase in SUV uptake, no new lesions).  - 9/2021 started on carbo/5-FU/pembrolizumab; due to toxicity went to pembrolizumab monotherapy starting with cycle 2.  Progression of disease noted after cycle 3 so added chemotherapy  back in with cycle 4. Keytruda monotherapy starting with C9.   - Labs acceptable to proceed with Keytruda. December 2022 PET scan reviewed with patient - shows no evidence of hypermetabolic disease. Continue current treatment. Re-staging scans in March. Changing to a 12 week Keytruda for next infusion due to planned surgery and out of town.    4. Status post total laryngectomy, total glossectomy and anterolateral thigh free flap reconstruction roughly 2017 at [facility] in [place] for persistent/recurrent base of tongue sq.c.c.    1. Continue Oxy IR. Consider palliative care involvement. Not a candidate for Celebrex d/t liver tx and kidney dysfunction.     5. Planned excision for conjunctival pre-cancerous lesion    6-8. S/p liver transplant and is currently on tacrolimus.  Afebrile, ANC sufficient for treatment. Grade 1. Will continue to monitor.     9 TSH low, but free T4 normal    11 Follow with PCP      he will return to clinic in 12 weeks, but knows to call in the interim if symptoms change or should a problem arise.       MDM includes:    - Acute or chronic illness or injury that poses a threat to life or bodily function  - Independent review and explanation of 2 results from unique tests  - Discussion of management and ordering 2 unique tests  - Extensive discussion of treatment and management  - Prescription drug management  - Drug therapy requiring intensive monitoring for toxicity    Bridger Abraham  PGY-3 Internal medicine

## 2023-03-23 NOTE — PLAN OF CARE
1146-Pt tolerated keytruda infusion well, no complications or side effects, POC and meds discussed with pt, pt aware of upcoming appts, pt knows to call MD with any questions or concerns. Pt ambulated off unit, no distress noted.

## 2023-04-12 ENCOUNTER — OFFICE VISIT (OUTPATIENT)
Dept: PALLIATIVE MEDICINE | Facility: CLINIC | Age: 73
End: 2023-04-12
Payer: MEDICARE

## 2023-04-12 VITALS
TEMPERATURE: 98 F | WEIGHT: 121.25 LBS | HEIGHT: 68 IN | OXYGEN SATURATION: 97 % | BODY MASS INDEX: 18.38 KG/M2 | SYSTOLIC BLOOD PRESSURE: 166 MMHG | DIASTOLIC BLOOD PRESSURE: 84 MMHG | HEART RATE: 91 BPM

## 2023-04-12 DIAGNOSIS — G89.29 CHRONIC LOW BACK PAIN, UNSPECIFIED BACK PAIN LATERALITY, UNSPECIFIED WHETHER SCIATICA PRESENT: ICD-10-CM

## 2023-04-12 DIAGNOSIS — M54.50 CHRONIC BILATERAL LOW BACK PAIN, UNSPECIFIED WHETHER SCIATICA PRESENT: ICD-10-CM

## 2023-04-12 DIAGNOSIS — C32.9 LARYNX CANCER: ICD-10-CM

## 2023-04-12 DIAGNOSIS — Z51.5 ENCOUNTER FOR PALLIATIVE CARE: Primary | ICD-10-CM

## 2023-04-12 DIAGNOSIS — G89.29 CHRONIC BILATERAL LOW BACK PAIN, UNSPECIFIED WHETHER SCIATICA PRESENT: ICD-10-CM

## 2023-04-12 DIAGNOSIS — Z90.02 HISTORY OF LARYNGECTOMY: ICD-10-CM

## 2023-04-12 DIAGNOSIS — C01 SQUAMOUS CELL CARCINOMA OF BASE OF TONGUE: ICD-10-CM

## 2023-04-12 DIAGNOSIS — M54.50 CHRONIC LOW BACK PAIN, UNSPECIFIED BACK PAIN LATERALITY, UNSPECIFIED WHETHER SCIATICA PRESENT: ICD-10-CM

## 2023-04-12 PROCEDURE — 99215 PR OFFICE/OUTPT VISIT, EST, LEVL V, 40-54 MIN: ICD-10-PCS | Mod: S$PBB,ICN,, | Performed by: NURSE PRACTITIONER

## 2023-04-12 PROCEDURE — 99215 OFFICE O/P EST HI 40 MIN: CPT | Mod: S$PBB,ICN,, | Performed by: NURSE PRACTITIONER

## 2023-04-12 PROCEDURE — 99999 PR PBB SHADOW E&M-EST. PATIENT-LVL III: ICD-10-PCS | Mod: PBBFAC,,, | Performed by: NURSE PRACTITIONER

## 2023-04-12 PROCEDURE — 99213 OFFICE O/P EST LOW 20 MIN: CPT | Mod: PBBFAC | Performed by: NURSE PRACTITIONER

## 2023-04-12 PROCEDURE — 99999 PR PBB SHADOW E&M-EST. PATIENT-LVL III: CPT | Mod: PBBFAC,,, | Performed by: NURSE PRACTITIONER

## 2023-04-13 NOTE — PROGRESS NOTES
[place]- Palliative Medicine [facility]  Palliative Care   Psychosocial Assessment    Patient Name: Rocco Swain  MRN: [id number]  Palliative Care Provider: Arabella Cuevas DNP  Primary Care Physician: KYLER Walton MD  Principal Problem: Squamous cell carcinoma in situ of tongue     Reason for Referral:  Psychosocial support and advanced care planning     Present during Interview: patient.      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 Underwent surgical resection of tongue and neck. s/p tracheostomy     Mental Health/Substance Use History: None identified   Risk of Abuse, neglect or exploitation: None identified   Current or Previous Trauma and/or evidence of PTSD: None identified   Non-traditional Health practices: None identified     Understanding of diagnosis and prognosis: Good   Experience/Comfort level with health care system:Good     Patients Mental Status: Alert and oriented to person, place,time and situation with pleasant demeanor.     Socio-Economic Factors/Resources:  Address: [address] [place] [address]  Phone Number: [phone] (home)     Marital Status: Single  Household composition: Alone   Children: None     Patient/Family perceptions about Caregiving Needs; availability and capacity: Patient independent with ADLs.     Family Dynamics/Relationships: Healthy     Patient/Family Strengths/Resilience: Patient presents with a pleasant demeanor and positive outlook.   Patient/Family Coping: Appropriately      Activities of Daily Living: Independent   Support Systems-Family & Community (Home Health, HME etc): n/a     Transportation:  yes    Work/Education History: retired  Self-Care Activities/Hobbies: Fly fishing, walking around the [facility]      History: no    Financial Resources:Medicare      Advanced Care Planning & Legal Concerns:   Advanced Directives/Living Will: yes  [place]/POLST: no   Planning:  no    Power of : yes  Surrogate Decision Maker: Brother/Ollie Swain       Spirituality, Culture & Coping Mechanisms:  F- Franny and Belief: Amish     I - Importance: Moderate     C - Community/Culture Values: n/a     A - Address in Care: Patient informed of available spiritual supports       Goals/Hopes/Expectations: Receive relief of his back pain.   Fears/Anxiety/Concerns: Chronic back pain which is now uncontrolled upon discontinuation of previous regimen due to treatment of cancer related pain.       SW accompanied DNP during initial visit with patient.  Patient presents Oriented x4 with pleasant and appropriate mood. Patient appears to have a good understanding of his illness.  Patient denies depressed mood and anxiety. Patient enjoys spending time fly fishing and walking around the [facility]. Patient reports having good support from his brother/Ollie.  Patient notes his only concern and hope is to find relief from his chronic back pain. Referral previously placed by Oncology to pain management. Patient has not heard back from this provider. DNP to follow up regarding pain management referral and place [facility]. DNP educated patient regarding the role of this clinic in treating symptoms related to cancer and treatment in addition to emotional, mental, and spiritual concerns. Patient denies psychosocial concerns or physical symptoms related to his cancer. DNP encouraged patient to reach out to clinic if these symptoms present.     Patient previously executed  ACP documents in 2020.  Living Will (electing no invasive treatments in event of irreversible condition) and HCPOA (assigning brother/Ollie as decision maker) can be found in EPIC  . Patient denies questions concerns. SW remains available to provide assistance as needed. SW will continue to follow.     Debo Soto, [facility]  [facility]   Palliative Medicine

## 2023-04-14 NOTE — PROGRESS NOTES
Consult Note  Palliative Care      Consult Requested By: Ct Francis  Reason for Consult: symptom mgmt/ACP      ASSESSMENT/PLAN:     Plan/Recommendations:  Diagnoses and all orders for this visit:    Squamous cell carcinoma of base of tongue  - following with Dr. Hernandez and SABINO Francis  - s/p laryngectomy, total glossectomy   - currently on keytruda   - 6/13/23 repeat PET scan  - 100% written communication     Encounter for palliative care  - Patient is decisional  - Patient accompanied today by self   - ACP documents are uploaded into EMR   - Philosophy of Palliative Medicine reviewed with patient and family at first visit  - New patient folder given to and reviewed with patient and family at first visit  - Goals of care: Patient has excellent activity tolerance, his goal is to remain active and able to do the things he enjoys. He fly fishes frequently and enjoys taking walks around the [place]. He is a retired  of 35 years. He has no children and is unmarried, he jokes that being single has allowed him to buy a Kristine. He lives alone with a brother who lives about a mile from his house.      Chronic low back pain, unspecified back pain laterality, unspecified whether sciatica present  - no longer requires pain medication for cancer-related pain  - states his chronic back pain is now more prominent, this is his biggest complaint  - he has tried PT and pain management with limited to no relief     - placed referral to [facility] for acupuncture     Insomnia  - reports 5-6 hours of sleep per night  - 2/2 chronic back pain, see above  - placed referral for acupuncture   - tip sheet provided in new patient folder  - will continue to monitor     Understanding of illness/Prognosis: good    Goals of care: life-prolonging, maintain good QOL    Follow up: 12 weeks     Patient's encounter and above plan of care discussed with patient's oncology team.     SUBJECTIVE:     History of Present  Illness:  Patient is a 73 y.o. year old male presenting with SCC base of tongue.     04/14/2023:  JESUS ALBERTO HERNÁNDEZ reviewed and summarized:  No surprises    Patient presents to clinic alone, all communication is written. He is a pleasant man, in good spirits at our initial visit. His biggest concern is back pain, which is chronic in nature. He has hx of PT and pain management interventions which were not effective. He is curious to try acupuncture, referral placed today. He has excellent activity tolerance and enjoys engaging in his hobbies and making the most of his retired life.     Past Medical History:   Diagnosis Date    Basal cell carcinoma (BCC) in situ of skin 2012    3 on face, 2 on arm, removed by dermatology.     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 Underwent surgical resection of tongue and neck. s/p tracheostomy     Past Surgical History:   Procedure Laterality Date    COLONOSCOPY      CONJUNCTIVA BIOPSY Right 10/11/2022    Procedure: BIOPSY, CONJUNCTIVA;  Surgeon: Victor M Vasques MD;  Location: [facility];  Service: Ophthalmology;  Laterality: Right;    INSERTION OF VENOUS ACCESS PORT Right 3/8/2021    Procedure: INSERTION, VENOUS ACCESS PORT;  Surgeon: Jesus Rendon MD;  Location: [address];  Service: General;  Laterality: Right;    LIVER TRANSPLANT  11/2008    transplanted for biopsy proven hepatocellular carcinoma,     LYMPH NODE BIOPSY N/A 9/18/2020    Procedure: BIOPSY, LYMPH NODE;  Surgeon: [facility];  Location: [address];  Service: General;  Laterality: N/A;  Rm 173    TRACHEOSTOMY       Family History   Problem Relation Age of Onset    Dementia Mother      Review of patient's allergies indicates:  No Known  Allergies    Medications:    Current Outpatient Medications:     azelaic acid (AZELEX) 15 % gel, APPLY TOPICALLY TO AFFECTED AREA IN THE MORNING, Disp: 50 g, Rfl: 3    gabapentin (NEURONTIN) 300 MG capsule, Take 1 capsule (300 mg total) by mouth every evening., Disp: 30 capsule, Rfl: 11    ibuprofen (ADVIL,MOTRIN) 200 MG tablet, Take 200 mg by mouth., Disp: , Rfl:     imiquimod (ALDARA) 5 % cream, Apply to biopsy site on frontal scalp + about a centimeter of surrounding skin qhs x 16 weeks. Wash off in am and apply sunscreen. Discontinue if skin becomes blistered, raw, bleeding, painful, etc., Disp: 24 packet, Rfl: 3    imiquimod (ALDARA) 5 % cream, Apply to biopsy site on frontal scalp + about a centimeter of surrounding skin qhs x 16 weeks. Wash off in am and apply sunscreen. Discontinue if skin becomes blistered, raw, bleeding, painful, etc., Disp: 24 packet, Rfl: 3    levothyroxine (SYNTHROID) 88 MCG tablet, TAKE 1 TABLET BY MOUTH ONCE DAILY, Disp: 90 tablet, Rfl: 3    LIDOcaine HCl 2% (LIDOCAINE VISCOUS) 2 % Soln, Swish and spit 15 mls every 8 (eight) hours as needed (mouth sore)., Disp: 100 mL, Rfl: 3    LIDOcaine-prilocaine (EMLA) cream, Apply generously to port site 30-60 min prior to chemo and then cover with saran wrap., Disp: 30 g, Rfl: 2    loperamide (IMODIUM) 2 mg capsule, Take 1 capsule (2 mg total) by mouth 4 (four) times daily as needed for Diarrhea., Disp: 30 capsule, Rfl: 1    loteprednol (LOTEMAX) 0.5 % ophthalmic suspension, Place 1 drop into the right eye 4 (four) times daily., Disp: 5 mL, Rfl: 1    magic mouthwash diphen/antac/lidoc/nysta, Take 10 mLs by mouth 4 (four) times daily., Disp: 120 mL, Rfl: 4    metroNIDAZOLE (METROGEL) 0.75 % gel, Apply topically to affected area 2 (two) times daily., Disp: 45 g, Rfl: 1    moxifloxacin (VIGAMOX) 0.5 % ophthalmic solution, Place 1 drop into the right eye 4 (four) times daily., Disp: 3 mL, Rfl: 3    multivit-min/FA/lycopen/lutein (CENTRUM SILVER MEN  ORAL), Take 1 tablet by mouth., Disp: , Rfl:     multivitamin capsule, Take 1 capsule by mouth once daily., Disp: , Rfl:     oxyCODONE (ROXICODONE) 10 mg Tab immediate release tablet, Take 1 tablet (10 mg total) by mouth every 6 (six) hours as needed for Pain., Disp: 80 tablet, Rfl: 0    prednisoLONE acetate (PRED FORTE) 1 % DrpS, Place 1 drop into the right eye 4 (four) times daily., Disp: 10 mL, Rfl: 3    sulfacetamide sodium-sulfur 10-5 % (w/w) Clsr, USE TO WASH AFFECTED AREA DAILY, Disp: , Rfl:     tacrolimus (PROGRAF) 0.5 MG Cap, Take 1 capsule (0.5 mg total) by mouth every EVENING.  (Use the 1mg capsule for your morning dose), Disp: 90 capsule, Rfl: 3    tacrolimus (PROGRAF) 0.5 MG Cap, Take 1 capsule (0.5 mg total) by mouth every evening. Use the 1mg capsule for your morning dose, Disp: 90 capsule, Rfl: 3    tacrolimus (PROGRAF) 1 MG Cap, Take 1 capsule (1 mg total) by mouth every morning. Use the tacrolimus 0.5mg capsule for your evening dose as directed., Disp: 90 capsule, Rfl: 3    tiZANidine (ZANAFLEX) 2 MG tablet, Take 1 tablet (2 mg total) by mouth nightly as needed (neck muscle strain)., Disp: 30 tablet, Rfl: 3    traZODone (DESYREL) 50 MG tablet, TAKE 1 TABLET BY MOUTH IN  THE EVENING, Disp: 90 tablet, Rfl: 3    vitamin E 400 UNIT capsule, Take 400 Units by mouth once daily., Disp: , Rfl:     Current Facility-Administered Medications:     sodium chloride 0.9% flush 10 mL, 10 mL, Intravenous, PRN, Ting Brambila MD    Facility-Administered Medications Ordered in Other Visits:     heparin, porcine (PF) 100 unit/mL injection flush 500 Units, 500 Units, Intravenous, PRN, Ronald Berg MD    ofloxacin 0.3 % ophthalmic solution 1 drop, 1 drop, Right Eye, On Call Procedure, Victor M Vasques MD, 2 drop at 10/11/22 0745    sodium chloride 0.9% flush 10 mL, 10 mL, Intravenous, PRN, Ronald Berg MD    sodium chloride 0.9% flush 10 mL, 10 mL, Intravenous, PRN, Victor M Vasques MD    OBJECTIVE:        ROS:  Review of Systems   Constitutional: Negative.  Negative for activity change, appetite change and chills.   HENT:  Positive for facial swelling and voice change.    Eyes: Negative.  Negative for pain, discharge and itching.   Respiratory: Negative.  Negative for apnea, cough and chest tightness.    Cardiovascular: Negative.  Negative for chest pain, palpitations and leg swelling.   Gastrointestinal:  Negative for constipation, diarrhea, nausea and vomiting.   Genitourinary:  Negative for difficulty urinating, dysuria and enuresis.   Musculoskeletal:  Positive for back pain. Negative for arthralgias and gait problem.   Skin: Negative.  Negative for color change, pallor and rash.     Review of Symptoms      Symptom Assessment (ESAS 0-10 Scale)  Pain:  4  Dyspnea:  0  Anxiety:  0  Nausea:  0  Depression:  0  Anorexia:  0  Fatigue:  0  Insomnia:  5  Restlessness:  0  Agitation:  0       Constipation:  No constipation    Pain Assessment:  OME in 24 hours:  0  Location(s): back    Back       Location: lower        Quality: None        Quantity: 4/10 in intensity        Chronicity: Onset 0 year(s) ago, stable        Aggravating Factors: Activity        Alleviating Factors: None       Associated Symptoms: None    ECOG Performance Status stGstrstastdstest:st st1st Living Arrangements:  Lives alone    Psychosocial/Cultural:   See Palliative Psychosocial Note: Yes  **Primary  to Follow**  Palliative Care  Consult: No    Advance Care Planning   Advance Directives:   Living Will: Yes        Copy on chart: Yes    Medical Power of : Yes      Decision Making:  Patient answered questions  Goals of Care: The patient endorses that what is most important right now is to focus on remaining as independent as possible and symptom/pain control    Accordingly, we have decided that the best plan to meet the patient's goals includes continuing with treatment      Physical Exam:  Vitals: Temp: 97.7 °F (36.5 °C)  "(04/12/23 1405)  Pulse: 91 (04/12/23 1405)  BP: (!) 166/84 (04/12/23 1405)  SpO2: 97 % (04/12/23 1405)  Physical Exam    Labs:  CBC:   WBC   Date Value Ref Range Status   03/23/2023 3.76 (L) 3.90 - 12.70 K/uL Final     Hemoglobin   Date Value Ref Range Status   03/23/2023 11.3 (L) 14.0 - 18.0 g/dL Final     Hematocrit   Date Value Ref Range Status   03/23/2023 32.6 (L) 40.0 - 54.0 % Final     MCV   Date Value Ref Range Status   03/23/2023 99 (H) 82 - 98 fL Final     Platelets   Date Value Ref Range Status   03/23/2023 121 (L) 150 - 450 K/uL Final       LFT:   Lab Results   Component Value Date    AST 55 (H) 03/23/2023     (H) 07/09/2020    ALKPHOS 78 03/23/2023    BILITOT 0.7 03/23/2023       Albumin:   Albumin   Date Value Ref Range Status   03/23/2023 3.9 3.5 - 5.2 g/dL Final     Protein:   Total Protein   Date Value Ref Range Status   03/23/2023 7.5 6.0 - 8.4 g/dL Final       Radiology:I have reviewed all pertinent imaging results/findings within the past 24 hours.    3/20/2023 NM PET: "Impression:     In this patient with base of tongue cancer, there is no definite evidence of hypermetabolic tumor.     Redemonstration of nonspecific mild superficial FDG uptake at the chin.  Clinical correlation suggested.     No other suspicious hypermetabolic lesion."    I spent a total of 44 minutes on the day of the visit.This includes face to face time in discussion of goals of care, symptom assessment, coordination of care and emotional support.  This also includes non-face to face time preparing to see the patient (eg, review of tests/imaging), obtaining and/or reviewing separately obtained history, documenting clinical information in the electronic or other health record, independently interpreting results and communicating results to the patient/family/caregiver, or care coordinator.     Signature: Arabella Cuevas DNP  "

## 2023-04-20 ENCOUNTER — PROCEDURE VISIT (OUTPATIENT)
Dept: DERMATOLOGY | Facility: CLINIC | Age: 73
End: 2023-04-20
Payer: MEDICARE

## 2023-04-20 VITALS
DIASTOLIC BLOOD PRESSURE: 87 MMHG | HEART RATE: 87 BPM | SYSTOLIC BLOOD PRESSURE: 151 MMHG | HEIGHT: 68 IN | BODY MASS INDEX: 18.34 KG/M2 | WEIGHT: 121 LBS

## 2023-04-20 DIAGNOSIS — D04.39 SQUAMOUS CELL CARCINOMA IN SITU OF SKIN OF RIGHT CHEEK: Primary | ICD-10-CM

## 2023-04-20 PROCEDURE — 13132 PR RECMPL WND HEAD,FAC,HAND 2.6-7.5 CM: ICD-10-PCS | Mod: S$PBB,51,, | Performed by: DERMATOLOGY

## 2023-04-20 PROCEDURE — 99499 UNLISTED E&M SERVICE: CPT | Mod: S$PBB,,, | Performed by: DERMATOLOGY

## 2023-04-20 PROCEDURE — 17311: ICD-10-PCS | Mod: S$PBB,,, | Performed by: DERMATOLOGY

## 2023-04-20 PROCEDURE — 17311 MOHS 1 STAGE H/N/HF/G: CPT | Mod: PBBFAC | Performed by: DERMATOLOGY

## 2023-04-20 PROCEDURE — 13132 CMPLX RPR F/C/C/M/N/AX/G/H/F: CPT | Mod: PBBFAC | Performed by: DERMATOLOGY

## 2023-04-20 PROCEDURE — 13132 CMPLX RPR F/C/C/M/N/AX/G/H/F: CPT | Mod: S$PBB,51,, | Performed by: DERMATOLOGY

## 2023-04-20 PROCEDURE — 99499 NO LOS: ICD-10-PCS | Mod: S$PBB,,, | Performed by: DERMATOLOGY

## 2023-04-20 PROCEDURE — 17311 MOHS 1 STAGE H/N/HF/G: CPT | Mod: S$PBB,,, | Performed by: DERMATOLOGY

## 2023-04-20 NOTE — PROGRESS NOTES
PROCEDURE: Mohs' Micrographic Surgery    INDICATION: Tumors in immunosuppressed patients.    REFERRING PROVIDER: Estrellita Myers M.D. (This lesion biospied by Dominic Haque MD)    CASE NUMBER:     ANESTHETIC: 3 cc 0.5% Bupivacaine with Epi 1:200,000 mixed 1:1 with plain 1% Lidocaine    SURGICAL PREP: Hibiclens    SURGEON: Dominic Haque MD    ASSISTANTS: Juliet Lisa PA-C and Bee Panchal MA    PREOPERATIVE DIAGNOSIS: squamous cell carcinoma in situ    POSTOPERATIVE DIAGNOSIS: squamous cell carcinoma in situ    PATHOLOGIC DIAGNOSIS: squamous cell carcinoma in situ    HISTOLOGY OF SPECIMENS IN FIRST STAGE:   Tumor Type:  No tumor seen.    STAGES OF MOHS' SURGERY PERFORMED: 1    TUMOR-FREE PLANE ACHIEVED: Yes    HEMOSTASIS: electrocoagulation     SPECIMENS: 2     LOCATION: right anterior jawline. Location verified with my clinical photograph. Patient also verified location with hand held mirror and by looking at photo taken prior to procedure.     INITIAL LESION SIZE: 0.7 x 1.0 cm    FINAL DEFECT SIZE: 1.5 x 1.6 cm    WOUND REPAIR/DISPOSITION: The patient tolerated Mohs' Micrographic Surgery for a squamous cell carcinoma in situ very well. When the tumor was completely removed, a repair of the surgical defect was undertaken.    PROCEDURE: Complex Linear Repair    INDICATION: Status post Mohs' Micrographic Surgery for squamous cell carcinoma in situ.    CASE NUMBER:     SURGEON: Dominic Haque MD    ASSISTANTS: Juliet Lisa PA-C and Bee Panchal MA    ANESTHETIC: 3 cc 1% Lidocaine with Epinephrine 1:100,000    SURGICAL PREP: Hibiclens, prepped by Bee Panchal MA    LOCATION: right anterior jawline    DEFECT SIZE: 1.5 x 1.6 cm    WOUND REPAIR/DISPOSITION:  After the patient's carcinoma had been completely removed with Mohs' Micrographic Surgery, a repair of the surgical defect was undertaken. The patient was returned to the operating suite where the area of right anterior jawline was prepped, draped,  "and anesthetized in the usual sterile fashion. The wound was widely undermined in all directions. The wound was undermined to a distance at least the maximum width of the defect as measured perpendicular to the closure line along at least one entire edge of the defect, in this case 2 cm. Then, electrocoagulation was used to obtain meticulous hemostasis. 5-0 Vicryl buried vertical mattress sutures were placed into the subcutaneous and dermal plane to close the wound and donn the cutaneous wound edge. Bilateral dog ears were identified and were removed by a standard Burow's triangle technique. The cutaneous wound edges were closed using interrupted 5-0 Prolene suture.    The patient tolerated the procedure well.    The area was cleaned and dressed appropriately and the patient was given wound care instructions, as well as appointment for follow-up evaluation and suture removal in 7 days.    LENGTH OF REPAIR: 3.6 cm    Vitals:    04/20/23 1008 04/20/23 1243   BP: 139/85 (!) 151/87   BP Location: Left arm    Patient Position: Sitting    BP Method: Medium (Automatic)    Pulse: 84 87   Weight: 54.9 kg (121 lb)    Height: 5' 8" (1.727 m)          "

## 2023-04-21 ENCOUNTER — PES CALL (OUTPATIENT)
Dept: ADMINISTRATIVE | Facility: CLINIC | Age: 73
End: 2023-04-21
Payer: MEDICARE

## 2023-04-26 ENCOUNTER — PATIENT MESSAGE (OUTPATIENT)
Dept: HEMATOLOGY/ONCOLOGY | Facility: CLINIC | Age: 73
End: 2023-04-26
Payer: MEDICARE

## 2023-04-27 ENCOUNTER — OFFICE VISIT (OUTPATIENT)
Dept: DERMATOLOGY | Facility: CLINIC | Age: 73
End: 2023-04-27
Payer: MEDICARE

## 2023-04-27 DIAGNOSIS — Z09 POSTOP CHECK: Primary | ICD-10-CM

## 2023-04-27 PROCEDURE — 99024 POSTOP FOLLOW-UP VISIT: CPT | Mod: POP,,, | Performed by: DERMATOLOGY

## 2023-04-27 PROCEDURE — 99999 PR PBB SHADOW E&M-EST. PATIENT-LVL II: ICD-10-PCS | Mod: PBBFAC,,, | Performed by: DERMATOLOGY

## 2023-04-27 PROCEDURE — 99999 PR PBB SHADOW E&M-EST. PATIENT-LVL II: CPT | Mod: PBBFAC,,, | Performed by: DERMATOLOGY

## 2023-04-27 PROCEDURE — 99212 OFFICE O/P EST SF 10 MIN: CPT | Mod: PBBFAC | Performed by: DERMATOLOGY

## 2023-04-27 PROCEDURE — 99024 PR POST-OP FOLLOW-UP VISIT: ICD-10-PCS | Mod: POP,,, | Performed by: DERMATOLOGY

## 2023-04-27 NOTE — PROGRESS NOTES
73 y.o. male patient is here for suture removal following Mohs' surgery.    Patient reports no problems right anterior jawline.    WOUND PE:  The right anterior jawline sutures intact. Wound healing well. Good skin edges. No signs or symptoms of infection.    IMPRESSION:  Healing operative site from Mohs' surgery SCCIS, right anterior jawline s/p Mohs with CLC, postop day # 7.    PLAN:  Sutures removed today by  Kiara Naranjo MA . Steri-strips applied.  Continue wound care.  Keep moist with Aquaphor.    RTC:  In 3-6 months with Estrellita Myers M.D. for skin check or sooner if new concern arises.

## 2023-05-08 ENCOUNTER — TELEPHONE (OUTPATIENT)
Dept: OPHTHALMOLOGY | Facility: CLINIC | Age: 73
End: 2023-05-08
Payer: MEDICARE

## 2023-05-15 ENCOUNTER — LAB VISIT (OUTPATIENT)
Dept: LAB | Facility: HOSPITAL | Age: 73
End: 2023-05-15
Attending: INTERNAL MEDICINE
Payer: MEDICARE

## 2023-05-15 DIAGNOSIS — Z94.4 STATUS POST LIVER TRANSPLANTATION: ICD-10-CM

## 2023-05-15 LAB
ALBUMIN SERPL BCP-MCNC: 3.7 G/DL (ref 3.5–5.2)
ALP SERPL-CCNC: 84 U/L (ref 55–135)
ALT SERPL W/O P-5'-P-CCNC: 27 U/L (ref 10–44)
ANION GAP SERPL CALC-SCNC: 12 MMOL/L (ref 8–16)
AST SERPL-CCNC: 59 U/L (ref 10–40)
BASOPHILS # BLD AUTO: 0.01 K/UL (ref 0–0.2)
BASOPHILS NFR BLD: 0.3 % (ref 0–1.9)
BILIRUB SERPL-MCNC: 0.4 MG/DL (ref 0.1–1)
BUN SERPL-MCNC: 20 MG/DL (ref 8–23)
CALCIUM SERPL-MCNC: 9.9 MG/DL (ref 8.7–10.5)
CHLORIDE SERPL-SCNC: 101 MMOL/L (ref 95–110)
CO2 SERPL-SCNC: 28 MMOL/L (ref 23–29)
CREAT SERPL-MCNC: 1.5 MG/DL (ref 0.5–1.4)
DIFFERENTIAL METHOD: ABNORMAL
EOSINOPHIL # BLD AUTO: 0.1 K/UL (ref 0–0.5)
EOSINOPHIL NFR BLD: 1.8 % (ref 0–8)
ERYTHROCYTE [DISTWIDTH] IN BLOOD BY AUTOMATED COUNT: 13.9 % (ref 11.5–14.5)
EST. GFR  (NO RACE VARIABLE): 48.6 ML/MIN/1.73 M^2
GLUCOSE SERPL-MCNC: 97 MG/DL (ref 70–110)
HCT VFR BLD AUTO: 34.3 % (ref 40–54)
HGB BLD-MCNC: 11.3 G/DL (ref 14–18)
IMM GRANULOCYTES # BLD AUTO: 0.02 K/UL (ref 0–0.04)
IMM GRANULOCYTES NFR BLD AUTO: 0.6 % (ref 0–0.5)
LYMPHOCYTES # BLD AUTO: 0.6 K/UL (ref 1–4.8)
LYMPHOCYTES NFR BLD: 17.2 % (ref 18–48)
MCH RBC QN AUTO: 34 PG (ref 27–31)
MCHC RBC AUTO-ENTMCNC: 32.9 G/DL (ref 32–36)
MCV RBC AUTO: 103 FL (ref 82–98)
MONOCYTES # BLD AUTO: 0.6 K/UL (ref 0.3–1)
MONOCYTES NFR BLD: 17.8 % (ref 4–15)
NEUTROPHILS # BLD AUTO: 2.1 K/UL (ref 1.8–7.7)
NEUTROPHILS NFR BLD: 62.3 % (ref 38–73)
NRBC BLD-RTO: 0 /100 WBC
PLATELET # BLD AUTO: 114 K/UL (ref 150–450)
PMV BLD AUTO: 10.1 FL (ref 9.2–12.9)
POTASSIUM SERPL-SCNC: 4.8 MMOL/L (ref 3.5–5.1)
PROT SERPL-MCNC: 7.4 G/DL (ref 6–8.4)
RBC # BLD AUTO: 3.32 M/UL (ref 4.6–6.2)
SODIUM SERPL-SCNC: 141 MMOL/L (ref 136–145)
TACROLIMUS BLD-MCNC: 4.8 NG/ML (ref 5–15)
WBC # BLD AUTO: 3.31 K/UL (ref 3.9–12.7)

## 2023-05-15 PROCEDURE — 80053 COMPREHEN METABOLIC PANEL: CPT | Performed by: INTERNAL MEDICINE

## 2023-05-15 PROCEDURE — 85025 COMPLETE CBC W/AUTO DIFF WBC: CPT | Performed by: INTERNAL MEDICINE

## 2023-05-15 PROCEDURE — 36415 COLL VENOUS BLD VENIPUNCTURE: CPT | Performed by: INTERNAL MEDICINE

## 2023-05-15 PROCEDURE — 80197 ASSAY OF TACROLIMUS: CPT | Performed by: INTERNAL MEDICINE

## 2023-05-19 ENCOUNTER — TELEPHONE (OUTPATIENT)
Dept: TRANSPLANT | Facility: CLINIC | Age: 73
End: 2023-05-19
Payer: MEDICARE

## 2023-05-19 NOTE — TELEPHONE ENCOUNTER
Letter sent to patient stating: Your labs have been reviewed by your Transplant physician, no action required. Next labs due 9/11/2023          ----- Message from Cornell Anton MD sent at 5/19/2023 10:54 AM CDT -----  Results reviewed

## 2023-05-19 NOTE — LETTER
May 19, 2023    Rocco Swain  [address] [place] [id number]          Dear Rocco Swain:  MRN: [id number]    This is a follow up to your recent labs, your lab results were stable.  There are no medicine changes.  Please have your labs drawn again on 9/11/2023.      If you cannot have your labs drawn on the scheduled date, it is your responsibility to call the transplant department to reschedule.  Please call [phone] and ask to speak to Rhea RIOS   for all scheduling requests.     Sincerely,    Shari WADDELLN, RN        Your Liver Transplant Coordinator    [facility]  [address] [place] [id number]  [phone]

## 2023-05-25 DIAGNOSIS — Z94.4 STATUS POST LIVER TRANSPLANTATION: Primary | ICD-10-CM

## 2023-06-07 RX ORDER — LEVOTHYROXINE SODIUM 88 UG/1
TABLET ORAL
Qty: 90 TABLET | Refills: 3 | Status: SHIPPED | OUTPATIENT
Start: 2023-06-07 | End: 2023-06-12

## 2023-06-12 ENCOUNTER — PATIENT MESSAGE (OUTPATIENT)
Dept: HEMATOLOGY/ONCOLOGY | Facility: CLINIC | Age: 73
End: 2023-06-12
Payer: MEDICARE

## 2023-06-12 RX ORDER — LEVOTHYROXINE SODIUM 88 UG/1
TABLET ORAL
Qty: 90 TABLET | Refills: 3 | Status: SHIPPED | OUTPATIENT
Start: 2023-06-12 | End: 2023-12-29 | Stop reason: SDUPTHER

## 2023-06-13 ENCOUNTER — HOSPITAL ENCOUNTER (OUTPATIENT)
Dept: RADIOLOGY | Facility: HOSPITAL | Age: 73
Discharge: HOME | End: 2023-06-13
Attending: INTERNAL MEDICINE
Payer: MEDICARE

## 2023-06-13 DIAGNOSIS — C77.0 SECONDARY MALIGNANT NEOPLASM OF CERVICAL LYMPH NODE: ICD-10-CM

## 2023-06-13 DIAGNOSIS — C01 SQUAMOUS CELL CARCINOMA OF BASE OF TONGUE: ICD-10-CM

## 2023-06-13 PROCEDURE — A9552 F18 FDG: HCPCS

## 2023-06-13 PROCEDURE — 78815 PET IMAGE W/CT SKULL-THIGH: CPT | Mod: TC

## 2023-06-13 PROCEDURE — 78815 NM PET CT ROUTINE: ICD-10-PCS | Mod: 26,PS,, | Performed by: STUDENT IN AN ORGANIZED HEALTH CARE EDUCATION/TRAINING PROGRAM

## 2023-06-13 PROCEDURE — 78815 PET IMAGE W/CT SKULL-THIGH: CPT | Mod: 26,PS,, | Performed by: STUDENT IN AN ORGANIZED HEALTH CARE EDUCATION/TRAINING PROGRAM

## 2023-06-15 ENCOUNTER — INFUSION (OUTPATIENT)
Dept: INFUSION THERAPY | Facility: HOSPITAL | Age: 73
End: 2023-06-15
Attending: STUDENT IN AN ORGANIZED HEALTH CARE EDUCATION/TRAINING PROGRAM
Payer: MEDICARE

## 2023-06-15 ENCOUNTER — OFFICE VISIT (OUTPATIENT)
Dept: HEMATOLOGY/ONCOLOGY | Facility: CLINIC | Age: 73
End: 2023-06-15
Payer: MEDICARE

## 2023-06-15 VITALS
TEMPERATURE: 98 F | WEIGHT: 115.94 LBS | DIASTOLIC BLOOD PRESSURE: 75 MMHG | OXYGEN SATURATION: 93 % | HEIGHT: 68 IN | HEART RATE: 87 BPM | RESPIRATION RATE: 18 BRPM | BODY MASS INDEX: 17.57 KG/M2 | SYSTOLIC BLOOD PRESSURE: 122 MMHG

## 2023-06-15 VITALS
BODY MASS INDEX: 17.57 KG/M2 | HEART RATE: 88 BPM | HEIGHT: 68 IN | TEMPERATURE: 98 F | RESPIRATION RATE: 20 BRPM | SYSTOLIC BLOOD PRESSURE: 138 MMHG | OXYGEN SATURATION: 98 % | WEIGHT: 115.94 LBS | DIASTOLIC BLOOD PRESSURE: 77 MMHG

## 2023-06-15 DIAGNOSIS — C77.0 SECONDARY MALIGNANT NEOPLASM OF CERVICAL LYMPH NODE: Primary | ICD-10-CM

## 2023-06-15 DIAGNOSIS — D84.821 IMMUNODEFICIENCY DUE TO DRUGS: ICD-10-CM

## 2023-06-15 DIAGNOSIS — R68.84 JAW PAIN: ICD-10-CM

## 2023-06-15 DIAGNOSIS — Z79.899 IMMUNODEFICIENCY DUE TO DRUGS: ICD-10-CM

## 2023-06-15 DIAGNOSIS — Z93.0 TRACHEOSTOMY STATUS: ICD-10-CM

## 2023-06-15 DIAGNOSIS — Z94.4 STATUS POST LIVER TRANSPLANTATION: ICD-10-CM

## 2023-06-15 DIAGNOSIS — C01 SQUAMOUS CELL CARCINOMA OF BASE OF TONGUE: ICD-10-CM

## 2023-06-15 DIAGNOSIS — R53.83 FATIGUE, UNSPECIFIED TYPE: ICD-10-CM

## 2023-06-15 DIAGNOSIS — C77.0 SECONDARY MALIGNANT NEOPLASM OF CERVICAL LYMPH NODE: ICD-10-CM

## 2023-06-15 DIAGNOSIS — T86.49 CIRRHOSIS OF TRANSPLANTED LIVER: ICD-10-CM

## 2023-06-15 DIAGNOSIS — C01 SQUAMOUS CELL CARCINOMA OF BASE OF TONGUE: Primary | ICD-10-CM

## 2023-06-15 DIAGNOSIS — H11.9 CONJUNCTIVAL LESION: ICD-10-CM

## 2023-06-15 DIAGNOSIS — K74.60 CIRRHOSIS OF TRANSPLANTED LIVER: ICD-10-CM

## 2023-06-15 PROCEDURE — 99215 OFFICE O/P EST HI 40 MIN: CPT | Mod: S$PBB,,, | Performed by: INTERNAL MEDICINE

## 2023-06-15 PROCEDURE — 25000003 PHARM REV CODE 250: Performed by: NURSE PRACTITIONER

## 2023-06-15 PROCEDURE — 99214 OFFICE O/P EST MOD 30 MIN: CPT | Mod: PBBFAC | Performed by: INTERNAL MEDICINE

## 2023-06-15 PROCEDURE — 99215 PR OFFICE/OUTPT VISIT, EST, LEVL V, 40-54 MIN: ICD-10-PCS | Mod: S$PBB,,, | Performed by: INTERNAL MEDICINE

## 2023-06-15 PROCEDURE — 96413 CHEMO IV INFUSION 1 HR: CPT

## 2023-06-15 PROCEDURE — 63600175 PHARM REV CODE 636 W HCPCS: Performed by: NURSE PRACTITIONER

## 2023-06-15 PROCEDURE — 99999 PR PBB SHADOW E&M-EST. PATIENT-LVL IV: ICD-10-PCS | Mod: PBBFAC,,, | Performed by: INTERNAL MEDICINE

## 2023-06-15 PROCEDURE — 99999 PR PBB SHADOW E&M-EST. PATIENT-LVL IV: CPT | Mod: PBBFAC,,, | Performed by: INTERNAL MEDICINE

## 2023-06-15 PROCEDURE — A4216 STERILE WATER/SALINE, 10 ML: HCPCS | Performed by: NURSE PRACTITIONER

## 2023-06-15 RX ORDER — HEPARIN 100 UNIT/ML
500 SYRINGE INTRAVENOUS
Status: CANCELLED | OUTPATIENT
Start: 2023-06-15

## 2023-06-15 RX ORDER — HEPARIN 100 UNIT/ML
500 SYRINGE INTRAVENOUS
Status: DISCONTINUED | OUTPATIENT
Start: 2023-06-15 | End: 2023-06-15 | Stop reason: HOSPADM

## 2023-06-15 RX ORDER — SODIUM CHLORIDE 0.9 % (FLUSH) 0.9 %
10 SYRINGE (ML) INJECTION
Status: CANCELLED | OUTPATIENT
Start: 2023-06-15

## 2023-06-15 RX ORDER — SODIUM CHLORIDE 0.9 % (FLUSH) 0.9 %
10 SYRINGE (ML) INJECTION
Status: DISCONTINUED | OUTPATIENT
Start: 2023-06-15 | End: 2023-06-15 | Stop reason: HOSPADM

## 2023-06-15 RX ADMIN — SODIUM CHLORIDE: 9 INJECTION, SOLUTION INTRAVENOUS at 11:06

## 2023-06-15 RX ADMIN — HEPARIN 500 UNITS: 100 SYRINGE at 12:06

## 2023-06-15 RX ADMIN — Medication 10 ML: at 12:06

## 2023-06-15 RX ADMIN — SODIUM CHLORIDE 400 MG: 9 INJECTION, SOLUTION INTRAVENOUS at 12:06

## 2023-06-15 NOTE — NURSING
PT tolerated Keytruda infusion well. No adverse reaction noted. Pt education reinforced on Keytruda side effects, what to expect and when to contact the doctor. Pt verbalized understanding. PAC flushed with heparin and de-accessed per protocol.

## 2023-06-15 NOTE — PROGRESS NOTES
ONCOLOGY FOLLOW UP VISIT    Subjective:      Patient ID: Rocco Swain    Chief Complaint: No chief complaint on file.      HPI  Rocco Swain is a 73 y.o. male, patient of Dr. Berg, who returns to clinic for evaluation and management of  P16 positive squamous cell carcinoma. Reports having Covid in early March. Has since recovered. Requesting dental clearance. Reports some joint pains that come and go: ankles, keens, elbows, wrists. Takes Oxy 0.5 pills (5 mg) 1-2x a day to control the pain. Chronic neck pain stable. Patient has some fatigue, but intermittent and stable.  Appetite improved today and weight is stable. He is fly fishing daily. He will receive his pembrolizumab infusion today.     ECOG Performance status: 1 - Symptomatic but completely ambulatory Communication from him is 100% written.     Cancer Staging   Squamous cell carcinoma of base of tongue  Staging form: Pharynx - HPV-Mediated Oropharynx, AJCC 8th Edition  - Clinical stage from 9/18/2020: Stage III (rcT4, cN1, cM0, p16+) - Signed by Ronald Berg MD on 12/27/2022      Oncologic History:  Oncology History   Squamous cell carcinoma of base of tongue   9/18/2020 Cancer Staged    Staging form: Pharynx - HPV-Mediated Oropharynx, AJCC 8th Edition  - Clinical stage from 9/18/2020: Stage III (rcT4, cN1, cM0, p16+)     9/28/2020 Initial Diagnosis    Squamous cell carcinoma of base of tongue     10/6/2020 - 8/17/2021 Chemotherapy    Treatment Summary   Plan Name: OP HEAD NECK CARBOPLATIN PACLITAXEL C1-2 FOLLOWED BY CETUXIMAB CARBOPLATIN C3-6 FOLLOWED BY CETUXIMAB MAINTENANCE WEEKLY  Treatment Goal: Control  Status: Inactive  Start Date: 10/6/2020  End Date: 8/17/2021  Provider: Ronald Berg MD  Chemotherapy: cetuximab (ERBITUX) 100 mg/50 mL chemo infusion 684 mg, 400 mg/m2 = 684 mg (100 % of original dose 400 mg/m2), Intravenous, Clinic/H[facility] 1 time, 29 of 38 cycles  Dose modification: 500 mg/m2 (original dose 400 mg/m2, Cycle 3), 250 mg/m2 (original dose  400 mg/m2, Cycle 3), 400 mg/m2 (original dose 400 mg/m2, Cycle 3), 250 mg/m2 (original dose 250 mg/m2, Cycle 7), 200 mg/m2 (original dose 250 mg/m2, Cycle 18), 200 mg/m2 (original dose 250 mg/m2, Cycle 19), 250 mg/m2 (original dose 250 mg/m2, Cycle 4), 200 mg/m2 (original dose 250 mg/m2, Cycle 25), 200 mg/m2 (original dose 250 mg/m2, Cycle 28)  Administration: 684 mg (11/17/2020), 400 mg (11/24/2020), 400 mg (2/9/2021), 400 mg (2/17/2021), 427.6 mg (3/9/2021), 400 mg (3/30/2021), 400 mg (3/23/2021), 400 mg (4/6/2021), 427.6 mg (4/13/2021), 427.6 mg (4/20/2021), 342 mg (5/11/2021), 342 mg (5/18/2021), 342 mg (5/25/2021), 400 mg (5/31/2021), 400 mg (6/7/2021), 400 mg (6/14/2021), 400 mg (12/8/2020), 427.6 mg (12/29/2020), 400 mg (12/1/2020), 400 mg (12/15/2020), 400 mg (12/22/2020), 427.6 mg (1/5/2021), 400 mg (1/12/2021), 400 mg (1/19/2021), 427.6 mg (1/26/2021), 400 mg (2/2/2021), 400 mg (2/23/2021), 400 mg (3/2/2021), 427.6 mg (3/16/2021), 400 mg (6/21/2021), 342 mg (6/28/2021), 342 mg (7/6/2021), 342 mg (7/13/2021), 342 mg (7/20/2021), 400 mg (7/27/2021), 400 mg (8/10/2021), 400 mg (8/17/2021)  CARBOplatin (PARAPLATIN) 310 mg in sodium chloride 0.9% 500 mL chemo infusion, 310 mg (92.2 % of original dose 334.5 mg), Intravenous, Clinic/[facility] 1 time, 6 of 6 cycles  Dose modification:   (original dose 334.5 mg, Cycle 1)  Administration: 310 mg (10/6/2020), 370 mg (11/17/2020), 300 mg (10/27/2020), 350 mg (12/8/2020), 335 mg (12/29/2020), 320 mg (1/19/2021)  PACLitaxeL (TAXOL) 175 mg/m2 = 300 mg in sodium chloride 0.9% 500 mL chemo infusion, 175 mg/m2 = 300 mg (100 % of original dose 175 mg/m2), Intravenous, Clinic/[facility] 1 time, 2 of 2 cycles  Dose modification: 175 mg/m2 (original dose 175 mg/m2, Cycle 1)  Administration: 300 mg (10/6/2020), 300 mg (10/27/2020)     4/29/2021 Tumor Conference       His case was discussed at the Multidisciplinary Head and Neck Team Planning Meeting.    Representatives from Medical Oncology,  Radiation Oncology, Head and Neck Surgical Oncology, Psychosocial Oncology, and Speech and Language Pathology discussed the case with the following recommendations:    1) biopsy         7/29/2021 Tumor Conference       His case was discussed at the Multidisciplinary Head and Neck Team Planning Meeting.    Representatives from Medical Oncology, Radiation Oncology, Head and Neck Surgical Oncology, Psychosocial Oncology, and Speech and Language Pathology discussed the case with the following recommendations:    1) Head and neck clinic follow up  2) consider Keytruda (discuss with transplant)  3) consider palliative referral         9/13/2021 -  Chemotherapy    Treatment Summary   Plan Name: OP HEAD NECK PEMBROLIZUMAB CARBOPLATIN FLUOROURACIL (C1 ONLY RECEIVED) FOLLOWED BY PEMBROLIZUMAB MAINTENANCE  Treatment Goal: Palliative  Status: Active  Start Date: 9/13/2021  End Date: 9/5/2023 (Planned)  Provider: Ronald Berg MD  Chemotherapy: CARBOplatin (PARAPLATIN) 320 mg in sodium chloride 0.9% 500 mL chemo infusion, 320 mg (100 % of original dose 320.5 mg), Intravenous, Clinic/[facility] 1 time, 6 of 6 cycles  Dose modification:   (original dose 320.5 mg, Cycle 1)  Administration: 320 mg (9/13/2021), 335 mg (12/23/2021), 325 mg (1/27/2022), 245 mg (3/3/2022), 255 mg (5/12/2022), 255 mg (4/7/2022)  fluorouraciL 1,000 mg/m2/day = 6,440 mg in sodium chloride 0.9% 240 mL chemo infusion, 1,000 mg/m2/day = 6,440 mg, Intravenous, Over 96 hours, 6 of 6 cycles  Dose modification: 800 mg/m2/day (original dose 1,000 mg/m2/day, Cycle 6), 5,000 mg (original dose 1,000 mg/m2/day, Cycle 8)  Administration: 6,440 mg (9/13/2021), 6,440 mg (12/23/2021), 6,480 mg (1/27/2022), 5,000 mg (3/3/2022), 5,000 mg (4/7/2022), 5,000 mg (5/12/2022)     Secondary malignant neoplasm of cervical lymph node   2/9/2021 Initial Diagnosis    Secondary malignant neoplasm of cervical lymph node     9/13/2021 -  Chemotherapy    Treatment Summary   Plan Name: OP HEAD NECK  PEMBROLIZUMAB CARBOPLATIN FLUOROURACIL (C1 ONLY RECEIVED) FOLLOWED BY PEMBROLIZUMAB MAINTENANCE  Treatment Goal: Palliative  Status: Active  Start Date: 9/13/2021  End Date: 9/5/2023 (Planned)  Provider: Ronald Berg MD  Chemotherapy: CARBOplatin (PARAPLATIN) 320 mg in sodium chloride 0.9% 500 mL chemo infusion, 320 mg (100 % of original dose 320.5 mg), Intravenous, Clinic/[facility] 1 time, 6 of 6 cycles  Dose modification:   (original dose 320.5 mg, Cycle 1)  Administration: 320 mg (9/13/2021), 335 mg (12/23/2021), 325 mg (1/27/2022), 245 mg (3/3/2022), 255 mg (5/12/2022), 255 mg (4/7/2022)  fluorouraciL 1,000 mg/m2/day = 6,440 mg in sodium chloride 0.9% 240 mL chemo infusion, 1,000 mg/m2/day = 6,440 mg, Intravenous, Over 96 hours, 6 of 6 cycles  Dose modification: 800 mg/m2/day (original dose 1,000 mg/m2/day, Cycle 6), 5,000 mg (original dose 1,000 mg/m2/day, Cycle 8)  Administration: 6,440 mg (9/13/2021), 6,440 mg (12/23/2021), 6,480 mg (1/27/2022), 5,000 mg (3/3/2022), 5,000 mg (4/7/2022), 5,000 mg (5/12/2022)         Review of Systems   Constitutional:  Positive for appetite change (early satiety). Negative for activity change, chills, fatigue, fever and unexpected weight change.   HENT:  Negative for ear pain, facial swelling, hearing loss, mouth sores, nosebleeds, sore throat, trouble swallowing and voice change.         No verbal communication. Skin fixed and immobile from previous scaring post-neck dissection   Eyes:  Positive for redness (right eye). Negative for pain, discharge and visual disturbance.   Respiratory:  Negative for cough, choking, chest tightness and shortness of breath.    Cardiovascular:  Negative for chest pain, palpitations and leg swelling.   Gastrointestinal:  Negative for abdominal distention, abdominal pain, blood in stool, constipation, diarrhea, nausea and vomiting.   Endocrine: Negative for cold intolerance and heat intolerance.   Genitourinary:  Negative for difficulty urinating,  dysuria, frequency and urgency.   Musculoskeletal:  Negative for arthralgias, gait problem, leg pain and myalgias.   Integumentary:  Positive for rash (intermittent). Negative for pallor and wound.   Allergic/Immunologic: Negative for frequent infections.   Neurological:  Negative for dizziness, tremors, weakness, light-headedness, numbness and headaches.   Hematological:  Negative for adenopathy. Does not bruise/bleed easily.   Psychiatric/Behavioral:  Negative for agitation, confusion, dysphoric mood and sleep disturbance. The patient is not nervous/anxious.       Allergies:  Review of patient's allergies indicates:  No Known Allergies    Medications:  Current Outpatient Medications   Medication Sig Dispense Refill    azelaic acid (AZELEX) 15 % gel APPLY TOPICALLY TO AFFECTED AREA IN THE MORNING 50 g 3    gabapentin (NEURONTIN) 300 MG capsule Take 1 capsule (300 mg total) by mouth every evening. 30 capsule 11    ibuprofen (ADVIL,MOTRIN) 200 MG tablet Take 200 mg by mouth.      imiquimod (ALDARA) 5 % cream Apply to biopsy site on frontal scalp + about a centimeter of surrounding skin qhs x 16 weeks. Wash off in am and apply sunscreen. Discontinue if skin becomes blistered, raw, bleeding, painful, etc. 24 packet 3    imiquimod (ALDARA) 5 % cream Apply to biopsy site on frontal scalp + about a centimeter of surrounding skin qhs x 16 weeks. Wash off in am and apply sunscreen. Discontinue if skin becomes blistered, raw, bleeding, painful, etc. 24 packet 3    levothyroxine (SYNTHROID) 88 MCG tablet TAKE 1 TABLET BY MOUTH ONCE DAILY 90 tablet 3    LIDOcaine HCl 2% (LIDOCAINE VISCOUS) 2 % Soln Swish and spit 15 mls every 8 (eight) hours as needed (mouth sore). 100 mL 3    LIDOcaine-prilocaine (EMLA) cream Apply generously to port site 30-60 min prior to chemo and then cover with saran wrap. 30 g 2    loperamide (IMODIUM) 2 mg capsule Take 1 capsule (2 mg total) by mouth 4 (four) times daily as needed for Diarrhea. 30  capsule 1    loteprednol (LOTEMAX) 0.5 % ophthalmic suspension Place 1 drop into the right eye 4 (four) times daily. 5 mL 1    magic mouthwash diphen/antac/lidoc/nysta Take 10 mLs by mouth 4 (four) times daily. 120 mL 4    metroNIDAZOLE (METROGEL) 0.75 % gel Apply topically to affected area 2 (two) times daily. 45 g 1    moxifloxacin (VIGAMOX) 0.5 % ophthalmic solution Place 1 drop into the right eye 4 (four) times daily. 3 mL 3    multivit-min/FA/lycopen/lutein (CENTRUM SILVER MEN ORAL) Take 1 tablet by mouth.      multivitamin capsule Take 1 capsule by mouth once daily.      oxyCODONE (ROXICODONE) 10 mg Tab immediate release tablet Take 1 tablet (10 mg total) by mouth every 6 (six) hours as needed for Pain. 80 tablet 0    prednisoLONE acetate (PRED FORTE) 1 % DrpS Place 1 drop into the right eye 4 (four) times daily. 10 mL 3    sulfacetamide sodium-sulfur 10-5 % (w/w) Clsr USE TO WASH AFFECTED AREA DAILY      tacrolimus (PROGRAF) 0.5 MG Cap Take 1 capsule (0.5 mg total) by mouth every EVENING.  (Use the 1mg capsule for your morning dose) 90 capsule 3    tacrolimus (PROGRAF) 0.5 MG Cap Take 1 capsule (0.5 mg total) by mouth every evening. Use the 1mg capsule for your morning dose 90 capsule 3    tacrolimus (PROGRAF) 1 MG Cap Take 1 capsule (1 mg total) by mouth every morning. Use the tacrolimus 0.5mg capsule for your evening dose as directed. 90 capsule 3    tiZANidine (ZANAFLEX) 2 MG tablet Take 1 tablet (2 mg total) by mouth nightly as needed (neck muscle strain). 30 tablet 3    traZODone (DESYREL) 50 MG tablet TAKE 1 TABLET BY MOUTH IN  THE EVENING 90 tablet 3    vitamin E 400 UNIT capsule Take 400 Units by mouth once daily.       Current Facility-Administered Medications   Medication Dose Route Frequency Provider Last Rate Last Admin    sodium chloride 0.9% flush 10 mL  10 mL Intravenous PRN Ting Brambila MD         Facility-Administered Medications Ordered in Other Visits   Medication Dose Route Frequency  Provider Last Rate Last Admin    heparin, porcine (PF) 100 unit/mL injection flush 500 Units  500 Units Intravenous PRN Ronald Berg MD        ofloxacin 0.3 % ophthalmic solution 1 drop  1 drop Right Eye On Call Procedure Victor M Vasques MD   2 drop at 10/11/22 0745    sodium chloride 0.9% flush 10 mL  10 mL Intravenous PRN Ronald Berg MD        sodium chloride 0.9% flush 10 mL  10 mL Intravenous PRN Victor M Vasques MD           PMH:  Past Medical History:   Diagnosis Date    Basal cell carcinoma (BCC) in situ of skin 2012    3 on face, 2 on arm, removed by dermatology.     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 Underwent surgical resection of tongue and neck. s/p tracheostomy       PSH:  Past Surgical History:   Procedure Laterality Date    COLONOSCOPY      CONJUNCTIVA BIOPSY Right 10/11/2022    Procedure: BIOPSY, CONJUNCTIVA;  Surgeon: Victor M Vasques MD;  Location: [facility];  Service: Ophthalmology;  Laterality: Right;    INSERTION OF VENOUS ACCESS PORT Right 3/8/2021    Procedure: INSERTION, VENOUS ACCESS PORT;  Surgeon: Jesus Rendon MD;  Location: [address];  Service: General;  Laterality: Right;    LIVER TRANSPLANT  11/2008    transplanted for biopsy proven hepatocellular carcinoma,     LYMPH NODE BIOPSY N/A 9/18/2020    Procedure: BIOPSY, LYMPH NODE;  Surgeon: [facility] Diagnostic Provider;  Location: [address];  Service: General;  Laterality: N/A;  Rm 173    TRACHEOSTOMY         FamHx:  Family History   Problem Relation Age of Onset    Dementia Mother        SocHx:  Social History     Socioeconomic History    Marital status: Single   Occupational History    Occupation: Retired     Comment: , notes exposures to fumes etc.   "Worked on [employer] for 4 years   Tobacco Use    Smoking status: Never    Smokeless tobacco: Never   Substance and Sexual Activity    Alcohol use: Yes     Comment: drinks wine, 1 glass day    Drug use: Not Currently    Sexual activity: Yes     Comment: No prior history of STD        Distress Score    Distress Score: 0 - No Distress        Objective:      Vitals:    06/15/23 0955   BP: 122/75   BP Location: Left arm   Patient Position: Sitting   BP Method: Medium (Automatic)   Pulse: 87   Resp: 18   Temp: 98 °F (36.7 °C)   TempSrc: Oral   SpO2: (!) 93%   Weight: 52.6 kg (115 lb 15.4 oz)   Height: 5' 8" (1.727 m)        Physical Exam  Vitals reviewed.   Constitutional:       General: He is not in acute distress.     Appearance: Normal appearance. He is well-developed and underweight.   HENT:      Head: Normocephalic and atraumatic.      Mouth/Throat:      Mouth: Mucous membranes are moist.      Dentition: Abnormal dentition. Dental caries present.   Eyes:      Conjunctiva/sclera: Conjunctivae normal.      Pupils: Pupils are equal, round, and reactive to light.   Neck:      Trachea: Tracheostomy present.   Pulmonary:      Effort: Pulmonary effort is normal. No respiratory distress.      Comments: Tracheostomy  Abdominal:      General: There is no distension.      Palpations: Abdomen is soft.   Musculoskeletal:         General: No swelling. Normal range of motion.      Cervical back: Normal range of motion and neck supple.   Skin:     General: Skin is warm and dry.   Neurological:      General: No focal deficit present.      Mental Status: He is alert and oriented to person, place, and time.   Psychiatric:         Mood and Affect: Mood normal.         Behavior: Behavior normal.         Thought Content: Thought content normal.         Judgment: Judgment normal.         LABS:  WBC   Date Value Ref Range Status   06/15/2023 2.80 (L) 3.90 - 12.70 K/uL Final     Hemoglobin   Date Value Ref Range Status "   06/15/2023 11.3 (L) 14.0 - 18.0 g/dL Final     Hematocrit   Date Value Ref Range Status   06/15/2023 33.7 (L) 40.0 - 54.0 % Final     Platelets   Date Value Ref Range Status   06/15/2023 120 (L) 150 - 450 K/uL Final       Chemistry        Component Value Date/Time     06/15/2023 0924    K 4.8 06/15/2023 0924     06/15/2023 0924    CO2 25 06/15/2023 0924    BUN 14 06/15/2023 0924    CREATININE 1.6 (H) 06/15/2023 0924     (H) 06/15/2023 0924        Component Value Date/Time    CALCIUM 10.1 06/15/2023 0924    ALKPHOS 88 06/15/2023 0924     (H) 06/15/2023 0924    ALT 43 06/15/2023 0924    BILITOT 0.7 06/15/2023 0924    ESTGFRAFRICA 52.6 (A) 07/14/2022 1119    EGFRNONAA 45.5 (A) 07/14/2022 1119              Assessment:       1. Squamous cell carcinoma of base of tongue    2. Secondary malignant neoplasm of cervical lymph node    3. Tracheostomy status    4. 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 Status post liver transplantation - 2008    7. Cirrhosis of transplanted liver    8. Jaw pain            Plan:       1,2. 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 He is not a surgical or radiation candidate.  -Started on PCC 10/6/2020 followed by maintenance cetuximab until 8/24/21 (discontinued due to progression of disease; continued increase in SUV uptake, no new lesions).  - 9/2021 started on carbo/5-FU/pembrolizumab; due to toxicity went to pembrolizumab monotherapy starting with cycle 2.  Progression of disease noted after cycle 3 so added chemotherapy back in with cycle 4. Keytruda monotherapy starting with C9.   - Labs acceptable to proceed with Keytruda. June 2023 PET scan reviewed with patient - shows no evidence of hypermetabolic disease. Continue current treatment. Re-staging scans in September. Next dose will be delayed d/t patient going out of town.    3. Status post  total laryngectomy, total glossectomy and anterolateral thigh free flap reconstruction roughly 2017 at [facility] in [place] for persistent/recurrent base of tongue sq.c.c.    4. Planned excision for conjunctival pre-cancerous lesion    5-7. S/p liver transplant and is currently on tacrolimus.  Afebrile, ANC sufficient for treatment. Grade 1. Will continue to monitor.     8. Continue Oxy IR. Consider palliative care involvement. Not a candidate for Celebrex d/t liver tx and kidney dysfunction.         he will return to clinic in 6 weeks, but knows to call in the interim if symptoms change or should a problem arise.     Patient was also seen and examined by Dr. Hernandez. Patient is in agreement with the proposed treatment plan. All questions were answered to the patient's satisfaction. Pt knows to call clinic if anything is needed before the next clinic visit.    Ct Francis, MSN, APRN, FNP-C  Hematology and Medical Oncology  Nurse Practitioner to Dr. Martín Hernandez  Nurse Practitioner, [facility]      I have reviewed the notes, assessments, and/or procedures performed by Ct SOSA, as above.  I have personally interviewed and examined the patient at the beside, and rounded with Ct. I concur with her assessment and plan and the documentation of Rocco Swain.    MDM includes:    - Acute or chronic illness or injury that poses a threat to life or bodily function  - Independent review and explanation of 2 results from unique tests  - Discussion of management and ordering 2 unique tests  - Extensive discussion of treatment and management  - Prescription drug management  - Drug therapy requiring intensive monitoring for toxicity    Martín Hernandez M.D.  Hematology/Oncology Attending  Director [facility]  [facility]          Route Chart for Scheduling    Med Onc Chart Routing      Follow up with physician . RTC on 8/10 for Keytruda    Follow up with [facility]    Infusion scheduling note    Injection scheduling note    Labs CBC, CMP, free T4 and TSH   Scheduling:  Preferred lab:  Lab interval:     Imaging    Pharmacy appointment    Other referrals            Treatment Plan Information   OP HEAD NECK PEMBROLIZUMAB CARBOPLATIN FLUOROURACIL (C1 ONLY RECEIVED) FOLLOWED BY PEMBROLIZUMAB MAINTENANCE   Ronald Berg MD   Upcoming Treatment Dates - OP HEAD NECK PEMBROLIZUMAB CARBOPLATIN FLUOROURACIL (C1 ONLY RECEIVED) FOLLOWED BY PEMBROLIZUMAB MAINTENANCE    6/13/2023       Immunotherapy       pembrolizumab (KEYTRUDA) 400 mg in sodium chloride 0.9% SolP 116 mL infusion  7/25/2023       Immunotherapy       pembrolizumab (KEYTRUDA) 400 mg in sodium chloride 0.9% SolP 116 mL infusion  9/5/2023       Immunotherapy       pembrolizumab (KEYTRUDA) 400 mg in sodium chloride 0.9% SolP 116 mL infusion    Therapy Plan Information  Flushes  heparin, porcine (PF) 100 unit/mL injection flush 500 Units  500 Units, Intravenous, Every visit  sodium chloride 0.9% flush 10 mL  10 mL, Intravenous, Every visit

## 2023-06-27 ENCOUNTER — TELEPHONE (OUTPATIENT)
Dept: PALLIATIVE MEDICINE | Facility: CLINIC | Age: 73
End: 2023-06-27
Payer: MEDICARE

## 2023-06-28 ENCOUNTER — OFFICE VISIT (OUTPATIENT)
Dept: PALLIATIVE MEDICINE | Facility: CLINIC | Age: 73
End: 2023-06-28
Payer: MEDICARE

## 2023-06-28 ENCOUNTER — PATIENT MESSAGE (OUTPATIENT)
Dept: HEMATOLOGY/ONCOLOGY | Facility: CLINIC | Age: 73
End: 2023-06-28
Payer: MEDICARE

## 2023-06-28 VITALS
OXYGEN SATURATION: 98 % | WEIGHT: 113.56 LBS | TEMPERATURE: 99 F | DIASTOLIC BLOOD PRESSURE: 76 MMHG | BODY MASS INDEX: 17.21 KG/M2 | HEIGHT: 68 IN | HEART RATE: 96 BPM | SYSTOLIC BLOOD PRESSURE: 122 MMHG

## 2023-06-28 DIAGNOSIS — C01 SQUAMOUS CELL CARCINOMA OF BASE OF TONGUE: ICD-10-CM

## 2023-06-28 DIAGNOSIS — C32.9 LARYNX CANCER: ICD-10-CM

## 2023-06-28 DIAGNOSIS — Z51.5 ENCOUNTER FOR PALLIATIVE CARE: Primary | ICD-10-CM

## 2023-06-28 DIAGNOSIS — M54.50 CHRONIC LOW BACK PAIN, UNSPECIFIED BACK PAIN LATERALITY, UNSPECIFIED WHETHER SCIATICA PRESENT: ICD-10-CM

## 2023-06-28 DIAGNOSIS — G89.29 CHRONIC BILATERAL LOW BACK PAIN, UNSPECIFIED WHETHER SCIATICA PRESENT: ICD-10-CM

## 2023-06-28 DIAGNOSIS — G89.29 CHRONIC LOW BACK PAIN, UNSPECIFIED BACK PAIN LATERALITY, UNSPECIFIED WHETHER SCIATICA PRESENT: ICD-10-CM

## 2023-06-28 DIAGNOSIS — G47.09 OTHER INSOMNIA: ICD-10-CM

## 2023-06-28 DIAGNOSIS — M54.50 CHRONIC BILATERAL LOW BACK PAIN, UNSPECIFIED WHETHER SCIATICA PRESENT: ICD-10-CM

## 2023-06-28 DIAGNOSIS — G47.00 INSOMNIA, UNSPECIFIED TYPE: ICD-10-CM

## 2023-06-28 PROCEDURE — 99214 OFFICE O/P EST MOD 30 MIN: CPT | Mod: S$PBB,,, | Performed by: NURSE PRACTITIONER

## 2023-06-28 PROCEDURE — 99999 PR PBB SHADOW E&M-EST. PATIENT-LVL II: CPT | Mod: PBBFAC,,, | Performed by: NURSE PRACTITIONER

## 2023-06-28 PROCEDURE — 99212 OFFICE O/P EST SF 10 MIN: CPT | Mod: PBBFAC | Performed by: NURSE PRACTITIONER

## 2023-06-28 PROCEDURE — 99999 PR PBB SHADOW E&M-EST. PATIENT-LVL II: ICD-10-PCS | Mod: PBBFAC,,, | Performed by: NURSE PRACTITIONER

## 2023-06-28 PROCEDURE — 99214 PR OFFICE/OUTPT VISIT, EST, LEVL IV, 30-39 MIN: ICD-10-PCS | Mod: S$PBB,,, | Performed by: NURSE PRACTITIONER

## 2023-06-28 RX ORDER — TRAZODONE HYDROCHLORIDE 50 MG/1
50 TABLET ORAL NIGHTLY
Qty: 90 TABLET | Refills: 3 | Status: CANCELLED | OUTPATIENT
Start: 2023-06-28

## 2023-06-28 NOTE — PROGRESS NOTES
Consult Note  Palliative Care      Consult Requested By: No ref. provider found  Reason for Consult: symptom mgmt/ACP      ASSESSMENT/PLAN:     Plan/Recommendations:  Diagnoses and all orders for this visit:    06/28/2023:  - initial appointment with integrative oncology TBD    Squamous cell carcinoma of base of tongue  - following with Dr. Hernandez and NP Tito  - s/p laryngectomy, total glossectomy   - currently on keytruda   - 6/13/23 repeat PET scan  - 100% written communication     Encounter for palliative care  - Patient is decisional  - Patient accompanied today by self   - ACP documents are uploaded into EMR   - Philosophy of Palliative Medicine reviewed with patient and family at first visit  - New patient folder given to and reviewed with patient and family at first visit  - Goals of care: Patient has excellent activity tolerance, his goal is to remain active and able to do the things he enjoys. He fly fishes frequently and enjoys taking walks around the [place]. He is a retired  of 35 years. He has no children and is unmarried, he jokes that being single has allowed him to buy a Kristine. He lives alone with a brother who lives about a mile from his house.      Chronic low back pain, unspecified back pain laterality, unspecified whether sciatica present  - no longer requires pain medication for cancer-related pain  - states his chronic back pain is now more prominent, this is his biggest complaint  - he has tried PT and pain management with limited to no relief     - placed referral to IO for acupuncture     Insomnia  - reports 5-6 hours of sleep per night  - 2/2 chronic back pain, see above  - placed referral for acupuncture   - tip sheet provided in new patient folder  - will continue to monitor     Understanding of illness/Prognosis: good    Goals of care: life-prolonging, maintain good QOL    Follow up: 12 weeks     Patient's encounter and above plan of care discussed  with patient's oncology team.     SUBJECTIVE:     History of Present Illness:  Patient is a 73 y.o. year old male presenting with SCC base of tongue. Please see oncology notes for full oncologic history and treatment course.       06/28/2023:  LA  reviewed:    - updated scan is reassuring    - doing very well overall  - remains off pain medication   - still trying to regain weight lost to Covid infection   - fishing frequently   - several summer trips scheduled fishing trip to [place] planned for early August  - did not see initial IO scheduling message, will f/u to make initial appointment      04/14/2023:  LA  reviewed and summarized:  No surprises    Patient presents to clinic alone, all communication is written. He is a pleasant man, in good spirits at our initial visit. His biggest concern is back pain, which is chronic in nature. He has hx of PT and pain management interventions which were not effective. He is curious to try acupuncture, referral placed today. He has excellent activity tolerance and enjoys engaging in his hobbies and making the most of his retired life.     Past Medical History:   Diagnosis Date    Basal cell carcinoma (BCC) in situ of skin 2012    3 on face, 2 on arm, removed by dermatology.     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 Underwent surgical resection of tongue and neck. s/p tracheostomy     Past Surgical History:   Procedure Laterality Date    COLONOSCOPY      CONJUNCTIVA BIOPSY Right 10/11/2022    Procedure: BIOPSY, CONJUNCTIVA;  Surgeon: Victor M Vasques MD;  Location: [facility];  Service: Ophthalmology;  Laterality: Right;    INSERTION OF VENOUS ACCESS PORT Right 3/8/2021    Procedure: INSERTION, VENOUS ACCESS PORT;   Surgeon: Jesus Rendon MD;  [facility];  Service: General;  Laterality: Right;    LIVER TRANSPLANT  11/2008    transplanted for biopsy proven hepatocellular carcinoma,     LYMPH NODE BIOPSY N/A 9/18/2020    Procedure: BIOPSY, LYMPH NODE;  Surgeon: Taz Diagnostic Provider;  Location: [facility] OR [facility];  Service: General;  Laterality: N/A;  Rm 173    TRACHEOSTOMY       Family History   Problem Relation Age of Onset    Dementia Mother      Review of patient's allergies indicates:  No Known Allergies    Medications:    Current Outpatient Medications:     azelaic acid (AZELEX) 15 % gel, APPLY TOPICALLY TO AFFECTED AREA IN THE MORNING, Disp: 50 g, Rfl: 3    gabapentin (NEURONTIN) 300 MG capsule, Take 1 capsule (300 mg total) by mouth every evening., Disp: 30 capsule, Rfl: 11    ibuprofen (ADVIL,MOTRIN) 200 MG tablet, Take 200 mg by mouth., Disp: , Rfl:     imiquimod (ALDARA) 5 % cream, Apply to biopsy site on frontal scalp + about a centimeter of surrounding skin qhs x 16 weeks. Wash off in am and apply sunscreen. Discontinue if skin becomes blistered, raw, bleeding, painful, etc., Disp: 24 packet, Rfl: 3    imiquimod (ALDARA) 5 % cream, Apply to biopsy site on frontal scalp + about a centimeter of surrounding skin qhs x 16 weeks. Wash off in am and apply sunscreen. Discontinue if skin becomes blistered, raw, bleeding, painful, etc., Disp: 24 packet, Rfl: 3    levothyroxine (SYNTHROID) 88 MCG tablet, TAKE 1 TABLET BY MOUTH ONCE DAILY, Disp: 90 tablet, Rfl: 3    LIDOcaine HCl 2% (LIDOCAINE VISCOUS) 2 % Soln, Swish and spit 15 mls every 8 (eight) hours as needed (mouth sore)., Disp: 100 mL, Rfl: 3    LIDOcaine-prilocaine (EMLA) cream, Apply generously to port site 30-60 min prior to chemo and then cover with saran wrap., Disp: 30 g, Rfl: 2    loperamide (IMODIUM) 2 mg capsule, Take 1 capsule (2 mg total) by mouth 4 (four) times daily as needed for Diarrhea., Disp: 30 capsule, Rfl: 1    loteprednol  (LOTEMAX) 0.5 % ophthalmic suspension, Place 1 drop into the right eye 4 (four) times daily., Disp: 5 mL, Rfl: 1    magic mouthwash diphen/antac/lidoc/nysta, Take 10 mLs by mouth 4 (four) times daily., Disp: 120 mL, Rfl: 4    metroNIDAZOLE (METROGEL) 0.75 % gel, Apply topically to affected area 2 (two) times daily., Disp: 45 g, Rfl: 1    moxifloxacin (VIGAMOX) 0.5 % ophthalmic solution, Place 1 drop into the right eye 4 (four) times daily., Disp: 3 mL, Rfl: 3    multivit-min/FA/lycopen/lutein (CENTRUM SILVER MEN ORAL), Take 1 tablet by mouth., Disp: , Rfl:     multivitamin capsule, Take 1 capsule by mouth once daily., Disp: , Rfl:     oxyCODONE (ROXICODONE) 10 mg Tab immediate release tablet, Take 1 tablet (10 mg total) by mouth every 6 (six) hours as needed for Pain., Disp: 80 tablet, Rfl: 0    prednisoLONE acetate (PRED FORTE) 1 % DrpS, Place 1 drop into the right eye 4 (four) times daily., Disp: 10 mL, Rfl: 3    sulfacetamide sodium-sulfur 10-5 % (w/w) Clsr, USE TO WASH AFFECTED AREA DAILY, Disp: , Rfl:     tacrolimus (PROGRAF) 0.5 MG Cap, Take 1 capsule (0.5 mg total) by mouth every EVENING.  (Use the 1mg capsule for your morning dose), Disp: 90 capsule, Rfl: 3    tacrolimus (PROGRAF) 0.5 MG Cap, Take 1 capsule (0.5 mg total) by mouth every evening. Use the 1mg capsule for your morning dose, Disp: 90 capsule, Rfl: 3    tacrolimus (PROGRAF) 1 MG Cap, Take 1 capsule (1 mg total) by mouth every morning. Use the tacrolimus 0.5mg capsule for your evening dose as directed., Disp: 90 capsule, Rfl: 3    tiZANidine (ZANAFLEX) 2 MG tablet, Take 1 tablet (2 mg total) by mouth nightly as needed (neck muscle strain)., Disp: 30 tablet, Rfl: 3    traZODone (DESYREL) 50 MG tablet, TAKE 1 TABLET BY MOUTH IN  THE EVENING, Disp: 90 tablet, Rfl: 3    vitamin E 400 UNIT capsule, Take 400 Units by mouth once daily., Disp: , Rfl:     Current Facility-Administered Medications:     sodium chloride 0.9% flush 10 mL, 10 mL, Intravenous,  PRN, Ting Brambila MD    Facility-Administered Medications Ordered in Other Visits:     heparin, porcine (PF) 100 unit/mL injection flush 500 Units, 500 Units, Intravenous, PRN, Ronald Berg MD    ofloxacin 0.3 % ophthalmic solution 1 drop, 1 drop, Right Eye, On Call Procedure, Victor M Vasques MD, 2 drop at 10/11/22 0745    sodium chloride 0.9% flush 10 mL, 10 mL, Intravenous, PRN, Ronald Berg MD    sodium chloride 0.9% flush 10 mL, 10 mL, Intravenous, PRN, Victor M Vasques MD    OBJECTIVE:       ROS:  Review of Systems   Constitutional: Negative.  Negative for activity change, appetite change and chills.   HENT:  Positive for facial swelling and voice change.    Eyes: Negative.  Negative for pain, discharge and itching.   Respiratory: Negative.  Negative for apnea, cough and chest tightness.    Cardiovascular: Negative.  Negative for chest pain, palpitations and leg swelling.   Gastrointestinal:  Negative for constipation, diarrhea, nausea and vomiting.   Genitourinary:  Negative for difficulty urinating, dysuria and enuresis.   Musculoskeletal:  Positive for back pain. Negative for arthralgias and gait problem.   Skin: Negative.  Negative for color change, pallor and rash.     Review of Symptoms      Symptom Assessment (ESAS 0-10 Scale)  Pain:  0  Dyspnea:  0  Anxiety:  0  Nausea:  0  Depression:  0  Anorexia:  5  Fatigue:  0  Insomnia:  2  Restlessness:  0  Agitation:  0     CAM / Delirium:  Negative  Constipation:  Negative  Diarrhea:  Negative    Constipation:  No constipation    Pain Assessment:  OME in 24 hours:  0  Location(s): back    Back       Location: lower        Quality: None        Quantity: 4/10 in intensity        Chronicity: Onset 0 year(s) ago, stable        Aggravating Factors: Activity        Alleviating Factors: None       Associated Symptoms: None    Modified Tricia Scale:  0    ECOG Performance Status stGstrstastdstest:st st1st Living Arrangements:  Lives alone    Psychosocial/Cultural:   See  Palliative Psychosocial Note: Yes  **Primary  to Follow**  Palliative Care  Consult: No    Advance Care Planning   Advance Directives:   Living Will: Yes        Copy on chart: Yes    Medical Power of : Yes      Decision Making:  Patient answered questions  Goals of Care: What is most important right now is to focus on remaining as independent as possible, symptom/pain control. Accordingly, we have decided that the best plan to meet the patient's goals includes continuing with treatment.      Physical Exam:  Vitals:    Vitals:    06/28/23 1302   BP: 122/76   Pulse: 96   Temp: 99 °F (37.2 °C)      Physical Exam  Vitals reviewed.   Constitutional:       Appearance: Normal appearance. He is not toxic-appearing or diaphoretic.   HENT:      Head: Normocephalic and atraumatic.      Comments: Trach      Right Ear: External ear normal.      Left Ear: External ear normal.      Nose: Nose normal.   Eyes:      General:         Right eye: No discharge.         Left eye: No discharge.      Extraocular Movements: Extraocular movements intact.      Conjunctiva/sclera: Conjunctivae normal.   Pulmonary:      Effort: Pulmonary effort is normal. No respiratory distress.      Breath sounds: No stridor.   Abdominal:      General: Abdomen is flat.      Palpations: Abdomen is soft.   Musculoskeletal:         General: No swelling or tenderness. Normal range of motion.      Cervical back: Normal range of motion.   Skin:     General: Skin is warm and dry.      Coloration: Skin is not jaundiced.      Findings: No bruising.   Neurological:      General: No focal deficit present.      Mental Status: He is alert and oriented to person, place, and time. Mental status is at baseline.   Psychiatric:         Mood and Affect: Mood normal.         Behavior: Behavior normal.         Thought Content: Thought content normal.         Judgment: Judgment normal.       Labs:  CBC:   WBC   Date Value Ref Range Status  "  06/15/2023 2.80 (L) 3.90 - 12.70 K/uL Final     Hemoglobin   Date Value Ref Range Status   06/15/2023 11.3 (L) 14.0 - 18.0 g/dL Final     Hematocrit   Date Value Ref Range Status   06/15/2023 33.7 (L) 40.0 - 54.0 % Final     MCV   Date Value Ref Range Status   06/15/2023 104 (H) 82 - 98 fL Final     Platelets   Date Value Ref Range Status   06/15/2023 120 (L) 150 - 450 K/uL Final       LFT:   Lab Results   Component Value Date     (H) 06/15/2023     (H) 07/09/2020    ALKPHOS 88 06/15/2023    BILITOT 0.7 06/15/2023       Albumin:   Albumin   Date Value Ref Range Status   06/15/2023 3.7 3.5 - 5.2 g/dL Final     Protein:   Total Protein   Date Value Ref Range Status   06/15/2023 7.5 6.0 - 8.4 g/dL Final       Radiology:I have reviewed all pertinent imaging results/findings within the past 24 hours.    06/13/2023 NM PET: "Impression:     No evidence of FDG avid tumor in patient with squamous cell carcinoma of the base of the tongue status post resection.  No abnormal uptake to suggest distant metastasis."       3/20/2023 NM PET: "Impression:     In this patient with base of tongue cancer, there is no definite evidence of hypermetabolic tumor.     Redemonstration of nonspecific mild superficial FDG uptake at the chin.  Clinical correlation suggested.     No other suspicious hypermetabolic lesion."    I spent a total of 44 minutes on the day of the visit.This includes face to face time in discussion of goals of care, symptom assessment, coordination of care and emotional support.  This also includes non-face to face time preparing to see the patient (eg, review of tests/imaging), obtaining and/or reviewing separately obtained history, documenting clinical information in the electronic or other health record, independently interpreting results and communicating results to the patient/family/caregiver, or care coordinator.     Signature: Arabella Cuevas DNP  "

## 2023-06-29 RX ORDER — TRAZODONE HYDROCHLORIDE 50 MG/1
50 TABLET ORAL NIGHTLY
Qty: 90 TABLET | Refills: 2 | Status: SHIPPED | OUTPATIENT
Start: 2023-06-29 | End: 2024-03-05

## 2023-07-03 ENCOUNTER — TELEPHONE (OUTPATIENT)
Dept: OPHTHALMOLOGY | Facility: CLINIC | Age: 73
End: 2023-07-03
Payer: MEDICARE

## 2023-07-11 ENCOUNTER — TELEPHONE (OUTPATIENT)
Dept: HEMATOLOGY/ONCOLOGY | Facility: CLINIC | Age: 73
End: 2023-07-11
Payer: MEDICARE

## 2023-07-12 DIAGNOSIS — Z93.0 TRACHEOSTOMY STATUS: ICD-10-CM

## 2023-07-12 DIAGNOSIS — K74.60 CIRRHOSIS OF TRANSPLANTED LIVER: ICD-10-CM

## 2023-07-12 DIAGNOSIS — G89.29 CHRONIC LOW BACK PAIN, UNSPECIFIED BACK PAIN LATERALITY, UNSPECIFIED WHETHER SCIATICA PRESENT: ICD-10-CM

## 2023-07-12 DIAGNOSIS — M54.50 CHRONIC LOW BACK PAIN, UNSPECIFIED BACK PAIN LATERALITY, UNSPECIFIED WHETHER SCIATICA PRESENT: ICD-10-CM

## 2023-07-12 DIAGNOSIS — T86.49 CIRRHOSIS OF TRANSPLANTED LIVER: ICD-10-CM

## 2023-07-12 DIAGNOSIS — C32.9 LARYNX CANCER: Primary | ICD-10-CM

## 2023-07-18 ENCOUNTER — OFFICE VISIT (OUTPATIENT)
Dept: HEMATOLOGY/ONCOLOGY | Facility: CLINIC | Age: 73
End: 2023-07-18
Payer: MEDICARE

## 2023-07-18 VITALS
DIASTOLIC BLOOD PRESSURE: 87 MMHG | WEIGHT: 113.75 LBS | HEART RATE: 82 BPM | SYSTOLIC BLOOD PRESSURE: 139 MMHG | BODY MASS INDEX: 17.3 KG/M2 | OXYGEN SATURATION: 96 %

## 2023-07-18 DIAGNOSIS — G89.29 CHRONIC LOW BACK PAIN, UNSPECIFIED BACK PAIN LATERALITY, UNSPECIFIED WHETHER SCIATICA PRESENT: ICD-10-CM

## 2023-07-18 DIAGNOSIS — C01 SQUAMOUS CELL CARCINOMA OF BASE OF TONGUE: ICD-10-CM

## 2023-07-18 DIAGNOSIS — C32.9 LARYNX CANCER: ICD-10-CM

## 2023-07-18 DIAGNOSIS — M25.60 RANGE OF MOTION DEFICIT: ICD-10-CM

## 2023-07-18 DIAGNOSIS — Z94.4 STATUS POST LIVER TRANSPLANTATION: Primary | ICD-10-CM

## 2023-07-18 DIAGNOSIS — C77.0 SECONDARY MALIGNANT NEOPLASM OF CERVICAL LYMPH NODE: ICD-10-CM

## 2023-07-18 DIAGNOSIS — M54.50 CHRONIC LOW BACK PAIN, UNSPECIFIED BACK PAIN LATERALITY, UNSPECIFIED WHETHER SCIATICA PRESENT: ICD-10-CM

## 2023-07-18 PROCEDURE — 99999 PR PBB SHADOW E&M-EST. PATIENT-LVL II: CPT | Mod: PBBFAC,,, | Performed by: OBSTETRICS & GYNECOLOGY

## 2023-07-18 PROCEDURE — 99214 OFFICE O/P EST MOD 30 MIN: CPT | Mod: S$PBB,,, | Performed by: OBSTETRICS & GYNECOLOGY

## 2023-07-18 PROCEDURE — 99212 OFFICE O/P EST SF 10 MIN: CPT | Mod: PBBFAC | Performed by: OBSTETRICS & GYNECOLOGY

## 2023-07-18 PROCEDURE — 99999 PR PBB SHADOW E&M-EST. PATIENT-LVL II: ICD-10-PCS | Mod: PBBFAC,,, | Performed by: OBSTETRICS & GYNECOLOGY

## 2023-07-18 PROCEDURE — 99214 PR OFFICE/OUTPT VISIT, EST, LEVL IV, 30-39 MIN: ICD-10-PCS | Mod: S$PBB,,, | Performed by: OBSTETRICS & GYNECOLOGY

## 2023-07-18 NOTE — PROGRESS NOTES
Integrative Health and Medicine Initial Visit      Chief Complaint:  patient writes to communicate    Patient is a 72 yo male with persistent and recurrent SCCA BOT currently receiving Keytruda for maintenance therapy.     Pertinent history is notable for Liver Transplant on Prograf, Emphysema, Atherosclerosis, CKD, Immunodeficiency, Hep C, Total Laryngectomy and Total Glossectomy.     Cancer/Stage/TNM:   Cancer Staging   Squamous cell carcinoma of base of tongue  Staging form: Pharynx - HPV-Mediated Oropharynx, AJCC 8th Edition  - Clinical stage from 9/18/2020: Stage III (rcT4, cN1, cM0, p16+) - Signed by Ronald Berg MD on 12/27/2022       Oncology History   Squamous cell carcinoma of base of tongue   9/18/2020 Cancer Staged    Staging form: Pharynx - HPV-Mediated Oropharynx, AJCC 8th Edition  - Clinical stage from 9/18/2020: Stage III (rcT4, cN1, cM0, p16+)     9/28/2020 Initial Diagnosis    Squamous cell carcinoma of base of tongue     10/6/2020 - 8/17/2021 Chemotherapy    Treatment Summary   Plan Name: OP HEAD NECK CARBOPLATIN PACLITAXEL C1-2 FOLLOWED BY CETUXIMAB CARBOPLATIN C3-6 FOLLOWED BY CETUXIMAB MAINTENANCE WEEKLY  Treatment Goal: Control  Status: Inactive  Start Date: 10/6/2020  End Date: 8/17/2021  Provider: Ronald Berg MD  Chemotherapy: cetuximab (ERBITUX) 100 mg/50 mL chemo infusion 684 mg, 400 mg/m2 = 684 mg (100 % of original dose 400 mg/m2), Intravenous, Clinic/[facility] 1 time, 29 of 38 cycles  Dose modification: 500 mg/m2 (original dose 400 mg/m2, Cycle 3), 250 mg/m2 (original dose 400 mg/m2, Cycle 3), 400 mg/m2 (original dose 400 mg/m2, Cycle 3), 250 mg/m2 (original dose 250 mg/m2, Cycle 7), 200 mg/m2 (original dose 250 mg/m2, Cycle 18), 200 mg/m2 (original dose 250 mg/m2, Cycle 19), 250 mg/m2 (original dose 250 mg/m2, Cycle 4), 200 mg/m2 (original dose 250 mg/m2, Cycle 25), 200 mg/m2 (original dose 250 mg/m2, Cycle 28)  Administration: 684 mg (11/17/2020), 400 mg (11/24/2020), 400 mg (2/9/2021), 400  mg (2/17/2021), 427.6 mg (3/9/2021), 400 mg (3/30/2021), 400 mg (3/23/2021), 400 mg (4/6/2021), 427.6 mg (4/13/2021), 427.6 mg (4/20/2021), 342 mg (5/11/2021), 342 mg (5/18/2021), 342 mg (5/25/2021), 400 mg (5/31/2021), 400 mg (6/7/2021), 400 mg (6/14/2021), 400 mg (12/8/2020), 427.6 mg (12/29/2020), 400 mg (12/1/2020), 400 mg (12/15/2020), 400 mg (12/22/2020), 427.6 mg (1/5/2021), 400 mg (1/12/2021), 400 mg (1/19/2021), 427.6 mg (1/26/2021), 400 mg (2/2/2021), 400 mg (2/23/2021), 400 mg (3/2/2021), 427.6 mg (3/16/2021), 400 mg (6/21/2021), 342 mg (6/28/2021), 342 mg (7/6/2021), 342 mg (7/13/2021), 342 mg (7/20/2021), 400 mg (7/27/2021), 400 mg (8/10/2021), 400 mg (8/17/2021)  CARBOplatin (PARAPLATIN) 310 mg in sodium chloride 0.9% 500 mL chemo infusion, 310 mg (92.2 % of original dose 334.5 mg), Intravenous, Clinic/[facility] 1 time, 6 of 6 cycles  Dose modification:   (original dose 334.5 mg, Cycle 1)  Administration: 310 mg (10/6/2020), 370 mg (11/17/2020), 300 mg (10/27/2020), 350 mg (12/8/2020), 335 mg (12/29/2020), 320 mg (1/19/2021)  PACLitaxeL (TAXOL) 175 mg/m2 = 300 mg in sodium chloride 0.9% 500 mL chemo infusion, 175 mg/m2 = 300 mg (100 % of original dose 175 mg/m2), Intravenous, Clinic/[facility] 1 time, 2 of 2 cycles  Dose modification: 175 mg/m2 (original dose 175 mg/m2, Cycle 1)  Administration: 300 mg (10/6/2020), 300 mg (10/27/2020)     4/29/2021 Tumor Conference       His case was discussed at the Multidisciplinary Head and Neck Team Planning Meeting.    Representatives from Medical Oncology, Radiation Oncology, Head and Neck Surgical Oncology, Psychosocial Oncology, and Speech and Language Pathology discussed the case with the following recommendations:    1) biopsy         7/29/2021 Tumor Conference       His case was discussed at the Multidisciplinary Head and Neck Team Planning Meeting.    Representatives from Medical Oncology, Radiation Oncology, Head and Neck Surgical Oncology, Psychosocial Oncology, and  Speech and Language Pathology discussed the case with the following recommendations:    1) Head and neck clinic follow up  2) consider Keytruda (discuss with transplant)  3) consider palliative referral         9/13/2021 -  Chemotherapy    Treatment Summary   Plan Name: OP HEAD NECK PEMBROLIZUMAB CARBOPLATIN FLUOROURACIL (C1 ONLY RECEIVED) FOLLOWED BY PEMBROLIZUMAB MAINTENANCE  Treatment Goal: Palliative  Status: Active  Start Date: 9/13/2021  End Date: 9/5/2023 (Planned)  Provider: Ronald Berg MD  Chemotherapy: CARBOplatin (PARAPLATIN) 320 mg in sodium chloride 0.9% 500 mL chemo infusion, 320 mg (100 % of original dose 320.5 mg), Intravenous, Clinic/[facility] 1 time, 6 of 6 cycles  Dose modification:   (original dose 320.5 mg, Cycle 1)  Administration: 320 mg (9/13/2021), 335 mg (12/23/2021), 325 mg (1/27/2022), 245 mg (3/3/2022), 255 mg (5/12/2022), 255 mg (4/7/2022)  fluorouraciL 1,000 mg/m2/day = 6,440 mg in sodium chloride 0.9% 240 mL chemo infusion, 1,000 mg/m2/day = 6,440 mg, Intravenous, Over 96 hours, 6 of 6 cycles  Dose modification: 800 mg/m2/day (original dose 1,000 mg/m2/day, Cycle 6), 5,000 mg (original dose 1,000 mg/m2/day, Cycle 8)  Administration: 6,440 mg (9/13/2021), 6,440 mg (12/23/2021), 6,480 mg (1/27/2022), 5,000 mg (3/3/2022), 5,000 mg (4/7/2022), 5,000 mg (5/12/2022)     Secondary malignant neoplasm of cervical lymph node   2/9/2021 Initial Diagnosis    Secondary malignant neoplasm of cervical lymph node     9/13/2021 -  Chemotherapy    Treatment Summary   Plan Name: OP HEAD NECK PEMBROLIZUMAB CARBOPLATIN FLUOROURACIL (C1 ONLY RECEIVED) FOLLOWED BY PEMBROLIZUMAB MAINTENANCE  Treatment Goal: Palliative  Status: Active  Start Date: 9/13/2021  End Date: 9/5/2023 (Planned)  Provider: Ronald Berg MD  Chemotherapy: CARBOplatin (PARAPLATIN) 320 mg in sodium chloride 0.9% 500 mL chemo infusion, 320 mg (100 % of original dose 320.5 mg), Intravenous, Clinic/[facility] 1 time, 6 of 6 cycles  Dose modification:    (original dose 320.5 mg, Cycle 1)  Administration: 320 mg (9/13/2021), 335 mg (12/23/2021), 325 mg (1/27/2022), 245 mg (3/3/2022), 255 mg (5/12/2022), 255 mg (4/7/2022)  fluorouraciL 1,000 mg/m2/day = 6,440 mg in sodium chloride 0.9% 240 mL chemo infusion, 1,000 mg/m2/day = 6,440 mg, Intravenous, Over 96 hours, 6 of 6 cycles  Dose modification: 800 mg/m2/day (original dose 1,000 mg/m2/day, Cycle 6), 5,000 mg (original dose 1,000 mg/m2/day, Cycle 8)  Administration: 6,440 mg (9/13/2021), 6,440 mg (12/23/2021), 6,480 mg (1/27/2022), 5,000 mg (3/3/2022), 5,000 mg (4/7/2022), 5,000 mg (5/12/2022)           Past Medical History:   Diagnosis Date    Basal cell carcinoma (BCC) in situ of skin 2012    3 on face, 2 on arm, removed by dermatology.     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 Underwent surgical resection of tongue and neck. s/p tracheostomy        Current Outpatient Medications   Medication Instructions    azelaic acid (AZELEX) 15 % gel APPLY TOPICALLY TO AFFECTED AREA IN THE MORNING    gabapentin (NEURONTIN) 300 mg, Oral, Nightly    ibuprofen (ADVIL,MOTRIN) 200 mg, Oral    imiquimod (ALDARA) 5 % cream Apply to biopsy site on frontal scalp + about a centimeter of surrounding skin qhs x 16 weeks. Wash off in am and apply sunscreen. Discontinue if skin becomes blistered, raw, bleeding, painful, etc.    imiquimod (ALDARA) 5 % cream Apply to biopsy site on frontal scalp + about a centimeter of surrounding skin qhs x 16 weeks. Wash off in am and apply sunscreen. Discontinue if skin becomes blistered, raw, bleeding, painful, etc.    levothyroxine (SYNTHROID) 88 MCG tablet TAKE 1 TABLET BY MOUTH ONCE DAILY    LIDOcaine HCl 2% (LIDOCAINE VISCOUS) 2 % Soln Swish and spit 15  mls every 8 (eight) hours as needed (mouth sore).    LIDOcaine-prilocaine (EMLA) cream Apply generously to port site 30-60 min prior to chemo and then cover with saran wrap.    loperamide (IMODIUM) 2 mg, Oral, 4 times daily PRN    loteprednol (LOTEMAX) 0.5 % ophthalmic suspension 1 drop, Right Eye, 4 times daily    magic mouthwash diphen/antac/lidoc/nysta Take 10 mLs by mouth 4 (four) times daily.    metroNIDAZOLE (METROGEL) 0.75 % gel Apply topically to affected area 2 (two) times daily.    moxifloxacin (VIGAMOX) 0.5 % ophthalmic solution 1 drop, Right Eye, 4 times daily    multivit-min/FA/lycopen/lutein (CENTRUM SILVER MEN ORAL) 1 tablet, Oral    multivitamin capsule 1 capsule, Oral, Daily    oxyCODONE (ROXICODONE) 10 mg, Oral, Every 6 hours PRN    prednisoLONE acetate (PRED FORTE) 1 % DrpS 1 drop, Right Eye, 4 times daily    sulfacetamide sodium-sulfur 10-5 % (w/w) Clsr USE TO WASH AFFECTED AREA DAILY    tacrolimus (PROGRAF) 0.5 MG Cap Take 1 capsule (0.5 mg total) by mouth every EVENING.  (Use the 1mg capsule for your morning dose)    tacrolimus (PROGRAF) 0.5 mg, Oral, Nightly, Use the 1mg capsule for your morning dose    tacrolimus (PROGRAF) 1 mg, Oral, Every morning, Use the tacrolimus 0.5mg capsule for your evening dose as directed.    tiZANidine (ZANAFLEX) 2 mg, Oral, Nightly PRN    traZODone (DESYREL) 50 mg, Oral, Nightly    vitamin E 400 Units, Oral, Daily        Past Surgical History:   Procedure Laterality Date    COLONOSCOPY      CONJUNCTIVA BIOPSY Right 10/11/2022    Procedure: BIOPSY, CONJUNCTIVA;  Surgeon: Victor M Vasques MD;  Location: [facility], [place], operated by [facility] OR;  Service: Ophthalmology;  Laterality: Right;    INSERTION OF VENOUS ACCESS PORT Right 3/8/2021    Procedure: INSERTION, VENOUS ACCESS PORT;  Surgeon: Jesus Rendon MD;  Location: [address];  Service: General;  Laterality: Right;    LIVER TRANSPLANT  11/2008    transplanted for biopsy proven hepatocellular carcinoma,     LYMPH NODE BIOPSY N/A  "9/18/2020    Procedure: BIOPSY, LYMPH NODE;  Surgeon: Taz Diagnostic Provider;  Location: [facility] OR [address] [place] [address];  Service: General;  Laterality: N/A;  Rm 173    TRACHEOSTOMY            Distress Score: 0       Today the patient described the following:  Nutrition: cannot eat solid foods, makes smoothies and uses Ensure            Physical Exam   /87 Height 5'8" weight 113 pounds  Wt Readings from Last 3 Encounters:   06/28/23 51.5 kg (113 lb 8.6 oz)   06/15/23 52.6 kg (115 lb 15.4 oz)   06/15/23 52.6 kg (115 lb 15.4 oz)     Temp Readings from Last 3 Encounters:   06/28/23 99 °F (37.2 °C)   06/15/23 98 °F (36.7 °C)   06/15/23 98 °F (36.7 °C) (Oral)     BP Readings from Last 3 Encounters:   06/28/23 122/76   06/15/23 138/77   06/15/23 122/75     Pulse Readings from Last 3 Encounters:   06/28/23 96   06/15/23 88   06/15/23 87       Body mass index is There is no height or weight on file to calculate BMI.    Vitals reviewed.   Constitutional:       General: Patient is not in acute distress.     Appearance: Normal appearance.     Psychiatric:         Mood and Affect: Mood normal.         Behavior: Behavior normal.         Thought Content: Thought content normal.         Judgment: Judgment normal.      Review of Systems:   Cardiac:           No SOB, chest pain with exertion,edema, orthopnea  Distress:          No excessive sadness, no hopelessness, no anhedonia, no excessive worry or nervousness  Cognitive:        No trouble with memory, no difficulty paying attention, no brain fog, no trouble functioning with work or home life  Fatigue:           Energy level adequate, performing ADL's, no morning fatigue                           Fatigue  13 / 10  ( Scale 0 - 10)   Hormonal:       No hot flashes, no night sweats  Pain:                Has  pain,  location:back                          Pain 06  / 10 (Scale 0 - 10)    Neuropathy:    No numbness, no tingling, no paresthesia   Sleep:              No difficulty falling " asleep, no waking up in night, no daytime sleepiness, no snoring  Altered function:          No problems with money management,  no problems with daily organization & planning  Weight:           No concerns with weight,- weight has remained the same       Labs:   Lab Results   Component Value Date    WBC 2.80 (L) 06/15/2023    HGB 11.3 (L) 06/15/2023    HCT 33.7 (L) 06/15/2023     (H) 06/15/2023     (L) 06/15/2023           Hemoglobin A1C   Date Value Ref Range Status   12/11/2019 4.8 4.0 - 5.6 % Final     Comment:     ADA Screening Guidelines:  5.7-6.4%  Consistent with prediabetes  >or=6.5%  Consistent with diabetes  High levels of fetal hemoglobin interfere with the HbA1C  assay. Heterozygous hemoglobin variants (HbS, HgC, etc)do  not significantly interfere with this assay.   However, presence of multiple variants may affect accuracy.        T3,T4,T7 and TSH   Vitamin d level  Vitamin b-12       Assessment:   Patient is a 73 y.o.male   1. Status post liver transplantation - 2008        2. Squamous cell carcinoma of base of tongue  Ambulatory referral/consult to Integrative Oncology      3. Chronic low back pain, unspecified back pain laterality, unspecified whether sciatica present  Ambulatory referral/consult to Integrative Oncology      4. Secondary malignant neoplasm of cervical lymph node        5. Range of motion deficit        6. Larynx cancer                 Plan   1. Nutrition: limitations with trach  2. Sleep: Recommend 6-8 hours of restful sleep nightly; recommend strong sleep hygiene routine one hour prior to bed. Discussed relaxation,  and guided imagery prior to bed.   3. Mind Body Medicine: Continue Deep breathing exercise, yoga, and focus on gratitude   4. Exercise: Recommend 60 minutes of gentle movement/light activity per day (cleaning, walking, cooking, gardening, stationary bike). Recommend some type of cardiovascular activity daily if well.   5. Recommend a positive thought  process through affirmations and visualizations.  6. Refer to Acupuncture For Chronic Low Back Pain    7. Dietitian for weight loss.    He is seeing outside dentist- lost all of his teeth after XRT    Patient participated in [facility] and one on one session

## 2023-07-19 ENCOUNTER — TELEPHONE (OUTPATIENT)
Dept: OPHTHALMOLOGY | Facility: CLINIC | Age: 73
End: 2023-07-19
Payer: MEDICARE

## 2023-07-24 ENCOUNTER — CLINICAL SUPPORT (OUTPATIENT)
Dept: REHABILITATION | Facility: HOSPITAL | Age: 73
End: 2023-07-24
Payer: MEDICARE

## 2023-07-24 DIAGNOSIS — M54.50 CHRONIC LOW BACK PAIN, UNSPECIFIED BACK PAIN LATERALITY, UNSPECIFIED WHETHER SCIATICA PRESENT: ICD-10-CM

## 2023-07-24 DIAGNOSIS — C32.9 LARYNX CANCER: ICD-10-CM

## 2023-07-24 DIAGNOSIS — Z93.0 TRACHEOSTOMY STATUS: ICD-10-CM

## 2023-07-24 DIAGNOSIS — T86.49 CIRRHOSIS OF TRANSPLANTED LIVER: ICD-10-CM

## 2023-07-24 DIAGNOSIS — G89.29 CHRONIC LOW BACK PAIN, UNSPECIFIED BACK PAIN LATERALITY, UNSPECIFIED WHETHER SCIATICA PRESENT: ICD-10-CM

## 2023-07-24 DIAGNOSIS — K74.60 CIRRHOSIS OF TRANSPLANTED LIVER: ICD-10-CM

## 2023-07-24 PROCEDURE — 97814 ACUP 1/> W/ESTIM EA ADDL 15: CPT | Performed by: ACUPUNCTURIST

## 2023-07-24 PROCEDURE — 97813 ACUP 1/> W/ESTIM 1ST 15 MIN: CPT | Performed by: ACUPUNCTURIST

## 2023-07-24 NOTE — PROGRESS NOTES
"  Acupuncture Evaluation Note     Name: Rocco Swain  Clinic Number: [id number]    Traditional Chinese Medicine (TCM) Diagnosis: Qi Stagnation and Blood Deficiency  Medical Diagnosis:   Encounter Diagnoses   Name Primary?    Larynx cancer     Tracheostomy status     Chronic low back pain, unspecified back pain laterality, unspecified whether sciatica present     Cirrhosis of transplanted liver         Evaluation Date: 7/24/2023    Visit #/Visits authorized: 1/12     Precautions: cancer and organ transplant    Subjective     Chief Concern:  cLBP, starts midline L1-L5 and radiates towards left SI joint. Hx 1 crushed disc, 3 discs with arthritis.     Medical necessity is demonstrated by the following IMPAIRMENTS: Medical Necessity: Cancer related pain, Decreased mobility limits day to day activities, social, and emergent situations, and Decreased quality of life              Aggravating Factors:  movement and standing   Relieving Factors:  rest and lying down     Symptom Description:     Quality:  Aching, Dull, and Sharp  Severity:  6  Frequency:  continuously and every day    Previous Treatments Tried:  Injection(s) and Therapy     HEENT:  occasional headaches, frontal/temple     Chest:   no complaints    Digestion:     Diet: in general, a "healthy" diet     Fluids: coffee 1 /day, is drinking plenty of fluids, glass of wine with dinner  Taste/Appetite: appetite good, taste normal    Symptoms: alternating loose stools and constipation    Sleep: trazedone, sleep is good    Energy Levels:  good    Psychological Symptoms:  good    Other Symptoms: Bleeds and bruises easily    GYN Symptoms: n/a      Objective     Observation: No tongue         Pulse:        wiry and forceful       New Findings:      Treatment     Treatment Principles:  Move qi, nourish blood, stop pain    Acupuncture points used:      Bilateral points: BL12, BL13, BL23, BL25, BL26, BL27  Unilateral points: BL62, GB41, BL60, BL58, KD6, KD3, SP6  Auricular " Treatment:      Needles In: 19  Needles Out: 19  [place] W/ STIM placed: 8:30  Needles W/ STIM removed: 8:50      Other Traditional Chinese Medicine Modalities - [facility]    Assessment     After treatment, patient felt good     Patient prognosis is Good.     Patient will continue to benefit from acupuncture treatment to address the deficits listed in the problem list box on initial evaluation, provide patient family education and to maximize pt's level of independence in the home and community environment.     Patient's spiritual, cultural and educational needs considered and pt agreeable to plan of care and goals.     Anticipated barriers to treatment: none    Plan     Recommend 1 /week for 5 sessions then re-assess.      Education:  Patient is aware of cumulative benefit of acupuncture

## 2023-07-27 ENCOUNTER — TELEPHONE (OUTPATIENT)
Dept: FAMILY MEDICINE | Facility: CLINIC | Age: 73
End: 2023-07-27
Payer: MEDICARE

## 2023-07-27 NOTE — TELEPHONE ENCOUNTER
Left a voicemail message to inform the Patient about the [facility] appointment and to schedule.  Requested the Patient to call the office back to be schedule.  Sent a detailed message thru [employer] as well.

## 2023-08-01 ENCOUNTER — TELEPHONE (OUTPATIENT)
Dept: OPHTHALMOLOGY | Facility: CLINIC | Age: 73
End: 2023-08-01
Payer: MEDICARE

## 2023-08-01 DIAGNOSIS — D49.89 CONJUNCTIVAL INTRAEPITHELIAL NEOPLASM: Primary | ICD-10-CM

## 2023-08-10 ENCOUNTER — INFUSION (OUTPATIENT)
Dept: INFUSION THERAPY | Facility: HOSPITAL | Age: 73
End: 2023-08-10
Attending: STUDENT IN AN ORGANIZED HEALTH CARE EDUCATION/TRAINING PROGRAM
Payer: MEDICARE

## 2023-08-10 ENCOUNTER — OFFICE VISIT (OUTPATIENT)
Dept: HEMATOLOGY/ONCOLOGY | Facility: CLINIC | Age: 73
End: 2023-08-10
Payer: MEDICARE

## 2023-08-10 ENCOUNTER — CLINICAL SUPPORT (OUTPATIENT)
Dept: REHABILITATION | Facility: HOSPITAL | Age: 73
End: 2023-08-10
Attending: OBSTETRICS & GYNECOLOGY
Payer: MEDICARE

## 2023-08-10 VITALS
HEART RATE: 77 BPM | HEIGHT: 68 IN | BODY MASS INDEX: 18.07 KG/M2 | SYSTOLIC BLOOD PRESSURE: 144 MMHG | DIASTOLIC BLOOD PRESSURE: 73 MMHG | TEMPERATURE: 99 F | RESPIRATION RATE: 18 BRPM | WEIGHT: 119.25 LBS

## 2023-08-10 VITALS
TEMPERATURE: 98 F | RESPIRATION RATE: 18 BRPM | BODY MASS INDEX: 18.07 KG/M2 | SYSTOLIC BLOOD PRESSURE: 153 MMHG | OXYGEN SATURATION: 98 % | HEIGHT: 68 IN | DIASTOLIC BLOOD PRESSURE: 80 MMHG | WEIGHT: 119.25 LBS | HEART RATE: 85 BPM

## 2023-08-10 DIAGNOSIS — K74.60 CIRRHOSIS OF TRANSPLANTED LIVER: ICD-10-CM

## 2023-08-10 DIAGNOSIS — C01 SQUAMOUS CELL CARCINOMA OF BASE OF TONGUE: Primary | ICD-10-CM

## 2023-08-10 DIAGNOSIS — N18.4 CHRONIC KIDNEY DISEASE (CKD), STAGE IV (SEVERE): ICD-10-CM

## 2023-08-10 DIAGNOSIS — H11.9 CONJUNCTIVAL LESION: ICD-10-CM

## 2023-08-10 DIAGNOSIS — C01 SQUAMOUS CELL CARCINOMA OF BASE OF TONGUE: ICD-10-CM

## 2023-08-10 DIAGNOSIS — M54.50 CHRONIC LOW BACK PAIN, UNSPECIFIED BACK PAIN LATERALITY, UNSPECIFIED WHETHER SCIATICA PRESENT: Primary | ICD-10-CM

## 2023-08-10 DIAGNOSIS — Z94.4 STATUS POST LIVER TRANSPLANTATION: ICD-10-CM

## 2023-08-10 DIAGNOSIS — Z79.899 IMMUNODEFICIENCY DUE TO DRUGS: ICD-10-CM

## 2023-08-10 DIAGNOSIS — Z93.0 TRACHEOSTOMY STATUS: ICD-10-CM

## 2023-08-10 DIAGNOSIS — T86.49 CIRRHOSIS OF TRANSPLANTED LIVER: ICD-10-CM

## 2023-08-10 DIAGNOSIS — R68.84 JAW PAIN: ICD-10-CM

## 2023-08-10 DIAGNOSIS — R53.83 FATIGUE, UNSPECIFIED TYPE: ICD-10-CM

## 2023-08-10 DIAGNOSIS — C77.0 SECONDARY MALIGNANT NEOPLASM OF CERVICAL LYMPH NODE: ICD-10-CM

## 2023-08-10 DIAGNOSIS — C77.0 SECONDARY MALIGNANT NEOPLASM OF CERVICAL LYMPH NODE: Primary | ICD-10-CM

## 2023-08-10 DIAGNOSIS — D84.821 IMMUNODEFICIENCY DUE TO DRUGS: ICD-10-CM

## 2023-08-10 DIAGNOSIS — G89.29 CHRONIC LOW BACK PAIN, UNSPECIFIED BACK PAIN LATERALITY, UNSPECIFIED WHETHER SCIATICA PRESENT: Primary | ICD-10-CM

## 2023-08-10 LAB
ALBUMIN SERPL BCP-MCNC: 3.3 G/DL (ref 3.5–5.2)
ALP SERPL-CCNC: 76 U/L (ref 55–135)
ALT SERPL W/O P-5'-P-CCNC: 15 U/L (ref 10–44)
ANION GAP SERPL CALC-SCNC: 12 MMOL/L (ref 8–16)
AST SERPL-CCNC: 42 U/L (ref 10–40)
BASOPHILS # BLD AUTO: 0.02 K/UL (ref 0–0.2)
BASOPHILS NFR BLD: 0.5 % (ref 0–1.9)
BILIRUB SERPL-MCNC: 0.5 MG/DL (ref 0.1–1)
BUN SERPL-MCNC: 17 MG/DL (ref 8–23)
CALCIUM SERPL-MCNC: 9.4 MG/DL (ref 8.7–10.5)
CHLORIDE SERPL-SCNC: 105 MMOL/L (ref 95–110)
CO2 SERPL-SCNC: 24 MMOL/L (ref 23–29)
CREAT SERPL-MCNC: 1.2 MG/DL (ref 0.5–1.4)
DIFFERENTIAL METHOD: ABNORMAL
EOSINOPHIL # BLD AUTO: 0.1 K/UL (ref 0–0.5)
EOSINOPHIL NFR BLD: 3.4 % (ref 0–8)
ERYTHROCYTE [DISTWIDTH] IN BLOOD BY AUTOMATED COUNT: 13.8 % (ref 11.5–14.5)
EST. GFR  (NO RACE VARIABLE): >60 ML/MIN/1.73 M^2
GLUCOSE SERPL-MCNC: 110 MG/DL (ref 70–110)
HCT VFR BLD AUTO: 31.6 % (ref 40–54)
HGB BLD-MCNC: 10.9 G/DL (ref 14–18)
IMM GRANULOCYTES # BLD AUTO: 0.02 K/UL (ref 0–0.04)
IMM GRANULOCYTES NFR BLD AUTO: 0.5 % (ref 0–0.5)
LYMPHOCYTES # BLD AUTO: 0.5 K/UL (ref 1–4.8)
LYMPHOCYTES NFR BLD: 11.6 % (ref 18–48)
MCH RBC QN AUTO: 34.7 PG (ref 27–31)
MCHC RBC AUTO-ENTMCNC: 34.5 G/DL (ref 32–36)
MCV RBC AUTO: 101 FL (ref 82–98)
MONOCYTES # BLD AUTO: 0.5 K/UL (ref 0.3–1)
MONOCYTES NFR BLD: 12.9 % (ref 4–15)
NEUTROPHILS # BLD AUTO: 2.8 K/UL (ref 1.8–7.7)
NEUTROPHILS NFR BLD: 71.1 % (ref 38–73)
NRBC BLD-RTO: 0 /100 WBC
PLATELET # BLD AUTO: 141 K/UL (ref 150–450)
PMV BLD AUTO: 9.7 FL (ref 9.2–12.9)
POTASSIUM SERPL-SCNC: 3.9 MMOL/L (ref 3.5–5.1)
PROT SERPL-MCNC: 7 G/DL (ref 6–8.4)
RBC # BLD AUTO: 3.14 M/UL (ref 4.6–6.2)
SODIUM SERPL-SCNC: 141 MMOL/L (ref 136–145)
T4 FREE SERPL-MCNC: 1.08 NG/DL (ref 0.71–1.51)
TSH SERPL DL<=0.005 MIU/L-ACNC: 0.51 UIU/ML (ref 0.4–4)
WBC # BLD AUTO: 3.88 K/UL (ref 3.9–12.7)

## 2023-08-10 PROCEDURE — 25000003 PHARM REV CODE 250: Performed by: NURSE PRACTITIONER

## 2023-08-10 PROCEDURE — 80053 COMPREHEN METABOLIC PANEL: CPT | Performed by: NURSE PRACTITIONER

## 2023-08-10 PROCEDURE — 63600175 PHARM REV CODE 636 W HCPCS: Performed by: INTERNAL MEDICINE

## 2023-08-10 PROCEDURE — 84439 ASSAY OF FREE THYROXINE: CPT | Performed by: NURSE PRACTITIONER

## 2023-08-10 PROCEDURE — 99215 PR OFFICE/OUTPT VISIT, EST, LEVL V, 40-54 MIN: ICD-10-PCS | Mod: S$PBB,,, | Performed by: NURSE PRACTITIONER

## 2023-08-10 PROCEDURE — A4216 STERILE WATER/SALINE, 10 ML: HCPCS | Performed by: NURSE PRACTITIONER

## 2023-08-10 PROCEDURE — 85025 COMPLETE CBC W/AUTO DIFF WBC: CPT | Performed by: NURSE PRACTITIONER

## 2023-08-10 PROCEDURE — 96413 CHEMO IV INFUSION 1 HR: CPT

## 2023-08-10 PROCEDURE — 63600175 PHARM REV CODE 636 W HCPCS: Mod: JZ,JG | Performed by: NURSE PRACTITIONER

## 2023-08-10 PROCEDURE — 99215 OFFICE O/P EST HI 40 MIN: CPT | Mod: PBBFAC,25 | Performed by: NURSE PRACTITIONER

## 2023-08-10 PROCEDURE — 84443 ASSAY THYROID STIM HORMONE: CPT | Performed by: NURSE PRACTITIONER

## 2023-08-10 PROCEDURE — A4216 STERILE WATER/SALINE, 10 ML: HCPCS | Performed by: INTERNAL MEDICINE

## 2023-08-10 PROCEDURE — 25000003 PHARM REV CODE 250: Performed by: INTERNAL MEDICINE

## 2023-08-10 PROCEDURE — 99215 OFFICE O/P EST HI 40 MIN: CPT | Mod: S$PBB,,, | Performed by: NURSE PRACTITIONER

## 2023-08-10 PROCEDURE — 97811 ACUP 1/> W/O ESTIM EA ADD 15: CPT | Performed by: ACUPUNCTURIST

## 2023-08-10 PROCEDURE — 99999 PR PBB SHADOW E&M-EST. PATIENT-LVL V: ICD-10-PCS | Mod: PBBFAC,,, | Performed by: NURSE PRACTITIONER

## 2023-08-10 PROCEDURE — 99999 PR PBB SHADOW E&M-EST. PATIENT-LVL V: CPT | Mod: PBBFAC,,, | Performed by: NURSE PRACTITIONER

## 2023-08-10 PROCEDURE — 97810 ACUP 1/> WO ESTIM 1ST 15 MIN: CPT | Performed by: ACUPUNCTURIST

## 2023-08-10 RX ORDER — HEPARIN 100 UNIT/ML
500 SYRINGE INTRAVENOUS
Status: DISCONTINUED | OUTPATIENT
Start: 2023-08-10 | End: 2023-08-10 | Stop reason: HOSPADM

## 2023-08-10 RX ORDER — SODIUM CHLORIDE 0.9 % (FLUSH) 0.9 %
10 SYRINGE (ML) INJECTION
Status: CANCELLED | OUTPATIENT
Start: 2023-08-10

## 2023-08-10 RX ORDER — SODIUM CHLORIDE 0.9 % (FLUSH) 0.9 %
10 SYRINGE (ML) INJECTION
Status: DISCONTINUED | OUTPATIENT
Start: 2023-08-10 | End: 2023-08-10 | Stop reason: HOSPADM

## 2023-08-10 RX ORDER — HEPARIN 100 UNIT/ML
500 SYRINGE INTRAVENOUS
Status: CANCELLED | OUTPATIENT
Start: 2023-08-10

## 2023-08-10 RX ADMIN — SODIUM CHLORIDE: 9 INJECTION, SOLUTION INTRAVENOUS at 11:08

## 2023-08-10 RX ADMIN — SODIUM CHLORIDE 400 MG: 9 INJECTION, SOLUTION INTRAVENOUS at 11:08

## 2023-08-10 RX ADMIN — Medication 10 ML: at 12:08

## 2023-08-10 RX ADMIN — Medication 10 ML: at 09:08

## 2023-08-10 RX ADMIN — HEPARIN 500 UNITS: 100 SYRINGE at 09:08

## 2023-08-10 NOTE — PROGRESS NOTES
ONCOLOGY FOLLOW UP VISIT    Subjective:      Patient ID: Rocco Swain    Chief Complaint: No chief complaint on file.      HPI  Rocco Swain is a 73 y.o. male, patient of Dr. Berg, who returns to clinic for evaluation and management of  P16 positive squamous cell carcinoma. Reports some joint pains that come and go: ankles, keens, elbows, wrists. Started acupuncture for chronic lower back pain - has noticed improvement in pain. Appetite has improved. He is fly fishing daily. Was in [place] last week and was stung by a jellyfish on his left foot. Has some healing blisters to his toes.     ECOG Performance status: 1 - Symptomatic but completely ambulatory Communication from him is 100% written.     Cancer Staging   Squamous cell carcinoma of base of tongue  Staging form: Pharynx - HPV-Mediated Oropharynx, AJCC 8th Edition  - Clinical stage from 9/18/2020: Stage III (rcT4, cN1, cM0, p16+) - Signed by Ronald Berg MD on 12/27/2022      Oncologic History:  Oncology History   Squamous cell carcinoma of base of tongue   9/18/2020 Cancer Staged    Staging form: Pharynx - HPV-Mediated Oropharynx, AJCC 8th Edition  - Clinical stage from 9/18/2020: Stage III (rcT4, cN1, cM0, p16+)     9/28/2020 Initial Diagnosis    Squamous cell carcinoma of base of tongue     10/6/2020 - 8/17/2021 Chemotherapy    Treatment Summary   Plan Name: OP HEAD NECK CARBOPLATIN PACLITAXEL C1-2 FOLLOWED BY CETUXIMAB CARBOPLATIN C3-6 FOLLOWED BY CETUXIMAB MAINTENANCE WEEKLY  Treatment Goal: Control  Status: Inactive  Start Date: 10/6/2020  End Date: 8/17/2021  Provider: Ronald Berg MD  Chemotherapy: cetuximab (ERBITUX) 100 mg/50 mL chemo infusion 684 mg, 400 mg/m2 = 684 mg (100 % of original dose 400 mg/m2), Intravenous, Clinic/HOD 1 time, 29 of 38 cycles  Dose modification: 500 mg/m2 (original dose 400 mg/m2, Cycle 3), 250 mg/m2 (original dose 400 mg/m2, Cycle 3), 400 mg/m2 (original dose 400 mg/m2, Cycle 3), 250 mg/m2 (original dose 250 mg/m2, Cycle  7), 200 mg/m2 (original dose 250 mg/m2, Cycle 18), 200 mg/m2 (original dose 250 mg/m2, Cycle 19), 250 mg/m2 (original dose 250 mg/m2, Cycle 4), 200 mg/m2 (original dose 250 mg/m2, Cycle 25), 200 mg/m2 (original dose 250 mg/m2, Cycle 28)  Administration: 684 mg (11/17/2020), 400 mg (11/24/2020), 400 mg (2/9/2021), 400 mg (2/17/2021), 427.6 mg (3/9/2021), 400 mg (3/30/2021), 400 mg (3/23/2021), 400 mg (4/6/2021), 427.6 mg (4/13/2021), 427.6 mg (4/20/2021), 342 mg (5/11/2021), 342 mg (5/18/2021), 342 mg (5/25/2021), 400 mg (5/31/2021), 400 mg (6/7/2021), 400 mg (6/14/2021), 400 mg (12/8/2020), 427.6 mg (12/29/2020), 400 mg (12/1/2020), 400 mg (12/15/2020), 400 mg (12/22/2020), 427.6 mg (1/5/2021), 400 mg (1/12/2021), 400 mg (1/19/2021), 427.6 mg (1/26/2021), 400 mg (2/2/2021), 400 mg (2/23/2021), 400 mg (3/2/2021), 427.6 mg (3/16/2021), 400 mg (6/21/2021), 342 mg (6/28/2021), 342 mg (7/6/2021), 342 mg (7/13/2021), 342 mg (7/20/2021), 400 mg (7/27/2021), 400 mg (8/10/2021), 400 mg (8/17/2021)  CARBOplatin (PARAPLATIN) 310 mg in sodium chloride 0.9% 500 mL chemo infusion, 310 mg (92.2 % of original dose 334.5 mg), Intravenous, Clinic/HOD 1 time, 6 of 6 cycles  Dose modification:   (original dose 334.5 mg, Cycle 1)  Administration: 310 mg (10/6/2020), 370 mg (11/17/2020), 300 mg (10/27/2020), 350 mg (12/8/2020), 335 mg (12/29/2020), 320 mg (1/19/2021)  PACLitaxeL (TAXOL) 175 mg/m2 = 300 mg in sodium chloride 0.9% 500 mL chemo infusion, 175 mg/m2 = 300 mg (100 % of original dose 175 mg/m2), Intravenous, Clinic/HOD 1 time, 2 of 2 cycles  Dose modification: 175 mg/m2 (original dose 175 mg/m2, Cycle 1)  Administration: 300 mg (10/6/2020), 300 mg (10/27/2020)     4/29/2021 Tumor Conference       His case was discussed at the Multidisciplinary Head and Neck Team Planning Meeting.    Representatives from Medical Oncology, Radiation Oncology, Head and Neck Surgical Oncology, Psychosocial Oncology, and Speech and Language  Pathology discussed the case with the following recommendations:    1) biopsy         7/29/2021 Tumor Conference       His case was discussed at the Multidisciplinary Head and Neck Team Planning Meeting.    Representatives from Medical Oncology, Radiation Oncology, Head and Neck Surgical Oncology, Psychosocial Oncology, and Speech and Language Pathology discussed the case with the following recommendations:    1) Head and neck clinic follow up  2) consider Keytruda (discuss with transplant)  3) consider palliative referral         9/13/2021 -  Chemotherapy    Treatment Summary   Plan Name: OP HEAD NECK PEMBROLIZUMAB CARBOPLATIN FLUOROURACIL (C1 ONLY RECEIVED) FOLLOWED BY PEMBROLIZUMAB MAINTENANCE  Treatment Goal: Palliative  Status: Active  Start Date: 9/13/2021  End Date: 9/5/2023 (Planned)  Provider: Ronald Berg MD  Chemotherapy: CARBOplatin (PARAPLATIN) 320 mg in sodium chloride 0.9% 500 mL chemo infusion, 320 mg (100 % of original dose 320.5 mg), Intravenous, Clinic/HOD 1 time, 6 of 6 cycles  Dose modification:   (original dose 320.5 mg, Cycle 1)  Administration: 320 mg (9/13/2021), 335 mg (12/23/2021), 325 mg (1/27/2022), 245 mg (3/3/2022), 255 mg (5/12/2022), 255 mg (4/7/2022)  fluorouraciL 1,000 mg/m2/day = 6,440 mg in sodium chloride 0.9% 240 mL chemo infusion, 1,000 mg/m2/day = 6,440 mg, Intravenous, Over 96 hours, 6 of 6 cycles  Dose modification: 800 mg/m2/day (original dose 1,000 mg/m2/day, Cycle 6), 5,000 mg (original dose 1,000 mg/m2/day, Cycle 8)  Administration: 6,440 mg (9/13/2021), 6,440 mg (12/23/2021), 6,480 mg (1/27/2022), 5,000 mg (3/3/2022), 5,000 mg (4/7/2022), 5,000 mg (5/12/2022)     Secondary malignant neoplasm of cervical lymph node   2/9/2021 Initial Diagnosis    Secondary malignant neoplasm of cervical lymph node     9/13/2021 -  Chemotherapy    Treatment Summary   Plan Name: OP HEAD NECK PEMBROLIZUMAB CARBOPLATIN FLUOROURACIL (C1 ONLY RECEIVED) FOLLOWED BY PEMBROLIZUMAB  MAINTENANCE  Treatment Goal: Palliative  Status: Active  Start Date: 9/13/2021  End Date: 9/5/2023 (Planned)  Provider: Ronald Berg MD  Chemotherapy: CARBOplatin (PARAPLATIN) 320 mg in sodium chloride 0.9% 500 mL chemo infusion, 320 mg (100 % of original dose 320.5 mg), Intravenous, Clinic/[facility] 1 time, 6 of 6 cycles  Dose modification:   (original dose 320.5 mg, Cycle 1)  Administration: 320 mg (9/13/2021), 335 mg (12/23/2021), 325 mg (1/27/2022), 245 mg (3/3/2022), 255 mg (5/12/2022), 255 mg (4/7/2022)  fluorouraciL 1,000 mg/m2/day = 6,440 mg in sodium chloride 0.9% 240 mL chemo infusion, 1,000 mg/m2/day = 6,440 mg, Intravenous, Over 96 hours, 6 of 6 cycles  Dose modification: 800 mg/m2/day (original dose 1,000 mg/m2/day, Cycle 6), 5,000 mg (original dose 1,000 mg/m2/day, Cycle 8)  Administration: 6,440 mg (9/13/2021), 6,440 mg (12/23/2021), 6,480 mg (1/27/2022), 5,000 mg (3/3/2022), 5,000 mg (4/7/2022), 5,000 mg (5/12/2022)         Review of Systems   Constitutional:  Negative for activity change, appetite change, chills, fatigue, fever and unexpected weight change.   HENT:  Negative for ear pain, facial swelling, hearing loss, mouth sores, nosebleeds, sore throat, trouble swallowing and voice change.         No verbal communication. Skin fixed and immobile from previous scaring post-neck dissection   Eyes:  Positive for redness (right eye). Negative for pain, discharge and visual disturbance.   Respiratory:  Negative for cough, choking, chest tightness and shortness of breath.    Cardiovascular:  Negative for chest pain, palpitations and leg swelling.   Gastrointestinal:  Negative for abdominal distention, abdominal pain, blood in stool, constipation, diarrhea, nausea and vomiting.   Endocrine: Negative for cold intolerance and heat intolerance.   Genitourinary:  Negative for difficulty urinating, dysuria, frequency and urgency.   Musculoskeletal:  Positive for back pain (lower - chronic). Negative for  arthralgias, gait problem, leg pain and myalgias.   Integumentary:  Negative for pallor, rash and wound.        Blistering to left toes   Allergic/Immunologic: Negative for frequent infections.   Neurological:  Negative for dizziness, tremors, weakness, light-headedness, numbness and headaches.   Hematological:  Negative for adenopathy. Does not bruise/bleed easily.   Psychiatric/Behavioral:  Negative for agitation, confusion, dysphoric mood and sleep disturbance. The patient is not nervous/anxious.         Allergies:  Review of patient's allergies indicates:  No Known Allergies    Medications:  Current Outpatient Medications   Medication Sig Dispense Refill    azelaic acid (AZELEX) 15 % gel APPLY TOPICALLY TO AFFECTED AREA IN THE MORNING 50 g 3    gabapentin (NEURONTIN) 300 MG capsule Take 1 capsule (300 mg total) by mouth every evening. 30 capsule 11    ibuprofen (ADVIL,MOTRIN) 200 MG tablet Take 200 mg by mouth.      imiquimod (ALDARA) 5 % cream Apply to biopsy site on frontal scalp + about a centimeter of surrounding skin qhs x 16 weeks. Wash off in am and apply sunscreen. Discontinue if skin becomes blistered, raw, bleeding, painful, etc. 24 packet 3    imiquimod (ALDARA) 5 % cream Apply to biopsy site on frontal scalp + about a centimeter of surrounding skin qhs x 16 weeks. Wash off in am and apply sunscreen. Discontinue if skin becomes blistered, raw, bleeding, painful, etc. 24 packet 3    levothyroxine (SYNTHROID) 88 MCG tablet TAKE 1 TABLET BY MOUTH ONCE DAILY 90 tablet 3    LIDOcaine HCl 2% (LIDOCAINE VISCOUS) 2 % Soln Swish and spit 15 mls every 8 (eight) hours as needed (mouth sore). 100 mL 3    LIDOcaine-prilocaine (EMLA) cream Apply generously to port site 30-60 min prior to chemo and then cover with saran wrap. 30 g 2    loperamide (IMODIUM) 2 mg capsule Take 1 capsule (2 mg total) by mouth 4 (four) times daily as needed for Diarrhea. 30 capsule 1    loteprednol (LOTEMAX) 0.5 % ophthalmic suspension  Place 1 drop into the right eye 4 (four) times daily. 5 mL 1    magic mouthwash diphen/antac/lidoc/nysta Take 10 mLs by mouth 4 (four) times daily. 120 mL 4    metroNIDAZOLE (METROGEL) 0.75 % gel Apply topically to affected area 2 (two) times daily. 45 g 1    moxifloxacin (VIGAMOX) 0.5 % ophthalmic solution Place 1 drop into the right eye 4 (four) times daily. 3 mL 3    multivit-min/FA/lycopen/lutein (CENTRUM SILVER MEN ORAL) Take 1 tablet by mouth.      multivitamin capsule Take 1 capsule by mouth once daily.      oxyCODONE (ROXICODONE) 10 mg Tab immediate release tablet Take 1 tablet (10 mg total) by mouth every 6 (six) hours as needed for Pain. 80 tablet 0    prednisoLONE acetate (PRED FORTE) 1 % DrpS Place 1 drop into the right eye 4 (four) times daily. 10 mL 3    sulfacetamide sodium-sulfur 10-5 % (w/w) Clsr USE TO WASH AFFECTED AREA DAILY      tacrolimus (PROGRAF) 0.5 MG Cap Take 1 capsule (0.5 mg total) by mouth every EVENING.  (Use the 1mg capsule for your morning dose) 90 capsule 3    tacrolimus (PROGRAF) 0.5 MG Cap Take 1 capsule (0.5 mg total) by mouth every evening. Use the 1mg capsule for your morning dose 90 capsule 3    tacrolimus (PROGRAF) 1 MG Cap Take 1 capsule (1 mg total) by mouth every morning. Use the tacrolimus 0.5mg capsule for your evening dose as directed. 90 capsule 3    tiZANidine (ZANAFLEX) 2 MG tablet Take 1 tablet (2 mg total) by mouth nightly as needed (neck muscle strain). 30 tablet 3    traZODone (DESYREL) 50 MG tablet Take 1 tablet (50 mg total) by mouth every evening. 90 tablet 2    vitamin E 400 UNIT capsule Take 400 Units by mouth once daily.       Current Facility-Administered Medications   Medication Dose Route Frequency Provider Last Rate Last Admin    sodium chloride 0.9% flush 10 mL  10 mL Intravenous PRN Ting Brambila MD         Facility-Administered Medications Ordered in Other Visits   Medication Dose Route Frequency Provider Last Rate Last Admin    heparin, porcine  (PF) 100 unit/mL injection flush 500 Units  500 Units Intravenous PRN Roanld Berg MD        heparin, porcine (PF) 100 unit/mL injection flush 500 Units  500 Units Intravenous PRN Martín Hernandez MD   500 Units at 08/10/23 0929    ofloxacin 0.3 % ophthalmic solution 1 drop  1 drop Right Eye On Call Procedure Victor M Vasques MD   2 drop at 10/11/22 0745    sodium chloride 0.9% flush 10 mL  10 mL Intravenous PRN Ronald Berg MD        sodium chloride 0.9% flush 10 mL  10 mL Intravenous PRN Victor M Vasques MD        sodium chloride 0.9% flush 10 mL  10 mL Intravenous PRN Martín Hernandez MD   10 mL at 08/10/23 0928       PMH:  Past Medical History:   Diagnosis Date    Basal cell carcinoma (BCC) in situ of skin 2012    3 on face, 2 on arm, removed by dermatology.     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 Underwent surgical resection of tongue and neck. s/p tracheostomy       PSH:  Past Surgical History:   Procedure Laterality Date    COLONOSCOPY      CONJUNCTIVA BIOPSY Right 10/11/2022    Procedure: BIOPSY, CONJUNCTIVA;  Surgeon: Victor M Vasques MD;  Location: [facility];  Service: Ophthalmology;  Laterality: Right;    INSERTION OF VENOUS ACCESS PORT Right 3/8/2021    Procedure: INSERTION, VENOUS ACCESS PORT;  Surgeon: Jesus Rendon MD;  Location: [facility] OR [address] [place] [address];  Service: General;  Laterality: Right;    LIVER TRANSPLANT  11/2008    transplanted for biopsy proven hepatocellular carcinoma,     LYMPH NODE BIOPSY N/A 9/18/2020    Procedure: BIOPSY, LYMPH NODE;  Surgeon: [facility] Diagnostic Provider;  Location: [facility] OR [address] [place] [address];  Service: General;  Laterality: N/A;  Rm 173    TRACHEOSTOMY         FamHx:  Family History   Problem Relation Age of Onset     Dementia Mother        SocHx:  Social History     Socioeconomic History    Marital status: Single   Occupational History    Occupation: Retired     Comment: , notes exposures to fumes etc.  Worked on [email or web address] for 4 years   Tobacco Use    Smoking status: Never    Smokeless tobacco: Never   Substance and Sexual Activity    Alcohol use: Yes     Comment: drinks wine, 1 glass day    Drug use: Not Currently    Sexual activity: Yes     Comment: No prior history of STD        Distress Score             Objective:      There were no vitals filed for this visit.       Physical Exam  Vitals reviewed.   Constitutional:       General: He is not in acute distress.     Appearance: Normal appearance. He is well-developed and underweight.   HENT:      Head: Normocephalic and atraumatic.      Mouth/Throat:      Mouth: Mucous membranes are moist.      Dentition: Abnormal dentition. Dental caries present.   Eyes:      Conjunctiva/sclera: Conjunctivae normal.      Pupils: Pupils are equal, round, and reactive to light.   Neck:      Trachea: Tracheostomy present.   Pulmonary:      Effort: Pulmonary effort is normal. No respiratory distress.      Comments: Tracheostomy  Abdominal:      General: There is no distension.      Palpations: Abdomen is soft.   Musculoskeletal:         General: No swelling. Normal range of motion.      Cervical back: Normal range of motion and neck supple.   Skin:     General: Skin is warm and dry.   Neurological:      General: No focal deficit present.      Mental Status: He is alert and oriented to person, place, and time.   Psychiatric:         Mood and Affect: Mood normal.         Behavior: Behavior normal.         Thought Content: Thought content normal.         Judgment: Judgment normal.           LABS:  WBC   Date Value Ref Range Status   08/10/2023 3.88 (L) 3.90 - 12.70 K/uL Final     Hemoglobin   Date Value Ref Range Status   08/10/2023 10.9 (L) 14.0 - 18.0 g/dL Final      Hematocrit   Date Value Ref Range Status   08/10/2023 31.6 (L) 40.0 - 54.0 % Final     Platelets   Date Value Ref Range Status   08/10/2023 141 (L) 150 - 450 K/uL Final       Chemistry        Component Value Date/Time     06/15/2023 0924    K 4.8 06/15/2023 0924     06/15/2023 0924    CO2 25 06/15/2023 0924    BUN 14 06/15/2023 0924    CREATININE 1.6 (H) 06/15/2023 0924     (H) 06/15/2023 0924        Component Value Date/Time    CALCIUM 10.1 06/15/2023 0924    ALKPHOS 88 06/15/2023 0924     (H) 06/15/2023 0924    ALT 43 06/15/2023 0924    BILITOT 0.7 06/15/2023 0924    ESTGFRAFRICA 52.6 (A) 07/14/2022 1119    EGFRNONAA 45.5 (A) 07/14/2022 1119              Assessment:       1. Squamous cell carcinoma of base of tongue    2. Secondary malignant neoplasm of cervical lymph node    3. Tracheostomy status    4. 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 Status post liver transplantation - 2008    7. Cirrhosis of transplanted liver    8. Jaw pain            Plan:       1,2. 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 He is not a surgical or radiation candidate.  -Started on PCC 10/6/2020 followed by maintenance cetuximab until 8/24/21 (discontinued due to progression of disease; continued increase in SUV uptake, no new lesions).  - 9/2021 started on carbo/5-FU/pembrolizumab; due to toxicity went to pembrolizumab monotherapy starting with cycle 2.  Progression of disease noted after cycle 3 so added chemotherapy back in with cycle 4. Keytruda monotherapy starting with C9.   - Labs acceptable to proceed with Keytruda; this dose will complete 2 years. June 2023 PET scan reviewed with patient - shows no evidence of hypermetabolic disease. Re-staging scans in September.     3. Status post total laryngectomy, total glossectomy and anterolateral thigh free flap reconstruction roughly  2017 at [facility] in [place] for persistent/recurrent base of tongue sq.c.c.    4. Planned excision for conjunctival pre-cancerous lesion    5-7. S/p liver transplant and is currently on tacrolimus.  Afebrile, ANC sufficient for treatment. Grade 1. Will continue to monitor.     8. Continue Oxy IR. Now following with palliative care. Not a candidate for Celebrex d/t liver tx and kidney dysfunction.       he will return to clinic in 6 weeks, but knows to call in the interim if symptoms change or should a problem arise.     Patient is in agreement with the proposed treatment plan. All questions were answered to the patient's satisfaction. Pt knows to call clinic if anything is needed before the next clinic visit.    Ct Francis, MSN, APRN, FNP-C  Hematology and Medical Oncology  Nurse Practitioner to Dr. Martín Hernandez  Nurse Practitioner, [facility]            Route Chart for Scheduling    Med Onc Chart Routing      Follow up with physician 6 weeks. review imaging   Follow up with [facility]    Infusion scheduling note    Injection scheduling note    Labs CBC, CMP, free T4 and TSH   Scheduling:  Preferred lab:  Lab interval:     Imaging PET scan   in 6 weeks   Pharmacy appointment    Other referrals              Treatment Plan Information   OP HEAD NECK PEMBROLIZUMAB CARBOPLATIN FLUOROURACIL (C1 ONLY RECEIVED) FOLLOWED BY PEMBROLIZUMAB MAINTENANCE   Ronald Berg MD   Upcoming Treatment Dates - OP HEAD NECK PEMBROLIZUMAB CARBOPLATIN FLUOROURACIL (C1 ONLY RECEIVED) FOLLOWED BY PEMBROLIZUMAB MAINTENANCE    7/25/2023       Immunotherapy       pembrolizumab (KEYTRUDA) 400 mg in sodium chloride 0.9% SolP 116 mL infusion  9/5/2023       Immunotherapy       pembrolizumab (KEYTRUDA) 400 mg in sodium chloride 0.9% SolP 116 mL infusion    Therapy Plan Information  PORT FLUSH  Flushes  heparin, porcine (PF) 100 unit/mL injection flush 500 Units  500 Units, Intravenous, Every visit  sodium  chloride 0.9% flush 10 mL  10 mL, Intravenous, Every visit    PORT FLUSH  Flushes  heparin, porcine (PF) 100 unit/mL injection flush 500 Units  500 Units, Intravenous, Every visit  sodium chloride 0.9% flush 10 mL  10 mL, Intravenous, Every visit

## 2023-08-10 NOTE — NURSING
Pt here for lab draw from PAC. Dada ordered blood work and sent to lab. Flushed PAC with NS and heparin; left PAC accessed for treatment later today. No questions or concerns. Pt ambulated out of unit unassisted.

## 2023-08-10 NOTE — PROGRESS NOTES
Acupuncture Evaluation Note     Name: Rocco Swain  [facility] Number: [id number]    Traditional Chinese Medicine (TCM) Diagnosis: Qi Stagnation and Blood Stasis  Medical Diagnosis:   Encounter Diagnosis   Name Primary?    Chronic low back pain, unspecified back pain laterality, unspecified whether sciatica present Yes        Evaluation Date: 8/10/2023    Visit #/Visits authorized: 12,2/12    Precautions: Standard and cancer    Subjective     Chief Concern: cLBP in L4-L5 area    Medical necessity is demonstrated by the following IMPAIRMENTS: Medical Necessity: Decreased mobility limits day to day activities, social, and emergent situations              Aggravating Factors:  movement and standing   Relieving Factors:  rest and laying down    Symptom Description:     Quality:  Aching, Dull, and Sharp  Severity:  6  Frequency:  continuously and every day    Treatment     Treatment Principles:  move qi and blood, stop pain    Acupuncture points used:  BL23, BL25, BL26, BL27, Du4,7, Adelaide Ji L3-L5, Bl30, 29, 28, chelsi    Needles In: 20  Needles Out: 20  Needles W/O STIM placed: 8:15 AM  Needles W/O STIM removed: 8:45 AM        Assessment     After treatment, patient felt good, had about 2-3 days of relief after initial visit. Continue with care     Patient prognosis is Good.     Patient will continue to benefit from acupuncture treatment to address the deficits listed in the problem list box on initial evaluation, provide patient family education and to maximize pt's level of independence in the home and community environment.     Patient's spiritual, cultural and educational needs considered and pt agreeable to plan of care and goals.     Anticipated barriers to treatment: none    Plan     Recommend 1 /week for 4 more sessions then re-assess.      Education:  Patient is aware of cumulative benefit of acupuncture

## 2023-08-14 ENCOUNTER — HOSPITAL ENCOUNTER (OUTPATIENT)
Dept: RADIOLOGY | Facility: HOSPITAL | Age: 73
Discharge: HOME | End: 2023-08-14
Attending: INTERNAL MEDICINE
Payer: MEDICARE

## 2023-08-14 DIAGNOSIS — Z94.4 STATUS POST LIVER TRANSPLANTATION: ICD-10-CM

## 2023-08-14 PROCEDURE — 93976 VASCULAR STUDY: CPT | Mod: 26,,, | Performed by: RADIOLOGY

## 2023-08-14 PROCEDURE — 93976 US DOPPLER LIVER TRANSPLANT POST (XPD): ICD-10-PCS | Mod: 26,,, | Performed by: RADIOLOGY

## 2023-08-14 PROCEDURE — 76705 US DOPPLER LIVER TRANSPLANT POST (XPD): ICD-10-PCS | Mod: 26,59,, | Performed by: RADIOLOGY

## 2023-08-14 PROCEDURE — 93976 VASCULAR STUDY: CPT | Mod: TC

## 2023-08-14 PROCEDURE — 76705 ECHO EXAM OF ABDOMEN: CPT | Mod: 26,59,, | Performed by: RADIOLOGY

## 2023-08-15 ENCOUNTER — PATIENT MESSAGE (OUTPATIENT)
Dept: TRANSPLANT | Facility: CLINIC | Age: 73
End: 2023-08-15
Payer: MEDICARE

## 2023-08-15 ENCOUNTER — TELEPHONE (OUTPATIENT)
Dept: TRANSPLANT | Facility: CLINIC | Age: 73
End: 2023-08-15
Payer: MEDICARE

## 2023-08-15 NOTE — TELEPHONE ENCOUNTER
My chart message sent stating Liver US has been reviewed, stable, no action required.Next due Feb 2024.        ----- Message from Cornell Anton MD sent at 8/15/2023  2:17 PM CDT -----  Results reviewed

## 2023-08-16 ENCOUNTER — CLINICAL SUPPORT (OUTPATIENT)
Dept: REHABILITATION | Facility: HOSPITAL | Age: 73
End: 2023-08-16
Payer: MEDICARE

## 2023-08-16 DIAGNOSIS — K74.60 CIRRHOSIS OF TRANSPLANTED LIVER: ICD-10-CM

## 2023-08-16 DIAGNOSIS — T86.49 CIRRHOSIS OF TRANSPLANTED LIVER: ICD-10-CM

## 2023-08-16 DIAGNOSIS — G89.29 CHRONIC LOW BACK PAIN, UNSPECIFIED BACK PAIN LATERALITY, UNSPECIFIED WHETHER SCIATICA PRESENT: ICD-10-CM

## 2023-08-16 DIAGNOSIS — C32.9 LARYNX CANCER: Primary | ICD-10-CM

## 2023-08-16 DIAGNOSIS — Z93.0 TRACHEOSTOMY STATUS: ICD-10-CM

## 2023-08-16 DIAGNOSIS — Z94.4 STATUS POST LIVER TRANSPLANTATION: Primary | ICD-10-CM

## 2023-08-16 DIAGNOSIS — M54.50 CHRONIC LOW BACK PAIN, UNSPECIFIED BACK PAIN LATERALITY, UNSPECIFIED WHETHER SCIATICA PRESENT: ICD-10-CM

## 2023-08-16 DIAGNOSIS — C22.0 HCC (HEPATOCELLULAR CARCINOMA): ICD-10-CM

## 2023-08-16 PROCEDURE — 97814 ACUP 1/> W/ESTIM EA ADDL 15: CPT | Performed by: ACUPUNCTURIST

## 2023-08-16 PROCEDURE — 97813 ACUP 1/> W/ESTIM 1ST 15 MIN: CPT | Performed by: ACUPUNCTURIST

## 2023-08-16 NOTE — PROGRESS NOTES
Acupuncture Evaluation Note     Name: Rocco Swain  [facility] Number: [id number]    Traditional Chinese Medicine (TCM) Diagnosis: Qi Stagnation, Qi Deficiency, and Blood Deficiency  Medical Diagnosis:   Encounter Diagnoses   Name Primary?    Larynx cancer Yes    Tracheostomy status     Chronic low back pain, unspecified back pain laterality, unspecified whether sciatica present     Cirrhosis of transplanted liver         Evaluation Date: 8/16/2023    Visit #/Visits authorized: 3/12    Precautions: Standard and cancer    Subjective     Chief Concern: cLBP, L4/L5 and upper back - neck and shoulder pain    Medical necessity is demonstrated by the following IMPAIRMENTS: Medical Necessity: Decreased mobility limits day to day activities, social, and emergent situations              Aggravating Factors:  movement and standing   Relieving Factors:  rest and laying down    Symptom Description:     Quality:  Aching, Dull, and Sharp  Severity:  2  Frequency:  continuously and every day    Treatment     Treatment Principles:  Move qi, nourish qi and blood, stop pain    Acupuncture points used:      Bilateral points:  BL11, BL13, BL14, BL15, BL18, BL23, BL25, GB20, BL10, BL40  Unilateral points: BL58, SI3, LU7  Auricular Treatment:      Needles In: 23  Needles Out: 23  [place] W/ STIM placed: 9:50  Needles W/ STIM removed: 10:20        Assessment     After treatment, patient felt better in low back      Patient prognosis is Good.     Patient will continue to benefit from acupuncture treatment to address the deficits listed in the problem list box on initial evaluation, provide patient family education and to maximize pt's level of independence in the home and community environment.     Patient's spiritual, cultural and educational needs considered and pt agreeable to plan of care and goals.     Anticipated barriers to treatment: none    Plan     Recommend 1 /week for 3 sessions then re-assess.      Education:  Patient is aware  of cumulative benefit of acupuncture

## 2023-08-18 ENCOUNTER — TELEPHONE (OUTPATIENT)
Dept: OPHTHALMOLOGY | Facility: CLINIC | Age: 73
End: 2023-08-18
Payer: MEDICARE

## 2023-08-18 NOTE — TELEPHONE ENCOUNTER
Patient given arrival time of 10:30 am on Thursday August 24 . Nothing to eat or drink after 11:59 pm.   Start drops into the operative eye today. [address] [facility]

## 2023-08-21 ENCOUNTER — CLINICAL SUPPORT (OUTPATIENT)
Dept: REHABILITATION | Facility: HOSPITAL | Age: 73
End: 2023-08-21
Payer: MEDICARE

## 2023-08-21 DIAGNOSIS — C32.9 LARYNX CANCER: Primary | ICD-10-CM

## 2023-08-21 DIAGNOSIS — Z93.0 TRACHEOSTOMY STATUS: ICD-10-CM

## 2023-08-21 DIAGNOSIS — K74.60 CIRRHOSIS OF TRANSPLANTED LIVER: ICD-10-CM

## 2023-08-21 DIAGNOSIS — T86.49 CIRRHOSIS OF TRANSPLANTED LIVER: ICD-10-CM

## 2023-08-21 DIAGNOSIS — M54.50 CHRONIC LOW BACK PAIN, UNSPECIFIED BACK PAIN LATERALITY, UNSPECIFIED WHETHER SCIATICA PRESENT: ICD-10-CM

## 2023-08-21 DIAGNOSIS — G89.29 CHRONIC LOW BACK PAIN, UNSPECIFIED BACK PAIN LATERALITY, UNSPECIFIED WHETHER SCIATICA PRESENT: ICD-10-CM

## 2023-08-21 PROCEDURE — 97814 ACUP 1/> W/ESTIM EA ADDL 15: CPT | Performed by: ACUPUNCTURIST

## 2023-08-21 PROCEDURE — 97813 ACUP 1/> W/ESTIM 1ST 15 MIN: CPT | Performed by: ACUPUNCTURIST

## 2023-08-21 NOTE — PROGRESS NOTES
Acupuncture Evaluation Note     Name: Rocco Swain  [facility] Number: [id number]    Traditional Chinese Medicine (TCM) Diagnosis: Qi Stagnation and Blood Deficiency  Medical Diagnosis:   Encounter Diagnoses   Name Primary?    Larynx cancer Yes    Tracheostomy status     Chronic low back pain, unspecified back pain laterality, unspecified whether sciatica present     Cirrhosis of transplanted liver         Evaluation Date: 8/21/2023    Visit #/Visits authorized: 4/12    Precautions: Standard and cancer    Subjective     Chief Concern: cLBP, upper back/neck pain     Medical necessity is demonstrated by the following IMPAIRMENTS: Medical Necessity: Decreased mobility limits day to day activities, social, and emergent situations and Decreased quality of life              Aggravating Factors:  movement and standing   Relieving Factors:  rest    Symptom Description:     Quality:  Aching, Dull, and Sharp  Severity:  2  Frequency:  continuously and every day    Treatment     Treatment Principles:  Move qi, nourish qi and blood, stop pain    Acupuncture points used:      Bilateral points: GB20, BL10, GB21, SI14, BL13, BL14, BL23, BL25, BL27, SI3, SP6, BL11  Unilateral points:  Auricular Treatment:      Needles In: 24  Needles Out: 24  [place] W/ STIM placed: 10:25  Needles W/ STIM removed: 10:50        Assessment     After treatment, patient felt neck and low back pain better      Patient prognosis is Good.     Patient will continue to benefit from acupuncture treatment to address the deficits listed in the problem list box on initial evaluation, provide patient family education and to maximize pt's level of independence in the home and community environment.     Patient's spiritual, cultural and educational needs considered and pt agreeable to plan of care and goals.     Anticipated barriers to treatment: none    Plan     Recommend 1 /week for 2 sessions then re-assess.      Education:  Patient is aware of cumulative benefit  of acupuncture

## 2023-08-22 NOTE — PRE-PROCEDURE INSTRUCTIONS
- Nothing to eat or drink after midinight, except AM meds with small sips of water, Gatorade/Powerade  - Hold all Diabetic meds AM of surgery  - Hold all Insulin AM of surgery  - Hold all Fluid pills AM of surgery  - Hold all non-insulin shots until after surgery (Ozempic, Mounjaro, Trulicity, Victoza, Byetta, Wegovy and Adlyxin) (up to 7 days prior)  - Take all B/P meds, except those that contain a fluid pill  - Hold all vits and herbal meds AM of surgery  - Use inhalers as needed and bring AM of surgery  - Take blood thinner meds AM of surgery  - Use eye drops as directed  - Shower and wash face with dial soap for 3 mins PM prior and AM of surgery  - No powder, lotions, creams, (makeup),  or jewelry    - Wear comfortable clothing (button up shirt)    (Patients ride may not leave while patient is in surgery)    -- 2nd floor surgery ctr at [facility] @ [address] [facility] [place] [id number]       Pt brother voiced understanding

## 2023-08-23 ENCOUNTER — ANESTHESIA EVENT (OUTPATIENT)
Dept: SURGERY | Facility: HOSPITAL | Age: 73
End: 2023-08-23
Payer: MEDICARE

## 2023-08-23 NOTE — ANESTHESIA PREPROCEDURE EVALUATION
08/23/2023  Rocco Swain is a 73 y.o., male.      Pre-op Assessment    I have reviewed the Patient Summary Reports.    I have reviewed the NPO Status.   I have reviewed the Medications.     Review of Systems  Hematology/Oncology:         -- Anemia: --  Cancer in past history:  surgery and radiation    EENT/Dental:   Laryngeal cancer  Base of the tongue cancer s/p resection  palliative care  tracheastomy Eyes:   Pulmonary:   COPD    Renal/:   Chronic Renal Disease, CKD    Hepatic/GI:   Liver Disease, Hepatitis    Endocrine:   Hypothyroidism       Basal cell carcinoma (BCC) in situ of skin 2012     3 on face, 2 on arm, removed by dermatology.    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 Underwent surgical resection of tongue and neck. s/p tracheostomy            Past Surgical History:   Procedure Laterality Date    COLONOSCOPY        CONJUNCTIVA BIOPSY Right 10/11/2022     Procedure: BIOPSY, CONJUNCTIVA;  Surgeon: Victor M Vasques MD;  [facility];  Service: Ophthalmology;  Laterality: Right;    INSERTION OF VENOUS ACCESS PORT Right 3/8/2021     Procedure: INSERTION, VENOUS ACCESS PORT;  Surgeon: Jesus Rendon MD;  Location: [facility] OR [address] [place] [address];  Service: General;  Laterality: Right;    LIVER TRANSPLANT   11/2008     transplanted for biopsy proven hepatocellular carcinoma,     LYMPH NODE BIOPSY N/A 9/18/2020     Procedure: BIOPSY, LYMPH NODE;  [facility];  [facility];  Service: General;  Laterality: N/A;  Rm 173    TRACHEOSTOMY            Hepatitis C, chronic Basal cell carcinoma (BCC) in situ of  skin   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  No concerning hypermetabolic cutaneous lesions.  Postsurgical changes of glossectomy, laryngectomy with flap reconstruction and tracheostomy.  There is some mild, diffuse hard palate uptake similar to priors.  Previous hypermetabolic focus about the cutaneous aspect of the chin appears to have normalized in the interim.     In the chest, there are no hypermetabolic lesions worrisome for malignancy.  There are no concerning pulmonary nodules or masses, and there are no pathologically enlarged or hypermetabolic lymph nodes.     In the abdomen and pelvis, there is physiologic tracer distribution within the abdominal organs and excretion into the genitourinary system.     In the bones, there are no hypermetabolic lesions worrisome for malignancy.     In the extremities, there are no hypermetabolic lesions worrisome for malignancy.     Impression:     No evidence of FDG avid tumor in patient with squamous cell carcinoma of the base of the tongue status post resection.  No abnormal uptake to suggest distant metastasis.         Physical Exam  General: Cachexia    Dental:tracheastomy      Anesthesia Plan  Type of Anesthesia, risks & benefits discussed:    Anesthesia Type: MAC  Intra-op Monitoring Plan: Standard ASA Monitors  Post Op Pain Control Plan: multimodal analgesia and IV/PO Opioids PRN  Induction:  IV  Informed Consent: Informed consent signed with the Patient and all parties understand the risks and agree with anesthesia plan.  All questions  answered.   ASA Score: 4  Day of Surgery Review of History & Physical: H&P Update referred to the surgeon/provider.I have interviewed and examined the patient. I have reviewed the patient's H&P dated: H&P completed by Anesthesiologist.    Ready For Surgery From Anesthesia Perspective.     .

## 2023-08-23 NOTE — H&P
Pre-operative History and Physical  Ophthalmology Service    CC: Right eye conjunctival lesion     HPI:   Patient is a 73 y.o. male presents with an eye problem right eye with recurrent VIK vs scarring requiring surgery as noted in the most recent ophthalmology progress note.    Past Medical History:   Diagnosis Date    Basal cell carcinoma (BCC) in situ of skin 2012    3 on face, 2 on arm, removed by dermatology.     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 Underwent surgical resection of tongue and neck. s/p tracheostomy       Past Surgical History:   Procedure Laterality Date    COLONOSCOPY      CONJUNCTIVA BIOPSY Right 10/11/2022    Procedure: BIOPSY, CONJUNCTIVA;  Surgeon: Victor M Vasques MD;  Location: [facility];  Service: Ophthalmology;  Laterality: Right;    INSERTION OF VENOUS ACCESS PORT Right 3/8/2021    Procedure: INSERTION, VENOUS ACCESS PORT;  Surgeon: Jesus Rendon MD;  Location: [facility] OR [address] [place] [address];  Service: General;  Laterality: Right;    LIVER TRANSPLANT  11/2008    transplanted for biopsy proven hepatocellular carcinoma,     LYMPH NODE BIOPSY N/A 9/18/2020    Procedure: BIOPSY, LYMPH NODE;  Surgeon: [facility] Diagnostic Provider;  Location: [facility] OR [address] [place] [address];  Service: General;  Laterality: N/A;  Rm 173    TRACHEOSTOMY         Family History   Problem Relation Age of Onset    Dementia Mother        Social History     Socioeconomic History    Marital status: Single   Occupational History    Occupation: Retired     Comment: , notes exposures to fumes etc.  Worked on [employer] for 4 years   Tobacco Use    Smoking status: Never    Smokeless tobacco: Never   Substance and Sexual Activity    Alcohol use: Yes     Comment: drinks wine, 1  glass day    Drug use: Not Currently    Sexual activity: Yes     Comment: No prior history of STD        Current Facility-Administered Medications   Medication Dose Route Frequency Provider Last Rate Last Admin    sodium chloride 0.9% flush 10 mL  10 mL Intravenous PRN Ting Brambila MD         Current Outpatient Medications   Medication Sig Dispense Refill    gabapentin (NEURONTIN) 300 MG capsule Take 1 capsule (300 mg total) by mouth every evening. 30 capsule 11    ibuprofen (ADVIL,MOTRIN) 200 MG tablet Take 200 mg by mouth.      levothyroxine (SYNTHROID) 88 MCG tablet TAKE 1 TABLET BY MOUTH ONCE DAILY 90 tablet 3    metroNIDAZOLE (METROGEL) 0.75 % gel Apply topically to affected area 2 (two) times daily. 45 g 1    multivit-min/FA/lycopen/lutein (CENTRUM SILVER MEN ORAL) Take 1 tablet by mouth.      oxyCODONE (ROXICODONE) 10 mg Tab immediate release tablet Take 1 tablet (10 mg total) by mouth every 6 (six) hours as needed for Pain. 80 tablet 0    tacrolimus (PROGRAF) 0.5 MG Cap Take 1 capsule (0.5 mg total) by mouth every EVENING.  (Use the 1mg capsule for your morning dose) 90 capsule 3    tiZANidine (ZANAFLEX) 2 MG tablet Take 1 tablet (2 mg total) by mouth nightly as needed (neck muscle strain). 30 tablet 3    traZODone (DESYREL) 50 MG tablet Take 1 tablet (50 mg total) by mouth every evening. 90 tablet 2    azelaic acid (AZELEX) 15 % gel APPLY TOPICALLY TO AFFECTED AREA IN THE MORNING 50 g 3    imiquimod (ALDARA) 5 % cream Apply to biopsy site on frontal scalp + about a centimeter of surrounding skin qhs x 16 weeks. Wash off in am and apply sunscreen. Discontinue if skin becomes blistered, raw, bleeding, painful, etc. 24 packet 3    imiquimod (ALDARA) 5 % cream Apply to biopsy site on frontal scalp + about a centimeter of surrounding skin qhs x 16 weeks. Wash off in am and apply sunscreen. Discontinue if skin becomes blistered, raw, bleeding, painful, etc. 24 packet 3    LIDOcaine HCl 2% (LIDOCAINE VISCOUS) 2  % Soln Swish and spit 15 mls every 8 (eight) hours as needed (mouth sore). 100 mL 3    LIDOcaine-prilocaine (EMLA) cream Apply generously to port site 30-60 min prior to chemo and then cover with saran wrap. 30 g 2    loperamide (IMODIUM) 2 mg capsule Take 1 capsule (2 mg total) by mouth 4 (four) times daily as needed for Diarrhea. 30 capsule 1    loteprednol (LOTEMAX) 0.5 % ophthalmic suspension Place 1 drop into the right eye 4 (four) times daily. 5 mL 1    magic mouthwash diphen/antac/lidoc/nysta Take 10 mLs by mouth 4 (four) times daily. 120 mL 4    moxifloxacin (VIGAMOX) 0.5 % ophthalmic solution Place 1 drop into the right eye 4 (four) times daily. 3 mL 3    prednisoLONE acetate (PRED FORTE) 1 % DrpS Place 1 drop into the right eye 4 (four) times daily. 10 mL 3    sulfacetamide sodium-sulfur 10-5 % (w/w) Clsr USE TO WASH AFFECTED AREA DAILY      tacrolimus (PROGRAF) 0.5 MG Cap Take 1 capsule (0.5 mg total) by mouth every evening. Use the 1mg capsule for your morning dose 90 capsule 3    tacrolimus (PROGRAF) 1 MG Cap Take 1 capsule (1 mg total) by mouth every morning. Use the tacrolimus 0.5mg capsule for your evening dose as directed. 90 capsule 3    vitamin E 400 UNIT capsule Take 400 Units by mouth once daily.       Facility-Administered Medications Ordered in Other Encounters   Medication Dose Route Frequency Provider Last Rate Last Admin    heparin, porcine (PF) 100 unit/mL injection flush 500 Units  500 Units Intravenous PRN Ronald Berg MD        ofloxacin 0.3 % ophthalmic solution 1 drop  1 drop Right Eye On Call Procedure Victor M Vasques MD   2 drop at 10/11/22 0745    sodium chloride 0.9% flush 10 mL  10 mL Intravenous PRN Ronald Berg MD        sodium chloride 0.9% flush 10 mL  10 mL Intravenous PRN Victor M Vasques MD           Review of patient's allergies indicates:  No Known Allergies      Review of systems:  Gen: denies fevers, chills, nausea, vomitting, weight changes  HEENT:  Denies discharge or coughing/sneezing. Conjunctival lesion OD  Resp: Denies SOB  CV: Denies CP or palpitations  Abd: Denies tenderness, diarrhea, constipation, nausea  Ext:  Denies pain or edema    Physical Exam  BP: Vital signs stable  General: No apparent distress  HEENT: Normocephalic, atraumatic, see past ophtho note for eye exam OD  Lungs: Adequate respirations, no respiratory distress  Heart: Pulses intact  Abdomen: Soft, no distention  Rectal/pelvic: Deferred    Assessment  Patient is a 73 y.o. male with eye problem recurrent VIK vs scarring of conjunctival right   - Risks/benefits/alternatives of the procedure including, but not limited to scarring, bleeding, infection, loss or decreased vision, and/or need for possible repeat surgery discussed with the patient and/or family, and Informed consent obtained prior to surgery and the patient/family voiced good understanding, witnessed, and placed in chart.  - Will proceed with  wide local excision conjunctival lesion with AMT reconstruction from 11:30-5:00  - Plan for local/MAC   - Patient not on blood thinners

## 2023-08-24 ENCOUNTER — ANESTHESIA (OUTPATIENT)
Dept: SURGERY | Facility: HOSPITAL | Age: 73
End: 2023-08-24
Payer: MEDICARE

## 2023-08-24 ENCOUNTER — HOSPITAL ENCOUNTER (OUTPATIENT)
Facility: HOSPITAL | Age: 73
Discharge: HOME | End: 2023-08-24
Attending: OPHTHALMOLOGY | Admitting: OPHTHALMOLOGY
Payer: MEDICARE

## 2023-08-24 VITALS
TEMPERATURE: 98 F | SYSTOLIC BLOOD PRESSURE: 142 MMHG | HEART RATE: 73 BPM | OXYGEN SATURATION: 99 % | DIASTOLIC BLOOD PRESSURE: 68 MMHG | RESPIRATION RATE: 43 BRPM

## 2023-08-24 DIAGNOSIS — D49.89 CONJUNCTIVAL INTRAEPITHELIAL NEOPLASM: ICD-10-CM

## 2023-08-24 PROCEDURE — 68115 PR EXCIS CONJUNC LESN,>1 CM: ICD-10-PCS | Mod: 51,RT,, | Performed by: OPHTHALMOLOGY

## 2023-08-24 PROCEDURE — 63600175 PHARM REV CODE 636 W HCPCS: Performed by: ANESTHESIOLOGY

## 2023-08-24 PROCEDURE — 71000033 HC RECOVERY, INTIAL HOUR: Performed by: OPHTHALMOLOGY

## 2023-08-24 PROCEDURE — 27201423 OPTIME MED/SURG SUP & DEVICES STERILE SUPPLY: Performed by: OPHTHALMOLOGY

## 2023-08-24 PROCEDURE — 63600175 PHARM REV CODE 636 W HCPCS: Performed by: OPHTHALMOLOGY

## 2023-08-24 PROCEDURE — 88342 CHG IMMUNOCYTOCHEMISTRY: ICD-10-PCS | Mod: 26,,, | Performed by: STUDENT IN AN ORGANIZED HEALTH CARE EDUCATION/TRAINING PROGRAM

## 2023-08-24 PROCEDURE — 88305 TISSUE EXAM BY PATHOLOGIST: CPT | Performed by: STUDENT IN AN ORGANIZED HEALTH CARE EDUCATION/TRAINING PROGRAM

## 2023-08-24 PROCEDURE — 88342 IMHCHEM/IMCYTCHM 1ST ANTB: CPT | Performed by: STUDENT IN AN ORGANIZED HEALTH CARE EDUCATION/TRAINING PROGRAM

## 2023-08-24 PROCEDURE — 68115 EXC LES CONJUNCTIVA >1 CM: CPT | Mod: 51,RT,, | Performed by: OPHTHALMOLOGY

## 2023-08-24 PROCEDURE — D9220A PRA ANESTHESIA: Mod: ,,, | Performed by: NURSE ANESTHETIST, CERTIFIED REGISTERED

## 2023-08-24 PROCEDURE — D9220A PRA ANESTHESIA: ICD-10-PCS | Mod: ,,, | Performed by: NURSE ANESTHETIST, CERTIFIED REGISTERED

## 2023-08-24 PROCEDURE — 25000003 PHARM REV CODE 250: Performed by: OPHTHALMOLOGY

## 2023-08-24 PROCEDURE — 36000706: Performed by: OPHTHALMOLOGY

## 2023-08-24 PROCEDURE — 88341 PR IHC OR ICC EACH ADD'L SINGLE ANTIBODY  STAINPR: ICD-10-PCS | Mod: 26,,, | Performed by: STUDENT IN AN ORGANIZED HEALTH CARE EDUCATION/TRAINING PROGRAM

## 2023-08-24 PROCEDURE — 37000008 HC ANESTHESIA 1ST 15 MINUTES: Performed by: OPHTHALMOLOGY

## 2023-08-24 PROCEDURE — 71000015 HC POSTOP RECOV 1ST HR: Performed by: OPHTHALMOLOGY

## 2023-08-24 PROCEDURE — 65780 OCULAR RECONST TRANSPLANT: CPT | Mod: RT,,, | Performed by: OPHTHALMOLOGY

## 2023-08-24 PROCEDURE — 88342 IMHCHEM/IMCYTCHM 1ST ANTB: CPT | Mod: 26,,, | Performed by: STUDENT IN AN ORGANIZED HEALTH CARE EDUCATION/TRAINING PROGRAM

## 2023-08-24 PROCEDURE — 88305 TISSUE EXAM BY PATHOLOGIST: CPT | Mod: 26,,, | Performed by: STUDENT IN AN ORGANIZED HEALTH CARE EDUCATION/TRAINING PROGRAM

## 2023-08-24 PROCEDURE — 88341 IMHCHEM/IMCYTCHM EA ADD ANTB: CPT | Mod: 26,,, | Performed by: STUDENT IN AN ORGANIZED HEALTH CARE EDUCATION/TRAINING PROGRAM

## 2023-08-24 PROCEDURE — 65780: ICD-10-PCS | Mod: RT,,, | Performed by: OPHTHALMOLOGY

## 2023-08-24 PROCEDURE — 36000707: Performed by: OPHTHALMOLOGY

## 2023-08-24 PROCEDURE — V2790 AMNIOTIC MEMBRANE: HCPCS | Performed by: OPHTHALMOLOGY

## 2023-08-24 PROCEDURE — 88305 TISSUE EXAM BY PATHOLOGIST: ICD-10-PCS | Mod: 26,,, | Performed by: STUDENT IN AN ORGANIZED HEALTH CARE EDUCATION/TRAINING PROGRAM

## 2023-08-24 PROCEDURE — 88341 IMHCHEM/IMCYTCHM EA ADD ANTB: CPT | Performed by: STUDENT IN AN ORGANIZED HEALTH CARE EDUCATION/TRAINING PROGRAM

## 2023-08-24 PROCEDURE — 37000009 HC ANESTHESIA EA ADD 15 MINS: Performed by: OPHTHALMOLOGY

## 2023-08-24 DEVICE — GRAFT AMNIO 1.5X1.0CM: Type: IMPLANTABLE DEVICE | Site: EYE | Status: FUNCTIONAL

## 2023-08-24 RX ORDER — MOXIFLOXACIN 5 MG/ML
1 SOLUTION/ DROPS OPHTHALMIC
Status: COMPLETED | OUTPATIENT
Start: 2023-08-24 | End: 2023-08-24

## 2023-08-24 RX ORDER — TETRACAINE HYDROCHLORIDE 5 MG/ML
1 SOLUTION OPHTHALMIC
Status: COMPLETED | OUTPATIENT
Start: 2023-08-24 | End: 2023-08-24

## 2023-08-24 RX ORDER — FENTANYL CITRATE 50 UG/ML
INJECTION, SOLUTION INTRAMUSCULAR; INTRAVENOUS
Status: DISCONTINUED | OUTPATIENT
Start: 2023-08-24 | End: 2023-08-24

## 2023-08-24 RX ORDER — ACETAMINOPHEN 10 MG/ML
1000 INJECTION, SOLUTION INTRAVENOUS ONCE
Status: COMPLETED | OUTPATIENT
Start: 2023-08-24 | End: 2023-08-24

## 2023-08-24 RX ORDER — HYDROCODONE BITARTRATE AND ACETAMINOPHEN 5; 325 MG/1; MG/1
1 TABLET ORAL EVERY 4 HOURS PRN
Status: DISCONTINUED | OUTPATIENT
Start: 2023-08-24 | End: 2023-08-24 | Stop reason: HOSPADM

## 2023-08-24 RX ORDER — ACETAMINOPHEN 325 MG/1
650 TABLET ORAL EVERY 4 HOURS PRN
Status: DISCONTINUED | OUTPATIENT
Start: 2023-08-24 | End: 2023-08-24 | Stop reason: HOSPADM

## 2023-08-24 RX ORDER — MIDAZOLAM HYDROCHLORIDE 1 MG/ML
INJECTION, SOLUTION INTRAMUSCULAR; INTRAVENOUS
Status: DISCONTINUED | OUTPATIENT
Start: 2023-08-24 | End: 2023-08-24

## 2023-08-24 RX ORDER — MOXIFLOXACIN 5 MG/ML
SOLUTION/ DROPS OPHTHALMIC
Status: DISCONTINUED | OUTPATIENT
Start: 2023-08-24 | End: 2023-08-24 | Stop reason: HOSPADM

## 2023-08-24 RX ADMIN — MIDAZOLAM HYDROCHLORIDE 0.5 MG: 1 INJECTION, SOLUTION INTRAMUSCULAR; INTRAVENOUS at 12:08

## 2023-08-24 RX ADMIN — ACETAMINOPHEN 1000 MG: 10 INJECTION INTRAVENOUS at 01:08

## 2023-08-24 RX ADMIN — MOXIFLOXACIN OPHTHALMIC 1 DROP: 5 SOLUTION/ DROPS OPHTHALMIC at 11:08

## 2023-08-24 RX ADMIN — FENTANYL CITRATE 25 MCG: 50 INJECTION, SOLUTION INTRAMUSCULAR; INTRAVENOUS at 12:08

## 2023-08-24 RX ADMIN — TETRACAINE HYDROCHLORIDE 1 DROP: 5 SOLUTION OPHTHALMIC at 11:08

## 2023-08-24 RX ADMIN — MIDAZOLAM HYDROCHLORIDE 1 MG: 1 INJECTION, SOLUTION INTRAMUSCULAR; INTRAVENOUS at 12:08

## 2023-08-24 RX ADMIN — HYDROCODONE BITARTRATE AND ACETAMINOPHEN 1 TABLET: 5; 325 TABLET ORAL at 02:08

## 2023-08-24 NOTE — DISCHARGE INSTRUCTIONS
Day of Surgery    If eye is patched leave patch in place. Dr Brambila or one of his assistants will remove it at your follow up appointment.   If your eye is not patched continue eye drops as before surgery.   You should expect to experience mild pain and discomfort; which Ibuprofen should help to ease. If you experience severe pain or nauea, call Dr Brambila or the on call doctor at [phone].  Plan to see Dr Brambila tomorrow at : [facility]. [address] [facility].

## 2023-08-24 NOTE — CARE UPDATE
Patient came in for an eye procedure with a MARY Tube in placed with a HME attached. Respiratory assessed patient at this time

## 2023-08-24 NOTE — DISCHARGE SUMMARY
Outcome: Successful outpatient ophthalmic surgical procedure  Preprinted Instructions given to patient.  Regular diet.  Activity: No restrictions  Meds: see Med Rec  Condition: stable  Follow up: 1 day with Dr Brambila  Disposition: Home  Diagnosis: s/p eye surgery  Date of discharge: 08/24/2023

## 2023-08-24 NOTE — TRANSFER OF CARE
Anesthesia Transfer of Care Note    Patient: Rocco Swain    Procedure(s) Performed: Procedure(s) (LRB):  EXCISION, SCAR (Right)    Patient location: PACU    Anesthesia Type: MAC    Transport from OR: Transported from OR on room air with adequate spontaneous ventilation    Post pain: adequate analgesia    Post assessment: no apparent anesthetic complications    Post vital signs: stable    Level of consciousness: awake, alert and oriented    Nausea/Vomiting: no nausea/vomiting    Complications: none    Transfer of care protocol was followed      Last vitals:   Visit Vitals  BP (!) 150/71   Pulse 71   Temp 36.5 °C (97.7 °F)   Resp 16   SpO2 100%

## 2023-08-24 NOTE — OP NOTE
SURGEON:  Ting Brambila M.D.    PREOPERATIVE DIAGNOSIS:    Conjunctival/Corneal VIK OD    POSTOPERATIVE DIAGNOSIS:    Conjunctival/Corneal VIK OD    PROCEDURES:    1) Excision of Conj/Corneal VIK right eye  2) Ocular surface reconstruction with Amniotic Membrane right eye    08/24/2023    IMPLANT: AmnioGraft Biotissue    ANESTHESIA:  MAC with topical Lidocaine    COMPLICATIONS:  None    ESTIMATED BLOOD LOSS: None    SPECIMENS: Conjunctival VIK right eye    INDICATIONS:    The patient has a history of painless progressive growth of a conjunctival tumor, which appears to be a pre-cancerous VIK lesion.  After a thorough discussion of the risks, benefits, and alternatives to surgery, including, but not limited to, the rare risks of infection, recurrence, hemorrhage, need for additional surgery, loss of vision, and even loss of the eye, the patient voices understanding and desires to proceed.    DESCRIPTION OF PROCEDURE:    The patient was prepped and draped in standard sterile fashion, and subconjunctival 2% lidocaine injected. The lesion was outlined with a marker with a 2mm clear margin. Rommel scissors were used to excise the lesion entirely, and a [place] blade used to free the limbal and corneal aspects of the lesion.   Hemostasis was achieved with cautery, and then the AMT was sized and the ocular surface reconstructed in a layered fashion with 8-0 vicryl. MMC 0.2% was injected subconj 0.1cc x2, then irrigated.  A collagen shield was placed.   Drops of Vigamox and Pred Forte were instilled and a shield was placed over the eye. The patient will follow up with Dr. Brambila in the morning.

## 2023-08-24 NOTE — ANESTHESIA POSTPROCEDURE EVALUATION
Anesthesia Post Evaluation    Patient: Rocco Swain    Procedure(s) Performed: Procedure(s) (LRB):  EXCISION, SCAR (Right)    Final Anesthesia Type: MAC      Patient location during evaluation: PACU  Patient participation: Yes- Able to Participate  Level of consciousness: awake and alert, awake and oriented  Post-procedure vital signs: reviewed and stable  Pain management: adequate  Airway patency: patent    PONV status at discharge: No PONV  Anesthetic complications: no      Cardiovascular status: blood pressure returned to baseline  Respiratory status: unassisted, spontaneous ventilation and room air  Hydration status: euvolemic  Follow-up not needed.          Vitals Value Taken Time   /68 08/24/23 1357   Temp 36.5 °C (97.7 °F) 08/24/23 1320   Pulse 72 08/24/23 1359   Resp 12 08/24/23 1359   SpO2 99 % 08/24/23 1359   Vitals shown include unvalidated device data.      No case tracking events are documented in the log.      Pain/Katya Score: Pain Rating Prior to Med Admin: 7 (8/24/2023  1:40 PM)  Katya Score: 10 (8/24/2023  1:49 PM)

## 2023-08-24 NOTE — PLAN OF CARE
Discharge instructions given and explained to patient and family (brother) with verbalization of understanding all instructions. Patients v/s stable, denies n/v and tolerating po fluids, rates pain level tolerable after pain medication (IV tylenol, PO norco) - pain now rated 3/10. IV removed, and family at bedside for patient discharge home.

## 2023-08-25 ENCOUNTER — OFFICE VISIT (OUTPATIENT)
Dept: OPHTHALMOLOGY | Facility: CLINIC | Age: 73
End: 2023-08-25
Payer: MEDICARE

## 2023-08-25 DIAGNOSIS — D49.89 CONJUNCTIVAL INTRAEPITHELIAL NEOPLASM: ICD-10-CM

## 2023-08-25 DIAGNOSIS — Z98.890 POST-OPERATIVE STATE: Primary | ICD-10-CM

## 2023-08-25 DIAGNOSIS — D49.89 CIN (CONJUNCTIVAL INTRAEPITHELIAL NEOPLASIA): ICD-10-CM

## 2023-08-25 PROCEDURE — 99024 PR POST-OP FOLLOW-UP VISIT: ICD-10-PCS | Mod: POP,,, | Performed by: OPHTHALMOLOGY

## 2023-08-25 PROCEDURE — 99999 PR PBB SHADOW E&M-EST. PATIENT-LVL III: ICD-10-PCS | Mod: PBBFAC,,, | Performed by: OPHTHALMOLOGY

## 2023-08-25 PROCEDURE — 99024 POSTOP FOLLOW-UP VISIT: CPT | Mod: POP,,, | Performed by: OPHTHALMOLOGY

## 2023-08-25 PROCEDURE — 99213 OFFICE O/P EST LOW 20 MIN: CPT | Mod: PBBFAC | Performed by: OPHTHALMOLOGY

## 2023-08-25 PROCEDURE — 99999 PR PBB SHADOW E&M-EST. PATIENT-LVL III: CPT | Mod: PBBFAC,,, | Performed by: OPHTHALMOLOGY

## 2023-08-25 RX ORDER — MOXIFLOXACIN 5 MG/ML
1 SOLUTION/ DROPS OPHTHALMIC 4 TIMES DAILY
Qty: 3 ML | Refills: 3 | Status: ON HOLD | OUTPATIENT
Start: 2023-08-25 | End: 2023-09-18 | Stop reason: HOSPADM

## 2023-08-25 NOTE — PROGRESS NOTES
HPI    Patient here for 1 day VIK excision/AMT OD    Patient states OD doing well since surgery. Patient states he is   experiencing some itching and pain in his eyes.     Lotemax 4x a day OD (was using before procedure)         Last edited by Lilia Bonilla on 8/25/2023  3:03 PM.            Assessment /Plan     For exam results, see Encounter Report.    Post-operative state    VIK (conjunctival intraepithelial neoplasia)    Conjunctival intraepithelial neoplasm    Other orders  -     moxifloxacin (VIGAMOX) 0.5 % ophthalmic solution; Place 1 drop into the right eye 4 (four) times daily.  Dispense: 3 mL; Refill: 3      VIK excision POD1 with MMC  AMT in place, K clear    Abx/Steroids qid

## 2023-08-31 ENCOUNTER — LAB VISIT (OUTPATIENT)
Dept: LAB | Facility: HOSPITAL | Age: 73
End: 2023-08-31
Attending: INTERNAL MEDICINE
Payer: MEDICARE

## 2023-08-31 DIAGNOSIS — Z94.4 STATUS POST LIVER TRANSPLANTATION: ICD-10-CM

## 2023-08-31 LAB
ALBUMIN SERPL BCP-MCNC: 3.5 G/DL (ref 3.5–5.2)
ALP SERPL-CCNC: 74 U/L (ref 55–135)
ALT SERPL W/O P-5'-P-CCNC: 14 U/L (ref 10–44)
ANION GAP SERPL CALC-SCNC: 10 MMOL/L (ref 8–16)
AST SERPL-CCNC: 42 U/L (ref 10–40)
BILIRUB SERPL-MCNC: 0.5 MG/DL (ref 0.1–1)
BUN SERPL-MCNC: 24 MG/DL (ref 8–23)
CALCIUM SERPL-MCNC: 9.6 MG/DL (ref 8.7–10.5)
CHLORIDE SERPL-SCNC: 104 MMOL/L (ref 95–110)
CO2 SERPL-SCNC: 27 MMOL/L (ref 23–29)
CREAT SERPL-MCNC: 1.5 MG/DL (ref 0.5–1.4)
EST. GFR  (NO RACE VARIABLE): 48.6 ML/MIN/1.73 M^2
GLUCOSE SERPL-MCNC: 116 MG/DL (ref 70–110)
POTASSIUM SERPL-SCNC: 4.7 MMOL/L (ref 3.5–5.1)
PROT SERPL-MCNC: 6.9 G/DL (ref 6–8.4)
SODIUM SERPL-SCNC: 141 MMOL/L (ref 136–145)
TACROLIMUS BLD-MCNC: 5.5 NG/ML (ref 5–15)
TACROLIMUS BLD-MCNC: 5.5 NG/ML (ref 5–15)

## 2023-08-31 PROCEDURE — 36415 COLL VENOUS BLD VENIPUNCTURE: CPT | Performed by: INTERNAL MEDICINE

## 2023-08-31 PROCEDURE — 80197 ASSAY OF TACROLIMUS: CPT | Performed by: INTERNAL MEDICINE

## 2023-08-31 PROCEDURE — 80053 COMPREHEN METABOLIC PANEL: CPT | Performed by: INTERNAL MEDICINE

## 2023-09-06 ENCOUNTER — CLINICAL SUPPORT (OUTPATIENT)
Dept: REHABILITATION | Facility: HOSPITAL | Age: 73
End: 2023-09-06
Payer: MEDICARE

## 2023-09-06 DIAGNOSIS — G89.29 CHRONIC LOW BACK PAIN, UNSPECIFIED BACK PAIN LATERALITY, UNSPECIFIED WHETHER SCIATICA PRESENT: ICD-10-CM

## 2023-09-06 DIAGNOSIS — C32.9 LARYNX CANCER: Primary | ICD-10-CM

## 2023-09-06 DIAGNOSIS — T86.49 CIRRHOSIS OF TRANSPLANTED LIVER: ICD-10-CM

## 2023-09-06 DIAGNOSIS — Z93.0 TRACHEOSTOMY STATUS: ICD-10-CM

## 2023-09-06 DIAGNOSIS — M54.50 CHRONIC LOW BACK PAIN, UNSPECIFIED BACK PAIN LATERALITY, UNSPECIFIED WHETHER SCIATICA PRESENT: ICD-10-CM

## 2023-09-06 DIAGNOSIS — K74.60 CIRRHOSIS OF TRANSPLANTED LIVER: ICD-10-CM

## 2023-09-06 PROCEDURE — 97813 ACUP 1/> W/ESTIM 1ST 15 MIN: CPT | Performed by: ACUPUNCTURIST

## 2023-09-06 PROCEDURE — 97814 ACUP 1/> W/ESTIM EA ADDL 15: CPT | Performed by: ACUPUNCTURIST

## 2023-09-06 NOTE — PROGRESS NOTES
Acupuncture Evaluation Note     Name: Rocco Swain  [facility] Number: [id number]    Traditional Chinese Medicine (TCM) Diagnosis: Qi Stagnation and Blood Stasis  Medical Diagnosis:   Encounter Diagnoses   Name Primary?    Larynx cancer Yes    Tracheostomy status     Chronic low back pain, unspecified back pain laterality, unspecified whether sciatica present     Cirrhosis of transplanted liver         Evaluation Date: 9/6/2023    Visit #/Visits authorized: 5/12    Precautions: Standard, cancer, and organ transplant    Subjective     Chief Concern: cLBP - T8-L5    Medical necessity is demonstrated by the following IMPAIRMENTS: Medical Necessity: Decreased mobility limits day to day activities, social, and emergent situations              Aggravating Factors:  movement and standing   Relieving Factors:  rest    Symptom Description:     Quality:  Aching, Dull, and Sharp  Severity:  2  Frequency:  continuously and every day    Treatment     Treatment Principles:  Invigorate qi and blood, stop pain     Acupuncture points used:      Bilateral points: BL18, BL20, HJJ BL23, HJJ BL25, HJJ BL26, DU4, Du5, Du7, KD7, SP6  Unilateral points:  Auricular Treatment:      Needles In: 17  Needles Out: 17  [place] W/ STIM placed: 10:20  Needles W/ STIM removed: 10:50        Assessment     After treatment, patient felt neck and shoulder pain improved     Patient prognosis is Good.     Patient will continue to benefit from acupuncture treatment to address the deficits listed in the problem list box on initial evaluation, provide patient family education and to maximize pt's level of independence in the home and community environment.     Patient's spiritual, cultural and educational needs considered and pt agreeable to plan of care and goals.     Anticipated barriers to treatment: none    Plan     Recommend 1 /week for 1 sessions then re-assess.      Education:  Patient is aware of cumulative benefit of acupuncture

## 2023-09-08 ENCOUNTER — TELEPHONE (OUTPATIENT)
Dept: TRANSPLANT | Facility: CLINIC | Age: 73
End: 2023-09-08
Payer: MEDICARE

## 2023-09-08 ENCOUNTER — OFFICE VISIT (OUTPATIENT)
Dept: OPHTHALMOLOGY | Facility: CLINIC | Age: 73
End: 2023-09-08
Payer: MEDICARE

## 2023-09-08 DIAGNOSIS — Z94.4 STATUS POST LIVER TRANSPLANTATION: Primary | ICD-10-CM

## 2023-09-08 DIAGNOSIS — Z98.890 POST-OPERATIVE STATE: Primary | ICD-10-CM

## 2023-09-08 DIAGNOSIS — D49.89 CIN (CONJUNCTIVAL INTRAEPITHELIAL NEOPLASIA): ICD-10-CM

## 2023-09-08 PROCEDURE — 99024 PR POST-OP FOLLOW-UP VISIT: ICD-10-PCS | Mod: POP,,, | Performed by: OPHTHALMOLOGY

## 2023-09-08 PROCEDURE — 99213 OFFICE O/P EST LOW 20 MIN: CPT | Mod: PBBFAC | Performed by: OPHTHALMOLOGY

## 2023-09-08 PROCEDURE — 99024 POSTOP FOLLOW-UP VISIT: CPT | Mod: POP,,, | Performed by: OPHTHALMOLOGY

## 2023-09-08 PROCEDURE — 99999 PR PBB SHADOW E&M-EST. PATIENT-LVL III: CPT | Mod: PBBFAC,,, | Performed by: OPHTHALMOLOGY

## 2023-09-08 PROCEDURE — 99999 PR PBB SHADOW E&M-EST. PATIENT-LVL III: ICD-10-PCS | Mod: PBBFAC,,, | Performed by: OPHTHALMOLOGY

## 2023-09-08 RX ORDER — NEOMYCIN SULFATE, POLYMYXIN B SULFATE AND DEXAMETHASONE 3.5; 10000; 1 MG/ML; [USP'U]/ML; MG/ML
1 SUSPENSION/ DROPS OPHTHALMIC 4 TIMES DAILY
Qty: 5 ML | Refills: 3 | Status: SHIPPED | OUTPATIENT
Start: 2023-09-08 | End: 2023-10-08

## 2023-09-08 NOTE — LETTER
September 8, 2023    Rocco Swain  [address]  [place] [address]          Dear Rocco Swain:  MRN: [id number]    This is a follow up to your recent labs, your lab results were stable.  There are no medicine changes.  Please have your labs drawn again on 11/27/23.      If you cannot have your labs drawn on the scheduled date, it is your responsibility to call the transplant department to reschedule.  Please call [phone] and ask to speak to Rhea RIOS   for all scheduling requests.     Sincerely,        Your Liver Transplant Coordinator    [facility]  [address] [place] [id number]  [phone]

## 2023-09-08 NOTE — PROGRESS NOTES
HPI     Post-op Evaluation            Comments: 2 week VIK excision/AMT OD          Comments    Last eye exam was 8/25/23 with Dr. Brambila.     Patient here for 2 week VIK excision/AMT OD. OD doing well since last   visit. Will have pain OD (3 out of 10) that comes and goes.    Vigamox QID OD  Lotemax QID OD            Last edited by Beth Cabezas MA on 9/8/2023  1:50 PM.            Assessment /Plan     For exam results, see Encounter Report.    Post-operative state    VIK (conjunctival intraepithelial neoplasia)    Other orders  -     neomycin-polymyxin-dexamethasone (MAXITROL) 3.5mg/mL-10,000 unit/mL-0.1 % DrpS; Place 1 drop into the right eye 4 (four) times daily.  Dispense: 5 mL; Refill: 3      Healing well at limbus, but nasal area still thickened.  Monitor for settling  Maxitrol qid

## 2023-09-08 NOTE — TELEPHONE ENCOUNTER
Labs reviewed and are stable, continue labs q 3 months. Letter sent.         ----- Message from Cornell Anton MD sent at 9/8/2023 10:46 AM CDT -----  Results reviewed

## 2023-09-12 ENCOUNTER — HOSPITAL ENCOUNTER (INPATIENT)
Facility: HOSPITAL | Age: 73
LOS: 5 days | Discharge: HOME | DRG: 377 | End: 2023-09-18
Attending: STUDENT IN AN ORGANIZED HEALTH CARE EDUCATION/TRAINING PROGRAM | Admitting: INTERNAL MEDICINE
Payer: MEDICARE

## 2023-09-12 DIAGNOSIS — D64.9 ANEMIA, UNSPECIFIED TYPE: Primary | ICD-10-CM

## 2023-09-12 DIAGNOSIS — K92.2 GASTROINTESTINAL HEMORRHAGE, UNSPECIFIED GASTROINTESTINAL HEMORRHAGE TYPE: ICD-10-CM

## 2023-09-12 DIAGNOSIS — A41.9 SEPSIS, DUE TO UNSPECIFIED ORGANISM, UNSPECIFIED WHETHER ACUTE ORGAN DYSFUNCTION PRESENT: ICD-10-CM

## 2023-09-12 DIAGNOSIS — Z94.4 STATUS POST LIVER TRANSPLANTATION: ICD-10-CM

## 2023-09-12 DIAGNOSIS — R00.1 BRADYCARDIA: ICD-10-CM

## 2023-09-12 DIAGNOSIS — R53.1 WEAKNESS: ICD-10-CM

## 2023-09-12 DIAGNOSIS — R07.9 CHEST PAIN: ICD-10-CM

## 2023-09-12 DIAGNOSIS — Z85.9 HISTORY OF CANCER: ICD-10-CM

## 2023-09-12 LAB
ALLENS TEST: ABNORMAL
BILIRUB UR QL STRIP: NEGATIVE
CLARITY UR REFRACT.AUTO: CLEAR
COLOR UR AUTO: ABNORMAL
GLUCOSE UR QL STRIP: NEGATIVE
HGB UR QL STRIP: NEGATIVE
INFLUENZA A, MOLECULAR: NEGATIVE
INFLUENZA B, MOLECULAR: NEGATIVE
KETONES UR QL STRIP: ABNORMAL
LDH SERPL L TO P-CCNC: 3.84 MMOL/L (ref 0.5–2.2)
LEUKOCYTE ESTERASE UR QL STRIP: NEGATIVE
NITRITE UR QL STRIP: NEGATIVE
PH UR STRIP: 7 [PH] (ref 5–8)
PROT UR QL STRIP: NEGATIVE
SAMPLE: ABNORMAL
SARS-COV-2 RDRP RESP QL NAA+PROBE: NEGATIVE
SITE: ABNORMAL
SP GR UR STRIP: 1.01 (ref 1–1.03)
SPECIMEN SOURCE: NORMAL
URN SPEC COLLECT METH UR: ABNORMAL

## 2023-09-12 PROCEDURE — 99291 CRITICAL CARE FIRST HOUR: CPT

## 2023-09-12 PROCEDURE — U0002 COVID-19 LAB TEST NON-CDC: HCPCS | Performed by: STUDENT IN AN ORGANIZED HEALTH CARE EDUCATION/TRAINING PROGRAM

## 2023-09-12 PROCEDURE — 82803 BLOOD GASES ANY COMBINATION: CPT

## 2023-09-12 PROCEDURE — 87502 INFLUENZA DNA AMP PROBE: CPT | Performed by: STUDENT IN AN ORGANIZED HEALTH CARE EDUCATION/TRAINING PROGRAM

## 2023-09-12 PROCEDURE — 94761 N-INVAS EAR/PLS OXIMETRY MLT: CPT

## 2023-09-12 PROCEDURE — 93005 ELECTROCARDIOGRAM TRACING: CPT

## 2023-09-12 PROCEDURE — 81003 URINALYSIS AUTO W/O SCOPE: CPT | Performed by: STUDENT IN AN ORGANIZED HEALTH CARE EDUCATION/TRAINING PROGRAM

## 2023-09-12 PROCEDURE — 87040 BLOOD CULTURE FOR BACTERIA: CPT | Mod: 59 | Performed by: STUDENT IN AN ORGANIZED HEALTH CARE EDUCATION/TRAINING PROGRAM

## 2023-09-12 PROCEDURE — 93010 ELECTROCARDIOGRAM REPORT: CPT | Mod: ,,, | Performed by: INTERNAL MEDICINE

## 2023-09-12 PROCEDURE — 63600175 PHARM REV CODE 636 W HCPCS: Performed by: STUDENT IN AN ORGANIZED HEALTH CARE EDUCATION/TRAINING PROGRAM

## 2023-09-12 PROCEDURE — 83605 ASSAY OF LACTIC ACID: CPT

## 2023-09-12 PROCEDURE — 36430 TRANSFUSION BLD/BLD COMPNT: CPT

## 2023-09-12 PROCEDURE — 93010 EKG 12-LEAD: ICD-10-PCS | Mod: ,,, | Performed by: INTERNAL MEDICINE

## 2023-09-12 PROCEDURE — 96365 THER/PROPH/DIAG IV INF INIT: CPT

## 2023-09-12 PROCEDURE — 99900035 HC TECH TIME PER 15 MIN (STAT)

## 2023-09-12 PROCEDURE — 96360 HYDRATION IV INFUSION INIT: CPT | Mod: 59

## 2023-09-12 RX ADMIN — SODIUM CHLORIDE, POTASSIUM CHLORIDE, SODIUM LACTATE AND CALCIUM CHLORIDE 1000 ML: 600; 310; 30; 20 INJECTION, SOLUTION INTRAVENOUS at 11:09

## 2023-09-12 NOTE — Clinical Note
Diagnosis: Weakness [466201]   Admitting Provider:: DRAKE CASTRO [[id number]]   Future Attending Provider: DRAKE CASTRO [[id number]]   Reason for IP Medical Treatment  (Clinical interventions that can only be accomplished in the IP setting? ) :: anemia, Hgb 3   I certify that Inpatient services for greater than or equal to 2 midnights are medically necessary:: Yes   Plans for Post-Acute care--if anticipated (pick the single best option):: A. No post acute care anticipated at this time

## 2023-09-13 ENCOUNTER — ANESTHESIA EVENT (OUTPATIENT)
Dept: ENDOSCOPY | Facility: HOSPITAL | Age: 73
DRG: 377 | End: 2023-09-13
Payer: MEDICARE

## 2023-09-13 PROBLEM — A41.9 SEVERE SEPSIS: Status: ACTIVE | Noted: 2023-09-13

## 2023-09-13 PROBLEM — D64.9 ANEMIA: Status: ACTIVE | Noted: 2023-09-13

## 2023-09-13 PROBLEM — E44.1 MILD MALNUTRITION: Status: ACTIVE | Noted: 2023-09-13

## 2023-09-13 PROBLEM — K92.2 GI BLEED: Status: ACTIVE | Noted: 2023-09-13

## 2023-09-13 PROBLEM — R65.20 SEVERE SEPSIS: Status: ACTIVE | Noted: 2023-09-13

## 2023-09-13 PROBLEM — Z71.89 ACP (ADVANCE CARE PLANNING): Status: ACTIVE | Noted: 2023-09-13

## 2023-09-13 LAB
ABO + RH BLD: NORMAL
ALBUMIN SERPL BCP-MCNC: 2.3 G/DL (ref 3.5–5.2)
ALLENS TEST: ABNORMAL
ALP SERPL-CCNC: 39 U/L (ref 55–135)
ALT SERPL W/O P-5'-P-CCNC: 11 U/L (ref 10–44)
ANION GAP SERPL CALC-SCNC: 14 MMOL/L (ref 8–16)
ANISOCYTOSIS BLD QL SMEAR: SLIGHT
AST SERPL-CCNC: 25 U/L (ref 10–40)
BASOPHILS # BLD AUTO: 0 K/UL (ref 0–0.2)
BASOPHILS # BLD AUTO: 0.01 K/UL (ref 0–0.2)
BASOPHILS # BLD AUTO: 0.02 K/UL (ref 0–0.2)
BASOPHILS NFR BLD: 0 % (ref 0–1.9)
BASOPHILS NFR BLD: 0.2 % (ref 0–1.9)
BASOPHILS NFR BLD: 0.4 % (ref 0–1.9)
BILIRUB SERPL-MCNC: 0.3 MG/DL (ref 0.1–1)
BLD GP AB SCN CELLS X3 SERPL QL: NORMAL
BLD PROD TYP BPU: NORMAL
BLOOD UNIT EXPIRATION DATE: NORMAL
BLOOD UNIT TYPE CODE: 6200
BLOOD UNIT TYPE: NORMAL
BUN SERPL-MCNC: 42 MG/DL (ref 8–23)
CALCIUM SERPL-MCNC: 7.8 MG/DL (ref 8.7–10.5)
CHLORIDE SERPL-SCNC: 108 MMOL/L (ref 95–110)
CO2 SERPL-SCNC: 15 MMOL/L (ref 23–29)
CODING SYSTEM: NORMAL
CREAT SERPL-MCNC: 1.2 MG/DL (ref 0.5–1.4)
CROSSMATCH INTERPRETATION: NORMAL
DIFFERENTIAL METHOD: ABNORMAL
DISPENSE STATUS: NORMAL
EOSINOPHIL # BLD AUTO: 0 K/UL (ref 0–0.5)
EOSINOPHIL NFR BLD: 0 % (ref 0–8)
EOSINOPHIL NFR BLD: 0.5 % (ref 0–8)
EOSINOPHIL NFR BLD: 0.7 % (ref 0–8)
ERYTHROCYTE [DISTWIDTH] IN BLOOD BY AUTOMATED COUNT: 15.2 % (ref 11.5–14.5)
ERYTHROCYTE [DISTWIDTH] IN BLOOD BY AUTOMATED COUNT: 17.4 % (ref 11.5–14.5)
ERYTHROCYTE [DISTWIDTH] IN BLOOD BY AUTOMATED COUNT: 18 % (ref 11.5–14.5)
EST. GFR  (NO RACE VARIABLE): >60 ML/MIN/1.73 M^2
GLUCOSE SERPL-MCNC: 113 MG/DL (ref 70–110)
HCT VFR BLD AUTO: 10.4 % (ref 40–54)
HCT VFR BLD AUTO: 14.7 % (ref 40–54)
HCT VFR BLD AUTO: 25 % (ref 40–54)
HGB BLD-MCNC: 3.4 G/DL (ref 14–18)
HGB BLD-MCNC: 4.7 G/DL (ref 14–18)
HGB BLD-MCNC: 8.5 G/DL (ref 14–18)
IMM GRANULOCYTES # BLD AUTO: 0.04 K/UL (ref 0–0.04)
IMM GRANULOCYTES # BLD AUTO: 0.05 K/UL (ref 0–0.04)
IMM GRANULOCYTES # BLD AUTO: 0.05 K/UL (ref 0–0.04)
IMM GRANULOCYTES NFR BLD AUTO: 0.5 % (ref 0–0.5)
IMM GRANULOCYTES NFR BLD AUTO: 0.9 % (ref 0–0.5)
IMM GRANULOCYTES NFR BLD AUTO: 0.9 % (ref 0–0.5)
LACTATE SERPL-SCNC: 1.5 MMOL/L (ref 0.5–2.2)
LDH SERPL L TO P-CCNC: 2.77 MMOL/L (ref 0.5–2.2)
LYMPHOCYTES # BLD AUTO: 0.3 K/UL (ref 1–4.8)
LYMPHOCYTES # BLD AUTO: 0.4 K/UL (ref 1–4.8)
LYMPHOCYTES # BLD AUTO: 1.2 K/UL (ref 1–4.8)
LYMPHOCYTES NFR BLD: 14 % (ref 18–48)
LYMPHOCYTES NFR BLD: 5 % (ref 18–48)
LYMPHOCYTES NFR BLD: 6.8 % (ref 18–48)
MAGNESIUM SERPL-MCNC: 1.4 MG/DL (ref 1.6–2.6)
MCH RBC QN AUTO: 30.4 PG (ref 27–31)
MCH RBC QN AUTO: 31.3 PG (ref 27–31)
MCH RBC QN AUTO: 34.3 PG (ref 27–31)
MCHC RBC AUTO-ENTMCNC: 32 G/DL (ref 32–36)
MCHC RBC AUTO-ENTMCNC: 32.7 G/DL (ref 32–36)
MCHC RBC AUTO-ENTMCNC: 34 G/DL (ref 32–36)
MCV RBC AUTO: 105 FL (ref 82–98)
MCV RBC AUTO: 89 FL (ref 82–98)
MCV RBC AUTO: 98 FL (ref 82–98)
MONOCYTES # BLD AUTO: 0.5 K/UL (ref 0.3–1)
MONOCYTES # BLD AUTO: 0.5 K/UL (ref 0.3–1)
MONOCYTES # BLD AUTO: 1.2 K/UL (ref 0.3–1)
MONOCYTES NFR BLD: 13.7 % (ref 4–15)
MONOCYTES NFR BLD: 9 % (ref 4–15)
MONOCYTES NFR BLD: 9.1 % (ref 4–15)
NEUTROPHILS # BLD AUTO: 4.6 K/UL (ref 1.8–7.7)
NEUTROPHILS # BLD AUTO: 4.7 K/UL (ref 1.8–7.7)
NEUTROPHILS # BLD AUTO: 6.3 K/UL (ref 1.8–7.7)
NEUTROPHILS NFR BLD: 71.8 % (ref 38–73)
NEUTROPHILS NFR BLD: 82.5 % (ref 38–73)
NEUTROPHILS NFR BLD: 84 % (ref 38–73)
NRBC BLD-RTO: 0 /100 WBC
PHOSPHATE SERPL-MCNC: 2.7 MG/DL (ref 2.7–4.5)
PLATELET # BLD AUTO: 106 K/UL (ref 150–450)
PLATELET # BLD AUTO: 137 K/UL (ref 150–450)
PLATELET # BLD AUTO: 182 K/UL (ref 150–450)
PLATELET BLD QL SMEAR: ABNORMAL
PMV BLD AUTO: 9.2 FL (ref 9.2–12.9)
PMV BLD AUTO: 9.2 FL (ref 9.2–12.9)
PMV BLD AUTO: 9.4 FL (ref 9.2–12.9)
POIKILOCYTOSIS BLD QL SMEAR: SLIGHT
POTASSIUM SERPL-SCNC: 3.8 MMOL/L (ref 3.5–5.1)
PROT SERPL-MCNC: 4.4 G/DL (ref 6–8.4)
RBC # BLD AUTO: 0.99 M/UL (ref 4.6–6.2)
RBC # BLD AUTO: 1.5 M/UL (ref 4.6–6.2)
RBC # BLD AUTO: 2.8 M/UL (ref 4.6–6.2)
SAMPLE: ABNORMAL
SITE: ABNORMAL
SODIUM SERPL-SCNC: 137 MMOL/L (ref 136–145)
SPECIMEN OUTDATE: NORMAL
TACROLIMUS BLD-MCNC: <2 NG/ML (ref 5–15)
TRANS ERYTHROCYTES VOL PATIENT: NORMAL ML
WBC # BLD AUTO: 5.45 K/UL (ref 3.9–12.7)
WBC # BLD AUTO: 5.73 K/UL (ref 3.9–12.7)
WBC # BLD AUTO: 8.81 K/UL (ref 3.9–12.7)

## 2023-09-13 PROCEDURE — 84100 ASSAY OF PHOSPHORUS: CPT

## 2023-09-13 PROCEDURE — 83605 ASSAY OF LACTIC ACID: CPT

## 2023-09-13 PROCEDURE — 99222 PR INITIAL HOSPITAL CARE,LEVL II: ICD-10-PCS | Mod: GC,,, | Performed by: INTERNAL MEDICINE

## 2023-09-13 PROCEDURE — 94761 N-INVAS EAR/PLS OXIMETRY MLT: CPT

## 2023-09-13 PROCEDURE — 85025 COMPLETE CBC W/AUTO DIFF WBC: CPT | Mod: 91 | Performed by: INTERNAL MEDICINE

## 2023-09-13 PROCEDURE — 86920 COMPATIBILITY TEST SPIN: CPT | Performed by: STUDENT IN AN ORGANIZED HEALTH CARE EDUCATION/TRAINING PROGRAM

## 2023-09-13 PROCEDURE — C9113 INJ PANTOPRAZOLE SODIUM, VIA: HCPCS

## 2023-09-13 PROCEDURE — 99222 1ST HOSP IP/OBS MODERATE 55: CPT | Mod: GC,,, | Performed by: INTERNAL MEDICINE

## 2023-09-13 PROCEDURE — 80053 COMPREHEN METABOLIC PANEL: CPT | Performed by: STUDENT IN AN ORGANIZED HEALTH CARE EDUCATION/TRAINING PROGRAM

## 2023-09-13 PROCEDURE — 20600001 HC STEP DOWN PRIVATE ROOM

## 2023-09-13 PROCEDURE — 63600175 PHARM REV CODE 636 W HCPCS: Performed by: STUDENT IN AN ORGANIZED HEALTH CARE EDUCATION/TRAINING PROGRAM

## 2023-09-13 PROCEDURE — 25000003 PHARM REV CODE 250: Performed by: STUDENT IN AN ORGANIZED HEALTH CARE EDUCATION/TRAINING PROGRAM

## 2023-09-13 PROCEDURE — P9021 RED BLOOD CELLS UNIT: HCPCS | Performed by: STUDENT IN AN ORGANIZED HEALTH CARE EDUCATION/TRAINING PROGRAM

## 2023-09-13 PROCEDURE — 85025 COMPLETE CBC W/AUTO DIFF WBC: CPT | Performed by: STUDENT IN AN ORGANIZED HEALTH CARE EDUCATION/TRAINING PROGRAM

## 2023-09-13 PROCEDURE — 99223 1ST HOSP IP/OBS HIGH 75: CPT | Mod: AI,GC,, | Performed by: INTERNAL MEDICINE

## 2023-09-13 PROCEDURE — 86900 BLOOD TYPING SEROLOGIC ABO: CPT | Performed by: STUDENT IN AN ORGANIZED HEALTH CARE EDUCATION/TRAINING PROGRAM

## 2023-09-13 PROCEDURE — 99900035 HC TECH TIME PER 15 MIN (STAT)

## 2023-09-13 PROCEDURE — 63600175 PHARM REV CODE 636 W HCPCS

## 2023-09-13 PROCEDURE — 25000003 PHARM REV CODE 250: Performed by: INTERNAL MEDICINE

## 2023-09-13 PROCEDURE — 99223 PR INITIAL HOSPITAL CARE,LEVL III: ICD-10-PCS | Mod: AI,GC,, | Performed by: INTERNAL MEDICINE

## 2023-09-13 PROCEDURE — 25000003 PHARM REV CODE 250

## 2023-09-13 PROCEDURE — 85025 COMPLETE CBC W/AUTO DIFF WBC: CPT | Mod: 91 | Performed by: STUDENT IN AN ORGANIZED HEALTH CARE EDUCATION/TRAINING PROGRAM

## 2023-09-13 PROCEDURE — 83735 ASSAY OF MAGNESIUM: CPT | Performed by: STUDENT IN AN ORGANIZED HEALTH CARE EDUCATION/TRAINING PROGRAM

## 2023-09-13 PROCEDURE — 80197 ASSAY OF TACROLIMUS: CPT | Performed by: STUDENT IN AN ORGANIZED HEALTH CARE EDUCATION/TRAINING PROGRAM

## 2023-09-13 RX ORDER — MUPIROCIN 20 MG/G
OINTMENT TOPICAL 2 TIMES DAILY
Status: COMPLETED | OUTPATIENT
Start: 2023-09-13 | End: 2023-09-17

## 2023-09-13 RX ORDER — PANTOPRAZOLE SODIUM 40 MG/10ML
80 INJECTION, POWDER, LYOPHILIZED, FOR SOLUTION INTRAVENOUS ONCE
Status: COMPLETED | OUTPATIENT
Start: 2023-09-13 | End: 2023-09-13

## 2023-09-13 RX ORDER — HYDROCODONE BITARTRATE AND ACETAMINOPHEN 500; 5 MG/1; MG/1
TABLET ORAL
Status: DISCONTINUED | OUTPATIENT
Start: 2023-09-13 | End: 2023-09-15

## 2023-09-13 RX ORDER — TACROLIMUS 1 MG/1
1 CAPSULE ORAL EVERY MORNING
Status: DISCONTINUED | OUTPATIENT
Start: 2023-09-14 | End: 2023-09-18 | Stop reason: HOSPADM

## 2023-09-13 RX ORDER — IBUPROFEN 200 MG
16 TABLET ORAL
Status: DISCONTINUED | OUTPATIENT
Start: 2023-09-13 | End: 2023-09-18 | Stop reason: HOSPADM

## 2023-09-13 RX ORDER — SODIUM CHLORIDE 0.9 % (FLUSH) 0.9 %
10 SYRINGE (ML) INJECTION EVERY 12 HOURS PRN
Status: DISCONTINUED | OUTPATIENT
Start: 2023-09-13 | End: 2023-09-18 | Stop reason: HOSPADM

## 2023-09-13 RX ORDER — TACROLIMUS 0.5 MG/1
0.5 CAPSULE ORAL EVERY EVENING
Status: DISCONTINUED | OUTPATIENT
Start: 2023-09-13 | End: 2023-09-18 | Stop reason: HOSPADM

## 2023-09-13 RX ORDER — IBUPROFEN 200 MG
24 TABLET ORAL
Status: DISCONTINUED | OUTPATIENT
Start: 2023-09-13 | End: 2023-09-18 | Stop reason: HOSPADM

## 2023-09-13 RX ORDER — NALOXONE HCL 0.4 MG/ML
0.02 VIAL (ML) INJECTION
Status: DISCONTINUED | OUTPATIENT
Start: 2023-09-13 | End: 2023-09-18 | Stop reason: HOSPADM

## 2023-09-13 RX ORDER — GLUCAGON 1 MG
1 KIT INJECTION
Status: DISCONTINUED | OUTPATIENT
Start: 2023-09-13 | End: 2023-09-18 | Stop reason: HOSPADM

## 2023-09-13 RX ORDER — PANTOPRAZOLE SODIUM 40 MG/10ML
40 INJECTION, POWDER, LYOPHILIZED, FOR SOLUTION INTRAVENOUS 2 TIMES DAILY
Status: COMPLETED | OUTPATIENT
Start: 2023-09-13 | End: 2023-09-17

## 2023-09-13 RX ORDER — MAGNESIUM SULFATE HEPTAHYDRATE 40 MG/ML
2 INJECTION, SOLUTION INTRAVENOUS ONCE
Status: COMPLETED | OUTPATIENT
Start: 2023-09-13 | End: 2023-09-13

## 2023-09-13 RX ORDER — POLYETHYLENE GLYCOL 3350, SODIUM SULFATE ANHYDROUS, SODIUM BICARBONATE, SODIUM CHLORIDE, POTASSIUM CHLORIDE 236; 22.74; 6.74; 5.86; 2.97 G/4L; G/4L; G/4L; G/4L; G/4L
4000 POWDER, FOR SOLUTION ORAL ONCE
Status: COMPLETED | OUTPATIENT
Start: 2023-09-13 | End: 2023-09-13

## 2023-09-13 RX ORDER — NEOMYCIN SULFATE, POLYMYXIN B SULFATE AND DEXAMETHASONE 3.5; 10000; 1 MG/ML; [USP'U]/ML; MG/ML
1 SUSPENSION/ DROPS OPHTHALMIC 4 TIMES DAILY
Status: DISCONTINUED | OUTPATIENT
Start: 2023-09-13 | End: 2023-09-18 | Stop reason: HOSPADM

## 2023-09-13 RX ADMIN — PANTOPRAZOLE SODIUM 80 MG: 40 INJECTION, POWDER, FOR SOLUTION INTRAVENOUS at 06:09

## 2023-09-13 RX ADMIN — MUPIROCIN: 20 OINTMENT TOPICAL at 09:09

## 2023-09-13 RX ADMIN — PIPERACILLIN SODIUM AND TAZOBACTAM SODIUM 4.5 G: 4; .5 INJECTION, POWDER, FOR SOLUTION INTRAVENOUS at 05:09

## 2023-09-13 RX ADMIN — VANCOMYCIN HYDROCHLORIDE 1250 MG: 1.25 INJECTION, POWDER, LYOPHILIZED, FOR SOLUTION INTRAVENOUS at 02:09

## 2023-09-13 RX ADMIN — TACROLIMUS 0.5 MG: 0.5 CAPSULE ORAL at 06:09

## 2023-09-13 RX ADMIN — PIPERACILLIN SODIUM AND TAZOBACTAM SODIUM 4.5 G: 4; .5 INJECTION, POWDER, LYOPHILIZED, FOR SOLUTION INTRAVENOUS at 12:09

## 2023-09-13 RX ADMIN — PANTOPRAZOLE SODIUM 40 MG: 40 INJECTION, POWDER, FOR SOLUTION INTRAVENOUS at 09:09

## 2023-09-13 RX ADMIN — NEOMYCIN SULFATE, POLYMYXIN B SULFATE AND DEXAMETHASONE 1 DROP: 3.5; 10000; 1 SUSPENSION OPHTHALMIC at 06:09

## 2023-09-13 RX ADMIN — POLYETHYLENE GLYCOL 3350, SODIUM SULFATE ANHYDROUS, SODIUM BICARBONATE, SODIUM CHLORIDE, POTASSIUM CHLORIDE 4000 ML: 236; 22.74; 6.74; 5.86; 2.97 POWDER, FOR SOLUTION ORAL at 06:09

## 2023-09-13 RX ADMIN — MAGNESIUM SULFATE HEPTAHYDRATE 2 G: 40 INJECTION, SOLUTION INTRAVENOUS at 06:09

## 2023-09-13 RX ADMIN — NEOMYCIN SULFATE, POLYMYXIN B SULFATE AND DEXAMETHASONE 1 DROP: 3.5; 10000; 1 SUSPENSION OPHTHALMIC at 09:09

## 2023-09-13 NOTE — HPI
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 He is not a surgical or radiation candidate.  -Started on PCC 10/6/2020 followed by maintenance cetuximab until 8/24/21 (discontinued due to progression of disease; continued increase in SUV uptake, no new lesions).- 9/2021 started on carbo/5-FU/pembrolizumab; due to toxicity went to pembrolizumab monotherapy starting with cycle 2.  Progression of disease noted after cycle 3 so added chemotherapy back in with cycle 4. Keytruda monotherapy starting with C9.   June 2023 PET scan reviewed with patient - shows no evidence of hypermetabolic disease.    Status post total laryngectomy, total glossectomy and anterolateral thigh free flap reconstruction roughly 2017 at [facility] in [place] for persistent/recurrent base of tongue sq.c.c.S/p liver transplant and is currently on tacrolimus.       The patient endorses that he has been having some diarrhea. He has noted some bright red stools.   He denies that he is on active chemotherapy treatment    He denies any chest pain, nausea, vomiting, hemoptysis, or shortness of breath    In the ED the patient is hemodynamically stable, normotensive and on room air    The critical care team was consulted given the patient's significant anemia on presentation

## 2023-09-13 NOTE — HPI
Mr. Swain is a 73-year-old male significant history of laryngeal cancer status post chemo with tongue removal, liver transplantation complicated with cirrhosis on tacro, and history of GI bleed per patient presents with concerns for weakness.  History was given by family in the ED however at time of primary evaluation family was not at bedside.  He does live alone, and has been feeling weak and dizzy over the last several days.  He denies recent sick contacts, fevers, chills.  He does state that he has had a GI bleed in the past requiring a scope.  In the ED melena was down in physical exam.  Lactic 3, hemoglobin less than 4, and hypotension was also noted.  We will concerns for hypovolemic versus distributive shock blood cell transfusion was ordered, along with broad-spectrum antibiotics due to his immunocompromised state.  Patient to be admitted to Medical Oncology for further management and workup, as well as GI evaluation            Oncological Hx:   Squamous cell carcinoma of base of tongue   9/18/2020 Cancer Staged     Staging form: Pharynx - HPV-Mediated Oropharynx, AJCC 8th Edition  - Clinical stage from 9/18/2020: Stage III (rcT4, cN1, cM0, p16+)      9/28/2020 Initial Diagnosis     Squamous cell carcinoma of base of tongue      10/6/2020 - 8/17/2021 Chemotherapy     Treatment Summary   Plan Name: OP HEAD NECK CARBOPLATIN PACLITAXEL C1-2 FOLLOWED BY CETUXIMAB CARBOPLATIN C3-6 FOLLOWED BY CETUXIMAB MAINTENANCE WEEKLY  Treatment Goal: Control  Status: Inactive  Start Date: 10/6/2020  End Date: 8/17/2021  Provider: Ronald Berg MD  Chemotherapy: cetuximab (ERBITUX) 100 mg/50 mL chemo infusion 684 mg, 400 mg/m2 = 684 mg (100 % of original dose 400 mg/m2), Intravenous, Clinic/HOD 1 time, 29 of 38 cycles  Dose modification: 500 mg/m2 (original dose 400 mg/m2, Cycle 3), 250 mg/m2 (original dose 400 mg/m2, Cycle 3), 400 mg/m2 (original dose 400 mg/m2, Cycle 3), 250 mg/m2 (original dose 250 mg/m2, Cycle 7), 200  mg/m2 (original dose 250 mg/m2, Cycle 18), 200 mg/m2 (original dose 250 mg/m2, Cycle 19), 250 mg/m2 (original dose 250 mg/m2, Cycle 4), 200 mg/m2 (original dose 250 mg/m2, Cycle 25), 200 mg/m2 (original dose 250 mg/m2, Cycle 28)  Administration: 684 mg (11/17/2020), 400 mg (11/24/2020), 400 mg (2/9/2021), 400 mg (2/17/2021), 427.6 mg (3/9/2021), 400 mg (3/30/2021), 400 mg (3/23/2021), 400 mg (4/6/2021), 427.6 mg (4/13/2021), 427.6 mg (4/20/2021), 342 mg (5/11/2021), 342 mg (5/18/2021), 342 mg (5/25/2021), 400 mg (5/31/2021), 400 mg (6/7/2021), 400 mg (6/14/2021), 400 mg (12/8/2020), 427.6 mg (12/29/2020), 400 mg (12/1/2020), 400 mg (12/15/2020), 400 mg (12/22/2020), 427.6 mg (1/5/2021), 400 mg (1/12/2021), 400 mg (1/19/2021), 427.6 mg (1/26/2021), 400 mg (2/2/2021), 400 mg (2/23/2021), 400 mg (3/2/2021), 427.6 mg (3/16/2021), 400 mg (6/21/2021), 342 mg (6/28/2021), 342 mg (7/6/2021), 342 mg (7/13/2021), 342 mg (7/20/2021), 400 mg (7/27/2021), 400 mg (8/10/2021), 400 mg (8/17/2021)  CARBOplatin (PARAPLATIN) 310 mg in sodium chloride 0.9% 500 mL chemo infusion, 310 mg (92.2 % of original dose 334.5 mg), Intravenous, Clinic/HOD 1 time, 6 of 6 cycles  Dose modification:   (original dose 334.5 mg, Cycle 1)  Administration: 310 mg (10/6/2020), 370 mg (11/17/2020), 300 mg (10/27/2020), 350 mg (12/8/2020), 335 mg (12/29/2020), 320 mg (1/19/2021)  PACLitaxeL (TAXOL) 175 mg/m2 = 300 mg in sodium chloride 0.9% 500 mL chemo infusion, 175 mg/m2 = 300 mg (100 % of original dose 175 mg/m2), Intravenous, Clinic/HOD 1 time, 2 of 2 cycles  Dose modification: 175 mg/m2 (original dose 175 mg/m2, Cycle 1)  Administration: 300 mg (10/6/2020), 300 mg (10/27/2020)      4/29/2021 Tumor Conference        His case was discussed at the Multidisciplinary Head and Neck Team Planning Meeting.     Representatives from Medical Oncology, Radiation Oncology, Head and Neck Surgical Oncology, Psychosocial Oncology, and Speech and Language Pathology  discussed the case with the following recommendations:     1) biopsy            7/29/2021 Tumor Conference        His case was discussed at the Multidisciplinary Head and Neck Team Planning Meeting.     Representatives from Medical Oncology, Radiation Oncology, Head and Neck Surgical Oncology, Psychosocial Oncology, and Speech and Language Pathology discussed the case with the following recommendations:     1) Head and neck clinic follow up  2) consider Keytruda (discuss with transplant)  3) consider palliative referral            9/13/2021 -  Chemotherapy     Treatment Summary   Plan Name: OP HEAD NECK PEMBROLIZUMAB CARBOPLATIN FLUOROURACIL (C1 ONLY RECEIVED) FOLLOWED BY PEMBROLIZUMAB MAINTENANCE  Treatment Goal: Palliative  Status: Active  Start Date: 9/13/2021  End Date: 9/5/2023 (Planned)  Provider: Ronald Berg MD  Chemotherapy: CARBOplatin (PARAPLATIN) 320 mg in sodium chloride 0.9% 500 mL chemo infusion, 320 mg (100 % of original dose 320.5 mg), Intravenous, Clinic/HOD 1 time, 6 of 6 cycles  Dose modification:   (original dose 320.5 mg, Cycle 1)  Administration: 320 mg (9/13/2021), 335 mg (12/23/2021), 325 mg (1/27/2022), 245 mg (3/3/2022), 255 mg (5/12/2022), 255 mg (4/7/2022)  fluorouraciL 1,000 mg/m2/day = 6,440 mg in sodium chloride 0.9% 240 mL chemo infusion, 1,000 mg/m2/day = 6,440 mg, Intravenous, Over 96 hours, 6 of 6 cycles  Dose modification: 800 mg/m2/day (original dose 1,000 mg/m2/day, Cycle 6), 5,000 mg (original dose 1,000 mg/m2/day, Cycle 8)  Administration: 6,440 mg (9/13/2021), 6,440 mg (12/23/2021), 6,480 mg (1/27/2022), 5,000 mg (3/3/2022), 5,000 mg (4/7/2022), 5,000 mg (5/12/2022)      Secondary malignant neoplasm of cervical lymph node   2/9/2021 Initial Diagnosis     Secondary malignant neoplasm of cervical lymph node      9/13/2021 -  Chemotherapy     Treatment Summary   Plan Name: OP HEAD NECK PEMBROLIZUMAB CARBOPLATIN FLUOROURACIL (C1 ONLY RECEIVED) FOLLOWED BY PEMBROLIZUMAB  MAINTENANCE  Treatment Goal: Palliative  Status: Active  Start Date: 9/13/2021  End Date: 9/5/2023 (Planned)  Provider: Ronald Berg MD  Chemotherapy: CARBOplatin (PARAPLATIN) 320 mg in sodium chloride 0.9% 500 mL chemo infusion, 320 mg (100 % of original dose 320.5 mg), Intravenous, Clinic/\[facility] [place] [facility]\"" 1 time, 6 of 6 cycles  Dose modification:   (original dose 320.5 mg, Cycle 1)  Administration: 320 mg (9/13/2021), 335 mg (12/23/2021), 325 mg (1/27/2022), 245 mg (3/3/2022), 255 mg (5/12/2022), 255 mg (4/7/2022)  fluorouraciL 1,000 mg/m2/day = 6,440 mg in sodium chloride 0.9% 240 mL chemo infusion, 1,000 mg/m2/day = 6,440 mg, Intravenous, Over 96 hours, 6 of 6 cycles  Dose modification: 800 mg/m2/day (original dose 1,000 mg/m2/day, Cycle 6), 5,000 mg (original dose 1,000 mg/m2/day, Cycle 8)  Administration: 6,440 mg (9/13/2021), 6,440 mg (12/23/2021), 6,480 mg (1/27/2022), 5,000 mg (3/3/2022), 5,000 mg (4/7/2022), 5,000 mg (5/12/2022)

## 2023-09-13 NOTE — ED TRIAGE NOTES
Rocco Swain, a 73 y.o. male presents to the ED w/ complaint of fatigue.    Triage note:  Chief Complaint   Patient presents with    Fatigue     Pt has history of esophageal cancer and lymphoma and was receiving chemo up until a month ago and finished. Pt complaining of fatigue and weakness x2 days. Pt had tongue removed due to cancer history     Review of patient's allergies indicates:  No Known Allergies  Past Medical History:   Diagnosis Date    Basal cell carcinoma (BCC) in situ of skin 2012    3 on face, 2 on arm, removed by dermatology.     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 Underwent surgical resection of tongue and neck. s/p tracheostomy

## 2023-09-13 NOTE — CONSULTS
[facility]  Hepatology  Consult Note    Patient Name: Rocco Swain  MRN: [id number]  Admission Date: 9/12/2023  Hospital Length of Stay: 0 days  Code Status: Full Code   Attending Provider: Chloé Burns MD   Consulting Provider: Andra Ramírez MD  Primary Care Physician: GAURANG Walton MD  Principal Problem:GI bleed    Inpatient consult to Hepatology  Consult performed by: Andra Ramírez MD  Consult ordered by: José Antonio Carver DO        Subjective:     HPI: Rocco Swain is a 73 y.o. 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 Started on IV PPI and given blood products. GI was consulted for GI bleed and hepatology was called to help with management of transplanted liver and immunosuppressants. Prograf currently being held.       Patient feels okay today on eval, no complaints. He missed 2 days of his prograf. Denies any abdominal swelling or other symptoms. Reports painless BRBPR several days ago but the bleeding stopped around 2 days ago but continued to feel fatigue. He denies melena but melena was noted but ED. He does have a hx of NSAID use recently.       Transplant Hx:  Follow Dr Anton. Transplanted Nov 2008 for Hep C cirrhosis with HCC, post op was complicated by hepatic vein stenosis requiring dilation. Liver bx March 2013 showed steatohepatitis of the transplanted organ. Estimated PP measurement at that time was 15 mmHg. He has ascites controlled with diuretics and is on Prograf. Last U/S of liver 8/14/2023 with stable intra/extra hepatic ductal dilation and no evidence of HCC. Last tacrolimus level 8/31/2023 5.5. Currently on 1 mg qAM and 0.5 mg qPM       PMH:   BCC of the skin  Prior hep C s/p  liver transplant 2008  Cirrhosis of transplanted liver  Hypothryoid  SCC of larynx and tongue base  CKD stage IV   Trach dependent     Surgeries:  Liver transplant  Laryngectomy  Tongue removal  Tracheostomy    Family Hx:  Not contributory    Social Hx:  T- None   E- None currently, prior hx of drinking 1 glass of wine weekly   D- None   Patient is single and a retired        Current Facility-Administered Medications on File Prior to Encounter   Medication Dose Route Frequency Provider Last Rate Last Admin    heparin, porcine (PF) 100 unit/mL injection flush 500 Units  500 Units Intravenous PRN Ronald Berg MD        ofloxacin 0.3 % ophthalmic solution 1 drop  1 drop Right Eye On Call Procedure Victor M Vasques MD   2 drop at 10/11/22 0745    sodium chloride 0.9% flush 10 mL  10 mL Intravenous PRN Ronald Berg MD        sodium chloride 0.9% flush 10 mL  10 mL Intravenous PRN Victor M Vasques MD         Current Outpatient Medications on File Prior to Encounter   Medication Sig Dispense Refill    azelaic acid (AZELEX) 15 % gel APPLY TOPICALLY TO AFFECTED AREA IN THE MORNING 50 g 3    gabapentin (NEURONTIN) 300 MG capsule Take 1 capsule (300 mg total) by mouth every evening. 30 capsule 11    ibuprofen (ADVIL,MOTRIN) 200 MG tablet Take 200 mg by mouth.      imiquimod (ALDARA) 5 % cream Apply to biopsy site on frontal scalp + about a centimeter of surrounding skin qhs x 16 weeks. Wash off in am and apply sunscreen. Discontinue if skin becomes blistered, raw, bleeding, painful, etc. 24 packet 3    imiquimod (ALDARA) 5 % cream Apply to biopsy site on frontal scalp + about a centimeter of surrounding skin qhs x 16 weeks. Wash off in am and apply sunscreen. Discontinue if skin becomes blistered, raw, bleeding, painful, etc. 24 packet 3    levothyroxine (SYNTHROID) 88 MCG tablet TAKE 1 TABLET BY MOUTH ONCE DAILY 90 tablet 3    LIDOcaine HCl 2% (LIDOCAINE VISCOUS) 2 % Soln Swish and spit 15 mls every 8  (eight) hours as needed (mouth sore). 100 mL 3    LIDOcaine-prilocaine (EMLA) cream Apply generously to port site 30-60 min prior to chemo and then cover with saran wrap. 30 g 2    loteprednol (LOTEMAX) 0.5 % ophthalmic suspension Place 1 drop into the right eye 4 (four) times daily. 5 mL 1    magic mouthwash diphen/antac/lidoc/nysta Take 10 mLs by mouth 4 (four) times daily. 120 mL 4    metroNIDAZOLE (METROGEL) 0.75 % gel Apply topically to affected area 2 (two) times daily. 45 g 1    moxifloxacin (VIGAMOX) 0.5 % ophthalmic solution Place 1 drop into the right eye 4 (four) times daily. 3 mL 3    moxifloxacin (VIGAMOX) 0.5 % ophthalmic solution Place 1 drop into the right eye 4 (four) times daily. 3 mL 3    multivit-min/FA/lycopen/lutein (CENTRUM SILVER MEN ORAL) Take 1 tablet by mouth.      neomycin-polymyxin-dexamethasone (MAXITROL) 3.5mg/mL-10,000 unit/mL-0.1 % DrpS Place 1 drop into the right eye 4 (four) times daily. 5 mL 3    prednisoLONE acetate (PRED FORTE) 1 % DrpS Place 1 drop into the right eye 4 (four) times daily. 10 mL 3    sulfacetamide sodium-sulfur 10-5 % (w/w) Clsr USE TO WASH AFFECTED AREA DAILY      tacrolimus (PROGRAF) 0.5 MG Cap Take 1 capsule (0.5 mg total) by mouth every EVENING.  (Use the 1mg capsule for your morning dose) 90 capsule 3    tacrolimus (PROGRAF) 0.5 MG Cap Take 1 capsule (0.5 mg total) by mouth every evening. Use the 1mg capsule for your morning dose 90 capsule 3    tacrolimus (PROGRAF) 1 MG Cap Take 1 capsule (1 mg total) by mouth every morning. Use the tacrolimus 0.5mg capsule for your evening dose as directed. 90 capsule 3    tiZANidine (ZANAFLEX) 2 MG tablet Take 1 tablet (2 mg total) by mouth nightly as needed (neck muscle strain). 30 tablet 3    traZODone (DESYREL) 50 MG tablet Take 1 tablet (50 mg total) by mouth every evening. 90 tablet 2    vitamin E 400 UNIT capsule Take 400 Units by mouth once daily.         Review of patient's allergies indicates:  No Known  Allergies    Review of Systems   All other systems reviewed and are negative.       Objective:     Vitals:    09/13/23 0709   BP: 103/64   Pulse: 90   Resp: 12   Temp: 99.3 °F (37.4 °C)         Constitutional: thin and ill appearing, but non-toxic  HENT: EOMI. Trach in place   Cardiovascular: regular rate and rhythm and no murmur  Respiratory: normal chest expansion & respiratory effort   and no accessory muscle use  GI: soft, non-tender, without masses or organomegaly  Musculoskeletal: no muscular tenderness noted  Skin: normal color and no jaundice  Neurological: alert, oriented x3  Psychiatric: mood and affect are within normal limits    Significant Labs:  Recent Labs   Lab 09/13/23  0135   HGB 3.4*       Lab Results   Component Value Date    WBC 8.81 09/13/2023    HGB 3.4 (LL) 09/13/2023    HCT 10.4 (LL) 09/13/2023     (H) 09/13/2023     09/13/2023       Lab Results   Component Value Date     09/13/2023    K 3.8 09/13/2023     09/13/2023    CO2 15 (L) 09/13/2023    BUN 42 (H) 09/13/2023    CREATININE 1.2 09/13/2023    CALCIUM 7.8 (L) 09/13/2023    ANIONGAP 14 09/13/2023    ESTGFRAFRICA 52.6 (A) 07/14/2022    EGFRNONAA 45.5 (A) 07/14/2022       Lab Results   Component Value Date    ALT 11 09/13/2023    AST 25 09/13/2023     (H) 07/09/2020    ALKPHOS 39 (L) 09/13/2023    BILITOT 0.3 09/13/2023       Lab Results   Component Value Date    INR 1.0 09/02/2020       Significant Imaging:  Reviewed pertinent radiology findings.       Assessment/Plan:     Rocco Swain is a 73 y.o. male with history of laryngeal cancer in prior liver transplant complicated by cirrhosis of his transplanted liver, currently stable of prograf who was admitted for symptomatic anemia thought to be 2/2 GI bleed. Hepatology consulted for management of immunosuppressants and liver. Prograf level undetectable due to not taking his medication for 2 days. Renal function stable.     Problem List:  Acute GI bleed    CKD Stage IV   SCC of larynx and tongue base   Cirrhosis of transplanted liver  Medication monitoring   Hypomagnesemia  Hypothyroidism  Lactic acidosis  Protein-tracey malnutrition        Recommendations:  Continue home regimen of prograf 1 mg qAM and 0.5 mg at night  Follow up GI recs for endoscopic evaluation for bleed  Repeat prograf level in the morning     Thank you for involving us in the care of Rocco Swain. Please call with any additional questions, concerns or changes in the patient's clinical status. We will continue to follow.   Andra Ramírez MD  Gastroenterology & Hepatology Fellow PGY IV   [facility]

## 2023-09-13 NOTE — ANESTHESIA PREPROCEDURE EVALUATION
[facility]  Anesthesia Pre-Operative Evaluation         Patient Name: Rocco Swain  YOB: 1950  MRN: [id number]    SUBJECTIVE:     Pre-operative evaluation for Procedure(s) (LRB):  EGD (ESOPHAGOGASTRODUODENOSCOPY) (N/A)  COLONOSCOPY (N/A)     09/13/2023    Rocco Swain is a 73 y.o. male w/ a significant PMHx of skin/tongue/larynx carcinoma s/p chemo and trach 2016, HCV and HCC s/p liver transplant on tacro c/b cirrhosis, and CKD III presenting with 4 days of fatigue and BRBPR. Pt was hypotensive with MAP of 68 at time of presentation. Lactate 3.8, HGB 3.4, 3 unit pRBCs ordered.    Patient now presents for the above procedure(s). He communicated via pen and paper.     Echo Summary  No results found for this or any previous visit.     Prev airway: None documented.    LDA:       PowerPort A Cath Single Lumen 03/08/21 1031 right internal jugular (Active)   Site Assessment No drainage;No redness;No swelling;No warmth 09/13/23 1204   Line Securement Device Secured with sutureless device 09/13/23 1204   Dressing Type CHG impregnated dressing/sponge;Transparent (Tegaderm);Central line dressing 09/12/23 2336   Dressing Status Clean;Dry;Intact 09/13/23 1204   Dressing Intervention Integrity maintained 09/13/23 1204   Status Accessed 09/12/23 2336   Accessed by: MARCK Handley 09/12/23 2336   Needle Insertion Date 09/12/23 09/12/23 2336   Needle Insertion Time 2336 09/12/23 2336   Type of Needle PowerHuber 09/12/23 2336   Gauge 20 09/12/23 2336   Needle Length 3/4 in 09/12/23 2336   Needle Status Left in place 09/12/23 2336   Flush Performed Yes 09/12/23 2336   Number of days: 919            Peripheral IV - Single Lumen 09/13/23 0507 20 G Anterior;Left Forearm (Active)   Site Assessment Clean;Dry;Intact;No redness;No swelling;No warmth;No drainage 09/13/23 1600   Extremity Assessment Distal to IV No warmth;No swelling;No redness;No abnormal discoloration 09/13/23 1204   Line Status Saline locked  09/13/23 1204   Dressing Status Clean;Dry;Intact 09/13/23 1204   Dressing Intervention Integrity maintained 09/13/23 1204   Dressing Change Due 09/17/23 09/13/23 1204   Site Change Due 09/17/23 09/13/23 1204   Reason Not Rotated Not due 09/13/23 1204   Number of days: 0       Drips: None documented.      Patient Active Problem List   Diagnosis    Larynx cancer    Hepatitis C    Postoperative hypothyroidism    History of laryngectomy    Status post liver transplantation - 2008    Cirrhosis of transplanted liver    Squamous cell carcinoma of base of tongue    Chronic kidney disease (CKD), stage IV (severe)    Atherosclerosis of abdominal aorta    Anemia in stage 3a chronic kidney disease    Secondary malignant neoplasm of cervical lymph node    Immunodeficiency due to drugs    Tracheostomy status    Centrilobular emphysema    Hypomagnesemia    Lip lesion    Hx of hepatitis C    History of colon polyps    Thrombocytopenia    Range of motion deficit    Finger deformity    Acute conjunctivitis of right eye    Conjunctival lesion    Left ear pain    Conjunctival intraepithelial neoplasm    Anemia    GI bleed    ACP (advance care planning)    Body mass index (BMI) less than 19    Severe sepsis    Mild malnutrition       Review of patient's allergies indicates:  No Known Allergies    Current Inpatient Medications:   mupirocin   Nasal BID    neomycin-polymyxin-dexamethasone  1 drop Right Eye QID    pantoprazole  40 mg Intravenous BID    polyethylene glycol  4,000 mL Oral Once    tacrolimus  0.5 mg Oral Daily PM    [START ON 9/14/2023] tacrolimus  1 mg Oral Daily AM       Current Facility-Administered Medications on File Prior to Encounter   Medication Dose Route Frequency Provider Last Rate Last Admin    heparin, porcine (PF) 100 unit/mL injection flush 500 Units  500 Units Intravenous PRN Ronald Berg MD        ofloxacin 0.3 % ophthalmic solution 1 drop  1 drop Right Eye On Call  Procedure Victor M Vasques MD   2 drop at 10/11/22 0745    sodium chloride 0.9% flush 10 mL  10 mL Intravenous PRN Ronald Berg MD        sodium chloride 0.9% flush 10 mL  10 mL Intravenous PRN Victor M Vasques MD         Current Outpatient Medications on File Prior to Encounter   Medication Sig Dispense Refill    azelaic acid (AZELEX) 15 % gel APPLY TOPICALLY TO AFFECTED AREA IN THE MORNING 50 g 3    gabapentin (NEURONTIN) 300 MG capsule Take 1 capsule (300 mg total) by mouth every evening. 30 capsule 11    ibuprofen (ADVIL,MOTRIN) 200 MG tablet Take 200 mg by mouth.      imiquimod (ALDARA) 5 % cream Apply to biopsy site on frontal scalp + about a centimeter of surrounding skin qhs x 16 weeks. Wash off in am and apply sunscreen. Discontinue if skin becomes blistered, raw, bleeding, painful, etc. 24 packet 3    imiquimod (ALDARA) 5 % cream Apply to biopsy site on frontal scalp + about a centimeter of surrounding skin qhs x 16 weeks. Wash off in am and apply sunscreen. Discontinue if skin becomes blistered, raw, bleeding, painful, etc. 24 packet 3    levothyroxine (SYNTHROID) 88 MCG tablet TAKE 1 TABLET BY MOUTH ONCE DAILY 90 tablet 3    LIDOcaine HCl 2% (LIDOCAINE VISCOUS) 2 % Soln Swish and spit 15 mls every 8 (eight) hours as needed (mouth sore). 100 mL 3    LIDOcaine-prilocaine (EMLA) cream Apply generously to port site 30-60 min prior to chemo and then cover with saran wrap. 30 g 2    loteprednol (LOTEMAX) 0.5 % ophthalmic suspension Place 1 drop into the right eye 4 (four) times daily. 5 mL 1    magic mouthwash diphen/antac/lidoc/nysta Take 10 mLs by mouth 4 (four) times daily. 120 mL 4    metroNIDAZOLE (METROGEL) 0.75 % gel Apply topically to affected area 2 (two) times daily. 45 g 1    moxifloxacin (VIGAMOX) 0.5 % ophthalmic solution Place 1 drop into the right eye 4 (four) times daily. 3 mL 3    moxifloxacin (VIGAMOX) 0.5 % ophthalmic solution Place 1 drop into the right eye 4  (four) times daily. 3 mL 3    multivit-min/FA/lycopen/lutein (CENTRUM SILVER MEN ORAL) Take 1 tablet by mouth.      neomycin-polymyxin-dexamethasone (MAXITROL) 3.5mg/mL-10,000 unit/mL-0.1 % DrpS Place 1 drop into the right eye 4 (four) times daily. 5 mL 3    prednisoLONE acetate (PRED FORTE) 1 % DrpS Place 1 drop into the right eye 4 (four) times daily. 10 mL 3    sulfacetamide sodium-sulfur 10-5 % (w/w) Clsr USE TO WASH AFFECTED AREA DAILY      tacrolimus (PROGRAF) 0.5 MG Cap Take 1 capsule (0.5 mg total) by mouth every EVENING.  (Use the 1mg capsule for your morning dose) 90 capsule 3    tacrolimus (PROGRAF) 0.5 MG Cap Take 1 capsule (0.5 mg total) by mouth every evening. Use the 1mg capsule for your morning dose 90 capsule 3    tacrolimus (PROGRAF) 1 MG Cap Take 1 capsule (1 mg total) by mouth every morning. Use the tacrolimus 0.5mg capsule for your evening dose as directed. 90 capsule 3    tiZANidine (ZANAFLEX) 2 MG tablet Take 1 tablet (2 mg total) by mouth nightly as needed (neck muscle strain). 30 tablet 3    traZODone (DESYREL) 50 MG tablet Take 1 tablet (50 mg total) by mouth every evening. 90 tablet 2    vitamin E 400 UNIT capsule Take 400 Units by mouth once daily.         Past Surgical History:   Procedure Laterality Date    COLONOSCOPY      CONJUNCTIVA BIOPSY Right 10/11/2022    Procedure: BIOPSY, CONJUNCTIVA;  Surgeon: Victor M Vasques MD;  Location: [facility] OR;  Service: Ophthalmology;  Laterality: Right;    INSERTION OF VENOUS ACCESS PORT Right 3/8/2021    Procedure: INSERTION, VENOUS ACCESS PORT;  Surgeon: Jesus Rendon MD;  Location: [facility] OR [address] [place] [address];  Service: General;  Laterality: Right;    LIVER TRANSPLANT  11/2008    transplanted for biopsy proven hepatocellular carcinoma,     LYMPH NODE BIOPSY N/A 9/18/2020    Procedure: BIOPSY, LYMPH NODE;  [facility];  [facility];  Service: General;  Laterality: N/A;  Rm 173    SURGICAL REMOVAL OF  SCAR Right 8/24/2023    Procedure: EXCISION, SCAR;  Surgeon: Ting Brambila MD;  Location: [facility] OR;  Service: Ophthalmology;  Laterality: Right;    TRACHEOSTOMY         Social History:  Tobacco Use: Low Risk  (9/8/2023)    Patient History     Smoking Tobacco Use: Never     Smokeless Tobacco Use: Never     Passive Exposure: Not on file      Alcohol Use: Not on file        OBJECTIVE:     Vital Signs Range (Last 24H):  Temp:  [36.8 °C (98.2 °F)-37.4 °C (99.3 °F)]   Pulse:  []   Resp:  [10-22]   BP: ()/(50-99)   SpO2:  [95 %-100 %]       Significant Labs:  Lab Results   Component Value Date    WBC 5.73 09/13/2023    HGB 4.7 (LL) 09/13/2023    HCT 14.7 (LL) 09/13/2023     (L) 09/13/2023    CHOL 123 03/18/2022    TRIG 71 03/18/2022    HDL 53 03/18/2022    ALT 11 09/13/2023    AST 25 09/13/2023     09/13/2023    K 3.8 09/13/2023     09/13/2023    CREATININE 1.2 09/13/2023    BUN 42 (H) 09/13/2023    CO2 15 (L) 09/13/2023    TSH 0.508 08/10/2023    PSA 0.85 03/18/2022    INR 1.0 09/02/2020    HGBA1C 4.8 12/11/2019       Diagnostic Studies: No relevant studies.    EKG:   Results for orders placed or performed during the hospital encounter of 09/12/23   EKG 12-lead    Collection Time: 09/12/23 10:16 PM    Narrative    Test Reason : R53.1,    Vent. Rate : 101 BPM     Atrial Rate : 090 BPM     P-R Int : 000 ms          QRS Dur : 068 ms      QT Int : 336 ms       P-R-T Axes : 000 079 270 degrees     QTc Int : 435 ms    Atrial fibrillation with rapid ventricular response with premature  ventricular or aberrantly conducted complexes  Nonspecific ST and/or T wave abnormalities  Abnormal ECG  No previous ECGs available  Confirmed by Monico Leigh MD ([id number]) on 9/13/2023 8:29:50 AM    Referred By: LON   SELF           Confirmed By:Monico Leigh MD       2D ECHO:  TTE:  No results found for this or any previous visit.    SHERI:  No results found for this or any previous  visit.    ASSESSMENT/PLAN:           Pre-op Assessment    I have reviewed the Patient Summary Reports.     I have reviewed the Nursing Notes. I have reviewed the NPO Status.   I have reviewed the Medications.     Review of Systems  Anesthesia Hx:  No problems with previous Anesthesia  History of prior surgery of interest to airway management or planning: tracheostomy, liver transplant. Denies Family Hx of Anesthesia complications.   Denies Personal Hx of Anesthesia complications.   Hematology/Oncology:  Hematology Normal       -- Cancer in past history:  chemotherapy and radiation    EENT/Dental:EENT/Dental Normal   Cardiovascular:  Cardiovascular Normal     Pulmonary:  Pulmonary Normal    Renal/:  Renal/ Normal     Hepatic/GI:   Liver Disease,    Musculoskeletal:  Musculoskeletal Normal    Neurological:  Neurology Normal    Endocrine:  Endocrine Normal    Dermatological:  Skin Normal    Psych:  Psychiatric Normal           Physical Exam  General: Well nourished    Airway:  Neck ROM: Normal ROM  Pre-Existing Airway: Tracheal Stoma    Chest/Lungs:  Clear to auscultation, Normal Respiratory Rate    Heart:  Rate: Normal  Rhythm: Regular Rhythm  Sounds: Normal    Abdomen:  Normal, Nontender        Anesthesia Plan  Type of Anesthesia, risks & benefits discussed:    Anesthesia Type: Gen Natural Airway, MAC  Intra-op Monitoring Plan: Standard ASA Monitors  Post Op Pain Control Plan: multimodal analgesia  Induction:  IV  Airway Plan: Direct and Via Tracheostomy, Post-Induction  Informed Consent: Informed consent signed with the Patient and all parties understand the risks and agree with anesthesia plan.  All questions answered.   ASA Score: 3  Day of Surgery Review of History & Physical: H&P Update referred to the surgeon/provider.    Ready For Surgery From Anesthesia Perspective.     .

## 2023-09-13 NOTE — ED NOTES
Pt in hospital gown and socks, on cardiac monitor, belongings labeled, side rails up x2, call light in reach, white board updated, urinal and bedside table at bedside. Pt educated and encouraged to call for assistance if needed. Care on going

## 2023-09-13 NOTE — ASSESSMENT & PLAN NOTE
Nutrition consult for low BMI regarding maximizing diet when safe to resume   - Recommend PT/OT once volume repleted to assess function, patient lives at home alone

## 2023-09-13 NOTE — SUBJECTIVE & OBJECTIVE
Oncology Treatment Plan:   OP HEAD NECK PEMBROLIZUMAB CARBOPLATIN FLUOROURACIL (C1 ONLY RECEIVED) FOLLOWED BY PEMBROLIZUMAB MAINTENANCE    Medications:  Continuous Infusions:  Scheduled Meds:   magnesium sulfate IVPB  2 g Intravenous Once    mupirocin   Nasal BID    pantoprazole  40 mg Intravenous BID    pantoprazole  80 mg Intravenous Once    piperacillin-tazobactam (Zosyn) IV (PEDS and ADULTS) (extended infusion is not appropriate)  4.5 g Intravenous Once    piperacillin-tazobactam (Zosyn) IV (PEDS and ADULTS) (extended infusion is not appropriate)  4.5 g Intravenous Q8H    vancomycin (VANCOCIN) IV (PEDS and ADULTS)  15 mg/kg Intravenous Q24H     PRN Meds:0.9%  NaCl infusion (for blood administration), dextrose 10%, dextrose 10%, glucagon (human recombinant), glucose, glucose, naloxone, sodium chloride 0.9%, Pharmacy to dose Vancomycin consult **AND** vancomycin - pharmacy to dose     Review of patient's allergies indicates:  No Known Allergies     Past Medical History:   Diagnosis Date    Basal cell carcinoma (BCC) in situ of skin 2012    3 on face, 2 on arm, removed by dermatology.     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 Underwent surgical resection of tongue and neck. s/p tracheostomy     Past Surgical History:   Procedure Laterality Date    COLONOSCOPY      CONJUNCTIVA BIOPSY Right 10/11/2022    Procedure: BIOPSY, CONJUNCTIVA;  Surgeon: Victor M Vasques MD;  Location: [facility];  Service: Ophthalmology;  Laterality: Right;    INSERTION OF VENOUS ACCESS PORT Right 3/8/2021    Procedure: INSERTION, VENOUS ACCESS PORT;  Surgeon: Jesus Rendon MD;  Location: [address];  Service: General;  Laterality: Right;    LIVER TRANSPLANT  11/2008    transplanted  for biopsy proven hepatocellular carcinoma,     LYMPH NODE BIOPSY N/A 9/18/2020    Procedure: BIOPSY, LYMPH NODE;  Surgeon: [facility] Diagnostic Provider;  Location: [facility] OR [address] [place] [address];  Service: General;  Laterality: N/A;  Rm 173    SURGICAL REMOVAL OF SCAR Right 8/24/2023    Procedure: EXCISION, SCAR;  Surgeon: Ting Brambila MD;  Location: [facility] OR;  Service: Ophthalmology;  Laterality: Right;    TRACHEOSTOMY       Family History       Problem Relation (Age of Onset)    Dementia Mother          Tobacco Use    Smoking status: Never    Smokeless tobacco: Never   Substance and Sexual Activity    Alcohol use: Yes     Comment: drinks wine, 1 glass day    Drug use: Not Currently    Sexual activity: Yes     Comment: No prior history of STD        Review of Systems   Constitutional:  Positive for fatigue. Negative for chills and fever.   Respiratory:  Negative for shortness of breath and wheezing.    Cardiovascular:  Negative for chest pain and leg swelling.   Gastrointestinal:  Negative for abdominal distention and abdominal pain.   Neurological:  Positive for dizziness and weakness.   Psychiatric/Behavioral:  Negative for agitation, behavioral problems and confusion.      Objective:     Vital Signs (Most Recent):  Temp: 99.1 °F (37.3 °C) (09/13/23 0424)  Pulse: 86 (09/13/23 0424)  Resp: 17 (09/13/23 0424)  BP: (!) 104/55 (09/13/23 0424)  SpO2: 96 % (09/13/23 0424) Vital Signs (24h Range):  Temp:  [98.2 °F (36.8 °C)-99.3 °F (37.4 °C)] 99.1 °F (37.3 °C)  Pulse:  [] 86  Resp:  [11-22] 17  SpO2:  [95 %-100 %] 96 %  BP: ()/(54-60) 104/55     Weight: 52.6 kg (116 lb)  Body mass index is 17.64 kg/m².  Body surface area is 1.59 meters squared.      Intake/Output Summary (Last 24 hours) at 9/13/2023 0427  Last data filed at 9/13/2023 0120  Gross per 24 hour   Intake --   Output 100 ml   Net -100 ml        Physical Exam  Constitutional:       Appearance: He is cachectic. He is ill-appearing. He is not toxic-appearing or  diaphoretic.   HENT:      Head: Normocephalic and atraumatic.   Cardiovascular:      Rate and Rhythm: Normal rate and regular rhythm.      Pulses: Normal pulses.      Heart sounds: Normal heart sounds. No murmur heard.  Pulmonary:      Effort: Pulmonary effort is normal. No respiratory distress.      Breath sounds: Normal breath sounds.   Abdominal:      General: There is no distension.      Tenderness: There is no abdominal tenderness.   Skin:     General: Skin is warm and dry.   Neurological:      General: No focal deficit present.      Mental Status: He is alert, oriented to person, place, and time and easily aroused. Mental status is at baseline.   Psychiatric:         Mood and Affect: Mood normal.         Behavior: Behavior normal. Behavior is cooperative.         Judgment: Judgment normal.          Significant Labs:   All pertinent labs from the last 24 hours have been reviewed.    Diagnostic Results:  I have reviewed all pertinent imaging results/findings within the past 24 hours.

## 2023-09-13 NOTE — CONSULTS
[facility] - Transplant Stepdown  Adult Nutrition  Consult Note    SUMMARY     Recommendations    When medically able, ADAT Full liquid diet or texture per SLP    When diet advanced, add [place] 1.4 x 4 cans per day to provide 1820 kcal, 80g protein, 936 ml FW    RD to monitor and follow    Goals: Meet % EEN, EPN by RD f/u  Nutrition Goal Status: new  Communication of RD Recs:  (POC)    Assessment and Plan    Endocrine  Mild malnutrition  Malnutrition Type:  Context: chronic illness  Level: mild    Related to (etiology):   Inadequate energy intake    Signs and Symptoms (as evidenced by):   Malnutrition Characteristic Summary:  Subcutaneous Fat (Malnutrition): severe depletion  Muscle Mass (Malnutrition): moderate depletion    Interventions/Recommendations (treatment strategy):  When medically able, ADAT Full liquid diet or texture per SLP  When diet advanced, add [place] 1.4 x 4 cans per day to provide 1820 kcal, 80g protein, 936 ml FW  RD to monitor and follow    Nutrition Diagnosis Status:   Continue    Malnutrition Assessment  Malnutrition Context: chronic illness  Malnutrition Level: mild     Subcutaneous Fat (Malnutrition): severe depletion  Muscle Mass (Malnutrition): moderate depletion   Orbital Region (Subcutaneous Fat Loss): severe depletion  Upper Arm Region (Subcutaneous Fat Loss): severe depletion   Southside Region (Muscle Loss): mild depletion  Clavicle Bone Region (Muscle Loss): moderate depletion  Clavicle and Acromion Bone Region (Muscle Loss): moderate depletion  Dorsal Hand (Muscle Loss): severe depletion  Patellar Region (Muscle Loss): moderate depletion  Anterior Thigh Region (Muscle Loss): mild depletion  Posterior Calf Region (Muscle Loss): mild depletion    Reason for Assessment    Reason For Assessment: consult  Diagnosis:  (GI bleed)  Relevant Medical History: post op hypothyroidism, liver tx c/b cirrhosis, SCC of tongue, anemia, hypomagnesemia, severe sepsis, hx of  "laryngectomy  Interdisciplinary Rounds: did not attend    General Information Comments:   RD consulted for BMI <18. Pt with hx of laryngeal cancer s/p chemo, glossectomy with laryngectomy (2016), liver tx c/b cirrhosis, GI bleed, CKD 4. Pt communicate via writing on a notepad. Stated he is unable to tolerate solid food, usually drink soup, shakes and 4-5 Ensure Plus (350 kcal, 16g protein per can) per day. Calories mainly from Ensure. Has not eaten x 2 days. NPO at this time, plan for EDG tomorrow.  lb. Wt stable over the past year per chart. Denies N/V/D/C. NFPE completed today, noted mild-severe fat and muscle depletion. Pt meet criteria for mild malnutrition.     Nutrition Discharge Planning: Pending medical course    Nutrition Risk Screen    Nutrition Risk Screen: no indicators present    Nutrition/Diet History    Spiritual, Cultural Beliefs, Orthodox Practices, Values that Affect Care: no    Anthropometrics    Temp: 98.4 °F (36.9 °C)  Height Method: Stated  Height: 5' 8" (172.7 cm)  Height (inches): 68 in  Weight Method: Standard Scale  Weight: 52.6 kg (116 lb)  Weight (lb): 116 lb  Ideal Body Weight (IBW), Male: 154 lb  % Ideal Body Weight, Male (lb): 75.32 %  BMI (Calculated): 17.6       Lab/Procedures/Meds    Pertinent Labs Reviewed: reviewed  Pertinent Labs Comments: Hemoglobin 3.4, Hematocrit 10.4, glucose 113, BUN 42, Tprotein 4.4, albumin 2.3, ALP 39  Pertinent Medications Reviewed: reviewed  Pertinent Medications Comments: pantorpazole    Estimated/Assessed Needs    Weight Used For Calorie Calculations: 69.9 kg (154 lb) (IBW)  Energy Calorie Requirements (kcal): 7603-6334 kcal  Energy Need Method: Kcal/kg (25-30 kcal/kg IBW)  Protein Requirements: 84-105g (1.2-1.5g/kg IBW)  Weight Used For Protein Calculations: 69.9 kg (154 lb) (IBW)  Fluid Requirements (mL): 1ml/kcal or per MD  Estimated Fluid Requirement Method: RDA Method  RDA Method (mL): 1746     Nutrition Prescription Ordered    Current " Diet Order: NPO    Evaluation of Received Nutrient/Fluid Intake    I/O: +270 ml since admit  Energy Calories Required: not meeting needs  Protein Required: not meeting needs  Comments: LBM 9/11    Nutrition Risk    Level of Risk/Frequency of Follow-up:  (1x/week)     Monitor and Evaluation    Food and Nutrient Intake: energy intake, food and beverage intake  Food and Nutrient Adminstration: diet order, enteral and parenteral nutrition administration  Physical Activity and Function: nutrition-related ADLs and IADLs  Anthropometric Measurements: height/length, weight, weight change, body mass index  Biochemical Data, Medical Tests and Procedures: gastrointestinal profile, electrolyte and renal panel, glucose/endocrine profile, inflammatory profile, lipid profile  Nutrition-Focused Physical Findings: overall appearance     Nutrition Follow-Up    RD Follow-up?: Yes

## 2023-09-13 NOTE — SUBJECTIVE & OBJECTIVE
Past Medical History:   Diagnosis Date    Basal cell carcinoma (BCC) in situ of skin 2012    3 on face, 2 on arm, removed by dermatology.     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 Underwent surgical resection of tongue and neck. s/p tracheostomy       Past Surgical History:   Procedure Laterality Date    COLONOSCOPY      CONJUNCTIVA BIOPSY Right 10/11/2022    Procedure: BIOPSY, CONJUNCTIVA;  Surgeon: Victor M Vasques MD;  Location: [facility] OR;  Service: Ophthalmology;  Laterality: Right;    INSERTION OF VENOUS ACCESS PORT Right 3/8/2021    Procedure: INSERTION, VENOUS ACCESS PORT;  Surgeon: Jesus Rendon MD;  Location: [facility] OR [address] [place] [address];  Service: General;  Laterality: Right;    LIVER TRANSPLANT  11/2008    transplanted for biopsy proven hepatocellular carcinoma,     LYMPH NODE BIOPSY N/A 9/18/2020    Procedure: BIOPSY, LYMPH NODE;  Surgeon: [facility] Diagnostic Provider;  Location: [facility] OR [address] [place] [address];  Service: General;  Laterality: N/A;  Rm 173    SURGICAL REMOVAL OF SCAR Right 8/24/2023    Procedure: EXCISION, SCAR;  Surgeon: Ting Brambila MD;  Location: [facility] OR;  Service: Ophthalmology;  Laterality: Right;    TRACHEOSTOMY         Review of patient's allergies indicates:  No Known Allergies  Family History       Problem Relation (Age of Onset)    Dementia Mother          Tobacco Use    Smoking status: Never    Smokeless tobacco: Never   Substance and Sexual Activity    Alcohol use: Yes     Comment: drinks wine, 1 glass day    Drug use: Not Currently    Sexual activity: Yes     Comment: No prior history of STD      Review of Systems   Constitutional:  Positive for fatigue. Negative for chills and fever.   Respiratory:  Negative for shortness of breath and wheezing.     Cardiovascular:  Negative for chest pain and leg swelling.   Gastrointestinal:  Positive for blood in stool. Negative for abdominal distention and abdominal pain.   Neurological:  Positive for dizziness and weakness.   Psychiatric/Behavioral:  Negative for agitation, behavioral problems and confusion.      Objective:     Vital Signs (Most Recent):  Temp: 99.3 °F (37.4 °C) (09/13/23 0721)  Pulse: 90 (09/13/23 0852)  Resp: 18 (09/13/23 0852)  BP: 121/73 (09/13/23 0840)  SpO2: 100 % (09/13/23 0852) Vital Signs (24h Range):  Temp:  [98.2 °F (36.8 °C)-99.3 °F (37.4 °C)] 99.3 °F (37.4 °C)  Pulse:  [] 90  Resp:  [10-22] 18  SpO2:  [95 %-100 %] 100 %  BP: ()/(50-99) 121/73     Weight: 52.6 kg (116 lb) (09/13/23 0226)  Body mass index is 17.64 kg/m².      Intake/Output Summary (Last 24 hours) at 9/13/2023 0947  Last data filed at 9/13/2023 0748  Gross per 24 hour   Intake 370.68 ml   Output 425 ml   Net -54.32 ml       Lines/Drains/Airways       Central Venous Catheter Line  Duration                  PowerPort A Cath Single Lumen 03/08/21 1031 right internal jugular 918 days              Airway  Duration                  Surgical Airway -- days         Laryngectomy (Do Not Remove) 08/24/23 1145  19 days              Peripheral Intravenous Line  Duration                  Peripheral IV - Single Lumen 09/13/23 0507 20 G Anterior;Left Forearm <1 day                     Physical Exam  Constitutional:       Appearance: He is cachectic. He is ill-appearing. He is not toxic-appearing or diaphoretic.   HENT:      Head: Normocephalic and atraumatic.   Cardiovascular:      Rate and Rhythm: Normal rate and regular rhythm.      Pulses: Normal pulses.      Heart sounds: Normal heart sounds. No murmur heard.  Pulmonary:      Effort: Pulmonary effort is normal. No respiratory distress.      Breath sounds: Normal breath sounds.   Abdominal:      General: Abdomen is flat. Bowel sounds are normal. There is no distension.       Palpations: Abdomen is soft.      Tenderness: There is no abdominal tenderness. There is no guarding.   Musculoskeletal:         General: No deformity.      Right lower leg: No edema.      Left lower leg: No edema.   Skin:     General: Skin is warm and dry.   Neurological:      Mental Status: He is alert, oriented to person, place, and time and easily aroused.   Psychiatric:         Mood and Affect: Mood normal.         Behavior: Behavior normal. Behavior is cooperative.          Significant Labs:  CBC:   Recent Labs   Lab 09/13/23  0135 09/13/23  0850   WBC 8.81 5.73   HGB 3.4* 4.7*   HCT 10.4* 14.7*    137*     CMP:   Recent Labs   Lab 09/13/23  0029   *   CALCIUM 7.8*   ALBUMIN 2.3*   PROT 4.4*      K 3.8   CO2 15*      BUN 42*   CREATININE 1.2   ALKPHOS 39*   ALT 11   AST 25   BILITOT 0.3     All pertinent lab results from the last 24 hours have been reviewed.    Significant Imaging:  Imaging results within the past 24 hours have been reviewed.

## 2023-09-13 NOTE — HPI
Rocco Swain is a 73 y.o. male with PMHx of skin/tongue/larynx carcinoma s/p chemo and trach, HCV and HCC s/p liver transplant on tacro c/b cirrhosis, and CKD III presenting with 4 days of fatigue. GI was consulted for GI bleed. Patient noticed 2 episodes of BRBPR in the last few days. His last endoscopy was 10 years ago outside of [facility]. He has been taking NSAIDs as needed for pain in the last 2 weeks due to recent eye surgery. Denies blood thinners. Denies chest pain, SOB, or abdominal pain.     In the ED, melena was noted on exam, however pt denies seeing black stools over the past 4 days. Pt was hypotensive with MAP of 68 at time of presentation. Lactate 3.8, HGB 3.4, 3 unit pRBCs ordered. No pertinent imaging. No prior endoscopies on file.

## 2023-09-13 NOTE — H&P
[facility] - Emergency Dept  Hematology/Oncology  H&P    Patient Name: Rcoco Swain  MRN: [id number]  Admission Date: 9/12/2023  Code Status: Full Code   Attending Provider: Chloé Burns MD  Primary Care Physician: GAURANG Walton MD  Principal Problem:GI bleed    Subjective:     HPI:   Mr. Riddle is a 73-year-old male significant history of laryngeal cancer status post chemo with tongue removal, liver transplantation complicated with cirrhosis on tacro, and history of GI bleed per patient presents with concerns for weakness.  History was given by family in the ED however at time of primary evaluation family was not at bedside.  He does live alone, and has been feeling weak and dizzy over the last several days.  He denies recent sick contacts, fevers, chills.  He does state that he has had a GI bleed in the past requiring a scope.  In the ED melena was down in physical exam.  Lactic 3, hemoglobin less than 4, and hypotension was also noted.  We will concerns for hypovolemic versus distributive shock blood cell transfusion was ordered, along with broad-spectrum antibiotics due to his immunocompromised state.  Patient to be admitted to Medical Oncology for further management and workup, as well as GI evaluation            Oncological Hx:   Squamous cell carcinoma of base of tongue   9/18/2020 Cancer Staged     Staging form: Pharynx - HPV-Mediated Oropharynx, AJCC 8th Edition  - Clinical stage from 9/18/2020: Stage III (rcT4, cN1, cM0, p16+)      9/28/2020 Initial Diagnosis     Squamous cell carcinoma of base of tongue      10/6/2020 - 8/17/2021 Chemotherapy     Treatment Summary   Plan Name: OP HEAD NECK CARBOPLATIN PACLITAXEL C1-2 FOLLOWED BY CETUXIMAB CARBOPLATIN C3-6 FOLLOWED BY CETUXIMAB MAINTENANCE WEEKLY  Treatment Goal: Control  Status: Inactive  Start Date: 10/6/2020  End Date: 8/17/2021  Provider: Ronald Berg MD  Chemotherapy: cetuximab (ERBITUX) 100 mg/50 mL chemo infusion 684 mg, 400 mg/m2 = 684 mg  (100 % of original dose 400 mg/m2), Intravenous, Clinic[facility] 1 time, 29 of 38 cycles  Dose modification: 500 mg/m2 (original dose 400 mg/m2, Cycle 3), 250 mg/m2 (original dose 400 mg/m2, Cycle 3), 400 mg/m2 (original dose 400 mg/m2, Cycle 3), 250 mg/m2 (original dose 250 mg/m2, Cycle 7), 200 mg/m2 (original dose 250 mg/m2, Cycle 18), 200 mg/m2 (original dose 250 mg/m2, Cycle 19), 250 mg/m2 (original dose 250 mg/m2, Cycle 4), 200 mg/m2 (original dose 250 mg/m2, Cycle 25), 200 mg/m2 (original dose 250 mg/m2, Cycle 28)  Administration: 684 mg (11/17/2020), 400 mg (11/24/2020), 400 mg (2/9/2021), 400 mg (2/17/2021), 427.6 mg (3/9/2021), 400 mg (3/30/2021), 400 mg (3/23/2021), 400 mg (4/6/2021), 427.6 mg (4/13/2021), 427.6 mg (4/20/2021), 342 mg (5/11/2021), 342 mg (5/18/2021), 342 mg (5/25/2021), 400 mg (5/31/2021), 400 mg (6/7/2021), 400 mg (6/14/2021), 400 mg (12/8/2020), 427.6 mg (12/29/2020), 400 mg (12/1/2020), 400 mg (12/15/2020), 400 mg (12/22/2020), 427.6 mg (1/5/2021), 400 mg (1/12/2021), 400 mg (1/19/2021), 427.6 mg (1/26/2021), 400 mg (2/2/2021), 400 mg (2/23/2021), 400 mg (3/2/2021), 427.6 mg (3/16/2021), 400 mg (6/21/2021), 342 mg (6/28/2021), 342 mg (7/6/2021), 342 mg (7/13/2021), 342 mg (7/20/2021), 400 mg (7/27/2021), 400 mg (8/10/2021), 400 mg (8/17/2021)  CARBOplatin (PARAPLATIN) 310 mg in sodium chloride 0.9% 500 mL chemo infusion, 310 mg (92.2 % of original dose 334.5 mg), Intravenous, Clinic/[facility] 1 time, 6 of 6 cycles  Dose modification:   (original dose 334.5 mg, Cycle 1)  Administration: 310 mg (10/6/2020), 370 mg (11/17/2020), 300 mg (10/27/2020), 350 mg (12/8/2020), 335 mg (12/29/2020), 320 mg (1/19/2021)  PACLitaxeL (TAXOL) 175 mg/m2 = 300 mg in sodium chloride 0.9% 500 mL chemo infusion, 175 mg/m2 = 300 mg (100 % of original dose 175 mg/m2), Intravenous, Clinic/[facility] 1 time, 2 of 2 cycles  Dose modification: 175 mg/m2 (original dose 175 mg/m2, Cycle 1)  Administration: 300 mg (10/6/2020), 300 mg  (10/27/2020)      4/29/2021 Tumor Conference        His case was discussed at the Multidisciplinary Head and Neck Team Planning Meeting.     Representatives from Medical Oncology, Radiation Oncology, Head and Neck Surgical Oncology, Psychosocial Oncology, and Speech and Language Pathology discussed the case with the following recommendations:     1) biopsy            7/29/2021 Tumor Conference        His case was discussed at the Multidisciplinary Head and Neck Team Planning Meeting.     Representatives from Medical Oncology, Radiation Oncology, Head and Neck Surgical Oncology, Psychosocial Oncology, and Speech and Language Pathology discussed the case with the following recommendations:     1) Head and neck clinic follow up  2) consider Keytruda (discuss with transplant)  3) consider palliative referral            9/13/2021 -  Chemotherapy     Treatment Summary   Plan Name: OP HEAD NECK PEMBROLIZUMAB CARBOPLATIN FLUOROURACIL (C1 ONLY RECEIVED) FOLLOWED BY PEMBROLIZUMAB MAINTENANCE  Treatment Goal: Palliative  Status: Active  Start Date: 9/13/2021  End Date: 9/5/2023 (Planned)  Provider: Ronald Berg MD  Chemotherapy: CARBOplatin (PARAPLATIN) 320 mg in sodium chloride 0.9% 500 mL chemo infusion, 320 mg (100 % of original dose 320.5 mg), Intravenous, Clinic/[facility] 1 time, 6 of 6 cycles  Dose modification:   (original dose 320.5 mg, Cycle 1)  Administration: 320 mg (9/13/2021), 335 mg (12/23/2021), 325 mg (1/27/2022), 245 mg (3/3/2022), 255 mg (5/12/2022), 255 mg (4/7/2022)  fluorouraciL 1,000 mg/m2/day = 6,440 mg in sodium chloride 0.9% 240 mL chemo infusion, 1,000 mg/m2/day = 6,440 mg, Intravenous, Over 96 hours, 6 of 6 cycles  Dose modification: 800 mg/m2/day (original dose 1,000 mg/m2/day, Cycle 6), 5,000 mg (original dose 1,000 mg/m2/day, Cycle 8)  Administration: 6,440 mg (9/13/2021), 6,440 mg (12/23/2021), 6,480 mg (1/27/2022), 5,000 mg (3/3/2022), 5,000 mg (4/7/2022), 5,000 mg (5/12/2022)      Secondary malignant  neoplasm of cervical lymph node   2/9/2021 Initial Diagnosis     Secondary malignant neoplasm of cervical lymph node      9/13/2021 -  Chemotherapy     Treatment Summary   Plan Name: OP HEAD NECK PEMBROLIZUMAB CARBOPLATIN FLUOROURACIL (C1 ONLY RECEIVED) FOLLOWED BY PEMBROLIZUMAB MAINTENANCE  Treatment Goal: Palliative  Status: Active  Start Date: 9/13/2021  End Date: 9/5/2023 (Planned)  Provider: Ronald Berg MD  Chemotherapy: CARBOplatin (PARAPLATIN) 320 mg in sodium chloride 0.9% 500 mL chemo infusion, 320 mg (100 % of original dose 320.5 mg), Intravenous, Clinic/[facility] 1 time, 6 of 6 cycles  Dose modification:   (original dose 320.5 mg, Cycle 1)  Administration: 320 mg (9/13/2021), 335 mg (12/23/2021), 325 mg (1/27/2022), 245 mg (3/3/2022), 255 mg (5/12/2022), 255 mg (4/7/2022)  fluorouraciL 1,000 mg/m2/day = 6,440 mg in sodium chloride 0.9% 240 mL chemo infusion, 1,000 mg/m2/day = 6,440 mg, Intravenous, Over 96 hours, 6 of 6 cycles  Dose modification: 800 mg/m2/day (original dose 1,000 mg/m2/day, Cycle 6), 5,000 mg (original dose 1,000 mg/m2/day, Cycle 8)  Administration: 6,440 mg (9/13/2021), 6,440 mg (12/23/2021), 6,480 mg (1/27/2022), 5,000 mg (3/3/2022), 5,000 mg (4/7/2022), 5,000 mg (5/12/2022)        Oncology Treatment Plan:   OP HEAD NECK PEMBROLIZUMAB CARBOPLATIN FLUOROURACIL (C1 ONLY RECEIVED) FOLLOWED BY PEMBROLIZUMAB MAINTENANCE    Medications:  Continuous Infusions:  Scheduled Meds:   magnesium sulfate IVPB  2 g Intravenous Once    mupirocin   Nasal BID    pantoprazole  40 mg Intravenous BID    pantoprazole  80 mg Intravenous Once    piperacillin-tazobactam (Zosyn) IV (PEDS and ADULTS) (extended infusion is not appropriate)  4.5 g Intravenous Once    piperacillin-tazobactam (Zosyn) IV (PEDS and ADULTS) (extended infusion is not appropriate)  4.5 g Intravenous Q8H    vancomycin (VANCOCIN) IV (PEDS and ADULTS)  15 mg/kg Intravenous Q24H     PRN Meds:0.9%  NaCl infusion (for blood administration), dextrose  10%, dextrose 10%, glucagon (human recombinant), glucose, glucose, naloxone, sodium chloride 0.9%, Pharmacy to dose Vancomycin consult **AND** vancomycin - pharmacy to dose     Review of patient's allergies indicates:  No Known Allergies     Past Medical History:   Diagnosis Date    Basal cell carcinoma (BCC) in situ of skin 2012    3 on face, 2 on arm, removed by dermatology.     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 Underwent surgical resection of tongue and neck. s/p tracheostomy     Past Surgical History:   Procedure Laterality Date    COLONOSCOPY      CONJUNCTIVA BIOPSY Right 10/11/2022    Procedure: BIOPSY, CONJUNCTIVA;  Surgeon: Victor M Vasques MD;  Location: [facility];  Service: Ophthalmology;  Laterality: Right;    INSERTION OF VENOUS ACCESS PORT Right 3/8/2021    Procedure: INSERTION, VENOUS ACCESS PORT;  Surgeon: Jesus Rendon MD;  Location: [facility] OR [address] [place] [address];  Service: General;  Laterality: Right;    LIVER TRANSPLANT  11/2008    transplanted for biopsy proven hepatocellular carcinoma,     LYMPH NODE BIOPSY N/A 9/18/2020    Procedure: BIOPSY, LYMPH NODE;  Surgeon: [facility] Diagnostic Provider;  Location: [facility] OR [address] [place] [address];  Service: General;  Laterality: N/A;  Rm 173    SURGICAL REMOVAL OF SCAR Right 8/24/2023    Procedure: EXCISION, SCAR;  Surgeon: Ting Brambila MD;  Location: [facility] OR;  Service: Ophthalmology;  Laterality: Right;    TRACHEOSTOMY       Family History       Problem Relation (Age of Onset)    Dementia Mother          Tobacco Use    Smoking status: Never    Smokeless tobacco: Never   Substance and Sexual Activity    Alcohol use: Yes     Comment: drinks wine, 1 glass day    Drug use: Not Currently    Sexual activity: Yes     Comment: No prior  history of STD        Review of Systems   Constitutional:  Positive for fatigue. Negative for chills and fever.   Respiratory:  Negative for shortness of breath and wheezing.    Cardiovascular:  Negative for chest pain and leg swelling.   Gastrointestinal:  Negative for abdominal distention and abdominal pain.   Neurological:  Positive for dizziness and weakness.   Psychiatric/Behavioral:  Negative for agitation, behavioral problems and confusion.      Objective:     Vital Signs (Most Recent):  Temp: 99.1 °F (37.3 °C) (09/13/23 0424)  Pulse: 86 (09/13/23 0424)  Resp: 17 (09/13/23 0424)  BP: (!) 104/55 (09/13/23 0424)  SpO2: 96 % (09/13/23 0424) Vital Signs (24h Range):  Temp:  [98.2 °F (36.8 °C)-99.3 °F (37.4 °C)] 99.1 °F (37.3 °C)  Pulse:  [] 86  Resp:  [11-22] 17  SpO2:  [95 %-100 %] 96 %  BP: ()/(54-60) 104/55     Weight: 52.6 kg (116 lb)  Body mass index is 17.64 kg/m².  Body surface area is 1.59 meters squared.      Intake/Output Summary (Last 24 hours) at 9/13/2023 0427  Last data filed at 9/13/2023 0120  Gross per 24 hour   Intake --   Output 100 ml   Net -100 ml        Physical Exam  Constitutional:       Appearance: He is cachectic. He is ill-appearing. He is not toxic-appearing or diaphoretic.   HENT:      Head: Normocephalic and atraumatic.   Cardiovascular:      Rate and Rhythm: Normal rate and regular rhythm.      Pulses: Normal pulses.      Heart sounds: Normal heart sounds. No murmur heard.  Pulmonary:      Effort: Pulmonary effort is normal. No respiratory distress.      Breath sounds: Normal breath sounds.   Abdominal:      General: There is no distension.      Tenderness: There is no abdominal tenderness.   Skin:     General: Skin is warm and dry.   Neurological:      General: No focal deficit present.      Mental Status: He is alert, oriented to person, place, and time and easily aroused. Mental status is at baseline.   Psychiatric:         Mood and Affect: Mood normal.          Behavior: Behavior normal. Behavior is cooperative.         Judgment: Judgment normal.          Significant Labs:   All pertinent labs from the last 24 hours have been reviewed.    Diagnostic Results:  I have reviewed all pertinent imaging results/findings within the past 24 hours.  Assessment/Plan:     * GI bleed  73 year old wit presents with concerns of dizziness, and fatigue with hemoglobin less than 4, lactic a history.  Melena seen on stools concerns for acute GI bleed.  Patient is immunocompromise status post finishing chemotherapy as well as liver transplant recipient on tacro.  Can not completely rule out infection currently though more suspicious of hypovolemic vs distributive in terms of possible shock.     - GI consult for possible scope   - Trend Hgb q8hrs. Transfuse for Hgb <7, unless otherwise indicated  - Maintain IV access with 2 large bore Ivs  - Intravascular resuscitation/support with IVFs   - diet NPO  - Hold all NSAIDs and anticoagulants, unless contraindicated  - Bolus IV pantoprazole 80mg followed by 40mg BID  - broad-spectrum antibiotics for now, deescalate as indicated       Severe sepsis  Antibiotics given-   Antibiotics (72h ago, onward)      Start     Stop Route Frequency Ordered    09/13/23 1200  piperacillin-tazobactam (ZOSYN) 4.5 g in dextrose 5 % in water (D5W) 100 mL IVPB (MB+)         -- IV Every 8 hours (non-standard times) 09/13/23 0418    09/13/23 0900  mupirocin 2 % ointment         09/18/23 0859 Nasl 2 times daily 09/13/23 0358    09/13/23 0516  vancomycin - pharmacy to dose  (vancomycin IVPB (PEDS and ADULTS))        See Hyperspace for full Linked Orders Report.    -- IV pharmacy to manage frequency 09/13/23 0418    09/13/23 0330  piperacillin-tazobactam (ZOSYN) 4.5 g in dextrose 5 % in water (D5W) 100 mL IVPB (MB+)  (ED Adult Sepsis Treatment)         -- IV Once 09/13/23 0222    09/13/23 0230  vancomycin 1,250 mg in dextrose 5 % (D5W) 250 mL IVPB (Vial-Mate)  (ED Adult  Sepsis Treatment)         -- IV Once 09/13/23 0222          - No current infectious foci found.  Lactic of 3, heart rate periodically above 90, increased respiratory rate, patient does meet sepsis criteria currently  - we will continue with broad-spectrum antibiotics for now, deescalate as indicated  - Given 1L fluids in ED   - to be transfused 3 units PRBCs  - blood cultures pending  - UA negative  - denies sick contacts currently or recent illness/fevers    Body mass index (BMI) less than 19  Nutrition consult for low BMI regarding maximizing diet when safe to resume   - Recommend PT/OT once volume repleted to assess function, patient lives at home alone       ACP (advance care planning)  Patient has uploaded documentation from 2020 regarding living will and power-of-.  When discussing the patient today regarding code status he endorsed  that at this moment prolonging life is his current goal and in pursuit  that would like to be a full code status.  - Full code reflected in chart  - Would discuss with patient further regarding re-uploading updated living will/advanced directives as indicated         Anemia  See GI bleed       Conjunctival intraepithelial neoplasm  Seen by D[facility] last visit 9/8   - cont home neomycin and Maxitrol ophthalmic solutions       Thrombocytopenia  Platelets 182 at time of admission.  - q8h Cbc to monitor hgb       Hypomagnesemia  Mag 1.4 at time of admission, magnesium generally low going back years on chart review   - IV mag, daily labs       Chronic kidney disease (CKD), stage IV (severe)  Cr at time of presentation 1.2.    - Daily CMP   - Avoid nephrotic agents as indicated       Squamous cell carcinoma of base of tongue  See HPI for oncological hx. Finished therapy a few weeks prior.       Cirrhosis of transplanted liver  Known hx of liver transplant with cirrhosis, followed by [facility] hepatology   - Hepatology consult   - Obtain  Updated tacro level       Postoperative  hypothyroidism  Noted in hx, takes synthroid, last TSH 1 month prior WNL   - Resume home synthroid when indicated           José Antonio Carver DO  Hematology/Oncology  [facility] Emergency Dept

## 2023-09-13 NOTE — ASSESSMENT & PLAN NOTE
Known hx of liver transplant with cirrhosis, followed by [facility] hepatology   - Hepatology consult   - Obtain  Updated tacro level

## 2023-09-13 NOTE — NURSING
Pt. Arrived from ED to room 84076.    VSS  No signs of evident distress or complaint of pain.    Right Chest wall port.  Dressing CDI  Left FA PIV dressing CDI    Oriented to room, call light, plan of care.  Urinal within reach.  Instructed to call for assistance.  Pt. Expressed understanding.  Bed in lowest locked position.  Call light within reach.

## 2023-09-13 NOTE — ASSESSMENT & PLAN NOTE
Mag 1.4 at time of admission, magnesium generally low going back years on chart review   - IV mag, daily labs

## 2023-09-13 NOTE — PROGRESS NOTES
"Pharmacokinetic Initial Assessment: IV Vancomycin    Assessment/Plan:    Initiate intravenous vancomycin with loading dose of 1250 mg once followed by a maintenance dose of vancomycin 750 mg IV every 24 hours  Desired empiric serum trough concentration is 15 to 20 mcg/mL  Draw vancomycin trough level 60 min prior to third dose on 09/15/23 at approximately 0200  Pharmacy will continue to follow and monitor vancomycin.      Please contact pharmacy at extension [phone] with any questions regarding this assessment.     Thank you for the consult,   Katelin Banks       Patient brief summary:  Rocco Swain is a 73 y.o. male initiated on antimicrobial therapy with IV Vancomycin for treatment of suspected sepsis    Drug Allergies:   Review of patient's allergies indicates:  No Known Allergies    Actual Body Weight:   52.6 kg    Renal Function:   Estimated Creatinine Clearance: 40.2 mL/min (based on SCr of 1.2 mg/dL).,     Dialysis Method (if applicable):  N/A    CBC (last 72 hours):  Recent Labs   Lab Result Units 09/13/23  0135   WBC K/uL 8.81   Hemoglobin g/dL 3.4*   Hematocrit % 10.4*   Platelets K/uL 182   Gran % % 71.8   Lymph % % 14.0*   Mono % % 13.7   Eosinophil % % 0.0   Basophil % % 0.0   Differential Method  Automated       Metabolic Panel (last 72 hours):  Recent Labs   Lab Result Units 09/12/23  2253 09/13/23  0029   Sodium mmol/L  --  137   Potassium mmol/L  --  3.8   Chloride mmol/L  --  108   CO2 mmol/L  --  15*   Glucose mg/dL  --  113*   Glucose, UA  Negative  --    BUN mg/dL  --  42*   Creatinine mg/dL  --  1.2   Albumin g/dL  --  2.3*   Total Bilirubin mg/dL  --  0.3   Alkaline Phosphatase U/L  --  39*   AST U/L  --  25   ALT U/L  --  11   Magnesium mg/dL  --  1.4*       Drug levels (last 3 results):  No results for input(s): "VANCOMYCINRA", "VANCORANDOM", "VANCOMYCINPE", "VANCOPEAK", "VANCOMYCINTR", "VANCOTROUGH" in the last 72 hours.    Microbiologic Results:  Microbiology Results (last 7 days)       " Procedure Component Value Units Date/Time    Blood culture x two cultures. Draw prior to antibiotics. [[id number]] Collected: 09/12/23 2347    Order Status: Sent Specimen: Blood from Peripheral, Forearm, Left Updated: 09/12/23 2354    Blood culture x two cultures. Draw prior to antibiotics. [[id number]] Collected: 09/12/23 2329    Order Status: Sent Specimen: Blood from Line, Port A Cath Updated: 09/12/23 2354    Influenza A & B by Molecular [[id number]] Collected: 09/12/23 2256    Order Status: Completed Specimen: Nasopharyngeal Swab Updated: 09/12/23 2344     Influenza A, Molecular Negative     Influenza B, Molecular Negative     Flu A & B Source Nasal swab

## 2023-09-13 NOTE — ASSESSMENT & PLAN NOTE
73 y.o. male with PMHx of skin/tongue/larynx carcinoma s/p chemo and trach, HCV and HCC s/p liver transplant on tacro c/b cirrhosis, and CKD III presenting with 4 days of fatigue. GI was consulted for GI bleed. 2 episodes of BRBPR. His last endoscopy was 10 years ago outside of [facility]. Endorses NSAIDs as needed for pain in the last 2 weeks due to recent eye surgery. Denies blood thinners. Denies chest pain, SOB, or abdominal pain.      In the ED, melena was noted on exam, however pt denies seeing black stools over the past 4 days. Pt was hypotensive with MAP of 68 at time of presentation. Lactate 3.8, HGB 3.4, 3 unit pRBCs ordered. No pertinent imaging. No prior endoscopies on file.     Recent Labs     09/13/23  0135 09/13/23  0850   HGB 3.4* 4.7*       Recommendations:  - Plan for colonoscopy +/- EGD tomorrow. Monitor fecal output for continued BRBPR vs melena for final plan.  - Clear liquid diet today (9/13) and NPO at midnight (9/14)  - Golytely prep to start at 6pm, to be completed within 4 hours if possible  - Monitor Hgb q8hrs  - Transfuse to keep Hgb >7, plts >50  - Maintain IV access with 2 large bore Ivs  - Intravascular resuscitation/support with IVFs   - Bolus IV pantoprazole 80mg followed by 40mg BID  - Please correct any coagulopathy with platelets and FFP for goal of platelets >50K and INR <2.0  - Hold anticoagulants if safe to do so per primary team  - If becomes hemodynamically unstable with associated large volume hematochezia, recommend STAT CTA and IR embolization if positive.  - Please notify GI team if there is significant change in patient's clinical status

## 2023-09-13 NOTE — ASSESSMENT & PLAN NOTE
Patient has uploaded documentation from 2020 regarding living will and power-of-.  When discussing the patient today regarding code status he endorsed  that at this moment prolonging life is his current goal and in pursuit  that would like to be a full code status.  - Full code reflected in chart  - Would discuss with patient further regarding re-uploading updated living will/advanced directives as indicated

## 2023-09-13 NOTE — ASSESSMENT & PLAN NOTE
Noted in hx, takes synthroid, last TSH 1 month prior WNL   - Resume home synthroid when indicated

## 2023-09-13 NOTE — ASSESSMENT & PLAN NOTE
73 y.o. male with PMHx of skin/tongue/larynx carcinoma s/p chemo and trach, HCV and HCC s/p liver transplant on tacro c/b cirrhosis, and CKD III presenting with 4 days of fatigue. GI was consulted for GI bleed. 2 episodes of BRBPR. His last endoscopy was 10 years ago outside of [facility]. Endorses NSAIDs as needed for pain in the last 2 weeks due to recent eye surgery. EGD and Colonoscopy done yesterday on 9/14 which was notable for duodenal ulcers that were treated with bipolar cautery. Blood counts have remained stable and patient without further episodes of melena.      Recommendations:  - PPI BID IV x 72 hours post EGD; then PPI PO BID for 12 weeks.  - Advance diet as tolerated, per primary team  - Please notify GI team if there is significant change in patient's clinical status

## 2023-09-13 NOTE — CONSULTS
[facility] - Emergency Dept  Gastroenterology  Consult Note    Patient Name: Rocco Swain  MRN: [id number]  Admission Date: 9/12/2023  Hospital Length of Stay: 0 days  Code Status: Full Code   Attending Provider: Chloé Burns MD   Consulting Provider: Rocco St MD  Primary Care Physician: GAURANG Walton MD  Principal Problem:GI bleed    Inpatient consult to Gastroenterology  Consult performed by: Rcoco St MD  Consult ordered by: José Antonio Carver DO        Subjective:     HPI:  Rocco Swain is a 73 y.o. male with PMHx of skin/tongue/larynx carcinoma s/p chemo and trach, HCV and HCC s/p liver transplant on tacro c/b cirrhosis, and CKD III presenting with 4 days of fatigue. GI was consulted for GI bleed. Patient noticed 2 episodes of BRBPR in the last few days. His last endoscopy was 10 years ago outside of [facility]. He has been taking NSAIDs as needed for pain in the last 2 weeks due to recent eye surgery. Denies blood thinners. Denies chest pain, SOB, or abdominal pain.     In the ED, melena was noted on exam, however pt denies seeing black stools over the past 4 days. Pt was hypotensive with MAP of 68 at time of presentation. Lactate 3.8, HGB 3.4, 3 unit pRBCs ordered. No pertinent imaging. No prior endoscopies on file.       Past Medical History:   Diagnosis Date    Basal cell carcinoma (BCC) in situ of skin 2012    3 on face, 2 on arm, removed by dermatology.     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 Underwent surgical resection of tongue and neck. s/p tracheostomy       Past Surgical History:   Procedure Laterality Date    COLONOSCOPY      CONJUNCTIVA BIOPSY Right 10/11/2022    Procedure: BIOPSY, CONJUNCTIVA;  Surgeon: Victor M Vasques MD;   Location: [facility], [place], operated by [facility] OR;  Service: Ophthalmology;  Laterality: Right;    INSERTION OF VENOUS ACCESS PORT Right 3/8/2021    Procedure: INSERTION, VENOUS ACCESS PORT;  Surgeon: Jesus Rendon MD;  Location: [facility] OR 2ND FLR;  Service: General;  Laterality: Right;    LIVER TRANSPLANT  11/2008    transplanted for biopsy proven hepatocellular carcinoma,     LYMPH NODE BIOPSY N/A 9/18/2020    Procedure: BIOPSY, LYMPH NODE;  Surgeon: Taz Diagnostic Provider;  Location: [facility] OR 2ND FLR;  Service: General;  Laterality: N/A;  Rm 173    SURGICAL REMOVAL OF SCAR Right 8/24/2023    Procedure: EXCISION, SCAR;  Surgeon: Ting Brambila MD;  Location: [facility] OR;  Service: Ophthalmology;  Laterality: Right;    TRACHEOSTOMY         Review of patient's allergies indicates:  No Known Allergies  Family History       Problem Relation (Age of Onset)    Dementia Mother          Tobacco Use    Smoking status: Never    Smokeless tobacco: Never   Substance and Sexual Activity    Alcohol use: Yes     Comment: drinks wine, 1 glass day    Drug use: Not Currently    Sexual activity: Yes     Comment: No prior history of STD      Review of Systems   Constitutional:  Positive for fatigue. Negative for chills and fever.   Respiratory:  Negative for shortness of breath and wheezing.    Cardiovascular:  Negative for chest pain and leg swelling.   Gastrointestinal:  Positive for blood in stool. Negative for abdominal distention and abdominal pain.   Neurological:  Positive for dizziness and weakness.   Psychiatric/Behavioral:  Negative for agitation, behavioral problems and confusion.      Objective:     Vital Signs (Most Recent):  Temp: 99.3 °F (37.4 °C) (09/13/23 0721)  Pulse: 90 (09/13/23 0852)  Resp: 18 (09/13/23 0852)  BP: 121/73 (09/13/23 0840)  SpO2: 100 % (09/13/23 0852) Vital Signs (24h Range):  Temp:  [98.2 °F (36.8 °C)-99.3 °F (37.4 °C)] 99.3 °F (37.4 °C)  Pulse:  [] 90  Resp:  [10-22] 18  SpO2:  [95 %-100 %] 100 %  BP: ()/(50-99)  121/73     Weight: 52.6 kg (116 lb) (09/13/23 0226)  Body mass index is 17.64 kg/m².      Intake/Output Summary (Last 24 hours) at 9/13/2023 0947  Last data filed at 9/13/2023 0748  Gross per 24 hour   Intake 370.68 ml   Output 425 ml   Net -54.32 ml       Lines/Drains/Airways       Central Venous Catheter Line  Duration                  PowerPort A Cath Single Lumen 03/08/21 1031 right internal jugular 918 days              Airway  Duration                  Surgical Airway -- days         Laryngectomy (Do Not Remove) 08/24/23 1145  19 days              Peripheral Intravenous Line  Duration                  Peripheral IV - Single Lumen 09/13/23 0507 20 G Anterior;Left Forearm <1 day                     Physical Exam  Constitutional:       Appearance: He is cachectic. He is ill-appearing. He is not toxic-appearing or diaphoretic.   HENT:      Head: Normocephalic and atraumatic.   Cardiovascular:      Rate and Rhythm: Normal rate and regular rhythm.      Pulses: Normal pulses.      Heart sounds: Normal heart sounds. No murmur heard.  Pulmonary:      Effort: Pulmonary effort is normal. No respiratory distress.      Breath sounds: Normal breath sounds.   Abdominal:      General: Abdomen is flat. Bowel sounds are normal. There is no distension.      Palpations: Abdomen is soft.      Tenderness: There is no abdominal tenderness. There is no guarding.   Musculoskeletal:         General: No deformity.      Right lower leg: No edema.      Left lower leg: No edema.   Skin:     General: Skin is warm and dry.   Neurological:      Mental Status: He is alert, oriented to person, place, and time and easily aroused.   Psychiatric:         Mood and Affect: Mood normal.         Behavior: Behavior normal. Behavior is cooperative.          Significant Labs:  CBC:   Recent Labs   Lab 09/13/23  0135 09/13/23  0850   WBC 8.81 5.73   HGB 3.4* 4.7*   HCT 10.4* 14.7*    137*     CMP:   Recent Labs   Lab 09/13/23  0029   *    CALCIUM 7.8*   ALBUMIN 2.3*   PROT 4.4*      K 3.8   CO2 15*      BUN 42*   CREATININE 1.2   ALKPHOS 39*   ALT 11   AST 25   BILITOT 0.3     All pertinent lab results from the last 24 hours have been reviewed.    Significant Imaging:  Imaging results within the past 24 hours have been reviewed.    Assessment/Plan:     GI  * GI bleed  73 y.o. male with PMHx of skin/tongue/larynx carcinoma s/p chemo and trach, HCV and HCC s/p liver transplant on tacro c/b cirrhosis, and CKD III presenting with 4 days of fatigue. GI was consulted for GI bleed. 2 episodes of BRBPR. His last endoscopy was 10 years ago outside of [facility]. Endorses NSAIDs as needed for pain in the last 2 weeks due to recent eye surgery. Denies blood thinners. Denies chest pain, SOB, or abdominal pain.      In the ED, melena was noted on exam, however pt denies seeing black stools over the past 4 days. Pt was hypotensive with MAP of 68 at time of presentation. Lactate 3.8, HGB 3.4, 3 unit pRBCs ordered. No pertinent imaging. No prior endoscopies on file.     Recent Labs     09/13/23  0135 09/13/23  0850   HGB 3.4* 4.7*       Recommendations:  - Plan for EGD +/- colonoscopy tomorrow. Monitor fecal output for continued BRBPR vs melena for final plan.  - Clear liquid diet today (9/13) and NPO at midnight (9/14)  - Golytely prep to start at 6pm, to be completed within 4 hours if possible  - Monitor Hgb q8hrs  - Transfuse to keep Hgb >7, plts >50  - Maintain IV access with 2 large bore Ivs  - Intravascular resuscitation/support with IVFs   - Bolus IV pantoprazole 80mg followed by 40mg BID  - Please correct any coagulopathy with platelets and FFP for goal of platelets >50K and INR <2.0  - Hold anticoagulants if safe to do so per primary team  - If becomes hemodynamically unstable with associated large volume hematochezia, recommend STAT CTA and IR embolization if positive.  - Please notify GI team if there is significant change in patient's clinical  status            Thank you for your consult. I will follow-up with patient. Please contact us if you have any additional questions.    Rocco St MD  Gastroenterology  [facility] - Emergency Dept

## 2023-09-13 NOTE — ASSESSMENT & PLAN NOTE
Malnutrition Type:  Context: chronic illness  Level: mild    Related to (etiology):   Inadequate energy intake    Signs and Symptoms (as evidenced by):   Malnutrition Characteristic Summary:  Subcutaneous Fat (Malnutrition): severe depletion  Muscle Mass (Malnutrition): moderate depletion    Interventions/Recommendations (treatment strategy):  1. When medically able, ADAT Full liquid diet or texture per SLP  2. When diet advanced, add [employer] 1.4 x 4 cans per day to provide 1820 kcal, 80g protein, 936 ml FW  3. RD to monitor and follow    Nutrition Diagnosis Status:   New

## 2023-09-13 NOTE — SUBJECTIVE & OBJECTIVE
Review of Systems   Unable to perform ROS: Patient nonverbal   Objective:     Vital Signs (Most Recent):  Temp: 99.3 °F (37.4 °C) (09/13/23 0028)  Pulse: 84 (09/13/23 0153)  Resp: 11 (09/13/23 0153)  BP: (!) 108/58 (09/13/23 0132)  SpO2: 99 % (09/13/23 0153) Vital Signs (24h Range):  Temp:  [98.2 °F (36.8 °C)-99.3 °F (37.4 °C)] 99.3 °F (37.4 °C)  Pulse:  [] 84  Resp:  [11-22] 11  SpO2:  [95 %-100 %] 99 %  BP: ()/(54-60) 108/58   Weight: 52.6 kg (116 lb)  Body mass index is 17.64 kg/m².      Intake/Output Summary (Last 24 hours) at 9/13/2023 0336  Last data filed at 9/13/2023 0120  Gross per 24 hour   Intake --   Output 100 ml   Net -100 ml          Physical Exam  Vitals reviewed.   HENT:      Head: Normocephalic.      Comments: Appearance is consistent with prior procedures  Tracheostomy in place, clean and intact around the stoma      Nose: Nose normal.   Eyes:      Conjunctiva/sclera: Conjunctivae normal.   Cardiovascular:      Rate and Rhythm: Normal rate.   Pulmonary:      Effort: Pulmonary effort is normal.   Skin:     Coloration: Skin is pale.   Neurological:      Mental Status: He is alert.      Comments: Responding to questions appropriately with hand signaling          Vents:     Lines/Drains/Airways       Central Venous Catheter Line  Duration                  PowerPort A Cath Single Lumen 03/08/21 1031 right internal jugular 918 days              Airway  Duration                  Surgical Airway -- days         Laryngectomy (Do Not Remove) 08/24/23 1145  19 days              Peripheral Intravenous Line  Duration                  Peripheral IV - Single Lumen 09/13/23 0244 20 G Anterior;Distal;Right Upper Arm <1 day                  Significant Labs:    CBC/Anemia Profile:  Recent Labs   Lab 09/13/23 0135   WBC 8.81   HGB 3.4*   HCT 10.4*      *   RDW 15.2*        Chemistries:  Recent Labs   Lab 09/13/23 0029      K 3.8      CO2 15*   BUN 42*   CREATININE 1.2  "  CALCIUM 7.8*   ALBUMIN 2.3*   PROT 4.4*   BILITOT 0.3   ALKPHOS 39*   ALT 11   AST 25   MG 1.4*       {Results:49599}    Significant Imaging:  {Imaging Review:16604::"I have reviewed all pertinent imaging results/findings within the past 24 hours."}  "

## 2023-09-13 NOTE — PLAN OF CARE
Recommendations    When medically able, ADAT Full liquid diet or texture per SLP    When diet advanced, add [employer] 1.4 x 4 cans per day to provide 1820 kcal, 80g protein, 936 ml FW    RD to monitor and follow    Goals: Meet % EEN, EPN by RD f/u  Nutrition Goal Status: new  Communication of RD Recs:  (POC)

## 2023-09-13 NOTE — ASSESSMENT & PLAN NOTE
Antibiotics given-   Antibiotics (72h ago, onward)    Start     Stop Route Frequency Ordered    09/13/23 1200  piperacillin-tazobactam (ZOSYN) 4.5 g in dextrose 5 % in water (D5W) 100 mL IVPB (MB+)         -- IV Every 8 hours (non-standard times) 09/13/23 0418    09/13/23 0900  mupirocin 2 % ointment         09/18/23 0859 Nasl 2 times daily 09/13/23 0358    09/13/23 0516  vancomycin - pharmacy to dose  (vancomycin IVPB (PEDS and ADULTS))        See Hyperspace for full Linked Orders Report.    -- IV pharmacy to manage frequency 09/13/23 0418 09/13/23 0330  piperacillin-tazobactam (ZOSYN) 4.5 g in dextrose 5 % in water (D5W) 100 mL IVPB (MB+)  (ED Adult Sepsis Treatment)         -- IV Once 09/13/23 0222 09/13/23 0230  vancomycin 1,250 mg in dextrose 5 % (D5W) 250 mL IVPB (Vial-Mate)  (ED Adult Sepsis Treatment)         -- IV Once 09/13/23 0222        - No current infectious foci found.  Lactic of 3, heart rate periodically above 90, increased respiratory rate, patient does meet sepsis criteria currently  - we will continue with broad-spectrum antibiotics for now, deescalate as indicated  - Given 1L fluids in ED   - to be transfused 3 units PRBCs  - blood cultures pending  - UA negative  - denies sick contacts currently or recent illness/fevers

## 2023-09-13 NOTE — ASSESSMENT & PLAN NOTE
73 year old wit presents with concerns of dizziness, and fatigue with hemoglobin less than 4, lactic a history.  Melena seen on stools concerns for acute GI bleed.  Patient is immunocompromise status post finishing chemotherapy as well as liver transplant recipient on tacro.  Can not completely rule out infection currently though more suspicious of hypovolemic vs distributive in terms of possible shock.     - GI consult for possible scope   - Trend Hgb q8hrs. Transfuse for Hgb <7, unless otherwise indicated  - Maintain IV access with 2 large bore Ivs  - Intravascular resuscitation/support with IVFs   - diet NPO  - Hold all NSAIDs and anticoagulants, unless contraindicated  - Bolus IV pantoprazole 80mg followed by 40mg BID  - broad-spectrum antibiotics for now, deescalate as indicated

## 2023-09-13 NOTE — ASSESSMENT & PLAN NOTE
The patient is hemodynamically stable  -Repeat CBC after blood transfusion  -Unlikely to need ICU admission

## 2023-09-13 NOTE — ED PROVIDER NOTES
Encounter Date: 9/12/2023       History     Chief Complaint   Patient presents with    Fatigue     Pt has history of esophageal cancer and lymphoma and was receiving chemo up until a month ago and finished. Pt complaining of fatigue and weakness x2 days. Pt had tongue removed due to cancer history     HPI    73-year-old male past medical history laryngeal cancer status post trach and chemo, liver transplant who presents to the ER for evaluation of weakness.  Patient gives some history, most history from family.  He lives alone, and over the last 2 days has been feeling weak and dizzy.  No energy.  Symptoms are mild, constant.  He denies any fevers, chills, dyspnea, nausea, vomiting, diarrhea, syncope, or any hematuria.      Review of patient's allergies indicates:  No Known Allergies  Past Medical History:   Diagnosis Date    Basal cell carcinoma (BCC) in situ of skin 2012    3 on face, 2 on arm, removed by dermatology.     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 Underwent surgical resection of tongue and neck. s/p tracheostomy     Past Surgical History:   Procedure Laterality Date    COLONOSCOPY      CONJUNCTIVA BIOPSY Right 10/11/2022    Procedure: BIOPSY, CONJUNCTIVA;  Surgeon: Victor M Vasques MD;  Location: [facility];  Service: Ophthalmology;  Laterality: Right;    INSERTION OF VENOUS ACCESS PORT Right 3/8/2021    Procedure: INSERTION, VENOUS ACCESS PORT;  Surgeon: Jesus Rendon MD;  Location: [address];  Service: General;  Laterality: Right;    LIVER TRANSPLANT  11/2008    transplanted for biopsy proven hepatocellular carcinoma,     LYMPH NODE BIOPSY N/A 9/18/2020    Procedure: BIOPSY, LYMPH NODE;  Surgeon: [facility];  Location: [facility] OR  2ND FLR;  Service: General;  Laterality: N/A;  Rm 173    SURGICAL REMOVAL OF SCAR Right 8/24/2023    Procedure: EXCISION, SCAR;  Surgeon: Ting Brambila MD;  Location: [facility] OR;  Service: Ophthalmology;  Laterality: Right;    TRACHEOSTOMY       Family History   Problem Relation Age of Onset    Dementia Mother      Social History     Tobacco Use    Smoking status: Never    Smokeless tobacco: Never   Substance Use Topics    Alcohol use: Yes     Comment: drinks wine, 1 glass day    Drug use: Not Currently     Review of Systems   Constitutional:  Positive for fatigue. Negative for fever.   Respiratory:  Negative for cough.    Cardiovascular:  Negative for chest pain.   Gastrointestinal:  Negative for abdominal pain.   Neurological:  Positive for weakness.       Physical Exam     Initial Vitals [09/12/23 2115]   BP Pulse Resp Temp SpO2   101/60 88 16 98.2 °F (36.8 °C) 96 %      MAP       --         Physical Exam    Nursing note and vitals reviewed.  Constitutional: No distress.   HENT:   Head: Normocephalic and atraumatic.   Eyes: Conjunctivae and EOM are normal.   Neck:   Tracheostomy in place, clean and intact around the stoma   Cardiovascular:            Irregular irregular   Pulmonary/Chest: He has no wheezes.   Abdominal: Abdomen is soft. There is no abdominal tenderness.   Musculoskeletal:         General: No edema. Normal range of motion.     Neurological: He is alert and oriented to person, place, and time.   Skin: Skin is warm and dry.   Psychiatric: He has a normal mood and affect. Thought content normal.         ED Course   Procedures  Labs Reviewed   URINALYSIS, REFLEX TO URINE CULTURE - Abnormal; Notable for the following components:       Result Value    Ketones, UA Trace (*)     All other components within normal limits    Narrative:     Specimen Source->Urine   MAGNESIUM - Abnormal; Notable for the following components:    Magnesium 1.4 (*)     All other components within normal limits   CBC W/ AUTO  DIFFERENTIAL - Abnormal; Notable for the following components:    RBC 0.99 (*)     Hemoglobin 3.4 (*)     Hematocrit 10.4 (*)      (*)     MCH 34.3 (*)     RDW 15.2 (*)     Mono # 1.2 (*)     Lymph % 14.0 (*)     All other components within normal limits    Narrative:     ADD ON [facility] #[id number] GLENN DIXON MD 09/13/2023  02:32    HGB and HCT critical result(s) called and verbal readback obtained   from JOHN DANIELS RN on 09/13/23 at 0234h by [email or web address] 09/13/2023 02:34   COMPREHENSIVE METABOLIC PANEL - Abnormal; Notable for the following components:    CO2 15 (*)     Glucose 113 (*)     BUN 42 (*)     Calcium 7.8 (*)     Total Protein 4.4 (*)     Albumin 2.3 (*)     Alkaline Phosphatase 39 (*)     All other components within normal limits    Narrative:     ADD ON [facility] #[id number] GLENN DIXON MD 09/13/2023  02:32    ISTAT LACTATE - Abnormal; Notable for the following components:    POC Lactate 3.84 (*)     All other components within normal limits   ISTAT LACTATE - Abnormal; Notable for the following components:    POC Lactate 2.77 (*)     All other components within normal limits   INFLUENZA A & B BY MOLECULAR   CULTURE, BLOOD   CULTURE, BLOOD   SARS-COV-2 RNA AMPLIFICATION, QUAL   CBC W/ AUTO DIFFERENTIAL   COMPREHENSIVE METABOLIC PANEL   TACROLIMUS LEVEL   PHOSPHORUS   LACTIC ACID, PLASMA   TYPE & SCREEN   PREPARE RBC SOFT          Imaging Results              X-Ray Chest AP Portable (Final result)  Result time 09/12/23 23:26:00      Final result by Jason Conroy MD (09/12/23 23:26:00)                   Impression:      No acute process.  Specifically, no airspace disease.      Electronically signed by: Jason Conroy MD  Date:    09/12/2023  Time:    23:26               Narrative:    EXAMINATION:  XR CHEST AP PORTABLE    CLINICAL HISTORY:  Sepsis;    TECHNIQUE:  Single frontal view of the chest was performed.    COMPARISON:  03/08/2021.    FINDINGS:  There is stable appearance of right-sided chest  port.  Monitoring EKG leads are present.  There are postoperative changes in the base of the neck.    The trachea is unremarkable.  There are calcifications of the aortic knob.  The cardiomediastinal silhouette is within normal limits.  There is no evidence of free air beneath the hemidiaphragms.  There are no pleural effusions.  There is no evidence of a pneumothorax.  There is a left-sided skin fold.  There is no evidence of pneumomediastinum.  No airspace opacity is present.  The osseous structures are unremarkable.                                       Medications   piperacillin-tazobactam (ZOSYN) 4.5 g in dextrose 5 % in water (D5W) 100 mL IVPB (MB+) (4.5 g Intravenous New Bag 9/13/23 0508)   0.9%  NaCl infusion (for blood administration) (has no administration in time range)   sodium chloride 0.9% flush 10 mL (has no administration in time range)   naloxone 0.4 mg/mL injection 0.02 mg (has no administration in time range)   glucose chewable tablet 16 g (has no administration in time range)   glucose chewable tablet 24 g (has no administration in time range)   glucagon (human recombinant) injection 1 mg (has no administration in time range)   dextrose 10% bolus 125 mL 125 mL (has no administration in time range)   dextrose 10% bolus 250 mL 250 mL (has no administration in time range)   pantoprazole injection 80 mg (has no administration in time range)   pantoprazole injection 40 mg (has no administration in time range)   mupirocin 2 % ointment (has no administration in time range)   magnesium sulfate 2g in water 50mL IVPB (premix) (has no administration in time range)   vancomycin - pharmacy to dose (has no administration in time range)   piperacillin-tazobactam (ZOSYN) 4.5 g in dextrose 5 % in water (D5W) 100 mL IVPB (MB+) (has no administration in time range)   vancomycin 750 mg in dextrose 5 % (D5W) 250 mL IVPB (Vial-Mate) (has no administration in time range)   neomycin-polymyxin-dexamethasone  3.5mg/mL-10,000 unit/mL-0.1 % ophthalmic suspension 1 drop (has no administration in time range)   lactated ringers bolus 1,000 mL (0 mLs Intravenous Stopped 9/13/23 0049)   vancomycin 1,250 mg in dextrose 5 % (D5W) 250 mL IVPB (Vial-Mate) (0 mg Intravenous Stopped 9/13/23 2234)     Medical Decision Making  73-year-old male presenting with weakness.  Mild hypotension on arrival, other vitals are normal.  He has a trach and is essentially nonverbal, but able to nod, most history from family. Sepsis workup started.  EKG AFib, no STEMI.  Chest x-ray with no acute findings. He was given broad-spectrum IV antibiotics.  There was a significant delay in obtaining labs, secondary to hemolysis.  His labs resulted with a hemoglobin of 3.4, a rectal exam was performed showing melena and dark stools.  Likely the source.  No leukocytosis, electrolytes are normal, creatinine normal.  Urinalysis normal, COVID negative, flu negative.  Family and patient consented to blood transfusion.  3 units PRBCs ordered.  Discussed with oncology, who are requesting ICU evaluation.  ICU evaluated the patient, they feel he is stable for the floor.  Impression is of lower GI bleed, acute anemia, weakness, hypotension, possible sepsis.    Critical care time not including procedures 35 minutes.         This patient does have evidence of infective focus  My overall impression is sepsis.  Source: Unknown  Antibiotics given- Antibiotics (72h ago, onward)    Start     Stop Route Frequency Ordered    09/13/23 1200  piperacillin-tazobactam (ZOSYN) 4.5 g in dextrose 5 % in   water (D5W) 100 mL IVPB (MB+)         -- IV Every 8 hours (non-standard times) 09/13/23 0418    09/13/23 0900  mupirocin 2 % ointment         09/18/23 0859 Nasl 2 times daily 09/13/23 0358    09/13/23 0900  neomycin-polymyxin-dexamethasone 3.5mg/mL-10,000   unit/mL-0.1 % ophthalmic suspension 1 drop         -- RIGHT EYE 4 times daily 09/13/23 0436    09/13/23 0516  vancomycin -  pharmacy to dose  (vancomycin IVPB (PEDS and   ADULTS))        See James for full Linked Orders Report.    -- IV pharmacy to manage frequency 09/13/23 0418    09/13/23 0330  piperacillin-tazobactam (ZOSYN) 4.5 g in dextrose 5 % in   water (D5W) 100 mL IVPB (MB+)  (ED Adult Sepsis Treatment)         -- IV Once 09/13/23 0222    09/13/23 0300  vancomycin 750 mg in dextrose 5 % (D5W) 250 mL IVPB   (Vial-Mate)         -- IV Every 24 hours (non-standard times) 09/13/23 0427      Latest lactate reviewed-  Lab             09/13/23                       0053          POCLAC       2.77*         Organ dysfunction indicated by Anemia    Fluid challenge Actual Body weight- Patient will receive 30ml/kg actual body weight to calculate fluid bolus for treatment of septic shock.     Post- resuscitation assessment Yes Perfusion exam was performed within 6 hours of septic shock presentation after bolus shows Adequate tissue perfusion assessed by non-invasive monitoring       Will Not start Pressors-      Amount and/or Complexity of Data Reviewed  Labs: ordered. Decision-making details documented in ED Course.  Radiology: ordered and independent interpretation performed. Decision-making details documented in ED Course.  ECG/medicine tests: ordered and independent interpretation performed. Decision-making details documented in ED Course.    Risk  Prescription drug management.  Decision regarding hospitalization.                               EKG  Rate 101   AFib   No STEMI      Clinical Impression:   Final diagnoses:  [R53.1] Weakness  [D64.9] Anemia, unspecified type (Primary)  [A41.9] Sepsis, due to unspecified organism, unspecified whether acute organ dysfunction present  [Z85.9] History of cancer        ED Disposition Condition    Admit Martín Chavez MD  09/13/23 7964

## 2023-09-13 NOTE — CONSULTS
[facility] - Emergency Dept  Critical Care Medicine  Consult Note    Patient Name: Rocco Swain  MRN: [id number]  Admission Date: 9/12/2023  Hospital Length of Stay: 0 days  Code Status: Prior  Attending Physician: Martín Lawson MD   Primary Care Provider: GAURANG Walton MD   Principal Problem: Anemia    Inpatient consult to Critical Care Medicine  Consult performed by: Stephen Menendez MD  Consult ordered by: Martín Lawson MD        Subjective:       Review of Systems   Unable to perform ROS: Patient nonverbal   Objective:     Vital Signs (Most Recent):  Temp: 99.3 °F (37.4 °C) (09/13/23 0028)  Pulse: 84 (09/13/23 0153)  Resp: 11 (09/13/23 0153)  BP: (!) 108/58 (09/13/23 0132)  SpO2: 99 % (09/13/23 0153) Vital Signs (24h Range):  Temp:  [98.2 °F (36.8 °C)-99.3 °F (37.4 °C)] 99.3 °F (37.4 °C)  Pulse:  [] 84  Resp:  [11-22] 11  SpO2:  [95 %-100 %] 99 %  BP: ()/(54-60) 108/58   Weight: 52.6 kg (116 lb)  Body mass index is 17.64 kg/m².      Intake/Output Summary (Last 24 hours) at 9/13/2023 0336  Last data filed at 9/13/2023 0120  Gross per 24 hour   Intake --   Output 100 ml   Net -100 ml          Physical Exam  Vitals reviewed.   HENT:      Head: Normocephalic.      Comments: Appearance is consistent with prior procedures  Tracheostomy in place, clean and intact around the stoma      Nose: Nose normal.   Eyes:      Conjunctiva/sclera: Conjunctivae normal.   Cardiovascular:      Rate and Rhythm: Normal rate.   Pulmonary:      Effort: Pulmonary effort is normal.   Skin:     Coloration: Skin is pale.   Neurological:      Mental Status: He is alert.      Comments: Responding to questions appropriately with hand signaling          Vents:     Lines/Drains/Airways       Central Venous Catheter Line  Duration                  PowerPort A Cath Single Lumen 03/08/21 1031 right internal jugular 918 days              Airway  Duration                  Surgical Airway -- days         Laryngectomy (Do Not  Remove) 08/24/23 1145  19 days              Peripheral Intravenous Line  Duration                  Peripheral IV - Single Lumen 09/13/23 0244 20 G Anterior;Distal;Right Upper Arm <1 day                  Significant Labs:    CBC/Anemia Profile:  Recent Labs   Lab 09/13/23  0135   WBC 8.81   HGB 3.4*   HCT 10.4*      *   RDW 15.2*        Chemistries:  Recent Labs   Lab 09/13/23  0029      K 3.8      CO2 15*   BUN 42*   CREATININE 1.2   CALCIUM 7.8*   ALBUMIN 2.3*   PROT 4.4*   BILITOT 0.3   ALKPHOS 39*   ALT 11   AST 25   MG 1.4*       All pertinent labs within the past 24 hours have been reviewed.    Significant Imaging:  I have reviewed all pertinent imaging results/findings within the past 24 hours.      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 He is not a surgical or radiation candidate.  -Started on PCC 10/6/2020 followed by maintenance cetuximab until 8/24/21 (discontinued due to progression of disease; continued increase in SUV uptake, no new lesions).- 9/2021 started on carbo/5-FU/pembrolizumab; due to toxicity went to pembrolizumab monotherapy starting with cycle 2.  Progression of disease noted after cycle 3 so added chemotherapy back in with cycle 4. Keytruda monotherapy starting with C9.   June 2023 PET scan reviewed with patient - shows no evidence of hypermetabolic disease.    Status post total laryngectomy, total glossectomy and anterolateral thigh free flap reconstruction roughly 2017 at [facility] for persistent/recurrent base of tongue sq.c.c.S/p liver transplant and is currently on tacrolimus.       The patient endorses that he has been having some diarrhea. He has noted some bright red stools.   He denies that he is on active chemotherapy treatment    He denies any chest pain, nausea, vomiting, hemoptysis, or  shortness of breath    In the ED the patient is hemodynamically stable, normotensive and on room air    The critical care team was consulted given the patient's significant anemia on presentation      Assessment/Plan:     Oncology  * Anemia  The patient was seen and examined at bedside and is hemodynamically stable with a normotensive blood pressure and on room air. In no apparent distress.   -Repeat CBC after blood transfusion  -Unlikely to need ICU admission      Thank you for your consult. I will sign off. Please contact us if you have any additional questions.     Stephen Menendez MD  Critical Care Medicine  [facility] - Emergency Dept

## 2023-09-14 ENCOUNTER — ANESTHESIA (OUTPATIENT)
Dept: ENDOSCOPY | Facility: HOSPITAL | Age: 73
DRG: 377 | End: 2023-09-14
Payer: MEDICARE

## 2023-09-14 LAB
ALBUMIN SERPL BCP-MCNC: 2.6 G/DL (ref 3.5–5.2)
ALP SERPL-CCNC: 48 U/L (ref 55–135)
ALT SERPL W/O P-5'-P-CCNC: 13 U/L (ref 10–44)
ANION GAP SERPL CALC-SCNC: 13 MMOL/L (ref 8–16)
AST SERPL-CCNC: 38 U/L (ref 10–40)
BASOPHILS # BLD AUTO: 0.01 K/UL (ref 0–0.2)
BASOPHILS # BLD AUTO: 0.02 K/UL (ref 0–0.2)
BASOPHILS NFR BLD: 0.2 % (ref 0–1.9)
BASOPHILS NFR BLD: 0.4 % (ref 0–1.9)
BASOPHILS NFR BLD: 0.5 % (ref 0–1.9)
BASOPHILS NFR BLD: 0.5 % (ref 0–1.9)
BILIRUB SERPL-MCNC: 0.7 MG/DL (ref 0.1–1)
BUN SERPL-MCNC: 25 MG/DL (ref 8–23)
CALCIUM SERPL-MCNC: 7.7 MG/DL (ref 8.7–10.5)
CHLORIDE SERPL-SCNC: 104 MMOL/L (ref 95–110)
CO2 SERPL-SCNC: 22 MMOL/L (ref 23–29)
CREAT SERPL-MCNC: 1.3 MG/DL (ref 0.5–1.4)
DIFFERENTIAL METHOD: ABNORMAL
EOSINOPHIL # BLD AUTO: 0.1 K/UL (ref 0–0.5)
EOSINOPHIL NFR BLD: 1.4 % (ref 0–8)
EOSINOPHIL NFR BLD: 1.5 % (ref 0–8)
EOSINOPHIL NFR BLD: 2.7 % (ref 0–8)
EOSINOPHIL NFR BLD: 3.1 % (ref 0–8)
ERYTHROCYTE [DISTWIDTH] IN BLOOD BY AUTOMATED COUNT: 18 % (ref 11.5–14.5)
ERYTHROCYTE [DISTWIDTH] IN BLOOD BY AUTOMATED COUNT: 18.6 % (ref 11.5–14.5)
ERYTHROCYTE [DISTWIDTH] IN BLOOD BY AUTOMATED COUNT: 18.6 % (ref 11.5–14.5)
ERYTHROCYTE [DISTWIDTH] IN BLOOD BY AUTOMATED COUNT: 18.7 % (ref 11.5–14.5)
EST. GFR  (NO RACE VARIABLE): 57.6 ML/MIN/1.73 M^2
GLUCOSE SERPL-MCNC: 84 MG/DL (ref 70–110)
HCT VFR BLD AUTO: 24.8 % (ref 40–54)
HCT VFR BLD AUTO: 26.5 % (ref 40–54)
HCT VFR BLD AUTO: 26.8 % (ref 40–54)
HCT VFR BLD AUTO: 29.3 % (ref 40–54)
HGB BLD-MCNC: 8.3 G/DL (ref 14–18)
HGB BLD-MCNC: 8.8 G/DL (ref 14–18)
HGB BLD-MCNC: 9.1 G/DL (ref 14–18)
HGB BLD-MCNC: 9.7 G/DL (ref 14–18)
IMM GRANULOCYTES # BLD AUTO: 0.04 K/UL (ref 0–0.04)
IMM GRANULOCYTES # BLD AUTO: 0.04 K/UL (ref 0–0.04)
IMM GRANULOCYTES # BLD AUTO: 0.05 K/UL (ref 0–0.04)
IMM GRANULOCYTES # BLD AUTO: 0.07 K/UL (ref 0–0.04)
IMM GRANULOCYTES NFR BLD AUTO: 0.8 % (ref 0–0.5)
IMM GRANULOCYTES NFR BLD AUTO: 0.9 % (ref 0–0.5)
IMM GRANULOCYTES NFR BLD AUTO: 1 % (ref 0–0.5)
IMM GRANULOCYTES NFR BLD AUTO: 1.3 % (ref 0–0.5)
LYMPHOCYTES # BLD AUTO: 0.4 K/UL (ref 1–4.8)
LYMPHOCYTES # BLD AUTO: 0.5 K/UL (ref 1–4.8)
LYMPHOCYTES NFR BLD: 12.5 % (ref 18–48)
LYMPHOCYTES NFR BLD: 5.4 % (ref 18–48)
LYMPHOCYTES NFR BLD: 7 % (ref 18–48)
LYMPHOCYTES NFR BLD: 8.3 % (ref 18–48)
MAGNESIUM SERPL-MCNC: 1.6 MG/DL (ref 1.6–2.6)
MCH RBC QN AUTO: 29.7 PG (ref 27–31)
MCH RBC QN AUTO: 29.7 PG (ref 27–31)
MCH RBC QN AUTO: 29.9 PG (ref 27–31)
MCH RBC QN AUTO: 30.4 PG (ref 27–31)
MCHC RBC AUTO-ENTMCNC: 32.8 G/DL (ref 32–36)
MCHC RBC AUTO-ENTMCNC: 33.1 G/DL (ref 32–36)
MCHC RBC AUTO-ENTMCNC: 33.5 G/DL (ref 32–36)
MCHC RBC AUTO-ENTMCNC: 34.3 G/DL (ref 32–36)
MCV RBC AUTO: 89 FL (ref 82–98)
MCV RBC AUTO: 89 FL (ref 82–98)
MCV RBC AUTO: 90 FL (ref 82–98)
MCV RBC AUTO: 91 FL (ref 82–98)
MONOCYTES # BLD AUTO: 0.4 K/UL (ref 0.3–1)
MONOCYTES # BLD AUTO: 0.5 K/UL (ref 0.3–1)
MONOCYTES # BLD AUTO: 0.6 K/UL (ref 0.3–1)
MONOCYTES # BLD AUTO: 0.7 K/UL (ref 0.3–1)
MONOCYTES NFR BLD: 10.9 % (ref 4–15)
MONOCYTES NFR BLD: 11.1 % (ref 4–15)
MONOCYTES NFR BLD: 14.7 % (ref 4–15)
MONOCYTES NFR BLD: 7.6 % (ref 4–15)
NEUTROPHILS # BLD AUTO: 2.8 K/UL (ref 1.8–7.7)
NEUTROPHILS # BLD AUTO: 3.3 K/UL (ref 1.8–7.7)
NEUTROPHILS # BLD AUTO: 4.3 K/UL (ref 1.8–7.7)
NEUTROPHILS # BLD AUTO: 5.4 K/UL (ref 1.8–7.7)
NEUTROPHILS NFR BLD: 68.2 % (ref 38–73)
NEUTROPHILS NFR BLD: 78 % (ref 38–73)
NEUTROPHILS NFR BLD: 81 % (ref 38–73)
NEUTROPHILS NFR BLD: 81 % (ref 38–73)
NRBC BLD-RTO: 0 /100 WBC
PHOSPHATE SERPL-MCNC: 2.5 MG/DL (ref 2.7–4.5)
PLATELET # BLD AUTO: 106 K/UL (ref 150–450)
PLATELET # BLD AUTO: 121 K/UL (ref 150–450)
PLATELET # BLD AUTO: 126 K/UL (ref 150–450)
PLATELET # BLD AUTO: 133 K/UL (ref 150–450)
PMV BLD AUTO: 8.6 FL (ref 9.2–12.9)
PMV BLD AUTO: 8.8 FL (ref 9.2–12.9)
PMV BLD AUTO: 8.9 FL (ref 9.2–12.9)
PMV BLD AUTO: 9.3 FL (ref 9.2–12.9)
POTASSIUM SERPL-SCNC: 3.1 MMOL/L (ref 3.5–5.1)
PROT SERPL-MCNC: 5.2 G/DL (ref 6–8.4)
RBC # BLD AUTO: 2.78 M/UL (ref 4.6–6.2)
RBC # BLD AUTO: 2.96 M/UL (ref 4.6–6.2)
RBC # BLD AUTO: 2.99 M/UL (ref 4.6–6.2)
RBC # BLD AUTO: 3.27 M/UL (ref 4.6–6.2)
SODIUM SERPL-SCNC: 139 MMOL/L (ref 136–145)
TACROLIMUS BLD-MCNC: <2 NG/ML (ref 5–15)
WBC # BLD AUTO: 4.15 K/UL (ref 3.9–12.7)
WBC # BLD AUTO: 4.23 K/UL (ref 3.9–12.7)
WBC # BLD AUTO: 5.26 K/UL (ref 3.9–12.7)
WBC # BLD AUTO: 6.64 K/UL (ref 3.9–12.7)

## 2023-09-14 PROCEDURE — 43255 EGD CONTROL BLEEDING ANY: CPT | Performed by: INTERNAL MEDICINE

## 2023-09-14 PROCEDURE — 99233 SBSQ HOSP IP/OBS HIGH 50: CPT | Mod: GC,,, | Performed by: INTERNAL MEDICINE

## 2023-09-14 PROCEDURE — 63600175 PHARM REV CODE 636 W HCPCS: Performed by: INTERNAL MEDICINE

## 2023-09-14 PROCEDURE — D9220A PRA ANESTHESIA: ICD-10-PCS | Mod: ANES,,, | Performed by: ANESTHESIOLOGY

## 2023-09-14 PROCEDURE — 93010 EKG 12-LEAD: ICD-10-PCS | Mod: ,,, | Performed by: INTERNAL MEDICINE

## 2023-09-14 PROCEDURE — 94761 N-INVAS EAR/PLS OXIMETRY MLT: CPT

## 2023-09-14 PROCEDURE — 43255 EGD CONTROL BLEEDING ANY: CPT | Mod: 51,,, | Performed by: INTERNAL MEDICINE

## 2023-09-14 PROCEDURE — C9113 INJ PANTOPRAZOLE SODIUM, VIA: HCPCS

## 2023-09-14 PROCEDURE — 83735 ASSAY OF MAGNESIUM: CPT | Performed by: INTERNAL MEDICINE

## 2023-09-14 PROCEDURE — D9220A PRA ANESTHESIA: ICD-10-PCS | Mod: CRNA,,, | Performed by: NURSE ANESTHETIST, CERTIFIED REGISTERED

## 2023-09-14 PROCEDURE — D9220A PRA ANESTHESIA: Mod: CRNA,,, | Performed by: NURSE ANESTHETIST, CERTIFIED REGISTERED

## 2023-09-14 PROCEDURE — 93005 ELECTROCARDIOGRAM TRACING: CPT

## 2023-09-14 PROCEDURE — 36415 COLL VENOUS BLD VENIPUNCTURE: CPT

## 2023-09-14 PROCEDURE — 93010 ELECTROCARDIOGRAM REPORT: CPT | Mod: ,,, | Performed by: INTERNAL MEDICINE

## 2023-09-14 PROCEDURE — 45378 DIAGNOSTIC COLONOSCOPY: CPT | Performed by: INTERNAL MEDICINE

## 2023-09-14 PROCEDURE — 25000003 PHARM REV CODE 250: Performed by: INTERNAL MEDICINE

## 2023-09-14 PROCEDURE — 37000008 HC ANESTHESIA 1ST 15 MINUTES: Performed by: INTERNAL MEDICINE

## 2023-09-14 PROCEDURE — 27201038 HC PROBE, BI-POLAR: Performed by: INTERNAL MEDICINE

## 2023-09-14 PROCEDURE — 63600175 PHARM REV CODE 636 W HCPCS

## 2023-09-14 PROCEDURE — 25000003 PHARM REV CODE 250: Performed by: NURSE ANESTHETIST, CERTIFIED REGISTERED

## 2023-09-14 PROCEDURE — 80053 COMPREHEN METABOLIC PANEL: CPT | Performed by: INTERNAL MEDICINE

## 2023-09-14 PROCEDURE — 25000003 PHARM REV CODE 250

## 2023-09-14 PROCEDURE — 99233 PR SUBSEQUENT HOSPITAL CARE,LEVL III: ICD-10-PCS | Mod: GC,,, | Performed by: INTERNAL MEDICINE

## 2023-09-14 PROCEDURE — 45378 PR COLONOSCOPY,DIAGNOSTIC: ICD-10-PCS | Mod: ,,, | Performed by: INTERNAL MEDICINE

## 2023-09-14 PROCEDURE — 63600175 PHARM REV CODE 636 W HCPCS: Performed by: STUDENT IN AN ORGANIZED HEALTH CARE EDUCATION/TRAINING PROGRAM

## 2023-09-14 PROCEDURE — 45378 DIAGNOSTIC COLONOSCOPY: CPT | Mod: ,,, | Performed by: INTERNAL MEDICINE

## 2023-09-14 PROCEDURE — 63600175 PHARM REV CODE 636 W HCPCS: Performed by: NURSE ANESTHETIST, CERTIFIED REGISTERED

## 2023-09-14 PROCEDURE — 85025 COMPLETE CBC W/AUTO DIFF WBC: CPT | Mod: 91 | Performed by: INTERNAL MEDICINE

## 2023-09-14 PROCEDURE — 99900026 HC AIRWAY MAINTENANCE (STAT)

## 2023-09-14 PROCEDURE — 84100 ASSAY OF PHOSPHORUS: CPT | Performed by: INTERNAL MEDICINE

## 2023-09-14 PROCEDURE — 43255 PR EGD, FLEX, W/CTRL BLEED, ANY METHOD: ICD-10-PCS | Mod: 51,,, | Performed by: INTERNAL MEDICINE

## 2023-09-14 PROCEDURE — 27201028 HC NEEDLE, SCLERO: Performed by: INTERNAL MEDICINE

## 2023-09-14 PROCEDURE — D9220A PRA ANESTHESIA: Mod: ANES,,, | Performed by: ANESTHESIOLOGY

## 2023-09-14 PROCEDURE — 20600001 HC STEP DOWN PRIVATE ROOM

## 2023-09-14 PROCEDURE — 99900035 HC TECH TIME PER 15 MIN (STAT)

## 2023-09-14 PROCEDURE — 37000009 HC ANESTHESIA EA ADD 15 MINS: Performed by: INTERNAL MEDICINE

## 2023-09-14 PROCEDURE — 80197 ASSAY OF TACROLIMUS: CPT | Performed by: INTERNAL MEDICINE

## 2023-09-14 RX ORDER — LEVOTHYROXINE SODIUM 88 UG/1
88 TABLET ORAL
Status: DISCONTINUED | OUTPATIENT
Start: 2023-09-14 | End: 2023-09-18 | Stop reason: HOSPADM

## 2023-09-14 RX ORDER — POTASSIUM CHLORIDE 750 MG/1
30 CAPSULE, EXTENDED RELEASE ORAL
Status: DISCONTINUED | OUTPATIENT
Start: 2023-09-14 | End: 2023-09-14

## 2023-09-14 RX ORDER — EPINEPHRINE CONVENIENCE KIT 1 MG/ML(1)
KIT INTRAMUSCULAR; SUBCUTANEOUS
Status: COMPLETED | OUTPATIENT
Start: 2023-09-14 | End: 2023-09-14

## 2023-09-14 RX ORDER — PROPOFOL 10 MG/ML
VIAL (ML) INTRAVENOUS
Status: DISCONTINUED | OUTPATIENT
Start: 2023-09-14 | End: 2023-09-14

## 2023-09-14 RX ORDER — LIDOCAINE HYDROCHLORIDE 20 MG/ML
INJECTION INTRAVENOUS
Status: DISCONTINUED | OUTPATIENT
Start: 2023-09-14 | End: 2023-09-14

## 2023-09-14 RX ORDER — PHENYLEPHRINE HYDROCHLORIDE 10 MG/ML
INJECTION INTRAVENOUS
Status: DISCONTINUED | OUTPATIENT
Start: 2023-09-14 | End: 2023-09-14

## 2023-09-14 RX ORDER — ONDANSETRON 2 MG/ML
4 INJECTION INTRAMUSCULAR; INTRAVENOUS ONCE AS NEEDED
Status: DISCONTINUED | OUTPATIENT
Start: 2023-09-14 | End: 2023-09-14 | Stop reason: HOSPADM

## 2023-09-14 RX ORDER — PROPOFOL 10 MG/ML
VIAL (ML) INTRAVENOUS CONTINUOUS PRN
Status: DISCONTINUED | OUTPATIENT
Start: 2023-09-14 | End: 2023-09-14

## 2023-09-14 RX ORDER — POTASSIUM CHLORIDE 7.45 MG/ML
10 INJECTION INTRAVENOUS
Status: COMPLETED | OUTPATIENT
Start: 2023-09-14 | End: 2023-09-14

## 2023-09-14 RX ADMIN — NEOMYCIN SULFATE, POLYMYXIN B SULFATE AND DEXAMETHASONE 1 DROP: 3.5; 10000; 1 SUSPENSION OPHTHALMIC at 09:09

## 2023-09-14 RX ADMIN — NEOMYCIN SULFATE, POLYMYXIN B SULFATE AND DEXAMETHASONE 1 DROP: 3.5; 10000; 1 SUSPENSION OPHTHALMIC at 05:09

## 2023-09-14 RX ADMIN — POTASSIUM CHLORIDE 10 MEQ: 10 INJECTION, SOLUTION INTRAVENOUS at 12:09

## 2023-09-14 RX ADMIN — PROPOFOL 150 MCG/KG/MIN: 10 INJECTION, EMULSION INTRAVENOUS at 10:09

## 2023-09-14 RX ADMIN — PHENYLEPHRINE HYDROCHLORIDE 50 MCG: 10 INJECTION INTRAVENOUS at 10:09

## 2023-09-14 RX ADMIN — TACROLIMUS 1 MG: 1 CAPSULE ORAL at 08:09

## 2023-09-14 RX ADMIN — LEVOTHYROXINE SODIUM 88 MCG: 88 TABLET ORAL at 12:09

## 2023-09-14 RX ADMIN — PHENYLEPHRINE HYDROCHLORIDE 100 MCG: 10 INJECTION INTRAVENOUS at 10:09

## 2023-09-14 RX ADMIN — PANTOPRAZOLE SODIUM 40 MG: 40 INJECTION, POWDER, FOR SOLUTION INTRAVENOUS at 08:09

## 2023-09-14 RX ADMIN — POTASSIUM CHLORIDE 10 MEQ: 10 INJECTION, SOLUTION INTRAVENOUS at 05:09

## 2023-09-14 RX ADMIN — PROPOFOL 20 MG: 10 INJECTION, EMULSION INTRAVENOUS at 10:09

## 2023-09-14 RX ADMIN — LIDOCAINE HYDROCHLORIDE 60 MG: 20 INJECTION INTRAVENOUS at 10:09

## 2023-09-14 RX ADMIN — POTASSIUM CHLORIDE 10 MEQ: 10 INJECTION, SOLUTION INTRAVENOUS at 06:09

## 2023-09-14 RX ADMIN — NEOMYCIN SULFATE, POLYMYXIN B SULFATE AND DEXAMETHASONE 1 DROP: 3.5; 10000; 1 SUSPENSION OPHTHALMIC at 08:09

## 2023-09-14 RX ADMIN — NEOMYCIN SULFATE, POLYMYXIN B SULFATE AND DEXAMETHASONE 1 DROP: 3.5; 10000; 1 SUSPENSION OPHTHALMIC at 12:09

## 2023-09-14 RX ADMIN — POTASSIUM CHLORIDE 10 MEQ: 10 INJECTION, SOLUTION INTRAVENOUS at 08:09

## 2023-09-14 RX ADMIN — PHENYLEPHRINE HYDROCHLORIDE 200 MCG: 10 INJECTION INTRAVENOUS at 11:09

## 2023-09-14 RX ADMIN — PANTOPRAZOLE SODIUM 40 MG: 40 INJECTION, POWDER, FOR SOLUTION INTRAVENOUS at 09:09

## 2023-09-14 RX ADMIN — TACROLIMUS 0.5 MG: 0.5 CAPSULE ORAL at 05:09

## 2023-09-14 RX ADMIN — POTASSIUM CHLORIDE 10 MEQ: 10 INJECTION, SOLUTION INTRAVENOUS at 04:09

## 2023-09-14 RX ADMIN — PROPOFOL 30 MG: 10 INJECTION, EMULSION INTRAVENOUS at 10:09

## 2023-09-14 RX ADMIN — PHENYLEPHRINE HYDROCHLORIDE 200 MCG: 10 INJECTION INTRAVENOUS at 10:09

## 2023-09-14 RX ADMIN — POTASSIUM CHLORIDE 10 MEQ: 10 INJECTION, SOLUTION INTRAVENOUS at 01:09

## 2023-09-14 RX ADMIN — MUPIROCIN: 20 OINTMENT TOPICAL at 09:09

## 2023-09-14 RX ADMIN — SODIUM CHLORIDE: 0.9 INJECTION, SOLUTION INTRAVENOUS at 10:09

## 2023-09-14 RX ADMIN — MUPIROCIN: 20 OINTMENT TOPICAL at 08:09

## 2023-09-14 NOTE — HOSPITAL COURSE
Presented after feeling weak and dizzy over the last several days.  He denies recent sick contacts, fevers, chills.  He does state that he has had a GI bleed in the past requiring a scope.  In the ED, Lactic 3, hemoglobin less than 4, and hypotension was also noted. RBC transfusion was ordered, along with broad-spectrum antibiotics due to his immunocompromised state.GI consult for possible scope, Trend Hgb q8hrs, transfuse 3u pRBC for Hb 3, diet NPO, Bolus IV pantoprazole 80mg followed by 40mg BID. Antibiotics d/c after NGTD on Bcx. Procedure completed with GI, HH stable throughout stay. Completed 72 hours of IV Protonix, transitioned to 12 weeks PO Protonix BID. Tolerating regular diet without issue.       Review of Systems   Constitutional:  Positive for fatigue. Negative for chills and fever.   Respiratory:  Negative for shortness of breath and wheezing.    Cardiovascular:  Negative for chest pain and leg swelling.   Gastrointestinal:  Negative for abdominal distention and abdominal pain.   Neurological:  Positive for dizziness and weakness.   Psychiatric/Behavioral:  Negative for agitation, behavioral problems and confusion.       Objective:      Vital Signs (Most Recent):  Temp: 98.3 °F (36.8 °C) (09/17/23 1142)  Pulse: 74 (09/17/23 1142)  Resp: 14 (09/17/23 1142)  BP: 132/79 (09/17/23 1142)  SpO2: 100 % (09/17/23 1142) Vital Signs (24h Range):  Temp:  [97.7 °F (36.5 °C)-98.3 °F (36.8 °C)] 98.3 °F (36.8 °C)  Pulse:  [69-79] 74  Resp:  [14-19] 14  SpO2:  [98 %-100 %] 100 %  BP: (123-155)/(66-91) 132/79      Weight: 51.8 kg (114 lb 3.2 oz)  Body mass index is 17.36 kg/m².  Body surface area is 1.58 meters squared.        Intake/Output Summary (Last 24 hours) at 9/17/2023 1508  Last data filed at 9/17/2023 0525      Gross per 24 hour   Intake 1404 ml   Output 675 ml   Net 729 ml            Physical Exam  Constitutional:       Appearance: He is cachectic. He is ill-appearing. He is not toxic-appearing or  diaphoretic.   HENT:      Head: Normocephalic and atraumatic.   Cardiovascular:      Rate and Rhythm: Normal rate and regular rhythm.      Pulses: Normal pulses.      Heart sounds: Normal heart sounds. No murmur heard.  Pulmonary:      Effort: Pulmonary effort is normal. No respiratory distress.      Breath sounds: Normal breath sounds.   Abdominal:      General: There is no distension.      Tenderness: There is no abdominal tenderness.   Skin:     General: Skin is warm and dry.   Neurological:      General: No focal deficit present.      Mental Status: He is alert, oriented to person, place, and time and easily aroused. Mental status is at baseline.   Psychiatric:         Mood and Affect: Mood normal.         Behavior: Behavior normal. Behavior is cooperative.         Judgment: Judgment normal.            Significant Labs:   All pertinent labs from the last 24 hours have been reviewed.     Diagnostic Results:     Findings on EGD:  - Nonobstructing schatzki ring. Dilated with resultant bleeding. Treated with bipolar cautery.   - Scar in the gastric antrum.   - Oozing duodenal ulcers. Injected. Treated with bipolar cautery.    - No specimens collected.      Findings on colonoscopy:  Old blood throughout colon. No active bleeding. Prep not adequate to detect polyps. See full Provation report for further details.

## 2023-09-14 NOTE — SUBJECTIVE & OBJECTIVE
Interval History:  NPO midnight, s/p EGD/cscope findings are below, resuming regular diet. No other complaints this morning. Resting comfortably. Hb am 9 s/p 3u pRBC overnight.    Oncology Treatment Plan:   OP HEAD NECK PEMBROLIZUMAB CARBOPLATIN FLUOROURACIL (C1 ONLY RECEIVED) FOLLOWED BY PEMBROLIZUMAB MAINTENANCE    Medications:  Continuous Infusions:  Scheduled Meds:   levothyroxine  88 mcg Oral Before breakfast    mupirocin   Nasal BID    neomycin-polymyxin-dexamethasone  1 drop Right Eye QID    pantoprazole  40 mg Intravenous BID    potassium chloride  10 mEq Intravenous Q1H    tacrolimus  0.5 mg Oral Daily PM    tacrolimus  1 mg Oral Daily AM     PRN Meds:0.9%  NaCl infusion (for blood administration), dextrose 10%, dextrose 10%, glucagon (human recombinant), glucose, glucose, naloxone, sodium chloride 0.9%     Review of patient's allergies indicates:  No Known Allergies     Past Medical History:   Diagnosis Date    Basal cell carcinoma (BCC) in situ of skin 2012    3 on face, 2 on arm, removed by dermatology.     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 Underwent surgical resection of tongue and neck. s/p tracheostomy     Past Surgical History:   Procedure Laterality Date    COLONOSCOPY      CONJUNCTIVA BIOPSY Right 10/11/2022    Procedure: BIOPSY, CONJUNCTIVA;  Surgeon: Victor M Vasques MD;  Location: [facility];  Service: Ophthalmology;  Laterality: Right;    INSERTION OF VENOUS ACCESS PORT Right 3/8/2021    Procedure: INSERTION, VENOUS ACCESS PORT;  Surgeon: Jesus Rendon MD;  Location: [address];  Service: General;  Laterality: Right;    LIVER TRANSPLANT  11/2008    transplanted for biopsy proven hepatocellular carcinoma,     LYMPH NODE BIOPSY N/A  9/18/2020    Procedure: BIOPSY, LYMPH NODE;  Surgeon: Taz Diagnostic Provider;  Location: [facility] OR [address] [place] [address];  Service: General;  Laterality: N/A;  Rm 173    SURGICAL REMOVAL OF SCAR Right 8/24/2023    Procedure: EXCISION, SCAR;  Surgeon: Ting Brambila MD;  Location: [facility];  Service: Ophthalmology;  Laterality: Right;    TRACHEOSTOMY       Family History       Problem Relation (Age of Onset)    Dementia Mother          Tobacco Use    Smoking status: Never    Smokeless tobacco: Never   Substance and Sexual Activity    Alcohol use: Yes     Comment: drinks wine, 1 glass day    Drug use: Not Currently    Sexual activity: Yes     Comment: No prior history of STD        Review of Systems   Constitutional:  Positive for fatigue. Negative for chills and fever.   Respiratory:  Negative for shortness of breath and wheezing.    Cardiovascular:  Negative for chest pain and leg swelling.   Gastrointestinal:  Negative for abdominal distention and abdominal pain.   Neurological:  Positive for dizziness and weakness.   Psychiatric/Behavioral:  Negative for agitation, behavioral problems and confusion.      Objective:     Vital Signs (Most Recent):  Temp: 97.8 °F (36.6 °C) (09/14/23 1231)  Pulse: 66 (09/14/23 1231)  Resp: 16 (09/14/23 1231)  BP: 110/79 (09/14/23 1231)  SpO2: 99 % (09/14/23 1231) Vital Signs (24h Range):  Temp:  [97.5 °F (36.4 °C)-98.7 °F (37.1 °C)] 97.8 °F (36.6 °C)  Pulse:  [] 66  Resp:  [16-20] 16  SpO2:  [97 %-100 %] 99 %  BP: (103-170)/(64-81) 110/79     Weight: 52.6 kg (116 lb)  Body mass index is 17.64 kg/m².  Body surface area is 1.59 meters squared.      Intake/Output Summary (Last 24 hours) at 9/14/2023 1237  Last data filed at 9/14/2023 1221  Gross per 24 hour   Intake 1450 ml   Output 950 ml   Net 500 ml          Physical Exam  Constitutional:       Appearance: He is cachectic. He is ill-appearing. He is not toxic-appearing or diaphoretic.   HENT:      Head: Normocephalic and atraumatic.    Cardiovascular:      Rate and Rhythm: Normal rate and regular rhythm.      Pulses: Normal pulses.      Heart sounds: Normal heart sounds. No murmur heard.  Pulmonary:      Effort: Pulmonary effort is normal. No respiratory distress.      Breath sounds: Normal breath sounds.   Abdominal:      General: There is no distension.      Tenderness: There is no abdominal tenderness.   Skin:     General: Skin is warm and dry.   Neurological:      General: No focal deficit present.      Mental Status: He is alert, oriented to person, place, and time and easily aroused. Mental status is at baseline.   Psychiatric:         Mood and Affect: Mood normal.         Behavior: Behavior normal. Behavior is cooperative.         Judgment: Judgment normal.          Significant Labs:   All pertinent labs from the last 24 hours have been reviewed.    Diagnostic Results:  I have reviewed all pertinent imaging results/findings within the past 24 hours.    Diagnostic Results:    Findings on EGD today:  - Nonobstructing schatzki ring. Dilated with resultant bleeding. Treated with bipolar cautery.   - Scar in the gastric antrum.   - Oozing duodenal ulcers. Injected. Treated with bipolar cautery.    - No specimens collected.     Findings on colonoscopy today:  Old blood throughout colon. No active bleeding. Prep not adequate to detect polyps. See full Provation report for further details.

## 2023-09-14 NOTE — NURSING TRANSFER
Nursing Transfer Note      9/14/2023   12:19 PM    Nurse giving handoff:Kena  Nurse receiving handoff:Marine     Reason patient is being transferred: Post op     Transfer To: [id number]    Transfer via stretcher    Transfer with cardiac monitoring    Transported by Pt transport     Transfer Vital Signs:See Flowsheet      Telemetry: on patient   Order for Tele Monitor? Yes    Additional Lines: none    4eyes on Skin: yes    Medicines sent: none    Any special needs or follow-up needed: none    Patient belongings transferred with patient: Yes    Chart send with patient: Yes    Notified: daughter    Patient reassessed at: 9/14/2023 1145  1  Upon arrival to floor: cardiac monitor applied, patient oriented to room, call bell in reach, and bed in lowest position

## 2023-09-14 NOTE — PROVATION PATIENT INSTRUCTIONS
Discharge Summary/Instructions after an Endoscopic Procedure  Patient Name: Rocco Swain  Patient MRN: [id number]  Patient YOB: 1950  Thursday, September 14, 2023  Reyes Olivia MD  Dear patient,  As a result of recent federal legislation (The Federal Cures Act), you may   receive lab or pathology results from your procedure in your [facility]   account before your physician is able to contact you. Your physician or   their representative will relay the results to you with their   recommendations at their soonest availability.  Thank you,  RESTRICTIONS:  During your procedure today, you received medications for sedation.  These   medications may affect your judgment, balance and coordination.  Therefore,   for 24 hours, you have the following restrictions:   - DO NOT drive a car, operate machinery, make legal/financial decisions,   sign important papers or drink alcohol.    ACTIVITY:  Today: no heavy lifting, straining or running due to procedural   sedation/anesthesia.  The following day: return to full activity including work.  DIET:  Eat and drink normally unless instructed otherwise.     TREATMENT FOR COMMON SIDE EFFECTS:  - Mild abdominal pain, nausea, belching, bloating or excessive gas:  rest,   eat lightly and use a heating pad.  - Sore Throat: treat with throat lozenges and/or gargle with warm salt   water.  - Because air was used during the procedure, expelling large amounts of air   from your rectum or belching is normal.  - If a bowel prep was taken, you may not have a bowel movement for 1-3 days.    This is normal.  SYMPTOMS TO WATCH FOR AND REPORT TO YOUR PHYSICIAN:  1. Abdominal pain or bloating, other than gas cramps.  2. Chest pain.  3. Back pain.  4. Signs of infection such as: chills or fever occurring within 24 hours   after the procedure.  5. Rectal bleeding, which would show as bright red, maroon, or black stools.   (A tablespoon of blood from the rectum is not serious,  especially if   hemorrhoids are present.)  6. Vomiting.  7. Weakness or dizziness.  GO DIRECTLY TO THE NEAREST EMERGENCY ROOM IF YOU HAVE ANY OF THE FOLLOWING:      Difficulty breathing              Chills and/or fever over 101 F   Persistent vomiting and/or vomiting blood   Severe abdominal pain   Severe chest pain   Black, tarry stools   Bleeding- more than one tablespoon   Any other symptom or condition that you feel may need urgent attention  Your doctor recommends these additional instructions:  If any biopsies were taken, your doctors clinic will contact you in 1 to 2   weeks with any results.  - [facility] patient to [facility] for [facility].   - Advance diet as tolerated.   - Continue IV PPI BID for 72 hours. Then, PO PPI BID for 12 weeks.   - Avoid NSAIDs.   - Repeat colonoscopy for surveillance based on clinical status at that   time.  For questions, problems or results please call your physician - Reyes Olivia MD at Work:  [phone].  [facility], EMERGENCY ROOM PHONE NUMBER: [phone]  IF A COMPLICATION OR EMERGENCY SITUATION ARISES AND YOU ARE UNABLE TO REACH   YOUR PHYSICIAN - GO DIRECTLY TO THE EMERGENCY ROOM.  Reyes Olivia MD  9/14/2023 12:46:42 PM  This report has been verified and signed electronically.  Dear patient,  As a result of recent federal legislation (The Federal Cures Act), you may   receive lab or pathology results from your procedure in your [facility]   account before your physician is able to contact you. Your physician or   their representative will relay the results to you with their   recommendations at their soonest availability.  Thank you,  PROVATION

## 2023-09-14 NOTE — TRANSFER OF CARE
"Anesthesia Transfer of Care Note    Patient: Rocco Swain    Procedure(s) Performed: Procedure(s) (LRB):  EGD (ESOPHAGOGASTRODUODENOSCOPY) (N/A)  COLONOSCOPY (N/A)    Patient location: PACU    Anesthesia Type: general    Transport from OR: Transported from OR on room air with adequate spontaneous ventilation    Post pain: adequate analgesia    Post assessment: no apparent anesthetic complications and tolerated procedure well    Post vital signs: stable    Level of consciousness: awake, alert and oriented    Nausea/Vomiting: no nausea/vomiting    Complications: none    Transfer of care protocol was followed      Last vitals:   Visit Vitals  BP (!) 170/70   Pulse 68   Temp 36.7 °C (98.1 °F)   Resp 20   Ht 5' 8" (1.727 m)   Wt 52.6 kg (116 lb)   SpO2 100%   BMI 17.64 kg/m²     "

## 2023-09-14 NOTE — H&P
Short Stay Endoscopy History and Physical    PCP - GAURANG Walton MD    Procedure - EGD, Colonoscopy  ASA - per anesthesia  Mallampati - per anesthesia  History of Anesthesia problems - no  Family history Anesthesia problems -  no   Plan of anesthesia - General    HPI:  This is a 73 y.o. male here for evaluation of : Hematochezia & melena. Hb 4.7>>9.1, after 3 U PRBC.     ROS:  Constitutional: No fevers, chills  CV: No chest pain  Pulm: No shortness of breath  GI: see HPI  Derm: No rash    Medical History:  has a past medical history of Basal cell carcinoma (BCC) in situ of skin (2012), Hepatitis C, chronic (2006), Hypothyroidism, Larynx cancer, Liver transplanted, Lumbar disc disease, and Squamous cell carcinoma in situ (SCCIS) of tongue (02/2016).    Surgical History:  has a past surgical history that includes Liver transplant (11/2008); Tracheostomy; Colonoscopy; Lymph node biopsy (N/A, 9/18/2020); Insertion of venous access port (Right, 3/8/2021); Conjunctiva biopsy (Right, 10/11/2022); and Surgical removal of scar (Right, 8/24/2023).    Family History: family history includes Dementia in his mother.. Otherwise no colon cancer, inflammatory bowel disease, or GI malignancies.    Social History:  reports that he has never smoked. He has never used smokeless tobacco. He reports current alcohol use. He reports that he does not currently use drugs.    Review of patient's allergies indicates:  No Known Allergies    Medications:   Medications Prior to Admission   Medication Sig Dispense Refill Last Dose    azelaic acid (AZELEX) 15 % gel APPLY TOPICALLY TO AFFECTED AREA IN THE MORNING 50 g 3     gabapentin (NEURONTIN) 300 MG capsule Take 1 capsule (300 mg total) by mouth every evening. 30 capsule 11     ibuprofen (ADVIL,MOTRIN) 200 MG tablet Take 200 mg by mouth.       imiquimod (ALDARA) 5 % cream Apply to biopsy site on frontal scalp + about a centimeter of surrounding skin qhs x 16 weeks. Wash off in am and  apply sunscreen. Discontinue if skin becomes blistered, raw, bleeding, painful, etc. 24 packet 3     imiquimod (ALDARA) 5 % cream Apply to biopsy site on frontal scalp + about a centimeter of surrounding skin qhs x 16 weeks. Wash off in am and apply sunscreen. Discontinue if skin becomes blistered, raw, bleeding, painful, etc. 24 packet 3     levothyroxine (SYNTHROID) 88 MCG tablet TAKE 1 TABLET BY MOUTH ONCE DAILY 90 tablet 3     LIDOcaine HCl 2% (LIDOCAINE VISCOUS) 2 % Soln Swish and spit 15 mls every 8 (eight) hours as needed (mouth sore). 100 mL 3     LIDOcaine-prilocaine (EMLA) cream Apply generously to port site 30-60 min prior to chemo and then cover with saran wrap. 30 g 2     [] loteprednol (LOTEMAX) 0.5 % ophthalmic suspension Place 1 drop into the right eye 4 (four) times daily. 5 mL 1     magic mouthwash diphen/antac/lidoc/nysta Take 10 mLs by mouth 4 (four) times daily. 120 mL 4     metroNIDAZOLE (METROGEL) 0.75 % gel Apply topically to affected area 2 (two) times daily. 45 g 1     moxifloxacin (VIGAMOX) 0.5 % ophthalmic solution Place 1 drop into the right eye 4 (four) times daily. 3 mL 3     moxifloxacin (VIGAMOX) 0.5 % ophthalmic solution Place 1 drop into the right eye 4 (four) times daily. 3 mL 3     multivit-min/FA/lycopen/lutein (CENTRUM SILVER MEN ORAL) Take 1 tablet by mouth.       neomycin-polymyxin-dexamethasone (MAXITROL) 3.5mg/mL-10,000 unit/mL-0.1 % DrpS Place 1 drop into the right eye 4 (four) times daily. 5 mL 3     prednisoLONE acetate (PRED FORTE) 1 % DrpS Place 1 drop into the right eye 4 (four) times daily. 10 mL 3     sulfacetamide sodium-sulfur 10-5 % (w/w) Clsr USE TO WASH AFFECTED AREA DAILY       tacrolimus (PROGRAF) 0.5 MG Cap Take 1 capsule (0.5 mg total) by mouth every EVENING.  (Use the 1mg capsule for your morning dose) 90 capsule 3     tacrolimus (PROGRAF) 0.5 MG Cap Take 1 capsule (0.5 mg total) by mouth every evening. Use the 1mg capsule for your morning dose 90  capsule 3     tacrolimus (PROGRAF) 1 MG Cap Take 1 capsule (1 mg total) by mouth every morning. Use the tacrolimus 0.5mg capsule for your evening dose as directed. 90 capsule 3     tiZANidine (ZANAFLEX) 2 MG tablet Take 1 tablet (2 mg total) by mouth nightly as needed (neck muscle strain). 30 tablet 3     traZODone (DESYREL) 50 MG tablet Take 1 tablet (50 mg total) by mouth every evening. 90 tablet 2     vitamin E 400 UNIT capsule Take 400 Units by mouth once daily.            Physical Exam:    Vital Signs:   Vitals:    09/14/23 0839   BP:    Pulse: 72   Resp:    Temp:        General Appearance: Well appearing in no acute distress  Eyes:    No scleral icterus  ENT: lips and tongue normal  Lungs: no use of accessory muscles  Heart:  normal rate, regular rhythm  Abdomen: Soft, non tender, non distended   Extremities: no edema  Skin: No rash      Labs:  Lab Results   Component Value Date    WBC 4.15 09/14/2023    HGB 9.1 (L) 09/14/2023    HCT 26.5 (L) 09/14/2023     (L) 09/14/2023    CHOL 123 03/18/2022    TRIG 71 03/18/2022    HDL 53 03/18/2022    ALT 13 09/14/2023    AST 38 09/14/2023     09/14/2023    K 3.1 (L) 09/14/2023     09/14/2023    CREATININE 1.3 09/14/2023    BUN 25 (H) 09/14/2023    CO2 22 (L) 09/14/2023    TSH 0.508 08/10/2023    PSA 0.85 03/18/2022    INR 1.0 09/02/2020    HGBA1C 4.8 12/11/2019       I have explained the risks and benefits of endoscopy procedures to the patient including but not limited to bleeding, perforation, infection, and death.  The patient was asked if they understand and allowed to ask any further questions to their satisfaction.    Kannan Hawkins MD

## 2023-09-14 NOTE — PLAN OF CARE
Patient is AAOx4 and ambulatory with no assistance.  VSS/afebrile, NSR on tele.  Trach capped, on RA.  Checking Q6 CBCs, last H/H 8.3/24.8 s/p 3u blood - next CBC @ 0700.  patient has been NPO since midnight for EGD/colonoscopy today.  Protonix 40mg IVP continued.  Up to BR for elimination.  R chest wall port accessed.  No complaints o/n.  Fall precautions maintained.  Bed in lowest locked position.  Call bell in reach.

## 2023-09-14 NOTE — ASSESSMENT & PLAN NOTE
Antibiotics given-   Antibiotics (72h ago, onward)    Start     Stop Route Frequency Ordered    09/13/23 0900  mupirocin 2 % ointment         09/18/23 0859 Nasl 2 times daily 09/13/23 0358    09/13/23 0900  neomycin-polymyxin-dexamethasone 3.5mg/mL-10,000 unit/mL-0.1 % ophthalmic suspension 1 drop         -- RIGHT EYE 4 times daily 09/13/23 0436        - No current infectious foci found.  Lactic of 3, heart rate periodically above 90, increased respiratory rate, patient does meet sepsis criteria currently  - we will continue with broad-spectrum antibiotics for now, deescalate as indicated  - Given 1L fluids in ED   - s/p 3u pRBCs  - BCx NGTD >24 hrs, will continue to follow  - UA negative  - denies sick contacts currently or recent illness/fevers

## 2023-09-14 NOTE — PLAN OF CARE
VSS  NO signs of evident distress or complaint of pain  Pt. Ambulating in room independently. Non slip socks worn when OOB  Laryngectomy patient.  Obligate neck breather.  Signage in place.  Supplies in room  Tele NSR  Left FA 20g PIV dressing CDI  Right Chestwall port dressing CDI  H&H stable.  CBC q. 6  Pureed diet in place.  Pt. Tolerating well.  Serum potassium 3.1  Replaced as per MAR  Bed in lowest locked position.  Call light within reach.  Instructed to call for assistance.  Pt. Expressed understanding.    Educated on plan of care.

## 2023-09-14 NOTE — PLAN OF CARE
PLAN OF CARE - HEPATOLOGY      Patient tacrolimus levels pending. Patient received first dose of tacrolimus yesterday evening so would not expect level to be therapeutic yet.     Plan:  -- Continue current home doses of tacrolimus  -- Repeat level tomorrow at 7 AM (timed lab) --> I have placed this order     Will follow up levels tomorrow and adjust as needed if patient is still hospitalized.      Andra Ramírez MD   GI/Hepatology Fellow

## 2023-09-14 NOTE — PROGRESS NOTES
[facility] - Transplant Stepdown  Hematology/Oncology  Progress Note    Patient Name: Rocco Swain  Admission Date: 9/12/2023  Hospital Length of Stay: 1 days  Code Status: Full Code     Subjective:     HPI:  Mr. Swain is a 73-year-old male significant history of laryngeal cancer status post chemo with tongue removal, liver transplantation complicated with cirrhosis on tacro, and history of GI bleed per patient presents with concerns for weakness.  History was given by family in the ED however at time of primary evaluation family was not at bedside.  He does live alone, and has been feeling weak and dizzy over the last several days.  He denies recent sick contacts, fevers, chills.  He does state that he has had a GI bleed in the past requiring a scope.  In the ED melena was down in physical exam.  Lactic 3, hemoglobin less than 4, and hypotension was also noted.  We will concerns for hypovolemic versus distributive shock blood cell transfusion was ordered, along with broad-spectrum antibiotics due to his immunocompromised state.  Patient to be admitted to Medical Oncology for further management and workup, as well as GI evaluation            Oncological Hx:   Squamous cell carcinoma of base of tongue   9/18/2020 Cancer Staged     Staging form: Pharynx - HPV-Mediated Oropharynx, AJCC 8th Edition  - Clinical stage from 9/18/2020: Stage III (rcT4, cN1, cM0, p16+)      9/28/2020 Initial Diagnosis     Squamous cell carcinoma of base of tongue      10/6/2020 - 8/17/2021 Chemotherapy     Treatment Summary   Plan Name: OP HEAD NECK CARBOPLATIN PACLITAXEL C1-2 FOLLOWED BY CETUXIMAB CARBOPLATIN C3-6 FOLLOWED BY CETUXIMAB MAINTENANCE WEEKLY  Treatment Goal: Control  Status: Inactive  Start Date: 10/6/2020  End Date: 8/17/2021  Provider: Ronald Berg MD  Chemotherapy: cetuximab (ERBITUX) 100 mg/50 mL chemo infusion 684 mg, 400 mg/m2 = 684 mg (100 % of original dose 400 mg/m2), Intravenous, Clinic/[facility] 1 time, 29 of 38  cycles  Dose modification: 500 mg/m2 (original dose 400 mg/m2, Cycle 3), 250 mg/m2 (original dose 400 mg/m2, Cycle 3), 400 mg/m2 (original dose 400 mg/m2, Cycle 3), 250 mg/m2 (original dose 250 mg/m2, Cycle 7), 200 mg/m2 (original dose 250 mg/m2, Cycle 18), 200 mg/m2 (original dose 250 mg/m2, Cycle 19), 250 mg/m2 (original dose 250 mg/m2, Cycle 4), 200 mg/m2 (original dose 250 mg/m2, Cycle 25), 200 mg/m2 (original dose 250 mg/m2, Cycle 28)  Administration: 684 mg (11/17/2020), 400 mg (11/24/2020), 400 mg (2/9/2021), 400 mg (2/17/2021), 427.6 mg (3/9/2021), 400 mg (3/30/2021), 400 mg (3/23/2021), 400 mg (4/6/2021), 427.6 mg (4/13/2021), 427.6 mg (4/20/2021), 342 mg (5/11/2021), 342 mg (5/18/2021), 342 mg (5/25/2021), 400 mg (5/31/2021), 400 mg (6/7/2021), 400 mg (6/14/2021), 400 mg (12/8/2020), 427.6 mg (12/29/2020), 400 mg (12/1/2020), 400 mg (12/15/2020), 400 mg (12/22/2020), 427.6 mg (1/5/2021), 400 mg (1/12/2021), 400 mg (1/19/2021), 427.6 mg (1/26/2021), 400 mg (2/2/2021), 400 mg (2/23/2021), 400 mg (3/2/2021), 427.6 mg (3/16/2021), 400 mg (6/21/2021), 342 mg (6/28/2021), 342 mg (7/6/2021), 342 mg (7/13/2021), 342 mg (7/20/2021), 400 mg (7/27/2021), 400 mg (8/10/2021), 400 mg (8/17/2021)  CARBOplatin (PARAPLATIN) 310 mg in sodium chloride 0.9% 500 mL chemo infusion, 310 mg (92.2 % of original dose 334.5 mg), Intravenous, Clinic/H[facility] 1 time, 6 of 6 cycles  Dose modification:   (original dose 334.5 mg, Cycle 1)  Administration: 310 mg (10/6/2020), 370 mg (11/17/2020), 300 mg (10/27/2020), 350 mg (12/8/2020), 335 mg (12/29/2020), 320 mg (1/19/2021)  PACLitaxeL (TAXOL) 175 mg/m2 = 300 mg in sodium chloride 0.9% 500 mL chemo infusion, 175 mg/m2 = 300 mg (100 % of original dose 175 mg/m2), Intravenous, Clinic/[facility] 1 time, 2 of 2 cycles  Dose modification: 175 mg/m2 (original dose 175 mg/m2, Cycle 1)  Administration: 300 mg (10/6/2020), 300 mg (10/27/2020)      4/29/2021 Tumor Conference        His case was discussed at  the Multidisciplinary Head and Neck Team Planning Meeting.     Representatives from Medical Oncology, Radiation Oncology, Head and Neck Surgical Oncology, Psychosocial Oncology, and Speech and Language Pathology discussed the case with the following recommendations:     1) biopsy            7/29/2021 Tumor Conference        His case was discussed at the Multidisciplinary Head and Neck Team Planning Meeting.     Representatives from Medical Oncology, Radiation Oncology, Head and Neck Surgical Oncology, Psychosocial Oncology, and Speech and Language Pathology discussed the case with the following recommendations:     1) Head and neck clinic follow up  2) consider Keytruda (discuss with transplant)  3) consider palliative referral            9/13/2021 -  Chemotherapy     Treatment Summary   Plan Name: OP HEAD NECK PEMBROLIZUMAB CARBOPLATIN FLUOROURACIL (C1 ONLY RECEIVED) FOLLOWED BY PEMBROLIZUMAB MAINTENANCE  Treatment Goal: Palliative  Status: Active  Start Date: 9/13/2021  End Date: 9/5/2023 (Planned)  Provider: Ronald Berg MD  Chemotherapy: CARBOplatin (PARAPLATIN) 320 mg in sodium chloride 0.9% 500 mL chemo infusion, 320 mg (100 % of original dose 320.5 mg), Intravenous, Clinic/HOD 1 time, 6 of 6 cycles  Dose modification:   (original dose 320.5 mg, Cycle 1)  Administration: 320 mg (9/13/2021), 335 mg (12/23/2021), 325 mg (1/27/2022), 245 mg (3/3/2022), 255 mg (5/12/2022), 255 mg (4/7/2022)  fluorouraciL 1,000 mg/m2/day = 6,440 mg in sodium chloride 0.9% 240 mL chemo infusion, 1,000 mg/m2/day = 6,440 mg, Intravenous, Over 96 hours, 6 of 6 cycles  Dose modification: 800 mg/m2/day (original dose 1,000 mg/m2/day, Cycle 6), 5,000 mg (original dose 1,000 mg/m2/day, Cycle 8)  Administration: 6,440 mg (9/13/2021), 6,440 mg (12/23/2021), 6,480 mg (1/27/2022), 5,000 mg (3/3/2022), 5,000 mg (4/7/2022), 5,000 mg (5/12/2022)      Secondary malignant neoplasm of cervical lymph node   2/9/2021 Initial Diagnosis     Secondary  malignant neoplasm of cervical lymph node      9/13/2021 -  Chemotherapy     Treatment Summary   Plan Name: OP HEAD NECK PEMBROLIZUMAB CARBOPLATIN FLUOROURACIL (C1 ONLY RECEIVED) FOLLOWED BY PEMBROLIZUMAB MAINTENANCE  Treatment Goal: Palliative  Status: Active  Start Date: 9/13/2021  End Date: 9/5/2023 (Planned)  Provider: Ronald Berg MD  Chemotherapy: CARBOplatin (PARAPLATIN) 320 mg in sodium chloride 0.9% 500 mL chemo infusion, 320 mg (100 % of original dose 320.5 mg), Intravenous, Clinic/[facility] 1 time, 6 of 6 cycles  Dose modification:   (original dose 320.5 mg, Cycle 1)  Administration: 320 mg (9/13/2021), 335 mg (12/23/2021), 325 mg (1/27/2022), 245 mg (3/3/2022), 255 mg (5/12/2022), 255 mg (4/7/2022)  fluorouraciL 1,000 mg/m2/day = 6,440 mg in sodium chloride 0.9% 240 mL chemo infusion, 1,000 mg/m2/day = 6,440 mg, Intravenous, Over 96 hours, 6 of 6 cycles  Dose modification: 800 mg/m2/day (original dose 1,000 mg/m2/day, Cycle 6), 5,000 mg (original dose 1,000 mg/m2/day, Cycle 8)  Administration: 6,440 mg (9/13/2021), 6,440 mg (12/23/2021), 6,480 mg (1/27/2022), 5,000 mg (3/3/2022), 5,000 mg (4/7/2022), 5,000 mg (5/12/2022)       Interval History:  NPO midnight, s/p EGD/cscope findings are below, resuming regular diet. No other complaints this morning. Resting comfortably. Hb am 9 s/p 3u pRBC overnight.    Oncology Treatment Plan:   OP HEAD NECK PEMBROLIZUMAB CARBOPLATIN FLUOROURACIL (C1 ONLY RECEIVED) FOLLOWED BY PEMBROLIZUMAB MAINTENANCE    Medications:  Continuous Infusions:  Scheduled Meds:   levothyroxine  88 mcg Oral Before breakfast    mupirocin   Nasal BID    neomycin-polymyxin-dexamethasone  1 drop Right Eye QID    pantoprazole  40 mg Intravenous BID    potassium chloride  10 mEq Intravenous Q1H    tacrolimus  0.5 mg Oral Daily PM    tacrolimus  1 mg Oral Daily AM     PRN Meds:0.9%  NaCl infusion (for blood administration), dextrose 10%, dextrose 10%, glucagon (human recombinant), glucose, glucose,  naloxone, sodium chloride 0.9%     Review of patient's allergies indicates:  No Known Allergies     Past Medical History:   Diagnosis Date    Basal cell carcinoma (BCC) in situ of skin 2012    3 on face, 2 on arm, removed by dermatology.    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 Underwent surgical resection of tongue and neck. s/p tracheostomy     Past Surgical History:   Procedure Laterality Date    COLONOSCOPY      CONJUNCTIVA BIOPSY Right 10/11/2022    Procedure: BIOPSY, CONJUNCTIVA;  Surgeon: Victor M Vasques MD;  Location: [facility] OR;  Service: Ophthalmology;  Laterality: Right;    INSERTION OF VENOUS ACCESS PORT Right 3/8/2021    Procedure: INSERTION, VENOUS ACCESS PORT;  Surgeon: Jesus Rendon MD;  Location: [facility] OR [address] [place] [address];  Service: General;  Laterality: Right;    LIVER TRANSPLANT  11/2008    transplanted for biopsy proven hepatocellular carcinoma,     LYMPH NODE BIOPSY N/A 9/18/2020    Procedure: BIOPSY, LYMPH NODE;  Surgeon: Taz Diagnostic Provider;  Location: [facility] OR [address] [place] [address];  Service: General;  Laterality: N/A;  Rm 173    SURGICAL REMOVAL OF SCAR Right 8/24/2023    Procedure: EXCISION, SCAR;  Surgeon: Ting Brambila MD;  Location: [facility] OR;  Service: Ophthalmology;  Laterality: Right;    TRACHEOSTOMY       Family History       Problem Relation (Age of Onset)    Dementia Mother          Tobacco Use    Smoking status: Never    Smokeless tobacco: Never   Substance and Sexual Activity    Alcohol use: Yes     Comment: drinks wine, 1 glass day    Drug use: Not Currently    Sexual activity: Yes     Comment: No prior history of STD        Review of Systems   Constitutional:  Positive for fatigue. Negative for chills and fever.    Respiratory:  Negative for shortness of breath and wheezing.    Cardiovascular:  Negative for chest pain and leg swelling.   Gastrointestinal:  Negative for abdominal distention and abdominal pain.   Neurological:  Positive for dizziness and weakness.   Psychiatric/Behavioral:  Negative for agitation, behavioral problems and confusion.      Objective:     Vital Signs (Most Recent):  Temp: 97.8 °F (36.6 °C) (09/14/23 1231)  Pulse: 66 (09/14/23 1231)  Resp: 16 (09/14/23 1231)  BP: 110/79 (09/14/23 1231)  SpO2: 99 % (09/14/23 1231) Vital Signs (24h Range):  Temp:  [97.5 °F (36.4 °C)-98.7 °F (37.1 °C)] 97.8 °F (36.6 °C)  Pulse:  [] 66  Resp:  [16-20] 16  SpO2:  [97 %-100 %] 99 %  BP: (103-170)/(64-81) 110/79     Weight: 52.6 kg (116 lb)  Body mass index is 17.64 kg/m².  Body surface area is 1.59 meters squared.      Intake/Output Summary (Last 24 hours) at 9/14/2023 1237  Last data filed at 9/14/2023 1221  Gross per 24 hour   Intake 1450 ml   Output 950 ml   Net 500 ml          Physical Exam  Constitutional:       Appearance: He is cachectic. He is ill-appearing. He is not toxic-appearing or diaphoretic.   HENT:      Head: Normocephalic and atraumatic.   Cardiovascular:      Rate and Rhythm: Normal rate and regular rhythm.      Pulses: Normal pulses.      Heart sounds: Normal heart sounds. No murmur heard.  Pulmonary:      Effort: Pulmonary effort is normal. No respiratory distress.      Breath sounds: Normal breath sounds.   Abdominal:      General: There is no distension.      Tenderness: There is no abdominal tenderness.   Skin:     General: Skin is warm and dry.   Neurological:      General: No focal deficit present.      Mental Status: He is alert, oriented to person, place, and time and easily aroused. Mental status is at baseline.   Psychiatric:         Mood and Affect: Mood normal.         Behavior: Behavior normal. Behavior is cooperative.         Judgment: Judgment normal.          Significant Labs:    All pertinent labs from the last 24 hours have been reviewed.    Diagnostic Results:  I have reviewed all pertinent imaging results/findings within the past 24 hours.    Diagnostic Results:    Findings on EGD today:  - Nonobstructing schatzki ring. Dilated with resultant bleeding. Treated with bipolar cautery.   - Scar in the gastric antrum.   - Oozing duodenal ulcers. Injected. Treated with bipolar cautery.    - No specimens collected.     Findings on colonoscopy today:  Old blood throughout colon. No active bleeding. Prep not adequate to detect polyps. See full Provation report for further details.    Assessment/Plan:     * GI bleed  73 year old wit presents with concerns of dizziness, and fatigue with hemoglobin less than 4, lactic a history.  Melena seen on stools concerns for acute GI bleed.  Patient is immunocompromise status post finishing chemotherapy as well as liver transplant recipient on tacro.  Can not completely rule out infection currently though more suspicious of hypovolemic vs distributive in terms of possible shock.     - s/p EGD / C scope, full read in subjective  - Trend Hgb q8hrs. Transfuse for Hgb <7, unless otherwise indicated  - Maintain IV access with 2 large bore Ivs  - Intravascular resuscitation/support with IVFs   - Diet as tolerated  - Hold all NSAIDs and anticoagulants, unless contraindicated  - IV PPI BID x 72 hours. Then, PO PPI BID for 12 weeks  - h pylori serology    Severe sepsis  Antibiotics given-   Antibiotics (72h ago, onward)    Start     Stop Route Frequency Ordered    09/13/23 0900  mupirocin 2 % ointment         09/18/23 0859 Nasl 2 times daily 09/13/23 0358    09/13/23 0900  neomycin-polymyxin-dexamethasone 3.5mg/mL-10,000 unit/mL-0.1 % ophthalmic suspension 1 drop         -- RIGHT EYE 4 times daily 09/13/23 0436        - No current infectious foci found.  Lactic of 3, heart rate periodically above 90, increased respiratory rate, patient does meet sepsis criteria  currently  - we will continue with broad-spectrum antibiotics for now, deescalate as indicated  - Given 1L fluids in ED   - s/p 3u pRBCs  - BCx NGTD >24 hrs, will continue to follow  - UA negative  - denies sick contacts currently or recent illness/fevers    Body mass index (BMI) less than 19  Nutrition consult for low BMI regarding maximizing diet when safe to resume   - Recommend PT/OT once volume repleted to assess function, patient lives at home alone       ACP (advance care planning)  Patient has uploaded documentation from 2020 regarding living will and power-of-.  When discussing the patient today regarding code status he endorsed  that at this moment prolonging life is his current goal and in pursuit  that would like to be a full code status.  - Full code reflected in chart  - Would discuss with patient further regarding re-uploading updated living will/advanced directives as indicated         Anemia  See GI bleed       Conjunctival intraepithelial neoplasm  Seen by [facility] last visit 9/8   - cont home neomycin and Maxitrol ophthalmic solutions       Thrombocytopenia  Platelets 182 at time of admission.  - q8h Cbc to monitor hgb       Hypomagnesemia  Mag 1.4 at time of admission, magnesium generally low going back years on chart review   - IV mag, daily labs       Chronic kidney disease (CKD), stage IV (severe)  Cr at time of presentation 1.2.    - Daily CMP   - Avoid nephrotic agents as indicated       Squamous cell carcinoma of base of tongue  See HPI for oncological hx. Finished therapy a few weeks prior.       Cirrhosis of transplanted liver  Known hx of liver transplant with cirrhosis, followed by [facility] hepatology   - Hepatology consult   - Obtain  Updated tacro level       Postoperative hypothyroidism  Noted in hx, takes synthroid, last TSH 1 month prior WNL   - Resume home synthroid now                 Enzo Walton MD  Hematology/Oncology  [facility] - Transplant Stepdown

## 2023-09-14 NOTE — TREATMENT PLAN
Brief GI Treatment Plan    Findings on EGD today:                        - Nonobstructing schatzki ring. Dilated with                          resultant bleeding. Treated with bipolar cautery.                          - Scar in the gastric antrum.                          - Oozing duodenal ulcers. Injected. Treated with                          bipolar cautery.                          - No specimens collected.   Findings on colonoscopy today:  Old blood throughout colon. No active bleeding. Prep not adequate to detect polyps. See full Provation report for further details.    PLAN:  - IV PPI BID x 72 hours. Then, PO PPI BID for 12 weeks.   - Return patient to [facility] for [facility] care.   - Resume previous diet.   - Avoid NSAIDs   - H pylori serology.     We will sign off.     Kannan Hawkins MD  PGY-V, Gastroenterology & Hepatology

## 2023-09-14 NOTE — PROVATION PATIENT INSTRUCTIONS
Discharge Summary/Instructions after an Endoscopic Procedure  Patient Name: Rocco Swain  Patient MRN: [id number]  Patient YOB: 1950  Thursday, September 14, 2023  Reyes Olivia MD  Dear patient,  As a result of recent federal legislation (The Federal Cures Act), you may   receive lab or pathology results from your procedure in your [facility]   account before your physician is able to contact you. Your physician or   their representative will relay the results to you with their   recommendations at their soonest availability.  Thank you,  RESTRICTIONS:  During your procedure today, you received medications for sedation.  These   medications may affect your judgment, balance and coordination.  Therefore,   for 24 hours, you have the following restrictions:   - DO NOT drive a car, operate machinery, make legal/financial decisions,   sign important papers or drink alcohol.    ACTIVITY:  Today: no heavy lifting, straining or running due to procedural   sedation/anesthesia.  The following day: return to full activity including work.  DIET:  Eat and drink normally unless instructed otherwise.     TREATMENT FOR COMMON SIDE EFFECTS:  - Mild abdominal pain, nausea, belching, bloating or excessive gas:  rest,   eat lightly and use a heating pad.  - Sore Throat: treat with throat lozenges and/or gargle with warm salt   water.  - Because air was used during the procedure, expelling large amounts of air   from your rectum or belching is normal.  - If a bowel prep was taken, you may not have a bowel movement for 1-3 days.    This is normal.  SYMPTOMS TO WATCH FOR AND REPORT TO YOUR PHYSICIAN:  1. Abdominal pain or bloating, other than gas cramps.  2. Chest pain.  3. Back pain.  4. Signs of infection such as: chills or fever occurring within 24 hours   after the procedure.  5. Rectal bleeding, which would show as bright red, maroon, or black stools.   (A tablespoon of blood from the rectum is not serious,  especially if   hemorrhoids are present.)  6. Vomiting.  7. Weakness or dizziness.  GO DIRECTLY TO THE NEAREST EMERGENCY ROOM IF YOU HAVE ANY OF THE FOLLOWING:      Difficulty breathing              Chills and/or fever over 101 F   Persistent vomiting and/or vomiting blood   Severe abdominal pain   Severe chest pain   Black, tarry stools   Bleeding- more than one tablespoon   Any other symptom or condition that you feel may need urgent attention  Your doctor recommends these additional instructions:  If any biopsies were taken, your doctors clinic will contact you in 1 to 2   weeks with any results.  - Return patient to [facility] for ongoing care.   - Resume previous diet.   - Avoid NSAIDs  - High dose BID PPI  - Perform a colonoscopy today.  For questions, problems or results please call your physician - Reyes Olivia MD at Work:  [phone].  [facility], EMERGENCY ROOM PHONE NUMBER: [phone]  IF A COMPLICATION OR EMERGENCY SITUATION ARISES AND YOU ARE UNABLE TO REACH   YOUR PHYSICIAN - GO DIRECTLY TO THE EMERGENCY ROOM.  Reyes Olivia MD  9/14/2023 11:28:04 AM  This report has been verified and signed electronically.  Dear patient,  As a result of recent federal legislation (The Federal Cures Act), you may   receive lab or pathology results from your procedure in your [facility]   account before your physician is able to contact you. Your physician or   their representative will relay the results to you with their   recommendations at their soonest availability.  Thank you,  PROVATION

## 2023-09-14 NOTE — ASSESSMENT & PLAN NOTE
73 year old wit presents with concerns of dizziness, and fatigue with hemoglobin less than 4, lactic a history.  Melena seen on stools concerns for acute GI bleed.  Patient is immunocompromise status post finishing chemotherapy as well as liver transplant recipient on tacro.  Can not completely rule out infection currently though more suspicious of hypovolemic vs distributive in terms of possible shock.     - s/p EGD / C scope, full read in subjective  - Trend Hgb q8hrs. Transfuse for Hgb <7, unless otherwise indicated  - Maintain IV access with 2 large bore Ivs  - Intravascular resuscitation/support with IVFs   - Diet as tolerated  - Hold all NSAIDs and anticoagulants, unless contraindicated  - IV PPI BID x 72 hours. Then, PO PPI BID for 12 weeks  - h pylori serology

## 2023-09-14 NOTE — DISCHARGE INSTRUCTIONS
:  Please contact Oncology Social Worker VIN Carlson, LCSW, [phone] on an outpatient basis as needed.    VIN Carlson, \[facility] [place] [facility]\""W  [phone]

## 2023-09-15 LAB
BASOPHILS # BLD AUTO: 0.01 K/UL (ref 0–0.2)
BASOPHILS # BLD AUTO: 0.02 K/UL (ref 0–0.2)
BASOPHILS NFR BLD: 0.2 % (ref 0–1.9)
BASOPHILS NFR BLD: 0.2 % (ref 0–1.9)
BASOPHILS NFR BLD: 0.3 % (ref 0–1.9)
BASOPHILS NFR BLD: 0.4 % (ref 0–1.9)
DIFFERENTIAL METHOD: ABNORMAL
EOSINOPHIL # BLD AUTO: 0.2 K/UL (ref 0–0.5)
EOSINOPHIL # BLD AUTO: 0.2 K/UL (ref 0–0.5)
EOSINOPHIL # BLD AUTO: 0.3 K/UL (ref 0–0.5)
EOSINOPHIL # BLD AUTO: 0.3 K/UL (ref 0–0.5)
EOSINOPHIL NFR BLD: 4.6 % (ref 0–8)
EOSINOPHIL NFR BLD: 5.1 % (ref 0–8)
EOSINOPHIL NFR BLD: 6.5 % (ref 0–8)
EOSINOPHIL NFR BLD: 6.9 % (ref 0–8)
ERYTHROCYTE [DISTWIDTH] IN BLOOD BY AUTOMATED COUNT: 18.5 % (ref 11.5–14.5)
ERYTHROCYTE [DISTWIDTH] IN BLOOD BY AUTOMATED COUNT: 18.7 % (ref 11.5–14.5)
ERYTHROCYTE [DISTWIDTH] IN BLOOD BY AUTOMATED COUNT: 18.7 % (ref 11.5–14.5)
ERYTHROCYTE [DISTWIDTH] IN BLOOD BY AUTOMATED COUNT: 18.9 % (ref 11.5–14.5)
HCT VFR BLD AUTO: 23.7 % (ref 40–54)
HCT VFR BLD AUTO: 24 % (ref 40–54)
HCT VFR BLD AUTO: 24.9 % (ref 40–54)
HCT VFR BLD AUTO: 25.3 % (ref 40–54)
HGB BLD-MCNC: 8.2 G/DL (ref 14–18)
HGB BLD-MCNC: 8.2 G/DL (ref 14–18)
HGB BLD-MCNC: 8.6 G/DL (ref 14–18)
HGB BLD-MCNC: 8.6 G/DL (ref 14–18)
IMM GRANULOCYTES # BLD AUTO: 0.03 K/UL (ref 0–0.04)
IMM GRANULOCYTES # BLD AUTO: 0.04 K/UL (ref 0–0.04)
IMM GRANULOCYTES NFR BLD AUTO: 0.7 % (ref 0–0.5)
IMM GRANULOCYTES NFR BLD AUTO: 0.8 % (ref 0–0.5)
IMM GRANULOCYTES NFR BLD AUTO: 0.9 % (ref 0–0.5)
IMM GRANULOCYTES NFR BLD AUTO: 1 % (ref 0–0.5)
LYMPHOCYTES # BLD AUTO: 0.4 K/UL (ref 1–4.8)
LYMPHOCYTES # BLD AUTO: 0.4 K/UL (ref 1–4.8)
LYMPHOCYTES # BLD AUTO: 0.5 K/UL (ref 1–4.8)
LYMPHOCYTES # BLD AUTO: 0.5 K/UL (ref 1–4.8)
LYMPHOCYTES NFR BLD: 10.1 % (ref 18–48)
LYMPHOCYTES NFR BLD: 10.5 % (ref 18–48)
LYMPHOCYTES NFR BLD: 12 % (ref 18–48)
LYMPHOCYTES NFR BLD: 8.5 % (ref 18–48)
MAGNESIUM SERPL-MCNC: 1.6 MG/DL (ref 1.6–2.6)
MCH RBC QN AUTO: 29.2 PG (ref 27–31)
MCH RBC QN AUTO: 29.7 PG (ref 27–31)
MCH RBC QN AUTO: 29.9 PG (ref 27–31)
MCH RBC QN AUTO: 30.5 PG (ref 27–31)
MCHC RBC AUTO-ENTMCNC: 32.9 G/DL (ref 32–36)
MCHC RBC AUTO-ENTMCNC: 34 G/DL (ref 32–36)
MCHC RBC AUTO-ENTMCNC: 34.6 G/DL (ref 32–36)
MCHC RBC AUTO-ENTMCNC: 35.8 G/DL (ref 32–36)
MCV RBC AUTO: 85 FL (ref 82–98)
MCV RBC AUTO: 86 FL (ref 82–98)
MCV RBC AUTO: 87 FL (ref 82–98)
MCV RBC AUTO: 90 FL (ref 82–98)
MONOCYTES # BLD AUTO: 0.7 K/UL (ref 0.3–1)
MONOCYTES NFR BLD: 13.1 % (ref 4–15)
MONOCYTES NFR BLD: 14.3 % (ref 4–15)
MONOCYTES NFR BLD: 16 % (ref 4–15)
MONOCYTES NFR BLD: 16.5 % (ref 4–15)
NEUTROPHILS # BLD AUTO: 2.6 K/UL (ref 1.8–7.7)
NEUTROPHILS # BLD AUTO: 2.7 K/UL (ref 1.8–7.7)
NEUTROPHILS # BLD AUTO: 3.2 K/UL (ref 1.8–7.7)
NEUTROPHILS # BLD AUTO: 3.7 K/UL (ref 1.8–7.7)
NEUTROPHILS NFR BLD: 64.2 % (ref 38–73)
NEUTROPHILS NFR BLD: 65.2 % (ref 38–73)
NEUTROPHILS NFR BLD: 69.4 % (ref 38–73)
NEUTROPHILS NFR BLD: 72.6 % (ref 38–73)
NRBC BLD-RTO: 0 /100 WBC
PHOSPHATE SERPL-MCNC: 2.1 MG/DL (ref 2.7–4.5)
PLATELET # BLD AUTO: 125 K/UL (ref 150–450)
PLATELET # BLD AUTO: 127 K/UL (ref 150–450)
PLATELET # BLD AUTO: 136 K/UL (ref 150–450)
PLATELET # BLD AUTO: 138 K/UL (ref 150–450)
PMV BLD AUTO: 9.1 FL (ref 9.2–12.9)
PMV BLD AUTO: 9.3 FL (ref 9.2–12.9)
PMV BLD AUTO: 9.3 FL (ref 9.2–12.9)
PMV BLD AUTO: 9.6 FL (ref 9.2–12.9)
RBC # BLD AUTO: 2.74 M/UL (ref 4.6–6.2)
RBC # BLD AUTO: 2.76 M/UL (ref 4.6–6.2)
RBC # BLD AUTO: 2.82 M/UL (ref 4.6–6.2)
RBC # BLD AUTO: 2.95 M/UL (ref 4.6–6.2)
TACROLIMUS BLD-MCNC: 3 NG/ML (ref 5–15)
WBC # BLD AUTO: 4 K/UL (ref 3.9–12.7)
WBC # BLD AUTO: 4.18 K/UL (ref 3.9–12.7)
WBC # BLD AUTO: 4.55 K/UL (ref 3.9–12.7)
WBC # BLD AUTO: 5.03 K/UL (ref 3.9–12.7)

## 2023-09-15 PROCEDURE — 94761 N-INVAS EAR/PLS OXIMETRY MLT: CPT

## 2023-09-15 PROCEDURE — 99900035 HC TECH TIME PER 15 MIN (STAT)

## 2023-09-15 PROCEDURE — 25000003 PHARM REV CODE 250: Performed by: HOSPITALIST

## 2023-09-15 PROCEDURE — 63600175 PHARM REV CODE 636 W HCPCS: Performed by: STUDENT IN AN ORGANIZED HEALTH CARE EDUCATION/TRAINING PROGRAM

## 2023-09-15 PROCEDURE — 84100 ASSAY OF PHOSPHORUS: CPT

## 2023-09-15 PROCEDURE — 99232 SBSQ HOSP IP/OBS MODERATE 35: CPT | Mod: ,,,

## 2023-09-15 PROCEDURE — 99232 PR SUBSEQUENT HOSPITAL CARE,LEVL II: ICD-10-PCS | Mod: ,,,

## 2023-09-15 PROCEDURE — 63600175 PHARM REV CODE 636 W HCPCS

## 2023-09-15 PROCEDURE — C9113 INJ PANTOPRAZOLE SODIUM, VIA: HCPCS

## 2023-09-15 PROCEDURE — C9113 INJ PANTOPRAZOLE SODIUM, VIA: HCPCS | Performed by: HOSPITALIST

## 2023-09-15 PROCEDURE — 25000003 PHARM REV CODE 250

## 2023-09-15 PROCEDURE — 20600001 HC STEP DOWN PRIVATE ROOM

## 2023-09-15 PROCEDURE — 80197 ASSAY OF TACROLIMUS: CPT | Performed by: STUDENT IN AN ORGANIZED HEALTH CARE EDUCATION/TRAINING PROGRAM

## 2023-09-15 PROCEDURE — 99233 SBSQ HOSP IP/OBS HIGH 50: CPT | Mod: GC,,, | Performed by: HOSPITALIST

## 2023-09-15 PROCEDURE — 83735 ASSAY OF MAGNESIUM: CPT

## 2023-09-15 PROCEDURE — 63600175 PHARM REV CODE 636 W HCPCS: Performed by: HOSPITALIST

## 2023-09-15 PROCEDURE — 99233 PR SUBSEQUENT HOSPITAL CARE,LEVL III: ICD-10-PCS | Mod: GC,,, | Performed by: HOSPITALIST

## 2023-09-15 PROCEDURE — 85025 COMPLETE CBC W/AUTO DIFF WBC: CPT | Performed by: INTERNAL MEDICINE

## 2023-09-15 RX ORDER — OXYCODONE HYDROCHLORIDE 5 MG/1
5 TABLET ORAL EVERY 6 HOURS PRN
Status: DISCONTINUED | OUTPATIENT
Start: 2023-09-15 | End: 2023-09-18 | Stop reason: HOSPADM

## 2023-09-15 RX ADMIN — OXYCODONE HYDROCHLORIDE 5 MG: 5 TABLET ORAL at 11:09

## 2023-09-15 RX ADMIN — MUPIROCIN: 20 OINTMENT TOPICAL at 09:09

## 2023-09-15 RX ADMIN — NEOMYCIN SULFATE, POLYMYXIN B SULFATE AND DEXAMETHASONE 1 DROP: 3.5; 10000; 1 SUSPENSION OPHTHALMIC at 09:09

## 2023-09-15 RX ADMIN — PANTOPRAZOLE SODIUM 40 MG: 40 INJECTION, POWDER, FOR SOLUTION INTRAVENOUS at 08:09

## 2023-09-15 RX ADMIN — NEOMYCIN SULFATE, POLYMYXIN B SULFATE AND DEXAMETHASONE 1 DROP: 3.5; 10000; 1 SUSPENSION OPHTHALMIC at 01:09

## 2023-09-15 RX ADMIN — NEOMYCIN SULFATE, POLYMYXIN B SULFATE AND DEXAMETHASONE 1 DROP: 3.5; 10000; 1 SUSPENSION OPHTHALMIC at 05:09

## 2023-09-15 RX ADMIN — PANTOPRAZOLE SODIUM 40 MG: 40 INJECTION, POWDER, FOR SOLUTION INTRAVENOUS at 09:09

## 2023-09-15 RX ADMIN — OXYCODONE HYDROCHLORIDE 5 MG: 5 TABLET ORAL at 05:09

## 2023-09-15 RX ADMIN — TACROLIMUS 0.5 MG: 0.5 CAPSULE ORAL at 05:09

## 2023-09-15 RX ADMIN — LEVOTHYROXINE SODIUM 88 MCG: 88 TABLET ORAL at 06:09

## 2023-09-15 RX ADMIN — TACROLIMUS 1 MG: 1 CAPSULE ORAL at 08:09

## 2023-09-15 NOTE — RESPIRATORY THERAPY
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  Clinically Significant Surgical Hx: laryngectomy    24 Hours Vitals Range:  Temp:  [97.7 °F (36.5 °C)-98.7 °F (37.1 °C)]   Pulse:  [65-87]   Resp:  [12-20]   BP: (110-170)/(64-99)   SpO2:  [97 %-100 %]     Labs:    Recent Labs     23  0029 23  0509 23  0647 09/15/23  0603     --  139  --    K 3.8  --  3.1*  --      --  104  --    CO2 15*  --  22*  --    BUN 42*  --  25*  --    CREATININE 1.2  --  1.3  --    *  --  84  --    PHOS  --  2.7 2.5* 2.1*   MG 1.4*  --  1.6 1.6        No results for input(s): "PH", "PCO2", "PO2", "HCO3", "POCSATURATED", "BE" in the last 72 hours.    ASSESSMENT/INTERVENTIONS  All supplies visible at bedside.      Last VS   Temp: 98.7 °F (37.1 °C) (09/15 0830)  Pulse: 73 (09/15 0830)  Resp: 12 (09/15 0416)  BP: 161/79 (09/15 0830)  SpO2: 98 % (09/15 0416)      Extra ETT's at bedside: 6, 7, 8  Level of Consciousness: Level of Consciousness (AVPU): alert  Respiratory Effort: Respiratory Effort: Normal, Unlabored Expansion/Accessory Muscle Usage: Expansion/Accessory Muscles/Retractions: expansion symmetric, no retractions, no use of accessory muscles  All Lung Field Breath Sounds: All Lung Fields Breath Sounds: Anterior:, Lateral:, diminished  O2 Device/Concentration: room air with HME  Surgical airway:  Laryngectomy ,   Ambu at bedside:  yes, with laryngectomy sign     Active Orders   Respiratory Care    Stoma Care by RT BID WAKE     Frequency: BID WAKE     Number of " Occurrences: Until Specified       RECOMMENDATIONS    We recommend: RRT Recs: Continue POC per primary team.      FOLLOW-UP    Please call back the Rapid Response RT, Bee Malcolm, RRT at x [phone] for any questions or concerns.

## 2023-09-15 NOTE — ASSESSMENT & PLAN NOTE
73 year old wit presents with concerns of dizziness, and fatigue with hemoglobin less than 4, lactic a history.  Melena seen on stools concerns for acute GI bleed.  Patient is immunocompromise status post finishing chemotherapy as well as liver transplant recipient on tacro.  Can not completely rule out infection currently though more suspicious of hypovolemic vs distributive in terms of possible shock.     - s/p EGD / C scope, full read in subjective  - Trend Hgb q8hrs. Stable. Transfuse for Hgb <7, unless otherwise indicated  - Maintain IV access with 2 large bore Ivs  - Intravascular resuscitation/support with IVFs   - Diet as tolerated  - Hold all NSAIDs and anticoagulants, unless contraindicated  - IV PPI BID x 72 hours. Then, PO PPI BID for 12 weeks  - h pylori serology

## 2023-09-15 NOTE — PROGRESS NOTES
[facility] - Transplant Stepdown  Hematology/Oncology  Progress Note    Patient Name: Rocco Swain  Admission Date: 9/12/2023  Hospital Length of Stay: 2 days  Code Status: Full Code     Subjective:     HPI:  Mr. Swain is a 73-year-old male significant history of laryngeal cancer status post chemo with tongue removal, liver transplantation complicated with cirrhosis on tacro, and history of GI bleed per patient presents with concerns for weakness.  History was given by family in the ED however at time of primary evaluation family was not at bedside.  He does live alone, and has been feeling weak and dizzy over the last several days.  He denies recent sick contacts, fevers, chills.  He does state that he has had a GI bleed in the past requiring a scope.  In the ED melena was down in physical exam.  Lactic 3, hemoglobin less than 4, and hypotension was also noted.  We will concerns for hypovolemic versus distributive shock blood cell transfusion was ordered, along with broad-spectrum antibiotics due to his immunocompromised state.  Patient to be admitted to Medical Oncology for further management and workup, as well as GI evaluation            Oncological Hx:   Squamous cell carcinoma of base of tongue   9/18/2020 Cancer Staged     Staging form: Pharynx - HPV-Mediated Oropharynx, AJCC 8th Edition  - Clinical stage from 9/18/2020: Stage III (rcT4, cN1, cM0, p16+)      9/28/2020 Initial Diagnosis     Squamous cell carcinoma of base of tongue      10/6/2020 - 8/17/2021 Chemotherapy     Treatment Summary   Plan Name: OP HEAD NECK CARBOPLATIN PACLITAXEL C1-2 FOLLOWED BY CETUXIMAB CARBOPLATIN C3-6 FOLLOWED BY CETUXIMAB MAINTENANCE WEEKLY  Treatment Goal: Control  Status: Inactive  Start Date: 10/6/2020  End Date: 8/17/2021  Provider: Ronald Berg MD  Chemotherapy: cetuximab (ERBITUX) 100 mg/50 mL chemo infusion 684 mg, 400 mg/m2 = 684 mg (100 % of original dose 400 mg/m2), Intravenous, Clinic/H[facility] 1 time, 29 of 38  cycles  Dose modification: 500 mg/m2 (original dose 400 mg/m2, Cycle 3), 250 mg/m2 (original dose 400 mg/m2, Cycle 3), 400 mg/m2 (original dose 400 mg/m2, Cycle 3), 250 mg/m2 (original dose 250 mg/m2, Cycle 7), 200 mg/m2 (original dose 250 mg/m2, Cycle 18), 200 mg/m2 (original dose 250 mg/m2, Cycle 19), 250 mg/m2 (original dose 250 mg/m2, Cycle 4), 200 mg/m2 (original dose 250 mg/m2, Cycle 25), 200 mg/m2 (original dose 250 mg/m2, Cycle 28)  Administration: 684 mg (11/17/2020), 400 mg (11/24/2020), 400 mg (2/9/2021), 400 mg (2/17/2021), 427.6 mg (3/9/2021), 400 mg (3/30/2021), 400 mg (3/23/2021), 400 mg (4/6/2021), 427.6 mg (4/13/2021), 427.6 mg (4/20/2021), 342 mg (5/11/2021), 342 mg (5/18/2021), 342 mg (5/25/2021), 400 mg (5/31/2021), 400 mg (6/7/2021), 400 mg (6/14/2021), 400 mg (12/8/2020), 427.6 mg (12/29/2020), 400 mg (12/1/2020), 400 mg (12/15/2020), 400 mg (12/22/2020), 427.6 mg (1/5/2021), 400 mg (1/12/2021), 400 mg (1/19/2021), 427.6 mg (1/26/2021), 400 mg (2/2/2021), 400 mg (2/23/2021), 400 mg (3/2/2021), 427.6 mg (3/16/2021), 400 mg (6/21/2021), 342 mg (6/28/2021), 342 mg (7/6/2021), 342 mg (7/13/2021), 342 mg (7/20/2021), 400 mg (7/27/2021), 400 mg (8/10/2021), 400 mg (8/17/2021)  CARBOplatin (PARAPLATIN) 310 mg in sodium chloride 0.9% 500 mL chemo infusion, 310 mg (92.2 % of original dose 334.5 mg), Intravenous, Clinic/H[facility] 1 time, 6 of 6 cycles  Dose modification:   (original dose 334.5 mg, Cycle 1)  Administration: 310 mg (10/6/2020), 370 mg (11/17/2020), 300 mg (10/27/2020), 350 mg (12/8/2020), 335 mg (12/29/2020), 320 mg (1/19/2021)  PACLitaxeL (TAXOL) 175 mg/m2 = 300 mg in sodium chloride 0.9% 500 mL chemo infusion, 175 mg/m2 = 300 mg (100 % of original dose 175 mg/m2), Intravenous, Clinic/H[facility] 1 time, 2 of 2 cycles  Dose modification: 175 mg/m2 (original dose 175 mg/m2, Cycle 1)  Administration: 300 mg (10/6/2020), 300 mg (10/27/2020)      4/29/2021 Tumor Conference        His case was discussed at  the Multidisciplinary Head and Neck Team Planning Meeting.     Representatives from Medical Oncology, Radiation Oncology, Head and Neck Surgical Oncology, Psychosocial Oncology, and Speech and Language Pathology discussed the case with the following recommendations:     1) biopsy            7/29/2021 Tumor Conference        His case was discussed at the Multidisciplinary Head and Neck Team Planning Meeting.     Representatives from Medical Oncology, Radiation Oncology, Head and Neck Surgical Oncology, Psychosocial Oncology, and Speech and Language Pathology discussed the case with the following recommendations:     1) Head and neck clinic follow up  2) consider Keytruda (discuss with transplant)  3) consider palliative referral            9/13/2021 -  Chemotherapy     Treatment Summary   Plan Name: OP HEAD NECK PEMBROLIZUMAB CARBOPLATIN FLUOROURACIL (C1 ONLY RECEIVED) FOLLOWED BY PEMBROLIZUMAB MAINTENANCE  Treatment Goal: Palliative  Status: Active  Start Date: 9/13/2021  End Date: 9/5/2023 (Planned)  Provider: Ronald Berg MD  Chemotherapy: CARBOplatin (PARAPLATIN) 320 mg in sodium chloride 0.9% 500 mL chemo infusion, 320 mg (100 % of original dose 320.5 mg), Intravenous, Clinic/HOD 1 time, 6 of 6 cycles  Dose modification:   (original dose 320.5 mg, Cycle 1)  Administration: 320 mg (9/13/2021), 335 mg (12/23/2021), 325 mg (1/27/2022), 245 mg (3/3/2022), 255 mg (5/12/2022), 255 mg (4/7/2022)  fluorouraciL 1,000 mg/m2/day = 6,440 mg in sodium chloride 0.9% 240 mL chemo infusion, 1,000 mg/m2/day = 6,440 mg, Intravenous, Over 96 hours, 6 of 6 cycles  Dose modification: 800 mg/m2/day (original dose 1,000 mg/m2/day, Cycle 6), 5,000 mg (original dose 1,000 mg/m2/day, Cycle 8)  Administration: 6,440 mg (9/13/2021), 6,440 mg (12/23/2021), 6,480 mg (1/27/2022), 5,000 mg (3/3/2022), 5,000 mg (4/7/2022), 5,000 mg (5/12/2022)      Secondary malignant neoplasm of cervical lymph node   2/9/2021 Initial Diagnosis     Secondary  malignant neoplasm of cervical lymph node      9/13/2021 -  Chemotherapy     Treatment Summary   Plan Name: OP HEAD NECK PEMBROLIZUMAB CARBOPLATIN FLUOROURACIL (C1 ONLY RECEIVED) FOLLOWED BY PEMBROLIZUMAB MAINTENANCE  Treatment Goal: Palliative  Status: Active  Start Date: 9/13/2021  End Date: 9/5/2023 (Planned)  Provider: Ronald Berg MD  Chemotherapy: CARBOplatin (PARAPLATIN) 320 mg in sodium chloride 0.9% 500 mL chemo infusion, 320 mg (100 % of original dose 320.5 mg), Intravenous, Clinic/[facility] 1 time, 6 of 6 cycles  Dose modification:   (original dose 320.5 mg, Cycle 1)  Administration: 320 mg (9/13/2021), 335 mg (12/23/2021), 325 mg (1/27/2022), 245 mg (3/3/2022), 255 mg (5/12/2022), 255 mg (4/7/2022)  fluorouraciL 1,000 mg/m2/day = 6,440 mg in sodium chloride 0.9% 240 mL chemo infusion, 1,000 mg/m2/day = 6,440 mg, Intravenous, Over 96 hours, 6 of 6 cycles  Dose modification: 800 mg/m2/day (original dose 1,000 mg/m2/day, Cycle 6), 5,000 mg (original dose 1,000 mg/m2/day, Cycle 8)  Administration: 6,440 mg (9/13/2021), 6,440 mg (12/23/2021), 6,480 mg (1/27/2022), 5,000 mg (3/3/2022), 5,000 mg (4/7/2022), 5,000 mg (5/12/2022)       Interval History:  s/p EGD/cscope No other complaints this morning. Resting comfortably. HH stable, needs 72 hours IV PPI then can be safely discharged      Oncology Treatment Plan:   OP HEAD NECK PEMBROLIZUMAB CARBOPLATIN FLUOROURACIL (C1 ONLY RECEIVED) FOLLOWED BY PEMBROLIZUMAB MAINTENANCE    Medications:  Continuous Infusions:  Scheduled Meds:   levothyroxine  88 mcg Oral Before breakfast    mupirocin   Nasal BID    neomycin-polymyxin-dexamethasone  1 drop Right Eye QID    pantoprazole  40 mg Intravenous BID    tacrolimus  0.5 mg Oral Daily PM    tacrolimus  1 mg Oral Daily AM     PRN Meds:0.9%  NaCl infusion (for blood administration), dextrose 10%, dextrose 10%, glucagon (human recombinant), glucose, glucose, naloxone, oxyCODONE, sodium chloride 0.9%     Review of patient's  allergies indicates:  No Known Allergies     Past Medical History:   Diagnosis Date    Basal cell carcinoma (BCC) in situ of skin 2012    3 on face, 2 on arm, removed by dermatology.    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 Underwent surgical resection of tongue and neck. s/p tracheostomy     Past Surgical History:   Procedure Laterality Date    COLONOSCOPY      COLONOSCOPY N/A 9/14/2023    Procedure: COLONOSCOPY;  Surgeon: Reyes Olivia MD;  [facility]  Service: Endoscopy;  Laterality: N/A;    CONJUNCTIVA BIOPSY Right 10/11/2022    Procedure: BIOPSY, CONJUNCTIVA;  Surgeon: Victor M Vasques MD;  Location: [facility];  Service: Ophthalmology;  Laterality: Right;    ESOPHAGOGASTRODUODENOSCOPY N/A 9/14/2023    Procedure: EGD (ESOPHAGOGASTRODUODENOSCOPY);  Surgeon: Reyes Olivia MD;  Location: [facility] ([address] [place] [address]);  Service: Endoscopy;  Laterality: N/A;    INSERTION OF VENOUS ACCESS PORT Right 3/8/2021    Procedure: INSERTION, VENOUS ACCESS PORT;  Surgeon: Jesus Rendon MD;  Location: [address];  Service: General;  Laterality: Right;    LIVER TRANSPLANT  11/2008    transplanted for biopsy proven hepatocellular carcinoma,     LYMPH NODE BIOPSY N/A 9/18/2020    Procedure: BIOPSY, LYMPH NODE;  Surgeon: [facility];  Location: [address];  Service: General;  Laterality: N/A;  Rm 173    SURGICAL REMOVAL OF SCAR Right 8/24/2023    Procedure: EXCISION, SCAR;  Surgeon: Ting Brambila MD;  [facility];  Service: Ophthalmology;  Laterality: Right;    TRACHEOSTOMY       Family History       Problem Relation (Age of Onset)    Dementia Mother          Tobacco Use    Smoking status: Never     Smokeless tobacco: Never   Substance and Sexual Activity    Alcohol use: Yes     Comment: drinks wine, 1 glass day    Drug use: Not Currently    Sexual activity: Yes     Comment: No prior history of STD        Review of Systems   Constitutional:  Positive for fatigue. Negative for chills and fever.   Respiratory:  Negative for shortness of breath and wheezing.    Cardiovascular:  Negative for chest pain and leg swelling.   Gastrointestinal:  Negative for abdominal distention and abdominal pain.   Neurological:  Positive for dizziness and weakness.   Psychiatric/Behavioral:  Negative for agitation, behavioral problems and confusion.      Objective:     Vital Signs (Most Recent):  Temp: 97.8 °F (36.6 °C) (09/15/23 1546)  Pulse: 72 (09/15/23 1546)  Resp: 16 (09/15/23 1546)  BP: (!) 154/81 (09/15/23 1546)  SpO2: 99 % (09/15/23 1546) Vital Signs (24h Range):  Temp:  [97.8 °F (36.6 °C)-98.8 °F (37.1 °C)] 97.8 °F (36.6 °C)  Pulse:  [72-87] 72  Resp:  [12-16] 16  SpO2:  [97 %-100 %] 99 %  BP: (120-161)/(68-99) 154/81     Weight: 52.6 kg (116 lb)  Body mass index is 17.64 kg/m².  Body surface area is 1.59 meters squared.      Intake/Output Summary (Last 24 hours) at 9/15/2023 1659  Last data filed at 9/15/2023 1616  Gross per 24 hour   Intake 500 ml   Output 1325 ml   Net -825 ml          Physical Exam  Constitutional:       Appearance: He is cachectic. He is ill-appearing. He is not toxic-appearing or diaphoretic.   HENT:      Head: Normocephalic and atraumatic.   Cardiovascular:      Rate and Rhythm: Normal rate and regular rhythm.      Pulses: Normal pulses.      Heart sounds: Normal heart sounds. No murmur heard.  Pulmonary:      Effort: Pulmonary effort is normal. No respiratory distress.      Breath sounds: Normal breath sounds.   Abdominal:      General: There is no distension.      Tenderness: There is no abdominal tenderness.   Skin:     General: Skin is warm and dry.   Neurological:      General: No focal  deficit present.      Mental Status: He is alert, oriented to person, place, and time and easily aroused. Mental status is at baseline.   Psychiatric:         Mood and Affect: Mood normal.         Behavior: Behavior normal. Behavior is cooperative.         Judgment: Judgment normal.          Significant Labs:   All pertinent labs from the last 24 hours have been reviewed.    Diagnostic Results:  I have reviewed all pertinent imaging results/findings within the past 24 hours.    Diagnostic Results:    Findings on EGD today:  - Nonobstructing schatzki ring. Dilated with resultant bleeding. Treated with bipolar cautery.   - Scar in the gastric antrum.   - Oozing duodenal ulcers. Injected. Treated with bipolar cautery.    - No specimens collected.     Findings on colonoscopy today:  Old blood throughout colon. No active bleeding. Prep not adequate to detect polyps. See full Provation report for further details.    Assessment/Plan:     * GI bleed  73 year old wit presents with concerns of dizziness, and fatigue with hemoglobin less than 4, lactic a history.  Melena seen on stools concerns for acute GI bleed.  Patient is immunocompromise status post finishing chemotherapy as well as liver transplant recipient on tacro.  Can not completely rule out infection currently though more suspicious of hypovolemic vs distributive in terms of possible shock.     - s/p EGD / C scope, full read in subjective  - Trend Hgb q8hrs. Stable. Transfuse for Hgb <7, unless otherwise indicated  - Maintain IV access with 2 large bore Ivs  - Intravascular resuscitation/support with IVFs   - Diet as tolerated  - Hold all NSAIDs and anticoagulants, unless contraindicated  - IV PPI BID x 72 hours. Then, PO PPI BID for 12 weeks  - h pylori serology    Severe sepsis  Antibiotics given-   Antibiotics (72h ago, onward)    Start     Stop Route Frequency Ordered    09/13/23 0900  mupirocin 2 % ointment         09/18/23 0859 Nasl 2 times daily 09/13/23  0358    09/13/23 0900  neomycin-polymyxin-dexamethasone 3.5mg/mL-10,000 unit/mL-0.1 % ophthalmic suspension 1 drop         -- RIGHT EYE 4 times daily 09/13/23 0436        - No current infectious foci found.  Lactic of 3, heart rate periodically above 90, increased respiratory rate, patient does meet sepsis criteria currently  - we will continue with broad-spectrum antibiotics for now, deescalate as indicated  - Given 1L fluids in ED   - s/p 3u pRBCs  - BCx NGTD >24 hrs, will continue to follow  - UA negative  - denies sick contacts currently or recent illness/fevers    Body mass index (BMI) less than 19  Nutrition consult for low BMI regarding maximizing diet when safe to resume   - Recommend PT/OT once volume repleted to assess function, patient lives at home alone       ACP (advance care planning)  Patient has uploaded documentation from 2020 regarding living will and power-of-.  When discussing the patient today regarding code status he endorsed  that at this moment prolonging life is his current goal and in pursuit  that would like to be a full code status.  - Full code reflected in chart  - Would discuss with patient further regarding re-uploading updated living will/advanced directives as indicated         Anemia  See GI bleed       Conjunctival intraepithelial neoplasm  Seen by D[facility] last visit 9/8   - cont home neomycin and Maxitrol ophthalmic solutions       Thrombocytopenia  Platelets 182 at time of admission.  - q8h Cbc to monitor hgb       Hypomagnesemia  Mag 1.4 at time of admission, magnesium generally low going back years on chart review   - IV mag, daily labs       Chronic kidney disease (CKD), stage IV (severe)  Cr at time of presentation 1.2.    - Daily CMP   - Avoid nephrotic agents as indicated       Squamous cell carcinoma of base of tongue  See HPI for oncological hx. Finished therapy a few weeks prior.       Cirrhosis of transplanted liver  Known hx of liver transplant with cirrhosis,  followed by [facility] hepatology   - Hepatology consult   - Tacro level: 3          Postoperative hypothyroidism  Noted in hx, takes synthroid, last TSH 1 month prior WNL   - Resume home synthroid now                 Enzo Walton MD  Hematology/Oncology  [facility] - Transplant Stepdown

## 2023-09-15 NOTE — PROGRESS NOTES
[facility] - Transplant Stepdown  Gastroenterology  Progress Note    Patient Name: Rocco Swain  MRN: [id number]  Admission Date: 9/12/2023  Hospital Length of Stay: 2 days  Code Status: Full Code   Attending Provider: Chloé Burns MD  Consulting Provider: Lilia Davis NP  Primary Care Physician: GAURANG Walton MD  Principal Problem: GI bleed    Subjective:     Interval History: Patient s/p EGD and Colonoscopy on 9/14 which was notable for multiple duodenal ulcers that were injected and treated with bipolar cautery. Denies any further episodes of melena. Blood counts have remained stable without significant decrease. Vitals stable.      Review of Systems   Constitutional:  Negative for activity change, appetite change, fatigue and fever.   HENT:  Negative for sore throat and trouble swallowing.    Gastrointestinal:  Negative for abdominal distention, abdominal pain, anal bleeding, blood in stool, constipation, diarrhea, nausea, rectal pain and vomiting.   Skin:  Negative for color change and pallor.   Neurological:  Negative for dizziness, syncope and weakness.     Objective:     Vital Signs (Most Recent):  Temp: 98.7 °F (37.1 °C) (09/15/23 0830)  Pulse: 73 (09/15/23 0830)  Resp: 12 (09/15/23 0416)  BP: (!) 161/79 (09/15/23 0830)  SpO2: 98 % (09/15/23 0416) Vital Signs (24h Range):  Temp:  [97.7 °F (36.5 °C)-98.7 °F (37.1 °C)] 98.7 °F (37.1 °C)  Pulse:  [65-87] 73  Resp:  [12-20] 12  SpO2:  [97 %-100 %] 98 %  BP: (110-170)/(64-99) 161/79     Weight: 52.6 kg (116 lb) (09/13/23 1232)  Body mass index is 17.64 kg/m².      Intake/Output Summary (Last 24 hours) at 9/15/2023 0939  Last data filed at 9/15/2023 0605  Gross per 24 hour   Intake 750 ml   Output 975 ml   Net -225 ml       Lines/Drains/Airways       Central Venous Catheter Line  Duration                  PowerPort A Cath Single Lumen 03/08/21 1031 right internal jugular 920 days              Airway  Duration                  Laryngectomy (Do Not  Remove) 08/24/23 1145  21 days              Peripheral Intravenous Line  Duration                  Peripheral IV - Single Lumen 09/13/23 0507 20 G Anterior;Left Forearm 2 days                     Physical Exam  Vitals and nursing note reviewed.   Constitutional:       Appearance: He is normal weight. He is not ill-appearing.   HENT:      Mouth/Throat:      Mouth: Mucous membranes are moist.      Pharynx: Oropharynx is clear.      Comments: Va tube in place  Eyes:      General: No scleral icterus.  Abdominal:      General: Abdomen is flat. Bowel sounds are normal. There is no distension.      Palpations: Abdomen is soft. There is no mass.      Tenderness: There is no abdominal tenderness.      Hernia: No hernia is present.   Skin:     General: Skin is warm and dry.      Capillary Refill: Capillary refill takes less than 2 seconds.      Coloration: Skin is not jaundiced or pale.      Findings: No bruising or erythema.   Neurological:      Mental Status: He is alert and oriented to person, place, and time. Mental status is at baseline.          Significant Labs:  CBC:   Recent Labs   Lab 09/14/23  1901 09/15/23  0045 09/15/23  0603   WBC 6.64 4.55 4.18   HGB 8.8* 8.2* 8.2*   HCT 26.8* 23.7* 24.9*   * 127* 138*     CMP:   Recent Labs   Lab 09/14/23  0647   GLU 84   CALCIUM 7.7*   ALBUMIN 2.6*   PROT 5.2*      K 3.1*   CO2 22*      BUN 25*   CREATININE 1.3   ALKPHOS 48*   ALT 13   AST 38   BILITOT 0.7         Significant Imaging:  Imaging results within the past 24 hours have been reviewed.    Assessment/Plan:     GI  * GI bleed  73 y.o. male with PMHx of skin/tongue/larynx carcinoma s/p chemo and trach, HCV and HCC s/p liver transplant on tacro c/b cirrhosis, and CKD III presenting with 4 days of fatigue. GI was consulted for GI bleed. 2 episodes of BRBPR. His last endoscopy was 10 years ago outside of [facility]. Endorses NSAIDs as needed for pain in the last 2 weeks due to recent eye surgery. EGD and  Colonoscopy done yesterday on 9/14 which was notable for duodenal ulcers that were treated with bipolar cautery. Blood counts have remained stable and patient without further episodes of melena.      Recommendations:  - PPI BID IV x 72 hours post EGD; then PPI PO BID for 12 weeks.  - Advance diet as tolerated, per primary team  - Please notify GI team if there is significant change in patient's clinical status            Thank you for your consult. After careful review of labs and patient current status with the GI staff and fellow, it has been decided that I will sign off. Please contact us if you have any additional questions.    Lilia Davis, SABINO  Gastroenterology  [facility] - Transplant Stepdown

## 2023-09-15 NOTE — TREATMENT PLAN
TREATMENT PLAN HEPATOLOGY      Tacrolimus level 3.0 this AM. Goal 3-4.   Continue home dose. Will follow up outpatient.         Andra Ramírez MD  PGYIV  GASTO/HEP FELLOW

## 2023-09-15 NOTE — SUBJECTIVE & OBJECTIVE
Interval History:  s/p EGD/cscope No other complaints this morning. Resting comfortably. HH stable, needs 72 hours IV PPI then can be safely discharged      Oncology Treatment Plan:   OP HEAD NECK PEMBROLIZUMAB CARBOPLATIN FLUOROURACIL (C1 ONLY RECEIVED) FOLLOWED BY PEMBROLIZUMAB MAINTENANCE    Medications:  Continuous Infusions:  Scheduled Meds:   levothyroxine  88 mcg Oral Before breakfast    mupirocin   Nasal BID    neomycin-polymyxin-dexamethasone  1 drop Right Eye QID    pantoprazole  40 mg Intravenous BID    tacrolimus  0.5 mg Oral Daily PM    tacrolimus  1 mg Oral Daily AM     PRN Meds:0.9%  NaCl infusion (for blood administration), dextrose 10%, dextrose 10%, glucagon (human recombinant), glucose, glucose, naloxone, oxyCODONE, sodium chloride 0.9%     Review of patient's allergies indicates:  No Known Allergies     Past Medical History:   Diagnosis Date    Basal cell carcinoma (BCC) in situ of skin 2012    3 on face, 2 on arm, removed by dermatology.     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 Underwent surgical resection of tongue and neck. s/p tracheostomy     Past Surgical History:   Procedure Laterality Date    COLONOSCOPY      COLONOSCOPY N/A 9/14/2023    Procedure: COLONOSCOPY;  Surgeon: Reyes Olivia MD;  Location: [address]);  Service: Endoscopy;  Laterality: N/A;    CONJUNCTIVA BIOPSY Right 10/11/2022    Procedure: BIOPSY, CONJUNCTIVA;  Surgeon: Victor M Vasques MD;  Location: [facility];  Service: Ophthalmology;  Laterality: Right;    ESOPHAGOGASTRODUODENOSCOPY N/A 9/14/2023    Procedure: EGD (ESOPHAGOGASTRODUODENOSCOPY);  Surgeon: Reyes Olivia MD;  Location: [facility] ([address] [place] [address]);  Service: Endoscopy;  Laterality: N/A;     INSERTION OF VENOUS ACCESS PORT Right 3/8/2021    Procedure: INSERTION, VENOUS ACCESS PORT;  Surgeon: Jesus Rendon MD;  [facility];  Service: General;  Laterality: Right;    LIVER TRANSPLANT  11/2008    transplanted for biopsy proven hepatocellular carcinoma,     LYMPH NODE BIOPSY N/A 9/18/2020    Procedure: BIOPSY, LYMPH NODE;  Surgeon: Taz Diagnostic Provider;  Location: [facility] OR 2ND FLR;  Service: General;  Laterality: N/A;  Rm 173    SURGICAL REMOVAL OF SCAR Right 8/24/2023    Procedure: EXCISION, SCAR;  Surgeon: iTng Brambila MD;  [facility];  Service: Ophthalmology;  Laterality: Right;    TRACHEOSTOMY       Family History       Problem Relation (Age of Onset)    Dementia Mother          Tobacco Use    Smoking status: Never    Smokeless tobacco: Never   Substance and Sexual Activity    Alcohol use: Yes     Comment: drinks wine, 1 glass day    Drug use: Not Currently    Sexual activity: Yes     Comment: No prior history of STD        Review of Systems   Constitutional:  Positive for fatigue. Negative for chills and fever.   Respiratory:  Negative for shortness of breath and wheezing.    Cardiovascular:  Negative for chest pain and leg swelling.   Gastrointestinal:  Negative for abdominal distention and abdominal pain.   Neurological:  Positive for dizziness and weakness.   Psychiatric/Behavioral:  Negative for agitation, behavioral problems and confusion.      Objective:     Vital Signs (Most Recent):  Temp: 97.8 °F (36.6 °C) (09/15/23 1546)  Pulse: 72 (09/15/23 1546)  Resp: 16 (09/15/23 1546)  BP: (!) 154/81 (09/15/23 1546)  SpO2: 99 % (09/15/23 1546) Vital Signs (24h Range):  Temp:  [97.8 °F (36.6 °C)-98.8 °F (37.1 °C)] 97.8 °F (36.6 °C)  Pulse:  [72-87] 72  Resp:  [12-16] 16  SpO2:  [97 %-100 %] 99 %  BP: (120-161)/(68-99) 154/81     Weight: 52.6 kg (116 lb)  Body mass index is 17.64 kg/m².  Body surface area is 1.59 meters squared.      Intake/Output Summary (Last 24 hours) at  9/15/2023 1659  Last data filed at 9/15/2023 1616  Gross per 24 hour   Intake 500 ml   Output 1325 ml   Net -825 ml          Physical Exam  Constitutional:       Appearance: He is cachectic. He is ill-appearing. He is not toxic-appearing or diaphoretic.   HENT:      Head: Normocephalic and atraumatic.   Cardiovascular:      Rate and Rhythm: Normal rate and regular rhythm.      Pulses: Normal pulses.      Heart sounds: Normal heart sounds. No murmur heard.  Pulmonary:      Effort: Pulmonary effort is normal. No respiratory distress.      Breath sounds: Normal breath sounds.   Abdominal:      General: There is no distension.      Tenderness: There is no abdominal tenderness.   Skin:     General: Skin is warm and dry.   Neurological:      General: No focal deficit present.      Mental Status: He is alert, oriented to person, place, and time and easily aroused. Mental status is at baseline.   Psychiatric:         Mood and Affect: Mood normal.         Behavior: Behavior normal. Behavior is cooperative.         Judgment: Judgment normal.          Significant Labs:   All pertinent labs from the last 24 hours have been reviewed.    Diagnostic Results:  I have reviewed all pertinent imaging results/findings within the past 24 hours.    Diagnostic Results:    Findings on EGD today:  - Nonobstructing schatzki ring. Dilated with resultant bleeding. Treated with bipolar cautery.   - Scar in the gastric antrum.   - Oozing duodenal ulcers. Injected. Treated with bipolar cautery.    - No specimens collected.     Findings on colonoscopy today:  Old blood throughout colon. No active bleeding. Prep not adequate to detect polyps. See full Provation report for further details.

## 2023-09-15 NOTE — SUBJECTIVE & OBJECTIVE
Subjective:     Interval History: Patient s/p EGD and Colonoscopy on 9/14 which was notable for multiple duodenal ulcers that were injected and treated with bipolar cautery. Denies any further episodes of melena. Blood counts have remained stable without significant decrease. Vitals stable.      Review of Systems   Constitutional:  Negative for activity change, appetite change, fatigue and fever.   HENT:  Negative for sore throat and trouble swallowing.    Gastrointestinal:  Negative for abdominal distention, abdominal pain, anal bleeding, blood in stool, constipation, diarrhea, nausea, rectal pain and vomiting.   Skin:  Negative for color change and pallor.   Neurological:  Negative for dizziness, syncope and weakness.     Objective:     Vital Signs (Most Recent):  Temp: 98.7 °F (37.1 °C) (09/15/23 0830)  Pulse: 73 (09/15/23 0830)  Resp: 12 (09/15/23 0416)  BP: (!) 161/79 (09/15/23 0830)  SpO2: 98 % (09/15/23 0416) Vital Signs (24h Range):  Temp:  [97.7 °F (36.5 °C)-98.7 °F (37.1 °C)] 98.7 °F (37.1 °C)  Pulse:  [65-87] 73  Resp:  [12-20] 12  SpO2:  [97 %-100 %] 98 %  BP: (110-170)/(64-99) 161/79     Weight: 52.6 kg (116 lb) (09/13/23 1232)  Body mass index is 17.64 kg/m².      Intake/Output Summary (Last 24 hours) at 9/15/2023 0939  Last data filed at 9/15/2023 0605  Gross per 24 hour   Intake 750 ml   Output 975 ml   Net -225 ml       Lines/Drains/Airways       Central Venous Catheter Line  Duration                  PowerPort A Cath Single Lumen 03/08/21 1031 right internal jugular 920 days              Airway  Duration                  Laryngectomy (Do Not Remove) 08/24/23 1145  21 days              Peripheral Intravenous Line  Duration                  Peripheral IV - Single Lumen 09/13/23 0507 20 G Anterior;Left Forearm 2 days                     Physical Exam  Vitals and nursing note reviewed.   Constitutional:       Appearance: He is normal weight. He is not ill-appearing.   HENT:      Mouth/Throat:       Mouth: Mucous membranes are moist.      Pharynx: Oropharynx is clear.      Comments: Va tube in place  Eyes:      General: No scleral icterus.  Abdominal:      General: Abdomen is flat. Bowel sounds are normal. There is no distension.      Palpations: Abdomen is soft. There is no mass.      Tenderness: There is no abdominal tenderness.      Hernia: No hernia is present.   Skin:     General: Skin is warm and dry.      Capillary Refill: Capillary refill takes less than 2 seconds.      Coloration: Skin is not jaundiced or pale.      Findings: No bruising or erythema.   Neurological:      Mental Status: He is alert and oriented to person, place, and time. Mental status is at baseline.          Significant Labs:  CBC:   Recent Labs   Lab 09/14/23  1901 09/15/23  0045 09/15/23  0603   WBC 6.64 4.55 4.18   HGB 8.8* 8.2* 8.2*   HCT 26.8* 23.7* 24.9*   * 127* 138*     CMP:   Recent Labs   Lab 09/14/23  0647   GLU 84   CALCIUM 7.7*   ALBUMIN 2.6*   PROT 5.2*      K 3.1*   CO2 22*      BUN 25*   CREATININE 1.3   ALKPHOS 48*   ALT 13   AST 38   BILITOT 0.7         Significant Imaging:  Imaging results within the past 24 hours have been reviewed.

## 2023-09-15 NOTE — PROGRESS NOTES
RD f/u. Diet advanced to pureed diet. Per initial nutrition assessment, pt obtain most calorie from ONS. RD to change ONS to [employer] 1.4 for higher calorie.     Recommendations     Contineu Regular diet with texture per SLP     Rec'd [employer] 1.4 x 4 cans per day to provide 1820 kcal, 80g protein, 936 ml FW     RD to monitor and follow     Goals: Meet % EEN, EPN by RD f/u  Nutrition Goal Status: new  Communication of RD Recs:  (POC)

## 2023-09-15 NOTE — PLAN OF CARE
PT admitted with fatigue - sPMHx of skin/tongue/larynx carcinoma s/p chemo and trach,s /p EGD and Colonoscopy on 9/14 which was notable for multiple duodenal ulcers that were injected and treated with bipolar cauterized.    Pt  AAO.  Up to bathroom.  RSC port - CDI.  VSS.  Receiving protonix IV BID 72 hrs post EGD.  Per pt keep pt until Sunday to monitor HH Q6- stable.  Stoma clean and dry- rapid team rounds on pt per shift.  Oxy po started for chronic back pain.   Tolerating pureed diet- dietician added supplements today.  Pt is not verbal- communicates by writing on note pads.

## 2023-09-15 NOTE — ASSESSMENT & PLAN NOTE
Known hx of liver transplant with cirrhosis, followed by [facility] hepatology   - Hepatology consult   - [place] level: 3

## 2023-09-15 NOTE — PLAN OF CARE
Recommendations     Contineu Regular diet with texture per SLP     Rec'd [employer] 1.4 x 4 cans per day to provide 1820 kcal, 80g protein, 936 ml FW     RD to monitor and follow     Goals: Meet % EEN, EPN by RD f/u  Nutrition Goal Status: new  Communication of RD Recs:  (POC)

## 2023-09-16 LAB
ALBUMIN SERPL BCP-MCNC: 2.5 G/DL (ref 3.5–5.2)
ALP SERPL-CCNC: 46 U/L (ref 55–135)
ALT SERPL W/O P-5'-P-CCNC: 27 U/L (ref 10–44)
ANION GAP SERPL CALC-SCNC: 6 MMOL/L (ref 8–16)
AST SERPL-CCNC: 55 U/L (ref 10–40)
BASOPHILS # BLD AUTO: 0.02 K/UL (ref 0–0.2)
BASOPHILS # BLD AUTO: 0.02 K/UL (ref 0–0.2)
BASOPHILS NFR BLD: 0.5 % (ref 0–1.9)
BASOPHILS NFR BLD: 0.5 % (ref 0–1.9)
BILIRUB SERPL-MCNC: 0.4 MG/DL (ref 0.1–1)
BUN SERPL-MCNC: 12 MG/DL (ref 8–23)
CALCIUM SERPL-MCNC: 7.9 MG/DL (ref 8.7–10.5)
CHLORIDE SERPL-SCNC: 107 MMOL/L (ref 95–110)
CO2 SERPL-SCNC: 24 MMOL/L (ref 23–29)
CREAT SERPL-MCNC: 0.9 MG/DL (ref 0.5–1.4)
DIFFERENTIAL METHOD: ABNORMAL
DIFFERENTIAL METHOD: ABNORMAL
EOSINOPHIL # BLD AUTO: 0.3 K/UL (ref 0–0.5)
EOSINOPHIL # BLD AUTO: 0.3 K/UL (ref 0–0.5)
EOSINOPHIL NFR BLD: 7.8 % (ref 0–8)
EOSINOPHIL NFR BLD: 7.9 % (ref 0–8)
ERYTHROCYTE [DISTWIDTH] IN BLOOD BY AUTOMATED COUNT: 18.4 % (ref 11.5–14.5)
ERYTHROCYTE [DISTWIDTH] IN BLOOD BY AUTOMATED COUNT: 18.5 % (ref 11.5–14.5)
EST. GFR  (NO RACE VARIABLE): >60 ML/MIN/1.73 M^2
GLUCOSE SERPL-MCNC: 88 MG/DL (ref 70–110)
HCT VFR BLD AUTO: 22.6 % (ref 40–54)
HCT VFR BLD AUTO: 25 % (ref 40–54)
HGB BLD-MCNC: 8 G/DL (ref 14–18)
HGB BLD-MCNC: 8.4 G/DL (ref 14–18)
IMM GRANULOCYTES # BLD AUTO: 0.02 K/UL (ref 0–0.04)
IMM GRANULOCYTES # BLD AUTO: 0.03 K/UL (ref 0–0.04)
IMM GRANULOCYTES NFR BLD AUTO: 0.5 % (ref 0–0.5)
IMM GRANULOCYTES NFR BLD AUTO: 0.8 % (ref 0–0.5)
LYMPHOCYTES # BLD AUTO: 0.5 K/UL (ref 1–4.8)
LYMPHOCYTES # BLD AUTO: 0.5 K/UL (ref 1–4.8)
LYMPHOCYTES NFR BLD: 11.5 % (ref 18–48)
LYMPHOCYTES NFR BLD: 12.7 % (ref 18–48)
MAGNESIUM SERPL-MCNC: 1.4 MG/DL (ref 1.6–2.6)
MCH RBC QN AUTO: 30 PG (ref 27–31)
MCH RBC QN AUTO: 30.2 PG (ref 27–31)
MCHC RBC AUTO-ENTMCNC: 33.6 G/DL (ref 32–36)
MCHC RBC AUTO-ENTMCNC: 35.4 G/DL (ref 32–36)
MCV RBC AUTO: 85 FL (ref 82–98)
MCV RBC AUTO: 90 FL (ref 82–98)
MONOCYTES # BLD AUTO: 0.7 K/UL (ref 0.3–1)
MONOCYTES # BLD AUTO: 0.7 K/UL (ref 0.3–1)
MONOCYTES NFR BLD: 17.1 % (ref 4–15)
MONOCYTES NFR BLD: 17.6 % (ref 4–15)
NEUTROPHILS # BLD AUTO: 2.4 K/UL (ref 1.8–7.7)
NEUTROPHILS # BLD AUTO: 2.6 K/UL (ref 1.8–7.7)
NEUTROPHILS NFR BLD: 60.5 % (ref 38–73)
NEUTROPHILS NFR BLD: 62.6 % (ref 38–73)
NRBC BLD-RTO: 0 /100 WBC
NRBC BLD-RTO: 0 /100 WBC
PHOSPHATE SERPL-MCNC: 2.5 MG/DL (ref 2.7–4.5)
PLATELET # BLD AUTO: 117 K/UL (ref 150–450)
PLATELET # BLD AUTO: 140 K/UL (ref 150–450)
PMV BLD AUTO: 9 FL (ref 9.2–12.9)
PMV BLD AUTO: 9.2 FL (ref 9.2–12.9)
POTASSIUM SERPL-SCNC: 3.7 MMOL/L (ref 3.5–5.1)
PROT SERPL-MCNC: 5 G/DL (ref 6–8.4)
RBC # BLD AUTO: 2.67 M/UL (ref 4.6–6.2)
RBC # BLD AUTO: 2.78 M/UL (ref 4.6–6.2)
SODIUM SERPL-SCNC: 137 MMOL/L (ref 136–145)
WBC # BLD AUTO: 3.93 K/UL (ref 3.9–12.7)
WBC # BLD AUTO: 4.09 K/UL (ref 3.9–12.7)

## 2023-09-16 PROCEDURE — 83735 ASSAY OF MAGNESIUM: CPT | Performed by: HOSPITALIST

## 2023-09-16 PROCEDURE — 20600001 HC STEP DOWN PRIVATE ROOM

## 2023-09-16 PROCEDURE — 25000003 PHARM REV CODE 250: Performed by: HOSPITALIST

## 2023-09-16 PROCEDURE — 80053 COMPREHEN METABOLIC PANEL: CPT | Performed by: HOSPITALIST

## 2023-09-16 PROCEDURE — 99900035 HC TECH TIME PER 15 MIN (STAT)

## 2023-09-16 PROCEDURE — 99233 PR SUBSEQUENT HOSPITAL CARE,LEVL III: ICD-10-PCS | Mod: GC,,, | Performed by: HOSPITALIST

## 2023-09-16 PROCEDURE — 85025 COMPLETE CBC W/AUTO DIFF WBC: CPT | Mod: 91 | Performed by: HOSPITALIST

## 2023-09-16 PROCEDURE — 84100 ASSAY OF PHOSPHORUS: CPT | Performed by: HOSPITALIST

## 2023-09-16 PROCEDURE — 99233 SBSQ HOSP IP/OBS HIGH 50: CPT | Mod: GC,,, | Performed by: HOSPITALIST

## 2023-09-16 PROCEDURE — 63600175 PHARM REV CODE 636 W HCPCS: Performed by: HOSPITALIST

## 2023-09-16 PROCEDURE — 94761 N-INVAS EAR/PLS OXIMETRY MLT: CPT

## 2023-09-16 PROCEDURE — C9113 INJ PANTOPRAZOLE SODIUM, VIA: HCPCS | Performed by: HOSPITALIST

## 2023-09-16 RX ADMIN — NEOMYCIN SULFATE, POLYMYXIN B SULFATE AND DEXAMETHASONE 1 DROP: 3.5; 10000; 1 SUSPENSION OPHTHALMIC at 05:09

## 2023-09-16 RX ADMIN — TACROLIMUS 1 MG: 1 CAPSULE ORAL at 09:09

## 2023-09-16 RX ADMIN — MUPIROCIN: 20 OINTMENT TOPICAL at 08:09

## 2023-09-16 RX ADMIN — NEOMYCIN SULFATE, POLYMYXIN B SULFATE AND DEXAMETHASONE 1 DROP: 3.5; 10000; 1 SUSPENSION OPHTHALMIC at 01:09

## 2023-09-16 RX ADMIN — NEOMYCIN SULFATE, POLYMYXIN B SULFATE AND DEXAMETHASONE 1 DROP: 3.5; 10000; 1 SUSPENSION OPHTHALMIC at 08:09

## 2023-09-16 RX ADMIN — LEVOTHYROXINE SODIUM 88 MCG: 88 TABLET ORAL at 06:09

## 2023-09-16 RX ADMIN — MUPIROCIN: 20 OINTMENT TOPICAL at 09:09

## 2023-09-16 RX ADMIN — OXYCODONE HYDROCHLORIDE 5 MG: 5 TABLET ORAL at 12:09

## 2023-09-16 RX ADMIN — OXYCODONE HYDROCHLORIDE 5 MG: 5 TABLET ORAL at 06:09

## 2023-09-16 RX ADMIN — OXYCODONE HYDROCHLORIDE 5 MG: 5 TABLET ORAL at 07:09

## 2023-09-16 RX ADMIN — TACROLIMUS 0.5 MG: 0.5 CAPSULE ORAL at 05:09

## 2023-09-16 RX ADMIN — PANTOPRAZOLE SODIUM 40 MG: 40 INJECTION, POWDER, FOR SOLUTION INTRAVENOUS at 09:09

## 2023-09-16 RX ADMIN — NEOMYCIN SULFATE, POLYMYXIN B SULFATE AND DEXAMETHASONE 1 DROP: 3.5; 10000; 1 SUSPENSION OPHTHALMIC at 09:09

## 2023-09-16 RX ADMIN — PANTOPRAZOLE SODIUM 40 MG: 40 INJECTION, POWDER, FOR SOLUTION INTRAVENOUS at 08:09

## 2023-09-16 NOTE — SUBJECTIVE & OBJECTIVE
Interval History:  s/p EGD/cscope No other complaints this morning. Resting comfortably. HH stable, needs 72 hours IV PPI then can be safely discharged. 11am today is 48 hours, tomorrow (9/17) at 11am will be 72 hours. No complaints at all today, tolerating diet      Oncology Treatment Plan:   OP HEAD NECK PEMBROLIZUMAB CARBOPLATIN FLUOROURACIL (C1 ONLY RECEIVED) FOLLOWED BY PEMBROLIZUMAB MAINTENANCE    Medications:  Continuous Infusions:  Scheduled Meds:   levothyroxine  88 mcg Oral Before breakfast    mupirocin   Nasal BID    neomycin-polymyxin-dexamethasone  1 drop Right Eye QID    pantoprazole  40 mg Intravenous BID    tacrolimus  0.5 mg Oral Daily PM    tacrolimus  1 mg Oral Daily AM     PRN Meds:dextrose 10%, dextrose 10%, glucagon (human recombinant), glucose, glucose, naloxone, oxyCODONE, sodium chloride 0.9%     Review of patient's allergies indicates:  No Known Allergies     Past Medical History:   Diagnosis Date    Basal cell carcinoma (BCC) in situ of skin 2012    3 on face, 2 on arm, removed by dermatology.     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 Underwent surgical resection of tongue and neck. s/p tracheostomy     Past Surgical History:   Procedure Laterality Date    COLONOSCOPY      COLONOSCOPY N/A 9/14/2023    Procedure: COLONOSCOPY;  Surgeon: Reyes Olivia MD;  Location: [address];  Service: Endoscopy;  Laterality: N/A;    CONJUNCTIVA BIOPSY Right 10/11/2022    Procedure: BIOPSY, CONJUNCTIVA;  Surgeon: Victor M Vasques MD;  Location: [facility];  Service: Ophthalmology;  Laterality: Right;    ESOPHAGOGASTRODUODENOSCOPY N/A 9/14/2023    Procedure: EGD (ESOPHAGOGASTRODUODENOSCOPY);  Surgeon: Reyes Olivia MD;  Location: [facility]  ENDO (2ND FLR);  Service: Endoscopy;  Laterality: N/A;    INSERTION OF VENOUS ACCESS PORT Right 3/8/2021    Procedure: INSERTION, VENOUS ACCESS PORT;  Surgeon: Jesus Rendon MD;  Location: [facility] OR 2ND FLR;  Service: General;  Laterality: Right;    LIVER TRANSPLANT  11/2008    transplanted for biopsy proven hepatocellular carcinoma,     LYMPH NODE BIOPSY N/A 9/18/2020    Procedure: BIOPSY, LYMPH NODE;  Surgeon: Dosc Diagnostic Provider;  Location: [facility] OR 2ND FLR;  Service: General;  Laterality: N/A;  Rm 173    SURGICAL REMOVAL OF SCAR Right 8/24/2023    Procedure: EXCISION, SCAR;  Surgeon: Ting Brambila MD;  [facility];  Service: Ophthalmology;  Laterality: Right;    TRACHEOSTOMY       Family History       Problem Relation (Age of Onset)    Dementia Mother          Tobacco Use    Smoking status: Never    Smokeless tobacco: Never   Substance and Sexual Activity    Alcohol use: Yes     Comment: drinks wine, 1 glass day    Drug use: Not Currently    Sexual activity: Yes     Comment: No prior history of STD        Review of Systems   Constitutional:  Positive for fatigue. Negative for chills and fever.   Respiratory:  Negative for shortness of breath and wheezing.    Cardiovascular:  Negative for chest pain and leg swelling.   Gastrointestinal:  Negative for abdominal distention and abdominal pain.   Neurological:  Positive for dizziness and weakness.   Psychiatric/Behavioral:  Negative for agitation, behavioral problems and confusion.      Objective:     Vital Signs (Most Recent):  Temp: 97.8 °F (36.6 °C) (09/16/23 1533)  Pulse: 69 (09/16/23 1533)  Resp: 16 (09/16/23 1533)  BP: 130/79 (09/16/23 1533)  SpO2: 100 % (09/16/23 1533) Vital Signs (24h Range):  Temp:  [97.7 °F (36.5 °C)-98.3 °F (36.8 °C)] 97.8 °F (36.6 °C)  Pulse:  [69-90] 69  Resp:  [15-22] 16  SpO2:  [10 %-100 %] 100 %  BP: (118-167)/(70-87) 130/79     Weight: 57.1 kg (125 lb 14.1 oz)  Body mass index is 19.14 kg/m².  Body surface area is 1.66  meters squared.      Intake/Output Summary (Last 24 hours) at 9/16/2023 1740  Last data filed at 9/16/2023 1732  Gross per 24 hour   Intake 2278 ml   Output 700 ml   Net 1578 ml          Physical Exam  Constitutional:       Appearance: He is cachectic. He is ill-appearing. He is not toxic-appearing or diaphoretic.   HENT:      Head: Normocephalic and atraumatic.   Cardiovascular:      Rate and Rhythm: Normal rate and regular rhythm.      Pulses: Normal pulses.      Heart sounds: Normal heart sounds. No murmur heard.  Pulmonary:      Effort: Pulmonary effort is normal. No respiratory distress.      Breath sounds: Normal breath sounds.   Abdominal:      General: There is no distension.      Tenderness: There is no abdominal tenderness.   Skin:     General: Skin is warm and dry.   Neurological:      General: No focal deficit present.      Mental Status: He is alert, oriented to person, place, and time and easily aroused. Mental status is at baseline.   Psychiatric:         Mood and Affect: Mood normal.         Behavior: Behavior normal. Behavior is cooperative.         Judgment: Judgment normal.          Significant Labs:   All pertinent labs from the last 24 hours have been reviewed.    Diagnostic Results:  I have reviewed all pertinent imaging results/findings within the past 24 hours.    Diagnostic Results:    Findings on EGD today:  - Nonobstructing schatzki ring. Dilated with resultant bleeding. Treated with bipolar cautery.   - Scar in the gastric antrum.   - Oozing duodenal ulcers. Injected. Treated with bipolar cautery.    - No specimens collected.     Findings on colonoscopy today:  Old blood throughout colon. No active bleeding. Prep not adequate to detect polyps. See full Provation report for further details.

## 2023-09-16 NOTE — PLAN OF CARE
PT admitted with fatigue - sPMHx of skin/tongue/larynx carcinoma s/p chemo and trach,s /p EGD and Colonoscopy on 9/14 which was notable for multiple duodenal ulcers that were injected and treated with bipolar cauterized.    Pt  AAO.  Up to bathroom.  RSC port - CDI.  VSS.  Receiving protonix IV BID 72 hrs post EGD.  Per pt keep pt until Sunday to monitor HH- stable.  Stoma clean and dry- rapid team rounds on pt per shift- obturator added to supply bag.  Oxy po Q6 given for chronic back pain.   Tolerating regular diet today - dietician added supplements yesterday. Voids in commode.  Pt is non verbal due to trach- communicates by writing on note pads.  Pt is independent and turns per self. Ambulates to restroom and in room. Up in chair this afternoon.

## 2023-09-16 NOTE — PROGRESS NOTES
[facility] - Transplant Stepdown  Hematology/Oncology  Progress Note    Patient Name: Rocco Swain  Admission Date: 9/12/2023  Hospital Length of Stay: 3 days  Code Status: Full Code     Subjective:     HPI:  Mr. Swain is a 73-year-old male significant history of laryngeal cancer status post chemo with tongue removal, liver transplantation complicated with cirrhosis on tacro, and history of GI bleed per patient presents with concerns for weakness.  History was given by family in the ED however at time of primary evaluation family was not at bedside.  He does live alone, and has been feeling weak and dizzy over the last several days.  He denies recent sick contacts, fevers, chills.  He does state that he has had a GI bleed in the past requiring a scope.  In the ED melena was down in physical exam.  Lactic 3, hemoglobin less than 4, and hypotension was also noted.  We will concerns for hypovolemic versus distributive shock blood cell transfusion was ordered, along with broad-spectrum antibiotics due to his immunocompromised state.  Patient to be admitted to Medical Oncology for further management and workup, as well as GI evaluation            Oncological Hx:   Squamous cell carcinoma of base of tongue   9/18/2020 Cancer Staged     Staging form: Pharynx - HPV-Mediated Oropharynx, AJCC 8th Edition  - Clinical stage from 9/18/2020: Stage III (rcT4, cN1, cM0, p16+)      9/28/2020 Initial Diagnosis     Squamous cell carcinoma of base of tongue      10/6/2020 - 8/17/2021 Chemotherapy     Treatment Summary   Plan Name: OP HEAD NECK CARBOPLATIN PACLITAXEL C1-2 FOLLOWED BY CETUXIMAB CARBOPLATIN C3-6 FOLLOWED BY CETUXIMAB MAINTENANCE WEEKLY  Treatment Goal: Control  Status: Inactive  Start Date: 10/6/2020  End Date: 8/17/2021  Provider: Ronald Berg MD  Chemotherapy: cetuximab (ERBITUX) 100 mg/50 mL chemo infusion 684 mg, 400 mg/m2 = 684 mg (100 % of original dose 400 mg/m2), Intravenous, Clinic/HOD 1 time, 29 of 38  cycles  Dose modification: 500 mg/m2 (original dose 400 mg/m2, Cycle 3), 250 mg/m2 (original dose 400 mg/m2, Cycle 3), 400 mg/m2 (original dose 400 mg/m2, Cycle 3), 250 mg/m2 (original dose 250 mg/m2, Cycle 7), 200 mg/m2 (original dose 250 mg/m2, Cycle 18), 200 mg/m2 (original dose 250 mg/m2, Cycle 19), 250 mg/m2 (original dose 250 mg/m2, Cycle 4), 200 mg/m2 (original dose 250 mg/m2, Cycle 25), 200 mg/m2 (original dose 250 mg/m2, Cycle 28)  Administration: 684 mg (11/17/2020), 400 mg (11/24/2020), 400 mg (2/9/2021), 400 mg (2/17/2021), 427.6 mg (3/9/2021), 400 mg (3/30/2021), 400 mg (3/23/2021), 400 mg (4/6/2021), 427.6 mg (4/13/2021), 427.6 mg (4/20/2021), 342 mg (5/11/2021), 342 mg (5/18/2021), 342 mg (5/25/2021), 400 mg (5/31/2021), 400 mg (6/7/2021), 400 mg (6/14/2021), 400 mg (12/8/2020), 427.6 mg (12/29/2020), 400 mg (12/1/2020), 400 mg (12/15/2020), 400 mg (12/22/2020), 427.6 mg (1/5/2021), 400 mg (1/12/2021), 400 mg (1/19/2021), 427.6 mg (1/26/2021), 400 mg (2/2/2021), 400 mg (2/23/2021), 400 mg (3/2/2021), 427.6 mg (3/16/2021), 400 mg (6/21/2021), 342 mg (6/28/2021), 342 mg (7/6/2021), 342 mg (7/13/2021), 342 mg (7/20/2021), 400 mg (7/27/2021), 400 mg (8/10/2021), 400 mg (8/17/2021)  CARBOplatin (PARAPLATIN) 310 mg in sodium chloride 0.9% 500 mL chemo infusion, 310 mg (92.2 % of original dose 334.5 mg), Intravenous, Clinic/HOD 1 time, 6 of 6 cycles  Dose modification:   (original dose 334.5 mg, Cycle 1)  Administration: 310 mg (10/6/2020), 370 mg (11/17/2020), 300 mg (10/27/2020), 350 mg (12/8/2020), 335 mg (12/29/2020), 320 mg (1/19/2021)  PACLitaxeL (TAXOL) 175 mg/m2 = 300 mg in sodium chloride 0.9% 500 mL chemo infusion, 175 mg/m2 = 300 mg (100 % of original dose 175 mg/m2), Intravenous, Clinic/HOD 1 time, 2 of 2 cycles  Dose modification: 175 mg/m2 (original dose 175 mg/m2, Cycle 1)  Administration: 300 mg (10/6/2020), 300 mg (10/27/2020)      4/29/2021 Tumor Conference        His case was discussed at  the Multidisciplinary Head and Neck Team Planning Meeting.     Representatives from Medical Oncology, Radiation Oncology, Head and Neck Surgical Oncology, Psychosocial Oncology, and Speech and Language Pathology discussed the case with the following recommendations:     1) biopsy            7/29/2021 Tumor Conference        His case was discussed at the Multidisciplinary Head and Neck Team Planning Meeting.     Representatives from Medical Oncology, Radiation Oncology, Head and Neck Surgical Oncology, Psychosocial Oncology, and Speech and Language Pathology discussed the case with the following recommendations:     1) Head and neck clinic follow up  2) consider Keytruda (discuss with transplant)  3) consider palliative referral            9/13/2021 -  Chemotherapy     Treatment Summary   Plan Name: OP HEAD NECK PEMBROLIZUMAB CARBOPLATIN FLUOROURACIL (C1 ONLY RECEIVED) FOLLOWED BY PEMBROLIZUMAB MAINTENANCE  Treatment Goal: Palliative  Status: Active  Start Date: 9/13/2021  End Date: 9/5/2023 (Planned)  Provider: Ronald Berg MD  Chemotherapy: CARBOplatin (PARAPLATIN) 320 mg in sodium chloride 0.9% 500 mL chemo infusion, 320 mg (100 % of original dose 320.5 mg), Intravenous, Clinic/HOD 1 time, 6 of 6 cycles  Dose modification:   (original dose 320.5 mg, Cycle 1)  Administration: 320 mg (9/13/2021), 335 mg (12/23/2021), 325 mg (1/27/2022), 245 mg (3/3/2022), 255 mg (5/12/2022), 255 mg (4/7/2022)  fluorouraciL 1,000 mg/m2/day = 6,440 mg in sodium chloride 0.9% 240 mL chemo infusion, 1,000 mg/m2/day = 6,440 mg, Intravenous, Over 96 hours, 6 of 6 cycles  Dose modification: 800 mg/m2/day (original dose 1,000 mg/m2/day, Cycle 6), 5,000 mg (original dose 1,000 mg/m2/day, Cycle 8)  Administration: 6,440 mg (9/13/2021), 6,440 mg (12/23/2021), 6,480 mg (1/27/2022), 5,000 mg (3/3/2022), 5,000 mg (4/7/2022), 5,000 mg (5/12/2022)      Secondary malignant neoplasm of cervical lymph node   2/9/2021 Initial Diagnosis     Secondary  malignant neoplasm of cervical lymph node      9/13/2021 -  Chemotherapy     Treatment Summary   Plan Name: OP HEAD NECK PEMBROLIZUMAB CARBOPLATIN FLUOROURACIL (C1 ONLY RECEIVED) FOLLOWED BY PEMBROLIZUMAB MAINTENANCE  Treatment Goal: Palliative  Status: Active  Start Date: 9/13/2021  End Date: 9/5/2023 (Planned)  Provider: Ronald Berg MD  Chemotherapy: CARBOplatin (PARAPLATIN) 320 mg in sodium chloride 0.9% 500 mL chemo infusion, 320 mg (100 % of original dose 320.5 mg), Intravenous, Clinic/[facility] 1 time, 6 of 6 cycles  Dose modification:   (original dose 320.5 mg, Cycle 1)  Administration: 320 mg (9/13/2021), 335 mg (12/23/2021), 325 mg (1/27/2022), 245 mg (3/3/2022), 255 mg (5/12/2022), 255 mg (4/7/2022)  fluorouraciL 1,000 mg/m2/day = 6,440 mg in sodium chloride 0.9% 240 mL chemo infusion, 1,000 mg/m2/day = 6,440 mg, Intravenous, Over 96 hours, 6 of 6 cycles  Dose modification: 800 mg/m2/day (original dose 1,000 mg/m2/day, Cycle 6), 5,000 mg (original dose 1,000 mg/m2/day, Cycle 8)  Administration: 6,440 mg (9/13/2021), 6,440 mg (12/23/2021), 6,480 mg (1/27/2022), 5,000 mg (3/3/2022), 5,000 mg (4/7/2022), 5,000 mg (5/12/2022)       Interval History:  s/p EGD/cscope No other complaints this morning. Resting comfortably. HH stable, needs 72 hours IV PPI then can be safely discharged. 11am today is 48 hours, tomorrow (9/17) at 11am will be 72 hours. No complaints at all today, tolerating diet      Oncology Treatment Plan:   OP HEAD NECK PEMBROLIZUMAB CARBOPLATIN FLUOROURACIL (C1 ONLY RECEIVED) FOLLOWED BY PEMBROLIZUMAB MAINTENANCE    Medications:  Continuous Infusions:  Scheduled Meds:   levothyroxine  88 mcg Oral Before breakfast    mupirocin   Nasal BID    neomycin-polymyxin-dexamethasone  1 drop Right Eye QID    pantoprazole  40 mg Intravenous BID    tacrolimus  0.5 mg Oral Daily PM    tacrolimus  1 mg Oral Daily AM     PRN Meds:dextrose 10%, dextrose 10%, glucagon (human recombinant), glucose, glucose,  naloxone, oxyCODONE, sodium chloride 0.9%     Review of patient's allergies indicates:  No Known Allergies     Past Medical History:   Diagnosis Date    Basal cell carcinoma (BCC) in situ of skin 2012    3 on face, 2 on arm, removed by dermatology.    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 Underwent surgical resection of tongue and neck. s/p tracheostomy     Past Surgical History:   Procedure Laterality Date    COLONOSCOPY      COLONOSCOPY N/A 9/14/2023    Procedure: COLONOSCOPY;  Surgeon: Reyes Olivia MD;  Location: [facility] ([address] [place] [address]);  Service: Endoscopy;  Laterality: N/A;    CONJUNCTIVA BIOPSY Right 10/11/2022    Procedure: BIOPSY, CONJUNCTIVA;  Surgeon: Victor M Vasques MD;  Location: [facility];  Service: Ophthalmology;  Laterality: Right;    ESOPHAGOGASTRODUODENOSCOPY N/A 9/14/2023    Procedure: EGD (ESOPHAGOGASTRODUODENOSCOPY);  Surgeon: Reyes Olivia MD;  Location: [facility] ([address] [place] [address]);  Service: Endoscopy;  Laterality: N/A;    INSERTION OF VENOUS ACCESS PORT Right 3/8/2021    Procedure: INSERTION, VENOUS ACCESS PORT;  Surgeon: Jesus Rendon MD;  Location: [address];  Service: General;  Laterality: Right;    LIVER TRANSPLANT  11/2008    transplanted for biopsy proven hepatocellular carcinoma,     LYMPH NODE BIOPSY N/A 9/18/2020    Procedure: BIOPSY, LYMPH NODE;  Surgeon: Taz Diagnostic Provider;  [address];  Service: General;  Laterality: N/A;  Rm 173    SURGICAL REMOVAL OF SCAR Right 8/24/2023    Procedure: EXCISION, SCAR;  Surgeon: Ting Brambila MD;  Location: [facility] OR;  Service: Ophthalmology;  Laterality: Right;    TRACHEOSTOMY       Family History       Problem Relation (Age of Onset)    Dementia  Mother          Tobacco Use    Smoking status: Never    Smokeless tobacco: Never   Substance and Sexual Activity    Alcohol use: Yes     Comment: drinks wine, 1 glass day    Drug use: Not Currently    Sexual activity: Yes     Comment: No prior history of STD        Review of Systems   Constitutional:  Positive for fatigue. Negative for chills and fever.   Respiratory:  Negative for shortness of breath and wheezing.    Cardiovascular:  Negative for chest pain and leg swelling.   Gastrointestinal:  Negative for abdominal distention and abdominal pain.   Neurological:  Positive for dizziness and weakness.   Psychiatric/Behavioral:  Negative for agitation, behavioral problems and confusion.      Objective:     Vital Signs (Most Recent):  Temp: 97.8 °F (36.6 °C) (09/16/23 1533)  Pulse: 69 (09/16/23 1533)  Resp: 16 (09/16/23 1533)  BP: 130/79 (09/16/23 1533)  SpO2: 100 % (09/16/23 1533) Vital Signs (24h Range):  Temp:  [97.7 °F (36.5 °C)-98.3 °F (36.8 °C)] 97.8 °F (36.6 °C)  Pulse:  [69-90] 69  Resp:  [15-22] 16  SpO2:  [10 %-100 %] 100 %  BP: (118-167)/(70-87) 130/79     Weight: 57.1 kg (125 lb 14.1 oz)  Body mass index is 19.14 kg/m².  Body surface area is 1.66 meters squared.      Intake/Output Summary (Last 24 hours) at 9/16/2023 1740  Last data filed at 9/16/2023 1732  Gross per 24 hour   Intake 2278 ml   Output 700 ml   Net 1578 ml          Physical Exam  Constitutional:       Appearance: He is cachectic. He is ill-appearing. He is not toxic-appearing or diaphoretic.   HENT:      Head: Normocephalic and atraumatic.   Cardiovascular:      Rate and Rhythm: Normal rate and regular rhythm.      Pulses: Normal pulses.      Heart sounds: Normal heart sounds. No murmur heard.  Pulmonary:      Effort: Pulmonary effort is normal. No respiratory distress.      Breath sounds: Normal breath sounds.   Abdominal:      General: There is no distension.      Tenderness: There is no abdominal tenderness.   Skin:     General:  Skin is warm and dry.   Neurological:      General: No focal deficit present.      Mental Status: He is alert, oriented to person, place, and time and easily aroused. Mental status is at baseline.   Psychiatric:         Mood and Affect: Mood normal.         Behavior: Behavior normal. Behavior is cooperative.         Judgment: Judgment normal.          Significant Labs:   All pertinent labs from the last 24 hours have been reviewed.    Diagnostic Results:  I have reviewed all pertinent imaging results/findings within the past 24 hours.    Diagnostic Results:    Findings on EGD today:  - Nonobstructing schatzki ring. Dilated with resultant bleeding. Treated with bipolar cautery.   - Scar in the gastric antrum.   - Oozing duodenal ulcers. Injected. Treated with bipolar cautery.    - No specimens collected.     Findings on colonoscopy today:  Old blood throughout colon. No active bleeding. Prep not adequate to detect polyps. See full Provation report for further details.    Assessment/Plan:     * GI bleed  73 year old wit presents with concerns of dizziness, and fatigue with hemoglobin less than 4, lactic a history.  Melena seen on stools concerns for acute GI bleed.  Patient is immunocompromise status post finishing chemotherapy as well as liver transplant recipient on tacro.  Can not completely rule out infection currently though more suspicious of hypovolemic vs distributive in terms of possible shock.     - s/p EGD / C scope, full read in subjective  - Trend Hgb daily. Stable. Transfuse for Hgb <7, unless otherwise indicated  - Maintain IV access with 2 large bore Ivs  - Intravascular resuscitation/support with IVFs   - Diet as tolerated  - Hold all NSAIDs and anticoagulants, unless contraindicated  - IV PPI BID x 72 hours (end 9/17). Then, PO PPI BID for 12 weeks  - h pylori serology    Severe sepsis  Antibiotics given-   Antibiotics (72h ago, onward)    Start     Stop Route Frequency Ordered    09/13/23 0900   mupirocin 2 % ointment         09/18/23 0859 Nasl 2 times daily 09/13/23 0358    09/13/23 0900  neomycin-polymyxin-dexamethasone 3.5mg/mL-10,000 unit/mL-0.1 % ophthalmic suspension 1 drop         -- RIGHT EYE 4 times daily 09/13/23 0436        - No current infectious foci found.  Lactic of 3, heart rate periodically above 90, increased respiratory rate, patient does meet sepsis criteria currently  - we will continue with broad-spectrum antibiotics for now, deescalate as indicated  - Given 1L fluids in ED   - s/p 3u pRBCs  - BCx NGTD >24 hrs, will continue to follow  - UA negative  - denies sick contacts currently or recent illness/fevers    Body mass index (BMI) less than 19  Nutrition consult for low BMI regarding maximizing diet when safe to resume   - Recommend PT/OT once volume repleted to assess function, patient lives at home alone       ACP (advance care planning)  Patient has uploaded documentation from 2020 regarding living will and power-of-.  When discussing the patient today regarding code status he endorsed  that at this moment prolonging life is his current goal and in pursuit  that would like to be a full code status.  - Full code reflected in chart  - Would discuss with patient further regarding re-uploading updated living will/advanced directives as indicated         Anemia  See GI bleed       Conjunctival intraepithelial neoplasm  Seen by [facility] last visit 9/8   - cont home neomycin and Maxitrol ophthalmic solutions       Thrombocytopenia  Platelets 182 at time of admission.  - q8h Cbc to monitor hgb       Hypomagnesemia  Mag 1.4 at time of admission, magnesium generally low going back years on chart review   - IV mag, daily labs       Chronic kidney disease (CKD), stage IV (severe)  Cr at time of presentation 1.2.    - Daily CMP   - Avoid nephrotic agents as indicated       Squamous cell carcinoma of base of tongue  See HPI for oncological hx. Finished therapy a few weeks prior.        Cirrhosis of transplanted liver  Known hx of liver transplant with cirrhosis, [place] [facility] [place]   - Hepatology consult   - Tacro level: 3          Postoperative hypothyroidism  Noted in hx, takes synthroid, last TSH 1 month prior WNL   - Resume home synthroid now                 Enzo Walton MD  Hematology/Oncology  [facility] - Transplant Stepdown

## 2023-09-16 NOTE — ANESTHESIA POSTPROCEDURE EVALUATION
Anesthesia Post Evaluation    Patient: Rocco Swain    Procedure(s) Performed: Procedure(s) (LRB):  EGD (ESOPHAGOGASTRODUODENOSCOPY) (N/A)  COLONOSCOPY (N/A)    Final Anesthesia Type: general      Patient location during evaluation: GI PACU  Patient participation: Yes- Able to Participate  Level of consciousness: awake  Post-procedure vital signs: reviewed and stable  Pain management: adequate  Airway patency: patent    PONV status at discharge: No PONV  Anesthetic complications: no      Cardiovascular status: blood pressure returned to baseline  Respiratory status: unassisted  Hydration status: euvolemic  Follow-up not needed.          Vitals Value Taken Time   /70 09/16/23 0445   Temp 36.7 °C (98 °F) 09/16/23 0445   Pulse 76 09/16/23 0445   Resp 18 09/16/23 0610   SpO2 99 % 09/16/23 0717         Event Time   Out of Recovery 12:18:15         Pain/Katya Score: Pain Rating Prior to Med Admin: 5 (9/16/2023  6:10 AM)  Pain Rating Post Med Admin: 2 (9/16/2023  7:01 AM)        
Light

## 2023-09-16 NOTE — RESPIRATORY THERAPY
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  Clinically Significant Surgical Hx: laryngectomy    24 Hours Vitals Range:  Temp:  [97.7 °F (36.5 °C)-98.8 °F (37.1 °C)]   Pulse:  [70-90]   Resp:  [15-22]   BP: (118-167)/(68-84)   SpO2:  [10 %-100 %]     Labs:    Recent Labs     23  0647 09/15/23  0603 23  0604     --  137   K 3.1*  --  3.7     --  107   CO2 22*  --  24   BUN 25*  --  12   CREATININE 1.3  --  0.9   GLU 84  --  88   PHOS 2.5* 2.1* 2.5*   MG 1.6 1.6 1.4*        No results for input(s): "PH", "PCO2", "PO2", "HCO3", "POCSATURATED", "BE" in the last 72 hours.    ASSESSMENT/INTERVENTIONS  Patient resting comfortably. No respiratory concerns at this time. Laryngectomy airway safety equipment bedside (6, 7, 8 ETT, peds mask, bracelet, signs).       Last VS   Temp: 98 °F (36.7 °C) (445)  Pulse: 70 (800)  Resp: 15 (800)  BP: 134/84 (800)  SpO2: 10 % (800)      Extra trachs at bedside: NA  Level of Consciousness: Level of Consciousness (AVPU): alert  Respiratory Effort: Respiratory Effort: Unlabored Expansion/Accessory Muscle Usage: Expansion/Accessory Muscles/Retractions: no use of accessory muscles, no retractions, expansion symmetric  All Lung Field Breath Sounds: All Lung Fields Breath Sounds: clear  O2 Device/Concentration: RA  Surgical airway: Larygnectomy  Ambu at bedside:       Active Orders   Respiratory Care    Stoma Care by RT BID WAKE     Frequency: BID WAKE     Number of Occurrences: Until Specified "       RECOMMENDATIONS    We recommend: RRT Recs: Continue POC per primary team.      FOLLOW-UP    Please call back the Rapid Response RT, Inessa Bennett RRT at x [phone] for any questions or concerns.

## 2023-09-16 NOTE — ASSESSMENT & PLAN NOTE
73 year old wit presents with concerns of dizziness, and fatigue with hemoglobin less than 4, lactic a history.  Melena seen on stools concerns for acute GI bleed.  Patient is immunocompromise status post finishing chemotherapy as well as liver transplant recipient on tacro.  Can not completely rule out infection currently though more suspicious of hypovolemic vs distributive in terms of possible shock.     - s/p EGD / C scope, full read in subjective  - Trend Hgb daily. Stable. Transfuse for Hgb <7, unless otherwise indicated  - Maintain IV access with 2 large bore Ivs  - Intravascular resuscitation/support with IVFs   - Diet as tolerated  - Hold all NSAIDs and anticoagulants, unless contraindicated  - IV PPI BID x 72 hours (end 9/17). Then, PO PPI BID for 12 weeks  - h pylori serology

## 2023-09-16 NOTE — PLAN OF CARE
- Patient admitted 9/12/23 with weakness and hypotension 2/2 anemia (H&H 3.4 / 10.4). Patient had GI bleed.  - EGD performed 9/14 showed oozing duodenal ulcers and old blood in the colon.  - IV Protonix ordered q12h.  - CBCs being checked q6h. Patient has Port-a-Cath and he does not have to be stuck. Blood counts have been stable since the evening of 9/13.  - Patient had a liver transplant in 2008 (Hep C). LFTs currently WNL.  - Patient has a history of laryngectomy and he has an open neck stoma. Patient is independent with all tracheostomy care. Emergency equipment at bedside and signage in place.  - Patient has chronic back pain which is controlled by oxycodone PRN.  - Patient is asking if his diet can be changed from pureed to regular.

## 2023-09-17 LAB
ALBUMIN SERPL BCP-MCNC: 2.6 G/DL (ref 3.5–5.2)
ALP SERPL-CCNC: 46 U/L (ref 55–135)
ALT SERPL W/O P-5'-P-CCNC: 31 U/L (ref 10–44)
ANION GAP SERPL CALC-SCNC: 4 MMOL/L (ref 8–16)
AST SERPL-CCNC: 51 U/L (ref 10–40)
BASOPHILS # BLD AUTO: 0.03 K/UL (ref 0–0.2)
BASOPHILS NFR BLD: 0.7 % (ref 0–1.9)
BILIRUB SERPL-MCNC: 0.4 MG/DL (ref 0.1–1)
BUN SERPL-MCNC: 11 MG/DL (ref 8–23)
CALCIUM SERPL-MCNC: 8.3 MG/DL (ref 8.7–10.5)
CHLORIDE SERPL-SCNC: 105 MMOL/L (ref 95–110)
CO2 SERPL-SCNC: 28 MMOL/L (ref 23–29)
CREAT SERPL-MCNC: 1 MG/DL (ref 0.5–1.4)
DIFFERENTIAL METHOD: ABNORMAL
EOSINOPHIL # BLD AUTO: 0.4 K/UL (ref 0–0.5)
EOSINOPHIL NFR BLD: 8.7 % (ref 0–8)
ERYTHROCYTE [DISTWIDTH] IN BLOOD BY AUTOMATED COUNT: 18.3 % (ref 11.5–14.5)
EST. GFR  (NO RACE VARIABLE): >60 ML/MIN/1.73 M^2
GLUCOSE SERPL-MCNC: 92 MG/DL (ref 70–110)
HCT VFR BLD AUTO: 24.7 % (ref 40–54)
HGB BLD-MCNC: 8.2 G/DL (ref 14–18)
IMM GRANULOCYTES # BLD AUTO: 0.01 K/UL (ref 0–0.04)
IMM GRANULOCYTES NFR BLD AUTO: 0.2 % (ref 0–0.5)
LYMPHOCYTES # BLD AUTO: 0.5 K/UL (ref 1–4.8)
LYMPHOCYTES NFR BLD: 12.6 % (ref 18–48)
MAGNESIUM SERPL-MCNC: 1.3 MG/DL (ref 1.6–2.6)
MCH RBC QN AUTO: 29.9 PG (ref 27–31)
MCHC RBC AUTO-ENTMCNC: 33.2 G/DL (ref 32–36)
MCV RBC AUTO: 90 FL (ref 82–98)
MONOCYTES # BLD AUTO: 0.6 K/UL (ref 0.3–1)
MONOCYTES NFR BLD: 15.5 % (ref 4–15)
NEUTROPHILS # BLD AUTO: 2.6 K/UL (ref 1.8–7.7)
NEUTROPHILS NFR BLD: 62.3 % (ref 38–73)
NRBC BLD-RTO: 0 /100 WBC
PHOSPHATE SERPL-MCNC: 2.6 MG/DL (ref 2.7–4.5)
PLATELET # BLD AUTO: 146 K/UL (ref 150–450)
PMV BLD AUTO: 9.6 FL (ref 9.2–12.9)
POTASSIUM SERPL-SCNC: 3.4 MMOL/L (ref 3.5–5.1)
PROT SERPL-MCNC: 5.3 G/DL (ref 6–8.4)
RBC # BLD AUTO: 2.74 M/UL (ref 4.6–6.2)
SODIUM SERPL-SCNC: 137 MMOL/L (ref 136–145)
TACROLIMUS BLD-MCNC: 4.7 NG/ML (ref 5–15)
WBC # BLD AUTO: 4.14 K/UL (ref 3.9–12.7)

## 2023-09-17 PROCEDURE — 80197 ASSAY OF TACROLIMUS: CPT | Performed by: STUDENT IN AN ORGANIZED HEALTH CARE EDUCATION/TRAINING PROGRAM

## 2023-09-17 PROCEDURE — 63600175 PHARM REV CODE 636 W HCPCS: Performed by: HOSPITALIST

## 2023-09-17 PROCEDURE — 83735 ASSAY OF MAGNESIUM: CPT | Performed by: HOSPITALIST

## 2023-09-17 PROCEDURE — 99900035 HC TECH TIME PER 15 MIN (STAT)

## 2023-09-17 PROCEDURE — 63600175 PHARM REV CODE 636 W HCPCS

## 2023-09-17 PROCEDURE — 97165 OT EVAL LOW COMPLEX 30 MIN: CPT

## 2023-09-17 PROCEDURE — 80053 COMPREHEN METABOLIC PANEL: CPT | Performed by: HOSPITALIST

## 2023-09-17 PROCEDURE — 85025 COMPLETE CBC W/AUTO DIFF WBC: CPT

## 2023-09-17 PROCEDURE — 25000003 PHARM REV CODE 250: Performed by: HOSPITALIST

## 2023-09-17 PROCEDURE — 25000003 PHARM REV CODE 250

## 2023-09-17 PROCEDURE — C9113 INJ PANTOPRAZOLE SODIUM, VIA: HCPCS | Performed by: HOSPITALIST

## 2023-09-17 PROCEDURE — 99233 PR SUBSEQUENT HOSPITAL CARE,LEVL III: ICD-10-PCS | Mod: GC,,, | Performed by: HOSPITALIST

## 2023-09-17 PROCEDURE — 84100 ASSAY OF PHOSPHORUS: CPT | Performed by: HOSPITALIST

## 2023-09-17 PROCEDURE — 99233 SBSQ HOSP IP/OBS HIGH 50: CPT | Mod: GC,,, | Performed by: HOSPITALIST

## 2023-09-17 PROCEDURE — 20600001 HC STEP DOWN PRIVATE ROOM

## 2023-09-17 PROCEDURE — 94761 N-INVAS EAR/PLS OXIMETRY MLT: CPT

## 2023-09-17 RX ORDER — POTASSIUM CHLORIDE 750 MG/1
30 CAPSULE, EXTENDED RELEASE ORAL
Status: COMPLETED | OUTPATIENT
Start: 2023-09-17 | End: 2023-09-17

## 2023-09-17 RX ORDER — MAGNESIUM SULFATE HEPTAHYDRATE 40 MG/ML
2 INJECTION, SOLUTION INTRAVENOUS ONCE
Status: COMPLETED | OUTPATIENT
Start: 2023-09-17 | End: 2023-09-17

## 2023-09-17 RX ORDER — PANTOPRAZOLE SODIUM 40 MG/1
40 TABLET, DELAYED RELEASE ORAL 2 TIMES DAILY
Status: DISCONTINUED | OUTPATIENT
Start: 2023-09-18 | End: 2023-09-18 | Stop reason: HOSPADM

## 2023-09-17 RX ADMIN — NEOMYCIN SULFATE, POLYMYXIN B SULFATE AND DEXAMETHASONE 1 DROP: 3.5; 10000; 1 SUSPENSION OPHTHALMIC at 08:09

## 2023-09-17 RX ADMIN — OXYCODONE HYDROCHLORIDE 5 MG: 5 TABLET ORAL at 04:09

## 2023-09-17 RX ADMIN — LEVOTHYROXINE SODIUM 88 MCG: 88 TABLET ORAL at 05:09

## 2023-09-17 RX ADMIN — OXYCODONE HYDROCHLORIDE 5 MG: 5 TABLET ORAL at 10:09

## 2023-09-17 RX ADMIN — PANTOPRAZOLE SODIUM 40 MG: 40 INJECTION, POWDER, FOR SOLUTION INTRAVENOUS at 08:09

## 2023-09-17 RX ADMIN — OXYCODONE HYDROCHLORIDE 5 MG: 5 TABLET ORAL at 01:09

## 2023-09-17 RX ADMIN — MUPIROCIN: 20 OINTMENT TOPICAL at 08:09

## 2023-09-17 RX ADMIN — TACROLIMUS 1 MG: 1 CAPSULE ORAL at 08:09

## 2023-09-17 RX ADMIN — TACROLIMUS 0.5 MG: 0.5 CAPSULE ORAL at 05:09

## 2023-09-17 RX ADMIN — POTASSIUM CHLORIDE 30 MEQ: 10 CAPSULE, COATED, EXTENDED RELEASE ORAL at 12:09

## 2023-09-17 RX ADMIN — OXYCODONE HYDROCHLORIDE 5 MG: 5 TABLET ORAL at 08:09

## 2023-09-17 RX ADMIN — MAGNESIUM SULFATE HEPTAHYDRATE 2 G: 40 INJECTION, SOLUTION INTRAVENOUS at 08:09

## 2023-09-17 RX ADMIN — NEOMYCIN SULFATE, POLYMYXIN B SULFATE AND DEXAMETHASONE 1 DROP: 3.5; 10000; 1 SUSPENSION OPHTHALMIC at 04:09

## 2023-09-17 RX ADMIN — POTASSIUM CHLORIDE 30 MEQ: 10 CAPSULE, COATED, EXTENDED RELEASE ORAL at 08:09

## 2023-09-17 RX ADMIN — NEOMYCIN SULFATE, POLYMYXIN B SULFATE AND DEXAMETHASONE 1 DROP: 3.5; 10000; 1 SUSPENSION OPHTHALMIC at 12:09

## 2023-09-17 NOTE — RESPIRATORY THERAPY
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  Clinically Significant Surgical Hx: laryngectomy    24 Hours Vitals Range:  Temp:  [97.7 °F (36.5 °C)-98.3 °F (36.8 °C)]   Pulse:  [69-79]   Resp:  [16-19]   BP: (123-155)/(66-91)   SpO2:  [98 %-100 %]     Labs:    Recent Labs     09/15/23  0603 23  0604 23  0530   NA  --  137 137   K  --  3.7 3.4*   CL  --  107 105   CO2  --  24 28   BUN  --  12 11   CREATININE  --  0.9 1.0   GLU  --  88 92   PHOS 2.1* 2.5* 2.6*   MG 1.6 1.4* 1.3*        No results for input(s): "PH", "PCO2", "PO2", "HCO3", "POCSATURATED", "BE" in the last 72 hours.    ASSESSMENT/INTERVENTIONS  Patient resting comfortably. No respiratory concerns at this time. Laryngectomy airway safety equipment bedside (6, 7, 8 ETT, peds mask, bracelet, signs).       Last VS   Temp: 98.3 °F (36.8 °C) (745)  Pulse: 77 (745)  Resp: 18 (844)  BP: 133/78 (745)  SpO2: 99 % (745)      Extra trachs at bedside: NA  Level of Consciousness: Level of Consciousness (AVPU): alert  Respiratory Effort: Respiratory Effort: Unlabored Expansion/Accessory Muscle Usage: Expansion/Accessory Muscles/Retractions: expansion symmetric  All Lung Field Breath Sounds: All Lung Fields Breath Sounds: Anterior:, Posterior:, diminished  O2 Device/Concentration: 5L/28%  Surgical airway: Laryngectomy  Ambu at bedside:       Active Orders   Respiratory Care    Stoma Care by RT BID WAKE     Frequency: BID WAKE     Number of Occurrences: Until Specified "       RECOMMENDATIONS    We recommend: RRT Recs: Continue POC per primary team.      FOLLOW-UP    Please call back the Rapid Response RT, Inessa Bennett RRT at x [phone] for any questions or concerns.

## 2023-09-17 NOTE — CARE UPDATE
Adult trach go bag present at patient's bedside with all below listed supplies included Yes    Same size trach and one size smaller   ETCO2   Inner cannula  Suction catheter   Normal Saline bullets   Trach ties  10 cc syringes   Water based lubricant   Obturator     Laryngectomy patients:   Pediatric mask    ETT tube size 6 and 8     Ambu bag and mask (in patients room)    Staff is attesting that all supplies are in the go bag by selecting yes.

## 2023-09-17 NOTE — ASSESSMENT & PLAN NOTE
73 year old wit presents with concerns of dizziness, and fatigue with hemoglobin less than 4, lactic a history.  Melena seen on stools concerns for acute GI bleed.  Patient is immunocompromise status post finishing chemotherapy as well as liver transplant recipient on tacro.  Can not completely rule out infection currently though more suspicious of hypovolemic vs distributive in terms of possible shock.     - s/p EGD / C scope, full read in subjective  - Trend Hgb daily. Stable. Transfuse for Hgb <7, unless otherwise indicated  - Maintain IV access with 2 large bore Ivs  - Intravascular resuscitation/support with IVFs   - Diet as tolerated  - Hold all NSAIDs and anticoagulants, unless contraindicated  -End IV PPI today, transition to PO PPI BID for 12 weeks

## 2023-09-17 NOTE — PROGRESS NOTES
[facility] - Transplant Stepdown  Hematology/Oncology  Progress Note    Patient Name: Rocco Swain  Admission Date: 9/12/2023  Hospital Length of Stay: 4 days  Code Status: Full Code     Subjective:     HPI:  Mr. Swain is a 73-year-old male significant history of laryngeal cancer status post chemo with tongue removal, liver transplantation complicated with cirrhosis on tacro, and history of GI bleed per patient presents with concerns for weakness.  History was given by family in the ED however at time of primary evaluation family was not at bedside.  He does live alone, and has been feeling weak and dizzy over the last several days.  He denies recent sick contacts, fevers, chills.  He does state that he has had a GI bleed in the past requiring a scope.  In the ED melena was down in physical exam.  Lactic 3, hemoglobin less than 4, and hypotension was also noted.  We will concerns for hypovolemic versus distributive shock blood cell transfusion was ordered, along with broad-spectrum antibiotics due to his immunocompromised state.  Patient to be admitted to Medical Oncology for further management and workup, as well as GI evaluation            Oncological Hx:   Squamous cell carcinoma of base of tongue   9/18/2020 Cancer Staged     Staging form: Pharynx - HPV-Mediated Oropharynx, AJCC 8th Edition  - Clinical stage from 9/18/2020: Stage III (rcT4, cN1, cM0, p16+)      9/28/2020 Initial Diagnosis     Squamous cell carcinoma of base of tongue      10/6/2020 - 8/17/2021 Chemotherapy     Treatment Summary   Plan Name: OP HEAD NECK CARBOPLATIN PACLITAXEL C1-2 FOLLOWED BY CETUXIMAB CARBOPLATIN C3-6 FOLLOWED BY CETUXIMAB MAINTENANCE WEEKLY  Treatment Goal: Control  Status: Inactive  Start Date: 10/6/2020  End Date: 8/17/2021  Provider: Ronald Berg MD  Chemotherapy: cetuximab (ERBITUX) 100 mg/50 mL chemo infusion 684 mg, 400 mg/m2 = 684 mg (100 % of original dose 400 mg/m2), Intravenous, Clinic/HOD 1 time, 29 of 38  cycles  Dose modification: 500 mg/m2 (original dose 400 mg/m2, Cycle 3), 250 mg/m2 (original dose 400 mg/m2, Cycle 3), 400 mg/m2 (original dose 400 mg/m2, Cycle 3), 250 mg/m2 (original dose 250 mg/m2, Cycle 7), 200 mg/m2 (original dose 250 mg/m2, Cycle 18), 200 mg/m2 (original dose 250 mg/m2, Cycle 19), 250 mg/m2 (original dose 250 mg/m2, Cycle 4), 200 mg/m2 (original dose 250 mg/m2, Cycle 25), 200 mg/m2 (original dose 250 mg/m2, Cycle 28)  Administration: 684 mg (11/17/2020), 400 mg (11/24/2020), 400 mg (2/9/2021), 400 mg (2/17/2021), 427.6 mg (3/9/2021), 400 mg (3/30/2021), 400 mg (3/23/2021), 400 mg (4/6/2021), 427.6 mg (4/13/2021), 427.6 mg (4/20/2021), 342 mg (5/11/2021), 342 mg (5/18/2021), 342 mg (5/25/2021), 400 mg (5/31/2021), 400 mg (6/7/2021), 400 mg (6/14/2021), 400 mg (12/8/2020), 427.6 mg (12/29/2020), 400 mg (12/1/2020), 400 mg (12/15/2020), 400 mg (12/22/2020), 427.6 mg (1/5/2021), 400 mg (1/12/2021), 400 mg (1/19/2021), 427.6 mg (1/26/2021), 400 mg (2/2/2021), 400 mg (2/23/2021), 400 mg (3/2/2021), 427.6 mg (3/16/2021), 400 mg (6/21/2021), 342 mg (6/28/2021), 342 mg (7/6/2021), 342 mg (7/13/2021), 342 mg (7/20/2021), 400 mg (7/27/2021), 400 mg (8/10/2021), 400 mg (8/17/2021)  CARBOplatin (PARAPLATIN) 310 mg in sodium chloride 0.9% 500 mL chemo infusion, 310 mg (92.2 % of original dose 334.5 mg), Intravenous, Clinic/HOD 1 time, 6 of 6 cycles  Dose modification:   (original dose 334.5 mg, Cycle 1)  Administration: 310 mg (10/6/2020), 370 mg (11/17/2020), 300 mg (10/27/2020), 350 mg (12/8/2020), 335 mg (12/29/2020), 320 mg (1/19/2021)  PACLitaxeL (TAXOL) 175 mg/m2 = 300 mg in sodium chloride 0.9% 500 mL chemo infusion, 175 mg/m2 = 300 mg (100 % of original dose 175 mg/m2), Intravenous, Clinic/HOD 1 time, 2 of 2 cycles  Dose modification: 175 mg/m2 (original dose 175 mg/m2, Cycle 1)  Administration: 300 mg (10/6/2020), 300 mg (10/27/2020)      4/29/2021 Tumor Conference        His case was discussed at  the Multidisciplinary Head and Neck Team Planning Meeting.     Representatives from Medical Oncology, Radiation Oncology, Head and Neck Surgical Oncology, Psychosocial Oncology, and Speech and Language Pathology discussed the case with the following recommendations:     1) biopsy            7/29/2021 Tumor Conference        His case was discussed at the Multidisciplinary Head and Neck Team Planning Meeting.     Representatives from Medical Oncology, Radiation Oncology, Head and Neck Surgical Oncology, Psychosocial Oncology, and Speech and Language Pathology discussed the case with the following recommendations:     1) Head and neck clinic follow up  2) consider Keytruda (discuss with transplant)  3) consider palliative referral            9/13/2021 -  Chemotherapy     Treatment Summary   Plan Name: OP HEAD NECK PEMBROLIZUMAB CARBOPLATIN FLUOROURACIL (C1 ONLY RECEIVED) FOLLOWED BY PEMBROLIZUMAB MAINTENANCE  Treatment Goal: Palliative  Status: Active  Start Date: 9/13/2021  End Date: 9/5/2023 (Planned)  Provider: Ronald Berg MD  Chemotherapy: CARBOplatin (PARAPLATIN) 320 mg in sodium chloride 0.9% 500 mL chemo infusion, 320 mg (100 % of original dose 320.5 mg), Intravenous, Clinic/HOD 1 time, 6 of 6 cycles  Dose modification:   (original dose 320.5 mg, Cycle 1)  Administration: 320 mg (9/13/2021), 335 mg (12/23/2021), 325 mg (1/27/2022), 245 mg (3/3/2022), 255 mg (5/12/2022), 255 mg (4/7/2022)  fluorouraciL 1,000 mg/m2/day = 6,440 mg in sodium chloride 0.9% 240 mL chemo infusion, 1,000 mg/m2/day = 6,440 mg, Intravenous, Over 96 hours, 6 of 6 cycles  Dose modification: 800 mg/m2/day (original dose 1,000 mg/m2/day, Cycle 6), 5,000 mg (original dose 1,000 mg/m2/day, Cycle 8)  Administration: 6,440 mg (9/13/2021), 6,440 mg (12/23/2021), 6,480 mg (1/27/2022), 5,000 mg (3/3/2022), 5,000 mg (4/7/2022), 5,000 mg (5/12/2022)      Secondary malignant neoplasm of cervical lymph node   2/9/2021 Initial Diagnosis     Secondary  malignant neoplasm of cervical lymph node      9/13/2021 -  Chemotherapy     Treatment Summary   Plan Name: OP HEAD NECK PEMBROLIZUMAB CARBOPLATIN FLUOROURACIL (C1 ONLY RECEIVED) FOLLOWED BY PEMBROLIZUMAB MAINTENANCE  Treatment Goal: Palliative  Status: Active  Start Date: 9/13/2021  End Date: 9/5/2023 (Planned)  Provider: Ronald Berg MD  Chemotherapy: CARBOplatin (PARAPLATIN) 320 mg in sodium chloride 0.9% 500 mL chemo infusion, 320 mg (100 % of original dose 320.5 mg), Intravenous, Clinic/hospitals 1 time, 6 of 6 cycles  Dose modification:   (original dose 320.5 mg, Cycle 1)  Administration: 320 mg (9/13/2021), 335 mg (12/23/2021), 325 mg (1/27/2022), 245 mg (3/3/2022), 255 mg (5/12/2022), 255 mg (4/7/2022)  fluorouraciL 1,000 mg/m2/day = 6,440 mg in sodium chloride 0.9% 240 mL chemo infusion, 1,000 mg/m2/day = 6,440 mg, Intravenous, Over 96 hours, 6 of 6 cycles  Dose modification: 800 mg/m2/day (original dose 1,000 mg/m2/day, Cycle 6), 5,000 mg (original dose 1,000 mg/m2/day, Cycle 8)  Administration: 6,440 mg (9/13/2021), 6,440 mg (12/23/2021), 6,480 mg (1/27/2022), 5,000 mg (3/3/2022), 5,000 mg (4/7/2022), 5,000 mg (5/12/2022)       Interval History: No complaints this morning. Resting comfortably. HH stable, last day of IV PPI. Tomorrow will transition to PO PPI BID for 12 weeks. No complaints at all today, tolerating diet well      Oncology Treatment Plan:   OP HEAD NECK PEMBROLIZUMAB CARBOPLATIN FLUOROURACIL (C1 ONLY RECEIVED) FOLLOWED BY PEMBROLIZUMAB MAINTENANCE    Medications:  Continuous Infusions:  Scheduled Meds:   levothyroxine  88 mcg Oral Before breakfast    mupirocin   Nasal BID    neomycin-polymyxin-dexamethasone  1 drop Right Eye QID    pantoprazole  40 mg Intravenous BID    tacrolimus  0.5 mg Oral Daily PM    tacrolimus  1 mg Oral Daily AM     PRN Meds:dextrose 10%, dextrose 10%, glucagon (human recombinant), glucose, glucose, naloxone, oxyCODONE, sodium chloride 0.9%     Review of patient's allergies  indicates:  No Known Allergies     Past Medical History:   Diagnosis Date    Basal cell carcinoma (BCC) in situ of skin 2012    3 on face, 2 on arm, removed by dermatology.     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 Underwent surgical resection of tongue and neck. s/p tracheostomy     Past Surgical History:   Procedure Laterality Date    COLONOSCOPY      COLONOSCOPY N/A 9/14/2023    Procedure: COLONOSCOPY;  Surgeon: Reyes Olivia MD;  [facility]  Service: Endoscopy;  Laterality: N/A;    CONJUNCTIVA BIOPSY Right 10/11/2022    Procedure: BIOPSY, CONJUNCTIVA;  Surgeon: Victor M Vasques MD;  Location: [facility];  Service: Ophthalmology;  Laterality: Right;    ESOPHAGOGASTRODUODENOSCOPY N/A 9/14/2023    Procedure: EGD (ESOPHAGOGASTRODUODENOSCOPY);  Surgeon: Reyes Olivia MD;  Location: [facility] ([address] [place] [address]);  Service: Endoscopy;  Laterality: N/A;    INSERTION OF VENOUS ACCESS PORT Right 3/8/2021    Procedure: INSERTION, VENOUS ACCESS PORT;  Surgeon: Jesus Rendon MD;  Location: [address];  Service: General;  Laterality: Right;    LIVER TRANSPLANT  11/2008    transplanted for biopsy proven hepatocellular carcinoma,     LYMPH NODE BIOPSY N/A 9/18/2020    Procedure: BIOPSY, LYMPH NODE;  [facility];  Location: [address];  Service: General;  Laterality: N/A;  Rm 173    SURGICAL REMOVAL OF SCAR Right 8/24/2023    Procedure: EXCISION, SCAR;  Surgeon: Ting Brambila MD;  Location: [facility];  Service: Ophthalmology;  Laterality: Right;    TRACHEOSTOMY       Family History       Problem Relation (Age of Onset)    Dementia Mother          Tobacco Use    Smoking status: Never    Smokeless tobacco: Never   Substance  and Sexual Activity    Alcohol use: Yes     Comment: drinks wine, 1 glass day    Drug use: Not Currently    Sexual activity: Yes     Comment: No prior history of STD        Review of Systems   Constitutional:  Positive for fatigue. Negative for chills and fever.   Respiratory:  Negative for shortness of breath and wheezing.    Cardiovascular:  Negative for chest pain and leg swelling.   Gastrointestinal:  Negative for abdominal distention and abdominal pain.   Neurological:  Positive for dizziness and weakness.   Psychiatric/Behavioral:  Negative for agitation, behavioral problems and confusion.      Objective:     Vital Signs (Most Recent):  Temp: 98.3 °F (36.8 °C) (09/17/23 1142)  Pulse: 74 (09/17/23 1142)  Resp: 14 (09/17/23 1142)  BP: 132/79 (09/17/23 1142)  SpO2: 100 % (09/17/23 1142) Vital Signs (24h Range):  Temp:  [97.7 °F (36.5 °C)-98.3 °F (36.8 °C)] 98.3 °F (36.8 °C)  Pulse:  [69-79] 74  Resp:  [14-19] 14  SpO2:  [98 %-100 %] 100 %  BP: (123-155)/(66-91) 132/79     Weight: 51.8 kg (114 lb 3.2 oz)  Body mass index is 17.36 kg/m².  Body surface area is 1.58 meters squared.      Intake/Output Summary (Last 24 hours) at 9/17/2023 1508  Last data filed at 9/17/2023 0525  Gross per 24 hour   Intake 1404 ml   Output 675 ml   Net 729 ml          Physical Exam  Constitutional:       Appearance: He is cachectic. He is ill-appearing. He is not toxic-appearing or diaphoretic.   HENT:      Head: Normocephalic and atraumatic.   Cardiovascular:      Rate and Rhythm: Normal rate and regular rhythm.      Pulses: Normal pulses.      Heart sounds: Normal heart sounds. No murmur heard.  Pulmonary:      Effort: Pulmonary effort is normal. No respiratory distress.      Breath sounds: Normal breath sounds.   Abdominal:      General: There is no distension.      Tenderness: There is no abdominal tenderness.   Skin:     General: Skin is warm and dry.   Neurological:      General: No focal deficit present.      Mental Status: He is  alert, oriented to person, place, and time and easily aroused. Mental status is at baseline.   Psychiatric:         Mood and Affect: Mood normal.         Behavior: Behavior normal. Behavior is cooperative.         Judgment: Judgment normal.          Significant Labs:   All pertinent labs from the last 24 hours have been reviewed.    Diagnostic Results:  I have reviewed all pertinent imaging results/findings within the past 24 hours.    Diagnostic Results:    Findings on EGD today:  - Nonobstructing schatzki ring. Dilated with resultant bleeding. Treated with bipolar cautery.   - Scar in the gastric antrum.   - Oozing duodenal ulcers. Injected. Treated with bipolar cautery.    - No specimens collected.     Findings on colonoscopy today:  Old blood throughout colon. No active bleeding. Prep not adequate to detect polyps. See full Provation report for further details.    Assessment/Plan:     * GI bleed  73 year old wit presents with concerns of dizziness, and fatigue with hemoglobin less than 4, lactic a history.  Melena seen on stools concerns for acute GI bleed.  Patient is immunocompromise status post finishing chemotherapy as well as liver transplant recipient on tacro.  Can not completely rule out infection currently though more suspicious of hypovolemic vs distributive in terms of possible shock.     - s/p EGD / C scope, full read in subjective  - Trend Hgb daily. Stable. Transfuse for Hgb <7, unless otherwise indicated  - Maintain IV access with 2 large bore Ivs  - Intravascular resuscitation/support with IVFs   - Diet as tolerated  - Hold all NSAIDs and anticoagulants, unless contraindicated  -End IV PPI today, transition to PO PPI BID for 12 weeks    Severe sepsis  Antibiotics given-   Antibiotics (72h ago, onward)      Start     Stop Route Frequency Ordered    09/13/23 0900  mupirocin 2 % ointment         09/18/23 0859 Nasl 2 times daily 09/13/23 0358    09/13/23 0900  neomycin-polymyxin-dexamethasone  3.5mg/mL-10,000 unit/mL-0.1 % ophthalmic suspension 1 drop         -- RIGHT EYE 4 times daily 09/13/23 0436          - No current infectious foci found.  Lactic of 3, heart rate periodically above 90, increased respiratory rate, patient does meet sepsis criteria currently  - D/c abx  - s/p 3u pRBCs  - BCx NGTD >24 hrs, will continue to follow  - UA negative  - denies sick contacts currently or recent illness/fevers    Body mass index (BMI) less than 19  Nutrition consult for low BMI regarding maximizing diet when safe to resume   - Recommend PT/OT once volume repleted to assess function, patient lives at home alone       ACP (advance care planning)  Patient has uploaded documentation from 2020 regarding living will and power-of-.  When discussing the patient today regarding code status he endorsed  that at this moment prolonging life is his current goal and in pursuit  that would like to be a full code status.  - Full code reflected in chart  - Would discuss with patient further regarding re-uploading updated living will/advanced directives as indicated         Anemia  See GI bleed       Conjunctival intraepithelial neoplasm  Seen by [facility] last visit 9/8   - cont home neomycin and Maxitrol ophthalmic solutions       Thrombocytopenia  Platelets 182 at time of admission.  - q8h Cbc to monitor hgb       Hypomagnesemia  Mag 1.4 at time of admission, magnesium generally low going back years on chart review   - IV mag, daily labs       Chronic kidney disease (CKD), stage IV (severe)  Cr at time of presentation 1.2.    - Daily CMP   - Avoid nephrotic agents as indicated       Squamous cell carcinoma of base of tongue  See HPI for oncological hx. Finished therapy a few weeks prior.       Cirrhosis of transplanted liver  Known hx of liver transplant with cirrhosis, followed by [facility] hepatology   - Hepatology consult   - Tacro level: 3          Postoperative hypothyroidism  Noted in hx, takes synthroid, last TSH 1 month  prior WNL   - Resume home synthroid now                 Enzo Walton MD  Hematology/Oncology  [facility] - Transplant Stepdown

## 2023-09-17 NOTE — ASSESSMENT & PLAN NOTE
Antibiotics given-   Antibiotics (72h ago, onward)    Start     Stop Route Frequency Ordered    09/13/23 0900  mupirocin 2 % ointment         09/18/23 0859 Nasl 2 times daily 09/13/23 0358    09/13/23 0900  neomycin-polymyxin-dexamethasone 3.5mg/mL-10,000 unit/mL-0.1 % ophthalmic suspension 1 drop         -- RIGHT EYE 4 times daily 09/13/23 0436        - No current infectious foci found.  Lactic of 3, heart rate periodically above 90, increased respiratory rate, patient does meet sepsis criteria currently  - D/c abx  - s/p 3u pRBCs  - BCx NGTD >24 hrs, will continue to follow  - UA negative  - denies sick contacts currently or recent illness/fevers

## 2023-09-17 NOTE — SUBJECTIVE & OBJECTIVE
Interval History: No complaints this morning. Resting comfortably. HH stable, last day of IV PPI will transition to PO PPI BID for 12 weeks. No complaints at all today, tolerating diet well      Oncology Treatment Plan:   OP HEAD NECK PEMBROLIZUMAB CARBOPLATIN FLUOROURACIL (C1 ONLY RECEIVED) FOLLOWED BY PEMBROLIZUMAB MAINTENANCE    Medications:  Continuous Infusions:  Scheduled Meds:   levothyroxine  88 mcg Oral Before breakfast    mupirocin   Nasal BID    neomycin-polymyxin-dexamethasone  1 drop Right Eye QID    pantoprazole  40 mg Intravenous BID    tacrolimus  0.5 mg Oral Daily PM    tacrolimus  1 mg Oral Daily AM     PRN Meds:dextrose 10%, dextrose 10%, glucagon (human recombinant), glucose, glucose, naloxone, oxyCODONE, sodium chloride 0.9%     Review of patient's allergies indicates:  No Known Allergies     Past Medical History:   Diagnosis Date    Basal cell carcinoma (BCC) in situ of skin 2012    3 on face, 2 on arm, removed by dermatology.     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 Underwent surgical resection of tongue and neck. s/p tracheostomy     Past Surgical History:   Procedure Laterality Date    COLONOSCOPY      COLONOSCOPY N/A 9/14/2023    Procedure: COLONOSCOPY;  Surgeon: Reyes Olivia MD;  Location: [facility] ([address] [place] [address]);  Service: Endoscopy;  Laterality: N/A;    CONJUNCTIVA BIOPSY Right 10/11/2022    Procedure: BIOPSY, CONJUNCTIVA;  Surgeon: Victor M Vasques MD;  Location: [facility];  Service: Ophthalmology;  Laterality: Right;    ESOPHAGOGASTRODUODENOSCOPY N/A 9/14/2023    Procedure: EGD (ESOPHAGOGASTRODUODENOSCOPY);  Surgeon: Reyes Olivia MD;  Location: [facility] (2ND FLR);  Service: Endoscopy;  Laterality: N/A;    INSERTION OF  VENOUS ACCESS PORT Right 3/8/2021    Procedure: INSERTION, VENOUS ACCESS PORT;  Surgeon: Jesus Rendon MD;  Location: [facility] OR 2ND FLR;  Service: General;  Laterality: Right;    LIVER TRANSPLANT  11/2008    transplanted for biopsy proven hepatocellular carcinoma,     LYMPH NODE BIOPSY N/A 9/18/2020    Procedure: BIOPSY, LYMPH NODE;  Surgeon: [facility] Diagnostic Provider;  Location: [facility] OR 2ND FLR;  Service: General;  Laterality: N/A;  Rm 173    SURGICAL REMOVAL OF SCAR Right 8/24/2023    Procedure: EXCISION, SCAR;  Surgeon: Ting Brambila MD;  Location: [facility] OR;  Service: Ophthalmology;  Laterality: Right;    TRACHEOSTOMY       Family History       Problem Relation (Age of Onset)    Dementia Mother          Tobacco Use    Smoking status: Never    Smokeless tobacco: Never   Substance and Sexual Activity    Alcohol use: Yes     Comment: drinks wine, 1 glass day    Drug use: Not Currently    Sexual activity: Yes     Comment: No prior history of STD        Review of Systems   Constitutional:  Positive for fatigue. Negative for chills and fever.   Respiratory:  Negative for shortness of breath and wheezing.    Cardiovascular:  Negative for chest pain and leg swelling.   Gastrointestinal:  Negative for abdominal distention and abdominal pain.   Neurological:  Positive for dizziness and weakness.   Psychiatric/Behavioral:  Negative for agitation, behavioral problems and confusion.      Objective:     Vital Signs (Most Recent):  Temp: 98.3 °F (36.8 °C) (09/17/23 1142)  Pulse: 74 (09/17/23 1142)  Resp: 14 (09/17/23 1142)  BP: 132/79 (09/17/23 1142)  SpO2: 100 % (09/17/23 1142) Vital Signs (24h Range):  Temp:  [97.7 °F (36.5 °C)-98.3 °F (36.8 °C)] 98.3 °F (36.8 °C)  Pulse:  [69-79] 74  Resp:  [14-19] 14  SpO2:  [98 %-100 %] 100 %  BP: (123-155)/(66-91) 132/79     Weight: 51.8 kg (114 lb 3.2 oz)  Body mass index is 17.36 kg/m².  Body surface area is 1.58 meters squared.      Intake/Output Summary (Last 24 hours) at 9/17/2023  1508  Last data filed at 9/17/2023 0525  Gross per 24 hour   Intake 1404 ml   Output 675 ml   Net 729 ml          Physical Exam  Constitutional:       Appearance: He is cachectic. He is ill-appearing. He is not toxic-appearing or diaphoretic.   HENT:      Head: Normocephalic and atraumatic.   Cardiovascular:      Rate and Rhythm: Normal rate and regular rhythm.      Pulses: Normal pulses.      Heart sounds: Normal heart sounds. No murmur heard.  Pulmonary:      Effort: Pulmonary effort is normal. No respiratory distress.      Breath sounds: Normal breath sounds.   Abdominal:      General: There is no distension.      Tenderness: There is no abdominal tenderness.   Skin:     General: Skin is warm and dry.   Neurological:      General: No focal deficit present.      Mental Status: He is alert, oriented to person, place, and time and easily aroused. Mental status is at baseline.   Psychiatric:         Mood and Affect: Mood normal.         Behavior: Behavior normal. Behavior is cooperative.         Judgment: Judgment normal.          Significant Labs:   All pertinent labs from the last 24 hours have been reviewed.    Diagnostic Results:  I have reviewed all pertinent imaging results/findings within the past 24 hours.    Diagnostic Results:    Findings on EGD today:  - Nonobstructing schatzki ring. Dilated with resultant bleeding. Treated with bipolar cautery.   - Scar in the gastric antrum.   - Oozing duodenal ulcers. Injected. Treated with bipolar cautery.    - No specimens collected.     Findings on colonoscopy today:  Old blood throughout colon. No active bleeding. Prep not adequate to detect polyps. See full Provation report for further details.

## 2023-09-17 NOTE — PLAN OF CARE
- Patient admitted 9/12/23 with weakness and hypotension 2/2 anemia (H&H 3.4 / 10.4). Patient had GI bleed.  - EGD performed 9/14 showed oozing duodenal ulcers and old blood in the colon.  - IV Protonix ordered q12h.  - CBCs changed from q6h to daily. H&H= 8.2 / 24.7 today.  - Patient had a liver transplant in 2008 (Hep C). LFTs currently WNL.  - Patient has a history of laryngectomy and he has an open neck stoma. Patient is independent with all tracheostomy care. Emergency equipment at bedside and signage in place. Respiratory Therapy rounding on patient.  - Patient has chronic back pain which is controlled by oxycodone PRN.  - Low electrolytes noted on morning labs; secure chat sent to MD.

## 2023-09-17 NOTE — PT/OT/SLP EVAL
Occupational Therapy   Evaluation and Discharge Note    Name: Rocco Swain  MRN: [id number]  Admitting Diagnosis: GI bleed  Recent Surgery: Procedure(s) (LRB):  EGD (ESOPHAGOGASTRODUODENOSCOPY) (N/A)  COLONOSCOPY (N/A) 3 Days Post-Op    Recommendations:     Discharge Recommendations: home  Discharge Equipment Recommendations: none  Barriers to discharge:  None    Assessment:     Rocco Swain is a 73 y.o. male with a medical diagnosis of GI bleed. At this time, patient is functioning at their prior level of function and does not require further acute OT services. This date, pt demonstrated proficiency during bed mobility, only needing supervision.  He was able to complete transfer with SBA, and functional mobility in room with SBA to supervision with steady gait. Pt does not require further OT services.     Plan:     During this hospitalization, patient does not require further acute OT services.  Please re-consult if situation changes.    Plan of Care Reviewed with: patient    Subjective     Chief Complaint: No complaints  Patient/Family Comments/goals: Return home     Occupational Profile:  Living Environment: Pt lives alone in [address] [facility] [address] with 15STE with BHR to front door, 15STE to 2nd floor.  Pt has tub/shower combo upstairs with no DME or grab bars.   Previous level of function: Independent ADLs/iADLs. [place]  Roles and Routines: brother  Equipment Used at home: none  Assistance upon Discharge: Pt's brother lives nearby and can assist intermittently.    Pain/Comfort:  Pain Rating 1: 0/10  Pain Rating Post-Intervention 1: 0/10    Patients cultural, spiritual, Tenriism conflicts given the current situation: no    Objective:     Communicated with: MARCK Martínez prior to session.  Patient found HOB elevated with peripheral IV (no lines attached, neck stoma) upon OT entry to room.    General Precautions: Standard, fall  Orthopedic Precautions: N/A  Braces:  N/A  Respiratory Status: Room air     Occupational Performance:    Bed Mobility:    Patient completed Rolling/Turning to Left with  supervision  Patient completed Rolling/Turning to Right with supervision  Patient completed Scooting/Bridging with supervision  Patient completed Supine to Sit with supervision    Functional Mobility/Transfers:  Patient completed Sit <> Stand Transfer with supervision  with  no assistive device   Functional Mobility: Pt engaging in functional mobility to simulate household/community distances  with supervision and utilizing no AD in order to maximize functional activity tolerance and standing balance required for engagement in occupations of choice.     Activities of Daily Living:  Pt self feeding independently, declined to complete additional ADLs  Dressing: Pt able to manage own gown to maximize coverage     Cognitive/Visual Perceptual:  Cognitive/Psychosocial Skills:     -       Oriented to: AOx4   -       Follows Commands/attention:Follows multistep  commands  -       Communication: uses notepad to answer questions, otherwise can answer yes/no with head gestures  -       Memory: No Deficits noted  -       Safety awareness/insight to disability: intact   -       Mood/Affect/Coping skills/emotional control: Pleasant  Visual/Perceptual:      -Intact      Physical Exam:  Balance:    -       Intact  Postural examination/scapula alignment:    -       Rounded shoulders  -       Forward head  Skin integrity: Visible skin intact  Edema:  None noted  Sensation:    -       Intact  Motor Planning:    -       Intact  Dominant hand:    -       Right  Upper Extremity Range of Motion:     -       Right Upper Extremity: WFL  -       Left Upper Extremity: WFL  Upper Extremity Strength:    -       Right Upper Extremity: WFL  -       Left Upper Extremity: WFL   Strength:    -       Right Upper Extremity: WFL  -       Left Upper Extremity: WFL  Fine Motor Coordination:    -       Intact  Gross motor  coordination:   WFL  Neurological:    -       Intact    AMPAC 6 Click ADL:  AMPAC Total Score: 24    Treatment & Education:  Pt educated on role of OT, POC, and goals for therapy.    POC was dicussed with patient/caregiver, who was included in its development and is in agreement with the identified goals and treatment plan.   Patient and family aware of patient's deficits and therapy progression.   Time provided for therapeutic counseling and discussion of health disposition.   Educated on importance of EOB/OOB mobility, maintaining routine, sitting up in chair, and maximizing independence with ADLs during admission   Pt completed ADLs and functional mobility for treatment session as noted above   Pt/caregiver verbalized understanding and expressed no further concerns/questions.  Updated communication board with level of assist required       Patient left HOB elevated with call button in reach and RN notified    GOALS:   Multidisciplinary Problems       Occupational Therapy Goals       Not on file                    History:     Past Medical History:   Diagnosis Date    Basal cell carcinoma (BCC) in situ of skin 2012    3 on face, 2 on arm, removed by dermatology.     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 Underwent surgical resection of tongue and neck. s/p tracheostomy         Past Surgical History:   Procedure Laterality Date    COLONOSCOPY      COLONOSCOPY N/A 9/14/2023    Procedure: COLONOSCOPY;  Surgeon: Reyes Olivia MD;  Location: [address]);  Service: Endoscopy;  Laterality: N/A;    CONJUNCTIVA BIOPSY Right 10/11/2022    Procedure: BIOPSY, CONJUNCTIVA;  Surgeon: Victor M Vasques MD;  Location: [facility];  Service: Ophthalmology;  Laterality:  Right;    ESOPHAGOGASTRODUODENOSCOPY N/A 9/14/2023    Procedure: EGD (ESOPHAGOGASTRODUODENOSCOPY);  Surgeon: Reyes Olivia MD;  Location: [facility] (2ND FLR);  Service: Endoscopy;  Laterality: N/A;    INSERTION OF VENOUS ACCESS PORT Right 3/8/2021    Procedure: INSERTION, VENOUS ACCESS PORT;  Surgeon: Jesus Rendon MD;  [facility];  Service: General;  Laterality: Right;    LIVER TRANSPLANT  11/2008    transplanted for biopsy proven hepatocellular carcinoma,     LYMPH NODE BIOPSY N/A 9/18/2020    Procedure: BIOPSY, LYMPH NODE;  [facility];  Location: [facility] OR 2ND FLR;  Service: General;  Laterality: N/A;  Rm 173    SURGICAL REMOVAL OF SCAR Right 8/24/2023    Procedure: EXCISION, SCAR;  Surgeon: Ting Brambila MD;  [facility];  Service: Ophthalmology;  Laterality: Right;    TRACHEOSTOMY         Time Tracking:     OT Date of Treatment: 09/17/23  OT Start Time: 0918  OT Stop Time: 0927  OT Total Time (min): 9 min    Billable Minutes:Evaluation 9 9/17/2023

## 2023-09-18 ENCOUNTER — PATIENT MESSAGE (OUTPATIENT)
Dept: TRANSPLANT | Facility: CLINIC | Age: 73
End: 2023-09-18
Payer: MEDICARE

## 2023-09-18 VITALS
SYSTOLIC BLOOD PRESSURE: 142 MMHG | TEMPERATURE: 98 F | BODY MASS INDEX: 17.31 KG/M2 | HEIGHT: 68 IN | DIASTOLIC BLOOD PRESSURE: 79 MMHG | RESPIRATION RATE: 16 BRPM | WEIGHT: 114.19 LBS | HEART RATE: 79 BPM | OXYGEN SATURATION: 99 %

## 2023-09-18 DIAGNOSIS — Z94.4 LIVER TRANSPLANTED: Primary | ICD-10-CM

## 2023-09-18 LAB
ALBUMIN SERPL BCP-MCNC: 2.7 G/DL (ref 3.5–5.2)
ALP SERPL-CCNC: 46 U/L (ref 55–135)
ALT SERPL W/O P-5'-P-CCNC: 32 U/L (ref 10–44)
ANION GAP SERPL CALC-SCNC: 5 MMOL/L (ref 8–16)
AST SERPL-CCNC: 48 U/L (ref 10–40)
BACTERIA BLD CULT: NORMAL
BACTERIA BLD CULT: NORMAL
BILIRUB SERPL-MCNC: 0.4 MG/DL (ref 0.1–1)
BUN SERPL-MCNC: 12 MG/DL (ref 8–23)
CALCIUM SERPL-MCNC: 8.6 MG/DL (ref 8.7–10.5)
CHLORIDE SERPL-SCNC: 105 MMOL/L (ref 95–110)
CO2 SERPL-SCNC: 27 MMOL/L (ref 23–29)
CREAT SERPL-MCNC: 1.1 MG/DL (ref 0.5–1.4)
EST. GFR  (NO RACE VARIABLE): >60 ML/MIN/1.73 M^2
GLUCOSE SERPL-MCNC: 92 MG/DL (ref 70–110)
MAGNESIUM SERPL-MCNC: 1.6 MG/DL (ref 1.6–2.6)
PHOSPHATE SERPL-MCNC: 2.5 MG/DL (ref 2.7–4.5)
POTASSIUM SERPL-SCNC: 4 MMOL/L (ref 3.5–5.1)
PROT SERPL-MCNC: 5.5 G/DL (ref 6–8.4)
SODIUM SERPL-SCNC: 137 MMOL/L (ref 136–145)
TACROLIMUS BLD-MCNC: 2.8 NG/ML (ref 5–15)

## 2023-09-18 PROCEDURE — 63600175 PHARM REV CODE 636 W HCPCS: Performed by: HOSPITALIST

## 2023-09-18 PROCEDURE — 25000003 PHARM REV CODE 250: Performed by: HOSPITALIST

## 2023-09-18 PROCEDURE — 99239 PR HOSPITAL DISCHARGE DAY,>30 MIN: ICD-10-PCS | Mod: GC,,, | Performed by: HOSPITALIST

## 2023-09-18 PROCEDURE — 83735 ASSAY OF MAGNESIUM: CPT | Performed by: HOSPITALIST

## 2023-09-18 PROCEDURE — 97161 PT EVAL LOW COMPLEX 20 MIN: CPT

## 2023-09-18 PROCEDURE — 94761 N-INVAS EAR/PLS OXIMETRY MLT: CPT

## 2023-09-18 PROCEDURE — 80053 COMPREHEN METABOLIC PANEL: CPT | Performed by: HOSPITALIST

## 2023-09-18 PROCEDURE — 25000003 PHARM REV CODE 250

## 2023-09-18 PROCEDURE — 99900035 HC TECH TIME PER 15 MIN (STAT)

## 2023-09-18 PROCEDURE — 99239 HOSP IP/OBS DSCHRG MGMT >30: CPT | Mod: GC,,, | Performed by: HOSPITALIST

## 2023-09-18 PROCEDURE — 80197 ASSAY OF TACROLIMUS: CPT | Performed by: STUDENT IN AN ORGANIZED HEALTH CARE EDUCATION/TRAINING PROGRAM

## 2023-09-18 PROCEDURE — 84100 ASSAY OF PHOSPHORUS: CPT | Performed by: HOSPITALIST

## 2023-09-18 RX ORDER — PANTOPRAZOLE SODIUM 40 MG/1
40 TABLET, DELAYED RELEASE ORAL 2 TIMES DAILY
Qty: 168 TABLET | Refills: 0 | Status: SHIPPED | OUTPATIENT
Start: 2023-09-18 | End: 2023-10-20

## 2023-09-18 RX ORDER — PANTOPRAZOLE SODIUM 40 MG/1
40 TABLET, DELAYED RELEASE ORAL 2 TIMES DAILY
Qty: 60 TABLET | Refills: 11 | Status: SHIPPED | OUTPATIENT
Start: 2023-09-18 | End: 2023-09-18 | Stop reason: SDUPTHER

## 2023-09-18 RX ORDER — OXYCODONE HYDROCHLORIDE 5 MG/1
5 TABLET ORAL EVERY 6 HOURS PRN
Qty: 28 TABLET | Refills: 0 | Status: SHIPPED | OUTPATIENT
Start: 2023-09-18 | End: 2023-10-16 | Stop reason: SDUPTHER

## 2023-09-18 RX ORDER — PANTOPRAZOLE SODIUM 40 MG/1
40 TABLET, DELAYED RELEASE ORAL 2 TIMES DAILY
Status: DISCONTINUED | OUTPATIENT
Start: 2023-09-18 | End: 2023-09-18

## 2023-09-18 RX ADMIN — PANTOPRAZOLE SODIUM 40 MG: 40 TABLET, DELAYED RELEASE ORAL at 08:09

## 2023-09-18 RX ADMIN — TACROLIMUS 1 MG: 1 CAPSULE ORAL at 08:09

## 2023-09-18 RX ADMIN — NEOMYCIN SULFATE, POLYMYXIN B SULFATE AND DEXAMETHASONE 1 DROP: 3.5; 10000; 1 SUSPENSION OPHTHALMIC at 08:09

## 2023-09-18 RX ADMIN — OXYCODONE HYDROCHLORIDE 5 MG: 5 TABLET ORAL at 05:09

## 2023-09-18 RX ADMIN — LEVOTHYROXINE SODIUM 88 MCG: 88 TABLET ORAL at 05:09

## 2023-09-18 NOTE — DISCHARGE SUMMARY
[facility] - Transplant Stepdown  Hematology/Oncology  Discharge Summary      Patient Name: Rocco Swain  MRN: [id number]  Admission Date: 9/12/2023  Hospital Length of Stay: 5 days  Discharge Date and Time:  09/18/2023 10:17 AM  Attending Physician: Naomi Leyva MD   Discharging Provider: Enzo Walton MD  Primary Care Provider: GAURANG Walton MD    HPI:   Mr. Swain is a 73-year-old male significant history of laryngeal cancer status post chemo with tongue removal, liver transplantation complicated with cirrhosis on tacro, and history of GI bleed per patient presents with concerns for weakness.  History was given by family in the ED however at time of primary evaluation family was not at bedside.  He does live alone, and has been feeling weak and dizzy over the last several days.  He denies recent sick contacts, fevers, chills.  He does state that he has had a GI bleed in the past requiring a scope.  In the ED melena was down in physical exam.  Lactic 3, hemoglobin less than 4, and hypotension was also noted.  We will concerns for hypovolemic versus distributive shock blood cell transfusion was ordered, along with broad-spectrum antibiotics due to his immunocompromised state.  Patient to be admitted to Medical Oncology for further management and workup, as well as GI evaluation            Oncological Hx:   Squamous cell carcinoma of base of tongue   9/18/2020 Cancer Staged     Staging form: Pharynx - HPV-Mediated Oropharynx, AJCC 8th Edition  - Clinical stage from 9/18/2020: Stage III (rcT4, cN1, cM0, p16+)      9/28/2020 Initial Diagnosis     Squamous cell carcinoma of base of tongue      10/6/2020 - 8/17/2021 Chemotherapy     Treatment Summary   Plan Name: OP HEAD NECK CARBOPLATIN PACLITAXEL C1-2 FOLLOWED BY CETUXIMAB CARBOPLATIN C3-6 FOLLOWED BY CETUXIMAB MAINTENANCE WEEKLY  Treatment Goal: Control  Status: Inactive  Start Date: 10/6/2020  End Date: 8/17/2021  Provider: Ronald Berg MD  Chemotherapy:  cetuximab (ERBITUX) 100 mg/50 mL chemo infusion 684 mg, 400 mg/m2 = 684 mg (100 % of original dose 400 mg/m2), Intravenous, Clinic/[facility] 1 time, 29 of 38 cycles  Dose modification: 500 mg/m2 (original dose 400 mg/m2, Cycle 3), 250 mg/m2 (original dose 400 mg/m2, Cycle 3), 400 mg/m2 (original dose 400 mg/m2, Cycle 3), 250 mg/m2 (original dose 250 mg/m2, Cycle 7), 200 mg/m2 (original dose 250 mg/m2, Cycle 18), 200 mg/m2 (original dose 250 mg/m2, Cycle 19), 250 mg/m2 (original dose 250 mg/m2, Cycle 4), 200 mg/m2 (original dose 250 mg/m2, Cycle 25), 200 mg/m2 (original dose 250 mg/m2, Cycle 28)  Administration: 684 mg (11/17/2020), 400 mg (11/24/2020), 400 mg (2/9/2021), 400 mg (2/17/2021), 427.6 mg (3/9/2021), 400 mg (3/30/2021), 400 mg (3/23/2021), 400 mg (4/6/2021), 427.6 mg (4/13/2021), 427.6 mg (4/20/2021), 342 mg (5/11/2021), 342 mg (5/18/2021), 342 mg (5/25/2021), 400 mg (5/31/2021), 400 mg (6/7/2021), 400 mg (6/14/2021), 400 mg (12/8/2020), 427.6 mg (12/29/2020), 400 mg (12/1/2020), 400 mg (12/15/2020), 400 mg (12/22/2020), 427.6 mg (1/5/2021), 400 mg (1/12/2021), 400 mg (1/19/2021), 427.6 mg (1/26/2021), 400 mg (2/2/2021), 400 mg (2/23/2021), 400 mg (3/2/2021), 427.6 mg (3/16/2021), 400 mg (6/21/2021), 342 mg (6/28/2021), 342 mg (7/6/2021), 342 mg (7/13/2021), 342 mg (7/20/2021), 400 mg (7/27/2021), 400 mg (8/10/2021), 400 mg (8/17/2021)  CARBOplatin (PARAPLATIN) 310 mg in sodium chloride 0.9% 500 mL chemo infusion, 310 mg (92.2 % of original dose 334.5 mg), Intravenous, Clinic/[facility] 1 time, 6 of 6 cycles  Dose modification:   (original dose 334.5 mg, Cycle 1)  Administration: 310 mg (10/6/2020), 370 mg (11/17/2020), 300 mg (10/27/2020), 350 mg (12/8/2020), 335 mg (12/29/2020), 320 mg (1/19/2021)  PACLitaxeL (TAXOL) 175 mg/m2 = 300 mg in sodium chloride 0.9% 500 mL chemo infusion, 175 mg/m2 = 300 mg (100 % of original dose 175 mg/m2), Intravenous, Clinic/[facility] 1 time, 2 of 2 cycles  Dose modification: 175 mg/m2  (original dose 175 mg/m2, Cycle 1)  Administration: 300 mg (10/6/2020), 300 mg (10/27/2020)      4/29/2021 Tumor Conference        His case was discussed at the Multidisciplinary Head and Neck Team Planning Meeting.     Representatives from Medical Oncology, Radiation Oncology, Head and Neck Surgical Oncology, Psychosocial Oncology, and Speech and Language Pathology discussed the case with the following recommendations:     1) biopsy            7/29/2021 Tumor Conference        His case was discussed at the Multidisciplinary Head and Neck Team Planning Meeting.     Representatives from Medical Oncology, Radiation Oncology, Head and Neck Surgical Oncology, Psychosocial Oncology, and Speech and Language Pathology discussed the case with the following recommendations:     1) Head and neck clinic follow up  2) consider Keytruda (discuss with transplant)  3) consider palliative referral            9/13/2021 -  Chemotherapy     Treatment Summary   Plan Name: OP HEAD NECK PEMBROLIZUMAB CARBOPLATIN FLUOROURACIL (C1 ONLY RECEIVED) FOLLOWED BY PEMBROLIZUMAB MAINTENANCE  Treatment Goal: Palliative  Status: Active  Start Date: 9/13/2021  End Date: 9/5/2023 (Planned)  Provider: Ronald Berg MD  Chemotherapy: CARBOplatin (PARAPLATIN) 320 mg in sodium chloride 0.9% 500 mL chemo infusion, 320 mg (100 % of original dose 320.5 mg), Intravenous, [facility] 1 time, 6 of 6 cycles  Dose modification:   (original dose 320.5 mg, Cycle 1)  Administration: 320 mg (9/13/2021), 335 mg (12/23/2021), 325 mg (1/27/2022), 245 mg (3/3/2022), 255 mg (5/12/2022), 255 mg (4/7/2022)  fluorouraciL 1,000 mg/m2/day = 6,440 mg in sodium chloride 0.9% 240 mL chemo infusion, 1,000 mg/m2/day = 6,440 mg, Intravenous, Over 96 hours, 6 of 6 cycles  Dose modification: 800 mg/m2/day (original dose 1,000 mg/m2/day, Cycle 6), 5,000 mg (original dose 1,000 mg/m2/day, Cycle 8)  Administration: 6,440 mg (9/13/2021), 6,440 mg (12/23/2021), 6,480 mg (1/27/2022), 5,000 mg  (3/3/2022), 5,000 mg (4/7/2022), 5,000 mg (5/12/2022)      Secondary malignant neoplasm of cervical lymph node   2/9/2021 Initial Diagnosis     Secondary malignant neoplasm of cervical lymph node      9/13/2021 -  Chemotherapy     Treatment Summary   Plan Name: OP HEAD NECK PEMBROLIZUMAB CARBOPLATIN FLUOROURACIL (C1 ONLY RECEIVED) FOLLOWED BY PEMBROLIZUMAB MAINTENANCE  Treatment Goal: Palliative  Status: Active  Start Date: 9/13/2021  End Date: 9/5/2023 (Planned)  Provider: Ronald Berg MD  Chemotherapy: CARBOplatin (PARAPLATIN) 320 mg in sodium chloride 0.9% 500 mL chemo infusion, 320 mg (100 % of original dose 320.5 mg), Intravenous, Clinic/\[facility] [place] [facility]\"" 1 time, 6 of 6 cycles  Dose modification:   (original dose 320.5 mg, Cycle 1)  Administration: 320 mg (9/13/2021), 335 mg (12/23/2021), 325 mg (1/27/2022), 245 mg (3/3/2022), 255 mg (5/12/2022), 255 mg (4/7/2022)  fluorouraciL 1,000 mg/m2/day = 6,440 mg in sodium chloride 0.9% 240 mL chemo infusion, 1,000 mg/m2/day = 6,440 mg, Intravenous, Over 96 hours, 6 of 6 cycles  Dose modification: 800 mg/m2/day (original dose 1,000 mg/m2/day, Cycle 6), 5,000 mg (original dose 1,000 mg/m2/day, Cycle 8)  Administration: 6,440 mg (9/13/2021), 6,440 mg (12/23/2021), 6,480 mg (1/27/2022), 5,000 mg (3/3/2022), 5,000 mg (4/7/2022), 5,000 mg (5/12/2022)       Procedure(s) (LRB):  EGD (ESOPHAGOGASTRODUODENOSCOPY) (N/A)  COLONOSCOPY (N/A)     Hospital Course:   Presented after feeling weak and dizzy over the last several days.  He denies recent sick contacts, fevers, chills.  He does state that he has had a GI bleed in the past requiring a scope.  In the ED, Lactic 3, hemoglobin less than 4, and hypotension was also noted. RBC transfusion was ordered, along with broad-spectrum antibiotics due to his immunocompromised state.GI consult for possible scope, Trend Hgb q8hrs, transfuse 3u pRBC for Hb 3, diet NPO, Bolus IV pantoprazole 80mg followed by 40mg BID. Antibiotics d/c after NGTD on Bcx.  Procedure completed with GI, HH stable throughout stay. Completed 72 hours of IV Protonix, transitioned to 12 weeks PO Protonix BID. Tolerating regular diet without issue.       Review of Systems   Constitutional:  Positive for fatigue. Negative for chills and fever.   Respiratory:  Negative for shortness of breath and wheezing.    Cardiovascular:  Negative for chest pain and leg swelling.   Gastrointestinal:  Negative for abdominal distention and abdominal pain.   Neurological:  Positive for dizziness and weakness.   Psychiatric/Behavioral:  Negative for agitation, behavioral problems and confusion.       Objective:      Vital Signs (Most Recent):  Temp: 98.3 °F (36.8 °C) (09/17/23 1142)  Pulse: 74 (09/17/23 1142)  Resp: 14 (09/17/23 1142)  BP: 132/79 (09/17/23 1142)  SpO2: 100 % (09/17/23 1142) Vital Signs (24h Range):  Temp:  [97.7 °F (36.5 °C)-98.3 °F (36.8 °C)] 98.3 °F (36.8 °C)  Pulse:  [69-79] 74  Resp:  [14-19] 14  SpO2:  [98 %-100 %] 100 %  BP: (123-155)/(66-91) 132/79      Weight: 51.8 kg (114 lb 3.2 oz)  Body mass index is 17.36 kg/m².  Body surface area is 1.58 meters squared.        Intake/Output Summary (Last 24 hours) at 9/17/2023 1508  Last data filed at 9/17/2023 0525      Gross per 24 hour   Intake 1404 ml   Output 675 ml   Net 729 ml            Physical Exam  Constitutional:       Appearance: He is cachectic. He is ill-appearing. He is not toxic-appearing or diaphoretic.   HENT:      Head: Normocephalic and atraumatic.   Cardiovascular:      Rate and Rhythm: Normal rate and regular rhythm.      Pulses: Normal pulses.      Heart sounds: Normal heart sounds. No murmur heard.  Pulmonary:      Effort: Pulmonary effort is normal. No respiratory distress.      Breath sounds: Normal breath sounds.   Abdominal:      General: There is no distension.      Tenderness: There is no abdominal tenderness.   Skin:     General: Skin is warm and dry.   Neurological:      General: No focal deficit present.       Mental Status: He is alert, oriented to person, place, and time and easily aroused. Mental status is at baseline.   Psychiatric:         Mood and Affect: Mood normal.         Behavior: Behavior normal. Behavior is cooperative.         Judgment: Judgment normal.            Significant Labs:   All pertinent labs from the last 24 hours have been reviewed.     Diagnostic Results:     Findings on EGD:  - Nonobstructing schatzki ring. Dilated with resultant bleeding. Treated with bipolar cautery.   - Scar in the gastric antrum.   - Oozing duodenal ulcers. Injected. Treated with bipolar cautery.    - No specimens collected.      Findings on colonoscopy:  Old blood throughout colon. No active bleeding. Prep not adequate to detect polyps. See full Provation report for further details.           Goals of Care Treatment Preferences:  Code Status: Full Code    Living Will: Yes     What is most important right now is to focus on remaining as independent as possible, symptom/pain control.  Accordingly, we have decided that the best plan to meet the patient's goals includes continuing with treatment.      Consults:   Consults (From admission, onward)          Status Ordering Provider     Inpatient consult to Hepatology  Once        Provider:  (Not yet assigned)    Completed GINNY WAHL     Inpatient consult to Gastroenterology  Once        Provider:  (Not yet assigned)    Completed GINNY WAHL     Inpatient consult to Registered Dietitian/Nutritionist  Once        Provider:  (Not yet assigned)    Completed GINNY WAHL     Inpatient consult to Critical Care Medicine  Once        Provider:  (Not yet assigned)    Completed GLENN DIXON            Significant Diagnostic Studies: Labs:   CBC   Recent Labs   Lab 09/17/23  0530   WBC 4.14   HGB 8.2*   HCT 24.7*   *       Pending Diagnostic Studies:       Procedure Component Value Units Date/Time    CBC with Automated Differential [[id number]] Collected:  09/14/23 0647    Order Status: Sent Lab Status: In process Updated: 09/14/23 0647    Specimen: Blood     Comprehensive Metabolic Panel (CMP) [[id number]] Collected: 09/14/23 0647    Order Status: Sent Lab Status: In process Updated: 09/14/23 0647    Specimen: Blood     Magnesium [[id number]] Collected: 09/14/23 0647    Order Status: Sent Lab Status: In process Updated: 09/14/23 0647    Specimen: Blood     Phosphorus [[id number]] Collected: 09/14/23 0647    Order Status: Sent Lab Status: In process Updated: 09/14/23 0647    Specimen: Blood     Tacrolimus level [[id number]] Collected: 09/14/23 0647    Order Status: Sent Lab Status: In process Updated: 09/14/23 0647    Specimen: Blood           Final Active Diagnoses:    Diagnosis Date Noted POA    PRINCIPAL PROBLEM:  GI bleed [K92.2] 09/13/2023 Unknown    Anemia [D64.9] 09/13/2023 Unknown    ACP (advance care planning) [Z71.89] 09/13/2023 Not Applicable    Body mass index (BMI) less than 19 [Z68.1] 09/13/2023 Not Applicable    Severe sepsis [A41.9, R65.20] 09/13/2023 Unknown    Mild malnutrition [E44.1] 09/13/2023 Yes    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  Medications:    Reconciled Home Medications:      Medication List        START taking these medications      oxyCODONE 5 MG immediate release tablet  Commonly known as: ROXICODONE  Take 1 tablet (5 mg total) by mouth every 6 (six) hours as needed for Pain.      pantoprazole 40 MG tablet  Commonly known as: PROTONIX  Take 1 tablet (40 mg total) by mouth 2 (two) times a day.            CHANGE how you take these medications      imiquimod 5 % cream  Commonly known as: ALDARA  Apply to biopsy site on frontal scalp + about a centimeter of surrounding skin qhs x 16 weeks. Wash off in am and apply sunscreen. Discontinue if skin becomes blistered, raw, bleeding, painful, etc.  What changed: Another medication with the same name was removed. Continue taking this medication, and follow the directions you see here.     moxifloxacin 0.5 % ophthalmic solution  Commonly known as: VIGAMOX  Place 1 drop into the right eye 4 (four) times daily.  What changed: Another medication with the same name was removed. Continue taking this medication, and follow the directions you see here.            CONTINUE taking these medications      azelaic acid 15 % gel  Commonly known as: AZELEX  APPLY TOPICALLY TO AFFECTED AREA IN THE MORNING     CENTRUM SILVER MEN ORAL  Take 1 tablet by mouth.     gabapentin 300 MG capsule  Commonly known as: NEURONTIN  Take 1 capsule (300 mg total) by mouth every evening.     ibuprofen 200 MG tablet  Commonly known as: ADVIL,MOTRIN  Take 200 mg by mouth.     levothyroxine 88 MCG tablet  Commonly known as: SYNTHROID  TAKE 1 TABLET BY MOUTH ONCE DAILY     LIDOcaine HCl 2% 2 % Soln  Commonly known as: LIDOcaine VISCOUS  Swish and spit 15 mls every 8 (eight) hours as needed (mouth sore).     LIDOcaine-prilocaine cream  Commonly known as: EMLA  Apply generously to port site 30-60 min prior to chemo and then cover with saran wrap.     magic mouthwash diphen/antac/lidoc/nysta  Take 10 mLs by mouth 4 (four) times daily.     metroNIDAZOLE 0.75 % gel  Commonly known as: METROGEL  Apply topically to affected area 2 (two) times daily.     neomycin-polymyxin-dexamethasone 3.5mg/mL-10,000 unit/mL-0.1 % Drps  Commonly known as: MAXITROL  Place 1 drop into the right eye 4 (four) times  daily.     prednisoLONE acetate 1 % Drps  Commonly known as: PRED FORTE  Place 1 drop into the right eye 4 (four) times daily.     sulfacetamide sodium-sulfur 10-5 % (w/w) Clsr  USE TO WASH AFFECTED AREA DAILY     * tacrolimus 0.5 MG Cap  Commonly known as: PROGRAF  Take 1 capsule (0.5 mg total) by mouth every EVENING.  (Use the 1mg capsule for your morning dose)     * tacrolimus 0.5 MG Cap  Commonly known as: PROGRAF  Take 1 capsule (0.5 mg total) by mouth every evening. Use the 1mg capsule for your morning dose     * tacrolimus 1 MG Cap  Commonly known as: PROGRAF  Take 1 capsule (1 mg total) by mouth every morning. Use the tacrolimus 0.5mg capsule for your evening dose as directed.     tiZANidine 2 MG tablet  Commonly known as: ZANAFLEX  Take 1 tablet (2 mg total) by mouth nightly as needed (neck muscle strain).     traZODone 50 MG tablet  Commonly known as: DESYREL  Take 1 tablet (50 mg total) by mouth every evening.     vitamin E 400 UNIT capsule  Take 400 Units by mouth once daily.           * This list has 3 medication(s) that are the same as other medications prescribed for you. Read the directions carefully, and ask your doctor or other care provider to review them with you.                STOP taking these medications      loteprednol 0.5 % ophthalmic suspension  Commonly known as: LOTEMAX              Enzo Walton MD  Hematology/Oncology  [facility] - Trans[facility]

## 2023-09-18 NOTE — ASSESSMENT & PLAN NOTE
73 year old wit presents with concerns of dizziness, and fatigue with hemoglobin less than 4, lactic a history.  Melena seen on stools concerns for acute GI bleed.  Patient is immunocompromise status post finishing chemotherapy as well as liver transplant recipient on tacro.  Can not completely rule out infection currently though more suspicious of hypovolemic vs distributive in terms of possible shock.     - s/p EGD / C scope, full read in subjective  - Trend Hgb daily. Stable. Transfuse for Hgb <7, unless otherwise indicated  - Maintain IV access with 2 large bore Ivs  - Intravascular resuscitation/support with IVFs   - Diet as tolerated  - Hold all NSAIDs and anticoagulants, unless contraindicated  -s/p 72 hrs IV PPI today, Day of d/c is first day of PO PPI BID for 12 weeks

## 2023-09-18 NOTE — PLAN OF CARE
- Patient is alert and orientedX4.  - Calm and cooperative.  - S/P Laryngectomy and Glossectomy. Writes on notepad to communicate.  - VSS. Afebrile. Spo2 maintained@RA.  - Scheduled medicines given as per MAR.  - Oxycodone given for pain. Moderate relief obtained.  - IV pantoprazole dose completed. Switching to PO pantop from today.  - Patient has a trach stoma. Tracheostomy care done by the patient independently. Respiratory therapy following.  - H=8.2   - Regular diet.  - Power Port A Cath Single Lumen in situ. Accessed.   - Anticipated discharge today.  - Bed in low position. Wheels locked. Call light within patient's reach.  - Non skid socks on when out of bed.  - Will continue to monitor.

## 2023-09-18 NOTE — RESPIRATORY THERAPY
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  Clinically Significant Surgical Hx: laryngectomy    24 Hours Vitals Range:  Temp:  [98 °F (36.7 °C)-98.5 °F (36.9 °C)]   Pulse:  [70-78]   Resp:  [16-18]   BP: (108-152)/(56-82)   SpO2:  [98 %-100 %]     Labs:    Recent Labs     23  0604 23  0530 23  0545    137 137   K 3.7 3.4* 4.0    105 105   CO2 24 28 27   BUN 12 11 12   CREATININE 0.9 1.0 1.1   GLU 88 92 92   PHOS 2.5* 2.6* 2.5*   MG 1.4* 1.3* 1.6        No results for input(s): "PH", "PCO2", "PO2", "HCO3", "POCSATURATED", "BE" in the last 72 hours.    ASSESSMENT/INTERVENTIONS  All supplies at bedside       Last VS   Temp: 98 °F (36.7 °C) ( 1102)  Pulse: 71 ( 1148)  Resp: 17 ( 1148)  BP: 134/64 ( 1102)  SpO2: 98 % ( 1148)      Extra trachs at bedside: 678 ETT  Level of Consciousness: Level of Consciousness (AVPU): alert  Respiratory Effort: Respiratory Effort: Unlabored Expansion/Accessory Muscle Usage: Expansion/Accessory Muscles/Retractions: expansion symmetric  All Lung Field Breath Sounds: All Lung Fields Breath Sounds: Anterior:, Posterior:, Lateral:, diminished  O2 Device/Concentration: RA  Surgical airway: Yes, laryngectomy,     Ambu at bedside:       Active Orders   Respiratory Care    Stoma Care by RT BID WAKE     Frequency: BID WAKE     Number of Occurrences: Until Specified       RECOMMENDATIONS    We recommend: RRT Recs: Continue POC per primary team.      FOLLOW-UP    Please call back the Rapid Response RT, " Katie Serrato, RRT at x [phone] for any questions or concerns.

## 2023-09-18 NOTE — PROGRESS NOTES
Message via Epic staff message sent to post transplant coordinator regarding need for labs at end of the week and regular follow up after this hospital stay.

## 2023-09-18 NOTE — PT/OT/SLP EVAL
Physical Therapy Evaluation and Discharge Note    Patient Name:  Rocco Swain   MRN:  [id number]    Recommendations:     Discharge Recommendations: other (see comments)  Discharge Equipment Recommendations: none   Barriers to discharge: None    Assessment:     Rocco Swain is a 73 y.o. male admitted with a medical diagnosis of GI bleed. .  At this time, patient is functioning at their prior level of function and does not require further acute PT services.     Recent Surgery: Procedure(s) (LRB):  EGD (ESOPHAGOGASTRODUODENOSCOPY) (N/A)  COLONOSCOPY (N/A) 4 Days Post-Op    Plan:     During this hospitalization, patient does not require further acute PT services.  Please re-consult if situation changes.      Subjective     Chief Complaint: no specific c/o  Patient/Family Comments/goals: pt. Agreeable to PT  Pain/Comfort:  Pain Rating 1: 0/10  Pain Rating Post-Intervention 1: 0/10    Patients cultural, spiritual, Adventist conflicts given the current situation: no    Living Environment:  Pt. Lives alone in 2-story home with bedroom/bathroom upstairs ~15 steps with handrail  Prior to admission, patients level of function was indep.  Equipment used at home: none.  Upon discharge, patient will not have assistance.    Objective:     Communicated with nursing prior to session.  Patient found supine with peripheral IV upon PT entry to room.    General Precautions: Standard, fall    Orthopedic Precautions:N/A   Braces: N/A  Respiratory Status: Room air    Exams:  RLE ROM: WFL  RLE Strength: WFL  LLE ROM: WFL  LLE Strength: WFL    Functional Mobility:  Bed Mobility:     Rolling Right: modified independence  Scooting: modified independence  Supine to Sit: modified independence  Sit to Supine: modified independence  Transfers:     Sit to Stand:  modified independence with no AD  Gait: 120' with Mod. Indep. without AD or LOB  Balance: good  Stairs:  Pt ascended/descended 20 stair(s) with No Assistive Device with left handrail  with Supervision or Set-up Assistance.     AM-PAC 6 CLICK MOBILITY  Total Score:24       Treatment and Education:  Discussed therapy needs, goals, and POC.    AM-PAC 6 CLICK MOBILITY  Total Score:24     Patient left supine with all lines intact and call button in reach.    GOALS:   Multidisciplinary Problems       Physical Therapy Goals       Not on file              Multidisciplinary Problems (Resolved)          Problem: Physical Therapy    Goal Priority Disciplines Outcome Goal Variances Interventions   Physical Therapy Goal   (Resolved)     PT, PT/OT Met                         History:     Past Medical History:   Diagnosis Date    Basal cell carcinoma (BCC) in situ of skin 2012    3 on face, 2 on arm, removed by dermatology.     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 Underwent surgical resection of tongue and neck. s/p tracheostomy       Past Surgical History:   Procedure Laterality Date    COLONOSCOPY      COLONOSCOPY N/A 9/14/2023    Procedure: COLONOSCOPY;  Surgeon: Reyes Olivia MD;  Location: [address]);  Service: Endoscopy;  Laterality: N/A;    CONJUNCTIVA BIOPSY Right 10/11/2022    Procedure: BIOPSY, CONJUNCTIVA;  Surgeon: Victor M Vasques MD;  Location: [facility];  Service: Ophthalmology;  Laterality: Right;    ESOPHAGOGASTRODUODENOSCOPY N/A 9/14/2023    Procedure: EGD (ESOPHAGOGASTRODUODENOSCOPY);  Surgeon: Reyes Olivia MD;  Location: [address]);  Service: Endoscopy;  Laterality: N/A;    INSERTION OF VENOUS ACCESS PORT Right 3/8/2021    Procedure: INSERTION, VENOUS ACCESS PORT;  Surgeon: Jesus Rendon MD;  [address];  Service: General;  Laterality: Right;    LIVER TRANSPLANT  11/2008    transplanted for  biopsy proven hepatocellular carcinoma,     LYMPH NODE BIOPSY N/A 9/18/2020    Procedure: BIOPSY, LYMPH NODE;  Surgeon: Taz Diagnostic Provider;  Location: [facility] OR [address] [place] [address];  Service: General;  Laterality: N/A;  Rm 173    SURGICAL REMOVAL OF SCAR Right 8/24/2023    Procedure: EXCISION, SCAR;  Surgeon: Ting Brambila MD;  [facility];  Service: Ophthalmology;  Laterality: Right;    TRACHEOSTOMY         Time Tracking:     PT Received On: 09/18/23  PT Start Time: 1103     PT Stop Time: 1109  PT Total Time (min): 6 min     Billable Minutes: Evaluation 6      09/18/2023

## 2023-09-18 NOTE — PROGRESS NOTES
"On call ALIDA notified that patient may dc on 9/17 but attending had concerns. ALIDA reviewed chart, requested PT/OT consult as patient arrived with dizziness and weakness.    ALIDA spoke to patients brother, Ollie. Ollie explained that patient lives alone but "is remarkably independent." Balance has never been a concern until this hospitalization. Ollie lives 1 mile away and checks in with the patient at least daily. Ollie reports he has no concerns about patient returning home.    ALIDA notified team.   "

## 2023-09-18 NOTE — PROGRESS NOTES
Admit Assessment    Patient Identification  Rocco Swain   :  1950  Admit Date:  2023  Attending Provider:  Naomi Leyva MD              Referral:   Patient was admitted to Medical Oncology Service with a diagnosis of GI bleed  Weakness [R53.1]  History of cancer [Z85.9]  Chest pain [R07.9]  Anemia, unspecified type [D64.9]  Sepsis, due to unspecified organism, unspecified whether acute organ dysfunction present [A41.9].    Additional oncologic and medical history is noted in H&P, in chart.    Oncology Social Worker is involved. Patient was referred to the Social Work Department via admission list/census - routine referral. Patient presents as a 73 y.o. year old  male.    Persons interviewed: patient    Living Situation:    Patient resides at [address] [place], [place] [id number], phone: [phone] (home).      Patient was previously ambulatory and independent with ADLs.     (RETIRED) Functional Status Prior  Ambulation Prior: 0-->independent  Transferrin-->independent  Toiletin-->independent    Current or Past Agencies and Description of Services/Supplies    DME  Equipment Currently Used at Home: none    Home Health  None currently    IV Infusion  None currently    Nutrition: oral diet    Outpatient Pharmacy:     [facility] - [place] [id number] [address] [address]  Phone: [phone] Fax: [phone]    [facility] [address] [address]  Phone: [phone] Fax: [phone]    [facility] - [facility] [address]  [address]  [facility] [address]  [facility] [address]  Phone: [phone] Fax: [phone]    [facility] [address] [address]  Phone: [phone] Fax: [phone]      Patient Preference of agencies include: as listed above      Patient/Caregiver informed of right to choose providers or agencies.  Patient provides permission to release any necessary information to [facility] and to Non[facility] agencies as needed to facilitate patient care, treatment planning, and patient discharge planning.  Written and verbal resources will be provided as needed/requested.      Coping  As to situation.  Family support.          Adjustment to Diagnosis and Treatment  As to situation; ongoing process.      Emotional/Behavioral/Cognitive Issues  None noted/observed.          History/Current Symptoms of Anxiety/Depression: see H&P    History/Current Substance Use:  As per chart:  Social History     Tobacco Use    Smoking status: Never    Smokeless tobacco: Never   Substance and Sexual Activity    Alcohol use: Yes     Comment: drinks wine, 1 glass day    Drug use: Not Currently    Sexual activity: Yes     Comment: No prior history of STD        Indications of Abuse/Neglect: No  As per chart:  Abuse Screen (yes response referral indicated)  Feels Unsafe at Home or Work/School: no  Feels Threatened by Someone: no  Does anyone try to keep you from having contact with others or doing things outside your home?: no  Physical Signs of Abuse Present: no      Financial:  Payer/Plan Subscr  Sex Relation Sub. Ins. ID Effective Group Num   1. MEDICARE - [place]* FARIBA AIKEN 1950 Male Self [id number]                                    [address]   2. [facility]* FARIBA AIKEN 1950 Male Self [id number] 3/1/19                                    [address]          Other identified concerns/needs:    Plan: Patient to return home via family car at discharge from the hospital.    Interventions/Referrals:     Patient/caregiver engaged in treatment planning process.    Oncology Social Worker providing psychosocial and supportive counseling, resources, education, assistance and discharge planning as appropriate.  Patient/caregiver state understanding of   available resources,  following, remains available. Provided contact information for Oncology Social Worker.

## 2023-09-18 NOTE — PLAN OF CARE
Pt d/c from TSU. VSS. Port de-accessed. AVS printed & reviewed w/ pt. Upcoming appointments & d/c instructions reviewed, pt expressed understanding. Prescriptions delivered to bedside. Pt transported off unit via wheelchair w/ pt belongings.

## 2023-09-18 NOTE — PLAN OF CARE
CHW met with patient/family at bedside. Patient experience rounding completed and reviewed the following.     Do you know your discharge plan? Yes     If yes, what is the plan? Home    Have you discussed your needs and preferences with your SW/CM? Yes     If you are discharging home, do you have help at home? Yes     Do you think you will need help additional at home at discharge? No     Do you currently have difficulty keeping up with bills, affording medicine or buying food? No    Assigned SW/CM notified of any patient/family needs or concerns. Appropriate resources provided to address patient's needs.

## 2023-09-19 NOTE — PHYSICIAN QUERY
PT Name: Rocco Swain  MR #: [id number]     DOCUMENTATION CLARIFICATION     CDS/: Etta Duong RN          Contact Information: [email or web address]    This form is a permanent document in the medical record.     Query Date: September 19, 2023    By submitting this query, we are merely seeking further clarification of documentation.  Please utilize your independent clinical judgment when addressing the question(s) below.  The Medical Record contains the following:  Indicators Supporting Clinical Findings Location in Medical Record   x Acute Illness (e.g. AMI, Sepsis, etc.) GI bleed, melena  concerns for hypovolemic versus distributive shock blood cell transfusion was ordered, along with broad-spectrum antibiotics due to his immunocompromised state 9/13 H&P   x Vital Signs  T 98.2, HR 88, RR 16, BP 90/54  T 97.5-99.3, HR 86-95, RR 11-17, BP 98/50 9/12 vitals  9/13 vitals   x Acidosis documented Lactic acidosis 9/13 Heptalogy consult   x ABGs/Labs  09/13/23 01:35 09/13/23 08:50 09/13/23 18:56   Hemoglobin 3.4 (LL) 4.7 (LL) 8.5 (L)   Hematocrit 10.4 (LL) 14.7 (LL) 25.0 (L)      09/12/23 23:36 09/13/23 00:53   POC Lactate 3.84 (HH) 2.77 (H)    Lab results    x Hypotension or Low Blood Pressure documented Mild hypotension on arrival, other vitals are normal    Lactic 3, hemoglobin less than 4, and hypotension was also noted    Pt was hypotensive with MAP of 68 at time of presentation 9/12 ED Provider Note    9/13 H&P      9/13 GI Consult     Altered Mental Status or Confusion      Diaphoresis, Cold Extremities or Cyanosis      Oliguria     x Medication/Treatment  -Vasopressors  -Inotropic Drugs  -IV Fluids   -IV Antibiotics  -Cardiac Assist Devices  -Hemodynamic Monitoring  -Blood/Blood Products lactated ringers bolus 1,000 mL IV x1   piperacillin-tazobactam (ZOSYN) 4.5 g IVPB q8 hrs x2   vancomycin 1,250 mg IVPB x1    Transfuse RBC 3 Units  9/12 medication  9/13 medication       9/13 Transfusions    Other        Provider, please specify diagnosis or diagnoses associated with above clinical findings.    [  x ] Hypovolemic Shock   [    ] Hemorrhagic Shock   [    ] Shock ruled out   [    ] Other Shock (please specify): __________   [    ] Other Condition (please specify): _________   [  ] Clinically Undetermined         Please document in your progress notes daily for the duration of treatment until resolved and include in your discharge summary.     Form No. [id number]

## 2023-09-19 NOTE — PHYSICIAN QUERY
PT Name: Rocco Swain  MR #: [id number]    DOCUMENTATION CLARIFICATION      CDS/: Etta Duong RN          Contact Information: [email or web address]      This form is a permanent document in the medical record.      Query Date: September 19, 2023    By submitting this query, we are merely seeking further clarification of documentation. Please utilize your independent clinical judgment when addressing the question(s) below.    The Medical Record contains the following:   Indicators  Supporting Clinical Findings Location in Medical Record     x Anemia documented anemia 9/13 H&P-9/18 DC Summary     x H&H  09/13/23 01:35 09/13/23 08:50 09/13/23 18:56   Hemoglobin 3.4 (LL) 4.7 (LL) 8.5 (L)   Hematocrit 10.4 (LL) 14.7 (LL) 25.0 (L)    Lab results      x BP                    HR HR 88, BP 90/54  HR 86-95, BP 98/50 9/12 vitals  9/13 vitals     x Bleeding GI bleed, melena 9/13 H&P    Procedure/Surgery Performed/EBL       x Transfusion(s) Transfuse RBC 3 Units  9/13 Transfusions     x Acute/Chronic illness GI bleed, Conjunctival intraepithelial neoplasm s/p chemo, thrombocytopenia, CKD stage IV 9/13 H&P     x Treatments Bolus IV pantoprazole 80mg followed by 40mg BID    Completed 72 hours of IV Protonix, transitioned to 12 weeks PO Protonix BID 9/13 H&P    9/18 DC Summary     x Other Findings on colonoscopy today:  Old blood throughout colon. No active bleeding. 9/14 GI Treatment Plan     Provider, please further specify the Anemia diagnosis or diagnoses associated with above clinical findings.     [ x] Acute blood loss anemia    [   ] Anemia due to other (please specify): _________________   [   ] Clinically Undetermined            Please document in your progress notes daily for the duration of treatment, until resolved, and include in your discharge summary.    Form No. [id number]

## 2023-09-19 NOTE — PHYSICIAN QUERY
PT Name: Rocco Swain  MR #: [id number]     DOCUMENTATION CLARIFICATION     CDS/: Etta Duong RN          Contact Information: [email or web address]    This form is a permanent document in the medical record.     Query Date: September 19, 2023    By submitting this query, we are merely seeking further clarification of documentation.  Please utilize your independent clinical judgment when addressing the question(s) below.  The Medical Record contains the following:  Indicators Supporting Clinical Findings Location in Medical Record   x HR         RR          BP        Temp T 98.2, HR 88, RR 16, BP 90/54  T 97.5-99.3, HR 86-95, RR 11-17, BP 98/50 9/12 vitals  9/13 vitals   x Lactic Acid          Procalcitonin  09/13/23 05:09   Lactate, Harish 1.5      09/12/23 23:36 09/13/23 00:53   POC Lactate 3.84 (HH) 2.77 (H)    Lab results   x WBC           Bands          CRP     09/13/23 01:35 09/14/23 00:51   WBC 8.81 4.23    Lab results     Culture(s)      AMS, Confusion, LOC, etc.      Organ Dysfunction/Failure     x Bacteremia or Sepsis / Septic Severe sepsis 9/13 H&P-9/18 DC Summary   x Known or Suspected Source of Infection documented No current infectious foci found 9/13 H&P    (Failed) Outpatient Treatment     x Medication piperacillin-tazobactam (ZOSYN) 4.5 g IVPB q8 hrs x2   vancomycin 1,250 mg IVPB x1 9/13 medication    x Treatment lactated ringers bolus 1,000 mL IV x1  9/12 medicaiton    Other Patient is immunocompromise status post finishing chemotherapy as well as liver transplant recipient on tacro. 9/13 H&P        Due to the conflicting clinical picture, please clinically validate the diagnosis of Severe sepsis.    If validated, please provide additional clinical support for the diagnosis.     [ x ] Severe sepsis is not confirmed and/or it has been ruled out   [   ] Severe sepsis is not confirmed and/or it has been ruled out, other diagnosis ruled in (please          specify):_______________   [   ]  Severe sepsis is confirmed. Please specify clinical support (signs, symptoms, and treatment) for  the confirmed diagnosis: ____________________   [   ] Other clarification (please specify): ___________________       Please document in your progress notes daily for the duration of treatment until resolved and include in your discharge summary.

## 2023-09-21 ENCOUNTER — INFUSION (OUTPATIENT)
Dept: INFUSION THERAPY | Facility: HOSPITAL | Age: 73
End: 2023-09-21
Attending: STUDENT IN AN ORGANIZED HEALTH CARE EDUCATION/TRAINING PROGRAM
Payer: MEDICARE

## 2023-09-21 ENCOUNTER — OFFICE VISIT (OUTPATIENT)
Dept: HEMATOLOGY/ONCOLOGY | Facility: CLINIC | Age: 73
End: 2023-09-21
Payer: MEDICARE

## 2023-09-21 ENCOUNTER — PATIENT MESSAGE (OUTPATIENT)
Dept: HEMATOLOGY/ONCOLOGY | Facility: CLINIC | Age: 73
End: 2023-09-21

## 2023-09-21 VITALS
OXYGEN SATURATION: 98 % | SYSTOLIC BLOOD PRESSURE: 129 MMHG | HEIGHT: 68 IN | DIASTOLIC BLOOD PRESSURE: 73 MMHG | WEIGHT: 118.19 LBS | HEART RATE: 86 BPM | RESPIRATION RATE: 18 BRPM | BODY MASS INDEX: 17.91 KG/M2 | TEMPERATURE: 98 F

## 2023-09-21 DIAGNOSIS — R53.83 FATIGUE, UNSPECIFIED TYPE: ICD-10-CM

## 2023-09-21 DIAGNOSIS — T86.49 CIRRHOSIS OF TRANSPLANTED LIVER: ICD-10-CM

## 2023-09-21 DIAGNOSIS — C01 SQUAMOUS CELL CARCINOMA OF BASE OF TONGUE: Primary | ICD-10-CM

## 2023-09-21 DIAGNOSIS — D50.0 IRON DEFICIENCY ANEMIA DUE TO CHRONIC BLOOD LOSS: ICD-10-CM

## 2023-09-21 DIAGNOSIS — H11.9 CONJUNCTIVAL LESION: ICD-10-CM

## 2023-09-21 DIAGNOSIS — Z79.899 IMMUNODEFICIENCY DUE TO DRUGS: ICD-10-CM

## 2023-09-21 DIAGNOSIS — R68.84 JAW PAIN: ICD-10-CM

## 2023-09-21 DIAGNOSIS — Z93.0 TRACHEOSTOMY STATUS: ICD-10-CM

## 2023-09-21 DIAGNOSIS — N18.4 CHRONIC KIDNEY DISEASE (CKD), STAGE IV (SEVERE): ICD-10-CM

## 2023-09-21 DIAGNOSIS — K26.4 GASTROINTESTINAL HEMORRHAGE ASSOCIATED WITH DUODENAL ULCER: ICD-10-CM

## 2023-09-21 DIAGNOSIS — D84.821 IMMUNODEFICIENCY DUE TO DRUGS: ICD-10-CM

## 2023-09-21 DIAGNOSIS — Z94.4 STATUS POST LIVER TRANSPLANTATION: ICD-10-CM

## 2023-09-21 DIAGNOSIS — K74.60 CIRRHOSIS OF TRANSPLANTED LIVER: ICD-10-CM

## 2023-09-21 DIAGNOSIS — C77.0 SECONDARY MALIGNANT NEOPLASM OF CERVICAL LYMPH NODE: ICD-10-CM

## 2023-09-21 LAB
ALBUMIN SERPL BCP-MCNC: 3.2 G/DL (ref 3.5–5.2)
ALP SERPL-CCNC: 55 U/L (ref 55–135)
ALT SERPL W/O P-5'-P-CCNC: 33 U/L (ref 10–44)
ANION GAP SERPL CALC-SCNC: 8 MMOL/L (ref 8–16)
AST SERPL-CCNC: 52 U/L (ref 10–40)
BASOPHILS # BLD AUTO: 0.02 K/UL (ref 0–0.2)
BASOPHILS NFR BLD: 0.6 % (ref 0–1.9)
BILIRUB SERPL-MCNC: 0.3 MG/DL (ref 0.1–1)
BUN SERPL-MCNC: 19 MG/DL (ref 8–23)
CALCIUM SERPL-MCNC: 9.1 MG/DL (ref 8.7–10.5)
CHLORIDE SERPL-SCNC: 105 MMOL/L (ref 95–110)
CO2 SERPL-SCNC: 24 MMOL/L (ref 23–29)
CREAT SERPL-MCNC: 1.4 MG/DL (ref 0.5–1.4)
DIFFERENTIAL METHOD: ABNORMAL
EOSINOPHIL # BLD AUTO: 0.1 K/UL (ref 0–0.5)
EOSINOPHIL NFR BLD: 3.4 % (ref 0–8)
ERYTHROCYTE [DISTWIDTH] IN BLOOD BY AUTOMATED COUNT: 17.5 % (ref 11.5–14.5)
EST. GFR  (NO RACE VARIABLE): 52.7 ML/MIN/1.73 M^2
GLUCOSE SERPL-MCNC: 98 MG/DL (ref 70–110)
HCT VFR BLD AUTO: 22.4 % (ref 40–54)
HGB BLD-MCNC: 7.6 G/DL (ref 14–18)
IMM GRANULOCYTES # BLD AUTO: 0.01 K/UL (ref 0–0.04)
IMM GRANULOCYTES NFR BLD AUTO: 0.3 % (ref 0–0.5)
LYMPHOCYTES # BLD AUTO: 0.7 K/UL (ref 1–4.8)
LYMPHOCYTES NFR BLD: 19.9 % (ref 18–48)
MCH RBC QN AUTO: 29.3 PG (ref 27–31)
MCHC RBC AUTO-ENTMCNC: 33.9 G/DL (ref 32–36)
MCV RBC AUTO: 87 FL (ref 82–98)
MONOCYTES # BLD AUTO: 0.5 K/UL (ref 0.3–1)
MONOCYTES NFR BLD: 13.1 % (ref 4–15)
NEUTROPHILS # BLD AUTO: 2.2 K/UL (ref 1.8–7.7)
NEUTROPHILS NFR BLD: 62.7 % (ref 38–73)
NRBC BLD-RTO: 0 /100 WBC
PLATELET # BLD AUTO: 177 K/UL (ref 150–450)
PMV BLD AUTO: 9.6 FL (ref 9.2–12.9)
POTASSIUM SERPL-SCNC: 4.1 MMOL/L (ref 3.5–5.1)
PROT SERPL-MCNC: 6.3 G/DL (ref 6–8.4)
RBC # BLD AUTO: 2.59 M/UL (ref 4.6–6.2)
SODIUM SERPL-SCNC: 137 MMOL/L (ref 136–145)
T4 FREE SERPL-MCNC: 0.98 NG/DL (ref 0.71–1.51)
T4 FREE SERPL-MCNC: 1 NG/DL (ref 0.71–1.51)
TSH SERPL DL<=0.005 MIU/L-ACNC: 9.94 UIU/ML (ref 0.4–4)
WBC # BLD AUTO: 3.52 K/UL (ref 3.9–12.7)

## 2023-09-21 PROCEDURE — 99215 OFFICE O/P EST HI 40 MIN: CPT | Mod: S$PBB,,, | Performed by: INTERNAL MEDICINE

## 2023-09-21 PROCEDURE — 99215 OFFICE O/P EST HI 40 MIN: CPT | Mod: PBBFAC | Performed by: INTERNAL MEDICINE

## 2023-09-21 PROCEDURE — 25000003 PHARM REV CODE 250: Performed by: NURSE PRACTITIONER

## 2023-09-21 PROCEDURE — 96523 IRRIG DRUG DELIVERY DEVICE: CPT

## 2023-09-21 PROCEDURE — 85025 COMPLETE CBC W/AUTO DIFF WBC: CPT | Performed by: NURSE PRACTITIONER

## 2023-09-21 PROCEDURE — 80053 COMPREHEN METABOLIC PANEL: CPT | Performed by: NURSE PRACTITIONER

## 2023-09-21 PROCEDURE — 99999 PR PBB SHADOW E&M-EST. PATIENT-LVL V: ICD-10-PCS | Mod: PBBFAC,,, | Performed by: INTERNAL MEDICINE

## 2023-09-21 PROCEDURE — 99215 PR OFFICE/OUTPT VISIT, EST, LEVL V, 40-54 MIN: ICD-10-PCS | Mod: S$PBB,,, | Performed by: INTERNAL MEDICINE

## 2023-09-21 PROCEDURE — 84439 ASSAY OF FREE THYROXINE: CPT | Performed by: NURSE PRACTITIONER

## 2023-09-21 PROCEDURE — 99999 PR PBB SHADOW E&M-EST. PATIENT-LVL V: CPT | Mod: PBBFAC,,, | Performed by: INTERNAL MEDICINE

## 2023-09-21 PROCEDURE — 63600175 PHARM REV CODE 636 W HCPCS: Performed by: NURSE PRACTITIONER

## 2023-09-21 PROCEDURE — 84443 ASSAY THYROID STIM HORMONE: CPT | Performed by: NURSE PRACTITIONER

## 2023-09-21 PROCEDURE — A4216 STERILE WATER/SALINE, 10 ML: HCPCS | Performed by: NURSE PRACTITIONER

## 2023-09-21 RX ORDER — SODIUM CHLORIDE 0.9 % (FLUSH) 0.9 %
10 SYRINGE (ML) INJECTION
Status: DISCONTINUED | OUTPATIENT
Start: 2023-09-21 | End: 2023-09-21 | Stop reason: HOSPADM

## 2023-09-21 RX ORDER — HEPARIN 100 UNIT/ML
500 SYRINGE INTRAVENOUS
Status: DISCONTINUED | OUTPATIENT
Start: 2023-09-21 | End: 2023-09-21 | Stop reason: HOSPADM

## 2023-09-21 RX ADMIN — Medication 10 ML: at 02:09

## 2023-09-21 RX ADMIN — HEPARIN 500 UNITS: 100 SYRINGE at 02:09

## 2023-09-21 NOTE — Clinical Note
Please put patient on wait list for next week for transfusion. Schedule CBC, iron/TIBC, ferritin, and type and screen before.

## 2023-09-21 NOTE — PROGRESS NOTES
ONCOLOGY FOLLOW UP VISIT    Subjective:      Patient ID: Rocco Swain    Chief Complaint: Squamous cell carcinoma of base of tongue      HPI  Rocco Swain is a 73 y.o. male who returns to clinic for management of P16 positive squamous cell carcinoma. Admitted on 9/12/23 for 5 days for a GI bleed. Received 3 units of PRBCs and discharged with protonix BID. Old blood found during colonoscopy with no active bleeding.     ECOG Performance status: 1 - Symptomatic but completely ambulatory Communication from him is 100% written.     Cancer Staging   Squamous cell carcinoma of base of tongue  Staging form: Pharynx - HPV-Mediated Oropharynx, AJCC 8th Edition  - Clinical stage from 9/18/2020: Stage III (rcT4, cN1, cM0, p16+) - Signed by Ronald Berg MD on 12/27/2022      Oncologic History:  Oncology History   Squamous cell carcinoma of base of tongue   9/18/2020 Cancer Staged    Staging form: Pharynx - HPV-Mediated Oropharynx, AJCC 8th Edition  - Clinical stage from 9/18/2020: Stage III (rcT4, cN1, cM0, p16+)     9/28/2020 Initial Diagnosis    Squamous cell carcinoma of base of tongue     10/6/2020 - 8/17/2021 Chemotherapy    Treatment Summary   Plan Name: OP HEAD NECK CARBOPLATIN PACLITAXEL C1-2 FOLLOWED BY CETUXIMAB CARBOPLATIN C3-6 FOLLOWED BY CETUXIMAB MAINTENANCE WEEKLY  Treatment Goal: Control  Status: Inactive  Start Date: 10/6/2020  End Date: 8/17/2021  Provider: Ronald Berg MD  Chemotherapy: cetuximab (ERBITUX) 100 mg/50 mL chemo infusion 684 mg, 400 mg/m2 = 684 mg (100 % of original dose 400 mg/m2), Intravenous, Clinic/[facility] 1 time, 29 of 38 cycles  Dose modification: 500 mg/m2 (original dose 400 mg/m2, Cycle 3), 250 mg/m2 (original dose 400 mg/m2, Cycle 3), 400 mg/m2 (original dose 400 mg/m2, Cycle 3), 250 mg/m2 (original dose 250 mg/m2, Cycle 7), 200 mg/m2 (original dose 250 mg/m2, Cycle 18), 200 mg/m2 (original dose 250 mg/m2, Cycle 19), 250 mg/m2 (original dose 250 mg/m2, Cycle 4), 200 mg/m2 (original dose  250 mg/m2, Cycle 25), 200 mg/m2 (original dose 250 mg/m2, Cycle 28)  Administration: 684 mg (11/17/2020), 400 mg (11/24/2020), 400 mg (2/9/2021), 400 mg (2/17/2021), 427.6 mg (3/9/2021), 400 mg (3/30/2021), 400 mg (3/23/2021), 400 mg (4/6/2021), 427.6 mg (4/13/2021), 427.6 mg (4/20/2021), 342 mg (5/11/2021), 342 mg (5/18/2021), 342 mg (5/25/2021), 400 mg (5/31/2021), 400 mg (6/7/2021), 400 mg (6/14/2021), 400 mg (12/8/2020), 427.6 mg (12/29/2020), 400 mg (12/1/2020), 400 mg (12/15/2020), 400 mg (12/22/2020), 427.6 mg (1/5/2021), 400 mg (1/12/2021), 400 mg (1/19/2021), 427.6 mg (1/26/2021), 400 mg (2/2/2021), 400 mg (2/23/2021), 400 mg (3/2/2021), 427.6 mg (3/16/2021), 400 mg (6/21/2021), 342 mg (6/28/2021), 342 mg (7/6/2021), 342 mg (7/13/2021), 342 mg (7/20/2021), 400 mg (7/27/2021), 400 mg (8/10/2021), 400 mg (8/17/2021)  CARBOplatin (PARAPLATIN) 310 mg in sodium chloride 0.9% 500 mL chemo infusion, 310 mg (92.2 % of original dose 334.5 mg), Intravenous, Clinic[facility] 1 time, 6 of 6 cycles  Dose modification:   (original dose 334.5 mg, Cycle 1)  Administration: 310 mg (10/6/2020), 370 mg (11/17/2020), 300 mg (10/27/2020), 350 mg (12/8/2020), 335 mg (12/29/2020), 320 mg (1/19/2021)  PACLitaxeL (TAXOL) 175 mg/m2 = 300 mg in sodium chloride 0.9% 500 mL chemo infusion, 175 mg/m2 = 300 mg (100 % of original dose 175 mg/m2), Intravenous, Clinic/[facility] 1 time, 2 of 2 cycles  Dose modification: 175 mg/m2 (original dose 175 mg/m2, Cycle 1)  Administration: 300 mg (10/6/2020), 300 mg (10/27/2020)     4/29/2021 Tumor Conference       His case was discussed at the Multidisciplinary Head and Neck Team Planning Meeting.    Representatives from Medical Oncology, Radiation Oncology, Head and Neck Surgical Oncology, Psychosocial Oncology, and Speech and Language Pathology discussed the case with the following recommendations:    1) biopsy         7/29/2021 Tumor Conference       His case was discussed at the Multidisciplinary Head and  Neck Team Planning Meeting.    Representatives from Medical Oncology, Radiation Oncology, Head and Neck Surgical Oncology, Psychosocial Oncology, and Speech and Language Pathology discussed the case with the following recommendations:    1) Head and neck clinic follow up  2) consider Keytruda (discuss with transplant)  3) consider palliative referral         9/13/2021 -  Chemotherapy    Treatment Summary   Plan Name: OP HEAD NECK PEMBROLIZUMAB CARBOPLATIN FLUOROURACIL (C1 ONLY RECEIVED) FOLLOWED BY PEMBROLIZUMAB MAINTENANCE  Treatment Goal: Palliative  Status: Active  Start Date: 9/13/2021  End Date: 9/5/2023 (Planned)  Provider: Ronald Berg MD  Chemotherapy: CARBOplatin (PARAPLATIN) 320 mg in sodium chloride 0.9% 500 mL chemo infusion, 320 mg (100 % of original dose 320.5 mg), Intravenous, Clinic/[facility] 1 time, 6 of 6 cycles  Dose modification:   (original dose 320.5 mg, Cycle 1)  Administration: 320 mg (9/13/2021), 335 mg (12/23/2021), 325 mg (1/27/2022), 245 mg (3/3/2022), 255 mg (5/12/2022), 255 mg (4/7/2022)  fluorouraciL 1,000 mg/m2/day = 6,440 mg in sodium chloride 0.9% 240 mL chemo infusion, 1,000 mg/m2/day = 6,440 mg, Intravenous, Over 96 hours, 6 of 6 cycles  Dose modification: 800 mg/m2/day (original dose 1,000 mg/m2/day, Cycle 6), 5,000 mg (original dose 1,000 mg/m2/day, Cycle 8)  Administration: 6,440 mg (9/13/2021), 6,440 mg (12/23/2021), 6,480 mg (1/27/2022), 5,000 mg (3/3/2022), 5,000 mg (4/7/2022), 5,000 mg (5/12/2022)     Secondary malignant neoplasm of cervical lymph node   2/9/2021 Initial Diagnosis    Secondary malignant neoplasm of cervical lymph node     9/13/2021 -  Chemotherapy    Treatment Summary   Plan Name: OP HEAD NECK PEMBROLIZUMAB CARBOPLATIN FLUOROURACIL (C1 ONLY RECEIVED) FOLLOWED BY PEMBROLIZUMAB MAINTENANCE  Treatment Goal: Palliative  Status: Active  Start Date: 9/13/2021  End Date: 9/5/2023 (Planned)  Provider: Ronald Berg MD  Chemotherapy: CARBOplatin (PARAPLATIN) 320 mg in sodium  chloride 0.9% 500 mL chemo infusion, 320 mg (100 % of original dose 320.5 mg), Intravenous, Clinic/[facility] 1 time, 6 of 6 cycles  Dose modification:   (original dose 320.5 mg, Cycle 1)  Administration: 320 mg (9/13/2021), 335 mg (12/23/2021), 325 mg (1/27/2022), 245 mg (3/3/2022), 255 mg (5/12/2022), 255 mg (4/7/2022)  fluorouraciL 1,000 mg/m2/day = 6,440 mg in sodium chloride 0.9% 240 mL chemo infusion, 1,000 mg/m2/day = 6,440 mg, Intravenous, Over 96 hours, 6 of 6 cycles  Dose modification: 800 mg/m2/day (original dose 1,000 mg/m2/day, Cycle 6), 5,000 mg (original dose 1,000 mg/m2/day, Cycle 8)  Administration: 6,440 mg (9/13/2021), 6,440 mg (12/23/2021), 6,480 mg (1/27/2022), 5,000 mg (3/3/2022), 5,000 mg (4/7/2022), 5,000 mg (5/12/2022)         Review of Systems   Constitutional:  Negative for activity change, appetite change, chills, fatigue, fever and unexpected weight change.   HENT:  Negative for ear pain, facial swelling, hearing loss, mouth sores, nosebleeds, sore throat, trouble swallowing and voice change.         No verbal communication. Skin fixed and immobile from previous scaring post-neck dissection   Eyes:  Positive for redness (right eye). Negative for pain, discharge and visual disturbance.   Respiratory:  Negative for cough, choking, chest tightness and shortness of breath.    Cardiovascular:  Negative for chest pain, palpitations and leg swelling.   Gastrointestinal:  Negative for abdominal distention, abdominal pain, blood in stool, constipation, diarrhea, nausea and vomiting.   Endocrine: Negative for cold intolerance and heat intolerance.   Genitourinary:  Negative for difficulty urinating, dysuria, frequency and urgency.   Musculoskeletal:  Positive for back pain (lower - chronic). Negative for arthralgias, gait problem, leg pain and myalgias.   Integumentary:  Negative for pallor, rash and wound.        Blistering to left toes   Allergic/Immunologic: Negative for frequent infections.    Neurological:  Negative for dizziness, tremors, weakness, light-headedness, numbness and headaches.   Hematological:  Negative for adenopathy. Does not bruise/bleed easily.   Psychiatric/Behavioral:  Negative for agitation, confusion, dysphoric mood and sleep disturbance. The patient is not nervous/anxious.         Allergies:  Review of patient's allergies indicates:  No Known Allergies    Medications:  Current Outpatient Medications   Medication Sig Dispense Refill    azelaic acid (AZELEX) 15 % gel APPLY TOPICALLY TO AFFECTED AREA IN THE MORNING 50 g 3    gabapentin (NEURONTIN) 300 MG capsule Take 1 capsule (300 mg total) by mouth every evening. 30 capsule 11    ibuprofen (ADVIL,MOTRIN) 200 MG tablet Take 200 mg by mouth.      imiquimod (ALDARA) 5 % cream Apply to biopsy site on frontal scalp + about a centimeter of surrounding skin qhs x 16 weeks. Wash off in am and apply sunscreen. Discontinue if skin becomes blistered, raw, bleeding, painful, etc. 24 packet 3    levothyroxine (SYNTHROID) 88 MCG tablet TAKE 1 TABLET BY MOUTH ONCE DAILY 90 tablet 3    LIDOcaine HCl 2% (LIDOCAINE VISCOUS) 2 % Soln Swish and spit 15 mls every 8 (eight) hours as needed (mouth sore). 100 mL 3    LIDOcaine-prilocaine (EMLA) cream Apply generously to port site 30-60 min prior to chemo and then cover with saran wrap. 30 g 2    magic mouthwash diphen/antac/lidoc/nysta Take 10 mLs by mouth 4 (four) times daily. 120 mL 4    moxifloxacin (VIGAMOX) 0.5 % ophthalmic solution Place 1 drop into the right eye 4 (four) times daily. 3 mL 3    multivit-min/FA/lycopen/lutein (CENTRUM SILVER MEN ORAL) Take 1 tablet by mouth.      neomycin-polymyxin-dexamethasone (MAXITROL) 3.5mg/mL-10,000 unit/mL-0.1 % DrpS Place 1 drop into the right eye 4 (four) times daily. 5 mL 3    oxyCODONE (ROXICODONE) 5 MG immediate release tablet Take 1 tablet (5 mg total) by mouth every 6 (six) hours as needed for Pain. 28 tablet 0    pantoprazole (PROTONIX) 40 MG tablet  Take 1 tablet (40 mg total) by mouth 2 (two) times a day. 168 tablet 0    prednisoLONE acetate (PRED FORTE) 1 % DrpS Place 1 drop into the right eye 4 (four) times daily. 10 mL 3    sulfacetamide sodium-sulfur 10-5 % (w/w) Clsr USE TO WASH AFFECTED AREA DAILY      tacrolimus (PROGRAF) 0.5 MG Cap Take 1 capsule (0.5 mg total) by mouth every EVENING.  (Use the 1mg capsule for your morning dose) 90 capsule 3    tacrolimus (PROGRAF) 0.5 MG Cap Take 1 capsule (0.5 mg total) by mouth every evening. Use the 1mg capsule for your morning dose 90 capsule 3    tacrolimus (PROGRAF) 1 MG Cap Take 1 capsule (1 mg total) by mouth every morning. Use the tacrolimus 0.5mg capsule for your evening dose as directed. 90 capsule 3    tiZANidine (ZANAFLEX) 2 MG tablet Take 1 tablet (2 mg total) by mouth nightly as needed (neck muscle strain). 30 tablet 3    traZODone (DESYREL) 50 MG tablet Take 1 tablet (50 mg total) by mouth every evening. 90 tablet 2    vitamin E 400 UNIT capsule Take 400 Units by mouth once daily.      metroNIDAZOLE (METROGEL) 0.75 % gel Apply topically to affected area 2 (two) times daily. 45 g 1     No current facility-administered medications for this visit.     Facility-Administered Medications Ordered in Other Visits   Medication Dose Route Frequency Provider Last Rate Last Admin    heparin, porcine (PF) 100 unit/mL injection flush 500 Units  500 Units Intravenous PRN Ronald Berg MD        ofloxacin 0.3 % ophthalmic solution 1 drop  1 drop Right Eye On Call Procedure Victor M Vasques MD   2 drop at 10/11/22 0745    sodium chloride 0.9% flush 10 mL  10 mL Intravenous PRN Ronald Berg MD        sodium chloride 0.9% flush 10 mL  10 mL Intravenous PRN Victor M Vasques MD           PMH:  Past Medical History:   Diagnosis Date    Basal cell carcinoma (BCC) in situ of skin 2012    3 on face, 2 on arm, removed by dermatology.     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 Underwent surgical resection of tongue and neck. s/p tracheostomy       PSH:  Past Surgical History:   Procedure Laterality Date    COLONOSCOPY      COLONOSCOPY N/A 9/14/2023    Procedure: COLONOSCOPY;  Surgeon: Reyes Olivia MD;  [facility]  Service: Endoscopy;  Laterality: N/A;    CONJUNCTIVA BIOPSY Right 10/11/2022    Procedure: BIOPSY, CONJUNCTIVA;  Surgeon: Victor M Vasques MD;  Location: [facility];  Service: Ophthalmology;  Laterality: Right;    ESOPHAGOGASTRODUODENOSCOPY N/A 9/14/2023    Procedure: EGD (ESOPHAGOGASTRODUODENOSCOPY);  Surgeon: Reyes Olivia MD;  [facility]  Service: Endoscopy;  Laterality: N/A;    INSERTION OF VENOUS ACCESS PORT Right 3/8/2021    Procedure: INSERTION, VENOUS ACCESS PORT;  Surgeon: Jesus Rendon MD;  [address];  Service: General;  Laterality: Right;    LIVER TRANSPLANT  11/2008    transplanted for biopsy proven hepatocellular carcinoma,     LYMPH NODE BIOPSY N/A 9/18/2020    Procedure: BIOPSY, LYMPH NODE;  Surgeon: Taz Diagnostic Provider;  [address];  Service: General;  Laterality: N/A;  Rm 173    SURGICAL REMOVAL OF SCAR Right 8/24/2023    Procedure: EXCISION, SCAR;  Surgeon: Ting Brambila MD;  [facility];  Service: Ophthalmology;  Laterality: Right;    TRACHEOSTOMY         FamHx:  Family History   Problem Relation Age of Onset    Dementia Mother        SocHx:  Social History     Socioeconomic History    Marital status: Single   Occupational History    Occupation: Retired     Comment: , notes exposures to fumes etc.  Worked on [employer] for 4 years   Tobacco Use    Smoking status: Never    Smokeless tobacco: Never   Substance and Sexual  "Activity    Alcohol use: Yes     Comment: drinks wine, 1 glass day    Drug use: Not Currently    Sexual activity: Yes     Comment: No prior history of STD        Distress Score    Distress Score: 0 - No Distress        Objective:      Vitals:    09/21/23 1451   BP: 129/73   BP Location: Left arm   Patient Position: Sitting   BP Method: Medium (Automatic)   Pulse: 86   Resp: 18   Temp: 98.2 °F (36.8 °C)   TempSrc: Oral   SpO2: 98%   Weight: 53.6 kg (118 lb 2.7 oz)   Height: 5' 8" (1.727 m)          Physical Exam  Vitals reviewed.   Constitutional:       General: He is not in acute distress.     Appearance: Normal appearance. He is well-developed and underweight.   HENT:      Head: Normocephalic and atraumatic.      Mouth/Throat:      Mouth: Mucous membranes are moist.      Dentition: Abnormal dentition. Dental caries present.   Eyes:      Conjunctiva/sclera: Conjunctivae normal.      Pupils: Pupils are equal, round, and reactive to light.   Neck:      Trachea: Tracheostomy present.   Pulmonary:      Effort: Pulmonary effort is normal. No respiratory distress.      Comments: Tracheostomy  Abdominal:      General: There is no distension.      Palpations: Abdomen is soft.   Musculoskeletal:         General: No swelling. Normal range of motion.      Cervical back: Normal range of motion and neck supple.   Skin:     General: Skin is warm and dry.   Neurological:      General: No focal deficit present.      Mental Status: He is alert and oriented to person, place, and time.   Psychiatric:         Mood and Affect: Mood normal.         Behavior: Behavior normal.         Thought Content: Thought content normal.         Judgment: Judgment normal.           LABS:  WBC   Date Value Ref Range Status   09/22/2023 3.18 (L) 3.90 - 12.70 K/uL Final     Hemoglobin   Date Value Ref Range Status   09/22/2023 8.2 (L) 14.0 - 18.0 g/dL Final     Hematocrit   Date Value Ref Range Status   09/22/2023 25.6 (L) 40.0 - 54.0 % Final "     Platelets   Date Value Ref Range Status   09/22/2023 195 150 - 450 K/uL Final       Chemistry        Component Value Date/Time     09/22/2023 0802    K 4.5 09/22/2023 0802     09/22/2023 0802    CO2 26 09/22/2023 0802    BUN 16 09/22/2023 0802    CREATININE 1.4 09/22/2023 0802     09/22/2023 0802        Component Value Date/Time    CALCIUM 9.2 09/22/2023 0802    ALKPHOS 58 09/22/2023 0802    AST 48 (H) 09/22/2023 0802    ALT 30 09/22/2023 0802    BILITOT 0.2 09/22/2023 0802    ESTGFRAFRICA 52.6 (A) 07/14/2022 1119    EGFRNONAA 45.5 (A) 07/14/2022 1119              Assessment:       1. Squamous cell carcinoma of base of tongue    2. Secondary malignant neoplasm of cervical lymph node    3. Tracheostomy status    4. 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 Status post liver transplantation - 2008    7. Cirrhosis of transplanted liver    8. Jaw pain    9. Iron deficiency anemia due to chronic blood loss    10. Gastrointestinal hemorrhage associated with duodenal ulcer      Plan:       1,2. 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 He is not a surgical or radiation candidate.  -Started on PCC 10/6/2020 followed by maintenance cetuximab until 8/24/21 (discontinued due to progression of disease; continued increase in SUV uptake, no new lesions).  - 9/2021 started on carbo/5-FU/pembrolizumab; due to toxicity went to pembrolizumab monotherapy starting with cycle 2.  Progression of disease noted after cycle 3 so added chemotherapy back in with cycle 4. Keytruda monotherapy starting with C9.   - Labs acceptable to proceed with Keytruda; will complete 2 years. Due for re-staging scans but cancelled by patient will get imaging prior to next cycle.      3. Status post total laryngectomy, total glossectomy and anterolateral thigh free flap reconstruction roughly 2017 at [facility] in  [place] for persistent/recurrent base of tongue sq.c.c.    4. Planned excision for conjunctival pre-cancerous lesion    5-7. S/p liver transplant and is currently on tacrolimus.  Afebrile, ANC sufficient for treatment. Grade 1. Will continue to monitor.     8. Continue Oxy IR. Now following with palliative care. Not a candidate for Celebrex d/t liver tx and kidney dysfunction.     9,10. Hgb 7.6 - will re-check tomorrow and schedule possible blood transfusion. Continue PPI BID. Monitor closely for bleeding in stool.    he will return to clinic in 6 weeks, but knows to call in the interim if symptoms change or should a problem arise.     Patient was also seen and examined by Dr. Hernandez. Patient is in agreement with the proposed treatment plan. All questions were answered to the patient's satisfaction. Pt knows to call clinic if anything is needed before the next clinic visit.    Ct Franics, MSN, APRN, FNP-C  Hematology and Medical Oncology  Nurse Practitioner to Dr. Martín Hernandez  Nurse Practitioner, [facility]    I have reviewed the notes, assessments, and/or procedures performed by Ct SOSA, as above.  I have personally interviewed and examined the patient at the beside, and rounded with Ct. I concur with her assessment and plan and the documentation of Rocco Swain.    MDM includes:    - Acute or chronic illness or injury that poses a threat to life or bodily function  - Independent review and explanation of 2 results from unique tests  - Discussion of management and ordering 2 unique tests  - Extensive discussion of treatment and management  - Prescription drug management  - Drug therapy requiring intensive monitoring for toxicity    Martín Hernandez M.D.  Hematology/Oncology Attending  Director [facility]  [facility]          Route Chart for Scheduling    Med Onc Chart Routing      Follow up with physician 6 weeks. prior to  Keytruda   Follow up with [facility]    Infusion scheduling note    Injection scheduling note    Labs CBC, CMP, free T4 and TSH   Scheduling:  Preferred lab:  Lab interval:     Imaging PET scan   in 6 weeks   Pharmacy appointment    Other referrals                  Treatment Plan Information   OP HEAD NECK PEMBROLIZUMAB CARBOPLATIN FLUOROURACIL (C1 ONLY RECEIVED) FOLLOWED BY PEMBROLIZUMAB MAINTENANCE   Ronald Berg MD   Upcoming Treatment Dates - OP HEAD NECK PEMBROLIZUMAB CARBOPLATIN FLUOROURACIL (C1 ONLY RECEIVED) FOLLOWED BY PEMBROLIZUMAB MAINTENANCE    9/5/2023       Immunotherapy       pembrolizumab (KEYTRUDA) 400 mg in sodium chloride 0.9% SolP 116 mL infusion    Therapy Plan Information  PORT FLUSH  Flushes  heparin, porcine (PF) 100 unit/mL injection flush 500 Units  500 Units, Intravenous, Every visit  sodium chloride 0.9% flush 10 mL  10 mL, Intravenous, Every visit    PORT FLUSH  Flushes  heparin, porcine (PF) 100 unit/mL injection flush 500 Units  500 Units, Intravenous, Every visit  sodium chloride 0.9% flush 10 mL  10 mL, Intravenous, Every visit

## 2023-09-22 ENCOUNTER — LAB VISIT (OUTPATIENT)
Dept: LAB | Facility: HOSPITAL | Age: 73
End: 2023-09-22
Attending: INTERNAL MEDICINE
Payer: MEDICARE

## 2023-09-22 ENCOUNTER — TELEPHONE (OUTPATIENT)
Dept: HEMATOLOGY/ONCOLOGY | Facility: CLINIC | Age: 73
End: 2023-09-22
Payer: MEDICARE

## 2023-09-22 DIAGNOSIS — Z94.4 STATUS POST LIVER TRANSPLANTATION: ICD-10-CM

## 2023-09-22 DIAGNOSIS — Z94.4 LIVER TRANSPLANTED: ICD-10-CM

## 2023-09-22 LAB
ALBUMIN SERPL BCP-MCNC: 3.2 G/DL (ref 3.5–5.2)
ALP SERPL-CCNC: 58 U/L (ref 55–135)
ALT SERPL W/O P-5'-P-CCNC: 30 U/L (ref 10–44)
ANION GAP SERPL CALC-SCNC: 8 MMOL/L (ref 8–16)
AST SERPL-CCNC: 48 U/L (ref 10–40)
BASOPHILS # BLD AUTO: 0.03 K/UL (ref 0–0.2)
BASOPHILS NFR BLD: 0.9 % (ref 0–1.9)
BILIRUB DIRECT SERPL-MCNC: 0.1 MG/DL (ref 0.1–0.3)
BILIRUB SERPL-MCNC: 0.2 MG/DL (ref 0.1–1)
BUN SERPL-MCNC: 16 MG/DL (ref 8–23)
CALCIUM SERPL-MCNC: 9.2 MG/DL (ref 8.7–10.5)
CHLORIDE SERPL-SCNC: 107 MMOL/L (ref 95–110)
CO2 SERPL-SCNC: 26 MMOL/L (ref 23–29)
CREAT SERPL-MCNC: 1.4 MG/DL (ref 0.5–1.4)
DIFFERENTIAL METHOD: ABNORMAL
EOSINOPHIL # BLD AUTO: 0.3 K/UL (ref 0–0.5)
EOSINOPHIL NFR BLD: 9.1 % (ref 0–8)
ERYTHROCYTE [DISTWIDTH] IN BLOOD BY AUTOMATED COUNT: 17 % (ref 11.5–14.5)
EST. GFR  (NO RACE VARIABLE): 52.7 ML/MIN/1.73 M^2
GLUCOSE SERPL-MCNC: 101 MG/DL (ref 70–110)
HCT VFR BLD AUTO: 25.6 % (ref 40–54)
HGB BLD-MCNC: 8.2 G/DL (ref 14–18)
IMM GRANULOCYTES # BLD AUTO: 0.01 K/UL (ref 0–0.04)
IMM GRANULOCYTES NFR BLD AUTO: 0.3 % (ref 0–0.5)
LYMPHOCYTES # BLD AUTO: 0.7 K/UL (ref 1–4.8)
LYMPHOCYTES NFR BLD: 21.7 % (ref 18–48)
MCH RBC QN AUTO: 29.8 PG (ref 27–31)
MCHC RBC AUTO-ENTMCNC: 32 G/DL (ref 32–36)
MCV RBC AUTO: 93 FL (ref 82–98)
MONOCYTES # BLD AUTO: 0.4 K/UL (ref 0.3–1)
MONOCYTES NFR BLD: 12.3 % (ref 4–15)
NEUTROPHILS # BLD AUTO: 1.8 K/UL (ref 1.8–7.7)
NEUTROPHILS NFR BLD: 55.7 % (ref 38–73)
NRBC BLD-RTO: 0 /100 WBC
PLATELET # BLD AUTO: 195 K/UL (ref 150–450)
PMV BLD AUTO: 9.5 FL (ref 9.2–12.9)
POTASSIUM SERPL-SCNC: 4.5 MMOL/L (ref 3.5–5.1)
PROT SERPL-MCNC: 6.6 G/DL (ref 6–8.4)
RBC # BLD AUTO: 2.75 M/UL (ref 4.6–6.2)
SODIUM SERPL-SCNC: 141 MMOL/L (ref 136–145)
TACROLIMUS BLD-MCNC: 2.5 NG/ML (ref 5–15)
WBC # BLD AUTO: 3.18 K/UL (ref 3.9–12.7)

## 2023-09-22 PROCEDURE — 85025 COMPLETE CBC W/AUTO DIFF WBC: CPT | Performed by: INTERNAL MEDICINE

## 2023-09-22 PROCEDURE — 80053 COMPREHEN METABOLIC PANEL: CPT | Performed by: INTERNAL MEDICINE

## 2023-09-22 PROCEDURE — 36415 COLL VENOUS BLD VENIPUNCTURE: CPT | Performed by: INTERNAL MEDICINE

## 2023-09-22 PROCEDURE — 80197 ASSAY OF TACROLIMUS: CPT | Performed by: INTERNAL MEDICINE

## 2023-09-22 PROCEDURE — 82248 BILIRUBIN DIRECT: CPT | Performed by: INTERNAL MEDICINE

## 2023-09-22 NOTE — TELEPHONE ENCOUNTER
I spoke to patients brother, let him know that patient wouldn't need a transfusion on Monday. We want to recheck labs on Wednesday. He said he would let the patient know. I told him if we needed to change it, just let me know.

## 2023-09-25 ENCOUNTER — CLINICAL SUPPORT (OUTPATIENT)
Dept: REHABILITATION | Facility: HOSPITAL | Age: 73
End: 2023-09-25
Payer: MEDICARE

## 2023-09-25 DIAGNOSIS — Z93.0 TRACHEOSTOMY STATUS: ICD-10-CM

## 2023-09-25 DIAGNOSIS — G89.29 CHRONIC LOW BACK PAIN, UNSPECIFIED BACK PAIN LATERALITY, UNSPECIFIED WHETHER SCIATICA PRESENT: ICD-10-CM

## 2023-09-25 DIAGNOSIS — K74.60 CIRRHOSIS OF TRANSPLANTED LIVER: ICD-10-CM

## 2023-09-25 DIAGNOSIS — M54.50 CHRONIC LOW BACK PAIN, UNSPECIFIED BACK PAIN LATERALITY, UNSPECIFIED WHETHER SCIATICA PRESENT: ICD-10-CM

## 2023-09-25 DIAGNOSIS — C32.9 LARYNX CANCER: Primary | ICD-10-CM

## 2023-09-25 DIAGNOSIS — T86.49 CIRRHOSIS OF TRANSPLANTED LIVER: ICD-10-CM

## 2023-09-25 PROCEDURE — 97810 ACUP 1/> WO ESTIM 1ST 15 MIN: CPT | Performed by: ACUPUNCTURIST

## 2023-09-25 PROCEDURE — 97811 ACUP 1/> W/O ESTIM EA ADD 15: CPT | Performed by: ACUPUNCTURIST

## 2023-09-25 NOTE — PROGRESS NOTES
Acupuncture Evaluation Note     Name: Rocco Swain  [facility] Number: [id number]    Traditional Chinese Medicine (TCM) Diagnosis: Qi Stagnation and Blood Deficiency  Medical Diagnosis:   Encounter Diagnoses   Name Primary?    Larynx cancer Yes    Tracheostomy status     Chronic low back pain, unspecified back pain laterality, unspecified whether sciatica present     Cirrhosis of transplanted liver         Evaluation Date: 9/25/2023    Visit #/Visits authorized: 6/12    Precautions: cancer and organ transplant    Subjective     Chief Concern: cLBP - thoracic and lumbar     Medical necessity is demonstrated by the following IMPAIRMENTS: Medical Necessity: Cancer related pain and Decreased mobility limits day to day activities, social, and emergent situations              Aggravating Factors:  movement and standing   Relieving Factors:  rest    Symptom Description:     Quality:  Aching  Severity:  1  Frequency:   N/A     Treatment     Treatment Principles:  Move qi, nourish blood, stop pain     Acupuncture points used:      Bilateral points: BL14, BL15, BL18, BL20, BL23, BL25, BL26  Unilateral points: SP6, GV7, GV3  Auricular Treatment:      Needles In: 17  Needles Out: 17  Needles W/O STIM placed: 10:20  Needles W/O STIM removed: 10:50        Assessment     After treatment, patient felt back pain is significantly improved, continue with plan of care    Patient prognosis is Good.     Patient will continue to benefit from acupuncture treatment to address the deficits listed in the problem list box on initial evaluation, provide patient family education and to maximize pt's level of independence in the home and community environment.     Patient's spiritual, cultural and educational needs considered and pt agreeable to plan of care and goals.     Anticipated barriers to treatment: none    Plan     Recommend 1 /week for 6 sessions then re-assess.      Education:  Patient is aware of cumulative benefit of  acupuncture

## 2023-09-27 ENCOUNTER — OFFICE VISIT (OUTPATIENT)
Dept: HEMATOLOGY/ONCOLOGY | Facility: CLINIC | Age: 73
End: 2023-09-27
Payer: MEDICARE

## 2023-09-27 ENCOUNTER — LAB VISIT (OUTPATIENT)
Dept: LAB | Facility: HOSPITAL | Age: 73
End: 2023-09-27
Payer: MEDICARE

## 2023-09-27 VITALS
OXYGEN SATURATION: 98 % | HEIGHT: 68 IN | HEART RATE: 81 BPM | TEMPERATURE: 98 F | RESPIRATION RATE: 18 BRPM | WEIGHT: 121.25 LBS | DIASTOLIC BLOOD PRESSURE: 75 MMHG | BODY MASS INDEX: 18.38 KG/M2 | SYSTOLIC BLOOD PRESSURE: 142 MMHG

## 2023-09-27 DIAGNOSIS — C77.0 SECONDARY MALIGNANT NEOPLASM OF CERVICAL LYMPH NODE: ICD-10-CM

## 2023-09-27 DIAGNOSIS — R68.84 JAW PAIN: ICD-10-CM

## 2023-09-27 DIAGNOSIS — Z94.4 STATUS POST LIVER TRANSPLANTATION: ICD-10-CM

## 2023-09-27 DIAGNOSIS — T86.49 CIRRHOSIS OF TRANSPLANTED LIVER: ICD-10-CM

## 2023-09-27 DIAGNOSIS — D50.0 IRON DEFICIENCY ANEMIA DUE TO CHRONIC BLOOD LOSS: ICD-10-CM

## 2023-09-27 DIAGNOSIS — K26.4 GASTROINTESTINAL HEMORRHAGE ASSOCIATED WITH DUODENAL ULCER: ICD-10-CM

## 2023-09-27 DIAGNOSIS — Z79.899 IMMUNODEFICIENCY DUE TO DRUGS: ICD-10-CM

## 2023-09-27 DIAGNOSIS — C01 SQUAMOUS CELL CARCINOMA OF BASE OF TONGUE: ICD-10-CM

## 2023-09-27 DIAGNOSIS — C01 SQUAMOUS CELL CARCINOMA OF BASE OF TONGUE: Primary | ICD-10-CM

## 2023-09-27 DIAGNOSIS — D84.821 IMMUNODEFICIENCY DUE TO DRUGS: ICD-10-CM

## 2023-09-27 DIAGNOSIS — Z93.0 TRACHEOSTOMY STATUS: ICD-10-CM

## 2023-09-27 DIAGNOSIS — K74.60 CIRRHOSIS OF TRANSPLANTED LIVER: ICD-10-CM

## 2023-09-27 DIAGNOSIS — H11.9 CONJUNCTIVAL LESION: ICD-10-CM

## 2023-09-27 LAB
ABO + RH BLD: NORMAL
BASOPHILS # BLD AUTO: 0.03 K/UL (ref 0–0.2)
BASOPHILS NFR BLD: 1 % (ref 0–1.9)
BLD GP AB SCN CELLS X3 SERPL QL: NORMAL
DIFFERENTIAL METHOD: ABNORMAL
EOSINOPHIL # BLD AUTO: 0.3 K/UL (ref 0–0.5)
EOSINOPHIL NFR BLD: 10.9 % (ref 0–8)
ERYTHROCYTE [DISTWIDTH] IN BLOOD BY AUTOMATED COUNT: 17.3 % (ref 11.5–14.5)
FERRITIN SERPL-MCNC: 31 NG/ML (ref 20–300)
HCT VFR BLD AUTO: 25.8 % (ref 40–54)
HGB BLD-MCNC: 8.1 G/DL (ref 14–18)
IMM GRANULOCYTES # BLD AUTO: 0.01 K/UL (ref 0–0.04)
IMM GRANULOCYTES NFR BLD AUTO: 0.3 % (ref 0–0.5)
IRON SERPL-MCNC: 327 UG/DL (ref 45–160)
LYMPHOCYTES # BLD AUTO: 0.9 K/UL (ref 1–4.8)
LYMPHOCYTES NFR BLD: 29.7 % (ref 18–48)
MCH RBC QN AUTO: 29.3 PG (ref 27–31)
MCHC RBC AUTO-ENTMCNC: 31.4 G/DL (ref 32–36)
MCV RBC AUTO: 94 FL (ref 82–98)
MONOCYTES # BLD AUTO: 0.3 K/UL (ref 0.3–1)
MONOCYTES NFR BLD: 9.6 % (ref 4–15)
NEUTROPHILS # BLD AUTO: 1.5 K/UL (ref 1.8–7.7)
NEUTROPHILS NFR BLD: 48.5 % (ref 38–73)
NRBC BLD-RTO: 0 /100 WBC
PLATELET # BLD AUTO: 185 K/UL (ref 150–450)
PMV BLD AUTO: 9.6 FL (ref 9.2–12.9)
RBC # BLD AUTO: 2.76 M/UL (ref 4.6–6.2)
SATURATED IRON: 71 % (ref 20–50)
SPECIMEN OUTDATE: NORMAL
TOTAL IRON BINDING CAPACITY: 460 UG/DL (ref 250–450)
TRANSFERRIN SERPL-MCNC: 311 MG/DL (ref 200–375)
WBC # BLD AUTO: 3.13 K/UL (ref 3.9–12.7)

## 2023-09-27 PROCEDURE — 99999 PR PBB SHADOW E&M-EST. PATIENT-LVL V: CPT | Mod: PBBFAC,,, | Performed by: NURSE PRACTITIONER

## 2023-09-27 PROCEDURE — 99215 OFFICE O/P EST HI 40 MIN: CPT | Mod: PBBFAC | Performed by: NURSE PRACTITIONER

## 2023-09-27 PROCEDURE — 83540 ASSAY OF IRON: CPT | Performed by: INTERNAL MEDICINE

## 2023-09-27 PROCEDURE — 36415 COLL VENOUS BLD VENIPUNCTURE: CPT | Performed by: INTERNAL MEDICINE

## 2023-09-27 PROCEDURE — 99214 OFFICE O/P EST MOD 30 MIN: CPT | Mod: S$PBB,,, | Performed by: NURSE PRACTITIONER

## 2023-09-27 PROCEDURE — 82728 ASSAY OF FERRITIN: CPT | Performed by: INTERNAL MEDICINE

## 2023-09-27 PROCEDURE — 84466 ASSAY OF TRANSFERRIN: CPT | Performed by: INTERNAL MEDICINE

## 2023-09-27 PROCEDURE — 99999 PR PBB SHADOW E&M-EST. PATIENT-LVL V: ICD-10-PCS | Mod: PBBFAC,,, | Performed by: NURSE PRACTITIONER

## 2023-09-27 PROCEDURE — 86900 BLOOD TYPING SEROLOGIC ABO: CPT | Performed by: INTERNAL MEDICINE

## 2023-09-27 PROCEDURE — 99214 PR OFFICE/OUTPT VISIT, EST, LEVL IV, 30-39 MIN: ICD-10-PCS | Mod: S$PBB,,, | Performed by: NURSE PRACTITIONER

## 2023-09-27 PROCEDURE — 85025 COMPLETE CBC W/AUTO DIFF WBC: CPT | Performed by: NURSE PRACTITIONER

## 2023-09-27 NOTE — PROGRESS NOTES
ONCOLOGY FOLLOW UP VISIT    Subjective:      Patient ID: Rocco Swain    Chief Complaint: Squamous cell carcinoma of base of tongue      HPI  Rocco Swain is a 73 y.o. male who returns to clinic for management of P16 positive squamous cell carcinoma. Admitted on 9/12/23 for 5 days for a GI bleed. Received 3 units of PRBCs and discharged with protonix BID. Old blood found during colonoscopy with no active bleeding.     Today, patient reports fair energy. States he is up and walking frequently. Denies dizziness or lightheadedness. Had a small amount of red blood in his stool 2 days ago.     ECOG Performance status: 1 - Symptomatic but completely ambulatory Communication from him is 100% written.     Cancer Staging   Squamous cell carcinoma of base of tongue  Staging form: Pharynx - HPV-Mediated Oropharynx, AJCC 8th Edition  - Clinical stage from 9/18/2020: Stage III (rcT4, cN1, cM0, p16+) - Signed by Ronald Berg MD on 12/27/2022      Oncologic History:  Oncology History   Squamous cell carcinoma of base of tongue   9/18/2020 Cancer Staged    Staging form: Pharynx - HPV-Mediated Oropharynx, AJCC 8th Edition  - Clinical stage from 9/18/2020: Stage III (rcT4, cN1, cM0, p16+)     9/28/2020 Initial Diagnosis    Squamous cell carcinoma of base of tongue     10/6/2020 - 8/17/2021 Chemotherapy    Treatment Summary   Plan Name: OP HEAD NECK CARBOPLATIN PACLITAXEL C1-2 FOLLOWED BY CETUXIMAB CARBOPLATIN C3-6 FOLLOWED BY CETUXIMAB MAINTENANCE WEEKLY  Treatment Goal: Control  Status: Inactive  Start Date: 10/6/2020  End Date: 8/17/2021  Provider: Ronald Berg MD  Chemotherapy: cetuximab (ERBITUX) 100 mg/50 mL chemo infusion 684 mg, 400 mg/m2 = 684 mg (100 % of original dose 400 mg/m2), Intravenous, Clinic/HOD 1 time, 29 of 38 cycles  Dose modification: 500 mg/m2 (original dose 400 mg/m2, Cycle 3), 250 mg/m2 (original dose 400 mg/m2, Cycle 3), 400 mg/m2 (original dose 400 mg/m2, Cycle 3), 250 mg/m2 (original dose 250 mg/m2,  Cycle 7), 200 mg/m2 (original dose 250 mg/m2, Cycle 18), 200 mg/m2 (original dose 250 mg/m2, Cycle 19), 250 mg/m2 (original dose 250 mg/m2, Cycle 4), 200 mg/m2 (original dose 250 mg/m2, Cycle 25), 200 mg/m2 (original dose 250 mg/m2, Cycle 28)  Administration: 684 mg (11/17/2020), 400 mg (11/24/2020), 400 mg (2/9/2021), 400 mg (2/17/2021), 427.6 mg (3/9/2021), 400 mg (3/30/2021), 400 mg (3/23/2021), 400 mg (4/6/2021), 427.6 mg (4/13/2021), 427.6 mg (4/20/2021), 342 mg (5/11/2021), 342 mg (5/18/2021), 342 mg (5/25/2021), 400 mg (5/31/2021), 400 mg (6/7/2021), 400 mg (6/14/2021), 400 mg (12/8/2020), 427.6 mg (12/29/2020), 400 mg (12/1/2020), 400 mg (12/15/2020), 400 mg (12/22/2020), 427.6 mg (1/5/2021), 400 mg (1/12/2021), 400 mg (1/19/2021), 427.6 mg (1/26/2021), 400 mg (2/2/2021), 400 mg (2/23/2021), 400 mg (3/2/2021), 427.6 mg (3/16/2021), 400 mg (6/21/2021), 342 mg (6/28/2021), 342 mg (7/6/2021), 342 mg (7/13/2021), 342 mg (7/20/2021), 400 mg (7/27/2021), 400 mg (8/10/2021), 400 mg (8/17/2021)  CARBOplatin (PARAPLATIN) 310 mg in sodium chloride 0.9% 500 mL chemo infusion, 310 mg (92.2 % of original dose 334.5 mg), Intravenous, Clinic/[facility] 1 time, 6 of 6 cycles  Dose modification:   (original dose 334.5 mg, Cycle 1)  Administration: 310 mg (10/6/2020), 370 mg (11/17/2020), 300 mg (10/27/2020), 350 mg (12/8/2020), 335 mg (12/29/2020), 320 mg (1/19/2021)  PACLitaxeL (TAXOL) 175 mg/m2 = 300 mg in sodium chloride 0.9% 500 mL chemo infusion, 175 mg/m2 = 300 mg (100 % of original dose 175 mg/m2), Intravenous, Clinic/[facility] 1 time, 2 of 2 cycles  Dose modification: 175 mg/m2 (original dose 175 mg/m2, Cycle 1)  Administration: 300 mg (10/6/2020), 300 mg (10/27/2020)     4/29/2021 Tumor Conference       His case was discussed at the Multidisciplinary Head and Neck Team Planning Meeting.    Representatives from Medical Oncology, Radiation Oncology, Head and Neck Surgical Oncology, Psychosocial Oncology, and Speech and Language  Pathology discussed the case with the following recommendations:    1) biopsy         7/29/2021 Tumor Conference       His case was discussed at the Multidisciplinary Head and Neck Team Planning Meeting.    Representatives from Medical Oncology, Radiation Oncology, Head and Neck Surgical Oncology, Psychosocial Oncology, and Speech and Language Pathology discussed the case with the following recommendations:    1) Head and neck clinic follow up  2) consider Keytruda (discuss with transplant)  3) consider palliative referral         9/13/2021 -  Chemotherapy    Treatment Summary   Plan Name: OP HEAD NECK PEMBROLIZUMAB CARBOPLATIN FLUOROURACIL (C1 ONLY RECEIVED) FOLLOWED BY PEMBROLIZUMAB MAINTENANCE  Treatment Goal: Palliative  Status: Active  Start Date: 9/13/2021  End Date: 9/5/2023 (Planned)  Provider: Ronald Berg MD  Chemotherapy: CARBOplatin (PARAPLATIN) 320 mg in sodium chloride 0.9% 500 mL chemo infusion, 320 mg (100 % of original dose 320.5 mg), Intravenous, Clinic/HOD 1 time, 6 of 6 cycles  Dose modification:   (original dose 320.5 mg, Cycle 1)  Administration: 320 mg (9/13/2021), 335 mg (12/23/2021), 325 mg (1/27/2022), 245 mg (3/3/2022), 255 mg (5/12/2022), 255 mg (4/7/2022)  fluorouraciL 1,000 mg/m2/day = 6,440 mg in sodium chloride 0.9% 240 mL chemo infusion, 1,000 mg/m2/day = 6,440 mg, Intravenous, Over 96 hours, 6 of 6 cycles  Dose modification: 800 mg/m2/day (original dose 1,000 mg/m2/day, Cycle 6), 5,000 mg (original dose 1,000 mg/m2/day, Cycle 8)  Administration: 6,440 mg (9/13/2021), 6,440 mg (12/23/2021), 6,480 mg (1/27/2022), 5,000 mg (3/3/2022), 5,000 mg (4/7/2022), 5,000 mg (5/12/2022)     Secondary malignant neoplasm of cervical lymph node   2/9/2021 Initial Diagnosis    Secondary malignant neoplasm of cervical lymph node     9/13/2021 -  Chemotherapy    Treatment Summary   Plan Name: OP HEAD NECK PEMBROLIZUMAB CARBOPLATIN FLUOROURACIL (C1 ONLY RECEIVED) FOLLOWED BY PEMBROLIZUMAB  MAINTENANCE  Treatment Goal: Palliative  Status: Active  Start Date: 9/13/2021  End Date: 9/5/2023 (Planned)  Provider: Ronald Berg MD  Chemotherapy: CARBOplatin (PARAPLATIN) 320 mg in sodium chloride 0.9% 500 mL chemo infusion, 320 mg (100 % of original dose 320.5 mg), Intravenous, Clinic/[facility] 1 time, 6 of 6 cycles  Dose modification:   (original dose 320.5 mg, Cycle 1)  Administration: 320 mg (9/13/2021), 335 mg (12/23/2021), 325 mg (1/27/2022), 245 mg (3/3/2022), 255 mg (5/12/2022), 255 mg (4/7/2022)  fluorouraciL 1,000 mg/m2/day = 6,440 mg in sodium chloride 0.9% 240 mL chemo infusion, 1,000 mg/m2/day = 6,440 mg, Intravenous, Over 96 hours, 6 of 6 cycles  Dose modification: 800 mg/m2/day (original dose 1,000 mg/m2/day, Cycle 6), 5,000 mg (original dose 1,000 mg/m2/day, Cycle 8)  Administration: 6,440 mg (9/13/2021), 6,440 mg (12/23/2021), 6,480 mg (1/27/2022), 5,000 mg (3/3/2022), 5,000 mg (4/7/2022), 5,000 mg (5/12/2022)         Review of Systems   Constitutional:  Negative for activity change, appetite change, chills, fatigue, fever and unexpected weight change.   HENT:  Negative for ear pain, facial swelling, hearing loss, mouth sores, nosebleeds, sore throat, trouble swallowing and voice change.         No verbal communication. Skin fixed and immobile from previous scaring post-neck dissection   Eyes:  Positive for redness (right eye). Negative for pain, discharge and visual disturbance.   Respiratory:  Negative for cough, choking, chest tightness and shortness of breath.    Cardiovascular:  Negative for chest pain, palpitations and leg swelling.   Gastrointestinal:  Negative for abdominal distention, abdominal pain, blood in stool, constipation, diarrhea, nausea and vomiting.   Endocrine: Negative for cold intolerance and heat intolerance.   Genitourinary:  Negative for difficulty urinating, dysuria, frequency and urgency.   Musculoskeletal:  Positive for back pain (lower - chronic). Negative for  arthralgias, gait problem, leg pain and myalgias.   Integumentary:  Negative for pallor, rash and wound.        Blistering to left toes   Allergic/Immunologic: Negative for frequent infections.   Neurological:  Negative for dizziness, tremors, weakness, light-headedness, numbness and headaches.   Hematological:  Negative for adenopathy. Does not bruise/bleed easily.   Psychiatric/Behavioral:  Negative for agitation, confusion, dysphoric mood and sleep disturbance. The patient is not nervous/anxious.         Allergies:  Review of patient's allergies indicates:  No Known Allergies    Medications:  Current Outpatient Medications   Medication Sig Dispense Refill    azelaic acid (AZELEX) 15 % gel APPLY TOPICALLY TO AFFECTED AREA IN THE MORNING 50 g 3    gabapentin (NEURONTIN) 300 MG capsule Take 1 capsule (300 mg total) by mouth every evening. 30 capsule 11    ibuprofen (ADVIL,MOTRIN) 200 MG tablet Take 200 mg by mouth.      imiquimod (ALDARA) 5 % cream Apply to biopsy site on frontal scalp + about a centimeter of surrounding skin qhs x 16 weeks. Wash off in am and apply sunscreen. Discontinue if skin becomes blistered, raw, bleeding, painful, etc. 24 packet 3    levothyroxine (SYNTHROID) 88 MCG tablet TAKE 1 TABLET BY MOUTH ONCE DAILY 90 tablet 3    LIDOcaine HCl 2% (LIDOCAINE VISCOUS) 2 % Soln Swish and spit 15 mls every 8 (eight) hours as needed (mouth sore). 100 mL 3    LIDOcaine-prilocaine (EMLA) cream Apply generously to port site 30-60 min prior to chemo and then cover with saran wrap. 30 g 2    magic mouthwash diphen/antac/lidoc/nysta Take 10 mLs by mouth 4 (four) times daily. 120 mL 4    moxifloxacin (VIGAMOX) 0.5 % ophthalmic solution Place 1 drop into the right eye 4 (four) times daily. 3 mL 3    multivit-min/FA/lycopen/lutein (CENTRUM SILVER MEN ORAL) Take 1 tablet by mouth.      neomycin-polymyxin-dexamethasone (MAXITROL) 3.5mg/mL-10,000 unit/mL-0.1 % DrpS Place 1 drop into the right eye 4 (four) times  daily. 5 mL 3    pantoprazole (PROTONIX) 40 MG tablet Take 1 tablet (40 mg total) by mouth 2 (two) times a day. 168 tablet 0    prednisoLONE acetate (PRED FORTE) 1 % DrpS Place 1 drop into the right eye 4 (four) times daily. 10 mL 3    sulfacetamide sodium-sulfur 10-5 % (w/w) Clsr USE TO WASH AFFECTED AREA DAILY      tacrolimus (PROGRAF) 0.5 MG Cap Take 1 capsule (0.5 mg total) by mouth every EVENING.  (Use the 1mg capsule for your morning dose) 90 capsule 3    tacrolimus (PROGRAF) 0.5 MG Cap Take 1 capsule (0.5 mg total) by mouth every evening. Use the 1mg capsule for your morning dose 90 capsule 3    tacrolimus (PROGRAF) 1 MG Cap Take 1 capsule (1 mg total) by mouth every morning. Use the tacrolimus 0.5mg capsule for your evening dose as directed. 90 capsule 3    tiZANidine (ZANAFLEX) 2 MG tablet Take 1 tablet (2 mg total) by mouth nightly as needed (neck muscle strain). 30 tablet 3    traZODone (DESYREL) 50 MG tablet Take 1 tablet (50 mg total) by mouth every evening. 90 tablet 2    vitamin E 400 UNIT capsule Take 400 Units by mouth once daily.      metroNIDAZOLE (METROGEL) 0.75 % gel Apply topically to affected area 2 (two) times daily. 45 g 1     No current facility-administered medications for this visit.     Facility-Administered Medications Ordered in Other Visits   Medication Dose Route Frequency Provider Last Rate Last Admin    heparin, porcine (PF) 100 unit/mL injection flush 500 Units  500 Units Intravenous PRN Ronald Berg MD        ofloxacin 0.3 % ophthalmic solution 1 drop  1 drop Right Eye On Call Procedure Victor M Vasques MD   2 drop at 10/11/22 0745    sodium chloride 0.9% flush 10 mL  10 mL Intravenous PRN Ronald Berg MD        sodium chloride 0.9% flush 10 mL  10 mL Intravenous PRN Victor M Vasques MD           PMH:  Past Medical History:   Diagnosis Date    Basal cell carcinoma (BCC) in situ of skin 2012    3 on face, 2 on arm, removed by dermatology.     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 Underwent surgical resection of tongue and neck. s/p tracheostomy       PSH:  Past Surgical History:   Procedure Laterality Date    COLONOSCOPY      COLONOSCOPY N/A 9/14/2023    Procedure: COLONOSCOPY;  Surgeon: Reyes Olivia MD;  [facility]  Service: Endoscopy;  Laterality: N/A;    CONJUNCTIVA BIOPSY Right 10/11/2022    Procedure: BIOPSY, CONJUNCTIVA;  Surgeon: Victor M Vasques MD;  Location: [facility] OR;  Service: Ophthalmology;  Laterality: Right;    ESOPHAGOGASTRODUODENOSCOPY N/A 9/14/2023    Procedure: EGD (ESOPHAGOGASTRODUODENOSCOPY);  Surgeon: Reyes Olivia MD;  [facility]  Service: Endoscopy;  Laterality: N/A;    INSERTION OF VENOUS ACCESS PORT Right 3/8/2021    Procedure: INSERTION, VENOUS ACCESS PORT;  Surgeon: Jesus Rendon MD;  Location: [address];  Service: General;  Laterality: Right;    LIVER TRANSPLANT  11/2008    transplanted for biopsy proven hepatocellular carcinoma,     LYMPH NODE BIOPSY N/A 9/18/2020    Procedure: BIOPSY, LYMPH NODE;  Surgeon: Taz Diagnostic Provider;  [address];  Service: General;  Laterality: N/A;  Rm 173    SURGICAL REMOVAL OF SCAR Right 8/24/2023    Procedure: EXCISION, SCAR;  Surgeon: Ting Brambila MD;  Location: [facility] OR;  Service: Ophthalmology;  Laterality: Right;    TRACHEOSTOMY         FamHx:  Family History   Problem Relation Age of Onset    Dementia Mother        SocHx:  Social History     Socioeconomic History    Marital status: Single   Occupational History    Occupation: Retired     Comment: , notes exposures to fumes etc.  Worked on [employer] for 4 years   Tobacco Use    Smoking status: Never  "   Smokeless tobacco: Never   Substance and Sexual Activity    Alcohol use: Yes     Comment: drinks wine, 1 glass day    Drug use: Not Currently    Sexual activity: Yes     Comment: No prior history of STD        Distress Score    Distress Score: 0 - No Distress        Objective:      Vitals:    09/27/23 0903   BP: (!) 142/75   BP Location: Left arm   Patient Position: Sitting   BP Method: Small (Automatic)   Pulse: 81   Resp: 18   Temp: 98.1 °F (36.7 °C)   TempSrc: Oral   SpO2: 98%   Weight: 55 kg (121 lb 4.1 oz)   Height: 5' 8" (1.727 m)          Physical Exam  Vitals reviewed.   Constitutional:       General: He is not in acute distress.     Appearance: Normal appearance. He is well-developed and underweight.   HENT:      Head: Normocephalic and atraumatic.      Mouth/Throat:      Mouth: Mucous membranes are moist.      Dentition: Abnormal dentition. Dental caries present.   Eyes:      Conjunctiva/sclera: Conjunctivae normal.      Pupils: Pupils are equal, round, and reactive to light.   Neck:      Trachea: Tracheostomy present.   Pulmonary:      Effort: Pulmonary effort is normal. No respiratory distress.      Comments: Tracheostomy  Abdominal:      General: There is no distension.      Palpations: Abdomen is soft.   Musculoskeletal:         General: No swelling. Normal range of motion.      Cervical back: Normal range of motion and neck supple.   Skin:     General: Skin is warm and dry.   Neurological:      General: No focal deficit present.      Mental Status: He is alert and oriented to person, place, and time.   Psychiatric:         Mood and Affect: Mood normal.         Behavior: Behavior normal.         Thought Content: Thought content normal.         Judgment: Judgment normal.           LABS:  WBC   Date Value Ref Range Status   09/27/2023 3.13 (L) 3.90 - 12.70 K/uL Final     Hemoglobin   Date Value Ref Range Status   09/27/2023 8.1 (L) 14.0 - 18.0 g/dL Final     Hematocrit   Date Value Ref Range Status "   09/27/2023 25.8 (L) 40.0 - 54.0 % Final     Platelets   Date Value Ref Range Status   09/27/2023 185 150 - 450 K/uL Final       Chemistry        Component Value Date/Time     09/22/2023 0802    K 4.5 09/22/2023 0802     09/22/2023 0802    CO2 26 09/22/2023 0802    BUN 16 09/22/2023 0802    CREATININE 1.4 09/22/2023 0802     09/22/2023 0802        Component Value Date/Time    CALCIUM 9.2 09/22/2023 0802    ALKPHOS 58 09/22/2023 0802    AST 48 (H) 09/22/2023 0802    ALT 30 09/22/2023 0802    BILITOT 0.2 09/22/2023 0802    ESTGFRAFRICA 52.6 (A) 07/14/2022 1119    EGFRNONAA 45.5 (A) 07/14/2022 1119              Assessment:       1. Squamous cell carcinoma of base of tongue    2. Secondary malignant neoplasm of cervical lymph node    3. Tracheostomy status    4. 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 Status post liver transplantation - 2008    7. Cirrhosis of transplanted liver    8. Jaw pain    9. Iron deficiency anemia due to chronic blood loss    10. Gastrointestinal hemorrhage associated with duodenal ulcer        Plan:       1,2. 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 He is not a surgical or radiation candidate.  -Started on PCC 10/6/2020 followed by maintenance cetuximab until 8/24/21 (discontinued due to progression of disease; continued increase in SUV uptake, no new lesions).  - 9/2021 started on carbo/5-FU/pembrolizumab; due to toxicity went to pembrolizumab monotherapy starting with cycle 2.  Progression of disease noted after cycle 3 so added chemotherapy back in with cycle 4. Keytruda monotherapy starting with C9.   - Continue with Keytruda; will complete 2 years. Due for re-staging scans but cancelled by patient will get imaging prior to next cycle.      3. Status post total laryngectomy, total glossectomy and anterolateral thigh free flap reconstruction roughly  2017 at [facility] in [place] for persistent/recurrent base of tongue sq.c.c.    4. Planned excision for conjunctival pre-cancerous lesion    5-7. S/p liver transplant and is currently on tacrolimus. Grade 1. Will continue to monitor.     8. Continue Oxy IR. Now following with palliative care. Not a candidate for Celebrex d/t liver tx and kidney dysfunction.     9,10. Hgb 8.1 - stable from last week. Will check weekly x2 to monitor for transfusion needs. Continue PPI BID. Monitor closely for bleeding in stool.    he will return to clinic in 5 weeks, but knows to call in the interim if symptoms change or should a problem arise.     Patient is in agreement with the proposed treatment plan. All questions were answered to the patient's satisfaction. Pt knows to call clinic if anything is needed before the next clinic visit.    Ct Francis, MSN, APRN, FNP-C  Hematology and Medical Oncology  Nurse Practitioner to Dr. Martín Hernandez  Nurse Practitioner, [facility]          Route Chart for Scheduling    Med Onc Chart Routing  Urgent    Follow up with physician    Follow up with [facility]    Infusion scheduling note    Injection scheduling note    Labs CBC and type and screen   Scheduling:  Preferred lab:  Lab interval:  weekly labs x2   Imaging    Pharmacy appointment    Other referrals                  Treatment Plan Information   OP HEAD NECK PEMBROLIZUMAB CARBOPLATIN FLUOROURACIL (C1 ONLY RECEIVED) FOLLOWED BY PEMBROLIZUMAB MAINTENANCE   Ronald Berg MD   Upcoming Treatment Dates - OP HEAD NECK PEMBROLIZUMAB CARBOPLATIN FLUOROURACIL (C1 ONLY RECEIVED) FOLLOWED BY PEMBROLIZUMAB MAINTENANCE    9/5/2023       Immunotherapy       pembrolizumab (KEYTRUDA) 400 mg in sodium chloride 0.9% SolP 116 mL infusion    Therapy Plan Information  PORT FLUSH  Flushes  heparin, porcine (PF) 100 unit/mL injection flush 500 Units  500 Units, Intravenous, Every visit  sodium chloride 0.9% flush 10 mL  10 mL,  Intravenous, Every visit    PORT FLUSH  Flushes  heparin, porcine (PF) 100 unit/mL injection flush 500 Units  500 Units, Intravenous, Every visit  sodium chloride 0.9% flush 10 mL  10 mL, Intravenous, Every visit

## 2023-10-02 ENCOUNTER — TELEPHONE (OUTPATIENT)
Dept: TRANSPLANT | Facility: CLINIC | Age: 73
End: 2023-10-02
Payer: MEDICARE

## 2023-10-02 NOTE — TELEPHONE ENCOUNTER
Letter sent to patient stating: Your labs have been reviewed by your Transplant physician, no action required. Next labs due 11/27/23          ----- Message from Cornell Anton MD sent at 10/2/2023  1:00 PM CDT -----  Results reviewed

## 2023-10-02 NOTE — LETTER
October 2, 2023    Rocco Swain  [address] [place] [address] [place] [address]          Dear Rocco Swain:  MRN: [id number]    This is a follow up to your recent labs, your lab results were stable.  There are no medicine changes.  Please have your labs drawn again on 11/27/2023.      If you cannot have your labs drawn on the scheduled date, it is your responsibility to call the transplant department to reschedule.  Please call [phone] and ask to speak to Rhea RIOS   for all scheduling requests.     Sincerely,    Shari WADDELLN, RN      Your Liver Transplant Coordinator    [facility]  [address] [place] [id number]  [phone]

## 2023-10-04 ENCOUNTER — LAB VISIT (OUTPATIENT)
Dept: LAB | Facility: HOSPITAL | Age: 73
End: 2023-10-04
Payer: MEDICARE

## 2023-10-04 DIAGNOSIS — C01 SQUAMOUS CELL CARCINOMA OF BASE OF TONGUE: ICD-10-CM

## 2023-10-04 DIAGNOSIS — D50.0 IRON DEFICIENCY ANEMIA DUE TO CHRONIC BLOOD LOSS: ICD-10-CM

## 2023-10-04 LAB
ABO + RH BLD: NORMAL
BASOPHILS # BLD AUTO: 0.02 K/UL (ref 0–0.2)
BASOPHILS NFR BLD: 0.6 % (ref 0–1.9)
BLD GP AB SCN CELLS X3 SERPL QL: NORMAL
DIFFERENTIAL METHOD: ABNORMAL
EOSINOPHIL # BLD AUTO: 0.2 K/UL (ref 0–0.5)
EOSINOPHIL NFR BLD: 5.8 % (ref 0–8)
ERYTHROCYTE [DISTWIDTH] IN BLOOD BY AUTOMATED COUNT: 20.5 % (ref 11.5–14.5)
HCT VFR BLD AUTO: 27.3 % (ref 40–54)
HGB BLD-MCNC: 8.5 G/DL (ref 14–18)
IMM GRANULOCYTES # BLD AUTO: 0.02 K/UL (ref 0–0.04)
IMM GRANULOCYTES NFR BLD AUTO: 0.6 % (ref 0–0.5)
LYMPHOCYTES # BLD AUTO: 0.7 K/UL (ref 1–4.8)
LYMPHOCYTES NFR BLD: 22.2 % (ref 18–48)
MCH RBC QN AUTO: 30.1 PG (ref 27–31)
MCHC RBC AUTO-ENTMCNC: 31.1 G/DL (ref 32–36)
MCV RBC AUTO: 97 FL (ref 82–98)
MONOCYTES # BLD AUTO: 0.3 K/UL (ref 0.3–1)
MONOCYTES NFR BLD: 10.3 % (ref 4–15)
NEUTROPHILS # BLD AUTO: 2 K/UL (ref 1.8–7.7)
NEUTROPHILS NFR BLD: 60.5 % (ref 38–73)
NRBC BLD-RTO: 0 /100 WBC
PLATELET # BLD AUTO: 123 K/UL (ref 150–450)
PMV BLD AUTO: 10.1 FL (ref 9.2–12.9)
RBC # BLD AUTO: 2.82 M/UL (ref 4.6–6.2)
SPECIMEN OUTDATE: NORMAL
WBC # BLD AUTO: 3.29 K/UL (ref 3.9–12.7)

## 2023-10-04 PROCEDURE — 36415 COLL VENOUS BLD VENIPUNCTURE: CPT | Performed by: INTERNAL MEDICINE

## 2023-10-04 PROCEDURE — 85025 COMPLETE CBC W/AUTO DIFF WBC: CPT | Performed by: NURSE PRACTITIONER

## 2023-10-04 PROCEDURE — 86901 BLOOD TYPING SEROLOGIC RH(D): CPT | Performed by: INTERNAL MEDICINE

## 2023-10-10 ENCOUNTER — PATIENT MESSAGE (OUTPATIENT)
Dept: OPHTHALMOLOGY | Facility: CLINIC | Age: 73
End: 2023-10-10

## 2023-10-10 ENCOUNTER — OFFICE VISIT (OUTPATIENT)
Dept: OPHTHALMOLOGY | Facility: CLINIC | Age: 73
End: 2023-10-10
Payer: MEDICARE

## 2023-10-10 DIAGNOSIS — Z98.890 POST-OPERATIVE STATE: Primary | ICD-10-CM

## 2023-10-10 DIAGNOSIS — D49.89 CIN (CONJUNCTIVAL INTRAEPITHELIAL NEOPLASIA): ICD-10-CM

## 2023-10-10 PROCEDURE — 99999 PR PBB SHADOW E&M-EST. PATIENT-LVL III: ICD-10-PCS | Mod: PBBFAC,,, | Performed by: OPHTHALMOLOGY

## 2023-10-10 PROCEDURE — 99024 PR POST-OP FOLLOW-UP VISIT: ICD-10-PCS | Mod: POP,,, | Performed by: OPHTHALMOLOGY

## 2023-10-10 PROCEDURE — 99024 POSTOP FOLLOW-UP VISIT: CPT | Mod: POP,,, | Performed by: OPHTHALMOLOGY

## 2023-10-10 PROCEDURE — 99999 PR PBB SHADOW E&M-EST. PATIENT-LVL III: CPT | Mod: PBBFAC,,, | Performed by: OPHTHALMOLOGY

## 2023-10-10 PROCEDURE — 99213 OFFICE O/P EST LOW 20 MIN: CPT | Mod: PBBFAC | Performed by: OPHTHALMOLOGY

## 2023-10-10 RX ORDER — NEOMYCIN SULFATE, POLYMYXIN B SULFATE AND DEXAMETHASONE 3.5; 10000; 1 MG/ML; [USP'U]/ML; MG/ML
1 SUSPENSION/ DROPS OPHTHALMIC 4 TIMES DAILY
Qty: 5 ML | Refills: 3 | Status: SHIPPED | OUTPATIENT
Start: 2023-10-10 | End: 2023-11-09

## 2023-10-11 ENCOUNTER — LAB VISIT (OUTPATIENT)
Dept: LAB | Facility: HOSPITAL | Age: 73
End: 2023-10-11
Payer: MEDICARE

## 2023-10-11 DIAGNOSIS — Z94.4 STATUS POST LIVER TRANSPLANTATION: ICD-10-CM

## 2023-10-11 DIAGNOSIS — D50.0 IRON DEFICIENCY ANEMIA DUE TO CHRONIC BLOOD LOSS: ICD-10-CM

## 2023-10-11 DIAGNOSIS — C01 SQUAMOUS CELL CARCINOMA OF BASE OF TONGUE: ICD-10-CM

## 2023-10-11 LAB
ABO + RH BLD: NORMAL
BASOPHILS # BLD AUTO: 0.02 K/UL (ref 0–0.2)
BASOPHILS NFR BLD: 0.9 % (ref 0–1.9)
BLD GP AB SCN CELLS X3 SERPL QL: NORMAL
DIFFERENTIAL METHOD: ABNORMAL
EOSINOPHIL # BLD AUTO: 0.2 K/UL (ref 0–0.5)
EOSINOPHIL NFR BLD: 7.3 % (ref 0–8)
ERYTHROCYTE [DISTWIDTH] IN BLOOD BY AUTOMATED COUNT: 21.8 % (ref 11.5–14.5)
HCT VFR BLD AUTO: 28.6 % (ref 40–54)
HGB BLD-MCNC: 9 G/DL (ref 14–18)
IMM GRANULOCYTES # BLD AUTO: 0.01 K/UL (ref 0–0.04)
IMM GRANULOCYTES NFR BLD AUTO: 0.4 % (ref 0–0.5)
LYMPHOCYTES # BLD AUTO: 0.5 K/UL (ref 1–4.8)
LYMPHOCYTES NFR BLD: 20.3 % (ref 18–48)
MCH RBC QN AUTO: 30.8 PG (ref 27–31)
MCHC RBC AUTO-ENTMCNC: 31.5 G/DL (ref 32–36)
MCV RBC AUTO: 98 FL (ref 82–98)
MONOCYTES # BLD AUTO: 0.3 K/UL (ref 0.3–1)
MONOCYTES NFR BLD: 12.5 % (ref 4–15)
NEUTROPHILS # BLD AUTO: 1.4 K/UL (ref 1.8–7.7)
NEUTROPHILS NFR BLD: 58.6 % (ref 38–73)
NRBC BLD-RTO: 0 /100 WBC
PLATELET # BLD AUTO: 145 K/UL (ref 150–450)
PMV BLD AUTO: 9.9 FL (ref 9.2–12.9)
RBC # BLD AUTO: 2.92 M/UL (ref 4.6–6.2)
SPECIMEN OUTDATE: NORMAL
WBC # BLD AUTO: 2.32 K/UL (ref 3.9–12.7)

## 2023-10-11 PROCEDURE — 85025 COMPLETE CBC W/AUTO DIFF WBC: CPT | Performed by: NURSE PRACTITIONER

## 2023-10-11 PROCEDURE — 36415 COLL VENOUS BLD VENIPUNCTURE: CPT | Performed by: INTERNAL MEDICINE

## 2023-10-11 PROCEDURE — 86850 RBC ANTIBODY SCREEN: CPT | Performed by: INTERNAL MEDICINE

## 2023-10-12 ENCOUNTER — PATIENT OUTREACH (OUTPATIENT)
Dept: ADMINISTRATIVE | Facility: HOSPITAL | Age: 73
End: 2023-10-12
Payer: MEDICARE

## 2023-10-12 RX ORDER — TACROLIMUS 0.5 MG/1
0.5 CAPSULE ORAL NIGHTLY
Qty: 90 CAPSULE | Refills: 3
Start: 2023-10-12 | End: 2023-10-20 | Stop reason: SDUPTHER

## 2023-10-13 ENCOUNTER — HOSPITAL ENCOUNTER (OUTPATIENT)
Dept: RADIOLOGY | Facility: HOSPITAL | Age: 73
Discharge: HOME | End: 2023-10-13
Attending: INTERNAL MEDICINE
Payer: MEDICARE

## 2023-10-13 ENCOUNTER — OFFICE VISIT (OUTPATIENT)
Dept: INTERNAL MEDICINE | Facility: CLINIC | Age: 73
End: 2023-10-13
Payer: MEDICARE

## 2023-10-13 VITALS
TEMPERATURE: 98 F | OXYGEN SATURATION: 99 % | WEIGHT: 121.94 LBS | SYSTOLIC BLOOD PRESSURE: 138 MMHG | BODY MASS INDEX: 18.48 KG/M2 | HEART RATE: 108 BPM | HEIGHT: 68 IN | DIASTOLIC BLOOD PRESSURE: 70 MMHG

## 2023-10-13 DIAGNOSIS — T86.49 CIRRHOSIS OF TRANSPLANTED LIVER: ICD-10-CM

## 2023-10-13 DIAGNOSIS — M54.50 CHRONIC BILATERAL LOW BACK PAIN WITHOUT SCIATICA: ICD-10-CM

## 2023-10-13 DIAGNOSIS — K92.2 UGI BLEED: ICD-10-CM

## 2023-10-13 DIAGNOSIS — G89.29 CHRONIC BILATERAL LOW BACK PAIN WITHOUT SCIATICA: ICD-10-CM

## 2023-10-13 DIAGNOSIS — C01 SQUAMOUS CELL CARCINOMA OF BASE OF TONGUE: ICD-10-CM

## 2023-10-13 DIAGNOSIS — G89.29 CHRONIC BILATERAL LOW BACK PAIN WITHOUT SCIATICA: Primary | ICD-10-CM

## 2023-10-13 DIAGNOSIS — Z93.0 TRACHEOSTOMY STATUS: ICD-10-CM

## 2023-10-13 DIAGNOSIS — M54.50 CHRONIC BILATERAL LOW BACK PAIN WITHOUT SCIATICA: Primary | ICD-10-CM

## 2023-10-13 DIAGNOSIS — M54.9 DORSALGIA, UNSPECIFIED: ICD-10-CM

## 2023-10-13 DIAGNOSIS — I48.0 PAF (PAROXYSMAL ATRIAL FIBRILLATION): ICD-10-CM

## 2023-10-13 DIAGNOSIS — K74.60 CIRRHOSIS OF TRANSPLANTED LIVER: ICD-10-CM

## 2023-10-13 PROCEDURE — 99999 PR PBB SHADOW E&M-EST. PATIENT-LVL V: CPT | Mod: PBBFAC,,, | Performed by: INTERNAL MEDICINE

## 2023-10-13 PROCEDURE — 72100 X-RAY EXAM L-S SPINE 2/3 VWS: CPT | Mod: 26,,, | Performed by: STUDENT IN AN ORGANIZED HEALTH CARE EDUCATION/TRAINING PROGRAM

## 2023-10-13 PROCEDURE — 99214 OFFICE O/P EST MOD 30 MIN: CPT | Mod: S$PBB,,, | Performed by: INTERNAL MEDICINE

## 2023-10-13 PROCEDURE — 72100 X-RAY EXAM L-S SPINE 2/3 VWS: CPT | Mod: TC,PO

## 2023-10-13 PROCEDURE — 72100 XR LUMBAR SPINE AP AND LATERAL: ICD-10-PCS | Mod: 26,,, | Performed by: STUDENT IN AN ORGANIZED HEALTH CARE EDUCATION/TRAINING PROGRAM

## 2023-10-13 PROCEDURE — 99215 OFFICE O/P EST HI 40 MIN: CPT | Mod: PBBFAC,PO | Performed by: INTERNAL MEDICINE

## 2023-10-13 PROCEDURE — 99999 PR PBB SHADOW E&M-EST. PATIENT-LVL V: ICD-10-PCS | Mod: PBBFAC,,, | Performed by: INTERNAL MEDICINE

## 2023-10-13 PROCEDURE — 99214 PR OFFICE/OUTPT VISIT, EST, LEVL IV, 30-39 MIN: ICD-10-PCS | Mod: S$PBB,,, | Performed by: INTERNAL MEDICINE

## 2023-10-13 NOTE — PROGRESS NOTES
History of present:   73-year-old gentleman with a number of chronic medical issues following up on couple those issues.  Patient was hospitalized last month with upper GI bleed secondary to gastric ulcers.  Transfused.  Had appropriate endoscopies.  Currently on pantoprazole.  His primary concern today is discuss his worsening chronic lower back pain.  This has been an ongoing issue for years though recently has increased.  Nonradiating.  No gait disturbance.  No bowel urinary continence.  His last MRI of the lumbosacral spine was in 2015.  Noted on an ECG tracing in his hospital stay than he was in atrial fibrillation, subsequent ECG sinus rhythm.  He does not have a known history of previous atrial fibrillation.    Current medications:   Medications are all noted reviewed in our electronic medical record medication list.    Review of systems:   Constitutional:  No fever no chills.    HEENT:  No URI symptoms.    Respiratory:  No cough shortness of breath.    Cardiovascular:  Denies current chest pain palpitations or syncope.  GI: Denies current nausea vomiting abdominal pain.  No melena or hematochezia described since discharge.    Physical examination:   General:  Pleasant alert appropriately groomed gentleman no acute distress.  Communication from the patient is with writing tablet due to his tracheostomy.    Vital signs:  Blood pressure 138/70.  Heart rate 108.  Lungs:  Clear to auscultation.    Cardiovascular:  Apical rate is irregularly irregular.  No significant murmur.  Carotids full bilaterally.  There is no peripheral extremity edema.  Abdomen:  Soft and benign and nontender.    Back:  No gross deformity.  There is tenderness palpation of the lumbar paraspinous musculature.  Negative straight leg raising.  Mental status:  Alert oriented affect mood appropriate.      Data:  Reviewed the recent hospital discharge summary, lab data.      Impression:  Chronic recently worsening lower back pain impairing daily  functioning.  New finding of atrial fibrillation, currently not an anticoagulation candidate.    Recent upper GI bleed no clinical evidence of recurrence.    Liver transplant status.    History of squamous cell carcinoma of the tongue      Plan:   Lumbosacral spine films today.    MRI lumbar spine.  Referral to Cardiology regarding atrial fibrillation.

## 2023-10-16 ENCOUNTER — OFFICE VISIT (OUTPATIENT)
Dept: PALLIATIVE MEDICINE | Facility: CLINIC | Age: 73
End: 2023-10-16
Payer: MEDICARE

## 2023-10-16 ENCOUNTER — PATIENT MESSAGE (OUTPATIENT)
Dept: HEMATOLOGY/ONCOLOGY | Facility: CLINIC | Age: 73
End: 2023-10-16
Payer: MEDICARE

## 2023-10-16 VITALS
SYSTOLIC BLOOD PRESSURE: 131 MMHG | OXYGEN SATURATION: 99 % | HEIGHT: 68 IN | DIASTOLIC BLOOD PRESSURE: 75 MMHG | HEART RATE: 100 BPM | BODY MASS INDEX: 18.44 KG/M2 | WEIGHT: 121.69 LBS

## 2023-10-16 DIAGNOSIS — G89.3 CANCER RELATED PAIN: ICD-10-CM

## 2023-10-16 DIAGNOSIS — C01 SQUAMOUS CELL CARCINOMA OF BASE OF TONGUE: ICD-10-CM

## 2023-10-16 DIAGNOSIS — Z51.5 ENCOUNTER FOR PALLIATIVE CARE: Primary | ICD-10-CM

## 2023-10-16 DIAGNOSIS — Z90.02 HISTORY OF LARYNGECTOMY: ICD-10-CM

## 2023-10-16 DIAGNOSIS — M54.50 CHRONIC LOW BACK PAIN, UNSPECIFIED BACK PAIN LATERALITY, UNSPECIFIED WHETHER SCIATICA PRESENT: ICD-10-CM

## 2023-10-16 DIAGNOSIS — G89.29 CHRONIC BILATERAL LOW BACK PAIN, UNSPECIFIED WHETHER SCIATICA PRESENT: ICD-10-CM

## 2023-10-16 DIAGNOSIS — L27.0 DRUG-INDUCED SKIN RASH: ICD-10-CM

## 2023-10-16 DIAGNOSIS — G47.00 INSOMNIA, UNSPECIFIED TYPE: ICD-10-CM

## 2023-10-16 DIAGNOSIS — M54.50 CHRONIC BILATERAL LOW BACK PAIN, UNSPECIFIED WHETHER SCIATICA PRESENT: ICD-10-CM

## 2023-10-16 DIAGNOSIS — C32.9 LARYNX CANCER: ICD-10-CM

## 2023-10-16 DIAGNOSIS — G89.29 CHRONIC LOW BACK PAIN, UNSPECIFIED BACK PAIN LATERALITY, UNSPECIFIED WHETHER SCIATICA PRESENT: ICD-10-CM

## 2023-10-16 DIAGNOSIS — H57.11 EYE PAIN, RIGHT: ICD-10-CM

## 2023-10-16 PROCEDURE — 99214 OFFICE O/P EST MOD 30 MIN: CPT | Mod: PBBFAC | Performed by: NURSE PRACTITIONER

## 2023-10-16 PROCEDURE — 99215 PR OFFICE/OUTPT VISIT, EST, LEVL V, 40-54 MIN: ICD-10-PCS | Mod: S$PBB,,, | Performed by: NURSE PRACTITIONER

## 2023-10-16 PROCEDURE — 99999 PR PBB SHADOW E&M-EST. PATIENT-LVL IV: ICD-10-PCS | Mod: PBBFAC,,, | Performed by: NURSE PRACTITIONER

## 2023-10-16 PROCEDURE — 99999 PR PBB SHADOW E&M-EST. PATIENT-LVL IV: CPT | Mod: PBBFAC,,, | Performed by: NURSE PRACTITIONER

## 2023-10-16 PROCEDURE — 99215 OFFICE O/P EST HI 40 MIN: CPT | Mod: S$PBB,,, | Performed by: NURSE PRACTITIONER

## 2023-10-16 RX ORDER — OXYCODONE HYDROCHLORIDE 5 MG/1
5 TABLET ORAL EVERY 6 HOURS PRN
Qty: 56 TABLET | Refills: 0 | Status: SHIPPED | OUTPATIENT
Start: 2023-10-16 | End: 2023-10-20

## 2023-10-16 RX ORDER — AZELAIC ACID 0.15 G/G
GEL TOPICAL
Qty: 50 G | Refills: 3 | Status: CANCELLED | OUTPATIENT
Start: 2023-10-11

## 2023-10-16 NOTE — PROGRESS NOTES
Consult Note  Palliative Care      Consult Requested By: No ref. provider found  Reason for Consult: symptom mgmt/ACP      ASSESSMENT/PLAN:     Plan/Recommendations:  Diagnoses and all orders for this visit:    10/16/2023:  - no changes made     Squamous cell carcinoma of base of tongue  - following with Dr. Hernandez and NP Tito  - s/p laryngectomy, total glossectomy   - currently on keytruda   - 6/13/23 repeat PET scan  - 100% written communication     Encounter for palliative care  - Patient is decisional  - Patient accompanied today by self   - ACP documents are uploaded into EMR   - Philosophy of Palliative Medicine reviewed with patient and family at first visit  - New patient folder given to and reviewed with patient and family at first visit  - Goals of care: Patient has excellent activity tolerance, his goal is to remain active and able to do the things he enjoys. He fly fishes frequently and enjoys taking walks around the [place]. He is a retired  of 35 years. He has no children and is unmarried, he jokes that being single has allowed him to buy a Kristine. He lives alone with a brother who lives about a mile from his house.      Chronic low back pain, unspecified back pain laterality, unspecified whether sciatica present  - no longer requires pain medication for cancer-related pain  - states his chronic back pain is now more prominent, this is his biggest complaint  - he has tried PT and pain management with limited to no relief     - placed referral to IO for acupuncture     Insomnia  - reports 5-6 hours of sleep per night  - 2/2 chronic back pain, see above  - placed referral for acupuncture   - tip sheet provided in new patient folder  - will continue to monitor     Understanding of illness/Prognosis: good    Goals of care: life-prolonging, maintain good QOL    Follow up: 10-12 weeks     Patient's encounter and above plan of care discussed with patient's oncology  team.     SUBJECTIVE:     History of Present Illness:  Patient is a 73 y.o. year old male presenting with SCC base of tongue. Please see oncology notes for full oncologic history and treatment course.     10/16/2023:  JESUS ALBERTO HERNÁNDEZ reviewed: no concerns    Presents to clinic visit alone.     - since our last visit he was hospitalized x 5 days for GIB, feeling much better now  - reports has gained 8 lbs since last spring  - s/p eye surgery (biopsy) 6 weeks ago, still w some residual pain, 14 day refill of oxy 5 mg provided   - next PET scan 10/31  - leaves for trip to [facility] Nov/1  - starts w new back doctor in November   - enjoying cooler weather, walks and driving his car    06/28/2023:  JESUS ALBERTO HERNÁNDEZ reviewed:    - updated scan is reassuring    - doing very well overall  - remains off pain medication   - still trying to regain weight lost to Covid infection   - fishing frequently   - several summer trips scheduled fishing trip to [place] planned for early August  - did not see initial IO scheduling message, will f/u to make initial appointment      04/14/2023:  JESUS ALBERTO HERNÁNDEZ reviewed and summarized:  No surprises    Patient presents to clinic alone, all communication is written. He is a pleasant man, in good spirits at our initial visit. His biggest concern is back pain, which is chronic in nature. He has hx of PT and pain management interventions which were not effective. He is curious to try acupuncture, referral placed today. He has excellent activity tolerance and enjoys engaging in his hobbies and making the most of his retired life.     Past Medical History:   Diagnosis Date    Basal cell carcinoma (BCC) in situ of skin 2012    3 on face, 2 on arm, removed by dermatology.     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 Underwent surgical resection of tongue and neck. s/p tracheostomy     Past Surgical History:   Procedure Laterality Date    COLONOSCOPY      COLONOSCOPY N/A 9/14/2023    Procedure: COLONOSCOPY;  Surgeon: Reyes Olivia MD;  Location: [facility] ENDO (2ND FLR);  Service: Endoscopy;  Laterality: N/A;    CONJUNCTIVA BIOPSY Right 10/11/2022    Procedure: BIOPSY, CONJUNCTIVA;  Surgeon: Victor M Vasques MD;  Location: [facility] OR;  Service: Ophthalmology;  Laterality: Right;    ESOPHAGOGASTRODUODENOSCOPY N/A 9/14/2023    Procedure: EGD (ESOPHAGOGASTRODUODENOSCOPY);  Surgeon: Reyes Olivia MD;  Location: [facility] (2ND FLR);  Service: Endoscopy;  Laterality: N/A;    INSERTION OF VENOUS ACCESS PORT Right 3/8/2021    Procedure: INSERTION, VENOUS ACCESS PORT;  Surgeon: Jesus Rendon MD;  [address];  Service: General;  Laterality: Right;    LIVER TRANSPLANT  11/2008    transplanted for biopsy proven hepatocellular carcinoma,     LYMPH NODE BIOPSY N/A 9/18/2020    Procedure: BIOPSY, LYMPH NODE;  Sur[facility]: [facility];  [facility];  Service: General;  Laterality: N/A;  Rm 173    SURGICAL REMOVAL OF SCAR Right 8/24/2023    Procedure: EXCISION, SCAR;  Surgeon: Ting Brambila MD;  Location: [facility] OR;  Service: Ophthalmology;  Laterality: Right;    TRACHEOSTOMY       Family History   Problem Relation Age of Onset    Dementia Mother      Review of patient's allergies indicates:  No Known Allergies    Medications:    Current Outpatient Medications:     azelaic acid (AZELEX) 15 % gel, APPLY TOPICALLY TO AFFECTED AREA IN THE MORNING, Disp: 50 g, Rfl: 3    gabapentin (NEURONTIN) 300 MG capsule, Take 1 capsule (300 mg total) by mouth every evening., Disp: 30 capsule, Rfl: 11    ibuprofen (ADVIL,MOTRIN) 200 MG tablet, Take 200 mg by mouth., Disp: , Rfl:     imiquimod (ALDARA) 5 % cream, Apply to biopsy site on frontal scalp + about a centimeter of  surrounding skin qhs x 16 weeks. Wash off in am and apply sunscreen. Discontinue if skin becomes blistered, raw, bleeding, painful, etc., Disp: 24 packet, Rfl: 3    levothyroxine (SYNTHROID) 88 MCG tablet, TAKE 1 TABLET BY MOUTH ONCE DAILY, Disp: 90 tablet, Rfl: 3    LIDOcaine HCl 2% (LIDOCAINE VISCOUS) 2 % Soln, Swish and spit 15 mls every 8 (eight) hours as needed (mouth sore)., Disp: 100 mL, Rfl: 3    LIDOcaine-prilocaine (EMLA) cream, Apply generously to port site 30-60 min prior to chemo and then cover with saran wrap., Disp: 30 g, Rfl: 2    magic mouthwash diphen/antac/lidoc/nysta, Take 10 mLs by mouth 4 (four) times daily., Disp: 120 mL, Rfl: 4    metroNIDAZOLE (METROGEL) 0.75 % gel, Apply topically to affected area 2 (two) times daily., Disp: 45 g, Rfl: 1    moxifloxacin (VIGAMOX) 0.5 % ophthalmic solution, Place 1 drop into the right eye 4 (four) times daily., Disp: 3 mL, Rfl: 3    multivit-min/FA/lycopen/lutein (CENTRUM SILVER MEN ORAL), Take 1 tablet by mouth., Disp: , Rfl:     neomycin-polymyxin-dexamethasone (MAXITROL) 3.5mg/mL-10,000 unit/mL-0.1 % DrpS, Place 1 drop into the right eye 4 (four) times daily., Disp: 5 mL, Rfl: 3    pantoprazole (PROTONIX) 40 MG tablet, Take 1 tablet (40 mg total) by mouth 2 (two) times a day., Disp: 168 tablet, Rfl: 0    prednisoLONE acetate (PRED FORTE) 1 % DrpS, Place 1 drop into the right eye 4 (four) times daily., Disp: 10 mL, Rfl: 3    sulfacetamide sodium-sulfur 10-5 % (w/w) Clsr, USE TO WASH AFFECTED AREA DAILY, Disp: , Rfl:     tacrolimus (PROGRAF) 0.5 MG Cap, Take 1 capsule (0.5 mg total) by mouth every EVENING.  (Use the 1mg capsule for your morning dose), Disp: 90 capsule, Rfl: 3    tacrolimus (PROGRAF) 0.5 MG Cap, Take 1 capsule (0.5 mg total) by mouth every evening. Use the 1mg capsule for your morning dose, Disp: 90 capsule, Rfl: 3    tacrolimus (PROGRAF) 1 MG Cap, Take 1 capsule (1 mg total) by mouth every morning. Use the tacrolimus 0.5mg capsule for your  evening dose as directed., Disp: 90 capsule, Rfl: 3    tiZANidine (ZANAFLEX) 2 MG tablet, Take 1 tablet (2 mg total) by mouth nightly as needed (neck muscle strain)., Disp: 30 tablet, Rfl: 3    traZODone (DESYREL) 50 MG tablet, Take 1 tablet (50 mg total) by mouth every evening., Disp: 90 tablet, Rfl: 2    vitamin E 400 UNIT capsule, Take 400 Units by mouth once daily., Disp: , Rfl:   No current facility-administered medications for this visit.    Facility-Administered Medications Ordered in Other Visits:     heparin, porcine (PF) 100 unit/mL injection flush 500 Units, 500 Units, Intravenous, PRN, Ronald Berg MD    ofloxacin 0.3 % ophthalmic solution 1 drop, 1 drop, Right Eye, On Call Procedure, Victor M Vasques MD, 2 drop at 10/11/22 0745    sodium chloride 0.9% flush 10 mL, 10 mL, Intravenous, PRN, Ronald Berg MD    sodium chloride 0.9% flush 10 mL, 10 mL, Intravenous, PRN, Victor M Vasques MD    OBJECTIVE:       ROS:  Review of Systems   Constitutional: Negative.  Negative for activity change, appetite change and chills.   HENT:  Positive for facial swelling and voice change.    Eyes: Negative.  Negative for pain, discharge and itching.   Respiratory: Negative.  Negative for apnea, cough and chest tightness.    Cardiovascular: Negative.  Negative for chest pain, palpitations and leg swelling.   Gastrointestinal:  Negative for constipation, diarrhea, nausea and vomiting.   Genitourinary:  Negative for difficulty urinating, dysuria and enuresis.   Musculoskeletal:  Positive for back pain. Negative for arthralgias and gait problem.   Skin: Negative.  Negative for color change, pallor and rash.       Review of Symptoms      Symptom Assessment (ESAS 0-10 Scale)  Pain:  5  Dyspnea:  0  Anxiety:  0  Nausea:  0  Depression:  0  Anorexia:  0  Fatigue:  0  Insomnia:  3  Restlessness:  3  Agitation:  0     CAM / Delirium:  Negative  Constipation:  Negative  Diarrhea:  Negative    Constipation:  No  constipation    Bowel Management Plan (BMP):  No      Pain Assessment:  OME in 24 hours:  0  Location(s): back    Back       Location: lower        Quality: None        Quantity: 4/10 in intensity        Chronicity: Onset 0 year(s) ago, stable        Aggravating Factors: Activity        Alleviating Factors: None       Associated Symptoms: None    Modified Tricia Scale:  0    ECOG Performance Status stGstrstastdstest:st st1st Living Arrangements:  Lives alone    Psychosocial/Cultural:   See Palliative Psychosocial Note: Yes  **Primary  to Follow**  Palliative Care  Consult: No      Advance Care Planning   Advance Directives:   Living Will: Yes        Copy on chart: Yes    Medical Power of : Yes      Decision Making:  Patient answered questions  Goals of Care: What is most important right now is to focus on remaining as independent as possible, symptom/pain control. Accordingly, we have decided that the best plan to meet the patient's goals includes continuing with treatment.        Physical Exam:  Vitals:    Vitals:    10/16/23 1006   BP: 131/75   Pulse: 100          Physical Exam  Vitals reviewed.   Constitutional:       Appearance: Normal appearance. He is not toxic-appearing or diaphoretic.   HENT:      Head: Normocephalic and atraumatic.      Comments: Trach      Right Ear: External ear normal.      Left Ear: External ear normal.      Nose: Nose normal.   Eyes:      General:         Right eye: No discharge.         Left eye: No discharge.      Extraocular Movements: Extraocular movements intact.      Conjunctiva/sclera: Conjunctivae normal.   Pulmonary:      Effort: Pulmonary effort is normal. No respiratory distress.      Breath sounds: No stridor.   Abdominal:      General: Abdomen is flat.      Palpations: Abdomen is soft.   Musculoskeletal:         General: No swelling or tenderness. Normal range of motion.      Cervical back: Normal range of motion.   Skin:     General: Skin is warm and  "dry.      Coloration: Skin is not jaundiced.      Findings: No bruising.   Neurological:      General: No focal deficit present.      Mental Status: He is alert and oriented to person, place, and time. Mental status is at baseline.   Psychiatric:         Mood and Affect: Mood normal.         Behavior: Behavior normal.         Thought Content: Thought content normal.         Judgment: Judgment normal.         Labs:  CBC:   WBC   Date Value Ref Range Status   10/11/2023 2.32 (L) 3.90 - 12.70 K/uL Final     Hemoglobin   Date Value Ref Range Status   10/11/2023 9.0 (L) 14.0 - 18.0 g/dL Final     Hematocrit   Date Value Ref Range Status   10/11/2023 28.6 (L) 40.0 - 54.0 % Final     MCV   Date Value Ref Range Status   10/11/2023 98 82 - 98 fL Final     Platelets   Date Value Ref Range Status   10/11/2023 145 (L) 150 - 450 K/uL Final       LFT:   Lab Results   Component Value Date    AST 48 (H) 09/22/2023     (H) 07/09/2020    ALKPHOS 58 09/22/2023    BILITOT 0.2 09/22/2023       Albumin:   Albumin   Date Value Ref Range Status   09/22/2023 3.2 (L) 3.5 - 5.2 g/dL Final     Protein:   Total Protein   Date Value Ref Range Status   09/22/2023 6.6 6.0 - 8.4 g/dL Final       Radiology:I have reviewed all pertinent imaging results/findings within the past 24 hours.      06/13/2023 NM PET: "Impression:     No evidence of FDG avid tumor in patient with squamous cell carcinoma of the base of the tongue status post resection.  No abnormal uptake to suggest distant metastasis."       3/20/2023 NM PET: "Impression:     In this patient with base of tongue cancer, there is no definite evidence of hypermetabolic tumor.     Redemonstration of nonspecific mild superficial FDG uptake at the chin.  Clinical correlation suggested.     No other suspicious hypermetabolic lesion."    I spent a total of 61 minutes on the day of the visit.This includes face to face time in discussion of goals of care, symptom assessment, coordination of " care and emotional support.  This also includes non-face to face time preparing to see the patient (eg, review of tests/imaging), obtaining and/or reviewing separately obtained history, documenting clinical information in the electronic or other health record, independently interpreting results and communicating results to the patient/family/caregiver, or care coordinator.     Signature: Arabella Cuevas DNP

## 2023-10-17 PROBLEM — I48.0 PAF (PAROXYSMAL ATRIAL FIBRILLATION): Status: ACTIVE | Noted: 2023-10-17

## 2023-10-18 DIAGNOSIS — L27.0 DRUG-INDUCED SKIN RASH: ICD-10-CM

## 2023-10-18 DIAGNOSIS — C01 SQUAMOUS CELL CARCINOMA OF BASE OF TONGUE: ICD-10-CM

## 2023-10-18 RX ORDER — AZELAIC ACID 0.15 G/G
GEL TOPICAL
Qty: 50 G | Refills: 3 | Status: SHIPPED | OUTPATIENT
Start: 2023-10-18

## 2023-10-20 ENCOUNTER — OFFICE VISIT (OUTPATIENT)
Dept: CARDIOLOGY | Facility: CLINIC | Age: 73
End: 2023-10-20
Payer: MEDICARE

## 2023-10-20 VITALS
BODY MASS INDEX: 18.36 KG/M2 | WEIGHT: 121.13 LBS | HEIGHT: 68 IN | SYSTOLIC BLOOD PRESSURE: 142 MMHG | HEART RATE: 84 BPM | DIASTOLIC BLOOD PRESSURE: 80 MMHG

## 2023-10-20 DIAGNOSIS — Z94.4 STATUS POST LIVER TRANSPLANTATION: ICD-10-CM

## 2023-10-20 DIAGNOSIS — C01 SQUAMOUS CELL CARCINOMA OF BASE OF TONGUE: ICD-10-CM

## 2023-10-20 DIAGNOSIS — D69.6 THROMBOCYTOPENIA: ICD-10-CM

## 2023-10-20 DIAGNOSIS — R01.1 UNDIAGNOSED CARDIAC MURMURS: ICD-10-CM

## 2023-10-20 DIAGNOSIS — I48.0 PAF (PAROXYSMAL ATRIAL FIBRILLATION): Primary | ICD-10-CM

## 2023-10-20 PROCEDURE — 99999 PR PBB SHADOW E&M-EST. PATIENT-LVL IV: CPT | Mod: PBBFAC,,, | Performed by: INTERNAL MEDICINE

## 2023-10-20 PROCEDURE — 93010 EKG 12-LEAD: ICD-10-PCS | Mod: S$PBB,,, | Performed by: INTERNAL MEDICINE

## 2023-10-20 PROCEDURE — 99213 OFFICE O/P EST LOW 20 MIN: CPT | Mod: S$PBB,,, | Performed by: INTERNAL MEDICINE

## 2023-10-20 PROCEDURE — 99214 OFFICE O/P EST MOD 30 MIN: CPT | Mod: PBBFAC,25,PO | Performed by: INTERNAL MEDICINE

## 2023-10-20 PROCEDURE — 93010 ELECTROCARDIOGRAM REPORT: CPT | Mod: S$PBB,,, | Performed by: INTERNAL MEDICINE

## 2023-10-20 PROCEDURE — 93005 ELECTROCARDIOGRAM TRACING: CPT | Mod: PBBFAC,PO | Performed by: INTERNAL MEDICINE

## 2023-10-20 RX ORDER — PANTOPRAZOLE SODIUM 40 MG/1
40 TABLET, DELAYED RELEASE ORAL 2 TIMES DAILY
COMMUNITY
End: 2023-12-07

## 2023-10-20 NOTE — PROGRESS NOTES
Subjective:     Chief Complaint:  Rocco Swain is a 73 y.o. male referred by GAURANG Walton MD for evaluation of Atrial Fibrillation.    Problem List and HPI:   Squamous cell carcinoma   Liver transplant 2008  Atrial fib 9/2023      Admitted on 9/12/2023 for 5 days w a GI bleed. Received 3 units of PRBCs and discharged with protonix BID. Old blood found during colonoscopy with no active bleeding.  Atrial fib noted while hospitalized. Reverted back spontaneously to sinus rhythm.  No prior history of atrial fibrillation.  He has had premature beats for many years.    Liver transplant in [place] in 2008.  On Prograf He gets labs and scans in our Hepatology dept.      Review of patient's allergies indicates:  No Known Allergies     Current Outpatient Medications   Medication Sig    azelaic acid (AZELEX) 15 % gel APPLY TOPICALLY TO AFFECTED AREA IN THE MORNING    gabapentin (NEURONTIN) 300 MG capsule Take 1 capsule (300 mg total) by mouth every evening. (Patient taking differently: Take 300 mg by mouth as needed.)    imiquimod (ALDARA) 5 % cream Apply to biopsy site on frontal scalp + about a centimeter of surrounding skin qhs x 16 weeks. Wash off in am and apply sunscreen. Discontinue if skin becomes blistered, raw, bleeding, painful, etc.    levothyroxine (SYNTHROID) 88 MCG tablet TAKE 1 TABLET BY MOUTH ONCE DAILY    LIDOcaine HCl 2% (LIDOCAINE VISCOUS) 2 % Soln Swish and spit 15 mls every 8 (eight) hours as needed (mouth sore).    magic mouthwash diphen/antac/lidoc/nysta Take 10 mLs by mouth 4 (four) times daily.    metroNIDAZOLE (METROGEL) 0.75 % gel Apply topically to affected area 2 (two) times daily.    moxifloxacin (VIGAMOX) 0.5 % ophthalmic solution Place 1 drop into the right eye 4 (four) times daily.    multivit-min/FA/lycopen/lutein (CENTRUM SILVER MEN ORAL) Take 1 tablet by mouth once daily.    neomycin-polymyxin-dexamethasone (MAXITROL) 3.5mg/mL-10,000 unit/mL-0.1 % DrpS Place 1 drop into the right  eye 4 (four) times daily.    pantoprazole (PROTONIX) 40 MG tablet Take 40 mg by mouth 2 (two) times daily.    prednisoLONE acetate (PRED FORTE) 1 % DrpS Place 1 drop into the right eye 4 (four) times daily.    sulfacetamide sodium-sulfur 10-5 % (w/w) Clsr USE TO WASH AFFECTED AREA DAILY    tacrolimus (PROGRAF) 0.5 MG Cap Take 1 capsule (0.5 mg total) by mouth every EVENING.  (Use the 1mg capsule for your morning dose)    tacrolimus (PROGRAF) 1 MG Cap Take 1 capsule (1 mg total) by mouth every morning. Use the tacrolimus 0.5mg capsule for your evening dose as directed.    tiZANidine (ZANAFLEX) 2 MG tablet Take 1 tablet (2 mg total) by mouth nightly as needed (neck muscle strain).    traZODone (DESYREL) 50 MG tablet Take 1 tablet (50 mg total) by mouth every evening.       Past Medical and Surgical history:  Rocco Swain  has a past medical history of Basal cell carcinoma (BCC) in situ of skin (2012), Hepatitis C, chronic (2006), Hypothyroidism, Larynx cancer, Liver transplanted, Lumbar disc disease, and Squamous cell carcinoma in situ (SCCIS) of tongue (02/2016).   Past Surgical History:   Procedure Laterality Date    COLONOSCOPY      COLONOSCOPY N/A 9/14/2023    Procedure: COLONOSCOPY;  Surgeon: Reyes Olivia MD;  Location: [facility] ([address] [place] [address]);  Service: Endoscopy;  Laterality: N/A;    CONJUNCTIVA BIOPSY Right 10/11/2022    Procedure: BIOPSY, CONJUNCTIVA;  Surgeon: Victor M Vasques MD;  Location: [facility];  Service: Ophthalmology;  Laterality: Right;    ESOPHAGOGASTRODUODENOSCOPY N/A 9/14/2023    Procedure: EGD (ESOPHAGOGASTRODUODENOSCOPY);  Surgeon: Reyes Olivia MD;  Location: [facility] (2ND FLR);  Service: Endoscopy;  Laterality: N/A;    INSERTION OF VENOUS ACCESS PORT Right 3/8/2021    Procedure: INSERTION, VENOUS ACCESS PORT;  Surgeon: Jesus Rendon MD;  Location: [facility] 2ND FLR;  Service: General;  Laterality: Right;    LIVER TRANSPLANT  11/2008    transplanted for biopsy proven  "hepatocellular carcinoma,     LYMPH NODE BIOPSY N/A 9/18/2020    Procedure: BIOPSY, LYMPH NODE;  Surgeon: [facility] Diagnostic Provider;  Location: [facility] OR [address] [place] [address];  Service: General;  Laterality: N/A;  Rm 173    SURGICAL REMOVAL OF SCAR Right 8/24/2023    Procedure: EXCISION, SCAR;  Surgeon: Ting Brambila MD;  Location: [facility] OR;  Service: Ophthalmology;  Laterality: Right;    TRACHEOSTOMY         Social history:  Rocco Swain  reports that he has never smoked. He has never used smokeless tobacco. He reports current alcohol use. He reports that he does not currently use drugs.    Family history:  Rocco's family history includes Dementia in his mother.      Objective:   BP (!) 142/80   Pulse 84   Ht 5' 8" (1.727 m)   Wt 54.9 kg (121 lb 2.3 oz)   BMI 18.42 kg/m²    Physical Exam  Constitutional:       Appearance: He is well-developed.      Comments:      HENT:      Head: Normocephalic.   Neck:      Vascular: No JVD.   Cardiovascular:      Rate and Rhythm: Normal rate and regular rhythm. Occasional Extrasystoles are present.     Pulses:           Radial pulses are 2+ on the right side and 2+ on the left side.      Heart sounds: S1 normal and S2 normal. Murmur heard.      Harsh holosystolic murmur is present with a grade of 2/6 at the apex.   Pulmonary:      Breath sounds: Normal breath sounds.   Chest:      Chest wall: There is no dullness to percussion.   Abdominal:      Palpations: Abdomen is soft. There is no hepatomegaly, splenomegaly or mass.   Musculoskeletal:      Right lower leg: No edema.      Left lower leg: No edema.   Skin:     Findings: No bruising.      Nails: There is no clubbing.   Neurological:      Mental Status: He is alert and oriented to person, place, and time.      Gait: Gait normal.   Psychiatric:         Speech: Speech normal.         Behavior: Behavior normal.           Labs  Lipids:  Recent Labs   Lab 03/18/22  1114   LDL Cholesterol 55.8 L   HDL 53   Cholesterol 123      Renal:  Recent " Labs   Lab 09/22/23  0802   Creatinine 1.4   Potassium 4.5   CO2 26   BUN 16     Liver:  Recent Labs   Lab 09/22/23  0802   AST 48 H   ALT 30     Cbc:    Lab Results   Component Value Date    WBC 2.32 (L) 10/11/2023    HGB 9.0 (L) 10/11/2023    HCT 28.6 (L) 10/11/2023    MCV 98 10/11/2023     (L) 10/11/2023         Assessment and Plan:       ICD-10-CM ICD-9-CM   1. PAF (paroxysmal atrial fibrillation)  I48.0 427.31   2. Undiagnosed cardiac murmurs  R01.1 785.2   3. Squamous cell carcinoma of base of tongue  C01 141.0   4. Status post liver transplantation - 2008  Z94.4 V42.7   5. Thrombocytopenia  D69.6 287.5        Atrial fibrillation occurred during hospitalization for a GI bleed and reverted spontaneously to normal rhythm.  In view of recent GI bleeding I do not recommend an anticoagulant at this time. Will reassess in 4-6 weeks and decide.  I discussed the role of the left atrial appendage in thrombus formation and  I will discuss his prognosis w his Oncologist. I discussed w the patient how a Watchman device can help reduce long-term stroke risk without long-term anticoagulation. The patient understands that anticoagulation is not discontinued immediately after successful device placement. At 6 weeks post implant they would undergo a SHERI and if no peridevice leak is noted then anticoagulant therapy will be changed to dual antiplatelet therapy for 6 months followed by lifelong single antiplatelet therapy as tolerated.       Orders entered during this encounter:    PAF (paroxysmal atrial fibrillation)  -     Ambulatory referral/consult to Cardiology  -     IN OFFICE EKG 12-LEAD (to Muse)  -     Holter monitor - 48 hour; Future  -     Echo; Future; Expected date: 10/20/2023    Undiagnosed cardiac murmurs  -     Echo; Future; Expected date: 10/20/2023    Squamous cell carcinoma of base of tongue    Status post liver transplantation - 2008    Thrombocytopenia         No follow-ups on file.

## 2023-10-31 ENCOUNTER — HOSPITAL ENCOUNTER (OUTPATIENT)
Dept: RADIOLOGY | Facility: HOSPITAL | Age: 73
Discharge: HOME | End: 2023-10-31
Attending: NURSE PRACTITIONER
Payer: MEDICARE

## 2023-10-31 DIAGNOSIS — C01 SQUAMOUS CELL CARCINOMA OF BASE OF TONGUE: ICD-10-CM

## 2023-10-31 PROCEDURE — 78815 NM PET CT ROUTINE: ICD-10-PCS | Mod: 26,PS,, | Performed by: STUDENT IN AN ORGANIZED HEALTH CARE EDUCATION/TRAINING PROGRAM

## 2023-10-31 PROCEDURE — 78815 PET IMAGE W/CT SKULL-THIGH: CPT | Mod: 26,PS,, | Performed by: STUDENT IN AN ORGANIZED HEALTH CARE EDUCATION/TRAINING PROGRAM

## 2023-10-31 PROCEDURE — A9552 F18 FDG: HCPCS

## 2023-11-01 ENCOUNTER — TELEPHONE (OUTPATIENT)
Dept: TRANSPLANT | Facility: CLINIC | Age: 73
End: 2023-11-01
Payer: MEDICARE

## 2023-11-01 DIAGNOSIS — Z85.9 HISTORY OF CANCER: ICD-10-CM

## 2023-11-01 DIAGNOSIS — C22.0 HCC (HEPATOCELLULAR CARCINOMA): ICD-10-CM

## 2023-11-01 DIAGNOSIS — Z94.4 STATUS POST LIVER TRANSPLANTATION: Primary | ICD-10-CM

## 2023-11-01 NOTE — LETTER
November 1, 2023    Rocco Swain  [address] [place] [address] [place] [address]          Dear Rocco Swain:  MRN: [id number]    This is a follow up to your recent labs, your lab results were stable.  There are no medicine changes.  Please have your labs drawn again on 1/3/2024.      If you cannot have your labs drawn on the scheduled date, it is your responsibility to call the transplant department to reschedule.  Please call [phone] and ask to speak to Rhea RIOS   for all scheduling requests.     Sincerely,    Shari WADDELLN, RN      Your Liver Transplant Coordinator    [facility]  [address] [place] [id number]  [phone]

## 2023-11-01 NOTE — TELEPHONE ENCOUNTER
Letter sent to patient stating: Your labs have been reviewed by your Transplant physician, no action required. Next labs due 1/3/2024          ----- Message from Cornell Anton MD sent at 11/1/2023 12:39 PM CDT -----  Results reviewed

## 2023-11-07 ENCOUNTER — OFFICE VISIT (OUTPATIENT)
Dept: OPHTHALMOLOGY | Facility: CLINIC | Age: 73
End: 2023-11-07
Payer: MEDICARE

## 2023-11-07 DIAGNOSIS — D49.89 CIN (CONJUNCTIVAL INTRAEPITHELIAL NEOPLASIA): Primary | ICD-10-CM

## 2023-11-07 PROCEDURE — 99024 POSTOP FOLLOW-UP VISIT: CPT | Mod: POP,,, | Performed by: OPHTHALMOLOGY

## 2023-11-07 PROCEDURE — 99024 PR POST-OP FOLLOW-UP VISIT: ICD-10-PCS | Mod: POP,,, | Performed by: OPHTHALMOLOGY

## 2023-11-07 PROCEDURE — 99213 OFFICE O/P EST LOW 20 MIN: CPT | Mod: PBBFAC | Performed by: OPHTHALMOLOGY

## 2023-11-07 PROCEDURE — 99999 PR PBB SHADOW E&M-EST. PATIENT-LVL III: CPT | Mod: PBBFAC,,, | Performed by: OPHTHALMOLOGY

## 2023-11-07 PROCEDURE — 99999 PR PBB SHADOW E&M-EST. PATIENT-LVL III: ICD-10-PCS | Mod: PBBFAC,,, | Performed by: OPHTHALMOLOGY

## 2023-11-07 RX ORDER — TACROLIMUS 1 MG/1
CAPSULE ORAL
COMMUNITY
Start: 2023-10-15 | End: 2023-12-07 | Stop reason: SDUPTHER

## 2023-11-07 RX ORDER — TACROLIMUS 0.5 MG/1
CAPSULE ORAL
COMMUNITY
Start: 2023-10-15 | End: 2023-12-07 | Stop reason: SDUPTHER

## 2023-11-07 NOTE — PROGRESS NOTES
HPI    Patient is here today for 3 week f/u OD. Patient states that OD is still   painful and red. No noticeable va changes since last visit and feels that   eye hasn't improved at all. Patient states that he is not using the drops   listen below this week. States that he was told to use them one week then   to not use them the next then to alternate.    Eye meds:  Vigamox QID OD  Lotemax QID OD    Last edited by Teri Wolff on 11/7/2023  9:50 AM.            Assessment /Plan     For exam results, see Encounter Report.    VIK (conjunctival intraepithelial neoplasia)      Recurrent VIK immediately post op  Large nasal limbal lesion OD, cycle 2 of 4 [facility]  Cont [facility] drops and monitor

## 2023-11-11 ENCOUNTER — HOSPITAL ENCOUNTER (OUTPATIENT)
Dept: RADIOLOGY | Facility: HOSPITAL | Age: 73
Discharge: HOME | End: 2023-11-11
Attending: INTERNAL MEDICINE
Payer: MEDICARE

## 2023-11-11 DIAGNOSIS — M54.9 DORSALGIA, UNSPECIFIED: ICD-10-CM

## 2023-11-11 DIAGNOSIS — G89.29 CHRONIC BILATERAL LOW BACK PAIN WITHOUT SCIATICA: ICD-10-CM

## 2023-11-11 DIAGNOSIS — M54.50 CHRONIC BILATERAL LOW BACK PAIN WITHOUT SCIATICA: ICD-10-CM

## 2023-11-11 PROCEDURE — 72148 MRI LUMBAR SPINE W/O DYE: CPT | Mod: 26,,, | Performed by: RADIOLOGY

## 2023-11-11 PROCEDURE — 72148 MRI LUMBAR SPINE W/O DYE: CPT | Mod: TC

## 2023-11-11 PROCEDURE — 72148 MRI LUMBAR SPINE WITHOUT CONTRAST: ICD-10-PCS | Mod: 26,,, | Performed by: RADIOLOGY

## 2023-11-16 DIAGNOSIS — C01 SQUAMOUS CELL CARCINOMA OF BASE OF TONGUE: ICD-10-CM

## 2023-11-16 DIAGNOSIS — C77.0 SECONDARY MALIGNANT NEOPLASM OF CERVICAL LYMPH NODE: ICD-10-CM

## 2023-11-17 ENCOUNTER — INFUSION (OUTPATIENT)
Dept: INFUSION THERAPY | Facility: HOSPITAL | Age: 73
End: 2023-11-17
Attending: STUDENT IN AN ORGANIZED HEALTH CARE EDUCATION/TRAINING PROGRAM
Payer: MEDICARE

## 2023-11-17 ENCOUNTER — OFFICE VISIT (OUTPATIENT)
Dept: HEMATOLOGY/ONCOLOGY | Facility: CLINIC | Age: 73
End: 2023-11-17
Payer: MEDICARE

## 2023-11-17 VITALS
RESPIRATION RATE: 16 BRPM | TEMPERATURE: 98 F | OXYGEN SATURATION: 99 % | SYSTOLIC BLOOD PRESSURE: 153 MMHG | HEIGHT: 68 IN | HEART RATE: 82 BPM | DIASTOLIC BLOOD PRESSURE: 80 MMHG | WEIGHT: 125 LBS | BODY MASS INDEX: 18.94 KG/M2

## 2023-11-17 VITALS
HEIGHT: 68 IN | TEMPERATURE: 98 F | BODY MASS INDEX: 18.94 KG/M2 | SYSTOLIC BLOOD PRESSURE: 153 MMHG | HEART RATE: 82 BPM | DIASTOLIC BLOOD PRESSURE: 80 MMHG | WEIGHT: 125 LBS | RESPIRATION RATE: 16 BRPM

## 2023-11-17 DIAGNOSIS — T86.49 CIRRHOSIS OF TRANSPLANTED LIVER: ICD-10-CM

## 2023-11-17 DIAGNOSIS — D84.821 IMMUNODEFICIENCY DUE TO DRUGS: ICD-10-CM

## 2023-11-17 DIAGNOSIS — D50.0 IRON DEFICIENCY ANEMIA DUE TO CHRONIC BLOOD LOSS: ICD-10-CM

## 2023-11-17 DIAGNOSIS — Z79.899 IMMUNODEFICIENCY DUE TO DRUGS: ICD-10-CM

## 2023-11-17 DIAGNOSIS — Z93.0 TRACHEOSTOMY STATUS: ICD-10-CM

## 2023-11-17 DIAGNOSIS — K74.60 CIRRHOSIS OF TRANSPLANTED LIVER: ICD-10-CM

## 2023-11-17 DIAGNOSIS — C01 SQUAMOUS CELL CARCINOMA OF BASE OF TONGUE: Primary | ICD-10-CM

## 2023-11-17 DIAGNOSIS — C77.0 SECONDARY MALIGNANT NEOPLASM OF CERVICAL LYMPH NODE: ICD-10-CM

## 2023-11-17 DIAGNOSIS — K26.4 GASTROINTESTINAL HEMORRHAGE ASSOCIATED WITH DUODENAL ULCER: ICD-10-CM

## 2023-11-17 DIAGNOSIS — L27.0 DRUG-INDUCED SKIN RASH: ICD-10-CM

## 2023-11-17 DIAGNOSIS — Z94.4 STATUS POST LIVER TRANSPLANTATION: ICD-10-CM

## 2023-11-17 PROCEDURE — 63600175 PHARM REV CODE 636 W HCPCS: Performed by: INTERNAL MEDICINE

## 2023-11-17 PROCEDURE — 96413 CHEMO IV INFUSION 1 HR: CPT

## 2023-11-17 PROCEDURE — 99999 PR PBB SHADOW E&M-EST. PATIENT-LVL IV: CPT | Mod: PBBFAC,,, | Performed by: INTERNAL MEDICINE

## 2023-11-17 PROCEDURE — 99999 PR PBB SHADOW E&M-EST. PATIENT-LVL IV: ICD-10-PCS | Mod: PBBFAC,,, | Performed by: INTERNAL MEDICINE

## 2023-11-17 PROCEDURE — A4216 STERILE WATER/SALINE, 10 ML: HCPCS | Performed by: INTERNAL MEDICINE

## 2023-11-17 PROCEDURE — 99215 PR OFFICE/OUTPT VISIT, EST, LEVL V, 40-54 MIN: ICD-10-PCS | Mod: S$PBB,,, | Performed by: INTERNAL MEDICINE

## 2023-11-17 PROCEDURE — 99215 OFFICE O/P EST HI 40 MIN: CPT | Mod: S$PBB,,, | Performed by: INTERNAL MEDICINE

## 2023-11-17 PROCEDURE — 25000003 PHARM REV CODE 250: Performed by: INTERNAL MEDICINE

## 2023-11-17 PROCEDURE — 99214 OFFICE O/P EST MOD 30 MIN: CPT | Mod: PBBFAC,25 | Performed by: INTERNAL MEDICINE

## 2023-11-17 RX ORDER — HEPARIN 100 UNIT/ML
500 SYRINGE INTRAVENOUS
Status: CANCELLED | OUTPATIENT
Start: 2023-11-17

## 2023-11-17 RX ORDER — TIZANIDINE 2 MG/1
2 TABLET ORAL NIGHTLY PRN
Qty: 30 TABLET | Refills: 3 | Status: SHIPPED | OUTPATIENT
Start: 2023-11-17 | End: 2024-01-25 | Stop reason: SDUPTHER

## 2023-11-17 RX ORDER — METRONIDAZOLE 7.5 MG/G
GEL TOPICAL 2 TIMES DAILY
Qty: 45 G | Refills: 1 | Status: SHIPPED | OUTPATIENT
Start: 2023-11-17 | End: 2024-11-16

## 2023-11-17 RX ORDER — SODIUM CHLORIDE 0.9 % (FLUSH) 0.9 %
10 SYRINGE (ML) INJECTION
Status: DISCONTINUED | OUTPATIENT
Start: 2023-11-17 | End: 2023-11-17 | Stop reason: HOSPADM

## 2023-11-17 RX ORDER — SODIUM CHLORIDE 0.9 % (FLUSH) 0.9 %
10 SYRINGE (ML) INJECTION
Status: CANCELLED | OUTPATIENT
Start: 2023-11-17

## 2023-11-17 RX ORDER — HEPARIN 100 UNIT/ML
500 SYRINGE INTRAVENOUS
Status: DISCONTINUED | OUTPATIENT
Start: 2023-11-17 | End: 2023-11-17 | Stop reason: HOSPADM

## 2023-11-17 RX ADMIN — SODIUM CHLORIDE 400 MG: 9 INJECTION, SOLUTION INTRAVENOUS at 02:11

## 2023-11-17 RX ADMIN — Medication 10 ML: at 03:11

## 2023-11-17 RX ADMIN — SODIUM CHLORIDE 1000 ML: 0.9 INJECTION, SOLUTION INTRAVENOUS at 02:11

## 2023-11-17 RX ADMIN — HEPARIN 500 UNITS: 100 SYRINGE at 03:11

## 2023-11-17 RX ADMIN — SODIUM CHLORIDE: 9 INJECTION, SOLUTION INTRAVENOUS at 02:11

## 2023-11-17 NOTE — PLAN OF CARE
1420 pt here for Keytruda D1C23 and IVF, labs, hx, meds, allergies reviewed, pt reclined in chair, continue to monitor

## 2023-11-17 NOTE — PROGRESS NOTES
ONCOLOGY FOLLOW UP VISIT    Subjective:      Patient ID: Rocco Swain    Chief Complaint: Squamous cell carcinoma of base of tongue      HPI  Rocco Swain is a 73 y.o. male who returns to clinic for management of P16 positive squamous cell carcinoma. Admitted on 9/12/23 for 5 days for a GI bleed. Received 3 units of PRBCs and discharged with protonix BID. Old blood found during colonoscopy with no active bleeding.     Today, patient reports no new issues    ECOG Performance status: 1 - Symptomatic but completely ambulatory Communication from him is 100% written.     Cancer Staging   Squamous cell carcinoma of base of tongue  Staging form: Pharynx - HPV-Mediated Oropharynx, AJCC 8th Edition  - Clinical stage from 9/18/2020: Stage III (rcT4, cN1, cM0, p16+) - Signed by Ronald Berg MD on 12/27/2022      Oncologic History:  Oncology History   Squamous cell carcinoma of base of tongue   9/18/2020 Cancer Staged    Staging form: Pharynx - HPV-Mediated Oropharynx, AJCC 8th Edition  - Clinical stage from 9/18/2020: Stage III (rcT4, cN1, cM0, p16+)     9/28/2020 Initial Diagnosis    Squamous cell carcinoma of base of tongue     10/6/2020 - 8/17/2021 Chemotherapy    Treatment Summary   Plan Name: OP HEAD NECK CARBOPLATIN PACLITAXEL C1-2 FOLLOWED BY CETUXIMAB CARBOPLATIN C3-6 FOLLOWED BY CETUXIMAB MAINTENANCE WEEKLY  Treatment Goal: Control  Status: Inactive  Start Date: 10/6/2020  End Date: 8/17/2021  Provider: Ronald Berg MD  Chemotherapy: cetuximab (ERBITUX) 100 mg/50 mL chemo infusion 684 mg, 400 mg/m2 = 684 mg (100 % of original dose 400 mg/m2), Intravenous, Clinic/[facility] 1 time, 29 of 38 cycles  Dose modification: 500 mg/m2 (original dose 400 mg/m2, Cycle 3), 250 mg/m2 (original dose 400 mg/m2, Cycle 3), 400 mg/m2 (original dose 400 mg/m2, Cycle 3), 250 mg/m2 (original dose 250 mg/m2, Cycle 7), 200 mg/m2 (original dose 250 mg/m2, Cycle 18), 200 mg/m2 (original dose 250 mg/m2, Cycle 19), 250 mg/m2 (original dose 250  mg/m2, Cycle 4), 200 mg/m2 (original dose 250 mg/m2, Cycle 25), 200 mg/m2 (original dose 250 mg/m2, Cycle 28)  Administration: 684 mg (11/17/2020), 400 mg (11/24/2020), 400 mg (2/9/2021), 400 mg (2/17/2021), 427.6 mg (3/9/2021), 400 mg (3/30/2021), 400 mg (3/23/2021), 400 mg (4/6/2021), 427.6 mg (4/13/2021), 427.6 mg (4/20/2021), 342 mg (5/11/2021), 342 mg (5/18/2021), 342 mg (5/25/2021), 400 mg (5/31/2021), 400 mg (6/7/2021), 400 mg (6/14/2021), 400 mg (12/8/2020), 427.6 mg (12/29/2020), 400 mg (12/1/2020), 400 mg (12/15/2020), 400 mg (12/22/2020), 427.6 mg (1/5/2021), 400 mg (1/12/2021), 400 mg (1/19/2021), 427.6 mg (1/26/2021), 400 mg (2/2/2021), 400 mg (2/23/2021), 400 mg (3/2/2021), 427.6 mg (3/16/2021), 400 mg (6/21/2021), 342 mg (6/28/2021), 342 mg (7/6/2021), 342 mg (7/13/2021), 342 mg (7/20/2021), 400 mg (7/27/2021), 400 mg (8/10/2021), 400 mg (8/17/2021)  CARBOplatin (PARAPLATIN) 310 mg in sodium chloride 0.9% 500 mL chemo infusion, 310 mg (92.2 % of original dose 334.5 mg), Intravenous, Clinic/hospitals 1 time, 6 of 6 cycles  Dose modification:   (original dose 334.5 mg, Cycle 1)  Administration: 310 mg (10/6/2020), 370 mg (11/17/2020), 300 mg (10/27/2020), 350 mg (12/8/2020), 335 mg (12/29/2020), 320 mg (1/19/2021)  PACLitaxeL (TAXOL) 175 mg/m2 = 300 mg in sodium chloride 0.9% 500 mL chemo infusion, 175 mg/m2 = 300 mg (100 % of original dose 175 mg/m2), Intravenous, Clinic/[facility] 1 time, 2 of 2 cycles  Dose modification: 175 mg/m2 (original dose 175 mg/m2, Cycle 1)  Administration: 300 mg (10/6/2020), 300 mg (10/27/2020)     4/29/2021 Tumor Conference       His case was discussed at the Multidisciplinary Head and Neck Team Planning Meeting.    Representatives from Medical Oncology, Radiation Oncology, Head and Neck Surgical Oncology, Psychosocial Oncology, and Speech and Language Pathology discussed the case with the following recommendations:    1) biopsy         7/29/2021 Tumor Conference       His case was  discussed at the Multidisciplinary Head and Neck Team Planning Meeting.    Representatives from Medical Oncology, Radiation Oncology, Head and Neck Surgical Oncology, Psychosocial Oncology, and Speech and Language Pathology discussed the case with the following recommendations:    1) Head and neck clinic follow up  2) consider Keytruda (discuss with transplant)  3) consider palliative referral         9/13/2021 -  Chemotherapy    Treatment Summary   Plan Name: OP HEAD NECK PEMBROLIZUMAB CARBOPLATIN FLUOROURACIL (C1 ONLY RECEIVED) FOLLOWED BY PEMBROLIZUMAB MAINTENANCE  Treatment Goal: Palliative  Status: Active  Start Date: 9/13/2021  End Date: 11/17/2023  Provider: Ronald Berg MD  Chemotherapy: CARBOplatin (PARAPLATIN) 320 mg in sodium chloride 0.9% 500 mL chemo infusion, 320 mg (100 % of original dose 320.5 mg), Intravenous, Clinic/[facility] 1 time, 6 of 6 cycles  Dose modification:   (original dose 320.5 mg, Cycle 1)  Administration: 320 mg (9/13/2021), 335 mg (12/23/2021), 325 mg (1/27/2022), 245 mg (3/3/2022), 255 mg (5/12/2022), 255 mg (4/7/2022)  fluorouraciL 1,000 mg/m2/day = 6,440 mg in sodium chloride 0.9% 240 mL chemo infusion, 1,000 mg/m2/day = 6,440 mg, Intravenous, Over 96 hours, 6 of 6 cycles  Dose modification: 800 mg/m2/day (original dose 1,000 mg/m2/day, Cycle 6), 5,000 mg (original dose 1,000 mg/m2/day, Cycle 8)  Administration: 6,440 mg (9/13/2021), 6,440 mg (12/23/2021), 6,480 mg (1/27/2022), 5,000 mg (3/3/2022), 5,000 mg (4/7/2022), 5,000 mg (5/12/2022)     Secondary malignant neoplasm of cervical lymph node   2/9/2021 Initial Diagnosis    Secondary malignant neoplasm of cervical lymph node     9/13/2021 -  Chemotherapy    Treatment Summary   Plan Name: OP HEAD NECK PEMBROLIZUMAB CARBOPLATIN FLUOROURACIL (C1 ONLY RECEIVED) FOLLOWED BY PEMBROLIZUMAB MAINTENANCE  Treatment Goal: Palliative  Status: Active  Start Date: 9/13/2021  End Date: 11/17/2023  Provider: Ronald Berg MD  Chemotherapy: CARBOplatin  (PARAPLATIN) 320 mg in sodium chloride 0.9% 500 mL chemo infusion, 320 mg (100 % of original dose 320.5 mg), Intravenous, Clinic/[facility] 1 time, 6 of 6 cycles  Dose modification:   (original dose 320.5 mg, Cycle 1)  Administration: 320 mg (9/13/2021), 335 mg (12/23/2021), 325 mg (1/27/2022), 245 mg (3/3/2022), 255 mg (5/12/2022), 255 mg (4/7/2022)  fluorouraciL 1,000 mg/m2/day = 6,440 mg in sodium chloride 0.9% 240 mL chemo infusion, 1,000 mg/m2/day = 6,440 mg, Intravenous, Over 96 hours, 6 of 6 cycles  Dose modification: 800 mg/m2/day (original dose 1,000 mg/m2/day, Cycle 6), 5,000 mg (original dose 1,000 mg/m2/day, Cycle 8)  Administration: 6,440 mg (9/13/2021), 6,440 mg (12/23/2021), 6,480 mg (1/27/2022), 5,000 mg (3/3/2022), 5,000 mg (4/7/2022), 5,000 mg (5/12/2022)         Review of Systems   Constitutional:  Negative for activity change, appetite change, chills, fatigue, fever and unexpected weight change.   HENT:  Negative for ear pain, facial swelling, hearing loss, mouth sores, nosebleeds, sore throat, trouble swallowing and voice change.         No verbal communication. Skin fixed and immobile from previous scaring post-neck dissection   Eyes:  Positive for redness (right eye). Negative for pain, discharge and visual disturbance.   Respiratory:  Negative for cough, choking, chest tightness and shortness of breath.    Cardiovascular:  Negative for chest pain, palpitations and leg swelling.   Gastrointestinal:  Negative for abdominal distention, abdominal pain, blood in stool, constipation, diarrhea, nausea and vomiting.   Endocrine: Negative for cold intolerance and heat intolerance.   Genitourinary:  Negative for difficulty urinating, dysuria, frequency and urgency.   Musculoskeletal:  Positive for back pain (lower - chronic). Negative for arthralgias, gait problem, leg pain and myalgias.   Integumentary:  Negative for pallor, rash and wound.        Blistering to left toes   Allergic/Immunologic: Negative for  frequent infections.   Neurological:  Negative for dizziness, tremors, weakness, light-headedness, numbness and headaches.   Hematological:  Negative for adenopathy. Does not bruise/bleed easily.   Psychiatric/Behavioral:  Negative for agitation, confusion, dysphoric mood and sleep disturbance. The patient is not nervous/anxious.         Allergies:  Review of patient's allergies indicates:  No Known Allergies    Medications:  Current Outpatient Medications   Medication Sig Dispense Refill    azelaic acid (AZELEX) 15 % gel APPLY TOPICALLY TO AFFECTED AREA IN THE MORNING 50 g 3    gabapentin (NEURONTIN) 300 MG capsule Take 1 capsule (300 mg total) by mouth every evening. (Patient taking differently: Take 300 mg by mouth as needed.) 30 capsule 11    imiquimod (ALDARA) 5 % cream Apply to biopsy site on frontal scalp + about a centimeter of surrounding skin qhs x 16 weeks. Wash off in am and apply sunscreen. Discontinue if skin becomes blistered, raw, bleeding, painful, etc. 24 packet 3    levothyroxine (SYNTHROID) 88 MCG tablet TAKE 1 TABLET BY MOUTH ONCE DAILY 90 tablet 3    LIDOcaine HCl 2% (LIDOCAINE VISCOUS) 2 % Soln Swish and spit 15 mls every 8 (eight) hours as needed (mouth sore). 100 mL 3    magic mouthwash diphen/antac/lidoc/nysta Take 10 mLs by mouth 4 (four) times daily. 120 mL 4    moxifloxacin (VIGAMOX) 0.5 % ophthalmic solution Place 1 drop into the right eye 4 (four) times daily. 3 mL 3    multivit-min/FA/lycopen/lutein (CENTRUM SILVER MEN ORAL) Take 1 tablet by mouth once daily.      pantoprazole (PROTONIX) 40 MG tablet Take 40 mg by mouth 2 (two) times daily.      prednisoLONE acetate (PRED FORTE) 1 % DrpS Place 1 drop into the right eye 4 (four) times daily. 10 mL 3    sulfacetamide sodium-sulfur 10-5 % (w/w) Clsr USE TO WASH AFFECTED AREA DAILY      tacrolimus (PROGRAF) 0.5 MG Cap Take 1 capsule (0.5 mg total) by mouth every EVENING.  (Use the 1mg capsule for your morning dose) 90 capsule  3    tacrolimus (PROGRAF) 0.5 MG Cap       tacrolimus (PROGRAF) 1 MG Cap Take 1 capsule (1 mg total) by mouth every morning. Use the tacrolimus 0.5mg capsule for your evening dose as directed. 90 capsule 3    tacrolimus (PROGRAF) 1 MG Cap       tiZANidine (ZANAFLEX) 2 MG tablet Take 1 tablet (2 mg total) by mouth nightly as needed (neck muscle strain). 30 tablet 3    traZODone (DESYREL) 50 MG tablet Take 1 tablet (50 mg total) by mouth every evening. 90 tablet 2    metroNIDAZOLE (METROGEL) 0.75 % gel Apply topically to affected area 2 (two) times daily. 45 g 1     No current facility-administered medications for this visit.     Facility-Administered Medications Ordered in Other Visits   Medication Dose Route Frequency Provider Last Rate Last Admin    heparin, porcine (PF) 100 unit/mL injection flush 500 Units  500 Units Intravenous PRN Ronald Berg MD        ofloxacin 0.3 % ophthalmic solution 1 drop  1 drop Right Eye On Call Procedure Victor M Vasques MD   2 drop at 10/11/22 0745    sodium chloride 0.9% flush 10 mL  10 mL Intravenous PRN Ronald Berg MD        sodium chloride 0.9% flush 10 mL  10 mL Intravenous PRN Victor M Vasques MD           PMH:  Past Medical History:   Diagnosis Date    Basal cell carcinoma (BCC) in situ of skin 2012    3 on face, 2 on arm, removed by dermatology.    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 Underwent surgical resection of tongue and neck. s/p tracheostomy       PSH:  Past Surgical History:   Procedure Laterality Date    COLONOSCOPY      COLONOSCOPY N/A 9/14/2023    Procedure: COLONOSCOPY;  Surgeon: Reyes Olivia MD;  Location: [address];  Service: Endoscopy;  Laterality: N/A;     "CONJUNCTIVA BIOPSY Right 10/11/2022    Procedure: BIOPSY, CONJUNCTIVA;  Surgeon: Victor M Vasques MD;  Location: [facility] OR;  Service: Ophthalmology;  Laterality: Right;    ESOPHAGOGASTRODUODENOSCOPY N/A 9/14/2023    Procedure: EGD (ESOPHAGOGASTRODUODENOSCOPY);  Surgeon: Reyes Olivia MD;  Location: [facility] ENDO (2ND FLR);  Service: Endoscopy;  Laterality: N/A;    INSERTION OF VENOUS ACCESS PORT Right 3/8/2021    Procedure: INSERTION, VENOUS ACCESS PORT;  Surgeon: Jesus Rendon MD;  Location: [facility] OR 2ND FLR;  Service: General;  Laterality: Right;    LIVER TRANSPLANT  11/2008    transplanted for biopsy proven hepatocellular carcinoma,     LYMPH NODE BIOPSY N/A 9/18/2020    Procedure: BIOPSY, LYMPH NODE;  Surgeon: Taz Diagnostic Provider;  Location: [facility] OR 2ND FLR;  Service: General;  Laterality: N/A;  Rm 173    SURGICAL REMOVAL OF SCAR Right 8/24/2023    Procedure: EXCISION, SCAR;  Surgeon: Ting Brambila MD;  [facility];  Service: Ophthalmology;  Laterality: Right;    TRACHEOSTOMY         FamHx:  Family History   Problem Relation Age of Onset    Dementia Mother        SocHx:  Social History     Socioeconomic History    Marital status: Single   Occupational History    Occupation: Retired     Comment: , notes exposures to fumes etc.  Worked on [employer] for 4 years   Tobacco Use    Smoking status: Never    Smokeless tobacco: Never   Substance and Sexual Activity    Alcohol use: Yes     Comment: drinks wine, 1 glass day    Drug use: Not Currently    Sexual activity: Yes     Comment: No prior history of STD        Distress Score             Objective:      Vitals:    11/17/23 1302   BP: (!) 153/80   BP Location: Left arm   Patient Position: Sitting   BP Method: Medium (Automatic)   Pulse: 82   Resp: 16   Temp: 97.8 °F (36.6 °C)   TempSrc: Oral   SpO2: 99%   Weight: 56.7 kg (125 lb)   Height: 5' 8" (1.727 m)          Physical Exam  Vitals reviewed. "   Constitutional:       General: He is not in acute distress.     Appearance: Normal appearance. He is well-developed and underweight.   HENT:      Head: Normocephalic and atraumatic.      Mouth/Throat:      Mouth: Mucous membranes are moist.      Dentition: Abnormal dentition. Dental caries present.   Eyes:      Conjunctiva/sclera: Conjunctivae normal.      Pupils: Pupils are equal, round, and reactive to light.   Neck:      Trachea: Tracheostomy present.   Pulmonary:      Effort: Pulmonary effort is normal. No respiratory distress.      Comments: Tracheostomy  Abdominal:      General: There is no distension.      Palpations: Abdomen is soft.   Musculoskeletal:         General: No swelling. Normal range of motion.      Cervical back: Normal range of motion and neck supple.   Skin:     General: Skin is warm and dry.   Neurological:      General: No focal deficit present.      Mental Status: He is alert and oriented to person, place, and time.   Psychiatric:         Mood and Affect: Mood normal.         Behavior: Behavior normal.         Thought Content: Thought content normal.         Judgment: Judgment normal.         LABS:  WBC   Date Value Ref Range Status   11/17/2023 3.31 (L) 3.90 - 12.70 K/uL Final     Hemoglobin   Date Value Ref Range Status   11/17/2023 11.0 (L) 14.0 - 18.0 g/dL Final     Hematocrit   Date Value Ref Range Status   11/17/2023 33.0 (L) 40.0 - 54.0 % Final     Platelets   Date Value Ref Range Status   11/17/2023 123 (L) 150 - 450 K/uL Final       Chemistry        Component Value Date/Time     11/17/2023 1211    K 4.6 11/17/2023 1211     11/17/2023 1211    CO2 27 11/17/2023 1211    BUN 22 11/17/2023 1211    CREATININE 1.7 (H) 11/17/2023 1211     (H) 11/17/2023 1211        Component Value Date/Time    CALCIUM 9.7 11/17/2023 1211    ALKPHOS 75 11/17/2023 1211    AST 46 (H) 11/17/2023 1211    ALT 15 11/17/2023 1211    BILITOT 0.4 11/17/2023 1211    ESTGFRAFRICA 52.6 (A)  07/14/2022 1119    EGFRNONAA 45.5 (A) 07/14/2022 1119              Assessment:       1. Squamous cell carcinoma of base of tongue    2. Secondary malignant neoplasm of cervical lymph node    3. Tracheostomy status    4. Immunodeficiency due to drugs    5. Status post liver transplantation - 2008    6. Cirrhosis of transplanted liver    7. Iron deficiency anemia due to chronic blood loss    8. Gastrointestinal hemorrhage associated with duodenal ulcer          Plan:       1,2. 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 He is not a surgical or radiation candidate.  -Started on PCC 10/6/2020 followed by maintenance cetuximab until 8/24/21 (discontinued due to progression of disease; continued increase in SUV uptake, no new lesions).  - 9/2021 started on carbo/5-FU/pembrolizumab; due to toxicity went to pembrolizumab monotherapy starting with cycle 2.  Progression of disease noted after cycle 3 so added chemotherapy back in with cycle 4. Keytruda monotherapy starting with C9.   - Continue with Keytruda; will complete 2 years December this year. Due for re-staging scans February.      3. Status post total laryngectomy, total glossectomy and anterolateral thigh free flap reconstruction roughly 2017 at [facility] in [place] for persistent/recurrent base of tongue sq.c.c.    4-6. S/p liver transplant and is currently on tacrolimus. Grade 1. Will continue to monitor.     7,8. Hgb recovered to > 11. Monitor closely for bleeding in stool.    he will return to clinic in 6 weeks, but knows to call in the interim if symptoms change or should a problem arise.     Patient is in agreement with the proposed treatment plan. All questions were answered to the patient's satisfaction. Pt knows to call clinic if anything is needed before the next clinic visit.    MDM includes:    - Acute or chronic illness or injury that poses a threat  to life or bodily function  - Independent review and explanation of 2 results from unique tests  - Discussion of management and ordering 2 unique tests  - Extensive discussion of treatment and management  - Prescription drug management  - Drug therapy requiring intensive monitoring for toxicity    Martín Hernandez M.D.  Hematology/Oncology Attending  Director [facility]  [facility]              Route Chart for Scheduling    Med Onc Chart Routing      Follow up with physician    Follow up with CORY 6 weeks. with labs prior to next Keytruda   Infusion scheduling note    Injection scheduling note    Labs CBC, CMP, free T4 and TSH   Scheduling:  Preferred lab:  Lab interval:     Imaging    Pharmacy appointment    Other referrals            Treatment Plan Information   OP HEAD NECK PEMBROLIZUMAB CARBOPLATIN FLUOROURACIL (C1 ONLY RECEIVED) FOLLOWED BY PEMBROLIZUMAB MAINTENANCE   Ronald Berg MD   Upcoming Treatment Dates - OP HEAD NECK PEMBROLIZUMAB CARBOPLATIN FLUOROURACIL (C1 ONLY RECEIVED) FOLLOWED BY PEMBROLIZUMAB MAINTENANCE    No upcoming days in selected categories.    Therapy Plan Information  PORT FLUSH  Flushes  heparin, porcine (PF) 100 unit/mL injection flush 500 Units  500 Units, Intravenous, Every visit  sodium chloride 0.9% flush 10 mL  10 mL, Intravenous, Every visit    PORT FLUSH  Flushes  heparin, porcine (PF) 100 unit/mL injection flush 500 Units  500 Units, Intravenous, Every visit  sodium chloride 0.9% flush 10 mL  10 mL, Intravenous, Every visit

## 2023-11-17 NOTE — PLAN OF CARE
1513 pt tolerated Keytrudaand IVF without issue, pt has no upcoming appts scheduled at this time, no distress noted upon d/c to home

## 2023-11-20 ENCOUNTER — PATIENT MESSAGE (OUTPATIENT)
Dept: DERMATOLOGY | Facility: CLINIC | Age: 73
End: 2023-11-20
Payer: MEDICARE

## 2023-11-21 ENCOUNTER — PATIENT MESSAGE (OUTPATIENT)
Dept: DERMATOLOGY | Facility: CLINIC | Age: 73
End: 2023-11-21
Payer: MEDICARE

## 2023-11-21 ENCOUNTER — PATIENT MESSAGE (OUTPATIENT)
Dept: HEMATOLOGY/ONCOLOGY | Facility: CLINIC | Age: 73
End: 2023-11-21
Payer: MEDICARE

## 2023-11-28 ENCOUNTER — HOSPITAL ENCOUNTER (OUTPATIENT)
Dept: CARDIOLOGY | Facility: HOSPITAL | Age: 73
Discharge: HOME | End: 2023-11-28
Attending: INTERNAL MEDICINE
Payer: MEDICARE

## 2023-11-28 VITALS
BODY MASS INDEX: 18.34 KG/M2 | HEART RATE: 75 BPM | SYSTOLIC BLOOD PRESSURE: 140 MMHG | HEIGHT: 68 IN | WEIGHT: 121 LBS | DIASTOLIC BLOOD PRESSURE: 80 MMHG

## 2023-11-28 DIAGNOSIS — I48.0 PAF (PAROXYSMAL ATRIAL FIBRILLATION): ICD-10-CM

## 2023-11-28 DIAGNOSIS — R01.1 UNDIAGNOSED CARDIAC MURMURS: ICD-10-CM

## 2023-11-28 LAB
ASCENDING AORTA: 3.1 CM
AV INDEX (PROSTH): 0.57
AV MEAN GRADIENT: 6 MMHG
AV PEAK GRADIENT: 10 MMHG
AV VALVE AREA BY VELOCITY RATIO: 1.91 CM²
AV VALVE AREA: 1.76 CM²
AV VELOCITY RATIO: 0.62
BSA FOR ECHO PROCEDURE: 1.62 M2
CV ECHO LV RWT: 0.43 CM
DOP CALC AO PEAK VEL: 1.56 M/S
DOP CALC AO VTI: 33.97 CM
DOP CALC LVOT AREA: 3.1 CM2
DOP CALC LVOT DIAMETER: 1.98 CM
DOP CALC LVOT PEAK VEL: 0.97 M/S
DOP CALC LVOT STROKE VOLUME: 59.95 CM3
DOP CALC RVOT PEAK VEL: 0.65 M/S
DOP CALC RVOT VTI: 11.65 CM
DOP CALCLVOT PEAK VEL VTI: 19.48 CM
E WAVE DECELERATION TIME: 155.12 MSEC
E/A RATIO: 0.76
E/E' RATIO: 6.59 M/S
ECHO LV POSTERIOR WALL: 0.88 CM (ref 0.6–1.1)
EJECTION FRACTION: 60 %
FRACTIONAL SHORTENING: 27 % (ref 28–44)
INTERVENTRICULAR SEPTUM: 0.77 CM (ref 0.6–1.1)
LA MAJOR: 5.02 CM
LA MINOR: 5.19 CM
LA WIDTH: 3.49 CM
LEFT ATRIUM SIZE: 3.5 CM
LEFT ATRIUM VOLUME INDEX MOD: 26.9 ML/M2
LEFT ATRIUM VOLUME INDEX: 32.1 ML/M2
LEFT ATRIUM VOLUME MOD: 44.31 CM3
LEFT ATRIUM VOLUME: 52.99 CM3
LEFT INTERNAL DIMENSION IN SYSTOLE: 3 CM (ref 2.1–4)
LEFT VENTRICLE DIASTOLIC VOLUME INDEX: 45.46 ML/M2
LEFT VENTRICLE DIASTOLIC VOLUME: 75.01 ML
LEFT VENTRICLE MASS INDEX: 62 G/M2
LEFT VENTRICLE SYSTOLIC VOLUME INDEX: 21.1 ML/M2
LEFT VENTRICLE SYSTOLIC VOLUME: 34.87 ML
LEFT VENTRICULAR INTERNAL DIMENSION IN DIASTOLE: 4.12 CM (ref 3.5–6)
LEFT VENTRICULAR MASS: 102.24 G
LV LATERAL E/E' RATIO: 7 M/S
LV SEPTAL E/E' RATIO: 6.22 M/S
MV A" WAVE DURATION": 12.56 MSEC
MV PEAK A VEL: 0.74 M/S
MV PEAK E VEL: 0.56 M/S
MV STENOSIS PRESSURE HALF TIME: 44.98 MS
MV VALVE AREA P 1/2 METHOD: 4.89 CM2
PISA TR MAX VEL: 2.59 M/S
PULM VEIN S/D RATIO: 1.3
PV MEAN GRADIENT: 1 MMHG
PV PEAK D VEL: 0.5 M/S
PV PEAK GRADIENT: 4
PV PEAK S VEL: 0.65 M/S
PV PEAK VELOCITY: 1.06 M/S
RA MAJOR: 5.1 CM
RA PRESSURE ESTIMATED: 3 MMHG
RA WIDTH: 3.33 CM
RIGHT VENTRICULAR END-DIASTOLIC DIMENSION: 2.3 CM
RV TB RVSP: 6 MMHG
SINUS: 3.15 CM
STJ: 2.86 CM
TDI LATERAL: 0.08 M/S
TDI SEPTAL: 0.09 M/S
TDI: 0.09 M/S
TR MAX PG: 27 MMHG
TRICUSPID ANNULAR PLANE SYSTOLIC EXCURSION: 2.85 CM
TV REST PULMONARY ARTERY PRESSURE: 30 MMHG
Z-SCORE OF LEFT VENTRICULAR DIMENSION IN END DIASTOLE: -1.17
Z-SCORE OF LEFT VENTRICULAR DIMENSION IN END SYSTOLE: 0.35

## 2023-11-28 PROCEDURE — 93306 TTE W/DOPPLER COMPLETE: CPT | Mod: PO

## 2023-11-28 PROCEDURE — 93306 TTE W/DOPPLER COMPLETE: CPT | Mod: 26,,, | Performed by: INTERNAL MEDICINE

## 2023-11-28 PROCEDURE — 93227 HOLTER MONITOR - 48 HOUR (CUPID ONLY): ICD-10-PCS | Mod: ,,, | Performed by: INTERNAL MEDICINE

## 2023-11-28 PROCEDURE — 93226 XTRNL ECG REC<48 HR SCAN A/R: CPT | Mod: PO

## 2023-11-28 PROCEDURE — 93306 ECHO (CUPID ONLY): ICD-10-PCS | Mod: 26,,, | Performed by: INTERNAL MEDICINE

## 2023-11-28 PROCEDURE — 93227 XTRNL ECG REC<48 HR R&I: CPT | Mod: ,,, | Performed by: INTERNAL MEDICINE

## 2023-11-28 NOTE — PROGRESS NOTES
"Oncology Nutrition Assessment for Medical Nutrition Therapy  Initial Visit    Rocco Swain   1950    Referring Provider: Katy Mccormick, *      Reason for Visit: nutrition counseling and education    PMHx:   Past Medical History:   Diagnosis Date    Basal cell carcinoma (BCC) in situ of skin 2012    3 on face, 2 on arm, removed by dermatology.     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 Underwent surgical resection of tongue and neck. s/p tracheostomy       Nutrition Assessment    This is a 73 y.o.male with a medical diagnosis of SCC of base of tongue s/p total laryngectomy, total glossectomy, and free flap 2017 at OSH. He was started on PCC followed by maintenance Cetuximab until discontinued 8/2021 due to progression of disease. He was then started on Carboplatin + 5-FU + Pembrolizumab which was ultimately changed to Keytruda monotherapy with cycle 9. Continues on Keytruda. Noted he has history of liver transplant. All communication is written. He reports that his appetite is improving, eating better lately. He reports eating 2 meals/day and snacks. He reports he has been gaining weight consistently. He does well with protein at meals, likes all kinds. He is also supplementing with 2-3 Ensure Plus (sometimes makes smoothies with them). He reports he is doing well with fluid intake, does best when add ice.     Weight: 56.3 kg (124 lb 0.1 oz)  Height: 5' 8" (172.7 cm)  BMI: Body mass index is 18.86 kg/m².   IBW: Ideal body weight: 68.4 kg (150 lb 12.7 oz)    Usual BW: 112-115lb  Weight Change: 10lb gain recently    Allergies: Patient has no known allergies.    Current Medications:  Current Outpatient Medications:     azelaic acid (AZELEX) 15 % gel, APPLY " TOPICALLY TO AFFECTED AREA IN THE MORNING, Disp: 50 g, Rfl: 3    gabapentin (NEURONTIN) 300 MG capsule, Take 1 capsule (300 mg total) by mouth every evening. (Patient taking differently: Take 300 mg by mouth as needed.), Disp: 30 capsule, Rfl: 11    imiquimod (ALDARA) 5 % cream, Apply to biopsy site on frontal scalp + about a centimeter of surrounding skin qhs x 16 weeks. Wash off in am and apply sunscreen. Discontinue if skin becomes blistered, raw, bleeding, painful, etc., Disp: 24 packet, Rfl: 3    levothyroxine (SYNTHROID) 88 MCG tablet, TAKE 1 TABLET BY MOUTH ONCE DAILY, Disp: 90 tablet, Rfl: 3    LIDOcaine HCl 2% (LIDOCAINE VISCOUS) 2 % Soln, Swish and spit 15 mls every 8 (eight) hours as needed (mouth sore)., Disp: 100 mL, Rfl: 3    magic mouthwash diphen/antac/lidoc/nysta, Take 10 mLs by mouth 4 (four) times daily., Disp: 120 mL, Rfl: 4    metroNIDAZOLE (METROGEL) 0.75 % gel, Apply topically to affected area 2 (two) times daily., Disp: 45 g, Rfl: 1    moxifloxacin (VIGAMOX) 0.5 % ophthalmic solution, Place 1 drop into the right eye 4 (four) times daily., Disp: 3 mL, Rfl: 3    multivit-min/FA/lycopen/lutein (CENTRUM SILVER MEN ORAL), Take 1 tablet by mouth once daily., Disp: , Rfl:     pantoprazole (PROTONIX) 40 MG tablet, Take 40 mg by mouth 2 (two) times daily., Disp: , Rfl:     prednisoLONE acetate (PRED FORTE) 1 % DrpS, Place 1 drop into the right eye 4 (four) times daily., Disp: 10 mL, Rfl: 3    sulfacetamide sodium-sulfur 10-5 % (w/w) Clsr, USE TO WASH AFFECTED AREA DAILY, Disp: , Rfl:     tacrolimus (PROGRAF) 0.5 MG Cap, Take 1 capsule (0.5 mg total) by mouth every EVENING.  (Use the 1mg capsule for your morning dose), Disp: 90 capsule, Rfl: 3    tacrolimus (PROGRAF) 0.5 MG Cap, , Disp: , Rfl:     tacrolimus (PROGRAF) 1 MG Cap, Take 1 capsule (1 mg total) by mouth every morning. Use the tacrolimus 0.5mg capsule for your evening dose as directed., Disp: 90 capsule, Rfl: 3    tacrolimus (PROGRAF) 1 MG  Cap, , Disp: , Rfl:     tiZANidine (ZANAFLEX) 2 MG tablet, Take 1 tablet (2 mg total) by mouth nightly as needed (neck muscle strain)., Disp: 30 tablet, Rfl: 3    traZODone (DESYREL) 50 MG tablet, Take 1 tablet (50 mg total) by mouth every evening., Disp: 90 tablet, Rfl: 2  No current facility-administered medications for this visit.    Facility-Administered Medications Ordered in Other Visits:     heparin, porcine (PF) 100 unit/mL injection flush 500 Units, 500 Units, Intravenous, PRN, Ronald Berg MD    ofloxacin 0.3 % ophthalmic solution 1 drop, 1 drop, Right Eye, On Call Procedure, Victor M Vasques MD, 2 drop at 10/11/22 0745    sodium chloride 0.9% flush 10 mL, 10 mL, Intravenous, PRN, Ronald Berg MD    sodium chloride 0.9% flush 10 mL, 10 mL, Intravenous, PRN, Victor M Vasques MD    Food/medication interactions noted: avoid grapefruit    Vitamins/Supplements: MVI, iron    Labs: Reviewed    Nutrition Diagnosis    No nutrition problem identified at this time.    Nutrition Intervention    Nutrition Prescription   1971 kcals (35kcal/kg)  73g protein (1.3g/kg)   1971mL fluid (35mL/kg)    Recommendations:  Continue to eat at least 2 meals/day and 2-3 snacks/day  Make meals/snacks high in calories and protein - examples reviewed  Continue to supplement with 2-3 Ensure Plus daily  Drink at least 64oz fluid/day    Materials Provided/Reviewed: Ensure coupons    Nutrition Monitoring and Evaluation    Monitor: diet education needs, energy intake, and weight status    Goals: weight maintenance/encourage weight gain    Follow up: Patient provided with contact information and advised to call/message with questions or to make future appointment if further intervention is needed.    Communication to referring provider/care team: note available in chart    Counseling time: 14 minutes    Lilia Richards MS, RD, LDN

## 2023-11-29 ENCOUNTER — CLINICAL SUPPORT (OUTPATIENT)
Dept: HEMATOLOGY/ONCOLOGY | Facility: CLINIC | Age: 73
End: 2023-11-29
Payer: MEDICARE

## 2023-11-29 ENCOUNTER — PATIENT MESSAGE (OUTPATIENT)
Dept: DERMATOLOGY | Facility: CLINIC | Age: 73
End: 2023-11-29
Payer: MEDICARE

## 2023-11-29 VITALS — WEIGHT: 124 LBS | BODY MASS INDEX: 18.86 KG/M2

## 2023-11-29 DIAGNOSIS — Z71.3 NUTRITIONAL COUNSELING: Primary | ICD-10-CM

## 2023-11-29 DIAGNOSIS — C01 SQUAMOUS CELL CARCINOMA OF BASE OF TONGUE: ICD-10-CM

## 2023-11-29 PROCEDURE — 99999 PR PBB SHADOW E&M-EST. PATIENT-LVL I: ICD-10-PCS | Mod: PBBFAC,,, | Performed by: DIETITIAN, REGISTERED

## 2023-11-29 PROCEDURE — 99999 PR PBB SHADOW E&M-EST. PATIENT-LVL I: CPT | Mod: PBBFAC,,, | Performed by: DIETITIAN, REGISTERED

## 2023-11-29 PROCEDURE — 99211 OFF/OP EST MAY X REQ PHY/QHP: CPT | Mod: PBBFAC | Performed by: DIETITIAN, REGISTERED

## 2023-11-29 PROCEDURE — 97802 MEDICAL NUTRITION INDIV IN: CPT | Mod: 59,PBBFAC | Performed by: DIETITIAN, REGISTERED

## 2023-11-29 PROCEDURE — 99999PBSHW PR PBB SHADOW TECHNICAL ONLY FILED TO HB: Mod: PBBFAC,,,

## 2023-11-29 PROCEDURE — 99999PBSHW PR PBB SHADOW TECHNICAL ONLY FILED TO HB: ICD-10-PCS | Mod: PBBFAC,,,

## 2023-11-30 ENCOUNTER — OFFICE VISIT (OUTPATIENT)
Dept: DERMATOLOGY | Facility: CLINIC | Age: 73
End: 2023-11-30
Payer: MEDICARE

## 2023-11-30 ENCOUNTER — TELEPHONE (OUTPATIENT)
Dept: TRANSPLANT | Facility: CLINIC | Age: 73
End: 2023-11-30
Payer: MEDICARE

## 2023-11-30 DIAGNOSIS — D48.5 NEOPLASM OF UNCERTAIN BEHAVIOR OF SKIN: ICD-10-CM

## 2023-11-30 DIAGNOSIS — L82.1 SK (SEBORRHEIC KERATOSIS): ICD-10-CM

## 2023-11-30 DIAGNOSIS — L57.0 AK (ACTINIC KERATOSIS): Primary | ICD-10-CM

## 2023-11-30 PROCEDURE — 17000 DESTRUCT PREMALG LESION: CPT | Mod: 59,PBBFAC,PN,ICN | Performed by: DERMATOLOGY

## 2023-11-30 PROCEDURE — 11102 TANGNTL BX SKIN SINGLE LES: CPT | Mod: S$PBB,ICN,, | Performed by: DERMATOLOGY

## 2023-11-30 PROCEDURE — 17003 DESTRUCT PREMALG LES 2-14: CPT | Mod: 59,PBBFAC,PN | Performed by: DERMATOLOGY

## 2023-11-30 PROCEDURE — 88305 TISSUE EXAM BY PATHOLOGIST: ICD-10-PCS | Mod: 26,,, | Performed by: PATHOLOGY

## 2023-11-30 PROCEDURE — 99999 PR PBB SHADOW E&M-EST. PATIENT-LVL III: CPT | Mod: PBBFAC,,, | Performed by: DERMATOLOGY

## 2023-11-30 PROCEDURE — 88305 TISSUE EXAM BY PATHOLOGIST: CPT | Performed by: PATHOLOGY

## 2023-11-30 PROCEDURE — 11102 TANGNTL BX SKIN SINGLE LES: CPT | Mod: PBBFAC,PN | Performed by: DERMATOLOGY

## 2023-11-30 PROCEDURE — 17003 DESTRUCTION, PREMALIGNANT LESIONS; SECOND THROUGH 14 LESIONS: ICD-10-PCS | Mod: 59,S$PBB,ICN, | Performed by: DERMATOLOGY

## 2023-11-30 PROCEDURE — 99213 OFFICE O/P EST LOW 20 MIN: CPT | Mod: PBBFAC,PN,25 | Performed by: DERMATOLOGY

## 2023-11-30 PROCEDURE — 99212 OFFICE O/P EST SF 10 MIN: CPT | Mod: 25,S$PBB,ICN, | Performed by: DERMATOLOGY

## 2023-11-30 PROCEDURE — 11103 TANGNTL BX SKIN EA SEP/ADDL: CPT | Mod: PBBFAC,PN | Performed by: DERMATOLOGY

## 2023-11-30 PROCEDURE — 11102 PR TANGENTIAL BIOPSY, SKIN, SINGLE LESION: ICD-10-PCS | Mod: S$PBB,ICN,, | Performed by: DERMATOLOGY

## 2023-11-30 PROCEDURE — 17003 DESTRUCT PREMALG LES 2-14: CPT | Mod: 59,S$PBB,ICN, | Performed by: DERMATOLOGY

## 2023-11-30 PROCEDURE — 17000 PR DESTRUCTION(LASER SURGERY,CRYOSURGERY,CHEMOSURGERY),PREMALIGNANT LESIONS,FIRST LESION: ICD-10-PCS | Mod: 59,S$PBB,ICN, | Performed by: DERMATOLOGY

## 2023-11-30 PROCEDURE — 99999 PR PBB SHADOW E&M-EST. PATIENT-LVL III: ICD-10-PCS | Mod: PBBFAC,,, | Performed by: DERMATOLOGY

## 2023-11-30 PROCEDURE — 11103 PR TANGENTIAL BIOPSY, SKIN, EA ADDTL LESION: ICD-10-PCS | Mod: S$PBB,ICN,, | Performed by: DERMATOLOGY

## 2023-11-30 PROCEDURE — 11103 TANGNTL BX SKIN EA SEP/ADDL: CPT | Mod: S$PBB,ICN,, | Performed by: DERMATOLOGY

## 2023-11-30 PROCEDURE — 17000 DESTRUCT PREMALG LESION: CPT | Mod: 59,S$PBB,ICN, | Performed by: DERMATOLOGY

## 2023-11-30 PROCEDURE — 99212 PR OFFICE/OUTPT VISIT, EST, LEVL II, 10-19 MIN: ICD-10-PCS | Mod: 25,S$PBB,ICN, | Performed by: DERMATOLOGY

## 2023-11-30 PROCEDURE — 88305 TISSUE EXAM BY PATHOLOGIST: CPT | Mod: 26,,, | Performed by: PATHOLOGY

## 2023-11-30 NOTE — TELEPHONE ENCOUNTER
My chart message sent to patient, liver transplant labs reviewed by your physician, no action required with next labs 1/3/2024            ----- Message from Cornell Anton MD sent at 11/29/2023 11:02 AM CST -----  Results reviewed

## 2023-11-30 NOTE — PROGRESS NOTES
Subjective:      Patient ID:  Rocco Swain is a 73 y.o. male who presents for   Chief Complaint   Patient presents with    Lesion     Lesion    73 y.o. male with hx of SCC base of tongue status post total laryngectomy, total glossectomy and anterolateral thigh free flap reconstruction several years ago at [facility] in [place].  He has also had a liver transplant secondary to hepatitis C and is on tacrolimus. Receiving palliative chemotherapy for metastatic disease in his neck.     Pt here today for a few new lesions:    Lesion on nose x 2 months, bleeding - on 2nd day of washing, scab comes off and it starts bleeding again.  Scaly areas on scalp  Raised, tender areas on R temple and lower neck.     Has had numerous lesions frozen off his skin in the past and has had multiple NMSCs.    Review of Systems   Constitutional:  Negative for fever and chills.   Skin:  Negative for itching and rash.       Objective:   Physical Exam   Constitutional: He appears well-developed and well-nourished. No distress.   Neurological: He is alert and oriented to person, place, and time. He is not disoriented.   Psychiatric: He has a normal mood and affect.   Skin:   Areas Examined (abnormalities noted in diagram):   Scalp / Hair Palpated and Inspected  Head / Face Inspection Performed  Neck Inspection Performed             Pt declined skin exam of any other areas today.                                       Diagram Legend     Erythematous scaling macule/papule c/w actinic keratosis       Vascular papule c/w angioma      Pigmented verrucoid papule/plaque c/w seborrheic keratosis      Yellow umbilicated papule c/w sebaceous hyperplasia      Irregularly shaped tan macule c/w lentigo     1-2 mm smooth white papules consistent with Milia      Movable subcutaneous cyst with punctum c/w epidermal inclusion cyst      Subcutaneous movable cyst c/w pilar cyst      Firm pink to brown papule c/w dermatofibroma      Pedunculated fleshy  papule(s) c/w skin tag(s)      Evenly pigmented macule c/w junctional nevus     Mildly variegated pigmented, slightly irregular-bordered macule c/w mildly atypical nevus      Flesh colored to evenly pigmented papule c/w intradermal nevus       Pink pearly papule/plaque c/w basal cell carcinoma      Erythematous hyperkeratotic cursted plaque c/w SCC      Surgical scar with no sign of skin cancer recurrence      Open and closed comedones      Inflammatory papules and pustules      Verrucoid papule consistent consistent with wart     Erythematous eczematous patches and plaques     Dystrophic onycholytic nail with subungual debris c/w onychomycosis     Umbilicated papule    Erythematous-base heme-crusted tan verrucoid plaque consistent with inflamed seborrheic keratosis     Erythematous Silvery Scaling Plaque c/w Psoriasis     See annotation      Assessment / Plan:    Neoplasm of uncertain behavior of skin  Shave biopsy procedure note:    Shave biopsy performed after verbal consent including risk of infection, scar, recurrence, need for additional treatment of site. Area prepped with alcohol, anesthetized with approximately 1.0cc of 1% lidocaine with epinephrine. Lesional tissue shaved with razor blade. Hemostasis achieved with application of aluminum chloride followed by hyfrecation. No complications. Dressing applied. Wound care explained.    -     Specimen to Pathology, Dermatology  Pathology Orders:       Normal Orders This Visit    Specimen to Pathology, Dermatology     Comments:    Number of Specimens:->4  ------------------------->-------------------------  Spec 1 Procedure:->Biopsy  Spec 1 Clinical Impression:->r/o HAK vs SCC vs other  Spec 1 Source:->L frontal scalp  ------------------------->-------------------------  Spec 2 Procedure:->Biopsy  Spec 2 Clinical Impression:->r/o SCC vs BCC vs other  Spec 2 Source:->R temple  ------------------------->-------------------------  Spec 3 Procedure:->Biopsy  Spec 3  Clinical Impression:->r/o BCC vs other  Spec 3 Source:->L nasal ala  ------------------------->-------------------------  Spec 4 Procedure:->Biopsy  Spec 4 Clinical Impression:->r/o BCC vs other  Spec 4 Source:->lower mid neck    Questions:    Procedure Type: Dermatology and skin neoplasms    Number of Specimens: 4    ------------------------: -------------------------    Spec 1 Procedure: Biopsy    Spec 1 Clinical Impression: r/o HAK vs SCC vs other    Spec 1 Source: L frontal scalp    ------------------------: -------------------------    Spec 2 Procedure: Biopsy    Spec 2 Clinical Impression: r/o SCC vs BCC vs other    Spec 2 Source: R temple    ------------------------: -------------------------    Spec 3 Procedure: Biopsy    Spec 3 Clinical Impression: r/o BCC vs other    Spec 3 Source: L nasal ala    ------------------------: -------------------------    Spec 4 Procedure: Biopsy    Spec 4 Clinical Impression: r/o BCC vs other    Spec 4 Source: lower mid neck    Release to patient:           AK (actinic keratosis)  Cryosurgery Procedure Note  Verbal consent from the patient is obtained including, but not limited to, risk of hypopigmentation/hyperpigmentation, scar, recurrence of lesion. The patient is aware of the precancerous quality and need for treatment of these lesions. Liquid nitrogen cryosurgery is applied to the 10 actinic keratoses, as detailed in the physical exam, to produce a freeze injury. The patient is aware that blisters may form and is instructed on wound care with gentle cleansing and use of vaseline ointment to keep moist until healed. The patient is supplied a handout on cryosurgery and is instructed to call if lesions do not completely resolve.    SK (seborrheic keratosis)  These are benign inherited growths without a malignant potential. Reassurance given to patient. No treatment is necessary.   Treatment of benign, asymptomatic lesions may be considered cosmetic.  Warned about risk of  hypo- or hyperpigmentation with treatment and risk of recurrence.    Follow up in about 4 months (around 3/30/2024) for skin check or sooner pending biopsy results.

## 2023-11-30 NOTE — PATIENT INSTRUCTIONS
Shave Biopsy Wound Care    Your doctor has performed a shave biopsy today.  A band aid and vaseline ointment has been placed over the site.  This should remain in place for NO LONGER THAN 48 hours.  It is fine to remove the bandaid after 24 hours, if the area is no longer bleeding. It is recommended that you keep the area dry (do not wet)) for the first 24 hours.  After 24 hours, wash the area with warm soap and water and apply Vaseline jelly.  Many patients prefer to use Neosporin or Bacitracin ointment.  This is acceptable; however, know that you can develop an allergy to this medication even if you have used it safely for years.  It is important to keep the area moist.  Letting it dry out and get air slows healing time, and will worsen the scar.        If you notice increasing redness, tenderness, pain, or yellow drainage at the biopsy site, please notify your doctor.  These are signs of an infection.    If your biopsy site is bleeding, apply firm pressure for 15 minutes straight.  Repeat for another 15 minutes, if it is still bleeding.   If the surgical site continues to bleed, then please contact your doctor.      For [facility] users:   You will receive your biopsy results in [facility] as soon as they are available. Please be assured that your physician/provider will review your results and will then determine what further treatment, evaluation, or planning is required. You should be contacted by your physician's/provider's office within 5 business days of receiving your results; If not, please reach out to directly. This is one more way [facility] is putting you first.     [facility] [place] [address]/ [phone] FAX/ [email or web address]     CRYOSURGERY      Your doctor has used a method called cryosurgery to treat your skin condition. Cryosurgery refers to the use of very cold substances to treat a variety of skin conditions such as warts, pre-skin cancers, molluscum contagiosum,  sun spots, and several benign growths. The substance we use in cryosurgery is liquid nitrogen and is so cold (-195 degrees Celsius) that is burns when administered.     Following treatment in the office, the skin may immediately burn and become red. You may find the area around the lesion is affected as well. It is sometimes necessary to treat not only the lesion, but a small area of the surrounding normal skin to achieve a good response.     A blister, and even a blood filled blister, may form after treatment.   This is a normal response. If the blister is painful, it is acceptable to sterilize a needle and with rubbing alcohol and gently pop the blister. It is important that you gently wash the area with soap and warm water as the blister fluid may contain wart virus if a wart was treated. Do no remove the roof of the blister.     The area treated can take anywhere from 1-3 weeks to heal. Healing time depends on the kind skin lesion treated, the location, and how aggressively the lesion was treated. It is recommended that the areas treated are covered with Vaseline or bacitracin ointment and a band-aid. If a band-aid is not practical, just ointment applied several times per day will do. Keeping these areas moist will speed the healing time.    Treatment with liquid nitrogen can leave a scar. In dark skin, it may be a light or dark scar, in light skin it may be a white or pink scar. These will generally fade with time, but may never go away completely.     If you have any concerns after your treatment, please feel free to call the office.       [address] [place] [address]/ [phone] FAX/ [email or web address] Sun Protection      The [facility] would like to remind you of the importance of sun protection all year round and particularly during the summer when the suns rays are the strongest. It has been proven that both acute and chronic sun exposure damages our  cells and leads to skin cancer. Beyond skin cancer, the sun causes 90% of the symptoms of premature skin aging, including wrinkles, lentigines (brown spots), and thin, easily bruised skin. Proper sun protection can help prevent these unwanted conditions.    Many patients report that they dont go in the sun. It has been shown that the average person receives 18 hours of incidental sun exposure per week during activities such as walking through parking lots, driving, or sitting next to windows. This accumulates to several bad sunburns per year!    In choosing sunscreen, you want one that protects against both UVA and UVB rays (broad spectrum). It is recommended that you use one of SPF 30 or higher. It is important to apply the sunscreen about 20 minutes prior to sun exposure. Most sunscreens are chemical sunscreens and a reaction must take place in the skin so that they are effective. If they are applied and then you are immediately exposed to the sun or start sweating, this reaction has not had time to take place and you are therefore unprotected. Sunscreen needs to be reapplied every 2 hours if you are participating in water sports or sweating. We recommend Elta MD or CeraVe sunscreens for daily use; however there are many options and it is most important for you to find one that you will use on a consistent basis.    If you have sensitive skin, you may do best with a sunscreen that contains only physical blockers in the active ingredient section. The only physical blockers available in the [place] currently are titanium dioxide or zinc oxide. These are typically thicker and harder to apply, however they afford very good protection. Neutrogena Sensitive Skin, Blue Lizard Sensitive Skin (pink top) or Neutrogena Pure and Free are popular ones.     Aside from sunscreen, clothes with UV protection (UPF), wide brimmed hats, and sunglasses are other means of sun protection that we recommend.      Based on a recent study  (6/2021) and out of an abundance of caution, we are recommending that you AVOID the following sunscreens as they may contain the carcinogen, benzene:    Spray and gel sunscreens  Any [facility] or [facility] brands as well as Max Block and TopCare brands   Neutrogena Ultra Sheer Dry-touch Water Resistant Sunscreen LOTION SPF 70   Neutrogena Sheer Zinc Dry-touch Face Sunscreen LOTION SPF 50   5.   Aveeno Baby Continuous Protection Sensitive Skin Sunscreen LOTION - Broad Spectrum SPF 50    Please note that Benzene is not an ingredient or the degradation product of any ingredient in any sunscreen. This study suggested that the findings are a result of contamination in the manufacturing process. At this point, we don't know how effectively Benzene gets through the skin, if it gets absorbed systemically, and what effects it may have.     We do know that ultraviolet radiation is a well-established carcinogen. Please use daily sun protection/avoidance and use of at least SPF 30, broad-spectrum sunscreen not listed above.                       [facility]  [address] [facility]  [facility] [address]  Dept: [phone]  Dept Fax: [phone]

## 2023-11-30 NOTE — LETTER
November 30, 2023    Rocco Swain  [address] [place] [address] [place] [address]          Dear Rocco Swain:  MRN: [id number]    This is a follow up to your recent labs, your lab results were stable.  There are no medicine changes.  Please have your labs drawn again on 1/3/2024.      If you cannot have your labs drawn on the scheduled date, it is your responsibility to call the transplant department to reschedule.  Please call [phone] and ask to speak to Rhea RIOS   for all scheduling requests.     Sincerely,    Shari WADDELLN, RN      Your Liver Transplant Coordinator    [facility]  [address] [place] [address]  [phone]

## 2023-12-05 LAB
OHS CV EVENT MONITOR DAY: 0
OHS CV HOLTER LENGTH DECIMAL HOURS: 48
OHS CV HOLTER LENGTH HOURS: 48
OHS CV HOLTER LENGTH MINUTES: 0
OHS CV HOLTER SINUS AVERAGE HR: 85
OHS CV HOLTER SINUS MAX HR: 135
OHS CV HOLTER SINUS MIN HR: 60

## 2023-12-06 ENCOUNTER — PATIENT MESSAGE (OUTPATIENT)
Dept: HEMATOLOGY/ONCOLOGY | Facility: CLINIC | Age: 73
End: 2023-12-06
Payer: MEDICARE

## 2023-12-06 LAB
FINAL PATHOLOGIC DIAGNOSIS: NORMAL
GROSS: NORMAL
Lab: NORMAL
MICROSCOPIC EXAM: NORMAL

## 2023-12-07 ENCOUNTER — OFFICE VISIT (OUTPATIENT)
Dept: CARDIOLOGY | Facility: CLINIC | Age: 73
End: 2023-12-07
Payer: MEDICARE

## 2023-12-07 ENCOUNTER — LAB VISIT (OUTPATIENT)
Dept: LAB | Facility: HOSPITAL | Age: 73
End: 2023-12-07
Payer: MEDICARE

## 2023-12-07 VITALS
HEART RATE: 80 BPM | BODY MASS INDEX: 19.34 KG/M2 | SYSTOLIC BLOOD PRESSURE: 124 MMHG | DIASTOLIC BLOOD PRESSURE: 86 MMHG | WEIGHT: 127.63 LBS | HEIGHT: 68 IN

## 2023-12-07 DIAGNOSIS — C01 SQUAMOUS CELL CARCINOMA OF BASE OF TONGUE: ICD-10-CM

## 2023-12-07 DIAGNOSIS — Q23.1 BICUSPID AORTIC VALVE: ICD-10-CM

## 2023-12-07 DIAGNOSIS — Z93.0 TRACHEOSTOMY STATUS: ICD-10-CM

## 2023-12-07 DIAGNOSIS — I48.0 PAF (PAROXYSMAL ATRIAL FIBRILLATION): Primary | ICD-10-CM

## 2023-12-07 DIAGNOSIS — I34.1 MITRAL VALVE PROLAPSE: ICD-10-CM

## 2023-12-07 LAB
BASOPHILS # BLD AUTO: 0.03 K/UL (ref 0–0.2)
BASOPHILS NFR BLD: 0.8 % (ref 0–1.9)
DIFFERENTIAL METHOD: ABNORMAL
EOSINOPHIL # BLD AUTO: 0.2 K/UL (ref 0–0.5)
EOSINOPHIL NFR BLD: 5.6 % (ref 0–8)
ERYTHROCYTE [DISTWIDTH] IN BLOOD BY AUTOMATED COUNT: 15.6 % (ref 11.5–14.5)
HCT VFR BLD AUTO: 35.6 % (ref 40–54)
HGB BLD-MCNC: 11.7 G/DL (ref 14–18)
IMM GRANULOCYTES # BLD AUTO: 0.01 K/UL (ref 0–0.04)
IMM GRANULOCYTES NFR BLD AUTO: 0.3 % (ref 0–0.5)
LYMPHOCYTES # BLD AUTO: 1.1 K/UL (ref 1–4.8)
LYMPHOCYTES NFR BLD: 28.5 % (ref 18–48)
MCH RBC QN AUTO: 33.5 PG (ref 27–31)
MCHC RBC AUTO-ENTMCNC: 32.9 G/DL (ref 32–36)
MCV RBC AUTO: 102 FL (ref 82–98)
MONOCYTES # BLD AUTO: 0.5 K/UL (ref 0.3–1)
MONOCYTES NFR BLD: 14.2 % (ref 4–15)
NEUTROPHILS # BLD AUTO: 1.9 K/UL (ref 1.8–7.7)
NEUTROPHILS NFR BLD: 50.6 % (ref 38–73)
NRBC BLD-RTO: 0 /100 WBC
PLATELET # BLD AUTO: 173 K/UL (ref 150–450)
PMV BLD AUTO: 10.3 FL (ref 9.2–12.9)
RBC # BLD AUTO: 3.49 M/UL (ref 4.6–6.2)
WBC # BLD AUTO: 3.72 K/UL (ref 3.9–12.7)

## 2023-12-07 PROCEDURE — 36415 COLL VENOUS BLD VENIPUNCTURE: CPT | Mod: PO | Performed by: NURSE PRACTITIONER

## 2023-12-07 PROCEDURE — 99999 PR PBB SHADOW E&M-EST. PATIENT-LVL IV: ICD-10-PCS | Mod: PBBFAC,,, | Performed by: INTERNAL MEDICINE

## 2023-12-07 PROCEDURE — 99999 PR PBB SHADOW E&M-EST. PATIENT-LVL IV: CPT | Mod: PBBFAC,,, | Performed by: INTERNAL MEDICINE

## 2023-12-07 PROCEDURE — 99214 OFFICE O/P EST MOD 30 MIN: CPT | Mod: S$PBB,,, | Performed by: INTERNAL MEDICINE

## 2023-12-07 PROCEDURE — 85025 COMPLETE CBC W/AUTO DIFF WBC: CPT | Performed by: NURSE PRACTITIONER

## 2023-12-07 PROCEDURE — 99214 PR OFFICE/OUTPT VISIT, EST, LEVL IV, 30-39 MIN: ICD-10-PCS | Mod: S$PBB,,, | Performed by: INTERNAL MEDICINE

## 2023-12-07 PROCEDURE — 99214 OFFICE O/P EST MOD 30 MIN: CPT | Mod: PBBFAC,PO | Performed by: INTERNAL MEDICINE

## 2023-12-07 RX ORDER — AMOXICILLIN 500 MG/1
2000 TABLET, FILM COATED ORAL ONCE
Qty: 12 TABLET | Refills: 2 | Status: SHIPPED | OUTPATIENT
Start: 2023-12-07 | End: 2023-12-15

## 2023-12-07 NOTE — PROGRESS NOTES
"  Subjective:   Chief Complaint:  Rocco Swain is a 73 y.o. male who presents for follow-up of Atrial Fibrillation      Problem List and HPI:   Squamous cell carcinoma head and neck  S/p trach  Liver transplant 2008 in [place]  Atrial fib 9/2023  AS - bicuspid aortic valve  Mitral regurgitation  - prolapse    Admitted on 9/12/2023 for 5 days w a GI bleed. Received 3 units of PRBCs and discharged with protonix BID. Old blood found during colonoscopy with no active bleeding.  Atrial fib noted while hospitalized. Reverted back spontaneously to sinus rhythm. In view of recent GI bleed I discussed but did not recommend starting anticoagulation immediately. He returns today after a Holter and echo. The Holter revealed frequent PACs and PVCs but no atrial fibrillation. He does not report palpitations, lightheadedness or dizziness.    Echo revealed a bicuspid aortic valve and mitral valve prolapse w moderate mitral regurgitation. LA was enlarged. LV systolic function was normal.      Review of patient's allergies indicates:  No Known Allergies       Social history:  Rocco Swain  reports that he has never smoked. He has never used smokeless tobacco. He reports current alcohol use. He reports that he does not currently use drugs.      Objective:   /86   Pulse 80   Ht 5' 8" (1.727 m)   Wt 57.9 kg (127 lb 10.3 oz)   BMI 19.41 kg/m²    Physical Exam  Constitutional:       Appearance: He is well-developed.      Comments:      HENT:      Head: Normocephalic.   Neck:      Vascular: No JVD.      Trachea: Tracheostomy present.   Cardiovascular:      Rate and Rhythm: Normal rate and regular rhythm.      Pulses:           Radial pulses are 2+ on the right side and 2+ on the left side.      Heart sounds: S1 normal and S2 normal. Murmur heard.      Systolic murmur is present with a grade of 1/6 at the upper left sternal border and lower left sternal border.   Pulmonary:      Breath sounds: Normal breath sounds.   Chest:     "  Chest wall: There is no dullness to percussion.   Abdominal:      Palpations: Abdomen is soft. There is no hepatomegaly, splenomegaly or mass.   Musculoskeletal:      Right lower leg: No edema.      Left lower leg: No edema.   Skin:     Findings: No bruising.      Nails: There is no clubbing.   Neurological:      Mental Status: He is alert and oriented to person, place, and time.      Gait: Gait normal.   Psychiatric:         Behavior: Behavior normal.      Comments: Cannot speak due to tracheotomy.         Lipids:  Recent Labs   Lab 03/18/22  1114   LDL Cholesterol 55.8 L   HDL 53   Cholesterol 123      Renal:  Recent Labs   Lab 11/28/23  1108   Creatinine 1.6 H   Potassium 4.5   CO2 28   BUN 23     Liver:  Recent Labs   Lab 11/28/23  1108   AST 45 H   ALT 20     CBC:  Lab Results   Component Value Date    WBC 3.72 (L) 12/07/2023    HGB 11.7 (L) 12/07/2023    HCT 35.6 (L) 12/07/2023     (H) 12/07/2023     12/07/2023           Results for orders placed during the hospital encounter of 11/28/23    Echo    Interpretation Summary    Left Ventricle: The left ventricle is normal in size. Normal wall thickness. There is concentric remodeling. Normal wall motion. There is normal systolic function. Ejection fraction by visual approximation is 60%. There is normal diastolic function.    Right Ventricle: Normal right ventricular cavity size. Wall thickness is normal. Right ventricle wall motion  is normal. Systolic function is normal.    Left Atrium: Left atrium is severely dilated.    Aortic Valve: The aortic valve appears bicuspid. There is mild stenosis. Aortic valve area by VTI is 1.76 cm². Aortic valve peak velocity is 1.56 m/s. Mean gradient is 6 mmHg. The dimensionless index is 0.57.    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 There is moderate regurgitation. There appear to be 2 separate jets.    Pulmonary Artery: The estimated pulmonary artery systolic pressure is 30 mmHg.    IVC/SVC: Normal venous  pressure at 3 mmHg.         Assessment and Plan:       ICD-10-CM ICD-9-CM   1. PAF (paroxysmal atrial fibrillation)  I48.0 427.31   2. Tracheostomy status  Z93.0 V44.0   3. Squamous cell carcinoma of base of tongue  C01 141.0   4. Bicuspid aortic valve  Q23.1 746.4   5. Mitral valve prolapse  I34.1 424.0      Discussed risk of stroke without anticoagulation.  Certainly has a high risk of bleeding. Atrial fib occurred during an acute illness and does not appear to have recurred.  He will require frequent monitoring for atrial fibrillation if he chooses not to take an anticoagulant.  Recommend using a wearable device with arrhythmia detection.  We discussed an ILR vs extended Holter monitoring.  He prefers to use a wearable device to look for atrial fibrillation.  Also discussed role of left atrial appendage occlusion patient if unable to tolerate an anticoagulant.  Discussed results of echocardiogram.  He has mild aortic stenosis from a bicuspid aortic valve and moderate mitral regurgitation from prolapse of the mitral leaflets.  Endocarditis prophylaxis is not indicated but in view 2 valves I recommended antibiotics prior to dental work.    Orders placed during this encounter:     PAF (paroxysmal atrial fibrillation)  -     EKG 12-lead; Future; Expected date: 06/07/2024    Tracheostomy status    Squamous cell carcinoma of base of tongue    Bicuspid aortic valve    Mitral valve prolapse    Other orders  -     amoxicillin (AMOXIL) 500 MG Tab; Take 4 tablets (2,000 mg total) by mouth once. 1 hour prior to dental work for 1 dose  Dispense: 12 tablet; Refill: 2         Follow up in about 6 months (around 6/7/2024).

## 2023-12-07 NOTE — PATIENT INSTRUCTIONS
Blood clots form during atrial fib in a small pouch inside the heart called the left atrial appendage (MARCOS-red arrow).   In patients who cannot take a blood thinner, there is considerable risk of stroke because blood clots can form inside the MARCOS, then dislodge and cause a stroke.  In some individuals with atrial fibrillation, the risk of bleeding while on a blood thinner may exceed the risk of a stroke without a blood thinner.    There is an option that allows one to reduce the dose or discontinue the blood thinner after placing a plug to seal off the MARCOS.  There are 2 plugs currently available. Watchman and Amulet. You can get more information and view the video at  [email or web address]

## 2023-12-11 ENCOUNTER — PATIENT MESSAGE (OUTPATIENT)
Dept: DERMATOLOGY | Facility: CLINIC | Age: 73
End: 2023-12-11
Payer: MEDICARE

## 2023-12-12 ENCOUNTER — OFFICE VISIT (OUTPATIENT)
Dept: OPHTHALMOLOGY | Facility: CLINIC | Age: 73
End: 2023-12-12
Payer: MEDICARE

## 2023-12-12 DIAGNOSIS — C69.01: ICD-10-CM

## 2023-12-12 DIAGNOSIS — D49.89 CIN (CONJUNCTIVAL INTRAEPITHELIAL NEOPLASIA): Primary | ICD-10-CM

## 2023-12-12 PROCEDURE — 99024 PR POST-OP FOLLOW-UP VISIT: ICD-10-PCS | Mod: POP,,, | Performed by: OPHTHALMOLOGY

## 2023-12-12 PROCEDURE — 99999 PR PBB SHADOW E&M-EST. PATIENT-LVL III: CPT | Mod: PBBFAC,,, | Performed by: OPHTHALMOLOGY

## 2023-12-12 PROCEDURE — 99024 POSTOP FOLLOW-UP VISIT: CPT | Mod: POP,,, | Performed by: OPHTHALMOLOGY

## 2023-12-12 PROCEDURE — 99999 PR PBB SHADOW E&M-EST. PATIENT-LVL III: ICD-10-PCS | Mod: PBBFAC,,, | Performed by: OPHTHALMOLOGY

## 2023-12-12 PROCEDURE — 99213 OFFICE O/P EST LOW 20 MIN: CPT | Mod: PBBFAC | Performed by: OPHTHALMOLOGY

## 2023-12-12 NOTE — PROGRESS NOTES
HPI    08/24/2023  1) Excision of Conj/Corneal VIK right eye  2) Ocular surface reconstruction with Amniotic Membrane right eye    Patient is here today for 1 month VIK follow up. States since 1 week ago,   his right eye has been very red and painful. Rates it 5/10. Vision is   blurry.     Visine PRN OU      Last edited by Lilia Bonilla on 12/12/2023  2:23 PM.            Assessment /Plan     For exam results, see Encounter Report.    VIK (conjunctival intraepithelial neoplasia)    Squamous cell carcinoma of right conjunctiva      Recurrent large conjunctival tumor nasal OD  S/p excision w/ MMC injection and topical MMC x4 weeks  May need adjuvent treatment with chemo/radiation?  Consult [facility] and Dr Martín Hernandez, oncology

## 2023-12-13 ENCOUNTER — PATIENT MESSAGE (OUTPATIENT)
Dept: CARDIOLOGY | Facility: CLINIC | Age: 73
End: 2023-12-13
Payer: MEDICARE

## 2023-12-15 DIAGNOSIS — I48.0 PAF (PAROXYSMAL ATRIAL FIBRILLATION): Primary | ICD-10-CM

## 2023-12-18 ENCOUNTER — PATIENT MESSAGE (OUTPATIENT)
Dept: HEMATOLOGY/ONCOLOGY | Facility: CLINIC | Age: 73
End: 2023-12-18
Payer: MEDICARE

## 2023-12-18 ENCOUNTER — LAB VISIT (OUTPATIENT)
Dept: LAB | Facility: HOSPITAL | Age: 73
End: 2023-12-18
Payer: MEDICARE

## 2023-12-18 DIAGNOSIS — R53.83 FATIGUE, UNSPECIFIED TYPE: ICD-10-CM

## 2023-12-18 DIAGNOSIS — C01 SQUAMOUS CELL CARCINOMA OF BASE OF TONGUE: ICD-10-CM

## 2023-12-18 PROBLEM — A41.9 SEVERE SEPSIS: Status: RESOLVED | Noted: 2023-09-13 | Resolved: 2023-12-18

## 2023-12-18 PROBLEM — R65.20 SEVERE SEPSIS: Status: RESOLVED | Noted: 2023-09-13 | Resolved: 2023-12-18

## 2023-12-18 PROBLEM — K92.2 GI BLEED: Status: RESOLVED | Noted: 2023-09-13 | Resolved: 2023-12-18

## 2023-12-18 LAB
ALBUMIN SERPL BCP-MCNC: 3.9 G/DL (ref 3.5–5.2)
ALP SERPL-CCNC: 80 U/L (ref 55–135)
ALT SERPL W/O P-5'-P-CCNC: 28 U/L (ref 10–44)
ANION GAP SERPL CALC-SCNC: 16 MMOL/L (ref 8–16)
AST SERPL-CCNC: 67 U/L (ref 10–40)
BASOPHILS # BLD AUTO: 0.01 K/UL (ref 0–0.2)
BASOPHILS NFR BLD: 0.3 % (ref 0–1.9)
BILIRUB SERPL-MCNC: 0.5 MG/DL (ref 0.1–1)
BUN SERPL-MCNC: 21 MG/DL (ref 8–23)
CALCIUM SERPL-MCNC: 9.5 MG/DL (ref 8.7–10.5)
CHLORIDE SERPL-SCNC: 99 MMOL/L (ref 95–110)
CO2 SERPL-SCNC: 22 MMOL/L (ref 23–29)
CREAT SERPL-MCNC: 1.3 MG/DL (ref 0.5–1.4)
DIFFERENTIAL METHOD: ABNORMAL
EOSINOPHIL # BLD AUTO: 0.1 K/UL (ref 0–0.5)
EOSINOPHIL NFR BLD: 1.7 % (ref 0–8)
ERYTHROCYTE [DISTWIDTH] IN BLOOD BY AUTOMATED COUNT: 14 % (ref 11.5–14.5)
EST. GFR  (NO RACE VARIABLE): 57.6 ML/MIN/1.73 M^2
GLUCOSE SERPL-MCNC: 115 MG/DL (ref 70–110)
HCT VFR BLD AUTO: 33.3 % (ref 40–54)
HGB BLD-MCNC: 11.6 G/DL (ref 14–18)
IMM GRANULOCYTES # BLD AUTO: 0.02 K/UL (ref 0–0.04)
IMM GRANULOCYTES NFR BLD AUTO: 0.6 % (ref 0–0.5)
LYMPHOCYTES # BLD AUTO: 0.7 K/UL (ref 1–4.8)
LYMPHOCYTES NFR BLD: 20.8 % (ref 18–48)
MCH RBC QN AUTO: 35.2 PG (ref 27–31)
MCHC RBC AUTO-ENTMCNC: 34.8 G/DL (ref 32–36)
MCV RBC AUTO: 101 FL (ref 82–98)
MONOCYTES # BLD AUTO: 0.5 K/UL (ref 0.3–1)
MONOCYTES NFR BLD: 13.3 % (ref 4–15)
NEUTROPHILS # BLD AUTO: 2.2 K/UL (ref 1.8–7.7)
NEUTROPHILS NFR BLD: 63.3 % (ref 38–73)
NRBC BLD-RTO: 0 /100 WBC
PLATELET # BLD AUTO: 142 K/UL (ref 150–450)
PMV BLD AUTO: 9.5 FL (ref 9.2–12.9)
POTASSIUM SERPL-SCNC: 4.2 MMOL/L (ref 3.5–5.1)
PROT SERPL-MCNC: 7.8 G/DL (ref 6–8.4)
RBC # BLD AUTO: 3.3 M/UL (ref 4.6–6.2)
SODIUM SERPL-SCNC: 137 MMOL/L (ref 136–145)
T4 FREE SERPL-MCNC: 0.87 NG/DL (ref 0.71–1.51)
T4 FREE SERPL-MCNC: 0.9 NG/DL (ref 0.71–1.51)
TSH SERPL DL<=0.005 MIU/L-ACNC: 28.12 UIU/ML (ref 0.4–4)
WBC # BLD AUTO: 3.46 K/UL (ref 3.9–12.7)

## 2023-12-18 PROCEDURE — 85025 COMPLETE CBC W/AUTO DIFF WBC: CPT | Performed by: NURSE PRACTITIONER

## 2023-12-18 PROCEDURE — 36415 COLL VENOUS BLD VENIPUNCTURE: CPT | Performed by: NURSE PRACTITIONER

## 2023-12-18 PROCEDURE — 84443 ASSAY THYROID STIM HORMONE: CPT | Performed by: NURSE PRACTITIONER

## 2023-12-18 PROCEDURE — 80053 COMPREHEN METABOLIC PANEL: CPT | Performed by: NURSE PRACTITIONER

## 2023-12-18 PROCEDURE — 84439 ASSAY OF FREE THYROXINE: CPT | Mod: 91 | Performed by: NURSE PRACTITIONER

## 2023-12-28 ENCOUNTER — LAB VISIT (OUTPATIENT)
Dept: LAB | Facility: HOSPITAL | Age: 73
End: 2023-12-28
Payer: MEDICARE

## 2023-12-28 ENCOUNTER — OFFICE VISIT (OUTPATIENT)
Dept: OPHTHALMOLOGY | Facility: CLINIC | Age: 73
End: 2023-12-28
Payer: MEDICARE

## 2023-12-28 DIAGNOSIS — C69.01: ICD-10-CM

## 2023-12-28 DIAGNOSIS — D49.89 CIN (CONJUNCTIVAL INTRAEPITHELIAL NEOPLASIA): Primary | ICD-10-CM

## 2023-12-28 DIAGNOSIS — C01 SQUAMOUS CELL CARCINOMA OF BASE OF TONGUE: ICD-10-CM

## 2023-12-28 DIAGNOSIS — R53.83 FATIGUE, UNSPECIFIED TYPE: ICD-10-CM

## 2023-12-28 LAB
ALBUMIN SERPL BCP-MCNC: 3.8 G/DL (ref 3.5–5.2)
ALP SERPL-CCNC: 71 U/L (ref 55–135)
ALT SERPL W/O P-5'-P-CCNC: 29 U/L (ref 10–44)
ANION GAP SERPL CALC-SCNC: 12 MMOL/L (ref 8–16)
AST SERPL-CCNC: 57 U/L (ref 10–40)
BASOPHILS # BLD AUTO: 0.02 K/UL (ref 0–0.2)
BASOPHILS NFR BLD: 0.7 % (ref 0–1.9)
BILIRUB SERPL-MCNC: 0.7 MG/DL (ref 0.1–1)
BUN SERPL-MCNC: 20 MG/DL (ref 8–23)
CALCIUM SERPL-MCNC: 9.8 MG/DL (ref 8.7–10.5)
CHLORIDE SERPL-SCNC: 100 MMOL/L (ref 95–110)
CO2 SERPL-SCNC: 29 MMOL/L (ref 23–29)
CREAT SERPL-MCNC: 1.7 MG/DL (ref 0.5–1.4)
DIFFERENTIAL METHOD BLD: ABNORMAL
EOSINOPHIL # BLD AUTO: 0.2 K/UL (ref 0–0.5)
EOSINOPHIL NFR BLD: 5.6 % (ref 0–8)
ERYTHROCYTE [DISTWIDTH] IN BLOOD BY AUTOMATED COUNT: 13.4 % (ref 11.5–14.5)
EST. GFR  (NO RACE VARIABLE): 41.8 ML/MIN/1.73 M^2
GLUCOSE SERPL-MCNC: 113 MG/DL (ref 70–110)
HCT VFR BLD AUTO: 32.7 % (ref 40–54)
HGB BLD-MCNC: 11.7 G/DL (ref 14–18)
IMM GRANULOCYTES # BLD AUTO: 0.01 K/UL (ref 0–0.04)
IMM GRANULOCYTES NFR BLD AUTO: 0.3 % (ref 0–0.5)
LYMPHOCYTES # BLD AUTO: 0.6 K/UL (ref 1–4.8)
LYMPHOCYTES NFR BLD: 19.2 % (ref 18–48)
MCH RBC QN AUTO: 34.1 PG (ref 27–31)
MCHC RBC AUTO-ENTMCNC: 35.8 G/DL (ref 32–36)
MCV RBC AUTO: 95 FL (ref 82–98)
MONOCYTES # BLD AUTO: 0.5 K/UL (ref 0.3–1)
MONOCYTES NFR BLD: 16.7 % (ref 4–15)
NEUTROPHILS # BLD AUTO: 1.7 K/UL (ref 1.8–7.7)
NEUTROPHILS NFR BLD: 57.5 % (ref 38–73)
NRBC BLD-RTO: 0 /100 WBC
PLATELET # BLD AUTO: 143 K/UL (ref 150–450)
PMV BLD AUTO: 9.2 FL (ref 9.2–12.9)
POTASSIUM SERPL-SCNC: 4.8 MMOL/L (ref 3.5–5.1)
PROT SERPL-MCNC: 7.5 G/DL (ref 6–8.4)
RBC # BLD AUTO: 3.43 M/UL (ref 4.6–6.2)
SODIUM SERPL-SCNC: 141 MMOL/L (ref 136–145)
T4 FREE SERPL-MCNC: 0.96 NG/DL (ref 0.71–1.51)
T4 FREE SERPL-MCNC: 1 NG/DL (ref 0.71–1.51)
TSH SERPL DL<=0.005 MIU/L-ACNC: 35.9 UIU/ML (ref 0.4–4)
WBC # BLD AUTO: 2.87 K/UL (ref 3.9–12.7)

## 2023-12-28 PROCEDURE — 99214 PR OFFICE/OUTPT VISIT, EST, LEVL IV, 30-39 MIN: ICD-10-PCS | Mod: S$PBB,,, | Performed by: OPHTHALMOLOGY

## 2023-12-28 PROCEDURE — 36415 COLL VENOUS BLD VENIPUNCTURE: CPT | Performed by: NURSE PRACTITIONER

## 2023-12-28 PROCEDURE — 84443 ASSAY THYROID STIM HORMONE: CPT | Performed by: NURSE PRACTITIONER

## 2023-12-28 PROCEDURE — 99214 OFFICE O/P EST MOD 30 MIN: CPT | Mod: S$PBB,,, | Performed by: OPHTHALMOLOGY

## 2023-12-28 PROCEDURE — 99999 PR PBB SHADOW E&M-EST. PATIENT-LVL III: ICD-10-PCS | Mod: PBBFAC,,, | Performed by: OPHTHALMOLOGY

## 2023-12-28 PROCEDURE — 84439 ASSAY OF FREE THYROXINE: CPT | Performed by: NURSE PRACTITIONER

## 2023-12-28 PROCEDURE — 99213 OFFICE O/P EST LOW 20 MIN: CPT | Mod: PBBFAC | Performed by: OPHTHALMOLOGY

## 2023-12-28 PROCEDURE — 92285 EXTERNAL OCULAR PHOTOGRAPHY: CPT | Mod: PBBFAC | Performed by: OPHTHALMOLOGY

## 2023-12-28 PROCEDURE — 99999 PR PBB SHADOW E&M-EST. PATIENT-LVL III: CPT | Mod: PBBFAC,,, | Performed by: OPHTHALMOLOGY

## 2023-12-28 PROCEDURE — 92285 EXTERNAL PHOTOGRAPHY - OU - BOTH EYES: ICD-10-PCS | Mod: 26,S$PBB,, | Performed by: OPHTHALMOLOGY

## 2023-12-28 PROCEDURE — 85025 COMPLETE CBC W/AUTO DIFF WBC: CPT | Performed by: NURSE PRACTITIONER

## 2023-12-28 PROCEDURE — 80053 COMPREHEN METABOLIC PANEL: CPT | Performed by: NURSE PRACTITIONER

## 2023-12-28 NOTE — PROGRESS NOTES
HPI    Rocco Swain is a/an 73 y.o. male here for SCC evaluation.   Referred by:   Oral Antibiotics: OXYCODONE 5MG PO Every 6-8 hours prn for pain.(Teeth   extraction sx)   Eye Meds: NONE  PT IS NONVERBAL   Pt presents with brother during visit   Currently not using any drops  Was prescribed chemo eye drops but was instructed to cease use of those   drops and to instead use visine for the time being.   Brother states OD issue has been going on for about a couple of years OD   has gotten increasingly red and irritated.   States OD is constantly watery and tearing   OD presents red, irritated and watery.   Pt states having yellow mucous and watery discharge excreting from OD and   has to frequently wash eyelid every hour or else lids will begin to stick   together.   Has been experiencing throbbing sensation and pain for the last week            Last edited by Zina Magallanes on 12/28/2023  2:19 PM.            Assessment /Plan     For exam results, see Encounter Report.    VIK (conjunctival intraepithelial neoplasia)    Squamous cell carcinoma of right conjunctiva  -     External Photography - OU - Both Eyes        The patient is a pleasant 73-year-old male here for evaluation of right squamous cell carcinoma of the right conjunctiva.  The patient has undergone 2 resections of the right squamous cell carcinoma with subsequent recurrence.  The patient has also completed a course of mitomycin-C drops with no improvement.  The patient has a history of liver transplantation in 2008.  He has a history of multiple skin cancers including squamous cell carcinoma and basal cell carcinoma.  He has a history of  removal of his larynx and whole tongue for squamous cell carcinoma.  At this time, the patient is having tearing and irritation of the right eye along with discharge.    On exam, the patient has full extraocular motility on the right side and the left side.  He has a large elevated nodular growth measuring 15  mm horizontally and 20 mm vertically directly on the surface of the right eye slightly overlying the limbus.  There is medial extension to the caruncle.  There was no preauricular or submandibular adenopathy.    These findings were discussed with the patient and his brother.  The patient has undergone resection x2 along with topical chemotherapy with mitomycin-C.  My recommendation is for the patient to see an ocular oncologist specialist to discuss further possible surgical/ topical chemotherapy options. We discussed the option of traveling to [place] or to [place].  The patient has a preference of traveling to [place] if at all possible.    I will have the patient see my colleague Rona Mcghee. If unable to be seen, plan for referral to MD Lin.

## 2023-12-29 ENCOUNTER — OFFICE VISIT (OUTPATIENT)
Dept: HEMATOLOGY/ONCOLOGY | Facility: CLINIC | Age: 73
End: 2023-12-29
Payer: MEDICARE

## 2023-12-29 ENCOUNTER — INFUSION (OUTPATIENT)
Dept: INFUSION THERAPY | Facility: HOSPITAL | Age: 73
End: 2023-12-29
Attending: STUDENT IN AN ORGANIZED HEALTH CARE EDUCATION/TRAINING PROGRAM
Payer: MEDICARE

## 2023-12-29 VITALS
BODY MASS INDEX: 19.51 KG/M2 | TEMPERATURE: 98 F | HEART RATE: 82 BPM | WEIGHT: 128.75 LBS | SYSTOLIC BLOOD PRESSURE: 129 MMHG | RESPIRATION RATE: 12 BRPM | HEIGHT: 68 IN | OXYGEN SATURATION: 98 % | DIASTOLIC BLOOD PRESSURE: 68 MMHG

## 2023-12-29 VITALS
TEMPERATURE: 98 F | WEIGHT: 128.75 LBS | DIASTOLIC BLOOD PRESSURE: 64 MMHG | BODY MASS INDEX: 19.51 KG/M2 | HEART RATE: 76 BPM | RESPIRATION RATE: 18 BRPM | HEIGHT: 68 IN | OXYGEN SATURATION: 99 % | SYSTOLIC BLOOD PRESSURE: 126 MMHG

## 2023-12-29 DIAGNOSIS — C01 SQUAMOUS CELL CARCINOMA OF BASE OF TONGUE: Primary | ICD-10-CM

## 2023-12-29 DIAGNOSIS — C77.0 SECONDARY MALIGNANT NEOPLASM OF CERVICAL LYMPH NODE: ICD-10-CM

## 2023-12-29 DIAGNOSIS — Z94.4 STATUS POST LIVER TRANSPLANTATION: ICD-10-CM

## 2023-12-29 DIAGNOSIS — E03.2 HYPOTHYROIDISM DUE TO MEDICAMENTS AND OTHER EXOGENOUS SUBSTANCES: ICD-10-CM

## 2023-12-29 DIAGNOSIS — Z93.0 TRACHEOSTOMY STATUS: ICD-10-CM

## 2023-12-29 DIAGNOSIS — Z79.899 IMMUNODEFICIENCY DUE TO DRUGS: ICD-10-CM

## 2023-12-29 DIAGNOSIS — D84.821 IMMUNODEFICIENCY DUE TO DRUGS: ICD-10-CM

## 2023-12-29 DIAGNOSIS — H57.11 EYE PAIN, RIGHT: ICD-10-CM

## 2023-12-29 DIAGNOSIS — T86.49 CIRRHOSIS OF TRANSPLANTED LIVER: ICD-10-CM

## 2023-12-29 DIAGNOSIS — H11.9 CONJUNCTIVAL LESION: ICD-10-CM

## 2023-12-29 DIAGNOSIS — D50.0 IRON DEFICIENCY ANEMIA DUE TO CHRONIC BLOOD LOSS: ICD-10-CM

## 2023-12-29 DIAGNOSIS — K74.60 CIRRHOSIS OF TRANSPLANTED LIVER: ICD-10-CM

## 2023-12-29 DIAGNOSIS — K26.4 GASTROINTESTINAL HEMORRHAGE ASSOCIATED WITH DUODENAL ULCER: ICD-10-CM

## 2023-12-29 PROCEDURE — 25000003 PHARM REV CODE 250: Performed by: NURSE PRACTITIONER

## 2023-12-29 PROCEDURE — 99215 PR OFFICE/OUTPT VISIT, EST, LEVL V, 40-54 MIN: ICD-10-PCS | Mod: S$PBB,,, | Performed by: NURSE PRACTITIONER

## 2023-12-29 PROCEDURE — 96413 CHEMO IV INFUSION 1 HR: CPT

## 2023-12-29 PROCEDURE — 99215 OFFICE O/P EST HI 40 MIN: CPT | Mod: PBBFAC,25 | Performed by: NURSE PRACTITIONER

## 2023-12-29 PROCEDURE — 99999 PR PBB SHADOW E&M-EST. PATIENT-LVL V: CPT | Mod: PBBFAC,,, | Performed by: NURSE PRACTITIONER

## 2023-12-29 PROCEDURE — 99215 OFFICE O/P EST HI 40 MIN: CPT | Mod: S$PBB,,, | Performed by: NURSE PRACTITIONER

## 2023-12-29 PROCEDURE — 96361 HYDRATE IV INFUSION ADD-ON: CPT

## 2023-12-29 PROCEDURE — 99999 PR PBB SHADOW E&M-EST. PATIENT-LVL V: ICD-10-PCS | Mod: PBBFAC,,, | Performed by: NURSE PRACTITIONER

## 2023-12-29 PROCEDURE — A4216 STERILE WATER/SALINE, 10 ML: HCPCS | Performed by: NURSE PRACTITIONER

## 2023-12-29 PROCEDURE — 63600175 PHARM REV CODE 636 W HCPCS: Performed by: NURSE PRACTITIONER

## 2023-12-29 RX ORDER — OXYCODONE HYDROCHLORIDE 5 MG/1
5 TABLET ORAL EVERY 6 HOURS PRN
Qty: 28 TABLET | Refills: 0 | Status: SHIPPED | OUTPATIENT
Start: 2023-12-29 | End: 2024-01-30 | Stop reason: SDUPTHER

## 2023-12-29 RX ORDER — HEPARIN 100 UNIT/ML
500 SYRINGE INTRAVENOUS
Status: CANCELLED | OUTPATIENT
Start: 2023-12-29

## 2023-12-29 RX ORDER — SODIUM CHLORIDE 0.9 % (FLUSH) 0.9 %
10 SYRINGE (ML) INJECTION
Status: DISCONTINUED | OUTPATIENT
Start: 2023-12-29 | End: 2023-12-29 | Stop reason: HOSPADM

## 2023-12-29 RX ORDER — SODIUM CHLORIDE 0.9 % (FLUSH) 0.9 %
10 SYRINGE (ML) INJECTION
Status: CANCELLED | OUTPATIENT
Start: 2023-12-29

## 2023-12-29 RX ORDER — LEVOTHYROXINE SODIUM 100 UG/1
100 TABLET ORAL DAILY
Qty: 30 TABLET | Refills: 5 | Status: SHIPPED | OUTPATIENT
Start: 2023-12-29 | End: 2024-03-11 | Stop reason: SDUPTHER

## 2023-12-29 RX ORDER — HEPARIN 100 UNIT/ML
500 SYRINGE INTRAVENOUS
Status: DISCONTINUED | OUTPATIENT
Start: 2023-12-29 | End: 2023-12-29 | Stop reason: HOSPADM

## 2023-12-29 RX ADMIN — Medication 10 ML: at 09:12

## 2023-12-29 RX ADMIN — SODIUM CHLORIDE 400 MG: 9 INJECTION, SOLUTION INTRAVENOUS at 09:12

## 2023-12-29 RX ADMIN — HEPARIN 500 UNITS: 100 SYRINGE at 09:12

## 2023-12-29 RX ADMIN — SODIUM CHLORIDE 1000 ML: 9 INJECTION, SOLUTION INTRAVENOUS at 08:12

## 2023-12-29 NOTE — PROGRESS NOTES
ONCOLOGY FOLLOW UP VISIT    Subjective:      Patient ID: Rocco Swain    Chief Complaint: Squamous cell carcinoma of base of tongue      HPI  Rocco Swain is a 73 y.o. male who returns to clinic for management of P16 positive squamous cell carcinoma. Patient reporting right eye pain related to cSCC. SCC has been resected 2x and has failed to respond to treatment. Patient will be travelling to [facility] to see an Ophthalmologist Oncologist for further treatment. States he has not been prescribed any pain medication for this problem. Recently completed oxy IR Rx for tooth extraction. States this medication helped. Also recently diagnosed with 5 other cSCC on his face and neck. Has been compliant with synthroid.     ECOG Performance status: 1 - Symptomatic but completely ambulatory Communication from him is 100% written.     Cancer Staging   Squamous cell carcinoma of base of tongue  Staging form: Pharynx - HPV-Mediated Oropharynx, AJCC 8th Edition  - Clinical stage from 9/18/2020: Stage III (rcT4, cN1, cM0, p16+) - Signed by Ronald Berg MD on 12/27/2022      Oncologic History:  Oncology History   Squamous cell carcinoma of base of tongue   9/18/2020 Cancer Staged    Staging form: Pharynx - HPV-Mediated Oropharynx, AJCC 8th Edition  - Clinical stage from 9/18/2020: Stage III (rcT4, cN1, cM0, p16+)     9/28/2020 Initial Diagnosis    Squamous cell carcinoma of base of tongue     10/6/2020 - 8/17/2021 Chemotherapy    Treatment Summary   Plan Name: OP HEAD NECK CARBOPLATIN PACLITAXEL C1-2 FOLLOWED BY CETUXIMAB CARBOPLATIN C3-6 FOLLOWED BY CETUXIMAB MAINTENANCE WEEKLY  Treatment Goal: Control  Status: Inactive  Start Date: 10/6/2020  End Date: 8/17/2021  Provider: Ronald Berg MD  Chemotherapy: cetuximab (ERBITUX) 100 mg/50 mL chemo infusion 684 mg, 400 mg/m2 = 684 mg (100 % of original dose 400 mg/m2), Intravenous, Clinic/HOD 1 time, 29 of 38 cycles  Dose modification: 500 mg/m2 (original dose 400 mg/m2, Cycle  3), 250 mg/m2 (original dose 400 mg/m2, Cycle 3), 400 mg/m2 (original dose 400 mg/m2, Cycle 3), 250 mg/m2 (original dose 250 mg/m2, Cycle 7), 200 mg/m2 (original dose 250 mg/m2, Cycle 18), 200 mg/m2 (original dose 250 mg/m2, Cycle 19), 250 mg/m2 (original dose 250 mg/m2, Cycle 4), 200 mg/m2 (original dose 250 mg/m2, Cycle 25), 200 mg/m2 (original dose 250 mg/m2, Cycle 28)  Administration: 684 mg (11/17/2020), 400 mg (11/24/2020), 400 mg (2/9/2021), 400 mg (2/17/2021), 427.6 mg (3/9/2021), 400 mg (3/30/2021), 400 mg (3/23/2021), 400 mg (4/6/2021), 427.6 mg (4/13/2021), 427.6 mg (4/20/2021), 342 mg (5/11/2021), 342 mg (5/18/2021), 342 mg (5/25/2021), 400 mg (5/31/2021), 400 mg (6/7/2021), 400 mg (6/14/2021), 400 mg (12/8/2020), 427.6 mg (12/29/2020), 400 mg (12/1/2020), 400 mg (12/15/2020), 400 mg (12/22/2020), 427.6 mg (1/5/2021), 400 mg (1/12/2021), 400 mg (1/19/2021), 427.6 mg (1/26/2021), 400 mg (2/2/2021), 400 mg (2/23/2021), 400 mg (3/2/2021), 427.6 mg (3/16/2021), 400 mg (6/21/2021), 342 mg (6/28/2021), 342 mg (7/6/2021), 342 mg (7/13/2021), 342 mg (7/20/2021), 400 mg (7/27/2021), 400 mg (8/10/2021), 400 mg (8/17/2021)  CARBOplatin (PARAPLATIN) 310 mg in sodium chloride 0.9% 500 mL chemo infusion, 310 mg (92.2 % of original dose 334.5 mg), Intravenous, Clinic/HOD 1 time, 6 of 6 cycles  Dose modification:   (original dose 334.5 mg, Cycle 1)  Administration: 310 mg (10/6/2020), 370 mg (11/17/2020), 300 mg (10/27/2020), 350 mg (12/8/2020), 335 mg (12/29/2020), 320 mg (1/19/2021)  PACLitaxeL (TAXOL) 175 mg/m2 = 300 mg in sodium chloride 0.9% 500 mL chemo infusion, 175 mg/m2 = 300 mg (100 % of original dose 175 mg/m2), Intravenous, Clinic/HOD 1 time, 2 of 2 cycles  Dose modification: 175 mg/m2 (original dose 175 mg/m2, Cycle 1)  Administration: 300 mg (10/6/2020), 300 mg (10/27/2020)     4/29/2021 Tumor Conference       His case was discussed at the Multidisciplinary Head and Neck Team Planning  Meeting.    Representatives from Medical Oncology, Radiation Oncology, Head and Neck Surgical Oncology, Psychosocial Oncology, and Speech and Language Pathology discussed the case with the following recommendations:    1) biopsy         7/29/2021 Tumor Conference       His case was discussed at the Multidisciplinary Head and Neck Team Planning Meeting.    Representatives from Medical Oncology, Radiation Oncology, Head and Neck Surgical Oncology, Psychosocial Oncology, and Speech and Language Pathology discussed the case with the following recommendations:    1) Head and neck clinic follow up  2) consider Keytruda (discuss with transplant)  3) consider palliative referral         9/13/2021 -  Chemotherapy    Treatment Summary   Plan Name: OP HEAD NECK PEMBROLIZUMAB CARBOPLATIN FLUOROURACIL (C1 ONLY RECEIVED) FOLLOWED BY PEMBROLIZUMAB MAINTENANCE  Treatment Goal: Palliative  Status: Active  Start Date: 9/13/2021  End Date: 12/29/2023  Provider: Ronald Berg MD  Chemotherapy: CARBOplatin (PARAPLATIN) 320 mg in sodium chloride 0.9% 500 mL chemo infusion, 320 mg (100 % of original dose 320.5 mg), Intravenous, Clinic/\[facility]\"" 1 time, 6 of 6 cycles  Dose modification:   (original dose 320.5 mg, Cycle 1)  Administration: 320 mg (9/13/2021), 335 mg (12/23/2021), 325 mg (1/27/2022), 245 mg (3/3/2022), 255 mg (5/12/2022), 255 mg (4/7/2022)  fluorouraciL 1,000 mg/m2/day = 6,440 mg in sodium chloride 0.9% 240 mL chemo infusion, 1,000 mg/m2/day = 6,440 mg, Intravenous, Over 96 hours, 6 of 6 cycles  Dose modification: 800 mg/m2/day (original dose 1,000 mg/m2/day, Cycle 6), 5,000 mg (original dose 1,000 mg/m2/day, Cycle 8)  Administration: 6,440 mg (9/13/2021), 6,440 mg (12/23/2021), 6,480 mg (1/27/2022), 5,000 mg (3/3/2022), 5,000 mg (4/7/2022), 5,000 mg (5/12/2022)     Secondary malignant neoplasm of cervical lymph node   2/9/2021 Initial Diagnosis    Secondary malignant neoplasm of cervical lymph node     9/13/2021 -  Chemotherapy     Treatment Summary   Plan Name: OP HEAD NECK PEMBROLIZUMAB CARBOPLATIN FLUOROURACIL (C1 ONLY RECEIVED) FOLLOWED BY PEMBROLIZUMAB MAINTENANCE  Treatment Goal: Palliative  Status: Active  Start Date: 9/13/2021  End Date: 12/29/2023  Provider: Ronald Berg MD  Chemotherapy: CARBOplatin (PARAPLATIN) 320 mg in sodium chloride 0.9% 500 mL chemo infusion, 320 mg (100 % of original dose 320.5 mg), Intravenous, Clinic/[facility] 1 time, 6 of 6 cycles  Dose modification:   (original dose 320.5 mg, Cycle 1)  Administration: 320 mg (9/13/2021), 335 mg (12/23/2021), 325 mg (1/27/2022), 245 mg (3/3/2022), 255 mg (5/12/2022), 255 mg (4/7/2022)  fluorouraciL 1,000 mg/m2/day = 6,440 mg in sodium chloride 0.9% 240 mL chemo infusion, 1,000 mg/m2/day = 6,440 mg, Intravenous, Over 96 hours, 6 of 6 cycles  Dose modification: 800 mg/m2/day (original dose 1,000 mg/m2/day, Cycle 6), 5,000 mg (original dose 1,000 mg/m2/day, Cycle 8)  Administration: 6,440 mg (9/13/2021), 6,440 mg (12/23/2021), 6,480 mg (1/27/2022), 5,000 mg (3/3/2022), 5,000 mg (4/7/2022), 5,000 mg (5/12/2022)         Review of Systems   Constitutional:  Negative for activity change, appetite change, chills, fatigue, fever and unexpected weight change.   HENT:  Negative for ear pain, facial swelling, hearing loss, mouth sores, nosebleeds, sore throat, trouble swallowing and voice change.         No verbal communication. Skin fixed and immobile from previous scaring post-neck dissection   Eyes:  Positive for redness (right eye - pain). Negative for pain, discharge and visual disturbance.   Respiratory:  Negative for cough, choking, chest tightness and shortness of breath.    Cardiovascular:  Negative for chest pain, palpitations and leg swelling.   Gastrointestinal:  Negative for abdominal distention, abdominal pain, blood in stool, constipation, diarrhea, nausea and vomiting.   Endocrine: Negative for cold intolerance and heat intolerance.   Genitourinary:  Negative for difficulty  urinating, dysuria, frequency and urgency.   Musculoskeletal:  Positive for back pain (lower - chronic). Negative for arthralgias, gait problem, leg pain and myalgias.   Integumentary:  Positive for mole/lesion (left nare and neck - cSCC). Negative for pallor, rash and wound.   Allergic/Immunologic: Negative for frequent infections.   Neurological:  Negative for dizziness, tremors, weakness, light-headedness, numbness and headaches.   Hematological:  Negative for adenopathy. Does not bruise/bleed easily.   Psychiatric/Behavioral:  Negative for agitation, confusion, dysphoric mood and sleep disturbance. The patient is not nervous/anxious.         Allergies:  Review of patient's allergies indicates:   Allergen Reactions    Aspirin     Tylenol extended release        Medications:  Current Outpatient Medications   Medication Sig Dispense Refill    amoxicillin (AMOXIL) 500 MG capsule Take 1 capsule by mouth every 8 hours until gone 21 capsule 0    azelaic acid (AZELEX) 15 % gel APPLY TOPICALLY TO AFFECTED AREA IN THE MORNING 50 g 3    chlorhexidine (PERIDEX) 0.12 % solution Gently swish and spit 15 mL twice a day for 3 weeks. Start rinses the day after your procedure. 473 mL 1    doxycycline hyclate 150 mg Tab Take two tabs PO 1 hour before dental surgery. 2 tablet 0    imiquimod (ALDARA) 5 % cream Apply to biopsy site on frontal scalp + about a centimeter of surrounding skin qhs x 16 weeks. Wash off in am and apply sunscreen. Discontinue if skin becomes blistered, raw, bleeding, painful, etc. 24 packet 3    LIDOcaine HCl 2% (LIDOCAINE VISCOUS) 2 % Soln Swish and spit 15 mls every 8 (eight) hours as needed (mouth sore). 100 mL 3    magic mouthwash diphen/antac/lidoc/nysta Take 10 mLs by mouth 4 (four) times daily. 120 mL 4    metroNIDAZOLE (METROGEL) 0.75 % gel Apply topically to affected area 2 (two) times daily. 45 g 1    multivit-min/FA/lycopen/lutein (CENTRUM SILVER MEN ORAL) Take 1 tablet by mouth once daily.       pentoxifylline (TRENTAL) 400 mg TbSR Take 1 tablet by mouth twice daily until gone. Start taking one week prior to your dental surgery. 112 tablet 0    prednisoLONE acetate (PRED FORTE) 1 % DrpS Place 1 drop into the right eye 4 (four) times daily. 10 mL 3    sulfacetamide sodium-sulfur 10-5 % (w/w) Clsr USE TO WASH AFFECTED AREA DAILY      tacrolimus (PROGRAF) 0.5 MG Cap Take 1 capsule (0.5 mg total) by mouth every EVENING.  (Use the 1mg capsule for your morning dose) 90 capsule 3    tacrolimus (PROGRAF) 1 MG Cap Take 1 capsule (1 mg total) by mouth every morning. Use the tacrolimus 0.5mg capsule for your evening dose as directed. 90 capsule 3    tiZANidine (ZANAFLEX) 2 MG tablet Take 1 tablet (2 mg total) by mouth nightly as needed (neck muscle strain). 30 tablet 3    traZODone (DESYREL) 50 MG tablet Take 1 tablet (50 mg total) by mouth every evening. 90 tablet 2    vitamin E 1000 UNIT capsule Take 1 cap PO BID until gone. Start taking one week prior to your dental surgery. 112 capsule 0    gabapentin (NEURONTIN) 300 MG capsule Take 1 capsule (300 mg total) by mouth every evening. (Patient taking differently: Take 300 mg by mouth as needed.) 30 capsule 11    levothyroxine (SYNTHROID) 100 MCG tablet Take 1 tablet (100 mcg total) by mouth once daily. 30 tablet 5    oxyCODONE (ROXICODONE) 5 MG immediate release tablet Take 1 tablet (5 mg total) by mouth every 6 (six) hours as needed for Pain. 28 tablet 0     No current facility-administered medications for this visit.     Facility-Administered Medications Ordered in Other Visits   Medication Dose Route Frequency Provider Last Rate Last Admin    alteplase injection 2 mg  2 mg Intra-Catheter PRN Ct Francis, SABINO        heparin, porcine (PF) 100 unit/mL injection flush 500 Units  500 Units Intravenous PRN Ronald Berg MD        heparin, porcine (PF) 100 unit/mL injection flush 500 Units  500 Units Intravenous PRN Ct Francis NP   500 Units at 12/29/23 0966     ofloxacin 0.3 % ophthalmic solution 1 drop  1 drop Right Eye On Call Procedure Victor M Vasques MD   2 drop at 10/11/22 0745    sodium chloride 0.9% 100 mL flush bag   Intravenous 1 time in Clinic/HOD Ct Francis NP        sodium chloride 0.9% flush 10 mL  10 mL Intravenous PRN Ronald Berg MD        sodium chloride 0.9% flush 10 mL  10 mL Intravenous PRN Victor M Vasques MD        sodium chloride 0.9% flush 10 mL  10 mL Intravenous PRN Ct Francis, NP   10 mL at 12/29/23 0935       PMH:  Past Medical History:   Diagnosis Date    Basal cell carcinoma (BCC) in situ of skin 2012    3 on face, 2 on arm, removed by dermatology.     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 Underwent surgical resection of tongue and neck. s/p tracheostomy       PSH:  Past Surgical History:   Procedure Laterality Date    COLONOSCOPY      COLONOSCOPY N/A 9/14/2023    Procedure: COLONOSCOPY;  Surgeon: Reyes Olivia MD;  Location: [facility] ([address] [place] [address]);  Service: Endoscopy;  Laterality: N/A;    CONJUNCTIVA BIOPSY Right 10/11/2022    Procedure: BIOPSY, CONJUNCTIVA;  Surgeon: Victor M Vasques MD;  Location: [facility];  Service: Ophthalmology;  Laterality: Right;    ESOPHAGOGASTRODUODENOSCOPY N/A 9/14/2023    Procedure: EGD (ESOPHAGOGASTRODUODENOSCOPY);  Surgeon: Reyes Olivia MD;  Location: [facility] ([address] [place] [address]);  Service: Endoscopy;  Laterality: N/A;    INSERTION OF VENOUS ACCESS PORT Right 3/8/2021    Procedure: INSERTION, VENOUS ACCESS PORT;  Surgeon: Jesus Rendon MD;  Location: [address];  Service: General;  Laterality: Right;    LIVER TRANSPLANT  11/2008    transplanted for biopsy proven hepatocellular carcinoma,     LYMPH NODE BIOPSY N/A 9/18/2020  "   Procedure: BIOPSY, LYMPH NODE;  Surgeon: Taz Diagnostic Provider;  [facility];  Service: General;  Laterality: N/A;  Rm 173    SURGICAL REMOVAL OF SCAR Right 8/24/2023    Procedure: EXCISION, SCAR;  Surgeon: Ting Brambila MD;  Location: [facility] OR;  Service: Ophthalmology;  Laterality: Right;    TRACHEOSTOMY         FamHx:  Family History   Problem Relation Age of Onset    Dementia Mother        SocHx:  Social History     Socioeconomic History    Marital status: Single   Occupational History    Occupation: Retired     Comment: , notes exposures to fumes etc.  Worked on [employer] for 4 years   Tobacco Use    Smoking status: Never    Smokeless tobacco: Never   Substance and Sexual Activity    Alcohol use: Yes     Comment: drinks wine, 1 glass day    Drug use: Not Currently    Sexual activity: Yes     Comment: No prior history of STD        Distress Score             Objective:      Vitals:    12/29/23 0728   BP: 129/68   BP Location: Left arm   Patient Position: Sitting   BP Method: Medium (Automatic)   Pulse: 82   Resp: 12   Temp: 98 °F (36.7 °C)   TempSrc: Oral   SpO2: 98%   Weight: 58.4 kg (128 lb 12 oz)   Height: 5' 8" (1.727 m)          Physical Exam  Vitals reviewed.   Constitutional:       General: He is not in acute distress.     Appearance: Normal appearance. He is well-developed and underweight.   HENT:      Head: Normocephalic and atraumatic.      Mouth/Throat:      Mouth: Mucous membranes are moist.      Dentition: Abnormal dentition. Dental caries present.   Eyes:      Conjunctiva/sclera: Conjunctivae normal.      Pupils: Pupils are equal, round, and reactive to light.      Comments: Right eye - injected and nodular growth on the inner eye conjunctiva   Neck:      Trachea: Tracheostomy present.   Pulmonary:      Effort: Pulmonary effort is normal. No respiratory distress.      Comments: Tracheostomy  Abdominal:      General: There is no distension.      " Palpations: Abdomen is soft.   Musculoskeletal:         General: No swelling. Normal range of motion.      Cervical back: Normal range of motion and neck supple.   Skin:     General: Skin is warm and dry.   Neurological:      General: No focal deficit present.      Mental Status: He is alert and oriented to person, place, and time.   Psychiatric:         Mood and Affect: Mood normal.         Behavior: Behavior normal.         Thought Content: Thought content normal.         Judgment: Judgment normal.           LABS:  WBC   Date Value Ref Range Status   12/28/2023 2.87 (L) 3.90 - 12.70 K/uL Final     Hemoglobin   Date Value Ref Range Status   12/28/2023 11.7 (L) 14.0 - 18.0 g/dL Final     Hematocrit   Date Value Ref Range Status   12/28/2023 32.7 (L) 40.0 - 54.0 % Final     Platelets   Date Value Ref Range Status   12/28/2023 143 (L) 150 - 450 K/uL Final       Chemistry        Component Value Date/Time     12/28/2023 1005    K 4.8 12/28/2023 1005     12/28/2023 1005    CO2 29 12/28/2023 1005    BUN 20 12/28/2023 1005    CREATININE 1.7 (H) 12/28/2023 1005     (H) 12/28/2023 1005        Component Value Date/Time    CALCIUM 9.8 12/28/2023 1005    ALKPHOS 71 12/28/2023 1005    AST 57 (H) 12/28/2023 1005    ALT 29 12/28/2023 1005    BILITOT 0.7 12/28/2023 1005    ESTGFRAFRICA 52.6 (A) 07/14/2022 1119    EGFRNONAA 45.5 (A) 07/14/2022 1119              Assessment:       1. Squamous cell carcinoma of base of tongue    2. Secondary malignant neoplasm of cervical lymph node    3. Tracheostomy status    4. Immunodeficiency due to drugs    5. Status post liver transplantation - 2008    6. Cirrhosis of transplanted liver    7. Iron deficiency anemia due to chronic blood loss    8. Gastrointestinal hemorrhage associated with duodenal ulcer    9. Conjunctival lesion    10. Eye pain, right    11. Hypothyroidism due to medicaments and other exogenous substances            Plan:       1,2. Recurrent SCC of base of  tongue, P16+ - IR guided bx 9/18/2020 Squamous cell carcinoma with basaloid features (p16 positive) and necrosis. He is not a surgical or radiation candidate.  -Started on PCC 10/6/2020 followed by maintenance cetuximab until 8/24/21 (discontinued due to progression of disease; continued increase in SUV uptake, no new lesions).  - 9/2021 started on carbo/5-FU/pembrolizumab; due to toxicity went to pembrolizumab monotherapy starting with cycle 2.  Progression of disease noted after cycle 3 so added chemotherapy back in with cycle 4. Keytruda monotherapy starting with C9.   - Continue with Keytruda; will complete 2 years today (12/29/23). Will now proceed with Q3 month surveillance. Due for re-staging scans in 4 weeks.     3. Status post total laryngectomy, total glossectomy and anterolateral thigh free flap reconstruction roughly 2017 at [facility] in [place] for persistent/recurrent base of tongue sq.c.c.    4-6. S/p liver transplant and is currently on tacrolimus. Grade 1. Will continue to monitor.     7,8. Hgb recovered to > 11. Monitor closely for bleeding in stool.    9,10. Following with Ophthalmology for cSCC of the right eye conjunctiva. Plans to see specialist at [facility] or [facility] for this problem. Will give a prescription for Oxy IR - will defer future refills to Ophthalmology providers.     11. TSH trending back up (35). Increasing synthroid to 100 mcg.     he will return to clinic in 6 weeks, but knows to call in the interim if symptoms change or should a problem arise.     Patient is in agreement with the proposed treatment plan. All questions were answered to the patient's satisfaction. Pt knows to call clinic if anything is needed before the next clinic visit.    Ct Francis, MSN, APRN, FNP-C  Hematology and Medical Oncology  Nurse Practitioner to Dr. Martín Hernandez  Nurse Practitioner, [facility]            Route Chart for Scheduling    Med Onc Chart  Routing      Follow up with physician 6 weeks. review imaging   Follow up with [facility]    Infusion scheduling note    Injection scheduling note    Labs CBC, CMP, free T4 and TSH   Scheduling:  Preferred lab:  Lab interval:  TSH and T4 only in 3 weeks; all labs in 6 weeks   Imaging PET scan   in 6 weeks   Pharmacy appointment    Other referrals                Treatment Plan Information   OP HEAD NECK PEMBROLIZUMAB CARBOPLATIN FLUOROURACIL (C1 ONLY RECEIVED) FOLLOWED BY PEMBROLIZUMAB MAINTENANCE   Ronald Berg MD   Upcoming Treatment Dates - OP HEAD NECK PEMBROLIZUMAB CARBOPLATIN FLUOROURACIL (C1 ONLY RECEIVED) FOLLOWED BY PEMBROLIZUMAB MAINTENANCE    No upcoming days in selected categories.    Therapy Plan Information  PORT FLUSH  Flushes  heparin, porcine (PF) 100 unit/mL injection flush 500 Units  500 Units, Intravenous, Every visit  sodium chloride 0.9% flush 10 mL  10 mL, Intravenous, Every visit    PORT FLUSH  Flushes  heparin, porcine (PF) 100 unit/mL injection flush 500 Units  500 Units, Intravenous, Every visit  sodium chloride 0.9% flush 10 mL  10 mL, Intravenous, Every visit

## 2023-12-29 NOTE — PLAN OF CARE
Pt tolerated Keytruda and IVF's well. No adverse reaction noted. Pt education reinforced on chemo regimen, side effects, what to expect, and when to call . Pt verbalized understanding. I reviewed pt calendar w/ pt and understanding verbalized.

## 2024-01-02 ENCOUNTER — PATIENT MESSAGE (OUTPATIENT)
Dept: TRANSPLANT | Facility: CLINIC | Age: 74
End: 2024-01-02
Payer: MEDICARE

## 2024-01-02 ENCOUNTER — PATIENT MESSAGE (OUTPATIENT)
Dept: HEMATOLOGY/ONCOLOGY | Facility: CLINIC | Age: 74
End: 2024-01-02
Payer: MEDICARE

## 2024-01-03 ENCOUNTER — PATIENT MESSAGE (OUTPATIENT)
Dept: OPHTHALMOLOGY | Facility: CLINIC | Age: 74
End: 2024-01-03
Payer: MEDICARE

## 2024-01-03 ENCOUNTER — LAB VISIT (OUTPATIENT)
Dept: LAB | Facility: HOSPITAL | Age: 74
End: 2024-01-03
Attending: NURSE PRACTITIONER
Payer: MEDICARE

## 2024-01-03 ENCOUNTER — OFFICE VISIT (OUTPATIENT)
Dept: PALLIATIVE MEDICINE | Facility: CLINIC | Age: 74
End: 2024-01-03
Payer: MEDICARE

## 2024-01-03 VITALS
BODY MASS INDEX: 19.12 KG/M2 | WEIGHT: 126.13 LBS | HEART RATE: 79 BPM | HEIGHT: 68 IN | DIASTOLIC BLOOD PRESSURE: 91 MMHG | SYSTOLIC BLOOD PRESSURE: 153 MMHG | OXYGEN SATURATION: 97 % | TEMPERATURE: 98 F

## 2024-01-03 DIAGNOSIS — C22.0 HCC (HEPATOCELLULAR CARCINOMA): ICD-10-CM

## 2024-01-03 DIAGNOSIS — G89.29 CHRONIC BILATERAL LOW BACK PAIN, UNSPECIFIED WHETHER SCIATICA PRESENT: ICD-10-CM

## 2024-01-03 DIAGNOSIS — Z51.5 ENCOUNTER FOR PALLIATIVE CARE: Primary | ICD-10-CM

## 2024-01-03 DIAGNOSIS — G89.29 CHRONIC LOW BACK PAIN, UNSPECIFIED BACK PAIN LATERALITY, UNSPECIFIED WHETHER SCIATICA PRESENT: ICD-10-CM

## 2024-01-03 DIAGNOSIS — Z94.4 STATUS POST LIVER TRANSPLANTATION: ICD-10-CM

## 2024-01-03 DIAGNOSIS — M54.50 CHRONIC BILATERAL LOW BACK PAIN, UNSPECIFIED WHETHER SCIATICA PRESENT: ICD-10-CM

## 2024-01-03 DIAGNOSIS — Z90.02 HISTORY OF LARYNGECTOMY: ICD-10-CM

## 2024-01-03 DIAGNOSIS — G47.00 INSOMNIA, UNSPECIFIED TYPE: ICD-10-CM

## 2024-01-03 DIAGNOSIS — M54.50 CHRONIC LOW BACK PAIN, UNSPECIFIED BACK PAIN LATERALITY, UNSPECIFIED WHETHER SCIATICA PRESENT: ICD-10-CM

## 2024-01-03 DIAGNOSIS — G89.3 CANCER RELATED PAIN: ICD-10-CM

## 2024-01-03 DIAGNOSIS — C01 SQUAMOUS CELL CARCINOMA OF BASE OF TONGUE: ICD-10-CM

## 2024-01-03 DIAGNOSIS — Z85.9 HISTORY OF CANCER: ICD-10-CM

## 2024-01-03 DIAGNOSIS — H57.11 EYE PAIN, RIGHT: ICD-10-CM

## 2024-01-03 DIAGNOSIS — C32.9 LARYNX CANCER: ICD-10-CM

## 2024-01-03 LAB
AFP SERPL-MCNC: 2.5 NG/ML (ref 0–8.4)
ALBUMIN SERPL BCP-MCNC: 4 G/DL (ref 3.5–5.2)
ALP SERPL-CCNC: 67 U/L (ref 55–135)
ALT SERPL W/O P-5'-P-CCNC: 23 U/L (ref 10–44)
ANION GAP SERPL CALC-SCNC: 13 MMOL/L (ref 8–16)
AST SERPL-CCNC: 50 U/L (ref 10–40)
BASOPHILS # BLD AUTO: 0.02 K/UL (ref 0–0.2)
BASOPHILS NFR BLD: 0.5 % (ref 0–1.9)
BILIRUB SERPL-MCNC: 0.7 MG/DL (ref 0.1–1)
BUN SERPL-MCNC: 13 MG/DL (ref 8–23)
CALCIUM SERPL-MCNC: 9.5 MG/DL (ref 8.7–10.5)
CHLORIDE SERPL-SCNC: 101 MMOL/L (ref 95–110)
CO2 SERPL-SCNC: 26 MMOL/L (ref 23–29)
CREAT SERPL-MCNC: 1.4 MG/DL (ref 0.5–1.4)
DIFFERENTIAL METHOD BLD: ABNORMAL
EOSINOPHIL # BLD AUTO: 0.2 K/UL (ref 0–0.5)
EOSINOPHIL NFR BLD: 4.8 % (ref 0–8)
ERYTHROCYTE [DISTWIDTH] IN BLOOD BY AUTOMATED COUNT: 13.2 % (ref 11.5–14.5)
EST. GFR  (NO RACE VARIABLE): 52.7 ML/MIN/1.73 M^2
GLUCOSE SERPL-MCNC: 108 MG/DL (ref 70–110)
HCT VFR BLD AUTO: 34.7 % (ref 40–54)
HGB BLD-MCNC: 11.7 G/DL (ref 14–18)
IMM GRANULOCYTES # BLD AUTO: 0.01 K/UL (ref 0–0.04)
IMM GRANULOCYTES NFR BLD AUTO: 0.2 % (ref 0–0.5)
LYMPHOCYTES # BLD AUTO: 0.4 K/UL (ref 1–4.8)
LYMPHOCYTES NFR BLD: 9.9 % (ref 18–48)
MCH RBC QN AUTO: 34.4 PG (ref 27–31)
MCHC RBC AUTO-ENTMCNC: 33.7 G/DL (ref 32–36)
MCV RBC AUTO: 102 FL (ref 82–98)
MONOCYTES # BLD AUTO: 0.5 K/UL (ref 0.3–1)
MONOCYTES NFR BLD: 11.1 % (ref 4–15)
NEUTROPHILS # BLD AUTO: 3.1 K/UL (ref 1.8–7.7)
NEUTROPHILS NFR BLD: 73.5 % (ref 38–73)
NRBC BLD-RTO: 0 /100 WBC
PLATELET # BLD AUTO: 106 K/UL (ref 150–450)
PMV BLD AUTO: 9.9 FL (ref 9.2–12.9)
POTASSIUM SERPL-SCNC: 3.9 MMOL/L (ref 3.5–5.1)
PROT SERPL-MCNC: 7.7 G/DL (ref 6–8.4)
RBC # BLD AUTO: 3.4 M/UL (ref 4.6–6.2)
SODIUM SERPL-SCNC: 140 MMOL/L (ref 136–145)
WBC # BLD AUTO: 4.16 K/UL (ref 3.9–12.7)

## 2024-01-03 PROCEDURE — 85025 COMPLETE CBC W/AUTO DIFF WBC: CPT | Performed by: INTERNAL MEDICINE

## 2024-01-03 PROCEDURE — 99999 PR PBB SHADOW E&M-EST. PATIENT-LVL II: CPT | Mod: PBBFAC,,, | Performed by: NURSE PRACTITIONER

## 2024-01-03 PROCEDURE — 99214 OFFICE O/P EST MOD 30 MIN: CPT | Mod: S$PBB,,, | Performed by: NURSE PRACTITIONER

## 2024-01-03 PROCEDURE — 82105 ALPHA-FETOPROTEIN SERUM: CPT | Performed by: INTERNAL MEDICINE

## 2024-01-03 PROCEDURE — 80053 COMPREHEN METABOLIC PANEL: CPT | Performed by: INTERNAL MEDICINE

## 2024-01-03 PROCEDURE — 80197 ASSAY OF TACROLIMUS: CPT | Performed by: INTERNAL MEDICINE

## 2024-01-03 PROCEDURE — 36415 COLL VENOUS BLD VENIPUNCTURE: CPT | Performed by: INTERNAL MEDICINE

## 2024-01-03 PROCEDURE — 99212 OFFICE O/P EST SF 10 MIN: CPT | Mod: PBBFAC | Performed by: NURSE PRACTITIONER

## 2024-01-03 NOTE — PROGRESS NOTES
Consult Note  Palliative Care      Consult Requested By: No ref. provider found  Reason for Consult: symptom mgmt/ACP      ASSESSMENT/PLAN:     Plan/Recommendations:  Diagnoses and all orders for this visit:    01/03/2024:  - no changes made    Squamous cell carcinoma of base of tongue  - following with Dr. Hernandez and NP Tito  - s/p laryngectomy, total glossectomy   - currently on keytruda   - 6/13/23 repeat PET scan  - 100% written communication     Encounter for palliative care  - Patient is decisional  - Patient accompanied today by self   - ACP documents are uploaded into EMR   - Philosophy of Palliative Medicine reviewed with patient and family at first visit  - New patient folder given to and reviewed with patient and family at first visit  - Goals of care: Patient has excellent activity tolerance, his goal is to remain active and able to do the things he enjoys. He fly fishes frequently and enjoys taking walks around the [employer]. He is a retired  of 35 years. He has no children and is unmarried, he jokes that being single has allowed him to buy a Kristine. He lives alone with a brother who lives about a mile from his house.      Chronic low back pain, unspecified back pain laterality, unspecified whether sciatica present  - no longer requires pain medication for cancer-related pain  - states his chronic back pain is now more prominent, this is his biggest complaint  - he has tried PT and pain management with limited to no relief     - placed referral to IO for acupuncture     Insomnia  - reports 5-6 hours of sleep per night  - 2/2 chronic back pain, see above  - placed referral for acupuncture   - tip sheet provided in new patient folder  - will continue to monitor     Understanding of illness/Prognosis: good    Goals of care: life-prolonging, maintain good QOL    Follow up: 10-12 weeks     Patient's encounter and above plan of care discussed with patient's oncology  team.     SUBJECTIVE:     History of Present Illness:  Patient is a 73 y.o. year old male presenting with SCC base of tongue. Please see oncology notes for full oncologic history and treatment course.     01/03/2024:  JESUS ALBERTO HERNÁNDEZ reviewed    Patient presents to clinic f/u alone.     - interval PET scan clear/reassuring  - referred to [facility] for SCC eye  - low dose oxy managed by optho, using about 1 tab daily  - MOHS schedule for 24th  - He reports that he is feeling reasonably well, enjoyed the holidays  - No new or worsening symptoms  - RTC in 12 weeks      10/16/2023:  JESUS ALBERTO HERNÁNDEZ reviewed: no concerns    Presents to clinic visit alone.     - since our last visit he was hospitalized x 5 days for GIB, feeling much better now  - reports has gained 8 lbs since last spring  - s/p eye surgery (biopsy) 6 weeks ago, still w some residual pain, 14 day refill of oxy 5 mg provided   - next PET scan 10/31  - leaves for trip to [facility] Nov/1  - starts w new back doctor in November   - enjoying cooler weather, walks and driving his car    06/28/2023:  JESUS ALBERTO HERNÁNDEZ reviewed:    - updated scan is reassuring    - doing very well overall  - remains off pain medication   - still trying to regain weight lost to Covid infection   - fishing frequently   - several summer trips scheduled fishing trip to [place] planned for early August  - did not see initial IO scheduling message, will f/u to make initial appointment      04/14/2023:  JESUS ALBERTO HERNÁNDEZ reviewed and summarized:  No surprises    Patient presents to clinic alone, all communication is written. He is a pleasant man, in good spirits at our initial visit. His biggest concern is back pain, which is chronic in nature. He has hx of PT and pain management interventions which were not effective. He is curious to try acupuncture, referral placed today. He has excellent activity tolerance and enjoys engaging in his hobbies and making the most of his retired life.     Past Medical History:   Diagnosis Date     Basal cell carcinoma (BCC) in situ of skin 2012    3 on face, 2 on arm, removed by dermatology.     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 Underwent surgical resection of tongue and neck. s/p tracheostomy     Past Surgical History:   Procedure Laterality Date    COLONOSCOPY      COLONOSCOPY N/A 9/14/2023    Procedure: COLONOSCOPY;  Surgeon: Reyes Olivia MD;  Location: [facility] (2ND FLR);  Service: Endoscopy;  Laterality: N/A;    CONJUNCTIVA BIOPSY Right 10/11/2022    Procedure: BIOPSY, CONJUNCTIVA;  Surgeon: Victor M Vasques MD;  Location: [facility];  Service: Ophthalmology;  Laterality: Right;    ESOPHAGOGASTRODUODENOSCOPY N/A 9/14/2023    Procedure: EGD (ESOPHAGOGASTRODUODENOSCOPY);  Surgeon: Reyes Olivia MD;  [facility]  Service: Endoscopy;  Laterality: N/A;    INSERTION OF VENOUS ACCESS PORT Right 3/8/2021    Procedure: INSERTION, VENOUS ACCESS PORT;  Surgeon: Jesus Rendon MD;  Location: [address];  Service: General;  Laterality: Right;    LIVER TRANSPLANT  11/2008    transplanted for biopsy proven hepatocellular carcinoma,     LYMPH NODE BIOPSY N/A 9/18/2020    Procedure: BIOPSY, LYMPH NODE;  Surgeon: Taz Diagnostic Provider;  [address];  Service: General;  Laterality: N/A;  Rm 173    SURGICAL REMOVAL OF SCAR Right 8/24/2023    Procedure: EXCISION, SCAR;  Surgeon: Ting Brambila MD;  Location: [facility];  Service: Ophthalmology;  Laterality: Right;    TRACHEOSTOMY       Family History   Problem Relation Age of Onset    Dementia Mother      Review of patient's allergies indicates:   Allergen Reactions    Aspirin     Tylenol extended release        Medications:    Current Outpatient Medications:      amoxicillin (AMOXIL) 500 MG capsule, Take 1 capsule by mouth every 8 hours until gone, Disp: 21 capsule, Rfl: 0    azelaic acid (AZELEX) 15 % gel, APPLY TOPICALLY TO AFFECTED AREA IN THE MORNING, Disp: 50 g, Rfl: 3    chlorhexidine (PERIDEX) 0.12 % solution, Gently swish and spit 15 mL twice a day for 3 weeks. Start rinses the day after your procedure., Disp: 473 mL, Rfl: 1    doxycycline hyclate 150 mg Tab, Take two tabs PO 1 hour before dental surgery., Disp: 2 tablet, Rfl: 0    gabapentin (NEURONTIN) 300 MG capsule, Take 1 capsule (300 mg total) by mouth every evening. (Patient taking differently: Take 300 mg by mouth as needed.), Disp: 30 capsule, Rfl: 11    imiquimod (ALDARA) 5 % cream, Apply to biopsy site on frontal scalp + about a centimeter of surrounding skin qhs x 16 weeks. Wash off in am and apply sunscreen. Discontinue if skin becomes blistered, raw, bleeding, painful, etc., Disp: 24 packet, Rfl: 3    levothyroxine (SYNTHROID) 100 MCG tablet, Take 1 tablet (100 mcg total) by mouth once daily., Disp: 30 tablet, Rfl: 5    LIDOcaine HCl 2% (LIDOCAINE VISCOUS) 2 % Soln, Swish and spit 15 mls every 8 (eight) hours as needed (mouth sore)., Disp: 100 mL, Rfl: 3    magic mouthwash diphen/antac/lidoc/nysta, Take 10 mLs by mouth 4 (four) times daily., Disp: 120 mL, Rfl: 4    metroNIDAZOLE (METROGEL) 0.75 % gel, Apply topically to affected area 2 (two) times daily., Disp: 45 g, Rfl: 1    multivit-min/FA/lycopen/lutein (CENTRUM SILVER MEN ORAL), Take 1 tablet by mouth once daily., Disp: , Rfl:     oxyCODONE (ROXICODONE) 5 MG immediate release tablet, Take 1 tablet (5 mg total) by mouth every 6 (six) hours as needed for Pain., Disp: 28 tablet, Rfl: 0    pentoxifylline (TRENTAL) 400 mg TbSR, Take 1 tablet by mouth twice daily until gone. Start taking one week prior to your dental surgery., Disp: 112 tablet, Rfl: 0    prednisoLONE acetate (PRED FORTE) 1 % DrpS, Place 1 drop into the right eye 4 (four) times daily.,  Disp: 10 mL, Rfl: 3    sulfacetamide sodium-sulfur 10-5 % (w/w) Clsr, USE TO WASH AFFECTED AREA DAILY, Disp: , Rfl:     tacrolimus (PROGRAF) 0.5 MG Cap, Take 1 capsule (0.5 mg total) by mouth every EVENING.  (Use the 1mg capsule for your morning dose), Disp: 90 capsule, Rfl: 3    tacrolimus (PROGRAF) 1 MG Cap, Take 1 capsule (1 mg total) by mouth every morning. Use the tacrolimus 0.5mg capsule for your evening dose as directed., Disp: 90 capsule, Rfl: 3    tiZANidine (ZANAFLEX) 2 MG tablet, Take 1 tablet (2 mg total) by mouth nightly as needed (neck muscle strain)., Disp: 30 tablet, Rfl: 3    traZODone (DESYREL) 50 MG tablet, Take 1 tablet (50 mg total) by mouth every evening., Disp: 90 tablet, Rfl: 2    vitamin E 1000 UNIT capsule, Take 1 cap PO BID until gone. Start taking one week prior to your dental surgery., Disp: 112 capsule, Rfl: 0  No current facility-administered medications for this visit.    Facility-Administered Medications Ordered in Other Visits:     heparin, porcine (PF) 100 unit/mL injection flush 500 Units, 500 Units, Intravenous, PRN, Ronald Berg MD    ofloxacin 0.3 % ophthalmic solution 1 drop, 1 drop, Right Eye, On Call Procedure, Victor M Vasques MD, 2 drop at 10/11/22 0745    sodium chloride 0.9% flush 10 mL, 10 mL, Intravenous, PRN, Ronald Berg MD    sodium chloride 0.9% flush 10 mL, 10 mL, Intravenous, PRN, Victor M Vasques MD    OBJECTIVE:       ROS:  Review of Systems   Constitutional:  Positive for fatigue. Negative for activity change, appetite change and chills.   HENT:  Positive for facial swelling and voice change.    Eyes:  Positive for pain and redness. Negative for discharge and itching.   Respiratory: Negative.  Negative for apnea, cough and chest tightness.    Cardiovascular: Negative.  Negative for chest pain, palpitations and leg swelling.   Gastrointestinal:  Positive for constipation and diarrhea. Negative for nausea and vomiting.   Genitourinary:  Negative  for difficulty urinating, dysuria and enuresis.   Musculoskeletal:  Positive for back pain. Negative for arthralgias and gait problem.   Skin: Negative.  Negative for color change, pallor and rash.   Psychiatric/Behavioral:  Positive for sleep disturbance.        Review of Symptoms      Symptom Assessment (ESAS 0-10 Scale)  Pain:  6  Dyspnea:  0  Anxiety:  0  Nausea:  0  Depression:  0  Anorexia:  0  Fatigue:  2  Insomnia:  1  Restlessness:  0  Agitation:  0     CAM / Delirium:  Negative  Constipation:  Positive  Diarrhea:  Positive    Constipation:  Constipation    Bowel Management Plan (BMP):  No      Pain Assessment:  OME in 24 hours:  0  Location(s): back    Back       Location: lower        Quality: None        Quantity: 4/10 in intensity        Chronicity: Onset 0 year(s) ago, stable        Aggravating Factors: Activity        Alleviating Factors: None       Associated Symptoms: None    Modified Tricia Scale:  0    ECOG Performance Status stGstrstastdstest:st st1st Living Arrangements:  Lives alone    Psychosocial/Cultural:   See Palliative Psychosocial Note: Yes  **Primary  to Follow**  Palliative Care  Consult: No      Advance Care Planning   Advance Directives:   Living Will: Yes        Copy on chart: Yes    Medical Power of : Yes      Decision Making:  Patient answered questions  Goals of Care: What is most important right now is to focus on remaining as independent as possible, symptom/pain control. Accordingly, we have decided that the best plan to meet the patient's goals includes continuing with treatment.        Physical Exam:  Vitals:    Vitals:    01/03/24 1127   BP: (!) 153/91   Pulse: 79   Temp: 98.2 °F (36.8 °C)         Physical Exam  Vitals reviewed.   Constitutional:       Appearance: Normal appearance. He is not toxic-appearing or diaphoretic.   HENT:      Head: Normocephalic and atraumatic.      Comments: Trach      Right Ear: External ear normal.      Left Ear: External ear  normal.      Nose: Nose normal.   Eyes:      General:         Right eye: No discharge.         Left eye: No discharge.      Extraocular Movements: Extraocular movements intact.      Conjunctiva/sclera: Conjunctivae normal.   Pulmonary:      Effort: Pulmonary effort is normal. No respiratory distress.      Breath sounds: No stridor.   Abdominal:      General: Abdomen is flat.      Palpations: Abdomen is soft.   Musculoskeletal:         General: No swelling or tenderness. Normal range of motion.      Cervical back: Normal range of motion.   Skin:     General: Skin is warm and dry.      Coloration: Skin is not jaundiced.      Findings: No bruising.   Neurological:      General: No focal deficit present.      Mental Status: He is alert and oriented to person, place, and time. Mental status is at baseline.   Psychiatric:         Mood and Affect: Mood normal.         Behavior: Behavior normal.         Thought Content: Thought content normal.         Judgment: Judgment normal.       Labs:  CBC:   WBC   Date Value Ref Range Status   12/28/2023 2.87 (L) 3.90 - 12.70 K/uL Final     Hemoglobin   Date Value Ref Range Status   12/28/2023 11.7 (L) 14.0 - 18.0 g/dL Final     Hematocrit   Date Value Ref Range Status   12/28/2023 32.7 (L) 40.0 - 54.0 % Final     MCV   Date Value Ref Range Status   12/28/2023 95 82 - 98 fL Final     Platelets   Date Value Ref Range Status   12/28/2023 143 (L) 150 - 450 K/uL Final       LFT:   Lab Results   Component Value Date    AST 57 (H) 12/28/2023     (H) 07/09/2020    ALKPHOS 71 12/28/2023    BILITOT 0.7 12/28/2023       Albumin:   Albumin   Date Value Ref Range Status   12/28/2023 3.8 3.5 - 5.2 g/dL Final     Protein:   Total Protein   Date Value Ref Range Status   12/28/2023 7.5 6.0 - 8.4 g/dL Final       Radiology:I have reviewed all pertinent imaging results/findings within the past 24 hours.    10/31/2023 CT PET: Impression:     No hypermetabolic lesion concerning for recurrent or  "metastatic disease in this patient with head and neck cancer status post resection.    06/13/2023 NM PET: "Impression:     No evidence of FDG avid tumor in patient with squamous cell carcinoma of the base of the tongue status post resection.  No abnormal uptake to suggest distant metastasis."       3/20/2023 NM PET: "Impression:     In this patient with base of tongue cancer, there is no definite evidence of hypermetabolic tumor.     Redemonstration of nonspecific mild superficial FDG uptake at the chin.  Clinical correlation suggested.     No other suspicious hypermetabolic lesion."    I spent a total of 35 minutes on the day of the visit.This includes face to face time in discussion of goals of care, symptom assessment, coordination of care and emotional support.  This also includes non-face to face time preparing to see the patient (eg, review of tests/imaging), obtaining and/or reviewing separately obtained history, documenting clinical information in the electronic or other health record, independently interpreting results and communicating results to the patient/family/caregiver, or care coordinator.     Signature: Arabella Cuevas DNP    "

## 2024-01-04 ENCOUNTER — PATIENT MESSAGE (OUTPATIENT)
Dept: TRANSPLANT | Facility: CLINIC | Age: 74
End: 2024-01-04
Payer: MEDICARE

## 2024-01-04 ENCOUNTER — TELEPHONE (OUTPATIENT)
Dept: OPHTHALMOLOGY | Facility: CLINIC | Age: 74
End: 2024-01-04
Payer: MEDICARE

## 2024-01-04 ENCOUNTER — TELEPHONE (OUTPATIENT)
Dept: TRANSPLANT | Facility: CLINIC | Age: 74
End: 2024-01-04
Payer: MEDICARE

## 2024-01-04 LAB — TACROLIMUS BLD-MCNC: 4.9 NG/ML (ref 5–15)

## 2024-01-04 NOTE — TELEPHONE ENCOUNTER
My chart message sent to patient, liver transplant labs reviewed by your physician, no action required with next labs 3/25/24        ----- Message from Cornell Anton MD sent at 1/4/2024  2:32 PM CST -----  Results reviewed

## 2024-01-04 NOTE — TELEPHONE ENCOUNTER
----- Message from Max Salguero sent at 1/4/2024  4:23 PM CST -----  Contact: # [phone] option 1 Sailaja Menard with [facility] that wanted to speak with someone with Dr. Baptiste in regards to a pt that was referred to them. Please call back to further assist. PH# [phone] option 1 Sailaja.

## 2024-01-05 DIAGNOSIS — Z94.4 STATUS POST LIVER TRANSPLANTATION: Primary | ICD-10-CM

## 2024-01-08 DIAGNOSIS — Z94.4 LIVER TRANSPLANTED: Primary | ICD-10-CM

## 2024-01-08 RX ORDER — TACROLIMUS 0.5 MG/1
0.5 CAPSULE ORAL NIGHTLY
Qty: 90 CAPSULE | Refills: 3 | Status: SHIPPED | OUTPATIENT
Start: 2024-01-08

## 2024-01-08 RX ORDER — TACROLIMUS 1 MG/1
1 CAPSULE ORAL EVERY MORNING
Qty: 90 CAPSULE | Refills: 3 | Status: CANCELLED | OUTPATIENT
Start: 2024-01-08

## 2024-01-08 RX ORDER — TACROLIMUS 0.5 MG/1
0.5 CAPSULE ORAL NIGHTLY
Qty: 90 CAPSULE | Refills: 3 | Status: CANCELLED | OUTPATIENT
Start: 2024-01-08

## 2024-01-08 RX ORDER — TACROLIMUS 1 MG/1
1 CAPSULE ORAL EVERY MORNING
Qty: 90 CAPSULE | Refills: 3 | Status: SHIPPED | OUTPATIENT
Start: 2024-01-08

## 2024-01-09 ENCOUNTER — OFFICE VISIT (OUTPATIENT)
Dept: ELECTROPHYSIOLOGY | Facility: CLINIC | Age: 74
End: 2024-01-09
Payer: MEDICARE

## 2024-01-09 ENCOUNTER — HOSPITAL ENCOUNTER (OUTPATIENT)
Dept: CARDIOLOGY | Facility: CLINIC | Age: 74
Discharge: HOME | End: 2024-01-09
Payer: MEDICARE

## 2024-01-09 VITALS
HEIGHT: 68 IN | BODY MASS INDEX: 18.77 KG/M2 | WEIGHT: 123.88 LBS | HEART RATE: 81 BPM | SYSTOLIC BLOOD PRESSURE: 130 MMHG | DIASTOLIC BLOOD PRESSURE: 82 MMHG

## 2024-01-09 DIAGNOSIS — I48.0 PAF (PAROXYSMAL ATRIAL FIBRILLATION): ICD-10-CM

## 2024-01-09 DIAGNOSIS — I70.0 ATHEROSCLEROSIS OF ABDOMINAL AORTA: ICD-10-CM

## 2024-01-09 PROCEDURE — 99999 PR PBB SHADOW E&M-EST. PATIENT-LVL V: CPT | Mod: PBBFAC,,, | Performed by: INTERNAL MEDICINE

## 2024-01-09 PROCEDURE — 99215 OFFICE O/P EST HI 40 MIN: CPT | Mod: PBBFAC | Performed by: INTERNAL MEDICINE

## 2024-01-09 PROCEDURE — 93010 ELECTROCARDIOGRAM REPORT: CPT | Mod: S$PBB,,, | Performed by: INTERNAL MEDICINE

## 2024-01-09 PROCEDURE — 93005 ELECTROCARDIOGRAM TRACING: CPT | Mod: PBBFAC | Performed by: INTERNAL MEDICINE

## 2024-01-09 PROCEDURE — 99205 OFFICE O/P NEW HI 60 MIN: CPT | Mod: S$PBB,,, | Performed by: INTERNAL MEDICINE

## 2024-01-09 NOTE — PROGRESS NOTES
Subjective:   Patient ID:  Rocco Swain is a 73 y.o. male who presents for evaluation of No chief complaint on file.      73 yoM referred for ILR consideration. He had pAF during recent admission for GI bleed. No other known AF episodes. Holter with no AF/AFL.     He communicates with writing on a pad.     Echo 11/23:  ·  Left Ventricle: The left ventricle is normal in size. Normal wall thickness. There is concentric remodeling. Normal wall motion. There is normal systolic function. Ejection fraction by visual approximation is 60%. There is normal diastolic function.  ·  Right Ventricle: Normal right ventricular cavity size. Wall thickness is normal. Right ventricle wall motion  is normal. Systolic function is normal.  ·  Left Atrium: Left atrium is severely dilated.  ·  Aortic Valve: The aortic valve appears bicuspid. There is mild stenosis. Aortic valve area by VTI is 1.76 cm². Aortic valve peak velocity is 1.56 m/s. Mean gradient is 6 mmHg. The dimensionless index is 0.57.  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 There is moderate regurgitation. There appear to be 2 separate jets.  ·  Pulmonary Artery: The estimated pulmonary artery systolic pressure is 30 mmHg.  ·  IVC/SVC: Normal venous pressure at 3 mmHg.     Holter 11/23:  ·  The predominant rhythm is sinus.  ·  Frequent PACs (3.8%) with salvos of nonsustained AT  ·  Frequent PVCs (8%) with 8 runs of AIVR/nonsustained VT up to 13 beats in duration      Past Medical History:  2012: Basal cell carcinoma (BCC) in situ of skin      Comment:  3 on face, 2 on arm, removed by dermatology.   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                 Underwent surgical resection of tongue and neck. s/p                tracheostomy    Past Surgical History:  No date: COLONOSCOPY  9/14/2023: COLONOSCOPY; N/A      Comment:  Procedure: COLONOSCOPY;  Surgeon: Reyes Olivia MD;  [facility]  Service: Endoscopy;                Laterality: N/A;  10/11/2022: CONJUNCTIVA BIOPSY; Right      Comment:  Procedure: BIOPSY, CONJUNCTIVA;  Surgeon: Victor M Vasques MD;  Location: [facility];  Service:                Ophthalmology;  Laterality: Right;  9/14/2023: ESOPHAGOGASTRODUODENOSCOPY; N/A      Comment:  Procedure: EGD (ESOPHAGOGASTRODUODENOSCOPY);  Surgeon:                Reyes Olivia MD;  [facility]                Service: Endoscopy;  Laterality: N/A;  3/8/2021: INSERTION OF VENOUS ACCESS PORT; Right      Comment:  Procedure: INSERTION, VENOUS ACCESS PORT;  Surgeon:                Jesus Rendon MD;  [address];                 Service: General;  Laterality: Right;  11/2008: LIVER TRANSPLANT      Comment:  transplanted for biopsy proven hepatocellular carcinoma,  9/18/2020: LYMPH NODE BIOPSY; N/A      Comment:  Procedure: BIOPSY, LYMPH NODE;  Surgeon: [facility]               Provider;  [address];  Service: General;                Laterality: N/A;  Rm 173  8/24/2023: SURGICAL REMOVAL OF SCAR; Right      Comment:  Procedure: EXCISION, SCAR;  Surgeon: Ting Brambila MD;               Location: [facility];  Service: Ophthalmology;  Laterality:                Right;  No date: TRACHEOSTOMY    Social History    Socioeconomic History      Marital status: Single    Occupational History      Occupation: Retired        Comment: , notes exposures to fumes etc.  Worked on [employer] for 4 years    Tobacco Use      Smoking status: Never      Smokeless tobacco: Never    Substance and Sexual Activity      Alcohol use: Yes        Comment: drinks  wine, 1 glass day      Drug use: Not Currently      Sexual activity: Yes        Comment: No prior history of STD       Review of patient's family history indicates:  Problem: Dementia      Relation: Mother          Age of Onset: (Not Specified)      Current Outpatient Medications:  amoxicillin (AMOXIL) 500 MG capsule, Take 1 capsule by mouth every 8 hours until gone, Disp: 21 capsule, Rfl: 0  azelaic acid (AZELEX) 15 % gel, APPLY TOPICALLY TO AFFECTED AREA IN THE MORNING, Disp: 50 g, Rfl: 3  chlorhexidine (PERIDEX) 0.12 % solution, Gently swish and spit 15 mL twice a day for 3 weeks. Start rinses the day after your procedure., Disp: 473 mL, Rfl: 1  doxycycline hyclate 150 mg Tab, Take two tabs PO 1 hour before dental surgery., Disp: 2 tablet, Rfl: 0  gabapentin (NEURONTIN) 300 MG capsule, Take 1 capsule (300 mg total) by mouth every evening. (Patient taking differently: Take 300 mg by mouth as needed.), Disp: 30 capsule, Rfl: 11  imiquimod (ALDARA) 5 % cream, Apply to biopsy site on frontal scalp + about a centimeter of surrounding skin qhs x 16 weeks. Wash off in am and apply sunscreen. Discontinue if skin becomes blistered, raw, bleeding, painful, etc., Disp: 24 packet, Rfl: 3  levothyroxine (SYNTHROID) 100 MCG tablet, Take 1 tablet (100 mcg total) by mouth once daily., Disp: 30 tablet, Rfl: 5  LIDOcaine HCl 2% (LIDOCAINE VISCOUS) 2 % Soln, Swish and spit 15 mls every 8 (eight) hours as needed (mouth sore)., Disp: 100 mL, Rfl: 3  magic mouthwash diphen/antac/lidoc/nysta, Take 10 mLs by mouth 4 (four) times daily., Disp: 120 mL, Rfl: 4  metroNIDAZOLE (METROGEL) 0.75 % gel, Apply topically to affected area 2 (two) times daily., Disp: 45 g, Rfl: 1  multivit-min/FA/lycopen/lutein (CENTRUM SILVER MEN ORAL), Take 1 tablet by mouth once daily., Disp: , Rfl:   oxyCODONE (ROXICODONE) 5 MG immediate release tablet, Take 1 tablet (5 mg total) by mouth every 6 (six) hours as needed for Pain., Disp: 28 tablet, Rfl:  0  pentoxifylline (TRENTAL) 400 mg TbSR, Take 1 tablet by mouth twice daily until gone. Start taking one week prior to your dental surgery., Disp: 112 tablet, Rfl: 0  prednisoLONE acetate (PRED FORTE) 1 % DrpS, Place 1 drop into the right eye 4 (four) times daily., Disp: 10 mL, Rfl: 3  sulfacetamide sodium-sulfur 10-5 % (w/w) Clsr, USE TO WASH AFFECTED AREA DAILY, Disp: , Rfl:   tacrolimus (PROGRAF) 0.5 MG Cap, Take 1 capsule (0.5 mg total) by mouth every EVENING.  (Use the 1mg capsule for your morning dose), Disp: 90 capsule, Rfl: 3  tacrolimus (PROGRAF) 1 MG Cap, Take 1 capsule (1 mg total) by mouth every MORNING. Use the tacrolimus 0.5mg capsule for your evening dose as directed., Disp: 90 capsule, Rfl: 3  tiZANidine (ZANAFLEX) 2 MG tablet, Take 1 tablet (2 mg total) by mouth nightly as needed (neck muscle strain)., Disp: 30 tablet, Rfl: 3  traZODone (DESYREL) 50 MG tablet, Take 1 tablet (50 mg total) by mouth every evening., Disp: 90 tablet, Rfl: 2  vitamin E 1000 UNIT capsule, Take 1 cap PO BID until gone. Start taking one week prior to your dental surgery., Disp: 112 capsule, Rfl: 0    No current facility-administered medications for this visit.  Facility-Administered Medications Ordered in Other Visits:  heparin, porcine (PF) 100 unit/mL injection flush 500 Units, 500 Units, Intravenous, PRN, Ronald Berg MD  ofloxacin 0.3 % ophthalmic solution 1 drop, 1 drop, Right Eye, On Call Procedure, Victor M Vasques MD, 2 drop at 10/11/22 0745  sodium chloride 0.9% flush 10 mL, 10 mL, Intravenous, PRN, Ronald Berg MD  sodium chloride 0.9% flush 10 mL, 10 mL, Intravenous, PRN, Victor M Vasques MD                Review of Systems   Unable to perform ROS: Patient nonverbal       Objective:   Physical Exam  Vitals reviewed.   Constitutional:       General: He is not in acute distress.     Appearance: He is well-developed.      Comments:      HENT:      Head: Normocephalic and atraumatic.   Eyes:      Pupils:  Pupils are equal, round, and reactive to light.   Neck:      Thyroid: No thyromegaly.      Vascular: No JVD.      Trachea: Tracheostomy present.   Cardiovascular:      Rate and Rhythm: Normal rate and regular rhythm.      Chest Wall: PMI is not displaced.      Pulses:           Radial pulses are 2+ on the right side and 2+ on the left side.      Heart sounds: Normal heart sounds, S1 normal and S2 normal. No murmur heard.      at the upper left sternal border and lower left sternal border.      No friction rub. No gallop.   Pulmonary:      Effort: Pulmonary effort is normal. No respiratory distress.      Breath sounds: Normal breath sounds. No wheezing or rales.   Chest:      Chest wall: There is no dullness to percussion.   Abdominal:      General: Bowel sounds are normal. There is no distension.      Palpations: Abdomen is soft. There is no hepatomegaly, splenomegaly or mass.      Tenderness: There is no abdominal tenderness. There is no guarding or rebound.   Musculoskeletal:         General: No tenderness. Normal range of motion.      Cervical back: Normal range of motion.      Right lower leg: No edema.      Left lower leg: No edema.   Skin:     General: Skin is warm and dry.      Findings: No bruising, erythema or rash.      Nails: There is no clubbing.   Neurological:      Mental Status: He is alert and oriented to person, place, and time.      Cranial Nerves: No cranial nerve deficit.      Gait: Gait normal.   Psychiatric:         Behavior: Behavior normal.         Thought Content: Thought content normal.         Judgment: Judgment normal.      Comments: Cannot speak due to tracheotomy.       ECg: NSR nl DC QRS    Assessment:      1. PAF (paroxysmal atrial fibrillation)        Plan:     73 yoM here for ILR consideration. He had a single documented episode of AF in the setting of a GI bleed. No other arrhythmias detected. I offered 30d event monitor then ILR. He opted for a smart watch. Will have him return  should he have an arrhythmia

## 2024-01-18 ENCOUNTER — PATIENT MESSAGE (OUTPATIENT)
Dept: HEMATOLOGY/ONCOLOGY | Facility: CLINIC | Age: 74
End: 2024-01-18
Payer: MEDICARE

## 2024-01-18 ENCOUNTER — TELEPHONE (OUTPATIENT)
Dept: HEMATOLOGY/ONCOLOGY | Facility: CLINIC | Age: 74
End: 2024-01-18
Payer: MEDICARE

## 2024-01-18 NOTE — TELEPHONE ENCOUNTER
I talked to the pt's brother re appts scheduled for the pt up until the follow up with Dr. Hernandez on 2/12 to go over scan results. He verbalized an understanding.

## 2024-01-19 LAB
COMMENT: NORMAL
COMMENT: NORMAL
FINAL PATHOLOGIC DIAGNOSIS: NORMAL
FINAL PATHOLOGIC DIAGNOSIS: NORMAL
GROSS: NORMAL
GROSS: NORMAL
Lab: NORMAL
MICROSCOPIC EXAM: NORMAL
MICROSCOPIC EXAM: NORMAL
SUPPLEMENTAL DIAGNOSIS: NORMAL
SUPPLEMENTAL DIAGNOSIS: NORMAL

## 2024-01-22 LAB
FINAL PATHOLOGIC DIAGNOSIS: NORMAL
FINAL PATHOLOGIC DIAGNOSIS: NORMAL
GROSS: NORMAL
GROSS: NORMAL
Lab: NORMAL
Lab: NORMAL
MICROSCOPIC EXAM: NORMAL
MICROSCOPIC EXAM: NORMAL
SUPPLEMENTAL DIAGNOSIS: NORMAL
SUPPLEMENTAL DIAGNOSIS: NORMAL

## 2024-01-23 ENCOUNTER — PATIENT MESSAGE (OUTPATIENT)
Dept: DERMATOLOGY | Facility: CLINIC | Age: 74
End: 2024-01-23
Payer: MEDICARE

## 2024-01-24 ENCOUNTER — PROCEDURE VISIT (OUTPATIENT)
Dept: DERMATOLOGY | Facility: CLINIC | Age: 74
End: 2024-01-24
Payer: MEDICARE

## 2024-01-24 VITALS
WEIGHT: 123.88 LBS | DIASTOLIC BLOOD PRESSURE: 92 MMHG | BODY MASS INDEX: 18.77 KG/M2 | HEART RATE: 77 BPM | SYSTOLIC BLOOD PRESSURE: 148 MMHG | HEIGHT: 68 IN

## 2024-01-24 DIAGNOSIS — C44.329 SQUAMOUS CELL CARCINOMA OF SKIN OF RIGHT TEMPLE: Primary | ICD-10-CM

## 2024-01-24 DIAGNOSIS — C44.41 BASAL CELL CARCINOMA, SCALP/NECK: ICD-10-CM

## 2024-01-24 PROCEDURE — 17311 MOHS 1 STAGE H/N/HF/G: CPT | Mod: PBBFAC | Performed by: DERMATOLOGY

## 2024-01-24 PROCEDURE — 13132 CMPLX RPR F/C/C/M/N/AX/G/H/F: CPT | Mod: S$PBB,51,, | Performed by: DERMATOLOGY

## 2024-01-24 PROCEDURE — 99499 UNLISTED E&M SERVICE: CPT | Mod: S$PBB,,, | Performed by: DERMATOLOGY

## 2024-01-24 PROCEDURE — 13132 CMPLX RPR F/C/C/M/N/AX/G/H/F: CPT | Mod: PBBFAC | Performed by: DERMATOLOGY

## 2024-01-24 PROCEDURE — 17312 MOHS ADDL STAGE: CPT | Mod: PBBFAC | Performed by: DERMATOLOGY

## 2024-01-24 PROCEDURE — 17311 MOHS 1 STAGE H/N/HF/G: CPT | Mod: S$PBB,,, | Performed by: DERMATOLOGY

## 2024-01-24 PROCEDURE — 17312 MOHS ADDL STAGE: CPT | Mod: S$PBB,,, | Performed by: DERMATOLOGY

## 2024-01-24 NOTE — PROGRESS NOTES
PROCEDURE: Mohs' Micrographic Surgery    INDICATION: Location in mask areas of face including central face, nose, eyelids, eyebrows, lips, chin, preauricular, temple, and ear. Biopsy-proven skin cancer of cosmetically and functionally important areas, including head, neck, genital, hand, foot, or areas known for having difficulty in healing, such as the lower anterior legs. Tumor with ill-defined borders. Tumors in immunosuppressed patients.    REFERRING PROVIDER: Estrellita Myers M.D.    CASE NUMBER:     ANESTHETIC: 2.5 cc 0.5% Lidocaine with Epi 1:200,000 mixed 1:1 with 0.5% Bupivacaine    SURGICAL PREP: Hibiclens    SURGEON: Dominic Haque MD    ASSISTANTS: Juliet Lisa PA-C    PREOPERATIVE DIAGNOSIS: squamous cell carcinoma- superficial, invasive    POSTOPERATIVE DIAGNOSIS: squamous cell carcinoma    PATHOLOGIC DIAGNOSIS: squamous cell carcinoma- superficial, invasive    HISTOLOGY OF SPECIMENS IN FIRST STAGE:   Tumor Type:  No tumor seen.    STAGES OF MOHS' SURGERY PERFORMED: 1    TUMOR-FREE PLANE ACHIEVED: Yes    HEMOSTASIS: electrocoagulation     SPECIMENS: 2     LOCATION: right temple. Location verified with Dr. Myers's clinical photograph. Patient also verified location with hand held mirror.    INITIAL LESION SIZE: 0.6 x 0.6 cm    FINAL DEFECT SIZE: 0.9 x 0.9 cm    WOUND REPAIR/DISPOSITION: The patient tolerated Mohs' Micrographic Surgery for a squamous cell carcinoma very well. When the tumor was completely removed, a repair of the surgical defect was undertaken.      PROCEDURE: Complex Linear Repair    INDICATION: Status post Mohs' Micrographic Surgery for squamous cell carcinoma.    CASE NUMBER:     SURGEON: Dominic Haque MD    ASSISTANTS: Juliet Lisa PA-C and Georgina Cordova Surg Irina    ANESTHETIC: 1 cc 1% Lidocaine with Epinephrine 1:100,000    SURGICAL PREP: Hibiclens, prepped by Georgina Cordova Surg Irina    LOCATION: right temple    DEFECT SIZE: 0.9 x 0.9 cm    WOUND  "REPAIR/DISPOSITION:  After the patient's carcinoma had been completely removed with Mohs' Micrographic Surgery, a repair of the surgical defect was undertaken. The patient was returned to the operating suite where the area of right temple was prepped, draped, and anesthetized in the usual sterile fashion. The wound was widely undermined in all directions. The wound was undermined to a distance at least the maximum width of the defect as measured perpendicular to the closure line along at least one entire edge of the defect, in this case 1 cm. Then, electrocoagulation was used to obtain meticulous hemostasis. 5-0 Vicryl buried vertical mattress sutures were placed into the subcutaneous and dermal plane to close the wound and donn the cutaneous wound edge. Bilateral dog ears were identified and were removed by a standard Burow's triangle technique. The cutaneous wound edges were closed using interrupted 5-0 Prolene suture.    The patient tolerated the procedure well.    The area was cleaned and dressed appropriately and the patient was given wound care instructions, as well as appointment for follow-up evaluation and suture removal in 7 days.    LENGTH OF REPAIR: 2 cm    Vitals:    01/24/24 1125 01/24/24 1439   BP: 133/85 (!) 148/92   BP Location: Right arm Right arm   Patient Position: Sitting Lying   BP Method: Medium (Automatic) Medium (Automatic)   Pulse: 80 77   Weight: 56.2 kg (123 lb 14.4 oz)    Height: 5' 8" (1.727 m)        PROCEDURE: Mohs' Micrographic Surgery    INDICATION: Biopsy-proven skin cancer of cosmetically and functionally important areas, including head, neck, genital, hand, foot, or areas known for having difficulty in healing, such as the lower anterior legs. Tumor with ill-defined borders. Tumors in immunosuppressed patients.    REFERRING PROVIDER: Estrellita Myers M.D.    CASE NUMBER:     ANESTHETIC: 4 cc 0.5% Lidocaine with Epi 1:200,000 mixed 1:1 with 0.5% Bupivacaine    SURGICAL PREP: " Hibiclens    SURGEON: Dominic Haque MD    ASSISTANTS: Juliet Lisa PA-C and Georgina Cordova Surg Irina    PREOPERATIVE DIAGNOSIS: basal cell carcinoma    POSTOPERATIVE DIAGNOSIS: basal cell carcinoma- nodular, superficial    PATHOLOGIC DIAGNOSIS: basal cell carcinoma- nodular, superficial    HISTOLOGY OF SPECIMENS IN FIRST STAGE:   Debulking tumor confirms nodular basal cell carcinoma.  Tumor Type: Tumor seen. Superficial basal cell carcinoma: Foci of basaloid cells with peripheral palisading and focal retraction artifact arising along the dermoepidermal junction and extending into the papillary dermis.  Nodular basal cell carcinoma: Nodular tumor in dermis composed of basaloid cells exhibiting peripheral palisading and retraction artifact.   Depth of Invasion: epidermis and dermis  Perineural Invasion: No    HISTOLOGY OF SPECIMENS IN SUBSEQUENT STAGES:  Tumor Type: No tumor seen. Actinic keratosis: Keratinocyte atypia along the basal layer.    STAGES OF MOHS' SURGERY PERFORMED: 2    TUMOR-FREE PLANE ACHIEVED: Yes    HEMOSTASIS: electrocoagulation     SPECIMENS: 4 (2 in stage A and 2 in stage B)    LOCATION: lower mid neck. Location verified with Dr. Myers's clinical photograph. Patient also verified location with hand held mirror.    INITIAL LESION SIZE: 0.6 x 0.7 cm    FINAL DEFECT SIZE: 1.1 x 2.0 cm    WOUND REPAIR/DISPOSITION: The patient tolerated Mohs' Micrographic Surgery for a basal cell carcinoma very well. When the tumor was completely removed, a repair of the surgical defect was undertaken.    PROCEDURE: Complex Linear Repair    INDICATION: Status post Mohs' Micrographic Surgery for basal cell carcinoma.    CASE NUMBER:     SURGEON: Dominic Haque MD    ASSISTANTS: Juliet Lisa PA-C and Georgina Cordova Surg Irina    ANESTHETIC: 2.5 cc 1% Lidocaine with Epinephrine 1:100,000    SURGICAL PREP: Hibiclens, prepped by Georgina Cordova Surg Tech    LOCATION: lower mid neck    DEFECT SIZE: 1.1 x 2.0 cm    WOUND  "REPAIR/DISPOSITION:  After the patient's carcinoma had been completely removed with Mohs' Micrographic Surgery, a repair of the surgical defect was undertaken. The patient was returned to the operating suite where the area of lower mid neck was prepped, draped, and anesthetized in the usual sterile fashion. The wound was widely undermined in all directions. The wound was undermined to a distance at least the maximum width of the defect as measured perpendicular to the closure line along at least one entire edge of the defect, in this case 2 cm. Then, electrocoagulation was used to obtain meticulous hemostasis. 4-0 Vicryl buried vertical mattress sutures were placed into the subcutaneous and dermal plane to close the wound and donn the cutaneous wound edge. Bilateral dog ears were identified and were removed by a standard Burow's triangle technique. The cutaneous wound edges were closed using interrupted 4-0 Prolene suture.    The patient tolerated the procedure well.    The area was cleaned and dressed appropriately and the patient was given wound care instructions, as well as appointment for follow-up evaluation and suture removal in 7 days.    LENGTH OF REPAIR: 3.1 cm    Vitals:    01/24/24 1125 01/24/24 1439   BP: 133/85 (!) 148/92   BP Location: Right arm Right arm   Patient Position: Sitting Lying   BP Method: Medium (Automatic) Medium (Automatic)   Pulse: 80 77   Weight: 56.2 kg (123 lb 14.4 oz)    Height: 5' 8" (1.727 m)          NOTE: Pt also with BCC left nasal ala.  However this is encompassing lower third of nasal ala including nasal rim.  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  Pt recently went to [facility] for second opinion and the plan is to treat with chemo locally first.  Once he has " finished this treatment, then we can pursue tx for the BCC.  But at present we need to focus on the SCC treatment first. Disc with patient who is in agreement with the plan.

## 2024-01-25 DIAGNOSIS — C01 SQUAMOUS CELL CARCINOMA OF BASE OF TONGUE: ICD-10-CM

## 2024-01-25 DIAGNOSIS — C77.0 SECONDARY MALIGNANT NEOPLASM OF CERVICAL LYMPH NODE: ICD-10-CM

## 2024-01-25 RX ORDER — TIZANIDINE 2 MG/1
2 TABLET ORAL NIGHTLY PRN
Qty: 30 TABLET | Refills: 3 | Status: SHIPPED | OUTPATIENT
Start: 2024-01-25

## 2024-01-26 ENCOUNTER — PATIENT MESSAGE (OUTPATIENT)
Dept: HEMATOLOGY/ONCOLOGY | Facility: CLINIC | Age: 74
End: 2024-01-26
Payer: MEDICARE

## 2024-01-26 LAB
COMMENT: NORMAL
FINAL PATHOLOGIC DIAGNOSIS: NORMAL
GROSS: NORMAL
Lab: NORMAL
MICROSCOPIC EXAM: NORMAL
SUPPLEMENTAL DIAGNOSIS: NORMAL

## 2024-01-29 ENCOUNTER — PATIENT MESSAGE (OUTPATIENT)
Dept: HEMATOLOGY/ONCOLOGY | Facility: CLINIC | Age: 74
End: 2024-01-29
Payer: MEDICARE

## 2024-01-29 DIAGNOSIS — C44.329 SQUAMOUS CELL CARCINOMA OF SKIN OF ORBIT: Primary | ICD-10-CM

## 2024-01-29 DIAGNOSIS — H57.11 EYE PAIN, RIGHT: ICD-10-CM

## 2024-01-30 RX ORDER — OXYCODONE HYDROCHLORIDE 5 MG/1
5 TABLET ORAL EVERY 6 HOURS PRN
Qty: 28 TABLET | Refills: 0 | Status: SHIPPED | OUTPATIENT
Start: 2024-01-30 | End: 2024-02-05 | Stop reason: SDUPTHER

## 2024-02-05 ENCOUNTER — PATIENT MESSAGE (OUTPATIENT)
Dept: HEMATOLOGY/ONCOLOGY | Facility: CLINIC | Age: 74
End: 2024-02-05
Payer: MEDICARE

## 2024-02-05 ENCOUNTER — PATIENT MESSAGE (OUTPATIENT)
Dept: PALLIATIVE MEDICINE | Facility: CLINIC | Age: 74
End: 2024-02-05
Payer: MEDICARE

## 2024-02-05 DIAGNOSIS — H57.11 EYE PAIN, RIGHT: ICD-10-CM

## 2024-02-05 RX ORDER — OXYCODONE HYDROCHLORIDE 5 MG/1
5 TABLET ORAL EVERY 4 HOURS PRN
Qty: 90 TABLET | Refills: 0 | Status: SHIPPED | OUTPATIENT
Start: 2024-02-05 | End: 2024-02-21 | Stop reason: SDUPTHER

## 2024-02-07 ENCOUNTER — OFFICE VISIT (OUTPATIENT)
Dept: DERMATOLOGY | Facility: CLINIC | Age: 74
End: 2024-02-07
Payer: MEDICARE

## 2024-02-07 DIAGNOSIS — Z09 POSTOP CHECK: Primary | ICD-10-CM

## 2024-02-07 PROCEDURE — 99024 POSTOP FOLLOW-UP VISIT: CPT | Mod: POP,,, | Performed by: DERMATOLOGY

## 2024-02-07 PROCEDURE — 99213 OFFICE O/P EST LOW 20 MIN: CPT | Mod: PBBFAC | Performed by: DERMATOLOGY

## 2024-02-07 PROCEDURE — 99999 PR PBB SHADOW E&M-EST. PATIENT-LVL III: CPT | Mod: PBBFAC,,, | Performed by: DERMATOLOGY

## 2024-02-07 NOTE — PROGRESS NOTES
73 y.o. male patient is here for suture removal following Mohs' surgery.    Patient reports no problems.    WOUND PE:  The lower mid neck and right temple sutures intact. Wound healing well. Good skin edges. No signs or symptoms of infection.      IMPRESSION:  Healing operative site from Mohs' surgery BCC lower mid neck s/p Mohs with CLC, and SCC right temple s/p Mohs with CLC, postop day #14.    PLAN:  Sutures removed today by  Brooke Jeffers RN . Steri-strips applied.  Continue wound care.  Keep moist with Aquaphor.    Pt with BCC left ala - to f/u with heme-onc for tx of SCC R eye conj first, then can address this.     RTC:  In 3-6 months with Estrellita Myers M.D. for skin check or sooner if new concern arises.

## 2024-02-09 ENCOUNTER — HOSPITAL ENCOUNTER (OUTPATIENT)
Dept: RADIOLOGY | Facility: HOSPITAL | Age: 74
Discharge: HOME | End: 2024-02-09
Attending: NURSE PRACTITIONER
Payer: MEDICARE

## 2024-02-09 DIAGNOSIS — C01 SQUAMOUS CELL CARCINOMA OF BASE OF TONGUE: ICD-10-CM

## 2024-02-09 LAB — POCT GLUCOSE: 99 MG/DL (ref 70–110)

## 2024-02-09 PROCEDURE — 78815 PET IMAGE W/CT SKULL-THIGH: CPT | Mod: TC

## 2024-02-09 PROCEDURE — 78815 PET IMAGE W/CT SKULL-THIGH: CPT | Mod: 26,PS,, | Performed by: STUDENT IN AN ORGANIZED HEALTH CARE EDUCATION/TRAINING PROGRAM

## 2024-02-09 PROCEDURE — A9552 F18 FDG: HCPCS

## 2024-02-12 ENCOUNTER — OFFICE VISIT (OUTPATIENT)
Dept: HEMATOLOGY/ONCOLOGY | Facility: CLINIC | Age: 74
End: 2024-02-12
Payer: MEDICARE

## 2024-02-12 ENCOUNTER — TELEPHONE (OUTPATIENT)
Dept: TRANSPLANT | Facility: CLINIC | Age: 74
End: 2024-02-12
Payer: MEDICARE

## 2024-02-12 ENCOUNTER — PATIENT MESSAGE (OUTPATIENT)
Dept: INFUSION THERAPY | Facility: HOSPITAL | Age: 74
End: 2024-02-12
Payer: MEDICARE

## 2024-02-12 ENCOUNTER — HOSPITAL ENCOUNTER (OUTPATIENT)
Dept: RADIOLOGY | Facility: HOSPITAL | Age: 74
Discharge: HOME | End: 2024-02-12
Attending: INTERNAL MEDICINE
Payer: MEDICARE

## 2024-02-12 VITALS
BODY MASS INDEX: 18.21 KG/M2 | WEIGHT: 120.13 LBS | OXYGEN SATURATION: 100 % | TEMPERATURE: 98 F | SYSTOLIC BLOOD PRESSURE: 129 MMHG | HEART RATE: 80 BPM | HEIGHT: 68 IN | RESPIRATION RATE: 14 BRPM | DIASTOLIC BLOOD PRESSURE: 84 MMHG

## 2024-02-12 DIAGNOSIS — C22.0 HCC (HEPATOCELLULAR CARCINOMA): ICD-10-CM

## 2024-02-12 DIAGNOSIS — Z94.4 STATUS POST LIVER TRANSPLANTATION: ICD-10-CM

## 2024-02-12 DIAGNOSIS — D84.821 IMMUNODEFICIENCY DUE TO DRUGS: ICD-10-CM

## 2024-02-12 DIAGNOSIS — E03.2 HYPOTHYROIDISM DUE TO MEDICAMENTS AND OTHER EXOGENOUS SUBSTANCES: ICD-10-CM

## 2024-02-12 DIAGNOSIS — Z85.9 HISTORY OF CANCER: ICD-10-CM

## 2024-02-12 DIAGNOSIS — K26.4 GASTROINTESTINAL HEMORRHAGE ASSOCIATED WITH DUODENAL ULCER: ICD-10-CM

## 2024-02-12 DIAGNOSIS — D50.0 IRON DEFICIENCY ANEMIA DUE TO CHRONIC BLOOD LOSS: ICD-10-CM

## 2024-02-12 DIAGNOSIS — C01 SQUAMOUS CELL CARCINOMA OF BASE OF TONGUE: ICD-10-CM

## 2024-02-12 DIAGNOSIS — C44.329 SQUAMOUS CELL CARCINOMA OF SKIN OF ORBIT: Primary | ICD-10-CM

## 2024-02-12 DIAGNOSIS — Z94.4 LIVER TRANSPLANTED: Primary | ICD-10-CM

## 2024-02-12 DIAGNOSIS — Z93.0 TRACHEOSTOMY STATUS: ICD-10-CM

## 2024-02-12 DIAGNOSIS — K74.60 CIRRHOSIS OF TRANSPLANTED LIVER: ICD-10-CM

## 2024-02-12 DIAGNOSIS — Z79.899 IMMUNODEFICIENCY DUE TO DRUGS: ICD-10-CM

## 2024-02-12 DIAGNOSIS — C77.0 SECONDARY MALIGNANT NEOPLASM OF CERVICAL LYMPH NODE: ICD-10-CM

## 2024-02-12 DIAGNOSIS — T86.49 CIRRHOSIS OF TRANSPLANTED LIVER: ICD-10-CM

## 2024-02-12 PROBLEM — D69.6 THROMBOCYTOPENIA: Status: RESOLVED | Noted: 2022-05-09 | Resolved: 2024-02-12

## 2024-02-12 PROCEDURE — 99214 OFFICE O/P EST MOD 30 MIN: CPT | Mod: PBBFAC,25 | Performed by: INTERNAL MEDICINE

## 2024-02-12 PROCEDURE — 76705 ECHO EXAM OF ABDOMEN: CPT | Mod: 26,59,, | Performed by: RADIOLOGY

## 2024-02-12 PROCEDURE — 99999 PR PBB SHADOW E&M-EST. PATIENT-LVL IV: CPT | Mod: PBBFAC,,, | Performed by: INTERNAL MEDICINE

## 2024-02-12 PROCEDURE — 99215 OFFICE O/P EST HI 40 MIN: CPT | Mod: S$PBB,,, | Performed by: INTERNAL MEDICINE

## 2024-02-12 PROCEDURE — 93976 VASCULAR STUDY: CPT | Mod: 26,,, | Performed by: RADIOLOGY

## 2024-02-12 PROCEDURE — 76705 ECHO EXAM OF ABDOMEN: CPT | Mod: 59,TC

## 2024-02-12 RX ORDER — OLANZAPINE 5 MG/1
5 TABLET ORAL NIGHTLY
Qty: 30 TABLET | Refills: 5 | Status: SHIPPED | OUTPATIENT
Start: 2024-02-12 | End: 2024-05-08

## 2024-02-12 RX ORDER — ONDANSETRON 8 MG/1
8 TABLET, ORALLY DISINTEGRATING ORAL ONCE
Qty: 1 TABLET | Refills: 0 | Status: SHIPPED | OUTPATIENT
Start: 2024-02-12 | End: 2024-02-16

## 2024-02-12 RX ORDER — HYDROCORTISONE 1 %
CREAM (GRAM) TOPICAL
Qty: 112 G | Refills: 2 | Status: SHIPPED | OUTPATIENT
Start: 2024-02-12 | End: 2024-05-08

## 2024-02-12 RX ORDER — CLINDAMYCIN PHOSPHATE 10 MG/G
GEL TOPICAL 2 TIMES DAILY
Qty: 60 G | Refills: 3 | Status: SHIPPED | OUTPATIENT
Start: 2024-02-12

## 2024-02-12 RX ORDER — PROCHLORPERAZINE MALEATE 10 MG
5 TABLET ORAL EVERY 6 HOURS PRN
Qty: 30 TABLET | Refills: 1 | Status: SHIPPED | OUTPATIENT
Start: 2024-02-12 | End: 2024-05-08

## 2024-02-12 NOTE — TELEPHONE ENCOUNTER
Letter sent to patient stating: Your labs have been reviewed by your Transplant physician, no action required. Next U.S. due August 2024          ----- Message from Cornell Anton MD sent at 2/12/2024  3:31 PM CST -----  Results reviewed

## 2024-02-12 NOTE — LETTER
February 12, 2024    Rocco Swain  [address] [place] [id number]          Dear Rocco Brynn:  MRN: [id number]    This is a follow up to your recent Liver Ultrasound, your results were stable.  Please have your next U.S. done again in  August 2024.      Please call [phone] and ask to speak to Rhea RIOS -  for all scheduling requests.     Sincerely,    Shari WDADELLN, RN      Your Liver Transplant Coordinator    [facility]  [address] [place] [id number]  [phone]

## 2024-02-12 NOTE — PROGRESS NOTES
ONCOLOGY FOLLOW UP VISIT    Subjective:      Patient ID: Rocco Swain    Chief Complaint: Squamous cell carcinoma of base of tongue      HPI  Rocco Swain is a 73 y.o. male who returns to clinic for management of newly progressing cSCC. Patient had ANGELES on October PETCT, but then developed quickly progressing orbital lesion. He was seen at [facility] in [place]. Recommendation was to follow up at [facility] to initiate induction chemotherapy followed by possible surgery.     ECOG Performance status: 1 - Symptomatic but completely ambulatory Communication from him is 100% written.     Cancer Staging   Squamous cell carcinoma of base of tongue  Staging form: Pharynx - HPV-Mediated Oropharynx, AJCC 8th Edition  - Clinical stage from 9/18/2020: Stage III (rcT4, cN1, cM0, p16+) - Signed by Ronald Berg MD on 12/27/2022      Oncologic History:  Oncology History   Squamous cell carcinoma of base of tongue   9/18/2020 Cancer Staged    Staging form: Pharynx - HPV-Mediated Oropharynx, AJCC 8th Edition  - Clinical stage from 9/18/2020: Stage III (rcT4, cN1, cM0, p16+)     9/28/2020 Initial Diagnosis    Squamous cell carcinoma of base of tongue     10/6/2020 - 8/17/2021 Chemotherapy    Treatment Summary   Plan Name: OP HEAD NECK CARBOPLATIN PACLITAXEL C1-2 FOLLOWED BY CETUXIMAB CARBOPLATIN C3-6 FOLLOWED BY CETUXIMAB MAINTENANCE WEEKLY  Treatment Goal: Control  Status: Inactive  Start Date: 10/6/2020  End Date: 8/17/2021  Provider: Ronald Berg MD  Chemotherapy: cetuximab (ERBITUX) 100 mg/50 mL chemo infusion 684 mg, 400 mg/m2 = 684 mg (100 % of original dose 400 mg/m2), Intravenous, Clinic/[facility] 1 time, 29 of 38 cycles  Dose modification: 500 mg/m2 (original dose 400 mg/m2, Cycle 3), 250 mg/m2 (original dose 400 mg/m2, Cycle 3), 400 mg/m2 (original dose 400 mg/m2, Cycle 3), 250 mg/m2 (original dose 250 mg/m2, Cycle 7), 200 mg/m2 (original dose 250 mg/m2, Cycle 18), 200 mg/m2 (original dose 250 mg/m2, Cycle 19), 250 mg/m2  (original dose 250 mg/m2, Cycle 4), 200 mg/m2 (original dose 250 mg/m2, Cycle 25), 200 mg/m2 (original dose 250 mg/m2, Cycle 28)  Administration: 684 mg (11/17/2020), 400 mg (11/24/2020), 400 mg (2/9/2021), 400 mg (2/17/2021), 427.6 mg (3/9/2021), 400 mg (3/30/2021), 400 mg (3/23/2021), 400 mg (4/6/2021), 427.6 mg (4/13/2021), 427.6 mg (4/20/2021), 342 mg (5/11/2021), 342 mg (5/18/2021), 342 mg (5/25/2021), 400 mg (5/31/2021), 400 mg (6/7/2021), 400 mg (6/14/2021), 400 mg (12/8/2020), 427.6 mg (12/29/2020), 400 mg (12/1/2020), 400 mg (12/15/2020), 400 mg (12/22/2020), 427.6 mg (1/5/2021), 400 mg (1/12/2021), 400 mg (1/19/2021), 427.6 mg (1/26/2021), 400 mg (2/2/2021), 400 mg (2/23/2021), 400 mg (3/2/2021), 427.6 mg (3/16/2021), 400 mg (6/21/2021), 342 mg (6/28/2021), 342 mg (7/6/2021), 342 mg (7/13/2021), 342 mg (7/20/2021), 400 mg (7/27/2021), 400 mg (8/10/2021), 400 mg (8/17/2021)  CARBOplatin (PARAPLATIN) 310 mg in sodium chloride 0.9% 500 mL chemo infusion, 310 mg (92.2 % of original dose 334.5 mg), Intravenous, Clinic/\[facility]\"" 1 time, 6 of 6 cycles  Dose modification:   (original dose 334.5 mg, Cycle 1)  Administration: 310 mg (10/6/2020), 370 mg (11/17/2020), 300 mg (10/27/2020), 350 mg (12/8/2020), 335 mg (12/29/2020), 320 mg (1/19/2021)  PACLitaxeL (TAXOL) 175 mg/m2 = 300 mg in sodium chloride 0.9% 500 mL chemo infusion, 175 mg/m2 = 300 mg (100 % of original dose 175 mg/m2), Intravenous, Clinic/\[facility]\"" 1 time, 2 of 2 cycles  Dose modification: 175 mg/m2 (original dose 175 mg/m2, Cycle 1)  Administration: 300 mg (10/6/2020), 300 mg (10/27/2020)     4/29/2021 Tumor Conference       His case was discussed at the Multidisciplinary Head and Neck Team Planning Meeting.    Representatives from Medical Oncology, Radiation Oncology, Head and Neck Surgical Oncology, Psychosocial Oncology, and Speech and Language Pathology discussed the case with the following recommendations:    1) biopsy         7/29/2021 Tumor Conference        His case was discussed at the Multidisciplinary Head and Neck Team Planning Meeting.    Representatives from Medical Oncology, Radiation Oncology, Head and Neck Surgical Oncology, Psychosocial Oncology, and Speech and Language Pathology discussed the case with the following recommendations:    1) Head and neck clinic follow up  2) consider Keytruda (discuss with transplant)  3) consider palliative referral         9/13/2021 - 12/29/2023 Chemotherapy    Treatment Summary   Plan Name: OP HEAD NECK PEMBROLIZUMAB CARBOPLATIN FLUOROURACIL (C1 ONLY RECEIVED) FOLLOWED BY PEMBROLIZUMAB MAINTENANCE  Treatment Goal: Palliative  Status: Inactive  Start Date: 9/13/2021  End Date: 12/29/2023  Provider: Ronald Berg MD  Chemotherapy: CARBOplatin (PARAPLATIN) 320 mg in sodium chloride 0.9% 500 mL chemo infusion, 320 mg (100 % of original dose 320.5 mg), Intravenous, Clinic/[facility] 1 time, 6 of 6 cycles  Dose modification:   (original dose 320.5 mg, Cycle 1)  Administration: 320 mg (9/13/2021), 335 mg (12/23/2021), 325 mg (1/27/2022), 245 mg (3/3/2022), 255 mg (5/12/2022), 255 mg (4/7/2022)  fluorouraciL 1,000 mg/m2/day = 6,440 mg in sodium chloride 0.9% 240 mL chemo infusion, 1,000 mg/m2/day = 6,440 mg, Intravenous, Over 96 hours, 6 of 6 cycles  Dose modification: 800 mg/m2/day (original dose 1,000 mg/m2/day, Cycle 6), 5,000 mg (original dose 1,000 mg/m2/day, Cycle 8)  Administration: 6,440 mg (9/13/2021), 6,440 mg (12/23/2021), 6,480 mg (1/27/2022), 5,000 mg (3/3/2022), 5,000 mg (4/7/2022), 5,000 mg (5/12/2022)     Secondary malignant neoplasm of cervical lymph node   2/9/2021 Initial Diagnosis    Secondary malignant neoplasm of cervical lymph node     9/13/2021 - 12/29/2023 Chemotherapy    Treatment Summary   Plan Name: OP HEAD NECK PEMBROLIZUMAB CARBOPLATIN FLUOROURACIL (C1 ONLY RECEIVED) FOLLOWED BY PEMBROLIZUMAB MAINTENANCE  Treatment Goal: Palliative  Status: Inactive  Start Date: 9/13/2021  End Date: 12/29/2023  Provider: Ronald  SHIRA Berg MD  Chemotherapy: CARBOplatin (PARAPLATIN) 320 mg in sodium chloride 0.9% 500 mL chemo infusion, 320 mg (100 % of original dose 320.5 mg), Intravenous, Clinic/[facility] 1 time, 6 of 6 cycles  Dose modification:   (original dose 320.5 mg, Cycle 1)  Administration: 320 mg (9/13/2021), 335 mg (12/23/2021), 325 mg (1/27/2022), 245 mg (3/3/2022), 255 mg (5/12/2022), 255 mg (4/7/2022)  fluorouraciL 1,000 mg/m2/day = 6,440 mg in sodium chloride 0.9% 240 mL chemo infusion, 1,000 mg/m2/day = 6,440 mg, Intravenous, Over 96 hours, 6 of 6 cycles  Dose modification: 800 mg/m2/day (original dose 1,000 mg/m2/day, Cycle 6), 5,000 mg (original dose 1,000 mg/m2/day, Cycle 8)  Administration: 6,440 mg (9/13/2021), 6,440 mg (12/23/2021), 6,480 mg (1/27/2022), 5,000 mg (3/3/2022), 5,000 mg (4/7/2022), 5,000 mg (5/12/2022)     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   1/29/2024 Initial Diagnosis    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     1/29/2024 -  Chemotherapy    Treatment Summary   Plan Name: OP HEAD NECK CETUXIMAB CARBOPLATIN FLUOROURACIL Q3W  Treatment Goal: Curative  Status: Active  Start Date: 1/29/2024 (Planned)  End Date: 7/8/2024 (Planned)  Provider: Martín Hernandez MD  Chemotherapy: cetuximab (ERBITUX) 100 mg/50 mL chemo infusion 668 mg, 400 mg/m2, Intravenous, Clinic/[facility] 1 time, 0 of 12 cycles  CARBOplatin (PARAPLATIN) in sodium chloride 0.9% 250 mL chemo infusion, , Intravenous, Clinic/[facility] 1 time, 0 of 6 cycles         Review of Systems   Constitutional:  Negative for activity change, appetite change, chills, fatigue, fever and unexpected weight change.   HENT:  Negative for ear pain, facial swelling, hearing loss, mouth sores, nosebleeds, sore throat, trouble swallowing and voice change.         No verbal communication. Skin fixed and immobile from previous scaring post-neck dissection   Eyes:  Positive for redness (right eye - pain). Negative for pain, discharge and visual disturbance.   Respiratory:  Negative  for cough, choking, chest tightness and shortness of breath.    Cardiovascular:  Negative for chest pain, palpitations and leg swelling.   Gastrointestinal:  Negative for abdominal distention, abdominal pain, blood in stool, constipation, diarrhea, nausea and vomiting.   Endocrine: Negative for cold intolerance and heat intolerance.   Genitourinary:  Negative for difficulty urinating, dysuria, frequency and urgency.   Musculoskeletal:  Positive for back pain (lower - chronic). Negative for arthralgias, gait problem, leg pain and myalgias.   Integumentary:  Positive for mole/lesion (left nare and neck - cSCC). Negative for pallor, rash and wound.   Allergic/Immunologic: Negative for frequent infections.   Neurological:  Negative for dizziness, tremors, weakness, light-headedness, numbness and headaches.   Hematological:  Negative for adenopathy. Does not bruise/bleed easily.   Psychiatric/Behavioral:  Negative for agitation, confusion, dysphoric mood and sleep disturbance. The patient is not nervous/anxious.         Allergies:  Review of patient's allergies indicates:   Allergen Reactions    Aspirin     Tylenol extended release        Medications:  Current Outpatient Medications   Medication Sig Dispense Refill    amoxicillin (AMOXIL) 500 MG capsule Take 1 capsule by mouth every 8 hours until gone 21 capsule 0    azelaic acid (AZELEX) 15 % gel APPLY TOPICALLY TO AFFECTED AREA IN THE MORNING 50 g 3    chlorhexidine (PERIDEX) 0.12 % solution Gently swish and spit 15 mL twice a day for 3 weeks. Start rinses the day after your procedure. 473 mL 1    doxycycline hyclate 150 mg Tab Take two tabs PO 1 hour before dental surgery. 2 tablet 0    imiquimod (ALDARA) 5 % cream Apply to biopsy site on frontal scalp + about a centimeter of surrounding skin qhs x 16 weeks. Wash off in am and apply sunscreen. Discontinue if skin becomes blistered, raw, bleeding, painful, etc. 24 packet 3    levothyroxine (SYNTHROID) 100 MCG  tablet Take 1 tablet (100 mcg total) by mouth once daily. 30 tablet 5    LIDOcaine HCl 2% (LIDOCAINE VISCOUS) 2 % Soln Swish and spit 15 mls every 8 (eight) hours as needed (mouth sore). 100 mL 3    magic mouthwash diphen/antac/lidoc/nysta Take 10 mLs by mouth 4 (four) times daily. 120 mL 4    metroNIDAZOLE (METROGEL) 0.75 % gel Apply topically to affected area 2 (two) times daily. 45 g 1    multivit-min/FA/lycopen/lutein (CENTRUM SILVER MEN ORAL) Take 1 tablet by mouth once daily.      oxyCODONE (ROXICODONE) 5 MG immediate release tablet Take 1 tablet (5 mg total) by mouth every 4 (four) hours as needed for Pain. 90 tablet 0    pentoxifylline (TRENTAL) 400 mg TbSR Take 1 tablet by mouth twice daily until gone. Start taking one week prior to your dental surgery. 112 tablet 0    prednisoLONE acetate (PRED FORTE) 1 % DrpS Place 1 drop into the right eye 4 (four) times daily. 10 mL 3    sulfacetamide sodium-sulfur 10-5 % (w/w) Clsr USE TO WASH AFFECTED AREA DAILY      tacrolimus (PROGRAF) 0.5 MG Cap Take 1 capsule (0.5 mg total) by mouth every EVENING.  (Use the 1mg capsule for your morning dose) 90 capsule 3    tacrolimus (PROGRAF) 1 MG Cap Take 1 capsule (1 mg total) by mouth every MORNING. Use the tacrolimus 0.5mg capsule for your evening dose as directed. 90 capsule 3    tiZANidine (ZANAFLEX) 2 MG tablet Take 1 tablet (2 mg total) by mouth nightly as needed (neck muscle strain). 30 tablet 3    traZODone (DESYREL) 50 MG tablet Take 1 tablet (50 mg total) by mouth every evening. 90 tablet 2    vitamin E 1000 UNIT capsule Take 1 cap PO BID until gone. Start taking one week prior to your dental surgery. 112 capsule 0    clindamycin phosphate 1% (CLINDAGEL) 1 % gel Apply topically 2 (two) times daily. 60 g 3    gabapentin (NEURONTIN) 300 MG capsule Take 1 capsule (300 mg total) by mouth every evening. (Patient taking differently: Take 300 mg by mouth as needed.) 30 capsule 11    hydrocortisone 1 % cream  Apply to face, hands, feet, neck, back and chest at bedtime. 112 g 2    OLANZapine (ZYPREXA) 5 MG tablet Take 1 tablet (5 mg total) by mouth every evening. Take as directed on days 1-4 of your chemotherapy cycle. 30 tablet 5    ondansetron (ZOFRAN-ODT) 8 MG TbDL Take 1 tablet (8 mg total) by mouth once. for 1 dose 1 tablet 0    prochlorperazine (COMPAZINE) 10 MG tablet Take 0.5 tablets (5 mg total) by mouth every 6 (six) hours as needed. 30 tablet 1     No current facility-administered medications for this visit.     Facility-Administered Medications Ordered in Other Visits   Medication Dose Route Frequency Provider Last Rate Last Admin    heparin, porcine (PF) 100 unit/mL injection flush 500 Units  500 Units Intravenous PRN Ronald Berg MD        ofloxacin 0.3 % ophthalmic solution 1 drop  1 drop Right Eye On Call Procedure Victor M Vasques MD   2 drop at 10/11/22 0745    sodium chloride 0.9% flush 10 mL  10 mL Intravenous PRN Ronald Berg MD        sodium chloride 0.9% flush 10 mL  10 mL Intravenous PRN Victor M Vasques MD           PMH:  Past Medical History:   Diagnosis Date    Basal cell carcinoma (BCC) in situ of skin 2012    3 on face, 2 on arm, removed by dermatology.    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 Underwent surgical resection of tongue and neck. 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 temple 01/24/2024    right temple       PSH:  Past Surgical History:   Procedure Laterality Date    COLONOSCOPY      COLONOSCOPY N/A 9/14/2023    Procedure: COLONOSCOPY;  Surgeon: Reyes Olivia MD;  Location: [address]);  Service:  "Endoscopy;  Laterality: N/A;    CONJUNCTIVA BIOPSY Right 10/11/2022    Procedure: BIOPSY, CONJUNCTIVA;  Surgeon: Victor M Vasques MD;  Location: [facility] OR;  Service: Ophthalmology;  Laterality: Right;    ESOPHAGOGASTRODUODENOSCOPY N/A 9/14/2023    Procedure: EGD (ESOPHAGOGASTRODUODENOSCOPY);  Surgeon: Reyes Olivia MD;  Location: [facility] ENDO (2ND FLR);  Service: Endoscopy;  Laterality: N/A;    INSERTION OF VENOUS ACCESS PORT Right 3/8/2021    Procedure: INSERTION, VENOUS ACCESS PORT;  Surgeon: Jesus Rendon MD;  Location: [facility] OR 2ND FLR;  Service: General;  Laterality: Right;    LIVER TRANSPLANT  11/2008    transplanted for biopsy proven hepatocellular carcinoma,     LYMPH NODE BIOPSY N/A 9/18/2020    Procedure: BIOPSY, LYMPH NODE;  Surgeon: Taz Diagnostic Provider;  Location: [facility] OR 2ND FLR;  Service: General;  Laterality: N/A;  Rm 173    SURGICAL REMOVAL OF SCAR Right 8/24/2023    Procedure: EXCISION, SCAR;  Surgeon: Ting Brambila MD;  Location: [facility] OR;  Service: Ophthalmology;  Laterality: Right;    TRACHEOSTOMY         FamHx:  Family History   Problem Relation Age of Onset    Dementia Mother        SocHx:  Social History     Socioeconomic History    Marital status: Single   Occupational History    Occupation: Retired     Comment: , notes exposures to fumes etc.  Worked on [employer] for 4 years   Tobacco Use    Smoking status: Never    Smokeless tobacco: Never   Substance and Sexual Activity    Alcohol use: Yes     Comment: drinks wine, 1 glass day    Drug use: Not Currently    Sexual activity: Yes     Comment: No prior history of STD        Distress Score             Objective:      Vitals:    02/12/24 1110   BP: 129/84   BP Location: Left arm   Patient Position: Sitting   BP Method: Medium (Automatic)   Pulse: 80   Resp: 14   Temp: 97.7 °F (36.5 °C)   TempSrc: Oral   SpO2: 100%   Weight: 54.5 kg (120 lb 2.4 oz)   Height: 5' 8" (1.727 m)        "   Physical Exam  Vitals reviewed.   Constitutional:       General: He is not in acute distress.     Appearance: Normal appearance. He is well-developed and underweight.   HENT:      Head: Normocephalic and atraumatic.      Mouth/Throat:      Mouth: Mucous membranes are moist.      Dentition: Abnormal dentition. Dental caries present.   Eyes:      Conjunctiva/sclera: Conjunctivae normal.      Pupils: Pupils are equal, round, and reactive to light.      Comments: Right eye - injected and nodular growth on the inner eye conjunctiva   Neck:      Trachea: Tracheostomy present.   Pulmonary:      Effort: Pulmonary effort is normal. No respiratory distress.      Comments: Tracheostomy  Abdominal:      General: There is no distension.      Palpations: Abdomen is soft.   Musculoskeletal:         General: No swelling. Normal range of motion.      Cervical back: Normal range of motion and neck supple.   Skin:     General: Skin is warm and dry.   Neurological:      General: No focal deficit present.      Mental Status: He is alert and oriented to person, place, and time.   Psychiatric:         Mood and Affect: Mood normal.         Behavior: Behavior normal.         Thought Content: Thought content normal.         Judgment: Judgment normal.         LABS:  WBC   Date Value Ref Range Status   02/09/2024 5.09 3.90 - 12.70 K/uL Final     Hemoglobin   Date Value Ref Range Status   02/09/2024 12.4 (L) 14.0 - 18.0 g/dL Final     Hematocrit   Date Value Ref Range Status   02/09/2024 35.6 (L) 40.0 - 54.0 % Final     Platelets   Date Value Ref Range Status   02/09/2024 167 150 - 450 K/uL Final       Chemistry        Component Value Date/Time     02/09/2024 0925    K 3.8 02/09/2024 0925    CL 97 02/09/2024 0925    CO2 22 (L) 02/09/2024 0925    BUN 21 02/09/2024 0925    CREATININE 1.5 (H) 02/09/2024 0925    GLU 98 02/09/2024 0925        Component Value Date/Time    CALCIUM 10.3 02/09/2024 0925    ALKPHOS 89 02/09/2024 0925    AST 60  (H) 02/09/2024 0925    ALT 24 02/09/2024 0925    BILITOT 0.9 02/09/2024 0925    ESTGFRAFRICA 52.6 (A) 07/14/2022 1119    EGFRNONAA 45.5 (A) 07/14/2022 1119              Assessment:       1. 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 Squamous cell carcinoma of base of tongue    3. Secondary malignant neoplasm of cervical lymph node    4. Tracheostomy status    5. Status post liver transplantation - 2008    6. Immunodeficiency due to drugs    7. Iron deficiency anemia due to chronic blood loss    8. Gastrointestinal hemorrhage associated with duodenal ulcer    9. Hypothyroidism due to medicaments and other exogenous substances            Plan:         Orbital cSCC  Unfortunately patient has progressed while on immunotherapy for below diagnosis. For that reason systemic therapies are limited. Evaluated at [facility] and surgeon does think an exenteration sparing resection would be possible with good response to induction chemotherapy. Plan was for platinum doublet chemo + cetuximab x 2 cycles. Notified of this plan on 1/29 and treatment plan was entered. Authorization was obtained on 2/5, but cycle 1 still not scheduled.  - Patient was consented for chemotherapy today 2/12/2024 .   An extensive discussion was had which included a thorough discussion of the risk and benefits of treatment and alternatives.  Risks, including but not limited to, possible hair loss, bone marrow damage (anemia, thrombocytopenia, immune suppression, neutropenia), damage to body organs (brain, heart, liver, kidney, lungs, nervous system, skin, and others), allergic reactions, sterility, nausea/vomiting, constipation/diarrhea, sores in the mouth, secondary cancers, local damage at possible injection sites, and rarely death were all discussed.  The patient agrees with the plan, and all questions have been answered to their satisfaction.  Consent was signed the patient, provider, and a third party witness.      - schedule C1 ASAP at any infusion  site location. Port already in place. Will dose reduce C1 of 5-FU to 750 mg/m2 due to age/frailty and increase to full dose 1000 mg/m2 for C2 if tolerated. Also adding growth factor.    2,3. 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 He is not a surgical or radiation candidate.  -Started on PCC 10/6/2020 followed by maintenance cetuximab until 8/24/21 (discontinued due to progression of disease; continued increase in SUV uptake, no new lesions).  - 9/2021 started on carbo/5-FU/pembrolizumab; due to toxicity went to pembrolizumab monotherapy starting with cycle 2.  Progression of disease noted after cycle 3 so added chemotherapy back in with cycle 4. Keytruda monotherapy starting with C9.   - Keytruda discontinued after 2 years.    4. Status post total laryngectomy, total glossectomy and anterolateral thigh free flap reconstruction roughly 2017 at [facility] in [place] for persistent/recurrent base of tongue sq.c.c.    5-6. S/p liver transplant and is currently on tacrolimus. Grade 1. Will continue to monitor.     7,8. Hgb recovered to > 11. Monitor closely on chemotherapy.     9. TSH trending back down on increased synthroid dose of 100 mcg. Continue to monitor.    I have reviewed the notes, assessments, and/or procedures performed by Ct [facility], as above.  I have personally interviewed and examined the patient at the beside, and rounded with Ct. I concur with her assessment and plan and the documentation of Rocco Swain.    MDM includes:    - Acute or chronic illness or injury that poses a threat to life or bodily function  - Independent review and explanation of 2 results from unique tests  - Discussion of management and ordering 2 unique tests  - Extensive discussion of treatment and management  - Prescription drug management  - Drug therapy requiring intensive monitoring for toxicity    Martín  ANGEL Hernandez.  Hematology/Oncology Attending  Director [facility]  [facility]            Route Chart for Scheduling    Med Onc Chart Routing  Urgent    Follow up with physician    Follow up with CORY Other. 3/8 with labs prior to C2   Infusion scheduling note New or changed treatment   Etta is working on scheduling C1D1-5, need C1D8 - 2/23, and C1D15 - 3/1 scheduled   Injection scheduling note    Labs CBC, CMP, free T4 and TSH   Scheduling:  Preferred lab:  Lab interval:     Imaging    Pharmacy appointment    Other referrals            Treatment Plan Information   OP HEAD NECK CETUXIMAB CARBOPLATIN FLUOROURACIL Q3W   Martín Hernandez MD   Upcoming Treatment Dates - OP HEAD NECK CETUXIMAB CARBOPLATIN FLUOROURACIL Q3W    1/29/2024       Pre-Medications       diphenhydrAMINE (BENADRYL) 25 mg in NS 50 mL IVPB       Chemotherapy       cetuximab (ERBITUX) 100 mg/50 mL chemo infusion 668 mg       CARBOplatin (PARAPLATIN) in sodium chloride 0.9% 250 mL chemo infusion       fluorouracil (ADRUCIL) 775 mg/m2/day = 5,175 mg in sodium chloride 0.9% 240 mL chemo infusion       Antiemetics       aprepitant (CINVANTI) injection 130 mg       palonosetron 0.25mg/dexAMETHasone 12mg in NS IVPB 0.25 mg 50 mL  2/5/2024       Pre-Medications       diphenhydrAMINE (BENADRYL) 25 mg in NS 50 mL IVPB       Chemotherapy       cetuximab (ERBITUX) 100 mg/50 mL chemo infusion 417.6 mg  2/12/2024       Pre-Medications       diphenhydrAMINE (BENADRYL) 25 mg in NS 50 mL IVPB       Chemotherapy       cetuximab (ERBITUX) 100 mg/50 mL chemo infusion 417.6 mg  2/19/2024       Pre-Medications       diphenhydrAMINE (BENADRYL) 25 mg in NS 50 mL IVPB       Chemotherapy       cetuximab (ERBITUX) 100 mg/50 mL chemo infusion 417.6 mg       CARBOplatin (PARAPLATIN) in sodium chloride 0.9% 250 mL chemo infusion       fluorouracil (ADRUCIL) 1,000 mg/m2/day = 6,680 mg in sodium chloride 0.9% 240 mL chemo infusion        Antiemetics       aprepitant (CINVANTI) injection 130 mg       palonosetron 0.25mg/dexAMETHasone 12mg in NS IVPB 0.25 mg 50 mL    Therapy Plan Information  PORT FLUSH  Flushes  heparin, porcine (PF) 100 unit/mL injection flush 500 Units  500 Units, Intravenous, Every visit  sodium chloride 0.9% flush 10 mL  10 mL, Intravenous, Every visit    PORT FLUSH  Flushes  heparin, porcine (PF) 100 unit/mL injection flush 500 Units  500 Units, Intravenous, Every visit  sodium chloride 0.9% flush 10 mL  10 mL, Intravenous, Every visit

## 2024-02-19 ENCOUNTER — INFUSION (OUTPATIENT)
Dept: INFUSION THERAPY | Facility: HOSPITAL | Age: 74
End: 2024-02-19
Payer: MEDICARE

## 2024-02-19 VITALS
TEMPERATURE: 98 F | HEIGHT: 68 IN | DIASTOLIC BLOOD PRESSURE: 68 MMHG | RESPIRATION RATE: 18 BRPM | SYSTOLIC BLOOD PRESSURE: 170 MMHG | HEART RATE: 65 BPM | BODY MASS INDEX: 18.04 KG/M2 | WEIGHT: 119.06 LBS

## 2024-02-19 DIAGNOSIS — C01 SQUAMOUS CELL CARCINOMA OF BASE OF TONGUE: ICD-10-CM

## 2024-02-19 DIAGNOSIS — C44.329 SQUAMOUS CELL CARCINOMA OF SKIN OF ORBIT: Primary | ICD-10-CM

## 2024-02-19 LAB
ALBUMIN SERPL BCP-MCNC: 3.8 G/DL (ref 3.5–5.2)
ALP SERPL-CCNC: 79 U/L (ref 55–135)
ALT SERPL W/O P-5'-P-CCNC: 17 U/L (ref 10–44)
ANION GAP SERPL CALC-SCNC: 13 MMOL/L (ref 8–16)
AST SERPL-CCNC: 36 U/L (ref 10–40)
BASOPHILS # BLD AUTO: 0.01 K/UL (ref 0–0.2)
BASOPHILS NFR BLD: 0.3 % (ref 0–1.9)
BILIRUB SERPL-MCNC: 0.6 MG/DL (ref 0.1–1)
BUN SERPL-MCNC: 23 MG/DL (ref 8–23)
CALCIUM SERPL-MCNC: 10.4 MG/DL (ref 8.7–10.5)
CHLORIDE SERPL-SCNC: 97 MMOL/L (ref 95–110)
CO2 SERPL-SCNC: 27 MMOL/L (ref 23–29)
CREAT SERPL-MCNC: 1.6 MG/DL (ref 0.5–1.4)
DIFFERENTIAL METHOD BLD: ABNORMAL
EOSINOPHIL # BLD AUTO: 0.3 K/UL (ref 0–0.5)
EOSINOPHIL NFR BLD: 7 % (ref 0–8)
ERYTHROCYTE [DISTWIDTH] IN BLOOD BY AUTOMATED COUNT: 13.5 % (ref 11.5–14.5)
EST. GFR  (NO RACE VARIABLE): 44.9 ML/MIN/1.73 M^2
GLUCOSE SERPL-MCNC: 107 MG/DL (ref 70–110)
HCT VFR BLD AUTO: 33 % (ref 40–54)
HGB BLD-MCNC: 11.3 G/DL (ref 14–18)
IMM GRANULOCYTES # BLD AUTO: 0.03 K/UL (ref 0–0.04)
IMM GRANULOCYTES NFR BLD AUTO: 0.8 % (ref 0–0.5)
LYMPHOCYTES # BLD AUTO: 0.4 K/UL (ref 1–4.8)
LYMPHOCYTES NFR BLD: 10.3 % (ref 18–48)
MCH RBC QN AUTO: 35 PG (ref 27–31)
MCHC RBC AUTO-ENTMCNC: 34.2 G/DL (ref 32–36)
MCV RBC AUTO: 102 FL (ref 82–98)
MONOCYTES # BLD AUTO: 0.6 K/UL (ref 0.3–1)
MONOCYTES NFR BLD: 15.5 % (ref 4–15)
NEUTROPHILS # BLD AUTO: 2.6 K/UL (ref 1.8–7.7)
NEUTROPHILS NFR BLD: 66.1 % (ref 38–73)
NRBC BLD-RTO: 0 /100 WBC
PLATELET # BLD AUTO: 138 K/UL (ref 150–450)
PMV BLD AUTO: 9.4 FL (ref 9.2–12.9)
POTASSIUM SERPL-SCNC: 3.7 MMOL/L (ref 3.5–5.1)
PROT SERPL-MCNC: 7.7 G/DL (ref 6–8.4)
RBC # BLD AUTO: 3.23 M/UL (ref 4.6–6.2)
SODIUM SERPL-SCNC: 137 MMOL/L (ref 136–145)
WBC # BLD AUTO: 3.99 K/UL (ref 3.9–12.7)

## 2024-02-19 PROCEDURE — 96415 CHEMO IV INFUSION ADDL HR: CPT

## 2024-02-19 PROCEDURE — 96416 CHEMO PROLONG INFUSE W/PUMP: CPT

## 2024-02-19 PROCEDURE — 96413 CHEMO IV INFUSION 1 HR: CPT

## 2024-02-19 PROCEDURE — 25000003 PHARM REV CODE 250: Performed by: INTERNAL MEDICINE

## 2024-02-19 PROCEDURE — 96367 TX/PROPH/DG ADDL SEQ IV INF: CPT

## 2024-02-19 PROCEDURE — 85025 COMPLETE CBC W/AUTO DIFF WBC: CPT | Performed by: NURSE PRACTITIONER

## 2024-02-19 PROCEDURE — 96375 TX/PRO/DX INJ NEW DRUG ADDON: CPT

## 2024-02-19 PROCEDURE — 96417 CHEMO IV INFUS EACH ADDL SEQ: CPT

## 2024-02-19 PROCEDURE — 80053 COMPREHEN METABOLIC PANEL: CPT | Performed by: NURSE PRACTITIONER

## 2024-02-19 PROCEDURE — 63600175 PHARM REV CODE 636 W HCPCS: Mod: JW,JG | Performed by: INTERNAL MEDICINE

## 2024-02-19 RX ORDER — HEPARIN 100 UNIT/ML
500 SYRINGE INTRAVENOUS
Status: CANCELLED | OUTPATIENT
Start: 2024-03-11

## 2024-02-19 RX ORDER — PROCHLORPERAZINE EDISYLATE 5 MG/ML
5 INJECTION INTRAMUSCULAR; INTRAVENOUS ONCE AS NEEDED
Status: CANCELLED
Start: 2024-03-11

## 2024-02-19 RX ORDER — SODIUM CHLORIDE 0.9 % (FLUSH) 0.9 %
10 SYRINGE (ML) INJECTION
Status: CANCELLED | OUTPATIENT
Start: 2024-02-19

## 2024-02-19 RX ORDER — SODIUM CHLORIDE 0.9 % (FLUSH) 0.9 %
10 SYRINGE (ML) INJECTION
Status: CANCELLED | OUTPATIENT
Start: 2024-02-24

## 2024-02-19 RX ORDER — SODIUM CHLORIDE 0.9 % (FLUSH) 0.9 %
10 SYRINGE (ML) INJECTION
Status: DISCONTINUED | OUTPATIENT
Start: 2024-02-19 | End: 2024-02-19 | Stop reason: HOSPADM

## 2024-02-19 RX ORDER — DIPHENHYDRAMINE HYDROCHLORIDE 50 MG/ML
50 INJECTION INTRAMUSCULAR; INTRAVENOUS ONCE AS NEEDED
Status: CANCELLED | OUTPATIENT
Start: 2024-03-11

## 2024-02-19 RX ORDER — HEPARIN 100 UNIT/ML
500 SYRINGE INTRAVENOUS
Status: CANCELLED | OUTPATIENT
Start: 2024-02-19

## 2024-02-19 RX ORDER — PROCHLORPERAZINE EDISYLATE 5 MG/ML
5 INJECTION INTRAMUSCULAR; INTRAVENOUS ONCE AS NEEDED
Status: CANCELLED
Start: 2024-02-24

## 2024-02-19 RX ORDER — EPINEPHRINE 0.3 MG/.3ML
0.3 INJECTION SUBCUTANEOUS ONCE AS NEEDED
Status: DISCONTINUED | OUTPATIENT
Start: 2024-02-19 | End: 2024-02-19 | Stop reason: HOSPADM

## 2024-02-19 RX ORDER — SODIUM CHLORIDE 0.9 % (FLUSH) 0.9 %
10 SYRINGE (ML) INJECTION
Status: CANCELLED | OUTPATIENT
Start: 2024-02-20

## 2024-02-19 RX ORDER — EPINEPHRINE 0.3 MG/.3ML
0.3 INJECTION SUBCUTANEOUS ONCE AS NEEDED
Status: CANCELLED | OUTPATIENT
Start: 2024-03-11

## 2024-02-19 RX ORDER — PROCHLORPERAZINE EDISYLATE 5 MG/ML
5 INJECTION INTRAMUSCULAR; INTRAVENOUS ONCE AS NEEDED
Status: CANCELLED
Start: 2024-02-20

## 2024-02-19 RX ORDER — HEPARIN 100 UNIT/ML
500 SYRINGE INTRAVENOUS
Status: CANCELLED | OUTPATIENT
Start: 2024-02-20

## 2024-02-19 RX ORDER — SODIUM CHLORIDE 0.9 % (FLUSH) 0.9 %
10 SYRINGE (ML) INJECTION
Status: CANCELLED | OUTPATIENT
Start: 2024-03-11

## 2024-02-19 RX ORDER — EPINEPHRINE 0.3 MG/.3ML
0.3 INJECTION SUBCUTANEOUS ONCE AS NEEDED
Status: CANCELLED | OUTPATIENT
Start: 2024-02-19

## 2024-02-19 RX ORDER — DIPHENHYDRAMINE HYDROCHLORIDE 50 MG/ML
50 INJECTION INTRAMUSCULAR; INTRAVENOUS ONCE AS NEEDED
Status: CANCELLED | OUTPATIENT
Start: 2024-02-19

## 2024-02-19 RX ORDER — EPINEPHRINE 0.3 MG/.3ML
0.3 INJECTION SUBCUTANEOUS ONCE AS NEEDED
Status: CANCELLED | OUTPATIENT
Start: 2024-02-24

## 2024-02-19 RX ORDER — HEPARIN 100 UNIT/ML
500 SYRINGE INTRAVENOUS
Status: DISCONTINUED | OUTPATIENT
Start: 2024-02-19 | End: 2024-02-19 | Stop reason: HOSPADM

## 2024-02-19 RX ORDER — PROCHLORPERAZINE EDISYLATE 5 MG/ML
5 INJECTION INTRAMUSCULAR; INTRAVENOUS ONCE AS NEEDED
Status: DISCONTINUED | OUTPATIENT
Start: 2024-02-19 | End: 2024-02-19 | Stop reason: HOSPADM

## 2024-02-19 RX ORDER — DIPHENHYDRAMINE HYDROCHLORIDE 50 MG/ML
50 INJECTION INTRAMUSCULAR; INTRAVENOUS ONCE AS NEEDED
Status: DISCONTINUED | OUTPATIENT
Start: 2024-02-19 | End: 2024-02-19 | Stop reason: HOSPADM

## 2024-02-19 RX ORDER — HEPARIN 100 UNIT/ML
500 SYRINGE INTRAVENOUS
Status: CANCELLED | OUTPATIENT
Start: 2024-02-24

## 2024-02-19 RX ORDER — PROCHLORPERAZINE EDISYLATE 5 MG/ML
5 INJECTION INTRAMUSCULAR; INTRAVENOUS ONCE AS NEEDED
Status: CANCELLED
Start: 2024-02-19

## 2024-02-19 RX ORDER — DIPHENHYDRAMINE HYDROCHLORIDE 50 MG/ML
50 INJECTION INTRAMUSCULAR; INTRAVENOUS ONCE AS NEEDED
Status: CANCELLED | OUTPATIENT
Start: 2024-02-24

## 2024-02-19 RX ADMIN — SODIUM CHLORIDE: 9 INJECTION, SOLUTION INTRAVENOUS at 09:02

## 2024-02-19 RX ADMIN — DIPHENHYDRAMINE HYDROCHLORIDE 25 MG: 50 INJECTION INTRAMUSCULAR; INTRAVENOUS at 09:02

## 2024-02-19 RX ADMIN — FLUOROURACIL 5000 MG: 50 INJECTION, SOLUTION INTRAVENOUS at 02:02

## 2024-02-19 RX ADMIN — DEXAMETHASONE SODIUM PHOSPHATE 0.25 MG: 4 INJECTION, SOLUTION INTRA-ARTICULAR; INTRALESIONAL; INTRAMUSCULAR; INTRAVENOUS; SOFT TISSUE at 01:02

## 2024-02-19 RX ADMIN — APREPITANT 130 MG: 130 INJECTION, EMULSION INTRAVENOUS at 01:02

## 2024-02-19 RX ADMIN — CARBOPLATIN 295 MG: 10 INJECTION, SOLUTION INTRAVENOUS at 01:02

## 2024-02-19 RX ADMIN — CETUXIMAB 668 MG: 2 SOLUTION INTRAVENOUS at 10:02

## 2024-02-19 NOTE — PLAN OF CARE
0830 pt here for D1C1 erbitux/carbo/5 fu pump, port accessed per policy , specimen drawn and sent to lab, hx, meds, allergies reviewed, pt with no new complaints at this time, pt unable to speak due to trach, communicates by writing on paper, pt ted alexandra chair, continue to monitor

## 2024-02-19 NOTE — PLAN OF CARE
1440 pt tolerated erbitux/carbo infusion without issue, cadd pump infusing at 1.0 ml/hr for 96 hours, pt to rtc Friday around [id number] for pump d/c, no distress noted upon d/c to home

## 2024-02-21 DIAGNOSIS — H57.11 EYE PAIN, RIGHT: ICD-10-CM

## 2024-02-22 RX ORDER — OXYCODONE HYDROCHLORIDE 5 MG/1
5 TABLET ORAL EVERY 4 HOURS PRN
Qty: 90 TABLET | Refills: 0 | Status: SHIPPED | OUTPATIENT
Start: 2024-02-22 | End: 2024-03-11 | Stop reason: SDUPTHER

## 2024-02-23 ENCOUNTER — INFUSION (OUTPATIENT)
Dept: INFUSION THERAPY | Facility: HOSPITAL | Age: 74
End: 2024-02-23
Payer: MEDICARE

## 2024-02-23 VITALS
TEMPERATURE: 98 F | HEART RATE: 84 BPM | DIASTOLIC BLOOD PRESSURE: 75 MMHG | SYSTOLIC BLOOD PRESSURE: 137 MMHG | RESPIRATION RATE: 16 BRPM

## 2024-02-23 DIAGNOSIS — C44.329 SQUAMOUS CELL CARCINOMA OF SKIN OF ORBIT: Primary | ICD-10-CM

## 2024-02-23 PROCEDURE — 63600175 PHARM REV CODE 636 W HCPCS: Mod: JZ,JG | Performed by: INTERNAL MEDICINE

## 2024-02-23 PROCEDURE — 96377 APPLICATON ON-BODY INJECTOR: CPT

## 2024-02-23 PROCEDURE — A4216 STERILE WATER/SALINE, 10 ML: HCPCS | Performed by: INTERNAL MEDICINE

## 2024-02-23 PROCEDURE — 25000003 PHARM REV CODE 250: Performed by: INTERNAL MEDICINE

## 2024-02-23 RX ORDER — SODIUM CHLORIDE 0.9 % (FLUSH) 0.9 %
10 SYRINGE (ML) INJECTION
Status: DISCONTINUED | OUTPATIENT
Start: 2024-02-23 | End: 2024-02-23 | Stop reason: HOSPADM

## 2024-02-23 RX ORDER — PROCHLORPERAZINE EDISYLATE 5 MG/ML
5 INJECTION INTRAMUSCULAR; INTRAVENOUS ONCE AS NEEDED
Status: DISCONTINUED | OUTPATIENT
Start: 2024-02-23 | End: 2024-02-23 | Stop reason: HOSPADM

## 2024-02-23 RX ORDER — HEPARIN 100 UNIT/ML
500 SYRINGE INTRAVENOUS
Status: DISCONTINUED | OUTPATIENT
Start: 2024-02-23 | End: 2024-02-23 | Stop reason: HOSPADM

## 2024-02-23 RX ADMIN — Medication 10 ML: at 05:02

## 2024-02-23 RX ADMIN — HEPARIN 500 UNITS: 100 SYRINGE at 05:02

## 2024-02-23 RX ADMIN — PEGFILGRASTIM 6 MG: KIT SUBCUTANEOUS at 05:02

## 2024-02-23 NOTE — PLAN OF CARE
Problem: Anemia (Chemotherapy Effects)  Goal: Anemia Symptom Improvement  Outcome: Ongoing, Progressing     Problem: Urinary Bleeding Risk or Actual (Chemotherapy Effects)  Goal: Absence of Hematuria  Outcome: Ongoing, Progressing     Problem: Nausea and Vomiting (Chemotherapy Effects)  Goal: Fluid and Electrolyte Balance  Outcome: Ongoing, Progressing     Problem: Neurotoxicity (Chemotherapy Effects)  Goal: Neurotoxicity Symptom Control  Outcome: Ongoing, Progressing     Problem: Neutropenia (Chemotherapy Effects)  Goal: Absence of Infection  Outcome: Ongoing, Progressing     Problem: Oral Mucositis (Chemotherapy Effects)  Goal: Improved Oral Mucous Membrane Integrity  Outcome: Ongoing, Progressing     Problem: Thrombocytopenia Bleeding Risk (Chemotherapy Effects)  Goal: Absence of Bleeding  Outcome: Ongoing, Progressing     Problem: Infection  Goal: Absence of Infection Signs and Symptoms  Outcome: Ongoing, Progressing     Problem: Fatigue  Goal: Improved Activity Tolerance  Outcome: Ongoing, Progressing     Problem: Anemia  Goal: Anemia Symptom Improvement  Outcome: Ongoing, Progressing   Pt completed his 5FU pump and came in to have it d/c'd he had 0 adverse effects from the pump. A neulasta OBI was placed on the RLQ and pt was given instructions on the function and purpose of the cartridge. Pt nodded his understanding. Discharged AAOX4 and ambulatory

## 2024-02-24 ENCOUNTER — PATIENT MESSAGE (OUTPATIENT)
Dept: HEMATOLOGY/ONCOLOGY | Facility: CLINIC | Age: 74
End: 2024-02-24
Payer: MEDICARE

## 2024-02-24 DIAGNOSIS — C01 SQUAMOUS CELL CARCINOMA OF BASE OF TONGUE: ICD-10-CM

## 2024-02-26 ENCOUNTER — INFUSION (OUTPATIENT)
Dept: INFUSION THERAPY | Facility: HOSPITAL | Age: 74
End: 2024-02-26
Payer: MEDICARE

## 2024-02-26 VITALS
BODY MASS INDEX: 17.9 KG/M2 | RESPIRATION RATE: 18 BRPM | TEMPERATURE: 97 F | SYSTOLIC BLOOD PRESSURE: 130 MMHG | HEART RATE: 74 BPM | WEIGHT: 117.75 LBS | DIASTOLIC BLOOD PRESSURE: 74 MMHG

## 2024-02-26 DIAGNOSIS — C44.329 SQUAMOUS CELL CARCINOMA OF SKIN OF ORBIT: Primary | ICD-10-CM

## 2024-02-26 PROCEDURE — A4216 STERILE WATER/SALINE, 10 ML: HCPCS | Performed by: INTERNAL MEDICINE

## 2024-02-26 PROCEDURE — 96367 TX/PROPH/DG ADDL SEQ IV INF: CPT

## 2024-02-26 PROCEDURE — 25000003 PHARM REV CODE 250: Performed by: INTERNAL MEDICINE

## 2024-02-26 PROCEDURE — 63600175 PHARM REV CODE 636 W HCPCS: Mod: JZ,JG | Performed by: INTERNAL MEDICINE

## 2024-02-26 PROCEDURE — 96413 CHEMO IV INFUSION 1 HR: CPT

## 2024-02-26 RX ORDER — SODIUM CHLORIDE 0.9 % (FLUSH) 0.9 %
10 SYRINGE (ML) INJECTION
Status: DISCONTINUED | OUTPATIENT
Start: 2024-02-26 | End: 2024-02-26 | Stop reason: HOSPADM

## 2024-02-26 RX ORDER — HEPARIN 100 UNIT/ML
500 SYRINGE INTRAVENOUS
Status: DISCONTINUED | OUTPATIENT
Start: 2024-02-26 | End: 2024-02-26 | Stop reason: HOSPADM

## 2024-02-26 RX ORDER — DIPHENHYDRAMINE HYDROCHLORIDE 50 MG/ML
50 INJECTION INTRAMUSCULAR; INTRAVENOUS ONCE AS NEEDED
Status: DISCONTINUED | OUTPATIENT
Start: 2024-02-26 | End: 2024-02-26 | Stop reason: HOSPADM

## 2024-02-26 RX ORDER — EPINEPHRINE 0.3 MG/.3ML
0.3 INJECTION SUBCUTANEOUS ONCE AS NEEDED
Status: DISCONTINUED | OUTPATIENT
Start: 2024-02-26 | End: 2024-02-26 | Stop reason: HOSPADM

## 2024-02-26 RX ORDER — PROCHLORPERAZINE EDISYLATE 5 MG/ML
5 INJECTION INTRAMUSCULAR; INTRAVENOUS ONCE AS NEEDED
Status: DISCONTINUED | OUTPATIENT
Start: 2024-02-26 | End: 2024-02-26 | Stop reason: HOSPADM

## 2024-02-26 RX ADMIN — CETUXIMAB 400 MG: 2 SOLUTION INTRAVENOUS at 09:02

## 2024-02-26 RX ADMIN — DIPHENHYDRAMINE HYDROCHLORIDE 25 MG: 50 INJECTION INTRAMUSCULAR; INTRAVENOUS at 09:02

## 2024-02-26 RX ADMIN — Medication 10 ML: at 11:02

## 2024-02-26 RX ADMIN — HEPARIN 500 UNITS: 100 SYRINGE at 11:02

## 2024-02-26 NOTE — PLAN OF CARE
0830 pt here for D8C1 Erbitux infusion, labs, hx, meds, allergies reviewed, pt with no new complaints at this time, reclined in chair, continue to monitor

## 2024-02-26 NOTE — PLAN OF CARE
1200 pt tolerated erbitux infusion without issue, pt to rtc 3/4/24, no distress noted upon d/c to home

## 2024-03-01 DIAGNOSIS — G47.09 OTHER INSOMNIA: ICD-10-CM

## 2024-03-04 ENCOUNTER — INFUSION (OUTPATIENT)
Dept: INFUSION THERAPY | Facility: HOSPITAL | Age: 74
End: 2024-03-04
Payer: MEDICARE

## 2024-03-04 ENCOUNTER — DOCUMENTATION ONLY (OUTPATIENT)
Dept: HEMATOLOGY/ONCOLOGY | Facility: CLINIC | Age: 74
End: 2024-03-04
Payer: MEDICARE

## 2024-03-04 ENCOUNTER — TELEPHONE (OUTPATIENT)
Dept: HEMATOLOGY/ONCOLOGY | Facility: CLINIC | Age: 74
End: 2024-03-04
Payer: MEDICARE

## 2024-03-04 VITALS
RESPIRATION RATE: 18 BRPM | HEART RATE: 73 BPM | SYSTOLIC BLOOD PRESSURE: 147 MMHG | HEIGHT: 68 IN | TEMPERATURE: 98 F | OXYGEN SATURATION: 99 % | DIASTOLIC BLOOD PRESSURE: 65 MMHG | WEIGHT: 119.69 LBS | BODY MASS INDEX: 18.14 KG/M2

## 2024-03-04 DIAGNOSIS — C01 SQUAMOUS CELL CARCINOMA OF BASE OF TONGUE: ICD-10-CM

## 2024-03-04 DIAGNOSIS — C44.329 SQUAMOUS CELL CARCINOMA OF SKIN OF ORBIT: ICD-10-CM

## 2024-03-04 DIAGNOSIS — C77.0 SECONDARY MALIGNANT NEOPLASM OF CERVICAL LYMPH NODE: Primary | ICD-10-CM

## 2024-03-04 LAB
ALBUMIN SERPL BCP-MCNC: 3.4 G/DL (ref 3.5–5.2)
ALP SERPL-CCNC: 85 U/L (ref 55–135)
ALT SERPL W/O P-5'-P-CCNC: 25 U/L (ref 10–44)
ANION GAP SERPL CALC-SCNC: 11 MMOL/L (ref 8–16)
ANISOCYTOSIS BLD QL SMEAR: SLIGHT
AST SERPL-CCNC: 34 U/L (ref 10–40)
BASOPHILS NFR BLD: 0 % (ref 0–1.9)
BILIRUB SERPL-MCNC: 0.4 MG/DL (ref 0.1–1)
BUN SERPL-MCNC: 22 MG/DL (ref 8–23)
CALCIUM SERPL-MCNC: 9.4 MG/DL (ref 8.7–10.5)
CHLORIDE SERPL-SCNC: 104 MMOL/L (ref 95–110)
CO2 SERPL-SCNC: 24 MMOL/L (ref 23–29)
CREAT SERPL-MCNC: 1.2 MG/DL (ref 0.5–1.4)
DIFFERENTIAL METHOD BLD: ABNORMAL
DOHLE BOD BLD QL SMEAR: PRESENT
EOSINOPHIL NFR BLD: 3 % (ref 0–8)
ERYTHROCYTE [DISTWIDTH] IN BLOOD BY AUTOMATED COUNT: 12.2 % (ref 11.5–14.5)
EST. GFR  (NO RACE VARIABLE): >60 ML/MIN/1.73 M^2
GLUCOSE SERPL-MCNC: 116 MG/DL (ref 70–110)
HCT VFR BLD AUTO: 25.9 % (ref 40–54)
HGB BLD-MCNC: 9.2 G/DL (ref 14–18)
IMM GRANULOCYTES # BLD AUTO: ABNORMAL K/UL (ref 0–0.04)
IMM GRANULOCYTES NFR BLD AUTO: ABNORMAL % (ref 0–0.5)
LYMPHOCYTES NFR BLD: 14 % (ref 18–48)
MAGNESIUM SERPL-MCNC: 1.3 MG/DL (ref 1.6–2.6)
MCH RBC QN AUTO: 35.4 PG (ref 27–31)
MCHC RBC AUTO-ENTMCNC: 35.5 G/DL (ref 32–36)
MCV RBC AUTO: 100 FL (ref 82–98)
METAMYELOCYTES NFR BLD MANUAL: 2 %
MONOCYTES NFR BLD: 3 % (ref 4–15)
NEUTROPHILS NFR BLD: 70 % (ref 38–73)
NEUTS BAND NFR BLD MANUAL: 4 %
NRBC BLD-RTO: 0 /100 WBC
PLATELET # BLD AUTO: 25 K/UL (ref 150–450)
PLATELET BLD QL SMEAR: ABNORMAL
PMV BLD AUTO: 12.7 FL (ref 9.2–12.9)
POLYCHROMASIA BLD QL SMEAR: ABNORMAL
POTASSIUM SERPL-SCNC: 3.7 MMOL/L (ref 3.5–5.1)
PROMYELOCYTES NFR BLD MANUAL: 2 %
PROT SERPL-MCNC: 7.1 G/DL (ref 6–8.4)
RBC # BLD AUTO: 2.6 M/UL (ref 4.6–6.2)
SODIUM SERPL-SCNC: 139 MMOL/L (ref 136–145)
T4 FREE SERPL-MCNC: 0.62 NG/DL (ref 0.71–1.51)
TOXIC GRANULES BLD QL SMEAR: PRESENT
TSH SERPL DL<=0.005 MIU/L-ACNC: 103.02 UIU/ML (ref 0.4–4)
WBC # BLD AUTO: 5.66 K/UL (ref 3.9–12.7)
WBC OTHER NFR BLD MANUAL: 2 %

## 2024-03-04 PROCEDURE — 85027 COMPLETE CBC AUTOMATED: CPT | Performed by: NURSE PRACTITIONER

## 2024-03-04 PROCEDURE — 85007 BL SMEAR W/DIFF WBC COUNT: CPT | Performed by: NURSE PRACTITIONER

## 2024-03-04 PROCEDURE — 85060 BLOOD SMEAR INTERPRETATION: CPT | Mod: ,,, | Performed by: PATHOLOGY

## 2024-03-04 PROCEDURE — 84443 ASSAY THYROID STIM HORMONE: CPT | Performed by: NURSE PRACTITIONER

## 2024-03-04 PROCEDURE — 80053 COMPREHEN METABOLIC PANEL: CPT | Performed by: NURSE PRACTITIONER

## 2024-03-04 PROCEDURE — A4216 STERILE WATER/SALINE, 10 ML: HCPCS | Performed by: INTERNAL MEDICINE

## 2024-03-04 PROCEDURE — 84439 ASSAY OF FREE THYROXINE: CPT | Performed by: NURSE PRACTITIONER

## 2024-03-04 PROCEDURE — 63600175 PHARM REV CODE 636 W HCPCS: Performed by: INTERNAL MEDICINE

## 2024-03-04 PROCEDURE — 83735 ASSAY OF MAGNESIUM: CPT | Performed by: NURSE PRACTITIONER

## 2024-03-04 PROCEDURE — 36591 DRAW BLOOD OFF VENOUS DEVICE: CPT

## 2024-03-04 PROCEDURE — 25000003 PHARM REV CODE 250: Performed by: INTERNAL MEDICINE

## 2024-03-04 RX ORDER — HEPARIN 100 UNIT/ML
500 SYRINGE INTRAVENOUS
Status: DISCONTINUED | OUTPATIENT
Start: 2024-03-04 | End: 2024-03-04 | Stop reason: HOSPADM

## 2024-03-04 RX ORDER — HEPARIN 100 UNIT/ML
500 SYRINGE INTRAVENOUS
OUTPATIENT
Start: 2024-03-04

## 2024-03-04 RX ORDER — SODIUM CHLORIDE 0.9 % (FLUSH) 0.9 %
10 SYRINGE (ML) INJECTION
Status: DISCONTINUED | OUTPATIENT
Start: 2024-03-04 | End: 2024-03-04 | Stop reason: HOSPADM

## 2024-03-04 RX ORDER — SODIUM CHLORIDE 0.9 % (FLUSH) 0.9 %
10 SYRINGE (ML) INJECTION
OUTPATIENT
Start: 2024-03-04

## 2024-03-04 RX ADMIN — HEPARIN 500 UNITS: 100 SYRINGE at 09:03

## 2024-03-04 RX ADMIN — Medication 10 ML: at 09:03

## 2024-03-04 NOTE — PLAN OF CARE
Pt unable to received treatment today due to low platelets. Port deaccessed after flushing. Ambulatory from clinic in [facility].

## 2024-03-04 NOTE — TELEPHONE ENCOUNTER
I spoke to Ronna in the infusion departmanet. She is aware that the treatment will be held and to have, Tarsha who was assigned to him assess for active bleeding. This is due to critical plt count of 25. Ct Francis NP requested the holding of treatment.

## 2024-03-05 ENCOUNTER — TELEPHONE (OUTPATIENT)
Dept: HEMATOLOGY/ONCOLOGY | Facility: CLINIC | Age: 74
End: 2024-03-05
Payer: MEDICARE

## 2024-03-05 ENCOUNTER — PATIENT MESSAGE (OUTPATIENT)
Dept: HEMATOLOGY/ONCOLOGY | Facility: CLINIC | Age: 74
End: 2024-03-05
Payer: MEDICARE

## 2024-03-05 DIAGNOSIS — L27.0 DRUG RASH: Primary | ICD-10-CM

## 2024-03-05 LAB — PATH REV BLD -IMP: NORMAL

## 2024-03-05 RX ORDER — TRAZODONE HYDROCHLORIDE 50 MG/1
50 TABLET ORAL NIGHTLY
Qty: 90 TABLET | Refills: 3 | Status: SHIPPED | OUTPATIENT
Start: 2024-03-05

## 2024-03-05 NOTE — TELEPHONE ENCOUNTER
I spoke to the pt's brother. He is not currently with the Mr. Valiente at the present time. I informed him that the pt had sent a request for a refill on Doxcycline. I told him the last time I saw that it was ordered was on 11/30/23. At that time it was prior to dental surgery. I asked him to please ask his brother who told him he needs to take this med. I then addressed with him that his brother reported he does take his synthroid but due to thick saliva in his mouth is not sure if it is making it to his stomach. The pt discussed dissolving it in warm water to help with swallowing it. I informed him that I do not want him to do that until it has been verified with a pharmacist that this is permissible. He is to continue to take it as he usually does until told otherwise.

## 2024-03-06 RX ORDER — DOXYCYCLINE HYCLATE 100 MG
100 TABLET ORAL 2 TIMES DAILY
Qty: 42 TABLET | Refills: 0 | Status: ON HOLD | OUTPATIENT
Start: 2024-03-06 | End: 2024-04-11

## 2024-03-06 NOTE — TELEPHONE ENCOUNTER
I have sent a pt message to Mr. Swain. I informed him the the pharmacist said it was okay for him to crush his Synthroid in one to two teaspoons of water. He is to drink it immediately. Please see other message.

## 2024-03-06 NOTE — TELEPHONE ENCOUNTER
I talked to the pt's brother. I informed him that a refill for the Doxycycline has been sent by Dr. Hernandez. At the brother's request it will be sent to the [facility] main pharmacy. I also informed him that the Synthroid tablets can be crushed and put into 5-10 ml of warm water. It is to be drunk right away. I informed him that I had sent messages to the pt in regards to this. I asked that he please review the messages with him. Pt to reach out if he has any questions. He verbalized an understanding.

## 2024-03-11 ENCOUNTER — OFFICE VISIT (OUTPATIENT)
Dept: HEMATOLOGY/ONCOLOGY | Facility: CLINIC | Age: 74
End: 2024-03-11
Payer: MEDICARE

## 2024-03-11 ENCOUNTER — INFUSION (OUTPATIENT)
Dept: INFUSION THERAPY | Facility: HOSPITAL | Age: 74
End: 2024-03-11
Payer: MEDICARE

## 2024-03-11 VITALS
OXYGEN SATURATION: 97 % | SYSTOLIC BLOOD PRESSURE: 125 MMHG | TEMPERATURE: 98 F | WEIGHT: 118.63 LBS | BODY MASS INDEX: 17.98 KG/M2 | HEIGHT: 68 IN | HEART RATE: 81 BPM | RESPIRATION RATE: 14 BRPM | DIASTOLIC BLOOD PRESSURE: 78 MMHG

## 2024-03-11 VITALS
OXYGEN SATURATION: 99 % | DIASTOLIC BLOOD PRESSURE: 62 MMHG | HEART RATE: 85 BPM | WEIGHT: 118.63 LBS | BODY MASS INDEX: 17.98 KG/M2 | SYSTOLIC BLOOD PRESSURE: 122 MMHG | HEIGHT: 68 IN | RESPIRATION RATE: 16 BRPM

## 2024-03-11 DIAGNOSIS — E03.2 HYPOTHYROIDISM DUE TO MEDICAMENTS AND OTHER EXOGENOUS SUBSTANCES: ICD-10-CM

## 2024-03-11 DIAGNOSIS — D50.0 IRON DEFICIENCY ANEMIA DUE TO CHRONIC BLOOD LOSS: ICD-10-CM

## 2024-03-11 DIAGNOSIS — C44.329 SQUAMOUS CELL CARCINOMA OF SKIN OF ORBIT: Primary | ICD-10-CM

## 2024-03-11 DIAGNOSIS — C01 SQUAMOUS CELL CARCINOMA OF BASE OF TONGUE: ICD-10-CM

## 2024-03-11 DIAGNOSIS — Z93.0 TRACHEOSTOMY STATUS: ICD-10-CM

## 2024-03-11 DIAGNOSIS — C77.0 SECONDARY MALIGNANT NEOPLASM OF CERVICAL LYMPH NODE: ICD-10-CM

## 2024-03-11 DIAGNOSIS — D84.821 IMMUNODEFICIENCY DUE TO DRUGS: ICD-10-CM

## 2024-03-11 DIAGNOSIS — R79.89 ELEVATED SERUM CREATININE: ICD-10-CM

## 2024-03-11 DIAGNOSIS — Z94.4 STATUS POST LIVER TRANSPLANTATION: ICD-10-CM

## 2024-03-11 DIAGNOSIS — H57.11 EYE PAIN, RIGHT: ICD-10-CM

## 2024-03-11 DIAGNOSIS — Z79.899 IMMUNODEFICIENCY DUE TO DRUGS: ICD-10-CM

## 2024-03-11 DIAGNOSIS — K26.4 GASTROINTESTINAL HEMORRHAGE ASSOCIATED WITH DUODENAL ULCER: ICD-10-CM

## 2024-03-11 PROCEDURE — 96367 TX/PROPH/DG ADDL SEQ IV INF: CPT

## 2024-03-11 PROCEDURE — 25000003 PHARM REV CODE 250: Performed by: NURSE PRACTITIONER

## 2024-03-11 PROCEDURE — A4216 STERILE WATER/SALINE, 10 ML: HCPCS | Performed by: INTERNAL MEDICINE

## 2024-03-11 PROCEDURE — 96413 CHEMO IV INFUSION 1 HR: CPT

## 2024-03-11 PROCEDURE — 99215 OFFICE O/P EST HI 40 MIN: CPT | Mod: PBBFAC,25 | Performed by: NURSE PRACTITIONER

## 2024-03-11 PROCEDURE — 25000003 PHARM REV CODE 250: Performed by: INTERNAL MEDICINE

## 2024-03-11 PROCEDURE — 99215 OFFICE O/P EST HI 40 MIN: CPT | Mod: S$PBB,,, | Performed by: NURSE PRACTITIONER

## 2024-03-11 PROCEDURE — 63600175 PHARM REV CODE 636 W HCPCS: Performed by: INTERNAL MEDICINE

## 2024-03-11 PROCEDURE — 99999 PR PBB SHADOW E&M-EST. PATIENT-LVL V: CPT | Mod: PBBFAC,,, | Performed by: NURSE PRACTITIONER

## 2024-03-11 PROCEDURE — 96360 HYDRATION IV INFUSION INIT: CPT

## 2024-03-11 RX ORDER — EPINEPHRINE 0.3 MG/.3ML
0.3 INJECTION SUBCUTANEOUS ONCE AS NEEDED
Status: DISCONTINUED | OUTPATIENT
Start: 2024-03-11 | End: 2024-03-11 | Stop reason: HOSPADM

## 2024-03-11 RX ORDER — HEPARIN 100 UNIT/ML
500 SYRINGE INTRAVENOUS
Status: DISCONTINUED | OUTPATIENT
Start: 2024-03-11 | End: 2024-03-11 | Stop reason: HOSPADM

## 2024-03-11 RX ORDER — OXYCODONE HYDROCHLORIDE 5 MG/1
5 TABLET ORAL EVERY 4 HOURS PRN
Qty: 138 TABLET | Refills: 0 | Status: SHIPPED | OUTPATIENT
Start: 2024-03-11 | End: 2024-04-04

## 2024-03-11 RX ORDER — PROCHLORPERAZINE EDISYLATE 5 MG/ML
5 INJECTION INTRAMUSCULAR; INTRAVENOUS ONCE AS NEEDED
Status: CANCELLED
Start: 2024-03-18

## 2024-03-11 RX ORDER — PROCHLORPERAZINE EDISYLATE 5 MG/ML
5 INJECTION INTRAMUSCULAR; INTRAVENOUS ONCE AS NEEDED
Status: DISCONTINUED | OUTPATIENT
Start: 2024-03-11 | End: 2024-03-11 | Stop reason: HOSPADM

## 2024-03-11 RX ORDER — SODIUM CHLORIDE 0.9 % (FLUSH) 0.9 %
10 SYRINGE (ML) INJECTION
Status: CANCELLED | OUTPATIENT
Start: 2024-03-18

## 2024-03-11 RX ORDER — SODIUM CHLORIDE 0.9 % (FLUSH) 0.9 %
10 SYRINGE (ML) INJECTION
Status: DISCONTINUED | OUTPATIENT
Start: 2024-03-11 | End: 2024-03-11 | Stop reason: HOSPADM

## 2024-03-11 RX ORDER — DIPHENHYDRAMINE HYDROCHLORIDE 50 MG/ML
50 INJECTION INTRAMUSCULAR; INTRAVENOUS ONCE AS NEEDED
Status: DISCONTINUED | OUTPATIENT
Start: 2024-03-11 | End: 2024-03-11 | Stop reason: HOSPADM

## 2024-03-11 RX ORDER — HEPARIN 100 UNIT/ML
500 SYRINGE INTRAVENOUS
Status: CANCELLED | OUTPATIENT
Start: 2024-03-18

## 2024-03-11 RX ORDER — LEVOTHYROXINE SODIUM 125 UG/1
125 TABLET ORAL DAILY
Qty: 30 TABLET | Refills: 5 | Status: SHIPPED | OUTPATIENT
Start: 2024-03-11 | End: 2024-03-25

## 2024-03-11 RX ORDER — EPINEPHRINE 0.3 MG/.3ML
0.3 INJECTION SUBCUTANEOUS ONCE AS NEEDED
Status: CANCELLED | OUTPATIENT
Start: 2024-03-18

## 2024-03-11 RX ORDER — DIPHENHYDRAMINE HYDROCHLORIDE 50 MG/ML
50 INJECTION INTRAMUSCULAR; INTRAVENOUS ONCE AS NEEDED
Status: CANCELLED | OUTPATIENT
Start: 2024-03-18

## 2024-03-11 RX ADMIN — SODIUM CHLORIDE 1000 ML: 9 INJECTION, SOLUTION INTRAVENOUS at 10:03

## 2024-03-11 RX ADMIN — CETUXIMAB 400 MG: 2 SOLUTION INTRAVENOUS at 11:03

## 2024-03-11 RX ADMIN — HEPARIN 500 UNITS: 100 SYRINGE at 01:03

## 2024-03-11 RX ADMIN — DIPHENHYDRAMINE HYDROCHLORIDE 25 MG: 50 INJECTION, SOLUTION INTRAMUSCULAR; INTRAVENOUS at 10:03

## 2024-03-11 RX ADMIN — Medication 10 ML: at 01:03

## 2024-03-11 RX ADMIN — SODIUM CHLORIDE: 9 INJECTION, SOLUTION INTRAVENOUS at 10:03

## 2024-03-11 NOTE — PROGRESS NOTES
ONCOLOGY FOLLOW UP VISIT    Subjective:      Patient ID: Rocco Swain    Chief Complaint: Squamous cell carcinoma of base of tongue      HPI  Rocco Swain is a 74 y.o. male who returns to clinic for management of newly progressing cSCC. Patient had ANGELES on October PETCT, but then developed quickly progressing orbital lesion. He was seen at [facility] in [place]. Recommendation was to follow up at [facility] to initiate induction chemotherapy followed by possible surgery.     Interval HPI:  Today, patient reports fluctuating constipation and diarrhea. Takes stool softener when needed. Orbital lesion is smaller. States he is compliant with synthroid but thinks it is getting stuck in his oral secretions. No complaints of rash - using doxycycline. Denies nausea - did not take zyprexa with C1D1. Intermittent mouth sores and using magic mouthwash.     ECOG Performance status: 1 - Symptomatic but completely ambulatory Communication from him is 100% written.     Cancer Staging   Squamous cell carcinoma of base of tongue  Staging form: Pharynx - HPV-Mediated Oropharynx, AJCC 8th Edition  - Clinical stage from 9/18/2020: Stage III (rcT4, cN1, cM0, p16+) - Signed by Ronald Berg MD on 12/27/2022      Oncologic History:  Oncology History   Squamous cell carcinoma of base of tongue   9/18/2020 Cancer Staged    Staging form: Pharynx - HPV-Mediated Oropharynx, AJCC 8th Edition  - Clinical stage from 9/18/2020: Stage III (rcT4, cN1, cM0, p16+)     9/28/2020 Initial Diagnosis    Squamous cell carcinoma of base of tongue     10/6/2020 - 8/17/2021 Chemotherapy    Treatment Summary   Plan Name: OP HEAD NECK CARBOPLATIN PACLITAXEL C1-2 FOLLOWED BY CETUXIMAB CARBOPLATIN C3-6 FOLLOWED BY CETUXIMAB MAINTENANCE WEEKLY  Treatment Goal: Control  Status: Inactive  Start Date: 10/6/2020  End Date: 8/17/2021  Provider: Ronald Berg MD  Chemotherapy: cetuximab (ERBITUX) 100 mg/50 mL chemo infusion 684 mg, 400 mg/m2 = 684 mg (100 % of original  dose 400 mg/m2), Intravenous, [facility] 1 time, 29 of 38 cycles  Dose modification: 500 mg/m2 (original dose 400 mg/m2, Cycle 3), 250 mg/m2 (original dose 400 mg/m2, Cycle 3), 400 mg/m2 (original dose 400 mg/m2, Cycle 3), 250 mg/m2 (original dose 250 mg/m2, Cycle 7), 200 mg/m2 (original dose 250 mg/m2, Cycle 18), 200 mg/m2 (original dose 250 mg/m2, Cycle 19), 250 mg/m2 (original dose 250 mg/m2, Cycle 4), 200 mg/m2 (original dose 250 mg/m2, Cycle 25), 200 mg/m2 (original dose 250 mg/m2, Cycle 28)  Administration: 684 mg (11/17/2020), 400 mg (11/24/2020), 400 mg (2/9/2021), 400 mg (2/17/2021), 427.6 mg (3/9/2021), 400 mg (3/30/2021), 400 mg (3/23/2021), 400 mg (4/6/2021), 427.6 mg (4/13/2021), 427.6 mg (4/20/2021), 342 mg (5/11/2021), 342 mg (5/18/2021), 342 mg (5/25/2021), 400 mg (5/31/2021), 400 mg (6/7/2021), 400 mg (6/14/2021), 400 mg (12/8/2020), 427.6 mg (12/29/2020), 400 mg (12/1/2020), 400 mg (12/15/2020), 400 mg (12/22/2020), 427.6 mg (1/5/2021), 400 mg (1/12/2021), 400 mg (1/19/2021), 427.6 mg (1/26/2021), 400 mg (2/2/2021), 400 mg (2/23/2021), 400 mg (3/2/2021), 427.6 mg (3/16/2021), 400 mg (6/21/2021), 342 mg (6/28/2021), 342 mg (7/6/2021), 342 mg (7/13/2021), 342 mg (7/20/2021), 400 mg (7/27/2021), 400 mg (8/10/2021), 400 mg (8/17/2021)  CARBOplatin (PARAPLATIN) 310 mg in sodium chloride 0.9% 500 mL chemo infusion, 310 mg (92.2 % of original dose 334.5 mg), Intravenous, Clinic/HOD 1 time, 6 of 6 cycles  Dose modification:   (original dose 334.5 mg, Cycle 1)  Administration: 310 mg (10/6/2020), 370 mg (11/17/2020), 300 mg (10/27/2020), 350 mg (12/8/2020), 335 mg (12/29/2020), 320 mg (1/19/2021)  PACLitaxeL (TAXOL) 175 mg/m2 = 300 mg in sodium chloride 0.9% 500 mL chemo infusion, 175 mg/m2 = 300 mg (100 % of original dose 175 mg/m2), Intravenous, Clinic/HOD 1 time, 2 of 2 cycles  Dose modification: 175 mg/m2 (original dose 175 mg/m2, Cycle 1)  Administration: 300 mg (10/6/2020), 300 mg (10/27/2020)      4/29/2021 Tumor Conference       His case was discussed at the Multidisciplinary Head and Neck Team Planning Meeting.    Representatives from Medical Oncology, Radiation Oncology, Head and Neck Surgical Oncology, Psychosocial Oncology, and Speech and Language Pathology discussed the case with the following recommendations:    1) biopsy         7/29/2021 Tumor Conference       His case was discussed at the Multidisciplinary Head and Neck Team Planning Meeting.    Representatives from Medical Oncology, Radiation Oncology, Head and Neck Surgical Oncology, Psychosocial Oncology, and Speech and Language Pathology discussed the case with the following recommendations:    1) Head and neck clinic follow up  2) consider Keytruda (discuss with transplant)  3) consider palliative referral         9/13/2021 - 12/29/2023 Chemotherapy    Treatment Summary   Plan Name: OP HEAD NECK PEMBROLIZUMAB CARBOPLATIN FLUOROURACIL (C1 ONLY RECEIVED) FOLLOWED BY PEMBROLIZUMAB MAINTENANCE  Treatment Goal: Palliative  Status: Inactive  Start Date: 9/13/2021  End Date: 12/29/2023  Provider: Ronald Berg MD  Chemotherapy: CARBOplatin (PARAPLATIN) 320 mg in sodium chloride 0.9% 500 mL chemo infusion, 320 mg (100 % of original dose 320.5 mg), Intravenous, Clinic/HOD 1 time, 6 of 6 cycles  Dose modification:   (original dose 320.5 mg, Cycle 1)  Administration: 320 mg (9/13/2021), 335 mg (12/23/2021), 325 mg (1/27/2022), 245 mg (3/3/2022), 255 mg (5/12/2022), 255 mg (4/7/2022)  fluorouraciL 1,000 mg/m2/day = 6,440 mg in sodium chloride 0.9% 240 mL chemo infusion, 1,000 mg/m2/day = 6,440 mg, Intravenous, Over 96 hours, 6 of 6 cycles  Dose modification: 800 mg/m2/day (original dose 1,000 mg/m2/day, Cycle 6), 5,000 mg (original dose 1,000 mg/m2/day, Cycle 8)  Administration: 6,440 mg (9/13/2021), 6,440 mg (12/23/2021), 6,480 mg (1/27/2022), 5,000 mg (3/3/2022), 5,000 mg (4/7/2022), 5,000 mg (5/12/2022)     Secondary malignant neoplasm of cervical lymph  node   2/9/2021 Initial Diagnosis    Secondary malignant neoplasm of cervical lymph node     9/13/2021 - 12/29/2023 Chemotherapy    Treatment Summary   Plan Name: OP HEAD NECK PEMBROLIZUMAB CARBOPLATIN FLUOROURACIL (C1 ONLY RECEIVED) FOLLOWED BY PEMBROLIZUMAB MAINTENANCE  Treatment Goal: Palliative  Status: Inactive  Start Date: 9/13/2021  End Date: 12/29/2023  Provider: Ronald Berg MD  Chemotherapy: CARBOplatin (PARAPLATIN) 320 mg in sodium chloride 0.9% 500 mL chemo infusion, 320 mg (100 % of original dose 320.5 mg), Intravenous, Clinic/HOD 1 time, 6 of 6 cycles  Dose modification:   (original dose 320.5 mg, Cycle 1)  Administration: 320 mg (9/13/2021), 335 mg (12/23/2021), 325 mg (1/27/2022), 245 mg (3/3/2022), 255 mg (5/12/2022), 255 mg (4/7/2022)  fluorouraciL 1,000 mg/m2/day = 6,440 mg in sodium chloride 0.9% 240 mL chemo infusion, 1,000 mg/m2/day = 6,440 mg, Intravenous, Over 96 hours, 6 of 6 cycles  Dose modification: 800 mg/m2/day (original dose 1,000 mg/m2/day, Cycle 6), 5,000 mg (original dose 1,000 mg/m2/day, Cycle 8)  Administration: 6,440 mg (9/13/2021), 6,440 mg (12/23/2021), 6,480 mg (1/27/2022), 5,000 mg (3/3/2022), 5,000 mg (4/7/2022), 5,000 mg (5/12/2022)     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   1/29/2024 Initial Diagnosis    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     2/19/2024 -  Chemotherapy    Treatment Summary   Plan Name: OP HEAD NECK CETUXIMAB CARBOPLATIN FLUOROURACIL Q3W  Treatment Goal: Curative  Status: Active  Start Date: 2/19/2024  End Date: 8/5/2024 (Planned)  Provider: Martín Hernandez MD  Chemotherapy: cetuximab (ERBITUX) 100 mg/50 mL chemo infusion 668 mg, 400 mg/m2 = 668 mg, Intravenous, Clinic/HOD 1 time, 1 of 12 cycles  Administration: 668 mg (2/19/2024), 400 mg (2/26/2024), 400 mg (3/11/2024)  CARBOplatin (PARAPLATIN) 295 mg in sodium chloride 0.9% 314.5 mL chemo infusion, 295 mg, Intravenous, Clinic/HOD 1 time, 1 of 6 cycles  Administration: 295 mg (2/19/2024)          Review of Systems   Constitutional:  Negative for activity change, appetite change, chills, fatigue, fever and unexpected weight change.   HENT:  Positive for mouth sores (mild). Negative for ear pain, facial swelling, hearing loss, nosebleeds, sore throat, trouble swallowing and voice change.         No verbal communication. Skin fixed and immobile from previous scaring post-neck dissection   Eyes:  Positive for redness (right eye - pain). Negative for pain, discharge and visual disturbance.   Respiratory:  Negative for cough, choking, chest tightness and shortness of breath.    Cardiovascular:  Negative for chest pain, palpitations and leg swelling.   Gastrointestinal:  Positive for constipation and diarrhea. Negative for abdominal distention, abdominal pain, blood in stool, nausea and vomiting.   Endocrine: Negative for cold intolerance and heat intolerance.   Genitourinary:  Negative for difficulty urinating, dysuria, frequency and urgency.   Musculoskeletal:  Positive for back pain (lower - chronic). Negative for arthralgias, gait problem, leg pain and myalgias.   Integumentary:  Positive for mole/lesion (left nare and neck - cSCCv > improving). Negative for pallor, rash and wound.   Allergic/Immunologic: Negative for frequent infections.   Neurological:  Negative for dizziness, tremors, weakness, light-headedness, numbness and headaches.   Hematological:  Negative for adenopathy. Does not bruise/bleed easily.   Psychiatric/Behavioral:  Negative for agitation, confusion, dysphoric mood and sleep disturbance. The patient is not nervous/anxious.         Allergies:  Review of patient's allergies indicates:   Allergen Reactions    Aspirin     Tylenol extended release        Medications:  Current Outpatient Medications   Medication Sig Dispense Refill    azelaic acid (AZELEX) 15 % gel APPLY TOPICALLY TO AFFECTED AREA IN THE MORNING 50 g 3    chlorhexidine (PERIDEX) 0.12 % solution Gently swish and spit 15  mL twice a day for 3 weeks. Start rinses the day after your procedure. 473 mL 1    clindamycin phosphate 1% (CLINDAGEL) 1 % gel Apply topically 2 (two) times daily. 60 g 3    doxycycline (VIBRA-TABS) 100 MG tablet Take 1 tablet (100 mg total) by mouth 2 (two) times daily. 42 tablet 0    doxycycline hyclate 150 mg Tab Take two tabs PO 1 hour before dental surgery. 2 tablet 0    erythromycin (ROMYCIN) ophthalmic ointment Administer 0.5 inches to the right eye twice daily for 30 days. 3.5 g 1    hydrocortisone 1 % cream Apply to face, hands, feet, neck, back and chest at bedtime. 112 g 2    imiquimod (ALDARA) 5 % cream Apply to biopsy site on frontal scalp + about a centimeter of surrounding skin qhs x 16 weeks. Wash off in am and apply sunscreen. Discontinue if skin becomes blistered, raw, bleeding, painful, etc. 24 packet 3    LIDOcaine HCl 2% (LIDOCAINE VISCOUS) 2 % Soln Swish and spit 15 mls every 8 (eight) hours as needed (mouth sore). 100 mL 3    magic mouthwash diphen/antac/lidoc/nysta Take 10 mLs by mouth 4 (four) times daily. 120 mL 4    metroNIDAZOLE (METROGEL) 0.75 % gel Apply topically to affected area 2 (two) times daily. 45 g 1    multivit-min/FA/lycopen/lutein (CENTRUM SILVER MEN ORAL) Take 1 tablet by mouth once daily.      OLANZapine (ZYPREXA) 5 MG tablet Take 1 tablet (5 mg total) by mouth every evening. Take as directed on days 1-4 of your chemotherapy cycle. 30 tablet 5    pentoxifylline (TRENTAL) 400 mg TbSR Take 1 tablet by mouth twice daily until gone. Start taking one week prior to your dental surgery. 112 tablet 0    prednisoLONE acetate (PRED FORTE) 1 % DrpS Place 1 drop into the right eye 4 (four) times daily. 10 mL 3    prochlorperazine (COMPAZINE) 10 MG tablet Take ½ tablet (5 mg total) by mouth every 6 (six) hours as needed. 30 tablet 1    sulfacetamide sodium-sulfur 10-5 % (w/w) Clsr USE TO WASH AFFECTED AREA DAILY      tacrolimus (PROGRAF) 0.5 MG Cap Take 1 capsule (0.5  mg total) by mouth every EVENING.  (Use the 1mg capsule for your morning dose) 90 capsule 3    tacrolimus (PROGRAF) 1 MG Cap Take 1 capsule (1 mg total) by mouth every MORNING. Use the tacrolimus 0.5mg capsule for your evening dose as directed. 90 capsule 3    tiZANidine (ZANAFLEX) 2 MG tablet Take 1 tablet (2 mg total) by mouth nightly as needed (neck muscle strain). 30 tablet 3    traZODone (DESYREL) 50 MG tablet TAKE 1 TABLET BY MOUTH IN THE  EVENING 90 tablet 3    vitamin E 1000 UNIT capsule Take 1 cap PO BID until gone. Start taking one week prior to your dental surgery. 112 capsule 0    gabapentin (NEURONTIN) 300 MG capsule Take 1 capsule (300 mg total) by mouth every evening. (Patient taking differently: Take 300 mg by mouth as needed.) 30 capsule 11    levothyroxine (SYNTHROID) 125 MCG tablet Take 1 tablet (125 mcg total) by mouth once daily. 30 tablet 5    oxyCODONE (ROXICODONE) 5 MG immediate release tablet Take 1 tablet (5 mg total) by mouth every 4 (four) hours as needed for Pain. 138 tablet 0     No current facility-administered medications for this visit.     Facility-Administered Medications Ordered in Other Visits   Medication Dose Route Frequency Provider Last Rate Last Admin    heparin, porcine (PF) 100 unit/mL injection flush 500 Units  500 Units Intravenous PRN Ronald Berg MD        ofloxacin 0.3 % ophthalmic solution 1 drop  1 drop Right Eye On Call Procedure Victor M Vasques MD   2 drop at 10/11/22 0745    sodium chloride 0.9% flush 10 mL  10 mL Intravenous PRN Ronald Berg MD        sodium chloride 0.9% flush 10 mL  10 mL Intravenous PRN Victor M Vasques MD           PMH:  Past Medical History:   Diagnosis Date    Basal cell carcinoma (BCC) in situ of skin 2012    3 on face, 2 on arm, removed by dermatology.    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 Underwent surgical resection of tongue and neck. 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 temple 01/24/2024    right temple       PSH:  Past Surgical History:   Procedure Laterality Date    COLONOSCOPY      COLONOSCOPY N/A 9/14/2023    Procedure: COLONOSCOPY;  Surgeon: Reyes Olivia MD;  [facility]  Service: Endoscopy;  Laterality: N/A;    CONJUNCTIVA BIOPSY Right 10/11/2022    Procedure: BIOPSY, CONJUNCTIVA;  Surgeon: Victor M Vasques MD;  Location: [facility] OR;  Service: Ophthalmology;  Laterality: Right;    ESOPHAGOGASTRODUODENOSCOPY N/A 9/14/2023    Procedure: EGD (ESOPHAGOGASTRODUODENOSCOPY);  Surgeon: Reyes Olivia MD;  Location: [facility] (2ND FLR);  Service: Endoscopy;  Laterality: N/A;    INSERTION OF VENOUS ACCESS PORT Right 3/8/2021    Procedure: INSERTION, VENOUS ACCESS PORT;  Surgeon: Jesus Rendon MD;  Location: [address];  Service: General;  Laterality: Right;    LIVER TRANSPLANT  11/2008    transplanted for biopsy proven hepatocellular carcinoma,     LYMPH NODE BIOPSY N/A 9/18/2020    Procedure: BIOPSY, LYMPH NODE;  Surgeon: Taz Diagnostic Provider;  [facility];  Service: General;  Laterality: N/A;  Rm 173    SURGICAL REMOVAL OF SCAR Right 8/24/2023    Procedure: EXCISION, SCAR;  Surgeon: Ting Brambila MD;  [facility];  Service: Ophthalmology;  Laterality: Right;    TRACHEOSTOMY         FamHx:  Family History   Problem Relation Age of Onset    Dementia Mother        SocHx:  Social History     Socioeconomic History    Marital status: Single   Occupational History    Occupation: Retired     Comment: , notes exposures to fumes etc.  Worked on [employer] for 4 years  "  Tobacco Use    Smoking status: Never    Smokeless tobacco: Never   Substance and Sexual Activity    Alcohol use: Yes     Comment: drinks wine, 1 glass day    Drug use: Not Currently    Sexual activity: Yes     Comment: No prior history of STD        Distress Score             Objective:      Vitals:    03/11/24 0902   BP: 125/78   BP Location: Left arm   Patient Position: Sitting   BP Method: Medium (Automatic)   Pulse: 81   Resp: 14   Temp: 98.2 °F (36.8 °C)   TempSrc: Oral   SpO2: 97%   Weight: 53.8 kg (118 lb 9.7 oz)   Height: 5' 8" (1.727 m)            Physical Exam  Vitals reviewed.   Constitutional:       General: He is not in acute distress.     Appearance: Normal appearance. He is well-developed and underweight.   HENT:      Head: Normocephalic and atraumatic.      Mouth/Throat:      Mouth: Mucous membranes are moist.      Dentition: Abnormal dentition. Dental caries present.   Eyes:      Conjunctiva/sclera: Conjunctivae normal.      Pupils: Pupils are equal, round, and reactive to light.      Comments: Right eye - injected and nodular growth on the inner eye conjunctiva   Neck:      Trachea: Tracheostomy present.   Pulmonary:      Effort: Pulmonary effort is normal. No respiratory distress.      Comments: Tracheostomy  Abdominal:      General: There is no distension.      Palpations: Abdomen is soft.   Musculoskeletal:         General: No swelling. Normal range of motion.      Cervical back: Normal range of motion and neck supple.   Skin:     General: Skin is warm and dry.   Neurological:      General: No focal deficit present.      Mental Status: He is alert and oriented to person, place, and time.   Psychiatric:         Mood and Affect: Mood normal.         Behavior: Behavior normal.         Thought Content: Thought content normal.         Judgment: Judgment normal.         LABS:  WBC   Date Value Ref Range Status   03/11/2024 14.10 (H) 3.90 - 12.70 K/uL Final     Hemoglobin   Date Value Ref Range " Status   03/11/2024 10.1 (L) 14.0 - 18.0 g/dL Final     Hematocrit   Date Value Ref Range Status   03/11/2024 28.6 (L) 40.0 - 54.0 % Final     Platelets   Date Value Ref Range Status   03/11/2024 197 150 - 450 K/uL Final       Chemistry        Component Value Date/Time     03/11/2024 0848    K 4.3 03/11/2024 0848     03/11/2024 0848    CO2 26 03/11/2024 0848    BUN 17 03/11/2024 0848    CREATININE 1.5 (H) 03/11/2024 0848     (H) 03/11/2024 0848        Component Value Date/Time    CALCIUM 10.0 03/11/2024 0848    ALKPHOS 99 03/11/2024 0848    AST 34 03/11/2024 0848    ALT 16 03/11/2024 0848    BILITOT 0.5 03/11/2024 0848    ESTGFRAFRICA 52.6 (A) 07/14/2022 1119    EGFRNONAA 45.5 (A) 07/14/2022 1119              Assessment:       1. 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 Squamous cell carcinoma of base of tongue    3. Secondary malignant neoplasm of cervical lymph node    4. Tracheostomy status    5. Status post liver transplantation - 2008    6. Immunodeficiency due to drugs    7. Iron deficiency anemia due to chronic blood loss    8. Gastrointestinal hemorrhage associated with duodenal ulcer    9. 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 Elevated serum creatinine              Plan:         Orbital cSCC  Unfortunately patient has progressed while on immunotherapy for below diagnosis. For that reason systemic therapies are limited. Evaluated at [facility] and surgeon does think an exenteration sparing resection would be possible with good response to induction chemotherapy. Plan was for platinum doublet chemo + cetuximab x 2 cycles. Notified of this plan on 1/29 and treatment plan was entered. Authorization was obtained on 2/5, but cycle 1 still not scheduled.    - Labs acceptable to proceed with C1D15 - held last week for thrombocytopenia. Now recovered. Will keep dose reductions for C2 of 5-FU to 750 mg/m2 due to age/frailty. Also adding growth factor. Scheduled for  surgery on 3/28 at [facility] - pt will notify of [facility] team of C2 completion date (4/1/24).    2,3. 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 He is not a surgical or radiation candidate.  -Started on PCC 10/6/2020 followed by maintenance cetuximab until 8/24/21 (discontinued due to progression of disease; continued increase in SUV uptake, no new lesions).  - 9/2021 started on carbo/5-FU/pembrolizumab; due to toxicity went to pembrolizumab monotherapy starting with cycle 2.  Progression of disease noted after cycle 3 so added chemotherapy back in with cycle 4. Keytruda monotherapy starting with C9.   - Keytruda discontinued after 2 years.    4. Status post total laryngectomy, total glossectomy and anterolateral thigh free flap reconstruction roughly 2017 at [facility] in [place] for persistent/recurrent base of tongue sq.c.c.    5-6. S/p liver transplant and is currently on tacrolimus. Grade 1. Will continue to monitor.     7,8. Hgb stable. Monitor closely on chemotherapy.     9. TSH worsening - 145. Possibly d/t difficulty swallowing pill. Increasing dose to 125 mcg and will have pt crush medication and dissolve in water. E-consult placed. Continue to monitor.    10. Adding 1L of NS.     Patient is in agreement with the proposed treatment plan. All questions were answered to the patient's satisfaction. Pt knows to call clinic if anything is needed before the next clinic visit.    Ct Francis, MSN, APRN, FNP-C  Hematology and Medical Oncology  Nurse Practitioner to Dr. Martín Hernandez  Nurse Practitioner, [facility]            Route Chart for Scheduling    Med Onc Chart Routing  Urgent    Follow up with physician    Follow up with [facility]ORY 2 weeks. see me prior to tx on 3/25   Infusion scheduling note   Needs C2D5 scheduled on 3/22 and C2D15 on 4/1   Injection scheduling note    Labs CBC, CMP and  magnesium   Scheduling:  Preferred lab:  Lab interval:  needs labs on 3/18 and 4/1 before infusion   Imaging    Pharmacy appointment    Other referrals            Treatment Plan Information   OP HEAD NECK CETUXIMAB CARBOPLATIN FLUOROURACIL Q3W   Martín Hernandez MD   Upcoming Treatment Dates - OP HEAD NECK CETUXIMAB CARBOPLATIN FLUOROURACIL Q3W    3/18/2024       Pre-Medications       diphenhydrAMINE (BENADRYL) 25 mg in NS 50 mL IVPB       Chemotherapy       cetuximab (ERBITUX) 100 mg/50 mL chemo infusion 417.6 mg       CARBOplatin (PARAPLATIN) in sodium chloride 0.9% 250 mL chemo infusion       fluorouracil (ADRUCIL) 1,000 mg/m2/day = 6,680 mg in sodium chloride 0.9% 240 mL chemo infusion       Antiemetics       aprepitant (CINVANTI) injection 130 mg       palonosetron 0.25mg/dexAMETHasone 12mg in NS IVPB 0.25 mg 50 mL  3/25/2024       Pre-Medications       diphenhydrAMINE (BENADRYL) 25 mg in NS 50 mL IVPB       Chemotherapy       cetuximab (ERBITUX) 100 mg/50 mL chemo infusion 417.6 mg  4/1/2024       Pre-Medications       diphenhydrAMINE (BENADRYL) 25 mg in NS 50 mL IVPB       Chemotherapy       cetuximab (ERBITUX) 100 mg/50 mL chemo infusion 417.6 mg  4/8/2024       Pre-Medications       diphenhydrAMINE (BENADRYL) 25 mg in NS 50 mL IVPB       Chemotherapy       cetuximab (ERBITUX) 100 mg/50 mL chemo infusion 417.6 mg       CARBOplatin (PARAPLATIN) in sodium chloride 0.9% 250 mL chemo infusion       fluorouracil (ADRUCIL) 1,000 mg/m2/day = 6,680 mg in sodium chloride 0.9% 240 mL chemo infusion       Antiemetics       aprepitant (CINVANTI) injection 130 mg       palonosetron 0.25mg/dexAMETHasone 12mg in NS IVPB 0.25 mg 50 mL    Therapy Plan Information  PORT FLUSH  Flushes  heparin, porcine (PF) 100 unit/mL injection flush 500 Units  500 Units, Intravenous, Every visit  sodium chloride 0.9% flush 10 mL  10 mL, Intravenous, Every visit    PORT FLUSH  Flushes  heparin, porcine (PF) 100 unit/mL injection flush 500  Units  500 Units, Intravenous, Every visit  sodium chloride 0.9% flush 10 mL  10 mL, Intravenous, Every visit

## 2024-03-11 NOTE — PLAN OF CARE
1303   Patient completed Enhertu infusion as well as 1 liter NS, tolerated well.  Port de accessed, fluyshed, blood return noted, heparin locked.  RTC 3/18/24  Patient ambulated independently off floor.

## 2024-03-12 ENCOUNTER — E-CONSULT (OUTPATIENT)
Dept: ENDOCRINOLOGY | Facility: CLINIC | Age: 74
End: 2024-03-12
Payer: MEDICARE

## 2024-03-12 DIAGNOSIS — E89.0 POSTOPERATIVE HYPOTHYROIDISM: Primary | ICD-10-CM

## 2024-03-12 PROCEDURE — 99451 NTRPROF PH1/NTRNET/EHR 5/>: CPT | Mod: S$PBB,,, | Performed by: INTERNAL MEDICINE

## 2024-03-12 NOTE — CONSULTS
[facility] 6th Fl  Response for E-Consult     Patient Name: Rocco Swain  MRN: [id number]  Primary Care Provider: Leny, Primary Doctor   Requesting Provider: Ct Francis NP  E-Consult to Endocrinology  Consult performed by: Rafiq Kebede MD  Consult ordered by: Ct Francis NP  Reason for consult: Hypothyroidism  Assessment/Recommendations: Recommendation:   Patient with hypothyroidism with rising TSH level over the past several months. Per the chart he has been having difficulty swallowing thyroid hormone pill and has been having to crush it and give the dose in water. This may be impairing absorption. Per chart he is compliant with taking the medication. Would ensure he is taking the thyroid hormone on an empty stomach with water only and waiting at least 30 min before eating or taking other meds. If he is on any vitamins or supplements such as iron, magnesium, calcium, or taking a PPI this should be  from the thyroid hormone for at least 4 hrs. I would recommend changing his thyroid hormone preparation to Tirosint SOL (oral solution) and increasing the dose to 150 mcg daily. Would repeat TSH 5 weeks later to ensure it is coming down.         Recommendation:   Patient with hypothyroidism with rising TSH level over the past several months. Per the chart he has been having difficulty swallowing thyroid hormone pill and has been having to crush it and give the dose in water. This may be impairing absorption. Per chart he is compliant with taking the medication. Would ensure he is taking the thyroid hormone on an empty stomach with water only and waiting at least 30 min before eating or taking other meds. If he is on any vitamins or supplements such as iron, magnesium, calcium, or taking a PPI this should be  from the thyroid hormone for at least 4 hrs. I would recommend changing his thyroid hormone preparation to Tirosint SOL (oral solution) and increasing the dose to 150  mcg daily. Would repeat TSH 5 weeks later to ensure it is coming down.         Total time of Consultation: 5 minute    I did not speak to the requesting provider verbally about this.     *This eConsult is based on the clinical data available to me and is furnished without benefit of a physical examination. The eConsult will need to be interpreted in light of any clinical issues or changes in patient status not available to me at the time of filing this eConsults. Significant changes in patient condition or level of acuity should result in immediate formal consultation and reevaluation. Please alert me if you have further questions.    Thank you for this eConsult referral.     Rafiq Kebede MD  [facility]

## 2024-03-18 ENCOUNTER — INFUSION (OUTPATIENT)
Dept: INFUSION THERAPY | Facility: HOSPITAL | Age: 74
End: 2024-03-18
Payer: MEDICARE

## 2024-03-18 VITALS
TEMPERATURE: 98 F | SYSTOLIC BLOOD PRESSURE: 116 MMHG | WEIGHT: 119.06 LBS | RESPIRATION RATE: 16 BRPM | OXYGEN SATURATION: 98 % | BODY MASS INDEX: 18.04 KG/M2 | DIASTOLIC BLOOD PRESSURE: 62 MMHG | HEART RATE: 82 BPM | HEIGHT: 68 IN

## 2024-03-18 DIAGNOSIS — C44.329 SQUAMOUS CELL CARCINOMA OF SKIN OF ORBIT: Primary | ICD-10-CM

## 2024-03-18 PROCEDURE — 63600175 PHARM REV CODE 636 W HCPCS: Performed by: NURSE PRACTITIONER

## 2024-03-18 PROCEDURE — 96367 TX/PROPH/DG ADDL SEQ IV INF: CPT

## 2024-03-18 PROCEDURE — 96416 CHEMO PROLONG INFUSE W/PUMP: CPT

## 2024-03-18 PROCEDURE — 25000003 PHARM REV CODE 250: Performed by: NURSE PRACTITIONER

## 2024-03-18 PROCEDURE — 96417 CHEMO IV INFUS EACH ADDL SEQ: CPT

## 2024-03-18 PROCEDURE — 96413 CHEMO IV INFUSION 1 HR: CPT

## 2024-03-18 PROCEDURE — 96375 TX/PRO/DX INJ NEW DRUG ADDON: CPT

## 2024-03-18 PROCEDURE — 96415 CHEMO IV INFUSION ADDL HR: CPT

## 2024-03-18 RX ORDER — EPINEPHRINE 0.3 MG/.3ML
0.3 INJECTION SUBCUTANEOUS ONCE AS NEEDED
Status: DISCONTINUED | OUTPATIENT
Start: 2024-03-18 | End: 2024-03-18 | Stop reason: HOSPADM

## 2024-03-18 RX ORDER — SODIUM CHLORIDE 0.9 % (FLUSH) 0.9 %
10 SYRINGE (ML) INJECTION
Status: DISCONTINUED | OUTPATIENT
Start: 2024-03-18 | End: 2024-03-18 | Stop reason: HOSPADM

## 2024-03-18 RX ORDER — DIPHENHYDRAMINE HYDROCHLORIDE 50 MG/ML
50 INJECTION INTRAMUSCULAR; INTRAVENOUS ONCE AS NEEDED
Status: DISCONTINUED | OUTPATIENT
Start: 2024-03-18 | End: 2024-03-18 | Stop reason: HOSPADM

## 2024-03-18 RX ORDER — PROCHLORPERAZINE EDISYLATE 5 MG/ML
5 INJECTION INTRAMUSCULAR; INTRAVENOUS ONCE AS NEEDED
Status: DISCONTINUED | OUTPATIENT
Start: 2024-03-18 | End: 2024-03-18 | Stop reason: HOSPADM

## 2024-03-18 RX ORDER — HEPARIN 100 UNIT/ML
500 SYRINGE INTRAVENOUS
Status: DISCONTINUED | OUTPATIENT
Start: 2024-03-18 | End: 2024-03-18 | Stop reason: HOSPADM

## 2024-03-18 RX ADMIN — DEXAMETHASONE SODIUM PHOSPHATE 0.25 MG: 4 INJECTION, SOLUTION INTRA-ARTICULAR; INTRALESIONAL; INTRAMUSCULAR; INTRAVENOUS; SOFT TISSUE at 10:03

## 2024-03-18 RX ADMIN — CETUXIMAB 400 MG: 2 SOLUTION INTRAVENOUS at 09:03

## 2024-03-18 RX ADMIN — SODIUM CHLORIDE: 9 INJECTION, SOLUTION INTRAVENOUS at 08:03

## 2024-03-18 RX ADMIN — CARBOPLATIN 300 MG: 10 INJECTION, SOLUTION INTRAVENOUS at 10:03

## 2024-03-18 RX ADMIN — FLUOROURACIL 5000 MG: 50 INJECTION, SOLUTION INTRAVENOUS at 11:03

## 2024-03-18 RX ADMIN — APREPITANT 130 MG: 130 INJECTION, EMULSION INTRAVENOUS at 10:03

## 2024-03-18 RX ADMIN — DIPHENHYDRAMINE HYDROCHLORIDE 25 MG: 50 INJECTION, SOLUTION INTRAMUSCULAR; INTRAVENOUS at 08:03

## 2024-03-18 NOTE — PLAN OF CARE
Pt tolerated cetuximab and carbo without incident. Pt discharged infusing 5FU pump with descending volume noted. Pt will return 3.22.24 at 1130 for disconnect.

## 2024-03-22 ENCOUNTER — INFUSION (OUTPATIENT)
Dept: INFUSION THERAPY | Facility: HOSPITAL | Age: 74
End: 2024-03-22
Payer: MEDICARE

## 2024-03-22 VITALS
OXYGEN SATURATION: 98 % | DIASTOLIC BLOOD PRESSURE: 55 MMHG | HEART RATE: 86 BPM | TEMPERATURE: 98 F | RESPIRATION RATE: 17 BRPM | SYSTOLIC BLOOD PRESSURE: 105 MMHG

## 2024-03-22 DIAGNOSIS — C44.329 SQUAMOUS CELL CARCINOMA OF SKIN OF ORBIT: Primary | ICD-10-CM

## 2024-03-22 PROCEDURE — 96377 APPLICATON ON-BODY INJECTOR: CPT

## 2024-03-22 PROCEDURE — A4216 STERILE WATER/SALINE, 10 ML: HCPCS | Performed by: NURSE PRACTITIONER

## 2024-03-22 PROCEDURE — 25000003 PHARM REV CODE 250: Performed by: NURSE PRACTITIONER

## 2024-03-22 PROCEDURE — 63600175 PHARM REV CODE 636 W HCPCS: Mod: JZ,JG | Performed by: NURSE PRACTITIONER

## 2024-03-22 RX ORDER — HEPARIN 100 UNIT/ML
500 SYRINGE INTRAVENOUS
Status: DISCONTINUED | OUTPATIENT
Start: 2024-03-22 | End: 2024-03-22 | Stop reason: HOSPADM

## 2024-03-22 RX ORDER — PROCHLORPERAZINE EDISYLATE 5 MG/ML
5 INJECTION INTRAMUSCULAR; INTRAVENOUS ONCE AS NEEDED
Status: CANCELLED
Start: 2024-03-22

## 2024-03-22 RX ORDER — SODIUM CHLORIDE 0.9 % (FLUSH) 0.9 %
10 SYRINGE (ML) INJECTION
Status: DISCONTINUED | OUTPATIENT
Start: 2024-03-22 | End: 2024-03-22 | Stop reason: HOSPADM

## 2024-03-22 RX ORDER — SODIUM CHLORIDE 0.9 % (FLUSH) 0.9 %
10 SYRINGE (ML) INJECTION
Status: CANCELLED | OUTPATIENT
Start: 2024-03-22

## 2024-03-22 RX ORDER — HEPARIN 100 UNIT/ML
500 SYRINGE INTRAVENOUS
Status: CANCELLED | OUTPATIENT
Start: 2024-03-22

## 2024-03-22 RX ORDER — PROCHLORPERAZINE EDISYLATE 5 MG/ML
5 INJECTION INTRAMUSCULAR; INTRAVENOUS ONCE AS NEEDED
Status: DISCONTINUED | OUTPATIENT
Start: 2024-03-22 | End: 2024-03-22 | Stop reason: HOSPADM

## 2024-03-22 RX ADMIN — HEPARIN 500 UNITS: 100 SYRINGE at 02:03

## 2024-03-22 RX ADMIN — Medication 10 ML: at 02:03

## 2024-03-22 RX ADMIN — PEGFILGRASTIM 6 MG: KIT SUBCUTANEOUS at 02:03

## 2024-03-22 NOTE — PLAN OF CARE
Pt ambulatory to clinic alone for pump d/c. Denies any sig complaints. Pump completed pta. Port deaccessed after flushing. OBI to RLQ with green light flashing. Pod pal in place. Instructed to remove at 1830 tomorrow evening. Ambulatory from clinic in NAD.

## 2024-03-25 ENCOUNTER — OFFICE VISIT (OUTPATIENT)
Dept: HEMATOLOGY/ONCOLOGY | Facility: CLINIC | Age: 74
End: 2024-03-25
Payer: MEDICARE

## 2024-03-25 ENCOUNTER — INFUSION (OUTPATIENT)
Dept: INFUSION THERAPY | Facility: HOSPITAL | Age: 74
End: 2024-03-25
Payer: MEDICARE

## 2024-03-25 VITALS
SYSTOLIC BLOOD PRESSURE: 104 MMHG | WEIGHT: 115.94 LBS | BODY MASS INDEX: 17.57 KG/M2 | HEART RATE: 89 BPM | TEMPERATURE: 98 F | OXYGEN SATURATION: 99 % | HEIGHT: 68 IN | RESPIRATION RATE: 16 BRPM | DIASTOLIC BLOOD PRESSURE: 63 MMHG

## 2024-03-25 VITALS
HEART RATE: 85 BPM | SYSTOLIC BLOOD PRESSURE: 105 MMHG | WEIGHT: 115.94 LBS | RESPIRATION RATE: 18 BRPM | HEIGHT: 68 IN | BODY MASS INDEX: 17.57 KG/M2 | DIASTOLIC BLOOD PRESSURE: 62 MMHG | TEMPERATURE: 98 F

## 2024-03-25 DIAGNOSIS — E03.2 HYPOTHYROIDISM DUE TO MEDICAMENTS AND OTHER EXOGENOUS SUBSTANCES: ICD-10-CM

## 2024-03-25 DIAGNOSIS — D50.0 IRON DEFICIENCY ANEMIA DUE TO CHRONIC BLOOD LOSS: ICD-10-CM

## 2024-03-25 DIAGNOSIS — Z79.899 IMMUNODEFICIENCY DUE TO DRUGS: ICD-10-CM

## 2024-03-25 DIAGNOSIS — C01 SQUAMOUS CELL CARCINOMA OF BASE OF TONGUE: ICD-10-CM

## 2024-03-25 DIAGNOSIS — K59.03 DRUG-INDUCED CONSTIPATION: ICD-10-CM

## 2024-03-25 DIAGNOSIS — Z94.4 STATUS POST LIVER TRANSPLANTATION: ICD-10-CM

## 2024-03-25 DIAGNOSIS — D84.821 IMMUNODEFICIENCY DUE TO DRUGS: ICD-10-CM

## 2024-03-25 DIAGNOSIS — C44.329 SQUAMOUS CELL CARCINOMA OF SKIN OF ORBIT: Primary | ICD-10-CM

## 2024-03-25 DIAGNOSIS — C77.0 SECONDARY MALIGNANT NEOPLASM OF CERVICAL LYMPH NODE: ICD-10-CM

## 2024-03-25 DIAGNOSIS — K26.4 GASTROINTESTINAL HEMORRHAGE ASSOCIATED WITH DUODENAL ULCER: ICD-10-CM

## 2024-03-25 DIAGNOSIS — Z93.0 TRACHEOSTOMY STATUS: ICD-10-CM

## 2024-03-25 PROCEDURE — 63600175 PHARM REV CODE 636 W HCPCS: Performed by: INTERNAL MEDICINE

## 2024-03-25 PROCEDURE — 99215 OFFICE O/P EST HI 40 MIN: CPT | Mod: S$PBB,,, | Performed by: NURSE PRACTITIONER

## 2024-03-25 PROCEDURE — 96413 CHEMO IV INFUSION 1 HR: CPT

## 2024-03-25 PROCEDURE — 96367 TX/PROPH/DG ADDL SEQ IV INF: CPT

## 2024-03-25 PROCEDURE — 25000003 PHARM REV CODE 250: Performed by: INTERNAL MEDICINE

## 2024-03-25 PROCEDURE — 99215 OFFICE O/P EST HI 40 MIN: CPT | Mod: PBBFAC,25 | Performed by: NURSE PRACTITIONER

## 2024-03-25 PROCEDURE — 99999 PR PBB SHADOW E&M-EST. PATIENT-LVL V: CPT | Mod: PBBFAC,,, | Performed by: NURSE PRACTITIONER

## 2024-03-25 RX ORDER — EPINEPHRINE 0.3 MG/.3ML
0.3 INJECTION SUBCUTANEOUS ONCE AS NEEDED
Status: DISCONTINUED | OUTPATIENT
Start: 2024-03-25 | End: 2024-03-25 | Stop reason: HOSPADM

## 2024-03-25 RX ORDER — HEPARIN 100 UNIT/ML
500 SYRINGE INTRAVENOUS
Status: CANCELLED | OUTPATIENT
Start: 2024-03-25

## 2024-03-25 RX ORDER — HEPARIN 100 UNIT/ML
500 SYRINGE INTRAVENOUS
Status: DISCONTINUED | OUTPATIENT
Start: 2024-03-25 | End: 2024-03-25 | Stop reason: HOSPADM

## 2024-03-25 RX ORDER — EPINEPHRINE 0.3 MG/.3ML
0.3 INJECTION SUBCUTANEOUS ONCE AS NEEDED
Status: CANCELLED | OUTPATIENT
Start: 2024-03-25

## 2024-03-25 RX ORDER — DIPHENHYDRAMINE HYDROCHLORIDE 50 MG/ML
50 INJECTION INTRAMUSCULAR; INTRAVENOUS ONCE AS NEEDED
Status: DISCONTINUED | OUTPATIENT
Start: 2024-03-25 | End: 2024-03-25 | Stop reason: HOSPADM

## 2024-03-25 RX ORDER — PROCHLORPERAZINE EDISYLATE 5 MG/ML
5 INJECTION INTRAMUSCULAR; INTRAVENOUS ONCE AS NEEDED
Status: DISCONTINUED | OUTPATIENT
Start: 2024-03-25 | End: 2024-03-25 | Stop reason: HOSPADM

## 2024-03-25 RX ORDER — SENNOSIDES 8.6 MG/1
2 TABLET ORAL 2 TIMES DAILY PRN
Qty: 60 TABLET | Refills: 1 | Status: SHIPPED | OUTPATIENT
Start: 2024-03-25 | End: 2024-04-24

## 2024-03-25 RX ORDER — SODIUM CHLORIDE 0.9 % (FLUSH) 0.9 %
10 SYRINGE (ML) INJECTION
Status: DISCONTINUED | OUTPATIENT
Start: 2024-03-25 | End: 2024-03-25 | Stop reason: HOSPADM

## 2024-03-25 RX ORDER — PROCHLORPERAZINE EDISYLATE 5 MG/ML
5 INJECTION INTRAMUSCULAR; INTRAVENOUS ONCE AS NEEDED
Status: CANCELLED
Start: 2024-03-25

## 2024-03-25 RX ORDER — LEVOTHYROXINE SODIUM 50 UG/1
150 CAPSULE ORAL
Qty: 30 ML | Refills: 11 | Status: SHIPPED | OUTPATIENT
Start: 2024-03-25 | End: 2024-05-01

## 2024-03-25 RX ORDER — DIPHENHYDRAMINE HYDROCHLORIDE 50 MG/ML
50 INJECTION INTRAMUSCULAR; INTRAVENOUS ONCE AS NEEDED
Status: CANCELLED | OUTPATIENT
Start: 2024-03-25

## 2024-03-25 RX ORDER — SODIUM CHLORIDE 0.9 % (FLUSH) 0.9 %
10 SYRINGE (ML) INJECTION
Status: CANCELLED | OUTPATIENT
Start: 2024-03-25

## 2024-03-25 RX ADMIN — CETUXIMAB 400 MG: 2 SOLUTION INTRAVENOUS at 10:03

## 2024-03-25 RX ADMIN — DIPHENHYDRAMINE HYDROCHLORIDE 25 MG: 50 INJECTION, SOLUTION INTRAMUSCULAR; INTRAVENOUS at 09:03

## 2024-03-25 NOTE — PROGRESS NOTES
ONCOLOGY FOLLOW UP VISIT    Subjective:      Patient ID: Rocco Swain    Chief Complaint: No chief complaint on file.      HPI  Rocco Swain is a 74 y.o. male who returns to clinic for management of newly progressing cSCC. Patient had ANGELES on October PETCT, but then developed quickly progressing orbital lesion. He was seen at [facility] in [place]. Recommendation was to follow up at [facility] to initiate induction chemotherapy followed by possible surgery.     Interval HPI:  Today, patient reports constipation for the last 4 days. Stool softener and metamucil daily not helping. Orbital lesion is smaller. States he is compliant with synthroid and has been crushing medication. Did not take this mornings dose. No complaints of rash - using doxycycline. Denies nausea. Intermittent mouth sores and using magic mouthwash.     ECOG Performance status: 1 - Symptomatic but completely ambulatory Communication from him is 100% written.     Cancer Staging   Squamous cell carcinoma of base of tongue  Staging form: Pharynx - HPV-Mediated Oropharynx, AJCC 8th Edition  - Clinical stage from 9/18/2020: Stage III (rcT4, cN1, cM0, p16+) - Signed by Ronald Berg MD on 12/27/2022      Oncologic History:  Oncology History   Squamous cell carcinoma of base of tongue   9/18/2020 Cancer Staged    Staging form: Pharynx - HPV-Mediated Oropharynx, AJCC 8th Edition  - Clinical stage from 9/18/2020: Stage III (rcT4, cN1, cM0, p16+)     9/28/2020 Initial Diagnosis    Squamous cell carcinoma of base of tongue     10/6/2020 - 8/17/2021 Chemotherapy    Treatment Summary   Plan Name: OP HEAD NECK CARBOPLATIN PACLITAXEL C1-2 FOLLOWED BY CETUXIMAB CARBOPLATIN C3-6 FOLLOWED BY CETUXIMAB MAINTENANCE WEEKLY  Treatment Goal: Control  Status: Inactive  Start Date: 10/6/2020  End Date: 8/17/2021  Provider: Ronald Berg MD  Chemotherapy: cetuximab (ERBITUX) 100 mg/50 mL chemo infusion 684 mg, 400 mg/m2 = 684 mg (100 % of original dose 400 mg/m2), Intravenous,  Clinic/[facility] 1 time, 29 of 38 cycles  Dose modification: 500 mg/m2 (original dose 400 mg/m2, Cycle 3), 250 mg/m2 (original dose 400 mg/m2, Cycle 3), 400 mg/m2 (original dose 400 mg/m2, Cycle 3), 250 mg/m2 (original dose 250 mg/m2, Cycle 7), 200 mg/m2 (original dose 250 mg/m2, Cycle 18), 200 mg/m2 (original dose 250 mg/m2, Cycle 19), 250 mg/m2 (original dose 250 mg/m2, Cycle 4), 200 mg/m2 (original dose 250 mg/m2, Cycle 25), 200 mg/m2 (original dose 250 mg/m2, Cycle 28)  Administration: 684 mg (11/17/2020), 400 mg (11/24/2020), 400 mg (2/9/2021), 400 mg (2/17/2021), 427.6 mg (3/9/2021), 400 mg (3/30/2021), 400 mg (3/23/2021), 400 mg (4/6/2021), 427.6 mg (4/13/2021), 427.6 mg (4/20/2021), 342 mg (5/11/2021), 342 mg (5/18/2021), 342 mg (5/25/2021), 400 mg (5/31/2021), 400 mg (6/7/2021), 400 mg (6/14/2021), 400 mg (12/8/2020), 427.6 mg (12/29/2020), 400 mg (12/1/2020), 400 mg (12/15/2020), 400 mg (12/22/2020), 427.6 mg (1/5/2021), 400 mg (1/12/2021), 400 mg (1/19/2021), 427.6 mg (1/26/2021), 400 mg (2/2/2021), 400 mg (2/23/2021), 400 mg (3/2/2021), 427.6 mg (3/16/2021), 400 mg (6/21/2021), 342 mg (6/28/2021), 342 mg (7/6/2021), 342 mg (7/13/2021), 342 mg (7/20/2021), 400 mg (7/27/2021), 400 mg (8/10/2021), 400 mg (8/17/2021)  CARBOplatin (PARAPLATIN) 310 mg in sodium chloride 0.9% 500 mL chemo infusion, 310 mg (92.2 % of original dose 334.5 mg), Intravenous, Clinic/[facility] 1 time, 6 of 6 cycles  Dose modification:   (original dose 334.5 mg, Cycle 1)  Administration: 310 mg (10/6/2020), 370 mg (11/17/2020), 300 mg (10/27/2020), 350 mg (12/8/2020), 335 mg (12/29/2020), 320 mg (1/19/2021)  PACLitaxeL (TAXOL) 175 mg/m2 = 300 mg in sodium chloride 0.9% 500 mL chemo infusion, 175 mg/m2 = 300 mg (100 % of original dose 175 mg/m2), Intravenous, Clinic/[facility] 1 time, 2 of 2 cycles  Dose modification: 175 mg/m2 (original dose 175 mg/m2, Cycle 1)  Administration: 300 mg (10/6/2020), 300 mg (10/27/2020)     4/29/2021 Tumor Conference        His case was discussed at the Multidisciplinary Head and Neck Team Planning Meeting.    Representatives from Medical Oncology, Radiation Oncology, Head and Neck Surgical Oncology, Psychosocial Oncology, and Speech and Language Pathology discussed the case with the following recommendations:    1) biopsy         7/29/2021 Tumor Conference       His case was discussed at the Multidisciplinary Head and Neck Team Planning Meeting.    Representatives from Medical Oncology, Radiation Oncology, Head and Neck Surgical Oncology, Psychosocial Oncology, and Speech and Language Pathology discussed the case with the following recommendations:    1) Head and neck clinic follow up  2) consider Keytruda (discuss with transplant)  3) consider palliative referral         9/13/2021 - 12/29/2023 Chemotherapy    Treatment Summary   Plan Name: OP HEAD NECK PEMBROLIZUMAB CARBOPLATIN FLUOROURACIL (C1 ONLY RECEIVED) FOLLOWED BY PEMBROLIZUMAB MAINTENANCE  Treatment Goal: Palliative  Status: Inactive  Start Date: 9/13/2021  End Date: 12/29/2023  Provider: Ronald Berg MD  Chemotherapy: CARBOplatin (PARAPLATIN) 320 mg in sodium chloride 0.9% 500 mL chemo infusion, 320 mg (100 % of original dose 320.5 mg), Intravenous, Clinic/[facility] 1 time, 6 of 6 cycles  Dose modification:   (original dose 320.5 mg, Cycle 1)  Administration: 320 mg (9/13/2021), 335 mg (12/23/2021), 325 mg (1/27/2022), 245 mg (3/3/2022), 255 mg (5/12/2022), 255 mg (4/7/2022)  fluorouraciL 1,000 mg/m2/day = 6,440 mg in sodium chloride 0.9% 240 mL chemo infusion, 1,000 mg/m2/day = 6,440 mg, Intravenous, Over 96 hours, 6 of 6 cycles  Dose modification: 800 mg/m2/day (original dose 1,000 mg/m2/day, Cycle 6), 5,000 mg (original dose 1,000 mg/m2/day, Cycle 8)  Administration: 6,440 mg (9/13/2021), 6,440 mg (12/23/2021), 6,480 mg (1/27/2022), 5,000 mg (3/3/2022), 5,000 mg (4/7/2022), 5,000 mg (5/12/2022)     Secondary malignant neoplasm of cervical lymph node   2/9/2021 Initial  Diagnosis    Secondary malignant neoplasm of cervical lymph node     9/13/2021 - 12/29/2023 Chemotherapy    Treatment Summary   Plan Name: OP HEAD NECK PEMBROLIZUMAB CARBOPLATIN FLUOROURACIL (C1 ONLY RECEIVED) FOLLOWED BY PEMBROLIZUMAB MAINTENANCE  Treatment Goal: Palliative  Status: Inactive  Start Date: 9/13/2021  End Date: 12/29/2023  Provider: Ronald Berg MD  Chemotherapy: CARBOplatin (PARAPLATIN) 320 mg in sodium chloride 0.9% 500 mL chemo infusion, 320 mg (100 % of original dose 320.5 mg), Intravenous, Clinic/[facility] 1 time, 6 of 6 cycles  Dose modification:   (original dose 320.5 mg, Cycle 1)  Administration: 320 mg (9/13/2021), 335 mg (12/23/2021), 325 mg (1/27/2022), 245 mg (3/3/2022), 255 mg (5/12/2022), 255 mg (4/7/2022)  fluorouraciL 1,000 mg/m2/day = 6,440 mg in sodium chloride 0.9% 240 mL chemo infusion, 1,000 mg/m2/day = 6,440 mg, Intravenous, Over 96 hours, 6 of 6 cycles  Dose modification: 800 mg/m2/day (original dose 1,000 mg/m2/day, Cycle 6), 5,000 mg (original dose 1,000 mg/m2/day, Cycle 8)  Administration: 6,440 mg (9/13/2021), 6,440 mg (12/23/2021), 6,480 mg (1/27/2022), 5,000 mg (3/3/2022), 5,000 mg (4/7/2022), 5,000 mg (5/12/2022)     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   1/29/2024 Initial Diagnosis    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     2/19/2024 -  Chemotherapy    Treatment Summary   Plan Name: OP HEAD NECK CETUXIMAB CARBOPLATIN FLUOROURACIL Q3W  Treatment Goal: Curative  Status: Active  Start Date: 2/19/2024  End Date: 8/5/2024 (Planned)  Provider: Martín Hernandez MD  Chemotherapy: cetuximab (ERBITUX) 100 mg/50 mL chemo infusion 668 mg, 400 mg/m2 = 668 mg, Intravenous, Clinic/[facility] 1 time, 2 of 12 cycles  Administration: 668 mg (2/19/2024), 400 mg (2/26/2024), 400 mg (3/18/2024), 400 mg (3/11/2024)  CARBOplatin (PARAPLATIN) 295 mg in sodium chloride 0.9% 314.5 mL chemo infusion, 295 mg, Intravenous, Clinic/[facility] 1 time, 2 of 6 cycles  Administration: 295 mg (2/19/2024), 300 mg  (3/18/2024)         Review of Systems   Constitutional:  Negative for activity change, appetite change, chills, fatigue, fever and unexpected weight change.   HENT:  Positive for mouth sores (mild). Negative for ear pain, facial swelling, hearing loss, nosebleeds, sore throat, trouble swallowing and voice change.         No verbal communication. Skin fixed and immobile from previous scaring post-neck dissection   Eyes:  Positive for redness (right eye - pain). Negative for pain, discharge and visual disturbance.   Respiratory:  Negative for cough, choking, chest tightness and shortness of breath.    Cardiovascular:  Negative for chest pain, palpitations and leg swelling.   Gastrointestinal:  Positive for constipation. Negative for abdominal distention, abdominal pain, blood in stool, diarrhea, nausea and vomiting.   Endocrine: Negative for cold intolerance and heat intolerance.   Genitourinary:  Negative for difficulty urinating, dysuria, frequency and urgency.   Musculoskeletal:  Positive for back pain (lower - chronic). Negative for arthralgias, gait problem, leg pain and myalgias.   Integumentary:  Positive for mole/lesion (left nare and neck - cSCCv > improving). Negative for pallor, rash and wound.   Allergic/Immunologic: Negative for frequent infections.   Neurological:  Negative for dizziness, tremors, weakness, light-headedness, numbness and headaches.   Hematological:  Negative for adenopathy. Does not bruise/bleed easily.   Psychiatric/Behavioral:  Negative for agitation, confusion, dysphoric mood and sleep disturbance. The patient is not nervous/anxious.         Allergies:  Review of patient's allergies indicates:   Allergen Reactions    Aspirin     Tylenol extended release        Medications:  Current Outpatient Medications   Medication Sig Dispense Refill    azelaic acid (AZELEX) 15 % gel APPLY TOPICALLY TO AFFECTED AREA IN THE MORNING 50 g 3    chlorhexidine (PERIDEX) 0.12 % solution Gently swish and  spit 15 mL twice a day for 3 weeks. Start rinses the day after your procedure. 473 mL 1    clindamycin phosphate 1% (CLINDAGEL) 1 % gel Apply topically 2 (two) times daily. 60 g 3    doxycycline (VIBRA-TABS) 100 MG tablet Take 1 tablet (100 mg total) by mouth 2 (two) times daily. 42 tablet 0    doxycycline hyclate 150 mg Tab Take two tabs PO 1 hour before dental surgery. 2 tablet 0    erythromycin (ROMYCIN) ophthalmic ointment Administer 0.5 inches to the right eye twice daily for 30 days. 3.5 g 1    hydrocortisone 1 % cream Apply to face, hands, feet, neck, back and chest at bedtime. 112 g 2    imiquimod (ALDARA) 5 % cream Apply to biopsy site on frontal scalp + about a centimeter of surrounding skin qhs x 16 weeks. Wash off in am and apply sunscreen. Discontinue if skin becomes blistered, raw, bleeding, painful, etc. 24 packet 3    LIDOcaine HCl 2% (LIDOCAINE VISCOUS) 2 % Soln Swish and spit 15 mls every 8 (eight) hours as needed (mouth sore). 100 mL 3    magic mouthwash diphen/antac/lidoc/nysta Take 10 mLs by mouth 4 (four) times daily. 120 mL 4    metroNIDAZOLE (METROGEL) 0.75 % gel Apply topically to affected area 2 (two) times daily. 45 g 1    multivit-min/FA/lycopen/lutein (CENTRUM SILVER MEN ORAL) Take 1 tablet by mouth once daily.      OLANZapine (ZYPREXA) 5 MG tablet Take 1 tablet (5 mg total) by mouth every evening. Take as directed on days 1-4 of your chemotherapy cycle. 30 tablet 5    oxyCODONE (ROXICODONE) 5 MG immediate release tablet Take 1 tablet (5 mg total) by mouth every 4 (four) hours as needed for Pain. 138 tablet 0    prednisoLONE acetate (PRED FORTE) 1 % DrpS Place 1 drop into the right eye 4 (four) times daily. 10 mL 3    sulfacetamide sodium-sulfur 10-5 % (w/w) Clsr USE TO WASH AFFECTED AREA DAILY      tacrolimus (PROGRAF) 0.5 MG Cap Take 1 capsule (0.5 mg total) by mouth every EVENING.  (Use the 1mg capsule for your morning dose) 90 capsule 3    tacrolimus (PROGRAF) 1 MG Cap Take 1 capsule  (1 mg total) by mouth every MORNING. Use the tacrolimus 0.5mg capsule for your evening dose as directed. 90 capsule 3    tiZANidine (ZANAFLEX) 2 MG tablet Take 1 tablet (2 mg total) by mouth nightly as needed (neck muscle strain). 30 tablet 3    traZODone (DESYREL) 50 MG tablet TAKE 1 TABLET BY MOUTH IN THE  EVENING 90 tablet 3    vitamin E 1000 UNIT capsule Take 1 cap PO BID until gone. Start taking one week prior to your dental surgery. 112 capsule 0    gabapentin (NEURONTIN) 300 MG capsule Take 1 capsule (300 mg total) by mouth every evening. (Patient taking differently: Take 300 mg by mouth as needed.) 30 capsule 11    levothyroxine (TIROSINT-SOL) 150 mcg/mL Soln Take 150 mcg by mouth before breakfast. 30 mL 11    pentoxifylline (TRENTAL) 400 mg TbSR Take 1 tablet by mouth twice daily until gone. Start taking one week prior to your dental surgery. (Patient not taking: Reported on 3/25/2024) 112 tablet 0    prochlorperazine (COMPAZINE) 10 MG tablet Take ½ tablet (5 mg total) by mouth every 6 (six) hours as needed. (Patient not taking: Reported on 3/25/2024) 30 tablet 1    senna (SENOKOT) 8.6 mg tablet Take 2 tablets by mouth 2 (two) times daily as needed for Constipation. 60 tablet 1     No current facility-administered medications for this visit.     Facility-Administered Medications Ordered in Other Visits   Medication Dose Route Frequency Provider Last Rate Last Admin    alteplase injection 2 mg  2 mg Intra-Catheter PRN Martín Hernandez MD        cetuximab (ERBITUX) 100 mg/50 mL chemo infusion 400 mg  400 mg Intravenous 1 time in Clinic/[facility] Martín Hernandez MD        diphenhydrAMINE injection 50 mg  50 mg Intravenous Once PRN Martín Hernandez MD        EPINEPHrine (EPIPEN) 0.3 mg/0.3 mL pen injection 0.3 mg  0.3 mg Intramuscular Once PRN Martín Hernandez MD        heparin, porcine (PF) 100 unit/mL injection flush 500 Units  500 Units Intravenous PRN Ronald Berg MD        heparin, porcine (PF) 100  unit/mL injection flush 500 Units  500 Units Intravenous PRN Martín Hernandez MD        hydrocortisone sodium succinate injection 100 mg  100 mg Intravenous Once PRN Martín Hernandez MD        ofloxacin 0.3 % ophthalmic solution 1 drop  1 drop Right Eye On Call Procedure Victor M Vasques MD   2 drop at 10/11/22 0745    prochlorperazine injection Soln 5 mg  5 mg Intravenous Once PRN Martín Hernandez MD        sodium chloride 0.9% 250 mL flush bag   Intravenous PRN Martín Hernandez MD        sodium chloride 0.9% flush 10 mL  10 mL Intravenous PRN Ronald Berg MD        sodium chloride 0.9% flush 10 mL  10 mL Intravenous PRN Victor M Vasques MD        sodium chloride 0.9% flush 10 mL  10 mL Intravenous PRN Martín Hernandez MD           PMH:  Past Medical History:   Diagnosis Date    Basal cell carcinoma (BCC) in situ of skin 2012    3 on face, 2 on arm, removed by dermatology.     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 Underwent surgical resection of tongue and neck. s/p tracheostomy    Squamous cell carcinoma of skin of right temple 01/24/2024    right temple       PSH:  Past Surgical History:   Procedure Laterality Date    COLONOSCOPY      COLONOSCOPY N/A 9/14/2023    Procedure: COLONOSCOPY;  Surgeon: Reyes Olivia MD;  Location: [address]);  Service: Endoscopy;  Laterality: N/A;    CONJUNCTIVA BIOPSY Right 10/11/2022    Procedure: BIOPSY, CONJUNCTIVA;  Surgeon: Victor M Vasques MD;  Location: [facility];  Service: Ophthalmology;  Laterality: Right;    ESOPHAGOGASTRODUODENOSCOPY N/A 9/14/2023    Procedure: EGD (ESOPHAGOGASTRODUODENOSCOPY);  Surgeon: Corwin  "Reyes PICHARDO MD;  Location: [facility] ENDO (2ND FLR);  Service: Endoscopy;  Laterality: N/A;    INSERTION OF VENOUS ACCESS PORT Right 3/8/2021    Procedure: INSERTION, VENOUS ACCESS PORT;  Surgeon: Jesus Rendon MD;  Location: [facility] OR 2ND FLR;  Service: General;  Laterality: Right;    LIVER TRANSPLANT  11/2008    transplanted for biopsy proven hepatocellular carcinoma,     LYMPH NODE BIOPSY N/A 9/18/2020    Procedure: BIOPSY, LYMPH NODE;  Surgeon: Dosiam Diagnostic Provider;  Location: [facility] OR 2ND FLR;  Service: General;  Laterality: N/A;  Rm 173    SURGICAL REMOVAL OF SCAR Right 8/24/2023    Procedure: EXCISION, SCAR;  Surgeon: Ting Brambila MD;  Location: [facility] OR;  Service: Ophthalmology;  Laterality: Right;    TRACHEOSTOMY         FamHx:  Family History   Problem Relation Age of Onset    Dementia Mother        SocHx:  Social History     Socioeconomic History    Marital status: Single   Occupational History    Occupation: Retired     Comment: , notes exposures to fumes etc.  Worked on [employer] for 4 years   Tobacco Use    Smoking status: Never    Smokeless tobacco: Never   Substance and Sexual Activity    Alcohol use: Yes     Comment: drinks wine, 1 glass day    Drug use: Not Currently    Sexual activity: Yes     Comment: No prior history of STD        Distress Score             Objective:      Vitals:    03/25/24 0737   BP: 104/63   BP Location: Right arm   Patient Position: Sitting   BP Method: Medium (Automatic)   Pulse: 89   Resp: 16   Temp: 97.6 °F (36.4 °C)   TempSrc: Oral   SpO2: 99%   Weight: 52.6 kg (115 lb 15.4 oz)   Height: 5' 8" (1.727 m)              Physical Exam  Vitals reviewed.   Constitutional:       General: He is not in acute distress.     Appearance: Normal appearance. He is well-developed and underweight.   HENT:      Head: Normocephalic and atraumatic.      Mouth/Throat:      Mouth: Mucous membranes are moist.      Dentition: Abnormal dentition. Dental caries present. "   Eyes:      Conjunctiva/sclera: Conjunctivae normal.      Pupils: Pupils are equal, round, and reactive to light.      Comments: Right eye - injected and nodular growth on the inner eye conjunctiva   Neck:      Trachea: Tracheostomy present.   Pulmonary:      Effort: Pulmonary effort is normal. No respiratory distress.      Comments: Tracheostomy  Abdominal:      General: There is no distension.      Palpations: Abdomen is soft.   Musculoskeletal:         General: No swelling. Normal range of motion.      Cervical back: Normal range of motion and neck supple.   Skin:     General: Skin is warm and dry.   Neurological:      General: No focal deficit present.      Mental Status: He is alert and oriented to person, place, and time.   Psychiatric:         Mood and Affect: Mood normal.         Behavior: Behavior normal.         Thought Content: Thought content normal.         Judgment: Judgment normal.           LABS:  WBC   Date Value Ref Range Status   03/25/2024 25.09 (H) 3.90 - 12.70 K/uL Final     Hemoglobin   Date Value Ref Range Status   03/25/2024 9.5 (L) 14.0 - 18.0 g/dL Final     Hematocrit   Date Value Ref Range Status   03/25/2024 27.6 (L) 40.0 - 54.0 % Final     Platelets   Date Value Ref Range Status   03/25/2024 141 (L) 150 - 450 K/uL Final       Chemistry        Component Value Date/Time     03/25/2024 0811    K 4.4 03/25/2024 0811     03/25/2024 0811    CO2 25 03/25/2024 0811    BUN 33 (H) 03/25/2024 0811    CREATININE 1.3 03/25/2024 0811     (H) 03/25/2024 0811        Component Value Date/Time    CALCIUM 9.7 03/25/2024 0811    ALKPHOS 92 03/25/2024 0811    AST 36 03/25/2024 0811    ALT 17 03/25/2024 0811    BILITOT 0.7 03/25/2024 0811    ESTGFRAFRICA 52.6 (A) 07/14/2022 1119    EGFRNONAA 45.5 (A) 07/14/2022 1119              Assessment:       1. 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 Squamous cell carcinoma of base of tongue    3. Secondary malignant neoplasm of cervical lymph  node    4. Tracheostomy status    5. Status post liver transplantation - 2008    6. Immunodeficiency due to drugs    7. Iron deficiency anemia due to chronic blood loss    8. Gastrointestinal hemorrhage associated with duodenal ulcer    9. 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 Drug-induced constipation      Plan:         Orbital cSCC  Unfortunately patient has progressed while on immunotherapy for below diagnosis. For that reason systemic therapies are limited. Evaluated at [facility] and surgeon does think an exenteration sparing resection would be possible with good response to induction chemotherapy. Plan was for platinum doublet chemo + cetuximab x 2 cycles. Notified of this plan on 1/29 and treatment plan was entered. Authorization was obtained on 2/5, but cycle 1 still not scheduled.    - Labs acceptable to proceed with C2D8. Will keep dose reductions for C2 of 5-FU to 750 mg/m2 due to age/frailty. Also adding growth factor. Scheduled for surgery on 4/25 at [facility].    2,3. 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 He is not a surgical or radiation candidate.  -Started on PCC 10/6/2020 followed by maintenance cetuximab until 8/24/21 (discontinued due to progression of disease; continued increase in SUV uptake, no new lesions).  - 9/2021 started on carbo/5-FU/pembrolizumab; due to toxicity went to pembrolizumab monotherapy starting with cycle 2.  Progression of disease noted after cycle 3 so added chemotherapy back in with cycle 4. Keytruda monotherapy starting with C9.   - Keytruda discontinued after 2 years.    4. Status post total laryngectomy, total glossectomy and anterolateral thigh free flap reconstruction roughly 2017 at [facility] for persistent/recurrent base of tongue sq.c.c.    5-6. S/p liver transplant and is currently on tacrolimus. Grade 1. Will continue to  monitor.     7,8. Hgb stable. Monitor closely on chemotherapy.     9. TSH dropped to 17 with crushing medication but now trending up again, currently 31. Will switch thyroid hormone preparation to Tirosint SOL (oral solution) and increase the dose to 150 mcg daily per Endocrinology e-consult.     10. Start senokot daily - Rx sent.     Patient is in agreement with the proposed treatment plan. All questions were answered to the patient's satisfaction. Pt knows to call clinic if anything is needed before the next clinic visit.    Ct Francis, MSN, APRN, FNP-C  Hematology and Medical Oncology  Nurse Practitioner to Dr. Martín Hernandez  Nurse Practitioner, [facility]            Route Chart for Scheduling    Med Onc Chart Routing      Follow up with physician    Follow up with [facility]ORY 2 weeks. symptom and lab check   Infusion scheduling note    Injection scheduling note    Labs CBC, CMP, TSH and free T4   Scheduling:  Preferred lab:  Lab interval:     Imaging    Pharmacy appointment    Other referrals                Treatment Plan Information   OP HEAD NECK CETUXIMAB CARBOPLATIN FLUOROURACIL Q3W   Martín Hernandez MD   Upcoming Treatment Dates - OP HEAD NECK CETUXIMAB CARBOPLATIN FLUOROURACIL Q3W    4/1/2024       Pre-Medications       diphenhydrAMINE (BENADRYL) 25 mg in sodium chloride 0.9% 50 mL IVPB       Chemotherapy       cetuximab (ERBITUX) 100 mg/50 mL chemo infusion 417.6 mg  4/8/2024       Pre-Medications       diphenhydrAMINE (BENADRYL) 25 mg in sodium chloride 0.9% 50 mL IVPB       Chemotherapy       cetuximab (ERBITUX) 100 mg/50 mL chemo infusion 417.6 mg       CARBOplatin (PARAPLATIN) 300 mg in sodium chloride 0.9% 280 mL chemo infusion       fluorouracil (Adrucil) 1,000 mg/m2/day = 6,680 mg in sodium chloride 0.9% 240 mL chemo infusion       Antiemetics       aprepitant (CINVANTI) injection 130 mg       palonosetron (ALOXI) 0.25 mg with Dexamethasone (DECADRON) 12 mg in NS 50 mL  IVPB  4/15/2024       Pre-Medications       diphenhydrAMINE (BENADRYL) 25 mg in sodium chloride 0.9% 50 mL IVPB       Chemotherapy       cetuximab (ERBITUX) 100 mg/50 mL chemo infusion 417.6 mg  4/22/2024       Pre-Medications       diphenhydrAMINE (BENADRYL) 25 mg in sodium chloride 0.9% 50 mL IVPB       Chemotherapy       cetuximab (ERBITUX) 100 mg/50 mL chemo infusion 417.6 mg    Therapy Plan Information  PORT FLUSH  Flushes  heparin, porcine (PF) 100 unit/mL injection flush 500 Units  500 Units, Intravenous, Every visit  sodium chloride 0.9% flush 10 mL  10 mL, Intravenous, Every visit    PORT FLUSH  Flushes  heparin, porcine (PF) 100 unit/mL injection flush 500 Units  500 Units, Intravenous, Every visit  sodium chloride 0.9% flush 10 mL  10 mL, Intravenous, Every visit

## 2024-03-25 NOTE — PLAN OF CARE
Patient tolerated Erbitux infusion plus 1 hr OBS today. NAD noted. VSS see flowsheet. PAC + blood return upon deaccess. Discharged home

## 2024-03-26 ENCOUNTER — TELEPHONE (OUTPATIENT)
Dept: TRANSPLANT | Facility: CLINIC | Age: 74
End: 2024-03-26
Payer: MEDICARE

## 2024-03-26 NOTE — LETTER
March 26, 2024    Rocco Swain  [address]  [address]   [address]  [place] [address]          Dear Rocco Swain:  MRN: [id number]    This is a follow up to your recent labs, your lab results were stable.  There are no medicine changes.  Please have your labs drawn again on 6/26/2024.      If you cannot have your labs drawn on the scheduled date, it is your responsibility to call the transplant department to reschedule.  Please call [phone] and ask to speak to Rhea RIOS   for all scheduling requests.     Sincerely,    Shari WADDELLN, RN      Your Liver Transplant Coordinator    [facility]  [address] [place] [id number]  [phone]

## 2024-03-26 NOTE — TELEPHONE ENCOUNTER
Letter sent to patient stating: Your labs have been reviewed by your Transplant physician, no action required. Next labs due 6/26/2024            ----- Message from Cornell Anton MD sent at 3/26/2024  2:04 PM CDT -----  No changes    ----- Message -----  From: Shari Stark RN  Sent: 3/25/2024  12:34 PM CDT  To: Cornell Anton MD    Patient undergoing treatment with Onc provider, patient seen earlier today by Hem Onc provider and received an infusion today as well called ERbitux

## 2024-03-27 ENCOUNTER — TELEPHONE (OUTPATIENT)
Dept: TRANSPLANT | Facility: CLINIC | Age: 74
End: 2024-03-27
Payer: MEDICARE

## 2024-03-27 NOTE — TELEPHONE ENCOUNTER
Writer returned call to numbers below, first number VM not set up, second number writer called an was on hold for over 7 minutes then call dropped.   Second attempt made and waited on hold for 13 min then hung up.    ----- Message -----  From: Gunjan Andrew  Sent: 3/27/2024   2:02 PM CDT  To: [facility]-L[facility] Transplant Clinical  Subject: Patient advice                                             Name of Caller:    [facility]      Contact Preference:   [phone] or [phone]     Nature of Call:   Requesting a call back to get any recommendation to care for up coming procedure

## 2024-03-28 DIAGNOSIS — C22.0 HCC (HEPATOCELLULAR CARCINOMA): ICD-10-CM

## 2024-03-28 DIAGNOSIS — Z94.4 LIVER TRANSPLANTED: Primary | ICD-10-CM

## 2024-04-01 ENCOUNTER — PATIENT MESSAGE (OUTPATIENT)
Dept: HEMATOLOGY/ONCOLOGY | Facility: CLINIC | Age: 74
End: 2024-04-01
Payer: MEDICARE

## 2024-04-01 ENCOUNTER — TELEPHONE (OUTPATIENT)
Dept: HEMATOLOGY/ONCOLOGY | Facility: CLINIC | Age: 74
End: 2024-04-01
Payer: MEDICARE

## 2024-04-01 NOTE — TELEPHONE ENCOUNTER
I spoke to the pt's brother. He said that he was able to find out that the pt had two episodes of diarrhea which was a mix of watery and loose. He said that prior to this a suppository was used to relieve constipation. Last episode was at 0845. None since then. Pt has had approx 5-6 eight oz cups of water to drink in the past 24 hrs. I told him I will let Ct know. I will call back if there is any recommendations from her.

## 2024-04-01 NOTE — TELEPHONE ENCOUNTER
I spoke to Ollie, the pt;'s brother. He said that the pt canceled the appts for today due to diarrhea that started yesterday. Pt is unable to talk. He agreed to contact him to see how many episodes of diarrhea in the past 24 hrs. Also if watery or loose. I want to know approx how many 8 oz cups of water in the same 24 hr time period. He will let me know.

## 2024-04-01 NOTE — TELEPHONE ENCOUNTER
I spoke to the pt's brother. I informed him of the appts tomorrow for labs, visit with Ct and the infusion. He verbalized an understanding of the times.

## 2024-04-02 ENCOUNTER — OFFICE VISIT (OUTPATIENT)
Dept: HEMATOLOGY/ONCOLOGY | Facility: CLINIC | Age: 74
End: 2024-04-02
Payer: MEDICARE

## 2024-04-02 ENCOUNTER — LAB VISIT (OUTPATIENT)
Dept: LAB | Facility: HOSPITAL | Age: 74
End: 2024-04-02
Payer: MEDICARE

## 2024-04-02 VITALS
TEMPERATURE: 98 F | BODY MASS INDEX: 17.21 KG/M2 | SYSTOLIC BLOOD PRESSURE: 104 MMHG | HEIGHT: 68 IN | DIASTOLIC BLOOD PRESSURE: 73 MMHG | HEART RATE: 96 BPM | RESPIRATION RATE: 16 BRPM | WEIGHT: 113.56 LBS | OXYGEN SATURATION: 96 %

## 2024-04-02 DIAGNOSIS — Z94.4 STATUS POST LIVER TRANSPLANTATION: ICD-10-CM

## 2024-04-02 DIAGNOSIS — D50.0 IRON DEFICIENCY ANEMIA DUE TO CHRONIC BLOOD LOSS: ICD-10-CM

## 2024-04-02 DIAGNOSIS — K26.4 GASTROINTESTINAL HEMORRHAGE ASSOCIATED WITH DUODENAL ULCER: ICD-10-CM

## 2024-04-02 DIAGNOSIS — Z79.899 IMMUNODEFICIENCY DUE TO DRUGS: ICD-10-CM

## 2024-04-02 DIAGNOSIS — E03.2 HYPOTHYROIDISM DUE TO MEDICAMENTS AND OTHER EXOGENOUS SUBSTANCES: ICD-10-CM

## 2024-04-02 DIAGNOSIS — C01 SQUAMOUS CELL CARCINOMA OF BASE OF TONGUE: ICD-10-CM

## 2024-04-02 DIAGNOSIS — E83.42 HYPOMAGNESEMIA: ICD-10-CM

## 2024-04-02 DIAGNOSIS — N18.4 CHRONIC KIDNEY DISEASE (CKD), STAGE IV (SEVERE): ICD-10-CM

## 2024-04-02 DIAGNOSIS — C44.329 SQUAMOUS CELL CARCINOMA OF SKIN OF ORBIT: Primary | ICD-10-CM

## 2024-04-02 DIAGNOSIS — C44.329 SQUAMOUS CELL CARCINOMA OF SKIN OF ORBIT: ICD-10-CM

## 2024-04-02 DIAGNOSIS — K59.03 DRUG-INDUCED CONSTIPATION: ICD-10-CM

## 2024-04-02 DIAGNOSIS — D84.821 IMMUNODEFICIENCY DUE TO DRUGS: ICD-10-CM

## 2024-04-02 DIAGNOSIS — D69.59 CHEMOTHERAPY-INDUCED THROMBOCYTOPENIA: ICD-10-CM

## 2024-04-02 DIAGNOSIS — Z93.0 TRACHEOSTOMY STATUS: ICD-10-CM

## 2024-04-02 DIAGNOSIS — T45.1X5A CHEMOTHERAPY-INDUCED THROMBOCYTOPENIA: ICD-10-CM

## 2024-04-02 DIAGNOSIS — C77.0 SECONDARY MALIGNANT NEOPLASM OF CERVICAL LYMPH NODE: ICD-10-CM

## 2024-04-02 DIAGNOSIS — D69.6 THROMBOCYTOPENIA, UNSPECIFIED: ICD-10-CM

## 2024-04-02 LAB
ALBUMIN SERPL BCP-MCNC: 3.6 G/DL (ref 3.5–5.2)
ALP SERPL-CCNC: 89 U/L (ref 55–135)
ALT SERPL W/O P-5'-P-CCNC: 22 U/L (ref 10–44)
ANION GAP SERPL CALC-SCNC: 12 MMOL/L (ref 8–16)
AST SERPL-CCNC: 31 U/L (ref 10–40)
BASOPHILS # BLD AUTO: 0.06 K/UL (ref 0–0.2)
BASOPHILS NFR BLD: 0.9 % (ref 0–1.9)
BILIRUB SERPL-MCNC: 0.5 MG/DL (ref 0.1–1)
BUN SERPL-MCNC: 26 MG/DL (ref 8–23)
CALCIUM SERPL-MCNC: 10.1 MG/DL (ref 8.7–10.5)
CHLORIDE SERPL-SCNC: 104 MMOL/L (ref 95–110)
CO2 SERPL-SCNC: 24 MMOL/L (ref 23–29)
CREAT SERPL-MCNC: 1.7 MG/DL (ref 0.5–1.4)
DIFFERENTIAL METHOD BLD: ABNORMAL
DOHLE BOD BLD QL SMEAR: PRESENT
EOSINOPHIL # BLD AUTO: 0.1 K/UL (ref 0–0.5)
EOSINOPHIL NFR BLD: 1.6 % (ref 0–8)
ERYTHROCYTE [DISTWIDTH] IN BLOOD BY AUTOMATED COUNT: 13.6 % (ref 11.5–14.5)
EST. GFR  (NO RACE VARIABLE): 41.5 ML/MIN/1.73 M^2
GLUCOSE SERPL-MCNC: 132 MG/DL (ref 70–110)
HCT VFR BLD AUTO: 25.7 % (ref 40–54)
HGB BLD-MCNC: 8.8 G/DL (ref 14–18)
IMM GRANULOCYTES # BLD AUTO: 0.09 K/UL (ref 0–0.04)
IMM GRANULOCYTES NFR BLD AUTO: 1.4 % (ref 0–0.5)
LYMPHOCYTES # BLD AUTO: 0.9 K/UL (ref 1–4.8)
LYMPHOCYTES NFR BLD: 14 % (ref 18–48)
MAGNESIUM SERPL-MCNC: 1.3 MG/DL (ref 1.6–2.6)
MCH RBC QN AUTO: 35.3 PG (ref 27–31)
MCHC RBC AUTO-ENTMCNC: 34.2 G/DL (ref 32–36)
MCV RBC AUTO: 103 FL (ref 82–98)
MONOCYTES # BLD AUTO: 0.6 K/UL (ref 0.3–1)
MONOCYTES NFR BLD: 9.2 % (ref 4–15)
NEUTROPHILS # BLD AUTO: 4.7 K/UL (ref 1.8–7.7)
NEUTROPHILS NFR BLD: 72.9 % (ref 38–73)
NRBC BLD-RTO: 0 /100 WBC
PLATELET # BLD AUTO: 57 K/UL (ref 150–450)
PLATELET BLD QL SMEAR: ABNORMAL
PMV BLD AUTO: 11.3 FL (ref 9.2–12.9)
POTASSIUM SERPL-SCNC: 4.2 MMOL/L (ref 3.5–5.1)
PROT SERPL-MCNC: 7 G/DL (ref 6–8.4)
RBC # BLD AUTO: 2.49 M/UL (ref 4.6–6.2)
SODIUM SERPL-SCNC: 140 MMOL/L (ref 136–145)
T4 FREE SERPL-MCNC: 1.44 NG/DL (ref 0.71–1.51)
TOXIC GRANULES BLD QL SMEAR: PRESENT
TSH SERPL DL<=0.005 MIU/L-ACNC: 45.96 UIU/ML (ref 0.4–4)
WBC # BLD AUTO: 6.43 K/UL (ref 3.9–12.7)

## 2024-04-02 PROCEDURE — 99215 OFFICE O/P EST HI 40 MIN: CPT | Mod: S$PBB,,, | Performed by: NURSE PRACTITIONER

## 2024-04-02 PROCEDURE — 99215 OFFICE O/P EST HI 40 MIN: CPT | Mod: PBBFAC | Performed by: NURSE PRACTITIONER

## 2024-04-02 PROCEDURE — 80053 COMPREHEN METABOLIC PANEL: CPT | Performed by: NURSE PRACTITIONER

## 2024-04-02 PROCEDURE — 85025 COMPLETE CBC W/AUTO DIFF WBC: CPT | Performed by: NURSE PRACTITIONER

## 2024-04-02 PROCEDURE — 84443 ASSAY THYROID STIM HORMONE: CPT | Performed by: NURSE PRACTITIONER

## 2024-04-02 PROCEDURE — 83735 ASSAY OF MAGNESIUM: CPT | Performed by: NURSE PRACTITIONER

## 2024-04-02 PROCEDURE — 84439 ASSAY OF FREE THYROXINE: CPT | Performed by: NURSE PRACTITIONER

## 2024-04-02 PROCEDURE — 99999 PR PBB SHADOW E&M-EST. PATIENT-LVL V: CPT | Mod: PBBFAC,,, | Performed by: NURSE PRACTITIONER

## 2024-04-02 PROCEDURE — 36415 COLL VENOUS BLD VENIPUNCTURE: CPT | Performed by: NURSE PRACTITIONER

## 2024-04-02 NOTE — PROGRESS NOTES
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 male who returns to clinic for management of newly progressing cSCC. Patient had ANGELES on October PETCT, but then developed quickly progressing orbital lesion. He was seen at [facility] in [place]. Recommendation was to follow up at [facility] to initiate induction chemotherapy followed by possible surgery.     Interval HPI:  Today, patient reports taking a suppository yesterday which caused 2 loose stools. Reports that these BMs were small and does not feel relief from constipation. This prevented him from attending yesterdays appointment. Planning to use another suppository tonight. Currently taking senna 2 tabs BID. Notes left eye discharge described as pus-like over the last 2 weeks. Lower left eyelid red. Compliant with doxycycline. States right eye vision has improved on treatment. Reports he is eating better.     ECOG Performance status: 1 - Symptomatic but completely ambulatory Communication from him is 100% written.     Cancer Staging   Squamous cell carcinoma of base of tongue  Staging form: Pharynx - HPV-Mediated Oropharynx, AJCC 8th Edition  - Clinical stage from 9/18/2020: Stage III (rcT4, cN1, cM0, p16+) - Signed by Ronald Berg MD on 12/27/2022      Oncologic History:  Oncology History   Squamous cell carcinoma of base of tongue   9/18/2020 Cancer Staged    Staging form: Pharynx - HPV-Mediated Oropharynx, AJCC 8th Edition  - Clinical stage from 9/18/2020: Stage III (rcT4, cN1, cM0, p16+)     9/28/2020 Initial Diagnosis    Squamous cell carcinoma of base of tongue     10/6/2020 - 8/17/2021 Chemotherapy    Treatment Summary   Plan Name: OP HEAD NECK CARBOPLATIN PACLITAXEL C1-2 FOLLOWED BY CETUXIMAB CARBOPLATIN C3-6 FOLLOWED BY CETUXIMAB MAINTENANCE WEEKLY  Treatment Goal: Control  Status: Inactive  Start Date: 10/6/2020  End Date:  8/17/2021  Provider: Ronald Berg MD  Chemotherapy: cetuximab (ERBITUX) 100 mg/50 mL chemo infusion 684 mg, 400 mg/m2 = 684 mg (100 % of original dose 400 mg/m2), Intravenous, Clinic/[facility] 1 time, 29 of 38 cycles  Dose modification: 500 mg/m2 (original dose 400 mg/m2, Cycle 3), 250 mg/m2 (original dose 400 mg/m2, Cycle 3), 400 mg/m2 (original dose 400 mg/m2, Cycle 3), 250 mg/m2 (original dose 250 mg/m2, Cycle 7), 200 mg/m2 (original dose 250 mg/m2, Cycle 18), 200 mg/m2 (original dose 250 mg/m2, Cycle 19), 250 mg/m2 (original dose 250 mg/m2, Cycle 4), 200 mg/m2 (original dose 250 mg/m2, Cycle 25), 200 mg/m2 (original dose 250 mg/m2, Cycle 28)  Administration: 684 mg (11/17/2020), 400 mg (11/24/2020), 400 mg (2/9/2021), 400 mg (2/17/2021), 427.6 mg (3/9/2021), 400 mg (3/30/2021), 400 mg (3/23/2021), 400 mg (4/6/2021), 427.6 mg (4/13/2021), 427.6 mg (4/20/2021), 342 mg (5/11/2021), 342 mg (5/18/2021), 342 mg (5/25/2021), 400 mg (5/31/2021), 400 mg (6/7/2021), 400 mg (6/14/2021), 400 mg (12/8/2020), 427.6 mg (12/29/2020), 400 mg (12/1/2020), 400 mg (12/15/2020), 400 mg (12/22/2020), 427.6 mg (1/5/2021), 400 mg (1/12/2021), 400 mg (1/19/2021), 427.6 mg (1/26/2021), 400 mg (2/2/2021), 400 mg (2/23/2021), 400 mg (3/2/2021), 427.6 mg (3/16/2021), 400 mg (6/21/2021), 342 mg (6/28/2021), 342 mg (7/6/2021), 342 mg (7/13/2021), 342 mg (7/20/2021), 400 mg (7/27/2021), 400 mg (8/10/2021), 400 mg (8/17/2021)  CARBOplatin (PARAPLATIN) 310 mg in sodium chloride 0.9% 500 mL chemo infusion, 310 mg (92.2 % of original dose 334.5 mg), Intravenous, Clinic/[facility] 1 time, 6 of 6 cycles  Dose modification:   (original dose 334.5 mg, Cycle 1)  Administration: 310 mg (10/6/2020), 370 mg (11/17/2020), 300 mg (10/27/2020), 350 mg (12/8/2020), 335 mg (12/29/2020), 320 mg (1/19/2021)  PACLitaxeL (TAXOL) 175 mg/m2 = 300 mg in sodium chloride 0.9% 500 mL chemo infusion, 175 mg/m2 = 300 mg (100 % of original dose 175 mg/m2), Intravenous, Clinic/[facility] 1  time, 2 of 2 cycles  Dose modification: 175 mg/m2 (original dose 175 mg/m2, Cycle 1)  Administration: 300 mg (10/6/2020), 300 mg (10/27/2020)     4/29/2021 Tumor Conference       His case was discussed at the Multidisciplinary Head and Neck Team Planning Meeting.    Representatives from Medical Oncology, Radiation Oncology, Head and Neck Surgical Oncology, Psychosocial Oncology, and Speech and Language Pathology discussed the case with the following recommendations:    1) biopsy         7/29/2021 Tumor Conference       His case was discussed at the Multidisciplinary Head and Neck Team Planning Meeting.    Representatives from Medical Oncology, Radiation Oncology, Head and Neck Surgical Oncology, Psychosocial Oncology, and Speech and Language Pathology discussed the case with the following recommendations:    1) Head and neck clinic follow up  2) consider Keytruda (discuss with transplant)  3) consider palliative referral         9/13/2021 - 12/29/2023 Chemotherapy    Treatment Summary   Plan Name: OP HEAD NECK PEMBROLIZUMAB CARBOPLATIN FLUOROURACIL (C1 ONLY RECEIVED) FOLLOWED BY PEMBROLIZUMAB MAINTENANCE  Treatment Goal: Palliative  Status: Inactive  Start Date: 9/13/2021  End Date: 12/29/2023  Provider: Ronald Berg MD  Chemotherapy: CARBOplatin (PARAPLATIN) 320 mg in sodium chloride 0.9% 500 mL chemo infusion, 320 mg (100 % of original dose 320.5 mg), Intravenous, [facility] 1 time, 6 of 6 cycles  Dose modification:   (original dose 320.5 mg, Cycle 1)  Administration: 320 mg (9/13/2021), 335 mg (12/23/2021), 325 mg (1/27/2022), 245 mg (3/3/2022), 255 mg (5/12/2022), 255 mg (4/7/2022)  fluorouraciL 1,000 mg/m2/day = 6,440 mg in sodium chloride 0.9% 240 mL chemo infusion, 1,000 mg/m2/day = 6,440 mg, Intravenous, Over 96 hours, 6 of 6 cycles  Dose modification: 800 mg/m2/day (original dose 1,000 mg/m2/day, Cycle 6), 5,000 mg (original dose 1,000 mg/m2/day, Cycle 8)  Administration: 6,440 mg (9/13/2021), 6,440 mg  (12/23/2021), 6,480 mg (1/27/2022), 5,000 mg (3/3/2022), 5,000 mg (4/7/2022), 5,000 mg (5/12/2022)     Secondary malignant neoplasm of cervical lymph node   2/9/2021 Initial Diagnosis    Secondary malignant neoplasm of cervical lymph node     9/13/2021 - 12/29/2023 Chemotherapy    Treatment Summary   Plan Name: OP HEAD NECK PEMBROLIZUMAB CARBOPLATIN FLUOROURACIL (C1 ONLY RECEIVED) FOLLOWED BY PEMBROLIZUMAB MAINTENANCE  Treatment Goal: Palliative  Status: Inactive  Start Date: 9/13/2021  End Date: 12/29/2023  Provider: Ronald Berg MD  Chemotherapy: CARBOplatin (PARAPLATIN) 320 mg in sodium chloride 0.9% 500 mL chemo infusion, 320 mg (100 % of original dose 320.5 mg), Intravenous, Clinic/[facility] 1 time, 6 of 6 cycles  Dose modification:   (original dose 320.5 mg, Cycle 1)  Administration: 320 mg (9/13/2021), 335 mg (12/23/2021), 325 mg (1/27/2022), 245 mg (3/3/2022), 255 mg (5/12/2022), 255 mg (4/7/2022)  fluorouraciL 1,000 mg/m2/day = 6,440 mg in sodium chloride 0.9% 240 mL chemo infusion, 1,000 mg/m2/day = 6,440 mg, Intravenous, Over 96 hours, 6 of 6 cycles  Dose modification: 800 mg/m2/day (original dose 1,000 mg/m2/day, Cycle 6), 5,000 mg (original dose 1,000 mg/m2/day, Cycle 8)  Administration: 6,440 mg (9/13/2021), 6,440 mg (12/23/2021), 6,480 mg (1/27/2022), 5,000 mg (3/3/2022), 5,000 mg (4/7/2022), 5,000 mg (5/12/2022)     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   1/29/2024 Initial Diagnosis    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     2/19/2024 -  Chemotherapy    Treatment Summary   Plan Name: OP HEAD NECK CETUXIMAB CARBOPLATIN FLUOROURACIL Q3W  Treatment Goal: Curative  Status: Active  Start Date: 2/19/2024  End Date: 8/5/2024 (Planned)  Provider: Martín Hernandez MD  Chemotherapy: cetuximab (ERBITUX) 100 mg/50 mL chemo infusion 668 mg, 400 mg/m2 = 668 mg, Intravenous, Clinic/[facility] 1 time, 2 of 12 cycles  Administration: 668 mg (2/19/2024), 400 mg (2/26/2024), 400 mg (3/18/2024), 400 mg (3/25/2024), 400 mg  (3/11/2024)  CARBOplatin (PARAPLATIN) 295 mg in sodium chloride 0.9% 314.5 mL chemo infusion, 295 mg, Intravenous, [facility] 1 time, 2 of 6 cycles  Administration: 295 mg (2/19/2024), 300 mg (3/18/2024)         Review of Systems   Constitutional:  Positive for fatigue. Negative for activity change, appetite change, chills, fever and unexpected weight change.   HENT:  Negative for ear pain, facial swelling, hearing loss, mouth sores, nosebleeds, sore throat, trouble swallowing and voice change.         No verbal communication. Skin fixed and immobile from previous scaring post-neck dissection   Eyes:  Positive for redness (right eye - lesion; left lower eyelid erythema). Negative for pain, discharge and visual disturbance.   Respiratory:  Negative for cough, choking, chest tightness and shortness of breath.    Cardiovascular:  Negative for chest pain, palpitations and leg swelling.   Gastrointestinal:  Positive for constipation. Negative for abdominal distention, abdominal pain, blood in stool, diarrhea, nausea and vomiting.   Endocrine: Negative for cold intolerance and heat intolerance.   Genitourinary:  Negative for difficulty urinating, dysuria, frequency and urgency.   Musculoskeletal:  Positive for back pain (lower - chronic). Negative for arthralgias, gait problem, leg pain and myalgias.   Integumentary:  Positive for mole/lesion (left nare and neck - cSCCv > improving). Negative for pallor, rash and wound.   Allergic/Immunologic: Negative for frequent infections.   Neurological:  Negative for dizziness, tremors, weakness, light-headedness, numbness and headaches.   Hematological:  Negative for adenopathy. Does not bruise/bleed easily.   Psychiatric/Behavioral:  Negative for agitation, confusion, dysphoric mood and sleep disturbance. The patient is not nervous/anxious.         Allergies:  Review of patient's allergies indicates:   Allergen Reactions    Aspirin     Tylenol extended release         Medications:  Current Outpatient Medications   Medication Sig Dispense Refill    azelaic acid (AZELEX) 15 % gel APPLY TOPICALLY TO AFFECTED AREA IN THE MORNING 50 g 3    chlorhexidine (PERIDEX) 0.12 % solution Gently swish and spit 15 mL twice a day for 3 weeks. Start rinses the day after your procedure. 473 mL 1    clindamycin phosphate 1% (CLINDAGEL) 1 % gel Apply topically 2 (two) times daily. 60 g 3    doxycycline (VIBRA-TABS) 100 MG tablet Take 1 tablet (100 mg total) by mouth 2 (two) times daily. 42 tablet 0    doxycycline hyclate 150 mg Tab Take two tabs PO 1 hour before dental surgery. 2 tablet 0    erythromycin (ROMYCIN) ophthalmic ointment Administer 0.5 inches to the right eye twice daily for 30 days. 3.5 g 1    hydrocortisone 1 % cream Apply to face, hands, feet, neck, back and chest at bedtime. 112 g 2    imiquimod (ALDARA) 5 % cream Apply to biopsy site on frontal scalp + about a centimeter of surrounding skin qhs x 16 weeks. Wash off in am and apply sunscreen. Discontinue if skin becomes blistered, raw, bleeding, painful, etc. 24 packet 3    levothyroxine (TIROSINT-SOL) 150 mcg/mL Soln Take 150 mcg by mouth before breakfast. 30 mL 11    LIDOcaine HCl 2% (LIDOCAINE VISCOUS) 2 % Soln Swish and spit 15 mls every 8 (eight) hours as needed (mouth sore). 100 mL 3    magic mouthwash diphen/antac/lidoc/nysta Take 10 mLs by mouth 4 (four) times daily. 120 mL 4    metroNIDAZOLE (METROGEL) 0.75 % gel Apply topically to affected area 2 (two) times daily. 45 g 1    multivit-min/FA/lycopen/lutein (CENTRUM SILVER MEN ORAL) Take 1 tablet by mouth once daily.      OLANZapine (ZYPREXA) 5 MG tablet Take 1 tablet (5 mg total) by mouth every evening. Take as directed on days 1-4 of your chemotherapy cycle. 30 tablet 5    oxyCODONE (ROXICODONE) 5 MG immediate release tablet Take 1 tablet (5 mg total) by mouth every 4 (four) hours as needed for Pain. 138 tablet 0    pentoxifylline (TRENTAL) 400 mg TbSR Take 1 tablet  by mouth twice daily until gone. Start taking one week prior to your dental surgery. 112 tablet 0    prednisoLONE acetate (PRED FORTE) 1 % DrpS Place 1 drop into the right eye 4 (four) times daily. 10 mL 3    prochlorperazine (COMPAZINE) 10 MG tablet Take ½ tablet (5 mg total) by mouth every 6 (six) hours as needed. 30 tablet 1    senna (SENOKOT) 8.6 mg tablet Take 2 tablets by mouth 2 (two) times daily as needed for Constipation. 60 tablet 1    sulfacetamide sodium-sulfur 10-5 % (w/w) Clsr USE TO WASH AFFECTED AREA DAILY      tacrolimus (PROGRAF) 0.5 MG Cap Take 1 capsule (0.5 mg total) by mouth every EVENING.  (Use the 1mg capsule for your morning dose) 90 capsule 3    tacrolimus (PROGRAF) 1 MG Cap Take 1 capsule (1 mg total) by mouth every MORNING. Use the tacrolimus 0.5mg capsule for your evening dose as directed. 90 capsule 3    tiZANidine (ZANAFLEX) 2 MG tablet Take 1 tablet (2 mg total) by mouth nightly as needed (neck muscle strain). 30 tablet 3    traZODone (DESYREL) 50 MG tablet TAKE 1 TABLET BY MOUTH IN THE  EVENING 90 tablet 3    vitamin E 1000 UNIT capsule Take 1 cap PO BID until gone. Start taking one week prior to your dental surgery. 112 capsule 0    gabapentin (NEURONTIN) 300 MG capsule Take 1 capsule (300 mg total) by mouth every evening. (Patient taking differently: Take 300 mg by mouth as needed.) 30 capsule 11     No current facility-administered medications for this visit.     Facility-Administered Medications Ordered in Other Visits   Medication Dose Route Frequency Provider Last Rate Last Admin    heparin, porcine (PF) 100 unit/mL injection flush 500 Units  500 Units Intravenous PRN Ronald Berg MD        ofloxacin 0.3 % ophthalmic solution 1 drop  1 drop Right Eye On Call Procedure Victor M Vasques MD   2 drop at 10/11/22 0745    sodium chloride 0.9% flush 10 mL  10 mL Intravenous PRN Ronald Berg MD        sodium chloride 0.9% flush 10 mL  10 mL Intravenous PRN Victor M Vasques  MD PRETTY           PMH:  Past Medical History:   Diagnosis Date    Basal cell carcinoma (BCC) in situ of skin 2012    3 on face, 2 on arm, removed by dermatology.     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 Underwent surgical resection of tongue and neck. s/p tracheostomy    Squamous cell carcinoma of skin of right temple 01/24/2024    right temple       PSH:  Past Surgical History:   Procedure Laterality Date    COLONOSCOPY      COLONOSCOPY N/A 9/14/2023    Procedure: COLONOSCOPY;  Surgeon: Reyes Olivia MD;  Location: [facility] ([address] [place] [address]);  Service: Endoscopy;  Laterality: N/A;    CONJUNCTIVA BIOPSY Right 10/11/2022    Procedure: BIOPSY, CONJUNCTIVA;  Surgeon: Victor M Vasques MD;  Location: [facility];  Service: Ophthalmology;  Laterality: Right;    ESOPHAGOGASTRODUODENOSCOPY N/A 9/14/2023    Procedure: EGD (ESOPHAGOGASTRODUODENOSCOPY);  Surgeon: Reyes Olivia MD;  Location: [facility] ([address] [place] [address]);  Service: Endoscopy;  Laterality: N/A;    INSERTION OF VENOUS ACCESS PORT Right 3/8/2021    Procedure: INSERTION, VENOUS ACCESS PORT;  Surgeon: Jesus Rendon MD;  Location: [address];  Service: General;  Laterality: Right;    LIVER TRANSPLANT  11/2008    transplanted for biopsy proven hepatocellular carcinoma,     LYMPH NODE BIOPSY N/A 9/18/2020    Procedure: BIOPSY, LYMPH NODE;  Surgeon: [facility];  [address];  Service: General;  Laterality: N/A;  Rm 173    SURGICAL REMOVAL OF SCAR Right 8/24/2023    Procedure: EXCISION, SCAR;  Surgeon: Ting Brambila MD;  Location: [facility];  Service: Ophthalmology;  Laterality: Right;    TRACHEOSTOMY         FamHx:  Family  "History   Problem Relation Age of Onset    Dementia Mother        SocHx:  Social History     Socioeconomic History    Marital status: Single   Occupational History    Occupation: Retired     Comment: , notes exposures to fumes etc.  Worked on [employer] for 4 years   Tobacco Use    Smoking status: Never    Smokeless tobacco: Never   Substance and Sexual Activity    Alcohol use: Yes     Comment: drinks wine, 1 glass day    Drug use: Not Currently    Sexual activity: Yes     Comment: No prior history of STD        Distress Score             Objective:      Vitals:    04/02/24 0844 04/02/24 0902   BP: (!) 84/57 104/73   BP Location: Right arm Left arm   Patient Position: Sitting Sitting   BP Method: Medium (Automatic) Medium (Automatic)   Pulse: 90 96   Resp: 16    Temp: 97.6 °F (36.4 °C)    TempSrc: Oral    SpO2: 96%    Weight: 51.5 kg (113 lb 8.6 oz)    Height: 5' 8" (1.727 m)                 Physical Exam  Vitals reviewed.   Constitutional:       General: He is not in acute distress.     Appearance: Normal appearance. He is well-developed and underweight.   HENT:      Head: Normocephalic and atraumatic.      Mouth/Throat:      Mouth: Mucous membranes are moist.      Dentition: Abnormal dentition. Dental caries present.   Eyes:      Conjunctiva/sclera: Conjunctivae normal.      Pupils: Pupils are equal, round, and reactive to light.      Comments: Right eye - injected and nodular growth on the inner eye conjunctiva - decreased in size.     Left lower eyelid - erythema and scabbing.    Neck:      Trachea: Tracheostomy present.   Pulmonary:      Effort: Pulmonary effort is normal. No respiratory distress.      Comments: Tracheostomy  Abdominal:      General: There is no distension.      Palpations: Abdomen is soft.   Musculoskeletal:         General: No swelling. Normal range of motion.      Cervical back: Normal range of motion and neck supple.   Skin:     General: Skin is warm and dry. "   Neurological:      General: No focal deficit present.      Mental Status: He is alert and oriented to person, place, and time.   Psychiatric:         Mood and Affect: Mood normal.         Behavior: Behavior normal.         Thought Content: Thought content normal.         Judgment: Judgment normal.           LABS:  WBC   Date Value Ref Range Status   04/02/2024 6.43 3.90 - 12.70 K/uL Final     Hemoglobin   Date Value Ref Range Status   04/02/2024 8.8 (L) 14.0 - 18.0 g/dL Final     Hematocrit   Date Value Ref Range Status   04/02/2024 25.7 (L) 40.0 - 54.0 % Final     Platelets   Date Value Ref Range Status   04/02/2024 57 (L) 150 - 450 K/uL Final       Chemistry        Component Value Date/Time     04/02/2024 0853    K 4.2 04/02/2024 0853     04/02/2024 0853    CO2 24 04/02/2024 0853    BUN 26 (H) 04/02/2024 0853    CREATININE 1.7 (H) 04/02/2024 0853     (H) 04/02/2024 0853        Component Value Date/Time    CALCIUM 10.1 04/02/2024 0853    ALKPHOS 89 04/02/2024 0853    AST 31 04/02/2024 0853    ALT 22 04/02/2024 0853    BILITOT 0.5 04/02/2024 0853    ESTGFRAFRICA 52.6 (A) 07/14/2022 1119    EGFRNONAA 45.5 (A) 07/14/2022 1119              Assessment:       1. 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 Squamous cell carcinoma of base of tongue    3. Secondary malignant neoplasm of cervical lymph node    4. Tracheostomy status    5. Status post liver transplantation - 2008    6. Immunodeficiency due to drugs    7. Iron deficiency anemia due to chronic blood loss    8. Gastrointestinal hemorrhage associated with duodenal ulcer    9. 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 Drug-induced constipation    11. Chemotherapy-induced thrombocytopenia    12. Thrombocytopenia, unspecified    13. Chronic kidney disease (CKD), stage IV (severe)    14. Hypomagnesemia        Plan:         Orbital cSCC  Unfortunately patient has progressed while on immunotherapy for below  diagnosis. For that reason systemic therapies are limited. Evaluated at [facility] and surgeon does think an exenteration sparing resection would be possible with good response to induction chemotherapy. Plan was for platinum doublet chemo + cetuximab x 2 cycles. Notified of this plan on 1/29 and treatment plan was entered. Authorization was obtained on 2/5, but cycle 1 still not scheduled.    - Holding C2D15 for thrombocytopenia. Will keep dose reductions for C2 of 5-FU to 750 mg/m2 due to age/frailty. Also adding growth factor. Scheduled for surgery on 4/25 at [facility].    2,3. 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 He is not a surgical or radiation candidate.  -Started on PCC 10/6/2020 followed by maintenance cetuximab until 8/24/21 (discontinued due to progression of disease; continued increase in SUV uptake, no new lesions).  - 9/2021 started on carbo/5-FU/pembrolizumab; due to toxicity went to pembrolizumab monotherapy starting with cycle 2.  Progression of disease noted after cycle 3 so added chemotherapy back in with cycle 4. Keytruda monotherapy starting with C9.   - Keytruda discontinued after 2 years.    4. Status post total laryngectomy, total glossectomy and anterolateral thigh free flap reconstruction roughly 2017 at [facility] in [place] for persistent/recurrent base of tongue sq.c.c.    5-6. S/p liver transplant and is currently on tacrolimus. Grade 1. Will continue to monitor.     7,8. Hgb stable. Monitor closely on chemotherapy.     9. TSH still elevated - has been on tirosint x1 week. Swicthed thyroid hormone preparation to Tirosint SOL (oral solution) and increase the dose to 150 mcg daily per Endocrinology e-consult.     10. Start senokot daily - increase to 3 tabs BID and up to 4 tabs BID.     11,12. PLT 50K. Holding treatment today. Discussed thrombocytopenic precautions.     13,14. Cr 1.7, baseline  1.3-1.7. Patient encouraged to increase hydration. Mg mildly low. Will continue to monitor.     Patient is in agreement with the proposed treatment plan. All questions were answered to the patient's satisfaction. Pt knows to call clinic if anything is needed before the next clinic visit.    Ct Francis, MSN, APRN, FNP-C  Hematology and Medical Oncology  Nurse Practitioner to Dr. Martín Hernandez  Nurse Practitioner, [facility]            Route Chart for Scheduling    Med Onc Chart Routing      Follow up with physician    Follow up with [facility] 1 week. prior to Cetuximab   Infusion scheduling note    Injection scheduling note    Labs CBC, CMP, free T4, TSH and magnesium   Scheduling:  Preferred lab:  Lab interval:     Imaging    Pharmacy appointment    Other referrals                Treatment Plan Information   OP HEAD NECK CETUXIMAB CARBOPLATIN FLUOROURACIL Q3W   Martín Hernandez MD   Upcoming Treatment Dates - OP HEAD NECK CETUXIMAB CARBOPLATIN FLUOROURACIL Q3W    4/1/2024       Pre-Medications       diphenhydrAMINE (BENADRYL) 25 mg in sodium chloride 0.9% 50 mL IVPB       Chemotherapy       cetuximab (ERBITUX) 100 mg/50 mL chemo infusion 417.6 mg  4/8/2024       Pre-Medications       diphenhydrAMINE (BENADRYL) 25 mg in sodium chloride 0.9% 50 mL IVPB       Chemotherapy       cetuximab (ERBITUX) 100 mg/50 mL chemo infusion 417.6 mg       CARBOplatin (PARAPLATIN) 300 mg in sodium chloride 0.9% 280 mL chemo infusion       fluorouracil (Adrucil) 1,000 mg/m2/day = 6,680 mg in sodium chloride 0.9% 240 mL chemo infusion       Antiemetics       aprepitant (CINVANTI) injection 130 mg       palonosetron (ALOXI) 0.25 mg with Dexamethasone (DECADRON) 12 mg in NS 50 mL IVPB  4/15/2024       Pre-Medications       diphenhydrAMINE (BENADRYL) 25 mg in sodium chloride 0.9% 50 mL IVPB       Chemotherapy       cetuximab (ERBITUX) 100 mg/50 mL chemo infusion 417.6 mg  4/22/2024       Pre-Medications        diphenhydrAMINE (BENADRYL) 25 mg in sodium chloride 0.9% 50 mL IVPB       Chemotherapy       cetuximab (ERBITUX) 100 mg/50 mL chemo infusion 417.6 mg    Therapy Plan Information  PORT FLUSH  Flushes  heparin, porcine (PF) 100 unit/mL injection flush 500 Units  500 Units, Intravenous, Every visit  sodium chloride 0.9% flush 10 mL  10 mL, Intravenous, Every visit    PORT FLUSH  Flushes  heparin, porcine (PF) 100 unit/mL injection flush 500 Units  500 Units, Intravenous, Every visit  sodium chloride 0.9% flush 10 mL  10 mL, Intravenous, Every visit

## 2024-04-03 ENCOUNTER — TELEPHONE (OUTPATIENT)
Dept: TRANSPLANT | Facility: CLINIC | Age: 74
End: 2024-04-03
Payer: MEDICARE

## 2024-04-03 NOTE — TELEPHONE ENCOUNTER
Writer returned call but Socorro not available at this time. Writer able to determine what surgery clearance is needed for via Care Everywhere, request sent to Dr Anton.          ----- Message from Gunjan Andrew sent at 4/3/2024  3:25 PM CDT -----  Regarding: Patient advice  Contact: Socorro  [phone] or  [phone]  Fax [phone]            Name of Caller:  Socorro with  MD Lin      Contact Preference: [phone] or  [phone]  Fax [phone]     Nature of Call:  Requesting a tracey back would like to discuss any restriction or preparation advised prior to up coming procedure schedule 04/25/24

## 2024-04-05 ENCOUNTER — PATIENT MESSAGE (OUTPATIENT)
Dept: TRANSPLANT | Facility: CLINIC | Age: 74
End: 2024-04-05
Payer: MEDICARE

## 2024-04-05 ENCOUNTER — PATIENT MESSAGE (OUTPATIENT)
Dept: HEMATOLOGY/ONCOLOGY | Facility: CLINIC | Age: 74
End: 2024-04-05
Payer: MEDICARE

## 2024-04-08 ENCOUNTER — PATIENT MESSAGE (OUTPATIENT)
Dept: HEMATOLOGY/ONCOLOGY | Facility: CLINIC | Age: 74
End: 2024-04-08
Payer: MEDICARE

## 2024-04-08 ENCOUNTER — TELEPHONE (OUTPATIENT)
Dept: HEMATOLOGY/ONCOLOGY | Facility: CLINIC | Age: 74
End: 2024-04-08
Payer: MEDICARE

## 2024-04-08 ENCOUNTER — HOSPITAL ENCOUNTER (INPATIENT)
Facility: HOSPITAL | Age: 74
LOS: 3 days | Discharge: HOME | DRG: 682 | End: 2024-04-11
Attending: EMERGENCY MEDICINE | Admitting: INTERNAL MEDICINE
Payer: MEDICARE

## 2024-04-08 DIAGNOSIS — R07.9 CHEST PAIN: ICD-10-CM

## 2024-04-08 DIAGNOSIS — G62.89 OTHER POLYNEUROPATHY: ICD-10-CM

## 2024-04-08 DIAGNOSIS — K92.1 MELENA: ICD-10-CM

## 2024-04-08 DIAGNOSIS — L27.0 DRUG RASH: ICD-10-CM

## 2024-04-08 DIAGNOSIS — N18.9 ACUTE KIDNEY INJURY SUPERIMPOSED ON CHRONIC KIDNEY DISEASE: ICD-10-CM

## 2024-04-08 DIAGNOSIS — E86.0 DEHYDRATION: ICD-10-CM

## 2024-04-08 DIAGNOSIS — N17.9 AKI (ACUTE KIDNEY INJURY): Primary | ICD-10-CM

## 2024-04-08 DIAGNOSIS — D72.829 LEUKOCYTOSIS: ICD-10-CM

## 2024-04-08 DIAGNOSIS — N17.9 ACUTE KIDNEY INJURY SUPERIMPOSED ON CHRONIC KIDNEY DISEASE: ICD-10-CM

## 2024-04-08 PROBLEM — J02.9 SORE THROAT: Status: ACTIVE | Noted: 2024-04-08

## 2024-04-08 LAB
ALBUMIN SERPL BCP-MCNC: 3.3 G/DL (ref 3.5–5.2)
ALLENS TEST: NORMAL
ALP SERPL-CCNC: 98 U/L (ref 55–135)
ALT SERPL W/O P-5'-P-CCNC: 15 U/L (ref 10–44)
ANION GAP SERPL CALC-SCNC: 13 MMOL/L (ref 8–16)
AST SERPL-CCNC: 33 U/L (ref 10–40)
BASOPHILS # BLD AUTO: 0.1 K/UL (ref 0–0.2)
BASOPHILS NFR BLD: 0.5 % (ref 0–1.9)
BILIRUB SERPL-MCNC: 0.4 MG/DL (ref 0.1–1)
BILIRUB UR QL STRIP: NEGATIVE
BUN SERPL-MCNC: 35 MG/DL (ref 8–23)
CALCIUM SERPL-MCNC: 9.6 MG/DL (ref 8.7–10.5)
CHLORIDE SERPL-SCNC: 102 MMOL/L (ref 95–110)
CLARITY UR REFRACT.AUTO: CLEAR
CO2 SERPL-SCNC: 24 MMOL/L (ref 23–29)
COLOR UR AUTO: YELLOW
CREAT SERPL-MCNC: 1.7 MG/DL (ref 0.5–1.4)
DIFFERENTIAL METHOD BLD: ABNORMAL
EOSINOPHIL # BLD AUTO: 0 K/UL (ref 0–0.5)
EOSINOPHIL NFR BLD: 0 % (ref 0–8)
ERYTHROCYTE [DISTWIDTH] IN BLOOD BY AUTOMATED COUNT: 15.1 % (ref 11.5–14.5)
EST. GFR  (NO RACE VARIABLE): 41.5 ML/MIN/1.73 M^2
GLUCOSE SERPL-MCNC: 129 MG/DL (ref 70–110)
GLUCOSE UR QL STRIP: NEGATIVE
HCT VFR BLD AUTO: 24.4 % (ref 40–54)
HGB BLD-MCNC: 8.5 G/DL (ref 14–18)
HGB UR QL STRIP: NEGATIVE
HIV 1+2 AB+HIV1 P24 AG SERPL QL IA: NORMAL
IMM GRANULOCYTES # BLD AUTO: 0.7 K/UL (ref 0–0.04)
IMM GRANULOCYTES NFR BLD AUTO: 3.3 % (ref 0–0.5)
KETONES UR QL STRIP: NEGATIVE
LDH SERPL L TO P-CCNC: 0.87 MMOL/L (ref 0.5–2.2)
LEUKOCYTE ESTERASE UR QL STRIP: NEGATIVE
LIPASE SERPL-CCNC: 35 U/L (ref 4–60)
LYMPHOCYTES # BLD AUTO: 0.8 K/UL (ref 1–4.8)
LYMPHOCYTES NFR BLD: 3.9 % (ref 18–48)
MAGNESIUM SERPL-MCNC: 1.5 MG/DL (ref 1.6–2.6)
MCH RBC QN AUTO: 36.2 PG (ref 27–31)
MCHC RBC AUTO-ENTMCNC: 34.8 G/DL (ref 32–36)
MCV RBC AUTO: 104 FL (ref 82–98)
MONOCYTES # BLD AUTO: 1.8 K/UL (ref 0.3–1)
MONOCYTES NFR BLD: 8.2 % (ref 4–15)
NEUTROPHILS # BLD AUTO: 18.1 K/UL (ref 1.8–7.7)
NEUTROPHILS NFR BLD: 84.1 % (ref 38–73)
NITRITE UR QL STRIP: NEGATIVE
NRBC BLD-RTO: 0 /100 WBC
PH UR STRIP: 7 [PH] (ref 5–8)
PLATELET # BLD AUTO: 295 K/UL (ref 150–450)
PMV BLD AUTO: 9.3 FL (ref 9.2–12.9)
POTASSIUM SERPL-SCNC: 4 MMOL/L (ref 3.5–5.1)
PROT SERPL-MCNC: 7.5 G/DL (ref 6–8.4)
PROT UR QL STRIP: NEGATIVE
RBC # BLD AUTO: 2.35 M/UL (ref 4.6–6.2)
SAMPLE: NORMAL
SARS-COV-2 RDRP RESP QL NAA+PROBE: NEGATIVE
SITE: NORMAL
SODIUM SERPL-SCNC: 139 MMOL/L (ref 136–145)
SP GR UR STRIP: 1.02 (ref 1–1.03)
URN SPEC COLLECT METH UR: NORMAL
WBC # BLD AUTO: 21.47 K/UL (ref 3.9–12.7)

## 2024-04-08 PROCEDURE — 83735 ASSAY OF MAGNESIUM: CPT | Performed by: EMERGENCY MEDICINE

## 2024-04-08 PROCEDURE — 96361 HYDRATE IV INFUSION ADD-ON: CPT

## 2024-04-08 PROCEDURE — 83605 ASSAY OF LACTIC ACID: CPT

## 2024-04-08 PROCEDURE — 80053 COMPREHEN METABOLIC PANEL: CPT | Performed by: EMERGENCY MEDICINE

## 2024-04-08 PROCEDURE — 25000003 PHARM REV CODE 250: Performed by: STUDENT IN AN ORGANIZED HEALTH CARE EDUCATION/TRAINING PROGRAM

## 2024-04-08 PROCEDURE — 25000003 PHARM REV CODE 250

## 2024-04-08 PROCEDURE — 87389 HIV-1 AG W/HIV-1&-2 AB AG IA: CPT | Performed by: PHYSICIAN ASSISTANT

## 2024-04-08 PROCEDURE — 87077 CULTURE AEROBIC IDENTIFY: CPT | Performed by: EMERGENCY MEDICINE

## 2024-04-08 PROCEDURE — 63600175 PHARM REV CODE 636 W HCPCS: Performed by: EMERGENCY MEDICINE

## 2024-04-08 PROCEDURE — 99900035 HC TECH TIME PER 15 MIN (STAT)

## 2024-04-08 PROCEDURE — 20600001 HC STEP DOWN PRIVATE ROOM

## 2024-04-08 PROCEDURE — U0002 COVID-19 LAB TEST NON-CDC: HCPCS | Performed by: INTERNAL MEDICINE

## 2024-04-08 PROCEDURE — 85025 COMPLETE CBC W/AUTO DIFF WBC: CPT | Performed by: EMERGENCY MEDICINE

## 2024-04-08 PROCEDURE — 87040 BLOOD CULTURE FOR BACTERIA: CPT | Performed by: STUDENT IN AN ORGANIZED HEALTH CARE EDUCATION/TRAINING PROGRAM

## 2024-04-08 PROCEDURE — 96365 THER/PROPH/DIAG IV INF INIT: CPT

## 2024-04-08 PROCEDURE — 87186 SC STD MICRODIL/AGAR DIL: CPT | Performed by: EMERGENCY MEDICINE

## 2024-04-08 PROCEDURE — 25000003 PHARM REV CODE 250: Performed by: EMERGENCY MEDICINE

## 2024-04-08 PROCEDURE — 63600175 PHARM REV CODE 636 W HCPCS: Performed by: STUDENT IN AN ORGANIZED HEALTH CARE EDUCATION/TRAINING PROGRAM

## 2024-04-08 PROCEDURE — 83690 ASSAY OF LIPASE: CPT | Performed by: EMERGENCY MEDICINE

## 2024-04-08 PROCEDURE — 87070 CULTURE OTHR SPECIMN AEROBIC: CPT | Performed by: EMERGENCY MEDICINE

## 2024-04-08 PROCEDURE — 99285 EMERGENCY DEPT VISIT HI MDM: CPT | Mod: 25

## 2024-04-08 PROCEDURE — 81003 URINALYSIS AUTO W/O SCOPE: CPT | Performed by: EMERGENCY MEDICINE

## 2024-04-08 RX ORDER — TIZANIDINE 2 MG/1
2 TABLET ORAL NIGHTLY PRN
Status: DISCONTINUED | OUTPATIENT
Start: 2024-04-08 | End: 2024-04-11 | Stop reason: HOSPADM

## 2024-04-08 RX ORDER — SODIUM CHLORIDE 0.9 % (FLUSH) 0.9 %
10 SYRINGE (ML) INJECTION EVERY 12 HOURS PRN
Status: DISCONTINUED | OUTPATIENT
Start: 2024-04-08 | End: 2024-04-11 | Stop reason: HOSPADM

## 2024-04-08 RX ORDER — TRAZODONE HYDROCHLORIDE 50 MG/1
50 TABLET ORAL NIGHTLY
Status: DISCONTINUED | OUTPATIENT
Start: 2024-04-08 | End: 2024-04-11 | Stop reason: HOSPADM

## 2024-04-08 RX ORDER — PROCHLORPERAZINE EDISYLATE 5 MG/ML
5 INJECTION INTRAMUSCULAR; INTRAVENOUS EVERY 6 HOURS PRN
Status: DISCONTINUED | OUTPATIENT
Start: 2024-04-08 | End: 2024-04-11 | Stop reason: HOSPADM

## 2024-04-08 RX ORDER — SODIUM CHLORIDE 9 MG/ML
INJECTION, SOLUTION INTRAVENOUS CONTINUOUS
Status: ACTIVE | OUTPATIENT
Start: 2024-04-08 | End: 2024-04-09

## 2024-04-08 RX ORDER — DOXYCYCLINE HYCLATE 100 MG
100 TABLET ORAL 2 TIMES DAILY
Status: DISCONTINUED | OUTPATIENT
Start: 2024-04-08 | End: 2024-04-11

## 2024-04-08 RX ORDER — ONDANSETRON HYDROCHLORIDE 2 MG/ML
4 INJECTION, SOLUTION INTRAVENOUS EVERY 8 HOURS PRN
Status: DISCONTINUED | OUTPATIENT
Start: 2024-04-08 | End: 2024-04-11 | Stop reason: HOSPADM

## 2024-04-08 RX ORDER — POLYETHYLENE GLYCOL 3350 17 G/17G
17 POWDER, FOR SOLUTION ORAL DAILY
Status: DISCONTINUED | OUTPATIENT
Start: 2024-04-09 | End: 2024-04-11 | Stop reason: HOSPADM

## 2024-04-08 RX ORDER — HEPARIN SODIUM 5000 [USP'U]/ML
5000 INJECTION, SOLUTION INTRAVENOUS; SUBCUTANEOUS EVERY 8 HOURS
Status: DISCONTINUED | OUTPATIENT
Start: 2024-04-08 | End: 2024-04-11 | Stop reason: HOSPADM

## 2024-04-08 RX ORDER — IBUPROFEN 200 MG
24 TABLET ORAL
Status: DISCONTINUED | OUTPATIENT
Start: 2024-04-08 | End: 2024-04-11 | Stop reason: HOSPADM

## 2024-04-08 RX ORDER — TALC
6 POWDER (GRAM) TOPICAL NIGHTLY PRN
Status: DISCONTINUED | OUTPATIENT
Start: 2024-04-08 | End: 2024-04-11 | Stop reason: HOSPADM

## 2024-04-08 RX ORDER — NALOXONE HCL 0.4 MG/ML
0.02 VIAL (ML) INJECTION
Status: DISCONTINUED | OUTPATIENT
Start: 2024-04-08 | End: 2024-04-11 | Stop reason: HOSPADM

## 2024-04-08 RX ORDER — OXYCODONE HYDROCHLORIDE 5 MG/1
5 TABLET ORAL EVERY 6 HOURS PRN
Status: DISCONTINUED | OUTPATIENT
Start: 2024-04-08 | End: 2024-04-11 | Stop reason: HOSPADM

## 2024-04-08 RX ORDER — IBUPROFEN 200 MG
16 TABLET ORAL
Status: DISCONTINUED | OUTPATIENT
Start: 2024-04-08 | End: 2024-04-11 | Stop reason: HOSPADM

## 2024-04-08 RX ORDER — AMOXICILLIN 250 MG
1 CAPSULE ORAL 2 TIMES DAILY
Status: DISCONTINUED | OUTPATIENT
Start: 2024-04-08 | End: 2024-04-11 | Stop reason: HOSPADM

## 2024-04-08 RX ORDER — SODIUM CHLORIDE 9 MG/ML
INJECTION, SOLUTION INTRAVENOUS CONTINUOUS
Status: DISCONTINUED | OUTPATIENT
Start: 2024-04-08 | End: 2024-04-08

## 2024-04-08 RX ORDER — MAGNESIUM SULFATE HEPTAHYDRATE 40 MG/ML
2 INJECTION, SOLUTION INTRAVENOUS
Status: COMPLETED | OUTPATIENT
Start: 2024-04-08 | End: 2024-04-08

## 2024-04-08 RX ORDER — GLUCAGON 1 MG
1 KIT INJECTION
Status: DISCONTINUED | OUTPATIENT
Start: 2024-04-08 | End: 2024-04-11 | Stop reason: HOSPADM

## 2024-04-08 RX ORDER — ACYCLOVIR 200 MG/5ML
150 SUSPENSION, ORAL (FINAL DOSE FORM) ORAL
Status: DISCONTINUED | OUTPATIENT
Start: 2024-04-09 | End: 2024-04-11 | Stop reason: HOSPADM

## 2024-04-08 RX ADMIN — TRAZODONE HYDROCHLORIDE 50 MG: 50 TABLET ORAL at 08:04

## 2024-04-08 RX ADMIN — MAGNESIUM SULFATE HEPTAHYDRATE 2 G: 40 INJECTION, SOLUTION INTRAVENOUS at 03:04

## 2024-04-08 RX ADMIN — OXYCODONE 5 MG: 5 TABLET ORAL at 08:04

## 2024-04-08 RX ADMIN — SODIUM CHLORIDE 1000 ML: 9 INJECTION, SOLUTION INTRAVENOUS at 02:04

## 2024-04-08 RX ADMIN — SENNOSIDES AND DOCUSATE SODIUM 1 TABLET: 8.6; 5 TABLET ORAL at 08:04

## 2024-04-08 RX ADMIN — DOXYCYCLINE HYCLATE 100 MG: 100 TABLET, COATED ORAL at 08:04

## 2024-04-08 RX ADMIN — HEPARIN SODIUM 5000 UNITS: 5000 INJECTION INTRAVENOUS; SUBCUTANEOUS at 09:04

## 2024-04-08 RX ADMIN — SODIUM CHLORIDE: 9 INJECTION, SOLUTION INTRAVENOUS at 06:04

## 2024-04-08 NOTE — ASSESSMENT & PLAN NOTE
S/p liver transplant, currently on tacrolimus  -continue tacrolimus  -pending tacro lvel  - hepatology consulted apprec recs regarding tacro management

## 2024-04-08 NOTE — ASSESSMENT & PLAN NOTE
Known hx of liver transplant with cirrhosis, followed by [facility] hepatology,     see A&P of cirrhosis

## 2024-04-08 NOTE — H&P
[facility] - Emergency Dept  Hematology/Oncology  H&P    Patient Name: Rocco Swain  MRN: [id number]  Admission Date: 4/8/2024  Code Status: Full Code   Attending Provider: Martín Hernandez MD  Primary Care Physician: No, Primary Doctor  Principal Problem:Acute kidney injury superimposed on chronic kidney disease    Subjective:     HPI: 73 y/o F with SCC of the tongue (follows Dr. 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  Patient was supposed to be scheduled for a chemotherapy infusion today however he was complaining overall fatigue, generalized weakness, worsening sore throat.  His plan of care nurse recommended him to go to the ED for further evaluation.  Describes as a soreness in his posterior throat that has been progressively worsened for the last 4 days.  He does report of decreased p.o. intake.  He normally eats a liquid diet however his overall fatigue made him skip meals.  Reports of associated chills, body aches.  No sick contact.  He lives by himself and perform his ADLs independently. Similar symptoms after receiving infusion in the past.       Initial vitals in the ED: Patient was tachycardic 109, leukocytosis of 20, hemoglobin 8 5 (BL 11-10), CMP significant for BUN/Cr 35/1.7 (baseline 1.5 - 0.9),  chest x-ray with no acute pulmonary process. Patient received 1L in the ED, now on continuous fluids.        Oncology History   Squamous cell carcinoma of base of tongue   9/18/2020 Cancer Staged     Staging form: Pharynx - HPV-Mediated Oropharynx, AJCC 8th Edition  - Clinical stage from 9/18/2020: Stage III (rcT4, cN1, cM0, p16+)      9/28/2020 Initial Diagnosis     Squamous cell carcinoma of base of tongue      10/6/2020 - 8/17/2021 Chemotherapy     Treatment Summary   Plan Name: OP HEAD NECK CARBOPLATIN  PACLITAXEL C1-2 FOLLOWED BY CETUXIMAB CARBOPLATIN C3-6 FOLLOWED BY CETUXIMAB MAINTENANCE WEEKLY  Treatment Goal: Control  Status: Inactive  Start Date: 10/6/2020  End Date: 8/17/2021  Provider: Ronald Berg MD  Chemotherapy: cetuximab (ERBITUX) 100 mg/50 mL chemo infusion 684 mg, 400 mg/m2 = 684 mg (100 % of original dose 400 mg/m2), Intravenous, Clinic/[facility] 1 time, 29 of 38 cycles  Dose modification: 500 mg/m2 (original dose 400 mg/m2, Cycle 3), 250 mg/m2 (original dose 400 mg/m2, Cycle 3), 400 mg/m2 (original dose 400 mg/m2, Cycle 3), 250 mg/m2 (original dose 250 mg/m2, Cycle 7), 200 mg/m2 (original dose 250 mg/m2, Cycle 18), 200 mg/m2 (original dose 250 mg/m2, Cycle 19), 250 mg/m2 (original dose 250 mg/m2, Cycle 4), 200 mg/m2 (original dose 250 mg/m2, Cycle 25), 200 mg/m2 (original dose 250 mg/m2, Cycle 28)  Administration: 684 mg (11/17/2020), 400 mg (11/24/2020), 400 mg (2/9/2021), 400 mg (2/17/2021), 427.6 mg (3/9/2021), 400 mg (3/30/2021), 400 mg (3/23/2021), 400 mg (4/6/2021), 427.6 mg (4/13/2021), 427.6 mg (4/20/2021), 342 mg (5/11/2021), 342 mg (5/18/2021), 342 mg (5/25/2021), 400 mg (5/31/2021), 400 mg (6/7/2021), 400 mg (6/14/2021), 400 mg (12/8/2020), 427.6 mg (12/29/2020), 400 mg (12/1/2020), 400 mg (12/15/2020), 400 mg (12/22/2020), 427.6 mg (1/5/2021), 400 mg (1/12/2021), 400 mg (1/19/2021), 427.6 mg (1/26/2021), 400 mg (2/2/2021), 400 mg (2/23/2021), 400 mg (3/2/2021), 427.6 mg (3/16/2021), 400 mg (6/21/2021), 342 mg (6/28/2021), 342 mg (7/6/2021), 342 mg (7/13/2021), 342 mg (7/20/2021), 400 mg (7/27/2021), 400 mg (8/10/2021), 400 mg (8/17/2021)  CARBOplatin (PARAPLATIN) 310 mg in sodium chloride 0.9% 500 mL chemo infusion, 310 mg (92.2 % of original dose 334.5 mg), Intravenous, Clinic/[facility] 1 time, 6 of 6 cycles  Dose modification:   (original dose 334.5 mg, Cycle 1)  Administration: 310 mg (10/6/2020), 370 mg (11/17/2020), 300 mg (10/27/2020), 350 mg (12/8/2020), 335 mg (12/29/2020), 320 mg  (1/19/2021)  PACLitaxeL (TAXOL) 175 mg/m2 = 300 mg in sodium chloride 0.9% 500 mL chemo infusion, 175 mg/m2 = 300 mg (100 % of original dose 175 mg/m2), Intravenous, Clinic/[facility] 1 time, 2 of 2 cycles  Dose modification: 175 mg/m2 (original dose 175 mg/m2, Cycle 1)  Administration: 300 mg (10/6/2020), 300 mg (10/27/2020)      4/29/2021 Tumor Conference        His case was discussed at the Multidisciplinary Head and Neck Team Planning Meeting.     Representatives from Medical Oncology, Radiation Oncology, Head and Neck Surgical Oncology, Psychosocial Oncology, and Speech and Language Pathology discussed the case with the following recommendations:     1) biopsy            7/29/2021 Tumor Conference        His case was discussed at the Multidisciplinary Head and Neck Team Planning Meeting.     Representatives from Medical Oncology, Radiation Oncology, Head and Neck Surgical Oncology, Psychosocial Oncology, and Speech and Language Pathology discussed the case with the following recommendations:     1) Head and neck clinic follow up  2) consider Keytruda (discuss with transplant)  3) consider palliative referral            9/13/2021 - 12/29/2023 Chemotherapy     Treatment Summary   Plan Name: OP HEAD NECK PEMBROLIZUMAB CARBOPLATIN FLUOROURACIL (C1 ONLY RECEIVED) FOLLOWED BY PEMBROLIZUMAB MAINTENANCE  Treatment Goal: Palliative  Status: Inactive  Start Date: 9/13/2021  End Date: 12/29/2023  Provider: Ronald Berg MD  Chemotherapy: CARBOplatin (PARAPLATIN) 320 mg in sodium chloride 0.9% 500 mL chemo infusion, 320 mg (100 % of original dose 320.5 mg), Intravenous, Clinic/[facility] 1 time, 6 of 6 cycles  Dose modification:   (original dose 320.5 mg, Cycle 1)  Administration: 320 mg (9/13/2021), 335 mg (12/23/2021), 325 mg (1/27/2022), 245 mg (3/3/2022), 255 mg (5/12/2022), 255 mg (4/7/2022)  fluorouraciL 1,000 mg/m2/day = 6,440 mg in sodium chloride 0.9% 240 mL chemo infusion, 1,000 mg/m2/day = 6,440 mg, Intravenous, Over 96 hours,  6 of 6 cycles  Dose modification: 800 mg/m2/day (original dose 1,000 mg/m2/day, Cycle 6), 5,000 mg (original dose 1,000 mg/m2/day, Cycle 8)  Administration: 6,440 mg (9/13/2021), 6,440 mg (12/23/2021), 6,480 mg (1/27/2022), 5,000 mg (3/3/2022), 5,000 mg (4/7/2022), 5,000 mg (5/12/2022)      Secondary malignant neoplasm of cervical lymph node   2/9/2021 Initial Diagnosis     Secondary malignant neoplasm of cervical lymph node      9/13/2021 - 12/29/2023 Chemotherapy     Treatment Summary   Plan Name: OP HEAD NECK PEMBROLIZUMAB CARBOPLATIN FLUOROURACIL (C1 ONLY RECEIVED) FOLLOWED BY PEMBROLIZUMAB MAINTENANCE  Treatment Goal: Palliative  Status: Inactive  Start Date: 9/13/2021  End Date: 12/29/2023  Provider: Ronald Berg MD  Chemotherapy: CARBOplatin (PARAPLATIN) 320 mg in sodium chloride 0.9% 500 mL chemo infusion, 320 mg (100 % of original dose 320.5 mg), Intravenous, Clinic/[facility] 1 time, 6 of 6 cycles  Dose modification:   (original dose 320.5 mg, Cycle 1)  Administration: 320 mg (9/13/2021), 335 mg (12/23/2021), 325 mg (1/27/2022), 245 mg (3/3/2022), 255 mg (5/12/2022), 255 mg (4/7/2022)  fluorouraciL 1,000 mg/m2/day = 6,440 mg in sodium chloride 0.9% 240 mL chemo infusion, 1,000 mg/m2/day = 6,440 mg, Intravenous, Over 96 hours, 6 of 6 cycles  Dose modification: 800 mg/m2/day (original dose 1,000 mg/m2/day, Cycle 6), 5,000 mg (original dose 1,000 mg/m2/day, Cycle 8)  Administration: 6,440 mg (9/13/2021), 6,440 mg (12/23/2021), 6,480 mg (1/27/2022), 5,000 mg (3/3/2022), 5,000 mg (4/7/2022), 5,000 mg (5/12/2022)      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   1/29/2024 Initial Diagnosis     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      2/19/2024 -  Chemotherapy     Treatment Summary   Plan Name: OP HEAD NECK CETUXIMAB CARBOPLATIN FLUOROURACIL Q3W  Treatment Goal: Curative  Status: Active  Start Date: 2/19/2024  End Date: 8/5/2024 (Planned)  Provider: Martín Hernandez MD  Chemotherapy: cetuximab (ERBITUX) 100 mg/50 mL  chemo infusion 668 mg, 400 mg/m2 = 668 mg, Intravenous, Clinic/[facility] 1 time, 2 of 12 cycles  Administration: 668 mg (2/19/2024), 400 mg (2/26/2024), 400 mg (3/18/2024), 400 mg (3/25/2024), 400 mg (3/11/2024)  CARBOplatin (PARAPLATIN) 295 mg in sodium chloride 0.9% 314.5 mL chemo infusion, 295 mg, Intravenous, [facility] 1 time, 2 of 6 cycles  Administration: 295 mg (2/19/2024), 300 mg (3/18/2024)        Oncology Treatment Plan:   OP HEAD NECK CETUXIMAB CARBOPLATIN FLUOROURACIL Q3W    Medications:  Continuous Infusions:  Scheduled Meds:   magnesium sulfate IVPB  2 g Intravenous ED 1 Time    sodium chloride 0.9%  1,000 mL Intravenous ED 1 Time     PRN Meds:     Review of patient's allergies indicates:   Allergen Reactions    Aspirin     Tylenol extended release         Past Medical History:   Diagnosis Date    Basal cell carcinoma (BCC) in situ of skin 2012    3 on face, 2 on arm, removed by dermatology.     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 Underwent surgical resection of tongue and neck. s/p tracheostomy    Squamous cell carcinoma of skin of right temple 01/24/2024    right temple     Past Surgical History:   Procedure Laterality Date    COLONOSCOPY      COLONOSCOPY N/A 9/14/2023    Procedure: COLONOSCOPY;  Surgeon: Reyes Olivia MD;  Location: [address]);  Service: Endoscopy;  Laterality: N/A;    CONJUNCTIVA BIOPSY Right 10/11/2022    Procedure: BIOPSY, CONJUNCTIVA;  Surgeon: Victor M Vasques MD;  Location: [facility] OR;  Service: Ophthalmology;  Laterality: Right;    ESOPHAGOGASTRODUODENOSCOPY N/A 9/14/2023    Procedure: EGD (ESOPHAGOGASTRODUODENOSCOPY);  Surgeon: Reyes Olivia  MD;  Location: [facility] ENDO (2ND FLR);  Service: Endoscopy;  Laterality: N/A;    INSERTION OF VENOUS ACCESS PORT Right 3/8/2021    Procedure: INSERTION, VENOUS ACCESS PORT;  Surgeon: Jesus Rendon MD;  Location: [facility] OR 2ND FLR;  Service: General;  Laterality: Right;    LIVER TRANSPLANT  11/2008    transplanted for biopsy proven hepatocellular carcinoma,     LYMPH NODE BIOPSY N/A 9/18/2020    Procedure: BIOPSY, LYMPH NODE;  Surgeon: Dosiam Diagnostic Provider;  Location: [facility] OR 2ND FLR;  Service: General;  Laterality: N/A;  Rm 173    SURGICAL REMOVAL OF SCAR Right 8/24/2023    Procedure: EXCISION, SCAR;  Surgeon: Ting Brambila MD;  [facility];  Service: Ophthalmology;  Laterality: Right;    TRACHEOSTOMY       Family History       Problem Relation (Age of Onset)    Dementia Mother          Tobacco Use    Smoking status: Never    Smokeless tobacco: Never   Substance and Sexual Activity    Alcohol use: Yes     Comment: drinks wine, 1 glass day    Drug use: Not Currently    Sexual activity: Yes     Comment: No prior history of STD        Review of Systems   Constitutional:  Positive for fatigue.   HENT:  Positive for sore throat.    Neurological:  Positive for dizziness and weakness (generealized).     Objective:     Vital Signs (Most Recent):  Temp: (P) 96.3 °F (35.7 °C) (04/08/24 1430)  Pulse: 109 (04/08/24 1205)  Resp: 20 (04/08/24 1205)  SpO2: 97 % (04/08/24 1205) Vital Signs (24h Range):  Temp:  [96.3 °F (35.7 °C)-97.7 °F (36.5 °C)] (P) 96.3 °F (35.7 °C)  Pulse:  [109] 109  Resp:  [20] 20  SpO2:  [97 %] 97 %     Weight: 51.3 kg (113 lb)  Body mass index is 17.18 kg/m².  Body surface area is 1.57 meters squared.    No intake or output data in the 24 hours ending 04/08/24 1606     Physical Exam  Vitals and nursing note reviewed.   Constitutional:       General: He is not in acute distress.     Appearance: Normal appearance. He is ill-appearing.   HENT:      Head: Normocephalic and atraumatic.      Nose: Nose  normal.      Mouth/Throat:      Comments: Yellow eschar patch at the back of the throat.  Eyes:      General: No scleral icterus.  Neck:      Comments: Trach stoma  Cardiovascular:      Rate and Rhythm: Normal rate and regular rhythm.      Heart sounds: Normal heart sounds. No murmur heard.     No friction rub. No gallop.   Pulmonary:      Effort: Pulmonary effort is normal. No respiratory distress.      Breath sounds: No wheezing, rhonchi or rales.   Abdominal:      General: Abdomen is flat. There is no distension.      Palpations: Abdomen is soft. There is no mass.      Tenderness: There is no abdominal tenderness. There is no guarding.      Hernia: No hernia is present.   Musculoskeletal:         General: Normal range of motion.      Right lower leg: No edema.      Left lower leg: No edema.   Skin:     General: Skin is warm.      Capillary Refill: Capillary refill takes less than 2 seconds.      Coloration: Skin is not jaundiced.      Findings: No bruising or erythema.   Neurological:      General: No focal deficit present.      Mental Status: He is alert and oriented to person, place, and time.          Significant Labs:   CBC:   Recent Labs   Lab 04/08/24  1352   WBC 21.47*   HGB 8.5*   HCT 24.4*       and CMP:   Recent Labs   Lab 04/08/24  1352      K 4.0      CO2 24   *   BUN 35*   CREATININE 1.7*   CALCIUM 9.6   PROT 7.5   ALBUMIN 3.3*   BILITOT 0.4   ALKPHOS 98   AST 33   ALT 15   ANIONGAP 13       Diagnostic Results:  None  Assessment/Plan:     * Acute kidney injury superimposed on chronic kidney disease  75 y/o M with history of SCC with poor PO intake 2/2 to throat pain, now with TSERING on CKD likely 2/2 to pre renal    Recent Labs     04/08/24  1352   BUN 35*   CREATININE 1.7*       Results:    Plan:   - 1 L bolus in ED   - continuous fluids 1 day  - Avoid nephrotoxic agents such as NSAIDs, gadolinium and IV radiocontrast.  - Renally dose meds to current GFR.  - Maintain MAP >  65.        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 Patient had ANGELES on October PETCT, but then developed quickly progressing orbital lesion. Follows Dr. Hernandez, should be on cetuximab    Plan:  - prn pain medications (oxy 5 mg, tizanidine)   - no indication for inpatient chemo at this time.     Status post liver transplantation - 2008  S/p liver transplant, currently on tacrolimus  -continue tacrolimus  -pending tacro lvel  - hepatology consulted apprec recs regarding tacro management       Postoperative hypothyroidism  - continue home levothyroxine 150 mg    Hepatitis C  Known hx of liver transplant with cirrhosis, followed by [facility] [place],     see A&P of cirrhosis           Ronn Lisa,   Hematology/Oncology  [facility] - Emergency Dept

## 2024-04-08 NOTE — ASSESSMENT & PLAN NOTE
73 y/o M with history of SSC of the base of the tongue, 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 Patient had ANGELES on October PETCT, but then developed quickly progressing orbital lesion. Follows Dr. Hernandez, should be on cetuximab    Plan:  - prn pain medications (oxy 5 mg, tizanidine)   - no indication for inpatient chemo at this time.

## 2024-04-08 NOTE — ED PROVIDER NOTES
Chief Complaint   Multiple complaints (Has lizet tube, constipation, dehydrated, infection in throat)      History Of Present Illness   Rocco Swain is a 74 y.o. male presenting with constipation, dehydration and sore throat.  He has been having increasing issues with dehydration.  Last week, he when he went to chemotherapy, he was judged to be dehydrated on labs in his chemotherapy was canceled.  He was having labs drawn to be looked at before chemo today, but was sent to the ER for evaluation.  His bowel movements has decreased.  He notes pain in the back of his throat.  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  He is nonverbal from his surgery.    Independent Historian: Patient and family    Review of patient's allergies indicates:   Allergen Reactions    Aspirin     Tylenol extended release        Current Facility-Administered Medications on File Prior to Encounter   Medication Dose Route Frequency Provider Last Rate Last Admin    heparin, porcine (PF) 100 unit/mL injection flush 500 Units  500 Units Intravenous PRN Ronald Berg MD        ofloxacin 0.3 % ophthalmic solution 1 drop  1 drop Right Eye On Call Procedure Victor M Vasques MD   2 drop at 10/11/22 0745    sodium chloride 0.9% flush 10 mL  10 mL Intravenous PRN Ronald Berg MD        sodium chloride 0.9% flush 10 mL  10 mL Intravenous PRN Victor M Vasques MD         Current Outpatient Medications on File Prior to Encounter   Medication Sig Dispense Refill    azelaic acid (AZELEX) 15 % gel APPLY TOPICALLY TO AFFECTED AREA IN THE MORNING 50 g 3    chlorhexidine (PERIDEX) 0.12 % solution Gently swish and spit 15 mL twice a day for 3 weeks. Start rinses the day after your procedure. 473 mL 1    clindamycin phosphate 1% (CLINDAGEL) 1 % gel Apply topically 2 (two) times daily. 60 g 3    doxycycline (VIBRA-TABS) 100 MG tablet Take 1 tablet (100 mg total) by mouth 2 (two) times daily. 42 tablet 0    doxycycline hyclate 150 mg Tab  Take two tabs PO 1 hour before dental surgery. 2 tablet 0    erythromycin (ROMYCIN) ophthalmic ointment Administer 0.5 inches to the right eye twice daily for 30 days. 3.5 g 1    gabapentin (NEURONTIN) 300 MG capsule Take 1 capsule (300 mg total) by mouth every evening. (Patient taking differently: Take 300 mg by mouth as needed.) 30 capsule 11    hydrocortisone 1 % cream Apply to face, hands, feet, neck, back and chest at bedtime. 112 g 2    imiquimod (ALDARA) 5 % cream Apply to biopsy site on frontal scalp + about a centimeter of surrounding skin qhs x 16 weeks. Wash off in am and apply sunscreen. Discontinue if skin becomes blistered, raw, bleeding, painful, etc. 24 packet 3    levothyroxine (TIROSINT-SOL) 150 mcg/mL Soln Take 150 mcg by mouth before breakfast. 30 mL 11    LIDOcaine HCl 2% (LIDOCAINE VISCOUS) 2 % Soln Swish and spit 15 mls every 8 (eight) hours as needed (mouth sore). 100 mL 3    magic mouthwash diphen/antac/lidoc/nysta Take 10 mLs by mouth 4 (four) times daily. 120 mL 4    metroNIDAZOLE (METROGEL) 0.75 % gel Apply topically to affected area 2 (two) times daily. 45 g 1    multivit-min/FA/lycopen/lutein (CENTRUM SILVER MEN ORAL) Take 1 tablet by mouth once daily.      OLANZapine (ZYPREXA) 5 MG tablet Take 1 tablet (5 mg total) by mouth every evening. Take as directed on days 1-4 of your chemotherapy cycle. 30 tablet 5    pentoxifylline (TRENTAL) 400 mg TbSR Take 1 tablet by mouth twice daily until gone. Start taking one week prior to your dental surgery. 112 tablet 0    prednisoLONE acetate (PRED FORTE) 1 % DrpS Place 1 drop into the right eye 4 (four) times daily. 10 mL 3    prochlorperazine (COMPAZINE) 10 MG tablet Take ½ tablet (5 mg total) by mouth every 6 (six) hours as needed. 30 tablet 1    senna (SENOKOT) 8.6 mg tablet Take 2 tablets by mouth 2 (two) times daily as needed for Constipation. 60 tablet 1    sulfacetamide sodium-sulfur 10-5 % (w/w) Clsr USE TO WASH AFFECTED AREA DAILY       tacrolimus (PROGRAF) 0.5 MG Cap Take 1 capsule (0.5 mg total) by mouth every EVENING.  (Use the 1mg capsule for your morning dose) 90 capsule 3    tacrolimus (PROGRAF) 1 MG Cap Take 1 capsule (1 mg total) by mouth every MORNING. Use the tacrolimus 0.5mg capsule for your evening dose as directed. 90 capsule 3    tiZANidine (ZANAFLEX) 2 MG tablet Take 1 tablet (2 mg total) by mouth nightly as needed (neck muscle strain). 30 tablet 3    traZODone (DESYREL) 50 MG tablet TAKE 1 TABLET BY MOUTH IN THE  EVENING 90 tablet 3    vitamin E 1000 UNIT capsule Take 1 cap PO BID until gone. Start taking one week prior to your dental surgery. 112 capsule 0       Past History   As per HPI and below:  Past Medical History:   Diagnosis Date    Basal cell carcinoma (BCC) in situ of skin 2012    3 on face, 2 on arm, removed by dermatology.     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 Underwent surgical resection of tongue and neck. s/p tracheostomy    Squamous cell carcinoma of skin of right temple 01/24/2024    right temple     Past Surgical History:   Procedure Laterality Date    COLONOSCOPY      COLONOSCOPY N/A 9/14/2023    Procedure: COLONOSCOPY;  Surgeon: Reyes Olivia MD;  Location: [address]);  Service: Endoscopy;  Laterality: N/A;    CONJUNCTIVA BIOPSY Right 10/11/2022    Procedure: BIOPSY, CONJUNCTIVA;  Surgeon: Victor M Vasques MD;  Location: [facility];  Service: Ophthalmology;  Laterality: Right;    ESOPHAGOGASTRODUODENOSCOPY N/A 9/14/2023    Procedure: EGD (ESOPHAGOGASTRODUODENOSCOPY);  Surgeon: Reyes Olivia MD;  Location: [facility] ([address] [place] [address]);  Service: Endoscopy;  Laterality: N/A;     INSERTION OF VENOUS ACCESS PORT Right 3/8/2021    Procedure: INSERTION, VENOUS ACCESS PORT;  Surgeon: Jesus Rendon MD;  [facility];  Service: General;  Laterality: Right;    LIVER TRANSPLANT  11/2008    transplanted for biopsy proven hepatocellular carcinoma,     LYMPH NODE BIOPSY N/A 9/18/2020    Procedure: BIOPSY, LYMPH NODE;  Surgeon: Taz Diagnostic Provider;  Location: [facility] OR 2ND FLR;  Service: General;  Laterality: N/A;  Rm 173    SURGICAL REMOVAL OF SCAR Right 8/24/2023    Procedure: EXCISION, SCAR;  Surgeon: Ting Brambila MD;  [facility];  Service: Ophthalmology;  Laterality: Right;    TRACHEOSTOMY         Social History     Tobacco Use    Smoking status: Never    Smokeless tobacco: Never   Substance Use Topics    Alcohol use: Yes     Comment: drinks wine, 1 glass day    Drug use: Not Currently       Family History   Problem Relation Age of Onset    Dementia Mother        Physical Exam     Vitals:    04/08/24 1755 04/08/24 1756 04/08/24 1839 04/08/24 1946   BP: (!) 140/76 (!) 140/76 (!) 150/71 (!) 149/75   BP Location: Right arm Right arm Right arm    Patient Position: Lying Lying Lying Lying   Pulse: 85 85 79 76   Resp: (!) 22 20 19 16   Temp:  97.6 °F (36.4 °C) 97.9 °F (36.6 °C) 97.7 °F (36.5 °C)   TempSrc:  Oral Oral Oral   SpO2: 97% 97% 99% 96%   Weight:       Height:         Appearance: No acute distress.  Skin: Decreased turgor.  Eyes: No conjunctival injection.  ENT: Yellow eschar patch at the back of the throat.  Chest: Clear to auscultation bilaterally.  Good air movement.  No wheezes.  No rhonchi.  Cardiovascular: Regular rate and rhythm.  No murmurs. No gallops. No rubs.  Abdomen: Soft.  Not distended.  Nontender.  No guarding.  No rebound.  Musculoskeletal: Good range of motion all joints.  No deformities.  Neck supple.  No meningismus.  Neurologic: Motor intact.  Sensation intact.  Cranial nerves intact.  Mental Status:  Alert and oriented x 3.  Appropriate,  conversant.      Initial MDM   Decreased p.o. intake, weakness, constipation.  I suspect dehydration is worsening.  Tachycardic on arrival.  Will give fluids, check labs and reassess.      Medications Given     Medications   sodium chloride 0.9% flush 10 mL (has no administration in time range)   naloxone 0.4 mg/mL injection 0.02 mg (has no administration in time range)   glucose chewable tablet 16 g (has no administration in time range)   glucose chewable tablet 24 g (has no administration in time range)   glucagon (human recombinant) injection 1 mg (has no administration in time range)   heparin (porcine) injection 5,000 Units (has no administration in time range)   polyethylene glycol packet 17 g (has no administration in time range)   senna-docusate 8.6-50 mg per tablet 1 tablet (has no administration in time range)   ondansetron injection 4 mg (has no administration in time range)   prochlorperazine injection Soln 5 mg (has no administration in time range)   melatonin tablet 6 mg (has no administration in time range)   dextrose 10% bolus 125 mL 125 mL (has no administration in time range)   dextrose 10% bolus 250 mL 250 mL (has no administration in time range)   0.9%  NaCl infusion ( Intravenous New Bag 4/8/24 1846)   doxycycline tablet 100 mg (has no administration in time range)   traZODone tablet 50 mg (has no administration in time range)   levothyroxine 25 mcg/ml suspension 150 mcg (has no administration in time range)   tiZANidine tablet 2 mg (has no administration in time range)   oxyCODONE immediate release tablet 5 mg (has no administration in time range)   sodium chloride 0.9% bolus 1,000 mL 1,000 mL (0 mLs Intravenous Stopped 4/8/24 1642)   magnesium sulfate 2g in water 50mL IVPB (premix) (0 g Intravenous Stopped 4/8/24 2535)       Results and Course     Labs Reviewed   CBC W/ AUTO DIFFERENTIAL - Abnormal; Notable for the following components:       Result Value    WBC 21.47 (*)     RBC 2.35 (*)      Hemoglobin 8.5 (*)     Hematocrit 24.4 (*)      (*)     MCH 36.2 (*)     RDW 15.1 (*)     Immature Granulocytes 3.3 (*)     Gran # (ANC) 18.1 (*)     Immature Grans (Abs) 0.70 (*)     Lymph # 0.8 (*)     Mono # 1.8 (*)     Gran % 84.1 (*)     Lymph % 3.9 (*)     All other components within normal limits    Narrative:     Release to patient->Immediate   COMPREHENSIVE METABOLIC PANEL - Abnormal; Notable for the following components:    Glucose 129 (*)     BUN 35 (*)     Creatinine 1.7 (*)     Albumin 3.3 (*)     eGFR 41.5 (*)     All other components within normal limits    Narrative:     Release to patient->Immediate   MAGNESIUM - Abnormal; Notable for the following components:    Magnesium 1.5 (*)     All other components within normal limits    Narrative:     Release to patient->Immediate   CULTURE, RESPIRATORY  - THROAT   CULTURE, BLOOD   CULTURE, BLOOD   HIV 1 / 2 ANTIBODY    Narrative:     Release to patient->Immediate   LIPASE    Narrative:     Release to patient->Immediate   SARS-COV-2 RNA AMPLIFICATION, QUAL   TACROLIMUS LEVEL   ISTAT LACTATE       Imaging Results              X-Ray Chest AP Portable (Final result)  Result time 04/08/24 16:15:11      Final result by Ambika Gonzales MD (04/08/24 16:15:11)                   Impression:      No acute abnormality.      Electronically signed by: Ambika Gonzales MD  Date:    04/08/2024  Time:    16:15               Narrative:    EXAMINATION:  XR CHEST AP PORTABLE    CLINICAL HISTORY:  Elevated white blood cell count, unspecified    TECHNIQUE:  Single frontal view of the chest was performed.    COMPARISON:  01/16/2024    FINDINGS:  There is a port in the right upper thorax, distal tip in the SVC.The lungs are clear, with normal appearance of pulmonary vasculature and no pleural effusion or pneumothorax.    The cardiac silhouette is normal in size. The hilar and mediastinal contours are unremarkable.    Bones are intact.                                       ED Course as of 04/08/24 2023 Mon Apr 08, 2024   1452 WBC(!): 21.47 [DC]   1452 Hemoglobin(!): 8.5 [DC]   1452 Platelet Count: 295 [DC]   1504 Lipase: 35 [DC]   1504 Magnesium (!): 1.5 [DC]   1504 Creatinine(!): 1.7  Baseline 0.8 one month ago [DC]   1504 Glucose(!): 129 [DC]   1538 Discussed with oncology [DC]      ED Course User Index  [DC] César Rider MD               MDM, Impression and Plan   74 y.o. male with dehydration, worsening kidney function.  He is failing outpatient attempts to manage this.  Discussed with oncology who plan hospitalization.         Final diagnoses:  [D72.829] Leukocytosis  [E86.0] Dehydration  [N17.9] TSERING (acute kidney injury) (Primary)        ED Disposition Condition    Admit                   César Rider MD  04/08/24 2024

## 2024-04-08 NOTE — ED NOTES
Patient identifiers for Rocco Swain 74 y.o. male checked and correct.  Chief Complaint   Patient presents with    Multiple complaints     Has lizet tube, constipation, dehydrated, infection in throat     Past Medical History:   Diagnosis Date    Basal cell carcinoma (BCC) in situ of skin 2012    3 on face, 2 on arm, removed by dermatology.     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 Underwent surgical resection of tongue and neck. s/p tracheostomy    Squamous cell carcinoma of skin of right temple 01/24/2024    right temple     Allergies reported:   Review of patient's allergies indicates:   Allergen Reactions    Aspirin     Tylenol extended release        Appearance: Pt awake, alert & oriented to person, place & time. Pt in no acute distress at present time. Family reports patient is usually independent. Patient is not his normal self today.   Skin: Skin warm, dry & intact. Color consistent with ethnicity. Mucous membranes moist. No breakdown or brusing noted.   Musculoskeletal: generalized weakness,   Respiratory: Respirations spontaneous, even, and non-labored. Visible chest rise noted. Airway is open and patent. No accessory muscle use noted.   Neurologic: Sensation is intact. Speech is clear and appropriate. Eyes open spontaneously, behavior appropriate to situation, follows commands, facial expression symmetrical, bilateral hand grasp equal and even, purposeful motor response noted.  Cardiac: All peripheral pulses present. No Bilateral lower extremity edema. Cap refill is <3 seconds.  Abdomen: Abdomen soft, non distended, non tender to palpation.   : Pt voids independently, denies dysuria,  hematuria, frequency.

## 2024-04-08 NOTE — HPI
73 y/o F with SCC of the tongue (follows Dr. 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  Patient was supposed to be scheduled for a chemotherapy infusion today however he was complaining overall fatigue, generalized weakness, worsening sore throat.  His plan of care nurse recommended him to go to the ED for further evaluation.  Describes as a soreness in his posterior throat that has been progressively worsened for the last 4 days.  He does report of decreased p.o. intake.  He normally eats a liquid diet however his overall fatigue made him skip meals.  Reports of associated chills, body aches.  No sick contact.  He lives by himself and perform his ADLs independently. Similar symptoms after receiving infusion in the past.       Initial vitals in the ED: Patient was tachycardic 109, leukocytosis of 20, hemoglobin 8 5 (BL 11-10), CMP significant for BUN/Cr 35/1.7 (baseline 1.5 - 0.9),  chest x-ray with no acute pulmonary process. Patient received 1L in the ED, now on continuous fluids.        Oncology History   Squamous cell carcinoma of base of tongue   9/18/2020 Cancer Staged     Staging form: Pharynx - HPV-Mediated Oropharynx, AJCC 8th Edition  - Clinical stage from 9/18/2020: Stage III (rcT4, cN1, cM0, p16+)      9/28/2020 Initial Diagnosis     Squamous cell carcinoma of base of tongue      10/6/2020 - 8/17/2021 Chemotherapy     Treatment Summary   Plan Name: OP HEAD NECK CARBOPLATIN PACLITAXEL C1-2 FOLLOWED BY CETUXIMAB CARBOPLATIN C3-6 FOLLOWED BY CETUXIMAB MAINTENANCE WEEKLY  Treatment Goal: Control  Status: Inactive  Start Date: 10/6/2020  End Date: 8/17/2021  Provider: Ronald Berg MD  Chemotherapy: cetuximab (ERBITUX) 100 mg/50 mL chemo infusion 684 mg, 400 mg/m2 = 684 mg (100 % of original dose 400 mg/m2), Intravenous,  Clinic[facility] 1 time, 29 of 38 cycles  Dose modification: 500 mg/m2 (original dose 400 mg/m2, Cycle 3), 250 mg/m2 (original dose 400 mg/m2, Cycle 3), 400 mg/m2 (original dose 400 mg/m2, Cycle 3), 250 mg/m2 (original dose 250 mg/m2, Cycle 7), 200 mg/m2 (original dose 250 mg/m2, Cycle 18), 200 mg/m2 (original dose 250 mg/m2, Cycle 19), 250 mg/m2 (original dose 250 mg/m2, Cycle 4), 200 mg/m2 (original dose 250 mg/m2, Cycle 25), 200 mg/m2 (original dose 250 mg/m2, Cycle 28)  Administration: 684 mg (11/17/2020), 400 mg (11/24/2020), 400 mg (2/9/2021), 400 mg (2/17/2021), 427.6 mg (3/9/2021), 400 mg (3/30/2021), 400 mg (3/23/2021), 400 mg (4/6/2021), 427.6 mg (4/13/2021), 427.6 mg (4/20/2021), 342 mg (5/11/2021), 342 mg (5/18/2021), 342 mg (5/25/2021), 400 mg (5/31/2021), 400 mg (6/7/2021), 400 mg (6/14/2021), 400 mg (12/8/2020), 427.6 mg (12/29/2020), 400 mg (12/1/2020), 400 mg (12/15/2020), 400 mg (12/22/2020), 427.6 mg (1/5/2021), 400 mg (1/12/2021), 400 mg (1/19/2021), 427.6 mg (1/26/2021), 400 mg (2/2/2021), 400 mg (2/23/2021), 400 mg (3/2/2021), 427.6 mg (3/16/2021), 400 mg (6/21/2021), 342 mg (6/28/2021), 342 mg (7/6/2021), 342 mg (7/13/2021), 342 mg (7/20/2021), 400 mg (7/27/2021), 400 mg (8/10/2021), 400 mg (8/17/2021)  CARBOplatin (PARAPLATIN) 310 mg in sodium chloride 0.9% 500 mL chemo infusion, 310 mg (92.2 % of original dose 334.5 mg), Intravenous, Clinic/[facility] 1 time, 6 of 6 cycles  Dose modification:   (original dose 334.5 mg, Cycle 1)  Administration: 310 mg (10/6/2020), 370 mg (11/17/2020), 300 mg (10/27/2020), 350 mg (12/8/2020), 335 mg (12/29/2020), 320 mg (1/19/2021)  PACLitaxeL (TAXOL) 175 mg/m2 = 300 mg in sodium chloride 0.9% 500 mL chemo infusion, 175 mg/m2 = 300 mg (100 % of original dose 175 mg/m2), Intravenous, Clinic/[facility] 1 time, 2 of 2 cycles  Dose modification: 175 mg/m2 (original dose 175 mg/m2, Cycle 1)  Administration: 300 mg (10/6/2020), 300 mg (10/27/2020)      4/29/2021 Tumor Conference         His case was discussed at the Multidisciplinary Head and Neck Team Planning Meeting.     Representatives from Medical Oncology, Radiation Oncology, Head and Neck Surgical Oncology, Psychosocial Oncology, and Speech and Language Pathology discussed the case with the following recommendations:     1) biopsy            7/29/2021 Tumor Conference        His case was discussed at the Multidisciplinary Head and Neck Team Planning Meeting.     Representatives from Medical Oncology, Radiation Oncology, Head and Neck Surgical Oncology, Psychosocial Oncology, and Speech and Language Pathology discussed the case with the following recommendations:     1) Head and neck clinic follow up  2) consider Keytruda (discuss with transplant)  3) consider palliative referral            9/13/2021 - 12/29/2023 Chemotherapy     Treatment Summary   Plan Name: OP HEAD NECK PEMBROLIZUMAB CARBOPLATIN FLUOROURACIL (C1 ONLY RECEIVED) FOLLOWED BY PEMBROLIZUMAB MAINTENANCE  Treatment Goal: Palliative  Status: Inactive  Start Date: 9/13/2021  End Date: 12/29/2023  Provider: Ronald Berg MD  Chemotherapy: CARBOplatin (PARAPLATIN) 320 mg in sodium chloride 0.9% 500 mL chemo infusion, 320 mg (100 % of original dose 320.5 mg), Intravenous, Clinic/[facility] 1 time, 6 of 6 cycles  Dose modification:   (original dose 320.5 mg, Cycle 1)  Administration: 320 mg (9/13/2021), 335 mg (12/23/2021), 325 mg (1/27/2022), 245 mg (3/3/2022), 255 mg (5/12/2022), 255 mg (4/7/2022)  fluorouraciL 1,000 mg/m2/day = 6,440 mg in sodium chloride 0.9% 240 mL chemo infusion, 1,000 mg/m2/day = 6,440 mg, Intravenous, Over 96 hours, 6 of 6 cycles  Dose modification: 800 mg/m2/day (original dose 1,000 mg/m2/day, Cycle 6), 5,000 mg (original dose 1,000 mg/m2/day, Cycle 8)  Administration: 6,440 mg (9/13/2021), 6,440 mg (12/23/2021), 6,480 mg (1/27/2022), 5,000 mg (3/3/2022), 5,000 mg (4/7/2022), 5,000 mg (5/12/2022)      Secondary malignant neoplasm of cervical lymph node   2/9/2021  Initial Diagnosis     Secondary malignant neoplasm of cervical lymph node      9/13/2021 - 12/29/2023 Chemotherapy     Treatment Summary   Plan Name: OP HEAD NECK PEMBROLIZUMAB CARBOPLATIN FLUOROURACIL (C1 ONLY RECEIVED) FOLLOWED BY PEMBROLIZUMAB MAINTENANCE  Treatment Goal: Palliative  Status: Inactive  Start Date: 9/13/2021  End Date: 12/29/2023  Provider: Ronald Berg MD  Chemotherapy: CARBOplatin (PARAPLATIN) 320 mg in sodium chloride 0.9% 500 mL chemo infusion, 320 mg (100 % of original dose 320.5 mg), Intravenous, Clinic/[facility] 1 time, 6 of 6 cycles  Dose modification:   (original dose 320.5 mg, Cycle 1)  Administration: 320 mg (9/13/2021), 335 mg (12/23/2021), 325 mg (1/27/2022), 245 mg (3/3/2022), 255 mg (5/12/2022), 255 mg (4/7/2022)  fluorouraciL 1,000 mg/m2/day = 6,440 mg in sodium chloride 0.9% 240 mL chemo infusion, 1,000 mg/m2/day = 6,440 mg, Intravenous, Over 96 hours, 6 of 6 cycles  Dose modification: 800 mg/m2/day (original dose 1,000 mg/m2/day, Cycle 6), 5,000 mg (original dose 1,000 mg/m2/day, Cycle 8)  Administration: 6,440 mg (9/13/2021), 6,440 mg (12/23/2021), 6,480 mg (1/27/2022), 5,000 mg (3/3/2022), 5,000 mg (4/7/2022), 5,000 mg (5/12/2022)      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   1/29/2024 Initial Diagnosis     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      2/19/2024 -  Chemotherapy     Treatment Summary   Plan Name: OP HEAD NECK CETUXIMAB CARBOPLATIN FLUOROURACIL Q3W  Treatment Goal: Curative  Status: Active  Start Date: 2/19/2024  End Date: 8/5/2024 (Planned)  Provider: Martín Hernandez MD  Chemotherapy: cetuximab (ERBITUX) 100 mg/50 mL chemo infusion 668 mg, 400 mg/m2 = 668 mg, Intravenous, Clinic/[facility] 1 time, 2 of 12 cycles  Administration: 668 mg (2/19/2024), 400 mg (2/26/2024), 400 mg (3/18/2024), 400 mg (3/25/2024), 400 mg (3/11/2024)  CARBOplatin (PARAPLATIN) 295 mg in sodium chloride 0.9% 314.5 mL chemo infusion, 295 mg, Intravenous, [facility] 1 time, 2 of 6  cycles  Administration: 295 mg (2/19/2024), 300 mg (3/18/2024)

## 2024-04-08 NOTE — ED NOTES
Md at bedside updating patient on plan of care for further evaluation by oncology, and over night stay

## 2024-04-08 NOTE — SUBJECTIVE & OBJECTIVE
Oncology Treatment Plan:   OP HEAD NECK CETUXIMAB CARBOPLATIN FLUOROURACIL Q3W    Medications:  Continuous Infusions:  Scheduled Meds:   magnesium sulfate IVPB  2 g Intravenous ED 1 Time    sodium chloride 0.9%  1,000 mL Intravenous ED 1 Time     PRN Meds:     Review of patient's allergies indicates:   Allergen Reactions    Aspirin     Tylenol extended release         Past Medical History:   Diagnosis Date    Basal cell carcinoma (BCC) in situ of skin 2012    3 on face, 2 on arm, removed by dermatology.     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 Underwent surgical resection of tongue and neck. s/p tracheostomy    Squamous cell carcinoma of skin of right temple 01/24/2024    right temple     Past Surgical History:   Procedure Laterality Date    COLONOSCOPY      COLONOSCOPY N/A 9/14/2023    Procedure: COLONOSCOPY;  Surgeon: Reyes Olivia MD;  Location: [facility] (2ND FLR);  Service: Endoscopy;  Laterality: N/A;    CONJUNCTIVA BIOPSY Right 10/11/2022    Procedure: BIOPSY, CONJUNCTIVA;  Surgeon: Victor M Vasques MD;  Location: [facility];  Service: Ophthalmology;  Laterality: Right;    ESOPHAGOGASTRODUODENOSCOPY N/A 9/14/2023    Procedure: EGD (ESOPHAGOGASTRODUODENOSCOPY);  Surgeon: Reyes Olivia MD;  Location: [facility] (2ND FLR);  Service: Endoscopy;  Laterality: N/A;    INSERTION OF VENOUS ACCESS PORT Right 3/8/2021    Procedure: INSERTION, VENOUS ACCESS PORT;  Surgeon: Jesus Rendon MD;  [address];  Service: General;  Laterality: Right;    LIVER TRANSPLANT  11/2008    transplanted for biopsy proven hepatocellular carcinoma,     LYMPH NODE BIOPSY N/A 9/18/2020     Procedure: BIOPSY, LYMPH NODE;  Surgeon: Taz Diagnostic Provider;  Location: [facility] OR [address] [place] [address];  Service: General;  Laterality: N/A;  Rm 173    SURGICAL REMOVAL OF SCAR Right 8/24/2023    Procedure: EXCISION, SCAR;  Surgeon: Ting Brambila MD;  Location: [facility] OR;  Service: Ophthalmology;  Laterality: Right;    TRACHEOSTOMY       Family History       Problem Relation (Age of Onset)    Dementia Mother          Tobacco Use    Smoking status: Never    Smokeless tobacco: Never   Substance and Sexual Activity    Alcohol use: Yes     Comment: drinks wine, 1 glass day    Drug use: Not Currently    Sexual activity: Yes     Comment: No prior history of STD        Review of Systems   Constitutional:  Positive for fatigue.   HENT:  Positive for sore throat.    Neurological:  Positive for dizziness and weakness (generealized).     Objective:     Vital Signs (Most Recent):  Temp: (P) 96.3 °F (35.7 °C) (04/08/24 1430)  Pulse: 109 (04/08/24 1205)  Resp: 20 (04/08/24 1205)  SpO2: 97 % (04/08/24 1205) Vital Signs (24h Range):  Temp:  [96.3 °F (35.7 °C)-97.7 °F (36.5 °C)] (P) 96.3 °F (35.7 °C)  Pulse:  [109] 109  Resp:  [20] 20  SpO2:  [97 %] 97 %     Weight: 51.3 kg (113 lb)  Body mass index is 17.18 kg/m².  Body surface area is 1.57 meters squared.    No intake or output data in the 24 hours ending 04/08/24 1606     Physical Exam  Vitals and nursing note reviewed.   Constitutional:       General: He is not in acute distress.     Appearance: Normal appearance. He is ill-appearing.   HENT:      Head: Normocephalic and atraumatic.      Nose: Nose normal.      Mouth/Throat:      Comments: Yellow eschar patch at the back of the throat.  Eyes:      General: No scleral icterus.  Neck:      Comments: Trach stoma  Cardiovascular:      Rate and Rhythm: Normal rate and regular rhythm.      Heart sounds: Normal heart sounds. No murmur heard.     No friction rub. No gallop.   Pulmonary:      Effort: Pulmonary effort is normal. No respiratory  distress.      Breath sounds: No wheezing, rhonchi or rales.   Abdominal:      General: Abdomen is flat. There is no distension.      Palpations: Abdomen is soft. There is no mass.      Tenderness: There is no abdominal tenderness. There is no guarding.      Hernia: No hernia is present.   Musculoskeletal:         General: Normal range of motion.      Right lower leg: No edema.      Left lower leg: No edema.   Skin:     General: Skin is warm.      Capillary Refill: Capillary refill takes less than 2 seconds.      Coloration: Skin is not jaundiced.      Findings: No bruising or erythema.   Neurological:      General: No focal deficit present.      Mental Status: He is alert and oriented to person, place, and time.          Significant Labs:   CBC:   Recent Labs   Lab 04/08/24  1352   WBC 21.47*   HGB 8.5*   HCT 24.4*       and CMP:   Recent Labs   Lab 04/08/24  1352      K 4.0      CO2 24   *   BUN 35*   CREATININE 1.7*   CALCIUM 9.6   PROT 7.5   ALBUMIN 3.3*   BILITOT 0.4   ALKPHOS 98   AST 33   ALT 15   ANIONGAP 13       Diagnostic Results:  None

## 2024-04-08 NOTE — ASSESSMENT & PLAN NOTE
Exudates in the posterior throat, throat pain x4 days    Results  - negative for COVID/FLU/RSV    Plan  - pending throat culture

## 2024-04-08 NOTE — FIRST PROVIDER EVALUATION
"Medical screening examination initiated.  I have conducted a focused provider triage encounter, findings are as follows:    Brief history of present illness:  73yo M with a PMH of SCC of the base of the tongue. Nonverbal d/t prior laryngectomy and total glossectomy.  On chemo. Reportedly here for dehydration, constipation, ?throat infection.     Vitals:    04/08/24 1205   Pulse: 109   Resp: 20   Temp: 97.7 °F (36.5 °C)   TempSrc: Oral   SpO2: 97%   Weight: 51.3 kg (113 lb)   Height: 5' 8" (1.727 m)       Pertinent physical exam:  nontoxic appearing    Brief workup plan:  labs, defer rest of imaging/workup to primary team. Will likely need more info from [facility] or family if we can reach    Preliminary workup initiated; this workup will be continued and followed by the physician or advanced practice provider that is assigned to the patient when roomed.  "

## 2024-04-08 NOTE — ASSESSMENT & PLAN NOTE
73 y/o M with history of SCC with poor PO intake 2/2 to throat pain, now with TSERING on CKD likely 2/2 to pre renal    Recent Labs     04/08/24  1352   BUN 35*   CREATININE 1.7*       Results:    Plan:   - 1 L bolus in ED   - continuous fluids 1 day  - Avoid nephrotoxic agents such as NSAIDs, gadolinium and IV radiocontrast.  - Renally dose meds to current GFR.  - Maintain MAP > 65.

## 2024-04-09 ENCOUNTER — TELEPHONE (OUTPATIENT)
Dept: TRANSPLANT | Facility: CLINIC | Age: 74
End: 2024-04-09
Payer: MEDICARE

## 2024-04-09 PROBLEM — Z51.5 PALLIATIVE CARE ENCOUNTER: Status: ACTIVE | Noted: 2023-09-13

## 2024-04-09 LAB
ALBUMIN SERPL BCP-MCNC: 2.7 G/DL (ref 3.5–5.2)
ALP SERPL-CCNC: 83 U/L (ref 55–135)
ALT SERPL W/O P-5'-P-CCNC: 12 U/L (ref 10–44)
ANION GAP SERPL CALC-SCNC: 11 MMOL/L (ref 8–16)
AST SERPL-CCNC: 23 U/L (ref 10–40)
BASOPHILS # BLD AUTO: 0.04 K/UL (ref 0–0.2)
BASOPHILS NFR BLD: 0.3 % (ref 0–1.9)
BILIRUB SERPL-MCNC: 0.3 MG/DL (ref 0.1–1)
BUN SERPL-MCNC: 27 MG/DL (ref 8–23)
CALCIUM SERPL-MCNC: 8.3 MG/DL (ref 8.7–10.5)
CHLORIDE SERPL-SCNC: 108 MMOL/L (ref 95–110)
CO2 SERPL-SCNC: 21 MMOL/L (ref 23–29)
CREAT SERPL-MCNC: 1.2 MG/DL (ref 0.5–1.4)
DIFFERENTIAL METHOD BLD: ABNORMAL
EOSINOPHIL # BLD AUTO: 0.1 K/UL (ref 0–0.5)
EOSINOPHIL NFR BLD: 0.5 % (ref 0–8)
ERYTHROCYTE [DISTWIDTH] IN BLOOD BY AUTOMATED COUNT: 14.6 % (ref 11.5–14.5)
EST. GFR  (NO RACE VARIABLE): >60 ML/MIN/1.73 M^2
GLUCOSE SERPL-MCNC: 84 MG/DL (ref 70–110)
HCT VFR BLD AUTO: 23.3 % (ref 40–54)
HGB BLD-MCNC: 7.7 G/DL (ref 14–18)
IMM GRANULOCYTES # BLD AUTO: 0.31 K/UL (ref 0–0.04)
IMM GRANULOCYTES NFR BLD AUTO: 2.4 % (ref 0–0.5)
LYMPHOCYTES # BLD AUTO: 0.9 K/UL (ref 1–4.8)
LYMPHOCYTES NFR BLD: 7 % (ref 18–48)
MAGNESIUM SERPL-MCNC: 1.6 MG/DL (ref 1.6–2.6)
MCH RBC QN AUTO: 35.2 PG (ref 27–31)
MCHC RBC AUTO-ENTMCNC: 33 G/DL (ref 32–36)
MCV RBC AUTO: 106 FL (ref 82–98)
MONOCYTES # BLD AUTO: 1.3 K/UL (ref 0.3–1)
MONOCYTES NFR BLD: 9.7 % (ref 4–15)
NEUTROPHILS # BLD AUTO: 10.5 K/UL (ref 1.8–7.7)
NEUTROPHILS NFR BLD: 80.1 % (ref 38–73)
NRBC BLD-RTO: 0 /100 WBC
PHOSPHATE SERPL-MCNC: 2.8 MG/DL (ref 2.7–4.5)
PLATELET # BLD AUTO: 252 K/UL (ref 150–450)
PMV BLD AUTO: 9.6 FL (ref 9.2–12.9)
POTASSIUM SERPL-SCNC: 3.4 MMOL/L (ref 3.5–5.1)
PROT SERPL-MCNC: 6 G/DL (ref 6–8.4)
RBC # BLD AUTO: 2.19 M/UL (ref 4.6–6.2)
SODIUM SERPL-SCNC: 140 MMOL/L (ref 136–145)
TACROLIMUS BLD-MCNC: 2.7 NG/ML (ref 5–15)
WBC # BLD AUTO: 13.13 K/UL (ref 3.9–12.7)

## 2024-04-09 PROCEDURE — 36415 COLL VENOUS BLD VENIPUNCTURE: CPT | Performed by: STUDENT IN AN ORGANIZED HEALTH CARE EDUCATION/TRAINING PROGRAM

## 2024-04-09 PROCEDURE — 99900026 HC AIRWAY MAINTENANCE (STAT)

## 2024-04-09 PROCEDURE — 99223 1ST HOSP IP/OBS HIGH 75: CPT | Mod: ,,, | Performed by: EMERGENCY MEDICINE

## 2024-04-09 PROCEDURE — 99223 1ST HOSP IP/OBS HIGH 75: CPT | Mod: GC,,, | Performed by: INTERNAL MEDICINE

## 2024-04-09 PROCEDURE — 27200966 HC CLOSED SUCTION SYSTEM

## 2024-04-09 PROCEDURE — 63600175 PHARM REV CODE 636 W HCPCS: Performed by: STUDENT IN AN ORGANIZED HEALTH CARE EDUCATION/TRAINING PROGRAM

## 2024-04-09 PROCEDURE — 25000003 PHARM REV CODE 250: Performed by: STUDENT IN AN ORGANIZED HEALTH CARE EDUCATION/TRAINING PROGRAM

## 2024-04-09 PROCEDURE — 63600175 PHARM REV CODE 636 W HCPCS

## 2024-04-09 PROCEDURE — 80197 ASSAY OF TACROLIMUS: CPT

## 2024-04-09 PROCEDURE — 99900035 HC TECH TIME PER 15 MIN (STAT)

## 2024-04-09 PROCEDURE — 20600001 HC STEP DOWN PRIVATE ROOM

## 2024-04-09 PROCEDURE — 25000003 PHARM REV CODE 250

## 2024-04-09 PROCEDURE — 85025 COMPLETE CBC W/AUTO DIFF WBC: CPT | Performed by: STUDENT IN AN ORGANIZED HEALTH CARE EDUCATION/TRAINING PROGRAM

## 2024-04-09 PROCEDURE — 83735 ASSAY OF MAGNESIUM: CPT | Performed by: STUDENT IN AN ORGANIZED HEALTH CARE EDUCATION/TRAINING PROGRAM

## 2024-04-09 PROCEDURE — 99223 1ST HOSP IP/OBS HIGH 75: CPT | Mod: ,,, | Performed by: OTOLARYNGOLOGY

## 2024-04-09 PROCEDURE — 94761 N-INVAS EAR/PLS OXIMETRY MLT: CPT

## 2024-04-09 PROCEDURE — 84100 ASSAY OF PHOSPHORUS: CPT | Performed by: STUDENT IN AN ORGANIZED HEALTH CARE EDUCATION/TRAINING PROGRAM

## 2024-04-09 PROCEDURE — 80053 COMPREHEN METABOLIC PANEL: CPT | Performed by: STUDENT IN AN ORGANIZED HEALTH CARE EDUCATION/TRAINING PROGRAM

## 2024-04-09 PROCEDURE — 99223 1ST HOSP IP/OBS HIGH 75: CPT | Mod: AI,,, | Performed by: INTERNAL MEDICINE

## 2024-04-09 RX ORDER — TACROLIMUS 1 MG/1
1 CAPSULE ORAL EVERY MORNING
Status: DISCONTINUED | OUTPATIENT
Start: 2024-04-09 | End: 2024-04-11 | Stop reason: HOSPADM

## 2024-04-09 RX ORDER — DEXAMETHASONE 0.5 MG/5ML
1 SOLUTION ORAL 4 TIMES DAILY
Status: DISCONTINUED | OUTPATIENT
Start: 2024-04-09 | End: 2024-04-11 | Stop reason: HOSPADM

## 2024-04-09 RX ORDER — TACROLIMUS 0.5 MG/1
0.5 CAPSULE ORAL EVERY EVENING
Status: DISCONTINUED | OUTPATIENT
Start: 2024-04-09 | End: 2024-04-11 | Stop reason: HOSPADM

## 2024-04-09 RX ORDER — MAGNESIUM SULFATE HEPTAHYDRATE 40 MG/ML
2 INJECTION, SOLUTION INTRAVENOUS ONCE
Status: COMPLETED | OUTPATIENT
Start: 2024-04-09 | End: 2024-04-09

## 2024-04-09 RX ADMIN — HEPARIN SODIUM 5000 UNITS: 5000 INJECTION INTRAVENOUS; SUBCUTANEOUS at 09:04

## 2024-04-09 RX ADMIN — SODIUM CHLORIDE: 9 INJECTION, SOLUTION INTRAVENOUS at 01:04

## 2024-04-09 RX ADMIN — TACROLIMUS 1 MG: 1 CAPSULE ORAL at 09:04

## 2024-04-09 RX ADMIN — MAGNESIUM SULFATE HEPTAHYDRATE 2 G: 40 INJECTION, SOLUTION INTRAVENOUS at 08:04

## 2024-04-09 RX ADMIN — DEXAMETHASONE 1 MG: 0.5 SOLUTION ORAL at 09:04

## 2024-04-09 RX ADMIN — SENNOSIDES AND DOCUSATE SODIUM 1 TABLET: 8.6; 5 TABLET ORAL at 09:04

## 2024-04-09 RX ADMIN — DEXAMETHASONE 1 MG: 0.5 SOLUTION ORAL at 01:04

## 2024-04-09 RX ADMIN — SODIUM CHLORIDE: 9 INJECTION, SOLUTION INTRAVENOUS at 08:04

## 2024-04-09 RX ADMIN — LEVOTHYROXINE SODIUM 150 MCG: 100 TABLET ORAL at 05:04

## 2024-04-09 RX ADMIN — DOXYCYCLINE HYCLATE 100 MG: 100 TABLET, COATED ORAL at 09:04

## 2024-04-09 RX ADMIN — TRAZODONE HYDROCHLORIDE 50 MG: 50 TABLET ORAL at 09:04

## 2024-04-09 RX ADMIN — POLYETHYLENE GLYCOL 3350 17 G: 17 POWDER, FOR SOLUTION ORAL at 08:04

## 2024-04-09 RX ADMIN — DOXYCYCLINE HYCLATE 100 MG: 100 TABLET, COATED ORAL at 08:04

## 2024-04-09 RX ADMIN — SENNOSIDES AND DOCUSATE SODIUM 1 TABLET: 8.6; 5 TABLET ORAL at 08:04

## 2024-04-09 RX ADMIN — POTASSIUM BICARBONATE 25 MEQ: 978 TABLET, EFFERVESCENT ORAL at 08:04

## 2024-04-09 RX ADMIN — OXYCODONE 5 MG: 5 TABLET ORAL at 09:04

## 2024-04-09 RX ADMIN — HEPARIN SODIUM 5000 UNITS: 5000 INJECTION INTRAVENOUS; SUBCUTANEOUS at 02:04

## 2024-04-09 RX ADMIN — HEPARIN SODIUM 5000 UNITS: 5000 INJECTION INTRAVENOUS; SUBCUTANEOUS at 05:04

## 2024-04-09 RX ADMIN — DEXAMETHASONE 1 MG: 0.5 SOLUTION ORAL at 06:04

## 2024-04-09 RX ADMIN — TACROLIMUS 0.5 MG: 0.5 CAPSULE ORAL at 06:04

## 2024-04-09 NOTE — PROGRESS NOTES
[facility] - Oncology ([facility])  Hematology/Oncology  Progress Note    Patient Name: Rocco Swain  Admission Date: 4/8/2024  Hospital Length of Stay: 1 days  Code Status: Full Code     Subjective:     HPI:  75 y/o F with SCC of the tongue (follows Dr. 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  Patient was supposed to be scheduled for a chemotherapy infusion today however he was complaining overall fatigue, generalized weakness, worsening sore throat.  His plan of care nurse recommended him to go to the ED for further evaluation.  Describes as a soreness in his posterior throat that has been progressively worsened for the last 4 days.  He does report of decreased p.o. intake.  He normally eats a liquid diet however his overall fatigue made him skip meals.  Reports of associated chills, body aches.  No sick contact.  He lives by himself and perform his ADLs independently. Similar symptoms after receiving infusion in the past.       Initial vitals in the ED: Patient was tachycardic 109, leukocytosis of 20, hemoglobin 8 5 (BL 11-10), CMP significant for BUN/Cr 35/1.7 (baseline 1.5 - 0.9),  chest x-ray with no acute pulmonary process. Patient received 1L in the ED, now on continuous fluids.        Oncology History   Squamous cell carcinoma of base of tongue   9/18/2020 Cancer Staged     Staging form: Pharynx - HPV-Mediated Oropharynx, AJCC 8th Edition  - Clinical stage from 9/18/2020: Stage III (rcT4, cN1, cM0, p16+)      9/28/2020 Initial Diagnosis     Squamous cell carcinoma of base of tongue      10/6/2020 - 8/17/2021 Chemotherapy     Treatment Summary   Plan Name: OP HEAD NECK CARBOPLATIN PACLITAXEL C1-2 FOLLOWED BY CETUXIMAB CARBOPLATIN C3-6 FOLLOWED BY CETUXIMAB MAINTENANCE WEEKLY  Treatment Goal: Control  Status: Inactive  Start  Date: 10/6/2020  End Date: 8/17/2021  Provider: Ronald Berg MD  Chemotherapy: cetuximab (ERBITUX) 100 mg/50 mL chemo infusion 684 mg, 400 mg/m2 = 684 mg (100 % of original dose 400 mg/m2), Intravenous, Clinic/[facility] 1 time, 29 of 38 cycles  Dose modification: 500 mg/m2 (original dose 400 mg/m2, Cycle 3), 250 mg/m2 (original dose 400 mg/m2, Cycle 3), 400 mg/m2 (original dose 400 mg/m2, Cycle 3), 250 mg/m2 (original dose 250 mg/m2, Cycle 7), 200 mg/m2 (original dose 250 mg/m2, Cycle 18), 200 mg/m2 (original dose 250 mg/m2, Cycle 19), 250 mg/m2 (original dose 250 mg/m2, Cycle 4), 200 mg/m2 (original dose 250 mg/m2, Cycle 25), 200 mg/m2 (original dose 250 mg/m2, Cycle 28)  Administration: 684 mg (11/17/2020), 400 mg (11/24/2020), 400 mg (2/9/2021), 400 mg (2/17/2021), 427.6 mg (3/9/2021), 400 mg (3/30/2021), 400 mg (3/23/2021), 400 mg (4/6/2021), 427.6 mg (4/13/2021), 427.6 mg (4/20/2021), 342 mg (5/11/2021), 342 mg (5/18/2021), 342 mg (5/25/2021), 400 mg (5/31/2021), 400 mg (6/7/2021), 400 mg (6/14/2021), 400 mg (12/8/2020), 427.6 mg (12/29/2020), 400 mg (12/1/2020), 400 mg (12/15/2020), 400 mg (12/22/2020), 427.6 mg (1/5/2021), 400 mg (1/12/2021), 400 mg (1/19/2021), 427.6 mg (1/26/2021), 400 mg (2/2/2021), 400 mg (2/23/2021), 400 mg (3/2/2021), 427.6 mg (3/16/2021), 400 mg (6/21/2021), 342 mg (6/28/2021), 342 mg (7/6/2021), 342 mg (7/13/2021), 342 mg (7/20/2021), 400 mg (7/27/2021), 400 mg (8/10/2021), 400 mg (8/17/2021)  CARBOplatin (PARAPLATIN) 310 mg in sodium chloride 0.9% 500 mL chemo infusion, 310 mg (92.2 % of original dose 334.5 mg), Intravenous, Clinic/[facility] 1 time, 6 of 6 cycles  Dose modification:   (original dose 334.5 mg, Cycle 1)  Administration: 310 mg (10/6/2020), 370 mg (11/17/2020), 300 mg (10/27/2020), 350 mg (12/8/2020), 335 mg (12/29/2020), 320 mg (1/19/2021)  PACLitaxeL (TAXOL) 175 mg/m2 = 300 mg in sodium chloride 0.9% 500 mL chemo infusion, 175 mg/m2 = 300 mg (100 % of original dose 175 mg/m2),  Intravenous, [facility] 1 time, 2 of 2 cycles  Dose modification: 175 mg/m2 (original dose 175 mg/m2, Cycle 1)  Administration: 300 mg (10/6/2020), 300 mg (10/27/2020)      4/29/2021 Tumor Conference        His case was discussed at the Multidisciplinary Head and Neck Team Planning Meeting.     Representatives from Medical Oncology, Radiation Oncology, Head and Neck Surgical Oncology, Psychosocial Oncology, and Speech and Language Pathology discussed the case with the following recommendations:     1) biopsy            7/29/2021 Tumor Conference        His case was discussed at the Multidisciplinary Head and Neck Team Planning Meeting.     Representatives from Medical Oncology, Radiation Oncology, Head and Neck Surgical Oncology, Psychosocial Oncology, and Speech and Language Pathology discussed the case with the following recommendations:     1) Head and neck clinic follow up  2) consider Keytruda (discuss with transplant)  3) consider palliative referral            9/13/2021 - 12/29/2023 Chemotherapy     Treatment Summary   Plan Name: OP HEAD NECK PEMBROLIZUMAB CARBOPLATIN FLUOROURACIL (C1 ONLY RECEIVED) FOLLOWED BY PEMBROLIZUMAB MAINTENANCE  Treatment Goal: Palliative  Status: Inactive  Start Date: 9/13/2021  End Date: 12/29/2023  Provider: Ronald Berg MD  Chemotherapy: CARBOplatin (PARAPLATIN) 320 mg in sodium chloride 0.9% 500 mL chemo infusion, 320 mg (100 % of original dose 320.5 mg), Intravenous, [facility] 1 time, 6 of 6 cycles  Dose modification:   (original dose 320.5 mg, Cycle 1)  Administration: 320 mg (9/13/2021), 335 mg (12/23/2021), 325 mg (1/27/2022), 245 mg (3/3/2022), 255 mg (5/12/2022), 255 mg (4/7/2022)  fluorouraciL 1,000 mg/m2/day = 6,440 mg in sodium chloride 0.9% 240 mL chemo infusion, 1,000 mg/m2/day = 6,440 mg, Intravenous, Over 96 hours, 6 of 6 cycles  Dose modification: 800 mg/m2/day (original dose 1,000 mg/m2/day, Cycle 6), 5,000 mg (original dose 1,000 mg/m2/day, Cycle  8)  Administration: 6,440 mg (9/13/2021), 6,440 mg (12/23/2021), 6,480 mg (1/27/2022), 5,000 mg (3/3/2022), 5,000 mg (4/7/2022), 5,000 mg (5/12/2022)      Secondary malignant neoplasm of cervical lymph node   2/9/2021 Initial Diagnosis     Secondary malignant neoplasm of cervical lymph node      9/13/2021 - 12/29/2023 Chemotherapy     Treatment Summary   Plan Name: OP HEAD NECK PEMBROLIZUMAB CARBOPLATIN FLUOROURACIL (C1 ONLY RECEIVED) FOLLOWED BY PEMBROLIZUMAB MAINTENANCE  Treatment Goal: Palliative  Status: Inactive  Start Date: 9/13/2021  End Date: 12/29/2023  Provider: Ronald Berg MD  Chemotherapy: CARBOplatin (PARAPLATIN) 320 mg in sodium chloride 0.9% 500 mL chemo infusion, 320 mg (100 % of original dose 320.5 mg), Intravenous, [facility] 1 time, 6 of 6 cycles  Dose modification:   (original dose 320.5 mg, Cycle 1)  Administration: 320 mg (9/13/2021), 335 mg (12/23/2021), 325 mg (1/27/2022), 245 mg (3/3/2022), 255 mg (5/12/2022), 255 mg (4/7/2022)  fluorouraciL 1,000 mg/m2/day = 6,440 mg in sodium chloride 0.9% 240 mL chemo infusion, 1,000 mg/m2/day = 6,440 mg, Intravenous, Over 96 hours, 6 of 6 cycles  Dose modification: 800 mg/m2/day (original dose 1,000 mg/m2/day, Cycle 6), 5,000 mg (original dose 1,000 mg/m2/day, Cycle 8)  Administration: 6,440 mg (9/13/2021), 6,440 mg (12/23/2021), 6,480 mg (1/27/2022), 5,000 mg (3/3/2022), 5,000 mg (4/7/2022), 5,000 mg (5/12/2022)      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   1/29/2024 Initial Diagnosis     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      2/19/2024 -  Chemotherapy     Treatment Summary   Plan Name: OP HEAD NECK CETUXIMAB CARBOPLATIN FLUOROURACIL Q3W  Treatment Goal: Curative  Status: Active  Start Date: 2/19/2024  End Date: 8/5/2024 (Planned)  Provider: Martín Hernandez MD  Chemotherapy: cetuximab (ERBITUX) 100 mg/50 mL chemo infusion 668 mg, 400 mg/m2 = 668 mg, Intravenous, [facility] 1 time, 2 of 12 cycles  Administration: 668 mg (2/19/2024), 400 mg  (2/26/2024), 400 mg (3/18/2024), 400 mg (3/25/2024), 400 mg (3/11/2024)  CARBOplatin (PARAPLATIN) 295 mg in sodium chloride 0.9% 314.5 mL chemo infusion, 295 mg, Intravenous, [facility] 1 time, 2 of 6 cycles  Administration: 295 mg (2/19/2024), 300 mg (3/18/2024)       Interval History: .No acute events overnight, afebrile, hemodynamically stable.     Improving BUN/Cr  Still complaining of sore throat/mucitis,     Consulting palliative for pain regimen/methadone initiation        Oncology Treatment Plan:   OP HEAD NECK CETUXIMAB CARBOPLATIN FLUOROURACIL Q3W    Medications:  Continuous Infusions:   sodium chloride 0.9% 150 mL/hr at 04/09/24 0835     Scheduled Meds:   doxycycline  100 mg Oral BID    heparin (porcine)  5,000 Units Subcutaneous Q8H    levothyroxine  150 mcg Oral Before breakfast    polyethylene glycol  17 g Oral Daily    senna-docusate 8.6-50 mg  1 tablet Oral BID    tacrolimus  0.5 mg Oral Daily PM    tacrolimus  1 mg Oral Daily AM    traZODone  50 mg Oral QHS     PRN Meds:(Magic mouthwash) 1:1:1 diphenhydrAMINE(Benadryl) 12.5mg/5ml liq, aluminum & magnesium hydroxide-simethicone (Maalox), LIDOcaine viscous 2%, dextrose 10%, dextrose 10%, glucagon (human recombinant), glucose, glucose, melatonin, naloxone, ondansetron, oxyCODONE, prochlorperazine, sodium chloride 0.9%, tiZANidine     Review of Systems   HENT:  Positive for sore throat.      Objective:     Vital Signs (Most Recent):  Temp: 98.1 °F (36.7 °C) (04/09/24 1055)  Pulse: 100 (04/09/24 1055)  Resp: 18 (04/09/24 1055)  BP: (!) 142/72 (04/09/24 1055)  SpO2: (!) 94 % (04/09/24 1055) Vital Signs (24h Range):  Temp:  [96.3 °F (35.7 °C)-98.6 °F (37 °C)] 98.1 °F (36.7 °C)  Pulse:  [] 100  Resp:  [16-22] 18  SpO2:  [94 %-99 %] 94 %  BP: (118-150)/(65-83) 142/72     Weight: 51.3 kg (113 lb 2.2 oz)  Body mass index is 17.2 kg/m².  Body surface area is 1.57 meters squared.      Intake/Output Summary (Last 24 hours) at 4/9/2024 1120  Last data filed  at 4/9/2024 0955  Gross per 24 hour   Intake 2994.9 ml   Output 1300 ml   Net 1694.9 ml        Physical Exam  Vitals and nursing note reviewed.   Constitutional:       General: He is not in acute distress.     Appearance: Normal appearance. He is ill-appearing.   HENT:      Head: Normocephalic and atraumatic.      Nose: Nose normal.      Mouth/Throat:      Comments: Yellow eschar patch at the back of the throat.  Eyes:      General: No scleral icterus.  Neck:      Comments: Trach stoma  Cardiovascular:      Rate and Rhythm: Normal rate and regular rhythm.      Heart sounds: Normal heart sounds. No murmur heard.     No friction rub. No gallop.   Pulmonary:      Effort: Pulmonary effort is normal. No respiratory distress.      Breath sounds: No wheezing, rhonchi or rales.   Abdominal:      General: Abdomen is flat. There is no distension.      Palpations: Abdomen is soft. There is no mass.      Tenderness: There is no abdominal tenderness. There is no guarding.      Hernia: No hernia is present.   Musculoskeletal:         General: Normal range of motion.      Right lower leg: No edema.      Left lower leg: No edema.   Skin:     General: Skin is warm.      Capillary Refill: Capillary refill takes less than 2 seconds.      Coloration: Skin is not jaundiced.      Findings: No bruising or erythema.   Neurological:      General: No focal deficit present.      Mental Status: He is alert and oriented to person, place, and time.          Significant Labs:   CBC:   Recent Labs   Lab 04/08/24  1352 04/09/24  0237   WBC 21.47* 13.13*   HGB 8.5* 7.7*   HCT 24.4* 23.3*    252    and CMP:   Recent Labs   Lab 04/08/24  1352 04/09/24  0237    140   K 4.0 3.4*    108   CO2 24 21*   * 84   BUN 35* 27*   CREATININE 1.7* 1.2   CALCIUM 9.6 8.3*   PROT 7.5 6.0   ALBUMIN 3.3* 2.7*   BILITOT 0.4 0.3   ALKPHOS 98 83   AST 33 23   ALT 15 12   ANIONGAP 13 11       Diagnostic Results:  None  Assessment/Plan:     * Acute  kidney injury superimposed on chronic kidney disease  73 y/o M with history of SCC with poor PO intake 2/2 to throat pain, now with TSERING on CKD likely 2/2 to pre renal    Recent Labs     04/08/24  1352 04/09/24  0237   BUN 35* 27*   CREATININE 1.7* 1.2       Plan:   - continuous fluids 1 day  - Avoid nephrotoxic agents such as NSAIDs, gadolinium and IV radiocontrast.  - Renally dose meds to current GFR.  - Maintain MAP > 65.        Sore throat  Exudates in the posterior throat, throat pain x4 days likely 2/2 to mucitis from 5FU    Results  - negative for COVID/FLU/RSV    Plan  - magic mouth wash  - dex mouth wash  - pending palliative recs on starting methadone     Chronic kidney disease (CKD), stage IV (severe)  See A&P of TSERING on CKD    Squamous cell carcinoma of base of tongue  73 y/o M with history of SSC of the base of the tongue, 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 Patient had ANGELES on October PETCT, but then developed quickly progressing orbital lesion. Follows Dr. Hernandez, should be on cetuximab    Plan:  - prn pain medications (oxy 5 mg, tizanidine)   - no indication for inpatient chemo at this time.     Status post liver transplantation - 2008  S/p liver transplant, currently on tacrolimus  -continue tacrolimus  - hepatology consulted apprec recs regarding tacro management       Postoperative hypothyroidism  - continue home levothyroxine 150 mg    Hepatitis C  Known hx of liver transplant with cirrhosis, followed by [facility] hepatology,     see A&P of cirrhosis                Ronn Lisa,   Hematology/Oncology  [facility] - Oncology ([facility])

## 2024-04-09 NOTE — TELEPHONE ENCOUNTER
Irma communication faxed to MD Lin.      ----- Message from Cornell Anton MD sent at 4/5/2024 10:57 AM CDT -----  Regarding: RE: clear?  Yes but they need to be aware that his last platelet count was coming down (54). They need to recheck platelets and INR.      ----- Message -----  From: Shari Stark RN  Sent: 4/3/2024   3:58 PM CDT  To: Cornell Anton MD  Subject: clear?                                           Is patient cleared from liver transplant perspective to  have MD Lin perform on 4/25/24 a excision of  Malignant neoplasm of right conjunctiva.

## 2024-04-09 NOTE — NURSING
Electrolytes replaced. C/o mouth pain moderately relieved by prn oxy.  Bed alarm set. Instructed to call prior to getting out of bed, nodded understanding. VSS. Nonskid footwear on with bed locked and low. Bed rails up x2. Call light and personal items within reach.

## 2024-04-09 NOTE — ASSESSMENT & PLAN NOTE
75 y/o M with history of SCC with poor PO intake 2/2 to throat pain, now with TSERING on CKD likely 2/2 to pre renal    Recent Labs     04/08/24  1352 04/09/24  0237   BUN 35* 27*   CREATININE 1.7* 1.2       Plan:   - continuous fluids 1 day  - Avoid nephrotoxic agents such as NSAIDs, gadolinium and IV radiocontrast.  - Renally dose meds to current GFR.  - Maintain MAP > 65.

## 2024-04-09 NOTE — HPI
75 y/o F with SCC of the tongue (follows Dr. Hernandez currently on Cetuximab and carboplatin) nonverbal d/t prior laryngectomy and total glossectomy, postoperative hypothyrodism, hepatitis C liver cirrhosis s/p liver transplantation 2008 presented to the ED for sore throat. Patient was supposed to be scheduled for a chemotherapy infusion today however he was complaining overall fatigue, generalized weakness, worsening sore throat. His plan of care nurse recommended him to go to the ED for further evaluation. Describes as a soreness in his posterior throat that has been progressively worsened for the last 4 days. He does report of decreased p.o. intake. He normally eats a liquid diet however his overall fatigue made him skip meals. Reports of associated chills, body aches. No sick contact. He lives by himself and perform his ADLs independently. Similar symptoms after receiving infusion in the past.     In this setting, palliative medicine was consulted to help with symptom management, medical decision making, and aiding in the formation of goals of care.

## 2024-04-09 NOTE — CONSULTS
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  Patient was supposed to be scheduled for a chemotherapy infusion today, but a nurse referred him to the ED after he was complaining of generalized fatigue, weakness, and posterior sore throat that has progressively worsened for the last 4 days with associated chills, body aches, and decreased p.o. intake.  He normally eats a liquid diet however his overall fatigue made him skip meals. He notes that he's been constipated for 4 weeks and that stool softeners have provided no relief. Typically has a BM daily or every other day. He lives by himself and perform his ADLs independently.      In the ED, patient initially tachycardic 109, with leukocytosis of 20, Hgb 8.5 (baseline 11-10). Cr 1.7 (baseline 1.4) decreased to 1.2 after 1L IVF in the ED and continuous fluids. AST 52, ALT 19, Tbili 0.4. Chest x-ray with no acute pulmonary process. Patient admitted to Med Onc service and Hepatology was consulted for management of transplant immunosuppression.    Transplant History:  Follows with Dr Anton. Transplanted Nov 2008 for Hep C cirrhosis with HCC, post op was complicated by hepatic vein anastomotic stenosis and resultant ascites in 2010. The ascites did resolve after  stenosis dilation, but recurred again in 2013 with findings of restenosis of the middle and right hepatic veins. He was found to have portal hypertension based on portal pressure measurement with an estimated gradient of 15 mmHg done in February 2013. His ascites was controlled with diuretics. He is on prograf for immunosuppression. Liver bx March 2013 showed steatohepatitis of the transplanted organ. Last U/S of liver 2/12/2024 with stable intra/extra hepatic ductal dilation and no evidence of HCC. Currently on Tacro 1 mg qAM and 0.5 mg qPM.      Prior endoscopies:  EGD 9/14/23:  - Nonobstructing schatzki ring. Dilated with resultant bleeding. Treated with bipolar cautery.   - Scar in the gastric antrum.   - Oozing duodenal ulcers. Injected. Treated with bipolar cautery.   Colonoscopy 9/14/23:  - Old blood throughout colon presumed from duodenal ulcers found on EGD.  No active bleeding. Prep inadequate.    Review of Systems   Constitutional:  Positive for fatigue. Negative for activity change, appetite change and fever.   HENT:  Positive for sore throat.    Respiratory:  Negative for cough, shortness of breath and wheezing.    Cardiovascular:  Negative for chest pain and leg swelling.   Gastrointestinal:  Positive for constipation. Negative for abdominal pain, diarrhea, nausea and vomiting.   Skin:  Negative for rash.   Neurological:  Negative for headaches.       Past Medical History:   Diagnosis Date    Basal cell carcinoma (BCC) in situ of skin 2012    3 on face, 2 on arm, removed by dermatology.     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 Underwent surgical resection of tongue and neck. s/p  tracheostomy    Squamous cell carcinoma of skin of right temple 01/24/2024    right temple       Past Surgical History:   Procedure Laterality Date    COLONOSCOPY      COLONOSCOPY N/A 9/14/2023    Procedure: COLONOSCOPY;  Surgeon: Reyes Olivia MD;  Location: [facility] ENDO (2ND FLR);  Service: Endoscopy;  Laterality: N/A;    CONJUNCTIVA BIOPSY Right 10/11/2022    Procedure: BIOPSY, CONJUNCTIVA;  Surgeon: Victor M Vasques MD;  Location: [facility];  Service: Ophthalmology;  Laterality: Right;    ESOPHAGOGASTRODUODENOSCOPY N/A 9/14/2023    Procedure: EGD (ESOPHAGOGASTRODUODENOSCOPY);  Surgeon: Reyes Olivia MD;  [facility]  Service: Endoscopy;  Laterality: N/A;    INSERTION OF VENOUS ACCESS PORT Right 3/8/2021    Procedure: INSERTION, VENOUS ACCESS PORT;  Surgeon: Jesus Rendon MD;  Location: [address];  Service: General;  Laterality: Right;    LIVER TRANSPLANT  11/2008    transplanted for biopsy proven hepatocellular carcinoma,     LYMPH NODE BIOPSY N/A 9/18/2020    Procedure: BIOPSY, LYMPH NODE;  Surgeon: Taz Diagnostic Provider;  [address];  Service: General;  Laterality: N/A;  Rm 173    SURGICAL REMOVAL OF SCAR Right 8/24/2023    Procedure: EXCISION, SCAR;  Surgeon: Ting Brambila MD;  Location: [facility] OR;  Service: Ophthalmology;  Laterality: Right;    TRACHEOSTOMY         Family history of liver disease: No    Review of patient's allergies indicates:   Allergen Reactions    Aspirin     Tylenol extended release          Tobacco Use    Smoking status: Never    Smokeless tobacco: Never   Substance and Sexual Activity    Alcohol use: Yes     Comment: drinks wine, 1 glass day    Drug use: Not Currently    Sexual activity: Yes     Comment: No prior history of STD        Medications Prior to Admission   Medication Sig Dispense Refill Last Dose    azelaic acid (AZELEX) 15 % gel APPLY TOPICALLY TO AFFECTED AREA IN THE MORNING 50 g 3     chlorhexidine (PERIDEX)  0.12 % solution Gently swish and spit 15 mL twice a day for 3 weeks. Start rinses the day after your procedure. 473 mL 1     clindamycin phosphate 1% (CLINDAGEL) 1 % gel Apply topically 2 (two) times daily. 60 g 3     doxycycline (VIBRA-TABS) 100 MG tablet Take 1 tablet (100 mg total) by mouth 2 (two) times daily. 42 tablet 0     erythromycin (ROMYCIN) ophthalmic ointment Administer 0.5 inches to the right eye twice daily for 30 days. 3.5 g 1     gabapentin (NEURONTIN) 300 MG capsule Take 1 capsule (300 mg total) by mouth every evening. (Patient taking differently: Take 300 mg by mouth as needed.) 30 capsule 11     hydrocortisone 1 % cream Apply to face, hands, feet, neck, back and chest at bedtime. 112 g 2     imiquimod (ALDARA) 5 % cream Apply to biopsy site on frontal scalp + about a centimeter of surrounding skin qhs x 16 weeks. Wash off in am and apply sunscreen. Discontinue if skin becomes blistered, raw, bleeding, painful, etc. 24 packet 3     levothyroxine (TIROSINT-SOL) 150 mcg/mL Soln Take 150 mcg by mouth before breakfast. 30 mL 11     LIDOcaine HCl 2% (LIDOCAINE VISCOUS) 2 % Soln Swish and spit 15 mls every 8 (eight) hours as needed (mouth sore). 100 mL 3     magic mouthwash diphen/antac/lidoc/nysta Take 10 mLs by mouth 4 (four) times daily. 120 mL 4     metroNIDAZOLE (METROGEL) 0.75 % gel Apply topically to affected area 2 (two) times daily. 45 g 1     multivit-min/FA/lycopen/lutein (CENTRUM SILVER MEN ORAL) Take 1 tablet by mouth once daily.       OLANZapine (ZYPREXA) 5 MG tablet Take 1 tablet (5 mg total) by mouth every evening. Take as directed on days 1-4 of your chemotherapy cycle. 30 tablet 5     prednisoLONE acetate (PRED FORTE) 1 % DrpS Place 1 drop into the right eye 4 (four) times daily. 10 mL 3     prochlorperazine (COMPAZINE) 10 MG tablet Take ½ tablet (5 mg total) by mouth every 6 (six) hours as needed. 30 tablet 1     senna (SENOKOT) 8.6 mg tablet Take 2 tablets by mouth 2 (two) times  daily as needed for Constipation. 60 tablet 1     sulfacetamide sodium-sulfur 10-5 % (w/w) Clsr USE TO WASH AFFECTED AREA DAILY       tacrolimus (PROGRAF) 0.5 MG Cap Take 1 capsule (0.5 mg total) by mouth every EVENING.  (Use the 1mg capsule for your morning dose) 90 capsule 3     tacrolimus (PROGRAF) 1 MG Cap Take 1 capsule (1 mg total) by mouth every MORNING. Use the tacrolimus 0.5mg capsule for your evening dose as directed. 90 capsule 3     tiZANidine (ZANAFLEX) 2 MG tablet Take 1 tablet (2 mg total) by mouth nightly as needed (neck muscle strain). 30 tablet 3     traZODone (DESYREL) 50 MG tablet TAKE 1 TABLET BY MOUTH IN THE  EVENING 90 tablet 3     vitamin E 1000 UNIT capsule Take 1 cap PO BID until gone. Start taking one week prior to your dental surgery. 112 capsule 0        Objective:     Vital Signs (Most Recent):  Temp: 98.4 °F (36.9 °C) (04/09/24 0715)  Pulse: 85 (04/09/24 0913)  Resp: 16 (04/09/24 0913)  BP: 119/65 (04/09/24 0715)  SpO2: 98 % (04/09/24 0913) Vital Signs (24h Range):  Temp:  [96.3 °F (35.7 °C)-98.6 °F (37 °C)] 98.4 °F (36.9 °C)  Pulse:  [] 85  Resp:  [16-22] 16  SpO2:  [96 %-99 %] 98 %  BP: (118-150)/(65-83) 119/65     Weight: 51.3 kg (113 lb 2.2 oz) (04/08/24 2253)  Body mass index is 17.2 kg/m².       Physical Exam  Vitals and nursing note reviewed.   HENT:      Head: Normocephalic and atraumatic.   Eyes:      Conjunctiva/sclera: Conjunctivae normal.      Pupils: Pupils are equal, round, and reactive to light.   Cardiovascular:      Rate and Rhythm: Normal rate and regular rhythm.      Pulses: Normal pulses.   Pulmonary:      Effort: Pulmonary effort is normal. No respiratory distress.      Breath sounds: No wheezing or rales.   Abdominal:      Palpations: Abdomen is soft.      Tenderness: There is no abdominal tenderness. There is no guarding.   Musculoskeletal:      Right lower leg: No edema.      Left lower leg: No edema.   Skin:     General: Skin is warm and dry.    Neurological:      General: No focal deficit present.      Mental Status: He is alert and oriented to person, place, and time.   Psychiatric:         Mood and Affect: Mood normal.            Computed MELD 3.0 unavailable. Necessary lab results were not found in the last year.  Computed MELD-Na unavailable. Necessary lab results were not found in the last year.      Significant Labs:  Labs within the past month have been reviewed.    Significant Imaging:  N/A  Assessment/Plan:     GI  Status post liver transplantation - 2008  73M with history of tongue SCC and prior HCV cirrhosis and HCC s/p liver transplant complicated by cirrhosis of his transplanted liver, currently stable on prograf who was admitted for sore throat. Hepatology consulted for management of immunosuppressant medication. Tacro level 2.7, low, but acceptable given current chemo and active malignancy.     Recommendations:  - Continue home regimen of Tacrolimus 1 mg qAM and 0.5 mg qhs.        Thank you for your consult. I will follow-up with patient. Please contact us if you have any additional questions.    Abner Juarez MD  Hepatology  [facility] - Oncology ([facility])

## 2024-04-09 NOTE — SUBJECTIVE & OBJECTIVE
Review of Systems   Constitutional:  Positive for fatigue. Negative for activity change, appetite change and fever.   HENT:  Positive for sore throat.    Respiratory:  Negative for cough, shortness of breath and wheezing.    Cardiovascular:  Negative for chest pain and leg swelling.   Gastrointestinal:  Positive for constipation. Negative for abdominal pain, diarrhea, nausea and vomiting.   Skin:  Negative for rash.   Neurological:  Negative for headaches.       Past Medical History:   Diagnosis Date    Basal cell carcinoma (BCC) in situ of skin 2012    3 on face, 2 on arm, removed by dermatology.     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 Underwent surgical resection of tongue and neck. s/p tracheostomy    Squamous cell carcinoma of skin of right temple 01/24/2024    right temple       Past Surgical History:   Procedure Laterality Date    COLONOSCOPY      COLONOSCOPY N/A 9/14/2023    Procedure: COLONOSCOPY;  Surgeon: Reyes Olivia MD;  Location: [address]);  Service: Endoscopy;  Laterality: N/A;    CONJUNCTIVA BIOPSY Right 10/11/2022    Procedure: BIOPSY, CONJUNCTIVA;  Surgeon: Victor M Vasques MD;  [facility];  Service: Ophthalmology;  Laterality: Right;    ESOPHAGOGASTRODUODENOSCOPY N/A 9/14/2023    Procedure: EGD (ESOPHAGOGASTRODUODENOSCOPY);  Surgeon: Reyes Olivia MD;  Location: [address]);  Service: Endoscopy;  Laterality: N/A;    INSERTION OF VENOUS ACCESS PORT Right 3/8/2021    Procedure: INSERTION, VENOUS ACCESS PORT;  Surgeon: Jesus Rendon MD;  Location: [address];  Service: General;  Laterality: Right;    LIVER TRANSPLANT  11/2008     transplanted for biopsy proven hepatocellular carcinoma,     LYMPH NODE BIOPSY N/A 9/18/2020    Procedure: BIOPSY, LYMPH NODE;  Surgeon: Taz Diagnostic Provider;  Location: [facility] OR [address] [place] [address];  Service: General;  Laterality: N/A;  Rm 173    SURGICAL REMOVAL OF SCAR Right 8/24/2023    Procedure: EXCISION, SCAR;  Surgeon: Ting Brambila MD;  [facility];  Service: Ophthalmology;  Laterality: Right;    TRACHEOSTOMY         Family history of liver disease: No    Review of patient's allergies indicates:   Allergen Reactions    Aspirin     Tylenol extended release          Tobacco Use    Smoking status: Never    Smokeless tobacco: Never   Substance and Sexual Activity    Alcohol use: Yes     Comment: drinks wine, 1 glass day    Drug use: Not Currently    Sexual activity: Yes     Comment: No prior history of STD        Medications Prior to Admission   Medication Sig Dispense Refill Last Dose    azelaic acid (AZELEX) 15 % gel APPLY TOPICALLY TO AFFECTED AREA IN THE MORNING 50 g 3     chlorhexidine (PERIDEX) 0.12 % solution Gently swish and spit 15 mL twice a day for 3 weeks. Start rinses the day after your procedure. 473 mL 1     clindamycin phosphate 1% (CLINDAGEL) 1 % gel Apply topically 2 (two) times daily. 60 g 3     doxycycline (VIBRA-TABS) 100 MG tablet Take 1 tablet (100 mg total) by mouth 2 (two) times daily. 42 tablet 0     erythromycin (ROMYCIN) ophthalmic ointment Administer 0.5 inches to the right eye twice daily for 30 days. 3.5 g 1     gabapentin (NEURONTIN) 300 MG capsule Take 1 capsule (300 mg total) by mouth every evening. (Patient taking differently: Take 300 mg by mouth as needed.) 30 capsule 11     hydrocortisone 1 % cream Apply to face, hands, feet, neck, back and chest at bedtime. 112 g 2     imiquimod (ALDARA) 5 % cream Apply to biopsy site on frontal scalp + about a centimeter of surrounding skin qhs x 16 weeks. Wash off in am and apply sunscreen. Discontinue if skin becomes blistered, raw,  bleeding, painful, etc. 24 packet 3     levothyroxine (TIROSINT-SOL) 150 mcg/mL Soln Take 150 mcg by mouth before breakfast. 30 mL 11     LIDOcaine HCl 2% (LIDOCAINE VISCOUS) 2 % Soln Swish and spit 15 mls every 8 (eight) hours as needed (mouth sore). 100 mL 3     magic mouthwash diphen/antac/lidoc/nysta Take 10 mLs by mouth 4 (four) times daily. 120 mL 4     metroNIDAZOLE (METROGEL) 0.75 % gel Apply topically to affected area 2 (two) times daily. 45 g 1     multivit-min/FA/lycopen/lutein (CENTRUM SILVER MEN ORAL) Take 1 tablet by mouth once daily.       OLANZapine (ZYPREXA) 5 MG tablet Take 1 tablet (5 mg total) by mouth every evening. Take as directed on days 1-4 of your chemotherapy cycle. 30 tablet 5     prednisoLONE acetate (PRED FORTE) 1 % DrpS Place 1 drop into the right eye 4 (four) times daily. 10 mL 3     prochlorperazine (COMPAZINE) 10 MG tablet Take ½ tablet (5 mg total) by mouth every 6 (six) hours as needed. 30 tablet 1     senna (SENOKOT) 8.6 mg tablet Take 2 tablets by mouth 2 (two) times daily as needed for Constipation. 60 tablet 1     sulfacetamide sodium-sulfur 10-5 % (w/w) Clsr USE TO WASH AFFECTED AREA DAILY       tacrolimus (PROGRAF) 0.5 MG Cap Take 1 capsule (0.5 mg total) by mouth every EVENING.  (Use the 1mg capsule for your morning dose) 90 capsule 3     tacrolimus (PROGRAF) 1 MG Cap Take 1 capsule (1 mg total) by mouth every MORNING. Use the tacrolimus 0.5mg capsule for your evening dose as directed. 90 capsule 3     tiZANidine (ZANAFLEX) 2 MG tablet Take 1 tablet (2 mg total) by mouth nightly as needed (neck muscle strain). 30 tablet 3     traZODone (DESYREL) 50 MG tablet TAKE 1 TABLET BY MOUTH IN THE  EVENING 90 tablet 3     vitamin E 1000 UNIT capsule Take 1 cap PO BID until gone. Start taking one week prior to your dental surgery. 112 capsule 0        Objective:     Vital Signs (Most Recent):  Temp: 98.4 °F (36.9 °C) (04/09/24 0715)  Pulse: 85 (04/09/24 0913)  Resp: 16 (04/09/24  0913)  BP: 119/65 (04/09/24 0715)  SpO2: 98 % (04/09/24 0913) Vital Signs (24h Range):  Temp:  [96.3 °F (35.7 °C)-98.6 °F (37 °C)] 98.4 °F (36.9 °C)  Pulse:  [] 85  Resp:  [16-22] 16  SpO2:  [96 %-99 %] 98 %  BP: (118-150)/(65-83) 119/65     Weight: 51.3 kg (113 lb 2.2 oz) (04/08/24 2253)  Body mass index is 17.2 kg/m².       Physical Exam  Vitals and nursing note reviewed.   HENT:      Head: Normocephalic and atraumatic.   Eyes:      Conjunctiva/sclera: Conjunctivae normal.      Pupils: Pupils are equal, round, and reactive to light.   Cardiovascular:      Rate and Rhythm: Normal rate and regular rhythm.      Pulses: Normal pulses.   Pulmonary:      Effort: Pulmonary effort is normal. No respiratory distress.      Breath sounds: No wheezing or rales.   Abdominal:      Palpations: Abdomen is soft.      Tenderness: There is no abdominal tenderness. There is no guarding.   Musculoskeletal:      Right lower leg: No edema.      Left lower leg: No edema.   Skin:     General: Skin is warm and dry.   Neurological:      General: No focal deficit present.      Mental Status: He is alert and oriented to person, place, and time.   Psychiatric:         Mood and Affect: Mood normal.            Computed MELD 3.0 unavailable. Necessary lab results were not found in the last year.  Computed MELD-Na unavailable. Necessary lab results were not found in the last year.      Significant Labs:  Labs within the past month have been reviewed.    Significant Imaging:  N/A

## 2024-04-09 NOTE — SUBJECTIVE & OBJECTIVE
Interval History: pt seen at the bedside; able to communicate through marker board writing; non verbal    Past Medical History:   Diagnosis Date    Basal cell carcinoma (BCC) in situ of skin 2012    3 on face, 2 on arm, removed by dermatology.    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 Underwent surgical resection of tongue and neck. 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 temple 01/24/2024    right temple       Past Surgical History:   Procedure Laterality Date    COLONOSCOPY      COLONOSCOPY N/A 9/14/2023    Procedure: COLONOSCOPY;  Surgeon: Reyes Olivia MD;  Location: [address]);  Service: Endoscopy;  Laterality: N/A;    CONJUNCTIVA BIOPSY Right 10/11/2022    Procedure: BIOPSY, CONJUNCTIVA;  Surgeon: Victor M Vasques MD;  [facility]: [facility];  Service: Ophthalmology;  Laterality: Right;    ESOPHAGOGASTRODUODENOSCOPY N/A 9/14/2023    Procedure: EGD (ESOPHAGOGASTRODUODENOSCOPY);  Surgeon: Reyes Olivia MD;  Location: [address]);  Service: Endoscopy;  Laterality: N/A;    INSERTION OF VENOUS ACCESS PORT Right 3/8/2021    Procedure: INSERTION, VENOUS ACCESS PORT;  Surgeon: Jesus Rendon MD;  Location: [address];  Service: General;  Laterality: Right;    LIVER TRANSPLANT  11/2008    transplanted for biopsy proven hepatocellular carcinoma,     LYMPH NODE BIOPSY N/A 9/18/2020    Procedure: BIOPSY, LYMPH NODE;  Surgeon: [facility] Diagnostic Provider;  Location: [address];  Service: General;  Laterality: N/A;  Rm 173    SURGICAL REMOVAL OF SCAR Right 8/24/2023    Procedure: EXCISION, SCAR;  Surgeon: Ting Brambila MD;   Location: [facility];  Service: Ophthalmology;  Laterality: Right;    TRACHEOSTOMY         Review of patient's allergies indicates:   Allergen Reactions    Aspirin     Tylenol extended release        Medications:  Continuous Infusions:   sodium chloride 0.9% 150 mL/hr at 04/09/24 0835     Scheduled Meds:   dexAMETHasone  1 mg Swish & Spit QID    doxycycline  100 mg Oral BID    heparin (porcine)  5,000 Units Subcutaneous Q8H    levothyroxine  150 mcg Oral Before breakfast    polyethylene glycol  17 g Oral Daily    senna-docusate 8.6-50 mg  1 tablet Oral BID    tacrolimus  0.5 mg Oral Daily PM    tacrolimus  1 mg Oral Daily AM    traZODone  50 mg Oral QHS     PRN Meds:(Magic mouthwash) 1:1:1 diphenhydrAMINE(Benadryl) 12.5mg/5ml liq, aluminum & magnesium hydroxide-simethicone (Maalox), LIDOcaine viscous 2%, dextrose 10%, dextrose 10%, glucagon (human recombinant), glucose, glucose, melatonin, naloxone, ondansetron, oxyCODONE, prochlorperazine, sodium chloride 0.9%, tiZANidine    Family History       Problem Relation (Age of Onset)    Dementia Mother          Tobacco Use    Smoking status: Never    Smokeless tobacco: Never   Substance and Sexual Activity    Alcohol use: Yes     Comment: drinks wine, 1 glass day    Drug use: Not Currently    Sexual activity: Yes     Comment: No prior history of STD        Review of Systems   Constitutional:  Positive for fatigue.   HENT:  Positive for mouth sores and sore throat.    Eyes:  Positive for pain.   Respiratory:  Negative for cough, chest tightness and shortness of breath.    Psychiatric/Behavioral: Negative.     Objective:     Vital Signs (Most Recent):  Temp: 98.1 °F (36.7 °C) (04/09/24 1055)  Pulse: 100 (04/09/24 1055)  Resp: 18 (04/09/24 1055)  BP: (!) 142/72 (04/09/24 1055)  SpO2: (!) 94 % (04/09/24 1055) Vital Signs (24h Range):  Temp:  [96.3 °F (35.7 °C)-98.6 °F (37 °C)] 98.1 °F (36.7 °C)  Pulse:  [] 100  Resp:  [16-22] 18  SpO2:  [94 %-99 %] 94  %  BP: (118-150)/(65-83) 142/72     Weight: 51.3 kg (113 lb 2.2 oz)  Body mass index is 17.2 kg/m².       Physical Exam       Review of Symptoms      Symptom Assessment (ESAS 0-10 Scale)  Pain:  2  Dyspnea:  0  Anxiety:  1  Nausea:  0  Depression:  0  Anorexia:  0  Fatigue:  0  Insomnia:  0  Restlessness:  0  Agitation:  0     CAM / Delirium:  Negative  Constipation:  Negative  Diarrhea:  Negative      Pain Assessment:    Location(s): head (right eye)    Head       Location: frontal (right eye)        Quality: dull        Quantity: 2/10 in intensity        Chronicity: Onset 2 month(s) ago, stable        Aggravating Factors: none        Alleviating Factors: opiates        Associated Symptoms: none    ECOG Performance Status ndGndrndanddndend:nd nd2nd Living Arrangements:  Lives alone    Psychosocial/Cultural:   See Palliative Psychosocial Note: No  Lives alone    His brother Ollie is helpful    Retired   **Primary  to Follow**  Palliative Care  Consult: No      Advance Care Planning   Advance Directives:   Living Will: Yes        Copy on chart: Yes    Do Not Resuscitate Status: No    Medical Power of : Yes      Decision Making:  Patient answered questions  Goals of Care: What is most important right now is to focus on remaining as independent as possible, symptom/pain control. Accordingly, we have decided that the best plan to meet the patient's goals includes continuing with treatment.         Significant Labs: All pertinent labs within the past 24 hours have been reviewed.  CBC:   Recent Labs   Lab 04/09/24 0237   WBC 13.13*   HGB 7.7*   HCT 23.3*   *        BMP:  Recent Labs   Lab 04/09/24 0237   GLU 84      K 3.4*      CO2 21*   BUN 27*   CREATININE 1.2   CALCIUM 8.3*   MG 1.6     LFT:  Lab Results   Component Value Date    AST 23 04/09/2024     (H) 07/09/2020    ALKPHOS 83 04/09/2024    BILITOT 0.3 04/09/2024     Albumin:   Albumin   Date Value Ref  Range Status   04/09/2024 2.7 (L) 3.5 - 5.2 g/dL Final     Protein:   Total Protein   Date Value Ref Range Status   04/09/2024 6.0 6.0 - 8.4 g/dL Final     Lactic acid:   Lab Results   Component Value Date    LACTATE 1.5 09/13/2023       Significant Imaging: I have reviewed all pertinent imaging results/findings within the past 24 hours.  2/9 Pet Scan  Impression:     Stable postoperative change of glossectomy, laryngectomy, tracheostomy, and flap reconstruction without evidence of increased nodularity or hypermetabolic activity at the operative site to suggest local disease recurrence.     Interval development a 1.9 cm soft tissue lesion along the right medial orbit which shows increased tracer uptake, nonspecific.  Recommend correlation with direct visualization.  Differential considerations include neoplasm or other infectious/inflammatory etiology.

## 2024-04-09 NOTE — ASSESSMENT & PLAN NOTE
Exudates in the posterior throat, throat pain x4 days likely 2/2 to mucitis from 5FU    Results  - negative for COVID/FLU/RSV    Plan  - magic mouth wash  - dex mouth wash  - pending palliative recs on starting methadone

## 2024-04-09 NOTE — ASSESSMENT & PLAN NOTE
73M with history of tongue SCC and prior HCV cirrhosis and HCC s/p liver transplant complicated by cirrhosis of his transplanted liver, currently stable on prograf who was admitted for sore throat. Hepatology consulted for management of immunosuppressant medication. Tacro level 2.7, low, but acceptable given current chemo and active malignancy.     Recommendations:  - Continue home regimen of Tacrolimus 1 mg qAM and 0.5 mg qhs.

## 2024-04-09 NOTE — PLAN OF CARE
No acute events this shift. Patient AAOx4. Afebrile and VSS. Patient voids in urinal at bedside. No complaints of pain or nausea. IV fluids continued as ordered. Bed alrm on. Bed in lowest position and locked. Side rails up x2. All possessions and call light within reach. Non-skid socks worn. Instructed to call for assistance and voiced understanding. All needs of patient currently met. Will continue to monitor with frequent rounding.

## 2024-04-09 NOTE — HPI
Rocco Swain is a 74 year old man with history of SCC of the tongue, nonverbal d/t prior laryngectomy and total glossectomy, postoperative hypothyrodism, HCV cirrhosis and HCC s/p liver transplant in 2008 on Tacrolimus, presenting with sore throat.  Patient was supposed to be scheduled for a chemotherapy infusion today, but a nurse referred him to the ED after he was complaining of generalized fatigue, weakness, and posterior sore throat that has progressively worsened for the last 4 days with associated chills, body aches, and decreased p.o. intake.  He normally eats a liquid diet however his overall fatigue made him skip meals. He notes that he's been constipated for 4 weeks and that stool softeners have provided no relief. Typically has a BM daily or every other day. He lives by himself and perform his ADLs independently.      In the ED, patient initially tachycardic 109, with leukocytosis of 20, Hgb 8.5 (baseline 11-10). Cr 1.7 (baseline 1.4) decreased to 1.2 after 1L IVF in the ED and continuous fluids. AST 52, ALT 19, Tbili 0.4. Chest x-ray with no acute pulmonary process. Patient admitted to Med Onc service and Hepatology was consulted for management of transplant immunosuppression.    Transplant History:  Follows with Dr Anton. Transplanted Nov 2008 for Hep C cirrhosis with HCC, post op was complicated by hepatic vein anastomotic stenosis and resultant ascites in 2010. The ascites did resolve after stenosis dilation, but recurred again in 2013 with findings of restenosis of the middle and right hepatic veins. He was found to have portal hypertension based on portal pressure measurement with an estimated gradient of 15 mmHg done in February 2013. His ascites was controlled with diuretics. He is on prograf for immunosuppression. Liver bx March 2013 showed steatohepatitis of the transplanted organ. Last U/S of liver 2/12/2024 with stable intra/extra hepatic ductal dilation and no evidence of HCC.  Currently on Tacro 1 mg qAM and 0.5 mg qPM.      Prior endoscopies:  EGD 9/14/23:  - Nonobstructing schatzki ring. Dilated with resultant bleeding. Treated with bipolar cautery.   - Scar in the gastric antrum.   - Oozing duodenal ulcers. Injected. Treated with bipolar cautery.   Colonoscopy 9/14/23:  - Old blood throughout colon presumed from duodenal ulcers found on EGD.  No active bleeding. Prep inadequate.

## 2024-04-09 NOTE — SUBJECTIVE & OBJECTIVE
Interval History: .No acute events overnight, afebrile, hemodynamically stable.     Improving BUN/Cr  Still complaining of sore throat/mucitis,     Consulting palliative for pain regimen/methadone initiation        Oncology Treatment Plan:   OP HEAD NECK CETUXIMAB CARBOPLATIN FLUOROURACIL Q3W    Medications:  Continuous Infusions:   sodium chloride 0.9% 150 mL/hr at 04/09/24 0835     Scheduled Meds:   doxycycline  100 mg Oral BID    heparin (porcine)  5,000 Units Subcutaneous Q8H    levothyroxine  150 mcg Oral Before breakfast    polyethylene glycol  17 g Oral Daily    senna-docusate 8.6-50 mg  1 tablet Oral BID    tacrolimus  0.5 mg Oral Daily PM    tacrolimus  1 mg Oral Daily AM    traZODone  50 mg Oral QHS     PRN Meds:(Magic mouthwash) 1:1:1 diphenhydrAMINE(Benadryl) 12.5mg/5ml liq, aluminum & magnesium hydroxide-simethicone (Maalox), LIDOcaine viscous 2%, dextrose 10%, dextrose 10%, glucagon (human recombinant), glucose, glucose, melatonin, naloxone, ondansetron, oxyCODONE, prochlorperazine, sodium chloride 0.9%, tiZANidine     Review of Systems   HENT:  Positive for sore throat.      Objective:     Vital Signs (Most Recent):  Temp: 98.1 °F (36.7 °C) (04/09/24 1055)  Pulse: 100 (04/09/24 1055)  Resp: 18 (04/09/24 1055)  BP: (!) 142/72 (04/09/24 1055)  SpO2: (!) 94 % (04/09/24 1055) Vital Signs (24h Range):  Temp:  [96.3 °F (35.7 °C)-98.6 °F (37 °C)] 98.1 °F (36.7 °C)  Pulse:  [] 100  Resp:  [16-22] 18  SpO2:  [94 %-99 %] 94 %  BP: (118-150)/(65-83) 142/72     Weight: 51.3 kg (113 lb 2.2 oz)  Body mass index is 17.2 kg/m².  Body surface area is 1.57 meters squared.      Intake/Output Summary (Last 24 hours) at 4/9/2024 1129  Last data filed at 4/9/2024 0955  Gross per 24 hour   Intake 2994.9 ml   Output 1300 ml   Net 1694.9 ml        Physical Exam  Vitals and nursing note reviewed.   Constitutional:       General: He is not in acute distress.     Appearance: Normal appearance. He is ill-appearing.   HENT:       Head: Normocephalic and atraumatic.      Nose: Nose normal.      Mouth/Throat:      Comments: Yellow eschar patch at the back of the throat.  Eyes:      General: No scleral icterus.  Neck:      Comments: Trach stoma  Cardiovascular:      Rate and Rhythm: Normal rate and regular rhythm.      Heart sounds: Normal heart sounds. No murmur heard.     No friction rub. No gallop.   Pulmonary:      Effort: Pulmonary effort is normal. No respiratory distress.      Breath sounds: No wheezing, rhonchi or rales.   Abdominal:      General: Abdomen is flat. There is no distension.      Palpations: Abdomen is soft. There is no mass.      Tenderness: There is no abdominal tenderness. There is no guarding.      Hernia: No hernia is present.   Musculoskeletal:         General: Normal range of motion.      Right lower leg: No edema.      Left lower leg: No edema.   Skin:     General: Skin is warm.      Capillary Refill: Capillary refill takes less than 2 seconds.      Coloration: Skin is not jaundiced.      Findings: No bruising or erythema.   Neurological:      General: No focal deficit present.      Mental Status: He is alert and oriented to person, place, and time.          Significant Labs:   CBC:   Recent Labs   Lab 04/08/24  1352 04/09/24  0237   WBC 21.47* 13.13*   HGB 8.5* 7.7*   HCT 24.4* 23.3*    252    and CMP:   Recent Labs   Lab 04/08/24  1352 04/09/24  0237    140   K 4.0 3.4*    108   CO2 24 21*   * 84   BUN 35* 27*   CREATININE 1.7* 1.2   CALCIUM 9.6 8.3*   PROT 7.5 6.0   ALBUMIN 3.3* 2.7*   BILITOT 0.4 0.3   ALKPHOS 98 83   AST 33 23   ALT 15 12   ANIONGAP 13 11       Diagnostic Results:  None

## 2024-04-09 NOTE — RESPIRATORY THERAPY
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  Clinically Significant Surgical Hx: laryngectomy    24 Hours Vitals Range:  Temp:  [96.3 °F (35.7 °C)-98.6 °F (37 °C)]   Pulse:  []   Resp:  [16-22]   BP: (118-150)/(65-83)   SpO2:  [96 %-99 %]     Labs:    Recent Labs     24  1352 24  0237    140   K 4.0 3.4*    108   CO2 24 21*   BUN 35* 27*   CREATININE 1.7* 1.2   * 84   PHOS  --  2.8   MG 1.5* 1.6        No results for input(s): "PH", "PCO2", "PO2", "HCO3", "POCSATURATED", "BE" in the last 72 hours.    ASSESSMENT/INTERVENTIONS  Patient resting comfortably. No respiratory concerns at this time. Laryngectomy airway safety equipment bedside.      Last VS   Temp: 98.4 °F (36.9 °C) (715)  Pulse: 85 (913)  Resp: 16 (913)  BP: 119/65 (715)  SpO2: 98 % (913)      Extra trachs at bedside: NA  Level of Consciousness: Level of Consciousness (AVPU): alert  Respiratory Effort: Respiratory Effort: Normal, Unlabored Expansion/Accessory Muscle Usage: Expansion/Accessory Muscles/Retractions: no use of accessory muscles, expansion symmetric, no retractions  All Lung Field Breath Sounds: All Lung Fields Breath Sounds: Anterior:, Lateral:, clear, equal bilaterally  O2 Device/Concentration: RA  Surgical airway: Yes, laryngectomy,  NA  Ambu at bedside:       Active Orders   Respiratory Care    Routine tracheostomy care     Frequency: BID     " Number of Occurrences: Until Specified    Stoma Care by RT PRN     Frequency: PRN     Number of Occurrences: Until Specified       RECOMMENDATIONS    We recommend: RRT Recs: Continue POC per primary team.      FOLLOW-UP    Please call back the Rapid Response RT, Inessa Bennett RRT at x [phone] for any questions or concerns.

## 2024-04-09 NOTE — NURSING
Nurses Note -- 4 Eyes      4/9/2024   12:51 AM      Skin assessed during: Admit      [x] No Altered Skin Integrity Present    []Prevention Measures Documented      [] Yes- Altered Skin Integrity Present or Discovered   [] LDA Added if Not in Epic (Describe Wound)   [] New Altered Skin Integrity was Present on Admit and Documented in LDA   [] Wound Image Taken    Wound Care Consulted? No    Attending Nurse:  MARCK Brody    Second RN/Staff Member:  MARCK Chowdhury

## 2024-04-09 NOTE — ASSESSMENT & PLAN NOTE
S/p liver transplant, currently on tacrolimus  -continue tacrolimus  - hepatology consulted apprec recs regarding tacro management

## 2024-04-09 NOTE — PROGRESS NOTES
"ENT Tracheostomy Weekly Rounds Note  ENT will perform weekly rounds on adult inpatients who has a tracheostomy tube from prior to admission. If there is a patient that meets criteria, please place an inpatient ENT consult for "trach status" and the patient will be seen on the next available Tuesday.     In brief, Rocco Swain is a 74 y.o. male patient who laryngectomy and total glossectomy for BOT SCC years ago. He is admitted to [facility] onc for weakness since yesterday, on chemo for recurrence.    Medications:  Continuous Infusions:   sodium chloride 0.9% 150 mL/hr at 04/09/24 0835     Scheduled Meds:   dexAMETHasone  1 mg Swish & Spit QID    doxycycline  100 mg Oral BID    heparin (porcine)  5,000 Units Subcutaneous Q8H    levothyroxine  150 mcg Oral Before breakfast    polyethylene glycol  17 g Oral Daily    senna-docusate 8.6-50 mg  1 tablet Oral BID    tacrolimus  0.5 mg Oral Daily PM    tacrolimus  1 mg Oral Daily AM    traZODone  50 mg Oral QHS     PRN Meds:  (Magic mouthwash) 1:1:1 diphenhydrAMINE(Benadryl) 12.5mg/5ml liq, aluminum & magnesium hydroxide-simethicone (Maalox), LIDOcaine viscous 2%, dextrose 10%, dextrose 10%, glucagon (human recombinant), glucose, glucose, melatonin, naloxone, ondansetron, oxyCODONE, prochlorperazine, sodium chloride 0.9%, tiZANidine     Review of patient's allergies indicates:   Allergen Reactions    Aspirin     Tylenol extended release        Past Medical History:   Diagnosis Date    Basal cell carcinoma (BCC) in situ of skin 2012    3 on face, 2 on arm, removed by dermatology.     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 Underwent surgical resection of tongue and " neck. s/p tracheostomy    Squamous cell carcinoma of skin of right temple 01/24/2024    right temple     Past Surgical History:   Procedure Laterality Date    COLONOSCOPY      COLONOSCOPY N/A 9/14/2023    Procedure: COLONOSCOPY;  Surgeon: Reyes Olivia MD;  Location: [facility] ENDO (2ND FLR);  Service: Endoscopy;  Laterality: N/A;    CONJUNCTIVA BIOPSY Right 10/11/2022    Procedure: BIOPSY, CONJUNCTIVA;  Surgeon: Victor M Vasques MD;  Location: [facility];  Service: Ophthalmology;  Laterality: Right;    ESOPHAGOGASTRODUODENOSCOPY N/A 9/14/2023    Procedure: EGD (ESOPHAGOGASTRODUODENOSCOPY);  Surgeon: Reyes Oliiva MD;  Location: [facility] (2ND FLR);  Service: Endoscopy;  Laterality: N/A;    INSERTION OF VENOUS ACCESS PORT Right 3/8/2021    Procedure: INSERTION, VENOUS ACCESS PORT;  Surgeon: Jesus Rendon MD;  [address]  Service: General;  Laterality: Right;    LIVER TRANSPLANT  11/2008    transplanted for biopsy proven hepatocellular carcinoma,     LYMPH NODE BIOPSY N/A 9/18/2020    Procedure: BIOPSY, LYMPH NODE;  Surgeon: [facility] Diagnostic Provider;  [facility];  Service: General;  Laterality: N/A;  Rm 173    SURGICAL REMOVAL OF SCAR Right 8/24/2023    Procedure: EXCISION, SCAR;  Surgeon: Ting Brambila MD;  [facility];  Service: Ophthalmology;  Laterality: Right;    TRACHEOSTOMY       Tobacco Use    Smoking status: Never    Smokeless tobacco: Never   Substance and Sexual Activity    Alcohol use: Yes     Comment: drinks wine, 1 glass day    Drug use: Not Currently    Sexual activity: Yes     Comment: No prior history of STD      Objective:     Vital Signs (Most Recent):  Temp: 98 °F (36.7 °C) (04/09/24 1513)  Pulse: 80 (04/09/24 1513)  Resp: 18 (04/09/24 1513)  BP: (!) 121/59 (04/09/24 1513)  SpO2: 98 % (04/09/24 1513) Vital Signs (24h Range):  Temp:  [97.6 °F (36.4 °C)-98.6 °F (37 °C)] 98 °F (36.7 °C)  Pulse:  [] 80  Resp:  [16-22] 18  SpO2:  [94 %-99 %]  98 %  BP: (118-150)/(59-83) 121/59      Physical Exam:  In no acute distress  Laryngectomy stoma clean and patent, lizet tube clean with HMV     Significant Labs:  All pertinent labs from the last 24 hours have been reviewed.  Significant Diagnostics:  I have reviewed all pertinent imaging results/findings within the past 24 hours.    A/P:  Rocco Swain is a 74 y.o. old male patient who remote history of laryngectomy and total glossectomy. Laryngectomy stoma clean and patent. Patient is not intubateable orally (has not oral airway 2/2 laryngectomy)    -- Routine laryngectomy tube care  -- Bedside supplies: flexible suction caths,   -- clean lizet tube daily   -- Gentle suctioning   -- Humidified oxygen PRN if not using passy claudette speaking valve  -- Patient only seen once a week; please page ENT on-call for urgent laryngectomy issues

## 2024-04-10 ENCOUNTER — ANESTHESIA (OUTPATIENT)
Dept: ENDOSCOPY | Facility: HOSPITAL | Age: 74
DRG: 682 | End: 2024-04-10
Payer: MEDICARE

## 2024-04-10 ENCOUNTER — ANESTHESIA EVENT (OUTPATIENT)
Dept: ENDOSCOPY | Facility: HOSPITAL | Age: 74
DRG: 682 | End: 2024-04-10
Payer: MEDICARE

## 2024-04-10 PROBLEM — K92.1 MELENA: Status: ACTIVE | Noted: 2024-04-10

## 2024-04-10 LAB
ALBUMIN SERPL BCP-MCNC: 2.6 G/DL (ref 3.5–5.2)
ALP SERPL-CCNC: 90 U/L (ref 55–135)
ALT SERPL W/O P-5'-P-CCNC: 12 U/L (ref 10–44)
ANION GAP SERPL CALC-SCNC: 8 MMOL/L (ref 8–16)
AST SERPL-CCNC: 26 U/L (ref 10–40)
BACTERIA THROAT CULT: ABNORMAL
BASOPHILS # BLD AUTO: 0.05 K/UL (ref 0–0.2)
BASOPHILS NFR BLD: 0.3 % (ref 0–1.9)
BILIRUB SERPL-MCNC: 0.3 MG/DL (ref 0.1–1)
BUN SERPL-MCNC: 20 MG/DL (ref 8–23)
CALCIUM SERPL-MCNC: 8.1 MG/DL (ref 8.7–10.5)
CHLORIDE SERPL-SCNC: 108 MMOL/L (ref 95–110)
CO2 SERPL-SCNC: 22 MMOL/L (ref 23–29)
CREAT SERPL-MCNC: 1.1 MG/DL (ref 0.5–1.4)
DIFFERENTIAL METHOD BLD: ABNORMAL
EOSINOPHIL # BLD AUTO: 0 K/UL (ref 0–0.5)
EOSINOPHIL NFR BLD: 0 % (ref 0–8)
ERYTHROCYTE [DISTWIDTH] IN BLOOD BY AUTOMATED COUNT: 14.5 % (ref 11.5–14.5)
EST. GFR  (NO RACE VARIABLE): >60 ML/MIN/1.73 M^2
GLUCOSE SERPL-MCNC: 90 MG/DL (ref 70–110)
HCT VFR BLD AUTO: 23.8 % (ref 40–54)
HGB BLD-MCNC: 7.8 G/DL (ref 14–18)
IMM GRANULOCYTES # BLD AUTO: 0.39 K/UL (ref 0–0.04)
IMM GRANULOCYTES NFR BLD AUTO: 2.4 % (ref 0–0.5)
LYMPHOCYTES # BLD AUTO: 0.8 K/UL (ref 1–4.8)
LYMPHOCYTES NFR BLD: 4.9 % (ref 18–48)
MAGNESIUM SERPL-MCNC: 1.6 MG/DL (ref 1.6–2.6)
MCH RBC QN AUTO: 34.8 PG (ref 27–31)
MCHC RBC AUTO-ENTMCNC: 32.8 G/DL (ref 32–36)
MCV RBC AUTO: 106 FL (ref 82–98)
MONOCYTES # BLD AUTO: 1.1 K/UL (ref 0.3–1)
MONOCYTES NFR BLD: 6.9 % (ref 4–15)
NEUTROPHILS # BLD AUTO: 14.2 K/UL (ref 1.8–7.7)
NEUTROPHILS NFR BLD: 85.5 % (ref 38–73)
NRBC BLD-RTO: 0 /100 WBC
PHOSPHATE SERPL-MCNC: 2.5 MG/DL (ref 2.7–4.5)
PLATELET # BLD AUTO: 273 K/UL (ref 150–450)
PMV BLD AUTO: 9.5 FL (ref 9.2–12.9)
POTASSIUM SERPL-SCNC: 3.7 MMOL/L (ref 3.5–5.1)
PROT SERPL-MCNC: 6 G/DL (ref 6–8.4)
RBC # BLD AUTO: 2.24 M/UL (ref 4.6–6.2)
SODIUM SERPL-SCNC: 138 MMOL/L (ref 136–145)
TACROLIMUS BLD-MCNC: 4.1 NG/ML (ref 5–15)
WBC # BLD AUTO: 16.59 K/UL (ref 3.9–12.7)

## 2024-04-10 PROCEDURE — 25000003 PHARM REV CODE 250: Performed by: NURSE ANESTHETIST, CERTIFIED REGISTERED

## 2024-04-10 PROCEDURE — 43235 EGD DIAGNOSTIC BRUSH WASH: CPT | Mod: ,,, | Performed by: INTERNAL MEDICINE

## 2024-04-10 PROCEDURE — 25000003 PHARM REV CODE 250: Performed by: STUDENT IN AN ORGANIZED HEALTH CARE EDUCATION/TRAINING PROGRAM

## 2024-04-10 PROCEDURE — 63600175 PHARM REV CODE 636 W HCPCS: Performed by: STUDENT IN AN ORGANIZED HEALTH CARE EDUCATION/TRAINING PROGRAM

## 2024-04-10 PROCEDURE — 25000003 PHARM REV CODE 250

## 2024-04-10 PROCEDURE — 20600001 HC STEP DOWN PRIVATE ROOM

## 2024-04-10 PROCEDURE — 63600175 PHARM REV CODE 636 W HCPCS: Performed by: NURSE ANESTHETIST, CERTIFIED REGISTERED

## 2024-04-10 PROCEDURE — 83735 ASSAY OF MAGNESIUM: CPT | Performed by: STUDENT IN AN ORGANIZED HEALTH CARE EDUCATION/TRAINING PROGRAM

## 2024-04-10 PROCEDURE — 36415 COLL VENOUS BLD VENIPUNCTURE: CPT | Performed by: STUDENT IN AN ORGANIZED HEALTH CARE EDUCATION/TRAINING PROGRAM

## 2024-04-10 PROCEDURE — 0DJ08ZZ INSPECTION OF UPPER INTESTINAL TRACT, VIA NATURAL OR ARTIFICIAL OPENING ENDOSCOPIC: ICD-10-PCS | Performed by: INTERNAL MEDICINE

## 2024-04-10 PROCEDURE — 99223 1ST HOSP IP/OBS HIGH 75: CPT | Mod: 25,GC,, | Performed by: INTERNAL MEDICINE

## 2024-04-10 PROCEDURE — 99900035 HC TECH TIME PER 15 MIN (STAT)

## 2024-04-10 PROCEDURE — D9220A PRA ANESTHESIA: Mod: CRNA,,, | Performed by: NURSE ANESTHETIST, CERTIFIED REGISTERED

## 2024-04-10 PROCEDURE — 37000009 HC ANESTHESIA EA ADD 15 MINS: Performed by: INTERNAL MEDICINE

## 2024-04-10 PROCEDURE — 43235 EGD DIAGNOSTIC BRUSH WASH: CPT | Performed by: INTERNAL MEDICINE

## 2024-04-10 PROCEDURE — 80197 ASSAY OF TACROLIMUS: CPT | Performed by: STUDENT IN AN ORGANIZED HEALTH CARE EDUCATION/TRAINING PROGRAM

## 2024-04-10 PROCEDURE — 37000008 HC ANESTHESIA 1ST 15 MINUTES: Performed by: INTERNAL MEDICINE

## 2024-04-10 PROCEDURE — 80053 COMPREHEN METABOLIC PANEL: CPT | Performed by: STUDENT IN AN ORGANIZED HEALTH CARE EDUCATION/TRAINING PROGRAM

## 2024-04-10 PROCEDURE — 94761 N-INVAS EAR/PLS OXIMETRY MLT: CPT

## 2024-04-10 PROCEDURE — 84100 ASSAY OF PHOSPHORUS: CPT | Performed by: STUDENT IN AN ORGANIZED HEALTH CARE EDUCATION/TRAINING PROGRAM

## 2024-04-10 PROCEDURE — 85025 COMPLETE CBC W/AUTO DIFF WBC: CPT | Performed by: STUDENT IN AN ORGANIZED HEALTH CARE EDUCATION/TRAINING PROGRAM

## 2024-04-10 PROCEDURE — C9113 INJ PANTOPRAZOLE SODIUM, VIA: HCPCS | Performed by: STUDENT IN AN ORGANIZED HEALTH CARE EDUCATION/TRAINING PROGRAM

## 2024-04-10 PROCEDURE — D9220A PRA ANESTHESIA: Mod: ANES,,, | Performed by: INTERNAL MEDICINE

## 2024-04-10 PROCEDURE — 63600175 PHARM REV CODE 636 W HCPCS

## 2024-04-10 RX ORDER — SODIUM CHLORIDE 0.9 % (FLUSH) 0.9 %
10 SYRINGE (ML) INJECTION
Status: DISCONTINUED | OUTPATIENT
Start: 2024-04-10 | End: 2024-04-10 | Stop reason: HOSPADM

## 2024-04-10 RX ORDER — FLUOROMETHOLONE 1 MG/ML
1 SUSPENSION/ DROPS OPHTHALMIC 4 TIMES DAILY
Status: DISCONTINUED | OUTPATIENT
Start: 2024-04-10 | End: 2024-04-11 | Stop reason: HOSPADM

## 2024-04-10 RX ORDER — PHENYLEPHRINE HCL IN 0.9% NACL 1 MG/10 ML
SYRINGE (ML) INTRAVENOUS
Status: COMPLETED
Start: 2024-04-10 | End: 2024-04-10

## 2024-04-10 RX ORDER — PANTOPRAZOLE SODIUM 40 MG/1
40 TABLET, DELAYED RELEASE ORAL
Status: DISCONTINUED | OUTPATIENT
Start: 2024-04-10 | End: 2024-04-11 | Stop reason: HOSPADM

## 2024-04-10 RX ORDER — PROPOFOL 10 MG/ML
VIAL (ML) INTRAVENOUS
Status: DISCONTINUED | OUTPATIENT
Start: 2024-04-10 | End: 2024-04-10

## 2024-04-10 RX ORDER — SUCRALFATE 1 G/10ML
1 SUSPENSION ORAL
Status: DISCONTINUED | OUTPATIENT
Start: 2024-04-10 | End: 2024-04-10

## 2024-04-10 RX ORDER — SUCRALFATE 1 G/10ML
1 SUSPENSION ORAL
Status: DISCONTINUED | OUTPATIENT
Start: 2024-04-10 | End: 2024-04-11 | Stop reason: HOSPADM

## 2024-04-10 RX ORDER — PANTOPRAZOLE SODIUM 40 MG/1
40 TABLET, DELAYED RELEASE ORAL
Status: DISCONTINUED | OUTPATIENT
Start: 2024-04-10 | End: 2024-04-10

## 2024-04-10 RX ORDER — PANTOPRAZOLE SODIUM 40 MG/10ML
80 INJECTION, POWDER, LYOPHILIZED, FOR SOLUTION INTRAVENOUS ONCE
Status: COMPLETED | OUTPATIENT
Start: 2024-04-10 | End: 2024-04-10

## 2024-04-10 RX ORDER — PROPOFOL 10 MG/ML
VIAL (ML) INTRAVENOUS CONTINUOUS PRN
Status: DISCONTINUED | OUTPATIENT
Start: 2024-04-10 | End: 2024-04-10

## 2024-04-10 RX ORDER — LIDOCAINE HYDROCHLORIDE 20 MG/ML
INJECTION INTRAVENOUS
Status: DISCONTINUED | OUTPATIENT
Start: 2024-04-10 | End: 2024-04-10

## 2024-04-10 RX ADMIN — PROPOFOL 50 MG: 10 INJECTION, EMULSION INTRAVENOUS at 10:04

## 2024-04-10 RX ADMIN — HEPARIN SODIUM 5000 UNITS: 5000 INJECTION INTRAVENOUS; SUBCUTANEOUS at 01:04

## 2024-04-10 RX ADMIN — LIDOCAINE HYDROCHLORIDE 50 MG: 20 INJECTION INTRAVENOUS at 10:04

## 2024-04-10 RX ADMIN — TACROLIMUS 0.5 MG: 0.5 CAPSULE ORAL at 05:04

## 2024-04-10 RX ADMIN — TACROLIMUS 1 MG: 1 CAPSULE ORAL at 12:04

## 2024-04-10 RX ADMIN — DEXAMETHASONE 1 MG: 0.5 SOLUTION ORAL at 08:04

## 2024-04-10 RX ADMIN — OXYCODONE 5 MG: 5 TABLET ORAL at 04:04

## 2024-04-10 RX ADMIN — LEVOTHYROXINE SODIUM 150 MCG: 100 TABLET ORAL at 06:04

## 2024-04-10 RX ADMIN — PANTOPRAZOLE SODIUM 40 MG: 40 TABLET, DELAYED RELEASE ORAL at 04:04

## 2024-04-10 RX ADMIN — HEPARIN SODIUM 5000 UNITS: 5000 INJECTION INTRAVENOUS; SUBCUTANEOUS at 06:04

## 2024-04-10 RX ADMIN — TRAZODONE HYDROCHLORIDE 50 MG: 50 TABLET ORAL at 08:04

## 2024-04-10 RX ADMIN — DOXYCYCLINE HYCLATE 100 MG: 100 TABLET, COATED ORAL at 08:04

## 2024-04-10 RX ADMIN — SODIUM CHLORIDE: 9 INJECTION, SOLUTION INTRAVENOUS at 10:04

## 2024-04-10 RX ADMIN — HEPARIN SODIUM 5000 UNITS: 5000 INJECTION INTRAVENOUS; SUBCUTANEOUS at 09:04

## 2024-04-10 RX ADMIN — Medication: at 10:04

## 2024-04-10 RX ADMIN — PANTOPRAZOLE SODIUM 8 MG/HR: 40 INJECTION, POWDER, FOR SOLUTION INTRAVENOUS at 06:04

## 2024-04-10 RX ADMIN — FLUOROMETHOLONE 1 DROP: 1 SOLUTION/ DROPS OPHTHALMIC at 09:04

## 2024-04-10 RX ADMIN — SUCRALFATE 1 G: 1 SUSPENSION ORAL at 04:04

## 2024-04-10 RX ADMIN — DEXAMETHASONE 1 MG: 0.5 SOLUTION ORAL at 04:04

## 2024-04-10 RX ADMIN — FLUOROMETHOLONE 1 DROP: 1 SOLUTION/ DROPS OPHTHALMIC at 04:04

## 2024-04-10 RX ADMIN — DOXYCYCLINE HYCLATE 100 MG: 100 TABLET, COATED ORAL at 12:04

## 2024-04-10 RX ADMIN — SUCRALFATE 1 G: 1 SUSPENSION ORAL at 08:04

## 2024-04-10 RX ADMIN — DEXAMETHASONE 1 MG: 0.5 SOLUTION ORAL at 12:04

## 2024-04-10 RX ADMIN — PROPOFOL 150 MCG/KG/MIN: 10 INJECTION, EMULSION INTRAVENOUS at 10:04

## 2024-04-10 RX ADMIN — PANTOPRAZOLE SODIUM 80 MG: 40 INJECTION, POWDER, FOR SOLUTION INTRAVENOUS at 06:04

## 2024-04-10 RX ADMIN — FLUOROMETHOLONE 1 DROP: 1 SOLUTION/ DROPS OPHTHALMIC at 01:04

## 2024-04-10 RX ADMIN — FLUOROMETHOLONE 1 DROP: 1 SOLUTION/ DROPS OPHTHALMIC at 08:04

## 2024-04-10 NOTE — PLAN OF CARE
Patient AAOx4. Afebrile and VSS on room air. No complaints overnight. Patient is up with assist to the bedside commode, free from fall and injury overnight. Urinal provided and within reach. Bed is locked and in lowest position, non-skid socks on, and call light within reach. Bed alarm set. Patient educated on using the call light before getting out of bed and nods his head in understanding. Communicates with white board. Plan of care reviewed and is ongoing. Patient expresses no other needs at this time.

## 2024-04-10 NOTE — PROGRESS NOTES
Profoundly hypotensive on arrival to pacu. Pt placed in trendelenburg and anesthesia at bedside. No further interventions required and pt trending to normotensive. Denies pain/nausea. Previous laryngectomy noted. POC reviewed and understanding verbalized. Family updated via text

## 2024-04-10 NOTE — ASSESSMENT & PLAN NOTE
75 y/o M with history of SSC of the base of the tongue, 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 Patient had ANGELES on October PETCT, but then developed quickly progressing orbital lesion. Follows Dr. Hernandez, should be on cetuximab    Plan:  - prn pain medications (oxy 5 mg, tizanidine)   - no indication for inpatient chemo at this time.

## 2024-04-10 NOTE — ASSESSMENT & PLAN NOTE
Known hx of liver transplant with cirrhosis, followed by [facility] hepatology,     see A&P of cirrhosis

## 2024-04-10 NOTE — PROGRESS NOTES
[facility] ([facility])  Hematology/Oncology  Progress Note    Patient Name: Rocco Swani  Admission Date: 4/8/2024  Hospital Length of Stay: 2 days  Code Status: Full Code     Subjective:     HPI:  75 y/o F with SCC of the tongue (follows Dr. 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  Patient was supposed to be scheduled for a chemotherapy infusion today however he was complaining overall fatigue, generalized weakness, worsening sore throat.  His plan of care nurse recommended him to go to the ED for further evaluation.  Describes as a soreness in his posterior throat that has been progressively worsened for the last 4 days.  He does report of decreased p.o. intake.  He normally eats a liquid diet however his overall fatigue made him skip meals.  Reports of associated chills, body aches.  No sick contact.  He lives by himself and perform his ADLs independently. Similar symptoms after receiving infusion in the past.       Initial vitals in the ED: Patient was tachycardic 109, leukocytosis of 20, hemoglobin 8 5 (BL 11-10), CMP significant for BUN/Cr 35/1.7 (baseline 1.5 - 0.9),  chest x-ray with no acute pulmonary process. Patient received 1L in the ED, now on continuous fluids.        Oncology History   Squamous cell carcinoma of base of tongue   9/18/2020 Cancer Staged     Staging form: Pharynx - HPV-Mediated Oropharynx, AJCC 8th Edition  - Clinical stage from 9/18/2020: Stage III (rcT4, cN1, cM0, p16+)      9/28/2020 Initial Diagnosis     Squamous cell carcinoma of base of tongue      10/6/2020 - 8/17/2021 Chemotherapy     Treatment Summary   Plan Name: OP HEAD NECK CARBOPLATIN PACLITAXEL C1-2 FOLLOWED BY CETUXIMAB CARBOPLATIN C3-6 FOLLOWED BY CETUXIMAB MAINTENANCE WEEKLY  Treatment Goal: Control  Status: Inactive  Start  Date: 10/6/2020  End Date: 8/17/2021  Provider: Ronald Berg MD  Chemotherapy: cetuximab (ERBITUX) 100 mg/50 mL chemo infusion 684 mg, 400 mg/m2 = 684 mg (100 % of original dose 400 mg/m2), Intravenous, Clinic/[facility] 1 time, 29 of 38 cycles  Dose modification: 500 mg/m2 (original dose 400 mg/m2, Cycle 3), 250 mg/m2 (original dose 400 mg/m2, Cycle 3), 400 mg/m2 (original dose 400 mg/m2, Cycle 3), 250 mg/m2 (original dose 250 mg/m2, Cycle 7), 200 mg/m2 (original dose 250 mg/m2, Cycle 18), 200 mg/m2 (original dose 250 mg/m2, Cycle 19), 250 mg/m2 (original dose 250 mg/m2, Cycle 4), 200 mg/m2 (original dose 250 mg/m2, Cycle 25), 200 mg/m2 (original dose 250 mg/m2, Cycle 28)  Administration: 684 mg (11/17/2020), 400 mg (11/24/2020), 400 mg (2/9/2021), 400 mg (2/17/2021), 427.6 mg (3/9/2021), 400 mg (3/30/2021), 400 mg (3/23/2021), 400 mg (4/6/2021), 427.6 mg (4/13/2021), 427.6 mg (4/20/2021), 342 mg (5/11/2021), 342 mg (5/18/2021), 342 mg (5/25/2021), 400 mg (5/31/2021), 400 mg (6/7/2021), 400 mg (6/14/2021), 400 mg (12/8/2020), 427.6 mg (12/29/2020), 400 mg (12/1/2020), 400 mg (12/15/2020), 400 mg (12/22/2020), 427.6 mg (1/5/2021), 400 mg (1/12/2021), 400 mg (1/19/2021), 427.6 mg (1/26/2021), 400 mg (2/2/2021), 400 mg (2/23/2021), 400 mg (3/2/2021), 427.6 mg (3/16/2021), 400 mg (6/21/2021), 342 mg (6/28/2021), 342 mg (7/6/2021), 342 mg (7/13/2021), 342 mg (7/20/2021), 400 mg (7/27/2021), 400 mg (8/10/2021), 400 mg (8/17/2021)  CARBOplatin (PARAPLATIN) 310 mg in sodium chloride 0.9% 500 mL chemo infusion, 310 mg (92.2 % of original dose 334.5 mg), Intravenous, Clinic/[facility] 1 time, 6 of 6 cycles  Dose modification:   (original dose 334.5 mg, Cycle 1)  Administration: 310 mg (10/6/2020), 370 mg (11/17/2020), 300 mg (10/27/2020), 350 mg (12/8/2020), 335 mg (12/29/2020), 320 mg (1/19/2021)  PACLitaxeL (TAXOL) 175 mg/m2 = 300 mg in sodium chloride 0.9% 500 mL chemo infusion, 175 mg/m2 = 300 mg (100 % of original dose 175 mg/m2),  Intravenous, Clinic/[facility] 1 time, 2 of 2 cycles  Dose modification: 175 mg/m2 (original dose 175 mg/m2, Cycle 1)  Administration: 300 mg (10/6/2020), 300 mg (10/27/2020)      4/29/2021 Tumor Conference        His case was discussed at the Multidisciplinary Head and Neck Team Planning Meeting.     Representatives from Medical Oncology, Radiation Oncology, Head and Neck Surgical Oncology, Psychosocial Oncology, and Speech and Language Pathology discussed the case with the following recommendations:     1) biopsy            7/29/2021 Tumor Conference        His case was discussed at the Multidisciplinary Head and Neck Team Planning Meeting.     Representatives from Medical Oncology, Radiation Oncology, Head and Neck Surgical Oncology, Psychosocial Oncology, and Speech and Language Pathology discussed the case with the following recommendations:     1) Head and neck clinic follow up  2) consider Keytruda (discuss with transplant)  3) consider palliative referral            9/13/2021 - 12/29/2023 Chemotherapy     Treatment Summary   Plan Name: OP HEAD NECK PEMBROLIZUMAB CARBOPLATIN FLUOROURACIL (C1 ONLY RECEIVED) FOLLOWED BY PEMBROLIZUMAB MAINTENANCE  Treatment Goal: Palliative  Status: Inactive  Start Date: 9/13/2021  End Date: 12/29/2023  Provider: Ronald Berg MD  Chemotherapy: CARBOplatin (PARAPLATIN) 320 mg in sodium chloride 0.9% 500 mL chemo infusion, 320 mg (100 % of original dose 320.5 mg), Intravenous, Clinic/[facility] 1 time, 6 of 6 cycles  Dose modification:   (original dose 320.5 mg, Cycle 1)  Administration: 320 mg (9/13/2021), 335 mg (12/23/2021), 325 mg (1/27/2022), 245 mg (3/3/2022), 255 mg (5/12/2022), 255 mg (4/7/2022)  fluorouraciL 1,000 mg/m2/day = 6,440 mg in sodium chloride 0.9% 240 mL chemo infusion, 1,000 mg/m2/day = 6,440 mg, Intravenous, Over 96 hours, 6 of 6 cycles  Dose modification: 800 mg/m2/day (original dose 1,000 mg/m2/day, Cycle 6), 5,000 mg (original dose 1,000 mg/m2/day, Cycle  8)  Administration: 6,440 mg (9/13/2021), 6,440 mg (12/23/2021), 6,480 mg (1/27/2022), 5,000 mg (3/3/2022), 5,000 mg (4/7/2022), 5,000 mg (5/12/2022)      Secondary malignant neoplasm of cervical lymph node   2/9/2021 Initial Diagnosis     Secondary malignant neoplasm of cervical lymph node      9/13/2021 - 12/29/2023 Chemotherapy     Treatment Summary   Plan Name: OP HEAD NECK PEMBROLIZUMAB CARBOPLATIN FLUOROURACIL (C1 ONLY RECEIVED) FOLLOWED BY PEMBROLIZUMAB MAINTENANCE  Treatment Goal: Palliative  Status: Inactive  Start Date: 9/13/2021  End Date: 12/29/2023  Provider: Ronald Berg MD  Chemotherapy: CARBOplatin (PARAPLATIN) 320 mg in sodium chloride 0.9% 500 mL chemo infusion, 320 mg (100 % of original dose 320.5 mg), Intravenous, Clinic/\[facility]\"" 1 time, 6 of 6 cycles  Dose modification:   (original dose 320.5 mg, Cycle 1)  Administration: 320 mg (9/13/2021), 335 mg (12/23/2021), 325 mg (1/27/2022), 245 mg (3/3/2022), 255 mg (5/12/2022), 255 mg (4/7/2022)  fluorouraciL 1,000 mg/m2/day = 6,440 mg in sodium chloride 0.9% 240 mL chemo infusion, 1,000 mg/m2/day = 6,440 mg, Intravenous, Over 96 hours, 6 of 6 cycles  Dose modification: 800 mg/m2/day (original dose 1,000 mg/m2/day, Cycle 6), 5,000 mg (original dose 1,000 mg/m2/day, Cycle 8)  Administration: 6,440 mg (9/13/2021), 6,440 mg (12/23/2021), 6,480 mg (1/27/2022), 5,000 mg (3/3/2022), 5,000 mg (4/7/2022), 5,000 mg (5/12/2022)      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   1/29/2024 Initial Diagnosis     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      2/19/2024 -  Chemotherapy     Treatment Summary   Plan Name: OP HEAD NECK CETUXIMAB CARBOPLATIN FLUOROURACIL Q3W  Treatment Goal: Curative  Status: Active  Start Date: 2/19/2024  End Date: 8/5/2024 (Planned)  Provider: Martín Hernandez MD  Chemotherapy: cetuximab (ERBITUX) 100 mg/50 mL chemo infusion 668 mg, 400 mg/m2 = 668 mg, Intravenous, Clinic/[facility] 1 time, 2 of 12 cycles  Administration: 668 mg (2/19/2024), 400 mg  (2/26/2024), 400 mg (3/18/2024), 400 mg (3/25/2024), 400 mg (3/11/2024)  CARBOplatin (PARAPLATIN) 295 mg in sodium chloride 0.9% 314.5 mL chemo infusion, 295 mg, Intravenous, Clinic/[facility] 1 time, 2 of 6 cycles  Administration: 295 mg (2/19/2024), 300 mg (3/18/2024)       Interval History: Overnight patient experienced melanotic stools so GI consulted. EGD done 4/10 with severe diffuse esophagitis. Recommending PPI + sucralfate. At the bedside patient referred feeling relatively well    Oncology Treatment Plan:   OP HEAD NECK CETUXIMAB CARBOPLATIN FLUOROURACIL Q3W    Medications:  Continuous Infusions:  Scheduled Meds:   dexAMETHasone  1 mg Swish & Spit QID    doxycycline  100 mg Oral BID    fluorometholone 0.1%  1 drop Right Eye QID    heparin (porcine)  5,000 Units Subcutaneous Q8H    levothyroxine  150 mcg Oral Before breakfast    pantoprazole  40 mg Oral BID AC    polyethylene glycol  17 g Oral Daily    senna-docusate 8.6-50 mg  1 tablet Oral BID    sucralfate  1 g Oral QID (AC & HS)    tacrolimus  0.5 mg Oral Daily PM    tacrolimus  1 mg Oral Daily AM    traZODone  50 mg Oral QHS     PRN Meds:(Magic mouthwash) 1:1:1 diphenhydrAMINE(Benadryl) 12.5mg/5ml liq, aluminum & magnesium hydroxide-simethicone (Maalox), LIDOcaine viscous 2%, dextrose 10%, dextrose 10%, glucagon (human recombinant), glucose, glucose, melatonin, naloxone, ondansetron, oxyCODONE, prochlorperazine, sodium chloride 0.9%, tiZANidine     Review of Systems   Constitutional:  Positive for fatigue. Negative for chills and fever.   HENT:  Positive for sore throat. Negative for congestion.    Eyes:  Negative for visual disturbance.   Respiratory:  Negative for cough, shortness of breath and wheezing.    Cardiovascular:  Negative for chest pain, palpitations and leg swelling.   Gastrointestinal:  Negative for abdominal pain, diarrhea, nausea and vomiting.   Endocrine: Negative for polyuria.   Genitourinary:  Negative for dysuria, flank pain and  frequency.   Musculoskeletal:  Negative for arthralgias, back pain and myalgias.   Skin:  Negative for pallor and rash.   Neurological:  Positive for light-headedness. Negative for headaches.     Objective:     Vital Signs (Most Recent):  Temp: 97.2 °F (36.2 °C) (04/10/24 1130)  Pulse: 68 (04/10/24 1130)  Resp: 17 (04/10/24 1130)  BP: 134/79 (04/10/24 1130)  SpO2: 100 % (04/10/24 1130) Vital Signs (24h Range):  Temp:  [97.2 °F (36.2 °C)-98.2 °F (36.8 °C)] 97.2 °F (36.2 °C)  Pulse:  [66-88] 68  Resp:  [13-20] 17  SpO2:  [98 %-100 %] 100 %  BP: ()/(45-80) 134/79     Weight: 51.3 kg (113 lb 2.2 oz)  Body mass index is 17.2 kg/m².  Body surface area is 1.57 meters squared.      Intake/Output Summary (Last 24 hours) at 4/10/2024 1356  Last data filed at 4/10/2024 1027  Gross per 24 hour   Intake 402.01 ml   Output 750 ml   Net -347.99 ml        Physical Exam  Vitals and nursing note reviewed.   Constitutional:       General: He is not in acute distress.     Appearance: He is ill-appearing.   Neck:      Comments: Laryngectomy  Cardiovascular:      Rate and Rhythm: Normal rate and regular rhythm.   Pulmonary:      Effort: Pulmonary effort is normal.      Breath sounds: Normal breath sounds.   Abdominal:      General: Abdomen is flat. There is no distension.      Tenderness: There is no abdominal tenderness. There is no guarding.      Comments: Melanotic stool   Skin:     Coloration: Skin is pale.   Neurological:      General: No focal deficit present.      Mental Status: He is alert.          Significant Labs:   All pertinent labs from the last 24 hours have been reviewed.    Diagnostic Results:  I have reviewed all pertinent imaging results/findings within the past 24 hours.  Assessment/Plan:     * Acute kidney injury superimposed on chronic kidney disease  75 y/o M with history of SCC with poor PO intake 2/2 to throat pain, now with TSERING on CKD likely 2/2 to pre renal    Recent Labs     04/08/24  1352 04/09/24  0237  04/10/24  0502   BUN 35* 27* 20   CREATININE 1.7* 1.2 1.1         Plan:   - continuous fluids 1 day  - Avoid nephrotoxic agents such as NSAIDs, gadolinium and IV radiocontrast.  - Renally dose meds to current GFR.  - Maintain MAP > 65.        Melena  -experienced melena 4/9 overnight  -EGD done 4/10 with evidence of Noted severe esophagitis and gastric/duodenal ulcers without stigmata   -plan for PO PPI for 12 weeks and sucralfate for a month followed by a repeat EGD     Sore throat  Exudates in the posterior throat, throat pain x4 days likely 2/2 to mucositis from 5FU    Results  - negative for COVID/FLU/RSV    Plan  - magic mouth wash  - dex mouth wash  - pending palliative recs on starting methadone     Chronic kidney disease (CKD), stage IV (severe)  See A&P of TSERING on CKD    Squamous cell carcinoma of base of tongue  73 y/o M with history of SSC of the base of the tongue, 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 Patient had ANGELES on October PETCT, but then developed quickly progressing orbital lesion. Follows Dr. Hernandez, should be on cetuximab    Plan:  - prn pain medications (oxy 5 mg, tizanidine)   - no indication for inpatient chemo at this time.     Status post liver transplantation - 2008  S/p liver transplant, currently on tacrolimus  -continue tacrolimus  - hepatology consulted apprec recs regarding tacro management       Postoperative hypothyroidism  - continue home levothyroxine 150 mg    Hepatitis C  Known hx of liver transplant with cirrhosis, followed by [facility] hepatology,     see A&P of cirrhosis                Jayy Schuster MD  Hematology/Oncology  [facility] Oncology ([facility])

## 2024-04-10 NOTE — TREATMENT PLAN
Brief GI treatment plan    Findings on EGD today:                         - Severe erosive esophagitis.                          - Benign-appearing esophageal stenosis.                          - Closed previous gastrostomy present                          characterized by healthy appearing mucosa.                          - Non-bleeding gastric ulcers with a flat                          pigmented spot (Reid Class IIc).                          - Duodenitis.                          - Normal second portion of the duodenum.                          - No specimens collected.     Recommendations:  PPI BID for 12 weeks  Carafate QID for 1 month  Please order H pylori serology today (If positive, treat with bismuth quadruple therapy).   Ordered repeat scope in 12 weeks.     We will sign off.     Kannan Hawkins MD  PGY-V, Gastroenterology & Hepatology

## 2024-04-10 NOTE — RESPIRATORY THERAPY
"RAPID RESPONSE RESPIRATORY THERAPY PROACTIVE NOTE           Time of visit: 1020     Code Status: Full Code   : 1950  Bed: 822/822 A:   MRN: [id number]  Time spent at the bedside: < 15 min    SITUATION    Evaluated patient for: LDA Check     BACKGROUND    Patient has a past medical history of Basal cell carcinoma (BCC) in situ of skin, Basal cell carcinoma (BCC) of neck, Hepatitis C, chronic, Hypothyroidism, Larynx cancer, Liver transplanted, Lumbar disc disease, 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  Clinically Significant Surgical Hx: laryngectomy    24 Hours Vitals Range:  Temp:  [97.2 °F (36.2 °C)-98.2 °F (36.8 °C)]   Pulse:  [66-88]   Resp:  [13-20]   BP: ()/(45-80)   SpO2:  [98 %-100 %]     Labs:    Recent Labs     24  1352 24  0237 04/10/24  0502    140 138   K 4.0 3.4* 3.7    108 108   CO2 24 21* 22*   BUN 35* 27* 20   CREATININE 1.7* 1.2 1.1   * 84 90   PHOS  --  2.8 2.5*   MG 1.5* 1.6 1.6        No results for input(s): "PH", "PCO2", "PO2", "HCO3", "POCSATURATED", "BE" in the last 72 hours.    ASSESSMENT/INTERVENTIONS  Pt resting comfortably with no respiratory interventions required        Last VS   Temp: 97.2 °F (36.2 °C) (04/10 1130)  Pulse: 68 (04/10 1130)  Resp: 17 (04/10 1130)  BP: 134/79 (04/10 1130)  SpO2: 100 % (04/10 1130)      Extra trachs at bedside: Y  Level of Consciousness: Level of Consciousness (AVPU): responds to voice  Respiratory Effort: Respiratory Effort: Normal Expansion/Accessory Muscle Usage: Expansion/Accessory Muscles/Retractions: no use of accessory muscles, no retractions, expansion symmetric  All Lung Field Breath Sounds: All Lung Fields Breath Sounds: Anterior:, diminished  O2 Device/Concentration: R.A.  Surgical airway: Yes, laryngectomy,   Ambu at bedside:       Active Orders   Respiratory Care    Oxygen Continuous     Frequency: Continuous     Number of Occurrences: " Until Specified     Order Comments: Discontinue when Sp02 is greater than or equal to 95% of equal to Preop Sp02       Order Questions:      Device type: Low flow      Device: Simple Face Mask      Titrate O2 per Oxygen Titration Protocol: Yes      Notify MD of: Inability to achieve desired SpO2    Pulse Oximetry Continuous     Frequency: Continuous     Number of Occurrences: Until Specified    Routine tracheostomy care     Frequency: BID     Number of Occurrences: Until Specified    Stoma Care by RT PRN     Frequency: PRN     Number of Occurrences: Until Specified       RECOMMENDATIONS    We recommend: RRT Recs: Continue POC per primary team.      FOLLOW-UP    Please call back the Rapid Response RT, Luis Leigh RRT at x [phone] for any questions or concerns.

## 2024-04-10 NOTE — ASSESSMENT & PLAN NOTE
74 M with orbital SCC and history of recurrent SCC base of tongue. 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 He has developed significant mucositis from 5-FU on Extreme regimen and is admitted with pain and poor po intake/TSERING.     Advance Care Planning     Medicolegal  Decision-making:   -Pt does have decision-making capacity  -Pt has appointed a HCPOA.  -Marital status: unmarried  -Children: none    Advance care planning/Advance directives:  -The patient has previously engaged in advance care planning  -Living will, HCPOA reviewed  -Pt has scanned advance directives in [employer]    Psychosocial  Support system:  -Spiritual: yes;  Patient is part of a particular fernandez background: Hinduism  The palliative care  will be consulted to assess the patient.  -Family: brother Ollie supports    Health status prior to this hospitalization  -Functional status: good.  Pt was able to manage ADLs  -Cognitive status: good   -QOL: good prior to admit    Prognosis:  -Time and potential for recovery: poor given disease progression, weight loss, and malnutrition    GOC Discussion:   What is most important right now is to focus on remaining as independent as possible, symptom/pain control. Accordingly, we have decided that the best plan to meet the patient's goals includes continuing with treatment.       Active symptoms  -Pain: well controlled on current regimen of oxy 5 mg po prn (avg x1 day)  -Constipation: none; on bowel regimen  -Dyspnea none  -Anxiety: n/a  -Depression: n/a    Summary/Recommendation:  -Most important goals at this time: improvement in condition but with limits to invasive therapies  -no need for long acting for now as pt feels comfortable on ~oxy 5 mg x 1 per day; open to long actings in the future  -Code status: FC for now  -Most appropriate disposition: likely d/c home with plans to return for surgery at the end of the month  -has follow up 5/7 with  pall med and supportive care

## 2024-04-10 NOTE — INTERVAL H&P NOTE
The patient has been examined and the H&P has been reviewed:    Pre-Procedure H and P Addendum    Patient seen and examined.  History and exam unchanged from prior history and physical.      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 page 91 of 3/25/2020 outside records clinic (media tab).  Initially presented 8/2015 with left sided odynophagia.  Treated for reflux, which did not improve.    12/11/15 he had a fiberoptic exam showing discolored mucosal area of the left base of the tongue.    1/13/16 he had persistent dysphagia, odynophagia, and new left otalgia.  MRI showed an irregular mass along the left posterior margin of the base of the tongue measuring 1.8x2.3cm with ipsilateral level 2 lymph node measuring 1.7cm with central necrosis and contralateral level 2 node measuring 1.3cm with questionable necrosis.    1/19/2016 he had direct laryngoscopy with biopsy left of midline, proximal to vallecula, which was p16 positive, invasive, with moderately to poorly differentiated sq.c.c. with basaloid features.    1/29/16 met with medical oncology who recommended chemoxrt.    2/1/16  staging PET CT showed hypermetabolic mass at base of tongue 4x2.5x3.5cm, max SUV 24.13, hypermetabolic lymph node left neck SUV 6.77, small subcentimeter lymph node right neck max SUV 3.79 (exact size  of lymph nodes not mentioned in the summary).    2/15/16 started chemoxrt to left tongue base, bilateral cervical neck levels 1b-5, treated with 6MV photons utilizing IMRT, 5000 cGy in 200 cGy daily fractions.  Cone down boost to left base of tongue with additional 2000 cGy in 200 cGy daily fractions to gross disease.  Received weekly cisplatin with radiation but had to switch to carboplatin due to worsening renal function. during this time also missed several weekly treatments of cisplatin.    4/1/2016 completed chemoxrt.   11/1/2016 Repeat biopsy of left tongue showed persistent disease.  S/p salvage glossectomy with laryngectomy,  rT4aN0, p16 positive, base of tongue squamous cell carcinoma.     page 61 of 3/25/2020 outside records clinic (media tab)  Pathology: Tonsil, left tonsil fold: reactive squamous mucosa with inflammation and degenerating skeletal muscle, no carcinoma seen.  Right and Left base of tongue:  Reactive squamous mucosa and submucosa with inflammation, no carcinoma seen.  Neck, glossectomy, laryngectomy, bilateral neck dissection:  invasive squamous cell carcinoma.  tumor site: base of tongue/hypopharynx.  Small focus of tumor is seen within the hyoid  bone.  specimen Size [id number]  tumor size [id number]  depth of invasion 20mm  TNM stage pT4a, pN0, pMx  Lymph nodes, included in all parts:  number involved 0  number examined 3.  distance of tumor from margins  positive inked margins: none  within 1mm : none  within 1-2mm: anterior-inferior    Bilateral neck dissection:  level 1: one lymph node and submandibular gland, negative for tumor  level II : two lymph nodes, negative for tumor  level III: not identified  level IV: not identified  level V: not identified.    6/20/17 - PETCT with FDG avidity of left neck lymph node, level 3, SUV 2.9.  No other evidence of local or distant disease.  7/6/2017 - biopsy of left neck lymph node with IR.  Sample was non-diagnostic. Notes from path report: Less  than optimal scan specimen with minute tissue core of stromal fibroadipose tissue.  definitive tati background is not seen.  9/28/2017 - Repeat PET CT showing mild activity SUV 3.5 (increased from 3.2 previously; size 7.8x7.4x9.5mm).  Also new findings under left pectoralis minor (mild focal increased activity, indeterminate but new since previous exam)  3/22/2018 - CT neck shows no evidence of disease and level 2b lymph node decreased to 4.9mmx4.5mmx4.9mm from 7.8x7.4x9.5mm previously.    Seen by Dr. Myers 8/3/2020 for left-sided neck pain and a swelling in left level 2 several weeks ago.  He feels it arose after he began lifting weights.  CT neck 8/5/2020 : Lymph nodes At the left neck level L2 there is a heterogeneous soft tissue density with central hypoattenuation likely reflecting a necrotic lymph node measuring 1.6 x 1.4 cm in transverse dimension, 2.5 cm in craniocaudal dimension.  There is a similar appearing irregular soft tissue density at the right neck level 2 measuring 1.1 x 9.0 cm in transverse dimension and 1.5 cm craniocaudal dimension.  No other abnormal appearing or enlarged lymph nodes found.  Impression: At level 2 within the neck bilaterally, there are  soft tissue density masses with central hypoattenuation likely reflective of necrotic lymph nodes.  Findings are suspicious for malignancy recurrence.  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  Staging PET CT 9/30/2020 with left sided hypermetabolic node and right sided enlarged node without uptake.  Also has mandibular vestibule uptake, which may be another site of disease. Discussed at tumor board.  He is not a surgical or radiation candidate.  -Started on PCC 10/6/2020 followed by maintenance cetuximab until 8/24/21 (discontinued due to progression of disease; continued increase in SUV uptake, no new lesions).  9/2021 started on carbo/5-FU/pembrolizumab; due to toxicity went to  pembrolizumab monotherapy starting with cycle 2.  Progression of disease noted after cycle 3 so added chemotherapy back in with cycle 4.    Has been on maintenance pembrolizumab since cycle 9.      Status post liver transplantation - 2008 [Z94.4]  Not Applicable    Hepatitis C [B19.20]  Yes    Postoperative hypothyroidism [E89.0]  Yes    History of laryngectomy [Z90.02]  Not Applicable      Resolved Hospital Problems   No resolved problems to display.

## 2024-04-10 NOTE — ASSESSMENT & PLAN NOTE
74-year-old male with SCC of the tongue (follows Dr. Hernandez currently on Cetuximab and carboplatin) nonverbal d/t prior laryngectomy and total glossectomy, post-operative hypothyrodism, and hepatitis C liver cirrhosis s/p liver transplantation 2008 on tacrolimus presented to [facility] ED on 4/8/24 for a sore throat who was found to have TSERING on CKD IV.  GI was consulted for melanotic stool.  Denies NSAID, pepto bismol, and anticoagulant use.    HDS with a Hgb of 7.8.  Labs notable for WBC 17 with bandemia.  Throat and respiratory cultures are growing GNRs.  Received 80mg IV pantoprazole.    EGD 9/2023 performed for JIGNESH showed a non-obstructing Schatzki ring which was dilated with resultant bleeding.  This was treated with bipolar cautery.  Also seen was a scar in the gastric antrum and oozing duodenal ulcers.  The ulcers were injected and treated with bipolar cautery.  Colonoscopy 9/2023 performed for hematochezia and melena showed old blood in the entire examined colon.    Suspect melena is due to recurrent duodenal ulcers.  Patient is amenable to EGD.    Recommendations:  - Plan for EGD today, Wednesday 4/10/24  - Please keep NPO  - Trend Hgb q12h. Transfuse for Hgb < 7, unless otherwise indicated  - Maintain IV access with 2 large bore IVs  - Intravascular resuscitation/support with IVF   - Hold all NSAIDs and anticoagulants, unless contraindicated  - Continue IV pantoprazole 40mg BID starting 4/11  - Please correct any coagulopathy with platelets and FFP for goal of platelets > 50K and INR < 2.0  - Please notify the GI team if there is significant change in the patient's clinical status

## 2024-04-10 NOTE — PROGRESS NOTES
SW attempted to meet with patient to complete Psychosocial Assessment; however the patient ws in a procedure. SW will continue to follow.     VIN España, SW  Oncology Social Worker   [facility] - Oncology  [phone]

## 2024-04-10 NOTE — TRANSFER OF CARE
"Anesthesia Transfer of Care Note    Patient: Rocco Swain    Procedure(s) Performed: Procedure(s) (LRB):  EGD (ESOPHAGOGASTRODUODENOSCOPY) (N/A)    Patient location: PACU    Anesthesia Type: general    Transport from OR: Transported from OR on room air with adequate spontaneous ventilation    Post pain: adequate analgesia    Post assessment: no apparent anesthetic complications and tolerated procedure well    Post vital signs: stable    Level of consciousness: awake, alert and oriented    Nausea/Vomiting: no nausea/vomiting    Complications: none    Transfer of care protocol was followed      Last vitals: Visit Vitals  BP (!) 82/62 (Patient Position: Lying)   Pulse 70   Temp 36.5 °C (97.7 °F) (Temporal)   Resp 16   Ht 5' 8" (1.727 m)   Wt 51.3 kg (113 lb 2.2 oz)   SpO2 98%   BMI 17.20 kg/m²     "

## 2024-04-10 NOTE — SUBJECTIVE & OBJECTIVE
Past Medical History:   Diagnosis Date    Basal cell carcinoma (BCC) in situ of skin 2012    3 on face, 2 on arm, removed by dermatology.     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 Underwent surgical resection of tongue and neck. s/p tracheostomy    Squamous cell carcinoma of skin of right temple 01/24/2024    right temple       Past Surgical History:   Procedure Laterality Date    COLONOSCOPY      COLONOSCOPY N/A 9/14/2023    Procedure: COLONOSCOPY;  Surgeon: Reyes Olivia MD;  Location: [facility] ([address] [place] [address]);  Service: Endoscopy;  Laterality: N/A;    CONJUNCTIVA BIOPSY Right 10/11/2022    Procedure: BIOPSY, CONJUNCTIVA;  Surgeon: Victor M Vasques MD;  Location: [facility];  Service: Ophthalmology;  Laterality: Right;    ESOPHAGOGASTRODUODENOSCOPY N/A 9/14/2023    Procedure: EGD (ESOPHAGOGASTRODUODENOSCOPY);  Surgeon: Reyes Olivia MD;  Location: [facility] ([address] [place] [address]);  Service: Endoscopy;  Laterality: N/A;    INSERTION OF VENOUS ACCESS PORT Right 3/8/2021    Procedure: INSERTION, VENOUS ACCESS PORT;  Surgeon: Jesus Rendon MD;  Location: [address];  Service: General;  Laterality: Right;    LIVER TRANSPLANT  11/2008    transplanted for biopsy proven hepatocellular carcinoma,     LYMPH NODE BIOPSY N/A 9/18/2020    Procedure: BIOPSY, LYMPH NODE;  Surgeon: Taz Diagnostic Provider;  [address];  Service: General;  Laterality: N/A;  Rm 173    SURGICAL REMOVAL OF SCAR Right 8/24/2023    Procedure: EXCISION, SCAR;  Surgeon: Ting Brambila MD;  Location: [facility] OR;  Service: Ophthalmology;  Laterality: Right;    TRACHEOSTOMY         Review of patient's allergies indicates:    Allergen Reactions    Aspirin     Tylenol extended release      Family History       Problem Relation (Age of Onset)    Dementia Mother          Tobacco Use    Smoking status: Never    Smokeless tobacco: Never   Substance and Sexual Activity    Alcohol use: Yes     Comment: drinks wine, 1 glass day    Drug use: Not Currently    Sexual activity: Yes     Comment: No prior history of STD      Review of Systems   Constitutional:  Positive for fatigue. Negative for chills and fever.   Respiratory:  Negative for cough.    Cardiovascular:  Negative for chest pain.   Gastrointestinal:  Positive for blood in stool. Negative for abdominal distention, abdominal pain, diarrhea, nausea and vomiting.   Skin:  Positive for pallor.     Objective:     Vital Signs (Most Recent):  Temp: 98.2 °F (36.8 °C) (04/10/24 0747)  Pulse: 76 (04/10/24 0747)  Resp: 18 (04/10/24 0747)  BP: 132/72 (04/10/24 0747)  SpO2: 99 % (04/10/24 0747) Vital Signs (24h Range):  Temp:  [97.3 °F (36.3 °C)-98.2 °F (36.8 °C)] 98.2 °F (36.8 °C)  Pulse:  [] 76  Resp:  [16-18] 18  SpO2:  [94 %-100 %] 99 %  BP: (120-142)/(59-77) 132/72     Weight: 51.3 kg (113 lb 2.2 oz) (04/08/24 2253)  Body mass index is 17.2 kg/m².      Intake/Output Summary (Last 24 hours) at 4/10/2024 0828  Last data filed at 4/10/2024 0751  Gross per 24 hour   Intake 202.01 ml   Output 1700 ml   Net -1497.99 ml       Lines/Drains/Airways       Central Venous Catheter Line  Duration                  PowerPort A Cath Single Lumen 03/08/21 1031 right internal jugular 1128 days              Airway  Duration                  Laryngectomy (Do Not Remove) 08/24/23 1145  229 days              Peripheral Intravenous Line  Duration                  Peripheral IV - Single Lumen 04/08/24 1352 20 G Anterior;Right Forearm 1 day                     Physical Exam  Vitals and nursing note reviewed.   Constitutional:       General: He is not in acute distress.     Appearance: He is ill-appearing.   Neck:       Comments: Laryngectomy  Cardiovascular:      Rate and Rhythm: Normal rate and regular rhythm.   Pulmonary:      Effort: Pulmonary effort is normal.      Breath sounds: Normal breath sounds.   Abdominal:      General: Abdomen is flat. There is no distension.      Tenderness: There is no abdominal tenderness. There is no guarding.      Comments: Melanotic stool   Skin:     Coloration: Skin is pale.   Neurological:      General: No focal deficit present.      Mental Status: He is alert.          Significant Labs:  Blood Culture:   Recent Labs   Lab 04/08/24  1640 04/08/24  1641   LABBLOO No Growth to date  No Growth to date No Growth to date  No Growth to date     CBC:   Recent Labs   Lab 04/08/24  1352 04/09/24  0237 04/10/24  0502   WBC 21.47* 13.13* 16.59*   HGB 8.5* 7.7* 7.8*   HCT 24.4* 23.3* 23.8*    252 273     CMP:   Recent Labs   Lab 04/10/24  0502   GLU 90   CALCIUM 8.1*   ALBUMIN 2.6*   PROT 6.0      K 3.7   CO2 22*      BUN 20   CREATININE 1.1   ALKPHOS 90   ALT 12   AST 26   BILITOT 0.3       Significant Imaging:  Imaging results within the past 24 hours have been reviewed.

## 2024-04-10 NOTE — H&P (VIEW-ONLY)
[facility] - Oncology ([facility])  Gastroenterology  Consult Note    Patient Name: Rocco Swain  MRN: [id number]  Admission Date: 4/8/2024  Hospital Length of Stay: 2 days  Code Status: Full Code   Attending Provider: Martín Hernandez MD   Consulting Provider: Sagar Leo MD  Primary Care Physician: Leny, Primary Doctor  Principal Problem:Acute kidney injury superimposed on chronic kidney disease    Inpatient consult to Gastroenterology  Consult performed by: Sagar Leo MD  Consult ordered by: Myrna Billings MD        Subjective:     HPI:  Rocco Swain is a 74-year-old male with SCC of the tongue (follows Dr. Hernandez currently on Cetuximab and carboplatin) nonverbal d/t prior laryngectomy and total glossectomy, post-operative hypothyrodism, and hepatitis C liver cirrhosis s/p liver transplantation 2008 on tacrolimus presented to [facility] ED on 4/8/24 for a sore throat who was found to have TSERING on CKD IV.  GI was consulted for melanotic stool.  The patient states he had a melanotic stool the morning of 4/10.    At the time of consultation, the patient is HDS with a Hgb of 7.8.  Labs notable for WBC 17 with bandemia.  Throat and respiratory cultures are growing GNRs.  Received 80mg IV pantoprazole.    EGD 9/2023 performed for JIGNESH showed a non-obstructing schatzki ring which was dilated with resultant bleeding.  This was treated with bipolar cautery.  Also seen was a scar in the gastric antrum and oozing duodenal ulcers.  The ulcers were injected and treated with bipolar cautery.  No specimens were collected.    Colonoscopy 9/2023 performed for hematochezia and melena showed old blood in the entire examined colon which was presumed to be from duodenal ulcers found on EGD the same day.  No specimens were collected.    Past Medical History:   Diagnosis Date    Basal cell carcinoma (BCC) in situ of skin 2012    3 on face, 2 on arm, removed by dermatology.     Basal cell carcinoma (BCC) of neck 01/24/2024    lower mid  neck    Hepatitis C, chronic 2006    Treated for Hep C x 6 months, normal viral load since 07/2006    Hypothyroidism     Larynx cancer     Liver transplanted     Lumbar disc disease     Squamous cell carcinoma in situ (SCCIS) of tongue 02/2016    Treated with radiation to neck and chemotherapy. Underwent surgical resection of tongue and neck. s/p tracheostomy    Squamous cell carcinoma of skin of right temple 01/24/2024    right temple       Past Surgical History:   Procedure Laterality Date    COLONOSCOPY      COLONOSCOPY N/A 9/14/2023    Procedure: COLONOSCOPY;  Surgeon: Reyes Olivia MD;  Location: [facility] (2ND FLR);  Service: Endoscopy;  Laterality: N/A;    CONJUNCTIVA BIOPSY Right 10/11/2022    Procedure: BIOPSY, CONJUNCTIVA;  Surgeon: Victor M Vasques MD;  Location: [facility];  Service: Ophthalmology;  Laterality: Right;    ESOPHAGOGASTRODUODENOSCOPY N/A 9/14/2023    Procedure: EGD (ESOPHAGOGASTRODUODENOSCOPY);  Surgeon: Reyes Olivia MD;  [facility]  Service: Endoscopy;  Laterality: N/A;    INSERTION OF VENOUS ACCESS PORT Right 3/8/2021    Procedure: INSERTION, VENOUS ACCESS PORT;  Surgeon: Jesus Rendon MD;  [address];  Service: General;  Laterality: Right;    LIVER TRANSPLANT  11/2008    transplanted for biopsy proven hepatocellular carcinoma,     LYMPH NODE BIOPSY N/A 9/18/2020    Procedure: BIOPSY, LYMPH NODE;  Surgeon: Taz Diagnostic Provider;  [address];  Service: General;  Laterality: N/A;  Rm 173    SURGICAL REMOVAL OF SCAR Right 8/24/2023    Procedure: EXCISION, SCAR;  Surgeon: Ting Brambila MD;  Location: [facility] OR;  Service: Ophthalmology;  Laterality: Right;    TRACHEOSTOMY         Review of patient's allergies indicates:   Allergen Reactions    Aspirin     Tylenol extended release      Family History       Problem Relation (Age of Onset)    Dementia Mother          Tobacco Use    Smoking status: Never    Smokeless  tobacco: Never   Substance and Sexual Activity    Alcohol use: Yes     Comment: drinks wine, 1 glass day    Drug use: Not Currently    Sexual activity: Yes     Comment: No prior history of STD      Review of Systems   Constitutional:  Positive for fatigue. Negative for chills and fever.   Respiratory:  Negative for cough.    Cardiovascular:  Negative for chest pain.   Gastrointestinal:  Positive for blood in stool. Negative for abdominal distention, abdominal pain, diarrhea, nausea and vomiting.   Skin:  Positive for pallor.     Objective:     Vital Signs (Most Recent):  Temp: 98.2 °F (36.8 °C) (04/10/24 0747)  Pulse: 76 (04/10/24 0747)  Resp: 18 (04/10/24 0747)  BP: 132/72 (04/10/24 0747)  SpO2: 99 % (04/10/24 0747) Vital Signs (24h Range):  Temp:  [97.3 °F (36.3 °C)-98.2 °F (36.8 °C)] 98.2 °F (36.8 °C)  Pulse:  [] 76  Resp:  [16-18] 18  SpO2:  [94 %-100 %] 99 %  BP: (120-142)/(59-77) 132/72     Weight: 51.3 kg (113 lb 2.2 oz) (04/08/24 2253)  Body mass index is 17.2 kg/m².      Intake/Output Summary (Last 24 hours) at 4/10/2024 0828  Last data filed at 4/10/2024 0751  Gross per 24 hour   Intake 202.01 ml   Output 1700 ml   Net -1497.99 ml       Lines/Drains/Airways       Central Venous Catheter Line  Duration                  PowerPort A Cath Single Lumen 03/08/21 1031 right internal jugular 1128 days              Airway  Duration                  Laryngectomy (Do Not Remove) 08/24/23 1145  229 days              Peripheral Intravenous Line  Duration                  Peripheral IV - Single Lumen 04/08/24 1352 20 G Anterior;Right Forearm 1 day                     Physical Exam  Vitals and nursing note reviewed.   Constitutional:       General: He is not in acute distress.     Appearance: He is ill-appearing.   Neck:      Comments: Laryngectomy  Cardiovascular:      Rate and Rhythm: Normal rate and regular rhythm.   Pulmonary:      Effort: Pulmonary effort is normal.      Breath sounds: Normal breath sounds.    Abdominal:      General: Abdomen is flat. There is no distension.      Tenderness: There is no abdominal tenderness. There is no guarding.      Comments: Melanotic stool   Skin:     Coloration: Skin is pale.   Neurological:      General: No focal deficit present.      Mental Status: He is alert.          Significant Labs:  Blood Culture:   Recent Labs   Lab 04/08/24  1640 04/08/24  1641   LABBLOO No Growth to date  No Growth to date No Growth to date  No Growth to date     CBC:   Recent Labs   Lab 04/08/24  1352 04/09/24  0237 04/10/24  0502   WBC 21.47* 13.13* 16.59*   HGB 8.5* 7.7* 7.8*   HCT 24.4* 23.3* 23.8*    252 273     CMP:   Recent Labs   Lab 04/10/24  0502   GLU 90   CALCIUM 8.1*   ALBUMIN 2.6*   PROT 6.0      K 3.7   CO2 22*      BUN 20   CREATININE 1.1   ALKPHOS 90   ALT 12   AST 26   BILITOT 0.3       Significant Imaging:  Imaging results within the past 24 hours have been reviewed.  Assessment/Plan:     GI  Melena  74-year-old male with SCC of the tongue (follows Dr. Hernandez currently on Cetuximab and carboplatin) nonverbal d/t prior laryngectomy and total glossectomy, post-operative hypothyrodism, and hepatitis C liver cirrhosis s/p liver transplantation 2008 on tacrolimus presented to [facility] ED on 4/8/24 for a sore throat who was found to have TSERING on CKD IV.  GI was consulted for melanotic stool.  Denies NSAID, pepto bismol, and anticoagulant use.    HDS with a Hgb of 7.8.  Labs notable for WBC 17 with bandemia.  Throat and respiratory cultures are growing GNRs.  Received 80mg IV pantoprazole.    EGD 9/2023 performed for JIGNESH showed a non-obstructing Schatzki ring which was dilated with resultant bleeding.  This was treated with bipolar cautery.  Also seen was a scar in the gastric antrum and oozing duodenal ulcers.  The ulcers were injected and treated with bipolar cautery.  Colonoscopy 9/2023 performed for hematochezia and melena showed old blood in the entire examined  colon.    Suspect melena is due to recurrent duodenal ulcers.  Patient is amenable to EGD.    Recommendations:  - Plan for EGD today, Wednesday 4/10/24  - Please keep NPO  - Trend Hgb q12h. Transfuse for Hgb < 7, unless otherwise indicated  - Maintain IV access with 2 large bore IVs  - Intravascular resuscitation/support with IVF   - Hold all NSAIDs and anticoagulants, unless contraindicated  - Continue IV pantoprazole 40mg BID starting 4/11  - Please correct any coagulopathy with platelets and FFP for goal of platelets > 50K and INR < 2.0  - Please notify the GI team if there is significant change in the patient's clinical status         Thank you for your consult. I will follow-up with patient. Please contact us if you have any additional questions.    Sagar Leo MD  Gastroenterology  [facility] - On[facility] ([facility])

## 2024-04-10 NOTE — NURSING TRANSFER
Nursing Transfer Note      4/10/2024   11:25 AM    Nurse giving handoff:CARA Song RN  Nurse receiving handoff:MARCK Cruz    Reason patient is being transferred: per md order    Transfer To: 822    Transfer via stretcher    Transfer with respiratory supplies/white board    Transported by pct    Any special needs or follow-up needed: n/a    Patient belongings transferred with patient:  n/a    Chart send with patient: Yes    Notified: family

## 2024-04-10 NOTE — ASSESSMENT & PLAN NOTE
Exudates in the posterior throat, throat pain x4 days likely 2/2 to mucositis from 5FU    Results  - negative for COVID/FLU/RSV    Plan  - magic mouth wash  - dex mouth wash  - pending palliative recs on starting methadone

## 2024-04-10 NOTE — PROVATION PATIENT INSTRUCTIONS
Discharge Summary/Instructions after an Endoscopic Procedure  Patient Name: Rocco Swain  Patient MRN: [id number]  Patient YOB: 1950  Wednesday, April 10, 2024  Reyes Pagan MD  Dear patient,  As a result of recent federal legislation (The Federal Cures Act), you may   receive lab or pathology results from your procedure in your [facility]   account before your physician is able to contact you. Your physician or   their representative will relay the results to you with their   recommendations at their soonest availability.  Thank you,  RESTRICTIONS:  During your procedure today, you received medications for sedation.  These   medications may affect your judgment, balance and coordination.  Therefore,   for 24 hours, you have the following restrictions:   - DO NOT drive a car, operate machinery, make legal/financial decisions,   sign important papers or drink alcohol.    ACTIVITY:  Today: no heavy lifting, straining or running due to procedural   sedation/anesthesia.  The following day: return to full activity including work.  DIET:  Eat and drink normally unless instructed otherwise.     TREATMENT FOR COMMON SIDE EFFECTS:  - Mild abdominal pain, nausea, belching, bloating or excessive gas:  rest,   eat lightly and use a heating pad.  - Sore Throat: treat with throat lozenges and/or gargle with warm salt   water.  - Because air was used during the procedure, expelling large amounts of air   from your rectum or belching is normal.  - If a bowel prep was taken, you may not have a bowel movement for 1-3 days.    This is normal.  SYMPTOMS TO WATCH FOR AND REPORT TO YOUR PHYSICIAN:  1. Abdominal pain or bloating, other than gas cramps.  2. Chest pain.  3. Back pain.  4. Signs of infection such as: chills or fever occurring within 24 hours   after the procedure.  5. Rectal bleeding, which would show as bright red, maroon, or black stools.   (A tablespoon of blood from the rectum is not serious, especially  if   hemorrhoids are present.)  6. Vomiting.  7. Weakness or dizziness.  GO DIRECTLY TO THE NEAREST EMERGENCY ROOM IF YOU HAVE ANY OF THE FOLLOWING:      Difficulty breathing              Chills and/or fever over 101 F   Persistent vomiting and/or vomiting blood   Severe abdominal pain   Severe chest pain   Black, tarry stools   Bleeding- more than one tablespoon   Any other symptom or condition that you feel may need urgent attention  Your doctor recommends these additional instructions:  If any biopsies were taken, your doctors clinic will contact you in 1 to 2   weeks with any results.  - Return patient to [facility] for [facility] care.   - Advance diet as tolerated.   - Continue present medications.   - Perform an H. pylori serology.   - Use a proton pump inhibitor PO BID.  For questions, problems or results please call your physician - Reyes Pagan MD at Work:  [phone].  [facility], EMERGENCY ROOM PHONE NUMBER: [phone]  IF A COMPLICATION OR EMERGENCY SITUATION ARISES AND YOU ARE UNABLE TO REACH   YOUR PHYSICIAN - GO DIRECTLY TO THE EMERGENCY ROOM.  Reyes Pagan MD  4/10/2024 12:48:33 PM  This report has been verified and signed electronically.  Dear patient,  As a result of recent federal legislation (The Federal Cures Act), you may   receive lab or pathology results from your procedure in your [facility]   account before your physician is able to contact you. Your physician or   their representative will relay the results to you with their   recommendations at their soonest availability.  Thank you,  PROVATION

## 2024-04-10 NOTE — ASSESSMENT & PLAN NOTE
75 y/o M with history of SCC with poor PO intake 2/2 to throat pain, now with TSERING on CKD likely 2/2 to pre renal    Recent Labs     04/08/24  1352 04/09/24  0237 04/10/24  0502   BUN 35* 27* 20   CREATININE 1.7* 1.2 1.1         Plan:   - continuous fluids 1 day  - Avoid nephrotoxic agents such as NSAIDs, gadolinium and IV radiocontrast.  - Renally dose meds to current GFR.  - Maintain MAP > 65.

## 2024-04-10 NOTE — HPI
Rocco Swain is a 74-year-old male with SCC of the tongue (follows Dr. 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  GI was consulted for melanotic stool.  The patient states he had a melanotic stool the morning of 4/10.    At the time of consultation, the patient is HDS with a Hgb of 7.8.  Labs notable for WBC 17 with bandemia.  Throat and respiratory cultures are growing GNRs.  Received 80mg IV pantoprazole.    EGD 9/2023 performed for JIGNESH showed a non-obstructing schatzki ring which was dilated with resultant bleeding.  This was treated with bipolar cautery.  Also seen was a scar in the gastric antrum and oozing duodenal ulcers.  The ulcers were injected and treated with bipolar cautery.  No specimens were collected.    Colonoscopy 9/2023 performed for hematochezia and melena showed old blood in the entire examined colon which was presumed to be from duodenal ulcers found on EGD the same day.  No specimens were collected.

## 2024-04-10 NOTE — PLAN OF CARE
White board in use for communication needs along with yes/no head gestures.  Self care for trach.  EGD complete and resulted. New medications added based on results.  Explained new medications to patient, verbalized understanding.  Oxycodone x1  BM x1, still dark tarry stool.  VSS, all needs met, call light within reach.

## 2024-04-10 NOTE — SUBJECTIVE & OBJECTIVE
Interval History: Overnight patient experienced melanotic stools so GI consulted. EGD done 4/10 with severe diffuse esophagitis. Recommending PPI + sucralfate. At the bedside patient referred feeling relatively well    Oncology Treatment Plan:   OP HEAD NECK CETUXIMAB CARBOPLATIN FLUOROURACIL Q3W    Medications:  Continuous Infusions:  Scheduled Meds:   dexAMETHasone  1 mg Swish & Spit QID    doxycycline  100 mg Oral BID    fluorometholone 0.1%  1 drop Right Eye QID    heparin (porcine)  5,000 Units Subcutaneous Q8H    levothyroxine  150 mcg Oral Before breakfast    pantoprazole  40 mg Oral BID AC    polyethylene glycol  17 g Oral Daily    senna-docusate 8.6-50 mg  1 tablet Oral BID    sucralfate  1 g Oral QID (AC & HS)    tacrolimus  0.5 mg Oral Daily PM    tacrolimus  1 mg Oral Daily AM    traZODone  50 mg Oral QHS     PRN Meds:(Magic mouthwash) 1:1:1 diphenhydrAMINE(Benadryl) 12.5mg/5ml liq, aluminum & magnesium hydroxide-simethicone (Maalox), LIDOcaine viscous 2%, dextrose 10%, dextrose 10%, glucagon (human recombinant), glucose, glucose, melatonin, naloxone, ondansetron, oxyCODONE, prochlorperazine, sodium chloride 0.9%, tiZANidine     Review of Systems   Constitutional:  Positive for fatigue. Negative for chills and fever.   HENT:  Positive for sore throat. Negative for congestion.    Eyes:  Negative for visual disturbance.   Respiratory:  Negative for cough, shortness of breath and wheezing.    Cardiovascular:  Negative for chest pain, palpitations and leg swelling.   Gastrointestinal:  Negative for abdominal pain, diarrhea, nausea and vomiting.   Endocrine: Negative for polyuria.   Genitourinary:  Negative for dysuria, flank pain and frequency.   Musculoskeletal:  Negative for arthralgias, back pain and myalgias.   Skin:  Negative for pallor and rash.   Neurological:  Positive for light-headedness. Negative for headaches.     Objective:     Vital Signs (Most Recent):  Temp: 97.2 °F (36.2 °C) (04/10/24  1130)  Pulse: 68 (04/10/24 1130)  Resp: 17 (04/10/24 1130)  BP: 134/79 (04/10/24 1130)  SpO2: 100 % (04/10/24 1130) Vital Signs (24h Range):  Temp:  [97.2 °F (36.2 °C)-98.2 °F (36.8 °C)] 97.2 °F (36.2 °C)  Pulse:  [66-88] 68  Resp:  [13-20] 17  SpO2:  [98 %-100 %] 100 %  BP: ()/(45-80) 134/79     Weight: 51.3 kg (113 lb 2.2 oz)  Body mass index is 17.2 kg/m².  Body surface area is 1.57 meters squared.      Intake/Output Summary (Last 24 hours) at 4/10/2024 1356  Last data filed at 4/10/2024 1027  Gross per 24 hour   Intake 402.01 ml   Output 750 ml   Net -347.99 ml        Physical Exam  Vitals and nursing note reviewed.   Constitutional:       General: He is not in acute distress.     Appearance: He is ill-appearing.   Neck:      Comments: Laryngectomy  Cardiovascular:      Rate and Rhythm: Normal rate and regular rhythm.   Pulmonary:      Effort: Pulmonary effort is normal.      Breath sounds: Normal breath sounds.   Abdominal:      General: Abdomen is flat. There is no distension.      Tenderness: There is no abdominal tenderness. There is no guarding.      Comments: Melanotic stool   Skin:     Coloration: Skin is pale.   Neurological:      General: No focal deficit present.      Mental Status: He is alert.          Significant Labs:   All pertinent labs from the last 24 hours have been reviewed.    Diagnostic Results:  I have reviewed all pertinent imaging results/findings within the past 24 hours.

## 2024-04-10 NOTE — CONSULTS
[facility] - Oncology ([facility])  Gastroenterology  Consult Note    Patient Name: Rocco Swain  MRN: [id number]  Admission Date: 4/8/2024  Hospital Length of Stay: 2 days  Code Status: Full Code   Attending Provider: Martín Hernandez MD   Consulting Provider: Sagar Leo MD  Primary Care Physician: Leny, Primary Doctor  Principal Problem:Acute kidney injury superimposed on chronic kidney disease    Inpatient consult to Gastroenterology  Consult performed by: Sagar Leo MD  Consult ordered by: Myrna Billings MD        Subjective:     HPI:  Rocco Swain is a 74-year-old male with SCC of the tongue (follows Dr. Hernandez currently on Cetuximab and carboplatin) nonverbal d/t prior laryngectomy and total glossectomy, post-operative hypothyrodism, and hepatitis C liver cirrhosis s/p liver transplantation 2008 on tacrolimus presented to [facility] ED on 4/8/24 for a sore throat who was found to have TSERING on CKD IV.  GI was consulted for melanotic stool.  The patient states he had a melanotic stool the morning of 4/10.    At the time of consultation, the patient is HDS with a Hgb of 7.8.  Labs notable for WBC 17 with bandemia.  Throat and respiratory cultures are growing GNRs.  Received 80mg IV pantoprazole.    EGD 9/2023 performed for JIGNESH showed a non-obstructing schatzki ring which was dilated with resultant bleeding.  This was treated with bipolar cautery.  Also seen was a scar in the gastric antrum and oozing duodenal ulcers.  The ulcers were injected and treated with bipolar cautery.  No specimens were collected.    Colonoscopy 9/2023 performed for hematochezia and melena showed old blood in the entire examined colon which was presumed to be from duodenal ulcers found on EGD the same day.  No specimens were collected.    Past Medical History:   Diagnosis Date    Basal cell carcinoma (BCC) in situ of skin 2012    3 on face, 2 on arm, removed by dermatology.     Basal cell carcinoma (BCC) of neck 01/24/2024    lower mid  neck    Hepatitis C, chronic 2006    Treated for Hep C x 6 months, normal viral load since 07/2006    Hypothyroidism     Larynx cancer     Liver transplanted     Lumbar disc disease     Squamous cell carcinoma in situ (SCCIS) of tongue 02/2016    Treated with radiation to neck and chemotherapy. Underwent surgical resection of tongue and neck. s/p tracheostomy    Squamous cell carcinoma of skin of right temple 01/24/2024    right temple       Past Surgical History:   Procedure Laterality Date    COLONOSCOPY      COLONOSCOPY N/A 9/14/2023    Procedure: COLONOSCOPY;  Surgeon: Reyes Olivia MD;  Location: [facility] (2ND FLR);  Service: Endoscopy;  Laterality: N/A;    CONJUNCTIVA BIOPSY Right 10/11/2022    Procedure: BIOPSY, CONJUNCTIVA;  Surgeon: Victor M Vasques MD;  Location: [facility];  Service: Ophthalmology;  Laterality: Right;    ESOPHAGOGASTRODUODENOSCOPY N/A 9/14/2023    Procedure: EGD (ESOPHAGOGASTRODUODENOSCOPY);  Surgeon: Reyes Olivia MD;  [facility]  Service: Endoscopy;  Laterality: N/A;    INSERTION OF VENOUS ACCESS PORT Right 3/8/2021    Procedure: INSERTION, VENOUS ACCESS PORT;  Surgeon: Jesus Rendon MD;  [address];  Service: General;  Laterality: Right;    LIVER TRANSPLANT  11/2008    transplanted for biopsy proven hepatocellular carcinoma,     LYMPH NODE BIOPSY N/A 9/18/2020    Procedure: BIOPSY, LYMPH NODE;  Surgeon: Taz Diagnostic Provider;  [address];  Service: General;  Laterality: N/A;  Rm 173    SURGICAL REMOVAL OF SCAR Right 8/24/2023    Procedure: EXCISION, SCAR;  Surgeon: Ting Brambila MD;  Location: [facility] OR;  Service: Ophthalmology;  Laterality: Right;    TRACHEOSTOMY         Review of patient's allergies indicates:   Allergen Reactions    Aspirin     Tylenol extended release      Family History       Problem Relation (Age of Onset)    Dementia Mother          Tobacco Use    Smoking status: Never    Smokeless  tobacco: Never   Substance and Sexual Activity    Alcohol use: Yes     Comment: drinks wine, 1 glass day    Drug use: Not Currently    Sexual activity: Yes     Comment: No prior history of STD      Review of Systems   Constitutional:  Positive for fatigue. Negative for chills and fever.   Respiratory:  Negative for cough.    Cardiovascular:  Negative for chest pain.   Gastrointestinal:  Positive for blood in stool. Negative for abdominal distention, abdominal pain, diarrhea, nausea and vomiting.   Skin:  Positive for pallor.     Objective:     Vital Signs (Most Recent):  Temp: 98.2 °F (36.8 °C) (04/10/24 0747)  Pulse: 76 (04/10/24 0747)  Resp: 18 (04/10/24 0747)  BP: 132/72 (04/10/24 0747)  SpO2: 99 % (04/10/24 0747) Vital Signs (24h Range):  Temp:  [97.3 °F (36.3 °C)-98.2 °F (36.8 °C)] 98.2 °F (36.8 °C)  Pulse:  [] 76  Resp:  [16-18] 18  SpO2:  [94 %-100 %] 99 %  BP: (120-142)/(59-77) 132/72     Weight: 51.3 kg (113 lb 2.2 oz) (04/08/24 2253)  Body mass index is 17.2 kg/m².      Intake/Output Summary (Last 24 hours) at 4/10/2024 0828  Last data filed at 4/10/2024 0751  Gross per 24 hour   Intake 202.01 ml   Output 1700 ml   Net -1497.99 ml       Lines/Drains/Airways       Central Venous Catheter Line  Duration                  PowerPort A Cath Single Lumen 03/08/21 1031 right internal jugular 1128 days              Airway  Duration                  Laryngectomy (Do Not Remove) 08/24/23 1145  229 days              Peripheral Intravenous Line  Duration                  Peripheral IV - Single Lumen 04/08/24 1352 20 G Anterior;Right Forearm 1 day                     Physical Exam  Vitals and nursing note reviewed.   Constitutional:       General: He is not in acute distress.     Appearance: He is ill-appearing.   Neck:      Comments: Laryngectomy  Cardiovascular:      Rate and Rhythm: Normal rate and regular rhythm.   Pulmonary:      Effort: Pulmonary effort is normal.      Breath sounds: Normal breath sounds.    Abdominal:      General: Abdomen is flat. There is no distension.      Tenderness: There is no abdominal tenderness. There is no guarding.      Comments: Melanotic stool   Skin:     Coloration: Skin is pale.   Neurological:      General: No focal deficit present.      Mental Status: He is alert.          Significant Labs:  Blood Culture:   Recent Labs   Lab 04/08/24  1640 04/08/24  1641   LABBLOO No Growth to date  No Growth to date No Growth to date  No Growth to date     CBC:   Recent Labs   Lab 04/08/24  1352 04/09/24  0237 04/10/24  0502   WBC 21.47* 13.13* 16.59*   HGB 8.5* 7.7* 7.8*   HCT 24.4* 23.3* 23.8*    252 273     CMP:   Recent Labs   Lab 04/10/24  0502   GLU 90   CALCIUM 8.1*   ALBUMIN 2.6*   PROT 6.0      K 3.7   CO2 22*      BUN 20   CREATININE 1.1   ALKPHOS 90   ALT 12   AST 26   BILITOT 0.3       Significant Imaging:  Imaging results within the past 24 hours have been reviewed.  Assessment/Plan:     GI  Melena  74-year-old male with SCC of the tongue (follows Dr. Hernandez currently on Cetuximab and carboplatin) nonverbal d/t prior laryngectomy and total glossectomy, post-operative hypothyrodism, and hepatitis C liver cirrhosis s/p liver transplantation 2008 on tacrolimus presented to [facility] on 4/8/24 for a sore throat who was found to have TSERING on CKD IV.  GI was consulted for melanotic stool.  Denies NSAID, pepto bismol, and anticoagulant use.    HDS with a Hgb of 7.8.  Labs notable for WBC 17 with bandemia.  Throat and respiratory cultures are growing GNRs.  Received 80mg IV pantoprazole.    EGD 9/2023 performed for JIGNESH showed a non-obstructing Schatzki ring which was dilated with resultant bleeding.  This was treated with bipolar cautery.  Also seen was a scar in the gastric antrum and oozing duodenal ulcers.  The ulcers were injected and treated with bipolar cautery.  Colonoscopy 9/2023 performed for hematochezia and melena showed old blood in the entire examined  colon.    Suspect melena is due to recurrent duodenal ulcers.  Patient is amenable to EGD.    Recommendations:  - Plan for EGD today, Wednesday 4/10/24  - Please keep NPO  - Trend Hgb q12h. Transfuse for Hgb < 7, unless otherwise indicated  - Maintain IV access with 2 large bore IVs  - Intravascular resuscitation/support with IVF   - Hold all NSAIDs and anticoagulants, unless contraindicated  - Continue IV pantoprazole 40mg BID starting 4/11  - Please correct any coagulopathy with platelets and FFP for goal of platelets > 50K and INR < 2.0  - Please notify the GI team if there is significant change in the patient's clinical status         Thank you for your consult. I will follow-up with patient. Please contact us if you have any additional questions.    Sagar Leo MD  Gastroenterology  [facility] - Oncology ([facility])

## 2024-04-10 NOTE — ANESTHESIA POSTPROCEDURE EVALUATION
Anesthesia Post Evaluation    Patient: Rocco Swain    Procedure(s) Performed: Procedure(s) (LRB):  EGD (ESOPHAGOGASTRODUODENOSCOPY) (N/A)    Final Anesthesia Type: general      Patient location during evaluation: [facility]  Patient participation: Yes- Able to Participate  Level of consciousness: awake and alert  Post-procedure vital signs: reviewed and stable  Airway patency: patent    PONV status at discharge: No PONV  Anesthetic complications: no      Cardiovascular status: blood pressure returned to baseline  Respiratory status: unassisted  Hydration status: euvolemic  Follow-up not needed.              Vitals Value Taken Time   /79 04/10/24 1132   Temp 36.2 °C (97.2 °F) 04/10/24 1130   Pulse 70 04/10/24 1131   Resp 18 04/10/24 1131   SpO2 98 % 04/10/24 1131   Vitals shown include unvalidated device data.      Event Time   Out of Recovery 11:38:00         Pain/Katya Score: Pain Rating Prior to Med Admin: 7 (4/9/2024  9:28 AM)  Katya Score: 9 (4/10/2024 11:15 AM)

## 2024-04-10 NOTE — ASSESSMENT & PLAN NOTE
-experienced melena 4/9 overnight  -EGD done 4/10 with evidence of Noted severe esophagitis and gastric/duodenal ulcers without stigmata   -plan for PO PPI for 12 weeks and sucralfate for a month followed by a repeat EGD

## 2024-04-10 NOTE — ANESTHESIA PREPROCEDURE EVALUATION
[facility]  Anesthesia Pre-Operative Evaluation       Patient Name: Rocco Swain  YOB: 1950  MRN: [id number]  [facility]: [id number]      Code Status: Full Code   Date of Procedure: 4/10/2024  Anesthesia: General Procedure: Procedure(s) (LRB):  EGD (ESOPHAGOGASTRODUODENOSCOPY) (N/A)  Pre-Operative Diagnosis: Melena [K92.1]  Proceduralist: Surgeon(s) and Role:     * Reyes Pagan MD - Primary Nurse: Charlette Borjas LPN  Registered Nurse: Sima Gao RN      SUBJECTIVE:   Rocco Swain is a 74 y.o. 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 Rocco Swain is a 74-year-old male with SCC of the tongue (follows Dr. 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  GI was consulted for melanotic stool.  The patient states he had a melanotic stool the morning of 4/10.    At the time of consultation, the patient is HDS with a Hgb of 7.8.  Labs notable for WBC 17 with bandemia.  Throat and respiratory cultures are growing GNRs.  Received 80mg IV pantoprazole.    EGD 9/2023 performed for JIGNESH showed a non-obstructing schatzki ring which was dilated with resultant bleeding.  This was treated with bipolar cautery.  Also seen was a scar in the gastric antrum and oozing duodenal ulcers.  The ulcers were injected and treated with bipolar cautery.  No specimens were collected.    Colonoscopy 9/2023 performed for hematochezia  and melena showed old blood in the entire examined colon which was presumed to be from duodenal ulcers found on EGD the same day.  No specimens were collected.    Anticoagulants   Medication Route Frequency    heparin (porcine) injection 5,000 Units Subcutaneous Q8H       he has a current medication list which includes the following long-term medication(s): azelaic acid, clindamycin phosphate 1%, gabapentin, hydrocortisone, tirosint-sol, metronidazole, olanzapine, tacrolimus, tacrolimus, and trazodone.   ALLERGIES:     Review of patient's allergies indicates:   Allergen Reactions    Aspirin     Tylenol extended release      LDA:          Lines/Drains/Airways       Central Venous Catheter Line  Duration                  PowerPort A Cath Single Lumen 03/08/21 1031 right internal jugular 1128 days              Airway  Duration                  Laryngectomy (Do Not Remove) 08/24/23 1145  229 days              Peripheral Intravenous Line  Duration                  Peripheral IV - Single Lumen 04/08/24 1352 20 G Anterior;Right Forearm 1 day                  MEDICATIONS:     Antibiotics (From admission, onward)      Start     Stop Route Frequency Ordered    04/08/24 2100  doxycycline tablet 100 mg         -- Oral 2 times daily 04/08/24 1742          VTE Risk Mitigation (From admission, onward)           Ordered     heparin (porcine) injection 5,000 Units  Every 8 hours         04/08/24 1615     IP VTE HIGH RISK PATIENT  Once         04/08/24 1615     Place sequential compression device  Until discontinued         04/08/24 1615                  Current Facility-Administered Medications   Medication Dose Route Frequency Provider Last Rate Last Admin    (Magic mouthwash) 1:1:1 diphenhydrAMINE(Benadryl) 12.5mg/5ml liq, aluminum & magnesium hydroxide-simethicone (Maalox), LIDOcaine viscous 2%  5 mL Swish & Spit Q4H PRN Ronn Lisa DO        dexAMETHasone 0.5 mg/5 mL solution (SWISH AND SPIT) 1 mg  1 mg Swish & Spit QID Maria L  Ronn DO   1 mg at 04/09/24 2112    dextrose 10% bolus 125 mL 125 mL  12.5 g Intravenous PRN Jayy Schuster MD        dextrose 10% bolus 250 mL 250 mL  25 g Intravenous PRN Jayy Schuster MD        doxycycline tablet 100 mg  100 mg Oral BID Ronn Lisa, DO   100 mg at 04/09/24 2111    fluorometholone 0.1% ophthalmic suspension 1 drop  1 drop Right Eye QID Myrna Billings MD   1 drop at 04/10/24 0831    glucagon (human recombinant) injection 1 mg  1 mg Intramuscular PRN Jayy Schuster MD        glucose chewable tablet 16 g  16 g Oral PRN Jayy Schuster MD        glucose chewable tablet 24 g  24 g Oral PRN Jayy Schuster MD        heparin (porcine) injection 5,000 Units  5,000 Units Subcutaneous Q8H Jayy Schuster MD   5,000 Units at 04/10/24 0634    levothyroxine 25 mcg/ml suspension 150 mcg  150 mcg Oral Before breakfast Ronn Lisa, DO   150 mcg at 04/10/24 0634    melatonin tablet 6 mg  6 mg Oral Nightly PRN Jayy Schuster MD        naloxone 0.4 mg/mL injection 0.02 mg  0.02 mg Intravenous PRN Jayy Schuster MD        ondansetron injection 4 mg  4 mg Intravenous Q8H PRN Jayy Schuster MD        oxyCODONE immediate release tablet 5 mg  5 mg Oral Q6H PRN Ronn Lisa DO   5 mg at 04/09/24 0928    pantoprazole (PROTONIX) 40 mg in sodium chloride 0.9 % 100 mL IVPB (MB+)  8 mg/hr Intravenous Continuous Myrna Billings MD 20 mL/hr at 04/10/24 0751 8 mg/hr at 04/10/24 0751    polyethylene glycol packet 17 g  17 g Oral Daily Jayy Schuster MD   17 g at 04/09/24 0831    prochlorperazine injection Soln 5 mg  5 mg Intravenous Q6H PRN Jayy Schuster MD        senna-docusate 8.6-50 mg per tablet 1 tablet  1 tablet Oral BID Jayy Schuster MD   1 tablet at 04/09/24 2111    sodium chloride 0.9% flush 10 mL  10 mL Intravenous Q12H PRN Jayy Schuster MD         tacrolimus capsule 0.5 mg  0.5 mg Oral Daily PM Jayy Schuster MD   0.5 mg at 04/09/24 1820    tacrolimus capsule 1 mg  1 mg Oral Daily AM Jayy Schuster MD   1 mg at 04/09/24 0928    tiZANidine tablet 2 mg  2 mg Oral Nightly PRN Lisa, Son, DO        traZODone tablet 50 mg  50 mg Oral QHS Lisa, Son, DO   50 mg at 04/09/24 2111     Facility-Administered Medications Ordered in Other Encounters   Medication Dose Route Frequency Provider Last Rate Last Admin    heparin, porcine (PF) 100 unit/mL injection flush 500 Units  500 Units Intravenous PRN Ronald Berg MD        ofloxacin 0.3 % ophthalmic solution 1 drop  1 drop Right Eye On Call Procedure Victor M Vasques MD   2 drop at 10/11/22 0745    sodium chloride 0.9% flush 10 mL  10 mL Intravenous PRN Ronald Berg MD        sodium chloride 0.9% flush 10 mL  10 mL Intravenous PRN Victor M Vasques MD              History:     Active Hospital Problems    Diagnosis  POA    *Acute kidney injury superimposed on chronic kidney disease [N17.9, N18.9]  Yes    Melena [K92.1]  Yes    Sore throat [J02.9]  Yes    Palliative care encounter [Z51.5]  Not Applicable    Chronic kidney disease (CKD), stage IV (severe) [N18.4]  Yes    Squamous cell carcinoma of base of tongue [C01]  Yes     Per OSH records, initially presented in 2015 with symptoms and cT2N2c disease. page 91 of 3/25/2020 outside records clinic (media tab).  Initially presented 8/2015 with left sided odynophagia.  Treated for reflux, which did not improve.    12/11/15 he had a fiberoptic exam showing discolored mucosal area of the left base of the tongue.    1/13/16 he had persistent dysphagia, odynophagia, and new left otalgia.  MRI showed an irregular mass along the left posterior margin of the base of the tongue measuring 1.8x2.3cm with ipsilateral level 2 lymph node measuring 1.7cm with central necrosis and contralateral level 2 node measuring 1.3cm with questionable  necrosis.    1/19/2016 he had direct laryngoscopy with biopsy left of midline, proximal to vallecula, which was p16 positive, invasive, with moderately to poorly differentiated sq.c.c. with basaloid features.    1/29/16 met with medical oncology who recommended chemoxrt.    2/1/16  staging PET CT showed hypermetabolic mass at base of tongue 4x2.5x3.5cm, max SUV 24.13, hypermetabolic lymph node left neck SUV 6.77, small subcentimeter lymph node right neck max SUV 3.79 (exact size of lymph nodes not mentioned in the summary).    2/15/16 started chemoxrt to left tongue base, bilateral cervical neck levels 1b-5, treated with 6MV photons utilizing IMRT, 5000 cGy in 200 cGy daily fractions.  Cone down boost to left base of tongue with additional 2000 cGy in 200 cGy daily fractions to gross disease.  Received weekly cisplatin with radiation but had to switch to carboplatin due to worsening renal function. during this time also missed several weekly treatments of cisplatin.    4/1/2016 completed chemoxrt.   11/1/2016 Repeat biopsy of left tongue showed persistent disease.  S/p salvage glossectomy with laryngectomy,  rT4aN0, p16 positive, base of tongue squamous cell carcinoma.     page 61 of 3/25/2020 outside records clinic (media tab)  Pathology: Tonsil, left tonsil fold: reactive squamous mucosa with inflammation and degenerating skeletal muscle, no carcinoma seen.  Right and Left base of tongue:  Reactive squamous mucosa and submucosa with inflammation, no carcinoma seen.  Neck, glossectomy, laryngectomy, bilateral neck dissection:  invasive squamous cell carcinoma.  tumor site: base of tongue/hypopharynx.  Small focus of tumor is seen within the hyoid  bone.  specimen Size [id number]  tumor size [id number]  depth of invasion 20mm  TNM stage pT4a, pN0, pMx  Lymph nodes, included in all parts:  number involved 0  number examined 3.  distance of tumor from margins  positive inked margins: none  within 1mm : none  within  1-2mm: anterior-inferior    Bilateral neck dissection:  level 1: one lymph node and submandibular gland, negative for tumor  level II : two lymph nodes, negative for tumor  level III: not identified  level IV: not identified  level V: not identified.    6/20/17 - PETCT with FDG avidity of left neck lymph node, level 3, SUV 2.9.  No other evidence of local or distant disease.  7/6/2017 - biopsy of left neck lymph node with IR.  Sample was non-diagnostic. Notes from path report: Less than optimal scan specimen with minute tissue core of stromal fibroadipose tissue.  definitive tati background is not seen.  9/28/2017 - Repeat PET CT showing mild activity SUV 3.5 (increased from 3.2 previously; size 7.8x7.4x9.5mm).  Also new findings under left pectoralis minor (mild focal increased activity, indeterminate but new since previous exam)  3/22/2018 - CT neck shows no evidence of disease and level 2b lymph node decreased to 4.9mmx4.5mmx4.9mm from 7.8x7.4x9.5mm previously.    Seen by Dr. Myers 8/3/2020 for left-sided neck pain and a swelling in left level 2 several weeks ago.  He feels it arose after he began lifting weights.  CT neck 8/5/2020 : Lymph nodes At the left neck level L2 there is a heterogeneous soft tissue density with central hypoattenuation likely reflecting a necrotic lymph node measuring 1.6 x 1.4 cm in transverse dimension, 2.5 cm in craniocaudal dimension.  There is a similar appearing irregular soft tissue density at the right neck level 2 measuring 1.1 x 9.0 cm in transverse dimension and 1.5 cm craniocaudal dimension.  No other abnormal appearing or enlarged lymph nodes found.  Impression: At level 2 within the neck bilaterally, there are  soft tissue density masses with central hypoattenuation likely reflective of necrotic lymph nodes.  Findings are suspicious for malignancy recurrence.  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  Staging PET CT  9/30/2020 with left sided hypermetabolic node and right sided enlarged node without uptake.  Also has mandibular vestibule uptake, which may be another site of disease. Discussed at tumor board.  He is not a surgical or radiation candidate.  -Started on PCC 10/6/2020 followed by maintenance cetuximab until 8/24/21 (discontinued due to progression of disease; continued increase in SUV uptake, no new lesions).  9/2021 started on carbo/5-FU/pembrolizumab; due to toxicity went to pembrolizumab monotherapy starting with cycle 2.  Progression of disease noted after cycle 3 so added chemotherapy back in with cycle 4.    Has been on maintenance pembrolizumab since cycle 9.      Status post liver transplantation - 2008 [Z94.4]  Not Applicable    Hepatitis C [B19.20]  Yes    Postoperative hypothyroidism [E89.0]  Yes    History of laryngectomy [Z90.02]  Not Applicable      Resolved Hospital Problems   No resolved problems to display.     Surgical History:    has a past surgical history that includes Liver transplant (11/2008); Tracheostomy; Colonoscopy; Lymph node biopsy (N/A, 9/18/2020); Insertion of venous access port (Right, 3/8/2021); Conjunctiva biopsy (Right, 10/11/2022); Surgical removal of scar (Right, 8/24/2023); Esophagogastroduodenoscopy (N/A, 9/14/2023); and Colonoscopy (N/A, 9/14/2023).   Social History:    reports being sexually active.  reports that he has never smoked. He has never used smokeless tobacco. He reports current alcohol use. He reports that he does not currently use drugs.     OBJECTIVE:     Vital Signs (Most Recent):  Temp: 36.5 °C (97.7 °F) (04/10/24 0932)  Pulse: 88 (04/10/24 0932)  Resp: 16 (04/10/24 0932)  BP: (!) 142/69 (04/10/24 0932)  SpO2: 98 % (04/10/24 0932) Vital Signs Range (Last 24H):  Temp:  [36.3 °C (97.3 °F)-36.8 °C (98.2 °F)]   Pulse:  []   Resp:  [16-18]   BP: (120-142)/(59-77)   SpO2:  [94 %-100 %]        Body mass index is 17.2 kg/m².   Wt Readings from Last 4 Encounters:    04/08/24 51.3 kg (113 lb 2.2 oz)   04/02/24 51.5 kg (113 lb 8.6 oz)   03/25/24 52.6 kg (115 lb 15.4 oz)   03/25/24 52.6 kg (115 lb 15.4 oz)     Significant Labs:  Lab Results   Component Value Date    WBC 16.59 (H) 04/10/2024    HGB 7.8 (L) 04/10/2024    HCT 23.8 (L) 04/10/2024     04/10/2024     04/10/2024    K 3.7 04/10/2024     04/10/2024    CREATININE 1.1 04/10/2024    BUN 20 04/10/2024    CO2 22 (L) 04/10/2024    GLU 90 04/10/2024    CALCIUM 8.1 (L) 04/10/2024    MG 1.6 04/10/2024    PHOS 2.5 (L) 04/10/2024    ALKPHOS 90 04/10/2024    ALT 12 04/10/2024    AST 26 04/10/2024    ALBUMIN 2.6 (L) 04/10/2024    INR 1.0 09/02/2020    HGBA1C 4.8 12/11/2019     No LMP for male patient.  Recent Results (from the past 72 hour(s))   HIV 1/2 Ag/Ab (4th Gen)    Collection Time: 04/08/24  1:52 PM   Result Value Ref Range    HIV 1/2 Ag/Ab Non-reactive Non-reactive   CBC auto differential    Collection Time: 04/08/24  1:52 PM   Result Value Ref Range    WBC 21.47 (H) 3.90 - 12.70 K/uL    RBC 2.35 (L) 4.60 - 6.20 M/uL    Hemoglobin 8.5 (L) 14.0 - 18.0 g/dL    Hematocrit 24.4 (L) 40.0 - 54.0 %     (H) 82 - 98 fL    MCH 36.2 (H) 27.0 - 31.0 pg    MCHC 34.8 32.0 - 36.0 g/dL    RDW 15.1 (H) 11.5 - 14.5 %    Platelets 295 150 - 450 K/uL    MPV 9.3 9.2 - 12.9 fL    Immature Granulocytes 3.3 (H) 0.0 - 0.5 %    Gran # (ANC) 18.1 (H) 1.8 - 7.7 K/uL    Immature Grans (Abs) 0.70 (H) 0.00 - 0.04 K/uL    Lymph # 0.8 (L) 1.0 - 4.8 K/uL    Mono # 1.8 (H) 0.3 - 1.0 K/uL    Eos # 0.0 0.0 - 0.5 K/uL    Baso # 0.10 0.00 - 0.20 K/uL    nRBC 0 0 /100 WBC    Gran % 84.1 (H) 38.0 - 73.0 %    Lymph % 3.9 (L) 18.0 - 48.0 %    Mono % 8.2 4.0 - 15.0 %    Eosinophil % 0.0 0.0 - 8.0 %    Basophil % 0.5 0.0 - 1.9 %    Differential Method Automated    Comprehensive metabolic panel    Collection Time: 04/08/24  1:52 PM   Result Value Ref Range    Sodium 139 136 - 145 mmol/L    Potassium 4.0 3.5 - 5.1 mmol/L    Chloride 102 95 - 110  mmol/L    CO2 24 23 - 29 mmol/L    Glucose 129 (H) 70 - 110 mg/dL    BUN 35 (H) 8 - 23 mg/dL    Creatinine 1.7 (H) 0.5 - 1.4 mg/dL    Calcium 9.6 8.7 - 10.5 mg/dL    Total Protein 7.5 6.0 - 8.4 g/dL    Albumin 3.3 (L) 3.5 - 5.2 g/dL    Total Bilirubin 0.4 0.1 - 1.0 mg/dL    Alkaline Phosphatase 98 55 - 135 U/L    AST 33 10 - 40 U/L    ALT 15 10 - 44 U/L    eGFR 41.5 (A) >60 mL/min/1.73 m^2    Anion Gap 13 8 - 16 mmol/L   Lipase    Collection Time: 04/08/24  1:52 PM   Result Value Ref Range    Lipase 35 4 - 60 U/L   Magnesium    Collection Time: 04/08/24  1:52 PM   Result Value Ref Range    Magnesium 1.5 (L) 1.6 - 2.6 mg/dL   Throat culture    Collection Time: 04/08/24  3:56 PM    Specimen: Throat   Result Value Ref Range    RESPIRATORY CULTURE - THROAT (A)      GRAM NEGATIVE MELVIN  Moderate  Identification and susceptibility pending     ISTAT Lactate    Collection Time: 04/08/24  3:56 PM   Result Value Ref Range    POC Lactate 0.87 0.5 - 2.2 mmol/L    Sample VENOUS     Site Other     Allens Test N/A    COVID-19 Rapid Screening    Collection Time: 04/08/24  4:37 PM   Result Value Ref Range    SARS-CoV-2 RNA, Amplification, Qual Negative Negative   Blood culture    Collection Time: 04/08/24  4:40 PM    Specimen: Peripheral, Hand, Left; Blood   Result Value Ref Range    Blood Culture, Routine No Growth to date     Blood Culture, Routine No Growth to date    Blood culture    Collection Time: 04/08/24  4:41 PM    Specimen: Peripheral, Antecubital, Left; Blood   Result Value Ref Range    Blood Culture, Routine No Growth to date     Blood Culture, Routine No Growth to date    Urinalysis, Reflex to Urine Culture Urine, Clean Catch    Collection Time: 04/08/24  6:54 PM    Specimen: Urine   Result Value Ref Range    Specimen UA Urine, Clean Catch     Color, UA Yellow Yellow, Straw, Zari    Appearance, UA Clear Clear    pH, UA 7.0 5.0 - 8.0    Specific Gravity, UA 1.020 1.005 - 1.030    Protein, UA Negative Negative    Glucose,  UA Negative Negative    Ketones, UA Negative Negative    Bilirubin (UA) Negative Negative    Occult Blood UA Negative Negative    Nitrite, UA Negative Negative    Leukocytes, UA Negative Negative   Tacrolimus level    Collection Time: 04/09/24  2:37 AM   Result Value Ref Range    Tacrolimus Lvl 2.7 (L) 5.0 - 15.0 ng/mL   Comprehensive Metabolic Panel (CMP)    Collection Time: 04/09/24  2:37 AM   Result Value Ref Range    Sodium 140 136 - 145 mmol/L    Potassium 3.4 (L) 3.5 - 5.1 mmol/L    Chloride 108 95 - 110 mmol/L    CO2 21 (L) 23 - 29 mmol/L    Glucose 84 70 - 110 mg/dL    BUN 27 (H) 8 - 23 mg/dL    Creatinine 1.2 0.5 - 1.4 mg/dL    Calcium 8.3 (L) 8.7 - 10.5 mg/dL    Total Protein 6.0 6.0 - 8.4 g/dL    Albumin 2.7 (L) 3.5 - 5.2 g/dL    Total Bilirubin 0.3 0.1 - 1.0 mg/dL    Alkaline Phosphatase 83 55 - 135 U/L    AST 23 10 - 40 U/L    ALT 12 10 - 44 U/L    eGFR >60.0 >60 mL/min/1.73 m^2    Anion Gap 11 8 - 16 mmol/L   Magnesium    Collection Time: 04/09/24  2:37 AM   Result Value Ref Range    Magnesium 1.6 1.6 - 2.6 mg/dL   Phosphorus    Collection Time: 04/09/24  2:37 AM   Result Value Ref Range    Phosphorus 2.8 2.7 - 4.5 mg/dL   CBC with Automated Differential    Collection Time: 04/09/24  2:37 AM   Result Value Ref Range    WBC 13.13 (H) 3.90 - 12.70 K/uL    RBC 2.19 (L) 4.60 - 6.20 M/uL    Hemoglobin 7.7 (L) 14.0 - 18.0 g/dL    Hematocrit 23.3 (L) 40.0 - 54.0 %     (H) 82 - 98 fL    MCH 35.2 (H) 27.0 - 31.0 pg    MCHC 33.0 32.0 - 36.0 g/dL    RDW 14.6 (H) 11.5 - 14.5 %    Platelets 252 150 - 450 K/uL    MPV 9.6 9.2 - 12.9 fL    Immature Granulocytes 2.4 (H) 0.0 - 0.5 %    Gran # (ANC) 10.5 (H) 1.8 - 7.7 K/uL    Immature Grans (Abs) 0.31 (H) 0.00 - 0.04 K/uL    Lymph # 0.9 (L) 1.0 - 4.8 K/uL    Mono # 1.3 (H) 0.3 - 1.0 K/uL    Eos # 0.1 0.0 - 0.5 K/uL    Baso # 0.04 0.00 - 0.20 K/uL    nRBC 0 0 /100 WBC    Gran % 80.1 (H) 38.0 - 73.0 %    Lymph % 7.0 (L) 18.0 - 48.0 %    Mono % 9.7 4.0 - 15.0 %     Eosinophil % 0.5 0.0 - 8.0 %    Basophil % 0.3 0.0 - 1.9 %    Differential Method Automated    Comprehensive Metabolic Panel (CMP)    Collection Time: 04/10/24  5:02 AM   Result Value Ref Range    Sodium 138 136 - 145 mmol/L    Potassium 3.7 3.5 - 5.1 mmol/L    Chloride 108 95 - 110 mmol/L    CO2 22 (L) 23 - 29 mmol/L    Glucose 90 70 - 110 mg/dL    BUN 20 8 - 23 mg/dL    Creatinine 1.1 0.5 - 1.4 mg/dL    Calcium 8.1 (L) 8.7 - 10.5 mg/dL    Total Protein 6.0 6.0 - 8.4 g/dL    Albumin 2.6 (L) 3.5 - 5.2 g/dL    Total Bilirubin 0.3 0.1 - 1.0 mg/dL    Alkaline Phosphatase 90 55 - 135 U/L    AST 26 10 - 40 U/L    ALT 12 10 - 44 U/L    eGFR >60.0 >60 mL/min/1.73 m^2    Anion Gap 8 8 - 16 mmol/L   Magnesium    Collection Time: 04/10/24  5:02 AM   Result Value Ref Range    Magnesium 1.6 1.6 - 2.6 mg/dL   Phosphorus    Collection Time: 04/10/24  5:02 AM   Result Value Ref Range    Phosphorus 2.5 (L) 2.7 - 4.5 mg/dL   CBC with Automated Differential    Collection Time: 04/10/24  5:02 AM   Result Value Ref Range    WBC 16.59 (H) 3.90 - 12.70 K/uL    RBC 2.24 (L) 4.60 - 6.20 M/uL    Hemoglobin 7.8 (L) 14.0 - 18.0 g/dL    Hematocrit 23.8 (L) 40.0 - 54.0 %     (H) 82 - 98 fL    MCH 34.8 (H) 27.0 - 31.0 pg    MCHC 32.8 32.0 - 36.0 g/dL    RDW 14.5 11.5 - 14.5 %    Platelets 273 150 - 450 K/uL    MPV 9.5 9.2 - 12.9 fL    Immature Granulocytes 2.4 (H) 0.0 - 0.5 %    Gran # (ANC) 14.2 (H) 1.8 - 7.7 K/uL    Immature Grans (Abs) 0.39 (H) 0.00 - 0.04 K/uL    Lymph # 0.8 (L) 1.0 - 4.8 K/uL    Mono # 1.1 (H) 0.3 - 1.0 K/uL    Eos # 0.0 0.0 - 0.5 K/uL    Baso # 0.05 0.00 - 0.20 K/uL    nRBC 0 0 /100 WBC    Gran % 85.5 (H) 38.0 - 73.0 %    Lymph % 4.9 (L) 18.0 - 48.0 %    Mono % 6.9 4.0 - 15.0 %    Eosinophil % 0.0 0.0 - 8.0 %    Basophil % 0.3 0.0 - 1.9 %    Differential Method Automated    Tacrolimus level    Collection Time: 04/10/24  5:02 AM   Result Value Ref Range    Tacrolimus Lvl 4.1 (L) 5.0 - 15.0 ng/mL       EKG:   Results  "for orders placed or performed in visit on 10/20/23   IN OFFICE EKG 12-LEAD (to [place])    Collection Time: 10/20/23  2:57 PM    Narrative    Test Reason : I48.0,    Vent. Rate : 080 BPM     Atrial Rate : 080 BPM     P-R Int : 174 ms          QRS Dur : 064 ms      QT Int : 366 ms       P-R-T Axes : 027 071 038 degrees     QTc Int : 422 ms    Sinus rhythm with Premature atrial complexes  Low septal forces  Abnormal ECG  When compared with ECG of 14-SEP-2023 08:34,  Premature atrial complexes are now Present  Confirmed by Sagar EVANS MD ([id number]) on 10/23/2023 4:20:47 PM    Referred By: GAURANG GOODRICH           Confirmed By:Sagar EVANS MD       TTE:  Results for orders placed or performed during the hospital encounter of 11/28/23   Echo   Result Value Ref Range    RA Width 3.33 cm    LA volume (mod) 44.31 cm3    Left Atrium Major Axis 5.02 cm    Left Atrium Minor Axis 5.19 cm    RA Major Axis 5.10 cm    LV Diastolic Volume 75.01 mL    LV Systolic Volume 34.87 mL    PV Peak D Blas 0.50 m/s    PV Peak S Blas 0.65 m/s    MV Peak A Blas 0.74 m/s    MV stenosis pressure 1/2 time 44.98 ms    TR Max Blas 2.59 m/s    MV Peak E Blas 0.56 m/s    PV mean gradient 1 mmHg    RVOT peak VTI 11.65 cm    RVOT peak blas 0.65 m/s    Ao VTI 33.97 cm    Ao peak blas 1.56 m/s    LVOT peak VTI 19.48 cm    LVOT peak blas 0.97 m/s    LVOT diameter 1.98 cm    MV "A" wave duration 12.56 msec    E wave deceleration time 155.12 msec    PV peak gradient 4     AV mean gradient 6 mmHg    TAPSE 2.85 cm    RVDD 2.30 cm    Ascending aorta 3.10 cm    STJ 2.86 cm    Sinus 3.15 cm    LVIDs 3.00 2.1 - 4.0 cm    Posterior Wall 0.88 0.6 - 1.1 cm    IVS 0.77 0.6 - 1.1 cm    LVIDd 4.12 3.5 - 6.0 cm    PV PEAK VELOCITY 1.06 m/s    TDI LATERAL 0.08 m/s    LA WIDTH 3.49 cm    TDI SEPTAL 0.09 m/s    LV LATERAL E/E' RATIO 7.00 m/s    LV SEPTAL E/E' RATIO 6.22 m/s    FS 27 (A) 28 - 44 %    LV mass 102.24 g    Left Ventricle Relative Wall Thickness 0.43 cm    AV valve area 1.76 cm²    AV " Velocity Ratio 0.62     AV index (prosthetic) 0.57     MV valve area p 1/2 method 4.89 cm2    E/A ratio 0.76     Mean e' 0.09 m/s    Pulm vein S/D ratio 1.30     LVOT area 3.1 cm2    LVOT stroke volume 59.95 cm3    AV peak gradient 10 mmHg    E/E' ratio 6.59 m/s    Triscuspid Valve Regurgitation Peak Gradient 27 mmHg    MARY by Velocity Ratio 1.91 cm²    BSA 1.62 m2    LV Systolic Volume Index 21.1 mL/m2    LV Diastolic Volume Index 45.46 mL/m2    LV Mass Index 62 g/m2    LA Volume Index (Mod) 26.9 mL/m2    ZLVIDS 0.35     ZLVIDD -1.17     TV resting pulmonary artery pressure 30 mmHg    RV TB RVSP 6 mmHg    Est. RA pres 3 mmHg    EF 60 %    LA Volume Index 32.1 mL/m2    LA volume 52.99 cm3    LA size 3.5 cm    Narrative      Left Ventricle: The left ventricle is normal in size. Normal wall   thickness. There is concentric remodeling. Normal wall motion. There is   normal systolic function. Ejection fraction by visual approximation is   60%. There is normal diastolic function.    Right Ventricle: Normal right ventricular cavity size. Wall thickness   is normal. Right ventricle wall motion  is normal. Systolic function is   normal.    Left Atrium: Left atrium is severely dilated.    Aortic Valve: The aortic valve appears bicuspid. There is mild   stenosis. Aortic valve area by VTI is 1.76 cm². Aortic valve peak velocity   is 1.56 m/s. Mean gradient is 6 mmHg. The dimensionless index is 0.57.    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 There is moderate   regurgitation. There appear to be 2 separate jets.    Pulmonary Artery: The estimated pulmonary artery systolic pressure is   30 mmHg.    IVC/SVC: Normal venous pressure at 3 mmHg.       EF   Date Value Ref Range Status   11/28/2023 60 % Final      No results found. However, due to the size of the patient record, not all encounters were searched. Please check Results Review for a complete set of results.  SHERI:  No results found. However, due to the size of the  "patient record, not all encounters were searched. Please check Results Review for a complete set of results.  Stress Test:  No results found for this or any previous visit.     LHC:  No results found for this or any previous visit.     PFT:  No results found for: "FEV1", "FVC", "QAK0TQD", "TLC", "DLCO"   ASSESSMENT/PLAN:          Pre-op Assessment    I have reviewed the Patient Summary Reports.     I have reviewed the Nursing Notes. I have reviewed the NPO Status.   I have reviewed the Medications.     Review of Systems  Anesthesia Hx:  No problems with previous Anesthesia                EENT/Dental:   S/p laryngectomy           Pulmonary:   COPD                     Renal/:  Chronic Renal Disease                Hepatic/GI:       Hepatitis               Physical Exam  General: Well nourished, Cooperative, Alert and Oriented    Airway:  Laryngectomy stoma. On room air       Anesthesia Plan  Type of Anesthesia, risks & benefits discussed:    Anesthesia Type: Gen Natural Airway  Intra-op Monitoring Plan: Standard ASA Monitors  Induction:  IV  Informed Consent: Informed consent signed with the Patient and all parties understand the risks and agree with anesthesia plan.  All questions answered.   ASA Score: 3    Ready For Surgery From Anesthesia Perspective.     .      "

## 2024-04-10 NOTE — CONSULTS
[facility] - Oncology ([facility])  Palliative Medicine  Consult Note    Patient Name: Rocco Swain  MRN: [id number]  Admission Date: 4/8/2024  Hospital Length of Stay: 1 days  Code Status: Full Code   Attending Provider: Martín Hernandez MD  Consulting Provider: Elliott Garrett MD  Primary Care Physician: Leny, Primary Doctor  Principal Problem:Acute kidney injury superimposed on chronic kidney disease    Patient information was obtained from patient, past medical records, and primary team.      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 He has developed significant mucositis from 5-FU on Extreme regimen and is admitted with pain and poor po intake/TSERING.     Advance Care Planning    Medicolegal  Decision-making:   -Pt does have decision-making capacity  -Pt has appointed a HCPOA.  -Marital status: unmarried  -Children: none    Advance care planning/Advance directives:  -The patient has previously engaged in advance care planning  -Living will, HCPOA reviewed  -Pt has scanned advance directives in [facility]    Psychosocial  Support system:  -Spiritual: yes;  Patient is part of a particular fernandez background: [employer]  The palliative care  will be consulted to assess the patient.  -Family: brother Ollie supports    Health status prior to this hospitalization  -Functional status: good.  Pt was able to manage ADLs  -Cognitive status: good   -QOL: good prior to admit    Prognosis:  -Time and potential for recovery: poor given disease progression, weight loss, and malnutrition    GOC Discussion:   What is most important right now is to focus on remaining as independent as possible, symptom/pain control.  Accordingly, we have decided that the best plan to meet the patient's goals includes continuing with treatment.       Active symptoms  -Pain: well controlled on current regimen of oxy 5 mg po prn (avg x1 day)  -Constipation: none; on bowel regimen  -Dyspnea none  -Anxiety: n/a  -Depression: n/a    Summary/Recommendation:  -Most important goals at this time: improvement in condition but with limits to invasive therapies  -no need for long acting for now as pt feels comfortable on ~oxy 5 mg x 1 per day; open to long actings in the future  -Code status: FC for now  -Most appropriate disposition: likely d/c home with plans to return for surgery at the end of the month  -has follow up 5/7 with pall med and supportive care                 Thank you for your consult. I will sign off. Please contact us if you have any additional questions.    Subjective:     HPI:   73 y/o F with SCC of the tongue (follows Dr. Hernandez currently on Cetuximab and carboplatin) nonverbal d/t prior laryngectomy and total glossectomy, postoperative hypothyrodism, hepatitis C liver cirrhosis s/p liver transplantation 2008 presented to the ED for sore throat. Patient was supposed to be scheduled for a chemotherapy infusion today however he was complaining overall fatigue, generalized weakness, worsening sore throat. His plan of care nurse recommended him to go to the ED for further evaluation. Describes as a soreness in his posterior throat that has been progressively worsened for the last 4 days. He does report of decreased p.o. intake. He normally eats a liquid diet however his overall fatigue made him skip meals. Reports of associated chills, body aches. No sick contact. He lives by himself and perform his ADLs independently. Similar symptoms after receiving infusion in the past.     In this setting, palliative medicine was consulted to help with symptom management, medical decision making, and aiding in the formation of goals of care.        Hospital Course:  No notes on file    Interval History: pt seen at the bedside; able to communicate through marker board writing; non verbal    Past Medical History:   Diagnosis Date    Basal cell carcinoma (BCC) in situ of skin 2012    3 on face, 2 on arm, removed by dermatology.     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 Underwent surgical resection of tongue and neck. s/p tracheostomy    Squamous cell carcinoma of skin of right temple 01/24/2024    right temple       Past Surgical History:   Procedure Laterality Date    COLONOSCOPY      COLONOSCOPY N/A 9/14/2023    Procedure: COLONOSCOPY;  Surgeon: Reyes Olivia MD;  Location: [address]);  Service: Endoscopy;  Laterality: N/A;    CONJUNCTIVA BIOPSY Right 10/11/2022    Procedure: BIOPSY, CONJUNCTIVA;  Surgeon: Victor M Vasques MD;  Location: [facility];  Service: Ophthalmology;  Laterality: Right;    ESOPHAGOGASTRODUODENOSCOPY N/A 9/14/2023    Procedure: EGD (ESOPHAGOGASTRODUODENOSCOPY);  Surgeon: Reyes Olivia MD;  Location: [address]);  Service: Endoscopy;  Laterality: N/A;    INSERTION OF VENOUS ACCESS PORT Right 3/8/2021    Procedure: INSERTION, VENOUS ACCESS PORT;  Surgeon: Jesus Rendon MD;  Location: [address];  Service: General;  Laterality: Right;    LIVER TRANSPLANT  11/2008    transplanted for biopsy proven hepatocellular carcinoma,     LYMPH NODE BIOPSY N/A 9/18/2020    Procedure: BIOPSY, LYMPH NODE;  Surgeon: [facility];  [address];  Service: General;  Laterality: N/A;  Rm 173    SURGICAL REMOVAL OF SCAR Right 8/24/2023    Procedure: EXCISION, SCAR;  Surgeon:  Ting Brambila MD;  Location: [facility];  Service: Ophthalmology;  Laterality: Right;    TRACHEOSTOMY         Review of patient's allergies indicates:   Allergen Reactions    Aspirin     Tylenol extended release        Medications:  Continuous Infusions:   sodium chloride 0.9% 150 mL/hr at 04/09/24 0835     Scheduled Meds:   dexAMETHasone  1 mg Swish & Spit QID    doxycycline  100 mg Oral BID    heparin (porcine)  5,000 Units Subcutaneous Q8H    levothyroxine  150 mcg Oral Before breakfast    polyethylene glycol  17 g Oral Daily    senna-docusate 8.6-50 mg  1 tablet Oral BID    tacrolimus  0.5 mg Oral Daily PM    tacrolimus  1 mg Oral Daily AM    traZODone  50 mg Oral QHS     PRN Meds:(Magic mouthwash) 1:1:1 diphenhydrAMINE(Benadryl) 12.5mg/5ml liq, aluminum & magnesium hydroxide-simethicone (Maalox), LIDOcaine viscous 2%, dextrose 10%, dextrose 10%, glucagon (human recombinant), glucose, glucose, melatonin, naloxone, ondansetron, oxyCODONE, prochlorperazine, sodium chloride 0.9%, tiZANidine    Family History       Problem Relation (Age of Onset)    Dementia Mother          Tobacco Use    Smoking status: Never    Smokeless tobacco: Never   Substance and Sexual Activity    Alcohol use: Yes     Comment: drinks wine, 1 glass day    Drug use: Not Currently    Sexual activity: Yes     Comment: No prior history of STD        Review of Systems   Constitutional:  Positive for fatigue.   HENT:  Positive for mouth sores and sore throat.    Eyes:  Positive for pain.   Respiratory:  Negative for cough, chest tightness and shortness of breath.    Psychiatric/Behavioral: Negative.     Objective:     Vital Signs (Most Recent):  Temp: 98.1 °F (36.7 °C) (04/09/24 1055)  Pulse: 100 (04/09/24 1055)  Resp: 18 (04/09/24 1055)  BP: (!) 142/72 (04/09/24 1055)  SpO2: (!) 94 % (04/09/24 1055) Vital Signs (24h Range):  Temp:  [96.3 °F (35.7 °C)-98.6 °F (37 °C)] 98.1 °F (36.7 °C)  Pulse:  [] 100  Resp:  [16-22] 18  SpO2:  [94 %-99 %] 94  %  BP: (118-150)/(65-83) 142/72     Weight: 51.3 kg (113 lb 2.2 oz)  Body mass index is 17.2 kg/m².       Physical Exam       Review of Symptoms      Symptom Assessment (ESAS 0-10 Scale)  Pain:  2  Dyspnea:  0  Anxiety:  1  Nausea:  0  Depression:  0  Anorexia:  0  Fatigue:  0  Insomnia:  0  Restlessness:  0  Agitation:  0     CAM / Delirium:  Negative  Constipation:  Negative  Diarrhea:  Negative      Pain Assessment:    Location(s): head (right eye)    Head       Location: frontal (right eye)        Quality: dull        Quantity: 2/10 in intensity        Chronicity: Onset 2 month(s) ago, stable        Aggravating Factors: none        Alleviating Factors: opiates        Associated Symptoms: none    ECOG Performance Status ndGndrndanddndend:nd nd2nd Living Arrangements:  Lives alone    Psychosocial/Cultural:   See Palliative Psychosocial Note: No  Lives alone    His brother Ollie is helpful    Retired   **Primary  to Follow**  Palliative Care  Consult: No      Advance Care Planning  Advance Directives:   Living Will: Yes        Copy on chart: Yes    Do Not Resuscitate Status: No    Medical Power of : Yes      Decision Making:  Patient answered questions  Goals of Care: What is most important right now is to focus on remaining as independent as possible, symptom/pain control. Accordingly, we have decided that the best plan to meet the patient's goals includes continuing with treatment.         Significant Labs: All pertinent labs within the past 24 hours have been reviewed.  CBC:   Recent Labs   Lab 04/09/24 0237   WBC 13.13*   HGB 7.7*   HCT 23.3*   *        BMP:  Recent Labs   Lab 04/09/24 0237   GLU 84      K 3.4*      CO2 21*   BUN 27*   CREATININE 1.2   CALCIUM 8.3*   MG 1.6     LFT:  Lab Results   Component Value Date    AST 23 04/09/2024     (H) 07/09/2020    ALKPHOS 83 04/09/2024    BILITOT 0.3 04/09/2024     Albumin:   Albumin   Date Value Ref  Range Status   04/09/2024 2.7 (L) 3.5 - 5.2 g/dL Final     Protein:   Total Protein   Date Value Ref Range Status   04/09/2024 6.0 6.0 - 8.4 g/dL Final     Lactic acid:   Lab Results   Component Value Date    LACTATE 1.5 09/13/2023       Significant Imaging: I have reviewed all pertinent imaging results/findings within the past 24 hours.  2/9 Pet Scan  Impression:     Stable postoperative change of glossectomy, laryngectomy, tracheostomy, and flap reconstruction without evidence of increased nodularity or hypermetabolic activity at the operative site to suggest local disease recurrence.     Interval development a 1.9 cm soft tissue lesion along the right medial orbit which shows increased tracer uptake, nonspecific.  Recommend correlation with direct visualization.  Differential considerations include neoplasm or other infectious/inflammatory etiology.     I spent a total of 75 minutes on the day of the visit. This includes face to face time in discussion of goals of care, symptom assessment, coordination of care and emotional support.  This also includes non-face to face time preparing to see the patient (eg, review of tests/imaging), obtaining and/or reviewing separately obtained history, documenting clinical information in the electronic or other health record, independently interpreting results and communicating results to the patient/family/caregiver, or care coordinator.    Elliott Garrett MD  Palliative Medicine  [facility] - Oncology ([facility])

## 2024-04-11 VITALS
SYSTOLIC BLOOD PRESSURE: 146 MMHG | HEART RATE: 76 BPM | WEIGHT: 113.13 LBS | TEMPERATURE: 99 F | HEIGHT: 68 IN | DIASTOLIC BLOOD PRESSURE: 74 MMHG | BODY MASS INDEX: 17.14 KG/M2 | RESPIRATION RATE: 16 BRPM | OXYGEN SATURATION: 95 %

## 2024-04-11 LAB
ABO + RH BLD: NORMAL
ALBUMIN SERPL BCP-MCNC: 2.4 G/DL (ref 3.5–5.2)
ALP SERPL-CCNC: 92 U/L (ref 55–135)
ALT SERPL W/O P-5'-P-CCNC: 12 U/L (ref 10–44)
ANION GAP SERPL CALC-SCNC: 9 MMOL/L (ref 8–16)
AST SERPL-CCNC: 26 U/L (ref 10–40)
BASOPHILS # BLD AUTO: 0.02 K/UL (ref 0–0.2)
BASOPHILS # BLD AUTO: 0.13 K/UL (ref 0–0.2)
BASOPHILS NFR BLD: 0.1 % (ref 0–1.9)
BASOPHILS NFR BLD: 0.6 % (ref 0–1.9)
BILIRUB SERPL-MCNC: 0.3 MG/DL (ref 0.1–1)
BLD GP AB SCN CELLS X3 SERPL QL: NORMAL
BLD PROD TYP BPU: NORMAL
BLOOD UNIT EXPIRATION DATE: NORMAL
BLOOD UNIT TYPE CODE: 6200
BLOOD UNIT TYPE: NORMAL
BUN SERPL-MCNC: 21 MG/DL (ref 8–23)
CALCIUM SERPL-MCNC: 7.9 MG/DL (ref 8.7–10.5)
CHLORIDE SERPL-SCNC: 111 MMOL/L (ref 95–110)
CO2 SERPL-SCNC: 22 MMOL/L (ref 23–29)
CODING SYSTEM: NORMAL
CREAT SERPL-MCNC: 1.3 MG/DL (ref 0.5–1.4)
CROSSMATCH INTERPRETATION: NORMAL
DIFFERENTIAL METHOD BLD: ABNORMAL
DIFFERENTIAL METHOD BLD: ABNORMAL
DISPENSE STATUS: NORMAL
EOSINOPHIL # BLD AUTO: 0 K/UL (ref 0–0.5)
EOSINOPHIL # BLD AUTO: 0 K/UL (ref 0–0.5)
EOSINOPHIL NFR BLD: 0 % (ref 0–8)
EOSINOPHIL NFR BLD: 0 % (ref 0–8)
ERYTHROCYTE [DISTWIDTH] IN BLOOD BY AUTOMATED COUNT: 14.6 % (ref 11.5–14.5)
ERYTHROCYTE [DISTWIDTH] IN BLOOD BY AUTOMATED COUNT: 17.1 % (ref 11.5–14.5)
EST. GFR  (NO RACE VARIABLE): 57.3 ML/MIN/1.73 M^2
GLUCOSE SERPL-MCNC: 88 MG/DL (ref 70–110)
HCT VFR BLD AUTO: 20 % (ref 40–54)
HCT VFR BLD AUTO: 30.9 % (ref 40–54)
HGB BLD-MCNC: 10.4 G/DL (ref 14–18)
HGB BLD-MCNC: 6.8 G/DL (ref 14–18)
IMM GRANULOCYTES # BLD AUTO: 0.33 K/UL (ref 0–0.04)
IMM GRANULOCYTES # BLD AUTO: 0.8 K/UL (ref 0–0.04)
IMM GRANULOCYTES NFR BLD AUTO: 2.4 % (ref 0–0.5)
IMM GRANULOCYTES NFR BLD AUTO: 3.8 % (ref 0–0.5)
LYMPHOCYTES # BLD AUTO: 0.8 K/UL (ref 1–4.8)
LYMPHOCYTES # BLD AUTO: 0.9 K/UL (ref 1–4.8)
LYMPHOCYTES NFR BLD: 4.1 % (ref 18–48)
LYMPHOCYTES NFR BLD: 5.5 % (ref 18–48)
MAGNESIUM SERPL-MCNC: 1.4 MG/DL (ref 1.6–2.6)
MCH RBC QN AUTO: 33.1 PG (ref 27–31)
MCH RBC QN AUTO: 34.7 PG (ref 27–31)
MCHC RBC AUTO-ENTMCNC: 33.7 G/DL (ref 32–36)
MCHC RBC AUTO-ENTMCNC: 34 G/DL (ref 32–36)
MCV RBC AUTO: 102 FL (ref 82–98)
MCV RBC AUTO: 98 FL (ref 82–98)
MONOCYTES # BLD AUTO: 1 K/UL (ref 0.3–1)
MONOCYTES # BLD AUTO: 1.2 K/UL (ref 0.3–1)
MONOCYTES NFR BLD: 5.4 % (ref 4–15)
MONOCYTES NFR BLD: 7.6 % (ref 4–15)
NEUTROPHILS # BLD AUTO: 11.6 K/UL (ref 1.8–7.7)
NEUTROPHILS # BLD AUTO: 18.2 K/UL (ref 1.8–7.7)
NEUTROPHILS NFR BLD: 84.4 % (ref 38–73)
NEUTROPHILS NFR BLD: 86.1 % (ref 38–73)
NRBC BLD-RTO: 0 /100 WBC
NRBC BLD-RTO: 0 /100 WBC
NUM UNITS TRANS PACKED RBC: NORMAL
PHOSPHATE SERPL-MCNC: 2.7 MG/DL (ref 2.7–4.5)
PLATELET # BLD AUTO: 233 K/UL (ref 150–450)
PLATELET # BLD AUTO: 282 K/UL (ref 150–450)
PMV BLD AUTO: 9.5 FL (ref 9.2–12.9)
PMV BLD AUTO: 9.5 FL (ref 9.2–12.9)
POTASSIUM SERPL-SCNC: 3.5 MMOL/L (ref 3.5–5.1)
PROT SERPL-MCNC: 5.5 G/DL (ref 6–8.4)
RBC # BLD AUTO: 1.96 M/UL (ref 4.6–6.2)
RBC # BLD AUTO: 3.14 M/UL (ref 4.6–6.2)
SODIUM SERPL-SCNC: 142 MMOL/L (ref 136–145)
SPECIMEN OUTDATE: NORMAL
TACROLIMUS BLD-MCNC: 4 NG/ML (ref 5–15)
WBC # BLD AUTO: 13.72 K/UL (ref 3.9–12.7)
WBC # BLD AUTO: 21.14 K/UL (ref 3.9–12.7)

## 2024-04-11 PROCEDURE — 85025 COMPLETE CBC W/AUTO DIFF WBC: CPT | Performed by: STUDENT IN AN ORGANIZED HEALTH CARE EDUCATION/TRAINING PROGRAM

## 2024-04-11 PROCEDURE — 99238 HOSP IP/OBS DSCHRG MGMT 30/<: CPT | Mod: ,,, | Performed by: INTERNAL MEDICINE

## 2024-04-11 PROCEDURE — 25000003 PHARM REV CODE 250: Performed by: STUDENT IN AN ORGANIZED HEALTH CARE EDUCATION/TRAINING PROGRAM

## 2024-04-11 PROCEDURE — 80053 COMPREHEN METABOLIC PANEL: CPT | Performed by: STUDENT IN AN ORGANIZED HEALTH CARE EDUCATION/TRAINING PROGRAM

## 2024-04-11 PROCEDURE — 63600175 PHARM REV CODE 636 W HCPCS

## 2024-04-11 PROCEDURE — 94761 N-INVAS EAR/PLS OXIMETRY MLT: CPT

## 2024-04-11 PROCEDURE — 99900035 HC TECH TIME PER 15 MIN (STAT)

## 2024-04-11 PROCEDURE — 36415 COLL VENOUS BLD VENIPUNCTURE: CPT | Mod: XB

## 2024-04-11 PROCEDURE — 36415 COLL VENOUS BLD VENIPUNCTURE: CPT | Performed by: STUDENT IN AN ORGANIZED HEALTH CARE EDUCATION/TRAINING PROGRAM

## 2024-04-11 PROCEDURE — P9016 RBC LEUKOCYTES REDUCED: HCPCS | Performed by: STUDENT IN AN ORGANIZED HEALTH CARE EDUCATION/TRAINING PROGRAM

## 2024-04-11 PROCEDURE — 86850 RBC ANTIBODY SCREEN: CPT | Performed by: STUDENT IN AN ORGANIZED HEALTH CARE EDUCATION/TRAINING PROGRAM

## 2024-04-11 PROCEDURE — 84100 ASSAY OF PHOSPHORUS: CPT | Performed by: STUDENT IN AN ORGANIZED HEALTH CARE EDUCATION/TRAINING PROGRAM

## 2024-04-11 PROCEDURE — 25000003 PHARM REV CODE 250

## 2024-04-11 PROCEDURE — 30233N1 TRANSFUSION OF NONAUTOLOGOUS RED BLOOD CELLS INTO PERIPHERAL VEIN, PERCUTANEOUS APPROACH: ICD-10-PCS | Performed by: INTERNAL MEDICINE

## 2024-04-11 PROCEDURE — 63600175 PHARM REV CODE 636 W HCPCS: Performed by: STUDENT IN AN ORGANIZED HEALTH CARE EDUCATION/TRAINING PROGRAM

## 2024-04-11 PROCEDURE — 25000003 PHARM REV CODE 250: Performed by: INTERNAL MEDICINE

## 2024-04-11 PROCEDURE — 85025 COMPLETE CBC W/AUTO DIFF WBC: CPT | Mod: 91

## 2024-04-11 PROCEDURE — 80197 ASSAY OF TACROLIMUS: CPT | Performed by: STUDENT IN AN ORGANIZED HEALTH CARE EDUCATION/TRAINING PROGRAM

## 2024-04-11 PROCEDURE — 36430 TRANSFUSION BLD/BLD COMPNT: CPT

## 2024-04-11 PROCEDURE — 83735 ASSAY OF MAGNESIUM: CPT | Performed by: STUDENT IN AN ORGANIZED HEALTH CARE EDUCATION/TRAINING PROGRAM

## 2024-04-11 PROCEDURE — 86920 COMPATIBILITY TEST SPIN: CPT | Performed by: STUDENT IN AN ORGANIZED HEALTH CARE EDUCATION/TRAINING PROGRAM

## 2024-04-11 RX ORDER — SODIUM,POTASSIUM PHOSPHATES 280-250MG
1 POWDER IN PACKET (EA) ORAL ONCE
Status: COMPLETED | OUTPATIENT
Start: 2024-04-11 | End: 2024-04-11

## 2024-04-11 RX ORDER — AMOXICILLIN AND CLAVULANATE POTASSIUM 875; 125 MG/1; MG/1
1 TABLET, FILM COATED ORAL EVERY 12 HOURS
Status: DISCONTINUED | OUTPATIENT
Start: 2024-04-11 | End: 2024-04-11

## 2024-04-11 RX ORDER — PANTOPRAZOLE SODIUM 40 MG/1
40 TABLET, DELAYED RELEASE ORAL
Qty: 180 TABLET | Refills: 3 | Status: CANCELLED | OUTPATIENT
Start: 2024-04-11 | End: 2025-04-11

## 2024-04-11 RX ORDER — PANTOPRAZOLE SODIUM 40 MG/1
40 TABLET, DELAYED RELEASE ORAL
Qty: 180 TABLET | Refills: 3 | Status: SHIPPED | OUTPATIENT
Start: 2024-04-11 | End: 2025-04-11

## 2024-04-11 RX ORDER — AMOXICILLIN AND CLAVULANATE POTASSIUM 400; 57 MG/5ML; MG/5ML
875 POWDER, FOR SUSPENSION ORAL EVERY 12 HOURS
Status: CANCELLED | OUTPATIENT
Start: 2024-04-11 | End: 2024-04-16

## 2024-04-11 RX ORDER — SUCRALFATE 1 G/10ML
1 SUSPENSION ORAL
Status: CANCELLED | OUTPATIENT
Start: 2024-04-11

## 2024-04-11 RX ORDER — SODIUM CHLORIDE 9 MG/ML
INJECTION, SOLUTION INTRAVENOUS CONTINUOUS
Status: DISCONTINUED | OUTPATIENT
Start: 2024-04-11 | End: 2024-04-11 | Stop reason: HOSPADM

## 2024-04-11 RX ORDER — AMOXICILLIN AND CLAVULANATE POTASSIUM 400; 57 MG/5ML; MG/5ML
POWDER, FOR SUSPENSION ORAL
Qty: 150 ML | Refills: 0 | Status: SHIPPED | OUTPATIENT
Start: 2024-04-11

## 2024-04-11 RX ORDER — SUCRALFATE 1 G/1
1 TABLET ORAL
Qty: 120 TABLET | Refills: 0 | Status: SHIPPED | OUTPATIENT
Start: 2024-04-11 | End: 2024-04-11 | Stop reason: HOSPADM

## 2024-04-11 RX ORDER — OXYCODONE HYDROCHLORIDE 5 MG/1
5 TABLET ORAL EVERY 6 HOURS PRN
Refills: 0
Start: 2024-04-11 | End: 2024-04-17 | Stop reason: SDUPTHER

## 2024-04-11 RX ORDER — DOXYCYCLINE HYCLATE 100 MG
100 TABLET ORAL 2 TIMES DAILY
Qty: 42 TABLET | Refills: 0 | Status: SHIPPED | OUTPATIENT
Start: 2024-04-11 | End: 2024-04-17 | Stop reason: ALTCHOICE

## 2024-04-11 RX ORDER — MAGNESIUM SULFATE HEPTAHYDRATE 40 MG/ML
2 INJECTION, SOLUTION INTRAVENOUS ONCE
Status: COMPLETED | OUTPATIENT
Start: 2024-04-11 | End: 2024-04-11

## 2024-04-11 RX ORDER — HYDROCODONE BITARTRATE AND ACETAMINOPHEN 500; 5 MG/1; MG/1
TABLET ORAL
Status: DISCONTINUED | OUTPATIENT
Start: 2024-04-11 | End: 2024-04-11 | Stop reason: HOSPADM

## 2024-04-11 RX ORDER — MUPIROCIN 20 MG/G
OINTMENT TOPICAL 2 TIMES DAILY
Status: DISCONTINUED | OUTPATIENT
Start: 2024-04-11 | End: 2024-04-11 | Stop reason: HOSPADM

## 2024-04-11 RX ORDER — GABAPENTIN 300 MG/1
300 CAPSULE ORAL NIGHTLY
Qty: 30 CAPSULE | Refills: 11 | Status: CANCELLED | OUTPATIENT
Start: 2024-04-11 | End: 2025-04-11

## 2024-04-11 RX ORDER — SUCRALFATE 1 G/1
1 TABLET ORAL 4 TIMES DAILY
Qty: 120 TABLET | Refills: 0 | Status: SHIPPED | OUTPATIENT
Start: 2024-04-11

## 2024-04-11 RX ORDER — DEXAMETHASONE 0.5 MG/5ML
ELIXIR ORAL
Qty: 474 ML | Refills: 5 | Status: SHIPPED | OUTPATIENT
Start: 2024-04-11

## 2024-04-11 RX ORDER — OXYCODONE HYDROCHLORIDE 5 MG/1
5 TABLET ORAL EVERY 6 HOURS PRN
Qty: 120 TABLET | Refills: 0 | Status: CANCELLED | OUTPATIENT
Start: 2024-04-11

## 2024-04-11 RX ADMIN — MUPIROCIN: 20 OINTMENT TOPICAL at 10:04

## 2024-04-11 RX ADMIN — TACROLIMUS 1 MG: 1 CAPSULE ORAL at 09:04

## 2024-04-11 RX ADMIN — PANTOPRAZOLE SODIUM 40 MG: 40 TABLET, DELAYED RELEASE ORAL at 06:04

## 2024-04-11 RX ADMIN — SUCRALFATE 1 G: 1 SUSPENSION ORAL at 06:04

## 2024-04-11 RX ADMIN — DEXAMETHASONE 1 MG: 0.5 SOLUTION ORAL at 04:04

## 2024-04-11 RX ADMIN — HEPARIN SODIUM 5000 UNITS: 5000 INJECTION INTRAVENOUS; SUBCUTANEOUS at 06:04

## 2024-04-11 RX ADMIN — LEVOTHYROXINE SODIUM 150 MCG: 100 TABLET ORAL at 06:04

## 2024-04-11 RX ADMIN — SUCRALFATE 1 G: 1 SUSPENSION ORAL at 10:04

## 2024-04-11 RX ADMIN — DEXAMETHASONE 1 MG: 0.5 SOLUTION ORAL at 01:04

## 2024-04-11 RX ADMIN — OXYCODONE 5 MG: 5 TABLET ORAL at 09:04

## 2024-04-11 RX ADMIN — DEXAMETHASONE 1 MG: 0.5 SOLUTION ORAL at 10:04

## 2024-04-11 RX ADMIN — POTASSIUM BICARBONATE 25 MEQ: 978 TABLET, EFFERVESCENT ORAL at 09:04

## 2024-04-11 RX ADMIN — TACROLIMUS 0.5 MG: 0.5 CAPSULE ORAL at 06:04

## 2024-04-11 RX ADMIN — FLUOROMETHOLONE 1 DROP: 1 SOLUTION/ DROPS OPHTHALMIC at 09:04

## 2024-04-11 RX ADMIN — FLUOROMETHOLONE 1 DROP: 1 SOLUTION/ DROPS OPHTHALMIC at 01:04

## 2024-04-11 RX ADMIN — FLUOROMETHOLONE 1 DROP: 1 SOLUTION/ DROPS OPHTHALMIC at 04:04

## 2024-04-11 RX ADMIN — POTASSIUM & SODIUM PHOSPHATES POWDER PACK 280-160-250 MG 1 PACKET: 280-160-250 PACK at 09:04

## 2024-04-11 RX ADMIN — PANTOPRAZOLE SODIUM 40 MG: 40 TABLET, DELAYED RELEASE ORAL at 04:04

## 2024-04-11 RX ADMIN — SODIUM CHLORIDE: 9 INJECTION, SOLUTION INTRAVENOUS at 10:04

## 2024-04-11 RX ADMIN — POLYETHYLENE GLYCOL 3350 17 G: 17 POWDER, FOR SOLUTION ORAL at 09:04

## 2024-04-11 RX ADMIN — SENNOSIDES AND DOCUSATE SODIUM 1 TABLET: 8.6; 5 TABLET ORAL at 09:04

## 2024-04-11 RX ADMIN — SUCRALFATE 1 G: 1 SUSPENSION ORAL at 04:04

## 2024-04-11 RX ADMIN — HEPARIN SODIUM 5000 UNITS: 5000 INJECTION INTRAVENOUS; SUBCUTANEOUS at 01:04

## 2024-04-11 RX ADMIN — MAGNESIUM SULFATE HEPTAHYDRATE 2 G: 40 INJECTION, SOLUTION INTRAVENOUS at 10:04

## 2024-04-11 NOTE — PLAN OF CARE
Problem: Adult Inpatient Plan of Care  Goal: Plan of Care Review  Outcome: Ongoing, Progressing  Goal: Optimal Comfort and Wellbeing  Outcome: Ongoing, Progressing     Problem: Fluid and Electrolyte Imbalance (Acute Kidney Injury/Impairment)  Goal: Fluid and Electrolyte Balance  Outcome: Ongoing, Progressing     Problem: Oral Intake Inadequate (Acute Kidney Injury/Impairment)  Goal: Optimal Nutrition Intake  Outcome: Ongoing, Progressing     Problem: Fall Injury Risk  Goal: Absence of Fall and Fall-Related Injury  Outcome: Ongoing, Progressing

## 2024-04-11 NOTE — DISCHARGE INSTRUCTIONS
#Erosive Esophagitis  Recommendation per GI  - protonix BID for 12 weeks  - Carafate QID for 1 month  --- Crush and mix 1 tablet (1 g total) with liquid and take by mouth 4 (four) times daily before meals and nightly.   - repeat EGD in 12 weeks      #Throat pain due to mucositis/pharyngitis  - please use magic mouth wash  - please use dexamethasone swish and spit  - please take Augmentin twice a day (every 12 hours) x 5 days (End date on 4/16/24)

## 2024-04-11 NOTE — HOSPITAL COURSE
Patient with squamous cell carcinoma of the tongue followed by Dr. Hernandez admitted for TSERING on CKD secondary to mucositis (due to 5FU) leading to poor p.o. intake.  Patient was started on magic mouthwash and dexamethasone swish and spit with improvement of pain.  Palliative Medicine was consulted for possibly initiation methadone; however, patient opt out to use oxycodone for now.  Hospital course was couple gated as patient developed melena GI was involved.  EGD showed severe erosive esophagitis.  Patient started on Protonix b.i.d. for 12 weeks, WAQAS 7/2/24.  Carafate q.i.d. for 1 month EED 5/10/24.  Augmentin was prescribed with (End date on 4/16/24) as patient had throat culture growing Klebsiella. Patient was instructed to hold doxycycline until see Dr. Hernandez.  Patient is planned to follow up with Dr. Hernandez and MD Lin.

## 2024-04-11 NOTE — PROGRESS NOTES
Hgb on CBC noted to be 6.8. No type and screen on file. MD notified of value and need for type and screen/consent. Type and screen and consent pending completion at this time and PRBCs to follow after.

## 2024-04-11 NOTE — PROGRESS NOTES
Admit Assessment    Patient Identification  Rocco Swain   :  1950  Admit Date:  2024  Attending Provider:  Martín Hernandez MD              Referral:   Patient was admitted to Medical Oncology Service with a diagnosis of SCC of the Tongue, Recurrence, and Orbital SCC, and he was admitted this hospital stay due to Acute Kidney Injury superimposed on Chronic Kidney Disease, Melena, Sore Throat.  Leukocytosis [D72.829]  Chest pain [R07.9].    Patient is nonverbal due to prior laryngectomy and total glossectomy, and also has a history of postoperative hypothyrodism, hepatitis C liver cirrhosis s/p liver transplantation .  Additional oncologic and medical history is noted in H&P, in chart.    Oncology Social Worker is involved was referred to the Social Work Department via ([employer]).  Patient presents as a 74 y.o. year old, single,   male.    Persons interviewed: Met with patient in his room to complete assessment. Patient was alert, oriented, and cooperative. He communicates by nodding his head, mouthing words, and using a marker board to write on.     Living Situation:    Patient resides alone Patient resides alone in a townhouse at [address] [place] [address]   [address] [place] [address].  Patient's brother Ollie Swain resides nearby, and his cell phone number is [phone], and is patient's contact.    Patient is not  and he has no children.     Patient was previously ambulatory, independent with ADLs, household tasks, and driving.       (RETIRED) Functional Status Prior  Ambulation Prior: 0-->independent  Transferrin-->independent  Toiletin-->independent    Current or Past Agencies and Description of Services/Supplies    DME  None     Home Health  None    IV Infusion  None    Nutrition: Oral diet      Outpatient Pharmacy:     [facility] - [place], [place] - [address]  [address]  [address]  [facility]  [address]  Phone: [phone] Fax: [phone]    [facility] [place]  [address]  [facility] [address]  Phone: [phone] Fax: [phone]    [facility] - [place], [place] - [address]  [address]  [address]  [facility] [address]  Phone: [phone] Fax: [phone]    [facility]  [address]  [place] [address]  Phone: [phone] Fax: [phone]      Patient Preference of agencies include: as listed above.    Patient/Caregiver informed of right to choose providers or agencies.  Patient provides permission to release any necessary information to [facility] and to Non[facility] agencies as needed to facilitate patient care, treatment planning, and patient discharge planning.  Written and verbal resources will be provided as needed/requested.      Coping  Appropriate to situation.   Patient wrote whatever happens, happens.  He enjoys travelling, fishing, spending time with his brother.     Patient's Distress Score Cross Encounter: 0 - No Distress [[id number]]           Adjustment to Diagnosis and Treatment  Appropriate; ongoing process.      Emotional/Behavioral/Cognitive Issues  None noted/observed.          History/Current Symptoms of Anxiety/Depression:     History/Current Substance Use:  As per chart:  Social History     Tobacco Use    Smoking status: Never    Smokeless tobacco: Never   Substance and Sexual Activity    Alcohol use: Yes     Comment: drinks wine, 1 glass day    Drug use: Not Currently    Sexual activity: Yes     Comment: No prior history of STD        Indications of Abuse/Neglect: No  As per chart:  Abuse Screen (yes response referral indicated)  Feels Unsafe at Home or Work/School: no  Feels Threatened by Someone: no  Does anyone try to keep you from having contact with others or doing things outside your home?: no  Physical Signs of Abuse Present: no      Financial:  Payer/Plan Subscr  Sex Relation Sub. Ins. ID  Effective Group Num   1. [place]ARE - [place]* FARIBA AIKEN 1950 Male Self [id number] 5/1/11                                    [address]   2. [facility]* FARIBA AIKEN 1950 Male Self [id number] 3/1/19                                    [address]      Patient's medical insurances are Medicare primary, and a [facility] Medicare supplement.    He has Advance Directives, LW and HPOA, on file. His brother is listed as the primary agent for his HPOA.                Other identified concerns/needs: none    Plan: Patient plans to return home alone. His brother will provide car transportation for him at discharge from the hospital.    Interventions/Referrals: none at present    Patient/caregiver engaged in treatment planning process.    Oncology Social Worker providing psychosocial and supportive counseling, resources, education, assistance and discharge planning as appropriate.  Patient/caregiver state understanding of  available resources,  following, remains available.

## 2024-04-11 NOTE — PROGRESS NOTES
Assumed care of pt from 1845-0715.     - A/Ox4  - VSS on RA  - PIV flushes w/o difficulty; saline locked  - Voiding using urinal  - Bed alarm activated  - Call bell within reach    Pt performing trach care independently. Communicates w/ white board and yes/no head gestures.    No acute events overnight. POC discussed - pt verbalizes understanding and denies questions/concerns at this time.     At the conclusion of this shift, pt is observed to be resting comfortably w/ unlabored breathing and exhibits no s/s of distress. Bed is in lowest position and locked. POC followed.

## 2024-04-11 NOTE — DISCHARGE SUMMARY
Dax Gordon - Oncology ([facility])  Hematology/Oncology  Discharge Summary      Patient Name: Rocco Swain  MRN: [id number]  Admission Date: 4/8/2024  Hospital Length of Stay: 3 days  Discharge Date and Time:  04/11/2024 10:51 AM  Attending Physician: Martín Hernandez MD   Discharging Provider: Ronn Lisa DO  Primary Care Provider: Leny, Primary Doctor    HPI: 73 y/o F with SCC of the tongue (follows Dr. 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  Patient was supposed to be scheduled for a chemotherapy infusion today however he was complaining overall fatigue, generalized weakness, worsening sore throat.  His plan of care nurse recommended him to go to the ED for further evaluation.  Describes as a soreness in his posterior throat that has been progressively worsened for the last 4 days.  He does report of decreased p.o. intake.  He normally eats a liquid diet however his overall fatigue made him skip meals.  Reports of associated chills, body aches.  No sick contact.  He lives by himself and perform his ADLs independently. Similar symptoms after receiving infusion in the past.       Initial vitals in the ED: Patient was tachycardic 109, leukocytosis of 20, hemoglobin 8 5 (BL 11-10), CMP significant for BUN/Cr 35/1.7 (baseline 1.5 - 0.9),  chest x-ray with no acute pulmonary process. Patient received 1L in the ED, now on continuous fluids.        Oncology History   Squamous cell carcinoma of base of tongue   9/18/2020 Cancer Staged     Staging form: Pharynx - HPV-Mediated Oropharynx, AJCC 8th Edition  - Clinical stage from 9/18/2020: Stage III (rcT4, cN1, cM0, p16+)      9/28/2020 Initial Diagnosis     Squamous cell carcinoma of base of tongue      10/6/2020 - 8/17/2021 Chemotherapy     Treatment Summary   Plan Name: OP HEAD NECK  CARBOPLATIN PACLITAXEL C1-2 FOLLOWED BY CETUXIMAB CARBOPLATIN C3-6 FOLLOWED BY CETUXIMAB MAINTENANCE WEEKLY  Treatment Goal: Control  Status: Inactive  Start Date: 10/6/2020  End Date: 8/17/2021  Provider: Ronald eBrg MD  Chemotherapy: cetuximab (ERBITUX) 100 mg/50 mL chemo infusion 684 mg, 400 mg/m2 = 684 mg (100 % of original dose 400 mg/m2), Intravenous, Clinic/[facility] 1 time, 29 of 38 cycles  Dose modification: 500 mg/m2 (original dose 400 mg/m2, Cycle 3), 250 mg/m2 (original dose 400 mg/m2, Cycle 3), 400 mg/m2 (original dose 400 mg/m2, Cycle 3), 250 mg/m2 (original dose 250 mg/m2, Cycle 7), 200 mg/m2 (original dose 250 mg/m2, Cycle 18), 200 mg/m2 (original dose 250 mg/m2, Cycle 19), 250 mg/m2 (original dose 250 mg/m2, Cycle 4), 200 mg/m2 (original dose 250 mg/m2, Cycle 25), 200 mg/m2 (original dose 250 mg/m2, Cycle 28)  Administration: 684 mg (11/17/2020), 400 mg (11/24/2020), 400 mg (2/9/2021), 400 mg (2/17/2021), 427.6 mg (3/9/2021), 400 mg (3/30/2021), 400 mg (3/23/2021), 400 mg (4/6/2021), 427.6 mg (4/13/2021), 427.6 mg (4/20/2021), 342 mg (5/11/2021), 342 mg (5/18/2021), 342 mg (5/25/2021), 400 mg (5/31/2021), 400 mg (6/7/2021), 400 mg (6/14/2021), 400 mg (12/8/2020), 427.6 mg (12/29/2020), 400 mg (12/1/2020), 400 mg (12/15/2020), 400 mg (12/22/2020), 427.6 mg (1/5/2021), 400 mg (1/12/2021), 400 mg (1/19/2021), 427.6 mg (1/26/2021), 400 mg (2/2/2021), 400 mg (2/23/2021), 400 mg (3/2/2021), 427.6 mg (3/16/2021), 400 mg (6/21/2021), 342 mg (6/28/2021), 342 mg (7/6/2021), 342 mg (7/13/2021), 342 mg (7/20/2021), 400 mg (7/27/2021), 400 mg (8/10/2021), 400 mg (8/17/2021)  CARBOplatin (PARAPLATIN) 310 mg in sodium chloride 0.9% 500 mL chemo infusion, 310 mg (92.2 % of original dose 334.5 mg), Intravenous, Clinic/[facility] 1 time, 6 of 6 cycles  Dose modification:   (original dose 334.5 mg, Cycle 1)  Administration: 310 mg (10/6/2020), 370 mg (11/17/2020), 300 mg (10/27/2020), 350 mg (12/8/2020), 335 mg (12/29/2020), 320  mg (1/19/2021)  PACLitaxeL (TAXOL) 175 mg/m2 = 300 mg in sodium chloride 0.9% 500 mL chemo infusion, 175 mg/m2 = 300 mg (100 % of original dose 175 mg/m2), Intravenous, [facility] 1 time, 2 of 2 cycles  Dose modification: 175 mg/m2 (original dose 175 mg/m2, Cycle 1)  Administration: 300 mg (10/6/2020), 300 mg (10/27/2020)      4/29/2021 Tumor Conference        His case was discussed at the Multidisciplinary Head and Neck Team Planning Meeting.     Representatives from Medical Oncology, Radiation Oncology, Head and Neck Surgical Oncology, Psychosocial Oncology, and Speech and Language Pathology discussed the case with the following recommendations:     1) biopsy            7/29/2021 Tumor Conference        His case was discussed at the Multidisciplinary Head and Neck Team Planning Meeting.     Representatives from Medical Oncology, Radiation Oncology, Head and Neck Surgical Oncology, Psychosocial Oncology, and Speech and Language Pathology discussed the case with the following recommendations:     1) Head and neck clinic follow up  2) consider Keytruda (discuss with transplant)  3) consider palliative referral            9/13/2021 - 12/29/2023 Chemotherapy     Treatment Summary   Plan Name: OP HEAD NECK PEMBROLIZUMAB CARBOPLATIN FLUOROURACIL (C1 ONLY RECEIVED) FOLLOWED BY PEMBROLIZUMAB MAINTENANCE  Treatment Goal: Palliative  Status: Inactive  Start Date: 9/13/2021  End Date: 12/29/2023  Provider: Ronald Berg MD  Chemotherapy: CARBOplatin (PARAPLATIN) 320 mg in sodium chloride 0.9% 500 mL chemo infusion, 320 mg (100 % of original dose 320.5 mg), Intravenous, [facility] 1 time, 6 of 6 cycles  Dose modification:   (original dose 320.5 mg, Cycle 1)  Administration: 320 mg (9/13/2021), 335 mg (12/23/2021), 325 mg (1/27/2022), 245 mg (3/3/2022), 255 mg (5/12/2022), 255 mg (4/7/2022)  fluorouraciL 1,000 mg/m2/day = 6,440 mg in sodium chloride 0.9% 240 mL chemo infusion, 1,000 mg/m2/day = 6,440 mg, Intravenous, Over 96  hours, 6 of 6 cycles  Dose modification: 800 mg/m2/day (original dose 1,000 mg/m2/day, Cycle 6), 5,000 mg (original dose 1,000 mg/m2/day, Cycle 8)  Administration: 6,440 mg (9/13/2021), 6,440 mg (12/23/2021), 6,480 mg (1/27/2022), 5,000 mg (3/3/2022), 5,000 mg (4/7/2022), 5,000 mg (5/12/2022)      Secondary malignant neoplasm of cervical lymph node   2/9/2021 Initial Diagnosis     Secondary malignant neoplasm of cervical lymph node      9/13/2021 - 12/29/2023 Chemotherapy     Treatment Summary   Plan Name: OP HEAD NECK PEMBROLIZUMAB CARBOPLATIN FLUOROURACIL (C1 ONLY RECEIVED) FOLLOWED BY PEMBROLIZUMAB MAINTENANCE  Treatment Goal: Palliative  Status: Inactive  Start Date: 9/13/2021  End Date: 12/29/2023  Provider: Ronald Berg MD  Chemotherapy: CARBOplatin (PARAPLATIN) 320 mg in sodium chloride 0.9% 500 mL chemo infusion, 320 mg (100 % of original dose 320.5 mg), Intravenous, [facility] [place] 1 time, 6 of 6 cycles  Dose modification:   (original dose 320.5 mg, Cycle 1)  Administration: 320 mg (9/13/2021), 335 mg (12/23/2021), 325 mg (1/27/2022), 245 mg (3/3/2022), 255 mg (5/12/2022), 255 mg (4/7/2022)  fluorouraciL 1,000 mg/m2/day = 6,440 mg in sodium chloride 0.9% 240 mL chemo infusion, 1,000 mg/m2/day = 6,440 mg, Intravenous, Over 96 hours, 6 of 6 cycles  Dose modification: 800 mg/m2/day (original dose 1,000 mg/m2/day, Cycle 6), 5,000 mg (original dose 1,000 mg/m2/day, Cycle 8)  Administration: 6,440 mg (9/13/2021), 6,440 mg (12/23/2021), 6,480 mg (1/27/2022), 5,000 mg (3/3/2022), 5,000 mg (4/7/2022), 5,000 mg (5/12/2022)      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   1/29/2024 Initial Diagnosis     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      2/19/2024 -  Chemotherapy     Treatment Summary   Plan Name: OP HEAD NECK CETUXIMAB CARBOPLATIN FLUOROURACIL Q3W  Treatment Goal: Curative  Status: Active  Start Date: 2/19/2024  End Date: 8/5/2024 (Planned)  Provider: Martín Hernandez MD  Chemotherapy: cetuximab (ERBITUX) 100  "mg/50 mL chemo infusion 668 mg, 400 mg/m2 = 668 mg, Intravenous, [facility] 1 time, 2 of 12 cycles  Administration: 668 mg (2/19/2024), 400 mg (2/26/2024), 400 mg (3/18/2024), 400 mg (3/25/2024), 400 mg (3/11/2024)  CARBOplatin (PARAPLATIN) 295 mg in sodium chloride 0.9% 314.5 mL chemo infusion, 295 mg, Intravenous, [facility] 1 time, 2 of 6 cycles  Administration: 295 mg (2/19/2024), 300 mg (3/18/2024)       Procedure(s) (LRB):  EGD (ESOPHAGOGASTRODUODENOSCOPY) (N/A)     Hospital Course: Patient with squamous cell carcinoma of the tongue followed by Dr. Hernandez admitted for TSERING on CKD secondary to mucositis (due to 5FU) leading to poor p.o. intake.  Patient was started on magic mouthwash and dexamethasone swish and spit with improvement of pain.  Palliative Medicine was consulted for possibly initiation methadone; however, patient opt out to use oxycodone for now.  Hospital course was couple gated as patient developed melena GI was involved.  EGD showed severe erosive esophagitis.  Patient started on Protonix b.i.d. for 12 weeks, WAQAS 7/2/24.  Carafate q.i.d. for 1 month EED 5/10/24.  Throat culture revealed Klebsiella, pharyngitis.  Augmentin was given. Patient was instructed to hold doxycycline until see Dr. Hernandez.  Patient is planned to follow up with Dr. Hernandez and MD Lin.      Goals of Care Treatment Preferences:  Code Status: Full Code    Living Will: Yes     What is most important right now is to focus on remaining as independent as possible, symptom/pain control.  Accordingly, we have decided that the best plan to meet the patient's goals includes continuing with treatment.    /65 (BP Location: Right arm, Patient Position: Lying)   Pulse 91   Temp 98.2 °F (36.8 °C) (Oral)   Resp 16   Ht 5' 8" (1.727 m)   Wt 51.3 kg (113 lb 2.2 oz)   SpO2 97%   BMI 17.20 kg/m²     General Appear: No acute distress. Alert and cooperative, cachectic, nonverbal  Head:  Normocephalic, atraumatic.  HEENT: TEO, " PERRL, EOMI  Neck:  Supple, normal ROM, nontender, no JVD  Cardio: RRR S1/S2 nl, No m/r/g.   Lungs: CTA bilaterally, no wheezing or rales  Abdomen: normal bowel sounds, soft, nontender, no guarding, rebound, or masses  Ext:  Normal ROM, no edema  Skin: warm and dry. No rashes or lesions  Neuro:  Alert and Oriented x 3 No focal deficits. Normal strength and sensation   Psyc: Mood and thought content are normal and appropriate.      Consults:   Consults (From admission, onward)          Status Ordering Provider     Inpatient consult to Gastroenterology  Once        Provider:  (Not yet assigned)    Completed AGUSTIN CARTER     Inpatient consult to Palliative Care  Once        Provider:  (Not yet assigned)    Completed JOHN JAY     Inpatient consult to Hepatology  Once        Provider:  (Not yet assigned)    Completed JOHN JAY            Significant Diagnostic Studies: Labs: CMP   Recent Labs   Lab 04/10/24  0502 04/11/24  0506    142   K 3.7 3.5    111*   CO2 22* 22*   GLU 90 88   BUN 20 21   CREATININE 1.1 1.3   CALCIUM 8.1* 7.9*   PROT 6.0 5.5*   ALBUMIN 2.6* 2.4*   BILITOT 0.3 0.3   ALKPHOS 90 92   AST 26 26   ALT 12 12   ANIONGAP 8 9    and CBC   Recent Labs   Lab 04/10/24  0502 04/11/24  0506   WBC 16.59* 13.72*   HGB 7.8* 6.8*   HCT 23.8* 20.0*    233       Pending Diagnostic Studies:       None          Final Active Diagnoses:    Diagnosis Date Noted POA    PRINCIPAL PROBLEM:  Acute kidney injury superimposed on chronic kidney disease [N17.9, N18.9] 04/08/2024 Yes    Melena [K92.1] 04/10/2024 Yes    Sore throat [J02.9] 04/08/2024 Yes    Palliative care encounter [Z51.5] 09/13/2023 Not Applicable    GIB (gastrointestinal bleeding) [K92.2] 09/13/2023 Yes    Chronic kidney disease (CKD), stage IV (severe) [N18.4] 10/05/2020 Yes    Squamous cell carcinoma of base of tongue [C01] 09/28/2020 Yes    Status post liver transplantation - 2008 [Z94.4] 07/19/2020 Not Applicable    Hepatitis C  [B19.20] 01/27/2020 Yes    Postoperative hypothyroidism [E89.0] 01/27/2020 Yes    History of laryngectomy [Z90.02] 01/27/2020 Not Applicable      Problems Resolved During this Admission:      Discharged Condition: fair    Disposition: Home or Self Care    Follow Up:    Patient Instructions:   No discharge procedures on file.  Medications:  Reconciled Home Medications:      Medication List        START taking these medications      amoxicillin-clavulanate 400-57 mg/5 mL Susr  Commonly known as: AUGMENTIN  Take 11 mLs (875 mg total) by mouth every 12 (twelve) hours for 5 days. Discard Remainder     dexAMETHasone 0.5 mg/5 mL Elix  Commonly known as: DECADRON  Swish 10 mL by mouth for 2 minutes then spit 4 times daily     oxyCODONE 5 MG immediate release tablet  Commonly known as: ROXICODONE  Take 1 tablet (5 mg total) by mouth every 6 (six) hours as needed for Pain.     pantoprazole 40 MG tablet  Commonly known as: PROTONIX  Take 1 tablet (40 mg total) by mouth 2 (two) times daily before meals.     sucralfate 1 gram tablet  Commonly known as: CARAFATE  Take 1 tablet (1 g total) by mouth 4 times daily. Tablet can be broken and dissolved in water for ease of administration. Consume within 30 minutes of dissolving. Separate from pantoprazole and levothyroxine by at least 2 hours.            CHANGE how you take these medications      doxycycline 100 MG tablet  Commonly known as: VIBRA-TABS  Take 1 tablet (100 mg total) by mouth 2 (two) times daily. HOLD UNTIL SEE DR. SHEIKH  What changed: additional instructions     gabapentin 300 MG capsule  Commonly known as: NEURONTIN  Take 1 capsule (300 mg total) by mouth every evening.  What changed:   when to take this  reasons to take this            CONTINUE taking these medications      azelaic acid 15 % gel  Commonly known as: AZELEX  APPLY TOPICALLY TO AFFECTED AREA IN THE MORNING     CENTRUM SILVER MEN ORAL  Take 1 tablet by mouth once daily.     chlorhexidine 0.12 %  solution  Commonly known as: PERIDEX  Gently swish and spit 15 mL twice a day for 3 weeks. Start rinses the day after your procedure.     clindamycin phosphate 1% 1 % gel  Commonly known as: CLINDAGEL  Apply topically 2 (two) times daily.     erythromycin ophthalmic ointment  Commonly known as: ROMYCIN  Administer 0.5 inches to the right eye twice daily for 30 days.     hydrocortisone 1 % cream  Apply to face, hands, feet, neck, back and chest at bedtime.     imiquimod 5 % cream  Commonly known as: ALDARA  Apply to biopsy site on frontal scalp + about a centimeter of surrounding skin qhs x 16 weeks. Wash off in am and apply sunscreen. Discontinue if skin becomes blistered, raw, bleeding, painful, etc.     LIDOcaine viscous HCl 2% 2 % Soln  Commonly known as: LIDOcaine VISCOUS  Swish and spit 15 mls every 8 (eight) hours as needed (mouth sore).     magic mouthwash diphen/antac/lidoc/nysta  Take 10 mLs by mouth 4 (four) times daily.     metroNIDAZOLE 0.75 % gel  Commonly known as: METROGEL  Apply topically to affected area 2 (two) times daily.     OLANZapine 5 MG tablet  Commonly known as: ZyPREXA  Take 1 tablet (5 mg total) by mouth every evening. Take as directed on days 1-4 of your chemotherapy cycle.     prednisoLONE acetate 1 % Drps  Commonly known as: PRED FORTE  Place 1 drop into the right eye 4 (four) times daily.     prochlorperazine 10 MG tablet  Commonly known as: COMPAZINE  Take ½ tablet (5 mg total) by mouth every 6 (six) hours as needed.     SENNA 8.6 mg tablet  Generic drug: senna  Take 2 tablets by mouth 2 (two) times daily as needed for Constipation.     sulfacetamide sodium-sulfur 10-5 % (w/w) Clsr  USE TO WASH AFFECTED AREA DAILY     * tacrolimus 0.5 MG Cap  Commonly known as: PROGRAF  Take 1 capsule (0.5 mg total) by mouth every EVENING.  (Use the 1mg capsule for your morning dose)     * tacrolimus 1 MG Cap  Commonly known as: PROGRAF  Take 1 capsule (1 mg total) by mouth every MORNING. Use the  tacrolimus 0.5mg capsule for your evening dose as directed.     Tirosint-SoL 150 mcg/mL Soln  Generic drug: levothyroxine  Take 150 mcg by mouth before breakfast.     tiZANidine 2 MG tablet  Commonly known as: ZANAFLEX  Take 1 tablet (2 mg total) by mouth nightly as needed (neck muscle strain).     traZODone 50 MG tablet  Commonly known as: DESYREL  TAKE 1 TABLET BY MOUTH IN THE  EVENING     vitamin E 1000 UNIT capsule  Take 1 cap PO BID until gone. Start taking one week prior to your dental surgery.           * This list has 2 medication(s) that are the same as other medications prescribed for you. Read the directions carefully, and ask your doctor or other care provider to review them with you.                  Ronn Lisa,   Hematology/Oncology  Dax Gordon - Oncology ([facility])

## 2024-04-11 NOTE — RESPIRATORY THERAPY
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  Clinically Significant Surgical Hx: laryngectomy    24 Hours Vitals Range:  Temp:  [97.2 °F (36.2 °C)-98.7 °F (37.1 °C)]   Pulse:  [66-91]   Resp:  [13-20]   BP: (103-152)/(56-84)   SpO2:  [97 %-100 %]     Labs:    Recent Labs     24  0237 04/10/24  0502 24  0506    138 142   K 3.4* 3.7 3.5    108 111*   CO2 21* 22* 22*   BUN 27* 20 21   CREATININE 1.2 1.1 1.3   GLU 84 90 88   PHOS 2.8 2.5* 2.7   MG 1.6 1.6 1.4*        No results for input(s): "PH", "PCO2", "PO2", "HCO3", "POCSATURATED", "BE" in the last 72 hours.    ASSESSMENT/INTERVENTIONS  Patient resting comfortably. No respiratory concerns at this time. Laryngectomy airway safety equipment bedside      Last VS   Temp: 98.2 °F (36.8 °C) (824)  Pulse: 91 (824)  Resp: 16 (931)  BP: 135/65 (824)  SpO2: 97 % (900)      Extra trachs at bedside: NA  Level of Consciousness: Level of Consciousness (AVPU): alert  Respiratory Effort: Respiratory Effort: Normal, Unlabored Expansion/Accessory Muscle Usage: Expansion/Accessory Muscles/Retractions: no use of accessory muscles, no retractions, expansion symmetric  All Lung Field Breath Sounds: All Lung Fields Breath Sounds: Anterior:, Lateral:, clear  O2 Device/Concentration: RA  Surgical airway: Yes, laryngectomy,  NA  Ambu at bedside:       Active Orders   Respiratory Care    Oxygen Continuous     " Frequency: Continuous     Number of Occurrences: Until Specified     Order Comments: Discontinue when Sp02 is greater than or equal to 95% of equal to Preop Sp02       Order Questions:      Device type: Low flow      Device: Simple Face Mask      Titrate O2 per Oxygen Titration Protocol: Yes      Notify MD of: Inability to achieve desired SpO2    Routine tracheostomy care     Frequency: BID     Number of Occurrences: Until Specified    Stoma Care by RT PRN     Frequency: PRN     Number of Occurrences: Until Specified       RECOMMENDATIONS    We recommend: RRT Recs: Continue POC per primary team.      FOLLOW-UP    Please call back the Rapid Response RT, Inessa Bennett RRT at x [phone] for any questions or concerns.

## 2024-04-12 ENCOUNTER — PATIENT OUTREACH (OUTPATIENT)
Dept: ADMINISTRATIVE | Facility: CLINIC | Age: 74
End: 2024-04-12
Payer: MEDICARE

## 2024-04-12 NOTE — PLAN OF CARE
Patient is AAOx4 and ambulatory with no assistance.  VSS/afebrile, NSR on tele.  Trach capped, on RA.  Checking Q6 CBCs, last H/H 8.2/23.4 s/p 3u blood - next CBC @ 0700. S/P EGD/C-scope 9/14.  Pureed diet in place.  Protonix 40mg IVP continued.  gave IV K replacement for K 3.1.  Up to BR for elimination.  R chest wall port accessed.  No complaints o/n.  Fall precautions maintained. Bed in lowest locked position.  Call bell in reach.     Hypercholesterolemia

## 2024-04-12 NOTE — PLAN OF CARE
Side rails up x2; call bell in place; bed in lowest, locked position; skid proof socks on; no evidence of skin breakdown; care plan explained to patient; pt remains free of injury. Pt tolerated a full liquid diet, voids, ambulates in room. Stoma clean and dry, pt stated he did not need suctioning, on RA 02 stats 97%. One unit of PRBCs infused without incident, Mg rider infused without incident.  cc/hr received before and after Blood transfusion. Frequent rounding in progress, pt encouraged to call as needed, VSS and afebrile. IV d/cd dressing applied. Discharge instructions,appts, medications and scripts reviewed with pt. Pt verbalized understanding. Escort ordered for w/c transportation. Pt's brother at .

## 2024-04-13 LAB
BACTERIA BLD CULT: NORMAL
BACTERIA BLD CULT: NORMAL

## 2024-04-16 ENCOUNTER — TELEPHONE (OUTPATIENT)
Dept: ENDOSCOPY | Facility: HOSPITAL | Age: 74
End: 2024-04-16
Payer: MEDICARE

## 2024-04-16 DIAGNOSIS — K20.90 ESOPHAGITIS: Primary | ICD-10-CM

## 2024-04-16 NOTE — TELEPHONE ENCOUNTER
"----- Message from Neida Burnham sent at 4/15/2024  9:31 AM CDT -----  Regarding: FW: Repeat EGD    ----- Message -----  From: Kannan Hawkins MD  Sent: 4/15/2024  12:00 AM CDT  To: [facility]  Subject: Repeat EGD                                       Procedure: EGD    Diagnosis: Esophagitis    Procedure Timing: 10 weeks    #If within 4 weeks selected, please grayson as high priority#    #If greater than 12 weeks, please select "5-12 weeks" and delay sending until 3 months prior to requested date#     Provider: Any GI provider    Location: [address]    Additional Scheduling Information: hx of laryngectomy    Prep Specifications:N/A    Is the patient taking a GLP-1 Agonist:no    Have you attached a patient to this message: no  "

## 2024-04-16 NOTE — TELEPHONE ENCOUNTER
Spoke to patient to schedule procedure(s) Upper Endoscopy (EGD)       Physician to perform procedure(s) Dr. ALEXANDRE Mari  Date of Procedure (s) 6/28/2024  Arrival Time 11:30 Am   Time of Procedure(s) 12:30pm    Location of Procedure(s) [place] 2nd Floor  Type of Rx Prep sent to patient: N/A  Instructions provided to patient via [facility]    Patient was informed on the following information and verbalized understanding. Screening questionnaire reviewed with patient and complete. If procedure requires anesthesia, a responsible adult needs to be present to accompany the patient home, patient cannot drive after receiving anesthesia. Appointment details are tentative, especially check-in time. Patient will receive a prep-op call 7 days prior to confirm check-in time for procedure. If applicable the patient should contact their pharmacy to verify Rx for procedure prep is ready for pick-up. Patient was advised to call the scheduling department at [phone] if pharmacy states no Rx is available. Patient was advised to call the endoscopy scheduling department if any questions or concerns arise.       Endoscopy Scheduling Department

## 2024-04-17 ENCOUNTER — PATIENT MESSAGE (OUTPATIENT)
Dept: HEMATOLOGY/ONCOLOGY | Facility: CLINIC | Age: 74
End: 2024-04-17

## 2024-04-17 ENCOUNTER — OFFICE VISIT (OUTPATIENT)
Dept: HEMATOLOGY/ONCOLOGY | Facility: CLINIC | Age: 74
End: 2024-04-17
Payer: MEDICARE

## 2024-04-17 VITALS
WEIGHT: 108 LBS | RESPIRATION RATE: 14 BRPM | HEART RATE: 76 BPM | TEMPERATURE: 97 F | OXYGEN SATURATION: 96 % | HEIGHT: 68 IN | BODY MASS INDEX: 16.37 KG/M2 | SYSTOLIC BLOOD PRESSURE: 113 MMHG | DIASTOLIC BLOOD PRESSURE: 79 MMHG

## 2024-04-17 DIAGNOSIS — Z94.4 LIVER TRANSPLANTED: ICD-10-CM

## 2024-04-17 DIAGNOSIS — K92.2 UPPER GI BLEEDING: ICD-10-CM

## 2024-04-17 DIAGNOSIS — K22.10 EROSIVE ESOPHAGITIS: ICD-10-CM

## 2024-04-17 DIAGNOSIS — C77.0 SECONDARY MALIGNANT NEOPLASM OF CERVICAL LYMPH NODE: ICD-10-CM

## 2024-04-17 DIAGNOSIS — C44.329 SQUAMOUS CELL CARCINOMA OF SKIN OF ORBIT: Primary | ICD-10-CM

## 2024-04-17 DIAGNOSIS — C01 SQUAMOUS CELL CARCINOMA OF BASE OF TONGUE: ICD-10-CM

## 2024-04-17 DIAGNOSIS — D50.0 ANEMIA DUE TO BLOOD LOSS: ICD-10-CM

## 2024-04-17 DIAGNOSIS — K12.30 MUCOSITIS: ICD-10-CM

## 2024-04-17 DIAGNOSIS — N18.4 CHRONIC KIDNEY DISEASE (CKD), STAGE IV (SEVERE): ICD-10-CM

## 2024-04-17 DIAGNOSIS — R13.10 DYSPHAGIA, UNSPECIFIED TYPE: ICD-10-CM

## 2024-04-17 DIAGNOSIS — E03.2 HYPOTHYROIDISM DUE TO MEDICAMENTS AND OTHER EXOGENOUS SUBSTANCES: ICD-10-CM

## 2024-04-17 DIAGNOSIS — R63.4 WEIGHT LOSS: ICD-10-CM

## 2024-04-17 DIAGNOSIS — Z94.4 STATUS POST LIVER TRANSPLANTATION: ICD-10-CM

## 2024-04-17 DIAGNOSIS — D84.821 IMMUNODEFICIENCY DUE TO DRUGS: ICD-10-CM

## 2024-04-17 DIAGNOSIS — Z93.0 TRACHEOSTOMY STATUS: ICD-10-CM

## 2024-04-17 DIAGNOSIS — Z79.899 IMMUNODEFICIENCY DUE TO DRUGS: ICD-10-CM

## 2024-04-17 PROCEDURE — 99215 OFFICE O/P EST HI 40 MIN: CPT | Mod: S$PBB,,, | Performed by: NURSE PRACTITIONER

## 2024-04-17 PROCEDURE — 99999 PR PBB SHADOW E&M-EST. PATIENT-LVL V: CPT | Mod: PBBFAC,,, | Performed by: NURSE PRACTITIONER

## 2024-04-17 PROCEDURE — 99215 OFFICE O/P EST HI 40 MIN: CPT | Mod: PBBFAC,25 | Performed by: NURSE PRACTITIONER

## 2024-04-17 RX ORDER — OXYCODONE HYDROCHLORIDE 5 MG/1
5 TABLET ORAL EVERY 6 HOURS PRN
Qty: 60 TABLET | Refills: 0 | Status: SHIPPED | OUTPATIENT
Start: 2024-04-17

## 2024-04-17 RX ORDER — TACROLIMUS 1 MG/1
1 CAPSULE ORAL EVERY MORNING
Qty: 90 CAPSULE | Refills: 3 | Status: SHIPPED | OUTPATIENT
Start: 2024-04-17

## 2024-04-17 RX ORDER — TACROLIMUS 0.5 MG/1
0.5 CAPSULE ORAL NIGHTLY
Qty: 90 CAPSULE | Refills: 3 | Status: SHIPPED | OUTPATIENT
Start: 2024-04-17

## 2024-04-17 RX ORDER — HEPARIN 100 UNIT/ML
500 SYRINGE INTRAVENOUS
Status: CANCELLED | OUTPATIENT
Start: 2024-04-17

## 2024-04-17 RX ORDER — SODIUM CHLORIDE 0.9 % (FLUSH) 0.9 %
10 SYRINGE (ML) INJECTION
Status: CANCELLED | OUTPATIENT
Start: 2024-04-17

## 2024-04-17 NOTE — PROGRESS NOTES
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 male who returns to clinic for management of newly progressing cSCC. Patient had ANGELES on October PETCT, but then developed quickly progressing orbital lesion. He was seen at [facility] in [place]. Recommendation was to follow up at [facility] to initiate induction chemotherapy followed by possible surgery.     Interval HPI:  Admitted for TSERING on CKD secondary to mucositis (due to 5FU) leading to poor p.o. intake on 4/8/24. Patient was started on magic mouthwash and dexamethasone swish and spit with improvement of pain. Patient developed melena - GI was involved. EGD showed severe erosive esophagitis. Patient started on Protonix b.i.d. for 12 weeks, EED 7/2/24. Carafate q.i.d. for 1 month EED 5/10/24. Throat culture revealed Klebsiella, pharyngitis. Augmentin was given. Patient was instructed to hold doxycycline until f/u appt.     Today, patient reports continued throat pain. Did not receive pain meds from hospital discharge as Rx did not send electronically. Has been unable to swallow pills - states they stay in his throat and do not go down. Weight continues to drop. Has some intake of electrolyte drinks and Ensure but minimal amounts. Patient asking for PEG tube to help recover weight and improve nutrition. Completed Augmentin. Afebrile. Has loose stools this morning he states from taking Metamucil as he has been having constipation. Reports feeling dizzy and lightheaded.        ECOG Performance status: 1 - Symptomatic but completely ambulatory Communication from him is 100% written.     Cancer Staging   Squamous cell carcinoma of base of tongue  Staging form: Pharynx - HPV-Mediated Oropharynx, AJCC 8th Edition  - Clinical stage from 9/18/2020: Stage III (rcT4, cN1, cM0, p16+) - Signed by Ronald Berg MD on 12/27/2022      Oncologic  History:  Oncology History   Squamous cell carcinoma of base of tongue   9/18/2020 Cancer Staged    Staging form: Pharynx - HPV-Mediated Oropharynx, AJCC 8th Edition  - Clinical stage from 9/18/2020: Stage III (rcT4, cN1, cM0, p16+)     9/28/2020 Initial Diagnosis    Squamous cell carcinoma of base of tongue     10/6/2020 - 8/17/2021 Chemotherapy    Treatment Summary   Plan Name: OP HEAD NECK CARBOPLATIN PACLITAXEL C1-2 FOLLOWED BY CETUXIMAB CARBOPLATIN C3-6 FOLLOWED BY CETUXIMAB MAINTENANCE WEEKLY  Treatment Goal: Control  Status: Inactive  Start Date: 10/6/2020  End Date: 8/17/2021  Provider: Ronald Berg MD  Chemotherapy: cetuximab (ERBITUX) 100 mg/50 mL chemo infusion 684 mg, 400 mg/m2 = 684 mg (100 % of original dose 400 mg/m2), Intravenous, [facility] [place] 1 time, 29 of 38 cycles  Dose modification: 500 mg/m2 (original dose 400 mg/m2, Cycle 3), 250 mg/m2 (original dose 400 mg/m2, Cycle 3), 400 mg/m2 (original dose 400 mg/m2, Cycle 3), 250 mg/m2 (original dose 250 mg/m2, Cycle 7), 200 mg/m2 (original dose 250 mg/m2, Cycle 18), 200 mg/m2 (original dose 250 mg/m2, Cycle 19), 250 mg/m2 (original dose 250 mg/m2, Cycle 4), 200 mg/m2 (original dose 250 mg/m2, Cycle 25), 200 mg/m2 (original dose 250 mg/m2, Cycle 28)  Administration: 684 mg (11/17/2020), 400 mg (11/24/2020), 400 mg (2/9/2021), 400 mg (2/17/2021), 427.6 mg (3/9/2021), 400 mg (3/30/2021), 400 mg (3/23/2021), 400 mg (4/6/2021), 427.6 mg (4/13/2021), 427.6 mg (4/20/2021), 342 mg (5/11/2021), 342 mg (5/18/2021), 342 mg (5/25/2021), 400 mg (5/31/2021), 400 mg (6/7/2021), 400 mg (6/14/2021), 400 mg (12/8/2020), 427.6 mg (12/29/2020), 400 mg (12/1/2020), 400 mg (12/15/2020), 400 mg (12/22/2020), 427.6 mg (1/5/2021), 400 mg (1/12/2021), 400 mg (1/19/2021), 427.6 mg (1/26/2021), 400 mg (2/2/2021), 400 mg (2/23/2021), 400 mg (3/2/2021), 427.6 mg (3/16/2021), 400 mg (6/21/2021), 342 mg (6/28/2021), 342 mg (7/6/2021), 342 mg (7/13/2021), 342 mg (7/20/2021), 400 mg  (7/27/2021), 400 mg (8/10/2021), 400 mg (8/17/2021)  CARBOplatin (PARAPLATIN) 310 mg in sodium chloride 0.9% 500 mL chemo infusion, 310 mg (92.2 % of original dose 334.5 mg), Intravenous, Clinic/[facility] 1 time, 6 of 6 cycles  Dose modification:   (original dose 334.5 mg, Cycle 1)  Administration: 310 mg (10/6/2020), 370 mg (11/17/2020), 300 mg (10/27/2020), 350 mg (12/8/2020), 335 mg (12/29/2020), 320 mg (1/19/2021)  PACLitaxeL (TAXOL) 175 mg/m2 = 300 mg in sodium chloride 0.9% 500 mL chemo infusion, 175 mg/m2 = 300 mg (100 % of original dose 175 mg/m2), Intravenous, Clinic/[facility] 1 time, 2 of 2 cycles  Dose modification: 175 mg/m2 (original dose 175 mg/m2, Cycle 1)  Administration: 300 mg (10/6/2020), 300 mg (10/27/2020)     4/29/2021 Tumor Conference       His case was discussed at the Multidisciplinary Head and Neck Team Planning Meeting.    Representatives from Medical Oncology, Radiation Oncology, Head and Neck Surgical Oncology, Psychosocial Oncology, and Speech and Language Pathology discussed the case with the following recommendations:    1) biopsy         7/29/2021 Tumor Conference       His case was discussed at the Multidisciplinary Head and Neck Team Planning Meeting.    Representatives from Medical Oncology, Radiation Oncology, Head and Neck Surgical Oncology, Psychosocial Oncology, and Speech and Language Pathology discussed the case with the following recommendations:    1) Head and neck clinic follow up  2) consider Keytruda (discuss with transplant)  3) consider palliative referral         9/13/2021 - 12/29/2023 Chemotherapy    Treatment Summary   Plan Name: OP HEAD NECK PEMBROLIZUMAB CARBOPLATIN FLUOROURACIL (C1 ONLY RECEIVED) FOLLOWED BY PEMBROLIZUMAB MAINTENANCE  Treatment Goal: Palliative  Status: Inactive  Start Date: 9/13/2021  End Date: 12/29/2023  Provider: Ronald Berg MD  Chemotherapy: CARBOplatin (PARAPLATIN) 320 mg in sodium chloride 0.9% 500 mL chemo infusion, 320 mg (100 % of original dose  320.5 mg), Intravenous, Clinic/[facility] 1 time, 6 of 6 cycles  Dose modification:   (original dose 320.5 mg, Cycle 1)  Administration: 320 mg (9/13/2021), 335 mg (12/23/2021), 325 mg (1/27/2022), 245 mg (3/3/2022), 255 mg (5/12/2022), 255 mg (4/7/2022)  fluorouraciL 1,000 mg/m2/day = 6,440 mg in sodium chloride 0.9% 240 mL chemo infusion, 1,000 mg/m2/day = 6,440 mg, Intravenous, Over 96 hours, 6 of 6 cycles  Dose modification: 800 mg/m2/day (original dose 1,000 mg/m2/day, Cycle 6), 5,000 mg (original dose 1,000 mg/m2/day, Cycle 8)  Administration: 6,440 mg (9/13/2021), 6,440 mg (12/23/2021), 6,480 mg (1/27/2022), 5,000 mg (3/3/2022), 5,000 mg (4/7/2022), 5,000 mg (5/12/2022)     Secondary malignant neoplasm of cervical lymph node   2/9/2021 Initial Diagnosis    Secondary malignant neoplasm of cervical lymph node     9/13/2021 - 12/29/2023 Chemotherapy    Treatment Summary   Plan Name: OP HEAD NECK PEMBROLIZUMAB CARBOPLATIN FLUOROURACIL (C1 ONLY RECEIVED) FOLLOWED BY PEMBROLIZUMAB MAINTENANCE  Treatment Goal: Palliative  Status: Inactive  Start Date: 9/13/2021  End Date: 12/29/2023  Provider: Ronald Berg MD  Chemotherapy: CARBOplatin (PARAPLATIN) 320 mg in sodium chloride 0.9% 500 mL chemo infusion, 320 mg (100 % of original dose 320.5 mg), Intravenous, Clinic/[facility] 1 time, 6 of 6 cycles  Dose modification:   (original dose 320.5 mg, Cycle 1)  Administration: 320 mg (9/13/2021), 335 mg (12/23/2021), 325 mg (1/27/2022), 245 mg (3/3/2022), 255 mg (5/12/2022), 255 mg (4/7/2022)  fluorouraciL 1,000 mg/m2/day = 6,440 mg in sodium chloride 0.9% 240 mL chemo infusion, 1,000 mg/m2/day = 6,440 mg, Intravenous, Over 96 hours, 6 of 6 cycles  Dose modification: 800 mg/m2/day (original dose 1,000 mg/m2/day, Cycle 6), 5,000 mg (original dose 1,000 mg/m2/day, Cycle 8)  Administration: 6,440 mg (9/13/2021), 6,440 mg (12/23/2021), 6,480 mg (1/27/2022), 5,000 mg (3/3/2022), 5,000 mg (4/7/2022), 5,000 mg (5/12/2022)     Squamous cell  carcinoma of skin of orbit   1/29/2024 Initial Diagnosis    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     2/19/2024 -  Chemotherapy    Treatment Summary   Plan Name: OP HEAD NECK CETUXIMAB CARBOPLATIN FLUOROURACIL Q3W  Treatment Goal: Curative  Status: Active  Start Date: 2/19/2024  End Date: 8/16/2024 (Planned)  Provider: Martín Hernandez MD  Chemotherapy: cetuximab (ERBITUX) 100 mg/50 mL chemo infusion 668 mg, 400 mg/m2 = 668 mg, Intravenous, Clinic/[facility] 1 time, 2 of 12 cycles  Administration: 668 mg (2/19/2024), 400 mg (2/26/2024), 400 mg (3/18/2024), 400 mg (3/25/2024), 400 mg (3/11/2024)  CARBOplatin (PARAPLATIN) 295 mg in sodium chloride 0.9% 314.5 mL chemo infusion, 295 mg, Intravenous, Clinic/[facility] 1 time, 2 of 6 cycles  Administration: 295 mg (2/19/2024), 300 mg (3/18/2024)         Review of Systems   Constitutional:  Positive for fatigue and unexpected weight change (weight loss). Negative for activity change, appetite change, chills and fever.   HENT:  Positive for mouth sores, sore throat and trouble swallowing. Negative for ear pain, facial swelling, hearing loss, nosebleeds and voice change.         No verbal communication.   Eyes:  Positive for redness (right eye - lesion (improving)). Negative for pain, discharge and visual disturbance.   Respiratory:  Negative for cough, choking, chest tightness and shortness of breath.    Cardiovascular:  Negative for chest pain, palpitations and leg swelling.   Gastrointestinal:  Negative for abdominal distention, abdominal pain, blood in stool, constipation, diarrhea, nausea and vomiting.   Endocrine: Negative for cold intolerance and heat intolerance.   Genitourinary:  Negative for difficulty urinating, dysuria, frequency and urgency.   Musculoskeletal:  Positive for back pain (lower - chronic). Negative for arthralgias, gait problem, leg pain and myalgias.   Integumentary:  Positive for mole/lesion (left nare and neck - cSCCv > improving). Negative for pallor,  rash and wound.   Allergic/Immunologic: Negative for frequent infections.   Neurological:  Positive for dizziness. Negative for tremors, weakness, light-headedness, numbness and headaches.   Hematological:  Negative for adenopathy. Does not bruise/bleed easily.   Psychiatric/Behavioral:  Negative for agitation, confusion, dysphoric mood and sleep disturbance. The patient is not nervous/anxious.         Allergies:  Review of patient's allergies indicates:   Allergen Reactions    Aspirin     Tylenol extended release        Medications:  Current Outpatient Medications   Medication Sig Dispense Refill    amoxicillin-clavulanate (AUGMENTIN) 400-57 mg/5 mL SusR Take 11 mLs (875 mg total) by mouth every 12 (twelve) hours for 5 days. Discard Remainder 150 mL 0    azelaic acid (AZELEX) 15 % gel APPLY TOPICALLY TO AFFECTED AREA IN THE MORNING 50 g 3    chlorhexidine (PERIDEX) 0.12 % solution Gently swish and spit 15 mL twice a day for 3 weeks. Start rinses the day after your procedure. 473 mL 1    clindamycin phosphate 1% (CLINDAGEL) 1 % gel Apply topically 2 (two) times daily. 60 g 3    dexAMETHasone (DECADRON) 0.5 mg/5 mL Elix Swish 10 mL by mouth for 2 minutes then spit 4 times daily 474 mL 5    erythromycin (ROMYCIN) ophthalmic ointment Administer 0.5 inches to the right eye twice daily for 30 days. 3.5 g 1    hydrocortisone 1 % cream Apply to face, hands, feet, neck, back and chest at bedtime. 112 g 2    imiquimod (ALDARA) 5 % cream Apply to biopsy site on frontal scalp + about a centimeter of surrounding skin qhs x 16 weeks. Wash off in am and apply sunscreen. Discontinue if skin becomes blistered, raw, bleeding, painful, etc. 24 packet 3    levothyroxine (TIROSINT-SOL) 150 mcg/mL Soln Take 150 mcg by mouth before breakfast. 30 mL 11    LIDOcaine HCl 2% (LIDOCAINE VISCOUS) 2 % Soln Swish and spit 15 mls every 8 (eight) hours as needed (mouth sore). 100 mL 3    magic mouthwash diphen/antac/lidoc/nysta Take 10 mLs by  mouth 4 (four) times daily. 120 mL 4    metroNIDAZOLE (METROGEL) 0.75 % gel Apply topically to affected area 2 (two) times daily. 45 g 1    multivit-min/FA/lycopen/lutein (CENTRUM SILVER MEN ORAL) Take 1 tablet by mouth once daily.      OLANZapine (ZYPREXA) 5 MG tablet Take 1 tablet (5 mg total) by mouth every evening. Take as directed on days 1-4 of your chemotherapy cycle. 30 tablet 5    pantoprazole (PROTONIX) 40 MG tablet Take 1 tablet (40 mg total) by mouth 2 (two) times daily before meals. 180 tablet 3    prednisoLONE acetate (PRED FORTE) 1 % DrpS Place 1 drop into the right eye 4 (four) times daily. 10 mL 3    prochlorperazine (COMPAZINE) 10 MG tablet Take ½ tablet (5 mg total) by mouth every 6 (six) hours as needed. 30 tablet 1    senna (SENOKOT) 8.6 mg tablet Take 2 tablets by mouth 2 (two) times daily as needed for Constipation. 60 tablet 1    sucralfate (CARAFATE) 1 gram tablet Take 1 tablet (1 g total) by mouth 4 times daily. Tablet can be broken and dissolved in water for ease of administration. Consume within 30 minutes of dissolving. Separate from pantoprazole and levothyroxine by at least 2 hours. 120 tablet 0    sulfacetamide sodium-sulfur 10-5 % (w/w) Clsr USE TO WASH AFFECTED AREA DAILY      tacrolimus (PROGRAF) 0.5 MG Cap Take 1 capsule (0.5 mg total) by mouth every EVENING.  (Use the 1mg capsule for your morning dose) 90 capsule 3    tacrolimus (PROGRAF) 1 MG Cap Take 1 capsule (1 mg total) by mouth every MORNING. Use the tacrolimus 0.5mg capsule for your evening dose as directed. 90 capsule 3    tiZANidine (ZANAFLEX) 2 MG tablet Take 1 tablet (2 mg total) by mouth nightly as needed (neck muscle strain). 30 tablet 3    traZODone (DESYREL) 50 MG tablet TAKE 1 TABLET BY MOUTH IN THE  EVENING 90 tablet 3    vitamin E 1000 UNIT capsule Take 1 cap PO BID until gone. Start taking one week prior to your dental surgery. 112 capsule 0    gabapentin (NEURONTIN) 300 MG capsule Take 1 capsule (300 mg total)  by mouth every evening. (Patient taking differently: Take 300 mg by mouth as needed.) 30 capsule 11    oxyCODONE (ROXICODONE) 5 MG immediate release tablet Take 1 tablet (5 mg total) by mouth every 6 (six) hours as needed for Pain. 60 tablet 0     No current facility-administered medications for this visit.     Facility-Administered Medications Ordered in Other Visits   Medication Dose Route Frequency Provider Last Rate Last Admin    heparin, porcine (PF) 100 unit/mL injection flush 500 Units  500 Units Intravenous PRN Ronald Berg MD        ofloxacin 0.3 % ophthalmic solution 1 drop  1 drop Right Eye On Call Procedure Victor M Vasques MD   2 drop at 10/11/22 0745    sodium chloride 0.9% flush 10 mL  10 mL Intravenous PRN Ronald Berg MD        sodium chloride 0.9% flush 10 mL  10 mL Intravenous PRN Victor M Vasques MD           PMH:  Past Medical History:   Diagnosis Date    Basal cell carcinoma (BCC) in situ of skin 2012    3 on face, 2 on arm, removed by dermatology.     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 Underwent surgical resection of tongue and neck. s/p tracheostomy    Squamous cell carcinoma of skin of right temple 01/24/2024    right temple       PSH:  Past Surgical History:   Procedure Laterality Date    COLONOSCOPY      COLONOSCOPY N/A 9/14/2023    Procedure: COLONOSCOPY;  Surgeon: Reyes Olivia MD;  Location: [address]);  Service: Endoscopy;  Laterality: N/A;    CONJUNCTIVA BIOPSY Right 10/11/2022    Procedure: BIOPSY, CONJUNCTIVA;  Surgeon: Victor M Vasques MD;  Location: [facility];  Service: Ophthalmology;  Laterality: Right;    ESOPHAGOGASTRODUODENOSCOPY  "N/A 9/14/2023    Procedure: EGD (ESOPHAGOGASTRODUODENOSCOPY);  Surgeon: Reyes Olivia MD;  Location: [facility] ENDO (2ND FLR);  Service: Endoscopy;  Laterality: N/A;    ESOPHAGOGASTRODUODENOSCOPY N/A 4/10/2024    Procedure: EGD (ESOPHAGOGASTRODUODENOSCOPY);  Surgeon: Reyes Pagan MD;  Location: [facility] ENDO (2ND FLR);  Service: Endoscopy;  Laterality: N/A;    INSERTION OF VENOUS ACCESS PORT Right 3/8/2021    Procedure: INSERTION, VENOUS ACCESS PORT;  Surgeon: Jesus Rendon MD;  Location: [facility] OR 2ND FLR;  Service: General;  Laterality: Right;    LIVER TRANSPLANT  11/2008    transplanted for biopsy proven hepatocellular carcinoma,     LYMPH NODE BIOPSY N/A 9/18/2020    Procedure: BIOPSY, LYMPH NODE;  Surgeon: Taz Diagnostic Provider;  Location: [facility] OR 2ND FLR;  Service: General;  Laterality: N/A;  Rm 173    SURGICAL REMOVAL OF SCAR Right 8/24/2023    Procedure: EXCISION, SCAR;  Surgeon: Ting Brambila MD;  Location: [facility] OR;  Service: Ophthalmology;  Laterality: Right;    TRACHEOSTOMY         FamHx:  Family History   Problem Relation Name Age of Onset    Dementia Mother         SocHx:  Social History     Socioeconomic History    Marital status: Single   Occupational History    Occupation: Retired     Comment: , notes exposures to fumes etc.  Worked on [employer] for 4 years   Tobacco Use    Smoking status: Never    Smokeless tobacco: Never   Substance and Sexual Activity    Alcohol use: Yes     Comment: drinks wine, 1 glass day    Drug use: Not Currently    Sexual activity: Yes     Comment: No prior history of STD        Distress Score             Objective:      Vitals:    04/17/24 0845 04/17/24 0854   BP: (!) 86/54 113/79   BP Location: Left arm Right arm   Patient Position: Sitting Sitting   BP Method: Medium (Automatic) Medium (Automatic)   Pulse: 86 76   Resp: 14    Temp: 97.4 °F (36.3 °C)    TempSrc: Oral    SpO2: 96%    Weight: 49 kg (108 lb 0.4 oz)    Height: 5' 8" (1.727 m) "                   Physical Exam  Vitals reviewed.   Constitutional:       General: He is not in acute distress.     Appearance: Normal appearance. He is well-developed and underweight.   HENT:      Head: Normocephalic and atraumatic.      Mouth/Throat:      Mouth: Mucous membranes are moist.      Dentition: Abnormal dentition. Dental caries present.      Pharynx: Posterior oropharyngeal erythema (large ulcer) present.   Eyes:      Conjunctiva/sclera: Conjunctivae normal.      Pupils: Pupils are equal, round, and reactive to light.      Comments: Right eye - injected and nodular growth on the inner eye conjunctiva - decreased in size.     Left lower eyelid - erythema and scabbing.    Neck:      Trachea: Tracheostomy present.   Pulmonary:      Effort: Pulmonary effort is normal. No respiratory distress.      Comments: Tracheostomy  Abdominal:      General: There is no distension.      Palpations: Abdomen is soft.   Musculoskeletal:         General: No swelling. Normal range of motion.      Cervical back: Normal range of motion and neck supple.   Skin:     General: Skin is warm and dry.   Neurological:      General: No focal deficit present.      Mental Status: He is alert and oriented to person, place, and time.   Psychiatric:         Mood and Affect: Mood normal.         Behavior: Behavior normal.         Thought Content: Thought content normal.         Judgment: Judgment normal.           LABS:  WBC   Date Value Ref Range Status   04/17/2024 10.82 3.90 - 12.70 K/uL Final     Hemoglobin   Date Value Ref Range Status   04/17/2024 10.5 (L) 14.0 - 18.0 g/dL Final     Hematocrit   Date Value Ref Range Status   04/17/2024 32.1 (L) 40.0 - 54.0 % Final     Platelets   Date Value Ref Range Status   04/17/2024 269 150 - 450 K/uL Final       Chemistry        Component Value Date/Time     04/11/2024 0506    K 3.5 04/11/2024 0506     (H) 04/11/2024 0506    CO2 22 (L) 04/11/2024 0506    BUN 21 04/11/2024 0506     CREATININE 1.3 04/11/2024 0506    GLU 88 04/11/2024 0506        Component Value Date/Time    CALCIUM 7.9 (L) 04/11/2024 0506    ALKPHOS 92 04/11/2024 0506    AST 26 04/11/2024 0506    ALT 12 04/11/2024 0506    BILITOT 0.3 04/11/2024 0506    ESTGFRAFRICA 52.6 (A) 07/14/2022 1119    EGFRNONAA 45.5 (A) 07/14/2022 1119              Assessment:       1. 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 Squamous cell carcinoma of base of tongue    3. Secondary malignant neoplasm of cervical lymph node    4. Tracheostomy status    5. Status post liver transplantation - 2008    6. Immunodeficiency due to drugs    7. Anemia due to blood loss    8. Erosive esophagitis    9. Upper GI bleeding    10. 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 Chronic kidney disease (CKD), stage IV (severe)    12. Mucositis    13. Dysphagia, unspecified type    14. Weight loss          Plan:         Orbital cSCC  Unfortunately patient has progressed while on immunotherapy for below diagnosis. For that reason systemic therapies are limited. Evaluated at [facility] and surgeon does think an exenteration sparing resection would be possible with good response to induction chemotherapy. Plan was for platinum doublet chemo + cetuximab x 2 cycles. Notified of this plan on 1/29 and treatment plan was entered. Authorization was obtained on 2/5, but cycle 1 still not scheduled.    Was unable to complete C2D15 d/t hospitalization from mucositis and GI bleed. Will omit this treatment as he is scheduled for surgery next week and has not recovered from mucositis. Scheduled for surgery on 4/25 at [facility].    2,3. 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 He is not a surgical or radiation candidate.  -Started on PCC 10/6/2020 followed by maintenance cetuximab until 8/24/21 (discontinued due to progression of disease; continued increase in SUV uptake,  no new lesions).  - 9/2021 started on carbo/5-FU/pembrolizumab; due to toxicity went to pembrolizumab monotherapy starting with cycle 2.  Progression of disease noted after cycle 3 so added chemotherapy back in with cycle 4. Keytruda monotherapy starting with C9.   - Keytruda discontinued after 2 years.    4. Status post total laryngectomy, total glossectomy and anterolateral thigh free flap reconstruction roughly 2017 at [facility] in [place] for persistent/recurrent base of tongue sq.c.c.    5-6. S/p liver transplant and is currently on tacrolimus. Grade 1. Will continue to monitor.     7-9. EGD during admission showed severe erosive esophagitis. Patient started on Protonix b.i.d. for 12 weeks, EED 7/2/24. Carafate q.i.d. for 1 month EED 5/10/24. Hgb stable post-discharge.    10. TSH still elevated - has been on tirosint x1 week. Swicthed thyroid hormone preparation to Tirosint SOL (oral solution) and increase the dose to 150 mcg daily per Endocrinology e-consult.     11. Cr 1.7, baseline 1.3-1.7. Patient encouraged to increase hydration. Scheduling for IVFs later today.      12-14. Still having severe symptoms and progressive weight loss. Will place PEG tube to support nutrition. Completed Augmentin. Continue Benito's solutions + Dex rinses. Oxy IR sent to pharmacy to help with pain control.     Patient is in agreement with the proposed treatment plan. All questions were answered to the patient's satisfaction. Pt knows to call clinic if anything is needed before the next clinic visit.    Ct Francis, MSN, APRN, FNP-C  Hematology and Medical Oncology  Nurse Practitioner to Dr. Martín Hernandez  Nurse Practitioner, [facility]            Route Chart for Scheduling    Med Onc Chart Routing  Urgent    Follow up with physician    Follow up with [facility] 1 week. visit with Una or other [facility] for lab & symptom check   Infusion scheduling note    Injection scheduling note    Labs CBC, CMP, free  T4, TSH, magnesium and type and screen   Scheduling:  Preferred lab:  Lab interval:     Imaging    Pharmacy appointment    Other referrals                Treatment Plan Information   OP HEAD NECK CETUXIMAB CARBOPLATIN FLUOROURACIL Q3W   Martín Hernandez MD   Upcoming Treatment Dates - OP HEAD NECK CETUXIMAB CARBOPLATIN FLUOROURACIL Q3W    4/12/2024       Pre-Medications       diphenhydrAMINE (BENADRYL) 25 mg in sodium chloride 0.9% 50 mL IVPB       Chemotherapy       cetuximab (ERBITUX) 100 mg/50 mL chemo infusion 417.6 mg  4/19/2024       Pre-Medications       diphenhydrAMINE (BENADRYL) 25 mg in sodium chloride 0.9% 50 mL IVPB       Chemotherapy       cetuximab (ERBITUX) 100 mg/50 mL chemo infusion 417.6 mg       CARBOplatin (PARAPLATIN) 300 mg in sodium chloride 0.9% 280 mL chemo infusion       fluorouracil (Adrucil) 1,000 mg/m2/day = 6,680 mg in sodium chloride 0.9% 240 mL chemo infusion       Antiemetics       aprepitant (CINVANTI) injection 130 mg       palonosetron (ALOXI) 0.25 mg with Dexamethasone (DECADRON) 12 mg in NS 50 mL IVPB  4/26/2024       Pre-Medications       diphenhydrAMINE (BENADRYL) 25 mg in sodium chloride 0.9% 50 mL IVPB       Chemotherapy       cetuximab (ERBITUX) 100 mg/50 mL chemo infusion 417.6 mg  5/3/2024       Pre-Medications       diphenhydrAMINE (BENADRYL) 25 mg in sodium chloride 0.9% 50 mL IVPB       Chemotherapy       cetuximab (ERBITUX) 100 mg/50 mL chemo infusion 417.6 mg    Therapy Plan Information  PORT FLUSH  Flushes  heparin, porcine (PF) 100 unit/mL injection flush 500 Units  500 Units, Intravenous, Every visit  sodium chloride 0.9% flush 10 mL  10 mL, Intravenous, Every visit    PORT FLUSH  Flushes  heparin, porcine (PF) 100 unit/mL injection flush 500 Units  500 Units, Intravenous, Every visit  sodium chloride 0.9% flush 10 mL  10 mL, Intravenous, Every visit

## 2024-04-17 NOTE — Clinical Note
Please try checking in with patient. Sent a message about fluids for today but he never showed. He has been unable to hydrate or eat from mucositis and he is needing a PEG tube. Soonest I can get him a PEG is the 25th. Can we keep trying to reach out to him (portal - he is non-verbal) to see if he can make it into clinic for fluids and if he continues to worsen will need to go to the ER to see if the PEG can be placed sooner.   Thanks!

## 2024-04-17 NOTE — Clinical Note
Patient needs PEG tube placed ASAP please help get this scheduled.   Lilia - can you provide tube feed recommendations?

## 2024-04-18 ENCOUNTER — PATIENT MESSAGE (OUTPATIENT)
Dept: HEMATOLOGY/ONCOLOGY | Facility: CLINIC | Age: 74
End: 2024-04-18
Payer: MEDICARE

## 2024-04-18 ENCOUNTER — INFUSION (OUTPATIENT)
Dept: INFUSION THERAPY | Facility: HOSPITAL | Age: 74
End: 2024-04-18
Attending: OPTOMETRIST
Payer: MEDICARE

## 2024-04-18 VITALS
SYSTOLIC BLOOD PRESSURE: 145 MMHG | DIASTOLIC BLOOD PRESSURE: 67 MMHG | RESPIRATION RATE: 16 BRPM | HEART RATE: 71 BPM | OXYGEN SATURATION: 96 %

## 2024-04-18 DIAGNOSIS — N18.4 CHRONIC KIDNEY DISEASE (CKD), STAGE IV (SEVERE): ICD-10-CM

## 2024-04-18 DIAGNOSIS — N17.9 ACUTE KIDNEY INJURY SUPERIMPOSED ON CHRONIC KIDNEY DISEASE: Primary | ICD-10-CM

## 2024-04-18 DIAGNOSIS — Z71.3 NUTRITIONAL COUNSELING: Primary | ICD-10-CM

## 2024-04-18 DIAGNOSIS — N18.9 ACUTE KIDNEY INJURY SUPERIMPOSED ON CHRONIC KIDNEY DISEASE: Primary | ICD-10-CM

## 2024-04-18 DIAGNOSIS — D50.0 IRON DEFICIENCY ANEMIA DUE TO CHRONIC BLOOD LOSS: Primary | ICD-10-CM

## 2024-04-18 DIAGNOSIS — C44.329 SQUAMOUS CELL CARCINOMA OF SKIN OF ORBIT: ICD-10-CM

## 2024-04-18 DIAGNOSIS — C01 SQUAMOUS CELL CARCINOMA OF BASE OF TONGUE: ICD-10-CM

## 2024-04-18 DIAGNOSIS — R63.4 WEIGHT LOSS: ICD-10-CM

## 2024-04-18 PROCEDURE — 25000003 PHARM REV CODE 250: Performed by: NURSE PRACTITIONER

## 2024-04-18 PROCEDURE — 96360 HYDRATION IV INFUSION INIT: CPT

## 2024-04-18 PROCEDURE — 63600175 PHARM REV CODE 636 W HCPCS: Performed by: NURSE PRACTITIONER

## 2024-04-18 PROCEDURE — 96361 HYDRATE IV INFUSION ADD-ON: CPT

## 2024-04-18 PROCEDURE — A4216 STERILE WATER/SALINE, 10 ML: HCPCS | Performed by: NURSE PRACTITIONER

## 2024-04-18 RX ORDER — HEPARIN 100 UNIT/ML
500 SYRINGE INTRAVENOUS
Status: DISCONTINUED | OUTPATIENT
Start: 2024-04-18 | End: 2024-04-18 | Stop reason: HOSPADM

## 2024-04-18 RX ORDER — SODIUM CHLORIDE 0.9 % (FLUSH) 0.9 %
10 SYRINGE (ML) INJECTION
Status: DISCONTINUED | OUTPATIENT
Start: 2024-04-18 | End: 2024-04-18 | Stop reason: HOSPADM

## 2024-04-18 RX ADMIN — SODIUM CHLORIDE 1000 ML: 9 INJECTION, SOLUTION INTRAVENOUS at 03:04

## 2024-04-18 RX ADMIN — SODIUM CHLORIDE 1000 ML: 9 INJECTION, SOLUTION INTRAVENOUS at 02:04

## 2024-04-18 RX ADMIN — Medication 10 ML: at 04:04

## 2024-04-18 RX ADMIN — HEPARIN 500 UNITS: 100 SYRINGE at 04:04

## 2024-04-18 NOTE — PROGRESS NOTES
"Oncology Nutrition - Formula Recommendation    Rocco Swain  1950    Referring Provider: No ref. 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 Hernandez    PMHx:   Past Medical History:   Diagnosis Date    Basal cell carcinoma (BCC) in situ of skin 2012    3 on face, 2 on arm, removed by dermatology.     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 Underwent surgical resection of tongue and neck. s/p tracheostomy    Squamous cell carcinoma of skin of right temple 01/24/2024    right temple     Allergies: Aspirin and Tylenol extended release    Nutrition Assessment    Anthropometrics:   Weight:   Wt Readings from Last 10 Encounters:   04/17/24 49 kg (108 lb 0.4 oz)   04/08/24 51.3 kg (113 lb 2.2 oz)   04/02/24 51.5 kg (113 lb 8.6 oz)   03/25/24 52.6 kg (115 lb 15.4 oz)   03/25/24 52.6 kg (115 lb 15.4 oz)   03/18/24 54 kg (119 lb 0.8 oz)   03/11/24 53.8 kg (118 lb 9.7 oz)   03/11/24 53.8 kg (118 lb 9.7 oz)   03/04/24 54.3 kg (119 lb 11.4 oz)   02/26/24 53.4 kg (117 lb 11.6 oz)                              Weight Change: 9 lb in 2 months Ht Readings from Last 1 Encounters:   04/17/24 5' 8" (1.727 m)      BMI Readings from Last 1 Encounters:   04/17/24 16.43 kg/m²     Estimated body surface area is 1.53 meters squared as calculated from the following:    Height as of 4/17/24: 5' 8" (1.727 m).    Weight as of 4/17/24: 49 kg (108 lb 0.4 oz).        Appetite/Intake (per Ct Francis NP): Has some intake of electrolyte drinks " and Ensure but minimal amounts. Patient asking for PEG tube to help recover weight and improve nutrition. Taking Metamucil to manage constipation.    Pertinent HPI:  Admitted for TSERING on CKD secondary to mucositis (due to 5FU) leading to poor p.o. intake on 4/8/24. Patient was started on magic mouthwash and dexamethasone swish and spit with improvement of pain. Patient developed melena - GI was involved. EGD showed severe erosive esophagitis. Patient started on Protonix b.i.d. for 12 weeks, EED 7/2/24. Carafate q.i.d. for 1 month EED 5/10/24. Throat culture revealed Klebsiella, pharyngitis. Augmentin was given. Patient was instructed to hold doxycycline until f/u appt.     Current Medications:  Current Outpatient Medications   Medication Instructions    amoxicillin-clavulanate (AUGMENTIN) 400-57 mg/5 mL SusR Take 11 mLs (875 mg total) by mouth every 12 (twelve) hours for 5 days. Discard Remainder    azelaic acid (AZELEX) 15 % gel APPLY TOPICALLY TO AFFECTED AREA IN THE MORNING    chlorhexidine (PERIDEX) 0.12 % solution Gently swish and spit 15 mL twice a day for 3 weeks. Start rinses the day after your procedure.    clindamycin phosphate 1% (CLINDAGEL) 1 % gel Topical (Top), 2 times daily    dexAMETHasone (DECADRON) 0.5 mg/5 mL Elix Swish 10 mL by mouth for 2 minutes then spit 4 times daily    erythromycin (ROMYCIN) ophthalmic ointment Administer 0.5 inches to the right eye twice daily for 30 days.    gabapentin (NEURONTIN) 300 mg, Oral, Nightly    hydrocortisone 1 % cream Apply to face, hands, feet, neck, back and chest at bedtime.    imiquimod (ALDARA) 5 % cream Apply to biopsy site on frontal scalp + about a centimeter of surrounding skin qhs x 16 weeks. Wash off in am and apply sunscreen. Discontinue if skin becomes blistered, raw, bleeding, painful, etc.    LIDOcaine HCl 2% (LIDOCAINE VISCOUS) 2 % Soln Swish and spit 15 mls every 8 (eight) hours as needed (mouth sore).    magic mouthwash diphen/antac/lidoc/nysta  Take 10 mLs by mouth 4 (four) times daily.    metroNIDAZOLE (METROGEL) 0.75 % gel Apply topically to affected area 2 (two) times daily.    multivit-min/FA/lycopen/lutein (CENTRUM SILVER MEN ORAL) 1 tablet, Oral, Daily    OLANZapine (ZYPREXA) 5 mg, Oral, Nightly, Take as directed on days 1-4 of your chemotherapy cycle.    oxyCODONE (ROXICODONE) 5 mg, Oral, Every 6 hours PRN    pantoprazole (PROTONIX) 40 mg, Oral, 2 times daily before meals    prednisoLONE acetate (PRED FORTE) 1 % DrpS 1 drop, Right Eye, 4 times daily    prochlorperazine (COMPAZINE) 10 MG tablet Take ½ tablet (5 mg total) by mouth every 6 (six) hours as needed.    senna (SENOKOT) 8.6 mg tablet 2 tablets, Oral, 2 times daily PRN    sucralfate (CARAFATE) 1 gram tablet Take 1 tablet (1 g total) by mouth 4 times daily. Tablet can be broken and dissolved in water for ease of administration. Consume within 30 minutes of dissolving. Separate from pantoprazole and levothyroxine by at least 2 hours.    sulfacetamide sodium-sulfur 10-5 % (w/w) Clsr USE TO WASH AFFECTED AREA DAILY    tacrolimus (PROGRAF) 0.5 MG Cap Take 1 capsule (0.5 mg total) by mouth every EVENING.  (Use the 1mg capsule for your morning dose)    tacrolimus (PROGRAF) 1 MG Cap Take 1 capsule (1 mg total) by mouth every MORNING. Use the tacrolimus 0.5mg capsule for your evening dose as directed.    Tirosint-SoL 150 mcg, Oral, Before breakfast    tiZANidine (ZANAFLEX) 2 mg, Oral, Nightly PRN    traZODone (DESYREL) 50 mg, Oral, Nightly    vitamin E 1000 UNIT capsule Take 1 cap PO BID until gone. Start taking one week prior to your dental surgery.     Labs: Reviewed 4/2/24: tsh 45.962; 4/17/24: gluc 148, bun 28, cr 1.7, alb 3.3     Nutrition Diagnosis    Nutrition Problem: Malnutrition  Etiology (related to): inadequate energy intake, cancer and associated treatment, and decreased ability to consume sufficient energy  Signs/Symptoms (as evidenced by): reports of inadequate PO intake, weight loss of  9 lbs in 2 months, and BMI of 16.4    Nutrition Intervention    Nutrition Prescription  8651-0592 kcals/day 30-35 kcal/kg   39-49 g protein/day 0.8-1 gm/kg  4263-0693 mL fluid/day 1 ml/kcal    Recommendations:   Recommend Suplena, 4 cartons daily, bolus via PEG tube with 60 ml FWF before and 120 ml FWF after feedings.     Naveen Lozano RD-AP, , LDN  [facility] in Clinical Nutrition  Board Certified Specialist in Oncology Nutrition   [facility], [facility]  [phone]

## 2024-04-18 NOTE — PLAN OF CARE
1635 Patient tolerated 2L NS without incident. Vitals stable before and after. Port with brisk blood return and flushed without resistance before, during and after infusion, heparin locked and needle removed at discharge.  Patient aware of his next appointment date/time, uses [facility]. To contact provider with questions or concerns. D/C ambulatory and stable.

## 2024-04-22 ENCOUNTER — OFFICE VISIT (OUTPATIENT)
Dept: HEMATOLOGY/ONCOLOGY | Facility: CLINIC | Age: 74
End: 2024-04-22
Payer: MEDICARE

## 2024-04-22 ENCOUNTER — DOCUMENTATION ONLY (OUTPATIENT)
Dept: HEMATOLOGY/ONCOLOGY | Facility: CLINIC | Age: 74
End: 2024-04-22

## 2024-04-22 ENCOUNTER — LAB VISIT (OUTPATIENT)
Dept: LAB | Facility: HOSPITAL | Age: 74
End: 2024-04-22
Attending: INTERNAL MEDICINE
Payer: MEDICARE

## 2024-04-22 ENCOUNTER — NUTRITION (OUTPATIENT)
Dept: RADIATION ONCOLOGY | Facility: CLINIC | Age: 74
End: 2024-04-22
Payer: MEDICARE

## 2024-04-22 VITALS
BODY MASS INDEX: 16.54 KG/M2 | OXYGEN SATURATION: 96 % | HEART RATE: 83 BPM | RESPIRATION RATE: 16 BRPM | TEMPERATURE: 97 F | HEIGHT: 68 IN | WEIGHT: 109.13 LBS | SYSTOLIC BLOOD PRESSURE: 101 MMHG | DIASTOLIC BLOOD PRESSURE: 69 MMHG

## 2024-04-22 DIAGNOSIS — C32.9 LARYNX CANCER: ICD-10-CM

## 2024-04-22 DIAGNOSIS — C77.0 SECONDARY MALIGNANT NEOPLASM OF CERVICAL LYMPH NODE: ICD-10-CM

## 2024-04-22 DIAGNOSIS — C44.329 SQUAMOUS CELL CARCINOMA OF SKIN OF ORBIT: ICD-10-CM

## 2024-04-22 DIAGNOSIS — C01 SQUAMOUS CELL CARCINOMA OF BASE OF TONGUE: ICD-10-CM

## 2024-04-22 DIAGNOSIS — R63.4 WEIGHT LOSS: ICD-10-CM

## 2024-04-22 DIAGNOSIS — D50.0 IRON DEFICIENCY ANEMIA DUE TO CHRONIC BLOOD LOSS: ICD-10-CM

## 2024-04-22 DIAGNOSIS — E03.2 HYPOTHYROIDISM DUE TO MEDICAMENTS AND OTHER EXOGENOUS SUBSTANCES: ICD-10-CM

## 2024-04-22 DIAGNOSIS — Z71.3 NUTRITIONAL COUNSELING: Primary | ICD-10-CM

## 2024-04-22 DIAGNOSIS — Z93.0 TRACHEOSTOMY STATUS: ICD-10-CM

## 2024-04-22 DIAGNOSIS — C44.329 SQUAMOUS CELL CARCINOMA OF SKIN OF ORBIT: Primary | ICD-10-CM

## 2024-04-22 DIAGNOSIS — D49.89 CONJUNCTIVAL INTRAEPITHELIAL NEOPLASM: ICD-10-CM

## 2024-04-22 LAB
ABO + RH BLD: NORMAL
ALBUMIN SERPL BCP-MCNC: 3.2 G/DL (ref 3.5–5.2)
ALP SERPL-CCNC: 92 U/L (ref 55–135)
ALT SERPL W/O P-5'-P-CCNC: 30 U/L (ref 10–44)
ANION GAP SERPL CALC-SCNC: 12 MMOL/L (ref 8–16)
AST SERPL-CCNC: 52 U/L (ref 10–40)
BASOPHILS # BLD AUTO: 0.03 K/UL (ref 0–0.2)
BASOPHILS NFR BLD: 0.5 % (ref 0–1.9)
BILIRUB SERPL-MCNC: 0.5 MG/DL (ref 0.1–1)
BLD GP AB SCN CELLS X3 SERPL QL: NORMAL
BUN SERPL-MCNC: 15 MG/DL (ref 8–23)
CALCIUM SERPL-MCNC: 9.6 MG/DL (ref 8.7–10.5)
CHLORIDE SERPL-SCNC: 104 MMOL/L (ref 95–110)
CO2 SERPL-SCNC: 26 MMOL/L (ref 23–29)
CREAT SERPL-MCNC: 1.5 MG/DL (ref 0.5–1.4)
DIFFERENTIAL METHOD BLD: ABNORMAL
EOSINOPHIL # BLD AUTO: 0 K/UL (ref 0–0.5)
EOSINOPHIL NFR BLD: 0.3 % (ref 0–8)
ERYTHROCYTE [DISTWIDTH] IN BLOOD BY AUTOMATED COUNT: 17.1 % (ref 11.5–14.5)
EST. GFR  (NO RACE VARIABLE): 48.2 ML/MIN/1.73 M^2
GLUCOSE SERPL-MCNC: 132 MG/DL (ref 70–110)
HCT VFR BLD AUTO: 30.2 % (ref 40–54)
HGB BLD-MCNC: 9.8 G/DL (ref 14–18)
IMM GRANULOCYTES # BLD AUTO: 0.04 K/UL (ref 0–0.04)
IMM GRANULOCYTES NFR BLD AUTO: 0.6 % (ref 0–0.5)
LYMPHOCYTES # BLD AUTO: 0.6 K/UL (ref 1–4.8)
LYMPHOCYTES NFR BLD: 8.6 % (ref 18–48)
MAGNESIUM SERPL-MCNC: 1.3 MG/DL (ref 1.6–2.6)
MCH RBC QN AUTO: 33.1 PG (ref 27–31)
MCHC RBC AUTO-ENTMCNC: 32.5 G/DL (ref 32–36)
MCV RBC AUTO: 102 FL (ref 82–98)
MONOCYTES # BLD AUTO: 0.6 K/UL (ref 0.3–1)
MONOCYTES NFR BLD: 8.9 % (ref 4–15)
NEUTROPHILS # BLD AUTO: 5.2 K/UL (ref 1.8–7.7)
NEUTROPHILS NFR BLD: 81.1 % (ref 38–73)
NRBC BLD-RTO: 0 /100 WBC
PLATELET # BLD AUTO: 188 K/UL (ref 150–450)
PMV BLD AUTO: 10.1 FL (ref 9.2–12.9)
POTASSIUM SERPL-SCNC: 3.9 MMOL/L (ref 3.5–5.1)
PROT SERPL-MCNC: 7 G/DL (ref 6–8.4)
RBC # BLD AUTO: 2.96 M/UL (ref 4.6–6.2)
SODIUM SERPL-SCNC: 142 MMOL/L (ref 136–145)
SPECIMEN OUTDATE: NORMAL
T4 FREE SERPL-MCNC: 1.35 NG/DL (ref 0.71–1.51)
TSH SERPL DL<=0.005 MIU/L-ACNC: 6.94 UIU/ML (ref 0.4–4)
WBC # BLD AUTO: 6.42 K/UL (ref 3.9–12.7)

## 2024-04-22 PROCEDURE — 84443 ASSAY THYROID STIM HORMONE: CPT | Performed by: INTERNAL MEDICINE

## 2024-04-22 PROCEDURE — 80053 COMPREHEN METABOLIC PANEL: CPT | Performed by: INTERNAL MEDICINE

## 2024-04-22 PROCEDURE — 86901 BLOOD TYPING SEROLOGIC RH(D): CPT | Performed by: NURSE PRACTITIONER

## 2024-04-22 PROCEDURE — 99215 OFFICE O/P EST HI 40 MIN: CPT | Mod: PBBFAC,25 | Performed by: NURSE PRACTITIONER

## 2024-04-22 PROCEDURE — 99999PBSHW PR PBB SHADOW TECHNICAL ONLY FILED TO HB: Mod: PBBFAC,,,

## 2024-04-22 PROCEDURE — 36415 COLL VENOUS BLD VENIPUNCTURE: CPT | Performed by: NURSE PRACTITIONER

## 2024-04-22 PROCEDURE — 99999 PR PBB SHADOW E&M-EST. PATIENT-LVL V: CPT | Mod: PBBFAC,,, | Performed by: NURSE PRACTITIONER

## 2024-04-22 PROCEDURE — 97802 MEDICAL NUTRITION INDIV IN: CPT | Mod: PBBFAC

## 2024-04-22 PROCEDURE — 85025 COMPLETE CBC W/AUTO DIFF WBC: CPT | Performed by: INTERNAL MEDICINE

## 2024-04-22 PROCEDURE — 84439 ASSAY OF FREE THYROXINE: CPT | Performed by: INTERNAL MEDICINE

## 2024-04-22 PROCEDURE — 99214 OFFICE O/P EST MOD 30 MIN: CPT | Mod: S$PBB,,, | Performed by: NURSE PRACTITIONER

## 2024-04-22 PROCEDURE — 83735 ASSAY OF MAGNESIUM: CPT | Performed by: NURSE PRACTITIONER

## 2024-04-22 NOTE — PROGRESS NOTES
"Oncology Social Worker received a Secure Chat message from Naveen Lozano RD, in Oncology. Per Naveen, she "entered an order for Suplena QID via PEG tube. PEG tube has not yet been placed. Anticipating placement on the week of 4/29/24. He is a PEG  (hx of tubes in 2008 and 2015)."  Naveen also sent the message to Melani Quick RD, with [facility].     Called patient's brother, Ollie Swain, [phone] cell., who is listed in patient's chart as his contact as patient cannot verbally communicate. Discussed the above noted information with him, and he stated understanding and agreement with the tube feeding recommendations and the referral to [facility]. He said that he and patient will be travelling tomorrow to [facility] in [place], [place]. Patient is scheduled on Thurs. 4/25 for surgery to his eye to remove the lesion. Wished them safe travels and that all goes well with the surgery. Asked him to call me and let me know when they are back in [place] and provided him with my contact umber. Will inform him as I receive any further updates on the tube feeding supplies referral.     Sent a reply Secure Chat message to Naveen and Melani.     Will continue to follow and assist as needs are identified.      "

## 2024-04-22 NOTE — PROGRESS NOTES
"Oncology Nutrition Assessment Medical Nutrition Therapy Initial Visit    Rocco Swain  1950    Referring Provider: Ct Francis NP   Reason for Referral: Anticipating PEG tube placement    Diagnosis: SCC of Skin of Orbit; History of SCC of Base of Tongue    Treatment: OP HEAD NECK CETUXIMAB CARBOPLATIN FLUOROURACIL Q3W - Dr. Hernandez    PMHx:   Past Medical History:   Diagnosis Date    Basal cell carcinoma (BCC) in situ of skin 2012    3 on face, 2 on arm, removed by dermatology.     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 Underwent surgical resection of tongue and neck. s/p tracheostomy    Squamous cell carcinoma of skin of right temple 01/24/2024    right temple     Allergies: Aspirin and Tylenol extended release    Nutrition Assessment    Anthropometrics:   Weight:   Wt Readings from Last 10 Encounters:   04/17/24 49 kg (108 lb 0.4 oz)   04/08/24 51.3 kg (113 lb 2.2 oz)   04/02/24 51.5 kg (113 lb 8.6 oz)   03/25/24 52.6 kg (115 lb 15.4 oz)   03/25/24 52.6 kg (115 lb 15.4 oz)   03/18/24 54 kg (119 lb 0.8 oz)   03/11/24 53.8 kg (118 lb 9.7 oz)   03/11/24 53.8 kg (118 lb 9.7 oz)   03/04/24 54.3 kg (119 lb 11.4 oz)   02/26/24 53.4 kg (117 lb 11.6 oz)                              Usual BW: 115-119 lb. Timeframe: Ealry 2024  Weight Change: ~10lb weight loss in 1 month Ht Readings from Last 1 Encounters:   04/17/24 5' 8" (1.727 m)      BMI Readings from Last 1 Encounters:   04/17/24 16.43 kg/m²     Estimated body surface area is 1.53 meters squared as calculated from the following:    Height as of 4/17/24: 5' 8" (1.727 m).    Weight as of 4/17/24: 49 kg (108 lb 0.4 oz).        Appetite/Intake: No " appetite. Difficulty swallowing for last 2 weeks due to throat infection. Currently taking homemade smoothies, Soup, Ensure Plus 6 cartons daily (self-pay), water (sipping all day), and Gatorade. History of PEG tube placement/removal in 2008 and 2015.     Encounter Notes:  Rocco Swain is a 74 y.o. male with SCC of Skin of Orbit and history of SCC of Base of Tongue referred by Ct Francis, NP for nutrition assessment prior to PEG tube placement. Noted that Mr. Swain requested PEG tube placement for nutrition support while under active treatment. Noted that patient has a history of prior PEG tube placement/removal in 2008 and 2015 respectively. He reports that appetite and intake have declined over the last 2 weeks due to difficulty swallowing from a throat infection. Patient was treated with antibiotics for this which caused bowel changes (both diarrhea and constipation). Mr. Swain is aware of how to administer feedings, flushes, and medication in PEG tube as well as care and maintenance of tube. Anticipating PEG tube placement during the week of 4/29/24.     Current Medications:  Current Outpatient Medications   Medication Instructions    amoxicillin-clavulanate (AUGMENTIN) 400-57 mg/5 mL SusR Take 11 mLs (875 mg total) by mouth every 12 (twelve) hours for 5 days. Discard Remainder    azelaic acid (AZELEX) 15 % gel APPLY TOPICALLY TO AFFECTED AREA IN THE MORNING    chlorhexidine (PERIDEX) 0.12 % solution Gently swish and spit 15 mL twice a day for 3 weeks. Start rinses the day after your procedure.    clindamycin phosphate 1% (CLINDAGEL) 1 % gel Topical (Top), 2 times daily    dexAMETHasone (DECADRON) 0.5 mg/5 mL Elix Swish 10 mL by mouth for 2 minutes then spit 4 times daily    erythromycin (ROMYCIN) ophthalmic ointment Administer 0.5 inches to the right eye twice daily for 30 days.    gabapentin (NEURONTIN) 300 mg, Oral, Nightly    hydrocortisone 1 % cream Apply to face, hands, feet, neck, back and  chest at bedtime.    imiquimod (ALDARA) 5 % cream Apply to biopsy site on frontal scalp + about a centimeter of surrounding skin qhs x 16 weeks. Wash off in am and apply sunscreen. Discontinue if skin becomes blistered, raw, bleeding, painful, etc.    LIDOcaine HCl 2% (LIDOCAINE VISCOUS) 2 % Soln Swish and spit 15 mls every 8 (eight) hours as needed (mouth sore).    magic mouthwash diphen/antac/lidoc/nysta Take 10 mLs by mouth 4 (four) times daily.    metroNIDAZOLE (METROGEL) 0.75 % gel Apply topically to affected area 2 (two) times daily.    multivit-min/FA/lycopen/lutein (CENTRUM SILVER MEN ORAL) 1 tablet, Oral, Daily    OLANZapine (ZYPREXA) 5 mg, Oral, Nightly, Take as directed on days 1-4 of your chemotherapy cycle.    oxyCODONE (ROXICODONE) 5 mg, Oral, Every 6 hours PRN    pantoprazole (PROTONIX) 40 mg, Oral, 2 times daily before meals    prednisoLONE acetate (PRED FORTE) 1 % DrpS 1 drop, Right Eye, 4 times daily    prochlorperazine (COMPAZINE) 10 MG tablet Take ½ tablet (5 mg total) by mouth every 6 (six) hours as needed.    senna (SENOKOT) 8.6 mg tablet 2 tablets, Oral, 2 times daily PRN    sucralfate (CARAFATE) 1 gram tablet Take 1 tablet (1 g total) by mouth 4 times daily. Tablet can be broken and dissolved in water for ease of administration. Consume within 30 minutes of dissolving. Separate from pantoprazole and levothyroxine by at least 2 hours.    sulfacetamide sodium-sulfur 10-5 % (w/w) Clsr USE TO WASH AFFECTED AREA DAILY    tacrolimus (PROGRAF) 0.5 MG Cap Take 1 capsule (0.5 mg total) by mouth every EVENING.  (Use the 1mg capsule for your morning dose)    tacrolimus (PROGRAF) 1 MG Cap Take 1 capsule (1 mg total) by mouth every MORNING. Use the tacrolimus 0.5mg capsule for your evening dose as directed.    Tirosint-SoL 150 mcg, Oral, Before breakfast    tiZANidine (ZANAFLEX) 2 mg, Oral, Nightly PRN    traZODone (DESYREL) 50 mg, Oral, Nightly    vitamin E 1000 UNIT capsule Take 1 cap PO BID until gone.  Start taking one week prior to your dental surgery.       Labs: Reviewed 4/17/24: glucose 148, bun 28, cr 1.7, alb 3.3; 4/11/24: mg 1.4     Nutrition Diagnosis    Nutrition Problem: Unintended weight loss  Etiology (related to): inadequate energy intake and decreased ability to consume sufficient energy  Signs/Symptoms (as evidenced by): requiring tube feeding for nutrition support and weight loss of ~10 lbs in 1 month    Nutrition Intervention    Nutrition Prescription  2295-0431 kcals/day 30-35 kcal/kg   49-59 g protein/day 1-1.2 gm/kg  3686-0449 mL fluid/day 1 ml/kcal    Recommendations:   Recommend Suplena QID, bolus, via PEG tube. Recommend 60 ml FWF before and 120 ml FWF after.   Additional water flushes of 120 ml QID needed for adequate hydration.   May continue to consume smoothies and soups orally as tolerate. This counts as additional hydration.   Be sure to consume between feedings to prevent early satiety.     Materials Provided/Reviewed: none    Nutrition Monitoring and Evaluation    Monitor: rate/formulation of tube feeding, diet tolerance , weight, diet education needs , and adherence to nutrition recommendations     Follow up In 2 weeks     Communication to referring provider/care team: Note available in chart.     Consultation Time: 30 Minutes    Naveen COOPER, , LDN  Advanced Practice in Clinical Nutrition  Board Certified Specialist in Oncology Nutrition   [facility]  [phone]

## 2024-04-22 NOTE — PROGRESS NOTES
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 male who returns to clinic for management of newly progressing cSCC. Patient had ANGELES on October PETCT, but then developed quickly progressing orbital lesion. He was seen at [facility] in [place]. Recommendation was to follow up at [facility] to initiate induction chemotherapy followed by possible surgery.     Interval HPI:  Admitted for TSERING on CKD secondary to mucositis (due to 5FU) leading to poor p.o. intake on 4/8/24. Patient was started on magic mouthwash and dexamethasone swish and spit with improvement of pain. Patient developed melena - GI was involved. EGD showed severe erosive esophagitis. Patient started on Protonix b.i.d. for 12 weeks, EED 7/2/24. Carafate q.i.d. for 1 month EED 5/10/24. Throat culture revealed Klebsiella, pharyngitis. Augmentin was given. Patient was instructed to hold doxycycline until f/u appt.     Patient seen today for 1 week follow up and to review labs. Leaving for [place] tomorrow and plan for surgery at [facility] on Thursday. Weight is up 1 lb, seems to be able to take in more nutrition. Has been drinking more fluids.       ECOG Performance status: 1 - Symptomatic but completely ambulatory Communication from him is 100% written.     Cancer Staging   Squamous cell carcinoma of base of tongue  Staging form: Pharynx - HPV-Mediated Oropharynx, AJCC 8th Edition  - Clinical stage from 9/18/2020: Stage III (rcT4, cN1, cM0, p16+) - Signed by Ronald Berg MD on 12/27/2022      Oncologic History:  Oncology History   Squamous cell carcinoma of base of tongue   9/18/2020 Cancer Staged    Staging form: Pharynx - HPV-Mediated Oropharynx, AJCC 8th Edition  - Clinical stage from 9/18/2020: Stage III (rcT4, cN1, cM0, p16+)     9/28/2020 Initial Diagnosis    Squamous cell carcinoma of base of tongue     10/6/2020 - 8/17/2021  Chemotherapy    Treatment Summary   Plan Name: OP HEAD NECK CARBOPLATIN PACLITAXEL C1-2 FOLLOWED BY CETUXIMAB CARBOPLATIN C3-6 FOLLOWED BY CETUXIMAB MAINTENANCE WEEKLY  Treatment Goal: Control  Status: Inactive  Start Date: 10/6/2020  End Date: 8/17/2021  Provider: Ronald Berg MD  Chemotherapy: cetuximab (ERBITUX) 100 mg/50 mL chemo infusion 684 mg, 400 mg/m2 = 684 mg (100 % of original dose 400 mg/m2), Intravenous, Clinic/[facility] 1 time, 29 of 38 cycles  Dose modification: 500 mg/m2 (original dose 400 mg/m2, Cycle 3), 250 mg/m2 (original dose 400 mg/m2, Cycle 3), 400 mg/m2 (original dose 400 mg/m2, Cycle 3), 250 mg/m2 (original dose 250 mg/m2, Cycle 7), 200 mg/m2 (original dose 250 mg/m2, Cycle 18), 200 mg/m2 (original dose 250 mg/m2, Cycle 19), 250 mg/m2 (original dose 250 mg/m2, Cycle 4), 200 mg/m2 (original dose 250 mg/m2, Cycle 25), 200 mg/m2 (original dose 250 mg/m2, Cycle 28)  Administration: 684 mg (11/17/2020), 400 mg (11/24/2020), 400 mg (2/9/2021), 400 mg (2/17/2021), 427.6 mg (3/9/2021), 400 mg (3/30/2021), 400 mg (3/23/2021), 400 mg (4/6/2021), 427.6 mg (4/13/2021), 427.6 mg (4/20/2021), 342 mg (5/11/2021), 342 mg (5/18/2021), 342 mg (5/25/2021), 400 mg (5/31/2021), 400 mg (6/7/2021), 400 mg (6/14/2021), 400 mg (12/8/2020), 427.6 mg (12/29/2020), 400 mg (12/1/2020), 400 mg (12/15/2020), 400 mg (12/22/2020), 427.6 mg (1/5/2021), 400 mg (1/12/2021), 400 mg (1/19/2021), 427.6 mg (1/26/2021), 400 mg (2/2/2021), 400 mg (2/23/2021), 400 mg (3/2/2021), 427.6 mg (3/16/2021), 400 mg (6/21/2021), 342 mg (6/28/2021), 342 mg (7/6/2021), 342 mg (7/13/2021), 342 mg (7/20/2021), 400 mg (7/27/2021), 400 mg (8/10/2021), 400 mg (8/17/2021)  CARBOplatin (PARAPLATIN) 310 mg in sodium chloride 0.9% 500 mL chemo infusion, 310 mg (92.2 % of original dose 334.5 mg), Intravenous, Clinic/[facility] 1 time, 6 of 6 cycles  Dose modification:   (original dose 334.5 mg, Cycle 1)  Administration: 310 mg (10/6/2020), 370 mg (11/17/2020), 300 mg  (10/27/2020), 350 mg (12/8/2020), 335 mg (12/29/2020), 320 mg (1/19/2021)  PACLitaxeL (TAXOL) 175 mg/m2 = 300 mg in sodium chloride 0.9% 500 mL chemo infusion, 175 mg/m2 = 300 mg (100 % of original dose 175 mg/m2), Intravenous, Clinic/[facility] 1 time, 2 of 2 cycles  Dose modification: 175 mg/m2 (original dose 175 mg/m2, Cycle 1)  Administration: 300 mg (10/6/2020), 300 mg (10/27/2020)     4/29/2021 Tumor Conference       His case was discussed at the Multidisciplinary Head and Neck Team Planning Meeting.    Representatives from Medical Oncology, Radiation Oncology, Head and Neck Surgical Oncology, Psychosocial Oncology, and Speech and Language Pathology discussed the case with the following recommendations:    1) biopsy         7/29/2021 Tumor Conference       His case was discussed at the Multidisciplinary Head and Neck Team Planning Meeting.    Representatives from Medical Oncology, Radiation Oncology, Head and Neck Surgical Oncology, Psychosocial Oncology, and Speech and Language Pathology discussed the case with the following recommendations:    1) Head and neck clinic follow up  2) consider Keytruda (discuss with transplant)  3) consider palliative referral         9/13/2021 - 12/29/2023 Chemotherapy    Treatment Summary   Plan Name: OP HEAD NECK PEMBROLIZUMAB CARBOPLATIN FLUOROURACIL (C1 ONLY RECEIVED) FOLLOWED BY PEMBROLIZUMAB MAINTENANCE  Treatment Goal: Palliative  Status: Inactive  Start Date: 9/13/2021  End Date: 12/29/2023  Provider: Ronald Berg MD  Chemotherapy: CARBOplatin (PARAPLATIN) 320 mg in sodium chloride 0.9% 500 mL chemo infusion, 320 mg (100 % of original dose 320.5 mg), Intravenous, Clinic/[facility] 1 time, 6 of 6 cycles  Dose modification:   (original dose 320.5 mg, Cycle 1)  Administration: 320 mg (9/13/2021), 335 mg (12/23/2021), 325 mg (1/27/2022), 245 mg (3/3/2022), 255 mg (5/12/2022), 255 mg (4/7/2022)  fluorouraciL 1,000 mg/m2/day = 6,440 mg in sodium chloride 0.9% 240 mL chemo infusion, 1,000  mg/m2/day = 6,440 mg, Intravenous, Over 96 hours, 6 of 6 cycles  Dose modification: 800 mg/m2/day (original dose 1,000 mg/m2/day, Cycle 6), 5,000 mg (original dose 1,000 mg/m2/day, Cycle 8)  Administration: 6,440 mg (9/13/2021), 6,440 mg (12/23/2021), 6,480 mg (1/27/2022), 5,000 mg (3/3/2022), 5,000 mg (4/7/2022), 5,000 mg (5/12/2022)     Secondary malignant neoplasm of cervical lymph node   2/9/2021 Initial Diagnosis    Secondary malignant neoplasm of cervical lymph node     9/13/2021 - 12/29/2023 Chemotherapy    Treatment Summary   Plan Name: OP HEAD NECK PEMBROLIZUMAB CARBOPLATIN FLUOROURACIL (C1 ONLY RECEIVED) FOLLOWED BY PEMBROLIZUMAB MAINTENANCE  Treatment Goal: Palliative  Status: Inactive  Start Date: 9/13/2021  End Date: 12/29/2023  Provider: Ronald Berg MD  Chemotherapy: CARBOplatin (PARAPLATIN) 320 mg in sodium chloride 0.9% 500 mL chemo infusion, 320 mg (100 % of original dose 320.5 mg), Intravenous, Clinic/[facility] 1 time, 6 of 6 cycles  Dose modification:   (original dose 320.5 mg, Cycle 1)  Administration: 320 mg (9/13/2021), 335 mg (12/23/2021), 325 mg (1/27/2022), 245 mg (3/3/2022), 255 mg (5/12/2022), 255 mg (4/7/2022)  fluorouraciL 1,000 mg/m2/day = 6,440 mg in sodium chloride 0.9% 240 mL chemo infusion, 1,000 mg/m2/day = 6,440 mg, Intravenous, Over 96 hours, 6 of 6 cycles  Dose modification: 800 mg/m2/day (original dose 1,000 mg/m2/day, Cycle 6), 5,000 mg (original dose 1,000 mg/m2/day, Cycle 8)  Administration: 6,440 mg (9/13/2021), 6,440 mg (12/23/2021), 6,480 mg (1/27/2022), 5,000 mg (3/3/2022), 5,000 mg (4/7/2022), 5,000 mg (5/12/2022)     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   1/29/2024 Initial Diagnosis    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     2/19/2024 -  Chemotherapy    Treatment Summary   Plan Name: OP HEAD NECK CETUXIMAB CARBOPLATIN FLUOROURACIL Q3W  Treatment Goal: Curative  Status: Active  Start Date: 2/19/2024  End Date: 8/16/2024 (Planned)  Provider: Martín Hernandez,  MD  Chemotherapy: cetuximab (ERBITUX) 100 mg/50 mL chemo infusion 668 mg, 400 mg/m2 = 668 mg, Intravenous, Clinic/[facility] 1 time, 2 of 12 cycles  Administration: 668 mg (2/19/2024), 400 mg (2/26/2024), 400 mg (3/18/2024), 400 mg (3/25/2024), 400 mg (3/11/2024)  CARBOplatin (PARAPLATIN) 295 mg in sodium chloride 0.9% 314.5 mL chemo infusion, 295 mg, Intravenous, Clinic/[facility] 1 time, 2 of 6 cycles  Administration: 295 mg (2/19/2024), 300 mg (3/18/2024)         Review of Systems   Constitutional:  Positive for fatigue and unexpected weight change (weight loss). Negative for activity change, appetite change, chills and fever.   HENT:  Positive for mouth sores, sore throat and trouble swallowing. Negative for ear pain, facial swelling, hearing loss, nosebleeds and voice change.         No verbal communication.   Eyes:  Positive for redness (right eye - lesion (improving)). Negative for pain, discharge and visual disturbance.   Respiratory:  Negative for cough, choking, chest tightness and shortness of breath.    Cardiovascular:  Negative for chest pain, palpitations and leg swelling.   Gastrointestinal:  Negative for abdominal distention, abdominal pain, blood in stool, constipation, diarrhea, nausea and vomiting.   Endocrine: Negative for cold intolerance and heat intolerance.   Genitourinary:  Negative for difficulty urinating, dysuria, frequency and urgency.   Musculoskeletal:  Positive for back pain (lower - chronic). Negative for arthralgias, gait problem, leg pain and myalgias.   Integumentary:  Positive for mole/lesion (left nare and neck - cSCCv > improving). Negative for pallor, rash and wound.   Allergic/Immunologic: Negative for frequent infections.   Neurological:  Positive for dizziness. Negative for tremors, weakness, light-headedness, numbness and headaches.   Hematological:  Negative for adenopathy. Does not bruise/bleed easily.   Psychiatric/Behavioral:  Negative for agitation, confusion, dysphoric mood and  sleep disturbance. The patient is not nervous/anxious.         Allergies:  Review of patient's allergies indicates:   Allergen Reactions    Aspirin     Tylenol extended release        Medications:  Current Outpatient Medications   Medication Sig Dispense Refill    amoxicillin-clavulanate (AUGMENTIN) 400-57 mg/5 mL SusR Take 11 mLs (875 mg total) by mouth every 12 (twelve) hours for 5 days. Discard Remainder 150 mL 0    azelaic acid (AZELEX) 15 % gel APPLY TOPICALLY TO AFFECTED AREA IN THE MORNING 50 g 3    chlorhexidine (PERIDEX) 0.12 % solution Gently swish and spit 15 mL twice a day for 3 weeks. Start rinses the day after your procedure. 473 mL 1    clindamycin phosphate 1% (CLINDAGEL) 1 % gel Apply topically 2 (two) times daily. 60 g 3    dexAMETHasone (DECADRON) 0.5 mg/5 mL Elix Swish 10 mL by mouth for 2 minutes then spit 4 times daily 474 mL 5    erythromycin (ROMYCIN) ophthalmic ointment Administer 0.5 inches to the right eye twice daily for 30 days. 3.5 g 1    hydrocortisone 1 % cream Apply to face, hands, feet, neck, back and chest at bedtime. 112 g 2    imiquimod (ALDARA) 5 % cream Apply to biopsy site on frontal scalp + about a centimeter of surrounding skin qhs x 16 weeks. Wash off in am and apply sunscreen. Discontinue if skin becomes blistered, raw, bleeding, painful, etc. 24 packet 3    levothyroxine (TIROSINT-SOL) 150 mcg/mL Soln Take 150 mcg by mouth before breakfast. 30 mL 11    LIDOcaine HCl 2% (LIDOCAINE VISCOUS) 2 % Soln Swish and spit 15 mls every 8 (eight) hours as needed (mouth sore). 100 mL 3    magic mouthwash diphen/antac/lidoc/nysta Take 10 mLs by mouth 4 (four) times daily. 120 mL 4    metroNIDAZOLE (METROGEL) 0.75 % gel Apply topically to affected area 2 (two) times daily. 45 g 1    multivit-min/FA/lycopen/lutein (CENTRUM SILVER MEN ORAL) Take 1 tablet by mouth once daily.      OLANZapine (ZYPREXA) 5 MG tablet Take 1 tablet (5 mg total) by mouth every evening. Take as directed on days  1-4 of your chemotherapy cycle. 30 tablet 5    oxyCODONE (ROXICODONE) 5 MG immediate release tablet Take 1 tablet (5 mg total) by mouth every 6 (six) hours as needed for Pain. 60 tablet 0    pantoprazole (PROTONIX) 40 MG tablet Take 1 tablet (40 mg total) by mouth 2 (two) times daily before meals. 180 tablet 3    prednisoLONE acetate (PRED FORTE) 1 % DrpS Place 1 drop into the right eye 4 (four) times daily. 10 mL 3    prochlorperazine (COMPAZINE) 10 MG tablet Take ½ tablet (5 mg total) by mouth every 6 (six) hours as needed. 30 tablet 1    senna (SENOKOT) 8.6 mg tablet Take 2 tablets by mouth 2 (two) times daily as needed for Constipation. 60 tablet 1    sucralfate (CARAFATE) 1 gram tablet Take 1 tablet (1 g total) by mouth 4 times daily. Tablet can be broken and dissolved in water for ease of administration. Consume within 30 minutes of dissolving. Separate from pantoprazole and levothyroxine by at least 2 hours. 120 tablet 0    sulfacetamide sodium-sulfur 10-5 % (w/w) Clsr USE TO WASH AFFECTED AREA DAILY      tacrolimus (PROGRAF) 0.5 MG Cap Take 1 capsule (0.5 mg total) by mouth every EVENING.  (Use the 1mg capsule for your morning dose) 90 capsule 3    tacrolimus (PROGRAF) 1 MG Cap Take 1 capsule (1 mg total) by mouth every MORNING. Use the tacrolimus 0.5mg capsule for your evening dose as directed. 90 capsule 3    tiZANidine (ZANAFLEX) 2 MG tablet Take 1 tablet (2 mg total) by mouth nightly as needed (neck muscle strain). 30 tablet 3    traZODone (DESYREL) 50 MG tablet TAKE 1 TABLET BY MOUTH IN THE  EVENING 90 tablet 3    vitamin E 1000 UNIT capsule Take 1 cap PO BID until gone. Start taking one week prior to your dental surgery. 112 capsule 0    gabapentin (NEURONTIN) 300 MG capsule Take 1 capsule (300 mg total) by mouth every evening. (Patient taking differently: Take 300 mg by mouth as needed.) 30 capsule 11     No current facility-administered medications for this visit.     Facility-Administered  Medications Ordered in Other Visits   Medication Dose Route Frequency Provider Last Rate Last Admin    heparin, porcine (PF) 100 unit/mL injection flush 500 Units  500 Units Intravenous PRN Ronald Berg MD        ofloxacin 0.3 % ophthalmic solution 1 drop  1 drop Right Eye On Call Procedure Victor M Vasques MD   2 drop at 10/11/22 0745    sodium chloride 0.9% flush 10 mL  10 mL Intravenous PRN Ronald Berg MD        sodium chloride 0.9% flush 10 mL  10 mL Intravenous PRN Victor M Vasques MD           PMH:  Past Medical History:   Diagnosis Date    Basal cell carcinoma (BCC) in situ of skin 2012    3 on face, 2 on arm, removed by dermatology.     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 Underwent surgical resection of tongue and neck. s/p tracheostomy    Squamous cell carcinoma of skin of right temple 01/24/2024    right temple       PSH:  Past Surgical History:   Procedure Laterality Date    COLONOSCOPY      COLONOSCOPY N/A 9/14/2023    Procedure: COLONOSCOPY;  Surgeon: Reyes Olivia MD;  Location: [facility] (2ND FLR);  Service: Endoscopy;  Laterality: N/A;    CONJUNCTIVA BIOPSY Right 10/11/2022    Procedure: BIOPSY, CONJUNCTIVA;  Surgeon: Victor M Vasques MD;  Location: [facility];  Service: Ophthalmology;  Laterality: Right;    ESOPHAGOGASTRODUODENOSCOPY N/A 9/14/2023    Procedure: EGD (ESOPHAGOGASTRODUODENOSCOPY);  Surgeon: Reyes Olivia MD;  Location: [facility] (2ND FLR);  Service: Endoscopy;  Laterality: N/A;    ESOPHAGOGASTRODUODENOSCOPY N/A 4/10/2024    Procedure: EGD (ESOPHAGOGASTRODUODENOSCOPY);  Surgeon: Reyes Pagan MD;  Location: [facility] ENDO (2ND FLR);  Service:  "Endoscopy;  Laterality: N/A;    INSERTION OF VENOUS ACCESS PORT Right 3/8/2021    Procedure: INSERTION, VENOUS ACCESS PORT;  Surgeon: Jesus Rendon MD;  [facility];  Service: General;  Laterality: Right;    LIVER TRANSPLANT  11/2008    transplanted for biopsy proven hepatocellular carcinoma,     LYMPH NODE BIOPSY N/A 9/18/2020    Procedure: BIOPSY, LYMPH NODE;  Surgeon: Taz Diagnostic Provider;  Location: [facility] OR 2ND FLR;  Service: General;  Laterality: N/A;  Rm 173    SURGICAL REMOVAL OF SCAR Right 8/24/2023    Procedure: EXCISION, SCAR;  Surgeon: Ting Brambila MD;  Location: [facility] OR;  Service: Ophthalmology;  Laterality: Right;    TRACHEOSTOMY         FamHx:  Family History   Problem Relation Name Age of Onset    Dementia Mother         SocHx:  Social History     Socioeconomic History    Marital status: Single   Occupational History    Occupation: Retired     Comment: , notes exposures to fumes etc.  Worked on [employer] for 4 years   Tobacco Use    Smoking status: Never    Smokeless tobacco: Never   Substance and Sexual Activity    Alcohol use: Yes     Comment: drinks wine, 1 glass day    Drug use: Not Currently    Sexual activity: Yes     Comment: No prior history of STD        Distress Score             Objective:      Vitals:    04/22/24 1300   BP: 101/69   BP Location: Left arm   Patient Position: Sitting   BP Method: Medium (Automatic)   Pulse: 83   Resp: 16   Temp: 97.4 °F (36.3 °C)   TempSrc: Oral   SpO2: 96%   Weight: 49.5 kg (109 lb 2 oz)   Height: 5' 8" (1.727 m)                  Physical Exam  Vitals reviewed.   Constitutional:       General: He is not in acute distress.     Appearance: Normal appearance. He is well-developed and underweight.   HENT:      Head: Normocephalic and atraumatic.      Mouth/Throat:      Mouth: Mucous membranes are moist.      Dentition: Abnormal dentition. Dental caries present.      Pharynx: Posterior oropharyngeal erythema " (large ulcer) present.   Eyes:      Conjunctiva/sclera: Conjunctivae normal.      Pupils: Pupils are equal, round, and reactive to light.      Comments: Right eye - injected and nodular growth on the inner eye conjunctiva - decreased in size.     Left lower eyelid - erythema and scabbing.    Neck:      Trachea: Tracheostomy present.   Pulmonary:      Effort: Pulmonary effort is normal. No respiratory distress.      Comments: Tracheostomy  Abdominal:      General: There is no distension.      Palpations: Abdomen is soft.   Musculoskeletal:         General: No swelling. Normal range of motion.      Cervical back: Normal range of motion and neck supple.   Skin:     General: Skin is warm and dry.   Neurological:      General: No focal deficit present.      Mental Status: He is alert and oriented to person, place, and time.   Psychiatric:         Mood and Affect: Mood normal.         Behavior: Behavior normal.         Thought Content: Thought content normal.         Judgment: Judgment normal.           LABS:  WBC   Date Value Ref Range Status   04/22/2024 6.42 3.90 - 12.70 K/uL Final     Hemoglobin   Date Value Ref Range Status   04/22/2024 9.8 (L) 14.0 - 18.0 g/dL Final     Hematocrit   Date Value Ref Range Status   04/22/2024 30.2 (L) 40.0 - 54.0 % Final     Platelets   Date Value Ref Range Status   04/22/2024 188 150 - 450 K/uL Final       Chemistry        Component Value Date/Time     04/22/2024 1223    K 3.9 04/22/2024 1223     04/22/2024 1223    CO2 26 04/22/2024 1223    BUN 15 04/22/2024 1223    CREATININE 1.5 (H) 04/22/2024 1223     (H) 04/22/2024 1223        Component Value Date/Time    CALCIUM 9.6 04/22/2024 1223    ALKPHOS 92 04/22/2024 1223    AST 52 (H) 04/22/2024 1223    ALT 30 04/22/2024 1223    BILITOT 0.5 04/22/2024 1223    ESTGFRAFRICA 52.6 (A) 07/14/2022 1119    EGFRNONAA 45.5 (A) 07/14/2022 1119              Assessment:       1. 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  Squamous cell carcinoma of base of tongue    3. Weight loss    4. Secondary malignant neoplasm of cervical lymph node    5. Tracheostomy status            Plan:         Orbital cSCC  Unfortunately patient has progressed while on immunotherapy for below diagnosis. For that reason systemic therapies are limited. Evaluated at [facility] and surgeon does think an exenteration sparing resection would be possible with good response to induction chemotherapy. Plan was for platinum doublet chemo + cetuximab x 2 cycles. Notified of this plan on 1/29 and treatment plan was entered. Authorization was obtained on 2/5, but cycle 1 still not scheduled.    Was unable to complete C2D15 d/t hospitalization from mucositis and GI bleed. This day was omitted and patient is going to surgery on Thursday, 4/25 at [facility]. Reviewed labs, hemoglobin stable and creatinine has improved. He is able to tolerate more nutrition and fluids, cont this. He will message if anything is needed. Plan to have him seen by Dr. Hernandez in 2 weeks.    RTC 2 weeks with labs (CBC,CMP) and to see Dr. Hernandez.    2,3. 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 He is not a surgical or radiation candidate.  -Started on PCC 10/6/2020 followed by maintenance cetuximab until 8/24/21 (discontinued due to progression of disease; continued increase in SUV uptake, no new lesions).  - 9/2021 started on carbo/5-FU/pembrolizumab; due to toxicity went to pembrolizumab monotherapy starting with cycle 2.  Progression of disease noted after cycle 3 so added chemotherapy back in with cycle 4. Keytruda monotherapy starting with C9.   - Keytruda discontinued after 2 years.      Patient is in agreement with the proposed treatment plan. All questions were answered to the patient's satisfaction. Pt knows to call clinic if anything is needed before the next clinic visit.      Una Hurt, MSN, APRN,  FNP-C  Hematology and Medical Oncology  Manager- [place] [facility]  Nurse Practitioner/Clinical Investigator, [place] [facility]  Nurse Practitioner to Dr. Carroll Terry        Route Chart for Scheduling    Med Onc Chart Routing  Urgent    Follow up with physician . RTC 2 weeks with labs (CBC,CMP) and to see Dr. Hernandez.   Follow up with [facility]    Infusion scheduling note    Injection scheduling note    Labs    Imaging    Pharmacy appointment    Other referrals                Treatment Plan Information   OP HEAD NECK CETUXIMAB CARBOPLATIN FLUOROURACIL Q3W   Martín Hernandez MD   Upcoming Treatment Dates - OP HEAD NECK CETUXIMAB CARBOPLATIN FLUOROURACIL Q3W    4/12/2024       Pre-Medications       diphenhydrAMINE (BENADRYL) 25 mg in sodium chloride 0.9% 50 mL IVPB       Chemotherapy       cetuximab (ERBITUX) 100 mg/50 mL chemo infusion 417.6 mg  4/19/2024       Pre-Medications       diphenhydrAMINE (BENADRYL) 25 mg in sodium chloride 0.9% 50 mL IVPB       Chemotherapy       cetuximab (ERBITUX) 100 mg/50 mL chemo infusion 417.6 mg       CARBOplatin (PARAPLATIN) 300 mg in sodium chloride 0.9% 280 mL chemo infusion       fluorouracil (Adrucil) 1,000 mg/m2/day = 6,680 mg in sodium chloride 0.9% 240 mL chemo infusion       Antiemetics       aprepitant (CINVANTI) injection 130 mg       palonosetron (ALOXI) 0.25 mg with Dexamethasone (DECADRON) 12 mg in NS 50 mL IVPB  4/26/2024       Pre-Medications       diphenhydrAMINE (BENADRYL) 25 mg in sodium chloride 0.9% 50 mL IVPB       Chemotherapy       cetuximab (ERBITUX) 100 mg/50 mL chemo infusion 417.6 mg  5/3/2024       Pre-Medications       diphenhydrAMINE (BENADRYL) 25 mg in sodium chloride 0.9% 50 mL IVPB       Chemotherapy       cetuximab (ERBITUX) 100 mg/50 mL chemo infusion 417.6 mg    Supportive Plan Information  IV FLUIDS AND ELECTROLYTES   Ct Francis NP   Upcoming Treatment Dates - IV FLUIDS AND  ELECTROLYTES    No upcoming days in selected categories.    Therapy Plan Information  PORT FLUSH  Flushes  heparin, porcine (PF) 100 unit/mL injection flush 500 Units  500 Units, Intravenous, Every visit  sodium chloride 0.9% flush 10 mL  10 mL, Intravenous, Every visit    PORT FLUSH  Flushes  heparin, porcine (PF) 100 unit/mL injection flush 500 Units  500 Units, Intravenous, Every visit  sodium chloride 0.9% flush 10 mL  10 mL, Intravenous, Every visit

## 2024-04-23 ENCOUNTER — PATIENT MESSAGE (OUTPATIENT)
Dept: HEMATOLOGY/ONCOLOGY | Facility: CLINIC | Age: 74
End: 2024-04-23

## 2024-04-29 ENCOUNTER — PATIENT MESSAGE (OUTPATIENT)
Dept: HEMATOLOGY/ONCOLOGY | Facility: CLINIC | Age: 74
End: 2024-04-29
Payer: MEDICARE

## 2024-04-29 DIAGNOSIS — E03.2 HYPOTHYROIDISM DUE TO MEDICAMENTS AND OTHER EXOGENOUS SUBSTANCES: ICD-10-CM

## 2024-04-29 RX ORDER — LEVOTHYROXINE SODIUM 50 UG/1
150 CAPSULE ORAL
Qty: 30 ML | Refills: 11 | OUTPATIENT
Start: 2024-04-29 | End: 2024-05-29

## 2024-05-06 ENCOUNTER — PATIENT MESSAGE (OUTPATIENT)
Dept: CARDIOLOGY | Facility: CLINIC | Age: 74
End: 2024-05-06
Payer: MEDICARE

## 2024-05-06 RX ORDER — LEVOTHYROXINE SODIUM 88 UG/1
TABLET ORAL
Qty: 90 TABLET | Refills: 1 | Status: SHIPPED | OUTPATIENT
Start: 2024-05-06

## 2024-05-07 ENCOUNTER — OFFICE VISIT (OUTPATIENT)
Dept: HEMATOLOGY/ONCOLOGY | Facility: CLINIC | Age: 74
End: 2024-05-07
Payer: MEDICARE

## 2024-05-07 ENCOUNTER — OFFICE VISIT (OUTPATIENT)
Dept: PALLIATIVE MEDICINE | Facility: CLINIC | Age: 74
End: 2024-05-07
Payer: MEDICARE

## 2024-05-07 ENCOUNTER — PATIENT MESSAGE (OUTPATIENT)
Dept: HEMATOLOGY/ONCOLOGY | Facility: CLINIC | Age: 74
End: 2024-05-07

## 2024-05-07 ENCOUNTER — LAB VISIT (OUTPATIENT)
Dept: LAB | Facility: HOSPITAL | Age: 74
End: 2024-05-07
Payer: MEDICARE

## 2024-05-07 ENCOUNTER — CLINICAL SUPPORT (OUTPATIENT)
Dept: HEMATOLOGY/ONCOLOGY | Facility: CLINIC | Age: 74
End: 2024-05-07
Payer: MEDICARE

## 2024-05-07 VITALS
RESPIRATION RATE: 14 BRPM | HEIGHT: 68 IN | WEIGHT: 110.88 LBS | DIASTOLIC BLOOD PRESSURE: 71 MMHG | OXYGEN SATURATION: 98 % | BODY MASS INDEX: 16.8 KG/M2 | SYSTOLIC BLOOD PRESSURE: 131 MMHG | HEART RATE: 77 BPM | TEMPERATURE: 98 F

## 2024-05-07 VITALS
WEIGHT: 111.13 LBS | SYSTOLIC BLOOD PRESSURE: 121 MMHG | DIASTOLIC BLOOD PRESSURE: 59 MMHG | OXYGEN SATURATION: 98 % | BODY MASS INDEX: 16.89 KG/M2 | RESPIRATION RATE: 78 BRPM

## 2024-05-07 DIAGNOSIS — C44.329 SQUAMOUS CELL CARCINOMA OF SKIN OF ORBIT: Primary | ICD-10-CM

## 2024-05-07 DIAGNOSIS — C44.329 SQUAMOUS CELL CARCINOMA OF SKIN OF ORBIT: ICD-10-CM

## 2024-05-07 DIAGNOSIS — M54.50 CHRONIC LOW BACK PAIN, UNSPECIFIED BACK PAIN LATERALITY, UNSPECIFIED WHETHER SCIATICA PRESENT: ICD-10-CM

## 2024-05-07 DIAGNOSIS — G89.29 CHRONIC LOW BACK PAIN, UNSPECIFIED BACK PAIN LATERALITY, UNSPECIFIED WHETHER SCIATICA PRESENT: ICD-10-CM

## 2024-05-07 DIAGNOSIS — C32.9 LARYNX CANCER: ICD-10-CM

## 2024-05-07 DIAGNOSIS — Z51.5 ENCOUNTER FOR PALLIATIVE CARE: Primary | ICD-10-CM

## 2024-05-07 DIAGNOSIS — C01 SQUAMOUS CELL CARCINOMA OF BASE OF TONGUE: ICD-10-CM

## 2024-05-07 DIAGNOSIS — H57.11 EYE PAIN, RIGHT: ICD-10-CM

## 2024-05-07 DIAGNOSIS — Z90.02 HISTORY OF LARYNGECTOMY: ICD-10-CM

## 2024-05-07 DIAGNOSIS — Z79.899 IMMUNODEFICIENCY DUE TO DRUGS: ICD-10-CM

## 2024-05-07 DIAGNOSIS — Z71.3 NUTRITIONAL COUNSELING: Primary | ICD-10-CM

## 2024-05-07 DIAGNOSIS — D84.821 IMMUNODEFICIENCY DUE TO DRUGS: ICD-10-CM

## 2024-05-07 DIAGNOSIS — K12.30 MUCOSITIS: ICD-10-CM

## 2024-05-07 DIAGNOSIS — M54.50 CHRONIC BILATERAL LOW BACK PAIN, UNSPECIFIED WHETHER SCIATICA PRESENT: ICD-10-CM

## 2024-05-07 DIAGNOSIS — E44.1 MILD MALNUTRITION: ICD-10-CM

## 2024-05-07 DIAGNOSIS — G89.3 CANCER RELATED PAIN: ICD-10-CM

## 2024-05-07 DIAGNOSIS — R13.10 DYSPHAGIA, UNSPECIFIED TYPE: ICD-10-CM

## 2024-05-07 DIAGNOSIS — Z93.0 TRACHEOSTOMY STATUS: ICD-10-CM

## 2024-05-07 DIAGNOSIS — E03.2 HYPOTHYROIDISM DUE TO MEDICAMENTS AND OTHER EXOGENOUS SUBSTANCES: ICD-10-CM

## 2024-05-07 DIAGNOSIS — G89.29 CHRONIC BILATERAL LOW BACK PAIN, UNSPECIFIED WHETHER SCIATICA PRESENT: ICD-10-CM

## 2024-05-07 DIAGNOSIS — C77.0 SECONDARY MALIGNANT NEOPLASM OF CERVICAL LYMPH NODE: ICD-10-CM

## 2024-05-07 DIAGNOSIS — N18.4 CHRONIC KIDNEY DISEASE (CKD), STAGE IV (SEVERE): ICD-10-CM

## 2024-05-07 DIAGNOSIS — G47.00 INSOMNIA, UNSPECIFIED TYPE: ICD-10-CM

## 2024-05-07 DIAGNOSIS — R63.4 WEIGHT LOSS: ICD-10-CM

## 2024-05-07 DIAGNOSIS — Z94.4 STATUS POST LIVER TRANSPLANTATION: ICD-10-CM

## 2024-05-07 LAB
ALBUMIN SERPL BCP-MCNC: 3.2 G/DL (ref 3.5–5.2)
ALP SERPL-CCNC: 76 U/L (ref 55–135)
ALT SERPL W/O P-5'-P-CCNC: 19 U/L (ref 10–44)
ANION GAP SERPL CALC-SCNC: 9 MMOL/L (ref 8–16)
AST SERPL-CCNC: 36 U/L (ref 10–40)
BASOPHILS # BLD AUTO: 0.01 K/UL (ref 0–0.2)
BASOPHILS NFR BLD: 0.3 % (ref 0–1.9)
BILIRUB SERPL-MCNC: 0.4 MG/DL (ref 0.1–1)
BUN SERPL-MCNC: 18 MG/DL (ref 8–23)
CALCIUM SERPL-MCNC: 9.5 MG/DL (ref 8.7–10.5)
CHLORIDE SERPL-SCNC: 107 MMOL/L (ref 95–110)
CO2 SERPL-SCNC: 26 MMOL/L (ref 23–29)
CREAT SERPL-MCNC: 1.3 MG/DL (ref 0.5–1.4)
DIFFERENTIAL METHOD BLD: ABNORMAL
EOSINOPHIL # BLD AUTO: 0.1 K/UL (ref 0–0.5)
EOSINOPHIL NFR BLD: 1.9 % (ref 0–8)
ERYTHROCYTE [DISTWIDTH] IN BLOOD BY AUTOMATED COUNT: 15.5 % (ref 11.5–14.5)
EST. GFR  (NO RACE VARIABLE): 57.3 ML/MIN/1.73 M^2
GLUCOSE SERPL-MCNC: 108 MG/DL (ref 70–110)
HCT VFR BLD AUTO: 25.9 % (ref 40–54)
HGB BLD-MCNC: 8.4 G/DL (ref 14–18)
IMM GRANULOCYTES # BLD AUTO: 0.01 K/UL (ref 0–0.04)
IMM GRANULOCYTES NFR BLD AUTO: 0.3 % (ref 0–0.5)
LYMPHOCYTES # BLD AUTO: 0.7 K/UL (ref 1–4.8)
LYMPHOCYTES NFR BLD: 17.5 % (ref 18–48)
MCH RBC QN AUTO: 33.3 PG (ref 27–31)
MCHC RBC AUTO-ENTMCNC: 32.4 G/DL (ref 32–36)
MCV RBC AUTO: 103 FL (ref 82–98)
MONOCYTES # BLD AUTO: 0.4 K/UL (ref 0.3–1)
MONOCYTES NFR BLD: 10.5 % (ref 4–15)
NEUTROPHILS # BLD AUTO: 2.6 K/UL (ref 1.8–7.7)
NEUTROPHILS NFR BLD: 69.5 % (ref 38–73)
NRBC BLD-RTO: 0 /100 WBC
PLATELET # BLD AUTO: 120 K/UL (ref 150–450)
PMV BLD AUTO: 9.8 FL (ref 9.2–12.9)
POTASSIUM SERPL-SCNC: 4.3 MMOL/L (ref 3.5–5.1)
PROT SERPL-MCNC: 7.1 G/DL (ref 6–8.4)
RBC # BLD AUTO: 2.52 M/UL (ref 4.6–6.2)
SODIUM SERPL-SCNC: 142 MMOL/L (ref 136–145)
WBC # BLD AUTO: 3.72 K/UL (ref 3.9–12.7)

## 2024-05-07 PROCEDURE — 99999PBSHW PR PBB SHADOW TECHNICAL ONLY FILED TO HB: Mod: PBBFAC,,,

## 2024-05-07 PROCEDURE — 80053 COMPREHEN METABOLIC PANEL: CPT | Performed by: NURSE PRACTITIONER

## 2024-05-07 PROCEDURE — 99215 OFFICE O/P EST HI 40 MIN: CPT | Mod: S$PBB,,, | Performed by: NURSE PRACTITIONER

## 2024-05-07 PROCEDURE — 99215 OFFICE O/P EST HI 40 MIN: CPT | Mod: PBBFAC | Performed by: INTERNAL MEDICINE

## 2024-05-07 PROCEDURE — 36415 COLL VENOUS BLD VENIPUNCTURE: CPT | Performed by: NURSE PRACTITIONER

## 2024-05-07 PROCEDURE — 99999 PR PBB SHADOW E&M-EST. PATIENT-LVL V: CPT | Mod: PBBFAC,,, | Performed by: INTERNAL MEDICINE

## 2024-05-07 PROCEDURE — 97803 MED NUTRITION INDIV SUBSEQ: CPT | Mod: PBBFAC

## 2024-05-07 PROCEDURE — 99214 OFFICE O/P EST MOD 30 MIN: CPT | Mod: PBBFAC,27 | Performed by: NURSE PRACTITIONER

## 2024-05-07 PROCEDURE — 99999 PR PBB SHADOW E&M-EST. PATIENT-LVL IV: CPT | Mod: PBBFAC,,, | Performed by: NURSE PRACTITIONER

## 2024-05-07 PROCEDURE — 85025 COMPLETE CBC W/AUTO DIFF WBC: CPT | Performed by: NURSE PRACTITIONER

## 2024-05-07 PROCEDURE — 99215 OFFICE O/P EST HI 40 MIN: CPT | Mod: S$PBB,,, | Performed by: INTERNAL MEDICINE

## 2024-05-07 NOTE — PROGRESS NOTES
Consult Note  Palliative Care      Consult Requested By: No ref. provider found  Reason for Consult: symptom mgmt/ACP      ASSESSMENT/PLAN:     Plan/Recommendations:  Diagnoses and all orders for this visit:    05/07/2024:  - no changes made     Squamous cell carcinoma of base of tongue  - following with Dr. Hernandez and NP Tito  - s/p laryngectomy, total glossectomy   - currently on keytruda   - 6/13/23 repeat PET scan  - 100% written communication     Encounter for palliative care  - Patient is decisional  - Patient accompanied today by self   - ACP documents are uploaded into EMR   - Philosophy of Palliative Medicine reviewed with patient and family at first visit  - New patient folder given to and reviewed with patient and family at first visit  - Goals of care: Patient has excellent activity tolerance, his goal is to remain active and able to do the things he enjoys. He fly fishes frequently and enjoys taking walks around the [place]. He is a retired  of 35 years. He has no children and is unmarried, he jokes that being single has allowed him to buy a Kristine. He lives alone with a brother who lives about a mile from his house.      Chronic low back pain, unspecified back pain laterality, unspecified whether sciatica present  - no longer requires pain medication for cancer-related pain  - states his chronic back pain is now more prominent, this is his biggest complaint  - he has tried PT and pain management with limited to no relief     - placed referral to IO for acupuncture     Insomnia  - reports 5-6 hours of sleep per night  - 2/2 chronic back pain, see above  - placed referral for acupuncture   - tip sheet provided in new patient folder  - will continue to monitor     Anorexia  - was scheduled for PEG placement, cancelled. Per pt, determined he has adequate PO intake.   - following w nutrition     Understanding of illness/Prognosis: good    Goals of care:  life-prolonging, maintain good QOL    Follow up: 8 weeks    Patient's encounter and above plan of care discussed with patient's oncology team.     SUBJECTIVE:     History of Present Illness:  Patient is a 74 y.o. year old male presenting with SCC base of tongue. Please see oncology notes for full oncologic history and treatment course.     05/07/2024:  JESUS ALBERTO HERNÁNDEZ reviewed    Patient presents to clinic f/u alone.     - s/p orbital exenteration at [facility] 4/25/2024  - PEG cancelled, pt PO intake improving   - eye pain sig improved, no longer taking oxycodone   - reports overall doing/feeling well, his brother has been supportive through selam-op process     01/03/2024:  JESUS ALBERTO HERNÁNDEZ reviewed    Patient presents to clinic f/u alone.     - interval PET scan clear/reassuring  - referred to [facility] for SCC eye  - low dose oxy managed by optho, using about 1 tab daily  - MOHS schedule for 24th  - He reports that he is feeling reasonably well, enjoyed the holidays  - No new or worsening symptoms  - RTC in 12 weeks      10/16/2023:  JESUS ALBERTO HERNÁNDEZ reviewed: no concerns    Presents to clinic visit alone.     - since our last visit he was hospitalized x 5 days for GIB, feeling much better now  - reports has gained 8 lbs since last spring  - s/p eye surgery (biopsy) 6 weeks ago, still w some residual pain, 14 day refill of oxy 5 mg provided   - next PET scan 10/31  - leaves for trip to [facility] Nov/1  - starts w new back doctor in November   - enjoying cooler weather, walks and driving his car    06/28/2023:  JESUS ALBERTO HERNÁNDEZ reviewed:    - updated scan is reassuring    - doing very well overall  - remains off pain medication   - still trying to regain weight lost to Covid infection   - fishing frequently   - several summer trips scheduled fishing trip to [place] planned for early August  - did not see initial IO scheduling message, will f/u to make initial appointment      04/14/2023:  JESUS ALBERTO HERNÁNDEZ reviewed and summarized:  No surprises    Patient presents to  clinic alone, all communication is written. He is a pleasant man, in good spirits at our initial visit. His biggest concern is back pain, which is chronic in nature. He has hx of PT and pain management interventions which were not effective. He is curious to try acupuncture, referral placed today. He has excellent activity tolerance and enjoys engaging in his hobbies and making the most of his retired life.     Past Medical History:   Diagnosis Date    Basal cell carcinoma (BCC) in situ of skin 2012    3 on face, 2 on arm, removed by dermatology.     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 Underwent surgical resection of tongue and neck. s/p tracheostomy    Squamous cell carcinoma of skin of right temple 01/24/2024    right temple     Past Surgical History:   Procedure Laterality Date    COLONOSCOPY      COLONOSCOPY N/A 9/14/2023    Procedure: COLONOSCOPY;  Surgeon: Reyes Olivia MD;  Location: [address]);  Service: Endoscopy;  Laterality: N/A;    CONJUNCTIVA BIOPSY Right 10/11/2022    Procedure: BIOPSY, CONJUNCTIVA;  Surgeon: Victor M Vasques MD;  Location: [facility];  Service: Ophthalmology;  Laterality: Right;    ESOPHAGOGASTRODUODENOSCOPY N/A 9/14/2023    Procedure: EGD (ESOPHAGOGASTRODUODENOSCOPY);  Surgeon: Reyes Olivia MD;  [address]  Service: Endoscopy;  Laterality: N/A;    ESOPHAGOGASTRODUODENOSCOPY N/A 4/10/2024    Procedure: EGD (ESOPHAGOGASTRODUODENOSCOPY);  Surgeon: Reyes Pagan MD;  [facility]  Service: Endoscopy;  Laterality: N/A;    INSERTION OF VENOUS ACCESS PORT Right 3/8/2021    Procedure: INSERTION, VENOUS ACCESS  PORT;  Surgeon: Jesus Rendon MD;  Location: [facility] OR 2ND FLR;  Service: General;  Laterality: Right;    LIVER TRANSPLANT  11/2008    transplanted for biopsy proven hepatocellular carcinoma,     LYMPH NODE BIOPSY N/A 9/18/2020    Procedure: BIOPSY, LYMPH NODE;  Surgeon: Taz Diagnostic Provider;  Location: [facility] OR 2ND FLR;  Service: General;  Laterality: N/A;  Rm 173    SURGICAL REMOVAL OF SCAR Right 8/24/2023    Procedure: EXCISION, SCAR;  Surgeon: Ting Brambila MD;  Location: [facility] OR;  Service: Ophthalmology;  Laterality: Right;    TRACHEOSTOMY       Family History   Problem Relation Name Age of Onset    Dementia Mother       Review of patient's allergies indicates:   Allergen Reactions    Aspirin     Tylenol extended release        Medications:    Current Outpatient Medications:     amoxicillin-clavulanate (AUGMENTIN) 400-57 mg/5 mL SusR, Take 11 mLs (875 mg total) by mouth every 12 (twelve) hours for 5 days. Discard Remainder, Disp: 150 mL, Rfl: 0    azelaic acid (AZELEX) 15 % gel, APPLY TOPICALLY TO AFFECTED AREA IN THE MORNING, Disp: 50 g, Rfl: 3    chlorhexidine (PERIDEX) 0.12 % solution, Gently swish and spit 15 mL twice a day for 3 weeks. Start rinses the day after your procedure., Disp: 473 mL, Rfl: 1    clindamycin phosphate 1% (CLINDAGEL) 1 % gel, Apply topically 2 (two) times daily., Disp: 60 g, Rfl: 3    dexAMETHasone (DECADRON) 0.5 mg/5 mL Elix, Swish 10 mL by mouth for 2 minutes then spit 4 times daily, Disp: 474 mL, Rfl: 5    erythromycin (ROMYCIN) ophthalmic ointment, Administer 0.5 inches to the right eye twice daily for 30 days., Disp: 3.5 g, Rfl: 1    erythromycin (ROMYCIN) ophthalmic ointment, Administer 0.5 inches to the right eye once for 1 dose., Disp: 3.5 g, Rfl: 3    gabapentin (NEURONTIN) 300 MG capsule, Take 1 capsule (300 mg total) by mouth every evening. (Patient taking differently: Take 300 mg by mouth as needed.), Disp: 30 capsule, Rfl: 11    hydrocortisone 1 % cream, Apply to  face, hands, feet, neck, back and chest at bedtime., Disp: 112 g, Rfl: 2    imiquimod (ALDARA) 5 % cream, Apply to biopsy site on frontal scalp + about a centimeter of surrounding skin qhs x 16 weeks. Wash off in am and apply sunscreen. Discontinue if skin becomes blistered, raw, bleeding, painful, etc., Disp: 24 packet, Rfl: 3    levothyroxine (SYNTHROID) 88 MCG tablet, TAKE 1 TABLET BY MOUTH ONCE  DAILY, Disp: 90 tablet, Rfl: 1    LIDOcaine HCl 2% (LIDOCAINE VISCOUS) 2 % Soln, Swish and spit 15 mls every 8 (eight) hours as needed (mouth sore)., Disp: 100 mL, Rfl: 3    magic mouthwash diphen/antac/lidoc/nysta, Take 10 mLs by mouth 4 (four) times daily., Disp: 120 mL, Rfl: 4    metroNIDAZOLE (METROGEL) 0.75 % gel, Apply topically to affected area 2 (two) times daily., Disp: 45 g, Rfl: 1    multivit-min/FA/lycopen/lutein (CENTRUM SILVER MEN ORAL), Take 1 tablet by mouth once daily., Disp: , Rfl:     OLANZapine (ZYPREXA) 5 MG tablet, Take 1 tablet (5 mg total) by mouth every evening. Take as directed on days 1-4 of your chemotherapy cycle., Disp: 30 tablet, Rfl: 5    oxyCODONE (ROXICODONE) 5 MG immediate release tablet, Take 1 tablet (5 mg total) by mouth every 6 (six) hours as needed for Pain., Disp: 60 tablet, Rfl: 0    pantoprazole (PROTONIX) 40 MG tablet, Take 1 tablet (40 mg total) by mouth 2 (two) times daily before meals., Disp: 180 tablet, Rfl: 3    prednisoLONE acetate (PRED FORTE) 1 % DrpS, Place 1 drop into the right eye 4 (four) times daily., Disp: 10 mL, Rfl: 3    prochlorperazine (COMPAZINE) 10 MG tablet, Take ½ tablet (5 mg total) by mouth every 6 (six) hours as needed., Disp: 30 tablet, Rfl: 1    sucralfate (CARAFATE) 1 gram tablet, Take 1 tablet (1 g total) by mouth 4 times daily. Tablet can be broken and dissolved in water for ease of administration. Consume within 30 minutes of dissolving. Separate from pantoprazole and levothyroxine by at least 2 hours., Disp: 120 tablet, Rfl: 0    sulfacetamide  sodium-sulfur 10-5 % (w/w) Clsr, USE TO WASH AFFECTED AREA DAILY, Disp: , Rfl:     tacrolimus (PROGRAF) 0.5 MG Cap, Take 1 capsule (0.5 mg total) by mouth every EVENING.  (Use the 1mg capsule for your morning dose), Disp: 90 capsule, Rfl: 3    tacrolimus (PROGRAF) 1 MG Cap, Take 1 capsule (1 mg total) by mouth every MORNING. Use the tacrolimus 0.5mg capsule for your evening dose as directed., Disp: 90 capsule, Rfl: 3    tiZANidine (ZANAFLEX) 2 MG tablet, Take 1 tablet (2 mg total) by mouth nightly as needed (neck muscle strain)., Disp: 30 tablet, Rfl: 3    traZODone (DESYREL) 50 MG tablet, TAKE 1 TABLET BY MOUTH IN THE  EVENING, Disp: 90 tablet, Rfl: 3    vitamin E 1000 UNIT capsule, Take 1 cap PO BID until gone. Start taking one week prior to your dental surgery., Disp: 112 capsule, Rfl: 0  No current facility-administered medications for this visit.    Facility-Administered Medications Ordered in Other Visits:     heparin, porcine (PF) 100 unit/mL injection flush 500 Units, 500 Units, Intravenous, PRN, Ronald Berg MD    ofloxacin 0.3 % ophthalmic solution 1 drop, 1 drop, Right Eye, On Call Procedure, Victor M Vasques MD, 2 drop at 10/11/22 0745    sodium chloride 0.9% flush 10 mL, 10 mL, Intravenous, PRN, Ronald Berg MD    sodium chloride 0.9% flush 10 mL, 10 mL, Intravenous, PRN, Victor M Vasques MD    OBJECTIVE:       ROS:  Review of Systems   Constitutional:  Positive for fatigue. Negative for activity change, appetite change and chills.   HENT:  Positive for facial swelling and voice change.    Eyes:  Positive for pain and redness. Negative for discharge and itching.   Respiratory: Negative.  Negative for apnea, cough and chest tightness.    Cardiovascular: Negative.  Negative for chest pain, palpitations and leg swelling.   Gastrointestinal:  Positive for constipation and diarrhea. Negative for nausea and vomiting.   Genitourinary:  Negative for difficulty urinating, dysuria and enuresis.    Musculoskeletal:  Positive for back pain. Negative for arthralgias and gait problem.   Skin: Negative.  Negative for color change, pallor and rash.   Psychiatric/Behavioral:  Positive for sleep disturbance.        Review of Symptoms      Symptom Assessment (ESAS 0-10 Scale)  Pain:  6  Dyspnea:  0  Anxiety:  0  Nausea:  0  Depression:  0  Anorexia:  0  Fatigue:  2  Insomnia:  1  Restlessness:  0  Agitation:  0     CAM / Delirium:  Negative  Constipation:  Positive  Diarrhea:  Positive    Constipation:  Constipation    Bowel Management Plan (BMP):  No      Pain Assessment:  OME in 24 hours:  0  Location(s): back    Back       Location: lower        Quality: None        Quantity: 4/10 in intensity        Chronicity: Onset 0 year(s) ago, stable        Aggravating Factors: Activity        Alleviating Factors: None       Associated Symptoms: None    Modified Tricia Scale:  0    ECOG Performance Status stGstrstastdstest:st st1st Living Arrangements:  Lives alone    Psychosocial/Cultural:   See Palliative Psychosocial Note: Yes  **Primary  to Follow**  Palliative Care  Consult: No      Advance Care Planning   Advance Directives:   Living Will: Yes        Copy on chart: Yes    Medical Power of : Yes      Decision Making:  Patient answered questions  Goals of Care: What is most important right now is to focus on remaining as independent as possible, symptom/pain control. Accordingly, we have decided that the best plan to meet the patient's goals includes continuing with treatment.        Physical Exam:  Vitals:    Vitals:    05/07/24 1407   BP: (!) 121/59   Resp: (!) 78           Physical Exam  Vitals reviewed.   Constitutional:       Appearance: Normal appearance. He is not toxic-appearing or diaphoretic.      Comments: Written communication    HENT:      Head: Normocephalic and atraumatic.      Comments: Trach      Right Ear: External ear normal.      Left Ear: External ear normal.      Nose: Nose  normal.   Eyes:      General:         Right eye: No discharge.         Left eye: No discharge.      Comments: Dressing CDI   Pulmonary:      Effort: Pulmonary effort is normal. No respiratory distress.      Breath sounds: No stridor.   Abdominal:      General: Abdomen is flat.      Palpations: Abdomen is soft.   Musculoskeletal:         General: No swelling or tenderness. Normal range of motion.      Cervical back: Normal range of motion.   Skin:     General: Skin is warm and dry.      Coloration: Skin is not jaundiced.      Findings: No bruising.   Neurological:      General: No focal deficit present.      Mental Status: He is alert and oriented to person, place, and time. Mental status is at baseline.   Psychiatric:         Mood and Affect: Mood normal.         Behavior: Behavior normal.         Thought Content: Thought content normal.         Judgment: Judgment normal.         Labs:  CBC:   WBC   Date Value Ref Range Status   04/22/2024 6.42 3.90 - 12.70 K/uL Final     Hemoglobin   Date Value Ref Range Status   04/22/2024 9.8 (L) 14.0 - 18.0 g/dL Final     Hematocrit   Date Value Ref Range Status   04/22/2024 30.2 (L) 40.0 - 54.0 % Final     MCV   Date Value Ref Range Status   04/22/2024 102 (H) 82 - 98 fL Final     Platelets   Date Value Ref Range Status   04/22/2024 188 150 - 450 K/uL Final       LFT:   Lab Results   Component Value Date    AST 52 (H) 04/22/2024     (H) 07/09/2020    ALKPHOS 92 04/22/2024    BILITOT 0.5 04/22/2024       Albumin:   Albumin   Date Value Ref Range Status   04/22/2024 3.2 (L) 3.5 - 5.2 g/dL Final     Protein:   Total Protein   Date Value Ref Range Status   04/22/2024 7.0 6.0 - 8.4 g/dL Final       Radiology:I have reviewed all pertinent imaging results/findings within the past 24 hours.    04/24/2024: MRI orbits: Impression      1. Slight interval increase in size of the mass involving the inferonasal right conjunctiva with extension into the postseptal extraconal space and  "medial canthus, as described above. Enhancement of the inferolateral and superior conjunctiva is stable.    3. No adenopathy.    10/31/2023 CT PET: Impression:     No hypermetabolic lesion concerning for recurrent or metastatic disease in this patient with head and neck cancer status post resection.    06/13/2023 NM PET: "Impression:     No evidence of FDG avid tumor in patient with squamous cell carcinoma of the base of the tongue status post resection.  No abnormal uptake to suggest distant metastasis."       3/20/2023 NM PET: "Impression:     In this patient with base of tongue cancer, there is no definite evidence of hypermetabolic tumor.     Redemonstration of nonspecific mild superficial FDG uptake at the chin.  Clinical correlation suggested.     No other suspicious hypermetabolic lesion."    I spent a total of 50 minutes on the day of the visit.This includes face to face time in discussion of goals of care, symptom assessment, coordination of care and emotional support.  This also includes non-face to face time preparing to see the patient (eg, review of tests/imaging), obtaining and/or reviewing separately obtained history, documenting clinical information in the electronic or other health record, independently interpreting results and communicating results to the patient/family/caregiver, or care coordinator.     Signature: Arabella Cuevas DNP  "

## 2024-05-07 NOTE — PROGRESS NOTES
"Oncology Nutrition Assessment Medical Nutrition Therapy Follow-up Visit    Rocco Swain  1950    Referring Provider: No ref. provider found   Reason for Referral: Anticipating PEG tube placement    Diagnosis: SCC of Skin of Orbit; History of SCC of Base of Tongue    Treatment: OP HEAD NECK CETUXIMAB CARBOPLATIN FLUOROURACIL Q3W - Dr. Hernandez (completed in mid-April per patient)    PMHx:   Past Medical History:   Diagnosis Date    Basal cell carcinoma (BCC) in situ of skin 2012    3 on face, 2 on arm, removed by dermatology.     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 Underwent surgical resection of tongue and neck. s/p tracheostomy    Squamous cell carcinoma of skin of right temple 01/24/2024    right temple     Allergies: Aspirin and Tylenol extended release    Nutrition Assessment    Anthropometrics:   Weight:   Wt Readings from Last 10 Encounters:   05/07/24 50.4 kg (111 lb 1.8 oz)   05/07/24 50.3 kg (110 lb 14.3 oz)   04/22/24 49.5 kg (109 lb 2 oz)   04/17/24 49 kg (108 lb 0.4 oz)   04/08/24 51.3 kg (113 lb 2.2 oz)   04/02/24 51.5 kg (113 lb 8.6 oz)   03/25/24 52.6 kg (115 lb 15.4 oz)   03/25/24 52.6 kg (115 lb 15.4 oz)   03/18/24 54 kg (119 lb 0.8 oz)   03/11/24 53.8 kg (118 lb 9.7 oz)                              Usual BW: 115-119 lb. Timeframe: Ealry 2024  Weight Change: ~10lb weight loss in 1 month Ht Readings from Last 1 Encounters:   05/07/24 5' 8" (1.727 m)      BMI Readings from Last 1 Encounters:   05/07/24 16.89 kg/m²     Estimated body surface area is 1.56 meters squared as calculated from the following:    Height as of an earlier encounter on 5/7/24: 5' 8" (1.727 m).    Weight as of " an earlier encounter on 5/7/24: 50.4 kg (111 lb 1.8 oz).        Appetite/Intake: No appetite. Difficulty swallowing for last 2 weeks due to throat infection. Currently taking eggs in the morning, homemade smoothies, Soup, Ensure Plus 4-6 cartons daily (self-pay), and Gatorade. History of PEG tube placement/removal in 2008 and 2015.     Encounter Notes:  Rocco Swain is a 74 y.o. male with SCC of Skin of Orbit and history of SCC of Base of Tongue referred by Ct Francis NP for nutrition assessment prior to PEG tube placement which was ordered on 4/17/24. Noted that Mr. Swain requested PEG tube placement for nutrition support while under active treatment. Noted that patient has a history of prior PEG tube placement/removal in 2008 and 2015 respectively. He reports that appetite and intake have declined over the last 2 weeks due to difficulty swallowing from a throat infection. Patient was treated with antibiotics for this which caused bowel changes (both diarrhea and constipation). Mr. Swain is aware of how to administer feedings, flushes, and medication in PEG tube as well as care and maintenance of tube. Anticipating PEG tube placement during the week of 4/29/24.     Today, 5/7/24, Mr. Swain presents to clinic following removal of right eye at [facility]. Noted that patient has not had PEG tube scheduled or placed and now states that he no longer wants the tube. He reports having an increase in appetite, intake, and improved swallow function since completing treatment. Weight is up 2 lbs in 2 weeks. Denies N/V but continues to report intermittent diarrhea/constipation despite completed antibiotics. Only taking anti-diarrheal if liquid Bms are frequent.     Current Medications:  Current Outpatient Medications   Medication Instructions    amoxicillin-clavulanate (AUGMENTIN) 400-57 mg/5 mL SusR Take 11 mLs (875 mg total) by mouth every 12 (twelve) hours for 5 days. Discard Remainder    azelaic acid (AZELEX)  15 % gel APPLY TOPICALLY TO AFFECTED AREA IN THE MORNING    chlorhexidine (PERIDEX) 0.12 % solution Gently swish and spit 15 mL twice a day for 3 weeks. Start rinses the day after your procedure.    clindamycin phosphate 1% (CLINDAGEL) 1 % gel Topical (Top), 2 times daily    dexAMETHasone (DECADRON) 0.5 mg/5 mL Elix Swish 10 mL by mouth for 2 minutes then spit 4 times daily    erythromycin (ROMYCIN) ophthalmic ointment Administer 0.5 inches to the right eye twice daily for 30 days.    erythromycin (ROMYCIN) ophthalmic ointment Administer 0.5 inches to the right eye once for 1 dose.    gabapentin (NEURONTIN) 300 mg, Oral, Nightly    hydrocortisone 1 % cream Apply to face, hands, feet, neck, back and chest at bedtime.    imiquimod (ALDARA) 5 % cream Apply to biopsy site on frontal scalp + about a centimeter of surrounding skin qhs x 16 weeks. Wash off in am and apply sunscreen. Discontinue if skin becomes blistered, raw, bleeding, painful, etc.    levothyroxine (SYNTHROID) 88 MCG tablet TAKE 1 TABLET BY MOUTH ONCE  DAILY    LIDOcaine HCl 2% (LIDOCAINE VISCOUS) 2 % Soln Swish and spit 15 mls every 8 (eight) hours as needed (mouth sore).    magic mouthwash diphen/antac/lidoc/nysta Take 10 mLs by mouth 4 (four) times daily.    metroNIDAZOLE (METROGEL) 0.75 % gel Apply topically to affected area 2 (two) times daily.    multivit-min/FA/lycopen/lutein (CENTRUM SILVER MEN ORAL) 1 tablet, Oral, Daily    OLANZapine (ZYPREXA) 5 mg, Oral, Nightly, Take as directed on days 1-4 of your chemotherapy cycle.    oxyCODONE (ROXICODONE) 5 mg, Oral, Every 6 hours PRN    pantoprazole (PROTONIX) 40 mg, Oral, 2 times daily before meals    prednisoLONE acetate (PRED FORTE) 1 % DrpS 1 drop, Right Eye, 4 times daily    prochlorperazine (COMPAZINE) 10 MG tablet Take ½ tablet (5 mg total) by mouth every 6 (six) hours as needed.    sucralfate (CARAFATE) 1 gram tablet Take 1 tablet (1 g total) by mouth 4 times daily. Tablet can be broken and  dissolved in water for ease of administration. Consume within 30 minutes of dissolving. Separate from pantoprazole and levothyroxine by at least 2 hours.    sulfacetamide sodium-sulfur 10-5 % (w/w) Clsr USE TO WASH AFFECTED AREA DAILY    tacrolimus (PROGRAF) 0.5 MG Cap Take 1 capsule (0.5 mg total) by mouth every EVENING.  (Use the 1mg capsule for your morning dose)    tacrolimus (PROGRAF) 1 MG Cap Take 1 capsule (1 mg total) by mouth every MORNING. Use the tacrolimus 0.5mg capsule for your evening dose as directed.    tiZANidine (ZANAFLEX) 2 mg, Oral, Nightly PRN    traZODone (DESYREL) 50 mg, Oral, Nightly    vitamin E 1000 UNIT capsule Take 1 cap PO BID until gone. Start taking one week prior to your dental surgery.     Labs: Reviewed 5/7/24: alb 3.2    Nutrition Diagnosis    Nutrition Problem: Underweight  Etiology (related to): inadequate energy intake, cancer and associated treatment, and decreased ability to consume sufficient energy  Signs/Symptoms (as evidenced by): reports of inadequate PO intake and BMI of 16  - Resolving (5/7/24)    Nutrition Intervention    Nutrition Prescription  8259-2769 kcals/day 30-35 kcal/kg   49-59 g protein/day 1-1.2 gm/kg  1287-1644 mL fluid/day 1 ml/kcal    Recommendations:   Continue current intake of soft solids and Ensure Plus 5-6 bottles daily  Encouraged minimum of 64 oz of fluid daily for hydration.    Materials Provided/Reviewed: none    Nutrition Monitoring and Evaluation    Monitor: rate/formulation of tube feeding, weight, diet education needs , and adherence to nutrition recommendations     Follow up Patient will follow up via portal as needed with any questions/concerns     Communication to referring provider/care team: Note available in chart.     Consultation Time: 30 Minutes    Naveen COOPER, , LDN  [facility] in Clinical Nutrition  Board Certified Specialist in Oncology Nutrition   [facility], [facility]  [place]  [phone]

## 2024-05-08 ENCOUNTER — TELEPHONE (OUTPATIENT)
Dept: RADIATION ONCOLOGY | Facility: CLINIC | Age: 74
End: 2024-05-08
Payer: MEDICARE

## 2024-05-08 PROBLEM — K76.6 PORTAL HYPERTENSION: Status: ACTIVE | Noted: 2024-05-08

## 2024-05-08 NOTE — TELEPHONE ENCOUNTER
Spoke with patient brother (Ollie Swain) about making an appointment for consultation for radiation, Mr. Levin stated pt is getting treatment at [facility] in [place].

## 2024-05-08 NOTE — PROGRESS NOTES
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 male who returns to clinic for management of cSCC of the orbit. Patient had ANGELES on October PETCT, but then developed quickly progressing orbital lesion. He was seen at [facility] in [place]. Recommendation was to follow up at [facility] to initiate induction chemotherapy followed by possible surgery.     Interval History  Patient completed 2 cycles of Extreme regimen, though there were delays due to cytopenias and severe mucositis. He was able to go to [facility] to have exenteration and resection of cancer. Clear margins, but PNI on path. He is recovering well. His mucositis has almost completely resolved and he is now gaining weight.    ECOG Performance status: 1 - Symptomatic but completely ambulatory Communication from him is 100% written.     Cancer Staging   Squamous cell carcinoma of base of tongue  Staging form: Pharynx - HPV-Mediated Oropharynx, AJCC 8th Edition  - Clinical stage from 9/18/2020: Stage III (rcT4, cN1, cM0, p16+) - Signed by Ronald Berg MD on 12/27/2022      Oncologic History:  Oncology History   Squamous cell carcinoma of base of tongue   9/18/2020 Cancer Staged    Staging form: Pharynx - HPV-Mediated Oropharynx, AJCC 8th Edition  - Clinical stage from 9/18/2020: Stage III (rcT4, cN1, cM0, p16+)     9/28/2020 Initial Diagnosis    Squamous cell carcinoma of base of tongue     10/6/2020 - 8/17/2021 Chemotherapy    Treatment Summary   Plan Name: OP HEAD NECK CARBOPLATIN PACLITAXEL C1-2 FOLLOWED BY CETUXIMAB CARBOPLATIN C3-6 FOLLOWED BY CETUXIMAB MAINTENANCE WEEKLY  Treatment Goal: Control  Status: Inactive  Start Date: 10/6/2020  End Date: 8/17/2021  Provider: Ronald Berg MD  Chemotherapy: cetuximab (ERBITUX) 100 mg/50 mL chemo infusion 684 mg, 400 mg/m2 = 684 mg (100 % of original dose 400 mg/m2), Intravenous, Clinic/HOD 1  time, 29 of 38 cycles  Dose modification: 500 mg/m2 (original dose 400 mg/m2, Cycle 3), 250 mg/m2 (original dose 400 mg/m2, Cycle 3), 400 mg/m2 (original dose 400 mg/m2, Cycle 3), 250 mg/m2 (original dose 250 mg/m2, Cycle 7), 200 mg/m2 (original dose 250 mg/m2, Cycle 18), 200 mg/m2 (original dose 250 mg/m2, Cycle 19), 250 mg/m2 (original dose 250 mg/m2, Cycle 4), 200 mg/m2 (original dose 250 mg/m2, Cycle 25), 200 mg/m2 (original dose 250 mg/m2, Cycle 28)  Administration: 684 mg (11/17/2020), 400 mg (11/24/2020), 400 mg (2/9/2021), 400 mg (2/17/2021), 427.6 mg (3/9/2021), 400 mg (3/30/2021), 400 mg (3/23/2021), 400 mg (4/6/2021), 427.6 mg (4/13/2021), 427.6 mg (4/20/2021), 342 mg (5/11/2021), 342 mg (5/18/2021), 342 mg (5/25/2021), 400 mg (5/31/2021), 400 mg (6/7/2021), 400 mg (6/14/2021), 400 mg (12/8/2020), 427.6 mg (12/29/2020), 400 mg (12/1/2020), 400 mg (12/15/2020), 400 mg (12/22/2020), 427.6 mg (1/5/2021), 400 mg (1/12/2021), 400 mg (1/19/2021), 427.6 mg (1/26/2021), 400 mg (2/2/2021), 400 mg (2/23/2021), 400 mg (3/2/2021), 427.6 mg (3/16/2021), 400 mg (6/21/2021), 342 mg (6/28/2021), 342 mg (7/6/2021), 342 mg (7/13/2021), 342 mg (7/20/2021), 400 mg (7/27/2021), 400 mg (8/10/2021), 400 mg (8/17/2021)  CARBOplatin (PARAPLATIN) 310 mg in sodium chloride 0.9% 500 mL chemo infusion, 310 mg (92.2 % of original dose 334.5 mg), Intravenous, Clinic/HOD 1 time, 6 of 6 cycles  Dose modification:   (original dose 334.5 mg, Cycle 1)  Administration: 310 mg (10/6/2020), 370 mg (11/17/2020), 300 mg (10/27/2020), 350 mg (12/8/2020), 335 mg (12/29/2020), 320 mg (1/19/2021)  PACLitaxeL (TAXOL) 175 mg/m2 = 300 mg in sodium chloride 0.9% 500 mL chemo infusion, 175 mg/m2 = 300 mg (100 % of original dose 175 mg/m2), Intravenous, Clinic/HOD 1 time, 2 of 2 cycles  Dose modification: 175 mg/m2 (original dose 175 mg/m2, Cycle 1)  Administration: 300 mg (10/6/2020), 300 mg (10/27/2020)     4/29/2021 Tumor Conference       His case was  discussed at the Multidisciplinary Head and Neck Team Planning Meeting.    Representatives from Medical Oncology, Radiation Oncology, Head and Neck Surgical Oncology, Psychosocial Oncology, and Speech and Language Pathology discussed the case with the following recommendations:    1) biopsy         7/29/2021 Tumor Conference       His case was discussed at the Multidisciplinary Head and Neck Team Planning Meeting.    Representatives from Medical Oncology, Radiation Oncology, Head and Neck Surgical Oncology, Psychosocial Oncology, and Speech and Language Pathology discussed the case with the following recommendations:    1) Head and neck clinic follow up  2) consider Keytruda (discuss with transplant)  3) consider palliative referral         9/13/2021 - 12/29/2023 Chemotherapy    Treatment Summary   Plan Name: OP HEAD NECK PEMBROLIZUMAB CARBOPLATIN FLUOROURACIL (C1 ONLY RECEIVED) FOLLOWED BY PEMBROLIZUMAB MAINTENANCE  Treatment Goal: Palliative  Status: Inactive  Start Date: 9/13/2021  End Date: 12/29/2023  Provider: Ronald Berg MD  Chemotherapy: CARBOplatin (PARAPLATIN) 320 mg in sodium chloride 0.9% 500 mL chemo infusion, 320 mg (100 % of original dose 320.5 mg), Intravenous, Clinic/HOD 1 time, 6 of 6 cycles  Dose modification:   (original dose 320.5 mg, Cycle 1)  Administration: 320 mg (9/13/2021), 335 mg (12/23/2021), 325 mg (1/27/2022), 245 mg (3/3/2022), 255 mg (5/12/2022), 255 mg (4/7/2022)  fluorouraciL 1,000 mg/m2/day = 6,440 mg in sodium chloride 0.9% 240 mL chemo infusion, 1,000 mg/m2/day = 6,440 mg, Intravenous, Over 96 hours, 6 of 6 cycles  Dose modification: 800 mg/m2/day (original dose 1,000 mg/m2/day, Cycle 6), 5,000 mg (original dose 1,000 mg/m2/day, Cycle 8)  Administration: 6,440 mg (9/13/2021), 6,440 mg (12/23/2021), 6,480 mg (1/27/2022), 5,000 mg (3/3/2022), 5,000 mg (4/7/2022), 5,000 mg (5/12/2022)     Secondary malignant neoplasm of cervical lymph node   2/9/2021 Initial Diagnosis    Secondary  malignant neoplasm of cervical lymph node     9/13/2021 - 12/29/2023 Chemotherapy    Treatment Summary   Plan Name: OP HEAD NECK PEMBROLIZUMAB CARBOPLATIN FLUOROURACIL (C1 ONLY RECEIVED) FOLLOWED BY PEMBROLIZUMAB MAINTENANCE  Treatment Goal: Palliative  Status: Inactive  Start Date: 9/13/2021  End Date: 12/29/2023  Provider: Ronald Berg MD  Chemotherapy: CARBOplatin (PARAPLATIN) 320 mg in sodium chloride 0.9% 500 mL chemo infusion, 320 mg (100 % of original dose 320.5 mg), Intravenous, Clinic/HOD 1 time, 6 of 6 cycles  Dose modification:   (original dose 320.5 mg, Cycle 1)  Administration: 320 mg (9/13/2021), 335 mg (12/23/2021), 325 mg (1/27/2022), 245 mg (3/3/2022), 255 mg (5/12/2022), 255 mg (4/7/2022)  fluorouraciL 1,000 mg/m2/day = 6,440 mg in sodium chloride 0.9% 240 mL chemo infusion, 1,000 mg/m2/day = 6,440 mg, Intravenous, Over 96 hours, 6 of 6 cycles  Dose modification: 800 mg/m2/day (original dose 1,000 mg/m2/day, Cycle 6), 5,000 mg (original dose 1,000 mg/m2/day, Cycle 8)  Administration: 6,440 mg (9/13/2021), 6,440 mg (12/23/2021), 6,480 mg (1/27/2022), 5,000 mg (3/3/2022), 5,000 mg (4/7/2022), 5,000 mg (5/12/2022)     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   1/29/2024 Initial Diagnosis    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     2/19/2024 - 3/25/2024 Chemotherapy    Treatment Summary   Plan Name: OP HEAD NECK CETUXIMAB CARBOPLATIN FLUOROURACIL Q3W  Treatment Goal: Curative  Status: Inactive  Start Date: 2/19/2024  End Date: 3/25/2024  Provider: Martín Hernandez MD  Chemotherapy: cetuximab (ERBITUX) 100 mg/50 mL chemo infusion 668 mg, 400 mg/m2 = 668 mg, Intravenous, Clinic/HOD 1 time, 2 of 12 cycles  Administration: 668 mg (2/19/2024), 400 mg (2/26/2024), 400 mg (3/18/2024), 400 mg (3/25/2024), 400 mg (3/11/2024)  CARBOplatin (PARAPLATIN) 295 mg in sodium chloride 0.9% 314.5 mL chemo infusion, 295 mg, Intravenous, [facility] 1 time, 2 of 6 cycles  Administration: 295 mg (2/19/2024), 300 mg  (3/18/2024)         Review of Systems   Constitutional:  Negative for activity change, appetite change, chills, fatigue, fever and unexpected weight change.   HENT:  Negative for ear pain, facial swelling, hearing loss, mouth sores, nosebleeds, sore throat, trouble swallowing and voice change.         No verbal communication. Skin fixed and immobile from previous scaring post-neck dissection   Eyes:  Positive for redness (right eye - pain). Negative for pain, discharge and visual disturbance.   Respiratory:  Negative for cough, choking, chest tightness and shortness of breath.    Cardiovascular:  Negative for chest pain, palpitations and leg swelling.   Gastrointestinal:  Negative for abdominal distention, abdominal pain, blood in stool, constipation, diarrhea, nausea and vomiting.   Endocrine: Negative for cold intolerance and heat intolerance.   Genitourinary:  Negative for difficulty urinating, dysuria, frequency and urgency.   Musculoskeletal:  Positive for back pain (lower - chronic). Negative for arthralgias, gait problem, leg pain and myalgias.   Integumentary:  Positive for mole/lesion (left nare and neck - cSCC). Negative for pallor, rash and wound.   Allergic/Immunologic: Negative for frequent infections.   Neurological:  Negative for dizziness, tremors, weakness, light-headedness, numbness and headaches.   Hematological:  Negative for adenopathy. Does not bruise/bleed easily.   Psychiatric/Behavioral:  Negative for agitation, confusion, dysphoric mood and sleep disturbance. The patient is not nervous/anxious.         Allergies:  Review of patient's allergies indicates:   Allergen Reactions    Aspirin     Tylenol extended release        Medications:  Current Outpatient Medications   Medication Sig Dispense Refill    amoxicillin-clavulanate (AUGMENTIN) 400-57 mg/5 mL SusR Take 11 mLs (875 mg total) by mouth every 12 (twelve) hours for 5 days. Discard Remainder 150 mL 0    azelaic acid (AZELEX) 15 % gel  APPLY TOPICALLY TO AFFECTED AREA IN THE MORNING 50 g 3    chlorhexidine (PERIDEX) 0.12 % solution Gently swish and spit 15 mL twice a day for 3 weeks. Start rinses the day after your procedure. 473 mL 1    clindamycin phosphate 1% (CLINDAGEL) 1 % gel Apply topically 2 (two) times daily. 60 g 3    dexAMETHasone (DECADRON) 0.5 mg/5 mL Elix Swish 10 mL by mouth for 2 minutes then spit 4 times daily 474 mL 5    erythromycin (ROMYCIN) ophthalmic ointment Administer 0.5 inches to the right eye twice daily for 30 days. 3.5 g 1    erythromycin (ROMYCIN) ophthalmic ointment Administer 0.5 inches to the right eye once for 1 dose. 3.5 g 3    imiquimod (ALDARA) 5 % cream Apply to biopsy site on frontal scalp + about a centimeter of surrounding skin qhs x 16 weeks. Wash off in am and apply sunscreen. Discontinue if skin becomes blistered, raw, bleeding, painful, etc. 24 packet 3    levothyroxine (SYNTHROID) 88 MCG tablet TAKE 1 TABLET BY MOUTH ONCE  DAILY 90 tablet 1    LIDOcaine HCl 2% (LIDOCAINE VISCOUS) 2 % Soln Swish and spit 15 mls every 8 (eight) hours as needed (mouth sore). (Patient not taking: Reported on 5/7/2024) 100 mL 3    magic mouthwash diphen/antac/lidoc/nysta Take 10 mLs by mouth 4 (four) times daily. 120 mL 4    metroNIDAZOLE (METROGEL) 0.75 % gel Apply topically to affected area 2 (two) times daily. 45 g 1    multivit-min/FA/lycopen/lutein (CENTRUM SILVER MEN ORAL) Take 1 tablet by mouth once daily.      oxyCODONE (ROXICODONE) 5 MG immediate release tablet Take 1 tablet (5 mg total) by mouth every 6 (six) hours as needed for Pain. 60 tablet 0    pantoprazole (PROTONIX) 40 MG tablet Take 1 tablet (40 mg total) by mouth 2 (two) times daily before meals. 180 tablet 3    prednisoLONE acetate (PRED FORTE) 1 % DrpS Place 1 drop into the right eye 4 (four) times daily. 10 mL 3    sucralfate (CARAFATE) 1 gram tablet Take 1 tablet (1 g total) by mouth 4 times daily. Tablet can be broken and dissolved in  water for ease of administration. Consume within 30 minutes of dissolving. Separate from pantoprazole and levothyroxine by at least 2 hours. 120 tablet 0    sulfacetamide sodium-sulfur 10-5 % (w/w) Clsr USE TO WASH AFFECTED AREA DAILY      tacrolimus (PROGRAF) 0.5 MG Cap Take 1 capsule (0.5 mg total) by mouth every EVENING.  (Use the 1mg capsule for your morning dose) 90 capsule 3    tacrolimus (PROGRAF) 1 MG Cap Take 1 capsule (1 mg total) by mouth every MORNING. Use the tacrolimus 0.5mg capsule for your evening dose as directed. 90 capsule 3    tiZANidine (ZANAFLEX) 2 MG tablet Take 1 tablet (2 mg total) by mouth nightly as needed (neck muscle strain). 30 tablet 3    traZODone (DESYREL) 50 MG tablet TAKE 1 TABLET BY MOUTH IN THE  EVENING 90 tablet 3    vitamin E 1000 UNIT capsule Take 1 cap PO BID until gone. Start taking one week prior to your dental surgery. (Patient not taking: Reported on 5/7/2024) 112 capsule 0    gabapentin (NEURONTIN) 300 MG capsule Take 1 capsule (300 mg total) by mouth every evening. (Patient taking differently: Take 300 mg by mouth as needed.) 30 capsule 11     No current facility-administered medications for this visit.     Facility-Administered Medications Ordered in Other Visits   Medication Dose Route Frequency Provider Last Rate Last Admin    heparin, porcine (PF) 100 unit/mL injection flush 500 Units  500 Units Intravenous PRN Ronald Berg MD        ofloxacin 0.3 % ophthalmic solution 1 drop  1 drop Right Eye On Call Procedure Victor M Vasques MD   2 drop at 10/11/22 0745    sodium chloride 0.9% flush 10 mL  10 mL Intravenous PRN Ronald Berg MD        sodium chloride 0.9% flush 10 mL  10 mL Intravenous PRN Victor M Vasques MD           PMH:  Past Medical History:   Diagnosis Date    Basal cell carcinoma (BCC) in situ of skin 2012    3 on face, 2 on arm, removed by dermatology.     Basal cell carcinoma (BCC) of neck 01/24/2024    lower mid neck     Hepatitis C, chronic 2006    Treated for Hep C x 6 months, normal viral load since 07/2006    Hypothyroidism     Larynx cancer     Liver transplanted     Lumbar disc disease     Squamous cell carcinoma in situ (SCCIS) of tongue 02/2016    Treated with radiation to neck and chemotherapy. Underwent surgical resection of tongue and neck. 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 temple 01/24/2024    right temple       PSH:  Past Surgical History:   Procedure Laterality Date    COLONOSCOPY      COLONOSCOPY N/A 9/14/2023    Procedure: COLONOSCOPY;  Surgeon: Reyes Olivia MD;  Location: [facility] ([address] [place] [address]);  Service: Endoscopy;  Laterality: N/A;    CONJUNCTIVA BIOPSY Right 10/11/2022    Procedure: BIOPSY, CONJUNCTIVA;  Surgeon: Victor M Vasquse MD;  Location[facility];  Service: Ophthalmology;  Laterality: Right;    ESOPHAGOGASTRODUODENOSCOPY N/A 9/14/2023    Procedure: EGD (ESOPHAGOGASTRODUODENOSCOPY);  Surgeon: Reyes Olivia MD;  Location: [facility] ([address] [place] [address]);  Service: Endoscopy;  Laterality: N/A;    ESOPHAGOGASTRODUODENOSCOPY N/A 4/10/2024    Procedure: EGD (ESOPHAGOGASTRODUODENOSCOPY);  Surgeon: Reyes Pagan MD;  [facility]  Service: Endoscopy;  Laterality: N/A;    INSERTION OF VENOUS ACCESS PORT Right 3/8/2021    Procedure: INSERTION, VENOUS ACCESS PORT;  Surgeon: Jesus Rendon MD;  Location: [address];  Service: General;  Laterality: Right;    LIVER TRANSPLANT  11/2008    transplanted for biopsy proven hepatocellular carcinoma,     LYMPH NODE BIOPSY N/A 9/18/2020    Procedure: BIOPSY, LYMPH NODE;  Surgeon: [facility];  [address];  Service: General;  Laterality: N/A;  Rm 173    SURGICAL REMOVAL OF SCAR Right 8/24/2023    Procedure: EXCISION, SCAR;  Surgeon: Ting Brambila MD;  Location: [facility];  Service: Ophthalmology;  Laterality: Right;    TRACHEOSTOMY         FamHx:  Family History   Problem  "Relation Name Age of Onset    Dementia Mother         SocHx:  Social History     Socioeconomic History    Marital status: Single   Occupational History    Occupation: Retired     Comment: , notes exposures to fumes etc.  Worked on [employer] for 4 years   Tobacco Use    Smoking status: Never    Smokeless tobacco: Never   Substance and Sexual Activity    Alcohol use: Yes     Comment: drinks wine, 1 glass day    Drug use: Not Currently    Sexual activity: Yes     Comment: No prior history of STD        Distress Score             Objective:      Vitals:    05/07/24 1010   BP: 131/71   BP Location: Left arm   Patient Position: Sitting   BP Method: Medium (Automatic)   Pulse: 77   Resp: 14   Temp: 97.6 °F (36.4 °C)   TempSrc: Oral   SpO2: 98%   Weight: 50.3 kg (110 lb 14.3 oz)   Height: 5' 8" (1.727 m)          Physical Exam  Vitals reviewed.   Constitutional:       General: He is not in acute distress.     Appearance: Normal appearance. He is well-developed and underweight.   HENT:      Head: Normocephalic and atraumatic.      Mouth/Throat:      Mouth: Mucous membranes are moist.      Dentition: Abnormal dentition. Dental caries present.   Eyes:      Comments: Bandage over R orbit s/p exenteration. C/D/I   Neck:      Trachea: Tracheostomy present.   Pulmonary:      Effort: Pulmonary effort is normal. No respiratory distress.      Comments: Tracheostomy  Abdominal:      General: There is no distension.      Palpations: Abdomen is soft.   Musculoskeletal:         General: No swelling. Normal range of motion.      Cervical back: Normal range of motion and neck supple.   Skin:     General: Skin is warm and dry.   Neurological:      General: No focal deficit present.      Mental Status: He is alert and oriented to person, place, and time.   Psychiatric:         Mood and Affect: Mood normal.         Behavior: Behavior normal.         Thought Content: Thought content normal.         Judgment: " Judgment normal.         LABS:  WBC   Date Value Ref Range Status   05/07/2024 3.72 (L) 3.90 - 12.70 K/uL Final     Hemoglobin   Date Value Ref Range Status   05/07/2024 8.4 (L) 14.0 - 18.0 g/dL Final     Hematocrit   Date Value Ref Range Status   05/07/2024 25.9 (L) 40.0 - 54.0 % Final     Platelets   Date Value Ref Range Status   05/07/2024 120 (L) 150 - 450 K/uL Final       Chemistry        Component Value Date/Time     05/07/2024 1002    K 4.3 05/07/2024 1002     05/07/2024 1002    CO2 26 05/07/2024 1002    BUN 18 05/07/2024 1002    CREATININE 1.3 05/07/2024 1002     05/07/2024 1002        Component Value Date/Time    CALCIUM 9.5 05/07/2024 1002    ALKPHOS 76 05/07/2024 1002    AST 36 05/07/2024 1002    ALT 19 05/07/2024 1002    BILITOT 0.4 05/07/2024 1002    ESTGFRAFRICA 52.6 (A) 07/14/2022 1119    EGFRNONAA 45.5 (A) 07/14/2022 1119              Assessment:       1. 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 Larynx cancer    3. Secondary malignant neoplasm of cervical lymph node    4. Squamous cell carcinoma of base of tongue    5. Tracheostomy status    6. 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 Weight loss    9. Mucositis    10. Dysphagia, unspecified type    11. Mild malnutrition    12. 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 Chronic kidney disease (CKD), stage IV (severe)            Plan:         Orbital cSCC  Unfortunately patient has progressed while on immunotherapy for below diagnosis. For that reason systemic therapies are limited. Evaluated at [facility] and surgeon does think an exenteration sparing resection would be possible with good response to induction chemotherapy. Plan was for platinum doublet chemo + cetuximab x 2 cycles. Notified of this plan on 1/29 and treatment plan was entered.   - Initiated dose reduced C1 of 5-FU to 750 mg/m2 due to age/frailty and growth factor. Patient still had cytopenias and  mucositis, but was able to finish 2 cycles with delays. Now s/p exenteration. With PNI on path, will refer to radiation oncology to be evaluated for adjuvant RT.    2,3,4. 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 He is not a surgical or radiation candidate.  -Started on PCC 10/6/2020 followed by maintenance cetuximab until 8/24/21 (discontinued due to progression of disease; continued increase in SUV uptake, no new lesions).  - 9/2021 started on carbo/5-FU/pembrolizumab; due to toxicity went to pembrolizumab monotherapy starting with cycle 2.  Progression of disease noted after cycle 3 so added chemotherapy back in with cycle 4. Keytruda monotherapy starting with C9.   - Keytruda discontinued after 2 years.    5. Status post total laryngectomy, total glossectomy and anterolateral thigh free flap reconstruction roughly 2017 at [facility] in [place] for persistent/recurrent base of tongue sq.c.c.    6,7. S/p liver transplant and is currently on tacrolimus. Grade 1. Will continue to monitor.     8-11. Now gained 2 pounds since surgery. Improved since mucositis resolved.    12 TSH trending back down on increased synthroid dose of 100 mcg. Continue to monitor.    13 Cr stable    I have reviewed the notes, assessments, and/or procedures performed by Ct [facility], as above.  I have personally interviewed and examined the patient at the beside, and rounded with Ct. I concur with her assessment and plan and the documentation of Rocco Swain.    MDM includes:    - Acute or chronic illness or injury that poses a threat to life or bodily function  - Independent review and explanation of 2 results from unique tests  - Discussion of management and ordering 2 unique tests  - Extensive discussion of treatment and management  - Prescription drug management  - Drug therapy requiring intensive monitoring for  toxicity    Martín Hernandez M.D.  Hematology/Oncology Attending  Director [facility]  [facility]            Route Chart for Scheduling    Med Onc Chart Routing      Follow up with physician 3 months. RTC with labs prior   Follow up with [facility]    Infusion scheduling note    Injection scheduling note    Labs CBC, CMP, TSH and free T4   Scheduling:  Preferred lab:  Lab interval:     Imaging    Pharmacy appointment    Other referrals            Supportive Plan Information  IV FLUIDS AND ELECTROLYTES   Ct Francis NP   Upcoming Treatment Dates - IV FLUIDS AND ELECTROLYTES    No upcoming days in selected categories.    Therapy Plan Information  PORT FLUSH  Flushes  heparin, porcine (PF) 100 unit/mL injection flush 500 Units  500 Units, Intravenous, Every visit  sodium chloride 0.9% flush 10 mL  10 mL, Intravenous, Every visit    PORT FLUSH  Flushes  heparin, porcine (PF) 100 unit/mL injection flush 500 Units  500 Units, Intravenous, Every visit  sodium chloride 0.9% flush 10 mL  10 mL, Intravenous, Every visit

## 2024-06-04 NOTE — TELEPHONE ENCOUNTER
Letter sent to patient stating: Your labs have been reviewed by your Transplant physician, no action required. Next labs due 8/15/22        ----- Message from Cornell Anton MD sent at 5/11/2022  3:13 PM CDT -----  Results reviewed      
4 = No assist / stand by assistance

## 2024-06-10 ENCOUNTER — LAB VISIT (OUTPATIENT)
Dept: LAB | Facility: HOSPITAL | Age: 74
End: 2024-06-10
Attending: INTERNAL MEDICINE
Payer: MEDICARE

## 2024-06-10 DIAGNOSIS — Z94.4 LIVER TRANSPLANTED: ICD-10-CM

## 2024-06-10 DIAGNOSIS — C22.0 HCC (HEPATOCELLULAR CARCINOMA): ICD-10-CM

## 2024-06-10 LAB
AFP SERPL-MCNC: 2.4 NG/ML (ref 0–8.4)
ALBUMIN SERPL BCP-MCNC: 3.6 G/DL (ref 3.5–5.2)
ALP SERPL-CCNC: 66 U/L (ref 55–135)
ALT SERPL W/O P-5'-P-CCNC: 12 U/L (ref 10–44)
ANION GAP SERPL CALC-SCNC: 9 MMOL/L (ref 8–16)
AST SERPL-CCNC: 25 U/L (ref 10–40)
BASOPHILS # BLD AUTO: 0.01 K/UL (ref 0–0.2)
BASOPHILS NFR BLD: 0.3 % (ref 0–1.9)
BILIRUB SERPL-MCNC: 0.4 MG/DL (ref 0.1–1)
BUN SERPL-MCNC: 22 MG/DL (ref 8–23)
CALCIUM SERPL-MCNC: 9.8 MG/DL (ref 8.7–10.5)
CHLORIDE SERPL-SCNC: 108 MMOL/L (ref 95–110)
CO2 SERPL-SCNC: 25 MMOL/L (ref 23–29)
CREAT SERPL-MCNC: 1.3 MG/DL (ref 0.5–1.4)
DIFFERENTIAL METHOD BLD: ABNORMAL
EOSINOPHIL # BLD AUTO: 0.2 K/UL (ref 0–0.5)
EOSINOPHIL NFR BLD: 5.1 % (ref 0–8)
ERYTHROCYTE [DISTWIDTH] IN BLOOD BY AUTOMATED COUNT: 16.2 % (ref 11.5–14.5)
EST. GFR  (NO RACE VARIABLE): 57.3 ML/MIN/1.73 M^2
GLUCOSE SERPL-MCNC: 97 MG/DL (ref 70–110)
HCT VFR BLD AUTO: 31.1 % (ref 40–54)
HGB BLD-MCNC: 10.1 G/DL (ref 14–18)
IMM GRANULOCYTES # BLD AUTO: 0.01 K/UL (ref 0–0.04)
IMM GRANULOCYTES NFR BLD AUTO: 0.3 % (ref 0–0.5)
LYMPHOCYTES # BLD AUTO: 1.4 K/UL (ref 1–4.8)
LYMPHOCYTES NFR BLD: 35.2 % (ref 18–48)
MCH RBC QN AUTO: 34.8 PG (ref 27–31)
MCHC RBC AUTO-ENTMCNC: 32.5 G/DL (ref 32–36)
MCV RBC AUTO: 107 FL (ref 82–98)
MONOCYTES # BLD AUTO: 0.4 K/UL (ref 0.3–1)
MONOCYTES NFR BLD: 9.6 % (ref 4–15)
NEUTROPHILS # BLD AUTO: 2 K/UL (ref 1.8–7.7)
NEUTROPHILS NFR BLD: 49.5 % (ref 38–73)
NRBC BLD-RTO: 0 /100 WBC
PLATELET # BLD AUTO: 127 K/UL (ref 150–450)
PMV BLD AUTO: 8.7 FL (ref 9.2–12.9)
POTASSIUM SERPL-SCNC: 5.2 MMOL/L (ref 3.5–5.1)
PROT SERPL-MCNC: 7.4 G/DL (ref 6–8.4)
RBC # BLD AUTO: 2.9 M/UL (ref 4.6–6.2)
SODIUM SERPL-SCNC: 142 MMOL/L (ref 136–145)
TACROLIMUS BLD-MCNC: 2.9 NG/ML (ref 5–15)
WBC # BLD AUTO: 3.95 K/UL (ref 3.9–12.7)

## 2024-06-10 PROCEDURE — 36415 COLL VENOUS BLD VENIPUNCTURE: CPT | Performed by: INTERNAL MEDICINE

## 2024-06-10 PROCEDURE — 80053 COMPREHEN METABOLIC PANEL: CPT | Performed by: INTERNAL MEDICINE

## 2024-06-10 PROCEDURE — 80197 ASSAY OF TACROLIMUS: CPT | Performed by: INTERNAL MEDICINE

## 2024-06-10 PROCEDURE — 82105 ALPHA-FETOPROTEIN SERUM: CPT | Performed by: INTERNAL MEDICINE

## 2024-06-10 PROCEDURE — 85025 COMPLETE CBC W/AUTO DIFF WBC: CPT | Performed by: INTERNAL MEDICINE

## 2024-06-15 ENCOUNTER — TELEPHONE (OUTPATIENT)
Dept: TRANSPLANT | Facility: CLINIC | Age: 74
End: 2024-06-15
Payer: MEDICARE

## 2024-06-15 NOTE — TELEPHONE ENCOUNTER
Letter sent to patient stating: Your labs have been reviewed by your Transplant physician, no action required. Next labs due 8/8/24      ----- Message -----  From: Cornell Anton MD  Sent: 6/14/2024   3:12 PM CDT  To: [facility]Liver Transplant Clinical    Results reviewed

## 2024-06-15 NOTE — LETTER
Marian 15, 2024    Rocco Swain  [address]   [address]   [address]  [place] [address]          Dear Rocco Swain:  MRN: [id number]    This is a follow up to your recent labs, your lab results were stable.  There are no medicine changes.  Please have your labs drawn again on 8/8/24.      If you cannot have your labs drawn on the scheduled date, it is your responsibility to call the transplant department to reschedule.  Please call [phone] and ask to speak to Rhea RIOS   for all scheduling requests.     Sincerely,    Shari WADDELLN, RN      Your Liver Transplant Coordinator    [facility]  [address] [place] [id number]  [phone]

## 2024-06-18 ENCOUNTER — TELEPHONE (OUTPATIENT)
Dept: DERMATOLOGY | Facility: CLINIC | Age: 74
End: 2024-06-18
Payer: MEDICARE

## 2024-06-18 NOTE — TELEPHONE ENCOUNTER
Spoke to pt's brother. Let him know Dr. Haque will coordinate with Dr. Saha for repair of the L nose. Once I hear back from them I will call back to get him scheduled. Confirmed understanding.

## 2024-06-20 ENCOUNTER — PATIENT MESSAGE (OUTPATIENT)
Dept: DERMATOLOGY | Facility: CLINIC | Age: 74
End: 2024-06-20
Payer: MEDICARE

## 2024-06-20 ENCOUNTER — TELEPHONE (OUTPATIENT)
Dept: DERMATOLOGY | Facility: CLINIC | Age: 74
End: 2024-06-20
Payer: MEDICARE

## 2024-06-20 NOTE — TELEPHONE ENCOUNTER
Spoke to pt's brother, Ollie. Scheduled for Mohs on 9/5 at 8 am for BCC L nasal ala and for reconstruction with Dr. Saha on 9/6. Mr. Levin confirmed date, time, and location. Reminder sent in mail.

## 2024-06-21 ENCOUNTER — TELEPHONE (OUTPATIENT)
Dept: ENDOSCOPY | Facility: HOSPITAL | Age: 74
End: 2024-06-21
Payer: MEDICARE

## 2024-06-21 NOTE — TELEPHONE ENCOUNTER
Spoke to pts brother Ollie to reschedule procedure(s) Upper Endoscopy (EGD)       Physician to perform procedure(s) Dr. MAX Pagan  Date of Procedure (s) 6/24/24  Arrival Time 1:45 PM  Time of Procedure(s) 2:45 PM   Location of Procedure(s) [address]  Type of Rx Prep sent to patient: N/A  Instructions provided to patient via [facility]    Patient was informed on the following information and verbalized understanding. Screening questionnaire reviewed with patient and complete. If procedure requires anesthesia, a responsible adult needs to be present to accompany the patient home, patient cannot drive after receiving anesthesia. Appointment details are tentative, especially check-in time. Patient will receive a prep-op call 7 days prior to confirm check-in time for procedure. If applicable the patient should contact their pharmacy to verify Rx for procedure prep is ready for pick-up. Patient was advised to call the scheduling department at [phone] if pharmacy states no Rx is available. Patient was advised to call the endoscopy scheduling department if any questions or concerns arise.      [facility] Scheduling Department

## 2024-06-24 ENCOUNTER — ANESTHESIA EVENT (OUTPATIENT)
Dept: ENDOSCOPY | Facility: HOSPITAL | Age: 74
End: 2024-06-24
Payer: MEDICARE

## 2024-06-24 ENCOUNTER — HOSPITAL ENCOUNTER (OUTPATIENT)
Facility: HOSPITAL | Age: 74
Discharge: HOME | End: 2024-06-24
Attending: INTERNAL MEDICINE | Admitting: INTERNAL MEDICINE
Payer: MEDICARE

## 2024-06-24 ENCOUNTER — ANESTHESIA (OUTPATIENT)
Dept: ENDOSCOPY | Facility: HOSPITAL | Age: 74
End: 2024-06-24
Payer: MEDICARE

## 2024-06-24 VITALS
OXYGEN SATURATION: 97 % | HEART RATE: 90 BPM | RESPIRATION RATE: 20 BRPM | BODY MASS INDEX: 18.19 KG/M2 | TEMPERATURE: 98 F | SYSTOLIC BLOOD PRESSURE: 169 MMHG | WEIGHT: 120 LBS | HEIGHT: 68 IN | DIASTOLIC BLOOD PRESSURE: 101 MMHG

## 2024-06-24 DIAGNOSIS — K21.00 GERD WITH ESOPHAGITIS: ICD-10-CM

## 2024-06-24 DIAGNOSIS — K21.00 GASTROESOPHAGEAL REFLUX DISEASE WITH ESOPHAGITIS WITHOUT HEMORRHAGE: Primary | ICD-10-CM

## 2024-06-24 PROCEDURE — 37000008 HC ANESTHESIA 1ST 15 MINUTES: Performed by: INTERNAL MEDICINE

## 2024-06-24 PROCEDURE — 88305 TISSUE EXAM BY PATHOLOGIST: CPT | Mod: 59 | Performed by: PATHOLOGY

## 2024-06-24 PROCEDURE — 88305 TISSUE EXAM BY PATHOLOGIST: CPT | Mod: 26,,, | Performed by: PATHOLOGY

## 2024-06-24 PROCEDURE — 25000003 PHARM REV CODE 250: Performed by: NURSE ANESTHETIST, CERTIFIED REGISTERED

## 2024-06-24 PROCEDURE — 37000009 HC ANESTHESIA EA ADD 15 MINS: Performed by: INTERNAL MEDICINE

## 2024-06-24 PROCEDURE — 63600175 PHARM REV CODE 636 W HCPCS: Performed by: ANESTHESIOLOGY

## 2024-06-24 PROCEDURE — 63600175 PHARM REV CODE 636 W HCPCS: Performed by: NURSE ANESTHETIST, CERTIFIED REGISTERED

## 2024-06-24 PROCEDURE — 27201012 HC FORCEPS, HOT/COLD, DISP: Performed by: INTERNAL MEDICINE

## 2024-06-24 PROCEDURE — 43239 EGD BIOPSY SINGLE/MULTIPLE: CPT | Mod: ,,, | Performed by: INTERNAL MEDICINE

## 2024-06-24 PROCEDURE — 43239 EGD BIOPSY SINGLE/MULTIPLE: CPT | Performed by: INTERNAL MEDICINE

## 2024-06-24 RX ORDER — PROPOFOL 10 MG/ML
VIAL (ML) INTRAVENOUS
Status: DISCONTINUED | OUTPATIENT
Start: 2024-06-24 | End: 2024-06-24

## 2024-06-24 RX ORDER — SODIUM CHLORIDE 9 MG/ML
INJECTION, SOLUTION INTRAVENOUS CONTINUOUS
Status: DISCONTINUED | OUTPATIENT
Start: 2024-06-24 | End: 2024-06-25 | Stop reason: HOSPADM

## 2024-06-24 RX ORDER — SODIUM CHLORIDE 0.9 % (FLUSH) 0.9 %
10 SYRINGE (ML) INJECTION
Status: DISCONTINUED | OUTPATIENT
Start: 2024-06-24 | End: 2024-06-25 | Stop reason: HOSPADM

## 2024-06-24 RX ORDER — HEPARIN 100 UNIT/ML
5 SYRINGE INTRAVENOUS
Status: COMPLETED | OUTPATIENT
Start: 2024-06-24 | End: 2024-06-24

## 2024-06-24 RX ORDER — PROPOFOL 10 MG/ML
VIAL (ML) INTRAVENOUS CONTINUOUS PRN
Status: DISCONTINUED | OUTPATIENT
Start: 2024-06-24 | End: 2024-06-24

## 2024-06-24 RX ORDER — ONDANSETRON HYDROCHLORIDE 2 MG/ML
4 INJECTION, SOLUTION INTRAVENOUS DAILY PRN
Status: DISCONTINUED | OUTPATIENT
Start: 2024-06-24 | End: 2024-06-25 | Stop reason: HOSPADM

## 2024-06-24 RX ORDER — LIDOCAINE HYDROCHLORIDE 20 MG/ML
INJECTION INTRAVENOUS
Status: DISCONTINUED | OUTPATIENT
Start: 2024-06-24 | End: 2024-06-24

## 2024-06-24 RX ADMIN — LIDOCAINE HYDROCHLORIDE 50 MG: 20 INJECTION INTRAVENOUS at 02:06

## 2024-06-24 RX ADMIN — PROPOFOL 60 MG: 10 INJECTION, EMULSION INTRAVENOUS at 02:06

## 2024-06-24 RX ADMIN — SODIUM CHLORIDE: 0.9 INJECTION, SOLUTION INTRAVENOUS at 02:06

## 2024-06-24 RX ADMIN — HEPARIN 500 UNITS: 100 SYRINGE at 04:06

## 2024-06-24 RX ADMIN — PROPOFOL 125 MCG/KG/MIN: 10 INJECTION, EMULSION INTRAVENOUS at 02:06

## 2024-06-24 NOTE — ANESTHESIA POSTPROCEDURE EVALUATION
Anesthesia Post Evaluation    Patient: Rocco Swain    Procedure(s) Performed: Procedure(s) (LRB):  EGD (ESOPHAGOGASTRODUODENOSCOPY) (N/A)    Final Anesthesia Type: general      Patient location during evaluation: [facility]  Patient participation: Yes- Able to Participate  Level of consciousness: awake and alert and oriented  Post-procedure vital signs: reviewed and stable  Pain management: adequate  Airway patency: patent    PONV status at discharge: No PONV  Anesthetic complications: no      Cardiovascular status: blood pressure returned to baseline and hemodynamically stable  Respiratory status: unassisted, spontaneous ventilation and room air  Hydration status: euvolemic  Follow-up not needed.              Vitals Value Taken Time   /101 06/24/24 1600   Temp 36.7 °C (98 °F) 06/24/24 1600   Pulse 90 06/24/24 1600   Resp 20 06/24/24 1600   SpO2 97 % 06/24/24 1600         No case tracking events are documented in the log.      Pain/Katya Score: Katya Score: 10 (6/24/2024  4:00 PM)

## 2024-06-24 NOTE — H&P
Short Stay Endoscopy History and Physical    PCP - No, Primary Doctor    Procedure - EGD  ASA - per anesthesia  Mallampati - per anesthesia  History of Anesthesia problems - no  Family history Anesthesia problems -  no   Plan of anesthesia - MAC    HPI:  This is a 74 y.o. male here for evaluation of gastric ulcer and GERD with esophagitis.         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    Surgical History:  has a past surgical history that includes Liver transplant (11/2008); Tracheostomy; Colonoscopy; Lymph node biopsy (N/A, 9/18/2020); Insertion of venous access port (Right, 3/8/2021); Conjunctiva biopsy (Right, 10/11/2022); Surgical removal of scar (Right, 8/24/2023); Esophagogastroduodenoscopy (N/A, 9/14/2023); Colonoscopy (N/A, 9/14/2023); and Esophagogastroduodenoscopy (N/A, 4/10/2024).    Family History: family history includes Dementia in his mother.. Otherwise no colon cancer, inflammatory bowel disease, or GI malignancies.    Social History:  reports that he has never smoked. He has never used smokeless tobacco. He reports current alcohol use. He reports that he does not currently use drugs.    Review of patient's allergies indicates:   Allergen Reactions    Aspirin     Tylenol extended release        Medications:   Medications Prior to Admission   Medication Sig Dispense Refill Last Dose    levothyroxine (SYNTHROID) 88 MCG tablet TAKE 1 TABLET BY MOUTH ONCE  DAILY 90 tablet 1 6/24/2024    oxyCODONE (ROXICODONE) 5 MG immediate  release tablet Take 1 tablet (5 mg total) by mouth every 6 (six) hours as needed for Pain. 60 tablet 0 6/23/2024    tacrolimus (PROGRAF) 0.5 MG Cap Take 1 capsule (0.5 mg total) by mouth every EVENING.  (Use the 1mg capsule for your morning dose) 90 capsule 3 6/23/2024    tacrolimus (PROGRAF) 1 MG Cap Take 1 capsule (1 mg total) by mouth every MORNING. Use the tacrolimus 0.5mg capsule for your evening dose as directed. 90 capsule 3 6/24/2024    amoxicillin-clavulanate (AUGMENTIN) 400-57 mg/5 mL SusR Take 11 mLs (875 mg total) by mouth every 12 (twelve) hours for 5 days. Discard Remainder 150 mL 0     azelaic acid (AZELEX) 15 % gel APPLY TOPICALLY TO AFFECTED AREA IN THE MORNING 50 g 3     chlorhexidine (PERIDEX) 0.12 % solution Gently swish and spit 15 mL twice a day for 3 weeks. Start rinses the day after your procedure. 473 mL 1     clindamycin phosphate 1% (CLINDAGEL) 1 % gel Apply topically 2 (two) times daily. 60 g 3     dexAMETHasone (DECADRON) 0.5 mg/5 mL Elix Swish 10 mL by mouth for 2 minutes then spit 4 times daily 474 mL 5     erythromycin (ROMYCIN) ophthalmic ointment Administer 0.5 inches to the right eye twice daily for 30 days. 3.5 g 1     erythromycin (ROMYCIN) ophthalmic ointment Administer 0.5 inches to the right eye once for 1 dose. 3.5 g 3     gabapentin (NEURONTIN) 300 MG capsule Take 1 capsule (300 mg total) by mouth every evening. (Patient taking differently: Take 300 mg by mouth as needed.) 30 capsule 11     imiquimod (ALDARA) 5 % cream Apply to biopsy site on frontal scalp + about a centimeter of surrounding skin qhs x 16 weeks. Wash off in am and apply sunscreen. Discontinue if skin becomes blistered, raw, bleeding, painful, etc. 24 packet 3     LIDOcaine HCl 2% (LIDOCAINE VISCOUS) 2 % Soln Swish and spit 15 mls every 8 (eight) hours as needed (mouth sore). (Patient not taking: Reported on 5/7/2024) 100 mL 3     magic mouthwash diphen/antac/lidoc/nysta Take 10 mLs by mouth 4 (four) times  daily. 120 mL 4     metroNIDAZOLE (METROGEL) 0.75 % gel Apply topically to affected area 2 (two) times daily. 45 g 1     multivit-min/FA/lycopen/lutein (CENTRUM SILVER MEN ORAL) Take 1 tablet by mouth once daily.       pantoprazole (PROTONIX) 40 MG tablet Take 1 tablet (40 mg total) by mouth 2 (two) times daily before meals. 180 tablet 3     prednisoLONE acetate (PRED FORTE) 1 % DrpS Place 1 drop into the right eye 4 (four) times daily. 10 mL 3     sucralfate (CARAFATE) 1 gram tablet Take 1 tablet (1 g total) by mouth 4 times daily. Tablet can be broken and dissolved in water for ease of administration. Consume within 30 minutes of dissolving. Separate from pantoprazole and levothyroxine by at least 2 hours. 120 tablet 0     sulfacetamide sodium-sulfur 10-5 % (w/w) Clsr USE TO WASH AFFECTED AREA DAILY       tiZANidine (ZANAFLEX) 2 MG tablet Take 1 tablet (2 mg total) by mouth nightly as needed (neck muscle strain). 30 tablet 3     traZODone (DESYREL) 50 MG tablet TAKE 1 TABLET BY MOUTH IN THE  EVENING 90 tablet 3     vitamin E 1000 UNIT capsule Take 1 cap PO BID until gone. Start taking one week prior to your dental surgery. (Patient not taking: Reported on 5/7/2024) 112 capsule 0        Physical Exam:    Vital Signs:   Vitals:    06/24/24 1405   BP: (!) 152/89   Pulse: 91   Resp: 16   Temp: 98 °F (36.7 °C)       General Appearance: Well appearing in no acute distress  Eyes:    No scleral icterus  Lungs: CTA anteriorly  Heart:  Regular rate and rhythm   Abdomen: Soft, non tender, non distended with normal bowel sounds.      Labs:  Lab Results   Component Value Date    WBC 3.95 06/10/2024    HGB 10.1 (L) 06/10/2024    HCT 31.1 (L) 06/10/2024     (H) 06/10/2024     (L) 06/10/2024        BMP  Lab Results   Component Value Date     06/10/2024    K 5.2 (H) 06/10/2024     06/10/2024    CO2 25 06/10/2024    BUN 22 06/10/2024    CREATININE 1.3 06/10/2024    CALCIUM 9.8 06/10/2024    ANIONGAP 9  06/10/2024    ESTGFRAFRICA 52.6 (A) 07/14/2022    EGFRNONAA 45.5 (A) 07/14/2022     Lab Results   Component Value Date    INR 1.0 09/02/2020          Assessment:  74 y.o. male with GERD and esophagitis, and gastric ulcer.     Plan:  Proceed with EGD today.  I have explained the risks and benefits of endoscopy procedures to the patient including but not limited to bleeding, perforation, infection, and death.  All questions answered.      Reyes Pagan MD

## 2024-06-24 NOTE — TRANSFER OF CARE
"Anesthesia Transfer of Care Note    Patient: Rocco Swain    Procedure(s) Performed: Procedure(s) (LRB):  EGD (ESOPHAGOGASTRODUODENOSCOPY) (N/A)    Patient location: [facility]    Anesthesia Type: general    Transport from OR: Transported from OR on room air with adequate spontaneous ventilation    Post pain: adequate analgesia    Post assessment: no apparent anesthetic complications and tolerated procedure well    Post vital signs: stable    Level of consciousness: awake, alert and responds to stimulation    Nausea/Vomiting: no nausea/vomiting    Complications: none    Transfer of care protocol was followedComments: Report given to recovery RN.       Last vitals: Visit Vitals  BP (!) 121/67 (Patient Position: Lying)   Pulse 87   Temp 36.7 °C (98 °F) (Temporal)   Resp 14   Ht 5' 8" (1.727 m)   Wt 54.4 kg (120 lb)   SpO2 99%   BMI 18.25 kg/m²     "

## 2024-06-24 NOTE — ANESTHESIA PREPROCEDURE EVALUATION
[facility]  Anesthesia Pre-Operative Evaluation       Patient Name: Rocco Swain  YOB: 1950  MRN: [id number]  [facility]: [id number]      Code Status: Prior   Date of Procedure: 4/10/2024  Anesthesia: General Procedure: Procedure(s) (LRB):  EGD (ESOPHAGOGASTRODUODENOSCOPY) (N/A)  Pre-Operative Diagnosis: Melena [K92.1]  Proceduralist: Surgeon(s) and Role:     * Reyes Pagan MD - Primary Nurse: Charlette Borjas LPN  Registered Nurse: Sima Gao RN      SUBJECTIVE:   Rocco Swain is a 74 y.o. 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 No notes on file    Anticoagulants   Medication Route Frequency       he has a current medication list which includes the following long-term medication(s): levothyroxine, tacrolimus, tacrolimus, azelaic acid, clindamycin phosphate 1%, gabapentin, metronidazole, pantoprazole, and trazodone.   ALLERGIES:     Review of patient's allergies indicates:   Allergen Reactions    Aspirin     Tylenol extended release      LDA:          Lines/Drains/Airways       Central Venous Catheter Line  Duration                  PowerPort A Cath Single Lumen 03/08/21 1031 right internal jugular 1204 days              Airway  Duration                  Laryngectomy (Do Not Remove) 08/24/23 1145  305 days                  MEDICATIONS:     Antibiotics (From admission, onward)      None          VTE Risk Mitigation (From admission, onward)      None          Current Facility-Administered Medications   Medication Dose Route Frequency Provider Last Rate Last Admin    0.9%  NaCl infusion   Intravenous Continuous Reyes Pagan MD          Facility-Administered Medications Ordered in Other Encounters   Medication Dose Route Frequency Provider Last Rate Last Admin    heparin, porcine (PF) 100 unit/mL injection flush 500 Units  500 Units Intravenous PRN Ronald Berg MD        ofloxacin 0.3 % ophthalmic solution 1 drop  1 drop Right Eye On Call Procedure Victor M Vasques MD   2 drop at 10/11/22 0745    sodium chloride 0.9% flush 10 mL  10 mL Intravenous PRN Ronald Berg MD        sodium chloride 0.9% flush 10 mL  10 mL Intravenous PRN Victor M Vasques MD              History:     There are no hospital problems to display for this patient.    Surgical History:    has a past surgical history that includes Liver transplant (11/2008); Tracheostomy; Colonoscopy; Lymph node biopsy (N/A, 9/18/2020); Insertion of venous access port (Right, 3/8/2021); Conjunctiva biopsy (Right, 10/11/2022); Surgical removal of scar (Right, 8/24/2023); Esophagogastroduodenoscopy (N/A, 9/14/2023); Colonoscopy (N/A, 9/14/2023); and Esophagogastroduodenoscopy (N/A, 4/10/2024).   Social History:    reports being sexually active.  reports that he has never smoked. He has never used smokeless tobacco. He reports current alcohol use. He reports that he does not currently use drugs.     OBJECTIVE:     Vital Signs (Most Recent):    Vital Signs Range (Last 24H):          There is no height or weight on file to calculate BMI.   Wt Readings from Last 4 Encounters:   05/07/24 50.4 kg (111 lb 1.8 oz)   05/07/24 50.3 kg (110 lb 14.3 oz)   04/22/24 49.5 kg (109 lb 2 oz)   04/17/24 49 kg (108 lb 0.4 oz)     Significant Labs:  Lab Results   Component Value Date    WBC 3.95 06/10/2024    HGB 10.1 (L) 06/10/2024    HCT 31.1 (L) 06/10/2024     (L) 06/10/2024     06/10/2024    K 5.2 (H) 06/10/2024     06/10/2024    CREATININE 1.3 06/10/2024    BUN 22 06/10/2024    CO2 25 06/10/2024    GLU 97 06/10/2024    CALCIUM 9.8 06/10/2024    MG 1.3 (L) 04/22/2024    PHOS 2.7  04/11/2024    ALKPHOS 66 06/10/2024    ALT 12 06/10/2024    AST 25 06/10/2024    ALBUMIN 3.6 06/10/2024    INR 1.0 09/02/2020    HGBA1C 4.8 12/11/2019     No LMP for male patient.  No results found for this or any previous visit (from the past 72 hour(s)).      EKG:   Results for orders placed or performed in visit on 10/20/23   IN OFFICE EKG 12-LEAD (to [place])    Collection Time: 10/20/23  2:57 PM    Narrative    Test Reason : I48.0,    Vent. Rate : 080 BPM     Atrial Rate : 080 BPM     P-R Int : 174 ms          QRS Dur : 064 ms      QT Int : 366 ms       P-R-T Axes : 027 071 038 degrees     QTc Int : 422 ms    Sinus rhythm with Premature atrial complexes  Low septal forces  Abnormal ECG  When compared with ECG of 14-SEP-2023 08:34,  Premature atrial complexes are now Present  Confirmed by Sagar EVANS MD ([id number]) on 10/23/2023 4:20:47 PM    Referred By: GAURANG GOODRICH           Confirmed By:Sagar EVANS MD       TTE:    EF   Date Value Ref Range Status   11/28/2023 60 % Final        ASSESSMENT/PLAN:          Pre-op Assessment    I have reviewed the Patient Summary Reports.     I have reviewed the Nursing Notes. I have reviewed the NPO Status.   I have reviewed the Medications.     Review of Systems  Anesthesia Hx:  No problems with previous Anesthesia   History of prior surgery of interest to airway management or planning:             EENT/Dental:   S/p laryngectomy           Pulmonary:   COPD                     Renal/:  Chronic Renal Disease                Hepatic/GI:      Liver Disease, Hepatitis, C           Endocrine:   Hypothyroidism              Physical Exam  General: Well nourished, Cooperative, Alert and Oriented    Airway:  Mallampati: III   Mouth Opening: Small, but > 3cm  Pre-Existing Airway: Tracheal Stoma  Laryngectomy stoma. On room air       Anesthesia Plan  Type of Anesthesia, risks & benefits discussed:    Anesthesia Type: Gen Natural Airway  Intra-op Monitoring Plan: Standard ASA Monitors  Induction:   IV  Informed Consent: Informed consent signed with the Patient and all parties understand the risks and agree with anesthesia plan.  All questions answered.   ASA Score: 3  Day of Surgery Review of History & Physical: H&P Update referred to the surgeon/provider.  Anesthesia Plan Notes: Mr. Swain is fully intact and oriented, but communicates 100% non-verbally after his laryngectomy. Consents signed with the nurse present to witness our conversation.     Ready For Surgery From Anesthesia Perspective.     .

## 2024-06-24 NOTE — PLAN OF CARE
Pt alert and orientated. Family not at bs in waiting room. VSS. No distress noted. Pt denies N/V/D. Pt states they are ready for discharge.

## 2024-06-24 NOTE — PROVATION PATIENT INSTRUCTIONS
Discharge Summary/Instructions after an Endoscopic Procedure  Patient Name: Rocco Swain  Patient MRN: [id number]  Patient YOB: 1950  Monday, June 24, 2024  Reyes Pagan MD  Dear patient,  As a result of recent federal legislation (The Federal Cures Act), you may   receive lab or pathology results from your procedure in your [facility]   account before your physician is able to contact you. Your physician or   their representative will relay the results to you with their   recommendations at their soonest availability.  Thank you,  RESTRICTIONS:  During your procedure today, you received medications for sedation.  These   medications may affect your judgment, balance and coordination.  Therefore,   for 24 hours, you have the following restrictions:   - DO NOT drive a car, operate machinery, make legal/financial decisions,   sign important papers or drink alcohol.    ACTIVITY:  Today: no heavy lifting, straining or running due to procedural   sedation/anesthesia.  The following day: return to full activity including work.  DIET:  Eat and drink normally unless instructed otherwise.     TREATMENT FOR COMMON SIDE EFFECTS:  - Mild abdominal pain, nausea, belching, bloating or excessive gas:  rest,   eat lightly and use a heating pad.  - Sore Throat: treat with throat lozenges and/or gargle with warm salt   water.  - Because air was used during the procedure, expelling large amounts of air   from your rectum or belching is normal.  - If a bowel prep was taken, you may not have a bowel movement for 1-3 days.    This is normal.  SYMPTOMS TO WATCH FOR AND REPORT TO YOUR PHYSICIAN:  1. Abdominal pain or bloating, other than gas cramps.  2. Chest pain.  3. Back pain.  4. Signs of infection such as: chills or fever occurring within 24 hours   after the procedure.  5. Rectal bleeding, which would show as bright red, maroon, or black stools.   (A tablespoon of blood from the rectum is not serious, especially if    hemorrhoids are present.)  6. Vomiting.  7. Weakness or dizziness.  GO DIRECTLY TO THE NEAREST EMERGENCY ROOM IF YOU HAVE ANY OF THE FOLLOWING:      Difficulty breathing              Chills and/or fever over 101 F   Persistent vomiting and/or vomiting blood   Severe abdominal pain   Severe chest pain   Black, tarry stools   Bleeding- more than one tablespoon   Any other symptom or condition that you feel may need urgent attention  Your doctor recommends these additional instructions:  If any biopsies were taken, your doctors clinic will contact you in 1 to 2   weeks with any results.  - Discharge patient to home.   - Patient has a contact number available for emergencies.  The signs and   symptoms of potential delayed complications were discussed with the   patient.  Return to normal activities tomorrow.  Written discharge   instructions were provided to the patient.   - Resume previous diet.   - Continue present medications.   - Await pathology results.   - Return to referring physician.  For questions, problems or results please call your physician - Reyes Pagan MD at Work:  [phone].  [facility], EMERGENCY ROOM PHONE NUMBER: [phone]  IF A COMPLICATION OR EMERGENCY SITUATION ARISES AND YOU ARE UNABLE TO REACH   YOUR PHYSICIAN - GO DIRECTLY TO THE EMERGENCY ROOM.  Reyes Pagan MD  6/24/2024 3:08:24 PM  This report has been verified and signed electronically.  Dear patient,  As a result of recent federal legislation (The Federal Cures Act), you may   receive lab or pathology results from your procedure in your [facility]   account before your physician is able to contact you. Your physician or   their representative will relay the results to you with their   recommendations at their soonest availability.  Thank you,  [employer]ATION

## 2024-06-26 LAB
FINAL PATHOLOGIC DIAGNOSIS: NORMAL
GROSS: NORMAL
Lab: NORMAL

## 2024-07-11 ENCOUNTER — TELEPHONE (OUTPATIENT)
Dept: PALLIATIVE MEDICINE | Facility: CLINIC | Age: 74
End: 2024-07-11
Payer: MEDICARE

## 2024-07-13 DIAGNOSIS — Z94.4 LIVER TRANSPLANTED: ICD-10-CM

## 2024-07-15 ENCOUNTER — TELEPHONE (OUTPATIENT)
Dept: TRANSPLANT | Facility: CLINIC | Age: 74
End: 2024-07-15
Payer: MEDICARE

## 2024-07-15 PROBLEM — K92.2 GIB (GASTROINTESTINAL BLEEDING): Status: RESOLVED | Noted: 2023-09-13 | Resolved: 2024-07-15

## 2024-07-15 RX ORDER — TACROLIMUS 1 MG/1
1 CAPSULE ORAL EVERY MORNING
Qty: 90 CAPSULE | Refills: 3 | Status: SHIPPED | OUTPATIENT
Start: 2024-07-15

## 2024-07-15 RX ORDER — TACROLIMUS 0.5 MG/1
0.5 CAPSULE ORAL NIGHTLY
Qty: 90 CAPSULE | Refills: 3 | Status: SHIPPED | OUTPATIENT
Start: 2024-07-15

## 2024-07-15 NOTE — TELEPHONE ENCOUNTER
Writer returned call, clarified script as correct, patient using tac 1mg in the AM and using the tac 0.5mg in the evenings.        ---- Message from Alycia Gomes sent at 7/15/2024  1:24 PM CDT -----  Regarding: clarify directions on Tacrolimus  Contact: [phone] (Gigi COLLINS  [facility] called regarding to clarify directions on PT tacrolimus medication     Please reach back at you convenience  (Gigi COLLINS)

## 2024-07-16 ENCOUNTER — OFFICE VISIT (OUTPATIENT)
Dept: PALLIATIVE MEDICINE | Facility: CLINIC | Age: 74
End: 2024-07-16
Payer: MEDICARE

## 2024-07-16 VITALS
HEART RATE: 73 BPM | WEIGHT: 123.88 LBS | DIASTOLIC BLOOD PRESSURE: 85 MMHG | BODY MASS INDEX: 18.84 KG/M2 | SYSTOLIC BLOOD PRESSURE: 158 MMHG | OXYGEN SATURATION: 96 %

## 2024-07-16 DIAGNOSIS — G89.3 CANCER RELATED PAIN: ICD-10-CM

## 2024-07-16 DIAGNOSIS — H57.11 EYE PAIN, RIGHT: ICD-10-CM

## 2024-07-16 DIAGNOSIS — C01 SQUAMOUS CELL CARCINOMA OF BASE OF TONGUE: ICD-10-CM

## 2024-07-16 DIAGNOSIS — F43.21 ADJUSTMENT DISORDER WITH DEPRESSED MOOD: ICD-10-CM

## 2024-07-16 DIAGNOSIS — C32.9 LARYNX CANCER: ICD-10-CM

## 2024-07-16 DIAGNOSIS — Z90.02 HISTORY OF LARYNGECTOMY: ICD-10-CM

## 2024-07-16 DIAGNOSIS — Z51.5 ENCOUNTER FOR PALLIATIVE CARE: Primary | ICD-10-CM

## 2024-07-16 PROCEDURE — 99999 PR PBB SHADOW E&M-EST. PATIENT-LVL III: CPT | Mod: PBBFAC,,, | Performed by: NURSE PRACTITIONER

## 2024-07-16 PROCEDURE — 99215 OFFICE O/P EST HI 40 MIN: CPT | Mod: S$PBB,,, | Performed by: NURSE PRACTITIONER

## 2024-07-16 PROCEDURE — 99213 OFFICE O/P EST LOW 20 MIN: CPT | Mod: PBBFAC | Performed by: NURSE PRACTITIONER

## 2024-07-16 NOTE — PROGRESS NOTES
Consult Note  Palliative Care      Consult Requested By: No ref. provider found  Reason for Consult: symptom mgmt/ACP      ASSESSMENT/PLAN:     Plan/Recommendations:  Diagnoses and all orders for this visit:    07/16/2024:  - no changes made     Squamous cell carcinoma of base of tongue  - following with Dr. Hernandez and NP Tito  - s/p laryngectomy, total glossectomy   - 100% written communication     Encounter for palliative care  - Patient is decisional  - Patient accompanied today by self   - ACP documents are uploaded into EMR   - Philosophy of Palliative Medicine reviewed with patient and family at first visit  - New patient folder given to and reviewed with patient and family at first visit  - Goals of care: Patient has excellent activity tolerance, his goal is to remain active and able to do the things he enjoys. He fly fishes frequently and enjoys taking walks around the [employer]. He is a retired  of 35 years. He has no children and is unmarried, he jokes that being single has allowed him to buy a Kristine. He lives alone with a brother who lives about a mile from his house.      Cancer pain  - 7/16: notes facial pain started 3 weeks ago, right-side nose, adjacent to selam-orbital region.  Pain is intermittent, sometimes wakes him up   - has used a couple doses of oxycodone 5 mg left over from old prescription     Chronic low back pain, unspecified back pain laterality, unspecified whether sciatica present  - no longer requires pain medication for cancer-related pain  - states his chronic back pain is now more prominent, this is his biggest complaint  - he has tried PT and pain management with limited to no relief     - placed referral to IO for acupuncture     Insomnia  - reports 5-6 hours of sleep per night  - 2/2 chronic back pain, see above  - placed referral for acupuncture   - tip sheet provided in new patient folder  - will continue to monitor     Anorexia  -  resolved  - excellent appetite     Adjustment disorder with depression  - coping methods: driving his Kristine, long walks, fishing, travel, time with his brother     Understanding of illness/Prognosis: good    Goals of care: life-prolonging, maintain good QOL    Follow up: 12 weeks    Patient's encounter and above plan of care discussed with patient's oncology team.     SUBJECTIVE:     History of Present Illness:  Patient is a 74 y.o. year old male presenting with SCC base of tongue. Please see oncology notes for full oncologic history and treatment course.     07/16/2024:  JESUS ALBERTO HERNÁNDEZ reviewed    Patient presents to visit alone.     - some depression shortly after surgical removal of right eye, but is feeling better now, f/u @ [facility] on 12/18  - plan for cataract surgery on left, after 9/15   - purchased new Melba to boost mood, is also fishing, going on walks and spending time with brother for enjoyment   - pain, started 3 weeks ago: right side nose, using old oxycodone 5 mg occasionally, this helps   - excellent appetite, 10 lbs weight gain   - Upcoming trips to: [place] in October, [place] in November 05/07/2024:  JESUS ALBERTO HERNÁNDEZ reviewed    Patient presents to clinic f/u alone.     - s/p orbital exenteration at [facility] 4/25/2024,  - PEG cancelled, pt PO intake improving   - eye pain sig improved, no longer taking oxycodone   - reports overall doing/feeling well, his brother has been supportive through selam-op process     01/03/2024:  JESUS ALBERTO HERNÁNDEZ reviewed    Patient presents to clinic f/u alone.     - interval PET scan clear/reassuring  - referred to [facility] for SCC eye  - low dose oxy managed by optho, using about 1 tab daily  - MOHS schedule for 24th  - He reports that he is feeling reasonably well, enjoyed the holidays  - No new or worsening symptoms  - RTC in 12 weeks      10/16/2023:  JESUS ALBERTO HERNÁNDEZ reviewed: no concerns    Presents to clinic visit alone.     - since our last visit he was hospitalized x 5 days for GIB, feeling much  better now  - reports has gained 8 lbs since last spring  - s/p eye surgery (biopsy) 6 weeks ago, still w some residual pain, 14 day refill of oxy 5 mg provided   - next PET scan 10/31  - leaves for trip to [facility] Nov/1  - starts w new back doctor in November   - enjoying cooler weather, walks and driving his car    06/28/2023:  LA  reviewed:    - updated scan is reassuring    - doing very well overall  - remains off pain medication   - still trying to regain weight lost to Covid infection   - fishing frequently   - several summer trips scheduled fishing trip to [place] planned for early August  - did not see initial IO scheduling message, will f/u to make initial appointment      04/14/2023:  JESUS ALBERTO HERNÁNDEZ reviewed and summarized:  No surprises    Patient presents to clinic alone, all communication is written. He is a pleasant man, in good spirits at our initial visit. His biggest concern is back pain, which is chronic in nature. He has hx of PT and pain management interventions which were not effective. He is curious to try acupuncture, referral placed today. He has excellent activity tolerance and enjoys engaging in his hobbies and making the most of his retired life.     Past Medical History:   Diagnosis Date    Basal cell carcinoma (BCC) in situ of skin 2012    3 on face, 2 on arm, removed by dermatology.     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 Underwent surgical resection of tongue and neck. s/p tracheostomy    Squamous cell carcinoma of skin of right temple 01/24/2024    right temple     Past Surgical History:   Procedure Laterality Date    COLONOSCOPY      COLONOSCOPY N/A 9/14/2023    Procedure:  COLONOSCOPY;  Surgeon: Reyes Olivia MD;  Location: [facility] ENDO (2ND FLR);  Service: Endoscopy;  Laterality: N/A;    CONJUNCTIVA BIOPSY Right 10/11/2022    Procedure: BIOPSY, CONJUNCTIVA;  Surgeon: Victor M Vasques MD;  Location: [facility];  Service: Ophthalmology;  Laterality: Right;    ESOPHAGOGASTRODUODENOSCOPY N/A 9/14/2023    Procedure: EGD (ESOPHAGOGASTRODUODENOSCOPY);  Surgeon: Reyes Olivia MD;  Location: [facility] ENDO (2ND FLR);  Service: Endoscopy;  Laterality: N/A;    ESOPHAGOGASTRODUODENOSCOPY N/A 4/10/2024    Procedure: EGD (ESOPHAGOGASTRODUODENOSCOPY);  Surgeon: Reyes Pagan MD;  Location: [facility] ENDO (2ND FLR);  Service: Endoscopy;  Laterality: N/A;    ESOPHAGOGASTRODUODENOSCOPY N/A 6/24/2024    Procedure: EGD (ESOPHAGOGASTRODUODENOSCOPY);  Surgeon: Reyes Pagan MD;  Location: [facility] (2ND FLR);  Service: Endoscopy;  Laterality: N/A;  Ref By: Dr. Hawkins   Procedure: egd  Diagnosis: esophagitis  Procedure Timing: 10 weeks  Prep: standard prep  6/21-pt r/s-inst to portal-2nd floor criteria-labs within 90 daysMS    INSERTION OF VENOUS ACCESS PORT Right 3/8/2021    Procedure: INSERTION, VENOUS ACCESS PORT;  Surgeon: Jesus Rendon MD;  [address]  Service: General;  Laterality: Right;    LIVER TRANSPLANT  11/2008    transplanted for biopsy proven hepatocellular carcinoma,     LYMPH NODE BIOPSY N/A 9/18/2020    Procedure: BIOPSY, LYMPH NODE;  Surgeon: [facility];  [address];  Service: General;  Laterality: N/A;  Rm 173    SURGICAL REMOVAL OF SCAR Right 8/24/2023    Procedure: EXCISION, SCAR;  Surgeon: Ting Brambila MD;  Location: [facility];  Service: Ophthalmology;  Laterality: Right;    TRACHEOSTOMY       Family History   Problem Relation Name Age of Onset    Dementia Mother       Review of patient's allergies indicates:   Allergen Reactions    Aspirin     Tylenol extended release        Medications:    Current Outpatient Medications:      amoxicillin-clavulanate (AUGMENTIN) 400-57 mg/5 mL SusR, Take 11 mLs (875 mg total) by mouth every 12 (twelve) hours for 5 days. Discard Remainder, Disp: 150 mL, Rfl: 0    azelaic acid (AZELEX) 15 % gel, APPLY TOPICALLY TO AFFECTED AREA IN THE MORNING, Disp: 50 g, Rfl: 3    chlorhexidine (PERIDEX) 0.12 % solution, Gently swish and spit 15 mL twice a day for 3 weeks. Start rinses the day after your procedure., Disp: 473 mL, Rfl: 1    clindamycin phosphate 1% (CLINDAGEL) 1 % gel, Apply topically 2 (two) times daily., Disp: 60 g, Rfl: 3    dexAMETHasone (DECADRON) 0.5 mg/5 mL Elix, Swish 10 mL by mouth for 2 minutes then spit 4 times daily, Disp: 474 mL, Rfl: 5    erythromycin (ROMYCIN) ophthalmic ointment, Administer 0.5 inches to the right eye twice daily for 30 days., Disp: 3.5 g, Rfl: 1    erythromycin (ROMYCIN) ophthalmic ointment, Administer 0.5 inches to the right eye once for 1 dose., Disp: 3.5 g, Rfl: 3    gabapentin (NEURONTIN) 300 MG capsule, Take 1 capsule (300 mg total) by mouth every evening. (Patient taking differently: Take 300 mg by mouth as needed.), Disp: 30 capsule, Rfl: 11    imiquimod (ALDARA) 5 % cream, Apply to biopsy site on frontal scalp + about a centimeter of surrounding skin qhs x 16 weeks. Wash off in am and apply sunscreen. Discontinue if skin becomes blistered, raw, bleeding, painful, etc., Disp: 24 packet, Rfl: 3    levothyroxine (SYNTHROID) 88 MCG tablet, TAKE 1 TABLET BY MOUTH ONCE  DAILY, Disp: 90 tablet, Rfl: 1    LIDOcaine HCl 2% (LIDOCAINE VISCOUS) 2 % Soln, Swish and spit 15 mls every 8 (eight) hours as needed (mouth sore). (Patient not taking: Reported on 5/7/2024), Disp: 100 mL, Rfl: 3    magic mouthwash diphen/antac/lidoc/nysta, Take 10 mLs by mouth 4 (four) times daily., Disp: 120 mL, Rfl: 4    metroNIDAZOLE (METROGEL) 0.75 % gel, Apply topically to affected area 2 (two) times daily., Disp: 45 g, Rfl: 1    multivit-min/FA/lycopen/lutein (CENTRUM SILVER MEN ORAL), Take 1  tablet by mouth once daily., Disp: , Rfl:     oxyCODONE (ROXICODONE) 5 MG immediate release tablet, Take 1 tablet (5 mg total) by mouth every 6 (six) hours as needed for Pain., Disp: 60 tablet, Rfl: 0    pantoprazole (PROTONIX) 40 MG tablet, Take 1 tablet (40 mg total) by mouth 2 (two) times daily before meals., Disp: 180 tablet, Rfl: 3    prednisoLONE acetate (PRED FORTE) 1 % DrpS, Place 1 drop into the right eye 4 (four) times daily., Disp: 10 mL, Rfl: 3    sucralfate (CARAFATE) 1 gram tablet, Take 1 tablet (1 g total) by mouth 4 times daily. Tablet can be broken and dissolved in water for ease of administration. Consume within 30 minutes of dissolving. Separate from pantoprazole and levothyroxine by at least 2 hours., Disp: 120 tablet, Rfl: 0    sulfacetamide sodium-sulfur 10-5 % (w/w) Clsr, USE TO WASH AFFECTED AREA DAILY, Disp: , Rfl:     tacrolimus (PROGRAF) 0.5 MG Cap, Take 1 capsule (0.5 mg total) by mouth every EVENING.  (Use the 1mg capsule for your morning dose), Disp: 90 capsule, Rfl: 3    tacrolimus (PROGRAF) 1 MG Cap, Take 1 capsule (1 mg total) by mouth every MORNING. Use the tacrolimus 0.5mg capsule for your evening dose as directed., Disp: 90 capsule, Rfl: 3    tiZANidine (ZANAFLEX) 2 MG tablet, Take 1 tablet (2 mg total) by mouth nightly as needed (neck muscle strain)., Disp: 30 tablet, Rfl: 3    traZODone (DESYREL) 50 MG tablet, TAKE 1 TABLET BY MOUTH IN THE  EVENING, Disp: 90 tablet, Rfl: 3    vitamin E 1000 UNIT capsule, Take 1 cap PO BID until gone. Start taking one week prior to your dental surgery. (Patient not taking: Reported on 5/7/2024), Disp: 112 capsule, Rfl: 0  No current facility-administered medications for this visit.    Facility-Administered Medications Ordered in Other Visits:     heparin, porcine (PF) 100 unit/mL injection flush 500 Units, 500 Units, Intravenous, PRN, Morena, Ronald SILVA MD    ofloxacin 0.3 % ophthalmic solution 1 drop, 1 drop, Right Eye, On Call Procedure, Layo,  Victor M OLSEN MD, 2 drop at 10/11/22 0745    sodium chloride 0.9% flush 10 mL, 10 mL, Intravenous, PRN, Ronald Berg MD    sodium chloride 0.9% flush 10 mL, 10 mL, Intravenous, PRN, Victor M Vasques MD    OBJECTIVE:       ROS:  Review of Systems   Constitutional:  Positive for fatigue. Negative for activity change, appetite change and chills.        Written communication    HENT:          Right side nose pain   Eyes:  Positive for pain. Negative for discharge and itching.   Respiratory: Negative.  Negative for apnea, cough and chest tightness.    Cardiovascular: Negative.  Negative for chest pain, palpitations and leg swelling.   Gastrointestinal:  Negative for nausea and vomiting.   Genitourinary:  Negative for difficulty urinating, dysuria and enuresis.   Musculoskeletal:  Positive for back pain. Negative for arthralgias and gait problem.   Skin:  Positive for wound (left nare, healing). Negative for color change, pallor and rash.   Psychiatric/Behavioral:  Positive for dysphoric mood. Negative for sleep disturbance. The patient is not nervous/anxious.        Review of Symptoms      Symptom Assessment (ESAS 0-10 Scale)  Pain:  6  Dyspnea:  0  Anxiety:  0  Nausea:  0  Depression:  1  Anorexia:  0  Fatigue:  0  Insomnia:  0  Restlessness:  0  Agitation:  0     CAM / Delirium:  Negative  Constipation:  Negative  Diarrhea:  Negative    Anxiety:  Is not nervous/anxious    Bowel Management Plan (BMP):  No      Pain Assessment:  OME in 24 hours:  0  Location(s): back    Back       Location: lower        Quality: None        Quantity: 4/10 in intensity        Chronicity: Onset 0 year(s) ago, stable        Aggravating Factors: Activity        Alleviating Factors: None       Associated Symptoms: None    Modified Tricia Scale:  0    ECOG Performance Status stGstrstastdstest:st st1st Living Arrangements:  Lives alone    Psychosocial/Cultural:   See Palliative Psychosocial Note: Yes  **Primary  to Follow**  Palliative Care   Consult: No      Advance Care Planning   Advance Directives:   Living Will: Yes        Copy on chart: Yes    Medical Power of : Yes      Decision Making:  Patient answered questions  Goals of Care: What is most important right now is to focus on remaining as independent as possible, symptom/pain control. Accordingly, we have decided that the best plan to meet the patient's goals includes continuing with treatment.        Physical Exam:  Vitals:    Vitals:    07/16/24 1404   BP: (!) 158/85   Pulse: 73         Physical Exam  Vitals reviewed.   Constitutional:       Appearance: Normal appearance. He is not toxic-appearing or diaphoretic.      Comments: Written communication    HENT:      Head: Normocephalic and atraumatic.      Comments: Trach      Right Ear: External ear normal.      Left Ear: External ear normal.      Nose: Nose normal.   Eyes:      General:         Right eye: No discharge.         Left eye: No discharge.      Comments: Eye patch in place, right   Pulmonary:      Effort: Pulmonary effort is normal. No respiratory distress.      Breath sounds: No stridor.   Abdominal:      General: Abdomen is flat.      Palpations: Abdomen is soft.   Musculoskeletal:         General: No swelling or tenderness. Normal range of motion.      Cervical back: Normal range of motion.   Skin:     General: Skin is warm and dry.      Coloration: Skin is not jaundiced.      Findings: No bruising.   Neurological:      General: No focal deficit present.      Mental Status: He is alert and oriented to person, place, and time. Mental status is at baseline.   Psychiatric:         Mood and Affect: Mood normal.         Behavior: Behavior normal.         Thought Content: Thought content normal.         Judgment: Judgment normal.         Labs:  CBC:   WBC   Date Value Ref Range Status   06/10/2024 3.95 3.90 - 12.70 K/uL Final     Hemoglobin   Date Value Ref Range Status   06/10/2024 10.1 (L) 14.0 - 18.0 g/dL Final  "    Hematocrit   Date Value Ref Range Status   06/10/2024 31.1 (L) 40.0 - 54.0 % Final     MCV   Date Value Ref Range Status   06/10/2024 107 (H) 82 - 98 fL Final     Platelets   Date Value Ref Range Status   06/10/2024 127 (L) 150 - 450 K/uL Final       LFT:   Lab Results   Component Value Date    AST 25 06/10/2024     (H) 07/09/2020    ALKPHOS 66 06/10/2024    BILITOT 0.4 06/10/2024       Albumin:   Albumin   Date Value Ref Range Status   06/10/2024 3.6 3.5 - 5.2 g/dL Final     Protein:   Total Protein   Date Value Ref Range Status   06/10/2024 7.4 6.0 - 8.4 g/dL Final       Radiology:I have reviewed all pertinent imaging results/findings within the past 24 hours.    04/24/2024: MRI orbits: Impression      1. Slight interval increase in size of the mass involving the inferonasal right conjunctiva with extension into the postseptal extraconal space and medial canthus, as described above. Enhancement of the inferolateral and superior conjunctiva is stable.    3. No adenopathy.    10/31/2023 CT PET: Impression:     No hypermetabolic lesion concerning for recurrent or metastatic disease in this patient with head and neck cancer status post resection.    06/13/2023 NM PET: "Impression:     No evidence of FDG avid tumor in patient with squamous cell carcinoma of the base of the tongue status post resection.  No abnormal uptake to suggest distant metastasis."       3/20/2023 NM PET: "Impression:     In this patient with base of tongue cancer, there is no definite evidence of hypermetabolic tumor.     Redemonstration of nonspecific mild superficial FDG uptake at the chin.  Clinical correlation suggested.     No other suspicious hypermetabolic lesion."    I spent a total of 50 minutes on the day of the visit.This includes face to face time in discussion of goals of care, symptom assessment, coordination of care and emotional support.  This also includes non-face to face time preparing to see the patient (eg, review " of tests/imaging), obtaining and/or reviewing separately obtained history, documenting clinical information in the electronic or other health record, independently interpreting results and communicating results to the patient/family/caregiver, or care coordinator.     Signature: Arabella Cuevas DNP

## 2024-07-31 ENCOUNTER — TELEPHONE (OUTPATIENT)
Dept: OTOLARYNGOLOGY | Facility: CLINIC | Age: 74
End: 2024-07-31
Payer: MEDICARE

## 2024-07-31 DIAGNOSIS — C43.31 MALIGNANT MELANOMA OF NOSE: Primary | ICD-10-CM

## 2024-08-08 ENCOUNTER — OFFICE VISIT (OUTPATIENT)
Dept: HEMATOLOGY/ONCOLOGY | Facility: CLINIC | Age: 74
End: 2024-08-08
Payer: MEDICARE

## 2024-08-08 ENCOUNTER — LAB VISIT (OUTPATIENT)
Dept: LAB | Facility: HOSPITAL | Age: 74
End: 2024-08-08
Attending: INTERNAL MEDICINE
Payer: MEDICARE

## 2024-08-08 VITALS
DIASTOLIC BLOOD PRESSURE: 79 MMHG | HEIGHT: 68 IN | HEART RATE: 81 BPM | OXYGEN SATURATION: 98 % | RESPIRATION RATE: 16 BRPM | WEIGHT: 125.25 LBS | BODY MASS INDEX: 18.98 KG/M2 | SYSTOLIC BLOOD PRESSURE: 127 MMHG | TEMPERATURE: 98 F

## 2024-08-08 DIAGNOSIS — D84.821 IMMUNODEFICIENCY DUE TO DRUGS: ICD-10-CM

## 2024-08-08 DIAGNOSIS — E03.2 HYPOTHYROIDISM DUE TO MEDICAMENTS AND OTHER EXOGENOUS SUBSTANCES: ICD-10-CM

## 2024-08-08 DIAGNOSIS — Z94.4 LIVER TRANSPLANTED: ICD-10-CM

## 2024-08-08 DIAGNOSIS — R63.4 WEIGHT LOSS: ICD-10-CM

## 2024-08-08 DIAGNOSIS — G62.89 OTHER POLYNEUROPATHY: ICD-10-CM

## 2024-08-08 DIAGNOSIS — Z79.899 IMMUNODEFICIENCY DUE TO DRUGS: ICD-10-CM

## 2024-08-08 DIAGNOSIS — Z94.4 STATUS POST LIVER TRANSPLANTATION: ICD-10-CM

## 2024-08-08 DIAGNOSIS — N18.4 CHRONIC KIDNEY DISEASE (CKD), STAGE IV (SEVERE): ICD-10-CM

## 2024-08-08 DIAGNOSIS — Z93.0 TRACHEOSTOMY STATUS: ICD-10-CM

## 2024-08-08 DIAGNOSIS — C44.329 SQUAMOUS CELL CARCINOMA OF SKIN OF ORBIT: Primary | ICD-10-CM

## 2024-08-08 DIAGNOSIS — C44.329 SQUAMOUS CELL CARCINOMA OF SKIN OF ORBIT: ICD-10-CM

## 2024-08-08 DIAGNOSIS — C01 SQUAMOUS CELL CARCINOMA OF BASE OF TONGUE: ICD-10-CM

## 2024-08-08 DIAGNOSIS — C77.0 SECONDARY MALIGNANT NEOPLASM OF CERVICAL LYMPH NODE: ICD-10-CM

## 2024-08-08 DIAGNOSIS — C32.9 LARYNX CANCER: ICD-10-CM

## 2024-08-08 LAB
ALBUMIN SERPL BCP-MCNC: 3.8 G/DL (ref 3.5–5.2)
ALBUMIN SERPL BCP-MCNC: 3.8 G/DL (ref 3.5–5.2)
ALP SERPL-CCNC: 77 U/L (ref 55–135)
ALP SERPL-CCNC: 77 U/L (ref 55–135)
ALT SERPL W/O P-5'-P-CCNC: 14 U/L (ref 10–44)
ALT SERPL W/O P-5'-P-CCNC: 14 U/L (ref 10–44)
ANION GAP SERPL CALC-SCNC: 9 MMOL/L (ref 8–16)
ANION GAP SERPL CALC-SCNC: 9 MMOL/L (ref 8–16)
AST SERPL-CCNC: 34 U/L (ref 10–40)
AST SERPL-CCNC: 34 U/L (ref 10–40)
BASOPHILS # BLD AUTO: 0.02 K/UL (ref 0–0.2)
BASOPHILS NFR BLD: 0.4 % (ref 0–1.9)
BILIRUB SERPL-MCNC: 0.4 MG/DL (ref 0.1–1)
BILIRUB SERPL-MCNC: 0.4 MG/DL (ref 0.1–1)
BUN SERPL-MCNC: 24 MG/DL (ref 8–23)
BUN SERPL-MCNC: 24 MG/DL (ref 8–23)
CALCIUM SERPL-MCNC: 10 MG/DL (ref 8.7–10.5)
CALCIUM SERPL-MCNC: 10 MG/DL (ref 8.7–10.5)
CHLORIDE SERPL-SCNC: 102 MMOL/L (ref 95–110)
CHLORIDE SERPL-SCNC: 102 MMOL/L (ref 95–110)
CO2 SERPL-SCNC: 28 MMOL/L (ref 23–29)
CO2 SERPL-SCNC: 28 MMOL/L (ref 23–29)
CREAT SERPL-MCNC: 1.5 MG/DL (ref 0.5–1.4)
CREAT SERPL-MCNC: 1.5 MG/DL (ref 0.5–1.4)
DIFFERENTIAL METHOD BLD: ABNORMAL
EOSINOPHIL # BLD AUTO: 0.5 K/UL (ref 0–0.5)
EOSINOPHIL NFR BLD: 10.5 % (ref 0–8)
ERYTHROCYTE [DISTWIDTH] IN BLOOD BY AUTOMATED COUNT: 13.2 % (ref 11.5–14.5)
ERYTHROCYTE [DISTWIDTH] IN BLOOD BY AUTOMATED COUNT: 13.2 % (ref 11.5–14.5)
EST. GFR  (NO RACE VARIABLE): 48.2 ML/MIN/1.73 M^2
EST. GFR  (NO RACE VARIABLE): 48.2 ML/MIN/1.73 M^2
GLUCOSE SERPL-MCNC: 100 MG/DL (ref 70–110)
GLUCOSE SERPL-MCNC: 100 MG/DL (ref 70–110)
HCT VFR BLD AUTO: 36.1 % (ref 40–54)
HCT VFR BLD AUTO: 36.1 % (ref 40–54)
HGB BLD-MCNC: 11.7 G/DL (ref 14–18)
HGB BLD-MCNC: 11.7 G/DL (ref 14–18)
IMM GRANULOCYTES # BLD AUTO: 0.02 K/UL (ref 0–0.04)
IMM GRANULOCYTES # BLD AUTO: 0.02 K/UL (ref 0–0.04)
IMM GRANULOCYTES NFR BLD AUTO: 0.4 % (ref 0–0.5)
LYMPHOCYTES # BLD AUTO: 1.4 K/UL (ref 1–4.8)
LYMPHOCYTES NFR BLD: 27.9 % (ref 18–48)
MCH RBC QN AUTO: 33.9 PG (ref 27–31)
MCH RBC QN AUTO: 33.9 PG (ref 27–31)
MCHC RBC AUTO-ENTMCNC: 32.4 G/DL (ref 32–36)
MCHC RBC AUTO-ENTMCNC: 32.4 G/DL (ref 32–36)
MCV RBC AUTO: 105 FL (ref 82–98)
MCV RBC AUTO: 105 FL (ref 82–98)
MONOCYTES # BLD AUTO: 0.6 K/UL (ref 0.3–1)
MONOCYTES NFR BLD: 12.1 % (ref 4–15)
NEUTROPHILS # BLD AUTO: 2.4 K/UL (ref 1.8–7.7)
NEUTROPHILS # BLD AUTO: 2.4 K/UL (ref 1.8–7.7)
NEUTROPHILS NFR BLD: 48.7 % (ref 38–73)
NRBC BLD-RTO: 0 /100 WBC
PLATELET # BLD AUTO: 150 K/UL (ref 150–450)
PLATELET # BLD AUTO: 150 K/UL (ref 150–450)
PMV BLD AUTO: 9.4 FL (ref 9.2–12.9)
PMV BLD AUTO: 9.4 FL (ref 9.2–12.9)
POTASSIUM SERPL-SCNC: 3.9 MMOL/L (ref 3.5–5.1)
POTASSIUM SERPL-SCNC: 3.9 MMOL/L (ref 3.5–5.1)
PROT SERPL-MCNC: 8.1 G/DL (ref 6–8.4)
PROT SERPL-MCNC: 8.1 G/DL (ref 6–8.4)
RBC # BLD AUTO: 3.45 M/UL (ref 4.6–6.2)
RBC # BLD AUTO: 3.45 M/UL (ref 4.6–6.2)
SODIUM SERPL-SCNC: 139 MMOL/L (ref 136–145)
SODIUM SERPL-SCNC: 139 MMOL/L (ref 136–145)
T4 FREE SERPL-MCNC: 0.97 NG/DL (ref 0.71–1.51)
TACROLIMUS BLD-MCNC: 3.1 NG/ML (ref 5–15)
TSH SERPL DL<=0.005 MIU/L-ACNC: 4.5 UIU/ML (ref 0.4–4)
WBC # BLD AUTO: 4.95 K/UL (ref 3.9–12.7)
WBC # BLD AUTO: 4.95 K/UL (ref 3.9–12.7)

## 2024-08-08 PROCEDURE — 99999 PR PBB SHADOW E&M-EST. PATIENT-LVL V: CPT | Mod: PBBFAC,,, | Performed by: INTERNAL MEDICINE

## 2024-08-08 PROCEDURE — 99215 OFFICE O/P EST HI 40 MIN: CPT | Mod: PBBFAC | Performed by: INTERNAL MEDICINE

## 2024-08-08 PROCEDURE — 84439 ASSAY OF FREE THYROXINE: CPT | Performed by: INTERNAL MEDICINE

## 2024-08-08 PROCEDURE — 84443 ASSAY THYROID STIM HORMONE: CPT | Performed by: INTERNAL MEDICINE

## 2024-08-08 PROCEDURE — 36415 COLL VENOUS BLD VENIPUNCTURE: CPT | Performed by: INTERNAL MEDICINE

## 2024-08-08 PROCEDURE — 80197 ASSAY OF TACROLIMUS: CPT | Performed by: INTERNAL MEDICINE

## 2024-08-08 PROCEDURE — 99215 OFFICE O/P EST HI 40 MIN: CPT | Mod: S$PBB,,, | Performed by: INTERNAL MEDICINE

## 2024-08-08 PROCEDURE — 85025 COMPLETE CBC W/AUTO DIFF WBC: CPT | Performed by: INTERNAL MEDICINE

## 2024-08-08 PROCEDURE — G2211 COMPLEX E/M VISIT ADD ON: HCPCS | Mod: S$PBB,,, | Performed by: INTERNAL MEDICINE

## 2024-08-08 PROCEDURE — 80053 COMPREHEN METABOLIC PANEL: CPT | Performed by: INTERNAL MEDICINE

## 2024-08-08 RX ORDER — GABAPENTIN 100 MG/1
100 CAPSULE ORAL 3 TIMES DAILY
Qty: 90 CAPSULE | Refills: 11 | Status: SHIPPED | OUTPATIENT
Start: 2024-08-08 | End: 2025-08-08

## 2024-08-09 ENCOUNTER — TELEPHONE (OUTPATIENT)
Dept: OPHTHALMOLOGY | Facility: CLINIC | Age: 74
End: 2024-08-09
Payer: MEDICARE

## 2024-08-12 ENCOUNTER — HOSPITAL ENCOUNTER (OUTPATIENT)
Dept: RADIOLOGY | Facility: HOSPITAL | Age: 74
Discharge: HOME | End: 2024-08-12
Attending: INTERNAL MEDICINE
Payer: MEDICARE

## 2024-08-12 DIAGNOSIS — C22.0 HCC (HEPATOCELLULAR CARCINOMA): ICD-10-CM

## 2024-08-12 DIAGNOSIS — C44.329 SQUAMOUS CELL CARCINOMA OF SKIN OF ORBIT: ICD-10-CM

## 2024-08-12 DIAGNOSIS — Z85.9 HISTORY OF CANCER: ICD-10-CM

## 2024-08-12 DIAGNOSIS — Z94.4 LIVER TRANSPLANTED: ICD-10-CM

## 2024-08-12 PROCEDURE — 93976 VASCULAR STUDY: CPT | Mod: TC

## 2024-08-12 PROCEDURE — 76705 ECHO EXAM OF ABDOMEN: CPT | Mod: 26,59,, | Performed by: RADIOLOGY

## 2024-08-12 PROCEDURE — 70543 MRI ORBT/FAC/NCK W/O &W/DYE: CPT | Mod: TC

## 2024-08-12 PROCEDURE — 76705 ECHO EXAM OF ABDOMEN: CPT | Mod: TC

## 2024-08-12 PROCEDURE — A9585 GADOBUTROL INJECTION: HCPCS | Performed by: INTERNAL MEDICINE

## 2024-08-12 PROCEDURE — 70543 MRI ORBT/FAC/NCK W/O &W/DYE: CPT | Mod: 26,,, | Performed by: RADIOLOGY

## 2024-08-12 PROCEDURE — 93976 VASCULAR STUDY: CPT | Mod: 26,,, | Performed by: RADIOLOGY

## 2024-08-12 PROCEDURE — 25500020 PHARM REV CODE 255: Performed by: INTERNAL MEDICINE

## 2024-08-12 PROCEDURE — 70553 MRI BRAIN STEM W/O & W/DYE: CPT | Mod: 26,,, | Performed by: RADIOLOGY

## 2024-08-12 RX ORDER — GADOBUTROL 604.72 MG/ML
6 INJECTION INTRAVENOUS
Status: COMPLETED | OUTPATIENT
Start: 2024-08-12 | End: 2024-08-12

## 2024-08-12 RX ADMIN — GADOBUTROL 6 ML: 604.72 INJECTION INTRAVENOUS at 02:08

## 2024-08-13 ENCOUNTER — TELEPHONE (OUTPATIENT)
Dept: TRANSPLANT | Facility: CLINIC | Age: 74
End: 2024-08-13
Payer: MEDICARE

## 2024-08-13 ENCOUNTER — PATIENT MESSAGE (OUTPATIENT)
Dept: HEMATOLOGY/ONCOLOGY | Facility: CLINIC | Age: 74
End: 2024-08-13
Payer: MEDICARE

## 2024-08-13 DIAGNOSIS — K12.30 MUCOSITIS: ICD-10-CM

## 2024-08-13 RX ORDER — OXYCODONE HYDROCHLORIDE 5 MG/1
5 TABLET ORAL EVERY 6 HOURS PRN
Qty: 60 TABLET | Refills: 0 | Status: SHIPPED | OUTPATIENT
Start: 2024-08-13

## 2024-08-13 NOTE — TELEPHONE ENCOUNTER
Letter sent to patient stating: Your labs have been reviewed by your Transplant physician, no action required. Next labs due 11/4/24        ----- Message from Cornell Antno MD sent at 8/12/2024  9:16 AM CDT -----  Results reviewed

## 2024-08-13 NOTE — LETTER
August 13, 2024    Rocco Swain  [address]   [address]   [address]  [place] [address]          Dear Rocco Swain:  MRN: [id number]    This is a follow up to your recent labs, your lab results were stable.  There are no medicine changes.  Please have your labs drawn again on 11/4/24.      If you cannot have your labs drawn on the scheduled date, it is your responsibility to call the transplant department to reschedule.  Please call [phone] and ask to speak to Rhea RIOS   for all scheduling requests.     Sincerely,    Shari WADDELLN, RN      Your Liver Transplant Coordinator    [facility]  [address] [place] [id number]  [phone]

## 2024-08-16 ENCOUNTER — OFFICE VISIT (OUTPATIENT)
Dept: HEMATOLOGY/ONCOLOGY | Facility: CLINIC | Age: 74
End: 2024-08-16
Payer: MEDICARE

## 2024-08-16 VITALS
SYSTOLIC BLOOD PRESSURE: 122 MMHG | OXYGEN SATURATION: 98 % | HEART RATE: 72 BPM | HEIGHT: 68 IN | BODY MASS INDEX: 19.12 KG/M2 | WEIGHT: 126.13 LBS | DIASTOLIC BLOOD PRESSURE: 77 MMHG | RESPIRATION RATE: 14 BRPM

## 2024-08-16 DIAGNOSIS — Z93.0 TRACHEOSTOMY STATUS: ICD-10-CM

## 2024-08-16 DIAGNOSIS — G89.3 CANCER RELATED PAIN: ICD-10-CM

## 2024-08-16 DIAGNOSIS — E03.2 HYPOTHYROIDISM DUE TO MEDICAMENTS AND OTHER EXOGENOUS SUBSTANCES: ICD-10-CM

## 2024-08-16 DIAGNOSIS — C77.0 SECONDARY MALIGNANT NEOPLASM OF CERVICAL LYMPH NODE: ICD-10-CM

## 2024-08-16 DIAGNOSIS — D84.821 IMMUNODEFICIENCY DUE TO DRUGS: ICD-10-CM

## 2024-08-16 DIAGNOSIS — N18.4 CHRONIC KIDNEY DISEASE (CKD), STAGE IV (SEVERE): ICD-10-CM

## 2024-08-16 DIAGNOSIS — C01 SQUAMOUS CELL CARCINOMA OF BASE OF TONGUE: ICD-10-CM

## 2024-08-16 DIAGNOSIS — Z79.899 IMMUNODEFICIENCY DUE TO DRUGS: ICD-10-CM

## 2024-08-16 DIAGNOSIS — C44.329 SQUAMOUS CELL CARCINOMA OF SKIN OF ORBIT: Primary | ICD-10-CM

## 2024-08-16 DIAGNOSIS — Z94.4 STATUS POST LIVER TRANSPLANTATION: ICD-10-CM

## 2024-08-16 PROCEDURE — 99213 OFFICE O/P EST LOW 20 MIN: CPT | Mod: PBBFAC | Performed by: INTERNAL MEDICINE

## 2024-08-16 PROCEDURE — 99215 OFFICE O/P EST HI 40 MIN: CPT | Mod: S$PBB,,, | Performed by: INTERNAL MEDICINE

## 2024-08-16 PROCEDURE — 99999 PR PBB SHADOW E&M-EST. PATIENT-LVL III: CPT | Mod: PBBFAC,,, | Performed by: INTERNAL MEDICINE

## 2024-08-16 PROCEDURE — G2211 COMPLEX E/M VISIT ADD ON: HCPCS | Mod: S$PBB,,, | Performed by: INTERNAL MEDICINE

## 2024-08-16 RX ORDER — MORPHINE SULFATE 15 MG/1
15 TABLET, FILM COATED, EXTENDED RELEASE ORAL 2 TIMES DAILY
Qty: 60 TABLET | Refills: 0 | Status: SHIPPED | OUTPATIENT
Start: 2024-08-16 | End: 2024-08-23

## 2024-08-16 NOTE — Clinical Note
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 He unfortunately looks to have a local recurrence. Is this amenable to radiation. With prior chemo and kidney function, cisplatin would be tough to give concurrent.  Dashawn

## 2024-08-16 NOTE — PROGRESS NOTES
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 male who returns to clinic for management of cSCC of the orbit. Patient had ANGELES on October PETCT, but then developed quickly progressing orbital lesion. He was seen at [facility] in [place]. Recommendation was to follow up at [facility] to initiate induction chemotherapy followed by possible surgery. Patient completed 2 cycles of Extreme regimen, though there were delays due to cytopenias and severe mucositis. He was able to go to [facility] to have exenteration and resection of cancer April 2024 with clear margins, but PNI on path.     Interval History  Patient reporting increased right orbital pain starting 1.5 wks ago. Needing 2 oxy IR 5 mg tabs every 4-5 hrs to control the pain.    ECOG Performance status: 1 - Symptomatic but completely ambulatory Communication from him is 100% written.     Cancer Staging   Squamous cell carcinoma of base of tongue  Staging form: Pharynx - HPV-Mediated Oropharynx, AJCC 8th Edition  - Clinical stage from 9/18/2020: Stage III (rcT4, cN1, cM0, p16+) - Signed by Ronald Berg MD on 12/27/2022      Oncologic History:  Oncology History   Squamous cell carcinoma of base of tongue   9/18/2020 Cancer Staged    Staging form: Pharynx - HPV-Mediated Oropharynx, AJCC 8th Edition  - Clinical stage from 9/18/2020: Stage III (rcT4, cN1, cM0, p16+)     9/28/2020 Initial Diagnosis    Squamous cell carcinoma of base of tongue     10/6/2020 - 8/17/2021 Chemotherapy    Treatment Summary   Plan Name: OP HEAD NECK CARBOPLATIN PACLITAXEL C1-2 FOLLOWED BY CETUXIMAB CARBOPLATIN C3-6 FOLLOWED BY CETUXIMAB MAINTENANCE WEEKLY  Treatment Goal: Control  Status: Inactive  Start Date: 10/6/2020  End Date: 8/17/2021  Provider: Ronald Berg MD  Chemotherapy: cetuximab (ERBITUX) 100 mg/50 mL chemo infusion 684 mg, 400 mg/m2 = 684 mg (100 % of  original dose 400 mg/m2), Intravenous, [facility] 1 time, 29 of 38 cycles  Dose modification: 500 mg/m2 (original dose 400 mg/m2, Cycle 3), 250 mg/m2 (original dose 400 mg/m2, Cycle 3), 400 mg/m2 (original dose 400 mg/m2, Cycle 3), 250 mg/m2 (original dose 250 mg/m2, Cycle 7), 200 mg/m2 (original dose 250 mg/m2, Cycle 18), 200 mg/m2 (original dose 250 mg/m2, Cycle 19), 250 mg/m2 (original dose 250 mg/m2, Cycle 4), 200 mg/m2 (original dose 250 mg/m2, Cycle 25), 200 mg/m2 (original dose 250 mg/m2, Cycle 28)  Administration: 684 mg (11/17/2020), 400 mg (11/24/2020), 400 mg (2/9/2021), 400 mg (2/17/2021), 427.6 mg (3/9/2021), 400 mg (3/30/2021), 400 mg (3/23/2021), 400 mg (4/6/2021), 427.6 mg (4/13/2021), 427.6 mg (4/20/2021), 342 mg (5/11/2021), 342 mg (5/18/2021), 342 mg (5/25/2021), 400 mg (5/31/2021), 400 mg (6/7/2021), 400 mg (6/14/2021), 400 mg (12/8/2020), 427.6 mg (12/29/2020), 400 mg (12/1/2020), 400 mg (12/15/2020), 400 mg (12/22/2020), 427.6 mg (1/5/2021), 400 mg (1/12/2021), 400 mg (1/19/2021), 427.6 mg (1/26/2021), 400 mg (2/2/2021), 400 mg (2/23/2021), 400 mg (3/2/2021), 427.6 mg (3/16/2021), 400 mg (6/21/2021), 342 mg (6/28/2021), 342 mg (7/6/2021), 342 mg (7/13/2021), 342 mg (7/20/2021), 400 mg (7/27/2021), 400 mg (8/10/2021), 400 mg (8/17/2021)  CARBOplatin (PARAPLATIN) 310 mg in sodium chloride 0.9% 500 mL chemo infusion, 310 mg (92.2 % of original dose 334.5 mg), Intravenous, Clinic/[facility] 1 time, 6 of 6 cycles  Dose modification:   (original dose 334.5 mg, Cycle 1)  Administration: 310 mg (10/6/2020), 370 mg (11/17/2020), 300 mg (10/27/2020), 350 mg (12/8/2020), 335 mg (12/29/2020), 320 mg (1/19/2021)  PACLitaxeL (TAXOL) 175 mg/m2 = 300 mg in sodium chloride 0.9% 500 mL chemo infusion, 175 mg/m2 = 300 mg (100 % of original dose 175 mg/m2), Intravenous, Clinic/[facility] 1 time, 2 of 2 cycles  Dose modification: 175 mg/m2 (original dose 175 mg/m2, Cycle 1)  Administration: 300 mg (10/6/2020), 300 mg  (10/27/2020)     4/29/2021 Tumor Conference       His case was discussed at the Multidisciplinary Head and Neck Team Planning Meeting.    Representatives from Medical Oncology, Radiation Oncology, Head and Neck Surgical Oncology, Psychosocial Oncology, and Speech and Language Pathology discussed the case with the following recommendations:    1) biopsy         7/29/2021 Tumor Conference       His case was discussed at the Multidisciplinary Head and Neck Team Planning Meeting.    Representatives from Medical Oncology, Radiation Oncology, Head and Neck Surgical Oncology, Psychosocial Oncology, and Speech and Language Pathology discussed the case with the following recommendations:    1) Head and neck clinic follow up  2) consider Keytruda (discuss with transplant)  3) consider palliative referral         9/13/2021 - 12/29/2023 Chemotherapy    Treatment Summary   Plan Name: OP HEAD NECK PEMBROLIZUMAB CARBOPLATIN FLUOROURACIL (C1 ONLY RECEIVED) FOLLOWED BY PEMBROLIZUMAB MAINTENANCE  Treatment Goal: Palliative  Status: Inactive  Start Date: 9/13/2021  End Date: 12/29/2023  Provider: Ronald Berg MD  Chemotherapy: CARBOplatin (PARAPLATIN) 320 mg in sodium chloride 0.9% 500 mL chemo infusion, 320 mg (100 % of original dose 320.5 mg), Intravenous, Clinic/[facility] 1 time, 6 of 6 cycles  Dose modification:   (original dose 320.5 mg, Cycle 1)  Administration: 320 mg (9/13/2021), 335 mg (12/23/2021), 325 mg (1/27/2022), 245 mg (3/3/2022), 255 mg (5/12/2022), 255 mg (4/7/2022)  fluorouraciL 1,000 mg/m2/day = 6,440 mg in sodium chloride 0.9% 240 mL chemo infusion, 1,000 mg/m2/day = 6,440 mg, Intravenous, Over 96 hours, 6 of 6 cycles  Dose modification: 800 mg/m2/day (original dose 1,000 mg/m2/day, Cycle 6), 5,000 mg (original dose 1,000 mg/m2/day, Cycle 8)  Administration: 6,440 mg (9/13/2021), 6,440 mg (12/23/2021), 6,480 mg (1/27/2022), 5,000 mg (3/3/2022), 5,000 mg (4/7/2022), 5,000 mg (5/12/2022)     Secondary malignant neoplasm of  cervical lymph node   2/9/2021 Initial Diagnosis    Secondary malignant neoplasm of cervical lymph node     9/13/2021 - 12/29/2023 Chemotherapy    Treatment Summary   Plan Name: OP HEAD NECK PEMBROLIZUMAB CARBOPLATIN FLUOROURACIL (C1 ONLY RECEIVED) FOLLOWED BY PEMBROLIZUMAB MAINTENANCE  Treatment Goal: Palliative  Status: Inactive  Start Date: 9/13/2021  End Date: 12/29/2023  Provider: Ronald Berg MD  Chemotherapy: CARBOplatin (PARAPLATIN) 320 mg in sodium chloride 0.9% 500 mL chemo infusion, 320 mg (100 % of original dose 320.5 mg), Intravenous, Clinic/[facility] 1 time, 6 of 6 cycles  Dose modification:   (original dose 320.5 mg, Cycle 1)  Administration: 320 mg (9/13/2021), 335 mg (12/23/2021), 325 mg (1/27/2022), 245 mg (3/3/2022), 255 mg (5/12/2022), 255 mg (4/7/2022)  fluorouraciL 1,000 mg/m2/day = 6,440 mg in sodium chloride 0.9% 240 mL chemo infusion, 1,000 mg/m2/day = 6,440 mg, Intravenous, Over 96 hours, 6 of 6 cycles  Dose modification: 800 mg/m2/day (original dose 1,000 mg/m2/day, Cycle 6), 5,000 mg (original dose 1,000 mg/m2/day, Cycle 8)  Administration: 6,440 mg (9/13/2021), 6,440 mg (12/23/2021), 6,480 mg (1/27/2022), 5,000 mg (3/3/2022), 5,000 mg (4/7/2022), 5,000 mg (5/12/2022)     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   1/29/2024 Initial Diagnosis    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     2/19/2024 - 3/25/2024 Chemotherapy    Treatment Summary   Plan Name: OP HEAD NECK CETUXIMAB CARBOPLATIN FLUOROURACIL Q3W  Treatment Goal: Curative  Status: Inactive  Start Date: 2/19/2024  End Date: 3/25/2024  Provider: Martín Hernandez MD  Chemotherapy: cetuximab (ERBITUX) 100 mg/50 mL chemo infusion 668 mg, 400 mg/m2 = 668 mg, Intravenous, Clinic/[facility] 1 time, 2 of 12 cycles  Administration: 668 mg (2/19/2024), 400 mg (2/26/2024), 400 mg (3/18/2024), 400 mg (3/25/2024), 400 mg (3/11/2024)  CARBOplatin (PARAPLATIN) 295 mg in sodium chloride 0.9% 314.5 mL chemo infusion, 295 mg, Intravenous, Clinic/[facility] 1 time,  2 of 6 cycles  Administration: 295 mg (2/19/2024), 300 mg (3/18/2024)         Review of Systems   Constitutional:  Negative for activity change, appetite change, chills, fatigue, fever and unexpected weight change.   HENT:  Negative for ear pain, facial swelling, hearing loss, mouth sores, nosebleeds, sore throat, trouble swallowing and voice change.         No verbal communication. Skin fixed and immobile from previous scaring post-neck dissection   Eyes:  Positive for redness (right eye - pain). Negative for pain, discharge and visual disturbance.   Respiratory:  Negative for cough, choking, chest tightness and shortness of breath.    Cardiovascular:  Negative for chest pain, palpitations and leg swelling.   Gastrointestinal:  Negative for abdominal distention, abdominal pain, blood in stool, constipation, diarrhea, nausea and vomiting.   Endocrine: Negative for cold intolerance and heat intolerance.   Genitourinary:  Negative for difficulty urinating, dysuria, frequency and urgency.   Musculoskeletal:  Positive for back pain (lower - chronic). Negative for arthralgias, gait problem, leg pain and myalgias.   Integumentary:  Positive for mole/lesion (left nare and neck - [facility]). Negative for pallor, rash and wound.   Allergic/Immunologic: Negative for frequent infections.   Neurological:  Negative for dizziness, tremors, weakness, light-headedness, numbness and headaches.   Hematological:  Negative for adenopathy. Does not bruise/bleed easily.   Psychiatric/Behavioral:  Negative for agitation, confusion, dysphoric mood and sleep disturbance. The patient is not nervous/anxious.         Allergies:  Review of patient's allergies indicates:   Allergen Reactions    Aspirin     Tylenol extended release        Medications:  Current Outpatient Medications   Medication Sig Dispense Refill    amoxicillin-clavulanate (AUGMENTIN) 400-57 mg/5 mL SusR Take 11 mLs (875 mg total) by mouth every 12 (twelve) hours for 5 days. Discard  Remainder 150 mL 0    multivit-min/FA/lycopen/lutein (CENTRUM SILVER MEN ORAL) Take 1 tablet by mouth once daily.      oxyCODONE (ROXICODONE) 5 MG immediate release tablet Take 1 tablet (5 mg total) by mouth every 6 (six) hours as needed for Pain. 60 tablet 0    tacrolimus (PROGRAF) 0.5 MG Cap Take 1 capsule (0.5 mg total) by mouth every EVENING.  (Use the 1mg capsule for your morning dose) 90 capsule 3    tacrolimus (PROGRAF) 1 MG Cap Take 1 capsule (1 mg total) by mouth every MORNING. Use the tacrolimus 0.5mg capsule for your evening dose as directed. 90 capsule 3    traZODone (DESYREL) 50 MG tablet TAKE 1 TABLET BY MOUTH IN THE  EVENING 90 tablet 3    vitamin E 1000 UNIT capsule Take 1 cap PO BID until gone. Start taking one week prior to your dental surgery. 112 capsule 0    azelaic acid (AZELEX) 15 % gel APPLY TOPICALLY TO AFFECTED AREA IN THE MORNING (Patient not taking: Reported on 8/16/2024) 50 g 3    chlorhexidine (PERIDEX) 0.12 % solution Gently swish and spit 15 mL twice a day for 3 weeks. Start rinses the day after your procedure. (Patient not taking: Reported on 8/16/2024) 473 mL 1    clindamycin phosphate 1% (CLINDAGEL) 1 % gel Apply topically 2 (two) times daily. (Patient not taking: Reported on 8/16/2024) 60 g 3    dexAMETHasone (DECADRON) 0.5 mg/5 mL Elix Swish 10 mL by mouth for 2 minutes then spit 4 times daily (Patient not taking: Reported on 8/16/2024) 474 mL 5    erythromycin (ROMYCIN) ophthalmic ointment Administer 0.5 inches to the right eye twice daily for 30 days. (Patient not taking: Reported on 8/16/2024) 3.5 g 1    erythromycin (ROMYCIN) ophthalmic ointment Administer 0.5 inches to the right eye once for 1 dose. (Patient not taking: Reported on 8/16/2024) 3.5 g 3    gabapentin (NEURONTIN) 100 MG capsule Take 1 capsule (100 mg total) by mouth 3 (three) times daily. (Patient not taking: Reported on 8/16/2024) 90 capsule 11    imiquimod (ALDARA) 5 % cream Apply to biopsy site on frontal  scalp + about a centimeter of surrounding skin qhs x 16 weeks. Wash off in am and apply sunscreen. Discontinue if skin becomes blistered, raw, bleeding, painful, etc. (Patient not taking: Reported on 8/16/2024) 24 packet 3    levothyroxine (SYNTHROID) 88 MCG tablet TAKE 1 TABLET BY MOUTH ONCE  DAILY (Patient not taking: Reported on 8/16/2024) 90 tablet 1    LIDOcaine HCl 2% (LIDOCAINE VISCOUS) 2 % Soln Swish and spit 15 mls every 8 (eight) hours as needed (mouth sore). (Patient not taking: Reported on 8/16/2024) 100 mL 3    magic mouthwash diphen/antac/lidoc/nysta Take 10 mLs by mouth 4 (four) times daily. 120 mL 4    metroNIDAZOLE (METROGEL) 0.75 % gel Apply topically to affected area 2 (two) times daily. (Patient not taking: Reported on 8/16/2024) 45 g 1    morphine (MS CONTIN) 15 MG 12 hr tablet Take 1 tablet (15 mg total) by mouth 2 (two) times daily. 60 tablet 0    pantoprazole (PROTONIX) 40 MG tablet Take 1 tablet (40 mg total) by mouth 2 (two) times daily before meals. (Patient not taking: Reported on 8/16/2024) 180 tablet 3    prednisoLONE acetate (PRED FORTE) 1 % DrpS Place 1 drop into the right eye 4 (four) times daily. (Patient not taking: Reported on 8/16/2024) 10 mL 3    sucralfate (CARAFATE) 1 gram tablet Take 1 tablet (1 g total) by mouth 4 times daily. Tablet can be broken and dissolved in water for ease of administration. Consume within 30 minutes of dissolving. Separate from pantoprazole and levothyroxine by at least 2 hours. (Patient not taking: Reported on 8/16/2024) 120 tablet 0    sulfacetamide sodium-sulfur 10-5 % (w/w) Clsr USE TO WASH AFFECTED AREA DAILY (Patient not taking: Reported on 8/16/2024)      tiZANidine (ZANAFLEX) 2 MG tablet Take 1 tablet (2 mg total) by mouth nightly as needed (neck muscle strain). (Patient not taking: Reported on 8/16/2024) 30 tablet 3     No current facility-administered medications for this visit.     Facility-Administered Medications Ordered in Other Visits    Medication Dose Route Frequency Provider Last Rate Last Admin    heparin, porcine (PF) 100 unit/mL injection flush 500 Units  500 Units Intravenous PRN Ronald Berg MD        ofloxacin 0.3 % ophthalmic solution 1 drop  1 drop Right Eye On Call Procedure Victor M Vasques MD   2 drop at 10/11/22 0745    sodium chloride 0.9% flush 10 mL  10 mL Intravenous PRN Ronald Berg MD        sodium chloride 0.9% flush 10 mL  10 mL Intravenous PRN Victor M Vasques MD           PMH:  Past Medical History:   Diagnosis Date    Basal cell carcinoma (BCC) in situ of skin 2012    3 on face, 2 on arm, removed by dermatology.     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 Underwent surgical resection of tongue and neck. s/p tracheostomy    Squamous cell carcinoma of skin of right temple 01/24/2024    right temple       PSH:  Past Surgical History:   Procedure Laterality Date    COLONOSCOPY      COLONOSCOPY N/A 9/14/2023    Procedure: COLONOSCOPY;  Surgeon: Reyes Olivia MD;  [facility]: [facility]);  Service: Endoscopy;  Laterality: N/A;    CONJUNCTIVA BIOPSY Right 10/11/2022    Procedure: BIOPSY, CONJUNCTIVA;  Surgeon: Victor M Vasques MD;  Location: [facility];  Service: Ophthalmology;  Laterality: Right;    ESOPHAGOGASTRODUODENOSCOPY N/A 9/14/2023    Procedure: EGD (ESOPHAGOGASTRODUODENOSCOPY);  Surgeon: Reyes Olivia MD;  Location: [facility] (2ND FLR);  Service: Endoscopy;  Laterality: N/A;    ESOPHAGOGASTRODUODENOSCOPY N/A 4/10/2024    Procedure: EGD (ESOPHAGOGASTRODUODENOSCOPY);  Surgeon: Reyes Pagan MD;  Location: [facility] (2ND FLR);  Service: Endoscopy;  Laterality: N/A;     "ESOPHAGOGASTRODUODENOSCOPY N/A 6/24/2024    Procedure: EGD (ESOPHAGOGASTRODUODENOSCOPY);  Surgeon: Reyes Pagan MD;  Location: [facility] (2ND FLR);  Service: Endoscopy;  Laterality: N/A;  Ref By: Dr. Hawkins   Procedure: egd  Diagnosis: esophagitis  Procedure Timing: 10 weeks  Prep: standard prep  6/21-pt r/s-inst to portal-2nd floor criteria-labs within 90 daysMS    INSERTION OF VENOUS ACCESS PORT Right 3/8/2021    Procedure: INSERTION, VENOUS ACCESS PORT;  Surgeon: Jesus Rendon MD;  [facility];  Service: General;  Laterality: Right;    LIVER TRANSPLANT  11/2008    transplanted for biopsy proven hepatocellular carcinoma,     LYMPH NODE BIOPSY N/A 9/18/2020    Procedure: BIOPSY, LYMPH NODE;  Surgeon: Taz Diagnostic Provider;  [facility];  Service: General;  Laterality: N/A;  Rm 173    SURGICAL REMOVAL OF SCAR Right 8/24/2023    Procedure: EXCISION, SCAR;  Surgeon: Ting Brambila MD;  Location: [facility];  Service: Ophthalmology;  Laterality: Right;    TRACHEOSTOMY         FamHx:  Family History   Problem Relation Name Age of Onset    Dementia Mother         SocHx:  Social History     Socioeconomic History    Marital status: Single   Occupational History    Occupation: Retired     Comment: , notes exposures to fumes etc.  Worked on "[employer]" for 4 years   Tobacco Use    Smoking status: Never    Smokeless tobacco: Never   Substance and Sexual Activity    Alcohol use: Yes     Comment: drinks wine, 1 glass day    Drug use: Not Currently    Sexual activity: Yes     Comment: No prior history of STD        Distress Score             Objective:      Vitals:    08/16/24 1344   BP: 122/77   BP Location: Left arm   Patient Position: Sitting   BP Method: Medium (Automatic)   Pulse: 72   Resp: 14   SpO2: 98%   Weight: 57.2 kg (126 lb 1.7 oz)   Height: 5' 8" (1.727 m)          Physical Exam  Vitals reviewed.   Constitutional:       General: He is not in acute " distress.     Appearance: Normal appearance. He is well-developed and underweight.   HENT:      Head: Normocephalic and atraumatic.      Mouth/Throat:      Mouth: Mucous membranes are moist.      Dentition: Abnormal dentition. Dental caries present.   Eyes:      Comments: Bandage over R orbit s/p exenteration. C/D/I   Neck:      Trachea: Tracheostomy present.   Pulmonary:      Effort: Pulmonary effort is normal. No respiratory distress.      Comments: Tracheostomy  Abdominal:      General: There is no distension.      Palpations: Abdomen is soft.   Musculoskeletal:         General: No swelling. Normal range of motion.      Cervical back: Normal range of motion and neck supple.   Skin:     General: Skin is warm and dry.   Neurological:      General: No focal deficit present.      Mental Status: He is alert and oriented to person, place, and time.   Psychiatric:         Mood and Affect: Mood normal.         Behavior: Behavior normal.         Thought Content: Thought content normal.         Judgment: Judgment normal.           LABS:  WBC   Date Value Ref Range Status   08/08/2024 4.95 3.90 - 12.70 K/uL Final   08/08/2024 4.95 3.90 - 12.70 K/uL Final     Hemoglobin   Date Value Ref Range Status   08/08/2024 11.7 (L) 14.0 - 18.0 g/dL Final   08/08/2024 11.7 (L) 14.0 - 18.0 g/dL Final     Hematocrit   Date Value Ref Range Status   08/08/2024 36.1 (L) 40.0 - 54.0 % Final   08/08/2024 36.1 (L) 40.0 - 54.0 % Final     Platelets   Date Value Ref Range Status   08/08/2024 150 150 - 450 K/uL Final   08/08/2024 150 150 - 450 K/uL Final       Chemistry        Component Value Date/Time     08/08/2024 0918     08/08/2024 0918    K 3.9 08/08/2024 0918    K 3.9 08/08/2024 0918     08/08/2024 0918     08/08/2024 0918    CO2 28 08/08/2024 0918    CO2 28 08/08/2024 0918    BUN 24 (H) 08/08/2024 0918    BUN 24 (H) 08/08/2024 0918    CREATININE 1.5 (H) 08/08/2024 0918    CREATININE 1.5 (H) 08/08/2024 0918    GLU  "100 08/08/2024 0918     08/08/2024 0918        Component Value Date/Time    CALCIUM 10.0 08/08/2024 0918    CALCIUM 10.0 08/08/2024 0918    ALKPHOS 77 08/08/2024 0918    ALKPHOS 77 08/08/2024 0918    AST 34 08/08/2024 0918    AST 34 08/08/2024 0918    ALT 14 08/08/2024 0918    ALT 14 08/08/2024 0918    BILITOT 0.4 08/08/2024 0918    BILITOT 0.4 08/08/2024 0918    ESTGFRAFRICA 52.6 (A) 07/14/2022 1119    EGFRNONAA 45.5 (A) 07/14/2022 1119              Assessment:       1. 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 Squamous cell carcinoma of base of tongue    3. Secondary malignant neoplasm of cervical lymph node    4. Tracheostomy status    5. Cancer related pain    6. 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 Chronic kidney disease (CKD), stage IV (severe)      Plan:         Orbital cSCC  Unfortunately patient has progressed while on immunotherapy for below diagnosis. For that reason systemic therapies are limited. Evaluated at [facility] and surgeon does think an exenteration sparing resection would be possible with good response to induction chemotherapy. Plan was for platinum doublet chemo + cetuximab x 2 cycles. Notified of this plan on 1/29 and treatment plan was entered.   - Initiated dose reduced C1 of 5-FU to 750 mg/m2 due to age/frailty and growth factor. Patient still had cytopenias and mucositis, but was able to finish 2 cycles with delays. Now s/p exenteration. With PNI on path, referred to radiation oncology but adjuvant radiation deemed not necessary after exenteration.   - MRI orbit on 8/12/24 showing evidence of recurrent disease, "Nodular enhancing soft tissue superficially at the superomedial orbit and along the medial wall of the orbit posteriorly at least concerning for underlying recurrent lesion." Will present at next H&N TB to discuss possible RT. Next systemic option would be " cetuximab. Also discussed going to [facility] for possible trials.     2,3,4. Recurrent SCC of base of tongue, P16+ - Status post total laryngectomy, total glossectomy and anterolateral thigh free flap reconstruction roughly 2017 at [facility] in [place] for persistent/recurrent base of tongue sq.c.c. 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 He is not a surgical or radiation candidate.  -Started on PCC 10/6/2020 followed by maintenance cetuximab until 8/24/21 (discontinued due to progression of disease; continued increase in SUV uptake, no new lesions).  - 9/2021 started on carbo/5-FU/pembrolizumab; due to toxicity went to pembrolizumab monotherapy starting with cycle 2.  Progression of disease noted after cycle 3 so added chemotherapy back in with cycle 4. Keytruda monotherapy starting with C9.   - Keytruda discontinued after 2 years.    5 Pain not well controlled. Start oxy IR 10 mg PRN and start MS Contin.     6,7. S/p liver transplant and is currently on tacrolimus. Grade 1. Will continue to monitor.     8. TSH trending back down on increased synthroid dose of 100 mcg. Continue to monitor.    9. Cr elevated at 1.5.     Patient was also seen and examined by Dr. Hernandez. Patient is in agreement with the proposed treatment plan. All questions were answered to the patient's satisfaction. Pt knows to call clinic if anything is needed before the next clinic visit.    Ct Francis, MSN, APRN, FNP-C  Hematology and Medical Oncology  Nurse Practitioner to Dr. Martín Hernandez  Nurse Practitioner, [facility]      I have reviewed the notes, assessments, and/or procedures performed by Ct [facility], as above.  I have personally interviewed and examined the patient at the beside, and rounded with Ct. I concur with her assessment and plan and the documentation of Rocco Swain.    MDM includes:    - Acute or chronic illness or injury  that poses a threat to life or bodily function  - Independent review and explanation of 2 results from unique tests  - Discussion of management and ordering 2 unique tests  - Extensive discussion of treatment and management  - Prescription drug management  - Drug therapy requiring intensive monitoring for toxicity    Martín Hernandez M.D.  Hematology/Oncology Attending  Director Precision Cancer Therapies Program  [facility]            Route Chart for Scheduling    Med Onc Chart Routing      Follow up with physician 1 week. discuss treatment options   Follow up with CORY    Infusion scheduling note    Injection scheduling note    Labs CBC and CMP   Scheduling:  Preferred lab:  Lab interval:     Imaging    Pharmacy appointment    Other referrals                Supportive Plan Information  IV FLUIDS AND ELECTROLYTES   Ct Francis NP   Upcoming Treatment Dates - IV FLUIDS AND ELECTROLYTES    No upcoming days in selected categories.    Therapy Plan Information  PORT FLUSH  Flushes  heparin, porcine (PF) 100 unit/mL injection flush 500 Units  500 Units, Intravenous, Every visit  sodium chloride 0.9% flush 10 mL  10 mL, Intravenous, Every visit    PORT FLUSH  Flushes  heparin, porcine (PF) 100 unit/mL injection flush 500 Units  500 Units, Intravenous, Every visit  sodium chloride 0.9% flush 10 mL  10 mL, Intravenous, Every visit

## 2024-08-19 ENCOUNTER — PATIENT MESSAGE (OUTPATIENT)
Dept: TRANSPLANT | Facility: CLINIC | Age: 74
End: 2024-08-19
Payer: MEDICARE

## 2024-08-19 ENCOUNTER — PATIENT MESSAGE (OUTPATIENT)
Dept: HEMATOLOGY/ONCOLOGY | Facility: CLINIC | Age: 74
End: 2024-08-19
Payer: MEDICARE

## 2024-08-19 ENCOUNTER — TELEPHONE (OUTPATIENT)
Dept: TRANSPLANT | Facility: CLINIC | Age: 74
End: 2024-08-19
Payer: MEDICARE

## 2024-08-19 DIAGNOSIS — K59.03 DRUG-INDUCED CONSTIPATION: Primary | ICD-10-CM

## 2024-08-19 DIAGNOSIS — K12.30 MUCOSITIS: ICD-10-CM

## 2024-08-19 RX ORDER — SENNOSIDES 8.6 MG/1
2 TABLET ORAL 2 TIMES DAILY PRN
Qty: 60 TABLET | Refills: 2 | Status: SHIPPED | OUTPATIENT
Start: 2024-08-19 | End: 2025-08-19

## 2024-08-19 RX ORDER — OXYCODONE HYDROCHLORIDE 10 MG/1
10 TABLET ORAL EVERY 6 HOURS PRN
Qty: 30 TABLET | Refills: 0 | Status: SHIPPED | OUTPATIENT
Start: 2024-08-19

## 2024-08-19 NOTE — TELEPHONE ENCOUNTER
I spoke to patient. He is requesting the MRI he had a week or so ago be Power Shared with [facility]. I told him I would send a request.

## 2024-08-19 NOTE — TELEPHONE ENCOUNTER
My chart message sent to patient: Your imaging has been reviewed by your physician, no action required.      ----- Message from Cornell Anton MD sent at 8/16/2024  4:40 PM CDT -----  Results reviewed

## 2024-08-21 ENCOUNTER — TELEPHONE (OUTPATIENT)
Dept: HEMATOLOGY/ONCOLOGY | Facility: CLINIC | Age: 74
End: 2024-08-21
Payer: MEDICARE

## 2024-08-22 ENCOUNTER — LAB VISIT (OUTPATIENT)
Dept: LAB | Facility: HOSPITAL | Age: 74
End: 2024-08-22
Attending: INTERNAL MEDICINE
Payer: MEDICARE

## 2024-08-22 DIAGNOSIS — C44.329 SQUAMOUS CELL CARCINOMA OF SKIN OF ORBIT: ICD-10-CM

## 2024-08-22 LAB
ALBUMIN SERPL BCP-MCNC: 3.7 G/DL (ref 3.5–5.2)
ALP SERPL-CCNC: 93 U/L (ref 55–135)
ALT SERPL W/O P-5'-P-CCNC: 10 U/L (ref 10–44)
ANION GAP SERPL CALC-SCNC: 21 MMOL/L (ref 8–16)
AST SERPL-CCNC: 35 U/L (ref 10–40)
BILIRUB SERPL-MCNC: 0.6 MG/DL (ref 0.1–1)
BUN SERPL-MCNC: 33 MG/DL (ref 8–23)
CALCIUM SERPL-MCNC: 10.3 MG/DL (ref 8.7–10.5)
CHLORIDE SERPL-SCNC: 96 MMOL/L (ref 95–110)
CO2 SERPL-SCNC: 23 MMOL/L (ref 23–29)
CREAT SERPL-MCNC: 1.8 MG/DL (ref 0.5–1.4)
ERYTHROCYTE [DISTWIDTH] IN BLOOD BY AUTOMATED COUNT: 12.9 % (ref 11.5–14.5)
EST. GFR  (NO RACE VARIABLE): 38.8 ML/MIN/1.73 M^2
GLUCOSE SERPL-MCNC: 117 MG/DL (ref 70–110)
HCT VFR BLD AUTO: 35.7 % (ref 40–54)
HGB BLD-MCNC: 12 G/DL (ref 14–18)
IMM GRANULOCYTES # BLD AUTO: 0.03 K/UL (ref 0–0.04)
MCH RBC QN AUTO: 34.9 PG (ref 27–31)
MCHC RBC AUTO-ENTMCNC: 33.6 G/DL (ref 32–36)
MCV RBC AUTO: 104 FL (ref 82–98)
NEUTROPHILS # BLD AUTO: 4.5 K/UL (ref 1.8–7.7)
PLATELET # BLD AUTO: 188 K/UL (ref 150–450)
PMV BLD AUTO: 10.5 FL (ref 9.2–12.9)
POTASSIUM SERPL-SCNC: 5.6 MMOL/L (ref 3.5–5.1)
PROT SERPL-MCNC: 8.1 G/DL (ref 6–8.4)
RBC # BLD AUTO: 3.44 M/UL (ref 4.6–6.2)
SODIUM SERPL-SCNC: 140 MMOL/L (ref 136–145)
WBC # BLD AUTO: 6.82 K/UL (ref 3.9–12.7)

## 2024-08-22 PROCEDURE — 36415 COLL VENOUS BLD VENIPUNCTURE: CPT | Mod: PO | Performed by: INTERNAL MEDICINE

## 2024-08-22 PROCEDURE — 80053 COMPREHEN METABOLIC PANEL: CPT | Performed by: INTERNAL MEDICINE

## 2024-08-22 PROCEDURE — 85027 COMPLETE CBC AUTOMATED: CPT | Performed by: INTERNAL MEDICINE

## 2024-08-23 ENCOUNTER — OFFICE VISIT (OUTPATIENT)
Dept: HEMATOLOGY/ONCOLOGY | Facility: CLINIC | Age: 74
End: 2024-08-23
Payer: MEDICARE

## 2024-08-23 ENCOUNTER — OFFICE VISIT (OUTPATIENT)
Dept: RADIATION ONCOLOGY | Facility: CLINIC | Age: 74
End: 2024-08-23
Payer: MEDICARE

## 2024-08-23 VITALS
SYSTOLIC BLOOD PRESSURE: 157 MMHG | HEART RATE: 72 BPM | TEMPERATURE: 98 F | WEIGHT: 121.69 LBS | DIASTOLIC BLOOD PRESSURE: 80 MMHG | HEIGHT: 68 IN | BODY MASS INDEX: 18.44 KG/M2

## 2024-08-23 VITALS
HEIGHT: 68 IN | BODY MASS INDEX: 18.44 KG/M2 | WEIGHT: 121.69 LBS | HEART RATE: 73 BPM | RESPIRATION RATE: 16 BRPM | OXYGEN SATURATION: 99 % | DIASTOLIC BLOOD PRESSURE: 80 MMHG | SYSTOLIC BLOOD PRESSURE: 157 MMHG

## 2024-08-23 DIAGNOSIS — Z94.4 STATUS POST LIVER TRANSPLANTATION: ICD-10-CM

## 2024-08-23 DIAGNOSIS — N18.32 STAGE 3B CHRONIC KIDNEY DISEASE: ICD-10-CM

## 2024-08-23 DIAGNOSIS — C77.0 SECONDARY MALIGNANT NEOPLASM OF CERVICAL LYMPH NODE: ICD-10-CM

## 2024-08-23 DIAGNOSIS — C44.329 SQUAMOUS CELL CARCINOMA OF SKIN OF ORBIT: Primary | ICD-10-CM

## 2024-08-23 DIAGNOSIS — E03.2 HYPOTHYROIDISM DUE TO MEDICAMENTS AND OTHER EXOGENOUS SUBSTANCES: ICD-10-CM

## 2024-08-23 DIAGNOSIS — C01 SQUAMOUS CELL CARCINOMA OF BASE OF TONGUE: ICD-10-CM

## 2024-08-23 DIAGNOSIS — C44.329 SQUAMOUS CELL CARCINOMA OF SKIN OF ORBIT: ICD-10-CM

## 2024-08-23 DIAGNOSIS — Z93.0 TRACHEOSTOMY STATUS: ICD-10-CM

## 2024-08-23 DIAGNOSIS — G89.3 CANCER RELATED PAIN: ICD-10-CM

## 2024-08-23 DIAGNOSIS — Z79.899 IMMUNODEFICIENCY DUE TO DRUGS: ICD-10-CM

## 2024-08-23 DIAGNOSIS — D84.821 IMMUNODEFICIENCY DUE TO DRUGS: ICD-10-CM

## 2024-08-23 DIAGNOSIS — R11.0 NAUSEA: ICD-10-CM

## 2024-08-23 PROCEDURE — 99999 PR PBB SHADOW E&M-EST. PATIENT-LVL III: CPT | Mod: PBBFAC,,, | Performed by: STUDENT IN AN ORGANIZED HEALTH CARE EDUCATION/TRAINING PROGRAM

## 2024-08-23 PROCEDURE — 99213 OFFICE O/P EST LOW 20 MIN: CPT | Mod: PBBFAC | Performed by: INTERNAL MEDICINE

## 2024-08-23 PROCEDURE — 99999 PR PBB SHADOW E&M-EST. PATIENT-LVL III: CPT | Mod: PBBFAC,,, | Performed by: INTERNAL MEDICINE

## 2024-08-23 PROCEDURE — 99213 OFFICE O/P EST LOW 20 MIN: CPT | Mod: PBBFAC,27 | Performed by: STUDENT IN AN ORGANIZED HEALTH CARE EDUCATION/TRAINING PROGRAM

## 2024-08-23 RX ORDER — MORPHINE SULFATE 30 MG/1
30 TABLET, FILM COATED, EXTENDED RELEASE ORAL 2 TIMES DAILY
Qty: 60 TABLET | Refills: 0 | Status: SHIPPED | OUTPATIENT
Start: 2024-08-23 | End: 2024-09-22

## 2024-08-23 RX ORDER — PROCHLORPERAZINE MALEATE 10 MG
10 TABLET ORAL EVERY 6 HOURS PRN
Qty: 30 TABLET | Refills: 1 | Status: SHIPPED | OUTPATIENT
Start: 2024-08-23 | End: 2025-08-23

## 2024-08-23 RX ORDER — ONDANSETRON 8 MG/1
8 TABLET, ORALLY DISINTEGRATING ORAL EVERY 8 HOURS PRN
Qty: 60 TABLET | Refills: 4 | Status: SHIPPED | OUTPATIENT
Start: 2024-08-23 | End: 2025-08-23

## 2024-08-23 NOTE — PLAN OF CARE
DISCONTINUE ON PATHWAY REGIMEN - Head and Neck    [id number]        Carboplatin (Paraplatin)       Fluorouracil       Pembrolizumab (Keytruda)           Additional Orders: Serious immune-mediated adverse events can occur with   pembrolizumab. Please monitor your patient and refer to the linked   immune-mediated adverse reaction management materials for more information.    **Always confirm dose/schedule in your pharmacy ordering system**    REASON: Disease Progression  PRIOR TREATMENT: [id number]  TREATMENT RESPONSE: Progressive Disease (PD)    START ON PATHWAY REGIMEN - Head and Neck    [id number]        Cetuximab (Erbitux)       Cetuximab (Erbitux)     **Always confirm dose/schedule in your pharmacy ordering system**    Patient Characteristics:  Other Sites, Postoperative without Neoadjuvant Therapy, High Risk  Disease Classification: Other Sites  AJCC 8 Stage Grouping: III  Therapeutic Status: Postoperative without Neoadjuvant Therapy  Risk Status: High Risk  Intent of Therapy:  Curative Intent, Discussed with Patient

## 2024-08-23 NOTE — PROGRESS NOTES
[facility] Radiation Oncology Consult Note    Referring provider: Ct Francis, SABINO    Assessment:  Rocco Swain is a 74 y.o. 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 He is s/p orbital exenteration 4/25/24 (1mm SM, +PNI) with no adjuvant therapy. He now presents with a local recurrence.   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 He developed an unresectable recurrence and was treated with multiple lines of systemic therapy. Last on pembro 2023.  Liver transplant on tacrolimus.   ECOG: (1) Restricted in physically strenuous activity, ambulatory and able to do work of light nature        Plan:  Treatment options were discussed with the patient including radiotherapy, systemic therapy, and some combination thereof.  We discussed the goals of treatment.  The risks, benefits, scheduling, alternatives to and rationale of radiation therapy were explained in detail.    I will restage with PET/CT and see him after. Tentative plan for definitive RT +/- systemic therapy pending eligibility per med onc.   Have requested OSH prior RT        Oncologic History:  h/o liver transplantation in 2008 due to hepatitis C, currently on Tacrolimus. He has two head and neck primaries:     1) R/M SCC of the BOT, HPV+  Diagnosis: [id number] with locally advanced disease (unknown stage)  2016, definitive CRT with persistence disease  2016/2017, total laryngectomy + glossectomy in [place]   2020, developed local unresectable recurrence (biopsy-proven p16+ SCC).   Oct 2020 - Aug 2021, started PCC followed by maintenance cetuximab with PD in August 2021  Sep 2021, started carboplatin, 5-FU, and pembrolizumab x 2 cycles with limiting toxicity attributed to chemo. Transitioned to pembrolizumab alone (no signs for liver rejection). Added  chemotherapy again to the cycle 5  March 2022 to December 2023, pembrolizumab as single agent (no signs for liver rejection)    2) SCC of the conjunctivae   Initial diagnosis: April 2022  Eye lesions grew despite pembrolizumab  10/19/2022, local excision at [facility] (Dr Victor M Vasques)  08/31/2023, recurrence s/p local excision at [facility] (Dr Victor M Vasques)  October - December 2023, topical [facility] without response  1/19/2024, he was seen as a consult at [facility]. As he progressed on IO in the past, he was recommended systemic therapy with chemotherapy (platinum doublet, such as carboplatin and 5-FU) and cetuximab. [place] [facility] [place]   [place]24/24: MRI orbits ([facility]) with sight increase in size of the mass (2.6 cm) in the inferonasal right conjunctiva with extension into the post septal extraconal space. PPF, cavernous sinuses, and Meckel's caves are within normal limits. No adenopathy.  4/25/2024: right eyelid sparing orbital exenteration and Repair of defect right socket with adjacent tissue transfer using upper and lower eyelid skin at [facility].  Path: moderate to poorly differentiated squamous cell carcinoma involving conjunctiva of upper and lower eyelids, fibroconnective tissue and skeletal muscle. +PNI, 2 foci, largest nerve 0.06mm. margins negative but close at 1.2mm.  No adjuvant RT        Possibility of pregnancy: N/A  History of prior irradiation: Yes - to Base of tongue as above at [place], [place]. Also received care at [facility] (no RT there per pt)  History of prior systemic anti-cancer therapy: Yes - as above  History of collagen vascular disease: No  Implanted electronic device (pacer/defib/nerve stimulator): No    History of Present Illness:  Rocco Swain presents today to discuss the role of radiotherapy.    He notes progressive discomfort in the orbit starting 6 weeks after exenteration. Pre exenteration, he denies any focal numbness of the face. After surgery, he notes  numbness in V1 and 2 and was told that this was a normal side effect of his resection. No facial weakness. No otalgia. No other H&N complaints. He did not receive adjuvant RT to the orbit.     Review of Systems:  ROS  as above    Social History:  Social History     Tobacco Use    Smoking status: Never    Smokeless tobacco: Never   Substance Use Topics    Alcohol use: Yes     Comment: drinks wine, 1 glass day    Drug use: Not Currently       Past Medical History:  Past Medical History:   Diagnosis Date    Basal cell carcinoma (BCC) in situ of skin 2012    3 on face, 2 on arm, removed by dermatology.     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 Underwent surgical resection of tongue and neck. s/p tracheostomy    Squamous cell carcinoma of skin of right temple 01/24/2024    right temple       Past Surgical History:   Procedure Laterality Date    COLONOSCOPY      COLONOSCOPY N/A 9/14/2023    Procedure: COLONOSCOPY;  Surgeon: Reyes Olivia MD;  Location: [address]);  Service: Endoscopy;  Laterality: N/A;    CONJUNCTIVA BIOPSY Right 10/11/2022    Procedure: BIOPSY, CONJUNCTIVA;  Surgeon: Victor M Vasques MD;  Location: [facility];  Service: Ophthalmology;  Laterality: Right;    ESOPHAGOGASTRODUODENOSCOPY N/A 9/14/2023    Procedure: EGD (ESOPHAGOGASTRODUODENOSCOPY);  Surgeon: Reyes Olivia MD;  Location: [address]);  Service: Endoscopy;  Laterality: N/A;    ESOPHAGOGASTRODUODENOSCOPY N/A 4/10/2024    Procedure: EGD (ESOPHAGOGASTRODUODENOSCOPY);  Surgeon: Reyes Pagan MD;  Location: [address]);  Service: Endoscopy;  Laterality: N/A;    ESOPHAGOGASTRODUODENOSCOPY N/A  6/24/2024    Procedure: EGD (ESOPHAGOGASTRODUODENOSCOPY);  Surgeon: Reyes Pagan MD;  Location: [facility] ENDO (2ND FLR);  Service: Endoscopy;  Laterality: N/A;  Ref By: Dr. Hawkins   Procedure: egd  Diagnosis: esophagitis  Procedure Timing: 10 weeks  Prep: standard prep  6/21-pt r/s-inst to portal-2nd floor criteria-labs within 90 daysMS    INSERTION OF VENOUS ACCESS PORT Right 3/8/2021    Procedure: INSERTION, VENOUS ACCESS PORT;  Surgeon: Jesus Rendon MD;  Location: [facility] OR 2ND FLR;  Service: General;  Laterality: Right;    LIVER TRANSPLANT  11/2008    transplanted for biopsy proven hepatocellular carcinoma,     LYMPH NODE BIOPSY N/A 9/18/2020    Procedure: BIOPSY, LYMPH NODE;  Surgeon: [facility];  Location: [facility] OR 2ND FLR;  Service: General;  Laterality: N/A;  Rm 173    SURGICAL REMOVAL OF SCAR Right 8/24/2023    Procedure: EXCISION, SCAR;  Surgeon: Ting Brambila MD;  Location: [facility] OR;  Service: Ophthalmology;  Laterality: Right;    TRACHEOSTOMY           Medications:  Current Outpatient Medications on File Prior to Visit   Medication Sig Dispense Refill    gabapentin (NEURONTIN) 100 MG capsule Take 1 capsule (100 mg total) by mouth 3 (three) times daily. (Patient not taking: Reported on 8/16/2024) 90 capsule 11    levothyroxine (SYNTHROID) 88 MCG tablet TAKE 1 TABLET BY MOUTH ONCE  DAILY 90 tablet 1    magic mouthwash diphen/antac/lidoc/nysta Take 10 mLs by mouth 4 (four) times daily. 120 mL 4    morphine (MS CONTIN) 30 MG 12 hr tablet Take 1 tablet (30 mg total) by mouth 2 (two) times daily. 60 tablet 0    multivit-min/FA/lycopen/lutein (CENTRUM SILVER MEN ORAL) Take 1 tablet by mouth once daily.      ondansetron (ZOFRAN-ODT) 8 MG TbDL Take 1 tablet (8 mg total) by mouth every 8 (eight) hours as needed (nausea - first choice). 60 tablet 4    oxyCODONE (ROXICODONE) 10 mg Tab immediate release tablet Take 1 tablet (10 mg total) by mouth every 6 (six) hours as needed for Pain. 30 tablet 0     prochlorperazine (COMPAZINE) 10 MG tablet Take 1 tablet (10 mg total) by mouth every 6 (six) hours as needed (nausea/vomiting - second choice). 30 tablet 1    senna (SENOKOT) 8.6 mg tablet Take 2 tablets by mouth 2 (two) times daily as needed for Constipation. (Patient not taking: Reported on 8/23/2024) 60 tablet 2    tacrolimus (PROGRAF) 0.5 MG Cap Take 1 capsule (0.5 mg total) by mouth every EVENING.  (Use the 1mg capsule for your morning dose) 90 capsule 3    tacrolimus (PROGRAF) 1 MG Cap Take 1 capsule (1 mg total) by mouth every MORNING. Use the tacrolimus 0.5mg capsule for your evening dose as directed. 90 capsule 3    traZODone (DESYREL) 50 MG tablet TAKE 1 TABLET BY MOUTH IN THE  EVENING 90 tablet 3    vitamin E 1000 UNIT capsule Take 1 cap PO BID until gone. Start taking one week prior to your dental surgery. 112 capsule 0    [DISCONTINUED] amoxicillin-clavulanate (AUGMENTIN) 400-57 mg/5 mL SusR Take 11 mLs (875 mg total) by mouth every 12 (twelve) hours for 5 days. Discard Remainder (Patient not taking: Reported on 8/23/2024) 150 mL 0    [DISCONTINUED] azelaic acid (AZELEX) 15 % gel APPLY TOPICALLY TO AFFECTED AREA IN THE MORNING (Patient not taking: Reported on 8/16/2024) 50 g 3    [DISCONTINUED] chlorhexidine (PERIDEX) 0.12 % solution Gently swish and spit 15 mL twice a day for 3 weeks. Start rinses the day after your procedure. (Patient not taking: Reported on 8/16/2024) 473 mL 1    [DISCONTINUED] clindamycin phosphate 1% (CLINDAGEL) 1 % gel Apply topically 2 (two) times daily. (Patient not taking: Reported on 8/16/2024) 60 g 3    [DISCONTINUED] dexAMETHasone (DECADRON) 0.5 mg/5 mL Elix Swish 10 mL by mouth for 2 minutes then spit 4 times daily (Patient not taking: Reported on 8/16/2024) 474 mL 5    [DISCONTINUED] erythromycin (ROMYCIN) ophthalmic ointment Administer 0.5 inches to the right eye twice daily for 30 days. (Patient not taking: Reported on 8/16/2024) 3.5 g 1    [DISCONTINUED] erythromycin  (ROMYCIN) ophthalmic ointment Administer 0.5 inches to the right eye once for 1 dose. (Patient not taking: Reported on 8/16/2024) 3.5 g 3    [DISCONTINUED] imiquimod (ALDARA) 5 % cream Apply to biopsy site on frontal scalp + about a centimeter of surrounding skin qhs x 16 weeks. Wash off in am and apply sunscreen. Discontinue if skin becomes blistered, raw, bleeding, painful, etc. (Patient not taking: Reported on 8/16/2024) 24 packet 3    [DISCONTINUED] LIDOcaine HCl 2% (LIDOCAINE VISCOUS) 2 % Soln Swish and spit 15 mls every 8 (eight) hours as needed (mouth sore). (Patient not taking: Reported on 8/16/2024) 100 mL 3    [DISCONTINUED] metroNIDAZOLE (METROGEL) 0.75 % gel Apply topically to affected area 2 (two) times daily. (Patient not taking: Reported on 8/16/2024) 45 g 1    [DISCONTINUED] morphine (MS CONTIN) 15 MG 12 hr tablet Take 1 tablet (15 mg total) by mouth 2 (two) times daily. 60 tablet 0    [DISCONTINUED] pantoprazole (PROTONIX) 40 MG tablet Take 1 tablet (40 mg total) by mouth 2 (two) times daily before meals. (Patient not taking: Reported on 8/16/2024) 180 tablet 3    [DISCONTINUED] prednisoLONE acetate (PRED FORTE) 1 % DrpS Place 1 drop into the right eye 4 (four) times daily. (Patient not taking: Reported on 8/16/2024) 10 mL 3    [DISCONTINUED] sucralfate (CARAFATE) 1 gram tablet Take 1 tablet (1 g total) by mouth 4 times daily. Tablet can be broken and dissolved in water for ease of administration. Consume within 30 minutes of dissolving. Separate from pantoprazole and levothyroxine by at least 2 hours. (Patient not taking: Reported on 8/16/2024) 120 tablet 0    [DISCONTINUED] sulfacetamide sodium-sulfur 10-5 % (w/w) Clsr USE TO WASH AFFECTED AREA DAILY (Patient not taking: Reported on 8/16/2024)      [DISCONTINUED] tiZANidine (ZANAFLEX) 2 MG tablet Take 1 tablet (2 mg total) by mouth nightly as needed (neck muscle strain). (Patient not taking: Reported on 8/16/2024) 30 tablet 3     Current  "Facility-Administered Medications on File Prior to Visit   Medication Dose Route Frequency Provider Last Rate Last Admin    ofloxacin 0.3 % ophthalmic solution 1 drop  1 drop Right Eye On Call Procedure Victor M Vasques MD   2 drop at 10/11/22 0745    sodium chloride 0.9% flush 10 mL  10 mL Intravenous PRN Ronald Berg MD        sodium chloride 0.9% flush 10 mL  10 mL Intravenous PRN Victor M Vasques MD        [DISCONTINUED] heparin, porcine (PF) 100 unit/mL injection flush 500 Units  500 Units Intravenous PRN Ronald Berg MD           Allergies:  Review of patient's allergies indicates:   Allergen Reactions    Aspirin     Tylenol extended release        Exam:  Vitals:    08/23/24 0912   BP: (!) 157/80   Pulse: 72   Temp: 97.7 °F (36.5 °C)   Weight: 55.2 kg (121 lb 11.1 oz)   Height: 5' 8" (1.727 m)     Constitutional: Pleasant 74 y.o. male in no acute distress.  Well nourished. Well groomed.   HEENT: see media today. He is s/p exent with a palpable mass in the ~1 o'clock position with overlying skin intact without erythema or ulceration. He also notes pain with palpation of the orbit Sensation decreased in right V1-2, intact to light touch in V3  Lymph: heavily pretreated neck, with such limitation appreciated cervical, pre auricular, facial adenopathy  Cardiovascular: Upper extremities warm to touch  Lungs: No audible wheezing.  Normal effort.   Musculoskeletal: No gross MSK deformities. Ambulates well  Skin: No rashes appreciated.  Psych: Alert and oriented with appropriate mood and affect.  Neuro: as above, otherwise  Grossly normal.    Data Review:    Independent Interpretation of Test(s): MRI orbits from 8/12/24 was personally reviewed and shows enhancing mass in the superior, medial orbit corresponding to palpable area. Diffuse enhancement in the orbit, post op changes vs recurrence?  No appreciated asymmetry at meckels cave            Oli Joyce MD  Radiation Oncology      "

## 2024-08-23 NOTE — PROGRESS NOTES
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 male who returns to clinic for management of cSCC of the orbit. Patient had ANGELES on October PETCT, but then developed quickly progressing orbital lesion. He was seen at [facility] in [place]. Recommendation was to follow up at [facility] to initiate induction chemotherapy followed by possible surgery. Patient completed 2 cycles of Extreme regimen, though there were delays due to cytopenias and severe mucositis. He was able to go to [facility] to have exenteration and resection of cancer April 2024 with clear margins, but PNI on path.     Interval History  Still not having good pain control with MS Contin 15 mg BID + oxy IR 10 mg PRN. Still having good BMs with current bowel regimen. Has nausea which has been decreasing appetite.     ECOG Performance status: 1 - Symptomatic but completely ambulatory Communication from him is 100% written.     Cancer Staging   Squamous cell carcinoma of base of tongue  Staging form: Pharynx - HPV-Mediated Oropharynx, AJCC 8th Edition  - Clinical stage from 9/18/2020: Stage III (rcT4, cN1, cM0, p16+) - Signed by Ronald Berg MD on 12/27/2022      Oncologic History:  Oncology History   Squamous cell carcinoma of base of tongue   9/18/2020 Cancer Staged    Staging form: Pharynx - HPV-Mediated Oropharynx, AJCC 8th Edition  - Clinical stage from 9/18/2020: Stage III (rcT4, cN1, cM0, p16+)     9/28/2020 Initial Diagnosis    Squamous cell carcinoma of base of tongue     10/6/2020 - 8/17/2021 Chemotherapy    Treatment Summary   Plan Name: OP HEAD NECK CARBOPLATIN PACLITAXEL C1-2 FOLLOWED BY CETUXIMAB CARBOPLATIN C3-6 FOLLOWED BY CETUXIMAB MAINTENANCE WEEKLY  Treatment Goal: Control  Status: Inactive  Start Date: 10/6/2020  End Date: 8/17/2021  Provider: Ronald Berg MD  Chemotherapy: cetuximab (ERBITUX) 100 mg/50 mL chemo  infusion 684 mg, 400 mg/m2 = 684 mg (100 % of original dose 400 mg/m2), Intravenous, Clinic/[facility] 1 time, 29 of 38 cycles  Dose modification: 500 mg/m2 (original dose 400 mg/m2, Cycle 3), 250 mg/m2 (original dose 400 mg/m2, Cycle 3), 400 mg/m2 (original dose 400 mg/m2, Cycle 3), 250 mg/m2 (original dose 250 mg/m2, Cycle 7), 200 mg/m2 (original dose 250 mg/m2, Cycle 18), 200 mg/m2 (original dose 250 mg/m2, Cycle 19), 250 mg/m2 (original dose 250 mg/m2, Cycle 4), 200 mg/m2 (original dose 250 mg/m2, Cycle 25), 200 mg/m2 (original dose 250 mg/m2, Cycle 28)  Administration: 684 mg (11/17/2020), 400 mg (11/24/2020), 400 mg (2/9/2021), 400 mg (2/17/2021), 427.6 mg (3/9/2021), 400 mg (3/30/2021), 400 mg (3/23/2021), 400 mg (4/6/2021), 427.6 mg (4/13/2021), 427.6 mg (4/20/2021), 342 mg (5/11/2021), 342 mg (5/18/2021), 342 mg (5/25/2021), 400 mg (5/31/2021), 400 mg (6/7/2021), 400 mg (6/14/2021), 400 mg (12/8/2020), 427.6 mg (12/29/2020), 400 mg (12/1/2020), 400 mg (12/15/2020), 400 mg (12/22/2020), 427.6 mg (1/5/2021), 400 mg (1/12/2021), 400 mg (1/19/2021), 427.6 mg (1/26/2021), 400 mg (2/2/2021), 400 mg (2/23/2021), 400 mg (3/2/2021), 427.6 mg (3/16/2021), 400 mg (6/21/2021), 342 mg (6/28/2021), 342 mg (7/6/2021), 342 mg (7/13/2021), 342 mg (7/20/2021), 400 mg (7/27/2021), 400 mg (8/10/2021), 400 mg (8/17/2021)  CARBOplatin (PARAPLATIN) 310 mg in sodium chloride 0.9% 500 mL chemo infusion, 310 mg (92.2 % of original dose 334.5 mg), Intravenous, Clinic/[facility] 1 time, 6 of 6 cycles  Dose modification:   (original dose 334.5 mg, Cycle 1)  Administration: 310 mg (10/6/2020), 370 mg (11/17/2020), 300 mg (10/27/2020), 350 mg (12/8/2020), 335 mg (12/29/2020), 320 mg (1/19/2021)  PACLitaxeL (TAXOL) 175 mg/m2 = 300 mg in sodium chloride 0.9% 500 mL chemo infusion, 175 mg/m2 = 300 mg (100 % of original dose 175 mg/m2), Intravenous, [facility] 1 time, 2 of 2 cycles  Dose modification: 175 mg/m2 (original dose 175 mg/m2, Cycle  1)  Administration: 300 mg (10/6/2020), 300 mg (10/27/2020)     4/29/2021 Tumor Conference       His case was discussed at the Multidisciplinary Head and Neck Team Planning Meeting.    Representatives from Medical Oncology, Radiation Oncology, Head and Neck Surgical Oncology, Psychosocial Oncology, and Speech and Language Pathology discussed the case with the following recommendations:    1) biopsy         7/29/2021 Tumor Conference       His case was discussed at the Multidisciplinary Head and Neck Team Planning Meeting.    Representatives from Medical Oncology, Radiation Oncology, Head and Neck Surgical Oncology, Psychosocial Oncology, and Speech and Language Pathology discussed the case with the following recommendations:    1) Head and neck clinic follow up  2) consider Keytruda (discuss with transplant)  3) consider palliative referral         9/13/2021 - 12/29/2023 Chemotherapy    Treatment Summary   Plan Name: OP HEAD NECK PEMBROLIZUMAB CARBOPLATIN FLUOROURACIL (C1 ONLY RECEIVED) FOLLOWED BY PEMBROLIZUMAB MAINTENANCE  Treatment Goal: Palliative  Status: Inactive  Start Date: 9/13/2021  End Date: 12/29/2023  Provider: Ronald Berg MD  Chemotherapy: CARBOplatin (PARAPLATIN) 320 mg in sodium chloride 0.9% 500 mL chemo infusion, 320 mg (100 % of original dose 320.5 mg), Intravenous, [facility] 1 time, 6 of 6 cycles  Dose modification:   (original dose 320.5 mg, Cycle 1)  Administration: 320 mg (9/13/2021), 335 mg (12/23/2021), 325 mg (1/27/2022), 245 mg (3/3/2022), 255 mg (5/12/2022), 255 mg (4/7/2022)  fluorouraciL 1,000 mg/m2/day = 6,440 mg in sodium chloride 0.9% 240 mL chemo infusion, 1,000 mg/m2/day = 6,440 mg, Intravenous, Over 96 hours, 6 of 6 cycles  Dose modification: 800 mg/m2/day (original dose 1,000 mg/m2/day, Cycle 6), 5,000 mg (original dose 1,000 mg/m2/day, Cycle 8)  Administration: 6,440 mg (9/13/2021), 6,440 mg (12/23/2021), 6,480 mg (1/27/2022), 5,000 mg (3/3/2022), 5,000 mg (4/7/2022), 5,000 mg  (5/12/2022)     Secondary malignant neoplasm of cervical lymph node   2/9/2021 Initial Diagnosis    Secondary malignant neoplasm of cervical lymph node     9/13/2021 - 12/29/2023 Chemotherapy    Treatment Summary   Plan Name: OP HEAD NECK PEMBROLIZUMAB CARBOPLATIN FLUOROURACIL (C1 ONLY RECEIVED) FOLLOWED BY PEMBROLIZUMAB MAINTENANCE  Treatment Goal: Palliative  Status: Inactive  Start Date: 9/13/2021  End Date: 12/29/2023  Provider: Ronald Berg MD  Chemotherapy: CARBOplatin (PARAPLATIN) 320 mg in sodium chloride 0.9% 500 mL chemo infusion, 320 mg (100 % of original dose 320.5 mg), Intravenous, [facility] 1 time, 6 of 6 cycles  Dose modification:   (original dose 320.5 mg, Cycle 1)  Administration: 320 mg (9/13/2021), 335 mg (12/23/2021), 325 mg (1/27/2022), 245 mg (3/3/2022), 255 mg (5/12/2022), 255 mg (4/7/2022)  fluorouraciL 1,000 mg/m2/day = 6,440 mg in sodium chloride 0.9% 240 mL chemo infusion, 1,000 mg/m2/day = 6,440 mg, Intravenous, Over 96 hours, 6 of 6 cycles  Dose modification: 800 mg/m2/day (original dose 1,000 mg/m2/day, Cycle 6), 5,000 mg (original dose 1,000 mg/m2/day, Cycle 8)  Administration: 6,440 mg (9/13/2021), 6,440 mg (12/23/2021), 6,480 mg (1/27/2022), 5,000 mg (3/3/2022), 5,000 mg (4/7/2022), 5,000 mg (5/12/2022)     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   1/29/2024 Initial Diagnosis    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     2/19/2024 - 3/25/2024 Chemotherapy    Treatment Summary   Plan Name: OP HEAD NECK CETUXIMAB CARBOPLATIN FLUOROURACIL Q3W  Treatment Goal: Curative  Status: Inactive  Start Date: 2/19/2024  End Date: 3/25/2024  Provider: Martín Hernandez MD  Chemotherapy: cetuximab (ERBITUX) 100 mg/50 mL chemo infusion 668 mg, 400 mg/m2 = 668 mg, Intravenous, Clinic/[facility] 1 time, 2 of 12 cycles  Administration: 668 mg (2/19/2024), 400 mg (2/26/2024), 400 mg (3/18/2024), 400 mg (3/25/2024), 400 mg (3/11/2024)  CARBOplatin (PARAPLATIN) 295 mg in sodium chloride 0.9% 314.5 mL chemo  infusion, 295 mg, Intravenous, [facility] 1 time, 2 of 6 cycles  Administration: 295 mg (2/19/2024), 300 mg (3/18/2024)         Review of Systems   Constitutional:  Negative for activity change, appetite change, chills, fatigue, fever and unexpected weight change.   HENT:  Negative for ear pain, facial swelling, hearing loss, mouth sores, nosebleeds, sore throat, trouble swallowing and voice change.         No verbal communication. Skin fixed and immobile from previous scaring post-neck dissection   Eyes:  Positive for pain (right orbital pain) and redness (right eye enucleation). Negative for discharge and visual disturbance.   Respiratory:  Negative for cough, choking, chest tightness and shortness of breath.    Cardiovascular:  Negative for chest pain, palpitations and leg swelling.   Gastrointestinal:  Positive for nausea. Negative for abdominal distention, abdominal pain, blood in stool, constipation, diarrhea and vomiting.   Endocrine: Negative for cold intolerance and heat intolerance.   Genitourinary:  Negative for difficulty urinating, dysuria, frequency and urgency.   Musculoskeletal:  Positive for back pain (lower - chronic). Negative for arthralgias, gait problem, leg pain and myalgias.   Integumentary:  Negative for pallor, rash, wound and mole/lesion.   Allergic/Immunologic: Negative for frequent infections.   Neurological:  Negative for dizziness, tremors, weakness, light-headedness, numbness and headaches.   Hematological:  Negative for adenopathy. Does not bruise/bleed easily.   Psychiatric/Behavioral:  Negative for agitation, confusion, dysphoric mood and sleep disturbance. The patient is not nervous/anxious.         Allergies:  Review of patient's allergies indicates:   Allergen Reactions    Aspirin     Tylenol extended release        Medications:  Current Outpatient Medications   Medication Sig Dispense Refill    levothyroxine (SYNTHROID) 88 MCG tablet TAKE 1 TABLET BY MOUTH ONCE  DAILY 90 tablet  1    magic mouthwash diphen/antac/lidoc/nysta Take 10 mLs by mouth 4 (four) times daily. 120 mL 4    multivit-min/FA/lycopen/lutein (CENTRUM SILVER MEN ORAL) Take 1 tablet by mouth once daily.      oxyCODONE (ROXICODONE) 10 mg Tab immediate release tablet Take 1 tablet (10 mg total) by mouth every 6 (six) hours as needed for Pain. 30 tablet 0    tacrolimus (PROGRAF) 0.5 MG Cap Take 1 capsule (0.5 mg total) by mouth every EVENING.  (Use the 1mg capsule for your morning dose) 90 capsule 3    tacrolimus (PROGRAF) 1 MG Cap Take 1 capsule (1 mg total) by mouth every MORNING. Use the tacrolimus 0.5mg capsule for your evening dose as directed. 90 capsule 3    traZODone (DESYREL) 50 MG tablet TAKE 1 TABLET BY MOUTH IN THE  EVENING 90 tablet 3    vitamin E 1000 UNIT capsule Take 1 cap PO BID until gone. Start taking one week prior to your dental surgery. 112 capsule 0    amoxicillin-clavulanate (AUGMENTIN) 400-57 mg/5 mL SusR Take 11 mLs (875 mg total) by mouth every 12 (twelve) hours for 5 days. Discard Remainder (Patient not taking: Reported on 8/23/2024) 150 mL 0    azelaic acid (AZELEX) 15 % gel APPLY TOPICALLY TO AFFECTED AREA IN THE MORNING (Patient not taking: Reported on 8/16/2024) 50 g 3    chlorhexidine (PERIDEX) 0.12 % solution Gently swish and spit 15 mL twice a day for 3 weeks. Start rinses the day after your procedure. (Patient not taking: Reported on 8/16/2024) 473 mL 1    clindamycin phosphate 1% (CLINDAGEL) 1 % gel Apply topically 2 (two) times daily. (Patient not taking: Reported on 8/16/2024) 60 g 3    dexAMETHasone (DECADRON) 0.5 mg/5 mL Elix Swish 10 mL by mouth for 2 minutes then spit 4 times daily (Patient not taking: Reported on 8/16/2024) 474 mL 5    erythromycin (ROMYCIN) ophthalmic ointment Administer 0.5 inches to the right eye twice daily for 30 days. (Patient not taking: Reported on 8/16/2024) 3.5 g 1    erythromycin (ROMYCIN) ophthalmic ointment Administer 0.5 inches to the right eye once for 1  dose. (Patient not taking: Reported on 8/16/2024) 3.5 g 3    gabapentin (NEURONTIN) 100 MG capsule Take 1 capsule (100 mg total) by mouth 3 (three) times daily. (Patient not taking: Reported on 8/16/2024) 90 capsule 11    imiquimod (ALDARA) 5 % cream Apply to biopsy site on frontal scalp + about a centimeter of surrounding skin qhs x 16 weeks. Wash off in am and apply sunscreen. Discontinue if skin becomes blistered, raw, bleeding, painful, etc. (Patient not taking: Reported on 8/16/2024) 24 packet 3    LIDOcaine HCl 2% (LIDOCAINE VISCOUS) 2 % Soln Swish and spit 15 mls every 8 (eight) hours as needed (mouth sore). (Patient not taking: Reported on 8/16/2024) 100 mL 3    metroNIDAZOLE (METROGEL) 0.75 % gel Apply topically to affected area 2 (two) times daily. (Patient not taking: Reported on 8/16/2024) 45 g 1    morphine (MS CONTIN) 30 MG 12 hr tablet Take 1 tablet (30 mg total) by mouth 2 (two) times daily. 60 tablet 0    pantoprazole (PROTONIX) 40 MG tablet Take 1 tablet (40 mg total) by mouth 2 (two) times daily before meals. (Patient not taking: Reported on 8/16/2024) 180 tablet 3    prednisoLONE acetate (PRED FORTE) 1 % DrpS Place 1 drop into the right eye 4 (four) times daily. (Patient not taking: Reported on 8/16/2024) 10 mL 3    senna (SENOKOT) 8.6 mg tablet Take 2 tablets by mouth 2 (two) times daily as needed for Constipation. (Patient not taking: Reported on 8/23/2024) 60 tablet 2    sucralfate (CARAFATE) 1 gram tablet Take 1 tablet (1 g total) by mouth 4 times daily. Tablet can be broken and dissolved in water for ease of administration. Consume within 30 minutes of dissolving. Separate from pantoprazole and levothyroxine by at least 2 hours. (Patient not taking: Reported on 8/16/2024) 120 tablet 0    sulfacetamide sodium-sulfur 10-5 % (w/w) Clsr USE TO WASH AFFECTED AREA DAILY (Patient not taking: Reported on 8/16/2024)      tiZANidine (ZANAFLEX) 2 MG tablet Take 1 tablet (2 mg total) by mouth nightly as  needed (neck muscle strain). (Patient not taking: Reported on 8/16/2024) 30 tablet 3     No current facility-administered medications for this visit.     Facility-Administered Medications Ordered in Other Visits   Medication Dose Route Frequency Provider Last Rate Last Admin    heparin, porcine (PF) 100 unit/mL injection flush 500 Units  500 Units Intravenous PRN Ronald Berg MD        ofloxacin 0.3 % ophthalmic solution 1 drop  1 drop Right Eye On Call Procedure Victor M Vasques MD   2 drop at 10/11/22 0745    sodium chloride 0.9% flush 10 mL  10 mL Intravenous PRN Ronald Berg MD        sodium chloride 0.9% flush 10 mL  10 mL Intravenous PRN Victor M Vasques MD           PMH:  Past Medical History:   Diagnosis Date    Basal cell carcinoma (BCC) in situ of skin 2012    3 on face, 2 on arm, removed by dermatology.     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 Underwent surgical resection of tongue and neck. s/p tracheostomy    Squamous cell carcinoma of skin of right temple 01/24/2024    right temple       PSH:  Past Surgical History:   Procedure Laterality Date    COLONOSCOPY      COLONOSCOPY N/A 9/14/2023    Procedure: COLONOSCOPY;  Surgeon: Reyes Olivia MD;  Location: [address]);  Service: Endoscopy;  Laterality: N/A;    CONJUNCTIVA BIOPSY Right 10/11/2022    Procedure: BIOPSY, CONJUNCTIVA;  Surgeon: Victor M Vasques MD;  Location: [facility] OR;  Service: Ophthalmology;  Laterality: Right;    ESOPHAGOGASTRODUODENOSCOPY N/A 9/14/2023    Procedure: EGD (ESOPHAGOGASTRODUODENOSCOPY);  Surgeon: Reyes Olivia MD;  Location: [address]);  Service: Endoscopy;   Laterality: N/A;    ESOPHAGOGASTRODUODENOSCOPY N/A 4/10/2024    Procedure: EGD (ESOPHAGOGASTRODUODENOSCOPY);  Surgeon: Reyes Pagan MD;  Location: [facility] ENDO (2ND FLR);  Service: Endoscopy;  Laterality: N/A;    ESOPHAGOGASTRODUODENOSCOPY N/A 6/24/2024    Procedure: EGD (ESOPHAGOGASTRODUODENOSCOPY);  Surgeon: Reyes Pagan MD;  Location: [facility] ENDO (2ND FLR);  Service: Endoscopy;  Laterality: N/A;  Ref By: Dr. Hawkins   Procedure: egd  Diagnosis: esophagitis  Procedure Timing: 10 weeks  Prep: standard prep  6/21-pt r/s-inst to portal-2nd floor criteria-labs within 90 daysMS    INSERTION OF VENOUS ACCESS PORT Right 3/8/2021    Procedure: INSERTION, VENOUS ACCESS PORT;  Surgeon: Jesus Rendon MD;  Location: [facility] OR 2ND FLR;  Service: General;  Laterality: Right;    LIVER TRANSPLANT  11/2008    transplanted for biopsy proven hepatocellular carcinoma,     LYMPH NODE BIOPSY N/A 9/18/2020    Procedure: BIOPSY, LYMPH NODE;  Surgeon: Taz Diagnostic Provider;  Location: [facility] OR 2ND FLR;  Service: General;  Laterality: N/A;  Rm 173    SURGICAL REMOVAL OF SCAR Right 8/24/2023    Procedure: EXCISION, SCAR;  Surgeon: Ting Brambila MD;  Location: [facility] OR;  Service: Ophthalmology;  Laterality: Right;    TRACHEOSTOMY         FamHx:  Family History   Problem Relation Name Age of Onset    Dementia Mother         SocHx:  Social History     Socioeconomic History    Marital status: Single   Occupational History    Occupation: Retired     Comment: , notes exposures to fumes etc.  Worked on [employer] for 4 years   Tobacco Use    Smoking status: Never    Smokeless tobacco: Never   Substance and Sexual Activity    Alcohol use: Yes     Comment: drinks wine, 1 glass day    Drug use: Not Currently    Sexual activity: Yes     Comment: No prior history of STD        Distress Score             Objective:      Vitals:    08/23/24 0838   BP: (!) 157/80   BP Location: Left arm   Patient Position: Sitting   BP  "Method: Medium (Automatic)   Pulse: 73   Resp: 16   SpO2: 99%   Weight: 55.2 kg (121 lb 11.1 oz)   Height: 5' 8" (1.727 m)            Physical Exam  Vitals reviewed.   Constitutional:       General: He is not in acute distress.     Appearance: Normal appearance. He is well-developed and underweight.   HENT:      Head: Normocephalic and atraumatic.      Mouth/Throat:      Mouth: Mucous membranes are moist.      Dentition: Abnormal dentition. Dental caries present.   Eyes:      Comments: Bandage over R orbit s/p exenteration. C/D/I   Neck:      Trachea: Tracheostomy present.   Pulmonary:      Effort: Pulmonary effort is normal. No respiratory distress.      Comments: Tracheostomy  Abdominal:      General: There is no distension.      Palpations: Abdomen is soft.   Musculoskeletal:         General: No swelling. Normal range of motion.      Cervical back: Normal range of motion and neck supple.   Skin:     General: Skin is warm and dry.   Neurological:      General: No focal deficit present.      Mental Status: He is alert and oriented to person, place, and time.   Psychiatric:         Mood and Affect: Mood normal.         Behavior: Behavior normal.         Thought Content: Thought content normal.         Judgment: Judgment normal.           LABS:  WBC   Date Value Ref Range Status   08/22/2024 6.82 3.90 - 12.70 K/uL Final     Hemoglobin   Date Value Ref Range Status   08/22/2024 12.0 (L) 14.0 - 18.0 g/dL Final     Hematocrit   Date Value Ref Range Status   08/22/2024 35.7 (L) 40.0 - 54.0 % Final     Platelets   Date Value Ref Range Status   08/22/2024 188 150 - 450 K/uL Final       Chemistry        Component Value Date/Time     08/22/2024 1022    K 5.6 (H) 08/22/2024 1022    CL 96 08/22/2024 1022    CO2 23 08/22/2024 1022    BUN 33 (H) 08/22/2024 1022    CREATININE 1.8 (H) 08/22/2024 1022     (H) 08/22/2024 1022        Component Value Date/Time    CALCIUM 10.3 08/22/2024 1022    ALKPHOS 93 08/22/2024 1022 " "   AST 35 08/22/2024 1022    ALT 10 08/22/2024 1022    BILITOT 0.6 08/22/2024 1022    ESTGFRAFRICA 52.6 (A) 07/14/2022 1119    EGFRNONAA 45.5 (A) 07/14/2022 1119              Assessment:       1. 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 Squamous cell carcinoma of base of tongue    3. Secondary malignant neoplasm of cervical lymph node    4. Tracheostomy status    5. Cancer related pain    6. 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 Stage 3b chronic kidney disease        Plan:         Orbital cSCC  Unfortunately patient has progressed while on immunotherapy for below diagnosis. For that reason systemic therapies are limited. Evaluated at [facility] and surgeon does think an exenteration sparing resection would be possible with good response to induction chemotherapy. Plan was for platinum doublet chemo + cetuximab x 2 cycles. Notified of this plan on 1/29 and treatment plan was entered.   - Initiated dose reduced C1 of 5-FU to 750 mg/m2 due to age/frailty and growth factor. Patient still had cytopenias and mucositis, but was able to finish 2 cycles with delays. Now s/p exenteration. With PNI on path, referred to radiation oncology but adjuvant radiation deemed not necessary after exenteration.   - MRI orbit on 8/12/24 showing evidence of recurrent disease, "Nodular enhancing soft tissue superficially at the superomedial orbit and along the medial wall of the orbit posteriorly at least concerning for underlying recurrent lesion."   - Will meet with Dr. Joyce following this appt to discuss radiation. May consider adding Taxotere or Cetuximab + RT.      2,3,4. Recurrent SCC of base of tongue, P16+ - Status post total laryngectomy, total glossectomy and anterolateral thigh free flap reconstruction roughly 2017 at [facility] in [place] for persistent/recurrent base of tongue sq.c.c. 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 He is not a surgical or radiation candidate.  -Started on PCC 10/6/2020 followed by maintenance cetuximab until 8/24/21 (discontinued due to progression of disease; continued increase in SUV uptake, no new lesions).  - 9/2021 started on carbo/5-FU/pembrolizumab; due to toxicity went to pembrolizumab monotherapy starting with cycle 2.  Progression of disease noted after cycle 3 so added chemotherapy back in with cycle 4. Keytruda monotherapy starting with C9.   - Keytruda discontinued after 2 years.    5 Pain not well controlled. Increasing MS Contin to 30 mg BID + oxy IR 10 mg PRN.    6,7. S/p liver transplant and is currently on tacrolimus. Grade 1. Will continue to monitor.     8. TSH trending back down on increased synthroid dose of 100 mcg. Continue to monitor.    9. Cr elevated at 1.8. No eligible for platinum chemo.      10. Sent zofran and compazine.     Patient was also seen and examined by Dr. Hernandez. Patient is in agreement with the proposed treatment plan. All questions were answered to the patient's satisfaction. Pt knows to call clinic if anything is needed before the next clinic visit.    Ct Francis, MSN, APRN, FNP-C  Hematology and Medical Oncology  Nurse Practitioner to Dr. Martín Hernandez  Nurse Practitioner, [facility]      I have reviewed the notes, assessments, and/or procedures performed by Ct [facility], as above.  I have personally interviewed and examined the patient at the beside, and rounded with Ct. I concur with her assessment and plan and the documentation of Rocco Swain.    MDM includes:    - Acute or chronic illness or injury that poses a threat to life or bodily function  - Independent review and explanation of 2 results from unique tests  - Discussion of management and ordering 2 unique tests  - Extensive discussion of treatment and management  - Prescription  drug management    Martín Hernandez M.D.  Hematology/Oncology Attending  Director [facility]  [facility]            Route Chart for Scheduling    Med Onc Chart Routing      Follow up with physician . Determine f/u after Rad/Onc visit today   Follow up with [facility]    Infusion scheduling note    Injection scheduling note    Labs    Imaging    Pharmacy appointment    Other referrals                Supportive Plan Information  IV FLUIDS AND ELECTROLYTES   Ct Francis NP   Upcoming Treatment Dates - IV FLUIDS AND ELECTROLYTES    No upcoming days in selected categories.    Therapy Plan Information  PORT FLUSH  Flushes  heparin, porcine (PF) 100 unit/mL injection flush 500 Units  500 Units, Intravenous, Every visit  sodium chloride 0.9% flush 10 mL  10 mL, Intravenous, Every visit    PORT FLUSH  Flushes  heparin, porcine (PF) 100 unit/mL injection flush 500 Units  500 Units, Intravenous, Every visit  sodium chloride 0.9% flush 10 mL  10 mL, Intravenous, Every visit

## 2024-08-24 NOTE — PROGRESS NOTES
[facility] Follow Up Note      Assessment:  Rocco Swain is a 74 y.o. 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 He is s/p orbital exenteration 4/25/24 (1mm SM, +PNI) with no adjuvant therapy. He now presents with a local recurrence.   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 He developed an unresectable recurrence and was treated with multiple lines of systemic therapy. Last on pembro 2023.  Subcm apical LLL nodule. Suspicious for metastasis  Planning for Mohs to left nasal ala, biopsied as BCC  Liver transplant on tacrolimus.   ECOG: (1) Restricted in physically strenuous activity, ambulatory and able to do work of light nature        Plan:  Treatment options were discussed with the patient including radiotherapy, systemic therapy, and some combination thereof.  We discussed the goals of treatment.  He does have a subcm lung nodule, this could be infectious but malignancy is of concern. This could be from his conjunctival lesion or his recurrent BOT cancer.   He is symptomatic and has been heavily pre-treated with systemic therapy. I believe he would benefit from radiotherapy. Given his lung nodule, I discussed palliative RT with quad shot vs protracted radiotherapy. Will review at [facility] today.   He is amendable to daily treatments and lives close by, so my tentative plan would be to start with a plan to 66-70 Gy pending normal tissue tolerance at 2 Gy per day. If he demonstrates further progression in the lung we can have another discussion regarding stopping at 40 Gy (a palliative dose) vs continuing on to provide durable palliation. Alternatively could aim for 50-55 Gy in 20 fractions, but I would be hesitant to include elective nerve RT with this hypofractionation schedule. I  already have requested  his prior RT records  Lastly he is going for mohs next week, will discuss with H&N surgery and Mohs, as recon may require paramedian forehead flap, which would complicate my targets. As an alternative, I could plan for definitive RT to the nose given above.   Consent obtained for radiotherapy. Tentative plan or CT sim 9/3, but this would change if he is to undergo recon on 9/6.        Oncologic History:  h/o liver transplantation in 2008 due to hepatitis C, currently on Tacrolimus. He has two head and neck primaries:     1) R/M SCC of the BOT, HPV+  Diagnosis: [id number] with locally advanced disease (unknown stage)  2016, definitive CRT with persistence disease  2016/2017, total laryngectomy + glossectomy in [place]   2020, developed local unresectable recurrence (biopsy-proven p16+ SCC).   Oct 2020 - Aug 2021, started PCC followed by maintenance cetuximab with PD in August 2021  Sep 2021, started carboplatin, 5-FU, and pembrolizumab x 2 cycles with limiting toxicity attributed to chemo. Transitioned to pembrolizumab alone (no signs for liver rejection). Added chemotherapy again to the cycle 5  March 2022 to December 2023, pembrolizumab as single agent (no signs for liver rejection)    2) SCC of the conjunctivae   Initial diagnosis: April 2022  Eye lesions grew despite pembrolizumab  10/19/2022, local excision at [facility] (Dr Victor M Vasques)  08/31/2023, recurrence s/p local excision at [facility] (Dr Victor M Vasques)  October - December 2023, topical MMC without response  1/19/2024, he was seen as a consult at [facility]. As he progressed on IO in the past, he was recommended systemic therapy with chemotherapy (platinum doublet, such as carboplatin and 5-FU) and cetuximab. [place] [facility] [place]   [place]/24: MRI orbits ([facility]) with sight increase in size of the mass (2.6 cm) in the inferonasal right conjunctiva with extension into the post septal extraconal space. PPF, cavernous sinuses, and Meckel's caves are within normal  limits. No adenopathy.  4/25/2024: right eyelid sparing orbital exenteration and Repair of defect right socket with adjacent tissue transfer using upper and lower eyelid skin at [facility].  Path: moderate to poorly differentiated squamous cell carcinoma involving conjunctiva of upper and lower eyelids, fibroconnective tissue and skeletal muscle. +PNI, 2 foci, largest nerve 0.06mm. margins negative but close at 1.2mm.  No adjuvant RT        Possibility of pregnancy: N/A  History of prior irradiation: Yes - to Base of tongue as above at [place], [place]. Also received care at [facility] (no RT there per pt)  History of prior systemic anti-cancer therapy: Yes - as above  History of collagen vascular disease: No  Implanted electronic device (pacer/defib/nerve stimulator): No    Interval History:   Rocco Swain presents today to re- discuss the role of radiotherapy.    Since last visit:   No changes. Still with pain in the orbit. No worsening numbness. Still has sensation in the right upper lip.    Review of Systems:  ROS  as above    Social History:  Social History     Tobacco Use    Smoking status: Never    Smokeless tobacco: Never   Substance Use Topics    Alcohol use: Yes     Comment: drinks wine, 1 glass day    Drug use: Not Currently         Medications:  Current Outpatient Medications on File Prior to Visit   Medication Sig Dispense Refill    gabapentin (NEURONTIN) 100 MG capsule Take 1 capsule (100 mg total) by mouth 3 (three) times daily. 90 capsule 11    levothyroxine (SYNTHROID) 88 MCG tablet TAKE 1 TABLET BY MOUTH ONCE  DAILY 90 tablet 1    magic mouthwash diphen/antac/lidoc/nysta Take 10 mLs by mouth 4 (four) times daily. 120 mL 4    morphine (MS CONTIN) 30 MG 12 hr tablet Take 1 tablet (30 mg total) by mouth 2 (two) times daily. 60 tablet 0    multivit-min/FA/lycopen/lutein (CENTRUM SILVER MEN ORAL) Take 1 tablet by mouth once daily.      ondansetron (ZOFRAN-ODT) 8 MG TbDL  "Take 1 tablet (8 mg total) by mouth every 8 (eight) hours as needed (nausea - first choice). 60 tablet 4    oxyCODONE (ROXICODONE) 10 mg Tab immediate release tablet Take 1 tablet (10 mg total) by mouth every 6 (six) hours as needed for Pain. 30 tablet 0    prochlorperazine (COMPAZINE) 10 MG tablet Take 1 tablet (10 mg total) by mouth every 6 (six) hours as needed (nausea/vomiting - second choice). 30 tablet 1    senna (SENOKOT) 8.6 mg tablet Take 2 tablets by mouth 2 (two) times daily as needed for Constipation. 60 tablet 2    tacrolimus (PROGRAF) 0.5 MG Cap Take 1 capsule (0.5 mg total) by mouth every EVENING.  (Use the 1mg capsule for your morning dose) 90 capsule 3    tacrolimus (PROGRAF) 1 MG Cap Take 1 capsule (1 mg total) by mouth every MORNING. Use the tacrolimus 0.5mg capsule for your evening dose as directed. 90 capsule 3    traZODone (DESYREL) 50 MG tablet TAKE 1 TABLET BY MOUTH IN THE  EVENING 90 tablet 3    vitamin E 1000 UNIT capsule Take 1 cap PO BID until gone. Start taking one week prior to your dental surgery. 112 capsule 0     Current Facility-Administered Medications on File Prior to Visit   Medication Dose Route Frequency Provider Last Rate Last Admin    ofloxacin 0.3 % ophthalmic solution 1 drop  1 drop Right Eye On Call Procedure Victor M Vasques MD   2 drop at 10/11/22 0745    sodium chloride 0.9% flush 10 mL  10 mL Intravenous PRN Ronald Berg MD        sodium chloride 0.9% flush 10 mL  10 mL Intravenous PRN Victor M Vasques MD           Allergies:  Review of patient's allergies indicates:   Allergen Reactions    Aspirin     Tylenol extended release        Exam:  Vitals:    08/29/24 0913   BP: (!) 136/90   BP Location: Left arm   Patient Position: Sitting   BP Method: Small (Automatic)   Pulse: 80   Temp: 97 °F (36.1 °C)   Weight: 53.8 kg (118 lb 9.7 oz)   Height: 5' 8" (1.727 m)       Constitutional: Pleasant 74 y.o. male in no acute distress.  Well nourished. Well groomed. "   HEENT: see media today. He is s/p exent with a palpable mass in the ~1 o'clock position with overlying skin intact without erythema or ulceration. He also notes pain with palpation of the bony orbit Sensation. Decreased in right V1-2, intact to light touch in V3  Lymph: heavily pretreated neck, with such limitation appreciated cervical, pre auricular, facial adenopathy. + helio tube  Cardiovascular: Upper extremities warm to touch  Lungs: No audible wheezing.  Normal effort.   Musculoskeletal: No gross MSK deformities. Ambulates well  Skin: No rashes appreciated.  Psych: Alert and oriented with appropriate mood and affect.  Neuro: as above, otherwise  Grossly normal.    Data Review:    Information obtained via chart review and discussion with Mr. Swain.             Oli Joyce MD  Radiation Oncology

## 2024-08-27 ENCOUNTER — TELEPHONE (OUTPATIENT)
Dept: PREADMISSION TESTING | Facility: HOSPITAL | Age: 74
End: 2024-08-27
Payer: MEDICARE

## 2024-08-27 ENCOUNTER — HOSPITAL ENCOUNTER (OUTPATIENT)
Dept: RADIOLOGY | Facility: HOSPITAL | Age: 74
Discharge: HOME | End: 2024-08-27
Attending: STUDENT IN AN ORGANIZED HEALTH CARE EDUCATION/TRAINING PROGRAM
Payer: MEDICARE

## 2024-08-27 DIAGNOSIS — M79.609 PAIN IN EXTREMITY, UNSPECIFIED EXTREMITY: ICD-10-CM

## 2024-08-27 DIAGNOSIS — C44.329 SQUAMOUS CELL CARCINOMA OF SKIN OF ORBIT: ICD-10-CM

## 2024-08-27 DIAGNOSIS — Z01.818 PRE-OP TESTING: Primary | ICD-10-CM

## 2024-08-27 LAB — POCT GLUCOSE: 143 MG/DL (ref 70–110)

## 2024-08-27 PROCEDURE — A9552 F18 FDG: HCPCS | Performed by: STUDENT IN AN ORGANIZED HEALTH CARE EDUCATION/TRAINING PROGRAM

## 2024-08-27 PROCEDURE — 78815 PET IMAGE W/CT SKULL-THIGH: CPT | Mod: 26,PS,, | Performed by: NUCLEAR MEDICINE

## 2024-08-27 PROCEDURE — 78815 PET IMAGE W/CT SKULL-THIGH: CPT | Mod: TC

## 2024-08-27 RX ORDER — FLUDEOXYGLUCOSE F18 500 MCI/ML
11.05 INJECTION INTRAVENOUS
Status: COMPLETED | OUTPATIENT
Start: 2024-08-27 | End: 2024-08-27

## 2024-08-27 RX ADMIN — FLUDEOXYGLUCOSE F-18 11.05 MILLICURIE: 500 INJECTION INTRAVENOUS at 01:08

## 2024-08-28 PROBLEM — B19.20 HEPATITIS C: Status: RESOLVED | Noted: 2020-01-27 | Resolved: 2024-08-28

## 2024-08-28 NOTE — ASSESSMENT & PLAN NOTE
Prograf, Tacrilimus  Hepatology E consult 8/29/24  Danny Jamison MD   Recommendation: He is post-transplant with stable liver graft function and normal Liver function, hence no concern for surgery from transplant standpoint. He need to continue his Immunosuppression  and avoid hepatotoxic medications. I will inform our transplant coordinator to give formal fitness letter from the hepatologist as his last visit was in 2021 per chart. We will check his Prograf levels during the post surgery to maintain adequate Immunosuppression

## 2024-08-28 NOTE — ASSESSMENT & PLAN NOTE
GFR 44.7  BUN 25 Cr 1.6    Stages of CKD discussed  Deleterious effects NSAID's , Beneficial effects of Hydration discussed   Tylenol as needed for pain   I suggest monitoring renal function, in put and out put status selam-operatively. I  suggest avoiding nephrotoxic medication including NSAIDs, COX2 inhibitors, intravenous contrast agent,avoiding hypotension to prevent further renal impairment.

## 2024-08-29 ENCOUNTER — OFFICE VISIT (OUTPATIENT)
Dept: INTERNAL MEDICINE | Facility: CLINIC | Age: 74
End: 2024-08-29
Payer: MEDICARE

## 2024-08-29 ENCOUNTER — OFFICE VISIT (OUTPATIENT)
Dept: RADIATION ONCOLOGY | Facility: CLINIC | Age: 74
End: 2024-08-29
Payer: MEDICARE

## 2024-08-29 ENCOUNTER — HOSPITAL ENCOUNTER (OUTPATIENT)
Dept: CARDIOLOGY | Facility: CLINIC | Age: 74
Discharge: HOME | End: 2024-08-29
Payer: MEDICARE

## 2024-08-29 ENCOUNTER — TUMOR BOARD CONFERENCE (OUTPATIENT)
Dept: OTOLARYNGOLOGY | Facility: CLINIC | Age: 74
End: 2024-08-29
Payer: MEDICARE

## 2024-08-29 ENCOUNTER — E-CONSULT (OUTPATIENT)
Dept: HEPATOLOGY | Facility: CLINIC | Age: 74
End: 2024-08-29
Payer: MEDICARE

## 2024-08-29 VITALS
BODY MASS INDEX: 19.71 KG/M2 | HEART RATE: 69 BPM | TEMPERATURE: 98 F | SYSTOLIC BLOOD PRESSURE: 173 MMHG | WEIGHT: 130.06 LBS | OXYGEN SATURATION: 98 % | DIASTOLIC BLOOD PRESSURE: 87 MMHG | HEIGHT: 68 IN

## 2024-08-29 VITALS
SYSTOLIC BLOOD PRESSURE: 136 MMHG | BODY MASS INDEX: 17.98 KG/M2 | WEIGHT: 118.63 LBS | HEART RATE: 80 BPM | HEIGHT: 68 IN | DIASTOLIC BLOOD PRESSURE: 90 MMHG | TEMPERATURE: 97 F

## 2024-08-29 DIAGNOSIS — C01 SQUAMOUS CELL CARCINOMA OF BASE OF TONGUE: ICD-10-CM

## 2024-08-29 DIAGNOSIS — T86.49 CIRRHOSIS OF TRANSPLANTED LIVER: ICD-10-CM

## 2024-08-29 DIAGNOSIS — Z94.4 STATUS POST LIVER TRANSPLANTATION: Primary | ICD-10-CM

## 2024-08-29 DIAGNOSIS — Z94.4 STATUS POST LIVER TRANSPLANTATION: ICD-10-CM

## 2024-08-29 DIAGNOSIS — C44.311 BASAL CELL CARCINOMA (BCC) OF SKIN OF NOSE: ICD-10-CM

## 2024-08-29 DIAGNOSIS — K76.6 PORTAL HYPERTENSION: ICD-10-CM

## 2024-08-29 DIAGNOSIS — Z86.19 HX OF HEPATITIS C: ICD-10-CM

## 2024-08-29 DIAGNOSIS — Z01.818 PRE-OP TESTING: ICD-10-CM

## 2024-08-29 DIAGNOSIS — N18.9 ACUTE KIDNEY INJURY SUPERIMPOSED ON CHRONIC KIDNEY DISEASE: ICD-10-CM

## 2024-08-29 DIAGNOSIS — R91.1 LUNG NODULE, SOLITARY: ICD-10-CM

## 2024-08-29 DIAGNOSIS — R03.0 ELEVATED BLOOD PRESSURE READING: ICD-10-CM

## 2024-08-29 DIAGNOSIS — K92.1 MELENA: ICD-10-CM

## 2024-08-29 DIAGNOSIS — Z79.899 IMMUNODEFICIENCY DUE TO DRUGS: Primary | ICD-10-CM

## 2024-08-29 DIAGNOSIS — E89.0 POSTOPERATIVE HYPOTHYROIDISM: ICD-10-CM

## 2024-08-29 DIAGNOSIS — Z01.818 PREOP EXAMINATION: Primary | ICD-10-CM

## 2024-08-29 DIAGNOSIS — N17.9 ACUTE KIDNEY INJURY SUPERIMPOSED ON CHRONIC KIDNEY DISEASE: ICD-10-CM

## 2024-08-29 DIAGNOSIS — K74.60 CIRRHOSIS OF TRANSPLANTED LIVER: ICD-10-CM

## 2024-08-29 DIAGNOSIS — C44.329 SQUAMOUS CELL CARCINOMA OF SKIN OF ORBIT: ICD-10-CM

## 2024-08-29 DIAGNOSIS — D69.6 THROMBOCYTOPENIA, UNSPECIFIED: ICD-10-CM

## 2024-08-29 DIAGNOSIS — D63.1 ANEMIA IN STAGE 3A CHRONIC KIDNEY DISEASE: ICD-10-CM

## 2024-08-29 DIAGNOSIS — J02.9 SORE THROAT: ICD-10-CM

## 2024-08-29 DIAGNOSIS — E44.1 MILD MALNUTRITION: ICD-10-CM

## 2024-08-29 DIAGNOSIS — C44.329 SQUAMOUS CELL CARCINOMA OF SKIN OF ORBIT: Primary | ICD-10-CM

## 2024-08-29 DIAGNOSIS — D84.821 IMMUNODEFICIENCY DUE TO DRUGS: ICD-10-CM

## 2024-08-29 DIAGNOSIS — N18.31 ANEMIA IN STAGE 3A CHRONIC KIDNEY DISEASE: ICD-10-CM

## 2024-08-29 DIAGNOSIS — C32.9 LARYNX CANCER: ICD-10-CM

## 2024-08-29 DIAGNOSIS — I48.0 PAF (PAROXYSMAL ATRIAL FIBRILLATION): ICD-10-CM

## 2024-08-29 DIAGNOSIS — N18.32 STAGE 3B CHRONIC KIDNEY DISEASE: ICD-10-CM

## 2024-08-29 DIAGNOSIS — D84.821 IMMUNODEFICIENCY DUE TO DRUGS: Primary | ICD-10-CM

## 2024-08-29 DIAGNOSIS — Z79.899 IMMUNODEFICIENCY DUE TO DRUGS: ICD-10-CM

## 2024-08-29 PROBLEM — H10.31 ACUTE CONJUNCTIVITIS OF RIGHT EYE: Status: RESOLVED | Noted: 2022-09-12 | Resolved: 2024-08-29

## 2024-08-29 PROBLEM — H92.02 LEFT EAR PAIN: Status: RESOLVED | Noted: 2022-10-24 | Resolved: 2024-08-29

## 2024-08-29 PROBLEM — H11.9 CONJUNCTIVAL LESION: Status: RESOLVED | Noted: 2022-10-11 | Resolved: 2024-08-29

## 2024-08-29 PROBLEM — N18.30 CKD (CHRONIC KIDNEY DISEASE) STAGE 3, GFR 30-59 ML/MIN: Status: ACTIVE | Noted: 2020-10-05

## 2024-08-29 LAB
OHS QRS DURATION: 80 MS
OHS QTC CALCULATION: 478 MS

## 2024-08-29 PROCEDURE — 93005 ELECTROCARDIOGRAM TRACING: CPT | Mod: PBBFAC | Performed by: STUDENT IN AN ORGANIZED HEALTH CARE EDUCATION/TRAINING PROGRAM

## 2024-08-29 PROCEDURE — 99999 PR PBB SHADOW E&M-EST. PATIENT-LVL IV: CPT | Mod: PBBFAC,,, | Performed by: NURSE PRACTITIONER

## 2024-08-29 PROCEDURE — 99213 OFFICE O/P EST LOW 20 MIN: CPT | Mod: PBBFAC | Performed by: STUDENT IN AN ORGANIZED HEALTH CARE EDUCATION/TRAINING PROGRAM

## 2024-08-29 PROCEDURE — 99999 PR PBB SHADOW E&M-EST. PATIENT-LVL III: CPT | Mod: PBBFAC,,, | Performed by: STUDENT IN AN ORGANIZED HEALTH CARE EDUCATION/TRAINING PROGRAM

## 2024-08-29 PROCEDURE — 93010 ELECTROCARDIOGRAM REPORT: CPT | Mod: S$PBB,,, | Performed by: STUDENT IN AN ORGANIZED HEALTH CARE EDUCATION/TRAINING PROGRAM

## 2024-08-29 PROCEDURE — 99214 OFFICE O/P EST MOD 30 MIN: CPT | Mod: PBBFAC,27 | Performed by: NURSE PRACTITIONER

## 2024-08-29 NOTE — CONSULTS
[facility] - Hepatology  Response for E-Consult     Patient Name: Rocco Swain  MRN: [id number]  Primary Care Provider: Leny, Primary Doctor   Requesting Provider: Carl Cordero NP  E-Consult to Hepatology Inpatient  Consult performed by: Danny Jamison MD  Consult ordered by: Carl Cordero NP          Recommendation: He is post-transplant with stable liver graft function and normal Liver function, hence no concern for surgery from transplant standpoint. He need to continue his Immunosuppression  and avoid hepatotoxic medications. I will inform our transplant coordinator to give formal fitness letter from the hepatologist as his last visit was in 2021 per chart. We will check his Prograf levels during the post surgery to maintain adequate Immunosuppression         Total time of Consultation: 10 minute    I did not speak to the requesting provider verbally about this.     *This eConsult is based on the clinical data available to me and is furnished without benefit of a physical examination. The eConsult will need to be interpreted in light of any clinical issues or changes in patient status not available to me at the time of filing this eConsults. Significant changes in patient condition or level of acuity should result in immediate formal consultation and reevaluation. Please alert me if you have further questions.    Thank you for this eConsult referral.     Danny Jamison MD  [facility] - Hepatology

## 2024-08-29 NOTE — PROGRESS NOTES
[facility]  Progress Note    Patient Name: Rocco Swain  MRN: [id number]  Date of Evaluation- 08/29/2024  PCP- No, Primary Doctor    Future cases for Rocco Swain [[id number]]       Case ID Status Date Time [facility] Procedure Provider Location    [id number] [facility] 9/6/2024  2:00  CLOSURE, MOHS PROCEDURE DEFECT Beth Saha MD [[id number]] [facility] OR 2ND FLR            HPI:  This is a 74 y.o. 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   Patient is new to me.    The history has been obtained by speaking with the patient and reviewing the electronic medical record and/or outside health information.  Patient communicates with pen and paper.  Significant health conditions for the perioperative period are discussed below in assessment and plan.     Patient reports current health status to be Good.  Denies any new symptoms before surgery.       Subjective/ Objective:     Chief Complaint: Preoperative evaulation, perioperative medical management, and complication reduction plan.     Functional Capacity:  Able to climb a flight of stairs without CP SOB or Syncope.  Able to meet 4 METs      Anesthesia issues: BUT IS NECK BREATHER SECONDARY TO LARYNGECTOMY    Difficulty mouth opening: no  BUT IS NECK BREATHER SECONDARY TO LARYNGECTOMY    Steroid use in the last 12 months: N     Dental Issues:ALL TEETH OUT    Family anesthesia difficulty: None     Family Hx of Thrombosis N    Past Medical History:   Diagnosis Date    Acute conjunctivitis of right eye 09/12/2022    Atrial fibrillation     Basal cell carcinoma (BCC) in situ of skin 2012    3 on face, 2 on arm, removed by dermatology.     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 Underwent surgical resection of tongue and neck. s/p tracheostomy    Squamous cell carcinoma of skin of right temple 01/24/2024    right temple     Past Surgical History:   Procedure Laterality Date    COLONOSCOPY      COLONOSCOPY N/A 09/14/2023    Procedure: COLONOSCOPY;  Surgeon: Reyes Olivia MD;  Location: [facility] ([address] [place] [address]);  Service: Endoscopy;  Laterality: N/A;    CONJUNCTIVA BIOPSY Right 10/11/2022    Procedure: BIOPSY, CONJUNCTIVA;  Surgeon: Victor M Vasques MD;  Location: [facility];  Service: Ophthalmology;  Laterality: Right;    ESOPHAGOGASTRODUODENOSCOPY N/A 09/14/2023    Procedure: EGD (ESOPHAGOGASTRODUODENOSCOPY);  Surgeon: Reyes Olivia MD;  [address]  Service: Endoscopy;  Laterality: N/A;    ESOPHAGOGASTRODUODENOSCOPY N/A 04/10/2024    Procedure: EGD (ESOPHAGOGASTRODUODENOSCOPY);  Surgeon: Reyes Pagan MD;  [facility]  Service: Endoscopy;  Laterality: N/A;    ESOPHAGOGASTRODUODENOSCOPY N/A 06/24/2024    Procedure: EGD (ESOPHAGOGASTRODUODENOSCOPY);  Surgeon: Reyes Pagan MD;  Location: [facility] ([address] [place] [address]);  Service: Endoscopy;  Laterality: N/A;  Ref By: Dr. Hawkins   Procedure: egd  Diagnosis: esophagitis  Procedure Timing: 10 weeks  Prep: standard prep  6/21-pt r/s-inst to portal-2nd floor criteria-labs within 90 daysMS    GLOSSECTOMY      INSERTION OF VENOUS ACCESS PORT Right 03/08/2021    Procedure: INSERTION, VENOUS ACCESS PORT;  Surgeon: Jesus Rendon MD;  [address];  Service: General;  Laterality: Right;    LARYNGECTOMY      LIVER TRANSPLANT  11/2008    transplanted for biopsy  "proven hepatocellular carcinoma,     LYMPH NODE BIOPSY N/A 09/18/2020    Procedure: BIOPSY, LYMPH NODE;  Surgeon: Taz Diagnostic Provider;  Location: [facility] OR [address] [place] [address];  Service: General;  Laterality: N/A;  Rm 173    skin cancer removals      SURGICAL REMOVAL OF SCAR Right 08/24/2023    Procedure: EXCISION, SCAR;  Surgeon: Ting Brambila MD;  Location: [facility] OR;  Service: Ophthalmology;  Laterality: Right;    TRACHEOSTOMY         Review of Systems   Constitutional:  Positive for fatigue. Negative for chills, fever and unexpected weight change.   HENT:  Positive for voice change. Negative for trouble swallowing.         Unable to speak  Swallows soft foods only   Eyes:  Negative for photophobia and visual disturbance.        No acute visual changes   Respiratory:  Negative for cough, shortness of breath and wheezing.         No h/o FREEDOM   Cardiovascular:  Negative for chest pain, palpitations and leg swelling.   Gastrointestinal:  Negative for abdominal pain, blood in stool, constipation, diarrhea, nausea and vomiting.        No FLD,  No BRB or black tarry stool     Hepatitis, Cirrhosis and has had liver transplant   Genitourinary:  Negative for difficulty urinating, dysuria, frequency, hematuria and urgency.        Nocturia 1   Musculoskeletal:  Positive for back pain and neck pain. Negative for arthralgias, gait problem, myalgias and neck stiffness.   Neurological:  Negative for dizziness, seizures, syncope, light-headedness, numbness and headaches.   Psychiatric/Behavioral:  Negative for agitation, behavioral problems, confusion, decreased concentration, dysphoric mood, hallucinations, self-injury, sleep disturbance and suicidal ideas. The patient is not nervous/anxious and is not hyperactive.             VITALS  Visit Vitals  BP (!) 177/81 (BP Location: Right arm, Patient Position: Sitting)   Pulse 69   Temp 97.9 °F (36.6 °C) (Oral)   Ht 5' 8" (1.727 m)   Wt 59 kg (130 lb 1.1 oz)   SpO2 98%   BMI 19.78 kg/m²    "       Physical Exam  Constitutional:       General: He is not in acute distress.     Appearance: He is well-developed. He is not diaphoretic.   HENT:      Head: Normocephalic.      Right Ear: Hearing normal.      Left Ear: Hearing normal.      Nose: Nose normal.      Comments: Left nostril lesion noted     Mouth/Throat:      Lips: Pink.      Mouth: Mucous membranes are moist.      Comments: Patient has no tongue  Eyes:      General: Lids are normal.      Conjunctiva/sclera: Conjunctivae normal.      Pupils: Pupils are equal, round, and reactive to light.   Neck:      Vascular: No carotid bruit.      Trachea: Phonation normal. Tracheostomy present.      Comments: Scarring noted to neck  Trach noted and midline  Cardiovascular:      Rate and Rhythm: Normal rate and regular rhythm.      Pulses:           Carotid pulses are 2+ on the right side and 2+ on the left side.       Radial pulses are 2+ on the right side and 2+ on the left side.        Posterior tibial pulses are 2+ on the right side and 2+ on the left side.      Heart sounds: Normal heart sounds. No murmur heard.     No friction rub. No gallop.   Pulmonary:      Effort: Pulmonary effort is normal.      Breath sounds: Normal breath sounds.      Comments: Clear and equal  Anterior and Posterior BBS  Abdominal:      General: Abdomen is protuberant. Bowel sounds are normal. There is no distension.      Palpations: Abdomen is soft.      Tenderness: There is no abdominal tenderness.   Musculoskeletal:         General: No tenderness or deformity. Normal range of motion.      Cervical back: Normal range of motion.      Right lower leg: No edema.      Left lower leg: No edema.   Lymphadenopathy:      Head:      Right side of head: No submental, submandibular, tonsillar, preauricular, posterior auricular or occipital adenopathy.      Left side of head: No submental, submandibular, tonsillar, preauricular, posterior auricular or occipital adenopathy.      Cervical:       Right cervical: No superficial cervical adenopathy.     Left cervical: No superficial cervical adenopathy.   Skin:     General: Skin is warm and dry.      Capillary Refill: Capillary refill takes 2 to 3 seconds.      Coloration: Skin is not pale.      Findings: No erythema or rash.   Neurological:      Mental Status: He is alert and oriented to person, place, and time.      GCS: GCS eye subscore is 4. GCS verbal subscore is 5. GCS motor subscore is 6.      Motor: No abnormal muscle tone.      Coordination: Coordination normal.   Psychiatric:         Attention and Perception: Attention and perception normal.         Mood and Affect: Mood and affect normal.         Speech: Speech normal.         Behavior: Behavior normal. Behavior is cooperative.         Thought Content: Thought content normal.         Cognition and Memory: Cognition and memory normal.         Judgment: Judgment normal.          Significant Labs:  Lab Results   Component Value Date    WBC 6.82 08/22/2024    HGB 12.0 (L) 08/22/2024    HCT 35.7 (L) 08/22/2024     08/22/2024    CHOL 123 03/18/2022    TRIG 71 03/18/2022    HDL 53 03/18/2022    ALT 10 08/22/2024    AST 35 08/22/2024     08/22/2024    K 5.6 (H) 08/22/2024    CL 96 08/22/2024    CREATININE 1.8 (H) 08/22/2024    BUN 33 (H) 08/22/2024    CO2 23 08/22/2024    TSH 4.498 (H) 08/08/2024    PSA 0.85 03/18/2022    INR 1.0 09/02/2020    HGBA1C 4.8 12/11/2019       Diagnostic Studies: No relevant studies.    EKG:   Results for orders placed or performed in visit on 10/20/23   IN OFFICE EKG 12-LEAD (to [place])    Collection Time: 10/20/23  2:57 PM    Narrative    Test Reason : I48.0,    Vent. Rate : 080 BPM     Atrial Rate : 080 BPM     P-R Int : 174 ms          QRS Dur : 064 ms      QT Int : 366 ms       P-R-T Axes : 027 071 038 degrees     QTc Int : 422 ms    Sinus rhythm with Premature atrial complexes  Low septal forces  Abnormal ECG  When compared with ECG of 14-SEP-2023  "08:34,  Premature atrial complexes are now Present  Confirmed by Sagar EVANS MD ([id number]) on 10/23/2023 4:20:47 PM    Referred By: GAURANG GOODRICH           Confirmed By:Sagar EVANS MD       2D ECHO:  TTE:  Results for orders placed or performed during the hospital encounter of 11/28/23   Echo   Result Value Ref Range    RA Width 3.33 cm    LA volume (mod) 44.31 cm3    Left Atrium Major Axis 5.02 cm    Left Atrium Minor Axis 5.19 cm    RA Major Axis 5.10 cm    LV Diastolic Volume 75.01 mL    LV Systolic Volume 34.87 mL    PV Peak D Ryland 0.50 m/s    PV Peak S Ryland 0.65 m/s    MV Peak A Ryland 0.74 m/s    MV stenosis pressure 1/2 time 44.98 ms    TR Max Ryland 2.59 m/s    MV Peak E Ryland 0.56 m/s    PV mean gradient 1 mmHg    RVOT peak VTI 11.65 cm    RVOT peak ryland 0.65 m/s    Ao VTI 33.97 cm    Ao peak ryland 1.56 m/s    LVOT peak VTI 19.48 cm    LVOT peak ryland 0.97 m/s    LVOT diameter 1.98 cm    MV "A" wave duration 12.56 msec    E wave deceleration time 155.12 msec    PV peak gradient 4     AV mean gradient 6 mmHg    TAPSE 2.85 cm    RVDD 2.30 cm    Ascending aorta 3.10 cm    STJ 2.86 cm    Sinus 3.15 cm    LVIDs 3.00 2.1 - 4.0 cm    Posterior Wall 0.88 0.6 - 1.1 cm    IVS 0.77 0.6 - 1.1 cm    LVIDd 4.12 3.5 - 6.0 cm    PV PEAK VELOCITY 1.06 m/s    TDI LATERAL 0.08 m/s    LA WIDTH 3.49 cm    TDI SEPTAL 0.09 m/s    LV LATERAL E/E' RATIO 7.00 m/s    LV SEPTAL E/E' RATIO 6.22 m/s    FS 27 (A) 28 - 44 %    LV mass 102.24 g    Left Ventricle Relative Wall Thickness 0.43 cm    AV valve area 1.76 cm²    AV Velocity Ratio 0.62     AV index (prosthetic) 0.57     MV valve area p 1/2 method 4.89 cm2    E/A ratio 0.76     Mean e' 0.09 m/s    Pulm vein S/D ratio 1.30     LVOT area 3.1 cm2    LVOT stroke volume 59.95 cm3    AV peak gradient 10 mmHg    E/E' ratio 6.59 m/s    Triscuspid Valve Regurgitation Peak Gradient 27 mmHg    MARY by Velocity Ratio 1.91 cm²    BSA 1.62 m2    LV Systolic Volume Index 21.1 mL/m2    LV Diastolic Volume Index 45.46 mL/m2 "    LV Mass Index 62 g/m2    LA Volume Index (Mod) 26.9 mL/m2    ZLVIDS 0.35     ZLVIDD -1.17     TV resting pulmonary artery pressure 30 mmHg    RV TB RVSP 6 mmHg    Est. RA pres 3 mmHg    EF 60 %    LA Volume Index 32.1 mL/m2    LA volume 52.99 cm3    LA size 3.5 cm    Narrative      Left Ventricle: The left ventricle is normal in size. Normal wall   thickness. There is concentric remodeling. Normal wall motion. There is   normal systolic function. Ejection fraction by visual approximation is   60%. There is normal diastolic function.    Right Ventricle: Normal right ventricular cavity size. Wall thickness   is normal. Right ventricle wall motion  is normal. Systolic function is   normal.    Left Atrium: Left atrium is severely dilated.    Aortic Valve: The aortic valve appears bicuspid. There is mild   stenosis. Aortic valve area by VTI is 1.76 cm². Aortic valve peak velocity   is 1.56 m/s. Mean gradient is 6 mmHg. The dimensionless index is 0.57.    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 There is moderate   regurgitation. There appear to be 2 separate jets.    Pulmonary Artery: The estimated pulmonary artery systolic pressure is   30 mmHg.    IVC/SVC: Normal venous pressure at 3 mmHg.         SHERI:  No results found. However, due to the size of the patient record, not all encounters were searched. Please check Results Review for a complete set of results.     Imaging     Active Cardiac Conditions: None      Revised Cardiac Risk Index   High -Risk Surgery  Intraperitoneal; Intrathoracic; suprainguinal vascular Yes- + 1 No- 0   History of Ischemic Heart Disease   (Hx of MI/positive exercise test/current chest pain due to ischemia/use of nitrate therapy/EKG with pathological Q waves) Yes- + 1 No- 0   History of CHF  (Pulmonary edema/bilateral rales or S3 gallop/PND/CXR showing pulmonary vascular redistribution) Yes- + 1 No- 0   History of CVA   (Prior stroke or TIA) Yes- + 1 No- 0   Pre-operative treatment with  insulin Yes- + 1 No- 0   Pre-operative creatinine > 2mg/dl Yes- + 1 No- 0   Total:      Risk Status:  Estimated risk of cardiac complications after non-cardiac surgery using the Revised Cardiac Risk Index for Preoperative risk is 3.9 %      ARISCAT (Canet) risk index: Low: 1.6% risk of post-op pulmonary complications.    American Society of Anesthesiologists Physical Status classification (ASA): 3         No further cardiac workup needed prior to surgery.        Preoperative cardiac risk assessment-  The patient does not have any active cardiac conditions . Revised cardiac risk index predictors- ---.Functional capacity is more than 4 Mets. He will be undergoing a ENT procedure that carries a Moderate Risk risk     The estimated risk of the rate of adverse cardiac outcomes  3.9    No further cardiac work up is indicated prior to proceeding with the surgery          American Society of Anesthesiologists Physical status classification ( ASA ) class: 3     Postoperative pulmonary complication risk assessment: 1.6     ARISCAT ( Canet) risk index- risk class -  Low, if duration of surgery is under 3 hours, intermediate, if duration of surgery is over 3 hours        Assessment/Plan:     Status post liver transplantation - 2008  Prograf, Tacrilimus  Hepatology E consult 8/29/24  Danny Jamison MD   Recommendation: He is post-transplant with stable liver graft function and normal Liver function, hence no concern for surgery from transplant standpoint. He need to continue his Immunosuppression  and avoid hepatotoxic medications. I will inform our transplant coordinator to give formal fitness letter from the hepatologist as his last visit was in 2021 per chart. We will check his Prograf levels during the post surgery to maintain adequate Immunosuppression     Postoperative hypothyroidism  Synthroid 88 mcg  TSH 4.4  T4 0.97    CKD (chronic kidney disease) stage 3, GFR 30-59 ml/min  GFR 38.5  BUN 33 Cr 1.8    Stages of  CKD discussed  Deleterious effects NSAID's , Beneficial effects of Hydration discussed   Tylenol as needed for pain   I suggest monitoring renal function, in put and out put status selam-operatively. I  suggest avoiding nephrotoxic medication including NSAIDs, COX2 inhibitors, intravenous contrast agent,avoiding hypotension to prevent further renal impairment.     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    PAF (paroxysmal atrial fibrillation)  EKG today    Basal cell carcinoma (BCC) of skin of nose  Surgery scheduled 9/6/24 closure Moh's procedure defect    Sore throat  Patient reports chronic throat pain - he uses magic mouthwash to help    Melena  Denies any black stools - states they are brown   No BBB per rectum    Elevated blood pressure reading  Today's /81 and repeat 173/87  Pt not able to take Morphine d/t driving and has pain   Pain 7-8  States Home BP when taking pain meds 120-130/60-80    Larynx cancer  Cancer care provided by [facility]    Squamous cell carcinoma of base of tongue  Cancer care provided by [facility]    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  Cancer care provided by [facility]      Preventive perioperative care    Thromboembolic prophylaxis:  His risk factors for thrombosis include surgical procedure, age, and reduced mobility.I suggest  thromboembolic prophylaxis ( mechanical/pharmacological, weighing the risk benefits of pharmacological agent use considering selam procedural bleeding )  during the  perioperative period.I suggested being active in the post operative period.      Postoperative pulmonary complication prophylaxis-Risk factors for post operative pulmonary complications include age over 65 years and ASA class >2- I suggest incentive spirometry use, early ambulation, and pain control so as to avoid diaphragmatic splinting  Brush teeth twice per day, oral rinses, sleep with the head of the bed up 30 degrees     Renal complication prophylaxis-Risk factors for renal complications include pre-existing renal disease and age . I suggest keeping him well hydrated and avoidance/ minimizing the use of  NSAID's,SKINNER 2 Inhibitors ,IV contrast if possible in the perioperative period.I suggested drinking 2 litre's of water a day      Surgical site Infection Prophylaxis-I  suggest appropriate antibiotic for Prophylaxis against Surgical site infections Shower with Hibiclens in the night before surgery and the morning of surgery        In view of BPH the patient  is at risk of postoperative urinary retention.  I suggest avoidance / minimizing the of  Benzodiazepines,Anticholinergic medication,antihistamines ( Benadryl) , if possible in the perioperative period. I suggest using the minimum possible use of opioids for the minimum period of time in the perioperative period. Benadryl avoidance suggested      This visit was focused on Preoperative evaluation, Perioperative Medical management, complication reduction plans. I suggest that the patient follows up with primary care or relevant sub specialists for ongoing health care.    I appreciate the opportunity to be involved in this patients care. Please feel free to contact me if there were any questions about this consultation.    Patient is pending optimization    Labs EKG    Liver transplant 2008    Hepatology E consult 8/29/24  Danny Jamison MD   Recommendation: He is post-transplant with stable liver graft function and normal Liver function, hence no  concern for surgery from transplant standpoint. He need to continue his Immunosuppression  and avoid hepatotoxic medications. I will inform our transplant coordinator to give formal fitness letter from the hepatologist as his last visit was in 2021 per chart. We will check his Prograf levels during the post surgery to maintain adequate Immunosuppression       I spent a total of 93 minutes on the day of the visit.This includes face to face time and non-face to face time preparing to see the patient (eg, review of tests), obtaining and/or reviewing separately obtained history, documenting clinical information in the electronic or other health record, independently interpreting results and communicating results to the patient/family/caregiver, or care coordinator.      Carl Cordero NP  Perioperative Medicine  [facility]   Pager [phone]

## 2024-08-29 NOTE — OUTPATIENT SUBJECTIVE & OBJECTIVE
Outpatient Subjective & Objective      Chief Complaint: Preoperative evaulation, perioperative medical management, and complication reduction plan.     Functional Capacity:  Able to climb a flight of stairs without CP SOB or Syncope.  Able to meet 4 METs      Anesthesia issues: BUT IS NECK BREATHER SECONDARY TO LARYNGECTOMY    Difficulty mouth opening: no  BUT IS NECK BREATHER SECONDARY TO LARYNGECTOMY    Steroid use in the last 12 months: N     Dental Issues:ALL TEETH OUT    Family anesthesia difficulty: None     Family Hx of Thrombosis N    Past Medical History:   Diagnosis Date    Acute conjunctivitis of right eye 09/12/2022    Atrial fibrillation     Basal cell carcinoma (BCC) in situ of skin 2012    3 on face, 2 on arm, removed by dermatology.     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 Underwent surgical resection of tongue and neck. s/p tracheostomy    Squamous cell carcinoma of skin of right temple 01/24/2024    right temple     Past Surgical History:   Procedure Laterality Date    COLONOSCOPY      COLONOSCOPY N/A 09/14/2023    Procedure: COLONOSCOPY;  Surgeon: Reyes Olivia MD;  Location: [address];  Service: Endoscopy;  Laterality: N/A;    CONJUNCTIVA BIOPSY Right 10/11/2022    Procedure: BIOPSY, CONJUNCTIVA;  Surgeon: Victor M Vasques MD;  Location: [facility];  Service: Ophthalmology;  Laterality: Right;    ESOPHAGOGASTRODUODENOSCOPY N/A 09/14/2023    Procedure: EGD (ESOPHAGOGASTRODUODENOSCOPY);  Surgeon:  Reyes Olivia MD;  Location: [facility] (2ND FLR);  Service: Endoscopy;  Laterality: N/A;    ESOPHAGOGASTRODUODENOSCOPY N/A 04/10/2024    Procedure: EGD (ESOPHAGOGASTRODUODENOSCOPY);  Surgeon: Reyes Pagan MD;  Location: [facility] ENDO (2ND FLR);  Service: Endoscopy;  Laterality: N/A;    ESOPHAGOGASTRODUODENOSCOPY N/A 06/24/2024    Procedure: EGD (ESOPHAGOGASTRODUODENOSCOPY);  Surgeon: Reyes Pagan MD;  Location: [facility] (2ND FLR);  Service: Endoscopy;  Laterality: N/A;  Ref By: Dr. Hawkins   Procedure: egd  Diagnosis: esophagitis  Procedure Timing: 10 weeks  Prep: standard prep  6/21-pt r/s-inst to portal-2nd floor criteria-labs within 90 daysMS    GLOSSECTOMY      INSERTION OF VENOUS ACCESS PORT Right 03/08/2021    Procedure: INSERTION, VENOUS ACCESS PORT;  Surgeon: Jesus Rendon MD;  [address];  Service: General;  Laterality: Right;    LARYNGECTOMY      LIVER TRANSPLANT  11/2008    transplanted for biopsy proven hepatocellular carcinoma,     LYMPH NODE BIOPSY N/A 09/18/2020    Procedure: BIOPSY, LYMPH NODE;  [facility];  [address];  Service: General;  Laterality: N/A;  Rm 173    skin cancer removals      SURGICAL REMOVAL OF SCAR Right 08/24/2023    Procedure: EXCISION, SCAR;  Surgeon: Ting Brambila MD;  Location: [facility];  Service: Ophthalmology;  Laterality: Right;    TRACHEOSTOMY         Review of Systems   Constitutional:  Positive for fatigue. Negative for chills, fever and unexpected weight change.   HENT:  Positive for voice change. Negative for trouble swallowing.         Unable to speak  Swallows soft foods only   Eyes:  Negative for photophobia and visual disturbance.        No acute visual changes   Respiratory:  Negative for cough, shortness of breath and wheezing.         No h/o FREEDOM   Cardiovascular:  Negative for chest pain, palpitations and leg swelling.   Gastrointestinal:  Negative for abdominal pain, blood in stool,  "constipation, diarrhea, nausea and vomiting.        No FLD,  No BRB or black tarry stool     Hepatitis, Cirrhosis and has had liver transplant   Genitourinary:  Negative for difficulty urinating, dysuria, frequency, hematuria and urgency.        Nocturia 1   Musculoskeletal:  Positive for back pain and neck pain. Negative for arthralgias, gait problem, myalgias and neck stiffness.   Neurological:  Negative for dizziness, seizures, syncope, light-headedness, numbness and headaches.   Psychiatric/Behavioral:  Negative for agitation, behavioral problems, confusion, decreased concentration, dysphoric mood, hallucinations, self-injury, sleep disturbance and suicidal ideas. The patient is not nervous/anxious and is not hyperactive.             VITALS  Visit Vitals  BP (!) 177/81 (BP Location: Right arm, Patient Position: Sitting)   Pulse 69   Temp 97.9 °F (36.6 °C) (Oral)   Ht 5' 8" (1.727 m)   Wt 59 kg (130 lb 1.1 oz)   SpO2 98%   BMI 19.78 kg/m²          Physical Exam  Constitutional:       General: He is not in acute distress.     Appearance: He is well-developed. He is not diaphoretic.   HENT:      Head: Normocephalic.      Right Ear: Hearing normal.      Left Ear: Hearing normal.      Nose: Nose normal.      Comments: Left nostril lesion noted     Mouth/Throat:      Lips: Pink.      Mouth: Mucous membranes are moist.      Comments: Patient has no tongue  Eyes:      General: Lids are normal.      Conjunctiva/sclera: Conjunctivae normal.      Pupils: Pupils are equal, round, and reactive to light.   Neck:      Vascular: No carotid bruit.      Trachea: Phonation normal. Tracheostomy present.      Comments: Scarring noted to neck  Trach noted and midline  Cardiovascular:      Rate and Rhythm: Normal rate and regular rhythm.      Pulses:           Carotid pulses are 2+ on the right side and 2+ on the left side.       Radial pulses are 2+ on the right side and 2+ on the left side.        Posterior tibial pulses are 2+ on " the right side and 2+ on the left side.      Heart sounds: Normal heart sounds. No murmur heard.     No friction rub. No gallop.   Pulmonary:      Effort: Pulmonary effort is normal.      Breath sounds: Normal breath sounds.      Comments: Clear and equal  Anterior and Posterior BBS  Abdominal:      General: Abdomen is protuberant. Bowel sounds are normal. There is no distension.      Palpations: Abdomen is soft.      Tenderness: There is no abdominal tenderness.   Musculoskeletal:         General: No tenderness or deformity. Normal range of motion.      Cervical back: Normal range of motion.      Right lower leg: No edema.      Left lower leg: No edema.   Lymphadenopathy:      Head:      Right side of head: No submental, submandibular, tonsillar, preauricular, posterior auricular or occipital adenopathy.      Left side of head: No submental, submandibular, tonsillar, preauricular, posterior auricular or occipital adenopathy.      Cervical:      Right cervical: No superficial cervical adenopathy.     Left cervical: No superficial cervical adenopathy.   Skin:     General: Skin is warm and dry.      Capillary Refill: Capillary refill takes 2 to 3 seconds.      Coloration: Skin is not pale.      Findings: No erythema or rash.   Neurological:      Mental Status: He is alert and oriented to person, place, and time.      GCS: GCS eye subscore is 4. GCS verbal subscore is 5. GCS motor subscore is 6.      Motor: No abnormal muscle tone.      Coordination: Coordination normal.   Psychiatric:         Attention and Perception: Attention and perception normal.         Mood and Affect: Mood and affect normal.         Speech: Speech normal.         Behavior: Behavior normal. Behavior is cooperative.         Thought Content: Thought content normal.         Cognition and Memory: Cognition and memory normal.         Judgment: Judgment normal.          Significant Labs:  Lab Results   Component Value Date    WBC 6.82 08/22/2024     "HGB 12.0 (L) 08/22/2024    HCT 35.7 (L) 08/22/2024     08/22/2024    CHOL 123 03/18/2022    TRIG 71 03/18/2022    HDL 53 03/18/2022    ALT 10 08/22/2024    AST 35 08/22/2024     08/22/2024    K 5.6 (H) 08/22/2024    CL 96 08/22/2024    CREATININE 1.8 (H) 08/22/2024    BUN 33 (H) 08/22/2024    CO2 23 08/22/2024    TSH 4.498 (H) 08/08/2024    PSA 0.85 03/18/2022    INR 1.0 09/02/2020    HGBA1C 4.8 12/11/2019       Diagnostic Studies: No relevant studies.    EKG:   Results for orders placed or performed in visit on 10/20/23   IN OFFICE EKG 12-LEAD (to [place])    Collection Time: 10/20/23  2:57 PM    Narrative    Test Reason : I48.0,    Vent. Rate : 080 BPM     Atrial Rate : 080 BPM     P-R Int : 174 ms          QRS Dur : 064 ms      QT Int : 366 ms       P-R-T Axes : 027 071 038 degrees     QTc Int : 422 ms    Sinus rhythm with Premature atrial complexes  Low septal forces  Abnormal ECG  When compared with ECG of 14-SEP-2023 08:34,  Premature atrial complexes are now Present  Confirmed by Sagar EVANS MD ([id number]) on 10/23/2023 4:20:47 PM    Referred By: GAURANG GOODRICH           Confirmed By:Sagar EVANS MD       2D ECHO:  TTE:  Results for orders placed or performed during the hospital encounter of 11/28/23   Echo   Result Value Ref Range    RA Width 3.33 cm    LA volume (mod) 44.31 cm3    Left Atrium Major Axis 5.02 cm    Left Atrium Minor Axis 5.19 cm    RA Major Axis 5.10 cm    LV Diastolic Volume 75.01 mL    LV Systolic Volume 34.87 mL    PV Peak D Blas 0.50 m/s    PV Peak S Blas 0.65 m/s    MV Peak A Blas 0.74 m/s    MV stenosis pressure 1/2 time 44.98 ms    TR Max Blas 2.59 m/s    MV Peak E Blas 0.56 m/s    PV mean gradient 1 mmHg    RVOT peak VTI 11.65 cm    RVOT peak blas 0.65 m/s    Ao VTI 33.97 cm    Ao peak blas 1.56 m/s    LVOT peak VTI 19.48 cm    LVOT peak blas 0.97 m/s    LVOT diameter 1.98 cm    MV "A" wave duration 12.56 msec    E wave deceleration time 155.12 msec    PV peak gradient 4     AV mean gradient 6 " mmHg    TAPSE 2.85 cm    RVDD 2.30 cm    Ascending aorta 3.10 cm    STJ 2.86 cm    Sinus 3.15 cm    LVIDs 3.00 2.1 - 4.0 cm    Posterior Wall 0.88 0.6 - 1.1 cm    IVS 0.77 0.6 - 1.1 cm    LVIDd 4.12 3.5 - 6.0 cm    PV PEAK VELOCITY 1.06 m/s    TDI LATERAL 0.08 m/s    LA WIDTH 3.49 cm    TDI SEPTAL 0.09 m/s    LV LATERAL E/E' RATIO 7.00 m/s    LV SEPTAL E/E' RATIO 6.22 m/s    FS 27 (A) 28 - 44 %    LV mass 102.24 g    Left Ventricle Relative Wall Thickness 0.43 cm    AV valve area 1.76 cm²    AV Velocity Ratio 0.62     AV index (prosthetic) 0.57     MV valve area p 1/2 method 4.89 cm2    E/A ratio 0.76     Mean e' 0.09 m/s    Pulm vein S/D ratio 1.30     LVOT area 3.1 cm2    LVOT stroke volume 59.95 cm3    AV peak gradient 10 mmHg    E/E' ratio 6.59 m/s    Triscuspid Valve Regurgitation Peak Gradient 27 mmHg    MARY by Velocity Ratio 1.91 cm²    BSA 1.62 m2    LV Systolic Volume Index 21.1 mL/m2    LV Diastolic Volume Index 45.46 mL/m2    LV Mass Index 62 g/m2    LA Volume Index (Mod) 26.9 mL/m2    ZLVIDS 0.35     ZLVIDD -1.17     TV resting pulmonary artery pressure 30 mmHg    RV TB RVSP 6 mmHg    Est. RA pres 3 mmHg    EF 60 %    LA Volume Index 32.1 mL/m2    LA volume 52.99 cm3    LA size 3.5 cm    Narrative      Left Ventricle: The left ventricle is normal in size. Normal wall   thickness. There is concentric remodeling. Normal wall motion. There is   normal systolic function. Ejection fraction by visual approximation is   60%. There is normal diastolic function.    Right Ventricle: Normal right ventricular cavity size. Wall thickness   is normal. Right ventricle wall motion  is normal. Systolic function is   normal.    Left Atrium: Left atrium is severely dilated.    Aortic Valve: The aortic valve appears bicuspid. There is mild   stenosis. Aortic valve area by VTI is 1.76 cm². Aortic valve peak velocity   is 1.56 m/s. Mean gradient is 6 mmHg. The dimensionless index is 0.57.    Mitral Valve: There is bileaflet  prolapse. There is moderate   regurgitation. There appear to be 2 separate jets.    Pulmonary Artery: The estimated pulmonary artery systolic pressure is   30 mmHg.    IVC/SVC: Normal venous pressure at 3 mmHg.         SHERI:  No results found. However, due to the size of the patient record, not all encounters were searched. Please check Results Review for a complete set of results.     Imaging     Active Cardiac Conditions: None      Revised Cardiac Risk Index   High -Risk Surgery  Intraperitoneal; Intrathoracic; suprainguinal vascular Yes- + 1 No- 0   History of Ischemic Heart Disease   (Hx of MI/positive exercise test/current chest pain due to ischemia/use of nitrate therapy/EKG with pathological Q waves) Yes- + 1 No- 0   History of CHF  (Pulmonary edema/bilateral rales or S3 gallop/PND/CXR showing pulmonary vascular redistribution) Yes- + 1 No- 0   History of CVA   (Prior stroke or TIA) Yes- + 1 No- 0   Pre-operative treatment with insulin Yes- + 1 No- 0   Pre-operative creatinine > 2mg/dl Yes- + 1 No- 0   Total:      Risk Status:  Estimated risk of cardiac complications after non-cardiac surgery using the Revised Cardiac Risk Index for Preoperative risk is 3.9 %      ARISCAT (Canet) risk index: Low: 1.6% risk of post-op pulmonary complications.    American Society of Anesthesiologists Physical Status classification (ASA): 3         No further cardiac workup needed prior to surgery.    Outpatient Subjective & Objective

## 2024-08-29 NOTE — HPI
This is a 74 y.o. 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   Patient is new to me.    The history has been obtained by speaking with the patient and reviewing the electronic medical record and/or outside health information.  Patient communicates with pen and paper.  Significant health conditions for the perioperative period are discussed below in assessment and plan.     Patient reports current health status to be Good.  Denies any new symptoms before surgery.

## 2024-08-29 NOTE — ASSESSMENT & PLAN NOTE
Component      Latest Ref Rn 8/22/2024   RBC      4.60 - 6.20 M/uL 3.44 (L)    Hemoglobin      14.0 - 18.0 g/dL 12.0 (L)    Hematocrit      40.0 - 54.0 % 35.7 (L)    MCV      82 - 98 fL 104 (H)    MCH      27.0 - 31.0 pg 34.9 (H)    MCHC      32.0 - 36.0 g/dL 33.6    RDW      11.5 - 14.5 % 12.9       Legend:  (L) Low  (H) High

## 2024-08-29 NOTE — PROGRESS NOTES
Presenting Hospital / Clinic: [facility]  Presenter: Dr Joyce  Date Presented to Tumor Board: 08/29/24  Specialties Present: Medical Oncology; Radiation Oncology; Pathology; Navigation; Radiology; Nutrition; Speech Pathology; Head and Neck  Cancer Type: Head and neck cancer (SCC)  Recommended Plan Note: PET reviewed. Proceed with radiation therapy. Consider systemic therapy. Monitor lung lesions, biopsy if progression.

## 2024-08-29 NOTE — DISCHARGE INSTRUCTIONS
Your surgery has been scheduled for:_______9/6/24___________________________________    You should report to:  ____[facility], located on the [place] side of the first floor of the           [facility] ([phone])  _X___[facility], located on the [facility] side of the            Second floor of the [facility] ([phone])  ____3rd Floor [facility] located on the [facility] side of the [facility] [phone]  ____[facility]: located at [address] [facility] [place] [address]. [address].     Please Note   Tell your doctor if you take Aspirin, products containing Aspirin, herbal medications  or blood thinners, such as Coumadin, Ticlid, or Plavix.  (Consult your provider regarding holding or stopping before surgery).  Arrange for someone to drive you home following surgery.  You will not be allowed to leave the surgical facility alone or drive yourself home following sedation and anesthesia.    Before Surgery  Stop taking all herbal medications, vitamins, and supplements 7 days prior to surgery  No Motrin/Advil (Ibuprofen) 7 days before surgery  No Aleve (Naproxen) 7 days before surgery   No Goody's/BC Powder 7 days before surgery  Refrain from drinking alcoholic beverages for 24 hours before and after surgery  Stop or limit smoking at least 24 hours prior to surgery  You may take Tylenol for pain    Night before Surgery  Do not eat or drink after midnight  Take a shower or bath (shower is recommended).  Bathe with Hibiclens soap or an antibacterial soap from the neck down.  If not supplied by your surgeon, hibiclens soap will need to be purchased over the counter in pharmacy.  Rinse soap off thoroughly.  Shampoo your hair with your regular shampoo    The Day of Surgery  Take another bath or shower with hibiclens or any antibacterial soap, to reduce the chance of infection.  Take heart and  blood pressure medications with a small sip of water, as advised by the perioperative team.  Do not take fluid pills  You may brush your teeth and rinse your mouth, but do not swallow any additional water.   Do not apply perfumes, powder, body lotions or deodorant on the day of surgery.  Nail polish should be removed.  Do not wear makeup or moisturizer  Wear comfortable clothes, such as a button front shirt and loose fitting pants.  Leave all jewelry, including body piercings, and valuables at home.    Bring any devices you will need after surgery such as crutches or canes.  If you have sleep apnea, please bring your CPAP machine  In the event that your physical condition changes including the onset of a cold or respiratory illness, or if you have to delay or cancel your surgery, please notify your surgeon.

## 2024-08-29 NOTE — Clinical Note
I put him on for TB today to chat. He has what appears to be extensive recurrence in his right orbit, and he is pretty symptomatic.  He also has a subcm lung nodule, which could be a met. Part of his recurrence is at the 1-2 oclock position of the right orbit, so the forehead will be in the field. He is set up for Mohs for a BCC of his left nasal ala next week.   How are you planning to recon? I am just trying to avoid radiation where he either recently had a flap taken or is about to have surgery.  One alternative, if in field, is postponing Mohs vs definitive RT. I could RT it while treating the orbit.

## 2024-08-29 NOTE — ASSESSMENT & PLAN NOTE
EKG   Vent. Rate : 087 BPM     Atrial Rate : 000 BPM      P-R Int : 000 ms          QRS Dur : 080 ms       QT Int : 398 ms       P-R-T Axes : 000 082 -48 degrees      QTc Int : 478 ms     Atrial fibrillation with premature ventricular or aberrantly conducted   complexes   Abnormal ECG   When compared with ECG of 09-JAN-2024 10:35,   Atrial fibrillation has replaced Sinus rhythm     Confirmed by Max Mensah MD ([id number]) on 8/29/2024 2:18:15 PM     Referred By: JACKSON GALLARDO           Confirmed By:Aristeo Mensah MD     Specimen Collected: 08/29/24 12:22 CDT Last Resulted: 08/29/24 14:18 CDT

## 2024-08-29 NOTE — ASSESSMENT & PLAN NOTE
Today's /81 and repeat 173/87  Pt not able to take Morphine d/t driving and has pain   Pain 7-8  States Home BP when taking pain meds 120-130/60-80

## 2024-08-29 NOTE — Clinical Note
Mr. Caceres's EKG resulted an it appears he is in Afib again.  I have placed a Cardiology consult to see if we can get him checked out prior to surgery.  Dr. East is his Cardiologist.  Thank you, Carl

## 2024-09-01 DIAGNOSIS — K12.30 MUCOSITIS: ICD-10-CM

## 2024-09-03 ENCOUNTER — PATIENT MESSAGE (OUTPATIENT)
Dept: DERMATOLOGY | Facility: CLINIC | Age: 74
End: 2024-09-03
Payer: MEDICARE

## 2024-09-03 ENCOUNTER — E-CONSULT (OUTPATIENT)
Dept: CARDIOLOGY | Facility: CLINIC | Age: 74
End: 2024-09-03
Payer: MEDICARE

## 2024-09-03 ENCOUNTER — HOSPITAL ENCOUNTER (OUTPATIENT)
Dept: RADIATION THERAPY | Facility: HOSPITAL | Age: 74
Discharge: HOME | End: 2024-09-03
Payer: MEDICARE

## 2024-09-03 ENCOUNTER — TELEPHONE (OUTPATIENT)
Dept: DERMATOLOGY | Facility: CLINIC | Age: 74
End: 2024-09-03
Payer: MEDICARE

## 2024-09-03 ENCOUNTER — TELEPHONE (OUTPATIENT)
Dept: CARDIOLOGY | Facility: CLINIC | Age: 74
End: 2024-09-03
Payer: MEDICARE

## 2024-09-03 DIAGNOSIS — Z01.818 PRE-OP TESTING: Primary | ICD-10-CM

## 2024-09-03 PROCEDURE — 99451 NTRPROF PH1/NTRNET/EHR 5/>: CPT | Mod: S$PBB,,, | Performed by: INTERNAL MEDICINE

## 2024-09-03 RX ORDER — OXYCODONE HYDROCHLORIDE 10 MG/1
10 TABLET ORAL EVERY 6 HOURS PRN
Qty: 30 TABLET | Refills: 0 | Status: SHIPPED | OUTPATIENT
Start: 2024-09-03

## 2024-09-03 NOTE — TELEPHONE ENCOUNTER
----- Message from Giacomo East MD sent at 9/3/2024  8:46 AM CDT -----  García Elizabeth tell pt that the ECG and see that he had some premature beats but no atrial fibrillation.  Carl',   I'll complete the e-consult later today. Thanks for sending it as an e-consult.  SARA

## 2024-09-03 NOTE — TELEPHONE ENCOUNTER
----- Message from Dominic Haque MD sent at 9/3/2024  3:53 PM CDT -----  Pl call pt and cancel his Mohs for left nasal ala this Thursday.  I was contacted by Dr. Joyce in Rad Onc as this patient has a symptomatic orbital malignancy recurrence and will need to start radiation asap for this.  We will have to reschedule once his radiation is complete.    Thanks.

## 2024-09-03 NOTE — TELEPHONE ENCOUNTER
Sent message to pt and confirmed with pt's brother. Cancelled Mohs on 9/5. Will reschedule once he finishes radiation. Confirmed understanding and thanked me for the call.

## 2024-09-06 ENCOUNTER — TELEPHONE (OUTPATIENT)
Dept: HEMATOLOGY/ONCOLOGY | Facility: CLINIC | Age: 74
End: 2024-09-06
Payer: MEDICARE

## 2024-09-10 ENCOUNTER — LAB VISIT (OUTPATIENT)
Dept: LAB | Facility: HOSPITAL | Age: 74
End: 2024-09-10
Attending: INTERNAL MEDICINE
Payer: MEDICARE

## 2024-09-10 ENCOUNTER — TELEPHONE (OUTPATIENT)
Dept: HEMATOLOGY/ONCOLOGY | Facility: CLINIC | Age: 74
End: 2024-09-10
Payer: MEDICARE

## 2024-09-10 DIAGNOSIS — C44.329 SQUAMOUS CELL CARCINOMA OF SKIN OF ORBIT: ICD-10-CM

## 2024-09-10 LAB
ALBUMIN SERPL BCP-MCNC: 3.3 G/DL (ref 3.5–5.2)
ALP SERPL-CCNC: 75 U/L (ref 55–135)
ALT SERPL W/O P-5'-P-CCNC: 6 U/L (ref 10–44)
ANION GAP SERPL CALC-SCNC: 15 MMOL/L (ref 8–16)
AST SERPL-CCNC: 19 U/L (ref 10–40)
BILIRUB SERPL-MCNC: 0.7 MG/DL (ref 0.1–1)
BUN SERPL-MCNC: 32 MG/DL (ref 8–23)
CALCIUM SERPL-MCNC: 10.3 MG/DL (ref 8.7–10.5)
CHLORIDE SERPL-SCNC: 93 MMOL/L (ref 95–110)
CO2 SERPL-SCNC: 29 MMOL/L (ref 23–29)
CREAT SERPL-MCNC: 1.8 MG/DL (ref 0.5–1.4)
ERYTHROCYTE [DISTWIDTH] IN BLOOD BY AUTOMATED COUNT: 11.8 % (ref 11.5–14.5)
EST. GFR  (NO RACE VARIABLE): 38.8 ML/MIN/1.73 M^2
GLUCOSE SERPL-MCNC: 96 MG/DL (ref 70–110)
HCT VFR BLD AUTO: 33 % (ref 40–54)
HGB BLD-MCNC: 11.4 G/DL (ref 14–18)
IMM GRANULOCYTES # BLD AUTO: 0.05 K/UL (ref 0–0.04)
MAGNESIUM SERPL-MCNC: 1.6 MG/DL (ref 1.6–2.6)
MCH RBC QN AUTO: 34.3 PG (ref 27–31)
MCHC RBC AUTO-ENTMCNC: 34.5 G/DL (ref 32–36)
MCV RBC AUTO: 99 FL (ref 82–98)
NEUTROPHILS # BLD AUTO: 9.7 K/UL (ref 1.8–7.7)
PLATELET # BLD AUTO: 190 K/UL (ref 150–450)
PMV BLD AUTO: 10.1 FL (ref 9.2–12.9)
POTASSIUM SERPL-SCNC: 4.2 MMOL/L (ref 3.5–5.1)
PROT SERPL-MCNC: 7.8 G/DL (ref 6–8.4)
RBC # BLD AUTO: 3.32 M/UL (ref 4.6–6.2)
SODIUM SERPL-SCNC: 137 MMOL/L (ref 136–145)
WBC # BLD AUTO: 12.13 K/UL (ref 3.9–12.7)

## 2024-09-10 PROCEDURE — 83735 ASSAY OF MAGNESIUM: CPT | Performed by: NURSE PRACTITIONER

## 2024-09-10 PROCEDURE — 36415 COLL VENOUS BLD VENIPUNCTURE: CPT | Mod: PO | Performed by: NURSE PRACTITIONER

## 2024-09-10 PROCEDURE — 85027 COMPLETE CBC AUTOMATED: CPT | Performed by: INTERNAL MEDICINE

## 2024-09-10 PROCEDURE — 80053 COMPREHEN METABOLIC PANEL: CPT | Performed by: INTERNAL MEDICINE

## 2024-09-11 ENCOUNTER — PATIENT MESSAGE (OUTPATIENT)
Dept: HEMATOLOGY/ONCOLOGY | Facility: CLINIC | Age: 74
End: 2024-09-11
Payer: MEDICARE

## 2024-09-11 DIAGNOSIS — K12.30 MUCOSITIS: ICD-10-CM

## 2024-09-12 ENCOUNTER — TUMOR BOARD CONFERENCE (OUTPATIENT)
Dept: OTOLARYNGOLOGY | Facility: CLINIC | Age: 74
End: 2024-09-12
Payer: MEDICARE

## 2024-09-12 ENCOUNTER — PATIENT MESSAGE (OUTPATIENT)
Dept: HEMATOLOGY/ONCOLOGY | Facility: CLINIC | Age: 74
End: 2024-09-12
Payer: MEDICARE

## 2024-09-12 ENCOUNTER — DOCUMENTATION ONLY (OUTPATIENT)
Dept: RADIATION ONCOLOGY | Facility: HOSPITAL | Age: 74
End: 2024-09-12
Payer: MEDICARE

## 2024-09-12 ENCOUNTER — HOSPITAL ENCOUNTER (OUTPATIENT)
Dept: RADIOLOGY | Facility: HOSPITAL | Age: 74
Discharge: HOME | End: 2024-09-12
Attending: STUDENT IN AN ORGANIZED HEALTH CARE EDUCATION/TRAINING PROGRAM
Payer: MEDICARE

## 2024-09-12 DIAGNOSIS — H05.9 UNSPECIFIED DISORDER OF ORBIT: Primary | ICD-10-CM

## 2024-09-12 DIAGNOSIS — H05.9 UNSPECIFIED DISORDER OF ORBIT: ICD-10-CM

## 2024-09-12 PROCEDURE — 25500020 PHARM REV CODE 255: Performed by: STUDENT IN AN ORGANIZED HEALTH CARE EDUCATION/TRAINING PROGRAM

## 2024-09-12 PROCEDURE — 70482 CT ORBIT/EAR/FOSSA W/O&W/DYE: CPT | Mod: 26,,, | Performed by: RADIOLOGY

## 2024-09-12 PROCEDURE — 70482 CT ORBIT/EAR/FOSSA W/O&W/DYE: CPT | Mod: TC

## 2024-09-12 RX ADMIN — IOHEXOL 75 ML: 350 INJECTION, SOLUTION INTRAVENOUS at 12:09

## 2024-09-12 NOTE — PROGRESS NOTES
Presenting Hospital / Clinic: [facility]  Presenter: Dr Joyce  Date Presented to Tumor Board: 09/12/24  Specialties Present: Medical Oncology; Radiation Oncology; Navigation; Pathology; Radiology; Head and Neck; Nutrition; Speech Pathology  Cancer Type: Head and neck cancer  Recommended Plan Note: Imaging reviewed. Recommend radiation and systemic therapy. If needs re-biopsy, can fall up with H&N.

## 2024-09-12 NOTE — TELEPHONE ENCOUNTER
Spoke to pt's brother on the phone about his chemo education class with Ct this afternoon. His brother stated that the pt was told to go home after his radiation today and not to worry about his appts with Ct later today. Ct's RN, Vinny, messaged with the pt and the brother through the pt portal to communicate that Ct wanted to see him for part of the appt time despite him receiving previous education.

## 2024-09-13 RX ORDER — OXYCODONE HYDROCHLORIDE 10 MG/1
10 TABLET ORAL EVERY 6 HOURS PRN
Qty: 60 TABLET | Refills: 0 | Status: SHIPPED | OUTPATIENT
Start: 2024-09-13

## 2024-09-17 NOTE — PROGRESS NOTES
Mr. Swain was to start his course of radiotherapy on 9/12. When he came to his first fraction, therapists noted change in anatomy compared to simulation and I was called before he was treated. There was a significant change in fullness in the right orbit. See media from 9/12 compared to 8/23 image. I obtained a CT orbits that day and reviewed this at H&N TB with concern for tumor progression. He has no systemic options other than phase 1 trial, for which he will be evaluated. I met with him on 9/12 and discussed that given rapid progression, I am concerned that his is not curable disease. And palliative intent of radiotherapy. Furthermore, after evaluating his CT orbit from that day, he has a progressive nodule behind the right maxilla that showed some uptake on PET/Ct and size progression. Discussed with neuroradiology and this is consistent with a metastatic deposit, not PNI or tati.     Given that this is symptomatic I planned to continue his radiotherapy. I discussed palliative intent. I had initially planned for 66-69.96 in 33, but given elsewhere progression, I have concerns that he will have distant progression during a protracted course of radiotherapy. I will plan for 5fx of radiation, attempting to dose escalate as much as OAR tolerances and prior RT allows. I will plan to encompass this metastatic deposit as the field is near his orbit targets. Will work to get him back with med onc to explore systemic therapy options.

## 2024-09-18 ENCOUNTER — TUMOR BOARD CONFERENCE (OUTPATIENT)
Dept: HEMATOLOGY/ONCOLOGY | Facility: CLINIC | Age: 74
End: 2024-09-18
Payer: MEDICARE

## 2024-09-18 NOTE — PROGRESS NOTES
[facility] Tumor Board    Date: 9/18/2024    Patient Name: Rocco Swain    MRN: [id number]    Diagnosis: Recurrent Orbital SCC - Diagnosed in 8/2023.    Referring Provider: Dr. Hernandez    Present PCTP Providers:     Dr. Martín Hernandez, Dr. Óscar Nieves, Una Hurt NP, Ct Francis NP    Patient Summary:  Pathology:    Has failed Chemotherapy  Failed chemotherapy: 1. Had induction chemo (Carbo+5FU+cetuximab) Feb-march 2024 x2 cycles>underwent orbital exenteration>MRI orbit on 8/12/24 showing evidence of recurrent disease. Was planning to start chemoRT but patient has had rapid clinical progression and proceeding with palliative RT alone.    Current treatment(s):    ECOG: Restricted in physically strenuous activity but ambulatory and able to carry out work of a light or sedentary nature, e.g., light house work, office work    Molecular Workup:            Board Recommendations:    Standard of care recommendations:     Trial recommendations: Late phase: no trials.   Early phase: no trials.   [facility] referral: No.

## 2024-09-20 ENCOUNTER — TELEPHONE (OUTPATIENT)
Dept: HEMATOLOGY/ONCOLOGY | Facility: CLINIC | Age: 74
End: 2024-09-20
Payer: MEDICARE

## 2024-09-20 NOTE — TELEPHONE ENCOUNTER
Spoke to the pt's brother to see if the pt was still coming in to see Dr. Hernandez today. Pt's brother stated that the pt indicated that the pt saw Dr. Hernandez at the same time as Dr. Joyce during his radiation appt. Dr. Hernandez stated that he did not see the pt with Dr. Joyce but that the pt's plan is focused on radiation right now so he doesn't need to see the medical oncology team today. Dr. Hernandez is requesting a follow up appt in 2-3 weeks with the pt. Called the brother back and relayed Dr. Hernandez's message to the pt's brother and the brother expressed concern that the pt didn't see Dr. Joyce after his radiation treatment. Told the pt's brother that I would ask the radiation team to confirm that he attended the appt and met with Dr. Joyce.    
You can access the FollowMyHealth Patient Portal offered by [facility] [place] by registering at the following website: [email or web address]. By joining [employer]’s FollowMyHealth portal, you will also be able to view your health information using other applications (apps) compatible with our system.

## 2024-09-23 ENCOUNTER — TELEPHONE (OUTPATIENT)
Dept: HEMATOLOGY/ONCOLOGY | Facility: CLINIC | Age: 74
End: 2024-09-23
Payer: MEDICARE

## 2024-09-23 NOTE — TELEPHONE ENCOUNTER
Spoke to pt's brother and confirmed that pt was seen by radiation on Friday and that the pt will return to see Ct and Dr. Hernandez on 10/7.

## 2024-09-24 RX ORDER — LEVOTHYROXINE SODIUM 88 UG/1
TABLET ORAL
Qty: 90 TABLET | Refills: 0 | Status: SHIPPED | OUTPATIENT
Start: 2024-09-24

## 2024-09-25 ENCOUNTER — OFFICE VISIT (OUTPATIENT)
Dept: OPHTHALMOLOGY | Facility: CLINIC | Age: 74
End: 2024-09-25
Payer: MEDICARE

## 2024-09-25 DIAGNOSIS — H25.812 COMBINED FORM OF AGE-RELATED CATARACT, LEFT EYE: ICD-10-CM

## 2024-09-25 DIAGNOSIS — H05.011: Primary | ICD-10-CM

## 2024-09-25 DIAGNOSIS — C44.329 SQUAMOUS CELL CARCINOMA OF SKIN OF ORBIT: ICD-10-CM

## 2024-09-25 PROCEDURE — 92285 EXTERNAL OCULAR PHOTOGRAPHY: CPT | Mod: PBBFAC | Performed by: OPHTHALMOLOGY

## 2024-09-25 PROCEDURE — 99214 OFFICE O/P EST MOD 30 MIN: CPT | Mod: S$PBB,,, | Performed by: OPHTHALMOLOGY

## 2024-09-25 PROCEDURE — 99213 OFFICE O/P EST LOW 20 MIN: CPT | Mod: PBBFAC | Performed by: OPHTHALMOLOGY

## 2024-09-25 PROCEDURE — 99999 PR PBB SHADOW E&M-EST. PATIENT-LVL III: CPT | Mod: PBBFAC,,, | Performed by: OPHTHALMOLOGY

## 2024-09-25 RX ORDER — SULFAMETHOXAZOLE AND TRIMETHOPRIM 800; 160 MG/1; MG/1
1 TABLET ORAL 2 TIMES DAILY
Qty: 14 TABLET | Refills: 0 | Status: SHIPPED | OUTPATIENT
Start: 2024-09-25

## 2024-09-25 RX ORDER — SULFAMETHOXAZOLE AND TRIMETHOPRIM 800; 160 MG/1; MG/1
1 TABLET ORAL 2 TIMES DAILY
Qty: 14 TABLET | Refills: 0 | Status: SHIPPED | OUTPATIENT
Start: 2024-09-25 | End: 2024-09-25

## 2024-09-26 ENCOUNTER — OFFICE VISIT (OUTPATIENT)
Dept: CARDIOLOGY | Facility: CLINIC | Age: 74
End: 2024-09-26
Payer: MEDICARE

## 2024-09-26 VITALS
DIASTOLIC BLOOD PRESSURE: 60 MMHG | HEIGHT: 68 IN | WEIGHT: 111.13 LBS | BODY MASS INDEX: 16.84 KG/M2 | SYSTOLIC BLOOD PRESSURE: 100 MMHG | HEART RATE: 92 BPM

## 2024-09-26 DIAGNOSIS — I95.9 HYPOTENSION, UNSPECIFIED HYPOTENSION TYPE: ICD-10-CM

## 2024-09-26 DIAGNOSIS — Q23.1 BICUSPID AORTIC VALVE: Primary | ICD-10-CM

## 2024-09-26 DIAGNOSIS — I34.0 NONRHEUMATIC MITRAL VALVE REGURGITATION: ICD-10-CM

## 2024-09-26 DIAGNOSIS — Z94.4 STATUS POST LIVER TRANSPLANTATION: ICD-10-CM

## 2024-09-26 PROBLEM — Q23.81 BICUSPID AORTIC VALVE: Status: ACTIVE | Noted: 2024-09-26

## 2024-09-26 PROCEDURE — 99213 OFFICE O/P EST LOW 20 MIN: CPT | Mod: PBBFAC,PO | Performed by: INTERNAL MEDICINE

## 2024-09-26 PROCEDURE — 99214 OFFICE O/P EST MOD 30 MIN: CPT | Mod: S$PBB,,, | Performed by: INTERNAL MEDICINE

## 2024-09-26 PROCEDURE — 99999 PR PBB SHADOW E&M-EST. PATIENT-LVL III: CPT | Mod: PBBFAC,,, | Performed by: INTERNAL MEDICINE

## 2024-09-26 RX ORDER — TRAMADOL HYDROCHLORIDE 50 MG/1
50 TABLET ORAL
COMMUNITY
Start: 2024-01-18

## 2024-09-26 NOTE — PATIENT INSTRUCTIONS
You certainly do not need to have this device but at I would like you to know about it:  [employer] also known as [employer] makes a device that you can use to check your heart rhythm. The florecita analyses the heart rhythm and is accurate more than 90% of the time in detecting atrial fibrillation.  It also integrates with the [facility] florecita allowing you to send the tracing to your physician.  We are however unable to respond in real-time to these messages.          There are several watches made by [employer], [employer] and [employer] that record and analyze your heart rhythm - particularly useful for detecting atrial fibrillation. The reading is sent to your phone. The watches have the advantage of built in arrhythmia (irregular heart beat detection) so they can alert you to atrial fibrillation even if you don't feel it.  If the watch alerts you to an arrhythmia, you should obtain a tracing.

## 2024-09-26 NOTE — PROGRESS NOTES
Subjective:   Chief Complaint:  Rocco Swain is a 74 y.o. male who presents for follow-up of Atrial Fibrillation and Mitral Valve Prolapse      Problem List and HPI:   Squamous cell carcinoma head and neck  S/p trach  Liver transplant 2008 in [place]  Atrial fib 9/2023  AS - bicuspid aortic valve  Mitral regurgitation  - prolapse  Enucleation right eye  Weight loss      We communicate in writing. He does not report angina or shortness of breath with exertion. In 8/2024 ECG obtained prior to him undergoing Mohs surgery was read as atrial fibrillation.  I reviewed the images and felt that there were PACs and PVCs.  He was cleared to undergo the surgery and no anticoagulation was recommended.  He does not report palpitations.  He states that he is slightly dizzy sometimes.  He feels comfortable driving.  He has lost a lot odf weight over the years.  He does not like to eat solid food.  He supplements his diet with protein drinks.        Review of patient's allergies indicates:   Allergen Reactions    Aspirin     Tylenol extended release         Current Outpatient Medications   Medication Sig    gabapentin (NEURONTIN) 100 MG capsule Take 1 capsule (100 mg total) by mouth 3 (three) times daily.    levothyroxine (SYNTHROID) 88 MCG tablet TAKE 1 TABLET BY MOUTH ONCE  DAILY    magic mouthwash diphen/antac/lidoc/nysta Take 10 mLs by mouth 4 (four) times daily.    morphine (MS CONTIN) 30 MG 12 hr tablet Take 1 tablet (30 mg total) by mouth 2 (two) times daily.    multivit-min/FA/lycopen/lutein (CENTRUM SILVER MEN ORAL) Take 1 tablet by mouth once daily.    ondansetron (ZOFRAN-ODT) 8 MG TbDL Take 1 tablet (8 mg total) by mouth every 8 (eight) hours as needed (nausea - first choice).    oxyCODONE (ROXICODONE) 10 mg Tab immediate release tablet Take 1 tablet (10 mg total) by mouth every 6 (six) hours as needed for Pain.    prochlorperazine (COMPAZINE) 10 MG tablet Take 1 tablet (10 mg total) by mouth every 6 (six) hours as  "needed (nausea/vomiting - second choice).    senna (SENOKOT) 8.6 mg tablet Take 2 tablets by mouth 2 (two) times daily as needed for Constipation.    sulfamethoxazole-trimethoprim 800-160mg (BACTRIM DS) 800-160 mg Tab Take 1 tablet by mouth 2 (two) times daily.    tacrolimus (PROGRAF) 0.5 MG Cap Take 1 capsule (0.5 mg total) by mouth every EVENING.  (Use the 1mg capsule for your morning dose)    tacrolimus (PROGRAF) 1 MG Cap Take 1 capsule (1 mg total) by mouth every MORNING. Use the tacrolimus 0.5mg capsule for your evening dose as directed.    traMADoL (ULTRAM) 50 mg tablet Take 50 mg by mouth as needed for Pain.    traZODone (DESYREL) 50 MG tablet TAKE 1 TABLET BY MOUTH IN THE  EVENING     No current facility-administered medications for this visit.     Facility-Administered Medications Ordered in Other Visits   Medication    ofloxacin 0.3 % ophthalmic solution 1 drop    sodium chloride 0.9% flush 10 mL    sodium chloride 0.9% flush 10 mL       Social history:  Rocco Swain  reports that he has never smoked. He has never used smokeless tobacco. He reports current alcohol use. He reports that he does not currently use drugs.      Objective:   /60   Pulse 92   Ht 5' 8" (1.727 m)   Wt 50.4 kg (111 lb 1.8 oz)   BMI 16.89 kg/m²    Physical Exam  Constitutional:       Appearance: He is well-developed.      Comments:      HENT:      Head: Normocephalic.   Eyes:      Comments: Right eye enucleated.   Neck:      Vascular: No JVD.   Cardiovascular:      Rate and Rhythm: Normal rate and regular rhythm.      Pulses:           Radial pulses are 2+ on the right side and 2+ on the left side.      Heart sounds: S1 normal and S2 normal. Murmur heard.      Harsh midsystolic murmur is present with a grade of 2/6 at the upper left sternal border radiating to the neck.      High-pitched blowing holosystolic murmur of grade 2/6 is also present at the apex.   Pulmonary:      Breath sounds: Normal breath sounds.   Chest:     "  Chest wall: There is no dullness to percussion.   Abdominal:      Palpations: Abdomen is soft. There is no hepatomegaly, splenomegaly or mass.   Musculoskeletal:      Right lower leg: No edema.      Left lower leg: No edema.   Skin:     Findings: Ecchymosis (on both arms) present. No bruising.      Nails: There is no clubbing.   Neurological:      Mental Status: He is alert and oriented to person, place, and time.      Gait: Gait normal.   Psychiatric:         Speech: Speech normal.         Behavior: Behavior normal.         Lipids:  Recent Labs   Lab 03/18/22  1114   LDL Cholesterol 55.8 L   HDL 53   Cholesterol 123      Renal:  Recent Labs   Lab 09/10/24  1123   Creatinine 1.8 H   Potassium 4.2   CO2 29   BUN 32 H     Liver:  Recent Labs   Lab 09/10/24  1123   AST 19   ALT 6 L     CBC:  Lab Results   Component Value Date    WBC 12.13 09/10/2024    HGB 11.4 (L) 09/10/2024    HCT 33.0 (L) 09/10/2024    MCV 99 (H) 09/10/2024     09/10/2024           Results for orders placed during the hospital encounter of 11/28/23    Echo    Interpretation Summary    Left Ventricle: The left ventricle is normal in size. Normal wall thickness. There is concentric remodeling. Normal wall motion. There is normal systolic function. Ejection fraction by visual approximation is 60%. There is normal diastolic function.    Right Ventricle: Normal right ventricular cavity size. Wall thickness is normal. Right ventricle wall motion  is normal. Systolic function is normal.    Left Atrium: Left atrium is severely dilated.    Aortic Valve: The aortic valve appears bicuspid. There is mild stenosis. Aortic valve area by VTI is 1.76 cm². Aortic valve peak velocity is 1.56 m/s. Mean gradient is 6 mmHg. The dimensionless index is 0.57.    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 There is moderate regurgitation. There appear to be 2 separate jets.    Pulmonary Artery: The estimated pulmonary artery systolic pressure is 30 mmHg.    IVC/SVC:  Normal venous pressure at 3 mmHg.       This is the rhythm strip for the ECG from 8/29/2024.  P-waves are clearly visible.  There is no atrial fibrillation.          Assessment and Plan:       ICD-10-CM ICD-9-CM   1. Bicuspid aortic valve  Q23.1 746.4   2. Nonrheumatic mitral valve regurgitation  I34.0 424.0   3. Hypotension, unspecified hypotension type  I95.9 458.9   4. Status post liver transplantation - 2008  Z94.4 V42.7        Bicuspid aortic valve with mild aortic stenosis.  He also has mitral valve prolapse with the moderate mitral regurgitation.  Last echocardiogram was in 11/2023.  He does not report shortness of breath with exertion and does not have any signs of heart failure on examination.  Will repeat an echocardiogram in 1 year.  He has minimally symptomatic hypotension.  I discussed using midodrine p.r.n. or daily at a later date.    Orders placed during this encounter:     Bicuspid aortic valve  -     Echo; Future; Expected date: 09/26/2025    Nonrheumatic mitral valve regurgitation  -     Echo; Future; Expected date: 09/26/2025    Hypotension, unspecified hypotension type    Status post liver transplantation - 2008         No follow-ups on file.

## 2024-09-27 DIAGNOSIS — H57.11 PAIN OF RIGHT EYE: Primary | ICD-10-CM

## 2024-09-30 ENCOUNTER — PATIENT MESSAGE (OUTPATIENT)
Dept: TRANSPLANT | Facility: CLINIC | Age: 74
End: 2024-09-30
Payer: MEDICARE

## 2024-09-30 DIAGNOSIS — Z94.4 LIVER TRANSPLANTED: ICD-10-CM

## 2024-09-30 NOTE — TELEPHONE ENCOUNTER
Writer sent patient my chart message with below Tac dose adjustment        ----- Message from Cornell Anton MD sent at 9/30/2024  1:40 PM CDT -----  Thank you Don    He is already on a low dose but we can reduce even more to 0.5 mg BID.    Neal Anton  ----- Message -----  From: Oli Joyce Jr., MD  Sent: 9/19/2024   1:10 PM CDT  To: MD Dr. Sloane Streeter,     I am not sure if you are still following with Mr. Swain, saw that you had refilled his prograft. Unfortunately, he has a rapidly progressing recurrence of his conjunctival squamous cell carcinoma. Unsure if he is able to reduce his level of immunosuppression. But wanted to reach out and keep his transplanted team updated.     Tian Joyce

## 2024-10-01 RX ORDER — TACROLIMUS 0.5 MG/1
0.5 CAPSULE ORAL EVERY 12 HOURS
Qty: 180 CAPSULE | Refills: 3 | Status: SHIPPED | OUTPATIENT
Start: 2024-10-01 | End: 2025-10-01

## 2024-10-04 NOTE — PROGRESS NOTES
[facility] Follow Up Note      Assessment:  Rocco Swain is a 74 y.o. 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 He is s/p orbital exenteration 4/25/24 (1mm SM, +PNI) with no adjuvant therapy. He now presents with a local recurrence with rapid progression, s/p 32.5 Gy in 5 fractions to the right orbital mass completed 9/23/24.   Response on my exam, notably in the posterior orbit and along the 1 oclock lesion in the orbit. Pain improved.   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 He developed an unresectable recurrence and was treated with multiple lines of systemic therapy. Last on pembro 2023.  Subcm apical LLL nodule. Suspicious for metastasis  Planning for Mohs to left nasal ala, biopsied as BCC  Liver transplant on tacrolimus.   ECOG: (1) Restricted in physically strenuous activity, ambulatory and able to do work of light nature        Plan:  He has CT orbits scheduled  Will add CT chest   Discussed miralax daily. Recommend enema. Also consideration of mag citrate   RTC week of 11/11 or 11/18 (after his trip) or sooner PRN.         Oncologic History:  h/o liver transplantation in 2008 due to hepatitis C, currently on Tacrolimus. He has two head and neck primaries:     1) R/M SCC of the BOT, HPV+  Diagnosis: 2511-8175 with locally advanced disease (unknown stage)  2016, definitive CRT with persistence disease  Base of tongue CRT at [place], [place].   2016/2017, total laryngectomy + glossectomy in [place]   2020, developed local unresectable recurrence (biopsy-proven p16+ SCC).   Oct 2020 - Aug 2021, started PCC followed by maintenance cetuximab with PD in August 2021  Sep 2021, started carboplatin, 5-FU, and pembrolizumab x 2 cycles with limiting toxicity attributed to chemo.  Transitioned to pembrolizumab alone (no signs for liver rejection). Added chemotherapy again to the cycle 5  March 2022 to December 2023, pembrolizumab as single agent (no signs for liver rejection)    2) SCC of the conjunctivae   Initial diagnosis: April 2022  Eye lesions grew despite pembrolizumab  10/19/2022, local excision at [facility] (Dr Victor M Vasques)  08/31/2023, recurrence s/p local excision at [facility] (Dr Victor M Vasques)  October - December 2023, topical MMC without response  1/19/2024, he was seen as a consult at [facility]. As he progressed on IO in the past, he was recommended systemic therapy with chemotherapy (platinum doublet, such as carboplatin and 5-FU) and cetuximab. He started this at [facility].   4/24/24: MRI orbits ([facility]) with sight increase in size of the mass (2.6 cm) in the inferonasal right conjunctiva with extension into the post septal extraconal space. PPF, cavernous sinuses, and Meckel's caves are within normal limits. No adenopathy.  4/25/2024: right eyelid sparing orbital exenteration and Repair of defect right socket with adjacent tissue transfer using upper and lower eyelid skin at [facility].  Path: moderate to poorly differentiated squamous cell carcinoma involving conjunctiva of upper and lower eyelids, fibroconnective tissue and skeletal muscle. +PNI, 2 foci, largest nerve 0.06mm. margins negative but close at 1.2mm.  No adjuvant RT   8/12/24: MRI orbits s/p right orbital mass resection with orbital exenteration. Nodular enhancing soft tissue superficially at the superomedial orbit and along the medial wall of the orbit posteriorly at least concerning for underlying recurrent lesion.   8/27/24: PET/CT with FDG avid right orbital mass consistent with disease recurrence. New pulmonary nodules concerning for metastasis  9/12/24: CT orbits with progression and new 1.1 cm round enhancing focus posterior to the right maxilla, concerning for metastasis  9/17/24 - 9/23/24: 25-32.5 Gy in 5 fractions to  right orbital mass and lesion posterior to the right maxilla       Possibility of pregnancy: N/A  History of prior irradiation: Yes - as above  History of prior systemic anti-cancer therapy: Yes - as above  History of collagen vascular disease: No  Implanted electronic device (pacer/defib/nerve stimulator): No    Interval History:   Rocco Swain presents today for follow up.     Per patient, pain improved, still requiring less morphine. No drainage for past 2 days but was given abx. No other concerns other than constipation, last normal BM 7 days ago. Has had small BM since. He is going to [facility] 11/5 - 11/8. Would like to do scans and visit after.       Review of Systems:  ROS  as above    Social History:  Social History     Tobacco Use    Smoking status: Never    Smokeless tobacco: Never   Substance Use Topics    Alcohol use: Yes     Comment: drinks wine, 1 glass day    Drug use: Not Currently         Medications:  Current Outpatient Medications on File Prior to Visit   Medication Sig Dispense Refill    gabapentin (NEURONTIN) 100 MG capsule Take 1 capsule (100 mg total) by mouth 3 (three) times daily. 90 capsule 11    levothyroxine (SYNTHROID) 88 MCG tablet TAKE 1 TABLET BY MOUTH ONCE  DAILY 90 tablet 0    magic mouthwash diphen/antac/lidoc/nysta Take 10 mLs by mouth 4 (four) times daily. 120 mL 4    multivit-min/FA/lycopen/lutein (CENTRUM SILVER MEN ORAL) Take 1 tablet by mouth once daily.      ondansetron (ZOFRAN-ODT) 8 MG TbDL Take 1 tablet (8 mg total) by mouth every 8 (eight) hours as needed (nausea - first choice). 60 tablet 4    oxyCODONE (ROXICODONE) 10 mg Tab immediate release tablet Take 1 tablet (10 mg total) by mouth every 6 (six) hours as needed for Pain. 60 tablet 0    prochlorperazine (COMPAZINE) 10 MG tablet Take 1 tablet (10 mg total) by mouth every 6 (six) hours as needed (nausea/vomiting - second choice). 30 tablet 1    senna (SENOKOT) 8.6 mg tablet Take 2 tablets by mouth 2 (two) times daily as  needed for Constipation. 60 tablet 2    sulfamethoxazole-trimethoprim 800-160mg (BACTRIM DS) 800-160 mg Tab Take 1 tablet by mouth 2 (two) times daily. 14 tablet 0    tacrolimus (PROGRAF) 0.5 MG Cap Take 1 capsule (0.5 mg total) by mouth every 12 (twelve) hours. 180 capsule 3    traMADoL (ULTRAM) 50 mg tablet Take 50 mg by mouth as needed for Pain.      traZODone (DESYREL) 50 MG tablet TAKE 1 TABLET BY MOUTH IN THE  EVENING 90 tablet 3     Current Facility-Administered Medications on File Prior to Visit   Medication Dose Route Frequency Provider Last Rate Last Admin    ofloxacin 0.3 % ophthalmic solution 1 drop  1 drop Right Eye On Call Procedure Victor M Vasques MD   2 drop at 10/11/22 0745    sodium chloride 0.9% flush 10 mL  10 mL Intravenous PRN Ronald Berg MD        sodium chloride 0.9% flush 10 mL  10 mL Intravenous PRN Victor M Vasques MD           Allergies:  Review of patient's allergies indicates:   Allergen Reactions    Aspirin     Tylenol extended release        Exam:  There were no vitals filed for this visit.      Constitutional: Pleasant 74 y.o. male in no acute distress.  Well nourished. Well groomed.   HEENT: see media today. He is s/p exent with a palpable mass in the ~1 o'clock position with overlying skin intact without erythema or ulceration. He also notes pain with palpation of the bony orbit Sensation. Decreased in right V1-2, intact to light touch in V3  Lymph: heavily pretreated neck, with such limitation appreciated cervical, pre auricular, facial adenopathy. + helio tube  Cardiovascular: Upper extremities warm to touch  Lungs: No audible wheezing.  Normal effort.   Musculoskeletal: No gross MSK deformities. Ambulates well  Skin: No rashes appreciated.  Psych: Alert and oriented with appropriate mood and affect.  Neuro: as above, otherwise  Grossly normal.    Data Review:    Information obtained via chart review and discussion with Mr. Swain.             Oli Joyce,  MD  Radiation Oncology

## 2024-10-07 ENCOUNTER — OFFICE VISIT (OUTPATIENT)
Dept: RADIATION ONCOLOGY | Facility: CLINIC | Age: 74
End: 2024-10-07
Payer: MEDICARE

## 2024-10-07 VITALS
SYSTOLIC BLOOD PRESSURE: 102 MMHG | HEART RATE: 93 BPM | BODY MASS INDEX: 16.97 KG/M2 | HEIGHT: 68 IN | DIASTOLIC BLOOD PRESSURE: 67 MMHG | OXYGEN SATURATION: 95 % | WEIGHT: 112 LBS | TEMPERATURE: 98 F

## 2024-10-07 DIAGNOSIS — R91.1 SOLITARY PULMONARY NODULE: Primary | ICD-10-CM

## 2024-10-07 PROCEDURE — 99999 PR PBB SHADOW E&M-EST. PATIENT-LVL III: CPT | Mod: PBBFAC,,, | Performed by: STUDENT IN AN ORGANIZED HEALTH CARE EDUCATION/TRAINING PROGRAM

## 2024-10-07 PROCEDURE — 99213 OFFICE O/P EST LOW 20 MIN: CPT | Mod: PBBFAC | Performed by: STUDENT IN AN ORGANIZED HEALTH CARE EDUCATION/TRAINING PROGRAM

## 2024-10-07 PROCEDURE — 99024 POSTOP FOLLOW-UP VISIT: CPT | Mod: S$PBB,,, | Performed by: STUDENT IN AN ORGANIZED HEALTH CARE EDUCATION/TRAINING PROGRAM

## 2024-10-07 NOTE — Clinical Note
Clinically things look better. The mass in posterior orbit and at ~1 oclock are both smaller. He had no acute tox other than some dermatitis. He has a car show in [place] on 11/5-8. I recommended he go. You ok to see him when he gets back, 11-11 week? Will need to move that CT orbit

## 2024-10-08 DIAGNOSIS — G89.3 CANCER RELATED PAIN: ICD-10-CM

## 2024-10-08 RX ORDER — MORPHINE SULFATE 30 MG/1
30 TABLET, FILM COATED, EXTENDED RELEASE ORAL 2 TIMES DAILY
Qty: 60 TABLET | Refills: 0 | Status: CANCELLED | OUTPATIENT
Start: 2024-10-08 | End: 2024-11-07

## 2024-10-09 DIAGNOSIS — G89.3 CANCER RELATED PAIN: ICD-10-CM

## 2024-10-09 RX ORDER — MORPHINE SULFATE 30 MG/1
30 TABLET, FILM COATED, EXTENDED RELEASE ORAL 2 TIMES DAILY
Qty: 60 TABLET | Refills: 0 | Status: SHIPPED | OUTPATIENT
Start: 2024-10-09 | End: 2024-11-09

## 2024-10-17 ENCOUNTER — OFFICE VISIT (OUTPATIENT)
Dept: OPHTHALMOLOGY | Facility: CLINIC | Age: 74
End: 2024-10-17
Payer: MEDICARE

## 2024-10-17 DIAGNOSIS — H52.12: ICD-10-CM

## 2024-10-17 DIAGNOSIS — C44.329 SQUAMOUS CELL CARCINOMA OF SKIN OF ORBIT: Primary | ICD-10-CM

## 2024-10-17 PROCEDURE — 99214 OFFICE O/P EST MOD 30 MIN: CPT | Mod: S$PBB,,, | Performed by: OPHTHALMOLOGY

## 2024-10-17 PROCEDURE — 99999 PR PBB SHADOW E&M-EST. PATIENT-LVL III: CPT | Mod: PBBFAC,,, | Performed by: OPHTHALMOLOGY

## 2024-10-17 PROCEDURE — 92285 EXTERNAL OCULAR PHOTOGRAPHY: CPT | Mod: PBBFAC | Performed by: OPHTHALMOLOGY

## 2024-10-17 PROCEDURE — 99213 OFFICE O/P EST LOW 20 MIN: CPT | Mod: PBBFAC | Performed by: OPHTHALMOLOGY

## 2024-10-17 NOTE — PROGRESS NOTES
HPI    PT here for 3 wk follow up on orbital socket pain  States he is doing fine as of now, no issues or concerns at the moment.   States that OS is exhibiting some mucous and discharge at the moment   toward the bottom   Last edited by Zina Magallanes on 10/17/2024  3:48 PM.            Assessment /Plan     For exam results, see Encounter Report.    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  He has undergone radiation treatment which has significantly proved his underlying symptoms of purulent discharge and mass effect within the right socket.  At this time, he does not have any complaints in regard to the right socket.  He he is having some blurry vision of the left eye which has been ongoing for many years.     On exam, the patient has a well-healing right socket status post exenteration.  There is no purulent discharge upon palpation of the socket.  There is relative right V1 hypesthesia compared to the left side. There is tr. Inf. Spk of the left eye.     The patient continues to be bothered by blurry vision on the left side.  He has some dryness on the left eye.  I advised him to use some artificial tears twice daily for the near future.  We will set him up for a refraction so that he can get some glasses.    Return prn

## 2024-10-18 ENCOUNTER — TELEPHONE (OUTPATIENT)
Dept: OPTOMETRY | Facility: CLINIC | Age: 74
End: 2024-10-18
Payer: MEDICARE

## 2024-10-21 ENCOUNTER — OFFICE VISIT (OUTPATIENT)
Dept: OPHTHALMOLOGY | Facility: CLINIC | Age: 74
End: 2024-10-21
Payer: MEDICARE

## 2024-10-21 DIAGNOSIS — C44.329 SQUAMOUS CELL CARCINOMA OF SKIN OF ORBIT: ICD-10-CM

## 2024-10-21 DIAGNOSIS — H52.7 REFRACTIVE ERROR: ICD-10-CM

## 2024-10-21 DIAGNOSIS — H25.12 NUCLEAR SCLEROSIS OF LEFT EYE: Primary | ICD-10-CM

## 2024-10-21 DIAGNOSIS — H26.9 CORTICAL CATARACT OF LEFT EYE: ICD-10-CM

## 2024-10-21 PROCEDURE — 99999 PR PBB SHADOW E&M-EST. PATIENT-LVL III: CPT | Mod: PBBFAC,,, | Performed by: OPHTHALMOLOGY

## 2024-10-21 PROCEDURE — 99213 OFFICE O/P EST LOW 20 MIN: CPT | Mod: PBBFAC,PO | Performed by: OPHTHALMOLOGY

## 2024-10-21 PROCEDURE — 92014 COMPRE OPH EXAM EST PT 1/>: CPT | Mod: S$PBB,,, | Performed by: OPHTHALMOLOGY

## 2024-10-21 NOTE — PROGRESS NOTES
Subjective:       Patient ID: Rocco Swain is a 74 y.o. male.    Chief Complaint: Cataract    HPI    Dr. Baptiste    S/p Squamous cell carcinoma/exenteration of right orbit 4/25/24 Dr. Oliva RIBEIRO/lamont VIK/AMT OD 8/24/23 Dr. Brambila  S/p conj biopsy OD 10/11/22 Dr. Saba  Cataract OS    Patient here for OS cataract evaluation. States he can't drive at night   due to glare. Denies pain in the left eye. Denies diplopia and floaters,   occasionally sees flashes OS. States he has blurry vision in the left eye   and was told her should see Dr Vasques for Cat eval       Last edited by Ping Bowser on 10/21/2024 11:30 AM.             Assessment:       1. Nuclear sclerosis of left eye    2. Cortical cataract of left eye    3. 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 Refractive error        Plan:       Cataract OS- Not visually significant per Pt at this time.    Squamous cell CA of skin of orbit s/p Exenteration per-Doing well.  RE-Pt wants MRx.      RTC 1 wk for MRx OS.

## 2024-10-22 ENCOUNTER — PATIENT MESSAGE (OUTPATIENT)
Dept: RESEARCH | Facility: HOSPITAL | Age: 74
End: 2024-10-22
Payer: MEDICARE

## 2024-10-22 ENCOUNTER — OFFICE VISIT (OUTPATIENT)
Dept: PALLIATIVE MEDICINE | Facility: CLINIC | Age: 74
End: 2024-10-22
Payer: MEDICARE

## 2024-10-22 VITALS
HEART RATE: 72 BPM | DIASTOLIC BLOOD PRESSURE: 67 MMHG | WEIGHT: 112.44 LBS | BODY MASS INDEX: 17.1 KG/M2 | SYSTOLIC BLOOD PRESSURE: 105 MMHG | OXYGEN SATURATION: 95 %

## 2024-10-22 DIAGNOSIS — C32.9 LARYNX CANCER: ICD-10-CM

## 2024-10-22 DIAGNOSIS — Z90.02 HISTORY OF LARYNGECTOMY: ICD-10-CM

## 2024-10-22 DIAGNOSIS — G89.3 CANCER RELATED PAIN: ICD-10-CM

## 2024-10-22 DIAGNOSIS — C01 SQUAMOUS CELL CARCINOMA OF BASE OF TONGUE: ICD-10-CM

## 2024-10-22 DIAGNOSIS — G47.00 INSOMNIA, UNSPECIFIED TYPE: ICD-10-CM

## 2024-10-22 DIAGNOSIS — H57.11 EYE PAIN, RIGHT: ICD-10-CM

## 2024-10-22 DIAGNOSIS — M54.50 CHRONIC LOW BACK PAIN, UNSPECIFIED BACK PAIN LATERALITY, UNSPECIFIED WHETHER SCIATICA PRESENT: ICD-10-CM

## 2024-10-22 DIAGNOSIS — G89.29 CHRONIC LOW BACK PAIN, UNSPECIFIED BACK PAIN LATERALITY, UNSPECIFIED WHETHER SCIATICA PRESENT: ICD-10-CM

## 2024-10-22 DIAGNOSIS — Z51.5 ENCOUNTER FOR PALLIATIVE CARE: Primary | ICD-10-CM

## 2024-10-22 PROCEDURE — 99213 OFFICE O/P EST LOW 20 MIN: CPT | Mod: PBBFAC | Performed by: NURSE PRACTITIONER

## 2024-10-22 PROCEDURE — 99999 PR PBB SHADOW E&M-EST. PATIENT-LVL III: CPT | Mod: PBBFAC,,, | Performed by: NURSE PRACTITIONER

## 2024-10-22 PROCEDURE — 99215 OFFICE O/P EST HI 40 MIN: CPT | Mod: S$PBB,,, | Performed by: NURSE PRACTITIONER

## 2024-10-22 NOTE — Clinical Note
Hello, Met with him this AM. Depressed affect, appears more frail compared to last visit. Denies pain. He says he's working on resting, eating - rebuilding strength, he's hoping to make his [place] trip in two weeks. He requested a script for marijuana gummies, I faxed it in for him. -- Thank you, Arabella

## 2024-10-22 NOTE — PROGRESS NOTES
Consult Note  Palliative Care      Consult Requested By: No ref. provider found  Reason for Consult: symptom mgmt/ACP      ASSESSMENT/PLAN:     Plan/Recommendations:  Diagnoses and all orders for this visit:    10/22/2024:  - prescription for medical marijuana sent to [facility] on [address]    Squamous cell carcinoma of base of tongue, conjunctival intraepithelial neoplasm  - following with Dr. Hernandez and SABINO Francis  - s/p laryngectomy, total glossectomy   - 100% written communication   -  chemo Feb-March 2024  - s/p orbital exenteration   - MRI orbit 08/12/2024 with disease recurrence, now w rapid clinical progression   - palliative RT 9/2024  - currently on systemic therapy     Encounter for palliative care  - Patient is decisional  - Patient accompanied today by self   - ACP documents are uploaded into EMR   - Philosophy of Palliative Medicine reviewed with patient and family at first visit  - New patient folder given to and reviewed with patient and family at first visit  - Goals of care: Patient has excellent activity tolerance, his goal is to remain active and able to do the things he enjoys. He fly fishes frequently and enjoys taking walks around the [place]. He is a retired  of 35 years. He has no children and is unmarried, he jokes that being single has allowed him to buy a Kristine. He lives alone with a brother who lives about a mile from his house.      Cancer pain  - 7/16: notes facial pain started 3 weeks ago, right-side nose, adjacent to selam-orbital region.  Pain is intermittent, sometimes wakes him up   - has used a couple doses of oxycodone 5 mg left over from old prescription     Chronic low back pain, unspecified back pain laterality, unspecified whether sciatica present  - no longer requires pain medication for cancer-related pain  - states his chronic back pain is now more prominent, this is his biggest complaint  - he has tried PT and pain management with  limited to no relief     - placed referral to [facility] for acupuncture     Insomnia  - reports 5-6 hours of sleep per night  - 2/2 chronic back pain, see above  - placed referral for acupuncture   - tip sheet provided in new patient folder  - will continue to monitor     Anorexia  - resolved  - excellent appetite     Adjustment disorder with depression  - coping methods: driving his Kristine, long walks, fishing, travel, time with his brother     Understanding of illness/Prognosis: good    Goals of care: life-prolonging, maintain good QOL    Follow up: ~ 10 weeks    Patient's encounter and above plan of care discussed with patient's oncology team.     SUBJECTIVE:     History of Present Illness:  Patient is a 74 y.o. year old male presenting with SCC base of tongue. Please see oncology notes for full oncologic history and treatment course.     10/22/2024:  JESUS ALBERTO HERNÁNDEZ reviewed    Patient presents to follow-up visit alone. Interval history: s/p palliative RT, initially plan was to resume systemic therapy but this was subsequently aborted due to clinical decline. Patient says he needs time to rest and recover, he did not tolerate RT well but he is feeling better now. He denies pain, is not using opioids. Has interval constipation and diarrhea but improved now off opioids. His right eye is healing well. He is slowly regaining strength, resting, eating and drinking more. He does sleep a lot, he denies depression. He jie by taking his car out for drives and has been out fishing with his brother. Has trip to [place] planned in two weeks, though admits he wont be able to go if he doesn't feel stronger. He request prescription for MM for insomnia and mood. Faxed to dispensary today.     -  07/16/2024:  JESUS ALBERTO HERNÁNDEZ reviewed    Patient presents to visit alone.     - some depression shortly after surgical removal of right eye, but is feeling better now, f/u @ [facility] on 12/18  - plan for cataract surgery on left, after 9/15   - purchased new  Melba to boost mood, is also fishing, going on walks and spending time with brother for enjoyment   - pain, started 3 weeks ago: right side nose, using old oxycodone 5 mg occasionally, this helps   - excellent appetite, 10 lbs weight gain   - Upcoming trips to: [place] in October, [place] in November 05/07/2024:  JESUS ALBERTO HERNÁNDEZ reviewed    Patient presents to clinic f/u alone.     - s/p orbital exenteration at [facility] 4/25/2024,  - PEG cancelled, pt PO intake improving   - eye pain sig improved, no longer taking oxycodone   - reports overall doing/feeling well, his brother has been supportive through selam-op process     01/03/2024:  JESUS ALBERTO HERNÁNDEZ reviewed    Patient presents to clinic f/u alone.     - interval PET scan clear/reassuring  - referred to [facility] for SCC eye  - low dose oxy managed by optho, using about 1 tab daily  - MOHS schedule for 24th  - He reports that he is feeling reasonably well, enjoyed the holidays  - No new or worsening symptoms  - RTC in 12 weeks      10/16/2023:  JESUS ALBERTO HERNÁNDEZ reviewed: no concerns    Presents to clinic visit alone.     - since our last visit he was hospitalized x 5 days for GIB, feeling much better now  - reports has gained 8 lbs since last spring  - s/p eye surgery (biopsy) 6 weeks ago, still w some residual pain, 14 day refill of oxy 5 mg provided   - next PET scan 10/31  - leaves for trip to [facility] Nov/1  - starts w new back doctor in November   - enjoying cooler weather, walks and driving his car    06/28/2023:  JESUS ALBERTO HERNÁNDEZ reviewed:    - updated scan is reassuring    - doing very well overall  - remains off pain medication   - still trying to regain weight lost to Covid infection   - fishing frequently   - several summer trips scheduled fishing trip to [place] planned for early August  - did not see initial [facility] scheduling message, will f/u to make initial appointment      04/14/2023:  JESUS ALBERTO HERNÁNDEZ reviewed and summarized:  No surprises    Patient presents to clinic alone, all communication is  written. He is a pleasant man, in good spirits at our initial visit. His biggest concern is back pain, which is chronic in nature. He has hx of PT and pain management interventions which were not effective. He is curious to try acupuncture, referral placed today. He has excellent activity tolerance and enjoys engaging in his hobbies and making the most of his retired life.     Past Medical History:   Diagnosis Date    Acute conjunctivitis of right eye 09/12/2022    Atrial fibrillation     Basal cell carcinoma (BCC) in situ of skin 2012    3 on face, 2 on arm, removed by dermatology.     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 Underwent surgical resection of tongue and neck. s/p tracheostomy    Squamous cell carcinoma of skin of right temple 01/24/2024    right temple     Past Surgical History:   Procedure Laterality Date    COLONOSCOPY      COLONOSCOPY N/A 09/14/2023    Procedure: COLONOSCOPY;  Surgeon: Reyes Olivia MD;  Location: [facility] ([address] [place] [address]);  Service: Endoscopy;  Laterality: N/A;    CONJUNCTIVA BIOPSY Right 10/11/2022    Procedure: BIOPSY, CONJUNCTIVA;  Surgeon: Victor M Vasques MD;  Location: [facility];  Service: Ophthalmology;  Laterality: Right;    ESOPHAGOGASTRODUODENOSCOPY N/A 09/14/2023    Procedure: EGD (ESOPHAGOGASTRODUODENOSCOPY);  Surgeon: Reyes Olivia MD;  [facility]  Service: Endoscopy;  Laterality: N/A;    ESOPHAGOGASTRODUODENOSCOPY N/A  04/10/2024    Procedure: EGD (ESOPHAGOGASTRODUODENOSCOPY);  Surgeon: Reyes Pagan MD;  Location: [facility] ENDO (2ND FLR);  Service: Endoscopy;  Laterality: N/A;    ESOPHAGOGASTRODUODENOSCOPY N/A 06/24/2024    Procedure: EGD (ESOPHAGOGASTRODUODENOSCOPY);  Surgeon: Reyes Pagan MD;  Location: [facility] ENDO (2ND FLR);  Service: Endoscopy;  Laterality: N/A;  Ref By: Dr. Hawkins   Procedure: egd  Diagnosis: esophagitis  Procedure Timing: 10 weeks  Prep: standard prep  6/21-pt r/s-inst to portal-2nd floor criteria-labs within 90 daysMS    EYE SURGERY Right 04/25/2024    exenteration of orbit    GLOSSECTOMY      INSERTION OF VENOUS ACCESS PORT Right 03/08/2021    Procedure: INSERTION, VENOUS ACCESS PORT;  Surgeon: Jesus Rendon MD;  Location: [facility] OR 2ND FLR;  Service: General;  Laterality: Right;    LARYNGECTOMY      LIVER TRANSPLANT  11/2008    transplanted for biopsy proven hepatocellular carcinoma,     LYMPH NODE BIOPSY N/A 09/18/2020    Procedure: BIOPSY, LYMPH NODE;  Surgeon: Taz Diagnostic Provider;  Location: [facility] OR 2ND FLR;  Service: General;  Laterality: N/A;  Rm 173    skin cancer removals      SURGICAL REMOVAL OF SCAR Right 08/24/2023    Procedure: EXCISION, SCAR;  Surgeon: Ting Brambila MD;  Location: [facility] OR;  Service: Ophthalmology;  Laterality: Right;    TRACHEOSTOMY       Family History   Problem Relation Name Age of Onset    Dementia Mother      Cataracts Neg Hx      Glaucoma Neg Hx      Macular degeneration Neg Hx       Review of patient's allergies indicates:   Allergen Reactions    Aspirin     Tylenol extended release        Medications:    Current Outpatient Medications:     gabapentin (NEURONTIN) 100 MG capsule, Take 1 capsule (100 mg total) by mouth 3 (three) times daily., Disp: 90 capsule, Rfl: 11    levothyroxine (SYNTHROID) 88 MCG tablet, TAKE 1 TABLET BY MOUTH ONCE  DAILY, Disp: 90 tablet, Rfl: 0    magic mouthwash diphen/antac/lidoc/nysta, Take 10 mLs by mouth 4 (four) times daily.,  Disp: 120 mL, Rfl: 4    morphine (MS CONTIN) 30 MG 12 hr tablet, Take 1 tablet (30 mg total) by mouth 2 (two) times daily., Disp: 60 tablet, Rfl: 0    multivit-min/FA/lycopen/lutein (CENTRUM SILVER MEN ORAL), Take 1 tablet by mouth once daily., Disp: , Rfl:     ondansetron (ZOFRAN-ODT) 8 MG TbDL, Take 1 tablet (8 mg total) by mouth every 8 (eight) hours as needed (nausea - first choice)., Disp: 60 tablet, Rfl: 4    oxyCODONE (ROXICODONE) 10 mg Tab immediate release tablet, Take 1 tablet (10 mg total) by mouth every 6 (six) hours as needed for Pain., Disp: 60 tablet, Rfl: 0    prochlorperazine (COMPAZINE) 10 MG tablet, Take 1 tablet (10 mg total) by mouth every 6 (six) hours as needed (nausea/vomiting - second choice)., Disp: 30 tablet, Rfl: 1    senna (SENOKOT) 8.6 mg tablet, Take 2 tablets by mouth 2 (two) times daily as needed for Constipation., Disp: 60 tablet, Rfl: 2    sulfamethoxazole-trimethoprim 800-160mg (BACTRIM DS) 800-160 mg Tab, Take 1 tablet by mouth 2 (two) times daily., Disp: 14 tablet, Rfl: 0    tacrolimus (PROGRAF) 0.5 MG Cap, Take 1 capsule (0.5 mg total) by mouth every 12 (twelve) hours., Disp: 180 capsule, Rfl: 3    traMADoL (ULTRAM) 50 mg tablet, Take 50 mg by mouth as needed for Pain., Disp: , Rfl:     traZODone (DESYREL) 50 MG tablet, TAKE 1 TABLET BY MOUTH IN THE  EVENING, Disp: 90 tablet, Rfl: 3  No current facility-administered medications for this visit.    Facility-Administered Medications Ordered in Other Visits:     ofloxacin 0.3 % ophthalmic solution 1 drop, 1 drop, Right Eye, On Call Procedure, Victor M Vasques MD, 2 drop at 10/11/22 0745    sodium chloride 0.9% flush 10 mL, 10 mL, Intravenous, PRN, MorenaRonald MD    sodium chloride 0.9% flush 10 mL, 10 mL, Intravenous, PRN, Victor M Vasques MD    OBJECTIVE:       ROS:  Review of Systems   Constitutional:  Positive for fatigue. Negative for activity change, appetite change and chills.        Written communication     HENT:          Right side nose pain   Eyes:  Positive for pain. Negative for discharge and itching.   Respiratory: Negative.  Negative for apnea, cough and chest tightness.    Cardiovascular: Negative.  Negative for chest pain, palpitations and leg swelling.   Gastrointestinal:  Negative for nausea and vomiting.   Genitourinary:  Negative for difficulty urinating, dysuria and enuresis.   Musculoskeletal:  Positive for back pain. Negative for arthralgias and gait problem.   Skin:  Positive for wound (left nare, healing). Negative for color change, pallor and rash.   Psychiatric/Behavioral:  Positive for dysphoric mood. Negative for sleep disturbance. The patient is not nervous/anxious.        Review of Symptoms      Symptom Assessment (ESAS 0-10 Scale)  Pain:  0  Dyspnea:  0  Anxiety:  0  Nausea:  0  Depression:  0  Anorexia:  0  Fatigue:  3  Insomnia:  3  Restlessness:  0  Agitation:  0     CAM / Delirium:  Negative  Constipation:  Positive  Diarrhea:  Positive    Anxiety:  Is not nervous/anxious    Bowel Management Plan (BMP):  No      Pain Assessment:  OME in 24 hours:  0  Location(s): back    Back       Location: lower        Quality: None        Quantity: 4/10 in intensity        Chronicity: Onset 0 year(s) ago, stable        Aggravating Factors: Activity        Alleviating Factors: None       Associated Symptoms: None    Modified Tricia Scale:  0    ECOG Performance Status stGstrstastdstest:st st1st Living Arrangements:  Lives alone    Psychosocial/Cultural:   See Palliative Psychosocial Note: Yes  **Primary  to Follow**  Palliative Care  Consult: No      Advance Care Planning   Advance Directives:   Living Will: Yes        Copy on chart: Yes    Medical Power of : Yes      Decision Making:  Patient answered questions  Goals of Care: What is most important right now is to focus on remaining as independent as possible, symptom/pain control. Accordingly, we have decided that the best plan to  meet the patient's goals includes continuing with treatment.        Physical Exam:  Vitals:    Vitals:    10/22/24 1016   BP: 105/67   Pulse: 72       Physical Exam  Vitals reviewed.   Constitutional:       Appearance: He is underweight. He is not toxic-appearing or diaphoretic.      Comments: Written communication    HENT:      Head: Normocephalic and atraumatic.      Comments: Trach      Right Ear: External ear normal.      Left Ear: External ear normal.      Nose: Nose normal.      Comments: Wound, well-healed  Eyes:      General:         Right eye: No discharge.         Left eye: No discharge.      Comments: Right eye s/p enucleation, well-healed    Pulmonary:      Effort: Pulmonary effort is normal. No respiratory distress.      Breath sounds: No stridor.   Abdominal:      General: Abdomen is flat.      Palpations: Abdomen is soft.   Musculoskeletal:         General: No swelling or tenderness. Normal range of motion.      Cervical back: Normal range of motion.   Skin:     General: Skin is warm and dry.      Coloration: Skin is not jaundiced.      Findings: No bruising.   Neurological:      General: No focal deficit present.      Mental Status: He is alert and oriented to person, place, and time. Mental status is at baseline.   Psychiatric:         Mood and Affect: Mood is depressed.         Behavior: Behavior normal.         Thought Content: Thought content normal.         Judgment: Judgment normal.         Labs:  CBC:   WBC   Date Value Ref Range Status   09/10/2024 12.13 3.90 - 12.70 K/uL Final     Hemoglobin   Date Value Ref Range Status   09/10/2024 11.4 (L) 14.0 - 18.0 g/dL Final     Hematocrit   Date Value Ref Range Status   09/10/2024 33.0 (L) 40.0 - 54.0 % Final     MCV   Date Value Ref Range Status   09/10/2024 99 (H) 82 - 98 fL Final     Platelets   Date Value Ref Range Status   09/10/2024 190 150 - 450 K/uL Final       LFT:   Lab Results   Component Value Date    AST 19 09/10/2024     (H)  07/09/2020    ALKPHOS 75 09/10/2024    BILITOT 0.7 09/10/2024       Albumin:   Albumin   Date Value Ref Range Status   09/10/2024 3.3 (L) 3.5 - 5.2 g/dL Final     Protein:   Total Protein   Date Value Ref Range Status   09/10/2024 7.8 6.0 - 8.4 g/dL Final       Radiology:I have reviewed all pertinent imaging results/findings within the past 24 hours.    09/12/2024 CT 9/12/24: CT orbits with progression and new 1.1 cm round enhancing focus posterior to the right maxilla, concerning for metastasis     08/27/2024 NM PET: Impression:     Interval postoperative change of right orbital mass resection and exenteration for squamous cell carcinoma.  Hypermetabolic lesions about the right orbit concerning for disease recurrence.     New mildly hypermetabolic pulmonary nodule concerning for metastasis.    08/12/2024 MRI orbits: Impression:     Initial postsurgical study status post right orbital mass resection with orbital exenteration.  Nodular enhancing soft tissue superficially at the superomedial orbit and along the medial wall of the orbit posteriorly at least concerning for underlying recurrent lesion.  Postcontrast CT imaging or preferably PET would be helpful for further evaluation on this initial post treatment study.    I spent a total of 55 minutes on the day of the visit.This includes face to face time in discussion of goals of care, symptom assessment, coordination of care and emotional support.  This also includes non-face to face time preparing to see the patient (eg, review of tests/imaging), obtaining and/or reviewing separately obtained history, documenting clinical information in the electronic or other health record, independently interpreting results and communicating results to the patient/family/caregiver, or care coordinator.     Signature: Arabella Cuevas DNP

## 2024-10-23 ENCOUNTER — TELEPHONE (OUTPATIENT)
Dept: PALLIATIVE MEDICINE | Facility: CLINIC | Age: 74
End: 2024-10-23
Payer: MEDICARE

## 2024-10-23 ENCOUNTER — PATIENT MESSAGE (OUTPATIENT)
Dept: RESEARCH | Facility: HOSPITAL | Age: 74
End: 2024-10-23
Payer: MEDICARE

## 2024-10-23 NOTE — TELEPHONE ENCOUNTER
"10/23/2024 9:26:24 AM Transmission Record          Sent to [phone] with remote ID "          Result: (0/339;2/4) Unknown          Page record: NONE SENT          Elapsed time: 00:09 on channel 53    10/23/2024 9:31:36 AM Transmission Record          Sent to [phone] with remote ID "[id number]"          Result: (0/339;0/0) Success          Page record: 1 - 3          Elapsed time: 01:18 on channel 9    "

## 2024-10-24 ENCOUNTER — PATIENT MESSAGE (OUTPATIENT)
Dept: RESEARCH | Facility: HOSPITAL | Age: 74
End: 2024-10-24
Payer: MEDICARE

## 2024-10-28 ENCOUNTER — OFFICE VISIT (OUTPATIENT)
Dept: OPHTHALMOLOGY | Facility: CLINIC | Age: 74
End: 2024-10-28
Payer: MEDICARE

## 2024-10-28 DIAGNOSIS — H52.7 REFRACTIVE ERROR: Primary | ICD-10-CM

## 2024-10-28 DIAGNOSIS — H25.12 NUCLEAR SCLEROSIS OF LEFT EYE: ICD-10-CM

## 2024-10-28 DIAGNOSIS — H26.9 CORTICAL CATARACT OF LEFT EYE: ICD-10-CM

## 2024-10-28 PROCEDURE — 99499 UNLISTED E&M SERVICE: CPT | Mod: S$PBB,,, | Performed by: OPHTHALMOLOGY

## 2024-10-28 PROCEDURE — 99213 OFFICE O/P EST LOW 20 MIN: CPT | Mod: PBBFAC,PO | Performed by: OPHTHALMOLOGY

## 2024-10-28 PROCEDURE — 99999 PR PBB SHADOW E&M-EST. PATIENT-LVL III: CPT | Mod: PBBFAC,,, | Performed by: OPHTHALMOLOGY

## 2024-11-04 ENCOUNTER — LAB VISIT (OUTPATIENT)
Dept: LAB | Facility: HOSPITAL | Age: 74
End: 2024-11-04
Attending: INTERNAL MEDICINE
Payer: MEDICARE

## 2024-11-04 DIAGNOSIS — C22.0 HCC (HEPATOCELLULAR CARCINOMA): ICD-10-CM

## 2024-11-04 DIAGNOSIS — Z94.4 LIVER TRANSPLANTED: ICD-10-CM

## 2024-11-04 LAB
AFP SERPL-MCNC: <2 NG/ML (ref 0–8.4)
ALBUMIN SERPL BCP-MCNC: 3.3 G/DL (ref 3.5–5.2)
ALP SERPL-CCNC: 59 U/L (ref 40–150)
ALT SERPL W/O P-5'-P-CCNC: 13 U/L (ref 10–44)
ANION GAP SERPL CALC-SCNC: 9 MMOL/L (ref 8–16)
AST SERPL-CCNC: 26 U/L (ref 10–40)
BASOPHILS # BLD AUTO: 0.02 K/UL (ref 0–0.2)
BASOPHILS NFR BLD: 0.4 % (ref 0–1.9)
BILIRUB SERPL-MCNC: 0.6 MG/DL (ref 0.1–1)
BUN SERPL-MCNC: 26 MG/DL (ref 8–23)
CALCIUM SERPL-MCNC: 9.3 MG/DL (ref 8.7–10.5)
CHLORIDE SERPL-SCNC: 108 MMOL/L (ref 95–110)
CO2 SERPL-SCNC: 27 MMOL/L (ref 23–29)
CREAT SERPL-MCNC: 1.5 MG/DL (ref 0.5–1.4)
DIFFERENTIAL METHOD BLD: ABNORMAL
EOSINOPHIL # BLD AUTO: 0.3 K/UL (ref 0–0.5)
EOSINOPHIL NFR BLD: 5.8 % (ref 0–8)
ERYTHROCYTE [DISTWIDTH] IN BLOOD BY AUTOMATED COUNT: 15.9 % (ref 11.5–14.5)
EST. GFR  (NO RACE VARIABLE): 48.2 ML/MIN/1.73 M^2
GLUCOSE SERPL-MCNC: 104 MG/DL (ref 70–110)
HCT VFR BLD AUTO: 29 % (ref 40–54)
HGB BLD-MCNC: 9.5 G/DL (ref 14–18)
IMM GRANULOCYTES # BLD AUTO: 0.01 K/UL (ref 0–0.04)
IMM GRANULOCYTES NFR BLD AUTO: 0.2 % (ref 0–0.5)
LYMPHOCYTES # BLD AUTO: 0.9 K/UL (ref 1–4.8)
LYMPHOCYTES NFR BLD: 16.4 % (ref 18–48)
MCH RBC QN AUTO: 33.8 PG (ref 27–31)
MCHC RBC AUTO-ENTMCNC: 32.8 G/DL (ref 32–36)
MCV RBC AUTO: 103 FL (ref 82–98)
MONOCYTES # BLD AUTO: 0.5 K/UL (ref 0.3–1)
MONOCYTES NFR BLD: 9.5 % (ref 4–15)
NEUTROPHILS # BLD AUTO: 3.6 K/UL (ref 1.8–7.7)
NEUTROPHILS NFR BLD: 67.7 % (ref 38–73)
NRBC BLD-RTO: 0 /100 WBC
PLATELET # BLD AUTO: 145 K/UL (ref 150–450)
PMV BLD AUTO: 9 FL (ref 9.2–12.9)
POTASSIUM SERPL-SCNC: 4.7 MMOL/L (ref 3.5–5.1)
PROT SERPL-MCNC: 7 G/DL (ref 6–8.4)
RBC # BLD AUTO: 2.81 M/UL (ref 4.6–6.2)
SODIUM SERPL-SCNC: 144 MMOL/L (ref 136–145)
TACROLIMUS BLD-MCNC: 3.9 NG/ML (ref 5–15)
WBC # BLD AUTO: 5.35 K/UL (ref 3.9–12.7)

## 2024-11-04 PROCEDURE — 85025 COMPLETE CBC W/AUTO DIFF WBC: CPT | Performed by: INTERNAL MEDICINE

## 2024-11-04 PROCEDURE — 80053 COMPREHEN METABOLIC PANEL: CPT | Performed by: INTERNAL MEDICINE

## 2024-11-04 PROCEDURE — 36415 COLL VENOUS BLD VENIPUNCTURE: CPT | Performed by: INTERNAL MEDICINE

## 2024-11-04 PROCEDURE — 80197 ASSAY OF TACROLIMUS: CPT | Performed by: INTERNAL MEDICINE

## 2024-11-04 PROCEDURE — 82105 ALPHA-FETOPROTEIN SERUM: CPT | Performed by: INTERNAL MEDICINE

## 2024-11-08 ENCOUNTER — TELEPHONE (OUTPATIENT)
Dept: TRANSPLANT | Facility: CLINIC | Age: 74
End: 2024-11-08
Payer: MEDICARE

## 2024-11-08 DIAGNOSIS — C22.0 HCC (HEPATOCELLULAR CARCINOMA): Primary | ICD-10-CM

## 2024-11-11 NOTE — PROGRESS NOTES
[facility] Follow Up Note      Assessment:  Rocco Swain is a 74 y.o. 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 He is s/p orbital exenteration 4/25/24 (1mm SM, +PNI) with no adjuvant therapy. He now presents with a local recurrence with rapid progression, s/p 32.5 Gy in 5 fractions to the right orbital mass completed 9/23/24.   Good clinical response, significant reduction in orbital mass and pain level.   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 He developed an unresectable recurrence and was treated with multiple lines of systemic therapy. Last on pembro 2023.  Subcm apical LLL nodule. Suspicious for metastasis  Planning for Mohs to left nasal ala, biopsied as BCC. Currently on hold  Liver transplant on tacrolimus. He reduced tacro from 1.5 to 1mg daily, following with transplant  ECOG: (1) Restricted in physically strenuous activity, ambulatory and able to do work of light nature        Plan:  Follow up in 2 months with repeat imaging  Discussed Mohs now vs based on imaging in 2 months, as lesion is stable, will hold on Mohs and base decision on repeat imaging.   To see med onc later this week.         Oncologic History:  h/o liver transplantation in 2008 due to hepatitis C, currently on Tacrolimus. He has two head and neck primaries:     1) R/M SCC of the BOT, HPV+  Diagnosis: [id number] with locally advanced disease (unknown stage)  2016, definitive CRT with persistence disease  Base of tongue CRT at [place], [place].   2016/2017, total laryngectomy + glossectomy in [place]   2020, developed local unresectable recurrence (biopsy-proven p16+ SCC).   Oct 2020 -  Aug 2021, started PCC followed by maintenance cetuximab with PD in August 2021  Sep 2021, started carboplatin, 5-FU, and pembrolizumab x 2 cycles with limiting toxicity attributed to chemo. Transitioned to pembrolizumab alone (no signs for liver rejection). Added chemotherapy again to the cycle 5  March 2022 to December 2023, pembrolizumab as single agent (no signs for liver rejection)    2) SCC of the conjunctivae   Initial diagnosis: April 2022  Eye lesions grew despite pembrolizumab  10/19/2022, local excision at [facility] (Dr Victor M Vasques)  08/31/2023, recurrence s/p local excision at [facility] (Dr Victor M Vasques)  October - December 2023, topical MMC without response  1/19/2024, he was seen as a consult at [facility]. As he progressed on IO in the past, he was recommended systemic therapy with chemotherapy (platinum doublet, such as carboplatin and 5-FU) and cetuximab. He started this at [facility].   4/24/24: MRI orbits ([facility]) with sight increase in size of the mass (2.6 cm) in the inferonasal right conjunctiva with extension into the post septal extraconal space. PPF, cavernous sinuses, and Meckel's caves are within normal limits. No adenopathy.  4/25/2024: right eyelid sparing orbital exenteration and Repair of defect right socket with adjacent tissue transfer using upper and lower eyelid skin at [facility].  Path: moderate to poorly differentiated squamous cell carcinoma involving conjunctiva of upper and lower eyelids, fibroconnective tissue and skeletal muscle. +PNI, 2 foci, largest nerve 0.06mm. margins negative but close at 1.2mm.  No adjuvant RT   8/12/24: MRI orbits s/p right orbital mass resection with orbital exenteration. Nodular enhancing soft tissue superficially at the superomedial orbit and along the medial wall of the orbit posteriorly at least concerning for underlying recurrent lesion.   8/27/24: PET/CT with FDG avid right orbital mass consistent with disease recurrence. New pulmonary nodules concerning for  metastasis  9/12/24: CT orbits with progression and new 1.1 cm round enhancing focus posterior to the right maxilla, concerning for metastasis  9/17/24 - 9/23/24: 25-32.5 Gy in 5 fractions to right orbital mass and lesion posterior to the right maxilla       Possibility of pregnancy: N/A  History of prior irradiation: Yes - as above  History of prior systemic anti-cancer therapy: Yes - as above  History of collagen vascular disease: No  Implanted electronic device (pacer/defib/nerve stimulator): No    Interval History:   Rocco Swain presents today for follow up.     Since last visit, no oncologic events    No adverse effects of radiation. Pain significanty improved. Very slight tenderness around orbit. No headaches.  Not on pain meds. Retains vision in the left eye. Reports some GI discomfort with frequent bowel movements      Review of Systems:  ROS  as above    Social History:  Social History     Tobacco Use    Smoking status: Never    Smokeless tobacco: Never   Substance Use Topics    Alcohol use: Yes     Comment: drinks wine, 1 glass day    Drug use: Not Currently         Medications:  Current Outpatient Medications on File Prior to Visit   Medication Sig Dispense Refill    gabapentin (NEURONTIN) 100 MG capsule Take 1 capsule (100 mg total) by mouth 3 (three) times daily. 90 capsule 11    levothyroxine (SYNTHROID) 88 MCG tablet TAKE 1 TABLET BY MOUTH ONCE  DAILY 90 tablet 0    magic mouthwash diphen/antac/lidoc/nysta Take 10 mLs by mouth 4 (four) times daily. 120 mL 4    multivit-min/FA/lycopen/lutein (CENTRUM SILVER MEN ORAL) Take 1 tablet by mouth once daily.      ondansetron (ZOFRAN-ODT) 8 MG TbDL Take 1 tablet (8 mg total) by mouth every 8 (eight) hours as needed (nausea - first choice). 60 tablet 4    oxyCODONE (ROXICODONE) 10 mg Tab immediate release tablet Take 1 tablet (10 mg total) by mouth every 6 (six) hours as needed for Pain. 60 tablet 0    prochlorperazine (COMPAZINE) 10 MG tablet Take 1 tablet  (10 mg total) by mouth every 6 (six) hours as needed (nausea/vomiting - second choice). 30 tablet 1    senna (SENOKOT) 8.6 mg tablet Take 2 tablets by mouth 2 (two) times daily as needed for Constipation. 60 tablet 2    sulfamethoxazole-trimethoprim 800-160mg (BACTRIM DS) 800-160 mg Tab Take 1 tablet by mouth 2 (two) times daily. 14 tablet 0    tacrolimus (PROGRAF) 0.5 MG Cap Take 1 capsule (0.5 mg total) by mouth every 12 (twelve) hours. 180 capsule 3    traMADoL (ULTRAM) 50 mg tablet Take 50 mg by mouth as needed for Pain.      traZODone (DESYREL) 50 MG tablet TAKE 1 TABLET BY MOUTH IN THE  EVENING 90 tablet 3     Current Facility-Administered Medications on File Prior to Visit   Medication Dose Route Frequency Provider Last Rate Last Admin    [COMPLETED] iohexoL (OMNIPAQUE 350) injection 100 mL  100 mL Intravenous ONCE PRN Oli Joyce Jr., MD   100 mL at 11/12/24 0909    ofloxacin 0.3 % ophthalmic solution 1 drop  1 drop Right Eye On Call Procedure Victor M Vasques MD   2 drop at 10/11/22 0745    sodium chloride 0.9% flush 10 mL  10 mL Intravenous PRN Ronald Berg MD        sodium chloride 0.9% flush 10 mL  10 mL Intravenous PRN Victor M Vasques MD           Allergies:  Review of patient's allergies indicates:   Allergen Reactions    Aspirin     Tylenol extended release        Exam:  Vitals:    11/12/24 0915   BP: (!) 152/79   Pulse: 89   Temp: 97.8 °F (36.6 °C)   SpO2: 96%   Weight: 51.5 kg (113 lb 8.6 oz)         Constitutional: Pleasant 74 y.o. male in no acute distress.  Well nourished. Well groomed.   HEENT: He is s/p exent with resolution of the palpable mass previously seen in the ~1 o'clock position with overlying skin intact without erythema or ulceration. No appreciated residual fullness in the right orbit. See below  Lymph:  heavily pretreated neck with fibrosis, with such limitation appreciated cervical, pre auricular, facial adenopathy. + helio tube  Cardiovascular: Upper  extremities warm to touch  Lungs: No audible wheezing.  Normal effort.   Musculoskeletal: No gross MSK deformities. Ambulates well  Skin: No rashes appreciated.  Psych: Alert and oriented with appropriate mood and affect.  Neuro: as above, otherwise  Grossly normal.    Data Review:    Information obtained via chart review and discussion with Oneil AustinBrynn.     11/12/2024 9/12/24          Oli Joyce MD  Radiation Oncology

## 2024-11-12 ENCOUNTER — HOSPITAL ENCOUNTER (OUTPATIENT)
Dept: RADIOLOGY | Facility: HOSPITAL | Age: 74
Discharge: HOME | End: 2024-11-12
Attending: NURSE PRACTITIONER
Payer: MEDICARE

## 2024-11-12 ENCOUNTER — HOSPITAL ENCOUNTER (OUTPATIENT)
Dept: RADIOLOGY | Facility: HOSPITAL | Age: 74
Discharge: HOME | End: 2024-11-12
Attending: STUDENT IN AN ORGANIZED HEALTH CARE EDUCATION/TRAINING PROGRAM
Payer: MEDICARE

## 2024-11-12 ENCOUNTER — OFFICE VISIT (OUTPATIENT)
Dept: RADIATION ONCOLOGY | Facility: CLINIC | Age: 74
End: 2024-11-12
Payer: MEDICARE

## 2024-11-12 VITALS
OXYGEN SATURATION: 96 % | WEIGHT: 113.56 LBS | TEMPERATURE: 98 F | SYSTOLIC BLOOD PRESSURE: 152 MMHG | HEART RATE: 89 BPM | BODY MASS INDEX: 17.26 KG/M2 | DIASTOLIC BLOOD PRESSURE: 79 MMHG

## 2024-11-12 DIAGNOSIS — C01 SQUAMOUS CELL CARCINOMA OF BASE OF TONGUE: Primary | ICD-10-CM

## 2024-11-12 DIAGNOSIS — C44.329 SQUAMOUS CELL CARCINOMA OF SKIN OF ORBIT: ICD-10-CM

## 2024-11-12 DIAGNOSIS — R91.1 SOLITARY PULMONARY NODULE: ICD-10-CM

## 2024-11-12 DIAGNOSIS — H57.11 PAIN OF RIGHT EYE: ICD-10-CM

## 2024-11-12 PROCEDURE — 70481 CT ORBIT/EAR/FOSSA W/DYE: CPT | Mod: 26,,, | Performed by: RADIOLOGY

## 2024-11-12 PROCEDURE — 99499 UNLISTED E&M SERVICE: CPT | Mod: S$PBB,,, | Performed by: STUDENT IN AN ORGANIZED HEALTH CARE EDUCATION/TRAINING PROGRAM

## 2024-11-12 PROCEDURE — 99213 OFFICE O/P EST LOW 20 MIN: CPT | Mod: PBBFAC,25 | Performed by: STUDENT IN AN ORGANIZED HEALTH CARE EDUCATION/TRAINING PROGRAM

## 2024-11-12 PROCEDURE — 71250 CT THORAX DX C-: CPT | Mod: 26,,, | Performed by: RADIOLOGY

## 2024-11-12 PROCEDURE — 71250 CT THORAX DX C-: CPT | Mod: TC

## 2024-11-12 PROCEDURE — 25500020 PHARM REV CODE 255: Performed by: STUDENT IN AN ORGANIZED HEALTH CARE EDUCATION/TRAINING PROGRAM

## 2024-11-12 PROCEDURE — 99999 PR PBB SHADOW E&M-EST. PATIENT-LVL III: CPT | Mod: PBBFAC,,, | Performed by: STUDENT IN AN ORGANIZED HEALTH CARE EDUCATION/TRAINING PROGRAM

## 2024-11-12 PROCEDURE — 70481 CT ORBIT/EAR/FOSSA W/DYE: CPT | Mod: TC

## 2024-11-12 RX ADMIN — IOHEXOL 100 ML: 350 INJECTION, SOLUTION INTRAVENOUS at 09:11

## 2024-11-13 DIAGNOSIS — Z94.4 LIVER TRANSPLANTED: Primary | ICD-10-CM

## 2024-11-14 ENCOUNTER — OFFICE VISIT (OUTPATIENT)
Dept: HEMATOLOGY/ONCOLOGY | Facility: CLINIC | Age: 74
End: 2024-11-14
Payer: MEDICARE

## 2024-11-14 VITALS
OXYGEN SATURATION: 98 % | HEIGHT: 68 IN | BODY MASS INDEX: 18.44 KG/M2 | RESPIRATION RATE: 16 BRPM | DIASTOLIC BLOOD PRESSURE: 62 MMHG | WEIGHT: 121.69 LBS | SYSTOLIC BLOOD PRESSURE: 124 MMHG | HEART RATE: 85 BPM

## 2024-11-14 DIAGNOSIS — C44.329 SQUAMOUS CELL CARCINOMA OF SKIN OF ORBIT: Primary | ICD-10-CM

## 2024-11-14 DIAGNOSIS — D84.821 IMMUNODEFICIENCY DUE TO DRUGS: ICD-10-CM

## 2024-11-14 DIAGNOSIS — Z79.899 IMMUNODEFICIENCY DUE TO DRUGS: ICD-10-CM

## 2024-11-14 DIAGNOSIS — N18.32 STAGE 3B CHRONIC KIDNEY DISEASE: ICD-10-CM

## 2024-11-14 DIAGNOSIS — E03.2 HYPOTHYROIDISM DUE TO MEDICAMENTS AND OTHER EXOGENOUS SUBSTANCES: ICD-10-CM

## 2024-11-14 DIAGNOSIS — Z93.0 TRACHEOSTOMY STATUS: ICD-10-CM

## 2024-11-14 DIAGNOSIS — Z94.4 STATUS POST LIVER TRANSPLANTATION: ICD-10-CM

## 2024-11-14 DIAGNOSIS — C77.0 SECONDARY MALIGNANT NEOPLASM OF CERVICAL LYMPH NODE: ICD-10-CM

## 2024-11-14 DIAGNOSIS — C01 SQUAMOUS CELL CARCINOMA OF BASE OF TONGUE: ICD-10-CM

## 2024-11-14 DIAGNOSIS — G89.3 CANCER RELATED PAIN: ICD-10-CM

## 2024-11-14 PROCEDURE — G2211 COMPLEX E/M VISIT ADD ON: HCPCS | Mod: S$PBB,,, | Performed by: INTERNAL MEDICINE

## 2024-11-14 PROCEDURE — 99215 OFFICE O/P EST HI 40 MIN: CPT | Mod: S$PBB,,, | Performed by: INTERNAL MEDICINE

## 2024-11-14 PROCEDURE — 99999 PR PBB SHADOW E&M-EST. PATIENT-LVL IV: CPT | Mod: PBBFAC,,, | Performed by: INTERNAL MEDICINE

## 2024-11-14 PROCEDURE — 99214 OFFICE O/P EST MOD 30 MIN: CPT | Mod: PBBFAC | Performed by: INTERNAL MEDICINE

## 2024-11-14 NOTE — PROGRESS NOTES
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 male who returns to clinic for management of cSCC of the orbit. Patient had ANGELES on October PETCT, but then developed quickly progressing orbital lesion. He was seen at [facility] in [place]. Recommendation was to follow up at [facility] to initiate induction chemotherapy followed by possible surgery. Patient completed 2 cycles of Extreme regimen, though there were delays due to cytopenias and severe mucositis. He was able to go to [facility] to have exenteration and resection of cancer April 2024 with clear margins, but PNI on path.     Interval History  Still not having good pain control with MS Contin 15 mg BID + oxy IR 10 mg PRN. Still having good BMs with current bowel regimen. Has nausea which has been decreasing appetite.     ECOG Performance status: 1 - Symptomatic but completely ambulatory Communication from him is 100% written.     Cancer Staging   Squamous cell carcinoma of base of tongue  Staging form: Pharynx - HPV-Mediated Oropharynx, AJCC 8th Edition  - Clinical stage from 9/18/2020: Stage III (rcT4, cN1, cM0, p16+) - Signed by Ronald Berg MD on 12/27/2022      Oncologic History:  Oncology History   Squamous cell carcinoma of base of tongue   9/18/2020 Cancer Staged    Staging form: Pharynx - HPV-Mediated Oropharynx, AJCC 8th Edition  - Clinical stage from 9/18/2020: Stage III (rcT4, cN1, cM0, p16+)     9/28/2020 Initial Diagnosis    Squamous cell carcinoma of base of tongue     10/6/2020 - 8/17/2021 Chemotherapy    Treatment Summary   Plan Name: OP HEAD NECK CARBOPLATIN PACLITAXEL C1-2 FOLLOWED BY CETUXIMAB CARBOPLATIN C3-6 FOLLOWED BY CETUXIMAB MAINTENANCE WEEKLY  Treatment Goal: Control  Status: Inactive  Start Date: 10/6/2020  End Date: 8/17/2021  Provider: Ronald Berg MD  Chemotherapy: cetuximab (ERBITUX) 100 mg/50 mL chemo  infusion 684 mg, 400 mg/m2 = 684 mg (100 % of original dose 400 mg/m2), Intravenous, [facility] [place] 1 time, 29 of 38 cycles  Dose modification: 500 mg/m2 (original dose 400 mg/m2, Cycle 3), 250 mg/m2 (original dose 400 mg/m2, Cycle 3), 400 mg/m2 (original dose 400 mg/m2, Cycle 3), 250 mg/m2 (original dose 250 mg/m2, Cycle 7), 200 mg/m2 (original dose 250 mg/m2, Cycle 18), 200 mg/m2 (original dose 250 mg/m2, Cycle 19), 250 mg/m2 (original dose 250 mg/m2, Cycle 4), 200 mg/m2 (original dose 250 mg/m2, Cycle 25), 200 mg/m2 (original dose 250 mg/m2, Cycle 28)  Administration: 684 mg (11/17/2020), 400 mg (11/24/2020), 400 mg (2/9/2021), 400 mg (2/17/2021), 427.6 mg (3/9/2021), 400 mg (3/30/2021), 400 mg (3/23/2021), 400 mg (4/6/2021), 427.6 mg (4/13/2021), 427.6 mg (4/20/2021), 342 mg (5/11/2021), 342 mg (5/18/2021), 342 mg (5/25/2021), 400 mg (5/31/2021), 400 mg (6/7/2021), 400 mg (6/14/2021), 400 mg (12/8/2020), 427.6 mg (12/29/2020), 400 mg (12/1/2020), 400 mg (12/15/2020), 400 mg (12/22/2020), 427.6 mg (1/5/2021), 400 mg (1/12/2021), 400 mg (1/19/2021), 427.6 mg (1/26/2021), 400 mg (2/2/2021), 400 mg (2/23/2021), 400 mg (3/2/2021), 427.6 mg (3/16/2021), 400 mg (6/21/2021), 342 mg (6/28/2021), 342 mg (7/6/2021), 342 mg (7/13/2021), 342 mg (7/20/2021), 400 mg (7/27/2021), 400 mg (8/10/2021), 400 mg (8/17/2021)  CARBOplatin (PARAPLATIN) 310 mg in sodium chloride 0.9% 500 mL chemo infusion, 310 mg (92.2 % of original dose 334.5 mg), Intravenous, Clinic/H[facility] 1 time, 6 of 6 cycles  Dose modification:   (original dose 334.5 mg, Cycle 1)  Administration: 310 mg (10/6/2020), 370 mg (11/17/2020), 300 mg (10/27/2020), 350 mg (12/8/2020), 335 mg (12/29/2020), 320 mg (1/19/2021)  PACLitaxeL (TAXOL) 175 mg/m2 = 300 mg in sodium chloride 0.9% 500 mL chemo infusion, 175 mg/m2 = 300 mg (100 % of original dose 175 mg/m2), Intravenous, Clinic/[facility] 1 time, 2 of 2 cycles  Dose modification: 175 mg/m2 (original dose 175 mg/m2, Cycle  1)  Administration: 300 mg (10/6/2020), 300 mg (10/27/2020)     4/29/2021 Tumor Conference       His case was discussed at the Multidisciplinary Head and Neck Team Planning Meeting.    Representatives from Medical Oncology, Radiation Oncology, Head and Neck Surgical Oncology, Psychosocial Oncology, and Speech and Language Pathology discussed the case with the following recommendations:    1) biopsy         7/29/2021 Tumor Conference       His case was discussed at the Multidisciplinary Head and Neck Team Planning Meeting.    Representatives from Medical Oncology, Radiation Oncology, Head and Neck Surgical Oncology, Psychosocial Oncology, and Speech and Language Pathology discussed the case with the following recommendations:    1) Head and neck clinic follow up  2) consider Keytruda (discuss with transplant)  3) consider palliative referral         9/13/2021 - 12/29/2023 Chemotherapy    Treatment Summary   Plan Name: OP HEAD NECK PEMBROLIZUMAB CARBOPLATIN FLUOROURACIL (C1 ONLY RECEIVED) FOLLOWED BY PEMBROLIZUMAB MAINTENANCE  Treatment Goal: Palliative  Status: Inactive  Start Date: 9/13/2021  End Date: 12/29/2023  Provider: Ronald Berg MD  Chemotherapy: CARBOplatin (PARAPLATIN) 320 mg in sodium chloride 0.9% 500 mL chemo infusion, 320 mg (100 % of original dose 320.5 mg), Intravenous, Clinic/[facility] 1 time, 6 of 6 cycles  Dose modification:   (original dose 320.5 mg, Cycle 1)  Administration: 320 mg (9/13/2021), 335 mg (12/23/2021), 325 mg (1/27/2022), 245 mg (3/3/2022), 255 mg (5/12/2022), 255 mg (4/7/2022)  fluorouraciL 1,000 mg/m2/day = 6,440 mg in sodium chloride 0.9% 240 mL chemo infusion, 1,000 mg/m2/day = 6,440 mg, Intravenous, Over 96 hours, 6 of 6 cycles  Dose modification: 800 mg/m2/day (original dose 1,000 mg/m2/day, Cycle 6), 5,000 mg (original dose 1,000 mg/m2/day, Cycle 8)  Administration: 6,440 mg (9/13/2021), 6,440 mg (12/23/2021), 6,480 mg (1/27/2022), 5,000 mg (3/3/2022), 5,000 mg (4/7/2022), 5,000 mg  (5/12/2022)     Secondary malignant neoplasm of cervical lymph node   2/9/2021 Initial Diagnosis    Secondary malignant neoplasm of cervical lymph node     9/13/2021 - 12/29/2023 Chemotherapy    Treatment Summary   Plan Name: OP HEAD NECK PEMBROLIZUMAB CARBOPLATIN FLUOROURACIL (C1 ONLY RECEIVED) FOLLOWED BY PEMBROLIZUMAB MAINTENANCE  Treatment Goal: Palliative  Status: Inactive  Start Date: 9/13/2021  End Date: 12/29/2023  Provider: Ronald Berg MD  Chemotherapy: CARBOplatin (PARAPLATIN) 320 mg in sodium chloride 0.9% 500 mL chemo infusion, 320 mg (100 % of original dose 320.5 mg), Intravenous, Clinic/[facility] 1 time, 6 of 6 cycles  Dose modification:   (original dose 320.5 mg, Cycle 1)  Administration: 320 mg (9/13/2021), 335 mg (12/23/2021), 325 mg (1/27/2022), 245 mg (3/3/2022), 255 mg (5/12/2022), 255 mg (4/7/2022)  fluorouraciL 1,000 mg/m2/day = 6,440 mg in sodium chloride 0.9% 240 mL chemo infusion, 1,000 mg/m2/day = 6,440 mg, Intravenous, Over 96 hours, 6 of 6 cycles  Dose modification: 800 mg/m2/day (original dose 1,000 mg/m2/day, Cycle 6), 5,000 mg (original dose 1,000 mg/m2/day, Cycle 8)  Administration: 6,440 mg (9/13/2021), 6,440 mg (12/23/2021), 6,480 mg (1/27/2022), 5,000 mg (3/3/2022), 5,000 mg (4/7/2022), 5,000 mg (5/12/2022)     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   1/29/2024 Initial Diagnosis    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     2/19/2024 - 3/25/2024 Chemotherapy    Treatment Summary   Plan Name: OP HEAD NECK CETUXIMAB CARBOPLATIN FLUOROURACIL Q3W  Treatment Goal: Curative  Status: Inactive  Start Date: 2/19/2024  End Date: 3/25/2024  Provider: Martín Hernandez MD  Chemotherapy: cetuximab (ERBITUX) 100 mg/50 mL chemo infusion 668 mg, 400 mg/m2 = 668 mg, Intravenous, Clinic[facility] 1 time, 2 of 12 cycles  Administration: 668 mg (2/19/2024), 400 mg (2/26/2024), 400 mg (3/18/2024), 400 mg (3/25/2024), 400 mg (3/11/2024)  CARBOplatin (PARAPLATIN) 295 mg in sodium chloride 0.9% 314.5 mL chemo  infusion, 295 mg, Intravenous, Clinic/[facility] 1 time, 2 of 6 cycles  Administration: 295 mg (2/19/2024), 300 mg (3/18/2024)     8/23/2024 -  Chemotherapy    Treatment Summary   Plan Name: OP cetuximab (loading + maintenance) weekly  Treatment Goal: Curative  Status: Active  Start Date: 8/23/2024 (Planned)  End Date: 9/27/2024 (Planned)  Provider: Martín Hernandez MD  Chemotherapy: CETUXIMAB 100 MG/50 ML IV SOLN (UIR), 400 mg/m2, Intravenous, Clinic/[facility] 1 time, 0 of 6 cycles     9/17/2024 - 9/23/2024 Radiation Therapy    Treating physician: Tian Joyce  Treatment Summary  Course: C1 H&N 2024    Treatment Site Ref. ID Energy Dose/Fx (Gy) #Fx Dose Correction (Gy) Total Dose (Gy) Start Date End Date Elapsed Days   IM Orbit_R Orbit_R 6X 6.5 5 / 5 0 32.5 9/17/2024 9/23/2024 6            Review of Systems   Constitutional:  Negative for activity change, appetite change, chills, fatigue, fever and unexpected weight change.   HENT:  Negative for ear pain, facial swelling, hearing loss, mouth sores, nosebleeds, sore throat, trouble swallowing and voice change.         No verbal communication. Skin fixed and immobile from previous scaring post-neck dissection   Eyes:  Positive for pain (right orbital pain) and redness (right eye enucleation). Negative for discharge and visual disturbance.   Respiratory:  Negative for cough, choking, chest tightness and shortness of breath.    Cardiovascular:  Negative for chest pain, palpitations and leg swelling.   Gastrointestinal:  Positive for nausea. Negative for abdominal distention, abdominal pain, blood in stool, constipation, diarrhea and vomiting.   Endocrine: Negative for cold intolerance and heat intolerance.   Genitourinary:  Negative for difficulty urinating, dysuria, frequency and urgency.   Musculoskeletal:  Positive for back pain (lower - chronic). Negative for arthralgias, gait problem, leg pain and myalgias.   Integumentary:  Negative for pallor, rash, wound and mole/lesion.    Allergic/Immunologic: Negative for frequent infections.   Neurological:  Negative for dizziness, tremors, weakness, light-headedness, numbness and headaches.   Hematological:  Negative for adenopathy. Does not bruise/bleed easily.   Psychiatric/Behavioral:  Negative for agitation, confusion, dysphoric mood and sleep disturbance. The patient is not nervous/anxious.         Allergies:  Review of patient's allergies indicates:   Allergen Reactions    Aspirin     Tylenol extended release        Medications:  Current Outpatient Medications   Medication Sig Dispense Refill    levothyroxine (SYNTHROID) 88 MCG tablet TAKE 1 TABLET BY MOUTH ONCE  DAILY 90 tablet 0    magic mouthwash diphen/antac/lidoc/nysta Take 10 mLs by mouth 4 (four) times daily. 120 mL 4    multivit-min/FA/lycopen/lutein (CENTRUM SILVER MEN ORAL) Take 1 tablet by mouth once daily.      ondansetron (ZOFRAN-ODT) 8 MG TbDL Take 1 tablet (8 mg total) by mouth every 8 (eight) hours as needed (nausea - first choice). 60 tablet 4    prochlorperazine (COMPAZINE) 10 MG tablet Take 1 tablet (10 mg total) by mouth every 6 (six) hours as needed (nausea/vomiting - second choice). 30 tablet 1    senna (SENOKOT) 8.6 mg tablet Take 2 tablets by mouth 2 (two) times daily as needed for Constipation. 60 tablet 2    tacrolimus (PROGRAF) 0.5 MG Cap Take 1 capsule (0.5 mg total) by mouth every 12 (twelve) hours. 180 capsule 3    traZODone (DESYREL) 50 MG tablet TAKE 1 TABLET BY MOUTH IN THE  EVENING 90 tablet 3     No current facility-administered medications for this visit.     Facility-Administered Medications Ordered in Other Visits   Medication Dose Route Frequency Provider Last Rate Last Admin    ofloxacin 0.3 % ophthalmic solution 1 drop  1 drop Right Eye On Call Procedure Victor M Vasques MD   2 drop at 10/11/22 0745    sodium chloride 0.9% flush 10 mL  10 mL Intravenous PRN Ronald Berg MD        sodium chloride 0.9% flush 10 mL  10 mL Intravenous PRN  Victor M Vasques MD           PMH:  Past Medical History:   Diagnosis Date    Acute conjunctivitis of right eye 09/12/2022    Atrial fibrillation     Basal cell carcinoma (BCC) in situ of skin 2012    3 on face, 2 on arm, removed by dermatology.     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 Underwent surgical resection of tongue and neck. s/p tracheostomy    Squamous cell carcinoma of skin of right temple 01/24/2024    right temple       PSH:  Past Surgical History:   Procedure Laterality Date    COLONOSCOPY      COLONOSCOPY N/A 09/14/2023    Procedure: COLONOSCOPY;  Surgeon: Reyes Olivia MD;  Location: [facility] (2ND FLR);  Service: Endoscopy;  Laterality: N/A;    CONJUNCTIVA BIOPSY Right 10/11/2022    Procedure: BIOPSY, CONJUNCTIVA;  Surgeon: Victor M Vasques MD;  Location: [facility];  Service: Ophthalmology;  Laterality: Right;    ESOPHAGOGASTRODUODENOSCOPY N/A 09/14/2023    Procedure: EGD (ESOPHAGOGASTRODUODENOSCOPY);  Surgeon: Reyes Olivia MD;  Location: [facility] (2ND FLR);  Service: Endoscopy;  Laterality: N/A;    ESOPHAGOGASTRODUODENOSCOPY N/A 04/10/2024    Procedure: EGD (ESOPHAGOGASTRODUODENOSCOPY);  Surgeon: Reyes Pagan MD;  Location: [facility] ENDO (2ND FLR);  Service: Endoscopy;  Laterality: N/A;    ESOPHAGOGASTRODUODENOSCOPY N/A 06/24/2024    Procedure: EGD (ESOPHAGOGASTRODUODENOSCOPY);  Surgeon: Reyes Pagan MD;  Location: [facility] (2ND FLR);  Service:  "Endoscopy;  Laterality: N/A;  Ref By: Dr. Hawkins   Procedure: egd  Diagnosis: esophagitis  Procedure Timing: 10 weeks  Prep: standard prep  6/21-pt r/s-inst to portal-2nd floor criteria-labs within 90 daysMS    EYE SURGERY Right 04/25/2024    exenteration of orbit    GLOSSECTOMY      INSERTION OF VENOUS ACCESS PORT Right 03/08/2021    Procedure: INSERTION, VENOUS ACCESS PORT;  Surgeon: Jesus Rendon MD;  Location: [facility] OR [address] [place] [address];  Service: General;  Laterality: Right;    LARYNGECTOMY      LIVER TRANSPLANT  11/2008    transplanted for biopsy proven hepatocellular carcinoma,     LYMPH NODE BIOPSY N/A 09/18/2020    Procedure: BIOPSY, LYMPH NODE;  Surgeon: [facility] Diagnostic Provider;  Location: [facility] OR [address] [place] [address];  Service: General;  Laterality: N/A;  Rm 173    skin cancer removals      SURGICAL REMOVAL OF SCAR Right 08/24/2023    Procedure: EXCISION, SCAR;  Surgeon: Ting Brambila MD;  Location: [facility] OR;  Service: Ophthalmology;  Laterality: Right;    TRACHEOSTOMY         FamHx:  Family History   Problem Relation Name Age of Onset    Dementia Mother      Cataracts Neg Hx      Glaucoma Neg Hx      Macular degeneration Neg Hx         SocHx:  Social History     Socioeconomic History    Marital status: Single   Occupational History    Occupation: Retired     Comment: , notes exposures to fumes etc.  Worked on [employer] for 4 years   Tobacco Use    Smoking status: Never    Smokeless tobacco: Never   Substance and Sexual Activity    Alcohol use: Yes     Comment: drinks wine, 1 glass day    Drug use: Not Currently    Sexual activity: Yes     Comment: No prior history of STD    Social History Narrative    Steps at [employer]- 24       Distress Score    Distress Score: 0 - No Distress        Objective:      Vitals:    11/14/24 1409   BP: 124/62   BP Location: Left arm   Patient Position: Sitting   Pulse: 85   Resp: 16   TempSrc: Oral   SpO2: 98%   Weight: 55.2 kg (121 lb 11.1 oz)   Height: 5' 8" (1.727 m) "            Physical Exam  Vitals reviewed.   Constitutional:       General: He is not in acute distress.     Appearance: Normal appearance. He is well-developed and underweight.   HENT:      Head: Normocephalic and atraumatic.      Mouth/Throat:      Mouth: Mucous membranes are moist.      Dentition: Abnormal dentition. Dental caries present.   Eyes:      Comments: Bandage over R orbit s/p exenteration. C/D/I   Neck:      Trachea: Tracheostomy present.   Pulmonary:      Effort: Pulmonary effort is normal. No respiratory distress.      Comments: Tracheostomy  Abdominal:      General: There is no distension.      Palpations: Abdomen is soft.   Musculoskeletal:         General: No swelling. Normal range of motion.      Cervical back: Normal range of motion and neck supple.   Skin:     General: Skin is warm and dry.   Neurological:      General: No focal deficit present.      Mental Status: He is alert and oriented to person, place, and time.   Psychiatric:         Mood and Affect: Mood normal.         Behavior: Behavior normal.         Thought Content: Thought content normal.         Judgment: Judgment normal.           LABS:  WBC   Date Value Ref Range Status   11/04/2024 5.35 3.90 - 12.70 K/uL Final     Hemoglobin   Date Value Ref Range Status   11/04/2024 9.5 (L) 14.0 - 18.0 g/dL Final     Hematocrit   Date Value Ref Range Status   11/04/2024 29.0 (L) 40.0 - 54.0 % Final     Platelets   Date Value Ref Range Status   11/04/2024 145 (L) 150 - 450 K/uL Final       Chemistry        Component Value Date/Time     11/04/2024 0849    K 4.7 11/04/2024 0849     11/04/2024 0849    CO2 27 11/04/2024 0849    BUN 26 (H) 11/04/2024 0849    CREATININE 1.5 (H) 11/04/2024 0849     11/04/2024 0849        Component Value Date/Time    CALCIUM 9.3 11/04/2024 0849    ALKPHOS 59 11/04/2024 0849    AST 26 11/04/2024 0849    ALT 13 11/04/2024 0849    BILITOT 0.6 11/04/2024 0849    ESTGFRAFRICA 52.6 (A) 07/14/2022 1119     "EGFRNONAA 45.5 (A) 07/14/2022 1119              Assessment:       1. 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 Squamous cell carcinoma of base of tongue    3. Secondary malignant neoplasm of cervical lymph node    4. Tracheostomy status    5. Cancer related pain    6. 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 Stage 3b chronic kidney disease        Plan:         Orbital cSCC  Unfortunately patient has progressed while on immunotherapy for below diagnosis. For that reason systemic therapies are limited. Evaluated at [facility] and surgeon does think an exenteration sparing resection would be possible with good response to induction chemotherapy. Plan was for platinum doublet chemo + cetuximab x 2 cycles. Notified of this plan on 1/29 and treatment plan was entered.   - Initiated dose reduced C1 of 5-FU to 750 mg/m2 due to age/frailty and growth factor. Patient still had cytopenias and mucositis, but was able to finish 2 cycles with delays. Now s/p exenteration. With PNI on path, referred to radiation oncology but adjuvant radiation deemed not necessary after exenteration.   - MRI orbit on 8/12/24 showing evidence of recurrent disease, "Nodular enhancing soft tissue superficially at the superomedial orbit and along the medial wall of the orbit posteriorly at least concerning for underlying recurrent lesion."   - Has undergone palliative RT to the right orbit. CT showing reduction of right orbital mass. Will continue Q3 month surveillance.    2,3,4. Recurrent SCC of base of tongue, P16+ - Status post total laryngectomy, total glossectomy and anterolateral thigh free flap reconstruction roughly 2017 at [facility] in [place] for persistent/recurrent base of tongue sq.c.c. 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 He is not a surgical or radiation " candidate.  -Started on PCC 10/6/2020 followed by maintenance cetuximab until 8/24/21 (discontinued due to progression of disease; continued increase in SUV uptake, no new lesions).  - 9/2021 started on carbo/5-FU/pembrolizumab; due to toxicity went to pembrolizumab monotherapy starting with cycle 2.  Progression of disease noted after cycle 3 so added chemotherapy back in with cycle 4. Keytruda monotherapy starting with C9.   - Keytruda discontinued after 2 years.    5 Patient no longer having pain and off all narcotics.     6,7. S/p liver transplant and is currently on tacrolimus. Grade 1. Will continue to monitor.     8. TSH trending back down on increased synthroid dose of 100 mcg. Continue to monitor.    9. Cr elevated at 1.5. No eligible for platinum chemo.      10. Sent zofran and compazine.     Patient was also seen and examined by Dr. Hernandez. Patient is in agreement with the proposed treatment plan. All questions were answered to the patient's satisfaction. Pt knows to call clinic if anything is needed before the next clinic visit.    Ct Francis, MSN, APRN, FNP-C  Hematology and Medical Oncology  Nurse Practitioner to Dr. Martín Hernandez  Nurse Practitioner, [facility]      I have reviewed the notes, assessments, and/or procedures performed by Ct SOSA, as above.  I have personally interviewed and examined the patient at the beside, and rounded with Ct. I concur with her assessment and plan and the documentation of Rocco Swain.    MDM includes:    - Acute or chronic illness or injury that poses a threat to life or bodily function  - Independent review and explanation of 2 results from unique tests  - Discussion of management and ordering 2 unique tests  - Extensive discussion of treatment and management  - Prescription drug management    Martín Hernandez M.D.  Hematology/Oncology Attending  Director [facility]  [facility]            Route Chart for Scheduling    Med Onc Chart Routing      Follow up with physician 3 months. 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   noted on 1/29/2024   Line of treatment: Adjuvant  Treatment Goal: Curative     Upcoming Treatment Dates - OP cetuximab (loading + maintenance) weekly    8/23/2024       Pre-Medications       diphenhydrAMINE (BENADRYL) 25 mg in NS 50 mL IVPB       Chemotherapy       cetuximab (ERBITUX) 100 mg/50 mL chemo infusion 652 mg  8/30/2024       Pre-Medications       diphenhydrAMINE (BENADRYL) 25 mg in NS 50 mL IVPB       Chemotherapy       cetuximab (ERBITUX) 100 mg/50 mL chemo infusion 407.6 mg  9/6/2024       Pre-Medications       diphenhydrAMINE (BENADRYL) 25 mg in NS 50 mL IVPB       Chemotherapy       cetuximab (ERBITUX) 100 mg/50 mL chemo infusion 407.6 mg  9/13/2024       Pre-Medications       diphenhydrAMINE (BENADRYL) 25 mg in NS 50 mL IVPB       Chemotherapy       cetuximab (ERBITUX) 100 mg/50 mL chemo infusion 407.6 mg    Supportive Plan Information  IV FLUIDS AND ELECTROLYTES Ct Francis NP   Associated Diagnosis: Acute kidney injury superimposed on chronic kidney disease   noted on 4/8/2024   Line of treatment: Supportive Care   Treatment goal: Supportive     Upcoming Treatment Dates - IV FLUIDS AND ELECTROLYTES    No upcoming days in selected categories.    Therapy Plan Information  PORT FLUSH for CKD (chronic kidney disease) stage 3, GFR 30-59 ml/min, noted on 10/5/2020  PORT FLUSH for Squamous cell carcinoma of base of tongue, noted on 9/28/2020  Flushes  heparin,  porcine (PF) 100 unit/mL injection flush 500 Units  500 Units, Intravenous, Every visit  sodium chloride 0.9% flush 10 mL  10 mL, Intravenous, Every visit    PORT FLUSH for Squamous cell carcinoma of base of tongue, noted on 9/28/2020  PORT FLUSH for Secondary malignant neoplasm of cervical lymph node, noted on 2/9/2021  Flushes  heparin, porcine (PF) 100 unit/mL injection flush 500 Units  500 Units, Intravenous, Every visit  sodium chloride 0.9% flush 10 mL  10 mL, Intravenous, Every visit      No therapy plan of the specified type found.

## 2024-11-18 DIAGNOSIS — C01 SQUAMOUS CELL CARCINOMA OF BASE OF TONGUE: Primary | ICD-10-CM

## 2024-11-18 DIAGNOSIS — K86.89 PANCREATIC MASS: ICD-10-CM

## 2024-11-18 DIAGNOSIS — C44.329 SQUAMOUS CELL CARCINOMA OF SKIN OF ORBIT: ICD-10-CM

## 2024-11-25 ENCOUNTER — HOSPITAL ENCOUNTER (OUTPATIENT)
Dept: RADIOLOGY | Facility: HOSPITAL | Age: 74
Discharge: HOME | End: 2024-11-25
Attending: STUDENT IN AN ORGANIZED HEALTH CARE EDUCATION/TRAINING PROGRAM
Payer: MEDICARE

## 2024-11-25 DIAGNOSIS — K86.89 PANCREATIC MASS: ICD-10-CM

## 2024-11-25 DIAGNOSIS — C01 SQUAMOUS CELL CARCINOMA OF BASE OF TONGUE: ICD-10-CM

## 2024-11-25 DIAGNOSIS — C44.329 SQUAMOUS CELL CARCINOMA OF SKIN OF ORBIT: ICD-10-CM

## 2024-11-25 PROCEDURE — 74177 CT ABD & PELVIS W/CONTRAST: CPT | Mod: TC

## 2024-11-25 PROCEDURE — 25500020 PHARM REV CODE 255: Performed by: STUDENT IN AN ORGANIZED HEALTH CARE EDUCATION/TRAINING PROGRAM

## 2024-11-25 PROCEDURE — 74177 CT ABD & PELVIS W/CONTRAST: CPT | Mod: 26,,, | Performed by: RADIOLOGY

## 2024-11-25 RX ADMIN — IOHEXOL 75 ML: 350 INJECTION, SOLUTION INTRAVENOUS at 04:11

## 2024-11-26 RX ORDER — LEVOTHYROXINE SODIUM 88 UG/1
TABLET ORAL
Qty: 90 TABLET | Refills: 3 | OUTPATIENT
Start: 2024-11-26

## 2024-12-02 ENCOUNTER — PATIENT MESSAGE (OUTPATIENT)
Dept: DERMATOLOGY | Facility: CLINIC | Age: 74
End: 2024-12-02
Payer: MEDICARE

## 2024-12-04 ENCOUNTER — PATIENT MESSAGE (OUTPATIENT)
Dept: DERMATOLOGY | Facility: CLINIC | Age: 74
End: 2024-12-04
Payer: MEDICARE

## 2024-12-04 ENCOUNTER — TELEPHONE (OUTPATIENT)
Dept: DERMATOLOGY | Facility: CLINIC | Age: 74
End: 2024-12-04
Payer: MEDICARE

## 2024-12-04 NOTE — TELEPHONE ENCOUNTER
----- Message from Dominic Haque MD sent at 12/4/2024 10:28 AM CST -----  Regarding: RE: Coordinated Procedure  Hey-  Thanks for letting us know.  Agree with this.      Ana Lilia - please see message below and make sure patient is aware that we will not be treating this BCC given his other circumstances.    Thanks!  Dominic  ----- Message -----  From: Beth Saha MD  Sent: 12/4/2024   9:49 AM CST  To: Dominic Haque MD; Aisha Tran MA; #  Subject: FW: Coordinated Procedure                        Hi Mohs team,    This patient has been recently discussed at our head and neck tumor board. 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 I don't know if its in his best interest to have a small basal cell cancer removed under these circumstances. I discussed with Dr. Joyce from rad onc who feels his other cancer is very aggressive, and he does not have curative treatment options left.    Best,    ----- Message -----  From: Aisha Tran MA  Sent: 12/3/2024  11:39 AM CST  To: Beth Saha MD  Subject: FW: Coordinated Procedure                        Please advise if this is a reconstruction that you would like to perform.    Thanks!  ----- Message -----  From: Ana Lilia Jaimes MA  Sent: 12/2/2024   1:36 PM CST  To: Aisha Tran MA  Subject: Coordinated Procedure                            Hi,     This pt has a BCC on his L nasal ala. Dr. Haque would like to do Mohs and then coordinate with Dr. Saha for reconstruction. Our next available would be January 30th if Dr. Saha is available on the 31st. Please let me know if this works.     Thanks!  Ana Lilia

## 2025-01-12 NOTE — PROGRESS NOTES
[facility] Follow Up Note      Assessment:  Rocco Swain is a 74 y.o. 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 He is s/p orbital exenteration 4/25/24 (1mm SM, +PNI) with no adjuvant therapy. He now presents with a local recurrence with rapid progression, s/p 32.5 Gy in 5 fractions to the right orbital mass completed 9/23/24.   Good clinical response, significant reduction in orbital mass and pain level.   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 He developed an unresectable recurrence and was treated with multiple lines of systemic therapy. Last on pembro 2023.  Subcm apical LLL nodule. Suspicious for metastasis  Planning for Mohs to left nasal ala, biopsied as BCC. Currently on hold  Liver transplant on tacrolimus. He reduced tacro from 1.5 to 1mg daily, following with transplant  ECOG: (1) Restricted in physically strenuous activity, ambulatory and able to do work of light nature        Plan:  Follow up CT orbits, neck and chest from today  Clinically doing well, but reports continued progression of the BCC of the left nasal ala, he is wanting to proceed with a treatment. Will present at cutaneous TB, regarding Mohs vs RT.         Oncologic History:  h/o liver transplantation in 2008 due to hepatitis C, currently on Tacrolimus. He has two head and neck primaries:     1) R/M SCC of the BOT, HPV+  Diagnosis: [id number] with locally advanced disease (unknown stage)  2016, definitive CRT with presistent disease  Base of tongue CRT at [place], [place].   2016/2017, total laryngectomy + glossectomy in [place]   2020, developed local unresectable recurrence  (biopsy-proven p16+ SCC).   Oct 2020 - Aug 2021, started PCC followed by maintenance cetuximab with PD in August 2021  Sep 2021, started carboplatin, 5-FU, and pembrolizumab x 2 cycles with limiting toxicity attributed to chemo. Transitioned to pembrolizumab alone (no signs for liver rejection). Added chemotherapy again to the cycle 5  March 2022 to December 2023, pembrolizumab as single agent (no signs for liver rejection)    2) SCC of the conjunctivae   Initial diagnosis: April 2022  Eye lesions grew despite pembrolizumab  10/19/2022, local excision at [facility] (Dr Victor M Vasques)  08/31/2023, recurrence s/p local excision at [facility] (Dr Victor M Vasques)  October - December 2023, topical MMC without response  1/19/2024, he was seen as a consult at [facility]. As he progressed on IO in the past, he was recommended systemic therapy with chemotherapy (platinum doublet, such as carboplatin and 5-FU) and cetuximab. He started this at [facility] [place].   4/24/24: MRI orbits ([facility]) with sight increase in size of the mass (2.6 cm) in the inferonasal right conjunctiva with extension into the post septal extraconal space. PPF, cavernous sinuses, and Meckel's caves are within normal limits. No adenopathy.  4/25/2024: right eyelid sparing orbital exenteration and Repair of defect right socket with adjacent tissue transfer using upper and lower eyelid skin at [facility].  Path: moderate to poorly differentiated squamous cell carcinoma involving conjunctiva of upper and lower eyelids, fibroconnective tissue and skeletal muscle. +PNI, 2 foci, largest nerve 0.06mm. margins negative but close at 1.2mm.  No adjuvant RT   8/12/24: MRI orbits s/p right orbital mass resection with orbital exenteration. Nodular enhancing soft tissue superficially at the superomedial orbit and along the medial wall of the orbit posteriorly at least concerning for underlying recurrent lesion.   8/27/24: PET/CT with FDG avid right orbital mass consistent with disease recurrence. New  pulmonary nodules concerning for metastasis  9/12/24: CT orbits with progression and new 1.1 cm round enhancing focus posterior to the right maxilla, concerning for metastasis  9/17/24 - 9/23/24: 25-32.5 Gy in 5 fractions to right orbital mass and lesion posterior to the right maxilla       Possibility of pregnancy: N/A  History of prior irradiation: Yes - as above  History of prior systemic anti-cancer therapy: Yes - as above  History of collagen vascular disease: No  Implanted electronic device (pacer/defib/nerve stimulator): No    Interval History:   Rocco Swain presents today for follow up.     Since last visit, no oncologic events    Has some soreness in the right orbit but overall much improved compared to pre tx. No longer on pain meds. No new facial numbness, has right forehead and cheek numbness stable since his orbital exent at [facility].       Review of Systems:  ROS  as above    Social History:  Social History     Tobacco Use    Smoking status: Never    Smokeless tobacco: Never   Substance Use Topics    Alcohol use: Yes     Comment: drinks wine, 1 glass day    Drug use: Not Currently         Medications:  Current Outpatient Medications on File Prior to Visit   Medication Sig Dispense Refill    levothyroxine (SYNTHROID) 88 MCG tablet TAKE 1 TABLET BY MOUTH ONCE  DAILY 90 tablet 0    magic mouthwash diphen/antac/lidoc/nysta Take 10 mLs by mouth 4 (four) times daily. 120 mL 4    multivit-min/FA/lycopen/lutein (CENTRUM SILVER MEN ORAL) Take 1 tablet by mouth once daily.      ondansetron (ZOFRAN-ODT) 8 MG TbDL Take 1 tablet (8 mg total) by mouth every 8 (eight) hours as needed (nausea - first choice). 60 tablet 4    prochlorperazine (COMPAZINE) 10 MG tablet Take 1 tablet (10 mg total) by mouth every 6 (six) hours as needed (nausea/vomiting - second choice). 30 tablet 1    senna (SENOKOT) 8.6 mg tablet Take 2 tablets by mouth 2 (two) times daily as needed for Constipation. 60 tablet 2    tacrolimus (PROGRAF)  0.5 MG Cap Take 1 capsule (0.5 mg total) by mouth every 12 (twelve) hours. 180 capsule 3    traZODone (DESYREL) 50 MG tablet TAKE 1 TABLET BY MOUTH IN THE  EVENING 90 tablet 3     Current Facility-Administered Medications on File Prior to Visit   Medication Dose Route Frequency Provider Last Rate Last Admin    ofloxacin 0.3 % ophthalmic solution 1 drop  1 drop Right Eye On Call Procedure Victor M Vasques MD   2 drop at 10/11/22 0745    sodium chloride 0.9% flush 10 mL  10 mL Intravenous PRN Ronald Berg MD        sodium chloride 0.9% flush 10 mL  10 mL Intravenous PRN Victor M Vasques MD           Allergies:  Review of patient's allergies indicates:   Allergen Reactions    Aspirin     Tylenol extended release        Exam:  There were no vitals filed for this visit.        Constitutional: Pleasant 74 y.o. male in no acute distress.  Well nourished. Well groomed.   HEENT: He is s/p exent with continued resolution of the palpable mass previously seen in the ~1 o'clock position with overlying skin intact without erythema or ulceration. No appreciated residual fullness in the right orbit. See media from today. There is an ulcerated lesion on the left nasal ala, see media. No numbness in left V2  Lymph:  heavily pretreated neck with fibrosis, with such limitation appreciated cervical, pre auricular, facial adenopathy. + helio tube  Cardiovascular: Upper extremities warm to touch  Lungs: No audible wheezing.  Normal effort.   Musculoskeletal: No gross MSK deformities. Ambulates well  Skin: No rashes appreciated.  Psych: Alert and oriented with appropriate mood and affect.  Neuro: as above, otherwise  Grossly normal.    Data Review:    Information obtained via chart review and discussion with Mr. Swain.     11/12/2024 9/12/24          Oli Joyce MD  Radiation Oncology

## 2025-01-13 DIAGNOSIS — G47.09 OTHER INSOMNIA: ICD-10-CM

## 2025-01-14 ENCOUNTER — OFFICE VISIT (OUTPATIENT)
Dept: RADIATION ONCOLOGY | Facility: CLINIC | Age: 75
End: 2025-01-14
Payer: MEDICARE

## 2025-01-14 ENCOUNTER — HOSPITAL ENCOUNTER (OUTPATIENT)
Dept: RADIOLOGY | Facility: HOSPITAL | Age: 75
Discharge: HOME | End: 2025-01-14
Attending: STUDENT IN AN ORGANIZED HEALTH CARE EDUCATION/TRAINING PROGRAM
Payer: MEDICARE

## 2025-01-14 VITALS
WEIGHT: 127 LBS | TEMPERATURE: 98 F | HEIGHT: 68 IN | HEART RATE: 87 BPM | BODY MASS INDEX: 19.25 KG/M2 | OXYGEN SATURATION: 98 % | DIASTOLIC BLOOD PRESSURE: 92 MMHG | SYSTOLIC BLOOD PRESSURE: 144 MMHG

## 2025-01-14 DIAGNOSIS — C01 SQUAMOUS CELL CARCINOMA OF BASE OF TONGUE: ICD-10-CM

## 2025-01-14 DIAGNOSIS — C44.329 SQUAMOUS CELL CARCINOMA OF SKIN OF ORBIT: ICD-10-CM

## 2025-01-14 DIAGNOSIS — C44.329 SQUAMOUS CELL CARCINOMA OF SKIN OF ORBIT: Primary | ICD-10-CM

## 2025-01-14 DIAGNOSIS — C44.311 BASAL CELL CARCINOMA (BCC) OF SKIN OF NOSE: ICD-10-CM

## 2025-01-14 LAB
CREAT SERPL-MCNC: 1.5 MG/DL (ref 0.5–1.4)
SAMPLE: ABNORMAL

## 2025-01-14 PROCEDURE — 70481 CT ORBIT/EAR/FOSSA W/DYE: CPT | Mod: 26,,, | Performed by: RADIOLOGY

## 2025-01-14 PROCEDURE — 70481 CT ORBIT/EAR/FOSSA W/DYE: CPT | Mod: TC

## 2025-01-14 PROCEDURE — 99213 OFFICE O/P EST LOW 20 MIN: CPT | Mod: PBBFAC,25 | Performed by: STUDENT IN AN ORGANIZED HEALTH CARE EDUCATION/TRAINING PROGRAM

## 2025-01-14 PROCEDURE — 99999 PR PBB SHADOW E&M-EST. PATIENT-LVL III: CPT | Mod: PBBFAC,,, | Performed by: STUDENT IN AN ORGANIZED HEALTH CARE EDUCATION/TRAINING PROGRAM

## 2025-01-14 PROCEDURE — 71250 CT THORAX DX C-: CPT | Mod: 26,,, | Performed by: STUDENT IN AN ORGANIZED HEALTH CARE EDUCATION/TRAINING PROGRAM

## 2025-01-14 PROCEDURE — 70491 CT SOFT TISSUE NECK W/DYE: CPT | Mod: 26,,, | Performed by: RADIOLOGY

## 2025-01-14 PROCEDURE — 70491 CT SOFT TISSUE NECK W/DYE: CPT | Mod: TC

## 2025-01-14 PROCEDURE — 99213 OFFICE O/P EST LOW 20 MIN: CPT | Mod: S$PBB,,, | Performed by: STUDENT IN AN ORGANIZED HEALTH CARE EDUCATION/TRAINING PROGRAM

## 2025-01-14 PROCEDURE — 71250 CT THORAX DX C-: CPT | Mod: TC

## 2025-01-14 PROCEDURE — 25500020 PHARM REV CODE 255: Performed by: STUDENT IN AN ORGANIZED HEALTH CARE EDUCATION/TRAINING PROGRAM

## 2025-01-14 RX ORDER — TRAZODONE HYDROCHLORIDE 50 MG/1
50 TABLET ORAL NIGHTLY
Qty: 90 TABLET | Refills: 3 | Status: SHIPPED | OUTPATIENT
Start: 2025-01-14

## 2025-01-14 RX ADMIN — IOHEXOL 150 ML: 350 INJECTION, SOLUTION INTRAVENOUS at 10:01

## 2025-01-15 ENCOUNTER — OFFICE VISIT (OUTPATIENT)
Dept: PALLIATIVE MEDICINE | Facility: CLINIC | Age: 75
End: 2025-01-15
Payer: MEDICARE

## 2025-01-15 VITALS
WEIGHT: 128.5 LBS | SYSTOLIC BLOOD PRESSURE: 108 MMHG | HEART RATE: 102 BPM | DIASTOLIC BLOOD PRESSURE: 61 MMHG | OXYGEN SATURATION: 94 % | BODY MASS INDEX: 19.54 KG/M2

## 2025-01-15 DIAGNOSIS — Z90.02 HISTORY OF LARYNGECTOMY: ICD-10-CM

## 2025-01-15 DIAGNOSIS — M54.50 CHRONIC LOW BACK PAIN, UNSPECIFIED BACK PAIN LATERALITY, UNSPECIFIED WHETHER SCIATICA PRESENT: ICD-10-CM

## 2025-01-15 DIAGNOSIS — C01 SQUAMOUS CELL CARCINOMA OF BASE OF TONGUE: ICD-10-CM

## 2025-01-15 DIAGNOSIS — F43.21 ADJUSTMENT DISORDER WITH DEPRESSED MOOD: ICD-10-CM

## 2025-01-15 DIAGNOSIS — G89.29 CHRONIC LOW BACK PAIN, UNSPECIFIED BACK PAIN LATERALITY, UNSPECIFIED WHETHER SCIATICA PRESENT: ICD-10-CM

## 2025-01-15 DIAGNOSIS — H57.11 EYE PAIN, RIGHT: ICD-10-CM

## 2025-01-15 DIAGNOSIS — G89.3 CANCER RELATED PAIN: ICD-10-CM

## 2025-01-15 DIAGNOSIS — C32.9 LARYNX CANCER: ICD-10-CM

## 2025-01-15 DIAGNOSIS — G47.00 INSOMNIA, UNSPECIFIED TYPE: ICD-10-CM

## 2025-01-15 DIAGNOSIS — Z51.5 ENCOUNTER FOR PALLIATIVE CARE: Primary | ICD-10-CM

## 2025-01-15 PROCEDURE — 99999 PR PBB SHADOW E&M-EST. PATIENT-LVL III: CPT | Mod: PBBFAC,,, | Performed by: NURSE PRACTITIONER

## 2025-01-15 PROCEDURE — 99213 OFFICE O/P EST LOW 20 MIN: CPT | Mod: PBBFAC | Performed by: NURSE PRACTITIONER

## 2025-01-15 PROCEDURE — 99215 OFFICE O/P EST HI 40 MIN: CPT | Mod: S$PBB,,, | Performed by: NURSE PRACTITIONER

## 2025-01-15 NOTE — PROGRESS NOTES
Consult Note  Palliative Care      Consult Requested By: No ref. provider found  Reason for Consult: symptom mgmt/ACP      ASSESSMENT/PLAN:     Plan/Recommendations:  Diagnoses and all orders for this visit:    01/15/2025:  - no changes made today    Squamous cell carcinoma of base of tongue, conjunctival intraepithelial neoplasm  - following with Dr. Hernandez and SABINO Francis  - s/p laryngectomy, total glossectomy   - 100% written communication   -  chemo Feb-March 2024  - s/p orbital exenteration   - MRI orbit 08/12/2024 with disease recurrence, now w rapid clinical progression   - palliative RT 9/2024  - currently on systemic therapy     Encounter for palliative care  - Patient is decisional  - Patient accompanied today by self   - ACP documents are uploaded into EMR   - Philosophy of Palliative Medicine reviewed with patient and family at first visit  - New patient folder given to and reviewed with patient and family at first visit  - Goals of care: Patient has excellent activity tolerance, his goal is to remain active and able to do the things he enjoys. He fly fishes frequently and enjoys taking walks around the [place]. He is a retired  of 35 years. He has no children and is unmarried, he jokes that being single has allowed him to buy a Kristine. He lives alone with a brother who lives about a mile from his house.      Cancer pain  - 7/16: notes facial pain started 3 weeks ago, right-side nose, adjacent to selam-orbital region.  Pain is intermittent, sometimes wakes him up   - has used a couple doses of oxycodone 5 mg left over from old prescription     Chronic low back pain, unspecified back pain laterality, unspecified whether sciatica present  - no longer requires pain medication for cancer-related pain  - states his chronic back pain is now more prominent, this is his biggest complaint  - he has tried PT and pain management with limited to no relief     - placed referral to IO  for acupuncture     Insomnia  - reports 5-6 hours of sleep per night  - 2/2 chronic back pain, see above  - placed referral for acupuncture   - tip sheet provided in new patient folder  - will continue to monitor     Anorexia  - resolved  - excellent appetite     Adjustment disorder with depression  - coping methods: driving his Kristine, long walks, fishing, travel, time with his brother     Understanding of illness/Prognosis: good    Goals of care: life-prolonging, maintain good QOL    Follow up: 8 weeks    Patient's encounter and above plan of care discussed with patient's oncology team.     SUBJECTIVE:     History of Present Illness:  Patient is a 74 y.o. year old male presenting with SCC base of tongue. Please see oncology notes for full oncologic history and treatment course.     01/15/2025:  JESUS ALBERTO HERNÁNDEZ reviewed    Patient presents to clinic visit alone. He is overall doing well.     Reports:   - weight gain and improved appetite   - regular bm  - some discomfort around right orbital rim, does not need medication for pain  - would like to proceed with treatment for BCC nose, case will be presented at tumor board. As he understands it, tx recommendation may include RT, PO chemo and/or Mohs. His preference would be po tx and the surgery.   - enjoyed holiday with his brother  - denies new, worsening symptoms    10/22/2024:  JESUS ALBERTO HERNÁNDEZ reviewed    Patient presents to follow-up visit alone. Interval history: s/p palliative RT, initially plan was to resume systemic therapy but this was subsequently aborted due to clinical decline. Patient says he needs time to rest and recover, he did not tolerate RT well but he is feeling better now. He denies pain, is not using opioids. Has interval constipation and diarrhea but improved now off opioids. His right eye is healing well. He is slowly regaining strength, resting, eating and drinking more. He does sleep a lot, he denies depression. He jie by taking his car out for drives and  has been out fishing with his brother. Has trip to [place] planned in two weeks, though admits he wont be able to go if he doesn't feel stronger. He request prescription for MM for insomnia and mood. Faxed to dispensary today.     -  07/16/2024:  JESUS ALBERTO HERNÁNDEZ reviewed    Patient presents to visit alone.     - some depression shortly after surgical removal of right eye, but is feeling better now, f/u @ [facility] on 12/18  - plan for cataract surgery on left, after 9/15   - purchased new Melba to boost mood, is also fishing, going on walks and spending time with brother for enjoyment   - pain, started 3 weeks ago: right side nose, using old oxycodone 5 mg occasionally, this helps   - excellent appetite, 10 lbs weight gain   - Upcoming trips to: [place] in October, [facility] in November 05/07/2024:  JESUS ALBERTO HERNÁNDEZ reviewed    Patient presents to clinic f/u alone.     - s/p orbital exenteration at [facility] 4/25/2024,  - PEG cancelled, pt PO intake improving   - eye pain sig improved, no longer taking oxycodone   - reports overall doing/feeling well, his brother has been supportive through selam-op process     01/03/2024:  JESUS ALBERTO HERNÁNDEZ reviewed    Patient presents to clinic f/u alone.     - interval PET scan clear/reassuring  - referred to [facility] for SCC eye  - low dose oxy managed by optho, using about 1 tab daily  - MOHS schedule for 24th  - He reports that he is feeling reasonably well, enjoyed the holidays  - No new or worsening symptoms  - RTC in 12 weeks      10/16/2023:  JESUS ALBERTO HERNÁNDEZ reviewed: no concerns    Presents to clinic visit alone.     - since our last visit he was hospitalized x 5 days for GIB, feeling much better now  - reports has gained 8 lbs since last spring  - s/p eye surgery (biopsy) 6 weeks ago, still w some residual pain, 14 day refill of oxy 5 mg provided   - next PET scan 10/31  - leaves for trip to [facility] Nov/1  - starts w new back doctor in November   - enjoying cooler weather, walks and driving his car    06/28/2023:  LA   reviewed:    - updated scan is reassuring    - doing very well overall  - remains off pain medication   - still trying to regain weight lost to Covid infection   - fishing frequently   - several summer trips scheduled fishing trip to [place] planned for early August  - did not see initial IO scheduling message, will f/u to make initial appointment      04/14/2023:  LA  reviewed and summarized:  No surprises    Patient presents to clinic alone, all communication is written. He is a pleasant man, in good spirits at our initial visit. His biggest concern is back pain, which is chronic in nature. He has hx of PT and pain management interventions which were not effective. He is curious to try acupuncture, referral placed today. He has excellent activity tolerance and enjoys engaging in his hobbies and making the most of his retired life.     Past Medical History:   Diagnosis Date    Acute conjunctivitis of right eye 09/12/2022    Atrial fibrillation     Basal cell carcinoma (BCC) in situ of skin 2012    3 on face, 2 on arm, removed by dermatology.     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 Underwent surgical resection of tongue and neck. s/p tracheostomy    Squamous cell carcinoma of skin of right temple 01/24/2024    right temple     Past Surgical History:   Procedure Laterality Date    COLONOSCOPY      COLONOSCOPY N/A 09/14/2023    Procedure: COLONOSCOPY;  Surgeon: Reyes Olivia MD;   Location: [facility] ENDO (2ND FLR);  Service: Endoscopy;  Laterality: N/A;    CONJUNCTIVA BIOPSY Right 10/11/2022    Procedure: BIOPSY, CONJUNCTIVA;  Surgeon: Victor M Vasques MD;  Location: [facility];  Service: Ophthalmology;  Laterality: Right;    ESOPHAGOGASTRODUODENOSCOPY N/A 09/14/2023    Procedure: EGD (ESOPHAGOGASTRODUODENOSCOPY);  Surgeon: Reyes Olivia MD;  [facility]  Service: Endoscopy;  Laterality: N/A;    ESOPHAGOGASTRODUODENOSCOPY N/A 04/10/2024    Procedure: EGD (ESOPHAGOGASTRODUODENOSCOPY);  Surgeon: Reyes Pagan MD;  [facility]  Service: Endoscopy;  Laterality: N/A;    ESOPHAGOGASTRODUODENOSCOPY N/A 06/24/2024    Procedure: EGD (ESOPHAGOGASTRODUODENOSCOPY);  Surgeon: Reyes Pagan MD;  [facility]  Service: Endoscopy;  Laterality: N/A;  Ref By: Dr. Hawkins   Procedure: egd  Diagnosis: esophagitis  Procedure Timing: 10 weeks  Prep: standard prep  6/21-pt r/s-inst to portal-2nd floor criteria-labs within 90 daysMS    EYE SURGERY Right 04/25/2024    exenteration of orbit    GLOSSECTOMY      INSERTION OF VENOUS ACCESS PORT Right 03/08/2021    Procedure: INSERTION, VENOUS ACCESS PORT;  Surgeon: Jesus Rendon MD;  Location: [address];  Service: General;  Laterality: Right;    LARYNGECTOMY      LIVER TRANSPLANT  11/2008    transplanted for biopsy proven hepatocellular carcinoma,     LYMPH NODE BIOPSY N/A 09/18/2020    Procedure: BIOPSY, LYMPH NODE;  Surgeon: [facility] Diagnostic [facility];  [address];  Service: General;  Laterality: N/A;  Rm 173    skin cancer removals      SURGICAL REMOVAL OF SCAR Right 08/24/2023    Procedure: EXCISION, SCAR;  Surgeon: Ting Brambila MD;  Location: [facility];  Service: Ophthalmology;  Laterality: Right;    TRACHEOSTOMY       Family History   Problem Relation Name Age of Onset    Dementia Mother      Cataracts Neg Hx      Glaucoma Neg Hx      Macular degeneration Neg Hx       Review  of patient's allergies indicates:   Allergen Reactions    Aspirin     Tylenol extended release        Medications:    Current Outpatient Medications:     levothyroxine (SYNTHROID) 88 MCG tablet, TAKE 1 TABLET BY MOUTH ONCE  DAILY, Disp: 90 tablet, Rfl: 0    magic mouthwash diphen/antac/lidoc/nysta, Take 10 mLs by mouth 4 (four) times daily., Disp: 120 mL, Rfl: 4    multivit-min/FA/lycopen/lutein (CENTRUM SILVER MEN ORAL), Take 1 tablet by mouth once daily., Disp: , Rfl:     ondansetron (ZOFRAN-ODT) 8 MG TbDL, Take 1 tablet (8 mg total) by mouth every 8 (eight) hours as needed (nausea - first choice)., Disp: 60 tablet, Rfl: 4    prochlorperazine (COMPAZINE) 10 MG tablet, Take 1 tablet (10 mg total) by mouth every 6 (six) hours as needed (nausea/vomiting - second choice)., Disp: 30 tablet, Rfl: 1    senna (SENOKOT) 8.6 mg tablet, Take 2 tablets by mouth 2 (two) times daily as needed for Constipation., Disp: 60 tablet, Rfl: 2    tacrolimus (PROGRAF) 0.5 MG Cap, Take 1 capsule (0.5 mg total) by mouth every 12 (twelve) hours., Disp: 180 capsule, Rfl: 3    traZODone (DESYREL) 50 MG tablet, TAKE 1 TABLET BY MOUTH IN THE  EVENING, Disp: 90 tablet, Rfl: 3  No current facility-administered medications for this visit.    Facility-Administered Medications Ordered in Other Visits:     ofloxacin 0.3 % ophthalmic solution 1 drop, 1 drop, Right Eye, On Call Procedure, Victor M Vasques MD, 2 drop at 10/11/22 0745    sodium chloride 0.9% flush 10 mL, 10 mL, Intravenous, PRN, Ronald Berg MD    sodium chloride 0.9% flush 10 mL, 10 mL, Intravenous, PRN, Victor M Vasques MD    OBJECTIVE:       ROS:  Review of Systems   Constitutional:  Positive for fatigue. Negative for activity change, appetite change and chills.        Written communication    HENT:          Right side nose pain   Eyes:  Positive for pain. Negative for discharge and itching.   Respiratory: Negative.  Negative for apnea, cough and chest tightness.     Cardiovascular: Negative.  Negative for chest pain, palpitations and leg swelling.   Gastrointestinal:  Negative for nausea and vomiting.   Genitourinary:  Negative for difficulty urinating, dysuria and enuresis.   Musculoskeletal:  Positive for back pain. Negative for arthralgias and gait problem.   Skin:  Positive for wound (left nare, healing). Negative for color change, pallor and rash.   Psychiatric/Behavioral:  Positive for dysphoric mood. Negative for sleep disturbance. The patient is not nervous/anxious.        Review of Symptoms      Symptom Assessment (ESAS 0-10 Scale)  Pain:  4  Dyspnea:  0  Anxiety:  0  Nausea:  0  Depression:  0  Anorexia:  0  Fatigue:  2  Insomnia:  2  Restlessness:  0  Agitation:  0     CAM / Delirium:  Negative  Constipation:  Negative  Diarrhea:  Negative    Anxiety:  Is not nervous/anxious    Bowel Management Plan (BMP):  No      Pain Assessment:  OME in 24 hours:  0  Location(s): back    Back       Location: lower        Quality: None        Quantity: 4/10 in intensity        Chronicity: Onset 0 year(s) ago, stable        Aggravating Factors: Activity        Alleviating Factors: None       Associated Symptoms: None    Modified Tricia Scale:  0    ECOG Performance Status stGstrstastdstest:st st1st Living Arrangements:  Lives alone    Psychosocial/Cultural:   See Palliative Psychosocial Note: Yes  **Primary  to Follow**  Palliative Care  Consult: No      Advance Care Planning   Advance Directives:   Living Will: Yes        Copy on chart: Yes    Medical Power of : Yes      Decision Making:  Patient answered questions  Goals of Care: What is most important right now is to focus on remaining as independent as possible, symptom/pain control. Accordingly, we have decided that the best plan to meet the patient's goals includes continuing with treatment.        Physical Exam:  Vitals:    Vitals:    01/15/25 1007   BP: 108/61   Pulse: 102         Physical Exam  Vitals  reviewed.   Constitutional:       Appearance: He is underweight. He is not toxic-appearing or diaphoretic.      Comments: Written communication    HENT:      Head: Normocephalic and atraumatic.      Comments: Trach      Right Ear: External ear normal.      Left Ear: External ear normal.      Nose: Nose normal.      Comments: Wound, well-healed  Eyes:      General:         Right eye: No discharge.         Left eye: No discharge.      Comments: Right eye s/p enucleation, well-healed    Pulmonary:      Effort: Pulmonary effort is normal. No respiratory distress.      Breath sounds: No stridor.   Abdominal:      General: Abdomen is flat.      Palpations: Abdomen is soft.   Musculoskeletal:         General: No swelling or tenderness. Normal range of motion.      Cervical back: Normal range of motion.   Skin:     General: Skin is warm and dry.      Coloration: Skin is not jaundiced.      Findings: No bruising.   Neurological:      General: No focal deficit present.      Mental Status: He is alert and oriented to person, place, and time. Mental status is at baseline.   Psychiatric:         Mood and Affect: Mood is depressed.         Behavior: Behavior normal.         Thought Content: Thought content normal.         Judgment: Judgment normal.         Labs:  CBC:   WBC   Date Value Ref Range Status   11/04/2024 5.35 3.90 - 12.70 K/uL Final     Hemoglobin   Date Value Ref Range Status   11/04/2024 9.5 (L) 14.0 - 18.0 g/dL Final     Hematocrit   Date Value Ref Range Status   11/04/2024 29.0 (L) 40.0 - 54.0 % Final     MCV   Date Value Ref Range Status   11/04/2024 103 (H) 82 - 98 fL Final     Platelets   Date Value Ref Range Status   11/04/2024 145 (L) 150 - 450 K/uL Final       LFT:   Lab Results   Component Value Date    AST 26 11/04/2024     (H) 07/09/2020    ALKPHOS 59 11/04/2024    BILITOT 0.6 11/04/2024       Albumin:   Albumin   Date Value Ref Range Status   11/04/2024 3.3 (L) 3.5 - 5.2 g/dL Final     Protein:    Total Protein   Date Value Ref Range Status   11/04/2024 7.0 6.0 - 8.4 g/dL Final       Radiology:I have reviewed all pertinent imaging results/findings within the past 24 hours.    01/15/2025 CT chest: Impression:     No findings suspicious for metastatic disease     Bibasilar depending atelectasis.    09/12/2024 CT 9/12/24: CT orbits with progression and new 1.1 cm round enhancing focus posterior to the right maxilla, concerning for metastasis     08/27/2024 NM PET: Impression:     Interval postoperative change of right orbital mass resection and exenteration for squamous cell carcinoma.  Hypermetabolic lesions about the right orbit concerning for disease recurrence.     New mildly hypermetabolic pulmonary nodule concerning for metastasis.    08/12/2024 MRI orbits: Impression:     Initial postsurgical study status post right orbital mass resection with orbital exenteration.  Nodular enhancing soft tissue superficially at the superomedial orbit and along the medial wall of the orbit posteriorly at least concerning for underlying recurrent lesion.  Postcontrast CT imaging or preferably PET would be helpful for further evaluation on this initial post treatment study.    I spent a total of 50 minutes on the day of the visit.This includes face to face time in discussion of goals of care, symptom assessment, coordination of care and emotional support.  This also includes non-face to face time preparing to see the patient (eg, review of tests/imaging), obtaining and/or reviewing separately obtained history, documenting clinical information in the electronic or other health record, independently interpreting results and communicating results to the patient/family/caregiver, or care coordinator.     Signature: Arabella Cuevas DNP

## 2025-01-19 DIAGNOSIS — Z94.4 LIVER TRANSPLANTED: ICD-10-CM

## 2025-01-22 ENCOUNTER — TELEPHONE (OUTPATIENT)
Dept: HEMATOLOGY/ONCOLOGY | Facility: CLINIC | Age: 75
End: 2025-01-22
Payer: MEDICARE

## 2025-01-22 RX ORDER — TACROLIMUS 0.5 MG/1
0.5 CAPSULE ORAL EVERY 12 HOURS
Qty: 180 CAPSULE | Refills: 3 | Status: SHIPPED | OUTPATIENT
Start: 2025-01-22 | End: 2026-01-22

## 2025-01-22 NOTE — TELEPHONE ENCOUNTER
Spoke to patient's friend, VV will not be an option tomorrow as his friend will not be present. Friday appointment r/s to Monday 1/27. New day and time confirmed.

## 2025-01-27 ENCOUNTER — OFFICE VISIT (OUTPATIENT)
Dept: HEMATOLOGY/ONCOLOGY | Facility: CLINIC | Age: 75
End: 2025-01-27
Payer: MEDICARE

## 2025-01-27 VITALS
HEIGHT: 68 IN | DIASTOLIC BLOOD PRESSURE: 87 MMHG | SYSTOLIC BLOOD PRESSURE: 136 MMHG | BODY MASS INDEX: 19.34 KG/M2 | WEIGHT: 127.63 LBS | RESPIRATION RATE: 14 BRPM | OXYGEN SATURATION: 96 % | HEART RATE: 101 BPM

## 2025-01-27 DIAGNOSIS — C77.0 SECONDARY MALIGNANT NEOPLASM OF CERVICAL LYMPH NODE: ICD-10-CM

## 2025-01-27 DIAGNOSIS — N18.32 STAGE 3B CHRONIC KIDNEY DISEASE: ICD-10-CM

## 2025-01-27 DIAGNOSIS — C44.311 BASAL CELL CARCINOMA (BCC) OF SKIN OF NOSE: Primary | ICD-10-CM

## 2025-01-27 DIAGNOSIS — Z93.0 TRACHEOSTOMY STATUS: ICD-10-CM

## 2025-01-27 DIAGNOSIS — D84.821 IMMUNODEFICIENCY DUE TO DRUGS: ICD-10-CM

## 2025-01-27 DIAGNOSIS — E03.2 HYPOTHYROIDISM DUE TO MEDICAMENTS AND OTHER EXOGENOUS SUBSTANCES: ICD-10-CM

## 2025-01-27 DIAGNOSIS — Z94.4 STATUS POST LIVER TRANSPLANTATION: ICD-10-CM

## 2025-01-27 DIAGNOSIS — Z79.899 IMMUNODEFICIENCY DUE TO DRUGS: ICD-10-CM

## 2025-01-27 DIAGNOSIS — C44.329 SQUAMOUS CELL CARCINOMA OF SKIN OF ORBIT: ICD-10-CM

## 2025-01-27 DIAGNOSIS — C01 SQUAMOUS CELL CARCINOMA OF BASE OF TONGUE: ICD-10-CM

## 2025-01-27 PROCEDURE — G2211 COMPLEX E/M VISIT ADD ON: HCPCS | Mod: S$PBB,,, | Performed by: INTERNAL MEDICINE

## 2025-01-27 PROCEDURE — 99999 PR PBB SHADOW E&M-EST. PATIENT-LVL III: CPT | Mod: PBBFAC,,, | Performed by: INTERNAL MEDICINE

## 2025-01-27 PROCEDURE — 99213 OFFICE O/P EST LOW 20 MIN: CPT | Mod: PBBFAC | Performed by: INTERNAL MEDICINE

## 2025-01-27 PROCEDURE — 99215 OFFICE O/P EST HI 40 MIN: CPT | Mod: S$PBB,,, | Performed by: INTERNAL MEDICINE

## 2025-01-27 NOTE — PROGRESS NOTES
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 male who returns to clinic for management of cSCC of the orbit. Patient had ANGELES on October PETCT, but then developed quickly progressing orbital lesion. He was seen at [facility] in [place]. Recommendation was to follow up at [facility] to initiate induction chemotherapy followed by possible surgery. Patient completed 2 cycles of Extreme regimen, though there were delays due to cytopenias and severe mucositis. He was able to go to [facility] to have exenteration and resection of cancer April 2024 with clear margins, but PNI on path.     Interval History  Eye pain resolved and not needing pain medication, bowel movements back to normal. BCC on nose has been progressing.     ECOG Performance status: 1 - Symptomatic but completely ambulatory Communication from him is 100% written.     Cancer Staging   Squamous cell carcinoma of base of tongue  Staging form: Pharynx - HPV-Mediated Oropharynx, AJCC 8th Edition  - Clinical stage from 9/18/2020: Stage III (rcT4, cN1, cM0, p16+) - Signed by Ronald Berg MD on 12/27/2022      Oncologic History:  Oncology History   Squamous cell carcinoma of base of tongue   9/18/2020 Cancer Staged    Staging form: Pharynx - HPV-Mediated Oropharynx, AJCC 8th Edition  - Clinical stage from 9/18/2020: Stage III (rcT4, cN1, cM0, p16+)     9/28/2020 Initial Diagnosis    Squamous cell carcinoma of base of tongue     10/6/2020 - 8/17/2021 Chemotherapy    Treatment Summary   Plan Name: OP HEAD NECK CARBOPLATIN PACLITAXEL C1-2 FOLLOWED BY CETUXIMAB CARBOPLATIN C3-6 FOLLOWED BY CETUXIMAB MAINTENANCE WEEKLY  Treatment Goal: Control  Status: Inactive  Start Date: 10/6/2020  End Date: 8/17/2021  Provider: Ronald Berg MD  Chemotherapy: cetuximab (ERBITUX) 100 mg/50 mL chemo infusion 684 mg, 400 mg/m2 = 684 mg (100 % of original dose 400  mg/m2), Intravenous, [facility] 1 time, 29 of 38 cycles  Dose modification: 500 mg/m2 (original dose 400 mg/m2, Cycle 3), 250 mg/m2 (original dose 400 mg/m2, Cycle 3), 400 mg/m2 (original dose 400 mg/m2, Cycle 3), 250 mg/m2 (original dose 250 mg/m2, Cycle 7), 200 mg/m2 (original dose 250 mg/m2, Cycle 18), 200 mg/m2 (original dose 250 mg/m2, Cycle 19), 250 mg/m2 (original dose 250 mg/m2, Cycle 4), 200 mg/m2 (original dose 250 mg/m2, Cycle 25), 200 mg/m2 (original dose 250 mg/m2, Cycle 28)  Administration: 684 mg (11/17/2020), 400 mg (11/24/2020), 400 mg (2/9/2021), 400 mg (2/17/2021), 427.6 mg (3/9/2021), 400 mg (3/30/2021), 400 mg (3/23/2021), 400 mg (4/6/2021), 427.6 mg (4/13/2021), 427.6 mg (4/20/2021), 342 mg (5/11/2021), 342 mg (5/18/2021), 342 mg (5/25/2021), 400 mg (5/31/2021), 400 mg (6/7/2021), 400 mg (6/14/2021), 400 mg (12/8/2020), 427.6 mg (12/29/2020), 400 mg (12/1/2020), 400 mg (12/15/2020), 400 mg (12/22/2020), 427.6 mg (1/5/2021), 400 mg (1/12/2021), 400 mg (1/19/2021), 427.6 mg (1/26/2021), 400 mg (2/2/2021), 400 mg (2/23/2021), 400 mg (3/2/2021), 427.6 mg (3/16/2021), 400 mg (6/21/2021), 342 mg (6/28/2021), 342 mg (7/6/2021), 342 mg (7/13/2021), 342 mg (7/20/2021), 400 mg (7/27/2021), 400 mg (8/10/2021), 400 mg (8/17/2021)  CARBOplatin (PARAPLATIN) 310 mg in sodium chloride 0.9% 500 mL chemo infusion, 310 mg (92.2 % of original dose 334.5 mg), Intravenous, Clinic/HOD 1 time, 6 of 6 cycles  Dose modification:   (original dose 334.5 mg, Cycle 1)  Administration: 310 mg (10/6/2020), 370 mg (11/17/2020), 300 mg (10/27/2020), 350 mg (12/8/2020), 335 mg (12/29/2020), 320 mg (1/19/2021)  PACLitaxeL (TAXOL) 175 mg/m2 = 300 mg in sodium chloride 0.9% 500 mL chemo infusion, 175 mg/m2 = 300 mg (100 % of original dose 175 mg/m2), Intravenous, Clinic/HOD 1 time, 2 of 2 cycles  Dose modification: 175 mg/m2 (original dose 175 mg/m2, Cycle 1)  Administration: 300 mg (10/6/2020), 300 mg (10/27/2020)     4/29/2021  Tumor Conference       His case was discussed at the Multidisciplinary Head and Neck Team Planning Meeting.    Representatives from Medical Oncology, Radiation Oncology, Head and Neck Surgical Oncology, Psychosocial Oncology, and Speech and Language Pathology discussed the case with the following recommendations:    1) biopsy         7/29/2021 Tumor Conference       His case was discussed at the Multidisciplinary Head and Neck Team Planning Meeting.    Representatives from Medical Oncology, Radiation Oncology, Head and Neck Surgical Oncology, Psychosocial Oncology, and Speech and Language Pathology discussed the case with the following recommendations:    1) Head and neck clinic follow up  2) consider Keytruda (discuss with transplant)  3) consider palliative referral         9/13/2021 - 12/29/2023 Chemotherapy    Treatment Summary   Plan Name: OP HEAD NECK PEMBROLIZUMAB CARBOPLATIN FLUOROURACIL (C1 ONLY RECEIVED) FOLLOWED BY PEMBROLIZUMAB MAINTENANCE  Treatment Goal: Palliative  Status: Inactive  Start Date: 9/13/2021  End Date: 12/29/2023  Provider: Ronald Berg MD  Chemotherapy: CARBOplatin (PARAPLATIN) 320 mg in sodium chloride 0.9% 500 mL chemo infusion, 320 mg (100 % of original dose 320.5 mg), Intravenous, Clinic/HOD 1 time, 6 of 6 cycles  Dose modification:   (original dose 320.5 mg, Cycle 1)  Administration: 320 mg (9/13/2021), 335 mg (12/23/2021), 325 mg (1/27/2022), 245 mg (3/3/2022), 255 mg (5/12/2022), 255 mg (4/7/2022)  fluorouraciL 1,000 mg/m2/day = 6,440 mg in sodium chloride 0.9% 240 mL chemo infusion, 1,000 mg/m2/day = 6,440 mg, Intravenous, Over 96 hours, 6 of 6 cycles  Dose modification: 800 mg/m2/day (original dose 1,000 mg/m2/day, Cycle 6), 5,000 mg (original dose 1,000 mg/m2/day, Cycle 8)  Administration: 6,440 mg (9/13/2021), 6,440 mg (12/23/2021), 6,480 mg (1/27/2022), 5,000 mg (3/3/2022), 5,000 mg (4/7/2022), 5,000 mg (5/12/2022)     Secondary malignant neoplasm of cervical lymph node    2/9/2021 Initial Diagnosis    Secondary malignant neoplasm of cervical lymph node     9/13/2021 - 12/29/2023 Chemotherapy    Treatment Summary   Plan Name: OP HEAD NECK PEMBROLIZUMAB CARBOPLATIN FLUOROURACIL (C1 ONLY RECEIVED) FOLLOWED BY PEMBROLIZUMAB MAINTENANCE  Treatment Goal: Palliative  Status: Inactive  Start Date: 9/13/2021  End Date: 12/29/2023  Provider: Ronald Berg MD  Chemotherapy: CARBOplatin (PARAPLATIN) 320 mg in sodium chloride 0.9% 500 mL chemo infusion, 320 mg (100 % of original dose 320.5 mg), Intravenous, Clinic/HOD 1 time, 6 of 6 cycles  Dose modification:   (original dose 320.5 mg, Cycle 1)  Administration: 320 mg (9/13/2021), 335 mg (12/23/2021), 325 mg (1/27/2022), 245 mg (3/3/2022), 255 mg (5/12/2022), 255 mg (4/7/2022)  fluorouraciL 1,000 mg/m2/day = 6,440 mg in sodium chloride 0.9% 240 mL chemo infusion, 1,000 mg/m2/day = 6,440 mg, Intravenous, Over 96 hours, 6 of 6 cycles  Dose modification: 800 mg/m2/day (original dose 1,000 mg/m2/day, Cycle 6), 5,000 mg (original dose 1,000 mg/m2/day, Cycle 8)  Administration: 6,440 mg (9/13/2021), 6,440 mg (12/23/2021), 6,480 mg (1/27/2022), 5,000 mg (3/3/2022), 5,000 mg (4/7/2022), 5,000 mg (5/12/2022)     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   1/29/2024 Initial Diagnosis    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     2/19/2024 - 3/25/2024 Chemotherapy    Treatment Summary   Plan Name: OP HEAD NECK CETUXIMAB CARBOPLATIN FLUOROURACIL Q3W  Treatment Goal: Curative  Status: Inactive  Start Date: 2/19/2024  End Date: 3/25/2024  Provider: Martín Hernandez MD  Chemotherapy: cetuximab (ERBITUX) 100 mg/50 mL chemo infusion 668 mg, 400 mg/m2 = 668 mg, Intravenous, Clinic/HOD 1 time, 2 of 12 cycles  Administration: 668 mg (2/19/2024), 400 mg (2/26/2024), 400 mg (3/18/2024), 400 mg (3/25/2024), 400 mg (3/11/2024)  CARBOplatin (PARAPLATIN) 295 mg in sodium chloride 0.9% 314.5 mL chemo infusion, 295 mg, Intravenous, Clinic/HOD 1 time, 2 of 6  cycles  Administration: 295 mg (2/19/2024), 300 mg (3/18/2024)     8/23/2024 -  Chemotherapy    Treatment Summary   Plan Name: OP cetuximab (loading + maintenance) weekly  Treatment Goal: Curative  Status: Active  Start Date: 8/23/2024 (Planned)  End Date: 9/27/2024 (Planned)  Provider: Martín Hernandez MD  Chemotherapy: CETUXIMAB 100 MG/50 ML IV SOLN (UIR), 400 mg/m2, Intravenous, Clinic/HOD 1 time, 0 of 6 cycles     9/17/2024 - 9/23/2024 Radiation Therapy    Treating physician: Tian Joyce  Treatment Summary  Course: C1 H&N 2024    Treatment Site Ref. ID Energy Dose/Fx (Gy) #Fx Dose Correction (Gy) Total Dose (Gy) Start Date End Date Elapsed Days   IM Orbit_R Orbit_R 6X 6.5 5 / 5 0 32.5 9/17/2024 9/23/2024 6            Review of Systems   Constitutional:  Negative for activity change, appetite change, chills, fatigue, fever and unexpected weight change.   HENT:  Negative for ear pain, facial swelling, hearing loss, mouth sores, nosebleeds, sore throat, trouble swallowing and voice change.         No verbal communication. Skin fixed and immobile from previous scaring post-neck dissection   Eyes:  Negative for pain, discharge and visual disturbance.   Respiratory:  Negative for cough, choking, chest tightness and shortness of breath.    Cardiovascular:  Negative for chest pain, palpitations and leg swelling.   Gastrointestinal:  Negative for abdominal distention, abdominal pain, blood in stool, constipation, diarrhea, nausea and vomiting.   Endocrine: Negative for cold intolerance and heat intolerance.   Genitourinary:  Negative for difficulty urinating, dysuria, frequency and urgency.   Musculoskeletal:  Positive for back pain (lower - chronic). Negative for arthralgias, gait problem, leg pain and myalgias.   Integumentary:  Negative for pallor, rash, wound and mole/lesion.   Allergic/Immunologic: Negative for frequent infections.   Neurological:  Negative for dizziness, tremors, weakness, light-headedness,  numbness and headaches.   Hematological:  Negative for adenopathy. Does not bruise/bleed easily.   Psychiatric/Behavioral:  Negative for agitation, confusion, dysphoric mood and sleep disturbance. The patient is not nervous/anxious.         Allergies:  Review of patient's allergies indicates:   Allergen Reactions    Aspirin     Tylenol extended release        Medications:  Current Outpatient Medications   Medication Sig Dispense Refill    levothyroxine (SYNTHROID) 88 MCG tablet TAKE 1 TABLET BY MOUTH ONCE  DAILY 90 tablet 0    magic mouthwash diphen/antac/lidoc/nysta Take 10 mLs by mouth 4 (four) times daily. 120 mL 4    multivit-min/FA/lycopen/lutein (CENTRUM SILVER MEN ORAL) Take 1 tablet by mouth once daily.      tacrolimus (PROGRAF) 0.5 MG Cap Take 1 capsule (0.5 mg total) by mouth every 12 (twelve) hours. 180 capsule 3    traZODone (DESYREL) 50 MG tablet TAKE 1 TABLET BY MOUTH IN THE  EVENING 90 tablet 3    ondansetron (ZOFRAN-ODT) 8 MG TbDL Take 1 tablet (8 mg total) by mouth every 8 (eight) hours as needed (nausea - first choice). (Patient not taking: Reported on 1/27/2025) 60 tablet 4    prochlorperazine (COMPAZINE) 10 MG tablet Take 1 tablet (10 mg total) by mouth every 6 (six) hours as needed (nausea/vomiting - second choice). (Patient not taking: Reported on 1/27/2025) 30 tablet 1    senna (SENOKOT) 8.6 mg tablet Take 2 tablets by mouth 2 (two) times daily as needed for Constipation. (Patient not taking: Reported on 1/27/2025) 60 tablet 2    vismodegib (ERIVEDGE) 150 mg Cap Take 150 mg by mouth once daily. 30 capsule 3     No current facility-administered medications for this visit.     Facility-Administered Medications Ordered in Other Visits   Medication Dose Route Frequency Provider Last Rate Last Admin    ofloxacin 0.3 % ophthalmic solution 1 drop  1 drop Right Eye On Call Procedure Vicotr M Vasques MD   2 drop at 10/11/22 1702    sodium chloride 0.9% flush 10 mL  10 mL Intravenous PRN Morena  Ronald SILVA MD        sodium chloride 0.9% flush 10 mL  10 mL Intravenous PRN Victor M Vasques MD           PMH:  Past Medical History:   Diagnosis Date    Acute conjunctivitis of right eye 09/12/2022    Atrial fibrillation     Basal cell carcinoma (BCC) in situ of skin 2012    3 on face, 2 on arm, removed by dermatology.     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 Underwent surgical resection of tongue and neck. s/p tracheostomy    Squamous cell carcinoma of skin of right temple 01/24/2024    right temple       PSH:  Past Surgical History:   Procedure Laterality Date    COLONOSCOPY      COLONOSCOPY N/A 09/14/2023    Procedure: COLONOSCOPY;  Surgeon: Reyes Olivia MD;  Location: [address]);  Service: Endoscopy;  Laterality: N/A;    CONJUNCTIVA BIOPSY Right 10/11/2022    Procedure: BIOPSY, CONJUNCTIVA;  Surgeon: Victor M Vasques MD;  Location: [facility];  Service: Ophthalmology;  Laterality: Right;    ESOPHAGOGASTRODUODENOSCOPY N/A 09/14/2023    Procedure: EGD (ESOPHAGOGASTRODUODENOSCOPY);  Surgeon: Reyes Olivia MD;  Location: [address]);  Service: Endoscopy;  Laterality: N/A;    ESOPHAGOGASTRODUODENOSCOPY N/A 04/10/2024    Procedure: EGD (ESOPHAGOGASTRODUODENOSCOPY);  Surgeon: Reyes Pagan MD;  [address]  Service: Endoscopy;  Laterality: N/A;    ESOPHAGOGASTRODUODENOSCOPY N/A 06/24/2024    Procedure: EGD (ESOPHAGOGASTRODUODENOSCOPY);   Surgeon: Reyes Pagan MD;  Location: [facility] ENDO (2ND FLR);  Service: Endoscopy;  Laterality: N/A;  Ref By: Dr. Hawkins   Procedure: egd  Diagnosis: esophagitis  Procedure Timing: 10 weeks  Prep: standard prep  6/21-pt r/s-inst to portal-2nd floor criteria-labs within 90 daysMS    EYE SURGERY Right 04/25/2024    exenteration of orbit    GLOSSECTOMY      INSERTION OF VENOUS ACCESS PORT Right 03/08/2021    Procedure: INSERTION, VENOUS ACCESS PORT;  Surgeon: Jesus Rendon MD;  [address];  Service: General;  Laterality: Right;    LARYNGECTOMY      LIVER TRANSPLANT  11/2008    transplanted for biopsy proven hepatocellular carcinoma,     LYMPH NODE BIOPSY N/A 09/18/2020    Procedure: BIOPSY, LYMPH NODE;  Surgeon: Taz Diagnostic Provider;  [facility];  Service: General;  Laterality: N/A;  Rm 173    skin cancer removals      SURGICAL REMOVAL OF SCAR Right 08/24/2023    Procedure: EXCISION, SCAR;  Surgeon: Ting Brambila MD;  Location: [facility];  Service: Ophthalmology;  Laterality: Right;    TRACHEOSTOMY         FamHx:  Family History   Problem Relation Name Age of Onset    Dementia Mother      Cataracts Neg Hx      Glaucoma Neg Hx      Macular degeneration Neg Hx         SocHx:  Social History     Socioeconomic History    Marital status: Single   Occupational History    Occupation: Retired     Comment: , notes exposures to fumes etc.  Worked on [employer] for 4 years   Tobacco Use    Smoking status: Never    Smokeless tobacco: Never   Substance and Sexual Activity    Alcohol use: Yes     Comment: drinks wine, 1 glass day    Drug use: Not Currently    Sexual activity: Yes     Comment: No prior history of STD    Social History Narrative    Steps at [employer]- 24       Distress Score    Distress Score: 2        Objective:      Vitals:    01/27/25 0819   BP: 136/87   BP Location: Right arm   Patient Position: Sitting   Pulse: 101   Resp: 14   SpO2: 96%   Weight: 57.9 kg  "(127 lb 10.3 oz)   Height: 5' 8" (1.727 m)            Physical Exam  Vitals reviewed.   Constitutional:       General: He is not in acute distress.     Appearance: Normal appearance. He is well-developed and underweight.   HENT:      Head: Normocephalic and atraumatic.      Mouth/Throat:      Mouth: Mucous membranes are moist.      Dentition: Abnormal dentition. Dental caries present.   Eyes:      Comments: R eye enucleation   Neck:      Trachea: Tracheostomy present.   Pulmonary:      Effort: Pulmonary effort is normal. No respiratory distress.      Comments: Tracheostomy  Abdominal:      General: There is no distension.      Palpations: Abdomen is soft.   Musculoskeletal:         General: No swelling. Normal range of motion.      Cervical back: Normal range of motion and neck supple.   Skin:     General: Skin is warm and dry.   Neurological:      General: No focal deficit present.      Mental Status: He is alert and oriented to person, place, and time.   Psychiatric:         Mood and Affect: Mood normal.         Behavior: Behavior normal.         Thought Content: Thought content normal.         Judgment: Judgment normal.           LABS:  WBC   Date Value Ref Range Status   11/04/2024 5.35 3.90 - 12.70 K/uL Final     Hemoglobin   Date Value Ref Range Status   11/04/2024 9.5 (L) 14.0 - 18.0 g/dL Final     Hematocrit   Date Value Ref Range Status   11/04/2024 29.0 (L) 40.0 - 54.0 % Final     Platelets   Date Value Ref Range Status   11/04/2024 145 (L) 150 - 450 K/uL Final       Chemistry        Component Value Date/Time     11/04/2024 0849    K 4.7 11/04/2024 0849     11/04/2024 0849    CO2 27 11/04/2024 0849    BUN 26 (H) 11/04/2024 0849    CREATININE 1.5 (H) 11/04/2024 0849     11/04/2024 0849        Component Value Date/Time    CALCIUM 9.3 11/04/2024 0849    ALKPHOS 59 11/04/2024 0849    AST 26 11/04/2024 0849    ALT 13 11/04/2024 0849    BILITOT 0.6 11/04/2024 0849    ESTGFRAFRICA 52.6 (A) " 07/14/2022 1119    EGFRNONAA 45.5 (A) 07/14/2022 1119        Imaging  Results for orders placed or performed during the hospital encounter of 01/14/25 (from the past 2160 hours)   CT Chest Without Contrast    Narrative    EXAMINATION:  CT CHEST WITHOUT CONTRAST    CLINICAL HISTORY:  Head/neck cancer, monitor;cancer of the right orbit s/p exenteration and now palliative RT.; Malignant neoplasm of base of tongue    TECHNIQUE:  CT chest without contrast acquiring images from above the pulmonary apices to the upper abdomen.  Data was reconstructed for multiplanar images in axial, sagittal and coronal planes and for maximal intensity projection images in the the axial plane.    COMPARISON:  CT chest without contrast dated 11/12/2024    FINDINGS:  Base of Neck/thoracic soft tissues: Bilateral gynecomastia.    Aorta: Left-sided aortic arch. No aneurysm. Three vessel aortic arch. Mild calcified atherosclerosis of the thoracic aorta.    Heart/pericardium: Normal size.  Mitral annulus calcification.  Large multivessel calcified atherosclerosis of the coronary arteries.  No pericardial effusion.    Airways/Lungs/Pleura:  Well placed tracheostomy tube.Centrilobular emphysema.  Mild peripheral reticular densities in the posterior aspect of the lung bases likely representing subsegmental atelectasis.  Mild reticular densities in the dependent right middle lobe.  Right upper lobe 3 mm nodule (series 4, image 183), unchanged.  No consolidations.  No pleural effusion. No pneumothorax.    Pulmonary vasculature: Unremarkable.    Hailey/Mediastinum: No pathologic tati enlargement.    Esophagus: Unremarkable.    Upper Abdomen: Bilateral renal atrophy.  Pancreatic and hepatic calcifications.    Bones: Mild to moderate degenerative changes of the spine. No suspicious osseous lesion.      Impression    No findings suspicious for metastatic disease    Bibasilar depending atelectasis.      Electronically signed by: Rivera Rivera  Shima  Date:    01/14/2025  Time:    16:09   CT Soft Tissue Neck With Contrast    Narrative    EXAMINATION:  CT SOFT TISSUE NECK WITH CONTRAST    CLINICAL HISTORY:  Head/neck cancer, monitor;cancer of the right orbit s/p exenteration and now palliative RT.; Malignant neoplasm of base of tongue    TECHNIQUE:  Axial CT images obtained throughout the region of the neck after the administration of 150 ml omnipaque 350 intravenous contrast. Axial, sagittal and coronal reconstructions were performed.    COMPARISON:  CT soft tissue neck without 06/17/2022    FINDINGS:  Postoperative changes of glossectomy, laryngectomy with myocutaneous flap reconstruction, tracheostomy with tracheal cannula and bilateral neck dissections.    Right-sided internal jugular vascular catheter extending to the SVC tip not included in the field of view.    No new soft tissue enhancement within the surgical bed that would suggest worsening or recurrent disease.    No evidence for lymphadenopathy throughout the neck.    Major vessels of the neck appear patent.    No focal parotid gland lesion.  Submandibular glands and thyroid gland likely surgically resected    Mucosal thickening of the bilateral maxillary sinuses and ethmoid air cells.  Mastoid air cells are essentially clear.    Visualized lungs are clear with interval resolution previous ill-defined lung opacities.    Degenerative change of the cervical spine without evidence for acute fracture or traumatic subluxation    Operative change from right enucleation partially included in the field of view please see concomitant CT orbits for further details.      Impression    Evolving operative changes status post right-sided neck dissection, glossectomy,, laryngectomy tracheostomy placement as above.    No soft tissue mass or pathologic lymph node enlargement identified within the neck to suggest recurrent or residual disease.    Please see CT orbit report for further  details.    Electronically signed by resident: Enzo Walton  Date:    01/14/2025  Time:    11:03    Electronically signed by: J Carlos Dutton DO  Date:    01/14/2025  Time:    11:32   Results for orders placed or performed during the hospital encounter of 11/25/24 (from the past 2160 hours)   CT Abdomen Pelvis With IV Contrast Routine Oral Contrast    Narrative    EXAMINATION:  CT ABDOMEN PELVIS WITH IV CONTRAST    CLINICAL HISTORY:  Head/neck cancer, monitor;pancreatic fullness noted on CT chest. CT AP with contrast recommended.; Malignant neoplasm of base of tongue    TECHNIQUE:  Low dose axial images, sagittal and coronal reformations were obtained from the lung bases to the pubic symphysis following the IV administration of 75 mL of Omnipaque 350.Oral contrast was not given.    COMPARISON:  CT chest 11/12/2024, ultrasound 08/12/2024, PET-CT 02/09/2024    FINDINGS:  Lower thorax:    Heart: Normal in size. No pericardial effusion.    Lung Bases: Well aerated, without consolidation or pleural fluid.    Liver: Post liver transplant surgical changes.  Liver is unremarkable.  No focal hepatic lesion.    Gallbladder: Surgically absent.    Bile Ducts: Mild intrahepatic biliary duct dilatation.  Common bile duct measure 0.8 cm    Pancreas: Pancreatic atrophy with dystrophic calcifications.  No pancreatic lesion or peripancreatic fat stranding. No pancreatic duct dilatation.    Spleen: Normal in size.  No focal lesion.    Adrenals: Unremarkable.    Kidneys/ Ureters: Normal in size and location. Normal enhancement. No hydronephrosis or nephrolithiasis. No ureteral dilatation.  Bilateral renal hypodensities, few are too small characterize.  Right lower lobe simple cyst measuring 1.7 cm.    Bladder evaluation is limited by nondistention with diffuse bladder wall thickening and minimal perivesical stranding.    Reproductive organs: Mild prostatomegaly measure 4.4 cm with dystrophic calcification.    Gl/Mesentery/peritoneum and  retroperitoneum: Small hiatal hernia.  Stomach is unremarkable.  Small bowel is normal in caliber with no evidence of obstruction. No evidence of inflammation or wall thickening.  Colonic diverticulosis.  Rectosigmoid wall thickening with presacral and perirectal soft tissue stranding.  Trace of free fluid in the pelvis.  No free air.  No significant adenopathy.    Abdominal wall: Unremarkable.    Vasculature: No significant atherosclerosis or aneurysm.    Bones: Degenerative changes.  Significant disc height loss at L4-L5.  Grade 1 anterolisthesis of L3 on L4.  No acute fracture.  No suspicious lytic or sclerotic lesion.      Impression    1. No evidence of pancreatic mass, inflammation or pancreatic duct dilatation.  2. Postsurgical liver transplant.  3. Stable post cholecystectomy mild intra and extrahepatic biliary duct dilatation.  4. Rectosigmoid wall thickening with soft tissue stranding in the perirectal and presacral area suggestive of proctitis.    Electronically signed by resident: Rakesh Lincoln  Date:    11/25/2024  Time:    16:33    Electronically signed by: César Castro Jr  Date:    11/25/2024  Time:    17:14     *Note: Due to a large number of results and/or encounters for the requested time period, some results have not been displayed. A complete set of results can be found in Results Review.           Assessment:       1. Basal cell carcinoma (BCC) of skin of nose    2. 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 Squamous cell carcinoma of base of tongue    4. Secondary malignant neoplasm of cervical lymph node    5. Tracheostomy status    6. 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 Stage 3b chronic kidney disease        Plan:         BCC locally advanced, too large for RT or surgery now due to morbidity of the surgery. Will initiate vismodegib, sent to specialty pharmacy.  "Discussed potential side effects including fatigue, muscle cramping, rhabdomyolysis and provided written patient information. Consent signed.   Orbital cSCC  Unfortunately patient has progressed while on immunotherapy for below diagnosis. For that reason systemic therapies are limited. Evaluated at [facility] and surgeon does think an exenteration sparing resection would be possible with good response to induction chemotherapy. Plan was for platinum doublet chemo + cetuximab x 2 cycles. Notified of this plan on 1/29 and treatment plan was entered.   - Initiated dose reduced C1 of 5-FU to 750 mg/m2 due to age/frailty and growth factor. Patient still had cytopenias and mucositis, but was able to finish 2 cycles with delays. Now s/p exenteration. With PNI on path, referred to radiation oncology but adjuvant radiation deemed not necessary after exenteration.   - MRI orbit on 8/12/24 showing evidence of recurrent disease, "Nodular enhancing soft tissue superficially at the superomedial orbit and along the medial wall of the orbit posteriorly at least concerning for underlying recurrent lesion."   - Has undergone palliative RT to the right orbit. CT showing reduction of right orbital mass. Will continue Q3 month surveillance.    3,4,5. Recurrent SCC of base of tongue, P16+ - Status post total laryngectomy, total glossectomy and anterolateral thigh free flap reconstruction roughly 2017 at [facility] in [place] for persistent/recurrent base of tongue sq.c.c. 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 He is not a surgical or radiation candidate.  -Started on PCC 10/6/2020 followed by maintenance cetuximab until 8/24/21 (discontinued due to progression of disease; continued increase in SUV uptake, no new lesions).  - 9/2021 started on carbo/5-FU/pembrolizumab; due to toxicity went to pembrolizumab monotherapy starting with cycle 2.  Progression of disease noted after " cycle 3 so added chemotherapy back in with cycle 4. Keytruda monotherapy starting with C9.   - Keytruda discontinued after 2 years.    6,7. S/p liver transplant and is currently on tacrolimus. Grade 1. Will continue to monitor.     8. Monitor TSH on synthroid dose of 100 mcg.    9. Cr elevated at 1.5. No eligible for platinum chemo.      45 minutes total time spent with patient, 30 minutes were spent face to face with the patient and his family to discuss the disease, natural history, treatment options and survival statistics. Greater than 50% of this time was for counseling.  15 minutes of chart review and coordination of care. I have provided the patient with an opportunity to ask questions and have all questions answered to his satisfaction.       Martín Hernadnez M.D.  Hematology/Oncology Attending  Director [facility]  [facility]            Route Chart for Scheduling    Med Onc Chart Routing      Follow up with physician    Follow up with [facility] 4 weeks. 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   noted on 1/29/2024   Line of treatment: Adjuvant  Treatment Goal: Curative     Upcoming Treatment Dates - OP cetuximab (loading + maintenance) weekly    8/23/2024       Pre-Medications       diphenhydrAMINE (BENADRYL) 25 mg in NS 50 mL IVPB       Chemotherapy       cetuximab (ERBITUX) 100 mg/50 mL chemo infusion 652 mg  8/30/2024       Pre-Medications       diphenhydrAMINE (BENADRYL) 25 mg in NS 50 mL IVPB       Chemotherapy       cetuximab (ERBITUX) 100 mg/50 mL chemo infusion 407.6 mg  9/6/2024       Pre-Medications       diphenhydrAMINE  (BENADRYL) 25 mg in NS 50 mL IVPB       Chemotherapy       cetuximab (ERBITUX) 100 mg/50 mL chemo infusion 407.6 mg  9/13/2024       Pre-Medications       diphenhydrAMINE (BENADRYL) 25 mg in NS 50 mL IVPB       Chemotherapy       cetuximab (ERBITUX) 100 mg/50 mL chemo infusion 407.6 mg    Supportive Plan Information  IV FLUIDS AND ELECTROLYTES Ct Francis NP   Associated Diagnosis: Acute kidney injury superimposed on chronic kidney disease   noted on 4/8/2024   Line of treatment: Supportive Care   Treatment goal: Supportive     Upcoming Treatment Dates - IV FLUIDS AND ELECTROLYTES    No upcoming days in selected categories.    Therapy Plan Information  PORT FLUSH for CKD (chronic kidney disease) stage 3, GFR 30-59 ml/min, noted on 10/5/2020  PORT FLUSH for Squamous cell carcinoma of base of tongue, noted on 9/28/2020  Flushes  heparin, porcine (PF) 100 unit/mL injection flush 500 Units  500 Units, Intravenous, Every visit  sodium chloride 0.9% flush 10 mL  10 mL, Intravenous, Every visit    PORT FLUSH for Squamous cell carcinoma of base of tongue, noted on 9/28/2020  PORT FLUSH for Secondary malignant neoplasm of cervical lymph node, noted on 2/9/2021  Flushes  heparin, porcine (PF) 100 unit/mL injection flush 500 Units  500 Units, Intravenous, Every visit  sodium chloride 0.9% flush 10 mL  10 mL, Intravenous, Every visit      No therapy plan of the specified type found.

## 2025-01-29 DIAGNOSIS — C22.0 HCC (HEPATOCELLULAR CARCINOMA): Primary | ICD-10-CM

## 2025-02-03 ENCOUNTER — HOSPITAL ENCOUNTER (OUTPATIENT)
Dept: RADIOLOGY | Facility: HOSPITAL | Age: 75
Discharge: HOME | End: 2025-02-03
Attending: INTERNAL MEDICINE
Payer: MEDICARE

## 2025-02-03 DIAGNOSIS — C22.0 HCC (HEPATOCELLULAR CARCINOMA): ICD-10-CM

## 2025-02-03 PROCEDURE — 76705 ECHO EXAM OF ABDOMEN: CPT | Mod: 26,59,, | Performed by: RADIOLOGY

## 2025-02-03 PROCEDURE — 76705 ECHO EXAM OF ABDOMEN: CPT | Mod: TC

## 2025-02-03 PROCEDURE — 93976 VASCULAR STUDY: CPT | Mod: 26,,, | Performed by: RADIOLOGY

## 2025-02-04 ENCOUNTER — TELEPHONE (OUTPATIENT)
Dept: TRANSPLANT | Facility: CLINIC | Age: 75
End: 2025-02-04
Payer: MEDICARE

## 2025-02-04 DIAGNOSIS — Z94.4 STATUS POST LIVER TRANSPLANTATION: Primary | ICD-10-CM

## 2025-02-04 DIAGNOSIS — C22.0 HCC (HEPATOCELLULAR CARCINOMA): ICD-10-CM

## 2025-02-04 NOTE — TELEPHONE ENCOUNTER
Patient informed liver ultrasound results satisfactory.  Next ultrasound in 6 months.        ----- Message from Cornell Anton MD sent at 2/4/2025  1:46 PM CST -----  Results reviewed

## 2025-02-04 NOTE — TELEPHONE ENCOUNTER
Messaging sent through the patient portal. Labs stable with no medication changes.  Repeat labs on 5/5/2025.  Encouraged to call for questions.    ----- Message from Cornell Anton MD sent at 2/3/2025 11:46 AM CST -----  Results reviewed

## 2025-02-11 ENCOUNTER — HOSPITAL ENCOUNTER (OUTPATIENT)
Dept: RADIOLOGY | Facility: HOSPITAL | Age: 75
Discharge: HOME | End: 2025-02-11
Attending: NURSE PRACTITIONER
Payer: MEDICARE

## 2025-02-11 DIAGNOSIS — C44.329 SQUAMOUS CELL CARCINOMA OF SKIN OF ORBIT: ICD-10-CM

## 2025-02-11 PROCEDURE — 25500020 PHARM REV CODE 255: Performed by: NURSE PRACTITIONER

## 2025-02-11 PROCEDURE — 70481 CT ORBIT/EAR/FOSSA W/DYE: CPT | Mod: 26,,, | Performed by: RADIOLOGY

## 2025-02-11 PROCEDURE — 71250 CT THORAX DX C-: CPT | Mod: 26,,, | Performed by: STUDENT IN AN ORGANIZED HEALTH CARE EDUCATION/TRAINING PROGRAM

## 2025-02-11 PROCEDURE — 70481 CT ORBIT/EAR/FOSSA W/DYE: CPT | Mod: TC

## 2025-02-11 PROCEDURE — 71250 CT THORAX DX C-: CPT | Mod: TC

## 2025-02-11 RX ADMIN — IOHEXOL 75 ML: 350 INJECTION, SOLUTION INTRAVENOUS at 10:02

## 2025-02-14 ENCOUNTER — OFFICE VISIT (OUTPATIENT)
Dept: HEMATOLOGY/ONCOLOGY | Facility: CLINIC | Age: 75
End: 2025-02-14
Payer: MEDICARE

## 2025-02-14 VITALS
WEIGHT: 129.63 LBS | RESPIRATION RATE: 18 BRPM | TEMPERATURE: 98 F | BODY MASS INDEX: 19.65 KG/M2 | OXYGEN SATURATION: 97 % | SYSTOLIC BLOOD PRESSURE: 129 MMHG | DIASTOLIC BLOOD PRESSURE: 81 MMHG | HEART RATE: 87 BPM | HEIGHT: 68 IN

## 2025-02-14 DIAGNOSIS — C77.0 SECONDARY MALIGNANT NEOPLASM OF CERVICAL LYMPH NODE: ICD-10-CM

## 2025-02-14 DIAGNOSIS — C44.329 SQUAMOUS CELL CARCINOMA OF SKIN OF ORBIT: ICD-10-CM

## 2025-02-14 DIAGNOSIS — D84.821 IMMUNODEFICIENCY DUE TO DRUGS: ICD-10-CM

## 2025-02-14 DIAGNOSIS — Z79.899 IMMUNODEFICIENCY DUE TO DRUGS: ICD-10-CM

## 2025-02-14 DIAGNOSIS — E03.2 HYPOTHYROIDISM DUE TO MEDICAMENTS AND OTHER EXOGENOUS SUBSTANCES: ICD-10-CM

## 2025-02-14 DIAGNOSIS — Z93.0 TRACHEOSTOMY STATUS: ICD-10-CM

## 2025-02-14 DIAGNOSIS — N18.32 STAGE 3B CHRONIC KIDNEY DISEASE: ICD-10-CM

## 2025-02-14 DIAGNOSIS — Z94.4 STATUS POST LIVER TRANSPLANTATION: ICD-10-CM

## 2025-02-14 DIAGNOSIS — C44.311 BASAL CELL CARCINOMA (BCC) OF SKIN OF NOSE: Primary | ICD-10-CM

## 2025-02-14 DIAGNOSIS — C01 SQUAMOUS CELL CARCINOMA OF BASE OF TONGUE: ICD-10-CM

## 2025-02-14 PROCEDURE — 99213 OFFICE O/P EST LOW 20 MIN: CPT | Mod: PBBFAC | Performed by: INTERNAL MEDICINE

## 2025-02-14 NOTE — PROGRESS NOTES
ONCOLOGY FOLLOW UP VISIT    Subjective:      Patient ID: Rocco Swain    Chief Complaint: Basal cell carcinoma (BCC) of skin of nose      HPI  Rocco Swain is a 74 y.o. male who returns to clinic for management of cSCC of the orbit. Patient had ANGELES on October PETCT, but then developed quickly progressing orbital lesion. He was seen at [facility] in [place]. Recommendation was to follow up at [facility] to initiate induction chemotherapy followed by possible surgery. Patient completed 2 cycles of Extreme regimen, though there were delays due to cytopenias and severe mucositis. He was able to go to [facility] to have exenteration and resection of cancer April 2024 with clear margins, but PNI on path.     Interval History  Eye pain resolved and not needing pain medication. BCC on nose with less pain since starting vismodegib.     ECOG Performance status: 1 - Symptomatic but completely ambulatory Communication from him is 100% written.     Cancer Staging   Squamous cell carcinoma of base of tongue  Staging form: Pharynx - HPV-Mediated Oropharynx, AJCC 8th Edition  - Clinical stage from 9/18/2020: Stage III (rcT4, cN1, cM0, p16+) - Signed by Ronald Berg MD on 12/27/2022      Oncologic History:  Oncology History   Squamous cell carcinoma of base of tongue   9/18/2020 Cancer Staged    Staging form: Pharynx - HPV-Mediated Oropharynx, AJCC 8th Edition  - Clinical stage from 9/18/2020: Stage III (rcT4, cN1, cM0, p16+)     9/28/2020 Initial Diagnosis    Squamous cell carcinoma of base of tongue     10/6/2020 - 8/17/2021 Chemotherapy    Treatment Summary   Plan Name: OP HEAD NECK CARBOPLATIN PACLITAXEL C1-2 FOLLOWED BY CETUXIMAB CARBOPLATIN C3-6 FOLLOWED BY CETUXIMAB MAINTENANCE WEEKLY  Treatment Goal: Control  Status: Inactive  Start Date: 10/6/2020  End Date: 8/17/2021  Provider: Ronald Berg MD  Chemotherapy: cetuximab (ERBITUX) 100 mg/50 mL chemo infusion 684 mg, 400 mg/m2 = 684 mg (100 % of original dose 400 mg/m2),  Intravenous, [facility] 1 time, 29 of 38 cycles  Dose modification: 500 mg/m2 (original dose 400 mg/m2, Cycle 3), 250 mg/m2 (original dose 400 mg/m2, Cycle 3), 400 mg/m2 (original dose 400 mg/m2, Cycle 3), 250 mg/m2 (original dose 250 mg/m2, Cycle 7), 200 mg/m2 (original dose 250 mg/m2, Cycle 18), 200 mg/m2 (original dose 250 mg/m2, Cycle 19), 250 mg/m2 (original dose 250 mg/m2, Cycle 4), 200 mg/m2 (original dose 250 mg/m2, Cycle 25), 200 mg/m2 (original dose 250 mg/m2, Cycle 28)  Administration: 684 mg (11/17/2020), 400 mg (11/24/2020), 400 mg (2/9/2021), 400 mg (2/17/2021), 427.6 mg (3/9/2021), 400 mg (3/30/2021), 400 mg (3/23/2021), 400 mg (4/6/2021), 427.6 mg (4/13/2021), 427.6 mg (4/20/2021), 342 mg (5/11/2021), 342 mg (5/18/2021), 342 mg (5/25/2021), 400 mg (5/31/2021), 400 mg (6/7/2021), 400 mg (6/14/2021), 400 mg (12/8/2020), 427.6 mg (12/29/2020), 400 mg (12/1/2020), 400 mg (12/15/2020), 400 mg (12/22/2020), 427.6 mg (1/5/2021), 400 mg (1/12/2021), 400 mg (1/19/2021), 427.6 mg (1/26/2021), 400 mg (2/2/2021), 400 mg (2/23/2021), 400 mg (3/2/2021), 427.6 mg (3/16/2021), 400 mg (6/21/2021), 342 mg (6/28/2021), 342 mg (7/6/2021), 342 mg (7/13/2021), 342 mg (7/20/2021), 400 mg (7/27/2021), 400 mg (8/10/2021), 400 mg (8/17/2021)  CARBOplatin (PARAPLATIN) 310 mg in sodium chloride 0.9% 500 mL chemo infusion, 310 mg (92.2 % of original dose 334.5 mg), Intravenous, Clinic/H[facility] 1 time, 6 of 6 cycles  Dose modification:   (original dose 334.5 mg, Cycle 1)  Administration: 310 mg (10/6/2020), 370 mg (11/17/2020), 300 mg (10/27/2020), 350 mg (12/8/2020), 335 mg (12/29/2020), 320 mg (1/19/2021)  PACLitaxeL (TAXOL) 175 mg/m2 = 300 mg in sodium chloride 0.9% 500 mL chemo infusion, 175 mg/m2 = 300 mg (100 % of original dose 175 mg/m2), Intravenous, Clinic/[facility] 1 time, 2 of 2 cycles  Dose modification: 175 mg/m2 (original dose 175 mg/m2, Cycle 1)  Administration: 300 mg (10/6/2020), 300 mg (10/27/2020)     4/29/2021 Tumor  Conference       His case was discussed at the Multidisciplinary Head and Neck Team Planning Meeting.    Representatives from Medical Oncology, Radiation Oncology, Head and Neck Surgical Oncology, Psychosocial Oncology, and Speech and Language Pathology discussed the case with the following recommendations:    1) biopsy         7/29/2021 Tumor Conference       His case was discussed at the Multidisciplinary Head and Neck Team Planning Meeting.    Representatives from Medical Oncology, Radiation Oncology, Head and Neck Surgical Oncology, Psychosocial Oncology, and Speech and Language Pathology discussed the case with the following recommendations:    1) Head and neck clinic follow up  2) consider Keytruda (discuss with transplant)  3) consider palliative referral         9/13/2021 - 12/29/2023 Chemotherapy    Treatment Summary   Plan Name: OP HEAD NECK PEMBROLIZUMAB CARBOPLATIN FLUOROURACIL (C1 ONLY RECEIVED) FOLLOWED BY PEMBROLIZUMAB MAINTENANCE  Treatment Goal: Palliative  Status: Inactive  Start Date: 9/13/2021  End Date: 12/29/2023  Provider: Ronald Berg MD  Chemotherapy: CARBOplatin (PARAPLATIN) 320 mg in sodium chloride 0.9% 500 mL chemo infusion, 320 mg (100 % of original dose 320.5 mg), Intravenous, Clinic/H[facility] 1 time, 6 of 6 cycles  Dose modification:   (original dose 320.5 mg, Cycle 1)  Administration: 320 mg (9/13/2021), 335 mg (12/23/2021), 325 mg (1/27/2022), 245 mg (3/3/2022), 255 mg (5/12/2022), 255 mg (4/7/2022)  fluorouraciL 1,000 mg/m2/day = 6,440 mg in sodium chloride 0.9% 240 mL chemo infusion, 1,000 mg/m2/day = 6,440 mg, Intravenous, Over 96 hours, 6 of 6 cycles  Dose modification: 800 mg/m2/day (original dose 1,000 mg/m2/day, Cycle 6), 5,000 mg (original dose 1,000 mg/m2/day, Cycle 8)  Administration: 6,440 mg (9/13/2021), 6,440 mg (12/23/2021), 6,480 mg (1/27/2022), 5,000 mg (3/3/2022), 5,000 mg (4/7/2022), 5,000 mg (5/12/2022)     Secondary malignant neoplasm of cervical lymph node   2/9/2021  Initial Diagnosis    Secondary malignant neoplasm of cervical lymph node     9/13/2021 - 12/29/2023 Chemotherapy    Treatment Summary   Plan Name: OP HEAD NECK PEMBROLIZUMAB CARBOPLATIN FLUOROURACIL (C1 ONLY RECEIVED) FOLLOWED BY PEMBROLIZUMAB MAINTENANCE  Treatment Goal: Palliative  Status: Inactive  Start Date: 9/13/2021  End Date: 12/29/2023  Provider: Ronald Berg MD  Chemotherapy: CARBOplatin (PARAPLATIN) 320 mg in sodium chloride 0.9% 500 mL chemo infusion, 320 mg (100 % of original dose 320.5 mg), Intravenous, Clinic/H[facility] 1 time, 6 of 6 cycles  Dose modification:   (original dose 320.5 mg, Cycle 1)  Administration: 320 mg (9/13/2021), 335 mg (12/23/2021), 325 mg (1/27/2022), 245 mg (3/3/2022), 255 mg (5/12/2022), 255 mg (4/7/2022)  fluorouraciL 1,000 mg/m2/day = 6,440 mg in sodium chloride 0.9% 240 mL chemo infusion, 1,000 mg/m2/day = 6,440 mg, Intravenous, Over 96 hours, 6 of 6 cycles  Dose modification: 800 mg/m2/day (original dose 1,000 mg/m2/day, Cycle 6), 5,000 mg (original dose 1,000 mg/m2/day, Cycle 8)  Administration: 6,440 mg (9/13/2021), 6,440 mg (12/23/2021), 6,480 mg (1/27/2022), 5,000 mg (3/3/2022), 5,000 mg (4/7/2022), 5,000 mg (5/12/2022)     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   1/29/2024 Initial Diagnosis    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     2/19/2024 - 3/25/2024 Chemotherapy    Treatment Summary   Plan Name: OP HEAD NECK CETUXIMAB CARBOPLATIN FLUOROURACIL Q3W  Treatment Goal: Curative  Status: Inactive  Start Date: 2/19/2024  End Date: 3/25/2024  Provider: Martín Hernandez MD  Chemotherapy: cetuximab (ERBITUX) 100 mg/50 mL chemo infusion 668 mg, 400 mg/m2 = 668 mg, Intravenous, Clinic/[facility] 1 time, 2 of 12 cycles  Administration: 668 mg (2/19/2024), 400 mg (2/26/2024), 400 mg (3/18/2024), 400 mg (3/25/2024), 400 mg (3/11/2024)  CARBOplatin (PARAPLATIN) 295 mg in sodium chloride 0.9% 314.5 mL chemo infusion, 295 mg, Intravenous, Clinic/[facility] 1 time, 2 of 6 cycles  Administration:  295 mg (2/19/2024), 300 mg (3/18/2024)     8/23/2024 -  Chemotherapy    Treatment Summary   Plan Name: OP cetuximab (loading + maintenance) weekly  Treatment Goal: Curative  Status: Active  Start Date: 8/23/2024 (Planned)  End Date: 9/27/2024 (Planned)  Provider: Martín Hernandez MD  Chemotherapy: CETUXIMAB 100 MG/50 ML IV SOLN (UIR), 400 mg/m2, Intravenous, Clinic/[facility] 1 time, 0 of 6 cycles     9/17/2024 - 9/23/2024 Radiation Therapy    Treating physician: Tian Joyce  Treatment Summary  Course: C1 H&N 2024    Treatment Site Ref. ID Energy Dose/Fx (Gy) #Fx Dose Correction (Gy) Total Dose (Gy) Start Date End Date Elapsed Days   IM Orbit_R Orbit_R 6X 6.5 5 / 5 0 32.5 9/17/2024 9/23/2024 6            Review of Systems   Constitutional:  Negative for activity change, appetite change, chills, fatigue, fever and unexpected weight change.   HENT:  Negative for ear pain, facial swelling, hearing loss, mouth sores, nosebleeds, sore throat, trouble swallowing and voice change.         No verbal communication. Skin fixed and immobile from previous scaring post-neck dissection   Eyes:  Negative for pain, discharge and visual disturbance.   Respiratory:  Negative for cough, choking, chest tightness and shortness of breath.    Cardiovascular:  Negative for chest pain, palpitations and leg swelling.   Gastrointestinal:  Negative for abdominal distention, abdominal pain, blood in stool, constipation, diarrhea, nausea and vomiting.   Endocrine: Negative for cold intolerance and heat intolerance.   Genitourinary:  Negative for difficulty urinating, dysuria, frequency and urgency.   Musculoskeletal:  Positive for back pain (lower - chronic). Negative for arthralgias, gait problem, leg pain and myalgias.   Integumentary:  Negative for pallor, rash, wound and mole/lesion.   Allergic/Immunologic: Negative for frequent infections.   Neurological:  Negative for dizziness, tremors, weakness, light-headedness, numbness and headaches.    Hematological:  Negative for adenopathy. Does not bruise/bleed easily.   Psychiatric/Behavioral:  Negative for agitation, confusion, dysphoric mood and sleep disturbance. The patient is not nervous/anxious.         Allergies:  Review of patient's allergies indicates:   Allergen Reactions    Aspirin     Tylenol extended release        Medications:  Current Outpatient Medications   Medication Sig Dispense Refill    levothyroxine (SYNTHROID) 88 MCG tablet TAKE 1 TABLET BY MOUTH ONCE  DAILY 90 tablet 0    magic mouthwash diphen/antac/lidoc/nysta Take 10 mLs by mouth 4 (four) times daily. 120 mL 4    multivit-min/FA/lycopen/lutein (CENTRUM SILVER MEN ORAL) Take 1 tablet by mouth once daily.      ondansetron (ZOFRAN-ODT) 8 MG TbDL Take 1 tablet (8 mg total) by mouth every 8 (eight) hours as needed (nausea - first choice). 60 tablet 4    prochlorperazine (COMPAZINE) 10 MG tablet Take 1 tablet (10 mg total) by mouth every 6 (six) hours as needed (nausea/vomiting - second choice). 30 tablet 1    senna (SENOKOT) 8.6 mg tablet Take 2 tablets by mouth 2 (two) times daily as needed for Constipation. 60 tablet 2    tacrolimus (PROGRAF) 0.5 MG Cap Take 1 capsule (0.5 mg total) by mouth every 12 (twelve) hours. 180 capsule 3    traZODone (DESYREL) 50 MG tablet TAKE 1 TABLET BY MOUTH IN THE  EVENING 90 tablet 3    vismodegib (ERIVEDGE) 150 mg Cap Take 1 capsule (150 mg total) by mouth once daily. 30 capsule 3     No current facility-administered medications for this visit.     Facility-Administered Medications Ordered in Other Visits   Medication Dose Route Frequency Provider Last Rate Last Admin    ofloxacin 0.3 % ophthalmic solution 1 drop  1 drop Right Eye On Call Procedure Victor M Vasques MD   2 drop at 10/11/22 0724    sodium chloride 0.9% flush 10 mL  10 mL Intravenous PRN Ronald Berg MD        sodium chloride 0.9% flush 10 mL  10 mL Intravenous PRN Victor M Vasques MD           PMH:  Past Medical History:    Diagnosis Date    Acute conjunctivitis of right eye 09/12/2022    Atrial fibrillation     Basal cell carcinoma (BCC) in situ of skin 2012    3 on face, 2 on arm, removed by dermatology.     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 Underwent surgical resection of tongue and neck. s/p tracheostomy    Squamous cell carcinoma of skin of right temple 01/24/2024    right temple       PSH:  Past Surgical History:   Procedure Laterality Date    COLONOSCOPY      COLONOSCOPY N/A 09/14/2023    Procedure: COLONOSCOPY;  Surgeon: Reyes Olivia MD;  Location: [address]);  Service: Endoscopy;  Laterality: N/A;    CONJUNCTIVA BIOPSY Right 10/11/2022    Procedure: BIOPSY, CONJUNCTIVA;  Surgeon: Victor M Vasques MD;  Location: [facility];  Service: Ophthalmology;  Laterality: Right;    ESOPHAGOGASTRODUODENOSCOPY N/A 09/14/2023    Procedure: EGD (ESOPHAGOGASTRODUODENOSCOPY);  Surgeon: Reyes Olivia MD;  Location: [address]);  Service: Endoscopy;  Laterality: N/A;    ESOPHAGOGASTRODUODENOSCOPY N/A 04/10/2024    Procedure: EGD (ESOPHAGOGASTRODUODENOSCOPY);  Surgeon: Reyes Pagan MD;  [address]  Service: Endoscopy;  Laterality: N/A;    ESOPHAGOGASTRODUODENOSCOPY N/A 06/24/2024    Procedure: EGD (ESOPHAGOGASTRODUODENOSCOPY);  Surgeon: Reyes Pagan MD;  Location: [address]);  Service: Endoscopy;  Laterality: N/A;  Ref By: Dr. Hawkins   Procedure:  "egd  Diagnosis: esophagitis  Procedure Timing: 10 weeks  Prep: standard prep  6/21-pt r/s-inst to portal-2nd floor criteria-labs within 90 daysMS    EYE SURGERY Right 04/25/2024    exenteration of orbit    GLOSSECTOMY      INSERTION OF VENOUS ACCESS PORT Right 03/08/2021    Procedure: INSERTION, VENOUS ACCESS PORT;  Surgeon: Jesus Rendon MD;  Location: [facility] OR [address] [place] [address];  Service: General;  Laterality: Right;    LARYNGECTOMY      LIVER TRANSPLANT  11/2008    transplanted for biopsy proven hepatocellular carcinoma,     LYMPH NODE BIOPSY N/A 09/18/2020    Procedure: BIOPSY, LYMPH NODE;  Surgeon: Taz Diagnostic Provider;  [facility];  Service: General;  Laterality: N/A;  Rm 173    skin cancer removals      SURGICAL REMOVAL OF SCAR Right 08/24/2023    Procedure: EXCISION, SCAR;  Surgeon: Ting Brambila MD;  Location: [facility];  Service: Ophthalmology;  Laterality: Right;    TRACHEOSTOMY         FamHx:  Family History   Problem Relation Name Age of Onset    Dementia Mother      Cataracts Neg Hx      Glaucoma Neg Hx      Macular degeneration Neg Hx         SocHx:  Social History     Socioeconomic History    Marital status: Single   Occupational History    Occupation: Retired     Comment: , notes exposures to fumes etc.  Worked on [employer] for 4 years   Tobacco Use    Smoking status: Never    Smokeless tobacco: Never   Substance and Sexual Activity    Alcohol use: Yes     Comment: drinks wine, 1 glass day    Drug use: Not Currently    Sexual activity: Yes     Comment: No prior history of STD    Social History Narrative    Steps at [employer]- [employer]       Distress Score    Distress Score: 0 - No Distress        Objective:      Vitals:    02/14/25 1504   BP: 129/81   BP Location: Left arm   Patient Position: Sitting   Pulse: 87   Resp: 18   Temp: 97.6 °F (36.4 °C)   TempSrc: Oral   SpO2: 97%   Weight: 58.8 kg (129 lb 10.1 oz)   Height: 5' 8" (1.727 m)            Physical Exam  Vitals " reviewed.   Constitutional:       General: He is not in acute distress.     Appearance: Normal appearance. He is well-developed and underweight.   HENT:      Head: Normocephalic and atraumatic.      Nose:      Comments: ~ 2 cm BCC     Mouth/Throat:      Mouth: Mucous membranes are moist.      Dentition: Abnormal dentition. Dental caries present.   Eyes:      Comments: R eye enucleation   Neck:      Trachea: Tracheostomy present.   Pulmonary:      Effort: Pulmonary effort is normal. No respiratory distress.      Comments: Tracheostomy  Abdominal:      General: There is no distension.      Palpations: Abdomen is soft.   Musculoskeletal:         General: No swelling. Normal range of motion.      Cervical back: Normal range of motion and neck supple.   Skin:     General: Skin is warm and dry.   Neurological:      General: No focal deficit present.      Mental Status: He is alert and oriented to person, place, and time.   Psychiatric:         Mood and Affect: Mood normal.         Behavior: Behavior normal.         Thought Content: Thought content normal.         Judgment: Judgment normal.           LABS:  WBC   Date Value Ref Range Status   02/11/2025 4.60 3.90 - 12.70 K/uL Final     Hemoglobin   Date Value Ref Range Status   02/11/2025 11.9 (L) 14.0 - 18.0 g/dL Final     Hematocrit   Date Value Ref Range Status   02/11/2025 35.5 (L) 40.0 - 54.0 % Final     Platelets   Date Value Ref Range Status   02/11/2025 168 150 - 450 K/uL Final       Chemistry        Component Value Date/Time     02/11/2025 0858    K 4.4 02/11/2025 0858     02/11/2025 0858    CO2 23 02/11/2025 0858    BUN 23 02/11/2025 0858    CREATININE 1.4 02/11/2025 0858    GLU 96 02/11/2025 0858        Component Value Date/Time    CALCIUM 9.9 02/11/2025 0858    ALKPHOS 99 02/11/2025 0858    AST 41 (H) 02/11/2025 0858    ALT 16 02/11/2025 0858    BILITOT 0.5 02/11/2025 0858    ESTGFRAFRICA 52.6 (A) 07/14/2022 1119    EGFRNONAA 45.5 (A) 07/14/2022  1119        Imaging  Results for orders placed or performed during the hospital encounter of 02/11/25 (from the past 2160 hours)   CT Chest Without Contrast    Narrative    EXAMINATION:  CT CHEST WITHOUT CONTRAST    CLINICAL HISTORY:  Lung nodule, 6-8mm;history of head and neck cancer, cancer of orbit. new lung nodule noted on PET, surveillance; Squamous cell carcinoma of skin of other parts of face    TECHNIQUE:  CT chest without contrast acquiring images from above the pulmonary apices to the upper abdomen.  Data was reconstructed for multiplanar images in axial, sagittal and coronal planes and for maximal intensity projection images in the the axial plane.    COMPARISON:  Chest without contrast dated 01/14/2025.  F 18 FDG PET-CT dated 08/27/2024.    FINDINGS:  Base of Neck/thoracic soft tissues: Tracheostomy tube is well placed    Aorta: Left-sided aortic arch.  Calcified aortic valve and mitral annulus.  No aneurysm. Three vessel aortic arch. Mild atherosclerosis of the thoracic aorta.    Heart/pericardium: Normal size.  Large multivessel calcified atherosclerosis of the coronary arteries.  No pericardial effusion.    Airways/Lungs/Pleura:  Central airways are patent. Previously seen nodule in the left lower lobe superior segment on prior PET-CT showed significant improvement on CT dated 01/14/2025 with stable residual linear density measuring 5 x 3 mm.  No new nodules.  Mild peripheral bronchiectasis.  Bibasilar depending atelectasis.  No pleural effusion. No pneumothorax.    Pulmonary vasculature: Unremarkable.    Hailey/Mediastinum: No pathologic tati enlargement.    Esophagus: Unremarkable.    Upper Abdomen: Multiple pancreatic calcifications    Bones: Mild to moderate degenerative changes of the spine. No suspicious osseous lesion.      Impression    1. No findings suspicious for metastatic disease  2. Stable left lower lobe superior segment subcentimeter linear density corresponding to location of nodule  seen on most recent F 18 FDG PET-CT.  3. Incidental findings are stable.      Electronically signed by: Rivera Ronquillo  Date:    02/11/2025  Time:    21:02   Results for orders placed or performed during the hospital encounter of 01/14/25 (from the past 2160 hours)   CT Soft Tissue Neck With Contrast    Narrative    EXAMINATION:  CT SOFT TISSUE NECK WITH CONTRAST    CLINICAL HISTORY:  Head/neck cancer, monitor;cancer of the right orbit s/p exenteration and now palliative RT.; Malignant neoplasm of base of tongue    TECHNIQUE:  Axial CT images obtained throughout the region of the neck after the administration of 150 ml omnipaque 350 intravenous contrast. Axial, sagittal and coronal reconstructions were performed.    COMPARISON:  CT soft tissue neck without 06/17/2022    FINDINGS:  Postoperative changes of glossectomy, laryngectomy with myocutaneous flap reconstruction, tracheostomy with tracheal cannula and bilateral neck dissections.    Right-sided internal jugular vascular catheter extending to the SVC tip not included in the field of view.    No new soft tissue enhancement within the surgical bed that would suggest worsening or recurrent disease.    No evidence for lymphadenopathy throughout the neck.    Major vessels of the neck appear patent.    No focal parotid gland lesion.  Submandibular glands and thyroid gland likely surgically resected    Mucosal thickening of the bilateral maxillary sinuses and ethmoid air cells.  Mastoid air cells are essentially clear.    Visualized lungs are clear with interval resolution previous ill-defined lung opacities.    Degenerative change of the cervical spine without evidence for acute fracture or traumatic subluxation    Operative change from right enucleation partially included in the field of view please see concomitant CT orbits for further details.      Impression    Evolving operative changes status post right-sided neck dissection, glossectomy,, laryngectomy  tracheostomy placement as above.    No soft tissue mass or pathologic lymph node enlargement identified within the neck to suggest recurrent or residual disease.    Please see CT orbit report for further details.    Electronically signed by resident: Enzo Walton  Date:    01/14/2025  Time:    11:03    Electronically signed by: J Carlos Dutton DO  Date:    01/14/2025  Time:    11:32   Results for orders placed or performed during the hospital encounter of 11/25/24 (from the past 2160 hours)   CT Abdomen Pelvis With IV Contrast Routine Oral Contrast    Narrative    EXAMINATION:  CT ABDOMEN PELVIS WITH IV CONTRAST    CLINICAL HISTORY:  Head/neck cancer, monitor;pancreatic fullness noted on CT chest. CT AP with contrast recommended.; Malignant neoplasm of base of tongue    TECHNIQUE:  Low dose axial images, sagittal and coronal reformations were obtained from the lung bases to the pubic symphysis following the IV administration of 75 mL of Omnipaque 350.Oral contrast was not given.    COMPARISON:  CT chest 11/12/2024, ultrasound 08/12/2024, PET-CT 02/09/2024    FINDINGS:  Lower thorax:    Heart: Normal in size. No pericardial effusion.    Lung Bases: Well aerated, without consolidation or pleural fluid.    Liver: Post liver transplant surgical changes.  Liver is unremarkable.  No focal hepatic lesion.    Gallbladder: Surgically absent.    Bile Ducts: Mild intrahepatic biliary duct dilatation.  Common bile duct measure 0.8 cm    Pancreas: Pancreatic atrophy with dystrophic calcifications.  No pancreatic lesion or peripancreatic fat stranding. No pancreatic duct dilatation.    Spleen: Normal in size.  No focal lesion.    Adrenals: Unremarkable.    Kidneys/ Ureters: Normal in size and location. Normal enhancement. No hydronephrosis or nephrolithiasis. No ureteral dilatation.  Bilateral renal hypodensities, few are too small characterize.  Right lower lobe simple cyst measuring 1.7 cm.    Bladder evaluation is limited by  nondistention with diffuse bladder wall thickening and minimal perivesical stranding.    Reproductive organs: Mild prostatomegaly measure 4.4 cm with dystrophic calcification.    Gl/Mesentery/peritoneum and retroperitoneum: Small hiatal hernia.  Stomach is unremarkable.  Small bowel is normal in caliber with no evidence of obstruction. No evidence of inflammation or wall thickening.  Colonic diverticulosis.  Rectosigmoid wall thickening with presacral and perirectal soft tissue stranding.  Trace of free fluid in the pelvis.  No free air.  No significant adenopathy.    Abdominal wall: Unremarkable.    Vasculature: No significant atherosclerosis or aneurysm.    Bones: Degenerative changes.  Significant disc height loss at L4-L5.  Grade 1 anterolisthesis of L3 on L4.  No acute fracture.  No suspicious lytic or sclerotic lesion.      Impression    1. No evidence of pancreatic mass, inflammation or pancreatic duct dilatation.  2. Postsurgical liver transplant.  3. Stable post cholecystectomy mild intra and extrahepatic biliary duct dilatation.  4. Rectosigmoid wall thickening with soft tissue stranding in the perirectal and presacral area suggestive of proctitis.    Electronically signed by resident: Rakesh Lincoln  Date:    11/25/2024  Time:    16:33    Electronically signed by: César Castro Jr  Date:    11/25/2024  Time:    17:14     *Note: Due to a large number of results and/or encounters for the requested time period, some results have not been displayed. A complete set of results can be found in Results Review.           Assessment:       1. Basal cell carcinoma (BCC) of skin of nose    2. 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 Squamous cell carcinoma of base of tongue    4. Secondary malignant neoplasm of cervical lymph node    5. Tracheostomy status    6. 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 Stage 3b chronic kidney disease          Plan:         BCC locally advanced, too large for RT or surgery now due to morbidity of the surgery. Tolerating vismodegib well with improvement in pain. Ok to continue    Orbital cSCC  Unfortunately patient has progressed while on immunotherapy for below diagnosis. For that reason systemic therapies are limited. Evaluated at [facility] and surgeon does think an exenteration sparing resection would be possible with good response to induction chemotherapy. Plan was for platinum doublet chemo + cetuximab x 2 cycles. Notified of this plan on 1/29 and treatment plan was entered.   - Initiated dose reduced C1 of 5-FU to 750 mg/m2 due to age/frailty and growth factor. Patient still had cytopenias and mucositis, but was able to finish 2 cycles with delays. Now s/p exenteration. With PNI on path, referred to radiation oncology but adjuvant radiation deemed not necessary after exenteration.   - MRI orbit on 8/12/24 showing evidence of recurrent disease, "Nodular enhancing soft tissue superficially at the superomedial orbit and along the medial wall of the orbit posteriorly at least concerning for underlying recurrent lesion."   - Has undergone palliative RT to the right orbit. CT showing reduction of right orbital mass. Will continue Q3 month surveillance.    3,4,5. Recurrent SCC of base of tongue, P16+ - Status post total laryngectomy, total glossectomy and anterolateral thigh free flap reconstruction roughly 2017 at [facility] in [place] for persistent/recurrent base of tongue sq.c.c. 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 He is not a surgical or radiation candidate.  -Started on PCC 10/6/2020 followed by maintenance cetuximab until 8/24/21 (discontinued due to progression of disease; continued increase in SUV uptake, no new lesions).  - 9/2021 started on carbo/5-FU/pembrolizumab; due to toxicity went to " pembrolizumab monotherapy starting with cycle 2.  Progression of disease noted after cycle 3 so added chemotherapy back in with cycle 4. Keytruda monotherapy starting with C9.   - Keytruda discontinued after 2 years.    6,7. S/p liver transplant and is currently on tacrolimus. Grade 1. Will continue to monitor.     8. Monitor TSH on synthroid dose of 100 mcg.    9. Cr elevated at 1.5. No eligible for platinum chemo.      45 minutes total time spent with patient, 30 minutes were spent face to face with the patient and his family to discuss the disease, natural history, treatment options and survival statistics. Greater than 50% of this time was for counseling.  15 minutes of chart review and coordination of care. I have provided the patient with an opportunity to ask questions and have all questions answered to his satisfaction.       Martín Hernandez M.D.  Hematology/Oncology Attending  Director [facility] Program  [facility]            Route Chart for Scheduling    Med Onc Chart Routing      Follow up with physician    Follow up with [facility] 6 weeks. 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   noted on 1/29/2024   Line of treatment: Adjuvant  Treatment Goal: Curative     Upcoming Treatment Dates - OP cetuximab (loading + maintenance) weekly    8/23/2024       Pre-Medications       diphenhydrAMINE (BENADRYL) 25 mg in NS 50 mL IVPB       Chemotherapy       cetuximab (ERBITUX) 100 mg/50 mL chemo infusion 652 mg  8/30/2024       Pre-Medications       diphenhydrAMINE (BENADRYL) 25 mg in NS 50 mL IVPB       Chemotherapy       cetuximab (ERBITUX) 100 mg/50 mL chemo infusion 407.6 mg  9/6/2024       Pre-Medications        diphenhydrAMINE (BENADRYL) 25 mg in NS 50 mL IVPB       Chemotherapy       cetuximab (ERBITUX) 100 mg/50 mL chemo infusion 407.6 mg  9/13/2024       Pre-Medications       diphenhydrAMINE (BENADRYL) 25 mg in NS 50 mL IVPB       Chemotherapy       cetuximab (ERBITUX) 100 mg/50 mL chemo infusion 407.6 mg    Supportive Plan Information  IV FLUIDS AND ELECTROLYTES Ct Francis NP   Associated Diagnosis: Acute kidney injury superimposed on chronic kidney disease   noted on 4/8/2024   Line of treatment: Supportive Care   Treatment goal: Supportive     Upcoming Treatment Dates - IV FLUIDS AND ELECTROLYTES    No upcoming days in selected categories.    Therapy Plan Information  PORT FLUSH for CKD (chronic kidney disease) stage 3, GFR 30-59 ml/min, noted on 10/5/2020  PORT FLUSH for Squamous cell carcinoma of base of tongue, noted on 9/28/2020  Flushes  heparin, porcine (PF) 100 unit/mL injection flush 500 Units  500 Units, Intravenous, Every visit  sodium chloride 0.9% flush 10 mL  10 mL, Intravenous, Every visit    PORT FLUSH for Squamous cell carcinoma of base of tongue, noted on 9/28/2020  PORT FLUSH for Secondary malignant neoplasm of cervical lymph node, noted on 2/9/2021  Flushes  heparin, porcine (PF) 100 unit/mL injection flush 500 Units  500 Units, Intravenous, Every visit  sodium chloride 0.9% flush 10 mL  10 mL, Intravenous, Every visit      No therapy plan of the specified type found.

## 2025-02-24 ENCOUNTER — PATIENT MESSAGE (OUTPATIENT)
Dept: HEMATOLOGY/ONCOLOGY | Facility: CLINIC | Age: 75
End: 2025-02-24
Payer: MEDICARE

## 2025-02-24 ENCOUNTER — PATIENT MESSAGE (OUTPATIENT)
Dept: ADMINISTRATIVE | Facility: OTHER | Age: 75
End: 2025-02-24
Payer: MEDICARE

## 2025-02-24 ENCOUNTER — TELEPHONE (OUTPATIENT)
Dept: HEMATOLOGY/ONCOLOGY | Facility: CLINIC | Age: 75
End: 2025-02-24
Payer: MEDICARE

## 2025-02-26 DIAGNOSIS — C44.311 BASAL CELL CARCINOMA (BCC) OF SKIN OF NOSE: ICD-10-CM

## 2025-03-17 ENCOUNTER — OFFICE VISIT (OUTPATIENT)
Dept: PALLIATIVE MEDICINE | Facility: CLINIC | Age: 75
End: 2025-03-17
Payer: MEDICARE

## 2025-03-17 VITALS
SYSTOLIC BLOOD PRESSURE: 150 MMHG | HEART RATE: 78 BPM | DIASTOLIC BLOOD PRESSURE: 91 MMHG | OXYGEN SATURATION: 98 % | BODY MASS INDEX: 19.51 KG/M2 | WEIGHT: 128.31 LBS

## 2025-03-17 DIAGNOSIS — Z51.5 ENCOUNTER FOR PALLIATIVE CARE: ICD-10-CM

## 2025-03-17 DIAGNOSIS — G89.3 CANCER RELATED PAIN: ICD-10-CM

## 2025-03-17 DIAGNOSIS — C01 SQUAMOUS CELL CARCINOMA OF BASE OF TONGUE: Primary | ICD-10-CM

## 2025-03-17 DIAGNOSIS — C32.9 LARYNX CANCER: ICD-10-CM

## 2025-03-17 DIAGNOSIS — F43.21 ADJUSTMENT DISORDER WITH DEPRESSED MOOD: ICD-10-CM

## 2025-03-17 DIAGNOSIS — M54.50 CHRONIC LOW BACK PAIN, UNSPECIFIED BACK PAIN LATERALITY, UNSPECIFIED WHETHER SCIATICA PRESENT: ICD-10-CM

## 2025-03-17 DIAGNOSIS — G89.29 CHRONIC LOW BACK PAIN, UNSPECIFIED BACK PAIN LATERALITY, UNSPECIFIED WHETHER SCIATICA PRESENT: ICD-10-CM

## 2025-03-17 DIAGNOSIS — Z90.02 HISTORY OF LARYNGECTOMY: ICD-10-CM

## 2025-03-17 DIAGNOSIS — G47.00 INSOMNIA, UNSPECIFIED TYPE: ICD-10-CM

## 2025-03-17 PROCEDURE — 99213 OFFICE O/P EST LOW 20 MIN: CPT | Mod: PBBFAC | Performed by: NURSE PRACTITIONER

## 2025-03-17 PROCEDURE — 99999 PR PBB SHADOW E&M-EST. PATIENT-LVL III: CPT | Mod: PBBFAC,,, | Performed by: NURSE PRACTITIONER

## 2025-03-17 RX ORDER — OXYCODONE HYDROCHLORIDE 10 MG/1
10 TABLET ORAL EVERY 8 HOURS PRN
Qty: 45 TABLET | Refills: 0 | Status: SHIPPED | OUTPATIENT
Start: 2025-03-17 | End: 2025-04-02

## 2025-03-17 NOTE — PROGRESS NOTES
Consult Note  Palliative Care      Consult Requested By: No ref. provider found  Reason for Consult: symptom mgmt/ACP      ASSESSMENT/PLAN:     Plan/Recommendations:  Diagnoses and all orders for this visit:    03/17/2025:  - no changes made today    Squamous cell carcinoma of base of tongue, conjunctival intraepithelial neoplasm  - following with Dr. Hernandez and SABINO Francis  - s/p laryngectomy, total glossectomy   - 100% written communication   -  chemo Feb-March 2024  - s/p orbital exenteration   - MRI orbit 08/12/2024 with disease recurrence  - palliative RT 9/2024      Encounter for palliative care  - Patient is decisional  - Patient accompanied today by self   - ACP documents are uploaded into EMR   - Philosophy of Palliative Medicine reviewed with patient and family at first visit  - New patient folder given to and reviewed with patient and family at first visit  - Goals of care: Patient has excellent activity tolerance, his goal is to remain active and able to do the things he enjoys. He fly fishes frequently and enjoys taking walks around the [place]. He is a retired  of 35 years. He has no children and is unmarried, he jokes that being single has allowed him to buy a Kristine. He lives alone with a brother who lives about a mile from his house.      Cancer pain  - 7/16: notes facial pain started 3 weeks ago, right-side nose, adjacent to selam-orbital region.  Pain is intermittent, sometimes wakes him up   - has used a couple doses of oxycodone 5 mg left over from old prescription     Chronic low back pain, unspecified back pain laterality, unspecified whether sciatica present  - no longer requires pain medication for cancer-related pain  - states his chronic back pain is now more prominent, this is his biggest complaint  - he has tried PT and pain management with limited to no relief     - placed referral to [facility] for acupuncture     Insomnia  - reports 5-6 hours of sleep per  night  - 2/2 chronic back pain, see above  - placed referral for acupuncture   - tip sheet provided in new patient folder  - will continue to monitor     Anorexia  - resolved  - excellent appetite     Adjustment disorder with depression  - coping methods: driving his Kristine, long walks, fishing, travel, time with his brother     Understanding of illness/Prognosis: good    Goals of care: life-prolonging, maintain good QOL    Follow up: 12 weeks    Patient's encounter and above plan of care discussed with patient's oncology team.     SUBJECTIVE:     History of Present Illness:  Patient is a 75 y.o. year old male presenting with SCC base of tongue. Please see oncology notes for full oncologic history and treatment course.     03/17/2025:  JESUS ALBERTO HERNÁNDEZ reviewed     The patient presents to the clinic for a follow-up appointment alone. He reports feeling overall very well. He denies experiencing any pain and is no longer using pain medication. He has gained weight, has become more active, and states that his mood is good. He is sleeping well and, in general, has no concerns or needs at this time. A follow-up appointment is scheduled for 12 weeks from now.    LOV: 01/15/2025    Past Medical History:   Diagnosis Date    Acute conjunctivitis of right eye 09/12/2022    Atrial fibrillation     Basal cell carcinoma (BCC) in situ of skin 2012    3 on face, 2 on arm, removed by dermatology.     Basal cell carcinoma (BCC) of neck 01/24/2024    lower mid neck    Cataract     Conjunctival lesion 10/11/2022    COPD (chronic obstructive pulmonary disease)     Deep vein thrombosis     Disorder of kidney and ureter     Hepatitis C 01/27/2020    Hepatitis C, chronic 2006    Treated for Hep C x 6 months, normal viral load since 07/2006    Hypothyroidism     Larynx cancer     Left ear pain 10/24/2022    Liver transplanted     Lumbar disc disease     Squamous cell carcinoma in situ (SCCIS) of tongue 02/2016    Treated with radiation to neck and  chemotherapy. Underwent surgical resection of tongue and neck. s/p tracheostomy    Squamous cell carcinoma of skin of right temple 01/24/2024    right temple     Past Surgical History:   Procedure Laterality Date    COLONOSCOPY      COLONOSCOPY N/A 09/14/2023    Procedure: COLONOSCOPY;  Surgeon: Reyes Olivia MD;  Location: [facility] (2ND FLR);  Service: Endoscopy;  Laterality: N/A;    CONJUNCTIVA BIOPSY Right 10/11/2022    Procedure: BIOPSY, CONJUNCTIVA;  Surgeon: Victor M Vasques MD;  Location: [facility];  Service: Ophthalmology;  Laterality: Right;    ESOPHAGOGASTRODUODENOSCOPY N/A 09/14/2023    Procedure: EGD (ESOPHAGOGASTRODUODENOSCOPY);  Surgeon: Reyes Olivia MD;  [facility]  Service: Endoscopy;  Laterality: N/A;    ESOPHAGOGASTRODUODENOSCOPY N/A 04/10/2024    Procedure: EGD (ESOPHAGOGASTRODUODENOSCOPY);  Surgeon: Reyes Pagan MD;  [facility]  Service: Endoscopy;  Laterality: N/A;    ESOPHAGOGASTRODUODENOSCOPY N/A 06/24/2024    Procedure: EGD (ESOPHAGOGASTRODUODENOSCOPY);  Surgeon: Reyes Pagan MD;  [address]  Service: Endoscopy;  Laterality: N/A;  Ref By: Dr. Hawkins   Procedure: egd  Diagnosis: esophagitis  Procedure Timing: 10 weeks  Prep: standard prep  6/21-pt r/s-inst to portal-2nd floor criteria-labs within 90 daysMS    EYE SURGERY Right 04/25/2024    exenteration of orbit    GLOSSECTOMY      INSERTION OF VENOUS ACCESS PORT Right 03/08/2021    Procedure: INSERTION, VENOUS ACCESS PORT;  Surgeon: Jesus Rendon MD;  [address];  Service: General;  Laterality: Right;    LARYNGECTOMY      LIVER TRANSPLANT  11/2008    transplanted for biopsy proven hepatocellular carcinoma,     LYMPH NODE BIOPSY N/A 09/18/2020    Procedure: BIOPSY, LYMPH NODE;  Surgeon: [place] [facility];  [address];  Service: General;  Laterality: N/A;  Rm 173    skin cancer removals      SURGICAL REMOVAL OF SCAR  Right 08/24/2023    Procedure: EXCISION, SCAR;  Surgeon: Ting Brambila MD;  [facility];  Service: Ophthalmology;  Laterality: Right;    TRACHEOSTOMY       Family History   Problem Relation Name Age of Onset    Dementia Mother      Cataracts Neg Hx      Glaucoma Neg Hx      Macular degeneration Neg Hx       Review of patient's allergies indicates:   Allergen Reactions    Aspirin     Tylenol extended release        Medications:    Current Outpatient Medications:     levothyroxine (SYNTHROID) 88 MCG tablet, TAKE 1 TABLET BY MOUTH ONCE  DAILY, Disp: 90 tablet, Rfl: 0    magic mouthwash diphen/antac/lidoc/nysta, Take 10 mLs by mouth 4 (four) times daily., Disp: 120 mL, Rfl: 4    multivit-min/FA/lycopen/lutein (CENTRUM SILVER MEN ORAL), Take 1 tablet by mouth once daily., Disp: , Rfl:     ondansetron (ZOFRAN-ODT) 8 MG TbDL, Take 1 tablet (8 mg total) by mouth every 8 (eight) hours as needed (nausea - first choice)., Disp: 60 tablet, Rfl: 4    prochlorperazine (COMPAZINE) 10 MG tablet, Take 1 tablet (10 mg total) by mouth every 6 (six) hours as needed (nausea/vomiting - second choice)., Disp: 30 tablet, Rfl: 1    senna (SENOKOT) 8.6 mg tablet, Take 2 tablets by mouth 2 (two) times daily as needed for Constipation., Disp: 60 tablet, Rfl: 2    tacrolimus (PROGRAF) 0.5 MG Cap, Take 1 capsule (0.5 mg total) by mouth every 12 (twelve) hours., Disp: 180 capsule, Rfl: 3    traZODone (DESYREL) 50 MG tablet, TAKE 1 TABLET BY MOUTH IN THE  EVENING, Disp: 90 tablet, Rfl: 3  No current facility-administered medications for this visit.    Facility-Administered Medications Ordered in Other Visits:     ofloxacin 0.3 % ophthalmic solution 1 drop, 1 drop, Right Eye, On Call Procedure, Victor M Vasques MD, 2 drop at 10/11/22 0745    sodium chloride 0.9% flush 10 mL, 10 mL, Intravenous, PRN, MorenaRonald MD    sodium chloride 0.9% flush 10 mL, 10 mL, Intravenous, PRN, Victor M Vasques MD    OBJECTIVE:        ROS:  Review of Systems   Constitutional:  Positive for fatigue. Negative for activity change, appetite change and chills.        Written communication    HENT:          Right side nose pain   Eyes:  Positive for pain. Negative for discharge and itching.   Respiratory: Negative.  Negative for apnea, cough and chest tightness.    Cardiovascular: Negative.  Negative for chest pain, palpitations and leg swelling.   Gastrointestinal:  Negative for nausea and vomiting.   Genitourinary:  Negative for difficulty urinating, dysuria and enuresis.   Musculoskeletal:  Positive for back pain. Negative for arthralgias and gait problem.   Skin:  Positive for wound (left nare, healing). Negative for color change, pallor and rash.   Psychiatric/Behavioral:  Positive for dysphoric mood. Negative for sleep disturbance. The patient is not nervous/anxious.        Review of Symptoms      Symptom Assessment (ESAS 0-10 Scale)  Pain:  3  Dyspnea:  0  Anxiety:  0  Nausea:  0  Depression:  0  Anorexia:  0  Fatigue:  0  Insomnia:  3  Restlessness:  5  Agitation:  0     CAM / Delirium:  Negative  Constipation:  Negative  Diarrhea:  Negative    Anxiety:  Is not nervous/anxious    Bowel Management Plan (BMP):  No      Pain Assessment:  OME in 24 hours:  0  Location(s): back    Back       Location: lower        Quality: None        Quantity: 4/10 in intensity        Chronicity: Onset 0 year(s) ago, stable        Aggravating Factors: Activity        Alleviating Factors: None       Associated Symptoms: None    Modified Tricia Scale:  0    ECOG Performance Status stGstrstastdstest:st st1st Living Arrangements:  Lives alone    Psychosocial/Cultural:   See Palliative Psychosocial Note: Yes  **Primary  to Follow**  Palliative Care  Consult: No      Advance Care Planning   Advance Directives:   Living Will: Yes        Copy on chart: Yes    Medical Power of : Yes      Decision Making:  Patient answered questions  Goals of Care:  What is most important right now is to focus on remaining as independent as possible, symptom/pain control. Accordingly, we have decided that the best plan to meet the patient's goals includes continuing with treatment.        Physical Exam:  Vitals:    Vitals:    03/17/25 1303   BP: (!) 150/91   Pulse: 78          Physical Exam  Vitals reviewed.   Constitutional:       Appearance: He is underweight. He is not toxic-appearing or diaphoretic.      Comments: Written communication    HENT:      Head: Normocephalic and atraumatic.      Comments: Trach      Right Ear: External ear normal.      Left Ear: External ear normal.      Nose: Nose normal.      Comments: Wound, well-healed  Eyes:      General:         Right eye: No discharge.         Left eye: No discharge.      Comments: Right eye s/p enucleation, well-healed    Pulmonary:      Effort: Pulmonary effort is normal. No respiratory distress.      Breath sounds: No stridor.   Abdominal:      General: Abdomen is flat.      Palpations: Abdomen is soft.   Musculoskeletal:         General: No swelling or tenderness. Normal range of motion.      Cervical back: Normal range of motion.   Skin:     General: Skin is warm and dry.      Coloration: Skin is not jaundiced.      Findings: No bruising.   Neurological:      General: No focal deficit present.      Mental Status: He is alert and oriented to person, place, and time. Mental status is at baseline.   Psychiatric:         Mood and Affect: Mood normal. Mood is not anxious or depressed.         Behavior: Behavior normal.         Thought Content: Thought content normal.         Judgment: Judgment normal.         Labs:  CBC:   WBC   Date Value Ref Range Status   02/11/2025 4.60 3.90 - 12.70 K/uL Final     Hemoglobin   Date Value Ref Range Status   02/11/2025 11.9 (L) 14.0 - 18.0 g/dL Final     Hematocrit   Date Value Ref Range Status   02/11/2025 35.5 (L) 40.0 - 54.0 % Final     MCV   Date Value Ref Range Status   02/11/2025  101 (H) 82 - 98 fL Final     Platelets   Date Value Ref Range Status   02/11/2025 168 150 - 450 K/uL Final       LFT:   Lab Results   Component Value Date    AST 41 (H) 02/11/2025     (H) 07/09/2020    ALKPHOS 99 02/11/2025    BILITOT 0.5 02/11/2025       Albumin:   Albumin   Date Value Ref Range Status   02/11/2025 3.9 3.5 - 5.2 g/dL Final     Protein:   Total Protein   Date Value Ref Range Status   02/11/2025 8.1 6.0 - 8.4 g/dL Final       Radiology:I have reviewed all pertinent imaging results/findings within the past 24 hours.    02/11/2025 CT orbits: Impression:     Evolving operative change from right orbital exenteration     No evidence for definite new soft tissue opacity or nodular enhancement to suggest definite worsening residual recurrent lesion.     Clinical correlation and follow-up advised     Continued scattered areas of presumed demineralization     Further evaluation with nuclear medicine imaging as warranted.    02/11/2025 CT chest: Impression:     1. No findings suspicious for metastatic disease  2. Stable left lower lobe superior segment subcentimeter linear density corresponding to location of nodule seen on most recent F 18 FDG PET-CT.  3. Incidental findings are stable.     Interval postoperative change of right orbital mass resection and exenteration for squamous cell carcinoma.  Hypermetabolic lesions about the right orbit concerning for disease recurrence.     New mildly hypermetabolic pulmonary nodule concerning for metastasis.    08/12/2024 MRI orbits: Impression:     Initial postsurgical study status post right orbital mass resection with orbital exenteration.  Nodular enhancing soft tissue superficially at the superomedial orbit and along the medial wall of the orbit posteriorly at least concerning for underlying recurrent lesion.  Postcontrast CT imaging or preferably PET would be helpful for further evaluation on this initial post treatment study.    I spent a total of 30  minutes on the day of the visit.This includes face to face time in discussion of goals of care, symptom assessment, coordination of care and emotional support.  This also includes non-face to face time preparing to see the patient (eg, review of tests/imaging), obtaining and/or reviewing separately obtained history, documenting clinical information in the electronic or other health record, independently interpreting results and communicating results to the patient/family/caregiver, or care coordinator.     Signature: Arabella Cuevas DNP

## 2025-03-19 DIAGNOSIS — C44.311 BASAL CELL CARCINOMA (BCC) OF SKIN OF NOSE: ICD-10-CM

## 2025-03-26 ENCOUNTER — TELEPHONE (OUTPATIENT)
Dept: HEMATOLOGY/ONCOLOGY | Facility: CLINIC | Age: 75
End: 2025-03-26
Payer: MEDICARE

## 2025-03-27 ENCOUNTER — LAB VISIT (OUTPATIENT)
Dept: LAB | Facility: HOSPITAL | Age: 75
End: 2025-03-27
Payer: MEDICARE

## 2025-03-27 ENCOUNTER — OFFICE VISIT (OUTPATIENT)
Dept: HEMATOLOGY/ONCOLOGY | Facility: CLINIC | Age: 75
End: 2025-03-27
Payer: MEDICARE

## 2025-03-27 VITALS
WEIGHT: 130.5 LBS | HEIGHT: 68 IN | SYSTOLIC BLOOD PRESSURE: 146 MMHG | RESPIRATION RATE: 16 BRPM | HEART RATE: 78 BPM | BODY MASS INDEX: 19.78 KG/M2 | OXYGEN SATURATION: 98 % | DIASTOLIC BLOOD PRESSURE: 81 MMHG

## 2025-03-27 DIAGNOSIS — E03.2 HYPOTHYROIDISM DUE TO MEDICAMENTS AND OTHER EXOGENOUS SUBSTANCES: ICD-10-CM

## 2025-03-27 DIAGNOSIS — C44.329 SQUAMOUS CELL CARCINOMA OF SKIN OF ORBIT: ICD-10-CM

## 2025-03-27 DIAGNOSIS — C44.311 BASAL CELL CARCINOMA (BCC) OF SKIN OF NOSE: ICD-10-CM

## 2025-03-27 DIAGNOSIS — Z93.0 TRACHEOSTOMY STATUS: ICD-10-CM

## 2025-03-27 DIAGNOSIS — C01 SQUAMOUS CELL CARCINOMA OF BASE OF TONGUE: ICD-10-CM

## 2025-03-27 DIAGNOSIS — Z79.899 IMMUNODEFICIENCY DUE TO DRUGS: ICD-10-CM

## 2025-03-27 DIAGNOSIS — D84.821 IMMUNODEFICIENCY DUE TO DRUGS: ICD-10-CM

## 2025-03-27 DIAGNOSIS — C44.311 BASAL CELL CARCINOMA (BCC) OF SKIN OF NOSE: Primary | ICD-10-CM

## 2025-03-27 DIAGNOSIS — C77.0 SECONDARY MALIGNANT NEOPLASM OF CERVICAL LYMPH NODE: ICD-10-CM

## 2025-03-27 DIAGNOSIS — Z94.4 STATUS POST LIVER TRANSPLANTATION: ICD-10-CM

## 2025-03-27 LAB
ABSOLUTE NEUTROPHIL COUNT (OHS): 0.89 K/UL (ref 1.8–7.7)
ALBUMIN SERPL BCP-MCNC: 3.9 G/DL (ref 3.5–5.2)
ALP SERPL-CCNC: 123 UNIT/L (ref 40–150)
ALT SERPL W/O P-5'-P-CCNC: 18 UNIT/L (ref 10–44)
ANION GAP (OHS): 10 MMOL/L (ref 8–16)
AST SERPL-CCNC: 40 UNIT/L (ref 11–45)
BILIRUB SERPL-MCNC: 0.4 MG/DL (ref 0.1–1)
BUN SERPL-MCNC: 23 MG/DL (ref 8–23)
CALCIUM SERPL-MCNC: 10.1 MG/DL (ref 8.7–10.5)
CHLORIDE SERPL-SCNC: 102 MMOL/L (ref 95–110)
CK SERPL-CCNC: 95 U/L (ref 20–200)
CO2 SERPL-SCNC: 27 MMOL/L (ref 23–29)
CREAT SERPL-MCNC: 1.3 MG/DL (ref 0.5–1.4)
ERYTHROCYTE [DISTWIDTH] IN BLOOD BY AUTOMATED COUNT: 13.5 % (ref 11.5–14.5)
GFR SERPLBLD CREATININE-BSD FMLA CKD-EPI: 57 ML/MIN/1.73/M2
GLUCOSE SERPL-MCNC: 99 MG/DL (ref 70–110)
HCT VFR BLD AUTO: 34.1 % (ref 40–54)
HGB BLD-MCNC: 11.3 GM/DL (ref 14–18)
IMM GRANULOCYTES # BLD AUTO: 0 K/UL (ref 0–0.04)
MCH RBC QN AUTO: 33.6 PG (ref 27–50)
MCHC RBC AUTO-ENTMCNC: 33.1 G/DL (ref 32–36)
MCV RBC AUTO: 102 FL (ref 82–98)
PLATELET # BLD AUTO: 157 K/UL (ref 150–450)
PMV BLD AUTO: 8.9 FL (ref 9.2–12.9)
POTASSIUM SERPL-SCNC: 4.5 MMOL/L (ref 3.5–5.1)
PROT SERPL-MCNC: 7.9 GM/DL (ref 6–8.4)
RBC # BLD AUTO: 3.36 M/UL (ref 4.6–6.2)
SODIUM SERPL-SCNC: 139 MMOL/L (ref 136–145)
WBC # BLD AUTO: 3.82 K/UL (ref 3.9–12.7)

## 2025-03-27 PROCEDURE — 82550 ASSAY OF CK (CPK): CPT

## 2025-03-27 PROCEDURE — 85027 COMPLETE CBC AUTOMATED: CPT

## 2025-03-27 PROCEDURE — 82565 ASSAY OF CREATININE: CPT

## 2025-03-27 PROCEDURE — 99999 PR PBB SHADOW E&M-EST. PATIENT-LVL IV: CPT | Mod: PBBFAC,,, | Performed by: NURSE PRACTITIONER

## 2025-03-27 PROCEDURE — 99214 OFFICE O/P EST MOD 30 MIN: CPT | Mod: PBBFAC | Performed by: NURSE PRACTITIONER

## 2025-03-27 PROCEDURE — 36415 COLL VENOUS BLD VENIPUNCTURE: CPT

## 2025-03-27 NOTE — PROGRESS NOTES
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 male who returns to clinic for management of cSCC of the orbit. Patient had ANGELES on October PETCT, but then developed quickly progressing orbital lesion. He was seen at [facility] in [place]. Recommendation was to follow up at [facility] to initiate induction chemotherapy followed by possible surgery. Patient completed 2 cycles of Extreme regimen, though there were delays due to cytopenias and severe mucositis. He was able to go to [facility] to have exenteration and resection of cancer April 2024 with clear margins, but PNI on path.     Interval History    Pt states that nose pain continues to improve since starting vismotegib. BCC of the nose is decreasing in size. Pt has been having taste lose since starting medication, however it is not affecting his appetite. Pt denies any muscle cramps, N/V, diarrhea, constipation, rash, new/worsening fatigue, new/worsening SOB, or new/worsening cough.    ECOG Performance status: 1 - Symptomatic but completely ambulatory Communication from him is 100% written.     Cancer Staging   Squamous cell carcinoma of base of tongue  Staging form: Pharynx - HPV-Mediated Oropharynx, AJCC 8th Edition  - Clinical stage from 9/18/2020: Stage III (rcT4, cN1, cM0, p16+) - Signed by Ronald Berg MD on 12/27/2022      Oncologic History:  Oncology History   Squamous cell carcinoma of base of tongue   9/18/2020 Cancer Staged    Staging form: Pharynx - HPV-Mediated Oropharynx, AJCC 8th Edition  - Clinical stage from 9/18/2020: Stage III (rcT4, cN1, cM0, p16+)     9/28/2020 Initial Diagnosis    Squamous cell carcinoma of base of tongue     10/6/2020 - 8/17/2021 Chemotherapy    Treatment Summary   Plan Name: OP HEAD NECK CARBOPLATIN PACLITAXEL C1-2 FOLLOWED BY CETUXIMAB CARBOPLATIN C3-6 FOLLOWED BY CETUXIMAB MAINTENANCE  WEEKLY  Treatment Goal: Control  Status: Inactive  Start Date: 10/6/2020  End Date: 8/17/2021  Provider: Ronald Berg MD  Chemotherapy: cetuximab (ERBITUX) 100 mg/50 mL chemo infusion 684 mg, 400 mg/m2 = 684 mg (100 % of original dose 400 mg/m2), Intravenous, Clinic/[facility] 1 time, 29 of 38 cycles  Dose modification: 500 mg/m2 (original dose 400 mg/m2, Cycle 3), 250 mg/m2 (original dose 400 mg/m2, Cycle 3), 400 mg/m2 (original dose 400 mg/m2, Cycle 3), 250 mg/m2 (original dose 250 mg/m2, Cycle 7), 200 mg/m2 (original dose 250 mg/m2, Cycle 18), 200 mg/m2 (original dose 250 mg/m2, Cycle 19), 250 mg/m2 (original dose 250 mg/m2, Cycle 4), 200 mg/m2 (original dose 250 mg/m2, Cycle 25), 200 mg/m2 (original dose 250 mg/m2, Cycle 28)  Administration: 684 mg (11/17/2020), 400 mg (11/24/2020), 400 mg (2/9/2021), 400 mg (2/17/2021), 427.6 mg (3/9/2021), 400 mg (3/30/2021), 400 mg (3/23/2021), 400 mg (4/6/2021), 427.6 mg (4/13/2021), 427.6 mg (4/20/2021), 342 mg (5/11/2021), 342 mg (5/18/2021), 342 mg (5/25/2021), 400 mg (5/31/2021), 400 mg (6/7/2021), 400 mg (6/14/2021), 400 mg (12/8/2020), 427.6 mg (12/29/2020), 400 mg (12/1/2020), 400 mg (12/15/2020), 400 mg (12/22/2020), 427.6 mg (1/5/2021), 400 mg (1/12/2021), 400 mg (1/19/2021), 427.6 mg (1/26/2021), 400 mg (2/2/2021), 400 mg (2/23/2021), 400 mg (3/2/2021), 427.6 mg (3/16/2021), 400 mg (6/21/2021), 342 mg (6/28/2021), 342 mg (7/6/2021), 342 mg (7/13/2021), 342 mg (7/20/2021), 400 mg (7/27/2021), 400 mg (8/10/2021), 400 mg (8/17/2021)  CARBOplatin (PARAPLATIN) 310 mg in sodium chloride 0.9% 500 mL chemo infusion, 310 mg (92.2 % of original dose 334.5 mg), Intravenous, Clinic/[facility] 1 time, 6 of 6 cycles  Dose modification:   (original dose 334.5 mg, Cycle 1)  Administration: 310 mg (10/6/2020), 370 mg (11/17/2020), 300 mg (10/27/2020), 350 mg (12/8/2020), 335 mg (12/29/2020), 320 mg (1/19/2021)  PACLitaxeL (TAXOL) 175 mg/m2 = 300 mg in sodium chloride 0.9% 500 mL chemo infusion,  175 mg/m2 = 300 mg (100 % of original dose 175 mg/m2), Intravenous, [facility] 1 time, 2 of 2 cycles  Dose modification: 175 mg/m2 (original dose 175 mg/m2, Cycle 1)  Administration: 300 mg (10/6/2020), 300 mg (10/27/2020)     4/29/2021 Tumor Conference       His case was discussed at the Multidisciplinary Head and Neck Team Planning Meeting.    Representatives from Medical Oncology, Radiation Oncology, Head and Neck Surgical Oncology, Psychosocial Oncology, and Speech and Language Pathology discussed the case with the following recommendations:    1) biopsy         7/29/2021 Tumor Conference       His case was discussed at the Multidisciplinary Head and Neck Team Planning Meeting.    Representatives from Medical Oncology, Radiation Oncology, Head and Neck Surgical Oncology, Psychosocial Oncology, and Speech and Language Pathology discussed the case with the following recommendations:    1) Head and neck clinic follow up  2) consider Keytruda (discuss with transplant)  3) consider palliative referral         9/13/2021 - 12/29/2023 Chemotherapy    Treatment Summary   Plan Name: OP HEAD NECK PEMBROLIZUMAB CARBOPLATIN FLUOROURACIL (C1 ONLY RECEIVED) FOLLOWED BY PEMBROLIZUMAB MAINTENANCE  Treatment Goal: Palliative  Status: Inactive  Start Date: 9/13/2021  End Date: 12/29/2023  Provider: Ronald Berg MD  Chemotherapy: CARBOplatin (PARAPLATIN) 320 mg in sodium chloride 0.9% 500 mL chemo infusion, 320 mg (100 % of original dose 320.5 mg), Intravenous, [facility] 1 time, 6 of 6 cycles  Dose modification:   (original dose 320.5 mg, Cycle 1)  Administration: 320 mg (9/13/2021), 335 mg (12/23/2021), 325 mg (1/27/2022), 245 mg (3/3/2022), 255 mg (5/12/2022), 255 mg (4/7/2022)  fluorouraciL 1,000 mg/m2/day = 6,440 mg in sodium chloride 0.9% 240 mL chemo infusion, 1,000 mg/m2/day = 6,440 mg, Intravenous, Over 96 hours, 6 of 6 cycles  Dose modification: 800 mg/m2/day (original dose 1,000 mg/m2/day, Cycle 6), 5,000 mg (original  dose 1,000 mg/m2/day, Cycle 8)  Administration: 6,440 mg (9/13/2021), 6,440 mg (12/23/2021), 6,480 mg (1/27/2022), 5,000 mg (3/3/2022), 5,000 mg (4/7/2022), 5,000 mg (5/12/2022)     Secondary malignant neoplasm of cervical lymph node   2/9/2021 Initial Diagnosis    Secondary malignant neoplasm of cervical lymph node     9/13/2021 - 12/29/2023 Chemotherapy    Treatment Summary   Plan Name: OP HEAD NECK PEMBROLIZUMAB CARBOPLATIN FLUOROURACIL (C1 ONLY RECEIVED) FOLLOWED BY PEMBROLIZUMAB MAINTENANCE  Treatment Goal: Palliative  Status: Inactive  Start Date: 9/13/2021  End Date: 12/29/2023  Provider: Ronald Berg MD  Chemotherapy: CARBOplatin (PARAPLATIN) 320 mg in sodium chloride 0.9% 500 mL chemo infusion, 320 mg (100 % of original dose 320.5 mg), Intravenous, Clinic[facility] 1 time, 6 of 6 cycles  Dose modification:   (original dose 320.5 mg, Cycle 1)  Administration: 320 mg (9/13/2021), 335 mg (12/23/2021), 325 mg (1/27/2022), 245 mg (3/3/2022), 255 mg (5/12/2022), 255 mg (4/7/2022)  fluorouraciL 1,000 mg/m2/day = 6,440 mg in sodium chloride 0.9% 240 mL chemo infusion, 1,000 mg/m2/day = 6,440 mg, Intravenous, Over 96 hours, 6 of 6 cycles  Dose modification: 800 mg/m2/day (original dose 1,000 mg/m2/day, Cycle 6), 5,000 mg (original dose 1,000 mg/m2/day, Cycle 8)  Administration: 6,440 mg (9/13/2021), 6,440 mg (12/23/2021), 6,480 mg (1/27/2022), 5,000 mg (3/3/2022), 5,000 mg (4/7/2022), 5,000 mg (5/12/2022)     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   1/29/2024 Initial Diagnosis    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     2/19/2024 - 3/25/2024 Chemotherapy    Treatment Summary   Plan Name: OP HEAD NECK CETUXIMAB CARBOPLATIN FLUOROURACIL Q3W  Treatment Goal: Curative  Status: Inactive  Start Date: 2/19/2024  End Date: 3/25/2024  Provider: Martín Hernandez MD  Chemotherapy: cetuximab (ERBITUX) 100 mg/50 mL chemo infusion 668 mg, 400 mg/m2 = 668 mg, Intravenous, [facility] 1 time, 2 of 12 cycles  Administration: 668 mg  (2/19/2024), 400 mg (2/26/2024), 400 mg (3/18/2024), 400 mg (3/25/2024), 400 mg (3/11/2024)  CARBOplatin (PARAPLATIN) 295 mg in sodium chloride 0.9% 314.5 mL chemo infusion, 295 mg, Intravenous, [facility] 1 time, 2 of 6 cycles  Administration: 295 mg (2/19/2024), 300 mg (3/18/2024)     8/23/2024 - 8/23/2024 Chemotherapy    Treatment Summary   Plan Name: OP cetuximab (loading + maintenance) weekly  Treatment Goal: Curative  Status: Inactive  Start Date:   End Date:   Provider: Martín Hernandez MD  Chemotherapy: cetuximab (ERBITUX) 100 mg/50 mL chemo infusion 652 mg, 400 mg/m2 = 652 mg, Intravenous, [facility] 1 time, 0 of 6 cycles     9/17/2024 - 9/23/2024 Radiation Therapy    Treating physician: Tian Joyce  Treatment Summary  Course: C1 H&N 2024    Treatment Site Ref. ID Energy Dose/Fx (Gy) #Fx Dose Correction (Gy) Total Dose (Gy) Start Date End Date Elapsed Days   IM Orbit_R Orbit_R 6X 6.5 5 / 5 0 32.5 9/17/2024 9/23/2024 6            Review of Systems   Constitutional:  Negative for activity change, appetite change, chills, fatigue, fever and unexpected weight change.   HENT:  Negative for ear pain, facial swelling, hearing loss, mouth sores, nosebleeds, sore throat, trouble swallowing and voice change.         No verbal communication. Skin fixed and immobile from previous scaring post-neck dissection   Eyes:  Negative for pain, discharge and visual disturbance.   Respiratory:  Negative for cough, choking, chest tightness and shortness of breath.    Cardiovascular:  Negative for chest pain, palpitations and leg swelling.   Gastrointestinal:  Negative for abdominal distention, abdominal pain, blood in stool, constipation, diarrhea, nausea and vomiting.   Endocrine: Negative for cold intolerance and heat intolerance.   Genitourinary:  Negative for difficulty urinating, dysuria, frequency and urgency.   Musculoskeletal:  Positive for back pain (lower - chronic). Negative for arthralgias, gait problem, leg pain and  myalgias.   Integumentary:  Negative for pallor, rash, wound and mole/lesion.   Allergic/Immunologic: Negative for frequent infections.   Neurological:  Negative for dizziness, tremors, weakness, light-headedness, numbness and headaches.   Hematological:  Negative for adenopathy. Does not bruise/bleed easily.   Psychiatric/Behavioral:  Negative for agitation, confusion, dysphoric mood and sleep disturbance. The patient is not nervous/anxious.         Allergies:  Review of patient's allergies indicates:   Allergen Reactions    Aspirin     Tylenol extended release        Medications:  Current Outpatient Medications   Medication Sig Dispense Refill    levothyroxine (SYNTHROID) 88 MCG tablet TAKE 1 TABLET BY MOUTH ONCE  DAILY 90 tablet 0    magic mouthwash diphen/antac/lidoc/nysta Take 10 mLs by mouth 4 (four) times daily. 120 mL 4    multivit-min/FA/lycopen/lutein (CENTRUM SILVER MEN ORAL) Take 1 tablet by mouth once daily.      oxyCODONE (ROXICODONE) 10 mg Tab immediate release tablet Take 1 tablet (10 mg total) by mouth every 8 (eight) hours as needed for Pain. 45 tablet 0    tacrolimus (PROGRAF) 0.5 MG Cap Take 1 capsule (0.5 mg total) by mouth every 12 (twelve) hours. 180 capsule 3    traZODone (DESYREL) 50 MG tablet TAKE 1 TABLET BY MOUTH IN THE  EVENING 90 tablet 3    vismodegib (ERIVEDGE) 150 mg Cap Take 1 capsule (150 mg total) by mouth once daily. 30 capsule 3    ondansetron (ZOFRAN-ODT) 8 MG TbDL Take 1 tablet (8 mg total) by mouth every 8 (eight) hours as needed (nausea - first choice). (Patient not taking: Reported on 3/27/2025) 60 tablet 4    prochlorperazine (COMPAZINE) 10 MG tablet Take 1 tablet (10 mg total) by mouth every 6 (six) hours as needed (nausea/vomiting - second choice). (Patient not taking: Reported on 3/27/2025) 30 tablet 1    senna (SENOKOT) 8.6 mg tablet Take 2 tablets by mouth 2 (two) times daily as needed for Constipation. (Patient not taking: Reported on 3/27/2025) 60 tablet 2     No  current facility-administered medications for this visit.     Facility-Administered Medications Ordered in Other Visits   Medication Dose Route Frequency Provider Last Rate Last Admin    ofloxacin 0.3 % ophthalmic solution 1 drop  1 drop Right Eye On Call Procedure Victor M Vasques MD   2 drop at 10/11/22 0745    sodium chloride 0.9% flush 10 mL  10 mL Intravenous PRN Ronald Berg MD        sodium chloride 0.9% flush 10 mL  10 mL Intravenous PRN Victor M Vasques MD           PMH:  Past Medical History:   Diagnosis Date    Acute conjunctivitis of right eye 09/12/2022    Atrial fibrillation     Basal cell carcinoma (BCC) in situ of skin 2012    3 on face, 2 on arm, removed by dermatology.     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 Underwent surgical resection of tongue and neck. s/p tracheostomy    Squamous cell carcinoma of skin of right temple 01/24/2024    right temple       PSH:  Past Surgical History:   Procedure Laterality Date    COLONOSCOPY      COLONOSCOPY N/A 09/14/2023    Procedure: COLONOSCOPY;  Surgeon: Reyes Olivia MD;  Location: [address];  Service: Endoscopy;  Laterality: N/A;    CONJUNCTIVA BIOPSY Right 10/11/2022    Procedure: BIOPSY, CONJUNCTIVA;  Surgeon: Victor M Vasques MD;  Location: [facility];  Service: Ophthalmology;  Laterality: Right;    ESOPHAGOGASTRODUODENOSCOPY N/A 09/14/2023    Procedure: EGD (ESOPHAGOGASTRODUODENOSCOPY);  Surgeon:  Reyes Olivia MD;  Location: [facility] (2ND FLR);  Service: Endoscopy;  Laterality: N/A;    ESOPHAGOGASTRODUODENOSCOPY N/A 04/10/2024    Procedure: EGD (ESOPHAGOGASTRODUODENOSCOPY);  Surgeon: Reyes Pagan MD;  Location: [facility] ENDO (2ND FLR);  Service: Endoscopy;  Laterality: N/A;    ESOPHAGOGASTRODUODENOSCOPY N/A 06/24/2024    Procedure: EGD (ESOPHAGOGASTRODUODENOSCOPY);  Surgeon: Reyes Pagan MD;  Location: [facility] (2ND FLR);  Service: Endoscopy;  Laterality: N/A;  Ref By: Dr. Hawkins   Procedure: egd  Diagnosis: esophagitis  Procedure Timing: 10 weeks  Prep: standard prep  6/21-pt r/s-inst to portal-2nd floor criteria-labs within 90 daysMS    EYE SURGERY Right 04/25/2024    exenteration of orbit    GLOSSECTOMY      INSERTION OF VENOUS ACCESS PORT Right 03/08/2021    Procedure: INSERTION, VENOUS ACCESS PORT;  Surgeon: Jesus Rendon MD;  [address];  Service: General;  Laterality: Right;    LARYNGECTOMY      LIVER TRANSPLANT  11/2008    transplanted for biopsy proven hepatocellular carcinoma,     LYMPH NODE BIOPSY N/A 09/18/2020    Procedure: BIOPSY, LYMPH NODE;  Surgeon: Taz Diagnostic Provider;  [address];  Service: General;  Laterality: N/A;  Rm 173    skin cancer removals      SURGICAL REMOVAL OF SCAR Right 08/24/2023    Procedure: EXCISION, SCAR;  Surgeon: Ting Brambila MD;  Location: [facility];  Service: Ophthalmology;  Laterality: Right;    TRACHEOSTOMY         FamHx:  Family History   Problem Relation Name Age of Onset    Dementia Mother      Cataracts Neg Hx      Glaucoma Neg Hx      Macular degeneration Neg Hx         SocHx:  Social History     Socioeconomic History    Marital status: Single   Occupational History    Occupation: Retired     Comment: , notes exposures to fumes etc.  Worked on [employer] for 4 years   Tobacco Use    Smoking status: Never    Smokeless tobacco: Never   Substance and Sexual Activity    Alcohol use: Yes  "    Comment: drinks wine, 1 glass day    Drug use: Not Currently    Sexual activity: Yes     Comment: No prior history of STD    Social History Narrative    Steps at house- 24       Distress Score    Distress Score: 0 - No Distress        Objective:      Vitals:    03/27/25 0850   BP: (!) 146/81   BP Location: Left arm   Patient Position: Sitting   Pulse: 78   Resp: 16   SpO2: 98%   Weight: 59.2 kg (130 lb 8.2 oz)   Height: 5' 8" (1.727 m)              Physical Exam  Vitals reviewed.   Constitutional:       General: He is not in acute distress.     Appearance: Normal appearance. He is well-developed and underweight.   HENT:      Head: Normocephalic and atraumatic.      Nose:      Comments: See picture below of BCC. Improved since last image.      Mouth/Throat:      Mouth: Mucous membranes are moist.      Dentition: Abnormal dentition. Dental caries present.   Eyes:      Comments: R eye enucleation   Neck:      Trachea: Tracheostomy present.   Pulmonary:      Effort: Pulmonary effort is normal. No respiratory distress.      Comments: Tracheostomy  Abdominal:      General: There is no distension.      Palpations: Abdomen is soft.   Musculoskeletal:         General: No swelling. Normal range of motion.      Cervical back: Normal range of motion and neck supple.   Skin:     General: Skin is warm and dry.   Neurological:      General: No focal deficit present.      Mental Status: He is alert and oriented to person, place, and time.   Psychiatric:         Mood and Affect: Mood normal.         Behavior: Behavior normal.         Thought Content: Thought content normal.         Judgment: Judgment normal.             LABS:  WBC   Date Value Ref Range Status   03/27/2025 3.82 (L) 3.90 - 12.70 K/uL Final     Hemoglobin   Date Value Ref Range Status   02/11/2025 11.9 (L) 14.0 - 18.0 g/dL Final     HGB   Date Value Ref Range Status   03/27/2025 11.3 (L) 14.0 - 18.0 gm/dL Final     Hematocrit   Date Value Ref Range Status "   02/11/2025 35.5 (L) 40.0 - 54.0 % Final     HCT   Date Value Ref Range Status   03/27/2025 34.1 (L) 40.0 - 54.0 % Final     Platelet Count   Date Value Ref Range Status   03/27/2025 157 150 - 450 K/uL Final     Platelets   Date Value Ref Range Status   02/11/2025 168 150 - 450 K/uL Final       Chemistry        Component Value Date/Time     03/27/2025 0830     02/11/2025 0858    K 4.5 03/27/2025 0830    K 4.4 02/11/2025 0858     03/27/2025 0830     02/11/2025 0858    CO2 27 03/27/2025 0830    CO2 23 02/11/2025 0858    BUN 23 03/27/2025 0830    CREATININE 1.3 03/27/2025 0830    GLU 96 02/11/2025 0858        Component Value Date/Time    CALCIUM 10.1 03/27/2025 0830    CALCIUM 9.9 02/11/2025 0858    ALKPHOS 123 03/27/2025 0830    ALKPHOS 99 02/11/2025 0858    AST 40 03/27/2025 0830    AST 41 (H) 02/11/2025 0858    ALT 18 03/27/2025 0830    ALT 16 02/11/2025 0858    BILITOT 0.4 03/27/2025 0830    BILITOT 0.5 02/11/2025 0858    ESTGFRAFRICA 52.6 (A) 07/14/2022 1119    EGFRNONAA 45.5 (A) 07/14/2022 1119        Imaging  Results for orders placed or performed during the hospital encounter of 02/11/25 (from the past 2160 hours)   CT Chest Without Contrast    Narrative    EXAMINATION:  CT CHEST WITHOUT CONTRAST    CLINICAL HISTORY:  Lung nodule, 6-8mm;history of head and neck cancer, cancer of orbit. new lung nodule noted on PET, surveillance; Squamous cell carcinoma of skin of other parts of face    TECHNIQUE:  CT chest without contrast acquiring images from above the pulmonary apices to the upper abdomen.  Data was reconstructed for multiplanar images in axial, sagittal and coronal planes and for maximal intensity projection images in the the axial plane.    COMPARISON:  Chest without contrast dated 01/14/2025.  F 18 FDG PET-CT dated 08/27/2024.    FINDINGS:  Base of Neck/thoracic soft tissues: Tracheostomy tube is well placed    Aorta: Left-sided aortic arch.  Calcified aortic valve and mitral  annulus.  No aneurysm. Three vessel aortic arch. Mild atherosclerosis of the thoracic aorta.    Heart/pericardium: Normal size.  Large multivessel calcified atherosclerosis of the coronary arteries.  No pericardial effusion.    Airways/Lungs/Pleura:  Central airways are patent. Previously seen nodule in the left lower lobe superior segment on prior PET-CT showed significant improvement on CT dated 01/14/2025 with stable residual linear density measuring 5 x 3 mm.  No new nodules.  Mild peripheral bronchiectasis.  Bibasilar depending atelectasis.  No pleural effusion. No pneumothorax.    Pulmonary vasculature: Unremarkable.    Hailey/Mediastinum: No pathologic tati enlargement.    Esophagus: Unremarkable.    Upper Abdomen: Multiple pancreatic calcifications    Bones: Mild to moderate degenerative changes of the spine. No suspicious osseous lesion.      Impression    1. No findings suspicious for metastatic disease  2. Stable left lower lobe superior segment subcentimeter linear density corresponding to location of nodule seen on most recent F 18 FDG PET-CT.  3. Incidental findings are stable.      Electronically signed by: Rivera Ronquillo  Date:    02/11/2025  Time:    21:02   Results for orders placed or performed during the hospital encounter of 01/14/25 (from the past 2160 hours)   CT Soft Tissue Neck With Contrast    Narrative    EXAMINATION:  CT SOFT TISSUE NECK WITH CONTRAST    CLINICAL HISTORY:  Head/neck cancer, monitor;cancer of the right orbit s/p exenteration and now palliative RT.; Malignant neoplasm of base of tongue    TECHNIQUE:  Axial CT images obtained throughout the region of the neck after the administration of 150 ml omnipaque 350 intravenous contrast. Axial, sagittal and coronal reconstructions were performed.    COMPARISON:  CT soft tissue neck without 06/17/2022    FINDINGS:  Postoperative changes of glossectomy, laryngectomy with myocutaneous flap reconstruction, tracheostomy with  tracheal cannula and bilateral neck dissections.    Right-sided internal jugular vascular catheter extending to the SVC tip not included in the field of view.    No new soft tissue enhancement within the surgical bed that would suggest worsening or recurrent disease.    No evidence for lymphadenopathy throughout the neck.    Major vessels of the neck appear patent.    No focal parotid gland lesion.  Submandibular glands and thyroid gland likely surgically resected    Mucosal thickening of the bilateral maxillary sinuses and ethmoid air cells.  Mastoid air cells are essentially clear.    Visualized lungs are clear with interval resolution previous ill-defined lung opacities.    Degenerative change of the cervical spine without evidence for acute fracture or traumatic subluxation    Operative change from right enucleation partially included in the field of view please see concomitant CT orbits for further details.      Impression    Evolving operative changes status post right-sided neck dissection, glossectomy,, laryngectomy tracheostomy placement as above.    No soft tissue mass or pathologic lymph node enlargement identified within the neck to suggest recurrent or residual disease.    Please see CT orbit report for further details.    Electronically signed by resident: Enzo Walton  Date:    01/14/2025  Time:    11:03    Electronically signed by: J Carlos Dutton DO  Date:    01/14/2025  Time:    11:32     *Note: Due to a large number of results and/or encounters for the requested time period, some results have not been displayed. A complete set of results can be found in Results Review.           Assessment:       1. Basal cell carcinoma (BCC) of skin of nose    2. 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 Squamous cell carcinoma of base of tongue    4. Secondary malignant neoplasm of cervical lymph node    5. Tracheostomy status    6. 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 Tolerating vismodegib well with improvement in pain. Ok to continue. Has FU with Radiation Oncology on 4/15 to assess if eligible for radiation.     Orbital cSCC  Unfortunately patient has progressed while on immunotherapy for below diagnosis. For that reason systemic therapies are limited. Evaluated at [facility] and surgeon does think an exenteration sparing resection would be possible with good response to induction chemotherapy. Plan was for platinum doublet chemo + cetuximab x 2 cycles. Notified of this plan on 1/29 and treatment plan was entered.   - Initiated dose reduced C1 of 5-FU to 750 mg/m2 due to age/frailty and growth factor. Patient still had cytopenias and mucositis, but was able to finish 2 cycles with delays. Now s/p exenteration. With PNI on path, referred to radiation oncology but adjuvant radiation deemed not necessary after exenteration.   - MRI orbit on 8/12/24 showing evidence of recurrent disease, "Nodular enhancing soft tissue superficially at the superomedial orbit and along the medial wall of the orbit posteriorly at least concerning for underlying recurrent lesion."   - Has undergone palliative RT to the right orbit. CT showing reduction of right orbital mass. Will continue Q3 month surveillance.    3,4,5. Recurrent SCC of base of tongue, P16+ - Status post total laryngectomy, total glossectomy and anterolateral thigh free flap reconstruction roughly 2017 at [facility] in [place] for persistent/recurrent base of tongue sq.c.c. 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 He is not a surgical or radiation candidate.  -Started on PCC 10/6/2020 followed by maintenance cetuximab until 8/24/21 (discontinued due to progression of disease; continued increase in " SUV uptake, no new lesions).  - 9/2021 started on carbo/5-FU/pembrolizumab; due to toxicity went to pembrolizumab monotherapy starting with cycle 2.  Progression of disease noted after cycle 3 so added chemotherapy back in with cycle 4. Keytruda monotherapy starting with C9.   - Keytruda discontinued after 2 years.    6,7. S/p liver transplant and is currently on tacrolimus. Grade 1. Will continue to monitor.     8. Monitor TSH on synthroid dose of 100 mcg.    Geoff Alvarez PA-C  Hematology/Oncology   [facility]        Patient is in agreement with the proposed treatment plan. All questions were answered to the patient's satisfaction. Pt knows to call clinic if anything is needed before the next clinic visit.    Ct Garza, MSN, APRN, FNP-C  Hematology and Medical Oncology  Nurse Practitioner to Dr. Martín Hernandez  Nurse Practitioner, [facility]          Route Chart for Scheduling    Med Onc Chart Routing      Follow up with physician    Follow up with [facility] 4 weeks. with labs prior to appointment   Infusion scheduling note    Injection scheduling note    Labs CBC, CMP and other   Scheduling:  Preferred lab:  Lab interval:  and CK   Imaging    Pharmacy appointment    Other referrals                Supportive Plan Information  IV FLUIDS AND ELECTROLYTES Ct Francis NP   Associated Diagnosis: Acute kidney injury superimposed on chronic kidney disease   noted on 4/8/2024   Line of treatment: Supportive Care   Treatment goal: Supportive     Upcoming Treatment Dates - IV FLUIDS AND ELECTROLYTES    No upcoming days in selected categories.    Therapy Plan Information  PORT FLUSH for CKD (chronic kidney disease) stage 3, GFR 30-59 ml/min, noted on 10/5/2020  PORT FLUSH for Squamous cell carcinoma of base of tongue, noted on 9/28/2020  Flushes  heparin, porcine (PF) 100 unit/mL injection flush 500 Units  500 Units, Intravenous, Every visit  sodium chloride 0.9% flush 10  mL  10 mL, Intravenous, Every visit    PORT FLUSH for Squamous cell carcinoma of base of tongue, noted on 9/28/2020  PORT FLUSH for Secondary malignant neoplasm of cervical lymph node, noted on 2/9/2021  Flushes  heparin, porcine (PF) 100 unit/mL injection flush 500 Units  500 Units, Intravenous, Every visit  sodium chloride 0.9% flush 10 mL  10 mL, Intravenous, Every visit      No therapy plan of the specified type found.

## 2025-04-13 DIAGNOSIS — Z94.4 LIVER TRANSPLANTED: ICD-10-CM

## 2025-04-14 RX ORDER — TACROLIMUS 0.5 MG/1
0.5 CAPSULE ORAL EVERY 12 HOURS
Qty: 180 CAPSULE | Refills: 3 | Status: SHIPPED | OUTPATIENT
Start: 2025-04-14 | End: 2026-04-14

## 2025-04-14 NOTE — PROGRESS NOTES
[facility] Follow Up Note      Assessment:  Rocco Swain is a 75 y.o. 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 He is s/p orbital exenteration 4/25/24 (1mm SM, +PNI) with no adjuvant therapy. He now presents with a local recurrence with rapid progression, s/p 32.5 Gy in 5 fractions to the right orbital mass completed 9/23/24.   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 He developed an unresectable recurrence and was treated with multiple lines of systemic therapy. Last on pembro 2023.  Left nasal ala BCC. Currently vismodegib  Liver transplant on tacrolimus. Following with transplant  ECOG: (1) Restricted in physically strenuous activity, ambulatory and able to do work of light nature        Plan:  RTC with Surveillance CT orbits and chest. ~5/22  Pending repeat imaging, discussed proceeding with definitive radiotherapy to left nasal ala vs continued Vismodegib. Will continue medical therapy for now.         Oncologic History:  h/o liver transplantation in 2008 due to hepatitis C, currently on Tacrolimus. He has two head and neck primaries:     1) R/M SCC of the BOT, HPV+  Diagnosis: [id number] with locally advanced disease (unknown stage)  2016, definitive CRT with presistent disease  Base of tongue CRT at [place], [place].   2016/2017, total laryngectomy + glossectomy in [place]   2020, developed local unresectable recurrence (biopsy-proven p16+ SCC).   Oct 2020 - Aug 2021, started PCC followed by maintenance cetuximab with PD in August 2021  Sep 2021, started carboplatin, 5-FU, and pembrolizumab x 2 cycles with limiting toxicity attributed to chemo. Transitioned to pembrolizumab alone (no signs for liver rejection). Added chemotherapy again to the cycle 5  March 2022 to December 2023,  pembrolizumab as single agent (no signs for liver rejection)    2) SCC of the conjunctivae   Initial diagnosis: April 2022  Eye lesions grew despite pembrolizumab  10/19/2022, local excision at [facility] (Dr Victor M Vasques)  08/31/2023, recurrence s/p local excision at [facility] (Dr Victor M Vasques)  October - December 2023, topical [facility] without response  1/19/2024, he was seen as a consult at [facility]. As he progressed on IO in the past, he was recommended systemic therapy with chemotherapy (platinum doublet, such as carboplatin and 5-FU) and cetuximab. He started this at [facility].   4/24/24: MRI orbits ([facility]) with sight increase in size of the mass (2.6 cm) in the inferonasal right conjunctiva with extension into the post septal extraconal space. PPF, cavernous sinuses, and Meckel's caves are within normal limits. No adenopathy.  4/25/2024: right eyelid sparing orbital exenteration and Repair of defect right socket with adjacent tissue transfer using upper and lower eyelid skin at [facility].  Path: moderate to poorly differentiated squamous cell carcinoma involving conjunctiva of upper and lower eyelids, fibroconnective tissue and skeletal muscle. +PNI, 2 foci, largest nerve 0.06mm. margins negative but close at 1.2mm.  No adjuvant RT   8/12/24: MRI orbits s/p right orbital mass resection with orbital exenteration. Nodular enhancing soft tissue superficially at the superomedial orbit and along the medial wall of the orbit posteriorly at least concerning for underlying recurrent lesion.   8/27/24: PET/CT with FDG avid right orbital mass consistent with disease recurrence. New pulmonary nodules concerning for metastasis  9/12/24: CT orbits with progression and new 1.1 cm round enhancing focus posterior to the right maxilla, concerning for metastasis  9/17/24 - 9/23/24: 25-32.5 Gy in 5 fractions to right orbital mass and lesion posterior to the right maxilla       Possibility of pregnancy: N/A  History of prior irradiation: Yes - as  above  History of prior systemic anti-cancer therapy: Yes - as above  History of collagen vascular disease: No  Implanted electronic device (pacer/defib/nerve stimulator): No    Interval History:   Rocco Swain presents today for follow up.     Since last visit, no oncologic events    Has some soreness in the right orbit but overall much improved compared to pre tx. Unchanged compared to last visit. No longer on pain meds for this. No new facial numbness, has right forehead and cheek numbness stable since his orbital exent at [facility].     On vismodegib with loss of taste and decreased appetite.       Review of Systems:  ROS  as above    Social History:  Social History     Tobacco Use    Smoking status: Never    Smokeless tobacco: Never   Substance Use Topics    Alcohol use: Yes     Comment: drinks wine, 1 glass day    Drug use: Not Currently         Medications:  Current Outpatient Medications on File Prior to Visit   Medication Sig Dispense Refill    levothyroxine (SYNTHROID) 88 MCG tablet TAKE 1 TABLET BY MOUTH ONCE  DAILY 90 tablet 0    magic mouthwash diphen/antac/lidoc/nysta Take 10 mLs by mouth 4 (four) times daily. 120 mL 4    multivit-min/FA/lycopen/lutein (CENTRUM SILVER MEN ORAL) Take 1 tablet by mouth once daily.      ondansetron (ZOFRAN-ODT) 8 MG TbDL Take 1 tablet (8 mg total) by mouth every 8 (eight) hours as needed (nausea - first choice). (Patient not taking: Reported on 3/27/2025) 60 tablet 4    prochlorperazine (COMPAZINE) 10 MG tablet Take 1 tablet (10 mg total) by mouth every 6 (six) hours as needed (nausea/vomiting - second choice). (Patient not taking: Reported on 3/27/2025) 30 tablet 1    senna (SENOKOT) 8.6 mg tablet Take 2 tablets by mouth 2 (two) times daily as needed for Constipation. (Patient not taking: Reported on 3/27/2025) 60 tablet 2    tacrolimus (PROGRAF) 0.5 MG Cap Take 1 capsule (0.5 mg total) by mouth every 12 (twelve) hours. 180 capsule 3    traZODone (DESYREL) 50 MG tablet  "TAKE 1 TABLET BY MOUTH IN THE  EVENING 90 tablet 3    vismodegib (ERIVEDGE) 150 mg Cap Take 1 capsule (150 mg total) by mouth once daily. 30 capsule 3     Current Facility-Administered Medications on File Prior to Visit   Medication Dose Route Frequency Provider Last Rate Last Admin    ofloxacin 0.3 % ophthalmic solution 1 drop  1 drop Right Eye On Call Procedure Victor M Vasques MD   2 drop at 10/11/22 0745    sodium chloride 0.9% flush 10 mL  10 mL Intravenous PRN Ronald Berg MD        sodium chloride 0.9% flush 10 mL  10 mL Intravenous PRN Victor M Vasques MD           Allergies:  Review of patient's allergies indicates:   Allergen Reactions    Aspirin     Tylenol extended release        Exam:  Vitals:    04/15/25 1000   BP: (!) 143/89   Patient Position: Sitting   Pulse: 107   SpO2: 97%   Weight: 59.1 kg (130 lb 4.7 oz)   Height: 5' 8" (1.727 m)           Constitutional: Pleasant 75 y.o. male in no acute distress.  Well nourished. Well groomed.   HEENT: He is s/p exent with no palpable mass (one was previously seen in the ~1 o'clock position) with overlying skin intact without erythema or ulceration. No appreciated residual fullness in the right orbit. See media from today. Significant improvement in the ulcerated lesion in the left nasal ala. See media. No numbness in left V2. Intact in V3 bilaterally. No facial asymmetry.   Lymph:  heavily pretreated neck with fibrosis, with such limitation appreciated cervical, pre auricular, facial adenopathy. + helio tube  Cardiovascular: Upper extremities warm to touch  Lungs: No audible wheezing.  Normal effort.   Musculoskeletal: No gross MSK deformities. Ambulates well  Skin: No rashes appreciated.  Psych: Alert and oriented with appropriate mood and affect.  Neuro: as above, otherwise  Grossly normal.    Data Review:    Information obtained via chart review and discussion with Mr. Swain.             Oli Joyce MD  Radiation Oncology                "

## 2025-04-15 ENCOUNTER — OFFICE VISIT (OUTPATIENT)
Dept: RADIATION ONCOLOGY | Facility: CLINIC | Age: 75
End: 2025-04-15
Payer: MEDICARE

## 2025-04-15 VITALS
OXYGEN SATURATION: 97 % | BODY MASS INDEX: 19.75 KG/M2 | WEIGHT: 130.31 LBS | HEIGHT: 68 IN | DIASTOLIC BLOOD PRESSURE: 89 MMHG | HEART RATE: 107 BPM | SYSTOLIC BLOOD PRESSURE: 143 MMHG

## 2025-04-15 DIAGNOSIS — R91.1 LUNG NODULE, SOLITARY: ICD-10-CM

## 2025-04-15 DIAGNOSIS — H54.7 UNSPECIFIED VISUAL LOSS: Primary | ICD-10-CM

## 2025-04-15 DIAGNOSIS — C01 SQUAMOUS CELL CARCINOMA OF BASE OF TONGUE: ICD-10-CM

## 2025-04-15 DIAGNOSIS — C44.311 BASAL CELL CARCINOMA (BCC) OF SKIN OF NOSE: ICD-10-CM

## 2025-04-15 DIAGNOSIS — C44.329 SQUAMOUS CELL CARCINOMA OF SKIN OF ORBIT: ICD-10-CM

## 2025-04-15 PROCEDURE — 99999 PR PBB SHADOW E&M-EST. PATIENT-LVL III: CPT | Mod: PBBFAC,,, | Performed by: STUDENT IN AN ORGANIZED HEALTH CARE EDUCATION/TRAINING PROGRAM

## 2025-04-15 PROCEDURE — 99213 OFFICE O/P EST LOW 20 MIN: CPT | Mod: S$PBB,,, | Performed by: STUDENT IN AN ORGANIZED HEALTH CARE EDUCATION/TRAINING PROGRAM

## 2025-04-15 PROCEDURE — 99213 OFFICE O/P EST LOW 20 MIN: CPT | Mod: PBBFAC | Performed by: STUDENT IN AN ORGANIZED HEALTH CARE EDUCATION/TRAINING PROGRAM

## 2025-04-24 ENCOUNTER — OFFICE VISIT (OUTPATIENT)
Dept: HEMATOLOGY/ONCOLOGY | Facility: CLINIC | Age: 75
End: 2025-04-24
Payer: MEDICARE

## 2025-04-24 ENCOUNTER — LAB VISIT (OUTPATIENT)
Dept: LAB | Facility: HOSPITAL | Age: 75
End: 2025-04-24
Attending: INTERNAL MEDICINE
Payer: MEDICARE

## 2025-04-24 VITALS
WEIGHT: 129.19 LBS | BODY MASS INDEX: 19.58 KG/M2 | HEART RATE: 85 BPM | OXYGEN SATURATION: 99 % | SYSTOLIC BLOOD PRESSURE: 140 MMHG | DIASTOLIC BLOOD PRESSURE: 78 MMHG | RESPIRATION RATE: 16 BRPM | HEIGHT: 68 IN

## 2025-04-24 DIAGNOSIS — C44.311 BASAL CELL CARCINOMA (BCC) OF SKIN OF NOSE: ICD-10-CM

## 2025-04-24 DIAGNOSIS — Z93.0 TRACHEOSTOMY STATUS: ICD-10-CM

## 2025-04-24 DIAGNOSIS — C44.329 SQUAMOUS CELL CARCINOMA OF SKIN OF ORBIT: ICD-10-CM

## 2025-04-24 DIAGNOSIS — C01 SQUAMOUS CELL CARCINOMA OF BASE OF TONGUE: ICD-10-CM

## 2025-04-24 DIAGNOSIS — Z79.899 IMMUNODEFICIENCY DUE TO DRUGS: ICD-10-CM

## 2025-04-24 DIAGNOSIS — C77.0 SECONDARY MALIGNANT NEOPLASM OF CERVICAL LYMPH NODE: ICD-10-CM

## 2025-04-24 DIAGNOSIS — Z94.4 STATUS POST LIVER TRANSPLANTATION: ICD-10-CM

## 2025-04-24 DIAGNOSIS — C44.311 BASAL CELL CARCINOMA (BCC) OF SKIN OF NOSE: Primary | ICD-10-CM

## 2025-04-24 DIAGNOSIS — E03.2 HYPOTHYROIDISM DUE TO MEDICAMENTS AND OTHER EXOGENOUS SUBSTANCES: ICD-10-CM

## 2025-04-24 DIAGNOSIS — D84.821 IMMUNODEFICIENCY DUE TO DRUGS: ICD-10-CM

## 2025-04-24 LAB
ABSOLUTE NEUTROPHIL COUNT (OHS): 2.25 K/UL (ref 1.8–7.7)
ALBUMIN SERPL BCP-MCNC: 4 G/DL (ref 3.5–5.2)
ALP SERPL-CCNC: 84 UNIT/L (ref 40–150)
ALT SERPL W/O P-5'-P-CCNC: 16 UNIT/L (ref 10–44)
ANION GAP (OHS): 11 MMOL/L (ref 8–16)
AST SERPL-CCNC: 37 UNIT/L (ref 11–45)
BILIRUB SERPL-MCNC: 0.4 MG/DL (ref 0.1–1)
BUN SERPL-MCNC: 18 MG/DL (ref 8–23)
CALCIUM SERPL-MCNC: 9.9 MG/DL (ref 8.7–10.5)
CHLORIDE SERPL-SCNC: 97 MMOL/L (ref 95–110)
CK SERPL-CCNC: 143 U/L (ref 20–200)
CK SERPL-CCNC: 143 U/L (ref 20–200)
CO2 SERPL-SCNC: 27 MMOL/L (ref 23–29)
CREAT SERPL-MCNC: 1.4 MG/DL (ref 0.5–1.4)
ERYTHROCYTE [DISTWIDTH] IN BLOOD BY AUTOMATED COUNT: 14.1 % (ref 11.5–14.5)
GFR SERPLBLD CREATININE-BSD FMLA CKD-EPI: 52 ML/MIN/1.73/M2
GLUCOSE SERPL-MCNC: 142 MG/DL (ref 70–110)
HCT VFR BLD AUTO: 32.6 % (ref 40–54)
HGB BLD-MCNC: 11.2 GM/DL (ref 14–18)
IMM GRANULOCYTES # BLD AUTO: 0.01 K/UL (ref 0–0.04)
MCH RBC QN AUTO: 34.5 PG (ref 27–31)
MCHC RBC AUTO-ENTMCNC: 34.4 G/DL (ref 32–36)
MCV RBC AUTO: 100 FL (ref 82–98)
PLATELET # BLD AUTO: 137 K/UL (ref 150–450)
PMV BLD AUTO: 8.8 FL (ref 9.2–12.9)
POTASSIUM SERPL-SCNC: 4 MMOL/L (ref 3.5–5.1)
PROT SERPL-MCNC: 8 GM/DL (ref 6–8.4)
RBC # BLD AUTO: 3.25 M/UL (ref 4.6–6.2)
SODIUM SERPL-SCNC: 135 MMOL/L (ref 136–145)
WBC # BLD AUTO: 4.83 K/UL (ref 3.9–12.7)

## 2025-04-24 PROCEDURE — 85027 COMPLETE CBC AUTOMATED: CPT

## 2025-04-24 PROCEDURE — 82040 ASSAY OF SERUM ALBUMIN: CPT

## 2025-04-24 PROCEDURE — 99214 OFFICE O/P EST MOD 30 MIN: CPT | Mod: PBBFAC | Performed by: NURSE PRACTITIONER

## 2025-04-24 PROCEDURE — 99999 PR PBB SHADOW E&M-EST. PATIENT-LVL IV: CPT | Mod: PBBFAC,,, | Performed by: NURSE PRACTITIONER

## 2025-04-24 PROCEDURE — 82550 ASSAY OF CK (CPK): CPT | Mod: 91

## 2025-04-24 PROCEDURE — 36415 COLL VENOUS BLD VENIPUNCTURE: CPT

## 2025-04-24 NOTE — PROGRESS NOTES
ONCOLOGY FOLLOW UP VISIT    Subjective:      Patient ID: Rocco Swain    Chief Complaint: Basal cell carcinoma (BCC) of skin of nose      HPI  Rocco Swain is a 75 y.o. male who returns to clinic for management of cSCC of the orbit. Patient had ANGELES on October PETCT, but then developed quickly progressing orbital lesion. He was seen at [facility] in [place]. Recommendation was to follow up at [facility] to initiate induction chemotherapy followed by possible surgery. Patient completed 2 cycles of Extreme regimen, though there were delays due to cytopenias and severe mucositis. He was able to go to [facility] to have exenteration and resection of cancer April 2024 with clear margins, but PNI on path.     Interval History  Tolerating Vismodegib well. Pt states he is no longer having nose pain but right orbit still feels sore. Has occasional muscle cramps at night 1-2 nights a week. BCC of the nose is decreasing in size. Pt has been having taste lose since starting medication, however it is not affecting his appetite. Weight is stable. Pt denies any muscle cramps, N/V, diarrhea, constipation, rash, new/worsening fatigue, new/worsening SOB, or new/worsening cough.    ECOG Performance status: 1 - Symptomatic but completely ambulatory Communication from him is 100% written.     Cancer Staging   Squamous cell carcinoma of base of tongue  Staging form: Pharynx - HPV-Mediated Oropharynx, AJCC 8th Edition  - Clinical stage from 9/18/2020: Stage III (rcT4, cN1, cM0, p16+) - Signed by Ronald Berg MD on 12/27/2022      Oncologic History:  Oncology History   Squamous cell carcinoma of base of tongue   9/18/2020 Cancer Staged    Staging form: Pharynx - HPV-Mediated Oropharynx, AJCC 8th Edition  - Clinical stage from 9/18/2020: Stage III (rcT4, cN1, cM0, p16+)     9/28/2020 Initial Diagnosis    Squamous cell carcinoma of base of tongue     10/6/2020 - 8/17/2021 Chemotherapy    Treatment Summary   Plan Name: OP HEAD NECK  CARBOPLATIN PACLITAXEL C1-2 FOLLOWED BY CETUXIMAB CARBOPLATIN C3-6 FOLLOWED BY CETUXIMAB MAINTENANCE WEEKLY  Treatment Goal: Control  Status: Inactive  Start Date: 10/6/2020  End Date: 8/17/2021  Provider: Ronald Berg MD  Chemotherapy: cetuximab (ERBITUX) 100 mg/50 mL chemo infusion 684 mg, 400 mg/m2 = 684 mg (100 % of original dose 400 mg/m2), Intravenous, Clinic/[facility] 1 time, 29 of 38 cycles  Dose modification: 500 mg/m2 (original dose 400 mg/m2, Cycle 3), 250 mg/m2 (original dose 400 mg/m2, Cycle 3), 400 mg/m2 (original dose 400 mg/m2, Cycle 3), 250 mg/m2 (original dose 250 mg/m2, Cycle 7), 200 mg/m2 (original dose 250 mg/m2, Cycle 18), 200 mg/m2 (original dose 250 mg/m2, Cycle 19), 250 mg/m2 (original dose 250 mg/m2, Cycle 4), 200 mg/m2 (original dose 250 mg/m2, Cycle 25), 200 mg/m2 (original dose 250 mg/m2, Cycle 28)  Administration: 684 mg (11/17/2020), 400 mg (11/24/2020), 400 mg (2/9/2021), 400 mg (2/17/2021), 427.6 mg (3/9/2021), 400 mg (3/30/2021), 400 mg (3/23/2021), 400 mg (4/6/2021), 427.6 mg (4/13/2021), 427.6 mg (4/20/2021), 342 mg (5/11/2021), 342 mg (5/18/2021), 342 mg (5/25/2021), 400 mg (5/31/2021), 400 mg (6/7/2021), 400 mg (6/14/2021), 400 mg (12/8/2020), 427.6 mg (12/29/2020), 400 mg (12/1/2020), 400 mg (12/15/2020), 400 mg (12/22/2020), 427.6 mg (1/5/2021), 400 mg (1/12/2021), 400 mg (1/19/2021), 427.6 mg (1/26/2021), 400 mg (2/2/2021), 400 mg (2/23/2021), 400 mg (3/2/2021), 427.6 mg (3/16/2021), 400 mg (6/21/2021), 342 mg (6/28/2021), 342 mg (7/6/2021), 342 mg (7/13/2021), 342 mg (7/20/2021), 400 mg (7/27/2021), 400 mg (8/10/2021), 400 mg (8/17/2021)  CARBOplatin (PARAPLATIN) 310 mg in sodium chloride 0.9% 500 mL chemo infusion, 310 mg (92.2 % of original dose 334.5 mg), Intravenous, Clinic/[facility] 1 time, 6 of 6 cycles  Dose modification:   (original dose 334.5 mg, Cycle 1)  Administration: 310 mg (10/6/2020), 370 mg (11/17/2020), 300 mg (10/27/2020), 350 mg (12/8/2020), 335 mg (12/29/2020), 320  mg (1/19/2021)  PACLitaxeL (TAXOL) 175 mg/m2 = 300 mg in sodium chloride 0.9% 500 mL chemo infusion, 175 mg/m2 = 300 mg (100 % of original dose 175 mg/m2), Intravenous, Clinic/[facility] 1 time, 2 of 2 cycles  Dose modification: 175 mg/m2 (original dose 175 mg/m2, Cycle 1)  Administration: 300 mg (10/6/2020), 300 mg (10/27/2020)     4/29/2021 Tumor Conference       His case was discussed at the Multidisciplinary Head and Neck Team Planning Meeting.    Representatives from Medical Oncology, Radiation Oncology, Head and Neck Surgical Oncology, Psychosocial Oncology, and Speech and Language Pathology discussed the case with the following recommendations:    1) biopsy         7/29/2021 Tumor Conference       His case was discussed at the Multidisciplinary Head and Neck Team Planning Meeting.    Representatives from Medical Oncology, Radiation Oncology, Head and Neck Surgical Oncology, Psychosocial Oncology, and Speech and Language Pathology discussed the case with the following recommendations:    1) Head and neck clinic follow up  2) consider Keytruda (discuss with transplant)  3) consider palliative referral         9/13/2021 - 12/29/2023 Chemotherapy    Treatment Summary   Plan Name: OP HEAD NECK PEMBROLIZUMAB CARBOPLATIN FLUOROURACIL (C1 ONLY RECEIVED) FOLLOWED BY PEMBROLIZUMAB MAINTENANCE  Treatment Goal: Palliative  Status: Inactive  Start Date: 9/13/2021  End Date: 12/29/2023  Provider: Ronald Berg MD  Chemotherapy: CARBOplatin (PARAPLATIN) 320 mg in sodium chloride 0.9% 500 mL chemo infusion, 320 mg (100 % of original dose 320.5 mg), Intravenous, Clinic/[facility] 1 time, 6 of 6 cycles  Dose modification:   (original dose 320.5 mg, Cycle 1)  Administration: 320 mg (9/13/2021), 335 mg (12/23/2021), 325 mg (1/27/2022), 245 mg (3/3/2022), 255 mg (5/12/2022), 255 mg (4/7/2022)  fluorouraciL 1,000 mg/m2/day = 6,440 mg in sodium chloride 0.9% 240 mL chemo infusion, 1,000 mg/m2/day = 6,440 mg, Intravenous, Over 96 hours, 6 of 6  cycles  Dose modification: 800 mg/m2/day (original dose 1,000 mg/m2/day, Cycle 6), 5,000 mg (original dose 1,000 mg/m2/day, Cycle 8)  Administration: 6,440 mg (9/13/2021), 6,440 mg (12/23/2021), 6,480 mg (1/27/2022), 5,000 mg (3/3/2022), 5,000 mg (4/7/2022), 5,000 mg (5/12/2022)     Secondary malignant neoplasm of cervical lymph node   2/9/2021 Initial Diagnosis    Secondary malignant neoplasm of cervical lymph node     9/13/2021 - 12/29/2023 Chemotherapy    Treatment Summary   Plan Name: OP HEAD NECK PEMBROLIZUMAB CARBOPLATIN FLUOROURACIL (C1 ONLY RECEIVED) FOLLOWED BY PEMBROLIZUMAB MAINTENANCE  Treatment Goal: Palliative  Status: Inactive  Start Date: 9/13/2021  End Date: 12/29/2023  Provider: Ronald Berg MD  Chemotherapy: CARBOplatin (PARAPLATIN) 320 mg in sodium chloride 0.9% 500 mL chemo infusion, 320 mg (100 % of original dose 320.5 mg), Intravenous, Clinic/[facility] 1 time, 6 of 6 cycles  Dose modification:   (original dose 320.5 mg, Cycle 1)  Administration: 320 mg (9/13/2021), 335 mg (12/23/2021), 325 mg (1/27/2022), 245 mg (3/3/2022), 255 mg (5/12/2022), 255 mg (4/7/2022)  fluorouraciL 1,000 mg/m2/day = 6,440 mg in sodium chloride 0.9% 240 mL chemo infusion, 1,000 mg/m2/day = 6,440 mg, Intravenous, Over 96 hours, 6 of 6 cycles  Dose modification: 800 mg/m2/day (original dose 1,000 mg/m2/day, Cycle 6), 5,000 mg (original dose 1,000 mg/m2/day, Cycle 8)  Administration: 6,440 mg (9/13/2021), 6,440 mg (12/23/2021), 6,480 mg (1/27/2022), 5,000 mg (3/3/2022), 5,000 mg (4/7/2022), 5,000 mg (5/12/2022)     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   1/29/2024 Initial Diagnosis    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     2/19/2024 - 3/25/2024 Chemotherapy    Treatment Summary   Plan Name: OP HEAD NECK CETUXIMAB CARBOPLATIN FLUOROURACIL Q3W  Treatment Goal: Curative  Status: Inactive  Start Date: 2/19/2024  End Date: 3/25/2024  Provider: Martín Hernandez MD  Chemotherapy: cetuximab (ERBITUX) 100 mg/50 mL chemo  infusion 668 mg, 400 mg/m2 = 668 mg, Intravenous, Clinic/[facility] 1 time, 2 of 12 cycles  Administration: 668 mg (2/19/2024), 400 mg (2/26/2024), 400 mg (3/18/2024), 400 mg (3/25/2024), 400 mg (3/11/2024)  CARBOplatin (PARAPLATIN) 295 mg in sodium chloride 0.9% 314.5 mL chemo infusion, 295 mg, Intravenous, [facility] 1 time, 2 of 6 cycles  Administration: 295 mg (2/19/2024), 300 mg (3/18/2024)     8/23/2024 - 8/23/2024 Chemotherapy    Treatment Summary   Plan Name: OP cetuximab (loading + maintenance) weekly  Treatment Goal: Curative  Status: Inactive  Start Date:   End Date:   Provider: Martín Hernandez MD  Chemotherapy: cetuximab (ERBITUX) 100 mg/50 mL chemo infusion 652 mg, 400 mg/m2 = 652 mg, Intravenous, [facility] 1 time, 0 of 6 cycles     9/17/2024 - 9/23/2024 Radiation Therapy    Treating physician: Tian Joyce  Treatment Summary  Course: C1 H&N 2024    Treatment Site Ref. ID Energy Dose/Fx (Gy) #Fx Dose Correction (Gy) Total Dose (Gy) Start Date End Date Elapsed Days   IM Orbit_R Orbit_R 6X 6.5 5 / 5 0 32.5 9/17/2024 9/23/2024 6            Review of Systems   Constitutional:  Negative for activity change, appetite change, chills, fatigue, fever and unexpected weight change.   HENT:  Negative for ear pain, facial swelling, hearing loss, mouth sores, nosebleeds, sore throat, trouble swallowing and voice change.         No verbal communication. Skin fixed and immobile from previous scaring post-neck dissection. Taste changes.   Eyes:  Negative for pain, discharge and visual disturbance.   Respiratory:  Negative for cough, choking, chest tightness and shortness of breath.    Cardiovascular:  Negative for chest pain, palpitations and leg swelling.   Gastrointestinal:  Negative for abdominal distention, abdominal pain, blood in stool, constipation, diarrhea, nausea and vomiting.   Endocrine: Negative for cold intolerance and heat intolerance.   Genitourinary:  Negative for difficulty urinating, dysuria, frequency and  urgency.   Musculoskeletal:  Positive for back pain (lower - chronic). Negative for arthralgias, gait problem, leg pain and myalgias.   Integumentary:  Negative for pallor, rash, wound and mole/lesion.   Allergic/Immunologic: Negative for frequent infections.   Neurological:  Negative for dizziness, tremors, weakness, light-headedness, numbness and headaches.   Hematological:  Negative for adenopathy. Does not bruise/bleed easily.   Psychiatric/Behavioral:  Negative for agitation, confusion, dysphoric mood and sleep disturbance. The patient is not nervous/anxious.         Allergies:  Review of patient's allergies indicates:   Allergen Reactions    Aspirin     Tylenol extended release        Medications:  Current Outpatient Medications   Medication Sig Dispense Refill    levothyroxine (SYNTHROID) 88 MCG tablet TAKE 1 TABLET BY MOUTH ONCE  DAILY 90 tablet 0    magic mouthwash diphen/antac/lidoc/nysta Take 10 mLs by mouth 4 (four) times daily. 120 mL 4    multivit-min/FA/lycopen/lutein (CENTRUM SILVER MEN ORAL) Take 1 tablet by mouth once daily.      tacrolimus (PROGRAF) 0.5 MG Cap Take 1 capsule (0.5 mg total) by mouth every 12 (twelve) hours. 180 capsule 3    traZODone (DESYREL) 50 MG tablet TAKE 1 TABLET BY MOUTH IN THE  EVENING 90 tablet 3    vismodegib (ERIVEDGE) 150 mg Cap Take 1 capsule (150 mg total) by mouth once daily. 30 capsule 3    ondansetron (ZOFRAN-ODT) 8 MG TbDL Take 1 tablet (8 mg total) by mouth every 8 (eight) hours as needed (nausea - first choice). (Patient not taking: Reported on 4/24/2025) 60 tablet 4    prochlorperazine (COMPAZINE) 10 MG tablet Take 1 tablet (10 mg total) by mouth every 6 (six) hours as needed (nausea/vomiting - second choice). (Patient not taking: Reported on 4/24/2025) 30 tablet 1    senna (SENOKOT) 8.6 mg tablet Take 2 tablets by mouth 2 (two) times daily as needed for Constipation. (Patient not taking: Reported on 4/24/2025) 60 tablet 2     No current facility-administered  medications for this visit.     Facility-Administered Medications Ordered in Other Visits   Medication Dose Route Frequency Provider Last Rate Last Admin    ofloxacin 0.3 % ophthalmic solution 1 drop  1 drop Right Eye On Call Procedure Victor M Vasques MD   2 drop at 10/11/22 0745    sodium chloride 0.9% flush 10 mL  10 mL Intravenous PRN Ronald Berg MD        sodium chloride 0.9% flush 10 mL  10 mL Intravenous PRN Victor M Vasques MD           PMH:  Past Medical History:   Diagnosis Date    Acute conjunctivitis of right eye 09/12/2022    Atrial fibrillation     Basal cell carcinoma (BCC) in situ of skin 2012    3 on face, 2 on arm, removed by dermatology.     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 Underwent surgical resection of tongue and neck. s/p tracheostomy    Squamous cell carcinoma of skin of right temple 01/24/2024    right temple       PSH:  Past Surgical History:   Procedure Laterality Date    COLONOSCOPY      COLONOSCOPY N/A 09/14/2023    Procedure: COLONOSCOPY;  Surgeon: Reyes Olivia MD;  Location: [address];  Service: Endoscopy;  Laterality: N/A;    CONJUNCTIVA BIOPSY Right 10/11/2022    Procedure: BIOPSY, CONJUNCTIVA;  Surgeon: Victor M Vasques MD;  Location: [facility];  Service: Ophthalmology;  Laterality: Right;    ESOPHAGOGASTRODUODENOSCOPY N/A 09/14/2023    Procedure: EGD (ESOPHAGOGASTRODUODENOSCOPY);  Surgeon: Reyes Olivia MD;  Location:  [facility] ENDO (2ND FLR);  Service: Endoscopy;  Laterality: N/A;    ESOPHAGOGASTRODUODENOSCOPY N/A 04/10/2024    Procedure: EGD (ESOPHAGOGASTRODUODENOSCOPY);  Surgeon: Reyes Pagan MD;  Location: [facility] ENDO (2ND FLR);  Service: Endoscopy;  Laterality: N/A;    ESOPHAGOGASTRODUODENOSCOPY N/A 06/24/2024    Procedure: EGD (ESOPHAGOGASTRODUODENOSCOPY);  Surgeon: Reyes Pagan MD;  Location: [facility] ENDO (2ND FLR);  Service: Endoscopy;  Laterality: N/A;  Ref By: Dr. Hawkins   Procedure: egd  Diagnosis: esophagitis  Procedure Timing: 10 weeks  Prep: standard prep  6/21-pt r/s-inst to portal-2nd floor criteria-labs within 90 daysMS    EYE SURGERY Right 04/25/2024    exenteration of orbit    GLOSSECTOMY      INSERTION OF VENOUS ACCESS PORT Right 03/08/2021    Procedure: INSERTION, VENOUS ACCESS PORT;  Surgeon: Jesus Rendon MD;  [address]  Service: General;  Laterality: Right;    LARYNGECTOMY      LIVER TRANSPLANT  11/2008    transplanted for biopsy proven hepatocellular carcinoma,     LYMPH NODE BIOPSY N/A 09/18/2020    Procedure: BIOPSY, LYMPH NODE;  Surgeon: Taz Diagnostic Provider;  [address];  Service: General;  Laterality: N/A;  Rm 173    skin cancer removals      SURGICAL REMOVAL OF SCAR Right 08/24/2023    Procedure: EXCISION, SCAR;  Surgeon: Ting Brambila MD;  Location: [facility];  Service: Ophthalmology;  Laterality: Right;    TRACHEOSTOMY         FamHx:  Family History   Problem Relation Name Age of Onset    Dementia Mother      Cataracts Neg Hx      Glaucoma Neg Hx      Macular degeneration Neg Hx         SocHx:  Social History     Socioeconomic History    Marital status: Single   Occupational History    Occupation: Retired     Comment: , notes exposures to fumes etc.  Worked on [employer] for 4 years   Tobacco Use    Smoking status: Never    Smokeless tobacco: Never   Substance and Sexual Activity    Alcohol use: Yes     Comment: drinks wine, 1 glass  "day    Drug use: Not Currently    Sexual activity: Yes     Comment: No prior history of STD    Social History Narrative    Steps at house- 24       Distress Score             Objective:      Vitals:    04/24/25 0905   BP: (!) 140/78   BP Location: Left arm   Patient Position: Sitting   Pulse: 85   Resp: 16   SpO2: 99%   Weight: 58.6 kg (129 lb 3 oz)   Height: 5' 8" (1.727 m)           Physical Exam  Vitals reviewed.   Constitutional:       General: He is not in acute distress.     Appearance: Normal appearance. He is well-developed and underweight.   HENT:      Head: Normocephalic and atraumatic.      Nose:      Comments: See picture below of BCC. Improved since last image.      Mouth/Throat:      Mouth: Mucous membranes are moist.      Dentition: Abnormal dentition. Dental caries present.   Eyes:      Comments: R eye enucleation   Neck:      Trachea: Tracheostomy present.   Pulmonary:      Effort: Pulmonary effort is normal. No respiratory distress.      Comments: Tracheostomy  Abdominal:      General: There is no distension.      Palpations: Abdomen is soft.   Musculoskeletal:         General: No swelling. Normal range of motion.      Cervical back: Normal range of motion and neck supple.   Skin:     General: Skin is warm and dry.   Neurological:      General: No focal deficit present.      Mental Status: He is alert and oriented to person, place, and time.   Psychiatric:         Mood and Affect: Mood normal.         Behavior: Behavior normal.         Thought Content: Thought content normal.         Judgment: Judgment normal.             LABS:  WBC   Date Value Ref Range Status   04/24/2025 4.83 3.90 - 12.70 K/uL Final     Hemoglobin   Date Value Ref Range Status   02/11/2025 11.9 (L) 14.0 - 18.0 g/dL Final     HGB   Date Value Ref Range Status   04/24/2025 11.2 (L) 14.0 - 18.0 gm/dL Final     Hematocrit   Date Value Ref Range Status   02/11/2025 35.5 (L) 40.0 - 54.0 % Final     HCT   Date Value Ref Range Status "   04/24/2025 32.6 (L) 40.0 - 54.0 % Final     Platelet Count   Date Value Ref Range Status   04/24/2025 137 (L) 150 - 450 K/uL Final     Platelets   Date Value Ref Range Status   02/11/2025 168 150 - 450 K/uL Final       Chemistry        Component Value Date/Time     (L) 04/24/2025 0835     02/11/2025 0858    K 4.0 04/24/2025 0835    K 4.4 02/11/2025 0858    CL 97 04/24/2025 0835     02/11/2025 0858    CO2 27 04/24/2025 0835    CO2 23 02/11/2025 0858    BUN 18 04/24/2025 0835    CREATININE 1.4 04/24/2025 0835    GLU 96 02/11/2025 0858        Component Value Date/Time    CALCIUM 9.9 04/24/2025 0835    CALCIUM 9.9 02/11/2025 0858    ALKPHOS 84 04/24/2025 0835    ALKPHOS 99 02/11/2025 0858    AST 37 04/24/2025 0835    AST 41 (H) 02/11/2025 0858    ALT 16 04/24/2025 0835    ALT 16 02/11/2025 0858    BILITOT 0.4 04/24/2025 0835    BILITOT 0.5 02/11/2025 0858    ESTGFRAFRICA 52.6 (A) 07/14/2022 1119    EGFRNONAA 45.5 (A) 07/14/2022 1119        Imaging  Results for orders placed or performed during the hospital encounter of 02/11/25 (from the past 2160 hours)   CT Chest Without Contrast    Narrative    EXAMINATION:  CT CHEST WITHOUT CONTRAST    CLINICAL HISTORY:  Lung nodule, 6-8mm;history of head and neck cancer, cancer of orbit. new lung nodule noted on PET, surveillance; Squamous cell carcinoma of skin of other parts of face    TECHNIQUE:  CT chest without contrast acquiring images from above the pulmonary apices to the upper abdomen.  Data was reconstructed for multiplanar images in axial, sagittal and coronal planes and for maximal intensity projection images in the the axial plane.    COMPARISON:  Chest without contrast dated 01/14/2025.  F 18 FDG PET-CT dated 08/27/2024.    FINDINGS:  Base of Neck/thoracic soft tissues: Tracheostomy tube is well placed    Aorta: Left-sided aortic arch.  Calcified aortic valve and mitral annulus.  No aneurysm. Three vessel aortic arch. Mild atherosclerosis of the  thoracic aorta.    Heart/pericardium: Normal size.  Large multivessel calcified atherosclerosis of the coronary arteries.  No pericardial effusion.    Airways/Lungs/Pleura:  Central airways are patent. Previously seen nodule in the left lower lobe superior segment on prior PET-CT showed significant improvement on CT dated 01/14/2025 with stable residual linear density measuring 5 x 3 mm.  No new nodules.  Mild peripheral bronchiectasis.  Bibasilar depending atelectasis.  No pleural effusion. No pneumothorax.    Pulmonary vasculature: Unremarkable.    Hailey/Mediastinum: No pathologic tati enlargement.    Esophagus: Unremarkable.    Upper Abdomen: Multiple pancreatic calcifications    Bones: Mild to moderate degenerative changes of the spine. No suspicious osseous lesion.      Impression    1. No findings suspicious for metastatic disease  2. Stable left lower lobe superior segment subcentimeter linear density corresponding to location of nodule seen on most recent F 18 FDG PET-CT.  3. Incidental findings are stable.      Electronically signed by: Rivera Ronquillo  Date:    02/11/2025  Time:    21:02     *Note: Due to a large number of results and/or encounters for the requested time period, some results have not been displayed. A complete set of results can be found in Results Review.           Assessment:       1. Basal cell carcinoma (BCC) of skin of nose    2. 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 Squamous cell carcinoma of base of tongue    4. Secondary malignant neoplasm of cervical lymph node    5. Tracheostomy status    6. 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 Tolerating vismodegib well with improvement in pain. Ok to continue. Will have repeat imaging on 5/22 and will see   "Joyce after to discuss proceeding with RT.     Orbital cSCC  Unfortunately patient has progressed while on immunotherapy for below diagnosis. For that reason systemic therapies are limited. Evaluated at [facility] and surgeon does think an exenteration sparing resection would be possible with good response to induction chemotherapy. Plan was for platinum doublet chemo + cetuximab x 2 cycles. Notified of this plan on 1/29 and treatment plan was entered.   - Initiated dose reduced C1 of 5-FU to 750 mg/m2 due to age/frailty and growth factor. Patient still had cytopenias and mucositis, but was able to finish 2 cycles with delays. Now s/p exenteration. With PNI on path, referred to radiation oncology but adjuvant radiation deemed not necessary after exenteration.   - MRI orbit on 8/12/24 showing evidence of recurrent disease, "Nodular enhancing soft tissue superficially at the superomedial orbit and along the medial wall of the orbit posteriorly at least concerning for underlying recurrent lesion."   - Has undergone palliative RT to the right orbit. CT showing reduction of right orbital mass. Will continue Q3 month surveillance.    3,4,5. Recurrent SCC of base of tongue, P16+ - Status post total laryngectomy, total glossectomy and anterolateral thigh free flap reconstruction roughly 2017 at [facility] in [place] for persistent/recurrent base of tongue sq.c.c. 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 He is not a surgical or radiation candidate.  -Started on PCC 10/6/2020 followed by maintenance cetuximab until 8/24/21 (discontinued due to progression of disease; continued increase in SUV uptake, no new lesions).  - 9/2021 started on carbo/5-FU/pembrolizumab; due to toxicity went to pembrolizumab monotherapy starting with cycle 2.  Progression of disease noted after cycle 3 so added chemotherapy back in with cycle 4. Keytruda monotherapy starting with C9.   - " Keytruda discontinued after 2 years.    6,7. S/p liver transplant and is currently on tacrolimus. Grade 1. Will continue to monitor.     8. Monitor TSH on synthroid dose of 100 mcg.    Patient is in agreement with the proposed treatment plan. All questions were answered to the patient's satisfaction. Pt knows to call clinic if anything is needed before the next clinic visit.    Ct Garza, MSN, APRN, FNP-C  Hematology and Medical Oncology  Nurse Practitioner to Dr. Martín Hernandez  Nurse Practitioner, [facility]            Route Chart for Scheduling    Med Onc Chart Routing      Follow up with physician . RTC 5/23 for symptom check   Follow up with [facility]    Infusion scheduling note    Injection scheduling note    Labs CBC and CMP   Scheduling:  Preferred lab:  Lab interval:  CK   Imaging    Pharmacy appointment    Other referrals                Supportive Plan Information  IV FLUIDS AND ELECTROLYTES Ct Francis NP   Associated Diagnosis: Acute kidney injury superimposed on chronic kidney disease   noted on 4/8/2024   Line of treatment: Supportive Care   Treatment goal: Supportive     Upcoming Treatment Dates - IV FLUIDS AND ELECTROLYTES    No upcoming days in selected categories.    Therapy Plan Information  PORT FLUSH for CKD (chronic kidney disease) stage 3, GFR 30-59 ml/min, noted on 10/5/2020  PORT FLUSH for Squamous cell carcinoma of base of tongue, noted on 9/28/2020  Flushes  heparin, porcine (PF) 100 unit/mL injection flush 500 Units  500 Units, Intravenous, Every visit  sodium chloride 0.9% flush 10 mL  10 mL, Intravenous, Every visit    PORT FLUSH for Squamous cell carcinoma of base of tongue, noted on 9/28/2020  PORT FLUSH for Secondary malignant neoplasm of cervical lymph node, noted on 2/9/2021  Flushes  heparin, porcine (PF) 100 unit/mL injection flush 500 Units  500 Units, Intravenous, Every visit  sodium chloride 0.9% flush 10 mL  10 mL, Intravenous, Every  visit      No therapy plan of the specified type found.

## 2025-04-25 DIAGNOSIS — Z94.4 LIVER TRANSPLANTED: ICD-10-CM

## 2025-04-29 RX ORDER — TACROLIMUS 0.5 MG/1
0.5 CAPSULE ORAL EVERY 12 HOURS
Qty: 180 CAPSULE | Refills: 3 | Status: SHIPPED | OUTPATIENT
Start: 2025-04-29 | End: 2026-04-29

## 2025-05-01 ENCOUNTER — PATIENT MESSAGE (OUTPATIENT)
Dept: HEMATOLOGY/ONCOLOGY | Facility: CLINIC | Age: 75
End: 2025-05-01
Payer: MEDICARE

## 2025-05-02 DIAGNOSIS — Z94.4 LIVER TRANSPLANTED: ICD-10-CM

## 2025-05-05 ENCOUNTER — LAB VISIT (OUTPATIENT)
Dept: LAB | Facility: HOSPITAL | Age: 75
End: 2025-05-05
Attending: INTERNAL MEDICINE
Payer: MEDICARE

## 2025-05-05 ENCOUNTER — PATIENT MESSAGE (OUTPATIENT)
Dept: HEMATOLOGY/ONCOLOGY | Facility: CLINIC | Age: 75
End: 2025-05-05
Payer: MEDICARE

## 2025-05-05 ENCOUNTER — RESULTS FOLLOW-UP (OUTPATIENT)
Dept: TRANSPLANT | Facility: CLINIC | Age: 75
End: 2025-05-05
Payer: MEDICARE

## 2025-05-05 DIAGNOSIS — Z94.4 LIVER TRANSPLANTED: ICD-10-CM

## 2025-05-05 DIAGNOSIS — Z94.4 STATUS POST LIVER TRANSPLANTATION: ICD-10-CM

## 2025-05-05 DIAGNOSIS — Z94.4 LIVER TRANSPLANTED: Primary | ICD-10-CM

## 2025-05-05 DIAGNOSIS — C22.0 HCC (HEPATOCELLULAR CARCINOMA): ICD-10-CM

## 2025-05-05 LAB
ABSOLUTE EOSINOPHIL (OHS): 0.42 K/UL
ABSOLUTE MONOCYTE (OHS): 0.53 K/UL (ref 0.3–1)
ABSOLUTE NEUTROPHIL COUNT (OHS): 1.2 K/UL (ref 1.8–7.7)
AFP SERPL-MCNC: 2.4 NG/ML
ALBUMIN SERPL BCP-MCNC: 3.8 G/DL (ref 3.5–5.2)
ALP SERPL-CCNC: 80 UNIT/L (ref 40–150)
ALT SERPL W/O P-5'-P-CCNC: 19 UNIT/L (ref 10–44)
ANION GAP (OHS): 11 MMOL/L (ref 8–16)
AST SERPL-CCNC: 39 UNIT/L (ref 11–45)
BASOPHILS # BLD AUTO: 0.02 K/UL
BASOPHILS NFR BLD AUTO: 0.5 %
BILIRUB SERPL-MCNC: 0.4 MG/DL (ref 0.1–1)
BUN SERPL-MCNC: 19 MG/DL (ref 8–23)
CALCIUM SERPL-MCNC: 9.7 MG/DL (ref 8.7–10.5)
CHLORIDE SERPL-SCNC: 101 MMOL/L (ref 95–110)
CO2 SERPL-SCNC: 25 MMOL/L (ref 23–29)
CREAT SERPL-MCNC: 1.3 MG/DL (ref 0.5–1.4)
ERYTHROCYTE [DISTWIDTH] IN BLOOD BY AUTOMATED COUNT: 14 % (ref 11.5–14.5)
GFR SERPLBLD CREATININE-BSD FMLA CKD-EPI: 57 ML/MIN/1.73/M2
GLUCOSE SERPL-MCNC: 93 MG/DL (ref 70–110)
HCT VFR BLD AUTO: 35 % (ref 40–54)
HGB BLD-MCNC: 12 GM/DL (ref 14–18)
IMM GRANULOCYTES # BLD AUTO: 0.02 K/UL (ref 0–0.04)
IMM GRANULOCYTES NFR BLD AUTO: 0.5 % (ref 0–0.5)
LYMPHOCYTES # BLD AUTO: 1.85 K/UL (ref 1–4.8)
MCH RBC QN AUTO: 34.5 PG (ref 27–31)
MCHC RBC AUTO-ENTMCNC: 34.3 G/DL (ref 32–36)
MCV RBC AUTO: 101 FL (ref 82–98)
NUCLEATED RBC (/100WBC) (OHS): 0 /100 WBC
PLATELET # BLD AUTO: 161 K/UL (ref 150–450)
PMV BLD AUTO: 9.2 FL (ref 9.2–12.9)
POTASSIUM SERPL-SCNC: 4.5 MMOL/L (ref 3.5–5.1)
PROT SERPL-MCNC: 7.7 GM/DL (ref 6–8.4)
RBC # BLD AUTO: 3.48 M/UL (ref 4.6–6.2)
RELATIVE EOSINOPHIL (OHS): 10.4 %
RELATIVE LYMPHOCYTE (OHS): 45.8 % (ref 18–48)
RELATIVE MONOCYTE (OHS): 13.1 % (ref 4–15)
RELATIVE NEUTROPHIL (OHS): 29.7 % (ref 38–73)
SODIUM SERPL-SCNC: 137 MMOL/L (ref 136–145)
TACROLIMUS BLD-MCNC: 4.1 NG/ML (ref 5–15)
WBC # BLD AUTO: 4.04 K/UL (ref 3.9–12.7)

## 2025-05-05 PROCEDURE — 85025 COMPLETE CBC W/AUTO DIFF WBC: CPT

## 2025-05-05 PROCEDURE — 82105 ALPHA-FETOPROTEIN SERUM: CPT

## 2025-05-05 PROCEDURE — 82040 ASSAY OF SERUM ALBUMIN: CPT

## 2025-05-05 PROCEDURE — 80197 ASSAY OF TACROLIMUS: CPT

## 2025-05-05 PROCEDURE — 36415 COLL VENOUS BLD VENIPUNCTURE: CPT

## 2025-05-06 RX ORDER — TACROLIMUS 0.5 MG/1
0.5 CAPSULE ORAL EVERY 12 HOURS
Qty: 180 CAPSULE | Refills: 3 | Status: SHIPPED | OUTPATIENT
Start: 2025-05-06 | End: 2026-05-06

## 2025-05-07 NOTE — TELEPHONE ENCOUNTER
Informed patient through [facility] patient portal, labs stable, no medications changes, next labs for 8/4/2025 and encouraged to reach out for questions and concerns.       ----- Message from Cornell Anton MD sent at 5/5/2025  1:29 PM CDT -----  Results reviewed    ----- Message -----  From: Lab, Background User  Sent: 5/5/2025   9:54 AM CDT  To: Cornell Anton MD

## 2025-05-10 NOTE — PROGRESS NOTES
Physical Therapy    Visit Type: treatment    Relevant History/Co-morbidities: Closed fracture of neck of right femur, initial encounter (CMD), s/p hemiarthroplasty with posterior hip precautions, WBAT    SUBJECTIVE  Patient verbally agrees to allow the following to be present during session: spouse, son and relative  Unable to follow any commands  Patient / Family Goal: unable to state    Pain   RN informed on pain level.    Location: moans with pain, unable to grade the pain at this time     OBJECTIVE     Cognitive Status   Orientation    - Disoriented to: person, place, time and situation  Functional Communication   - Overall Communication Status: impaired   - Forms of Communication: incomprehensible and garbled  Attention Span    - Attention impairment: distractibility  Following Direction   - does not follow commands  Transition Between Tasks   - unable to transition  Safety Awareness/Insight   - unable to assess  Awareness of Deficits   - not aware of deficits  Verbal Expression   - impaired     Range of Motion (ROM)   (degrees unless noted; active unless noted; norms in ( ); negative=lacking to 0, positive=beyond 0)  Comments: Unable to assess lower extremity range of motion or MS due to patient's cognitive status      Sitting Balance  (DANIE = base of support)  Static      - Trial 1 details: maximal assist  Dynamic      - Trial 1 details: maximal assist  Patient leaning back    Standing Balance  (DANIE = base of support)  Firm Surface: Double Leg      - Static, Eyes Open       - Trial 1 details: maximal assist and with double UE support  Standing using anoop stedy      Sensation/Dermatome Testing:    Intact: LLE light touch, RLE light touch    Bed Mobility  - Supine to sit: 2 person, total assist - non-dependent  Transfers  Assistive devices: gait belt, non-mechanical sit to stand lift  - Sit to stand: 2 person, maximal assist  - Stand to sit: maximal assist, 2 person  Bed to chair using anoop jason    Ambulation /  Therapy with vancomycin complete and/or consult discontinued by provider.  Pharmacy will sign off, please re-consult as needed.    Gait  - Assist Level: not attempted due to not medically appropriate or safe        Interventions     Training provided: activity tolerance, balance retraining, bed mobility training, use of assistive device, safety training and transfer training    Skilled input: Verbal instruction/cues  Verbal Consent: Writer verbally educated and received verbal consent for hand placement, positioning of patient, and techniques to be performed today from patient's guardian          Education:   - Present and not ready to learn: patient  Education provided during session:  - Educated family on hip precautions  - bed mobility techniques, safety, role of PT and use of assistive device  - Results of above outlined education: No evidence of learning    ASSESSMENT   Patient will benefit from skilled therapy to address listed impairments and functional limitations.  Progress: no functional improvements  Interfering components: decreased activity tolerance, decreased insight into deficit and cognitive deficits    Discharge needs based on today's assessment:   - Current level of function: significantly below baseline level of function   - Therapy needs at discharge: therapy 5 or more times per week   - Activities of daily living (ADLs) requiring support at discharge: bed mobility, transfers and ambulation   - Impairments that require further therapy intervention: activity tolerance, pain, ROM, cognition, communication, motor planning and safety awareness        AM-PAC  - Generalized Prior Level of Function:         Key: MOD A=moderate assistance, IND/MOD I=independent/modified independent  - Generalized Current Level of Function     - Current Mobility Score: 6       AM-PAC Scoring Key= >21 Modified Independent; 20-21 Supervision; 18-19 Minimal assist; 13-17 Moderate assist; 9-12 Max assist; <9 Total assist    Patient in bed, multiple family members at bedside, patient cleared for out of bed transfer by nurse, SPS provided, nurse assistant  assisted with anoop gomez transfer, patient moaning with mov't, did not follow directions, would benefit from out of bed mobility with nursing, will cont physical therapy 3xwk . Ice packs provided.        Predicted patient presentation: Low (stable) - Patient comorbidities and complexities, as defined above, will have little effect on progress for prescribed plan of care.    PLAN (while hospitalized)  Suggestions for next session as indicated: Therapeutic exercise, bed mobility, transfers, gait training as tolerated.   PT Frequency: 3-5 x per week      PT/OT Mobility Equipment for Discharge: TBD     Interventions: balance, bed mobility, functional transfer training and safety education  Agreement to plan and goals: family/significant other/substitute decision maker agrees        GOALS  Review Date: 5/10/2025  Long Term Goals: (to be met by time of discharge from hospital)  Sit to supine: Patient will complete sit to supine moderate assist.  Supine to sit: Patient will complete supine to sit moderate assist.  Status: progressing/ongoing  Sit to stand: Patient will complete sit to stand transfer with 2-wheeled walker, maximal assist.   Status: progressing/ongoing  Stand to sit: Patient will complete stand to sit transfer with 2-wheeled walker, maximal assist.   Status: progressing/ongoing  Ambulation (even): Patient will ambulate on even surface for 15 feet with 2-wheeled walker, moderate assist.   Documented in the chart in the following areas: Assessment/Plan.      Patient at End of Session:   Location: in chair  Safety measures: alarm system in place/re-engaged, call light within reach and equipment intact  Handoff to: nurse, family/caregiver and nurse assistant      Therapy procedure time and total treatment time can be found documented on the Time Entry flowsheet

## 2025-05-15 ENCOUNTER — PATIENT MESSAGE (OUTPATIENT)
Dept: ADMINISTRATIVE | Facility: OTHER | Age: 75
End: 2025-05-15
Payer: MEDICARE

## 2025-05-21 NOTE — PROGRESS NOTES
[facility] Follow Up Note      Assessment:  Rocco Swain is a 75 y.o. 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 He is s/p orbital exenteration 4/25/24 (1mm SM, +PNI) with no adjuvant therapy. He now presents with a local recurrence with rapid progression, s/p 32.5 Gy in 5 fractions to the right orbital mass completed 9/23/24.   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 He developed an unresectable recurrence and was treated with multiple lines of systemic therapy. Last on pembro 2023.  CT scans with [facility] on personal reivew  Left nasal ala BCC. Currently vismodegib  Liver transplant on tacrolimus. Following with transplant  ECOG: (1) Restricted in physically strenuous activity, ambulatory and able to do work of light nature        Plan:  Follow up scans from today  RTC 3 months with surveillance imaing.   Discussed proceeding with definitive radiotherapy to left nasal ala vs continued Vismodegib. Will continue medical therapy for now.         Oncologic History:  h/o liver transplantation in 2008 due to hepatitis C, currently on Tacrolimus. He has two head and neck primaries:     1) R/M SCC of the BOT, HPV+  Diagnosis: [id number] with locally advanced disease (unknown stage)  2016, definitive CRT with presistent disease  Base of tongue CRT at [place], [place].   2016/2017, total laryngectomy + glossectomy in [place]   2020, developed local unresectable recurrence (biopsy-proven p16+ SCC).   Oct 2020 - Aug 2021, started PCC followed by maintenance cetuximab with PD in August 2021  Sep 2021, started carboplatin, 5-FU, and pembrolizumab x 2 cycles with limiting toxicity attributed to chemo. Transitioned to pembrolizumab alone (no signs for liver rejection). Added chemotherapy again to the  cycle 5  March 2022 to December 2023, pembrolizumab as single agent (no signs for liver rejection)    2) SCC of the conjunctivae   Initial diagnosis: April 2022  Eye lesions grew despite pembrolizumab  10/19/2022, local excision at [facility] (Dr Victor M Vasques)  08/31/2023, recurrence s/p local excision at [facility] (Dr Victor M Vasques)  October - December 2023, topical MMC without response  1/19/2024, he was seen as a consult at [facility]. As he progressed on IO in the past, he was recommended systemic therapy with chemotherapy (platinum doublet, such as carboplatin and 5-FU) and [place]. [place] [facility] [place]24/24: MRI orbits ([facility]) with sight increase in size of the mass (2.6 cm) in the inferonasal right conjunctiva with extension into the post septal extraconal space. PPF, cavernous sinuses, and Meckel's caves are within normal limits. No adenopathy.  4/25/2024: right eyelid sparing orbital exenteration and Repair of defect right socket with adjacent tissue transfer using upper and lower eyelid skin at [facility].  Path: moderate to poorly differentiated squamous cell carcinoma involving conjunctiva of upper and lower eyelids, fibroconnective tissue and skeletal muscle. +PNI, 2 foci, largest nerve 0.06mm. margins negative but close at 1.2mm.  No adjuvant RT   8/12/24: MRI orbits s/p right orbital mass resection with orbital exenteration. Nodular enhancing soft tissue superficially at the superomedial orbit and along the medial wall of the orbit posteriorly at least concerning for underlying recurrent lesion.   8/27/24: PET/CT with FDG avid right orbital mass consistent with disease recurrence. New pulmonary nodules concerning for metastasis  9/12/24: CT orbits with progression and new 1.1 cm round enhancing focus posterior to the right maxilla, concerning for metastasis  9/17/24 - 9/23/24: 25-32.5 Gy in 5 fractions to right orbital mass and lesion posterior to the right maxilla       Possibility of pregnancy:  N/A  History of prior irradiation: Yes - as above  History of prior systemic anti-cancer therapy: Yes - as above  History of collagen vascular disease: No  Implanted electronic device (pacer/defib/nerve stimulator): No    Interval History:   Rocco Swain presents today for follow up.     Last visit:   Has some soreness in the right orbit but overall much improved compared to pre tx. Unchanged compared to last visit. No longer on pain meds for this. No new facial numbness, has right forehead and cheek numbness stable since his orbital exent at [facility].     On vismodegib with loss of taste and decreased appetite.     Since last visit, no oncologic events     This visit: no changes. No new issues. No orbital pain, new facial numbness or weakness.     Review of Systems:  ROS  as above    Social History:  Social History     Tobacco Use    Smoking status: Never    Smokeless tobacco: Never   Substance Use Topics    Alcohol use: Yes     Comment: drinks wine, 1 glass day    Drug use: Not Currently         Medications:  Current Outpatient Medications on File Prior to Visit   Medication Sig Dispense Refill    levothyroxine (SYNTHROID) 88 MCG tablet TAKE 1 TABLET BY MOUTH ONCE  DAILY 90 tablet 0    magic mouthwash diphen/antac/lidoc/nysta Take 10 mLs by mouth 4 (four) times daily. 120 mL 4    multivit-min/FA/lycopen/lutein (CENTRUM SILVER MEN ORAL) Take 1 tablet by mouth once daily.      ondansetron (ZOFRAN-ODT) 8 MG TbDL Take 1 tablet (8 mg total) by mouth every 8 (eight) hours as needed (nausea - first choice). (Patient not taking: Reported on 4/24/2025) 60 tablet 4    prochlorperazine (COMPAZINE) 10 MG tablet Take 1 tablet (10 mg total) by mouth every 6 (six) hours as needed (nausea/vomiting - second choice). (Patient not taking: Reported on 4/24/2025) 30 tablet 1    senna (SENOKOT) 8.6 mg tablet Take 2 tablets by mouth 2 (two) times daily as needed for Constipation. (Patient not taking: Reported on 4/24/2025) 60 tablet 2     "tacrolimus (PROGRAF) 0.5 MG Cap Take 1 capsule (0.5 mg total) by mouth every 12 (twelve) hours. 180 capsule 3    traZODone (DESYREL) 50 MG tablet TAKE 1 TABLET BY MOUTH IN THE  EVENING 90 tablet 3    vismodegib (ERIVEDGE) 150 mg Cap Take 1 capsule (150 mg total) by mouth once daily. 30 capsule 3     Current Facility-Administered Medications on File Prior to Visit   Medication Dose Route Frequency Provider Last Rate Last Admin    [COMPLETED] iohexoL (OMNIPAQUE 350) injection 75 mL  75 mL Intravenous ONCE PRN Oli Joyce Jr., MD   75 mL at 05/22/25 0838    ofloxacin 0.3 % ophthalmic solution 1 drop  1 drop Right Eye On Call Procedure Victor M Vasques MD   2 drop at 10/11/22 0745    sodium chloride 0.9% flush 10 mL  10 mL Intravenous PRN Ronald Berg MD        sodium chloride 0.9% flush 10 mL  10 mL Intravenous PRN Victor M Vasques MD           Allergies:  Review of patient's allergies indicates:   Allergen Reactions    Aspirin     Tylenol extended release        Exam:  Vitals:    05/22/25 0935   BP: 124/85   BP Location: Left arm   Patient Position: Sitting   Pulse: 96   SpO2: 98%   Weight: 57.6 kg (127 lb 1.5 oz)   Height: 5' 8" (1.727 m)             Constitutional: Pleasant 75 y.o. male in no acute distress.  Well nourished. Well groomed.   HEENT: He is s/p exent with no palpable mass (one was previously seen in the ~1 o'clock position) with overlying skin intact without erythema or ulceration. No appreciated residual fullness in the right orbit. Continued improvement in the ulcerated lesion in the left nasal ala. No numbness in left V2. Intact in V3 bilaterally. No facial asymmetry.   Lymph:  heavily pretreated neck with fibrosis, with such limitation appreciated cervical, pre auricular, facial adenopathy. + helio tube  Cardiovascular: Upper extremities warm to touch  Lungs: No audible wheezing.  Normal effort.   Musculoskeletal: No gross MSK deformities. Ambulates well  Skin: No rashes " appreciated.  Psych: Alert and oriented with appropriate mood and affect.  Neuro: as above, otherwise  Grossly normal.    Data Review:    Information obtained via chart review and discussion with Mr. Swain.         Oli Joyce MD  Radiation Oncology

## 2025-05-22 ENCOUNTER — OFFICE VISIT (OUTPATIENT)
Dept: RADIATION ONCOLOGY | Facility: CLINIC | Age: 75
End: 2025-05-22
Payer: MEDICARE

## 2025-05-22 ENCOUNTER — HOSPITAL ENCOUNTER (OUTPATIENT)
Dept: RADIOLOGY | Facility: HOSPITAL | Age: 75
Discharge: HOME | End: 2025-05-22
Attending: STUDENT IN AN ORGANIZED HEALTH CARE EDUCATION/TRAINING PROGRAM
Payer: MEDICARE

## 2025-05-22 VITALS
HEIGHT: 68 IN | SYSTOLIC BLOOD PRESSURE: 124 MMHG | BODY MASS INDEX: 19.27 KG/M2 | OXYGEN SATURATION: 98 % | HEART RATE: 96 BPM | WEIGHT: 127.13 LBS | DIASTOLIC BLOOD PRESSURE: 85 MMHG

## 2025-05-22 DIAGNOSIS — C01 SQUAMOUS CELL CARCINOMA OF BASE OF TONGUE: ICD-10-CM

## 2025-05-22 DIAGNOSIS — C44.311 BASAL CELL CARCINOMA (BCC) OF SKIN OF NOSE: ICD-10-CM

## 2025-05-22 DIAGNOSIS — C44.329 SQUAMOUS CELL CARCINOMA OF SKIN OF ORBIT: Primary | ICD-10-CM

## 2025-05-22 DIAGNOSIS — C44.329 SQUAMOUS CELL CARCINOMA OF SKIN OF ORBIT: ICD-10-CM

## 2025-05-22 DIAGNOSIS — H54.7 UNSPECIFIED VISUAL LOSS: ICD-10-CM

## 2025-05-22 DIAGNOSIS — R91.1 LUNG NODULE, SOLITARY: ICD-10-CM

## 2025-05-22 DIAGNOSIS — H05.9 UNSPECIFIED DISORDER OF ORBIT: ICD-10-CM

## 2025-05-22 PROCEDURE — 70481 CT ORBIT/EAR/FOSSA W/DYE: CPT | Mod: TC

## 2025-05-22 PROCEDURE — 70481 CT ORBIT/EAR/FOSSA W/DYE: CPT | Mod: 26,,, | Performed by: RADIOLOGY

## 2025-05-22 PROCEDURE — 71250 CT THORAX DX C-: CPT | Mod: TC

## 2025-05-22 PROCEDURE — 99213 OFFICE O/P EST LOW 20 MIN: CPT | Mod: PBBFAC,25 | Performed by: STUDENT IN AN ORGANIZED HEALTH CARE EDUCATION/TRAINING PROGRAM

## 2025-05-22 PROCEDURE — 99999 PR PBB SHADOW E&M-EST. PATIENT-LVL III: CPT | Mod: PBBFAC,,, | Performed by: STUDENT IN AN ORGANIZED HEALTH CARE EDUCATION/TRAINING PROGRAM

## 2025-05-22 PROCEDURE — 25500020 PHARM REV CODE 255: Performed by: STUDENT IN AN ORGANIZED HEALTH CARE EDUCATION/TRAINING PROGRAM

## 2025-05-22 RX ADMIN — IOHEXOL 75 ML: 350 INJECTION, SOLUTION INTRAVENOUS at 08:05

## 2025-05-23 ENCOUNTER — LAB VISIT (OUTPATIENT)
Dept: LAB | Facility: HOSPITAL | Age: 75
End: 2025-05-23
Attending: INTERNAL MEDICINE
Payer: MEDICARE

## 2025-05-23 ENCOUNTER — OFFICE VISIT (OUTPATIENT)
Dept: HEMATOLOGY/ONCOLOGY | Facility: CLINIC | Age: 75
End: 2025-05-23
Payer: MEDICARE

## 2025-05-23 VITALS
BODY MASS INDEX: 19.45 KG/M2 | WEIGHT: 128.31 LBS | HEIGHT: 68 IN | OXYGEN SATURATION: 98 % | DIASTOLIC BLOOD PRESSURE: 69 MMHG | TEMPERATURE: 98 F | RESPIRATION RATE: 17 BRPM | SYSTOLIC BLOOD PRESSURE: 114 MMHG | HEART RATE: 86 BPM

## 2025-05-23 DIAGNOSIS — C44.311 BASAL CELL CARCINOMA (BCC) OF SKIN OF NOSE: ICD-10-CM

## 2025-05-23 DIAGNOSIS — E03.2 HYPOTHYROIDISM DUE TO MEDICAMENTS AND OTHER EXOGENOUS SUBSTANCES: ICD-10-CM

## 2025-05-23 DIAGNOSIS — Z94.4 STATUS POST LIVER TRANSPLANTATION: ICD-10-CM

## 2025-05-23 DIAGNOSIS — D84.821 IMMUNODEFICIENCY DUE TO DRUGS: ICD-10-CM

## 2025-05-23 DIAGNOSIS — Z79.899 IMMUNODEFICIENCY DUE TO DRUGS: ICD-10-CM

## 2025-05-23 DIAGNOSIS — Z93.0 TRACHEOSTOMY STATUS: ICD-10-CM

## 2025-05-23 DIAGNOSIS — C44.311 BASAL CELL CARCINOMA (BCC) OF SKIN OF NOSE: Primary | ICD-10-CM

## 2025-05-23 DIAGNOSIS — C01 SQUAMOUS CELL CARCINOMA OF BASE OF TONGUE: ICD-10-CM

## 2025-05-23 DIAGNOSIS — C77.0 SECONDARY MALIGNANT NEOPLASM OF CERVICAL LYMPH NODE: ICD-10-CM

## 2025-05-23 DIAGNOSIS — C44.329 SQUAMOUS CELL CARCINOMA OF SKIN OF ORBIT: ICD-10-CM

## 2025-05-23 DIAGNOSIS — D69.6 THROMBOCYTOPENIA, UNSPECIFIED: ICD-10-CM

## 2025-05-23 LAB
ABSOLUTE NEUTROPHIL COUNT (OHS): 3.02 K/UL (ref 1.8–7.7)
ALBUMIN SERPL BCP-MCNC: 3.8 G/DL (ref 3.5–5.2)
ALP SERPL-CCNC: 86 UNIT/L (ref 40–150)
ALT SERPL W/O P-5'-P-CCNC: 20 UNIT/L (ref 10–44)
ANION GAP (OHS): 13 MMOL/L (ref 8–16)
AST SERPL-CCNC: 45 UNIT/L (ref 11–45)
BILIRUB SERPL-MCNC: 0.4 MG/DL (ref 0.1–1)
BUN SERPL-MCNC: 21 MG/DL (ref 8–23)
CALCIUM SERPL-MCNC: 9.8 MG/DL (ref 8.7–10.5)
CHLORIDE SERPL-SCNC: 102 MMOL/L (ref 95–110)
CK SERPL-CCNC: 75 U/L (ref 20–200)
CO2 SERPL-SCNC: 23 MMOL/L (ref 23–29)
CREAT SERPL-MCNC: 1.4 MG/DL (ref 0.5–1.4)
ERYTHROCYTE [DISTWIDTH] IN BLOOD BY AUTOMATED COUNT: 13.9 % (ref 11.5–14.5)
GFR SERPLBLD CREATININE-BSD FMLA CKD-EPI: 52 ML/MIN/1.73/M2
GLUCOSE SERPL-MCNC: 143 MG/DL (ref 70–110)
HCT VFR BLD AUTO: 34.4 % (ref 40–54)
HGB BLD-MCNC: 11.6 GM/DL (ref 14–18)
IMM GRANULOCYTES # BLD AUTO: 0.02 K/UL (ref 0–0.04)
MCH RBC QN AUTO: 34.7 PG (ref 27–31)
MCHC RBC AUTO-ENTMCNC: 33.7 G/DL (ref 32–36)
MCV RBC AUTO: 103 FL (ref 82–98)
PLATELET # BLD AUTO: 178 K/UL (ref 150–450)
PMV BLD AUTO: 9.4 FL (ref 9.2–12.9)
POTASSIUM SERPL-SCNC: 4.8 MMOL/L (ref 3.5–5.1)
PROT SERPL-MCNC: 7.9 GM/DL (ref 6–8.4)
RBC # BLD AUTO: 3.34 M/UL (ref 4.6–6.2)
SODIUM SERPL-SCNC: 138 MMOL/L (ref 136–145)
WBC # BLD AUTO: 6.34 K/UL (ref 3.9–12.7)

## 2025-05-23 PROCEDURE — 36415 COLL VENOUS BLD VENIPUNCTURE: CPT

## 2025-05-23 PROCEDURE — 84450 TRANSFERASE (AST) (SGOT): CPT

## 2025-05-23 PROCEDURE — 99999 PR PBB SHADOW E&M-EST. PATIENT-LVL III: CPT | Mod: PBBFAC,,, | Performed by: INTERNAL MEDICINE

## 2025-05-23 PROCEDURE — 99213 OFFICE O/P EST LOW 20 MIN: CPT | Mod: PBBFAC | Performed by: INTERNAL MEDICINE

## 2025-05-23 PROCEDURE — 85027 COMPLETE CBC AUTOMATED: CPT

## 2025-05-23 PROCEDURE — 82550 ASSAY OF CK (CPK): CPT

## 2025-05-23 RX ORDER — AZELAIC ACID 0.15 G/G
GEL TOPICAL 2 TIMES DAILY
COMMUNITY

## 2025-05-23 RX ORDER — METRONIDAZOLE 7.5 MG/G
CREAM TOPICAL 2 TIMES DAILY
COMMUNITY

## 2025-05-23 NOTE — PROGRESS NOTES
ONCOLOGY FOLLOW UP VISIT    Subjective:      Patient ID: Rocco Swain    Chief Complaint: No chief complaint on file.      HPI  Rococ Swain is a 75 y.o. male who returns to clinic for management of cSCC of the orbit. Patient had ANGELES on October PETCT, but then developed quickly progressing orbital lesion. He was seen at [facility] in [place]. Recommendation was to follow up at [facility] to initiate induction chemotherapy followed by possible surgery. Patient completed 2 cycles of Extreme regimen, though there were delays due to cytopenias and severe mucositis. He was able to go to [facility] to have exenteration and resection of cancer April 2024 with clear margins, but PNI on path.     Interval History  Tolerating Vismodegib well. Pt states he is no longer having nose pain but right orbit still feels sore. Has occasional muscle cramps at night 1-2 nights a week. BCC of the nose is decreasing in size. Pt has been having taste lose since starting medication, however it is not affecting his appetite. Weight is stable. Pt denies any muscle cramps, N/V, diarrhea, constipation, rash, new/worsening fatigue, new/worsening SOB, or new/worsening cough.    ECOG Performance status: 1 - Symptomatic but completely ambulatory Communication from him is 100% written.     Cancer Staging   Squamous cell carcinoma of base of tongue  Staging form: Pharynx - HPV-Mediated Oropharynx, AJCC 8th Edition  - Clinical stage from 9/18/2020: Stage III (rcT4, cN1, cM0, p16+) - Signed by Ronald Berg MD on 12/27/2022      Oncologic History:  Oncology History   Squamous cell carcinoma of base of tongue   9/18/2020 Cancer Staged    Staging form: Pharynx - HPV-Mediated Oropharynx, AJCC 8th Edition  - Clinical stage from 9/18/2020: Stage III (rcT4, cN1, cM0, p16+)     9/28/2020 Initial Diagnosis    Squamous cell carcinoma of base of tongue     10/6/2020 - 8/17/2021 Chemotherapy    Treatment Summary   Plan Name: OP HEAD NECK CARBOPLATIN PACLITAXEL  C1-2 FOLLOWED BY CETUXIMAB CARBOPLATIN C3-6 FOLLOWED BY CETUXIMAB MAINTENANCE WEEKLY  Treatment Goal: Control  Status: Inactive  Start Date: 10/6/2020  End Date: 8/17/2021  Provider: Ronald Berg MD  Chemotherapy: cetuximab (ERBITUX) 100 mg/50 mL chemo infusion 684 mg, 400 mg/m2 = 684 mg (100 % of original dose 400 mg/m2), Intravenous, Clinic/[facility] 1 time, 29 of 38 cycles  Dose modification: 500 mg/m2 (original dose 400 mg/m2, Cycle 3), 250 mg/m2 (original dose 400 mg/m2, Cycle 3), 400 mg/m2 (original dose 400 mg/m2, Cycle 3), 250 mg/m2 (original dose 250 mg/m2, Cycle 7), 200 mg/m2 (original dose 250 mg/m2, Cycle 18), 200 mg/m2 (original dose 250 mg/m2, Cycle 19), 250 mg/m2 (original dose 250 mg/m2, Cycle 4), 200 mg/m2 (original dose 250 mg/m2, Cycle 25), 200 mg/m2 (original dose 250 mg/m2, Cycle 28)  Administration: 684 mg (11/17/2020), 400 mg (11/24/2020), 400 mg (2/9/2021), 400 mg (2/17/2021), 427.6 mg (3/9/2021), 400 mg (3/30/2021), 400 mg (3/23/2021), 400 mg (4/6/2021), 427.6 mg (4/13/2021), 427.6 mg (4/20/2021), 342 mg (5/11/2021), 342 mg (5/18/2021), 342 mg (5/25/2021), 400 mg (5/31/2021), 400 mg (6/7/2021), 400 mg (6/14/2021), 400 mg (12/8/2020), 427.6 mg (12/29/2020), 400 mg (12/1/2020), 400 mg (12/15/2020), 400 mg (12/22/2020), 427.6 mg (1/5/2021), 400 mg (1/12/2021), 400 mg (1/19/2021), 427.6 mg (1/26/2021), 400 mg (2/2/2021), 400 mg (2/23/2021), 400 mg (3/2/2021), 427.6 mg (3/16/2021), 400 mg (6/21/2021), 342 mg (6/28/2021), 342 mg (7/6/2021), 342 mg (7/13/2021), 342 mg (7/20/2021), 400 mg (7/27/2021), 400 mg (8/10/2021), 400 mg (8/17/2021)  CARBOplatin (PARAPLATIN) 310 mg in sodium chloride 0.9% 500 mL chemo infusion, 310 mg (92.2 % of original dose 334.5 mg), Intravenous, Clinic/[facility] 1 time, 6 of 6 cycles  Dose modification:   (original dose 334.5 mg, Cycle 1)  Administration: 310 mg (10/6/2020), 370 mg (11/17/2020), 300 mg (10/27/2020), 350 mg (12/8/2020), 335 mg (12/29/2020), 320 mg  (1/19/2021)  PACLitaxeL (TAXOL) 175 mg/m2 = 300 mg in sodium chloride 0.9% 500 mL chemo infusion, 175 mg/m2 = 300 mg (100 % of original dose 175 mg/m2), Intravenous, [facility] 1 time, 2 of 2 cycles  Dose modification: 175 mg/m2 (original dose 175 mg/m2, Cycle 1)  Administration: 300 mg (10/6/2020), 300 mg (10/27/2020)     4/29/2021 Tumor Conference       His case was discussed at the Multidisciplinary Head and Neck Team Planning Meeting.    Representatives from Medical Oncology, Radiation Oncology, Head and Neck Surgical Oncology, Psychosocial Oncology, and Speech and Language Pathology discussed the case with the following recommendations:    1) biopsy         7/29/2021 Tumor Conference       His case was discussed at the Multidisciplinary Head and Neck Team Planning Meeting.    Representatives from Medical Oncology, Radiation Oncology, Head and Neck Surgical Oncology, Psychosocial Oncology, and Speech and Language Pathology discussed the case with the following recommendations:    1) Head and neck clinic follow up  2) consider Keytruda (discuss with transplant)  3) consider palliative referral         9/13/2021 - 12/29/2023 Chemotherapy    Treatment Summary   Plan Name: OP HEAD NECK PEMBROLIZUMAB CARBOPLATIN FLUOROURACIL (C1 ONLY RECEIVED) FOLLOWED BY PEMBROLIZUMAB MAINTENANCE  Treatment Goal: Palliative  Status: Inactive  Start Date: 9/13/2021  End Date: 12/29/2023  Provider: Ronald Berg MD  Chemotherapy: CARBOplatin (PARAPLATIN) 320 mg in sodium chloride 0.9% 500 mL chemo infusion, 320 mg (100 % of original dose 320.5 mg), Intravenous, Clinic/[facility] 1 time, 6 of 6 cycles  Dose modification:   (original dose 320.5 mg, Cycle 1)  Administration: 320 mg (9/13/2021), 335 mg (12/23/2021), 325 mg (1/27/2022), 245 mg (3/3/2022), 255 mg (5/12/2022), 255 mg (4/7/2022)  fluorouraciL 1,000 mg/m2/day = 6,440 mg in sodium chloride 0.9% 240 mL chemo infusion, 1,000 mg/m2/day = 6,440 mg, Intravenous, Over 96 hours, 6 of 6  cycles  Dose modification: 800 mg/m2/day (original dose 1,000 mg/m2/day, Cycle 6), 5,000 mg (original dose 1,000 mg/m2/day, Cycle 8)  Administration: 6,440 mg (9/13/2021), 6,440 mg (12/23/2021), 6,480 mg (1/27/2022), 5,000 mg (3/3/2022), 5,000 mg (4/7/2022), 5,000 mg (5/12/2022)     Secondary malignant neoplasm of cervical lymph node   2/9/2021 Initial Diagnosis    Secondary malignant neoplasm of cervical lymph node     9/13/2021 - 12/29/2023 Chemotherapy    Treatment Summary   Plan Name: OP HEAD NECK PEMBROLIZUMAB CARBOPLATIN FLUOROURACIL (C1 ONLY RECEIVED) FOLLOWED BY PEMBROLIZUMAB MAINTENANCE  Treatment Goal: Palliative  Status: Inactive  Start Date: 9/13/2021  End Date: 12/29/2023  Provider: Ronald Berg MD  Chemotherapy: CARBOplatin (PARAPLATIN) 320 mg in sodium chloride 0.9% 500 mL chemo infusion, 320 mg (100 % of original dose 320.5 mg), Intravenous, Clinic/[facility] 1 time, 6 of 6 cycles  Dose modification:   (original dose 320.5 mg, Cycle 1)  Administration: 320 mg (9/13/2021), 335 mg (12/23/2021), 325 mg (1/27/2022), 245 mg (3/3/2022), 255 mg (5/12/2022), 255 mg (4/7/2022)  fluorouraciL 1,000 mg/m2/day = 6,440 mg in sodium chloride 0.9% 240 mL chemo infusion, 1,000 mg/m2/day = 6,440 mg, Intravenous, Over 96 hours, 6 of 6 cycles  Dose modification: 800 mg/m2/day (original dose 1,000 mg/m2/day, Cycle 6), 5,000 mg (original dose 1,000 mg/m2/day, Cycle 8)  Administration: 6,440 mg (9/13/2021), 6,440 mg (12/23/2021), 6,480 mg (1/27/2022), 5,000 mg (3/3/2022), 5,000 mg (4/7/2022), 5,000 mg (5/12/2022)     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   1/29/2024 Initial Diagnosis    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     2/19/2024 - 3/25/2024 Chemotherapy    Treatment Summary   Plan Name: OP HEAD NECK CETUXIMAB CARBOPLATIN FLUOROURACIL Q3W  Treatment Goal: Curative  Status: Inactive  Start Date: 2/19/2024  End Date: 3/25/2024  Provider: Martín Hernandez MD  Chemotherapy: cetuximab (ERBITUX) 100 mg/50 mL chemo  infusion 668 mg, 400 mg/m2 = 668 mg, Intravenous, [facility] 1 time, 2 of 12 cycles  Administration: 668 mg (2/19/2024), 400 mg (2/26/2024), 400 mg (3/18/2024), 400 mg (3/25/2024), 400 mg (3/11/2024)  CARBOplatin (PARAPLATIN) 295 mg in sodium chloride 0.9% 314.5 mL chemo infusion, 295 mg, Intravenous, [facility] 1 time, 2 of 6 cycles  Administration: 295 mg (2/19/2024), 300 mg (3/18/2024)     8/23/2024 - 8/23/2024 Chemotherapy    Treatment Summary   Plan Name: OP cetuximab (loading + maintenance) weekly  Treatment Goal: Curative  Status: Inactive  Start Date:   End Date:   Provider: Martín Hernandez MD  Chemotherapy: cetuximab (ERBITUX) 100 mg/50 mL chemo infusion 652 mg, 400 mg/m2 = 652 mg, Intravenous, [facility] 1 time, 0 of 6 cycles     9/17/2024 - 9/23/2024 Radiation Therapy    Treating physician: Tian Joyce  Treatment Summary  Course: C1 H&N 2024    Treatment Site Ref. ID Energy Dose/Fx (Gy) #Fx Dose Correction (Gy) Total Dose (Gy) Start Date End Date Elapsed Days   IM Orbit_R Orbit_R 6X 6.5 5 / 5 0 32.5 9/17/2024 9/23/2024 6            Review of Systems   Constitutional:  Negative for activity change, appetite change, chills, fatigue, fever and unexpected weight change.   HENT:  Negative for ear pain, facial swelling, hearing loss, mouth sores, nosebleeds, sore throat, trouble swallowing and voice change.         No verbal communication. Skin fixed and immobile from previous scaring post-neck dissection. Taste changes.   Eyes:  Negative for pain, discharge and visual disturbance.   Respiratory:  Negative for cough, choking, chest tightness and shortness of breath.    Cardiovascular:  Negative for chest pain, palpitations and leg swelling.   Gastrointestinal:  Negative for abdominal distention, abdominal pain, blood in stool, constipation, diarrhea, nausea and vomiting.   Endocrine: Negative for cold intolerance and heat intolerance.   Genitourinary:  Negative for difficulty urinating, dysuria, frequency and  urgency.   Musculoskeletal:  Positive for back pain (lower - chronic). Negative for arthralgias, gait problem, leg pain and myalgias.   Integumentary:  Negative for pallor, rash, wound and mole/lesion.   Allergic/Immunologic: Negative for frequent infections.   Neurological:  Negative for dizziness, tremors, weakness, light-headedness, numbness and headaches.   Hematological:  Negative for adenopathy. Does not bruise/bleed easily.   Psychiatric/Behavioral:  Negative for agitation, confusion, dysphoric mood and sleep disturbance. The patient is not nervous/anxious.         Allergies:  Review of patient's allergies indicates:   Allergen Reactions    Aspirin     Tylenol extended release        Medications:  Current Outpatient Medications   Medication Sig Dispense Refill    levothyroxine (SYNTHROID) 88 MCG tablet TAKE 1 TABLET BY MOUTH ONCE  DAILY 90 tablet 0    magic mouthwash diphen/antac/lidoc/nysta Take 10 mLs by mouth 4 (four) times daily. 120 mL 4    multivit-min/FA/lycopen/lutein (CENTRUM SILVER MEN ORAL) Take 1 tablet by mouth once daily.      ondansetron (ZOFRAN-ODT) 8 MG TbDL Take 1 tablet (8 mg total) by mouth every 8 (eight) hours as needed (nausea - first choice). (Patient not taking: Reported on 4/24/2025) 60 tablet 4    prochlorperazine (COMPAZINE) 10 MG tablet Take 1 tablet (10 mg total) by mouth every 6 (six) hours as needed (nausea/vomiting - second choice). (Patient not taking: Reported on 4/24/2025) 30 tablet 1    senna (SENOKOT) 8.6 mg tablet Take 2 tablets by mouth 2 (two) times daily as needed for Constipation. (Patient not taking: Reported on 4/24/2025) 60 tablet 2    tacrolimus (PROGRAF) 0.5 MG Cap Take 1 capsule (0.5 mg total) by mouth every 12 (twelve) hours. 180 capsule 3    traZODone (DESYREL) 50 MG tablet TAKE 1 TABLET BY MOUTH IN THE  EVENING 90 tablet 3    vismodegib (ERIVEDGE) 150 mg Cap Take 1 capsule (150 mg total) by mouth once daily. 30 capsule 3     No current  facility-administered medications for this visit.     Facility-Administered Medications Ordered in Other Visits   Medication Dose Route Frequency Provider Last Rate Last Admin    ofloxacin 0.3 % ophthalmic solution 1 drop  1 drop Right Eye On Call Procedure Victor M Vasques MD   2 drop at 10/11/22 0745    sodium chloride 0.9% flush 10 mL  10 mL Intravenous PRN Ronald Breg MD        sodium chloride 0.9% flush 10 mL  10 mL Intravenous PRN Victor M Vasques MD           PMH:  Past Medical History:   Diagnosis Date    Acute conjunctivitis of right eye 09/12/2022    Atrial fibrillation     Basal cell carcinoma (BCC) in situ of skin 2012    3 on face, 2 on arm, removed by dermatology.    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 Underwent surgical resection of tongue and neck. 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 temple 01/24/2024    right temple       PSH:  Past Surgical History:   Procedure Laterality Date    COLONOSCOPY      COLONOSCOPY N/A 09/14/2023    Procedure: COLONOSCOPY;  Surgeon: Reyes Olviia MD;  Location: [address];  Service: Endoscopy;  Laterality: N/A;    CONJUNCTIVA BIOPSY Right 10/11/2022    Procedure: BIOPSY, CONJUNCTIVA;  Surgeon: Victor M Vasques MD;  Location[facility];  Service: Ophthalmology;  Laterality: Right;    ESOPHAGOGASTRODUODENOSCOPY N/A 09/14/2023    Procedure: EGD  (ESOPHAGOGASTRODUODENOSCOPY);  Surgeon: Reyes Olivia MD;  Location: [facility] ENDO (2ND FLR);  Service: Endoscopy;  Laterality: N/A;    ESOPHAGOGASTRODUODENOSCOPY N/A 04/10/2024    Procedure: EGD (ESOPHAGOGASTRODUODENOSCOPY);  Surgeon: Reyes Pagan MD;  Location: [facility] ENDO (2ND FLR);  Service: Endoscopy;  Laterality: N/A;    ESOPHAGOGASTRODUODENOSCOPY N/A 06/24/2024    Procedure: EGD (ESOPHAGOGASTRODUODENOSCOPY);  Surgeon: Reyes Pagan MD;  Location: [facility] ENDO (2ND FLR);  Service: Endoscopy;  Laterality: N/A;  Ref By: Dr. Hawkins   Procedure: egd  Diagnosis: esophagitis  Procedure Timing: 10 weeks  Prep: standard prep  6/21-pt r/s-inst to portal-2nd floor criteria-labs within 90 daysMS    EYE SURGERY Right 04/25/2024    exenteration of orbit    GLOSSECTOMY      INSERTION OF VENOUS ACCESS PORT Right 03/08/2021    Procedure: INSERTION, VENOUS ACCESS PORT;  Surgeon: Jesus Rendon MD;  [facility];  Service: General;  Laterality: Right;    LARYNGECTOMY      LIVER TRANSPLANT  11/2008    transplanted for biopsy proven hepatocellular carcinoma,     LYMPH NODE BIOPSY N/A 09/18/2020    Procedure: BIOPSY, LYMPH NODE;  [facility];  [facility];  Service: General;  Laterality: N/A;  Rm 173    skin cancer removals      SURGICAL REMOVAL OF SCAR Right 08/24/2023    Procedure: EXCISION, SCAR;  Surgeon: Ting Brambila MD;  Location[facility];  Service: Ophthalmology;  Laterality: Right;    TRACHEOSTOMY         FamHx:  Family History   Problem Relation Name Age of Onset    Dementia Mother      Cataracts Neg Hx      Glaucoma Neg Hx      Macular degeneration Neg Hx         SocHx:  Social History     Socioeconomic History    Marital status: Single   Occupational History    Occupation: Retired     Comment: , notes exposures to fumes etc.  Worked on [employer] for 4 years   Tobacco Use    Smoking status: Never    Smokeless tobacco:  Never   Substance and Sexual Activity    Alcohol use: Yes     Comment: drinks wine, 1 glass day    Drug use: Not Currently    Sexual activity: Yes     Comment: No prior history of STD    Social History Narrative    Steps at house- 24       Distress Score             Objective:      There were no vitals filed for this visit.          Physical Exam  Vitals reviewed.   Constitutional:       General: He is not in acute distress.     Appearance: Normal appearance. He is well-developed and underweight.   HENT:      Head: Normocephalic and atraumatic.      Nose:      Comments: See picture below of BCC. Improved since last image.      Mouth/Throat:      Mouth: Mucous membranes are moist.      Dentition: Abnormal dentition. Dental caries present.   Eyes:      Comments: R eye enucleation   Neck:      Trachea: Tracheostomy present.   Pulmonary:      Effort: Pulmonary effort is normal. No respiratory distress.      Comments: Tracheostomy  Abdominal:      General: There is no distension.      Palpations: Abdomen is soft.   Musculoskeletal:         General: No swelling. Normal range of motion.      Cervical back: Normal range of motion and neck supple.   Skin:     General: Skin is warm and dry.   Neurological:      General: No focal deficit present.      Mental Status: He is alert and oriented to person, place, and time.   Psychiatric:         Mood and Affect: Mood normal.         Behavior: Behavior normal.         Thought Content: Thought content normal.         Judgment: Judgment normal.           LABS:  WBC   Date Value Ref Range Status   05/05/2025 4.04 3.90 - 12.70 K/uL Final     Hemoglobin   Date Value Ref Range Status   02/11/2025 11.9 (L) 14.0 - 18.0 g/dL Final     HGB   Date Value Ref Range Status   05/05/2025 12.0 (L) 14.0 - 18.0 gm/dL Final     Hematocrit   Date Value Ref Range Status   02/11/2025 35.5 (L) 40.0 - 54.0 % Final     HCT   Date Value Ref Range Status   05/05/2025 35.0 (L) 40.0 - 54.0 % Final      Platelet Count   Date Value Ref Range Status   05/05/2025 161 150 - 450 K/uL Final     Platelets   Date Value Ref Range Status   02/11/2025 168 150 - 450 K/uL Final       Chemistry        Component Value Date/Time     05/05/2025 0907     02/11/2025 0858    K 4.5 05/05/2025 0907    K 4.4 02/11/2025 0858     05/05/2025 0907     02/11/2025 0858    CO2 25 05/05/2025 0907    CO2 23 02/11/2025 0858    BUN 19 05/05/2025 0907    CREATININE 1.3 05/05/2025 0907    GLU 93 05/05/2025 0907    GLU 96 02/11/2025 0858        Component Value Date/Time    CALCIUM 9.7 05/05/2025 0907    CALCIUM 9.9 02/11/2025 0858    ALKPHOS 80 05/05/2025 0907    ALKPHOS 99 02/11/2025 0858    AST 39 05/05/2025 0907    AST 41 (H) 02/11/2025 0858    ALT 19 05/05/2025 0907    ALT 16 02/11/2025 0858    BILITOT 0.4 05/05/2025 0907    BILITOT 0.5 02/11/2025 0858    ESTGFRAFRICA 52.6 (A) 07/14/2022 1119    EGFRNONAA 45.5 (A) 07/14/2022 1119        Imaging  No results found. However, due to the size of the patient record, not all encounters were searched. Please check Results Review for a complete set of results.          Assessment:       1. Basal cell carcinoma (BCC) of skin of nose    2. 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 Squamous cell carcinoma of base of tongue    4. Secondary malignant neoplasm of cervical lymph node    5. Tracheostomy status    6. 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 Tolerating vismodegib well with improvement in pain and clinical response. Discussed RT with Dr. Joyce, but plan is to continue systemic therapy. Ok to continue.     Orbital cSCC  Unfortunately patient has progressed while on immunotherapy for below diagnosis. For that reason systemic therapies are limited. Evaluated  "at [facility] and surgeon does think an exenteration sparing resection would be possible with good response to induction chemotherapy. Plan was for platinum doublet chemo + cetuximab x 2 cycles. Notified of this plan on 1/29 and treatment plan was entered.   - Initiated dose reduced C1 of 5-FU to 750 mg/m2 due to age/frailty and growth factor. Patient still had cytopenias and mucositis, but was able to finish 2 cycles with delays. Now s/p exenteration. With PNI on path, referred to radiation oncology but adjuvant radiation deemed not necessary after exenteration.   - MRI orbit on 8/12/24 showing evidence of recurrent disease, "Nodular enhancing soft tissue superficially at the superomedial orbit and along the medial wall of the orbit posteriorly at least concerning for underlying recurrent lesion."   - Has undergone palliative RT to the right orbit. CT showing reduction of right orbital mass. Will continue Q3 month surveillance.    3,4,5. Recurrent SCC of base of tongue, P16+ - Status post total laryngectomy, total glossectomy and anterolateral thigh free flap reconstruction roughly 2017 at [facility] in [place] for persistent/recurrent base of tongue sq.c.c. 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 He is not a surgical or radiation candidate.  -Started on PCC 10/6/2020 followed by maintenance cetuximab until 8/24/21 (discontinued due to progression of disease; continued increase in SUV uptake, no new lesions).  - 9/2021 started on carbo/5-FU/pembrolizumab; due to toxicity went to pembrolizumab monotherapy starting with cycle 2.  Progression of disease noted after cycle 3 so added chemotherapy back in with cycle 4. Keytruda monotherapy starting with C9.   - Keytruda discontinued after 2 years.    6,7. S/p liver transplant and is currently on tacrolimus. Grade 1. Will continue to monitor.     8. Monitor TSH on synthroid dose of 100 mcg.    Patient is in " agreement with the proposed treatment plan. All questions were answered to the patient's satisfaction. Pt knows to call clinic if anything is needed before the next clinic visit.      MDM includes:    - Acute or chronic illness or injury that poses a threat to life or bodily function  - Independent review and explanation of 2 results from unique tests  - Discussion of management and ordering 2 unique tests  - Extensive discussion of treatment and management  - Prescription drug management  - Drug therapy requiring intensive monitoring for toxicity    Martín Hernandez M.D.  Hematology/Oncology Attending  Director [facility] Program  [facility]            Route Chart for Scheduling    Med Onc Chart Routing      Follow up with physician    Follow up with [facility] 6 weeks. with labs   Infusion scheduling note    Injection scheduling note    Labs CBC, CMP and other   Scheduling:  Preferred lab:  Lab interval:  cpk   Imaging    Pharmacy appointment    Other referrals            Supportive Plan Information  IV FLUIDS AND ELECTROLYTES Ct Francis NP   Associated Diagnosis: Acute kidney injury superimposed on chronic kidney disease   noted on 4/8/2024   Line of treatment: Supportive Care   Treatment goal: Supportive     Upcoming Treatment Dates - IV FLUIDS AND ELECTROLYTES    No upcoming days in selected categories.    Therapy Plan Information  PORT FLUSH for CKD (chronic kidney disease) stage 3, GFR 30-59 ml/min, noted on 10/5/2020  PORT FLUSH for Squamous cell carcinoma of base of tongue, noted on 9/28/2020  Flushes  heparin, porcine (PF) 100 unit/mL injection flush 500 Units  500 Units, Intravenous, Every visit  sodium chloride 0.9% flush 10 mL  10 mL, Intravenous, Every visit    PORT FLUSH for Squamous cell carcinoma of base of tongue, noted on 9/28/2020  PORT FLUSH for Secondary malignant neoplasm of cervical lymph node, noted on 2/9/2021  Flushes  heparin, porcine (PF) 100 unit/mL injection  flush 500 Units  500 Units, Intravenous, Every visit  sodium chloride 0.9% flush 10 mL  10 mL, Intravenous, Every visit      No therapy plan of the specified type found.

## 2025-05-27 ENCOUNTER — TELEPHONE (OUTPATIENT)
Dept: HEMATOLOGY/ONCOLOGY | Facility: CLINIC | Age: 75
End: 2025-05-27
Payer: MEDICARE

## 2025-05-28 ENCOUNTER — RESULTS FOLLOW-UP (OUTPATIENT)
Dept: RADIATION ONCOLOGY | Facility: HOSPITAL | Age: 75
End: 2025-05-28

## 2025-06-10 ENCOUNTER — PATIENT MESSAGE (OUTPATIENT)
Dept: ADMINISTRATIVE | Facility: OTHER | Age: 75
End: 2025-06-10
Payer: MEDICARE

## 2025-06-16 ENCOUNTER — OFFICE VISIT (OUTPATIENT)
Dept: PALLIATIVE MEDICINE | Facility: CLINIC | Age: 75
End: 2025-06-16
Payer: MEDICARE

## 2025-06-16 VITALS
DIASTOLIC BLOOD PRESSURE: 81 MMHG | SYSTOLIC BLOOD PRESSURE: 130 MMHG | BODY MASS INDEX: 19.64 KG/M2 | HEART RATE: 90 BPM | OXYGEN SATURATION: 99 % | WEIGHT: 129.19 LBS

## 2025-06-16 DIAGNOSIS — G89.29 CHRONIC LOW BACK PAIN, UNSPECIFIED BACK PAIN LATERALITY, UNSPECIFIED WHETHER SCIATICA PRESENT: ICD-10-CM

## 2025-06-16 DIAGNOSIS — G47.00 INSOMNIA, UNSPECIFIED TYPE: ICD-10-CM

## 2025-06-16 DIAGNOSIS — R43.2 LOSS OF TASTE: ICD-10-CM

## 2025-06-16 DIAGNOSIS — F43.21 ADJUSTMENT DISORDER WITH DEPRESSED MOOD: ICD-10-CM

## 2025-06-16 DIAGNOSIS — Z90.02 HISTORY OF LARYNGECTOMY: ICD-10-CM

## 2025-06-16 DIAGNOSIS — C01 SQUAMOUS CELL CARCINOMA OF BASE OF TONGUE: Primary | ICD-10-CM

## 2025-06-16 DIAGNOSIS — C32.9 LARYNX CANCER: ICD-10-CM

## 2025-06-16 DIAGNOSIS — M54.50 CHRONIC LOW BACK PAIN, UNSPECIFIED BACK PAIN LATERALITY, UNSPECIFIED WHETHER SCIATICA PRESENT: ICD-10-CM

## 2025-06-16 DIAGNOSIS — H57.11 EYE PAIN, RIGHT: ICD-10-CM

## 2025-06-16 DIAGNOSIS — Z51.5 ENCOUNTER FOR PALLIATIVE CARE: ICD-10-CM

## 2025-06-16 DIAGNOSIS — G89.3 CANCER RELATED PAIN: ICD-10-CM

## 2025-06-16 PROCEDURE — 99999 PR PBB SHADOW E&M-EST. PATIENT-LVL III: CPT | Mod: PBBFAC,,, | Performed by: NURSE PRACTITIONER

## 2025-06-16 PROCEDURE — 99214 OFFICE O/P EST MOD 30 MIN: CPT | Mod: S$PBB,,, | Performed by: NURSE PRACTITIONER

## 2025-06-16 PROCEDURE — 99213 OFFICE O/P EST LOW 20 MIN: CPT | Mod: PBBFAC | Performed by: NURSE PRACTITIONER

## 2025-06-16 RX ORDER — OXYCODONE HYDROCHLORIDE 5 MG/1
5 TABLET ORAL EVERY 8 HOURS PRN
Qty: 90 TABLET | Refills: 0 | Status: SHIPPED | OUTPATIENT
Start: 2025-06-16 | End: 2025-07-17

## 2025-06-16 NOTE — PROGRESS NOTES
Consult Note  Palliative Care      Consult Requested By: No ref. provider found  Reason for Consult: symptom mgmt/ACP      ASSESSMENT/PLAN:     Plan/Recommendations:  Diagnoses and all orders for this visit:    06/16/2025:  - referred to nutritionist     Squamous cell carcinoma of base of tongue, conjunctival intraepithelial neoplasm  - following with Dr. Hernandez and NP Tito  - s/p laryngectomy, total glossectomy   - 100% written communication   -  chemo Feb-March 2024  - s/p orbital exenteration   - MRI orbit 08/12/2024 with disease recurrence  - palliative RT 9/2024      Encounter for palliative care  - Patient is decisional  - Patient accompanied today by self   - ACP documents are uploaded into EMR   - Philosophy of Palliative Medicine reviewed with patient and family at first visit  - New patient folder given to and reviewed with patient and family at first visit  - Goals of care: Patient has excellent activity tolerance, his goal is to remain active and able to do the things he enjoys. He fly fishes frequently and enjoys taking walks around the [place]. He is a retired  of 35 years. He has no children and is unmarried, he jokes that being single has allowed him to buy a Kristine. He lives alone with a brother who lives about a mile from his house.      Cancer pain  - 7/16: notes facial pain started 3 weeks ago, right-side nose, adjacent to selam-orbital region.  Pain is intermittent, sometimes wakes him up   - has used a couple doses of oxycodone 5 mg left over from old prescription     Chronic low back pain, unspecified back pain laterality, unspecified whether sciatica present  - no longer requires pain medication for cancer-related pain  - states his chronic back pain is now more prominent, this is his biggest complaint  - he has tried PT and pain management with limited to no relief     - placed referral to [facility] for acupuncture     Insomnia  - reports 5-6 hours of sleep per  "night  - 2/2 chronic back pain, see above  - placed referral for acupuncture   - tip sheet provided in new patient folder  - will continue to monitor     Anorexia  - resolved  - excellent appetite     Adjustment disorder with depression  - coping methods: driving his Kristine, long walks, fishing, travel, time with his brother     Understanding of illness/Prognosis: good    Goals of care: life-prolonging, maintain good QOL    Follow up: 12 weeks    Patient's encounter and above plan of care discussed with patient's oncology team.     SUBJECTIVE:     History of Present Illness:  Patient is a 75 y.o. year old male presenting with SCC base of tongue. Please see oncology notes for full oncologic history and treatment course.     06/16/2025:  JESUS ALBERTO HERNÁNDEZ reviewed     History of Present Illness    INTERVAL HISTORY :  - Patient reports ongoing issues with taste loss, which began about a month after starting treatment pills around March, significantly impacting his ability to enjoy food. His appetite has been variable, with recent decline after a period of improvement. He experiences stomach discomfort described as feeling "in knots" after taking pain medication, though it's not characterized as nausea. He reports some constipation, for which he is taking effective medication. Pain management includes oxycodone 5mg, taken daily in the evening, typically after returning home and not when driving. He occasionally needs to take a full 10mg dose but this is rare. Patient has been using medical marijuana, obtained with a prescription, which previously helped with appetite but the effect on current appetite is unclear. He denies experiencing nausea or diarrhea.    ACTIVITIES OF DAILY LIVING:  - experiences constipation and is taking effective medication for it  - appetite has been "so-so" and has recently declined after a period of improvement  - experiencing loss of taste for about a month, which started after beginning treatment " pills around March  - able to drive  - [place] walks [place]       Assessment & Plan    PLAN SUMMARY:  - Incorporate more flavorful foods, especially citrus, to stimulate taste  - Referral to nutritionist for taste loss and appetite improvement  - Continue oxycodone 5 mg daily, typically taken at 7 PM  - Take pain medication with a small snack to manage stomach discomfort  - Contact office if needed before end of July  - Follow-up in early September after upcoming CTs      Portions of this note were generated by [employer].  Time spent with patient: 25 minutes         Past Medical History:   Diagnosis Date    Acute conjunctivitis of right eye 09/12/2022    Atrial fibrillation     Basal cell carcinoma (BCC) in situ of skin 2012    3 on face, 2 on arm, removed by dermatology.     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 Underwent surgical resection of tongue and neck. s/p tracheostomy    Squamous cell carcinoma of skin of right temple 01/24/2024    right temple     Past Surgical History:   Procedure Laterality Date    COLONOSCOPY      COLONOSCOPY N/A 09/14/2023    Procedure: COLONOSCOPY;  Surgeon: Reyes Olivia MD;  Location: [address];  Service: Endoscopy;  Laterality: N/A;    CONJUNCTIVA BIOPSY Right 10/11/2022    Procedure: BIOPSY, CONJUNCTIVA;  Surgeon: Victor M Vasques MD;  Location: [facility];  Service: Ophthalmology;  Laterality: Right;    ESOPHAGOGASTRODUODENOSCOPY  N/A 09/14/2023    Procedure: EGD (ESOPHAGOGASTRODUODENOSCOPY);  Surgeon: Reyes Olivia MD;  Location: [facility] ENDO (2ND FLR);  Service: Endoscopy;  Laterality: N/A;    ESOPHAGOGASTRODUODENOSCOPY N/A 04/10/2024    Procedure: EGD (ESOPHAGOGASTRODUODENOSCOPY);  Surgeon: Reyes Pagan MD;  Location: [facility] ENDO (2ND FLR);  Service: Endoscopy;  Laterality: N/A;    ESOPHAGOGASTRODUODENOSCOPY N/A 06/24/2024    Procedure: EGD (ESOPHAGOGASTRODUODENOSCOPY);  Surgeon: Reyes Pagan MD;  Location: [facility] ENDO (2ND FLR);  Service: Endoscopy;  Laterality: N/A;  Ref By: Dr. Hawkins   Procedure: egd  Diagnosis: esophagitis  Procedure Timing: 10 weeks  Prep: standard prep  6/21-pt r/s-inst to portal-2nd floor criteria-labs within 90 daysMS    EYE SURGERY Right 04/25/2024    exenteration of orbit    GLOSSECTOMY      INSERTION OF VENOUS ACCESS PORT Right 03/08/2021    Procedure: INSERTION, VENOUS ACCESS PORT;  Surgeon: Jesus Rendon MD;  [facility];  Service: General;  Laterality: Right;    LARYNGECTOMY      LIVER TRANSPLANT  11/2008    transplanted for biopsy proven hepatocellular carcinoma,     LYMPH NODE BIOPSY N/A 09/18/2020    Procedure: BIOPSY, LYMPH NODE;  Surgeon: Taz Diagnostic Provider;  Location: [facility] 2ND FLR;  Service: General;  Laterality: N/A;  Rm 173    skin cancer removals      SURGICAL REMOVAL OF SCAR Right 08/24/2023    Procedure: EXCISION, SCAR;  Surgeon: Ting Brambila MD;  Location: [facility];  Service: Ophthalmology;  Laterality: Right;    TRACHEOSTOMY       Family History   Problem Relation Name Age of Onset    Dementia Mother      Cataracts Neg Hx      Glaucoma Neg Hx      Macular degeneration Neg Hx       Review of patient's allergies indicates:   Allergen Reactions    Aspirin     Tylenol extended release        Medications:    Current Outpatient Medications:     azelaic acid (AZELEX) 15 % gel, Apply topically 2 (two) times daily., Disp: , Rfl:     levothyroxine (SYNTHROID) 88 MCG  tablet, TAKE 1 TABLET BY MOUTH ONCE  DAILY, Disp: 90 tablet, Rfl: 0    magic mouthwash diphen/antac/lidoc/nysta, Take 10 mLs by mouth 4 (four) times daily., Disp: 120 mL, Rfl: 4    metronidazole 0.75% (METROCREAM) 0.75 % Crea, Apply topically 2 (two) times daily., Disp: , Rfl:     multivit-min/FA/lycopen/lutein (CENTRUM SILVER MEN ORAL), Take 1 tablet by mouth once daily., Disp: , Rfl:     ondansetron (ZOFRAN-ODT) 8 MG TbDL, Take 1 tablet (8 mg total) by mouth every 8 (eight) hours as needed (nausea - first choice)., Disp: 60 tablet, Rfl: 4    prochlorperazine (COMPAZINE) 10 MG tablet, Take 1 tablet (10 mg total) by mouth every 6 (six) hours as needed (nausea/vomiting - second choice)., Disp: 30 tablet, Rfl: 1    senna (SENOKOT) 8.6 mg tablet, Take 2 tablets by mouth 2 (two) times daily as needed for Constipation., Disp: 60 tablet, Rfl: 2    tacrolimus (PROGRAF) 0.5 MG Cap, Take 1 capsule (0.5 mg total) by mouth every 12 (twelve) hours., Disp: 180 capsule, Rfl: 3    traZODone (DESYREL) 50 MG tablet, TAKE 1 TABLET BY MOUTH IN THE  EVENING, Disp: 90 tablet, Rfl: 3    vismodegib (ERIVEDGE) 150 mg Cap, Take 1 capsule (150 mg total) by mouth once daily., Disp: 30 capsule, Rfl: 3  No current facility-administered medications for this visit.    Facility-Administered Medications Ordered in Other Visits:     ofloxacin 0.3 % ophthalmic solution 1 drop, 1 drop, Right Eye, On Call Procedure, Victor M Vasques MD, 2 drop at 10/11/22 0745    sodium chloride 0.9% flush 10 mL, 10 mL, Intravenous, PRN, MorenaRonald MD    sodium chloride 0.9% flush 10 mL, 10 mL, Intravenous, PRN, Victor M Vasques MD    OBJECTIVE:       ROS:  Review of Systems   Constitutional:  Positive for fatigue. Negative for activity change, appetite change and chills.        Written communication    HENT:          Right side nose pain   Eyes:  Positive for pain. Negative for discharge and itching.   Respiratory: Negative.  Negative for apnea, cough  and chest tightness.    Cardiovascular: Negative.  Negative for chest pain, palpitations and leg swelling.   Gastrointestinal:  Negative for nausea and vomiting.   Genitourinary:  Negative for difficulty urinating, dysuria and enuresis.   Musculoskeletal:  Positive for back pain. Negative for arthralgias and gait problem.   Skin:  Positive for wound (left nare, healing). Negative for color change, pallor and rash.   Psychiatric/Behavioral:  Positive for dysphoric mood. Negative for sleep disturbance. The patient is not nervous/anxious.        Review of Symptoms      Symptom Assessment (ESAS 0-10 Scale)  Pain:  5  Dyspnea:  0  Anxiety:  0  Nausea:  0  Depression:  0  Anorexia:  5  Fatigue:  0  Insomnia:  3  Restlessness:  5  Agitation:  0     CAM / Delirium:  Negative  Constipation:  Negative  Diarrhea:  Positive    Anxiety:  Is not nervous/anxious    Bowel Management Plan (BMP):  No      Pain Assessment:  OME in 24 hours:  0  Location(s): back    Back       Location: lower        Quality: None        Quantity: 4/10 in intensity        Chronicity: Onset 0 year(s) ago, stable        Aggravating Factors: Activity        Alleviating Factors: None       Associated Symptoms: None    Modified Tricia Scale:  0    ECOG Performance Status stGstrstastdstest:st st1st Living Arrangements:  Lives alone    Psychosocial/Cultural:   See Palliative Psychosocial Note: Yes  **Primary  to Follow**  Palliative Care  Consult: No      Advance Care Planning   Advance Directives:   Living Will: Yes        Copy on chart: Yes    Medical Power of : Yes      Decision Making:  Patient answered questions  Goals of Care: What is most important right now is to focus on remaining as independent as possible, symptom/pain control. Accordingly, we have decided that the best plan to meet the patient's goals includes continuing with treatment.        Physical Exam:  Vitals:    Vitals:    06/16/25 1312   BP: 130/81   Pulse: 90          Physical Exam  Vitals reviewed.   Constitutional:       Appearance: He is underweight. He is not toxic-appearing or diaphoretic.      Comments: Written communication    HENT:      Head: Normocephalic and atraumatic.      Comments: Trach      Right Ear: External ear normal.      Left Ear: External ear normal.      Nose: Nose normal.      Comments: Wound, well-healed  Eyes:      General:         Right eye: No discharge.         Left eye: No discharge.      Comments: Right eye s/p enucleation, well-healed    Pulmonary:      Effort: Pulmonary effort is normal. No respiratory distress.      Breath sounds: No stridor.   Abdominal:      General: Abdomen is flat.      Palpations: Abdomen is soft.   Musculoskeletal:         General: No swelling or tenderness. Normal range of motion.      Cervical back: Normal range of motion.   Skin:     General: Skin is warm and dry.      Coloration: Skin is not jaundiced.      Findings: No bruising.   Neurological:      General: No focal deficit present.      Mental Status: He is alert and oriented to person, place, and time. Mental status is at baseline.   Psychiatric:         Mood and Affect: Mood normal. Mood is not anxious or depressed.         Behavior: Behavior normal.         Thought Content: Thought content normal.         Judgment: Judgment normal.         Labs:  CBC:   WBC   Date Value Ref Range Status   05/23/2025 6.34 3.90 - 12.70 K/uL Final     Hemoglobin   Date Value Ref Range Status   02/11/2025 11.9 (L) 14.0 - 18.0 g/dL Final     HGB   Date Value Ref Range Status   05/23/2025 11.6 (L) 14.0 - 18.0 gm/dL Final     Hematocrit   Date Value Ref Range Status   02/11/2025 35.5 (L) 40.0 - 54.0 % Final     HCT   Date Value Ref Range Status   05/23/2025 34.4 (L) 40.0 - 54.0 % Final     MCV   Date Value Ref Range Status   05/23/2025 103 (H) 82 - 98 fL Final   02/11/2025 101 (H) 82 - 98 fL Final     Platelet Count   Date Value Ref Range Status   05/23/2025 178 150 - 450 K/uL  Final     Platelets   Date Value Ref Range Status   02/11/2025 168 150 - 450 K/uL Final       LFT:   Lab Results   Component Value Date    AST 45 05/23/2025     (H) 07/09/2020    ALKPHOS 86 05/23/2025    BILITOT 0.4 05/23/2025       Albumin:   Albumin   Date Value Ref Range Status   05/23/2025 3.8 3.5 - 5.2 g/dL Final   02/11/2025 3.9 3.5 - 5.2 g/dL Final     Protein:   Protein Total   Date Value Ref Range Status   05/23/2025 7.9 6.0 - 8.4 gm/dL Final     Total Protein   Date Value Ref Range Status   02/11/2025 8.1 6.0 - 8.4 g/dL Final       Radiology:I have reviewed all pertinent imaging results/findings within the past 24 hours.    05/22/2025 CT orbits: Impression:     Stable post treatment changes status post right orbital exenteration.  No recurrent soft tissue mass identified.    5/22/2025 CT chest: Impression:     No detrimental change compared to prior exam.  Stable linear nodule in the left lower lobe measuring up to 0.5 cm.  No new pulmonary nodules.    08/12/2024 MRI orbits: Impression:     Initial postsurgical study status post right orbital mass resection with orbital exenteration.  Nodular enhancing soft tissue superficially at the superomedial orbit and along the medial wall of the orbit posteriorly at least concerning for underlying recurrent lesion.  Postcontrast CT imaging or preferably PET would be helpful for further evaluation on this initial post treatment study.    I spent a total of 35 minutes on the day of the visit.This includes face to face time in discussion of goals of care, symptom assessment, coordination of care and emotional support.  This also includes non-face to face time preparing to see the patient (eg, review of tests/imaging), obtaining and/or reviewing separately obtained history, documenting clinical information in the electronic or other health record, independently interpreting results and communicating results to the patient/family/caregiver, or care coordinator.      Signature: Arabella Cuevas, DNP

## 2025-06-25 ENCOUNTER — HOSPITAL ENCOUNTER (INPATIENT)
Facility: HOSPITAL | Age: 75
LOS: 4 days | Discharge: HOME | DRG: 378 | End: 2025-06-29
Attending: EMERGENCY MEDICINE | Admitting: HOSPITALIST
Payer: MEDICARE

## 2025-06-25 DIAGNOSIS — Z13.6 SCREENING FOR CARDIOVASCULAR CONDITION: ICD-10-CM

## 2025-06-25 DIAGNOSIS — Z94.4 STATUS POST LIVER TRANSPLANTATION: ICD-10-CM

## 2025-06-25 DIAGNOSIS — D62 ACUTE BLOOD LOSS ANEMIA: Primary | ICD-10-CM

## 2025-06-25 PROBLEM — C32.9 LARYNX CANCER: Status: RESOLVED | Noted: 2020-01-27 | Resolved: 2025-06-25

## 2025-06-25 LAB
ABO + RH BLD: NORMAL
ABO + RH BLD: NORMAL
ABSOLUTE EOSINOPHIL (OHS): 0.01 K/UL
ABSOLUTE MONOCYTE (OHS): 0.91 K/UL (ref 0.3–1)
ABSOLUTE NEUTROPHIL COUNT (OHS): 9.32 K/UL (ref 1.8–7.7)
ALBUMIN SERPL BCP-MCNC: 3.2 G/DL (ref 3.5–5.2)
ALLENS TEST: ABNORMAL
ALP SERPL-CCNC: 50 UNIT/L (ref 40–150)
ALT SERPL W/O P-5'-P-CCNC: 10 UNIT/L (ref 10–44)
ANION GAP (OHS): 15 MMOL/L (ref 8–16)
AST SERPL-CCNC: 19 UNIT/L (ref 11–45)
BASOPHILS # BLD AUTO: 0.01 K/UL
BASOPHILS NFR BLD AUTO: 0.1 %
BILIRUB SERPL-MCNC: 0.3 MG/DL (ref 0.1–1)
BILIRUB UR QL STRIP.AUTO: NEGATIVE
BIPAP: 0
BLD PROD TYP BPU: NORMAL
BLD PROD TYP BPU: NORMAL
BLOOD UNIT EXPIRATION DATE: NORMAL
BLOOD UNIT EXPIRATION DATE: NORMAL
BLOOD UNIT TYPE CODE: 6200
BLOOD UNIT TYPE CODE: 6200
BUN SERPL-MCNC: 47 MG/DL (ref 6–30)
BUN SERPL-MCNC: 50 MG/DL (ref 8–23)
CALCIUM SERPL-MCNC: 8.6 MG/DL (ref 8.7–10.5)
CHLORIDE SERPL-SCNC: 100 MMOL/L (ref 95–110)
CHLORIDE SERPL-SCNC: 98 MMOL/L (ref 95–110)
CLARITY UR: CLEAR
CO2 SERPL-SCNC: 19 MMOL/L (ref 23–29)
COLOR UR AUTO: YELLOW
CREAT SERPL-MCNC: 2.1 MG/DL (ref 0.5–1.4)
CREAT SERPL-MCNC: 2.2 MG/DL (ref 0.5–1.4)
CROSSMATCH INTERPRETATION: NORMAL
CROSSMATCH INTERPRETATION: NORMAL
DISPENSE STATUS: NORMAL
DISPENSE STATUS: NORMAL
ERYTHROCYTE [DISTWIDTH] IN BLOOD BY AUTOMATED COUNT: 15.2 % (ref 11.5–14.5)
FIO2: 21 %
GFR SERPLBLD CREATININE-BSD FMLA CKD-EPI: 32 ML/MIN/1.73/M2
GLUCOSE SERPL-MCNC: 182 MG/DL (ref 70–110)
GLUCOSE SERPL-MCNC: 190 MG/DL (ref 70–110)
GLUCOSE UR QL STRIP: NEGATIVE
HCT VFR BLD AUTO: 14.9 % (ref 40–54)
HCT VFR BLD CALC: <15 %PCV (ref 36–54)
HGB BLD-MCNC: 4.8 GM/DL (ref 14–18)
HGB UR QL STRIP: NEGATIVE
IMM GRANULOCYTES # BLD AUTO: 0.14 K/UL (ref 0–0.04)
IMM GRANULOCYTES NFR BLD AUTO: 1.3 % (ref 0–0.5)
INDIRECT COOMBS: NORMAL
KETONES UR QL STRIP: NEGATIVE
LACTATE SERPL-SCNC: 1.1 MMOL/L (ref 0.5–2.2)
LACTATE SERPL-SCNC: 3 MMOL/L (ref 0.5–2.2)
LDH SERPL L TO P-CCNC: 1.6 MMOL/L (ref 0.5–2.2)
LDH SERPL L TO P-CCNC: 6.84 MMOL/L (ref 0.5–2.2)
LEUKOCYTE ESTERASE UR QL STRIP: NEGATIVE
LYMPHOCYTES # BLD AUTO: 0.64 K/UL (ref 1–4.8)
MAGNESIUM SERPL-MCNC: 1.8 MG/DL (ref 1.6–2.6)
MCH RBC QN AUTO: 35 PG (ref 27–31)
MCHC RBC AUTO-ENTMCNC: 32.2 G/DL (ref 32–36)
MCV RBC AUTO: 109 FL (ref 82–98)
NITRITE UR QL STRIP: NEGATIVE
NUCLEATED RBC (/100WBC) (OHS): 1 /100 WBC
OHS QRS DURATION: 74 MS
OHS QTC CALCULATION: 449 MS
PH UR STRIP: 7 [PH]
PHOSPHATE SERPL-MCNC: 3.6 MG/DL (ref 2.7–4.5)
PLATELET # BLD AUTO: 220 K/UL (ref 150–450)
PMV BLD AUTO: 9.6 FL (ref 9.2–12.9)
POC IONIZED CALCIUM: 1.08 MMOL/L (ref 1.06–1.42)
POC PERFORMED BY: NORMAL
POC TCO2 (MEASURED): 18 MMOL/L (ref 23–29)
POC TEMPERATURE: 37 C
POTASSIUM BLD-SCNC: 4.5 MMOL/L (ref 3.5–5.1)
POTASSIUM SERPL-SCNC: 4.7 MMOL/L (ref 3.5–5.1)
PROT SERPL-MCNC: 5.9 GM/DL (ref 6–8.4)
PROT UR QL STRIP: NEGATIVE
RBC # BLD AUTO: 1.37 M/UL (ref 4.6–6.2)
RELATIVE EOSINOPHIL (OHS): 0.1 %
RELATIVE LYMPHOCYTE (OHS): 5.8 % (ref 18–48)
RELATIVE MONOCYTE (OHS): 8.3 % (ref 4–15)
RELATIVE NEUTROPHIL (OHS): 84.4 % (ref 38–73)
RH BLD: NORMAL
SAMPLE: ABNORMAL
SAMPLE: ABNORMAL
SITE: ABNORMAL
SODIUM BLD-SCNC: 134 MMOL/L (ref 136–145)
SODIUM SERPL-SCNC: 134 MMOL/L (ref 136–145)
SP GR UR STRIP: 1.03
SPECIMEN OUTDATE: NORMAL
SPECIMEN SOURCE: NORMAL
T4 FREE SERPL-MCNC: 1.08 NG/DL (ref 0.71–1.51)
TROPONIN I SERPL HS-MCNC: 6 NG/L
TSH SERPL-ACNC: 56.44 UIU/ML (ref 0.4–4)
UNIT NUMBER: NORMAL
UNIT NUMBER: NORMAL
UROBILINOGEN UR STRIP-ACNC: NEGATIVE EU/DL
WBC # BLD AUTO: 11.03 K/UL (ref 3.9–12.7)

## 2025-06-25 PROCEDURE — 86850 RBC ANTIBODY SCREEN: CPT | Performed by: EMERGENCY MEDICINE

## 2025-06-25 PROCEDURE — 96365 THER/PROPH/DIAG IV INF INIT: CPT

## 2025-06-25 PROCEDURE — 84484 ASSAY OF TROPONIN QUANT: CPT | Performed by: EMERGENCY MEDICINE

## 2025-06-25 PROCEDURE — 25500020 PHARM REV CODE 255: Performed by: EMERGENCY MEDICINE

## 2025-06-25 PROCEDURE — 25000003 PHARM REV CODE 250

## 2025-06-25 PROCEDURE — 25000003 PHARM REV CODE 250: Performed by: HOSPITALIST

## 2025-06-25 PROCEDURE — 36430 TRANSFUSION BLD/BLD COMPNT: CPT

## 2025-06-25 PROCEDURE — 84439 ASSAY OF FREE THYROXINE: CPT | Performed by: EMERGENCY MEDICINE

## 2025-06-25 PROCEDURE — 96366 THER/PROPH/DIAG IV INF ADDON: CPT

## 2025-06-25 PROCEDURE — 83735 ASSAY OF MAGNESIUM: CPT | Performed by: EMERGENCY MEDICINE

## 2025-06-25 PROCEDURE — P9016 RBC LEUKOCYTES REDUCED: HCPCS | Performed by: EMERGENCY MEDICINE

## 2025-06-25 PROCEDURE — 82247 BILIRUBIN TOTAL: CPT | Performed by: EMERGENCY MEDICINE

## 2025-06-25 PROCEDURE — 84100 ASSAY OF PHOSPHORUS: CPT | Performed by: EMERGENCY MEDICINE

## 2025-06-25 PROCEDURE — 99291 CRITICAL CARE FIRST HOUR: CPT

## 2025-06-25 PROCEDURE — 93005 ELECTROCARDIOGRAM TRACING: CPT

## 2025-06-25 PROCEDURE — 25000003 PHARM REV CODE 250: Performed by: EMERGENCY MEDICINE

## 2025-06-25 PROCEDURE — 63600175 PHARM REV CODE 636 W HCPCS: Performed by: EMERGENCY MEDICINE

## 2025-06-25 PROCEDURE — 83605 ASSAY OF LACTIC ACID: CPT

## 2025-06-25 PROCEDURE — 87040 BLOOD CULTURE FOR BACTERIA: CPT | Performed by: EMERGENCY MEDICINE

## 2025-06-25 PROCEDURE — 94761 N-INVAS EAR/PLS OXIMETRY MLT: CPT

## 2025-06-25 PROCEDURE — 85025 COMPLETE CBC W/AUTO DIFF WBC: CPT | Performed by: EMERGENCY MEDICINE

## 2025-06-25 PROCEDURE — 12000002 HC ACUTE/MED SURGE SEMI-PRIVATE ROOM

## 2025-06-25 PROCEDURE — 30233N1 TRANSFUSION OF NONAUTOLOGOUS RED BLOOD CELLS INTO PERIPHERAL VEIN, PERCUTANEOUS APPROACH: ICD-10-PCS | Performed by: EMERGENCY MEDICINE

## 2025-06-25 PROCEDURE — 99900035 HC TECH TIME PER 15 MIN (STAT)

## 2025-06-25 PROCEDURE — 63600175 PHARM REV CODE 636 W HCPCS: Performed by: HOSPITALIST

## 2025-06-25 PROCEDURE — 96361 HYDRATE IV INFUSION ADD-ON: CPT

## 2025-06-25 PROCEDURE — 84443 ASSAY THYROID STIM HORMONE: CPT | Performed by: EMERGENCY MEDICINE

## 2025-06-25 PROCEDURE — 96375 TX/PRO/DX INJ NEW DRUG ADDON: CPT

## 2025-06-25 PROCEDURE — 86920 COMPATIBILITY TEST SPIN: CPT | Performed by: EMERGENCY MEDICINE

## 2025-06-25 PROCEDURE — 81003 URINALYSIS AUTO W/O SCOPE: CPT | Performed by: EMERGENCY MEDICINE

## 2025-06-25 PROCEDURE — 93010 ELECTROCARDIOGRAM REPORT: CPT | Mod: ,,, | Performed by: STUDENT IN AN ORGANIZED HEALTH CARE EDUCATION/TRAINING PROGRAM

## 2025-06-25 RX ORDER — TRAZODONE HYDROCHLORIDE 50 MG/1
50 TABLET ORAL NIGHTLY
Status: DISCONTINUED | OUTPATIENT
Start: 2025-06-25 | End: 2025-06-29 | Stop reason: HOSPADM

## 2025-06-25 RX ORDER — HYDROCODONE BITARTRATE AND ACETAMINOPHEN 500; 5 MG/1; MG/1
TABLET ORAL
Status: DISCONTINUED | OUTPATIENT
Start: 2025-06-25 | End: 2025-06-27

## 2025-06-25 RX ORDER — ONDANSETRON HYDROCHLORIDE 2 MG/ML
4 INJECTION, SOLUTION INTRAVENOUS EVERY 8 HOURS PRN
Status: DISCONTINUED | OUTPATIENT
Start: 2025-06-25 | End: 2025-06-29 | Stop reason: HOSPADM

## 2025-06-25 RX ORDER — TACROLIMUS 0.5 MG/1
0.5 CAPSULE ORAL 2 TIMES DAILY
Status: DISCONTINUED | OUTPATIENT
Start: 2025-06-25 | End: 2025-06-29 | Stop reason: HOSPADM

## 2025-06-25 RX ORDER — PANTOPRAZOLE SODIUM 40 MG/10ML
40 INJECTION, POWDER, LYOPHILIZED, FOR SOLUTION INTRAVENOUS 2 TIMES DAILY
Status: DISCONTINUED | OUTPATIENT
Start: 2025-06-25 | End: 2025-06-28

## 2025-06-25 RX ORDER — PANTOPRAZOLE SODIUM 40 MG/10ML
80 INJECTION, POWDER, LYOPHILIZED, FOR SOLUTION INTRAVENOUS
Status: COMPLETED | OUTPATIENT
Start: 2025-06-25 | End: 2025-06-25

## 2025-06-25 RX ADMIN — PIPERACILLIN SODIUM AND TAZOBACTAM SODIUM 4.5 G: 4; .5 INJECTION, POWDER, FOR SOLUTION INTRAVENOUS at 02:06

## 2025-06-25 RX ADMIN — TACROLIMUS 0.5 MG: 0.5 CAPSULE ORAL at 11:06

## 2025-06-25 RX ADMIN — SODIUM CHLORIDE 1000 ML: 9 INJECTION, SOLUTION INTRAVENOUS at 01:06

## 2025-06-25 RX ADMIN — IOHEXOL 75 ML: 350 INJECTION, SOLUTION INTRAVENOUS at 03:06

## 2025-06-25 RX ADMIN — VANCOMYCIN HYDROCHLORIDE 1250 MG: 1.25 INJECTION, POWDER, LYOPHILIZED, FOR SOLUTION INTRAVENOUS at 04:06

## 2025-06-25 RX ADMIN — PANTOPRAZOLE SODIUM 40 MG: 40 INJECTION, POWDER, LYOPHILIZED, FOR SOLUTION INTRAVENOUS at 11:06

## 2025-06-25 RX ADMIN — TRAZODONE HYDROCHLORIDE 50 MG: 50 TABLET ORAL at 11:06

## 2025-06-25 RX ADMIN — PANTOPRAZOLE SODIUM 80 MG: 40 INJECTION, POWDER, LYOPHILIZED, FOR SOLUTION INTRAVENOUS at 03:06

## 2025-06-25 NOTE — PROVIDER PROGRESS NOTES - EMERGENCY DEPT.
Encounter Date: 6/25/2025    ED Physician Progress Notes        Physician Note:   I assumed care of this patient from Dr. Onofre BAZAN at 1700. Patient with acute blood loss anemia 2/2 GIB. CTA GIB protocol with no acute contrast extravasation,. Patient receiving 2nd unit of pRBCs.  Lactic acid 3.0.  Patient to be admitted to the hospital medicine for acute GI bleed.    Attestation of Dr. Negrete (Attending Physician):      I have reviewed the record and the patient care provided by the Resident provider and agree with the documented HPI, ROS, PE.  I also agree with the treatment plan and work up and I have performed an independent history / physical exam and MDM.

## 2025-06-25 NOTE — SUBJECTIVE & OBJECTIVE
Past Medical History:   Diagnosis Date    Acute conjunctivitis of right eye 09/12/2022    Atrial fibrillation     Basal cell carcinoma (BCC) in situ of skin 2012    3 on face, 2 on arm, removed by dermatology.     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 Underwent surgical resection of tongue and neck. s/p tracheostomy    Squamous cell carcinoma of skin of right temple 01/24/2024    right temple       Past Surgical History:   Procedure Laterality Date    COLONOSCOPY      COLONOSCOPY N/A 09/14/2023    Procedure: COLONOSCOPY;  Surgeon: Reyes Olivia MD;  Location: [address]);  Service: Endoscopy;  Laterality: N/A;    CONJUNCTIVA BIOPSY Right 10/11/2022    Procedure: BIOPSY, CONJUNCTIVA;  Surgeon: Victor M Vasques MD;  Location: [facility];  Service: Ophthalmology;  Laterality: Right;    ESOPHAGOGASTRODUODENOSCOPY N/A 09/14/2023    Procedure: EGD (ESOPHAGOGASTRODUODENOSCOPY);  Surgeon: Reyes Olivia MD;  Location: [address]);  Service: Endoscopy;  Laterality: N/A;    ESOPHAGOGASTRODUODENOSCOPY N/A 04/10/2024    Procedure: EGD (ESOPHAGOGASTRODUODENOSCOPY);  Surgeon: Reyes Pagan MD;  [address]  Service: Endoscopy;  Laterality: N/A;    ESOPHAGOGASTRODUODENOSCOPY N/A 06/24/2024    Procedure: EGD (ESOPHAGOGASTRODUODENOSCOPY);  Surgeon: Reyes Pagan MD;  Location: [address]);  Service: Endoscopy;  Laterality: N/A;  Ref By: Dr. Hawkins    Procedure: egd  Diagnosis: esophagitis  Procedure Timing: 10 weeks  Prep: standard prep  6/21-pt r/s-inst to portal-2nd floor criteria-labs within 90 daysMS    EYE SURGERY Right 04/25/2024    exenteration of orbit    GLOSSECTOMY      INSERTION OF VENOUS ACCESS PORT Right 03/08/2021    Procedure: INSERTION, VENOUS ACCESS PORT;  Surgeon: Jesus Rendon MD;  Location: [facility] OR [address] [place] [address];  Service: General;  Laterality: Right;    LARYNGECTOMY      LIVER TRANSPLANT  11/2008    transplanted for biopsy proven hepatocellular carcinoma,     LYMPH NODE BIOPSY N/A 09/18/2020    Procedure: BIOPSY, LYMPH NODE;  [facility];  Location: [facility] OR [address] [place] [address];  Service: General;  Laterality: N/A;  Rm 173    skin cancer removals      SURGICAL REMOVAL OF SCAR Right 08/24/2023    Procedure: EXCISION, SCAR;  Surgeon: Ting Brambila MD;  Location: [facility] OR;  Service: Ophthalmology;  Laterality: Right;    TRACHEOSTOMY         Review of patient's allergies indicates:   Allergen Reactions    Aspirin     Tylenol extended release        Family History       Problem Relation (Age of Onset)    Dementia Mother          Tobacco Use    Smoking status: Never    Smokeless tobacco: Never   Substance and Sexual Activity    Alcohol use: Yes     Comment: drinks wine, 1 glass day    Drug use: Not Currently    Sexual activity: Yes     Comment: No prior history of STD       Review of Systems   Constitutional:  Positive for activity change and appetite change.   Respiratory:  Negative for shortness of breath.    Cardiovascular:  Negative for chest pain.   Gastrointestinal:  Positive for blood in stool. Negative for abdominal pain, rectal pain and vomiting.   Neurological:  Positive for dizziness, weakness and light-headedness.   All other systems reviewed and are negative.    Objective:     Vital Signs (Most Recent):  Temp: 98 °F (36.7 °C) (06/25/25 1636)  Pulse: 108 (06/25/25 1713)  Resp: 18 (06/25/25 1552)  BP: (!) 111/57 (06/25/25  1700)  SpO2: 100 % (06/25/25 1713) Vital Signs (24h Range):  Temp:  [97.9 °F (36.6 °C)-98.1 °F (36.7 °C)] 98 °F (36.7 °C)  Pulse:  [] 108  Resp:  [16-18] 18  SpO2:  [83 %-100 %] 100 %  BP: ()/(51-73) 111/57      There is no height or weight on file to calculate BMI.      Intake/Output Summary (Last 24 hours) at 6/25/2025 1745  Last data filed at 6/25/2025 1545  Gross per 24 hour   Intake 1283 ml   Output --   Net 1283 ml          Physical Exam  Vitals and nursing note reviewed.   Constitutional:       General: He is not in acute distress.     Appearance: He is ill-appearing. He is not toxic-appearing.   HENT:      Mouth/Throat:      Mouth: Mucous membranes are dry.   Cardiovascular:      Rate and Rhythm: Tachycardia present.   Pulmonary:      Effort: Pulmonary effort is normal. No respiratory distress.   Skin:     General: Skin is warm and dry.   Neurological:      General: No focal deficit present.      Mental Status: He is alert and oriented to person, place, and time. Mental status is at baseline.   Psychiatric:         Behavior: Behavior normal.         Thought Content: Thought content normal.            Vents:     Lines/Drains/Airways       Central Venous Catheter Line  Duration                  PowerPort A Cath Single Lumen 03/08/21 1031 right internal jugular 1570 days              Airway  Duration                  Laryngectomy (Do Not Remove) 08/24/23 1145  671 days              Peripheral Intravenous Line  Duration             Peripheral IV Single Lumen 06/25/25 1250 20 G Anterior;Left Forearm <1 day    Peripheral IV Single Lumen 06/25/25 1319 20 G Anterior;Distal;Right Upper Arm <1 day                  Significant Labs:    CBC/Anemia Profile:  Recent Labs   Lab 06/25/25  1307 06/25/25  1325   WBC 11.03  --    HGB 4.8*  --    HCT 14.9* <15*     --    *  --    RDW 15.2*  --         Chemistries:  Recent Labs   Lab 06/25/25  1307   *   K 4.7      CO2 19*   BUN 50*    CREATININE 2.1*   CALCIUM 8.6*   ALBUMIN 3.2*   PROT 5.9*   BILITOT 0.3   ALKPHOS 50   ALT 10   AST 19   MG 1.8   PHOS 3.6       BMP:   Recent Labs   Lab 06/25/25  1307   *   *   K 4.7      CO2 19*   BUN 50*   CREATININE 2.1*   CALCIUM 8.6*   MG 1.8     CMP:   Recent Labs   Lab 06/25/25  1307   *   K 4.7      CO2 19*   *   BUN 50*   CREATININE 2.1*   CALCIUM 8.6*   PROT 5.9*   ALBUMIN 3.2*   BILITOT 0.3   ALKPHOS 50   AST 19   ALT 10   ANIONGAP 15     All pertinent labs within the past 24 hours have been reviewed.    Significant Imaging: I have reviewed all pertinent imaging results/findings within the past 24 hours.

## 2025-06-25 NOTE — ED PROVIDER NOTES
Encounter Date: 6/25/2025       History     Chief Complaint   Patient presents with    Dizziness     Patient arrives via EMS for dizziness and vertigo for about a week. Patient recently started a new chemo treatment. Patient is aphasic at baseline.      Rocco Swain is a 75 y.o. 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 Patient is aphasic and writes to respond.He reports headache, dizziness and lightheadedness for 2 days. He has felt weak and unable to get up for 2 days. He also reports some fever and chills. Has had BRBPR, more than a teaspoon since yesterday. He has felt nauseous but no vomiting. He feels dehydrated, only has been drinking some ensure. He denies any abdominal pain or dysuria. He denies any SOB, cough, congestion or sore throat. He has had some palpations, but denies CP. Reports that he had an episode of bloody stools previously in September.        Review of patient's allergies indicates:   Allergen Reactions    Aspirin     Tylenol extended release      Past Medical History:   Diagnosis Date    Acute conjunctivitis of right eye 09/12/2022    Atrial fibrillation     Basal cell carcinoma (BCC) in situ of skin 2012    3 on face, 2 on arm, removed by dermatology.     Basal cell carcinoma (BCC) of neck 01/24/2024    lower mid neck    Cataract     Conjunctival lesion 10/11/2022    COPD (chronic obstructive pulmonary disease)     Deep vein thrombosis     Disorder of kidney and ureter     Hepatitis C 01/27/2020    Hepatitis C, chronic 2006    Treated for Hep C x 6 months, normal viral load since 07/2006    Hypothyroidism     Larynx cancer     Left ear pain 10/24/2022    Liver transplanted     Lumbar disc disease     Squamous cell carcinoma  in situ (SCCIS) of tongue 02/2016    Treated with radiation to neck and chemotherapy. Underwent surgical resection of tongue and neck. s/p tracheostomy    Squamous cell carcinoma of skin of right temple 01/24/2024    right temple     Past Surgical History:   Procedure Laterality Date    COLONOSCOPY      COLONOSCOPY N/A 09/14/2023    Procedure: COLONOSCOPY;  Surgeon: Reyes Olivia MD;  [facility]  Service: Endoscopy;  Laterality: N/A;    CONJUNCTIVA BIOPSY Right 10/11/2022    Procedure: BIOPSY, CONJUNCTIVA;  Surgeon: Victor M Vasques MD;  Location: [facility];  Service: Ophthalmology;  Laterality: Right;    ESOPHAGOGASTRODUODENOSCOPY N/A 09/14/2023    Procedure: EGD (ESOPHAGOGASTRODUODENOSCOPY);  Surgeon: Reyes Olivia MD;  [address]  Service: Endoscopy;  Laterality: N/A;    ESOPHAGOGASTRODUODENOSCOPY N/A 04/10/2024    Procedure: EGD (ESOPHAGOGASTRODUODENOSCOPY);  Surgeon: Reyes Pagan MD;  [facility]  Service: Endoscopy;  Laterality: N/A;    ESOPHAGOGASTRODUODENOSCOPY N/A 06/24/2024    Procedure: EGD (ESOPHAGOGASTRODUODENOSCOPY);  Surgeon: Reyes Pagan MD;  [facility]  Service: Endoscopy;  Laterality: N/A;  Ref By: Dr. Hawkins   Procedure: egd  Diagnosis: esophagitis  Procedure Timing: 10 weeks  Prep: standard prep  6/21-pt r/s-inst to portal-2nd floor criteria-labs within 90 daysMS    EYE SURGERY Right 04/25/2024    exenteration of orbit    GLOSSECTOMY      INSERTION OF VENOUS ACCESS PORT Right 03/08/2021    Procedure: INSERTION, VENOUS ACCESS PORT;  Surgeon: Jesus Rendon MD;  Location: [address];  Service: General;  Laterality: Right;    LARYNGECTOMY      LIVER TRANSPLANT  11/2008    transplanted for biopsy proven hepatocellular carcinoma,     LYMPH NODE BIOPSY N/A 09/18/2020    Procedure: BIOPSY, LYMPH NODE;  Surgeon: [facility];  Location: [address];  Service: General;   Laterality: N/A;  Rm 173    skin cancer removals      SURGICAL REMOVAL OF SCAR Right 08/24/2023    Procedure: EXCISION, SCAR;  Surgeon: Ting Brambila MD;  Location: [facility] OR;  Service: Ophthalmology;  Laterality: Right;    TRACHEOSTOMY       Family History   Problem Relation Name Age of Onset    Dementia Mother      Cataracts Neg Hx      Glaucoma Neg Hx      Macular degeneration Neg Hx       Social History[1]  Review of Systems   Constitutional:  Positive for activity change, appetite change, chills, fatigue and fever.   Respiratory:  Negative for cough and shortness of breath.    Cardiovascular:  Positive for palpitations. Negative for chest pain and leg swelling.   Gastrointestinal:  Positive for blood in stool, diarrhea and nausea. Negative for abdominal pain.   Genitourinary:  Negative for dysuria and frequency.   Neurological:  Positive for dizziness, light-headedness and headaches.       Physical Exam     Initial Vitals [06/25/25 1128]   BP Pulse Resp Temp SpO2   (!) 84/52 97 16 98.1 °F (36.7 °C) 99 %      MAP       --         Physical Exam    Constitutional: He appears well-developed. He is not diaphoretic. No distress.   HENT:   Head: Normocephalic and atraumatic.   Eyes: EOM are normal.   Neck:   Trach in place   Normal range of motion.  Cardiovascular:  Normal rate and regular rhythm.           No murmur heard.  Pulmonary/Chest: Breath sounds normal. No respiratory distress. He has no wheezes. He has no rales.   Abdominal: Abdomen is soft. He exhibits no distension. There is no abdominal tenderness.   Musculoskeletal:         General: No tenderness or edema. Normal range of motion.      Cervical back: Normal range of motion.     Neurological: He is alert.   Skin: Skin is warm. There is pallor.         ED Course   Procedures  Labs Reviewed   COMPREHENSIVE METABOLIC PANEL - Abnormal       Result Value    Sodium 134 (*)     Potassium 4.7      Chloride 100      CO2 19 (*)     Glucose 182 (*)     BUN 50 (*)      Creatinine 2.1 (*)     Calcium 8.6 (*)     Protein Total 5.9 (*)     Albumin 3.2 (*)     Bilirubin Total 0.3      ALP 50      AST 19      ALT 10      Anion Gap 15      eGFR 32 (*)    CBC WITH DIFFERENTIAL - Abnormal    WBC 11.03      RBC 1.37 (*)     HGB 4.8 (*)     HCT 14.9 (*)      (*)     MCH 35.0 (*)     MCHC 32.2      RDW 15.2 (*)     Platelet Count 220      MPV 9.6      Nucleated RBC 1 (*)     Neut % 84.4 (*)     Lymph % 5.8 (*)     Mono % 8.3      Eos % 0.1      Basophil % 0.1      Imm Grans % 1.3 (*)     Neut # 9.32 (*)     Lymph # 0.64 (*)     Mono # 0.91      Eos # 0.01      Baso # 0.01      Imm Grans # 0.14 (*)    TSH - Abnormal    TSH 56.437 (*)    ISTAT LACTATE - Abnormal    POC Lactate 6.84 (*)     Sample VENOUS      Site Other      Allens Test N/A     ISTAT PROCEDURE - Abnormal    POC Glucose 190 (*)     POC BUN 47 (*)     POC Creatinine 2.2 (*)     POC Sodium 134 (*)     POC Potassium 4.5      POC Chloride 98      POC TCO2 (MEASURED) 18 (*)     POC Ionized Calcium 1.08      POC Hematocrit <15 (*)     Sample JAMIE     MAGNESIUM - Normal    Magnesium  1.8     PHOSPHORUS - Normal    Phosphorus Level 3.6     TROPONIN I HIGH SENSITIVITY - Normal    Troponin High Sensitive 6     T4, FREE - Normal    Free T4 1.08     CULTURE, BLOOD   CULTURE, BLOOD   CBC W/ AUTO DIFFERENTIAL    Narrative:     The following orders were created for panel order CBC auto differential.  Procedure                               Abnormality         Status                     ---------                               -----------         ------                     CBC with Differential[[id number]]       Abnormal            Final result                 Please view results for these tests on the individual orders.   URINALYSIS, REFLEX TO URINE CULTURE   LACTIC ACID, PLASMA   TYPE & SCREEN    Specimen Outdate 06/28/2025 23:59      Group & Rh A POS      Indirect Giacomo NEG     ISTAT CHEM8   PREPARE RBC SOFT    UNIT NUMBER  [id number]      UNIT ABO/RH A POS      DISPENSE STATUS Issued      Unit Expiration [id number]      Product Code F7577I09      Unit Blood Type Code 6200      CROSSMATCH INTERPRETATION Compatible      UNIT NUMBER [id number]      UNIT ABO/RH A POS      DISPENSE STATUS Selected      Unit Expiration [id number]      Product Code K3392A57      Unit Blood Type Code 6200      CROSSMATCH INTERPRETATION Compatible     PREPARE RBC SOFT        ECG Results              EKG 12-lead (Final result)        Collection Time Result Time QRS Duration OHS QTC Calculation    06/25/25 12:32:01 06/25/25 13:54:12 74 449                     Final result by Interface, Lab [facility] (06/25/25 13:54:17)                   Narrative:    Test Reason : Z13.6,    Vent. Rate :  96 BPM     Atrial Rate :  97 BPM     P-R Int :    ms          QRS Dur :  74 ms      QT Int : 356 ms       P-R-T Axes :     60  49 degrees    QTcB Int : 449 ms    Normal sinus rhythm  Nonspecific ST abnormality  Abnormal ECG  When compared with ECG of 29-Aug-2024 12:22,  Sinus rhythm is now present  Confirmed by Max Mensah ([id number]) on 6/25/2025 1:54:06 PM    Referred By: AAAREFERRAL SELF           Confirmed By: Max Mensah                                  Imaging Results              CTA Acute GI Bleed, Abdomen and Pelvis (In process)  Result time 06/25/25 17:03:58   Procedure changed from CTA Abdomen and Pelvis                    X-Ray Chest AP Portable (Final result)  Result time 06/25/25 13:01:45      Final result by Willie Montemayor MD (06/25/25 13:01:45)                   Impression:      See above      Electronically signed by: Willie Montemayor MD  Date:    06/25/2025  Time:    13:01               Narrative:    EXAMINATION:  XR CHEST AP PORTABLE    CLINICAL HISTORY:  Sepsis;    TECHNIQUE:  Single frontal view of the chest was performed.    COMPARISON:  No 04/08/2024 ne    FINDINGS:  Central venous catheter in the SVC.  Heart size normal.  The lungs  are clear.  No pleural effusion                                       Medications   0.9%  NaCl infusion (for blood administration) (has no administration in time range)   0.9%  NaCl infusion (for blood administration) (has no administration in time range)   vancomycin 1,250 mg in 0.9% NaCl 250 mL IVPB (admixture device) (1,250 mg Intravenous New Bag 6/25/25 1659)   sodium chloride 0.9% bolus 1,000 mL 1,000 mL (0 mLs Intravenous Stopped 6/25/25 1545)   piperacillin-tazobactam (ZOSYN) 4.5 g in D5W 100 mL IVPB (MB+) (0 g Intravenous Stopped 6/25/25 1545)   pantoprazole injection 80 mg (80 mg Intravenous Given 6/25/25 1545)   iohexoL (OMNIPAQUE 350) injection 75 mL (75 mLs Intravenous Given 6/25/25 1532)     Medical Decision Making  Rocco Swain is a 75 y.o. 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 Hypotensive BP 84/52 on admission improved to 90/70s with 1 L NS.  Labs significant for Hgb 4.8 and Lactate 6.8.  Ddx includes GI bleed, infection - pneumona, UTI, gastritis, hemorrhoids, orthostatic hypotension, dehydration, and brain metastasis. Most likely lower GI bleed given BRBPR, no dark stool or emesis.     - Given 1 L NS   - CBC with hgb of 4.8 and lactate 6.8, Cr 2.1, Bicarb 19  - blood consent completed and 2 units pRBCs ordered  - Ordered CTA  - ICU consulted, recommended repeat lactate and to follow up after CTA  - Vanc/Zosyn ordered. Given that patient is immunocompromised (s/p liver transplant on tacrolimus, SCC) with reported fever, chills and hypotension. However could likely be related to GI bleed and deescalated if no longer concerned for infectious etiology.       Discussed patient with Dr. Jenae Reyes at sign off who will be taking over care.           Amount and/or Complexity of  Data Reviewed  Labs: ordered.     Details: - CBC, CMP, UA, CXR,  type and screen     Radiology: ordered.    Risk  Prescription drug management.               ED Course as of 06/25/25 1708 Wed Jun 25, 2025   1255 EKG, independently interpreted, appears to be sinus at a rate of 96 nonspecific ST abnormalities normal axis intervals are not remarkable. [BA]      ED Course User Index  [BA] Robert Gonsales MD                           Clinical Impression:  Final diagnoses:  [Z13.6] Screening for cardiovascular condition                       [1]   Social History  Tobacco Use    Smoking status: Never    Smokeless tobacco: Never   Substance Use Topics    Alcohol use: Yes     Comment: drinks wine, 1 glass day    Drug use: Not Currently        Marisa Adhikari MD  Resident  06/25/25 170

## 2025-06-25 NOTE — ED NOTES
I-STAT Chem-8+ Results:   Value Reference Range   Sodium 134 136-145 mmol/L   Potassium  4.5 3.5-5.1 mmol/L   Chloride 98  mmol/L   Ionized Calcium 1.08 1.06-1.42 mmol/L   CO2 (measured) 18 23-29 mmol/L   Glucose 190  mg/dL   BUN 47 6-30 mg/dL   Creatinine 2.2 0.5-1.4 mg/dL   Hematocrit 15 36-54%

## 2025-06-25 NOTE — PHARMACY MED REC
"Admission Medication History     The home medication history was taken by Elda Buitrago.    You may go to "Admission" then "Reconcile Home Medications" tabs to review and/or act upon these items.     The home medication list has been updated by the Pharmacy department.   Please read ALL comments highlighted in yellow.   Please address this information as you see fit.    Feel free to contact us if you have any questions or require assistance.        THIS MEDICATION HISTORY WAS OBTAINED FROM [employer] SOFTWARE ONLY.      Medications listed below were obtained from: [employer]ytic software- [employer]  Current Outpatient Medications on File Prior to Encounter   Medication Sig    azelaic acid (AZELEX) 15 % gel Apply topically 2 (two) times daily.    levothyroxine (SYNTHROID) 88 MCG tablet TAKE 1 TABLET BY MOUTH ONCE  DAILY    magic mouthwash diphen/antac/lidoc/nysta Take 10 mLs by mouth 4 (four) times daily.    metronidazole 0.75% (METROCREAM) 0.75 % Crea Apply topically 2 (two) times daily.    multivit-min/FA/lycopen/lutein (CENTRUM SILVER MEN ORAL) Take 1 tablet by mouth once daily.    ondansetron (ZOFRAN-ODT) 8 MG TbDL Take 1 tablet (8 mg total) by mouth every 8 (eight) hours as needed (nausea - first choice).      oxyCODONE (ROXICODONE) 5 MG immediate release tablet Take 1 tablet (5 mg total) by mouth every 8 (eight) hours as needed for Pain.  LAST FILLED 06/17 FOR 30 DAY SUPPLY      prochlorperazine (COMPAZINE) 10 MG tablet Take 1 tablet (10 mg total) by mouth every 6 (six) hours as needed (nausea/vomiting - second choice).    senna (SENOKOT) 8.6 mg tablet Take 2 tablets by mouth 2 (two) times daily as needed for Constipation.    tacrolimus (PROGRAF) 0.5 MG Cap Take 1 capsule (0.5 mg total) by mouth every 12 (twelve) hours.  LAST FILLED 04/14 FOR 90 DAY SUPPLY      traZODone (DESYREL) 50 MG tablet TAKE 1 TABLET BY MOUTH IN THE  EVENING  LAST FILLED 06/11 FOR 90 DAY SUPPLY      vismodegib (ERIVEDGE) 150 mg Cap Take 1 capsule " (150 mg total) by mouth once daily.  LAST FILLED 06/17 FOR 28 DAY SUPPLY                 Elda Buitrago  EXT [id number]                  .           HPI:  89 y/o Female PMH of HTN, DM2, HL, seizure d/o, gout presents to ED for eval of increasing weakness and change in mental status over the past few weeks.  Family states some days pt is at her baseline, then other times she is more lethargic, noted drooling.  Family states pt taken to outside hospital twice for sx, work up neg & dc home.  Family states pt has been compliant w her meds, no known grand mal seizures.  No fall/head trauma.  Pt currently denies pain, denies all complaints; she is poor historian, on memantine, possible dementia. (10 Dec 2019 06:42)  ---------------------------- As Above ---------------------------------------------------- Patient seen earlier today  Called to see patient regarding abnormal CT scan w/o oral contrast. Stomach appears lobulated. The patient has no symptoms to suggest any stomach pathology.  The patient ( daughter ) denies melena, hematochezia, hematemesis, nausea, vomiting, abdominal pain, diarrhea, or change in bowel movements. The patient occasionally gets constipated followed by diarrhea.   ASA (+) No family history of colon cancer. Never had a colonoscopy. No weight loss.    Abnormal tomach on CT scan w/o contrast. Reviewed with radiologist. Will arrange for EGD on Monday. Discussed with daughters for 10 minutes. Consent obtained.    [id number]  ---------   EGD revealed an essentially normal stomach. Biopsy results negative  Patient passed speech evaluation. Dysphagia 2 with nectar thickened liquids - Will follow on a PRN basis

## 2025-06-25 NOTE — ASSESSMENT & PLAN NOTE
75 y.o. 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 Presents with concerns of GIB and acute blood anemia.   At the time of consultation, the patient is HDS afebrile, Aox3, with BP 90/70's map of 79, improved s/p 1 L IVF Hgb of 4.8, pending transfusion 2U prbcs . Initial poct lac of 6.8 at admission.      EGD 9/2023 performed for JIGNESH showed a non-obstructing schatzki ring which was dilated with resultant bleeding.  This was treated with bipolar cautery.  Also seen was a scar in the gastric antrum and oozing duodenal ulcers.  The ulcers were injected and treated with bipolar cautery.  No specimens were collected.     EGD 6/2024:  Normal esophagus. With gastritis and duodenitis noted.                   Plan:     - Please keep NPO  - consider GI Consult   - Pending transfusion 2 U PRBC, Trend Hgb   - Maintain IV access with 2 large bore IVs  - Intravascular resuscitation/support with IVF.   - F/u CTA results   - Hold all NSAIDs and anticoagulants, unless contraindicated  - IV pantoprazole 80mg followed by 40 bid   - Please correct any coagulopathy with platelets and FFP for goal of platelets > 50K and INR < 2.0  - repeat Lactate levels after resuscitation

## 2025-06-25 NOTE — CONSULTS
[facility] - Emergency Dept  Critical Care Medicine  Consult Note    Patient Name: Rocco Swain  MRN: [id number]  Admission Date: 6/25/2025  Hospital Length of Stay: 0 days  Code Status: Prior  Attending Physician: Robert Gonsales MD   Primary Care Provider: No, Primary Doctor   Principal Problem: <principal problem not specified>    Inpatient consult to Critical Care Medicine  Consult performed by: Amira Bush MD  Consult ordered by: Robert Gonsales MD        Subjective:     HPI:  Mr Swain is a 75 y.o. 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    He presents to the ED with complaints for dizziness and fatigue. Patient is aphasic and writes to respond or nods/ shakes his head for yes and no answered. He reports the symptoms and lightheadedness for the last 2 days. He reports bloody Bms that noticed yesterday and has history of GIB with a last EGD in 2024. He has felt nauseous but no hematemesis. He feels dehydrated, and dry since he did not have anything to eat or drink. He denies any abdominal pain.      Hospital/ICU Course:  No notes on file    Past Medical History:   Diagnosis Date    Acute conjunctivitis of right eye 09/12/2022    Atrial fibrillation     Basal cell carcinoma (BCC) in situ of skin 2012    3 on face, 2 on arm, removed by dermatology.     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 Underwent surgical resection of tongue and neck. s/p tracheostomy    Squamous cell carcinoma of skin of right temple 01/24/2024    right temple       Past Surgical History:   Procedure Laterality Date    COLONOSCOPY      COLONOSCOPY N/A 09/14/2023    Procedure: COLONOSCOPY;  Surgeon: Reyes Olivia MD;  [address]  Service: Endoscopy;  Laterality: N/A;    CONJUNCTIVA BIOPSY Right 10/11/2022    Procedure: BIOPSY, CONJUNCTIVA;  Surgeon: Victor M Vasques MD;  Location: [facility];  Service: Ophthalmology;  Laterality: Right;    ESOPHAGOGASTRODUODENOSCOPY N/A 09/14/2023    Procedure: EGD (ESOPHAGOGASTRODUODENOSCOPY);  Surgeon: Reyes Olivia MD;  [address]  Service: Endoscopy;  Laterality: N/A;    ESOPHAGOGASTRODUODENOSCOPY N/A 04/10/2024    Procedure: EGD (ESOPHAGOGASTRODUODENOSCOPY);  Surgeon: Reyes Pagan MD;  [facility]  Service: Endoscopy;  Laterality: N/A;    ESOPHAGOGASTRODUODENOSCOPY N/A 06/24/2024    Procedure: EGD (ESOPHAGOGASTRODUODENOSCOPY);  Surgeon: Reyes Pagan MD;  Location: [address]);  Service: Endoscopy;  Laterality: N/A;  Ref By: Dr. Hawkins   Procedure: egd  Diagnosis: esophagitis  Procedure Timing: 10 weeks  Prep: standard prep  6/21-pt r/s-inst to portal-2nd floor criteria-labs within 90 daysMS    EYE SURGERY Right 04/25/2024    exenteration of orbit    GLOSSECTOMY      INSERTION OF VENOUS ACCESS PORT Right 03/08/2021    Procedure: INSERTION, VENOUS ACCESS PORT;  Surgeon: Jesus Rendon MD;  [address];  Service: General;  Laterality: Right;    LARYNGECTOMY      LIVER TRANSPLANT  11/2008    transplanted for biopsy proven hepatocellular carcinoma,     LYMPH NODE BIOPSY N/A 09/18/2020    Procedure: BIOPSY, LYMPH  NODE;  Surgeon: Dosc Diagnostic Provider;  [facility]: [facility] [address] [place] [address];  Service: General;  Laterality: N/A;  Rm 173    skin cancer removals      SURGICAL REMOVAL OF SCAR Right 08/24/2023    Procedure: EXCISION, SCAR;  Surgeon: Ting Brambila MD;  Location: [facility];  Service: Ophthalmology;  Laterality: Right;    TRACHEOSTOMY         Review of patient's allergies indicates:   Allergen Reactions    Aspirin     Tylenol extended release        Family History       Problem Relation (Age of Onset)    Dementia Mother          Tobacco Use    Smoking status: Never    Smokeless tobacco: Never   Substance and Sexual Activity    Alcohol use: Yes     Comment: drinks wine, 1 glass day    Drug use: Not Currently    Sexual activity: Yes     Comment: No prior history of STD       Review of Systems   Constitutional:  Positive for activity change and appetite change.   Respiratory:  Negative for shortness of breath.    Cardiovascular:  Negative for chest pain.   Gastrointestinal:  Positive for blood in stool. Negative for abdominal pain, rectal pain and vomiting.   Neurological:  Positive for dizziness, weakness and light-headedness.   All other systems reviewed and are negative.    Objective:     Vital Signs (Most Recent):  Temp: 98 °F (36.7 °C) (06/25/25 1636)  Pulse: 108 (06/25/25 1713)  Resp: 18 (06/25/25 1552)  BP: (!) 111/57 (06/25/25 1700)  SpO2: 100 % (06/25/25 1713) Vital Signs (24h Range):  Temp:  [97.9 °F (36.6 °C)-98.1 °F (36.7 °C)] 98 °F (36.7 °C)  Pulse:  [] 108  Resp:  [16-18] 18  SpO2:  [83 %-100 %] 100 %  BP: ()/(51-73) 111/57      There is no height or weight on file to calculate BMI.      Intake/Output Summary (Last 24 hours) at 6/25/2025 1745  Last data filed at 6/25/2025 1545  Gross per 24 hour   Intake 1283 ml   Output --   Net 1283 ml          Physical Exam  Vitals and nursing note reviewed.   Constitutional:       General: He is not in acute distress.     Appearance: He is ill-appearing. He is not  toxic-appearing.   HENT:      Mouth/Throat:      Mouth: Mucous membranes are dry.   Cardiovascular:      Rate and Rhythm: Tachycardia present.   Pulmonary:      Effort: Pulmonary effort is normal. No respiratory distress.   Skin:     General: Skin is warm and dry.   Neurological:      General: No focal deficit present.      Mental Status: He is alert and oriented to person, place, and time. Mental status is at baseline.   Psychiatric:         Behavior: Behavior normal.         Thought Content: Thought content normal.            Vents:     Lines/Drains/Airways       Central Venous Catheter Line  Duration                  PowerPort A Cath Single Lumen 03/08/21 1031 right internal jugular 1570 days              Airway  Duration                  Laryngectomy (Do Not Remove) 08/24/23 1145  671 days              Peripheral Intravenous Line  Duration             Peripheral IV Single Lumen 06/25/25 1250 20 G Anterior;Left Forearm <1 day    Peripheral IV Single Lumen 06/25/25 1319 20 G Anterior;Distal;Right Upper Arm <1 day                  Significant Labs:    CBC/Anemia Profile:  Recent Labs   Lab 06/25/25  1307 06/25/25  1325   WBC 11.03  --    HGB 4.8*  --    HCT 14.9* <15*     --    *  --    RDW 15.2*  --         Chemistries:  Recent Labs   Lab 06/25/25  1307   *   K 4.7      CO2 19*   BUN 50*   CREATININE 2.1*   CALCIUM 8.6*   ALBUMIN 3.2*   PROT 5.9*   BILITOT 0.3   ALKPHOS 50   ALT 10   AST 19   MG 1.8   PHOS 3.6       BMP:   Recent Labs   Lab 06/25/25  1307   *   *   K 4.7      CO2 19*   BUN 50*   CREATININE 2.1*   CALCIUM 8.6*   MG 1.8     CMP:   Recent Labs   Lab 06/25/25  1307   *   K 4.7      CO2 19*   *   BUN 50*   CREATININE 2.1*   CALCIUM 8.6*   PROT 5.9*   ALBUMIN 3.2*   BILITOT 0.3   ALKPHOS 50   AST 19   ALT 10   ANIONGAP 15     All pertinent labs within the past 24 hours have been reviewed.    Significant Imaging: I have reviewed all  "pertinent imaging results/findings within the past 24 hours.    ABG  No results for input(s): "PH", "PO2", "PCO2", "HCO3", "BE" in the last 168 hours.  Assessment/Plan:     Oncology  Acute blood loss anemia  75 y.o. 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 Presents with concerns of GIB and acute blood anemia.   At the time of consultation, the patient is HDS afebrile, Aox3, with BP 90/70's map of 79, improved s/p 1 L IVF Hgb of 4.8, pending transfusion 2U prbcs . Initial poct lac of 6.8 at admission.      EGD 9/2023 performed for JIGNESH showed a non-obstructing schatzki ring which was dilated with resultant bleeding.  This was treated with bipolar cautery.  Also seen was a scar in the gastric antrum and oozing duodenal ulcers.  The ulcers were injected and treated with bipolar cautery.  No specimens were collected.     EGD 6/2024:  Normal esophagus. With gastritis and duodenitis noted.                   Plan:     - Please keep NPO  - consider GI Consult   - Pending transfusion 2 U PRBC, Trend Hgb   - Maintain IV access with 2 large bore IVs  - Intravascular resuscitation/support with IVF.   - F/u CTA results   - Hold all NSAIDs and anticoagulants, unless contraindicated  - IV pantoprazole 80mg followed by 40 bid   - Please correct any coagulopathy with platelets and FFP for goal of platelets > 50K and INR < 2.0  - repeat Lactate levels after resuscitation         Critical care was time spent personally by me on the following activities: development of treatment plan with patient or surrogate and bedside caregivers, discussions with consultants, evaluation of patient's response to treatment, examination of patient, ordering and performing treatments " and interventions, ordering and review of laboratory studies, ordering and review of radiographic studies, pulse oximetry, re-evaluation of patient's condition. This critical care time did not overlap with that of any other provider or involve time for any procedures.    Thank you for your consult. Please reach out to the fellow for any questions or concerns      Amira Bush MD  Critical Care Medicine  [facility] - Emergency Dept

## 2025-06-25 NOTE — ED TRIAGE NOTES
PT arrives to ED with complaints of dizziness. PT is aphasic at baseline and has been feeling dizzy and vertigo for the passed week. He reports feeling weak and not being able to get out of bed for the passed two days. He recently started a new cancer treatment.  PT does have a laryngectomy  and states he most often feels SOB, Endorses a bloody BM yesterday , and nausea . Denies CP , vomiting , numbness/tingling

## 2025-06-25 NOTE — HPI
Mr Swain is a 75 y.o. 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    He presents to the ED with complaints for dizziness and fatigue. Patient is aphasic and writes to respond or nods/ shakes his head for yes and no answered. He reports the symptoms and lightheadedness for the last 2 days. He reports bloody Bms that noticed yesterday and has history of GIB with a last EGD in 2024. He has felt nauseous but no hematemesis. He feels dehydrated, and dry since he did not have anything to eat or drink. He denies any abdominal pain.

## 2025-06-26 LAB
ABSOLUTE EOSINOPHIL (OHS): 0.08 K/UL
ABSOLUTE EOSINOPHIL (OHS): 0.13 K/UL
ABSOLUTE EOSINOPHIL (OHS): 0.14 K/UL
ABSOLUTE MONOCYTE (OHS): 0.53 K/UL (ref 0.3–1)
ABSOLUTE MONOCYTE (OHS): 0.56 K/UL (ref 0.3–1)
ABSOLUTE MONOCYTE (OHS): 0.61 K/UL (ref 0.3–1)
ABSOLUTE NEUTROPHIL COUNT (OHS): 3.68 K/UL (ref 1.8–7.7)
ABSOLUTE NEUTROPHIL COUNT (OHS): 3.87 K/UL (ref 1.8–7.7)
ABSOLUTE NEUTROPHIL COUNT (OHS): 3.89 K/UL (ref 1.8–7.7)
ALBUMIN SERPL BCP-MCNC: 2.6 G/DL (ref 3.5–5.2)
ALP SERPL-CCNC: 35 UNIT/L (ref 40–150)
ALT SERPL W/O P-5'-P-CCNC: 9 UNIT/L (ref 10–44)
ANION GAP (OHS): 13 MMOL/L (ref 8–16)
AST SERPL-CCNC: 19 UNIT/L (ref 11–45)
BASOPHILS # BLD AUTO: 0.01 K/UL
BASOPHILS # BLD AUTO: 0.01 K/UL
BASOPHILS # BLD AUTO: 0.02 K/UL
BASOPHILS NFR BLD AUTO: 0.2 %
BASOPHILS NFR BLD AUTO: 0.2 %
BASOPHILS NFR BLD AUTO: 0.4 %
BILIRUB SERPL-MCNC: 0.4 MG/DL (ref 0.1–1)
BUN SERPL-MCNC: 47 MG/DL (ref 8–23)
CALCIUM SERPL-MCNC: 7.8 MG/DL (ref 8.7–10.5)
CHLORIDE SERPL-SCNC: 107 MMOL/L (ref 95–110)
CO2 SERPL-SCNC: 20 MMOL/L (ref 23–29)
CREAT SERPL-MCNC: 1.8 MG/DL (ref 0.5–1.4)
ERYTHROCYTE [DISTWIDTH] IN BLOOD BY AUTOMATED COUNT: 17.2 % (ref 11.5–14.5)
ERYTHROCYTE [DISTWIDTH] IN BLOOD BY AUTOMATED COUNT: 17.4 % (ref 11.5–14.5)
ERYTHROCYTE [DISTWIDTH] IN BLOOD BY AUTOMATED COUNT: 17.5 % (ref 11.5–14.5)
GFR SERPLBLD CREATININE-BSD FMLA CKD-EPI: 39 ML/MIN/1.73/M2
GLUCOSE SERPL-MCNC: 105 MG/DL (ref 70–110)
HCT VFR BLD AUTO: 19.9 % (ref 40–54)
HCT VFR BLD AUTO: 20.7 % (ref 40–54)
HCT VFR BLD AUTO: 21.1 % (ref 40–54)
HGB BLD-MCNC: 6.7 GM/DL (ref 14–18)
HGB BLD-MCNC: 7 GM/DL (ref 14–18)
HGB BLD-MCNC: 7.1 GM/DL (ref 14–18)
HOLD SPECIMEN: NORMAL
IMM GRANULOCYTES # BLD AUTO: 0.09 K/UL (ref 0–0.04)
IMM GRANULOCYTES # BLD AUTO: 0.1 K/UL (ref 0–0.04)
IMM GRANULOCYTES # BLD AUTO: 0.1 K/UL (ref 0–0.04)
IMM GRANULOCYTES NFR BLD AUTO: 1.8 % (ref 0–0.5)
IMM GRANULOCYTES NFR BLD AUTO: 1.9 % (ref 0–0.5)
IMM GRANULOCYTES NFR BLD AUTO: 1.9 % (ref 0–0.5)
LYMPHOCYTES # BLD AUTO: 0.5 K/UL (ref 1–4.8)
LYMPHOCYTES # BLD AUTO: 0.57 K/UL (ref 1–4.8)
LYMPHOCYTES # BLD AUTO: 0.64 K/UL (ref 1–4.8)
MCH RBC QN AUTO: 31.3 PG (ref 27–31)
MCH RBC QN AUTO: 31.4 PG (ref 27–31)
MCH RBC QN AUTO: 32.4 PG (ref 27–31)
MCHC RBC AUTO-ENTMCNC: 33.6 G/DL (ref 32–36)
MCHC RBC AUTO-ENTMCNC: 33.7 G/DL (ref 32–36)
MCHC RBC AUTO-ENTMCNC: 33.8 G/DL (ref 32–36)
MCV RBC AUTO: 93 FL (ref 82–98)
MCV RBC AUTO: 93 FL (ref 82–98)
MCV RBC AUTO: 96 FL (ref 82–98)
NUCLEATED RBC (/100WBC) (OHS): 1 /100 WBC
PLATELET # BLD AUTO: 104 K/UL (ref 150–450)
PLATELET # BLD AUTO: 106 K/UL (ref 150–450)
PLATELET # BLD AUTO: 108 K/UL (ref 150–450)
PLATELET BLD QL SMEAR: ABNORMAL
PMV BLD AUTO: 9.2 FL (ref 9.2–12.9)
PMV BLD AUTO: 9.3 FL (ref 9.2–12.9)
PMV BLD AUTO: 9.4 FL (ref 9.2–12.9)
POTASSIUM SERPL-SCNC: 3.8 MMOL/L (ref 3.5–5.1)
PROT SERPL-MCNC: 4.9 GM/DL (ref 6–8.4)
RBC # BLD AUTO: 2.07 M/UL (ref 4.6–6.2)
RBC # BLD AUTO: 2.23 M/UL (ref 4.6–6.2)
RBC # BLD AUTO: 2.27 M/UL (ref 4.6–6.2)
RELATIVE EOSINOPHIL (OHS): 1.6 %
RELATIVE EOSINOPHIL (OHS): 2.5 %
RELATIVE EOSINOPHIL (OHS): 2.7 %
RELATIVE LYMPHOCYTE (OHS): 10.1 % (ref 18–48)
RELATIVE LYMPHOCYTE (OHS): 10.8 % (ref 18–48)
RELATIVE LYMPHOCYTE (OHS): 12.1 % (ref 18–48)
RELATIVE MONOCYTE (OHS): 10 % (ref 4–15)
RELATIVE MONOCYTE (OHS): 10.6 % (ref 4–15)
RELATIVE MONOCYTE (OHS): 12.3 % (ref 4–15)
RELATIVE NEUTROPHIL (OHS): 73.3 % (ref 38–73)
RELATIVE NEUTROPHIL (OHS): 73.6 % (ref 38–73)
RELATIVE NEUTROPHIL (OHS): 74 % (ref 38–73)
SODIUM SERPL-SCNC: 140 MMOL/L (ref 136–145)
TACROLIMUS BLD-MCNC: 4.5 NG/ML (ref 5–15)
WBC # BLD AUTO: 4.97 K/UL (ref 3.9–12.7)
WBC # BLD AUTO: 5.28 K/UL (ref 3.9–12.7)
WBC # BLD AUTO: 5.28 K/UL (ref 3.9–12.7)

## 2025-06-26 PROCEDURE — 99900035 HC TECH TIME PER 15 MIN (STAT)

## 2025-06-26 PROCEDURE — 99223 1ST HOSP IP/OBS HIGH 75: CPT | Mod: ,,, | Performed by: INTERNAL MEDICINE

## 2025-06-26 PROCEDURE — 94761 N-INVAS EAR/PLS OXIMETRY MLT: CPT

## 2025-06-26 PROCEDURE — 36415 COLL VENOUS BLD VENIPUNCTURE: CPT | Performed by: HOSPITALIST

## 2025-06-26 PROCEDURE — 11000001 HC ACUTE MED/SURG PRIVATE ROOM

## 2025-06-26 PROCEDURE — 80197 ASSAY OF TACROLIMUS: CPT | Performed by: HOSPITALIST

## 2025-06-26 PROCEDURE — 63600175 PHARM REV CODE 636 W HCPCS: Performed by: HOSPITALIST

## 2025-06-26 PROCEDURE — 25000003 PHARM REV CODE 250: Performed by: STUDENT IN AN ORGANIZED HEALTH CARE EDUCATION/TRAINING PROGRAM

## 2025-06-26 PROCEDURE — 82040 ASSAY OF SERUM ALBUMIN: CPT | Performed by: HOSPITALIST

## 2025-06-26 PROCEDURE — 85025 COMPLETE CBC W/AUTO DIFF WBC: CPT | Performed by: HOSPITALIST

## 2025-06-26 PROCEDURE — 25000003 PHARM REV CODE 250: Performed by: HOSPITALIST

## 2025-06-26 RX ORDER — POLYETHYLENE GLYCOL 3350, SODIUM SULFATE ANHYDROUS, SODIUM BICARBONATE, SODIUM CHLORIDE, POTASSIUM CHLORIDE 236; 22.74; 6.74; 5.86; 2.97 G/4L; G/4L; G/4L; G/4L; G/4L
4000 POWDER, FOR SOLUTION ORAL ONCE
Status: COMPLETED | OUTPATIENT
Start: 2025-06-26 | End: 2025-06-26

## 2025-06-26 RX ORDER — LEVOTHYROXINE SODIUM 88 UG/1
88 TABLET ORAL
Status: DISCONTINUED | OUTPATIENT
Start: 2025-06-27 | End: 2025-06-29 | Stop reason: HOSPADM

## 2025-06-26 RX ORDER — LEVOTHYROXINE SODIUM 88 UG/1
88 TABLET ORAL
COMMUNITY

## 2025-06-26 RX ADMIN — PANTOPRAZOLE SODIUM 40 MG: 40 INJECTION, POWDER, LYOPHILIZED, FOR SOLUTION INTRAVENOUS at 08:06

## 2025-06-26 RX ADMIN — TACROLIMUS 0.5 MG: 0.5 CAPSULE ORAL at 08:06

## 2025-06-26 RX ADMIN — POLYETHYLENE GLYCOL 3350, SODIUM SULFATE ANHYDROUS, SODIUM BICARBONATE, SODIUM CHLORIDE, POTASSIUM CHLORIDE 4000 ML: 236; 22.74; 6.74; 5.86; 2.97 POWDER, FOR SOLUTION ORAL at 06:06

## 2025-06-26 RX ADMIN — PANTOPRAZOLE SODIUM 40 MG: 40 INJECTION, POWDER, LYOPHILIZED, FOR SOLUTION INTRAVENOUS at 09:06

## 2025-06-26 RX ADMIN — TRAZODONE HYDROCHLORIDE 50 MG: 50 TABLET ORAL at 09:06

## 2025-06-26 RX ADMIN — TACROLIMUS 0.5 MG: 0.5 CAPSULE ORAL at 05:06

## 2025-06-26 NOTE — H&P
[facility] - Emergency Dept  [facility] Medicine  History & Physical    Patient Name: Rocco Swain  MRN: [id number]  Patient Class: IP- Inpatient  Admission Date: 6/25/2025  Attending Physician: Marcellus Bennett MD   Primary Care Provider: Leny Primary Doctor         Patient information was obtained from patient, past medical records, and ER records.     Subjective:     Principal Problem:Acute GI bleeding    Chief Complaint:   Chief Complaint   Patient presents with    Dizziness     Patient arrives via EMS for dizziness and vertigo for about a week. Patient recently started a new chemo treatment. Patient is aphasic at baseline.         HPI: 74 yo M with PMHx Liver transplant (2008) and 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 Pt. Non-verbal due to prior ENT surgery hx, nods head and writes to communicate. He reports progressively worsening fatigue and lightheadedness x2-3 days. He did note one episode of bright red blood in his stool yesterday, but he denies any melena or other signs of bleeding. No reported associated abdominal pain. He does have hx of GI bleeds, last EGD 4/2024 showed showed severe erosive esophagitis. Pt. Denies NSAID use. He is no longer on protonix.    Past Medical History:   Diagnosis Date    Acute conjunctivitis of right eye 09/12/2022    Atrial fibrillation     Basal cell carcinoma (BCC) in situ of skin 2012    3 on face, 2 on arm, removed by dermatology.     Basal cell carcinoma (BCC) of neck 01/24/2024    lower mid neck    Cataract     Conjunctival lesion 10/11/2022    COPD (chronic obstructive pulmonary disease)     Deep vein thrombosis     Disorder of kidney and ureter     Hepatitis C 01/27/2020    Hepatitis C, chronic 2006    Treated for Hep C x 6 months, normal viral load since 07/2006    Hypothyroidism      Larynx cancer     Left ear pain 10/24/2022    Liver transplanted     Lumbar disc disease     Squamous cell carcinoma in situ (SCCIS) of tongue 02/2016    Treated with radiation to neck and chemotherapy. Underwent surgical resection of tongue and neck. s/p tracheostomy    Squamous cell carcinoma of skin of right temple 01/24/2024    right temple       Past Surgical History:   Procedure Laterality Date    COLONOSCOPY      COLONOSCOPY N/A 09/14/2023    Procedure: COLONOSCOPY;  Surgeon: Reyes Olivia MD;  [address]  Service: Endoscopy;  Laterality: N/A;    CONJUNCTIVA BIOPSY Right 10/11/2022    Procedure: BIOPSY, CONJUNCTIVA;  Surgeon: Victor M Vasques MD;  Location: [facility];  Service: Ophthalmology;  Laterality: Right;    ESOPHAGOGASTRODUODENOSCOPY N/A 09/14/2023    Procedure: EGD (ESOPHAGOGASTRODUODENOSCOPY);  Surgeon: Reyes Olivia MD;  [address]  Service: Endoscopy;  Laterality: N/A;    ESOPHAGOGASTRODUODENOSCOPY N/A 04/10/2024    Procedure: EGD (ESOPHAGOGASTRODUODENOSCOPY);  Surgeon: Reyes Pagan MD;  [facility]  Service: Endoscopy;  Laterality: N/A;    ESOPHAGOGASTRODUODENOSCOPY N/A 06/24/2024    Procedure: EGD (ESOPHAGOGASTRODUODENOSCOPY);  Surgeon: Reyes Pagan MD;  Location: [address]);  Service: Endoscopy;  Laterality: N/A;  Ref By: Dr. Hawkins   Procedure: egd  Diagnosis: esophagitis  Procedure Timing: 10 weeks  Prep: standard prep  6/21-pt r/s-inst to portal-2nd floor criteria-labs within 90 daysMS    EYE SURGERY Right 04/25/2024    exenteration of orbit    GLOSSECTOMY      INSERTION OF VENOUS ACCESS PORT Right 03/08/2021    Procedure: INSERTION, VENOUS ACCESS PORT;  Surgeon: Jesus Rendon MD;  [address];  Service: General;  Laterality: Right;    LARYNGECTOMY      LIVER TRANSPLANT  11/2008    transplanted for biopsy proven hepatocellular carcinoma,     LYMPH NODE BIOPSY N/A 09/18/2020     Procedure: BIOPSY, LYMPH NODE;  Surgeon: Taz Diagnostic Provider;  [facility];  Service: General;  Laterality: N/A;  Rm 173    skin cancer removals      SURGICAL REMOVAL OF SCAR Right 08/24/2023    Procedure: EXCISION, SCAR;  Surgeon: Ting Brambila MD;  Location: [facility] OR;  Service: Ophthalmology;  Laterality: Right;    TRACHEOSTOMY         Review of patient's allergies indicates:   Allergen Reactions    Aspirin     Tylenol extended release        Current Facility-Administered Medications on File Prior to Encounter   Medication    ofloxacin 0.3 % ophthalmic solution 1 drop    sodium chloride 0.9% flush 10 mL    sodium chloride 0.9% flush 10 mL     Current Outpatient Medications on File Prior to Encounter   Medication Sig    ondansetron (ZOFRAN-ODT) 8 MG TbDL Take 1 tablet (8 mg total) by mouth every 8 (eight) hours as needed (nausea - first choice).    oxyCODONE (ROXICODONE) 5 MG immediate release tablet Take 1 tablet (5 mg total) by mouth every 8 (eight) hours as needed for Pain.    prochlorperazine (COMPAZINE) 10 MG tablet Take 1 tablet (10 mg total) by mouth every 6 (six) hours as needed (nausea/vomiting - second choice).    senna (SENOKOT) 8.6 mg tablet Take 2 tablets by mouth 2 (two) times daily as needed for Constipation.    tacrolimus (PROGRAF) 0.5 MG Cap Take 1 capsule (0.5 mg total) by mouth every 12 (twelve) hours.    traZODone (DESYREL) 50 MG tablet TAKE 1 TABLET BY MOUTH IN THE  EVENING    vismodegib (ERIVEDGE) 150 mg Cap Take 1 capsule (150 mg total) by mouth once daily.    [DISCONTINUED] azelaic acid (AZELEX) 15 % gel Apply topically 2 (two) times daily.    [DISCONTINUED] levothyroxine (SYNTHROID) 88 MCG tablet TAKE 1 TABLET BY MOUTH ONCE  DAILY    [DISCONTINUED] magic mouthwash diphen/antac/lidoc/nysta Take 10 mLs by mouth 4 (four) times daily.    [DISCONTINUED] metronidazole 0.75% (METROCREAM) 0.75 % Crea Apply topically 2 (two) times daily.    [DISCONTINUED]  multivit-min/FA/lycopen/lutein (CENTRUM SILVER MEN ORAL) Take 1 tablet by mouth once daily.     Family History       Problem Relation (Age of Onset)    Dementia Mother          Tobacco Use    Smoking status: Never    Smokeless tobacco: Never   Substance and Sexual Activity    Alcohol use: Yes     Comment: drinks wine, 1 glass day    Drug use: Not Currently    Sexual activity: Yes     Comment: No prior history of STD      Review of Systems   Constitutional:  Positive for appetite change and fatigue. Negative for activity change, chills, fever and unexpected weight change.   HENT:  Negative for congestion and sore throat.    Respiratory:  Negative for cough, shortness of breath and wheezing.    Cardiovascular:  Negative for chest pain, palpitations and leg swelling.   Gastrointestinal:  Negative for abdominal pain, blood in stool, nausea and vomiting.   Genitourinary:  Negative for dysuria and hematuria.   Musculoskeletal:  Negative for arthralgias and neck pain.   Skin:  Negative for color change and rash.   Neurological:  Positive for weakness and light-headedness. Negative for dizziness, seizures and numbness.   Psychiatric/Behavioral:  Negative for hallucinations and suicidal ideas.      Objective:     Vital Signs (Most Recent):  Temp: 97.9 °F (36.6 °C) (06/25/25 2218)  Pulse: 80 (06/25/25 2218)  Resp: 17 (06/25/25 2218)  BP: 115/79 (06/25/25 2218)  SpO2: 100 % (06/25/25 2218) Vital Signs (24h Range):  Temp:  [97.6 °F (36.4 °C)-98.2 °F (36.8 °C)] 97.9 °F (36.6 °C)  Pulse:  [] 80  Resp:  [14-18] 17  SpO2:  [83 %-100 %] 100 %  BP: ()/(51-79) 115/79     Weight: 61.2 kg (135 lb)  Body mass index is 20.53 kg/m².     Physical Exam  Vitals reviewed.   Constitutional:       General: He is not in acute distress.     Appearance: He is well-developed.   HENT:      Head: Normocephalic and atraumatic.   Eyes:      Extraocular Movements: Extraocular movements intact.      Comments: R eye enucleation    Neck:       Vascular: No JVD.      Trachea: No tracheal deviation.      Comments: Trach  Cardiovascular:      Rate and Rhythm: Normal rate and regular rhythm.      Heart sounds: No murmur heard.     No friction rub. No gallop.   Pulmonary:      Effort: No respiratory distress.      Breath sounds: Normal breath sounds. No wheezing or rales.   Abdominal:      General: Bowel sounds are normal. There is no distension.      Palpations: Abdomen is soft. There is no mass.      Tenderness: There is no abdominal tenderness.   Musculoskeletal:         General: No deformity.      Cervical back: Neck supple.   Lymphadenopathy:      Cervical: No cervical adenopathy.   Skin:     General: Skin is warm and dry.      Findings: No rash.   Neurological:      Mental Status: He is alert and oriented to person, place, and time.                Significant Labs: All pertinent labs within the past 24 hours have been reviewed.    Significant Imaging: I have reviewed all pertinent imaging results/findings within the past 24 hours.  Assessment/Plan:     Assessment & Plan  Acute GI bleeding  -Pt. With weakness, fatigue, hematochezia. Hgb 4.8, pt. Hemodynamically stable  -CTA negative for bleed, notes many diverticuli  -GI pathway ordered with 2 large bore IVs placed, GI consult, IV protonix  -Transfuse for Hgb <7  -Clear liquid diet, NPO @ [place] in anticipation of need for endoscopy    Status post liver transplantation - 2008  -No acute issues, liver function stable  -Continue home tacrolimus with am levels ordered    Squamous cell carcinoma of base of tongue  Tracheostomy status  -Hx squamous cell HENT cancer s/p trach, R eye enuleation. No acute issues, can discuss with oncology when to resume med erivedge        Acute kidney injury superimposed on chronic kidney disease  -Cr 2.1 on presentation, baseline 1.4  VTE Risk Mitigation (From admission, onward)           Ordered     IP VTE HIGH RISK PATIENT  Once         06/25/25 1928     Place sequential  compression device  Until discontinued         06/25/25 1928                                                Marcellus Bennett MD  Department of Hospital Medicine  [facility] - Emergency Dept

## 2025-06-26 NOTE — PROGRESS NOTES
I have reviewed the notes, assessments, and/or procedures performed this visit, and I concur with the documentation.    As noted  Apparent lower gi bleed in this complicated pt  Will try to do colonoscopy tomorrow if able to prep tonight  Will have conference with anesthesia because of the high risk

## 2025-06-26 NOTE — ASSESSMENT & PLAN NOTE
-Pt. With weakness, fatigue, hematochezia. Hgb 4.8, pt. Hemodynamically stable  -CTA negative for bleed, notes many diverticuli  -GI pathway ordered with 2 large bore IVs placed, GI consult, IV protonix  -Transfuse for Hgb <7  -Clear liquid diet, NPO @ [place] in anticipation of need for endoscopy

## 2025-06-26 NOTE — PLAN OF CARE
AAOx4, nonverbal, d/t laryngectomy, uses communication board. Resp even and unlabored  Continent of bowel and bladder, patient states that he had 1 small dark red bowel movement today, unseen by shift nurse. Right eye enucleation, left eye intact with adequate vision. Set up assist with meals. Safety precautions in place, bed in low position, call light within reach.    Problem: Adult Inpatient Plan of Care  Goal: Plan of Care Review  Outcome: Progressing  Goal: Patient-Specific Goal (Individualized)  Outcome: Progressing  Goal: Absence of Hospital-Acquired Illness or Injury  Outcome: Progressing  Goal: Optimal Comfort and Wellbeing  Outcome: Progressing  Goal: Readiness for Transition of Care  Outcome: Progressing     Problem: Gastrointestinal Bleeding  Goal: Optimal Coping with Acute Illness  Outcome: Progressing  Goal: Hemostasis  Outcome: Progressing     Problem: Acute Kidney Injury/Impairment  Goal: Fluid and Electrolyte Balance  Outcome: Progressing  Goal: Improved Oral Intake  Outcome: Progressing  Goal: Effective Renal Function  Outcome: Progressing     Problem: Skin Injury Risk Increased  Goal: Skin Health and Integrity  Outcome: Progressing

## 2025-06-26 NOTE — RESPIRATORY THERAPY
"RAPID RESPONSE RESPIRATORY THERAPY PROACTIVE NOTE           Time of visit: 902     Code Status: Full Code   : 1950  Bed: 19804/99785 A:   MRN: [id number]  Time spent at the bedside: < 15 min    SITUATION    Evaluated patient for: LDA Check     BACKGROUND    Patient has a past medical history of Acute conjunctivitis of right eye, Atrial fibrillation, Basal cell carcinoma (BCC) in situ of skin, Basal cell carcinoma (BCC) of neck, Cataract, Conjunctival lesion, COPD (chronic obstructive pulmonary disease), Deep vein thrombosis, Disorder of kidney and ureter, Hepatitis C, Hepatitis C, chronic, Hypothyroidism, Larynx cancer, Left ear pain, Liver transplanted, Lumbar disc disease, 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  Clinically Significant Surgical Hx: laryngectomy    24 Hours Vitals Range:  Temp:  [97 °F (36.1 °C)-98.2 °F (36.8 °C)]   Pulse:  []   Resp:  [14-18]   BP: ()/(51-79)   SpO2:  [83 %-100 %]     Labs:    Recent Labs     25  1307 25  0158   * 140   K 4.7 3.8    107   CO2 19* 20*   BUN 50* 47*   CREATININE 2.1* 1.8*   * 105   PHOS 3.6  --    MG 1.8  --         No results for input(s): "PH", "PCO2", "PO2", "HCO3", "POCSATURATED", "BE" in the last 72 hours.    ASSESSMENT/INTERVENTIONS  No respiratory concerns at this time.    Last VS   Temp: 97.6 °F (36.4 °C) (1121)  Pulse: 80 (1121)  Resp: 18 (1121)  BP: 118/67 (1121)  SpO2: 98 % (1121)    Extra trachs/ETTs at bedside: NA  Emergency Box number: L8  Level of Consciousness: Level of Consciousness (AVPU): alert  Respiratory Effort: Respiratory Effort: Normal, Unlabored Expansion/Accessory Muscle Usage: Expansion/Accessory Muscles/Retractions: expansion symmetric, no retractions, no use of accessory muscles  All Lung Field Breath Sounds: All Lung Fields Breath Sounds: clear  O2 Device/Concentration: RA  Surgical airway: " Yes, laryngectomy, NA  Ambu at bedside: Yes     Active Orders   Respiratory Care    Pulse Oximetry Continuous     Frequency: Continuous     Number of Occurrences: Until Specified    Stoma Care by RT Q4H     Frequency: Q4H     Number of Occurrences: Until Specified       RECOMMENDATIONS    We recommend: RRT Recs: Continue POC per primary team.      FOLLOW-UP    Please call back the Rapid Response RT, Inessa Cruz, RRT at x [phone] for any questions or concerns.

## 2025-06-26 NOTE — SUBJECTIVE & OBJECTIVE
Past Medical History:   Diagnosis Date    Acute conjunctivitis of right eye 09/12/2022    Atrial fibrillation     Basal cell carcinoma (BCC) in situ of skin 2012    3 on face, 2 on arm, removed by dermatology.     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 Underwent surgical resection of tongue and neck. s/p tracheostomy    Squamous cell carcinoma of skin of right temple 01/24/2024    right temple       Past Surgical History:   Procedure Laterality Date    COLONOSCOPY      COLONOSCOPY N/A 09/14/2023    Procedure: COLONOSCOPY;  Surgeon: Reyes Olivia MD;  Location: [address]);  Service: Endoscopy;  Laterality: N/A;    CONJUNCTIVA BIOPSY Right 10/11/2022    Procedure: BIOPSY, CONJUNCTIVA;  Surgeon: Victor M Vasques MD;  Location: [facility];  Service: Ophthalmology;  Laterality: Right;    ESOPHAGOGASTRODUODENOSCOPY N/A 09/14/2023    Procedure: EGD (ESOPHAGOGASTRODUODENOSCOPY);  Surgeon: Reyes Olivia MD;  Location: [address]);  Service: Endoscopy;  Laterality: N/A;    ESOPHAGOGASTRODUODENOSCOPY N/A 04/10/2024    Procedure: EGD (ESOPHAGOGASTRODUODENOSCOPY);  Surgeon: Reyes Pagan MD;  [address]  Service: Endoscopy;  Laterality: N/A;    ESOPHAGOGASTRODUODENOSCOPY N/A 06/24/2024    Procedure: EGD (ESOPHAGOGASTRODUODENOSCOPY);  Surgeon: Reyes Pagan MD;  Location: [address]);  Service: Endoscopy;  Laterality: N/A;  Ref By: Dr. Hawkins    Procedure: egd  Diagnosis: esophagitis  Procedure Timing: 10 weeks  Prep: standard prep  6/21-pt r/s-inst to portal-2nd floor criteria-labs within 90 daysMS    EYE SURGERY Right 04/25/2024    exenteration of orbit    GLOSSECTOMY      INSERTION OF VENOUS ACCESS PORT Right 03/08/2021    Procedure: INSERTION, VENOUS ACCESS PORT;  Surgeon: Jesus Rendon MD;  Location: [facility] OR [address] [place] [address];  Service: General;  Laterality: Right;    LARYNGECTOMY      LIVER TRANSPLANT  11/2008    transplanted for biopsy proven hepatocellular carcinoma,     LYMPH NODE BIOPSY N/A 09/18/2020    Procedure: BIOPSY, LYMPH NODE;  Surgeon: [facility] Diagnostic Provider;  Location: [facility] OR [address] [place] [address];  Service: General;  Laterality: N/A;  Rm 173    skin cancer removals      SURGICAL REMOVAL OF SCAR Right 08/24/2023    Procedure: EXCISION, SCAR;  Surgeon: Ting Brambila MD;  Location: [facility] OR;  Service: Ophthalmology;  Laterality: Right;    TRACHEOSTOMY         Review of patient's allergies indicates:   Allergen Reactions    Aspirin     Tylenol extended release        Current Facility-Administered Medications on File Prior to Encounter   Medication    ofloxacin 0.3 % ophthalmic solution 1 drop    sodium chloride 0.9% flush 10 mL    sodium chloride 0.9% flush 10 mL     Current Outpatient Medications on File Prior to Encounter   Medication Sig    ondansetron (ZOFRAN-ODT) 8 MG TbDL Take 1 tablet (8 mg total) by mouth every 8 (eight) hours as needed (nausea - first choice).    oxyCODONE (ROXICODONE) 5 MG immediate release tablet Take 1 tablet (5 mg total) by mouth every 8 (eight) hours as needed for Pain.    prochlorperazine (COMPAZINE) 10 MG tablet Take 1 tablet (10 mg total) by mouth every 6 (six) hours as needed (nausea/vomiting - second choice).    senna (SENOKOT) 8.6 mg tablet Take 2 tablets by mouth 2 (two) times daily as needed for Constipation.    tacrolimus (PROGRAF) 0.5 MG Cap Take 1 capsule (0.5 mg total) by mouth every 12 (twelve) hours.     traZODone (DESYREL) 50 MG tablet TAKE 1 TABLET BY MOUTH IN THE  EVENING    vismodegib (ERIVEDGE) 150 mg Cap Take 1 capsule (150 mg total) by mouth once daily.    [DISCONTINUED] azelaic acid (AZELEX) 15 % gel Apply topically 2 (two) times daily.    [DISCONTINUED] levothyroxine (SYNTHROID) 88 MCG tablet TAKE 1 TABLET BY MOUTH ONCE  DAILY    [DISCONTINUED] magic mouthwash diphen/antac/lidoc/nysta Take 10 mLs by mouth 4 (four) times daily.    [DISCONTINUED] metronidazole 0.75% (METROCREAM) 0.75 % Crea Apply topically 2 (two) times daily.    [DISCONTINUED] multivit-min/FA/lycopen/lutein (CENTRUM SILVER MEN ORAL) Take 1 tablet by mouth once daily.     Family History       Problem Relation (Age of Onset)    Dementia Mother          Tobacco Use    Smoking status: Never    Smokeless tobacco: Never   Substance and Sexual Activity    Alcohol use: Yes     Comment: drinks wine, 1 glass day    Drug use: Not Currently    Sexual activity: Yes     Comment: No prior history of STD      Review of Systems   Constitutional:  Positive for appetite change and fatigue. Negative for activity change, chills, fever and unexpected weight change.   HENT:  Negative for congestion and sore throat.    Respiratory:  Negative for cough, shortness of breath and wheezing.    Cardiovascular:  Negative for chest pain, palpitations and leg swelling.   Gastrointestinal:  Negative for abdominal pain, blood in stool, nausea and vomiting.   Genitourinary:  Negative for dysuria and hematuria.   Musculoskeletal:  Negative for arthralgias and neck pain.   Skin:  Negative for color change and rash.   Neurological:  Positive for weakness and light-headedness. Negative for dizziness, seizures and numbness.   Psychiatric/Behavioral:  Negative for hallucinations and suicidal ideas.      Objective:     Vital Signs (Most Recent):  Temp: 97.9 °F (36.6 °C) (06/25/25 2218)  Pulse: 80 (06/25/25 2218)  Resp: 17 (06/25/25 2218)  BP: 115/79 (06/25/25 2218)  SpO2: 100 %  (06/25/25 8278) Vital Signs (24h Range):  Temp:  [97.6 °F (36.4 °C)-98.2 °F (36.8 °C)] 97.9 °F (36.6 °C)  Pulse:  [] 80  Resp:  [14-18] 17  SpO2:  [83 %-100 %] 100 %  BP: ()/(51-79) 115/79     Weight: 61.2 kg (135 lb)  Body mass index is 20.53 kg/m².     Physical Exam  Vitals reviewed.   Constitutional:       General: He is not in acute distress.     Appearance: He is well-developed.   HENT:      Head: Normocephalic and atraumatic.   Eyes:      Extraocular Movements: Extraocular movements intact.      Comments: R eye enucleation    Neck:      Vascular: No JVD.      Trachea: No tracheal deviation.      Comments: Trach  Cardiovascular:      Rate and Rhythm: Normal rate and regular rhythm.      Heart sounds: No murmur heard.     No friction rub. No gallop.   Pulmonary:      Effort: No respiratory distress.      Breath sounds: Normal breath sounds. No wheezing or rales.   Abdominal:      General: Bowel sounds are normal. There is no distension.      Palpations: Abdomen is soft. There is no mass.      Tenderness: There is no abdominal tenderness.   Musculoskeletal:         General: No deformity.      Cervical back: Neck supple.   Lymphadenopathy:      Cervical: No cervical adenopathy.   Skin:     General: Skin is warm and dry.      Findings: No rash.   Neurological:      Mental Status: He is alert and oriented to person, place, and time.                Significant Labs: All pertinent labs within the past 24 hours have been reviewed.    Significant Imaging: I have reviewed all pertinent imaging results/findings within the past 24 hours.

## 2025-06-26 NOTE — PLAN OF CARE
I have reviewed the chart of Rocco Swain who is hospitalized for the following:    Active Hospital Problems    Diagnosis    *Acute GI bleeding    Acute blood loss anemia  - Hgb 4.8> 6.7>7.1 s/p 2U pRBC. GI consulted for GIB.   - transfuse for Hgb <7 or <8 in setting of active bleeding     Acute kidney injury superimposed on chronic kidney disease    Tracheostomy status     Tracheostomy present.  No acute issues at this time.  -continue to monitor      Squamous cell carcinoma of base of tongue         Status post liver transplantation - 2008    Postoperative hypothyroidism        Shania Johns PA-C  [facility]

## 2025-06-26 NOTE — PLAN OF CARE
Problem: Adult Inpatient Plan of Care  Goal: Plan of Care Review  Outcome: Progressing  Goal: Patient-Specific Goal (Individualized)  Outcome: Progressing  Goal: Absence of Hospital-Acquired Illness or Injury  Outcome: Progressing  Goal: Optimal Comfort and Wellbeing  Outcome: Progressing  Goal: Readiness for Transition of Care  Outcome: Progressing     Problem: Gastrointestinal Bleeding  Goal: Optimal Coping with Acute Illness  Outcome: Progressing  Goal: Hemostasis  Outcome: Progressing     Problem: Acute Kidney Injury/Impairment  Goal: Fluid and Electrolyte Balance  Outcome: Progressing  Goal: Improved Oral Intake  Outcome: Progressing  Goal: Effective Renal Function  Outcome: Progressing     Problem: Skin Injury Risk Increased  Goal: Skin Health and Integrity  Outcome: Progressing

## 2025-06-26 NOTE — NURSING
Pt admitted to the unit approximately 0100. Pt admitted for acute GI bleed, rectal bleeding. No current loss of blood. Last BM 6/24/2025. Pt received 2 units of blood in ED. VSS. AAOx4. Pt NPO. Nonverbal due to larngectomy, uses written communication. Pt nods and uses gestures. Right eyeball enucleated, no vision problems in left eye. Oriented to the unit/room. Call light within reach. No acute events this shift.

## 2025-06-26 NOTE — ED NOTES
Telemetry Verification   Patient placed on Telemetry Box  Verified with War Room  Box # [id number]   Monitor Tech Jeb   Rate 111   Rhythm Sinus Tach

## 2025-06-26 NOTE — CONSULTS
[facility]  Gastroenterology  Consult Note    Patient Name: Rocco Swain  MRN: [id number]  Admission Date: 6/25/2025  Hospital Length of Stay: 1 days  Code Status: Full Code   Attending Provider: Marvel Lopez MD   Consulting Provider: Afshin Vcaa MD  Primary Care Physician: No, Primary Doctor  Principal Problem:Acute GI bleeding    Inpatient consult to Gastroenterology  Consult performed by: Afshin Vaca MD  Consult ordered by: Marcellus Bennett MD        Subjective:     HPI: Rocco Swain is a 75 y.o. male with history of liver transplant (2008) and SCC of the base of the tongue and L orbit s/p resection, prior history of GI bleeds, esophagitis, PUD who presents to the ER with lightheadedness and fatigue this with a past week.  GI consulted due to 1 episode of hematochezia that occurred on 06/24.  Patient is nonverbal and was answering questions by writing.  He is not aware of any melena or hematochezia.  Nursing notes that his bowel movements have been dark since that initial episode.  Patient denies any abdominal pain, nausea, vomiting, constipation, diarrhea.    Vitals stable.  Labs with hemoglobin of 4.8 on 06/25 which improved to 7.0 with transfusion (baseline around 11-12), platelets 106, BUN 47, creatinine 1.8.  CTA abdomen pelvis on 06/25 with no active GI bleeding seen, scattered colon diverticula, postsurgical changes of liver transplant and cholecystectomy, chronic pancreatitis sequelae.    EGD on 06/24/2025 done for reflux esophagitis and follow up of peptic ulcer disease:  Impression:            - Normal esophagus.                          - Esophagogastric landmarks identified.                          - Gastritis. Biopsied.                          - Duodenitis. Biopsied.                          - Normal second portion of the duodenum.   Pathology consistent with chronic gastritis and reactive changes, no evidence of metaplasia/dysplasia/malignancy, no  H pylori.  Benign small bowel mucosa.    Colonoscopy on 09/14/2023 done for workup of hematochezia and melena:  Impression:            - Old blood in the entire examined colon, presumed                          from duodenal ulcers found on EGD jose alejandro.                          - No specimens collected.     ROS - negative other than what is stated in the HPI    Objective:     Vitals:    06/26/25 1548   BP: (!) 113/59   Pulse: 79   Resp: 18   Temp: 97.3 °F (36.3 °C)       Constitutional:  not in acute distress  HENT: Head: normocephalic  Eyes: Right eye enucleation  GI: soft, non-tender, without masses or organomegaly  Skin: normal color  Neurological: alert, oriented x3  Psychiatric: mood and affect are within normal limits, pt is a good historian; no memory problems were noted  Rectal:  Dark brown stool seen in diaper    Significant Labs:  Reviewed pertinent laboratory tests    Significant Imaging:  See HPI.      Assessment/Plan:     Rocco Swain is a 75 y.o. male with history of liver transplant (2008) and SCC of the base of the tongue and L orbit s/p resection, prior history of GI bleeds, esophagitis, PUD who presents to the ER with lightheadedness and fatigue this with a past week.  GI consulted due to 1 episode of hematochezia that occurred on 06/24 although dark brown stool seen in diaper today.    Given a count of hematochezia and low hemoglobin, patient may has been bleeding from a diverticulum that may have stopped on its own.    Problem List:  Possible hematochezia  Acute on chronic anemia  History of diverticulosis  History of PUD  History of duodenal ulcers    Recommendations:  - Plan for colonoscopy versus flex sig tomorrow  - Clear liquid diet today and NPO at midnight tonight  - Golytely prep to start at 6pm, to be completed within 4 hours if possible  - Monitor Hgb  - Transfuse to keep Hgb >7, plts >50  - Hold anticoagulants if safe to do so per primary team  - If becomes hemodynamically unstable with  associated large volume hematochezia, recommend STAT CTA and IR embolization if positive    Thank you for involving us in the care of Rocco Swain. Please call with any additional questions, concerns or changes in the patient's clinical status. We will continue to follow.     Discussed with Dr. Donald Vaca MD  Gastroenterology Fellow PGY-IV  [facility]

## 2025-06-26 NOTE — HPI
74 yo M with PMHx Liver transplant (2008) and 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 Pt. Non-verbal due to prior ENT surgery hx, nods head and writes to communicate. He reports progressively worsening fatigue and lightheadedness x2-3 days. He did note one episode of bright red blood in his stool yesterday, but he denies any melena or other signs of bleeding. No reported associated abdominal pain. He does have hx of GI bleeds, last EGD 4/2024 showed showed severe erosive esophagitis. Pt. Denies NSAID use. He is no longer on protonix.

## 2025-06-26 NOTE — PLAN OF CARE
[facility] - Acute Medical Stepdown  Initial Discharge Assessment       Primary Care Provider: No, Primary Doctor    Admission Diagnosis: Screening for cardiovascular condition [Z13.6]    Admission Date: 6/25/2025  Expected Discharge Date: 6/27/2025    Transition of Care Barriers: (P) None    Payor: MEDICARE / Plan: MEDICARE PART A & B / Product Type: Government /     Extended Emergency Contact Information  Primary Emergency Contact: Ollie Swain  Mobile Phone: [phone]  Relation: Brother  Preferred language: English   needed? No    Discharge Plan A: (P) Home     Patient refuses HH and says he does not need it.  Patient is independent and denies discharge needs at this time.      [facility] - [place] [id number] [facility]  [address] [address]  Phone: [phone] Fax: [phone]    [facility] [place]  [address]  [facility] [address]  Phone: [phone] Fax: [phone]    [facility] [place]  [address] [facility] [address]  Phone: [phone] Fax: [phone]    [facility] - [address]  [address] [address]  Phone: [phone] Fax: [phone]    [facility]  [address] [facility]  [facility] [address]  Phone: [phone] Fax: [phone]      Initial Assessment (most recent)       Adult Discharge Assessment - 06/26/25 1544          Discharge Assessment    Assessment Type Discharge Planning Assessment (P)      Confirmed/corrected address, phone number and insurance Yes (P)      Confirmed Demographics Correct on Facesheet (P)      Source of Information patient (P)      Communicated WAQAS with patient/caregiver Yes (P)      People in Home alone (P)      Facility Arrived From: home (P)      Do you expect to return to your current living situation? Yes (P)      Do you have help at home or someone to help  you manage your care at home? No (P)      Prior to hospitilization cognitive status: Alert/Oriented (P)      Current cognitive status: Alert/Oriented (P)      Walking or Climbing Stairs Difficulty no (P)      Dressing/Bathing Difficulty no (P)      Home Layout Able to live on 1st floor (P)      Equipment Currently Used at Home none (P)      Readmission within 30 days? No (P)      Patient currently being followed by outpatient case management? No (P)      Do you currently have service(s) that help you manage your care at home? No (P)      Do you take prescription medications? Yes (P)      Do you have any problems affording any of your prescribed medications? No (P)      Is the patient taking medications as prescribed? yes (P)      How do you get to doctors appointments? car, drives self (P)      Are you on dialysis? No (P)      Do you take coumadin? No (P)      Discharge Plan A Home (P)      DME Needed Upon Discharge  none (P)      Discharge Plan discussed with: Patient (P)      Transition of Care Barriers None (P)         Financial Resource Strain    How hard is it for you to pay for the very basics like food, housing, medical care, and heating? Not hard at all (P)         Housing Stability    In the last 12 months, was there a time when you were not able to pay the mortgage or rent on time? No (P)      At any time in the past 12 months, were you homeless or living in a shelter (including now)? No (P)         Transportation Needs    In the past 12 months, has lack of transportation kept you from medical appointments or from getting medications? No (P)      In the past 12 months, has lack of transportation kept you from meetings, work, or from getting things needed for daily living? No (P)         Food Insecurity    Within the past 12 months, you worried that your food would run out before you got the money to buy more. Never true (P)      Within the past 12 months, the food you bought just didn't last and you didn't  have money to get more. Never true (P)         Health Literacy    How often do you need to have someone help you when you read instructions, pamphlets, or other written material from your doctor or pharmacy? Rarely (P)                       Glen Byrd RN, BSN  Case Management

## 2025-06-26 NOTE — ASSESSMENT & PLAN NOTE
-Hx squamous cell HENT cancer s/p trach, R eye enuleation. No acute issues, can discuss with oncology when to resume med erivedge

## 2025-06-27 ENCOUNTER — ANESTHESIA (OUTPATIENT)
Dept: ENDOSCOPY | Facility: HOSPITAL | Age: 75
End: 2025-06-27
Payer: MEDICARE

## 2025-06-27 ENCOUNTER — ANESTHESIA EVENT (OUTPATIENT)
Dept: ENDOSCOPY | Facility: HOSPITAL | Age: 75
End: 2025-06-27
Payer: MEDICARE

## 2025-06-27 LAB
ABO + RH BLD: NORMAL
ABSOLUTE EOSINOPHIL (OHS): 0.15 K/UL
ABSOLUTE MONOCYTE (OHS): 0.47 K/UL (ref 0.3–1)
ABSOLUTE MONOCYTE (OHS): 0.57 K/UL (ref 0.3–1)
ABSOLUTE MONOCYTE (OHS): 0.62 K/UL (ref 0.3–1)
ABSOLUTE NEUTROPHIL COUNT (OHS): 2.32 K/UL (ref 1.8–7.7)
ABSOLUTE NEUTROPHIL COUNT (OHS): 2.86 K/UL (ref 1.8–7.7)
ABSOLUTE NEUTROPHIL COUNT (OHS): 3.44 K/UL (ref 1.8–7.7)
BASOPHILS # BLD AUTO: 0.01 K/UL
BASOPHILS # BLD AUTO: 0.01 K/UL
BASOPHILS # BLD AUTO: 0.02 K/UL
BASOPHILS NFR BLD AUTO: 0.2 %
BASOPHILS NFR BLD AUTO: 0.3 %
BASOPHILS NFR BLD AUTO: 0.4 %
BLD PROD TYP BPU: NORMAL
BLOOD UNIT EXPIRATION DATE: NORMAL
BLOOD UNIT TYPE CODE: 6200
CROSSMATCH INTERPRETATION: NORMAL
DISPENSE STATUS: NORMAL
ERYTHROCYTE [DISTWIDTH] IN BLOOD BY AUTOMATED COUNT: 18.4 % (ref 11.5–14.5)
ERYTHROCYTE [DISTWIDTH] IN BLOOD BY AUTOMATED COUNT: 18.6 % (ref 11.5–14.5)
ERYTHROCYTE [DISTWIDTH] IN BLOOD BY AUTOMATED COUNT: 19.3 % (ref 11.5–14.5)
HCT VFR BLD AUTO: 20 % (ref 40–54)
HCT VFR BLD AUTO: 23.9 % (ref 40–54)
HCT VFR BLD AUTO: 27.2 % (ref 40–54)
HGB BLD-MCNC: 6.6 GM/DL (ref 14–18)
HGB BLD-MCNC: 7.9 GM/DL (ref 14–18)
HGB BLD-MCNC: 8.9 GM/DL (ref 14–18)
IMM GRANULOCYTES # BLD AUTO: 0.04 K/UL (ref 0–0.04)
IMM GRANULOCYTES # BLD AUTO: 0.05 K/UL (ref 0–0.04)
IMM GRANULOCYTES # BLD AUTO: 0.08 K/UL (ref 0–0.04)
IMM GRANULOCYTES NFR BLD AUTO: 1.1 % (ref 0–0.5)
IMM GRANULOCYTES NFR BLD AUTO: 1.2 % (ref 0–0.5)
IMM GRANULOCYTES NFR BLD AUTO: 1.6 % (ref 0–0.5)
LYMPHOCYTES # BLD AUTO: 0.49 K/UL (ref 1–4.8)
LYMPHOCYTES # BLD AUTO: 0.55 K/UL (ref 1–4.8)
LYMPHOCYTES # BLD AUTO: 0.6 K/UL (ref 1–4.8)
MCH RBC QN AUTO: 31.3 PG (ref 27–31)
MCH RBC QN AUTO: 31.4 PG (ref 27–31)
MCH RBC QN AUTO: 32.2 PG (ref 27–31)
MCHC RBC AUTO-ENTMCNC: 32.7 G/DL (ref 32–36)
MCHC RBC AUTO-ENTMCNC: 33 G/DL (ref 32–36)
MCHC RBC AUTO-ENTMCNC: 33.1 G/DL (ref 32–36)
MCV RBC AUTO: 95 FL (ref 82–98)
MCV RBC AUTO: 96 FL (ref 82–98)
MCV RBC AUTO: 98 FL (ref 82–98)
NUCLEATED RBC (/100WBC) (OHS): 0 /100 WBC
NUCLEATED RBC (/100WBC) (OHS): 0 /100 WBC
NUCLEATED RBC (/100WBC) (OHS): 1 /100 WBC
PLATELET # BLD AUTO: 111 K/UL (ref 150–450)
PLATELET # BLD AUTO: 119 K/UL (ref 150–450)
PLATELET # BLD AUTO: 124 K/UL (ref 150–450)
PMV BLD AUTO: 9.1 FL (ref 9.2–12.9)
PMV BLD AUTO: 9.1 FL (ref 9.2–12.9)
PMV BLD AUTO: 9.4 FL (ref 9.2–12.9)
RBC # BLD AUTO: 2.05 M/UL (ref 4.6–6.2)
RBC # BLD AUTO: 2.52 M/UL (ref 4.6–6.2)
RBC # BLD AUTO: 2.83 M/UL (ref 4.6–6.2)
RELATIVE EOSINOPHIL (OHS): 3.1 %
RELATIVE EOSINOPHIL (OHS): 3.5 %
RELATIVE EOSINOPHIL (OHS): 4.3 %
RELATIVE LYMPHOCYTE (OHS): 11.3 % (ref 18–48)
RELATIVE LYMPHOCYTE (OHS): 14.1 % (ref 18–48)
RELATIVE LYMPHOCYTE (OHS): 14.2 % (ref 18–48)
RELATIVE MONOCYTE (OHS): 12.8 % (ref 4–15)
RELATIVE MONOCYTE (OHS): 13.4 % (ref 4–15)
RELATIVE MONOCYTE (OHS): 13.5 % (ref 4–15)
RELATIVE NEUTROPHIL (OHS): 66.7 % (ref 38–73)
RELATIVE NEUTROPHIL (OHS): 67.5 % (ref 38–73)
RELATIVE NEUTROPHIL (OHS): 70.8 % (ref 38–73)
UNIT NUMBER: NORMAL
WBC # BLD AUTO: 3.48 K/UL (ref 3.9–12.7)
WBC # BLD AUTO: 4.24 K/UL (ref 3.9–12.7)
WBC # BLD AUTO: 4.86 K/UL (ref 3.9–12.7)

## 2025-06-27 PROCEDURE — 27201089 HC SNARE, DISP (ANY): Performed by: INTERNAL MEDICINE

## 2025-06-27 PROCEDURE — 99900035 HC TECH TIME PER 15 MIN (STAT)

## 2025-06-27 PROCEDURE — A4216 STERILE WATER/SALINE, 10 ML: HCPCS | Performed by: ANESTHESIOLOGY

## 2025-06-27 PROCEDURE — 0DBN8ZZ EXCISION OF SIGMOID COLON, VIA NATURAL OR ARTIFICIAL OPENING ENDOSCOPIC: ICD-10-PCS | Performed by: INTERNAL MEDICINE

## 2025-06-27 PROCEDURE — 45385 COLONOSCOPY W/LESION REMOVAL: CPT | Performed by: INTERNAL MEDICINE

## 2025-06-27 PROCEDURE — 94761 N-INVAS EAR/PLS OXIMETRY MLT: CPT

## 2025-06-27 PROCEDURE — 85025 COMPLETE CBC W/AUTO DIFF WBC: CPT | Performed by: HOSPITALIST

## 2025-06-27 PROCEDURE — 63600175 PHARM REV CODE 636 W HCPCS: Performed by: STUDENT IN AN ORGANIZED HEALTH CARE EDUCATION/TRAINING PROGRAM

## 2025-06-27 PROCEDURE — 25000003 PHARM REV CODE 250: Performed by: HOSPITALIST

## 2025-06-27 PROCEDURE — 63600175 PHARM REV CODE 636 W HCPCS: Performed by: HOSPITALIST

## 2025-06-27 PROCEDURE — 25000003 PHARM REV CODE 250: Performed by: STUDENT IN AN ORGANIZED HEALTH CARE EDUCATION/TRAINING PROGRAM

## 2025-06-27 PROCEDURE — 37000009 HC ANESTHESIA EA ADD 15 MINS: Performed by: INTERNAL MEDICINE

## 2025-06-27 PROCEDURE — P9016 RBC LEUKOCYTES REDUCED: HCPCS | Performed by: STUDENT IN AN ORGANIZED HEALTH CARE EDUCATION/TRAINING PROGRAM

## 2025-06-27 PROCEDURE — 88305 TISSUE EXAM BY PATHOLOGIST: CPT | Mod: 26,,, | Performed by: STUDENT IN AN ORGANIZED HEALTH CARE EDUCATION/TRAINING PROGRAM

## 2025-06-27 PROCEDURE — 25000003 PHARM REV CODE 250: Performed by: ANESTHESIOLOGY

## 2025-06-27 PROCEDURE — 36415 COLL VENOUS BLD VENIPUNCTURE: CPT | Performed by: HOSPITALIST

## 2025-06-27 PROCEDURE — 37000008 HC ANESTHESIA 1ST 15 MINUTES: Performed by: INTERNAL MEDICINE

## 2025-06-27 PROCEDURE — 85025 COMPLETE CBC W/AUTO DIFF WBC: CPT | Performed by: STUDENT IN AN ORGANIZED HEALTH CARE EDUCATION/TRAINING PROGRAM

## 2025-06-27 PROCEDURE — 11000001 HC ACUTE MED/SURG PRIVATE ROOM

## 2025-06-27 PROCEDURE — 86920 COMPATIBILITY TEST SPIN: CPT | Performed by: STUDENT IN AN ORGANIZED HEALTH CARE EDUCATION/TRAINING PROGRAM

## 2025-06-27 PROCEDURE — 88305 TISSUE EXAM BY PATHOLOGIST: CPT | Mod: TC | Performed by: INTERNAL MEDICINE

## 2025-06-27 PROCEDURE — 45385 COLONOSCOPY W/LESION REMOVAL: CPT | Mod: ,,, | Performed by: INTERNAL MEDICINE

## 2025-06-27 PROCEDURE — 36415 COLL VENOUS BLD VENIPUNCTURE: CPT | Performed by: STUDENT IN AN ORGANIZED HEALTH CARE EDUCATION/TRAINING PROGRAM

## 2025-06-27 RX ORDER — PROCHLORPERAZINE EDISYLATE 5 MG/ML
5 INJECTION INTRAMUSCULAR; INTRAVENOUS EVERY 30 MIN PRN
Status: DISCONTINUED | OUTPATIENT
Start: 2025-06-27 | End: 2025-06-27 | Stop reason: HOSPADM

## 2025-06-27 RX ORDER — ONDANSETRON HYDROCHLORIDE 2 MG/ML
4 INJECTION, SOLUTION INTRAVENOUS DAILY PRN
Status: DISCONTINUED | OUTPATIENT
Start: 2025-06-27 | End: 2025-06-27 | Stop reason: HOSPADM

## 2025-06-27 RX ORDER — PROPOFOL 10 MG/ML
VIAL (ML) INTRAVENOUS CONTINUOUS PRN
Status: DISCONTINUED | OUTPATIENT
Start: 2025-06-27 | End: 2025-06-27

## 2025-06-27 RX ORDER — LIDOCAINE HYDROCHLORIDE 20 MG/ML
INJECTION INTRAVENOUS
Status: DISCONTINUED | OUTPATIENT
Start: 2025-06-27 | End: 2025-06-27

## 2025-06-27 RX ORDER — HYDROCODONE BITARTRATE AND ACETAMINOPHEN 500; 5 MG/1; MG/1
TABLET ORAL
Status: DISCONTINUED | OUTPATIENT
Start: 2025-06-27 | End: 2025-06-29 | Stop reason: HOSPADM

## 2025-06-27 RX ORDER — PROPOFOL 10 MG/ML
VIAL (ML) INTRAVENOUS
Status: DISCONTINUED | OUTPATIENT
Start: 2025-06-27 | End: 2025-06-27

## 2025-06-27 RX ORDER — GLUCAGON 1 MG
1 KIT INJECTION
Status: DISCONTINUED | OUTPATIENT
Start: 2025-06-27 | End: 2025-06-27 | Stop reason: HOSPADM

## 2025-06-27 RX ORDER — SODIUM CHLORIDE 0.9 % (FLUSH) 0.9 %
3 SYRINGE (ML) INJECTION EVERY 6 HOURS
Status: DISCONTINUED | OUTPATIENT
Start: 2025-06-27 | End: 2025-06-29 | Stop reason: HOSPADM

## 2025-06-27 RX ORDER — PHENYLEPHRINE HYDROCHLORIDE 10 MG/ML
INJECTION INTRAVENOUS
Status: DISCONTINUED | OUTPATIENT
Start: 2025-06-27 | End: 2025-06-27

## 2025-06-27 RX ADMIN — PROPOFOL 150 MCG/KG/MIN: 10 INJECTION, EMULSION INTRAVENOUS at 01:06

## 2025-06-27 RX ADMIN — PHENYLEPHRINE HYDROCHLORIDE 100 MCG: 10 INJECTION INTRAVENOUS at 01:06

## 2025-06-27 RX ADMIN — PROPOFOL 60 MG: 10 INJECTION, EMULSION INTRAVENOUS at 01:06

## 2025-06-27 RX ADMIN — PANTOPRAZOLE SODIUM 40 MG: 40 INJECTION, POWDER, LYOPHILIZED, FOR SOLUTION INTRAVENOUS at 08:06

## 2025-06-27 RX ADMIN — LEVOTHYROXINE SODIUM 88 MCG: 88 TABLET ORAL at 06:06

## 2025-06-27 RX ADMIN — PHENYLEPHRINE HYDROCHLORIDE 50 MCG: 10 INJECTION INTRAVENOUS at 01:06

## 2025-06-27 RX ADMIN — TRAZODONE HYDROCHLORIDE 50 MG: 50 TABLET ORAL at 08:06

## 2025-06-27 RX ADMIN — Medication 3 ML: at 06:06

## 2025-06-27 RX ADMIN — SODIUM CHLORIDE: 0.9 INJECTION, SOLUTION INTRAVENOUS at 01:06

## 2025-06-27 RX ADMIN — LIDOCAINE HYDROCHLORIDE 50 MG: 20 INJECTION INTRAVENOUS at 01:06

## 2025-06-27 RX ADMIN — TACROLIMUS 0.5 MG: 0.5 CAPSULE ORAL at 06:06

## 2025-06-27 RX ADMIN — TACROLIMUS 0.5 MG: 0.5 CAPSULE ORAL at 08:06

## 2025-06-27 NOTE — PLAN OF CARE
Pt received 1U PRBC today and went down for colonoscopy. No signs of active bleed found.   Bed low and locked, safety maintained, call light in reach.    Problem: Adult Inpatient Plan of Care  Goal: Plan of Care Review  Outcome: Progressing  Goal: Patient-Specific Goal (Individualized)  Outcome: Progressing  Goal: Absence of Hospital-Acquired Illness or Injury  Outcome: Progressing  Goal: Optimal Comfort and Wellbeing  Outcome: Progressing  Goal: Readiness for Transition of Care  Outcome: Progressing     Problem: Gastrointestinal Bleeding  Goal: Optimal Coping with Acute Illness  Outcome: Progressing  Goal: Hemostasis  Outcome: Progressing     Problem: Acute Kidney Injury/Impairment  Goal: Fluid and Electrolyte Balance  Outcome: Progressing  Goal: Improved Oral Intake  Outcome: Progressing  Goal: Effective Renal Function  Outcome: Progressing     Problem: Skin Injury Risk Increased  Goal: Skin Health and Integrity  Outcome: Progressing

## 2025-06-27 NOTE — ANESTHESIA PREPROCEDURE EVALUATION
06/27/2025  Rocco Swain is a 75 y.o., male.    Pre-operative evaluation for Procedure(s) (LRB):  COLONOSCOPY (N/A)      Encounter Diagnoses   Name Primary?    Screening for cardiovascular condition     Acute blood loss anemia Yes       Review of patient's allergies indicates:   Allergen Reactions    Aspirin     Tylenol extended release        Medications Prior to Admission   Medication Sig Dispense Refill Last Dose/Taking    levothyroxine (SYNTHROID) 88 MCG tablet Take 88 mcg by mouth before breakfast.       ondansetron (ZOFRAN-ODT) 8 MG TbDL Take 1 tablet (8 mg total) by mouth every 8 (eight) hours as needed (nausea - first choice). 60 tablet 4     oxyCODONE (ROXICODONE) 5 MG immediate release tablet Take 1 tablet (5 mg total) by mouth every 8 (eight) hours as needed for Pain. 90 tablet 0     prochlorperazine (COMPAZINE) 10 MG tablet Take 1 tablet (10 mg total) by mouth every 6 (six) hours as needed (nausea/vomiting - second choice). 30 tablet 1     senna (SENOKOT) 8.6 mg tablet Take 2 tablets by mouth 2 (two) times daily as needed for Constipation. 60 tablet 2     tacrolimus (PROGRAF) 0.5 MG Cap Take 1 capsule (0.5 mg total) by mouth every 12 (twelve) hours. 180 capsule 3     traZODone (DESYREL) 50 MG tablet TAKE 1 TABLET BY MOUTH IN THE  EVENING 90 tablet 3     vismodegib (ERIVEDGE) 150 mg Cap Take 1 capsule (150 mg total) by mouth once daily. 30 capsule 3             Current Facility-Administered Medications on File Prior to Encounter   Medication Dose Route Frequency Provider Last Rate Last Admin    ofloxacin 0.3 % ophthalmic solution 1 drop  1 drop Right Eye On Call Procedure Victor M Vasques MD   2 drop at 10/11/22 0745    sodium chloride 0.9% flush 10 mL  10 mL Intravenous PRN Ronald Berg MD        sodium chloride 0.9% flush 10 mL  10 mL Intravenous PRN Victor M Vasques MD          Current Outpatient Medications on File Prior to Encounter   Medication Sig Dispense Refill    levothyroxine (SYNTHROID) 88 MCG tablet Take 88 mcg by mouth before breakfast.      ondansetron (ZOFRAN-ODT) 8 MG TbDL Take 1 tablet (8 mg total) by mouth every 8 (eight) hours as needed (nausea - first choice). 60 tablet 4    oxyCODONE (ROXICODONE) 5 MG immediate release tablet Take 1 tablet (5 mg total) by mouth every 8 (eight) hours as needed for Pain. 90 tablet 0    prochlorperazine (COMPAZINE) 10 MG tablet Take 1 tablet (10 mg total) by mouth every 6 (six) hours as needed (nausea/vomiting - second choice). 30 tablet 1    senna (SENOKOT) 8.6 mg tablet Take 2 tablets by mouth 2 (two) times daily as needed for Constipation. 60 tablet 2    tacrolimus (PROGRAF) 0.5 MG Cap Take 1 capsule (0.5 mg total) by mouth every 12 (twelve) hours. 180 capsule 3    traZODone (DESYREL) 50 MG tablet TAKE 1 TABLET BY MOUTH IN THE  EVENING 90 tablet 3    vismodegib (ERIVEDGE) 150 mg Cap Take 1 capsule (150 mg total) by mouth once daily. 30 capsule 3       Past Medical History:  09/12/2022: Acute conjunctivitis of right eye  No date: Atrial fibrillation  2012: Basal cell carcinoma (BCC) in situ of skin      Comment:  3 on face, 2 on arm, removed by dermatology.   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                Underwent  surgical resection of tongue and neck. s/p                tracheostomy  01/24/2024: Squamous cell carcinoma of skin of right temple      Comment:  right temple    Past Surgical History:   Procedure Laterality Date    COLONOSCOPY      COLONOSCOPY N/A 09/14/2023    Procedure: COLONOSCOPY;  Surgeon: Reyes Olivia MD;  Location: [facility] (2ND FLR);  Service: Endoscopy;  Laterality: N/A;    CONJUNCTIVA BIOPSY Right 10/11/2022    Procedure: BIOPSY, CONJUNCTIVA;  Surgeon: Victor M Vasques MD;  Location: [facility];  Service: Ophthalmology;  Laterality: Right;    ESOPHAGOGASTRODUODENOSCOPY N/A 09/14/2023    Procedure: EGD (ESOPHAGOGASTRODUODENOSCOPY);  Surgeon: Reyes Olivia MD;  [facility]  Service: Endoscopy;  Laterality: N/A;    ESOPHAGOGASTRODUODENOSCOPY N/A 04/10/2024    Procedure: EGD (ESOPHAGOGASTRODUODENOSCOPY);  Surgeon: Reyes Pagan MD;  [facility]  Service: Endoscopy;  Laterality: N/A;    ESOPHAGOGASTRODUODENOSCOPY N/A 06/24/2024    Procedure: EGD (ESOPHAGOGASTRODUODENOSCOPY);  Surgeon: Reyes Pagan MD;  [address]  Service: Endoscopy;  Laterality: N/A;  Ref By: Dr. Hawkins   Procedure: egd  Diagnosis: esophagitis  Procedure Timing: 10 weeks  Prep: standard prep  6/21-pt r/s-inst to portal-2nd floor criteria-labs within 90 daysMS    EYE SURGERY Right 04/25/2024    exenteration of orbit    GLOSSECTOMY      INSERTION OF VENOUS ACCESS PORT Right 03/08/2021    Procedure: INSERTION, VENOUS ACCESS PORT;  Surgeon: Jesus Rendon MD;  Location: [address];  Service: General;  Laterality: Right;    LARYNGECTOMY      LIVER TRANSPLANT  11/2008    transplanted for biopsy proven hepatocellular carcinoma,     LYMPH NODE BIOPSY N/A 09/18/2020    Procedure: BIOPSY, LYMPH NODE;  [facility];  [address];  Service: General;  Laterality: N/A;  Rm 173    skin cancer removals      SURGICAL REMOVAL OF SCAR  "Right 08/24/2023    Procedure: EXCISION, SCAR;  Surgeon: Ting Brambila MD;  Location[facility];  Service: Ophthalmology;  Laterality: Right;    TRACHEOSTOMY         Tobacco Use History[1]    Social History     Substance and Sexual Activity   Alcohol Use Yes    Comment: drinks wine, 1 glass day       Physical Activity: Not on file       No birth history on file.  Recent Labs     06/27/25  0623   HCT 20.0*     Recent Labs     06/27/25  0623   *     Recent Labs     06/26/25  0158   K 3.8     Recent Labs     06/26/25  0158   CREATININE 1.8*     Recent Labs     06/26/25  0158        No results for input(s): "PT" in the last 72 hours.  Vitals:    06/27/25 1055 06/27/25 1111 06/27/25 1112 06/27/25 1222   BP: 129/70  125/68 132/76   BP Location:       Patient Position:       Pulse: 76 71 72 82   Resp: 18  18 16   Temp: 36.6 °C (97.9 °F)  36.5 °C (97.7 °F) 36.7 °C (98.1 °F)   TempSrc: Oral  Oral    SpO2: 99%  99% 96%   Weight:       Height:                           Pre-op Assessment          Review of Systems  Anesthesia Hx:  No problems with previous Anesthesia                Hematology/Oncology:    Oncology Normal    -- Anemia (hct 20, getting blood):                                  EENT/Dental:   Cannot speak due to laryngectomy          Cardiovascular:      Denies Hypertension.   Denies MI.        Denies Angina.                                    Pulmonary:  Pulmonary Normal   Denies COPD.  Denies Asthma.   Denies Shortness of breath.   Walks an hour every day               Renal/:  Chronic Renal Disease (06/26/25 01:58 Creatinine: 1.8 (H)   (H): Data is abnormally high)                Hepatic/GI:      Denies Liver Disease.               Neurological:  Denies TIA.  Denies CVA.    Denies Seizures.                                Endocrine:  Denies Diabetes.               Physical Exam  General: Well nourished, Cooperative, Alert and Oriented    Airway:  Laryngectomy, no connection to " pharynx      Anesthesia Plan  Type of Anesthesia, risks & benefits discussed:    Anesthesia Type: Gen Natural Airway  Intra-op Monitoring Plan: Standard ASA Monitors  Post Op Pain Control Plan: IV/PO Opioids PRN  Induction:  IV  Informed Consent: Informed consent signed with the Patient and all parties understand the risks and agree with anesthesia plan.  All questions answered.   ASA Score: 3  Day of Surgery Review of History & Physical: H&P Update referred to the surgeon/provider.    Ready For Surgery From Anesthesia Perspective.     .           [1]   Social History  Tobacco Use   Smoking Status Never   Smokeless Tobacco Never

## 2025-06-27 NOTE — HOSPITAL COURSE
Mr. 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 He was admitted with acute GI bleed and severe symptomatic anemia after presenting with lightheadedness and fatigue associated with hematochezia. Hemoglobin found to be 4.8. Improved to 7.0 following transfusion of 2 units of PRBCs.  Required additional transfusion of 1 unit on 06/27.  Underwent colonoscopy on 06/27 with diverticulosis in the sigmoid colon.  One 5 mm polyp was removed.  No active bleeding seen.  Patient resumed on regular diet.  H/H stable since that time.  Patient had acute renal failure at time of admission which resolved following blood and IV fluids.  He has severely elevated TSH but normal free T4.  However, patient reports that he missed nearly a week of levothyroxine.  Will resume previous dose and will need levels rechecked as an outpatient.  Patient found to have iron deficiency anemia here.  Given 1 g of Feraheme on 06/29.  Patient is stable for discharge with PCP, Oncology, hepatology follow-up.

## 2025-06-27 NOTE — SUBJECTIVE & OBJECTIVE
Interval History:  Patient feeling reasonably well today.  He is hungry.  Fatigue and lightheadedness have improved.    Review of Systems  Objective:     Vital Signs (Most Recent):  Temp: 98 °F (36.7 °C) (06/26/25 1950)  Pulse: 78 (06/26/25 1950)  Resp: 18 (06/26/25 1950)  BP: 121/63 (06/26/25 1950)  SpO2: 100 % (06/26/25 1950) Vital Signs (24h Range):  Temp:  [97 °F (36.1 °C)-98 °F (36.7 °C)] 98 °F (36.7 °C)  Pulse:  [70-98] 78  Resp:  [18] 18  SpO2:  [97 %-100 %] 100 %  BP: ()/(51-75) 121/63     Weight: 61.2 kg (134 lb 14.7 oz)  Body mass index is 20.51 kg/m².    Intake/Output Summary (Last 24 hours) at 6/26/2025 4806  Last data filed at 6/26/2025 1841  Gross per 24 hour   Intake --   Output 3 ml   Net -3 ml         Physical Exam  Vitals and nursing note reviewed.   Constitutional:       General: He is not in acute distress.     Appearance: He is well-developed. He is ill-appearing.   HENT:      Head:      Comments: Status post enucleation of right eye with flap covering orbit.  Laryngostomy tube in place     Right Ear: External ear normal.      Left Ear: External ear normal.      Nose: Nose normal.   Eyes:      Conjunctiva/sclera: Conjunctivae normal.      Pupils: Pupils are equal, round, and reactive to light.   Cardiovascular:      Rate and Rhythm: Normal rate and regular rhythm.   Pulmonary:      Effort: Pulmonary effort is normal. No respiratory distress.      Breath sounds: Normal breath sounds. No wheezing or rales.   Abdominal:      General: Bowel sounds are normal. There is no distension.      Palpations: Abdomen is soft.      Tenderness: There is no abdominal tenderness.   Musculoskeletal:         General: No tenderness. Normal range of motion.      Cervical back: Normal range of motion and neck supple.   Skin:     General: Skin is warm and dry.   Neurological:      Mental Status: He is alert and oriented to person, place, and time.   Psychiatric:         Thought Content: Thought content normal.                Significant Labs: All pertinent labs within the past 24 hours have been reviewed.  CBC:   Recent Labs   Lab 06/25/25  1307 06/25/25  1325 06/26/25  0016 06/26/25  0158   WBC 11.03  --  4.97 5.28  5.28   HGB 4.8*  --  6.7* 7.1*  7.0*   HCT 14.9* <15* 19.9* 21.1*  20.7*     --  104* 106*  108*     CMP:   Recent Labs   Lab 06/25/25  1307 06/26/25  0158   * 140   K 4.7 3.8    107   CO2 19* 20*   * 105   BUN 50* 47*   CREATININE 2.1* 1.8*   CALCIUM 8.6* 7.8*   PROT 5.9* 4.9*   ALBUMIN 3.2* 2.6*   BILITOT 0.3 0.4   ALKPHOS 50 35*   AST 19 19   ALT 10 9*   ANIONGAP 15 13       Significant Imaging: I have reviewed all pertinent imaging results/findings within the past 24 hours.

## 2025-06-27 NOTE — SUBJECTIVE & OBJECTIVE
Interval History:  Patient denies fatigue or lightheadedness.  Tolerated bowel prep well.  Did notice some evidence of bleeding and developed prep.  Colonoscopy negative for active bleeding.    Review of Systems  Objective:     Vital Signs (Most Recent):  Temp: 97.2 °F (36.2 °C) (06/27/25 1551)  Pulse: 80 (06/27/25 1701)  Resp: 18 (06/27/25 1551)  BP: (!) 113/55 (06/27/25 1551)  SpO2: 97 % (06/27/25 1551) Vital Signs (24h Range):  Temp:  [97.2 °F (36.2 °C)-98.1 °F (36.7 °C)] 97.2 °F (36.2 °C)  Pulse:  [69-85] 80  Resp:  [16-20] 18  SpO2:  [96 %-100 %] 97 %  BP: ()/(55-76) 113/55     Weight: 61.2 kg (134 lb 14.7 oz)  Body mass index is 20.51 kg/m².    Intake/Output Summary (Last 24 hours) at 6/27/2025 1742  Last data filed at 6/27/2025 1325  Gross per 24 hour   Intake 300 ml   Output 3 ml   Net 297 ml         Physical Exam  Vitals and nursing note reviewed.   Constitutional:       General: He is not in acute distress.     Appearance: He is well-developed. He is ill-appearing.   HENT:      Head:      Comments: Status post enucleation of right eye with flap covering orbit.  Laryngostomy tube in place     Right Ear: External ear normal.      Left Ear: External ear normal.      Nose: Nose normal.   Eyes:      Conjunctiva/sclera: Conjunctivae normal.      Pupils: Pupils are equal, round, and reactive to light.   Cardiovascular:      Rate and Rhythm: Normal rate and regular rhythm.   Pulmonary:      Effort: Pulmonary effort is normal. No respiratory distress.      Breath sounds: Normal breath sounds. No wheezing or rales.   Abdominal:      General: Bowel sounds are normal. There is no distension.      Palpations: Abdomen is soft.      Tenderness: There is no abdominal tenderness.   Musculoskeletal:         General: No tenderness. Normal range of motion.      Cervical back: Normal range of motion and neck supple.   Skin:     General: Skin is warm and dry.   Neurological:      Mental Status: He is alert and oriented to  person, place, and time.   Psychiatric:         Thought Content: Thought content normal.               Significant Labs: All pertinent labs within the past 24 hours have been reviewed.  CBC:   Recent Labs   Lab 06/26/25  0016 06/26/25  0158 06/27/25  0623   WBC 4.97 5.28  5.28 3.48*   HGB 6.7* 7.1*  7.0* 6.6*   HCT 19.9* 21.1*  20.7* 20.0*   * 106*  108* 111*     CMP:   Recent Labs   Lab 06/26/25  0158      K 3.8      CO2 20*      BUN 47*   CREATININE 1.8*   CALCIUM 7.8*   PROT 4.9*   ALBUMIN 2.6*   BILITOT 0.4   ALKPHOS 35*   AST 19   ALT 9*   ANIONGAP 13       Significant Imaging: I have reviewed all pertinent imaging results/findings within the past 24 hours.

## 2025-06-27 NOTE — PROGRESS NOTES
[facility] Medicine  Progress Note    Patient Name: Rocco Swain  MRN: [id number]  Patient Class: IP- Inpatient   Admission Date: 6/25/2025  Length of Stay: 1 days  Attending Physician: Marvel Lopez MD  Primary Care Provider: Leny, Primary Doctor        Subjective     Principal Problem:Acute GI bleeding        HPI:  74 yo M with PMHx Liver transplant (2008) and 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 Pt. Non-verbal due to prior ENT surgery hx, nods head and writes to communicate. He reports progressively worsening fatigue and lightheadedness x2-3 days. He did note one episode of bright red blood in his stool yesterday, but he denies any melena or other signs of bleeding. No reported associated abdominal pain. He does have hx of GI bleeds, last EGD 4/2024 showed showed severe erosive esophagitis. Pt. Denies NSAID use. He is no longer on protonix.    Overview/Hospital Course:  Mr. 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  He is admitted with acute GI bleed and severe symptomatic anemia after presenting with lightheadedness and fatigue.  Hemoglobin found to be 4.8.  Improved to 7.0 following transfusion of 2 units of PRBCs.  Patient noted that bowel movements contain mix of hematochezia and possible melena.  Patient had acute renal failure which is now improving.  He has severely elevated TSH but normal free T4.  Gastroenterology recommended clear liquid diet for now with plan for endoscopy on 06/27.    Interval History:  Patient feeling reasonably  well today.  He is hungry.  Fatigue and lightheadedness have improved.    Review of Systems  Objective:     Vital Signs (Most Recent):  Temp: 98 °F (36.7 °C) (06/26/25 1950)  Pulse: 78 (06/26/25 1950)  Resp: 18 (06/26/25 1950)  BP: 121/63 (06/26/25 1950)  SpO2: 100 % (06/26/25 1950) Vital Signs (24h Range):  Temp:  [97 °F (36.1 °C)-98 °F (36.7 °C)] 98 °F (36.7 °C)  Pulse:  [70-98] 78  Resp:  [18] 18  SpO2:  [97 %-100 %] 100 %  BP: ()/(51-75) 121/63     Weight: 61.2 kg (134 lb 14.7 oz)  Body mass index is 20.51 kg/m².    Intake/Output Summary (Last 24 hours) at 6/26/2025 0756  Last data filed at 6/26/2025 1841  Gross per 24 hour   Intake --   Output 3 ml   Net -3 ml         Physical Exam  Vitals and nursing note reviewed.   Constitutional:       General: He is not in acute distress.     Appearance: He is well-developed. He is ill-appearing.   HENT:      Head:      Comments: Status post enucleation of right eye with flap covering orbit.  Laryngostomy tube in place     Right Ear: External ear normal.      Left Ear: External ear normal.      Nose: Nose normal.   Eyes:      Conjunctiva/sclera: Conjunctivae normal.      Pupils: Pupils are equal, round, and reactive to light.   Cardiovascular:      Rate and Rhythm: Normal rate and regular rhythm.   Pulmonary:      Effort: Pulmonary effort is normal. No respiratory distress.      Breath sounds: Normal breath sounds. No wheezing or rales.   Abdominal:      General: Bowel sounds are normal. There is no distension.      Palpations: Abdomen is soft.      Tenderness: There is no abdominal tenderness.   Musculoskeletal:         General: No tenderness. Normal range of motion.      Cervical back: Normal range of motion and neck supple.   Skin:     General: Skin is warm and dry.   Neurological:      Mental Status: He is alert and oriented to person, place, and time.   Psychiatric:         Thought Content: Thought content normal.               Significant Labs: All pertinent  labs within the past 24 hours have been reviewed.  CBC:   Recent Labs   Lab 06/25/25  1307 06/25/25  1325 06/26/25  0016 06/26/25  0158   WBC 11.03  --  4.97 5.28  5.28   HGB 4.8*  --  6.7* 7.1*  7.0*   HCT 14.9* <15* 19.9* 21.1*  20.7*     --  104* 106*  108*     CMP:   Recent Labs   Lab 06/25/25  1307 06/26/25  0158   * 140   K 4.7 3.8    107   CO2 19* 20*   * 105   BUN 50* 47*   CREATININE 2.1* 1.8*   CALCIUM 8.6* 7.8*   PROT 5.9* 4.9*   ALBUMIN 3.2* 2.6*   BILITOT 0.3 0.4   ALKPHOS 50 35*   AST 19 19   ALT 10 9*   ANIONGAP 15 13       Significant Imaging: I have reviewed all pertinent imaging results/findings within the past 24 hours.      Assessment & Plan  Acute GI bleeding  -Pt. With weakness, fatigue, hematochezia. Hgb 4.8, pt. Hemodynamically stable  -CTA negative for bleed, notes many diverticuli  -GI pathway ordered with 2 large bore IVs placed, GI consult, IV protonix  -Transfuse for Hgb <7  -Clear liquid diet, NPO @ MN in anticipation of need for endoscopy    Status post liver transplantation - 2008  -No acute issues, liver function stable  -Continue home tacrolimus with am levels ordered    Squamous cell carcinoma of base of tongue  Tracheostomy status  -Hx squamous cell HENT cancer s/p trach, R eye enuleation. No acute issues, can discuss with oncology when to resume med erivedge        Acute kidney injury superimposed on chronic kidney disease  -Cr 2.1 on presentation, baseline 1.4  Postoperative hypothyroidism      Acute blood loss anemia  Anemia is likely due to acute blood loss which was from suspected GI bleed. Most recent hemoglobin and hematocrit are listed below.  Recent Labs     06/25/25  1307 06/25/25  1325 06/26/25  0016 06/26/25  0158   HGB 4.8*  --  6.7* 7.1*  7.0*   HCT 14.9* <15* 19.9* 21.1*  20.7*     Plan  - Monitor serial CBC: Daily  - Transfuse PRBC if patient becomes hemodynamically unstable, symptomatic or H/H drops below 7/21.  - Patient has  received 2 units of PRBCs on 6/25  - Patient's anemia is currently improving  -GI planning endoscopy  VTE Risk Mitigation (From admission, onward)           Ordered     IP VTE HIGH RISK PATIENT  Once         06/25/25 1928     Place sequential compression device  Until discontinued         06/25/25 1928                    Discharge Planning   WAQAS: 6/27/2025     Code Status: Full Code   Medical Readiness for Discharge Date:   Discharge Plan A: Home                        Marvel Lopez MD  Department of Hospital Medicine   [facility] - Acute Medical Stepdown

## 2025-06-27 NOTE — ASSESSMENT & PLAN NOTE
-Pt. With weakness, fatigue, hematochezia. Hgb 4.8, pt. Hemodynamically stable  -CTA negative for bleed, notes many diverticuli  -GI pathway ordered with 2 large bore IVs placed, GI consult, IV protonix  -Transfuse for Hgb <7  -status post colonoscopy on 06/27.  Showed diverticulosis and patient had a polyp removed from sigmoid colon.  No active bleeding.

## 2025-06-27 NOTE — NURSING TRANSFER
Nursing Transfer Note      6/27/2025   2:07 PM    Reason patient is being transferred: post procedre    Transfer To: [id number]    Transfer via stretcher    Transfer with cardiac monitoring    Transported by pct    Any special needs or follow-up needed: routine    Patient belongings transferred with patient: transported with respiratory emergency box    Chart send with patient: Yes    Notified: no one on text    Patient reassessed at: 4011 6/27/2025

## 2025-06-27 NOTE — ASSESSMENT & PLAN NOTE
TSH levels were very elevated   Patient reports that he missed nearly a week of levothyroxine  Resume home Synthroid and patient has plan to recheck levels with PCP next month

## 2025-06-27 NOTE — TREATMENT PLAN
GI Treatment Plan    S/p Colonoscopy today    Impression:            - Diverticulosis in the sigmoid colon.                          - One 5 mm polyp in the sigmoid colon, removed                          with a cold snare. Resected and retrieved.                          - The examination was otherwise normal.     Recommendation:        - Return patient to [facility].                          - Advance diet as tolerated.                          - Continue present medications.                          - No repeat colonoscopy due to age.     GI will sign off    Afshin Vaca MD  Gastroenterology Fellow, PGY-IV

## 2025-06-27 NOTE — ASSESSMENT & PLAN NOTE
Anemia is likely due to acute blood loss which was from suspected GI bleed. Most recent hemoglobin and hematocrit are listed below.  Recent Labs     06/26/25  0016 06/26/25  0158 06/27/25  0623   HGB 6.7* 7.1*  7.0* 6.6*   HCT 19.9* 21.1*  20.7* 20.0*     Plan  - Monitor serial CBC: Daily  - Transfuse PRBC if patient becomes hemodynamically unstable, symptomatic or H/H drops below 7/21.  - Patient has received 2 units of PRBCs on 6/25, 1 unit of PRBCs on 06/27  - Patient's anemia is currently worsening  -will continue to monitor

## 2025-06-27 NOTE — PLAN OF CARE
Patient alert and oriented X4, nonverbal, d/t laryngectomy, uses communication board. Resp even and unlabored. Continent of bowel and bladder. Patient is currently NPO since MN r/t scheduled colonoscopy today. Right eye enucleation, left eye intact with adequate vision. Safety precautions in place, bed in low position, call light within reach.     Problem: Adult Inpatient Plan of Care  Goal: Plan of Care Review  Outcome: Progressing  Goal: Patient-Specific Goal (Individualized)  Outcome: Progressing  Goal: Absence of Hospital-Acquired Illness or Injury  Outcome: Progressing  Goal: Optimal Comfort and Wellbeing  Outcome: Progressing  Goal: Readiness for Transition of Care  Outcome: Progressing     Problem: Gastrointestinal Bleeding  Goal: Optimal Coping with Acute Illness  Outcome: Progressing  Goal: Hemostasis  Outcome: Progressing     Problem: Acute Kidney Injury/Impairment  Goal: Fluid and Electrolyte Balance  Outcome: Progressing  Goal: Improved Oral Intake  Outcome: Progressing  Goal: Effective Renal Function  Outcome: Progressing     Problem: Skin Injury Risk Increased  Goal: Skin Health and Integrity  Outcome: Progressing

## 2025-06-27 NOTE — TRANSFER OF CARE
"Anesthesia Transfer of Care Note    Patient: Rocco Swain    Procedure(s) Performed: Procedure(s) (LRB):  COLONOSCOPY (N/A)    Patient location: PACU    Anesthesia Type: general    Transport from OR: Transported from OR on room air with adequate spontaneous ventilation    Post pain: adequate analgesia    Post assessment: no apparent anesthetic complications and tolerated procedure well    Post vital signs: stable    Level of consciousness: awake and alert    Nausea/Vomiting: no nausea/vomiting    Complications: none    Transfer of care protocol was followed      Last vitals: Visit Vitals  /76   Pulse 82   Temp 36.7 °C (98.1 °F)   Resp 16   Ht 5' 8" (1.727 m)   Wt 61.2 kg (134 lb 14.7 oz)   SpO2 96%   BMI 20.51 kg/m²     "

## 2025-06-27 NOTE — PROGRESS NOTES
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 Pt. Non-verbal due to prior ENT surgery hx, nods head and writes to communicate. He reports progressively worsening fatigue and lightheadedness x2-3 days. He did note one episode of bright red blood in his stool yesterday, but he denies any melena or other signs of bleeding. No reported associated abdominal pain. He does have hx of GI bleeds, last EGD 4/2024 showed showed severe erosive esophagitis. Pt. Denies NSAID use. He is no longer on protonix.    Overview/Hospital Course:  Mr. 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 He is admitted with acute GI bleed and severe symptomatic anemia after presenting with lightheadedness and fatigue. Hemoglobin found to be 4.8. Improved to 7.0 following transfusion of 2 units of PRBCs.  Required additional transfusion of 1 unit on 06/27.  Underwent colonoscopy on 06/27 with diverticulosis in the sigmoid colon.  One 5 mm polyp was removed.  No active bleeding seen.  Patient resumed on regular diet.  Continuing to monitor H/H.  Patient had acute renal failure which is now improving. He has severely elevated TSH but  normal free T4.  However, patient reports that he missed nearly a week of levothyroxine.  Will resume previous dose and will need levels rechecked as an outpatient.      Interval History:  Patient denies fatigue or lightheadedness.  Tolerated bowel prep well.  Did notice some evidence of bleeding and developed prep.  Colonoscopy negative for active bleeding.    Review of Systems  Objective:     Vital Signs (Most Recent):  Temp: 97.2 °F (36.2 °C) (06/27/25 1551)  Pulse: 80 (06/27/25 1701)  Resp: 18 (06/27/25 1551)  BP: (!) 113/55 (06/27/25 1551)  SpO2: 97 % (06/27/25 1551) Vital Signs (24h Range):  Temp:  [97.2 °F (36.2 °C)-98.1 °F (36.7 °C)] 97.2 °F (36.2 °C)  Pulse:  [69-85] 80  Resp:  [16-20] 18  SpO2:  [96 %-100 %] 97 %  BP: ()/(55-76) 113/55     Weight: 61.2 kg (134 lb 14.7 oz)  Body mass index is 20.51 kg/m².    Intake/Output Summary (Last 24 hours) at 6/27/2025 1742  Last data filed at 6/27/2025 1325  Gross per 24 hour   Intake 300 ml   Output 3 ml   Net 297 ml         Physical Exam  Vitals and nursing note reviewed.   Constitutional:       General: He is not in acute distress.     Appearance: He is well-developed. He is ill-appearing.   HENT:      Head:      Comments: Status post enucleation of right eye with flap covering orbit.  Laryngostomy tube in place     Right Ear: External ear normal.      Left Ear: External ear normal.      Nose: Nose normal.   Eyes:      Conjunctiva/sclera: Conjunctivae normal.      Pupils: Pupils are equal, round, and reactive to light.   Cardiovascular:      Rate and Rhythm: Normal rate and regular rhythm.   Pulmonary:      Effort: Pulmonary effort is normal. No respiratory distress.      Breath sounds: Normal breath sounds. No wheezing or rales.   Abdominal:      General: Bowel sounds are normal. There is no distension.      Palpations: Abdomen is soft.      Tenderness: There is no abdominal tenderness.   Musculoskeletal:         General: No tenderness. Normal range of  motion.      Cervical back: Normal range of motion and neck supple.   Skin:     General: Skin is warm and dry.   Neurological:      Mental Status: He is alert and oriented to person, place, and time.   Psychiatric:         Thought Content: Thought content normal.               Significant Labs: All pertinent labs within the past 24 hours have been reviewed.  CBC:   Recent Labs   Lab 06/26/25  0016 06/26/25  0158 06/27/25  0623   WBC 4.97 5.28  5.28 3.48*   HGB 6.7* 7.1*  7.0* 6.6*   HCT 19.9* 21.1*  20.7* 20.0*   * 106*  108* 111*     CMP:   Recent Labs   Lab 06/26/25  0158      K 3.8      CO2 20*      BUN 47*   CREATININE 1.8*   CALCIUM 7.8*   PROT 4.9*   ALBUMIN 2.6*   BILITOT 0.4   ALKPHOS 35*   AST 19   ALT 9*   ANIONGAP 13       Significant Imaging: I have reviewed all pertinent imaging results/findings within the past 24 hours.      Assessment & Plan  Acute GI bleeding  -Pt. With weakness, fatigue, hematochezia. Hgb 4.8, pt. Hemodynamically stable  -CTA negative for bleed, notes many diverticuli  -GI pathway ordered with 2 large bore IVs placed, GI consult, IV protonix  -Transfuse for Hgb <7  -status post colonoscopy on 06/27.  Showed diverticulosis and patient had a polyp removed from sigmoid colon.  No active bleeding.    Status post liver transplantation - 2008  -No acute issues, liver function stable  -Continue home tacrolimus     Squamous cell carcinoma of base of tongue  Tracheostomy status  -Hx squamous cell HENT cancer s/p trach, R eye enuleation. No acute issues, can discuss with oncology when to resume med erivedge        Acute kidney injury superimposed on chronic kidney disease  -Cr 2.1 on presentation, baseline 1.4  Postoperative hypothyroidism  TSH levels were very elevated   Patient reports that he missed nearly a week of levothyroxine  Resume home Synthroid and patient has plan to recheck levels with PCP next month    Acute blood loss anemia  Anemia is likely due  to acute blood loss which was from suspected GI bleed. Most recent hemoglobin and hematocrit are listed below.  Recent Labs     06/26/25  0016 06/26/25  0158 06/27/25  0623   HGB 6.7* 7.1*  7.0* 6.6*   HCT 19.9* 21.1*  20.7* 20.0*     Plan  - Monitor serial CBC: Daily  - Transfuse PRBC if patient becomes hemodynamically unstable, symptomatic or H/H drops below 7/21.  - Patient has received 2 units of PRBCs on 6/25, 1 unit of PRBCs on 06/27  - Patient's anemia is currently worsening  -will continue to monitor    VTE Risk Mitigation (From admission, onward)           Ordered     IP VTE HIGH RISK PATIENT  Once         06/25/25 1928     Place sequential compression device  Until discontinued         06/25/25 1928                    Discharge Planning   WAQAS: 6/28/2025     Code Status: Full Code   Medical Readiness for Discharge Date:   Discharge Plan A: Home                        Marvel Lopez MD  Department of Hospital Medicine   [facility]

## 2025-06-27 NOTE — PROVATION PATIENT INSTRUCTIONS
Discharge Summary/Instructions after an Endoscopic Procedure  Patient Name: Rocco Swain  Patient MRN: [id number]  Patient YOB: 1950  Friday, June 27, 2025  Carlton Bennett MD  Dear patient,  As a result of recent federal legislation (The Federal Cures Act), you may   receive lab or pathology results from your procedure in your [facility]   account before your physician is able to contact you. Your physician or   their representative will relay the results to you with their   recommendations at their soonest availability.  Thank you,  RESTRICTIONS:  During your procedure today, you received medications for sedation.  These   medications may affect your judgment, balance and coordination.  Therefore,   for 24 hours, you have the following restrictions:   - DO NOT drive a car, operate machinery, make legal/financial decisions,   sign important papers or drink alcohol.    ACTIVITY:  Today: no heavy lifting, straining or running due to procedural   sedation/anesthesia.  The following day: return to full activity including work.  DIET:  Eat and drink normally unless instructed otherwise.     TREATMENT FOR COMMON SIDE EFFECTS:  - Mild abdominal pain, nausea, belching, bloating or excessive gas:  rest,   eat lightly and use a heating pad.  - Sore Throat: treat with throat lozenges and/or gargle with warm salt   water.  - Because air was used during the procedure, expelling large amounts of air   from your rectum or belching is normal.  - If a bowel prep was taken, you may not have a bowel movement for 1-3 days.    This is normal.  SYMPTOMS TO WATCH FOR AND REPORT TO YOUR PHYSICIAN:  1. Abdominal pain or bloating, other than gas cramps.  2. Chest pain.  3. Back pain.  4. Signs of infection such as: chills or fever occurring within 24 hours   after the procedure.  5. Rectal bleeding, which would show as bright red, maroon, or black stools.   (A tablespoon of blood from the rectum is not serious, especially if    hemorrhoids are present.)  6. Vomiting.  7. Weakness or dizziness.  GO DIRECTLY TO THE NEAREST EMERGENCY ROOM IF YOU HAVE ANY OF THE FOLLOWING:      Difficulty breathing              Chills and/or fever over 101 F   Persistent vomiting and/or vomiting blood   Severe abdominal pain   Severe chest pain   Black, tarry stools   Bleeding- more than one tablespoon   Any other symptom or condition that you feel may need urgent attention  Your doctor recommends these additional instructions:  If any biopsies were taken, your doctors clinic will contact you in 1 to 2   weeks with any results.  - Return patient to [facility] for [facility] care.   - Advance diet as tolerated.   - Continue present medications.   - No repeat colonoscopy due to age.  For questions, problems or results please call your physician - Carlton Bennett MD at Work:  [phone].  [facility], EMERGENCY ROOM PHONE NUMBER: [phone]  IF A COMPLICATION OR EMERGENCY SITUATION ARISES AND YOU ARE UNABLE TO REACH   YOUR PHYSICIAN - GO DIRECTLY TO THE EMERGENCY ROOM.  Carlton Bennett MD  6/27/2025 1:19:21 PM  This report has been verified and signed electronically.  Dear patient,  As a result of recent federal legislation (The Federal Cures Act), you may   receive lab or pathology results from your procedure in your [facility]   account before your physician is able to contact you. Your physician or   their representative will relay the results to you with their   recommendations at their soonest availability.  Thank you,  PROVATION

## 2025-06-27 NOTE — ASSESSMENT & PLAN NOTE
Anemia is likely due to acute blood loss which was from suspected GI bleed. Most recent hemoglobin and hematocrit are listed below.  Recent Labs     06/25/25  1307 06/25/25  1325 06/26/25  0016 06/26/25  0158   HGB 4.8*  --  6.7* 7.1*  7.0*   HCT 14.9* <15* 19.9* 21.1*  20.7*     Plan  - Monitor serial CBC: Daily  - Transfuse PRBC if patient becomes hemodynamically unstable, symptomatic or H/H drops below 7/21.  - Patient has received 2 units of PRBCs on 6/25  - Patient's anemia is currently improving  -GI planning endoscopy

## 2025-06-27 NOTE — ANESTHESIA POSTPROCEDURE EVALUATION
Anesthesia Post Evaluation    Patient: Rocco Swain    Procedure(s) Performed: Procedure(s) (LRB):  COLONOSCOPY (N/A)    Final Anesthesia Type: general      Patient location during evaluation: PACU  Patient participation: Yes- Able to Participate  Level of consciousness: awake and alert and oriented  Post-procedure vital signs: reviewed and stable  Pain management: adequate  Airway patency: patent    PONV status at discharge: No PONV  Anesthetic complications: no      Cardiovascular status: stable  Respiratory status: unassisted, spontaneous ventilation and room air  Hydration status: euvolemic  Follow-up not needed.              Vitals Value Taken Time   /63 06/27/25 13:47   Temp 36.6 °C (97.9 °F) 06/27/25 13:35   Pulse 71 06/27/25 13:57   Resp 18 06/27/25 13:57   SpO2 100 % 06/27/25 13:57   Vitals shown include unfiled device data.      No case tracking events are documented in the log.      Pain/Katya Score: Katya Score: 10 (6/27/2025  1:45 PM)

## 2025-06-27 NOTE — RESPIRATORY THERAPY
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  Clinically Significant Surgical Hx: laryngectomy    24 Hours Vitals Range:  Temp:  [97.3 °F (36.3 °C)-98 °F (36.7 °C)]   Pulse:  [69-98]   Resp:  [16-18]   BP: (100-121)/(57-70)   SpO2:  [96 %-100 %]     Labs:    Recent Labs     25  1307 25  0158   * 140   K 4.7 3.8    107   CO2 19* 20*   BUN 50* 47*   CREATININE 2.1* 1.8*   * 105   PHOS 3.6  --    MG 1.8  --         No results for input(s): "PH", "PCO2", "PO2", "HCO3", "POCSATURATED", "BE" in the last 72 hours.    ASSESSMENT/INTERVENTIONS  All supplies visible at bedside. Neck breather sign on door and HOB. No respiratory concerns at this time.    Last VS   Temp: 97.7 °F (36.5 °C) (759)  Pulse: 76 (759)  Resp: 16 (759)  BP: 110/65 (759)  SpO2: 99 % (759)    Extra trachs/ETTs at bedside: 6 & 7 ETT  Emergency Box number: L8  Level of Consciousness: Level of Consciousness (AVPU): (P) alert  Respiratory Effort: Respiratory Effort: (P) Unlabored Expansion/Accessory Muscle Usage: Expansion/Accessory Muscles/Retractions: (P) no use of accessory muscles  All Lung Field Breath Sounds: All Lung Fields Breath Sounds: clear, " Anterior:, Lateral:  O2 Device/Concentration: room air  Surgical airway: Yes, laryngectomy, stoma  Ambu at bedside: yes     Active Orders   Respiratory Care    Pulse Oximetry Continuous     Frequency: Continuous     Number of Occurrences: Until Specified    Stoma Care by RT Q4H     Frequency: Q4H     Number of Occurrences: Until Specified       RECOMMENDATIONS    We recommend: RRT Recs: Continue POC per primary team.      FOLLOW-UP    Please call back the Rapid Response RT, Bee Malcolm, RRT at x [phone] for any questions or concerns.

## 2025-06-28 LAB
ABSOLUTE EOSINOPHIL (OHS): 0.19 K/UL
ABSOLUTE MONOCYTE (OHS): 0.52 K/UL (ref 0.3–1)
ABSOLUTE NEUTROPHIL COUNT (OHS): 2.5 K/UL (ref 1.8–7.7)
BASOPHILS # BLD AUTO: 0.02 K/UL
BASOPHILS NFR BLD AUTO: 0.5 %
ERYTHROCYTE [DISTWIDTH] IN BLOOD BY AUTOMATED COUNT: 18.5 % (ref 11.5–14.5)
FERRITIN SERPL-MCNC: 51 NG/ML (ref 20–300)
HCT VFR BLD AUTO: 24.3 % (ref 40–54)
HGB BLD-MCNC: 8.1 GM/DL (ref 14–18)
IMM GRANULOCYTES # BLD AUTO: 0.03 K/UL (ref 0–0.04)
IMM GRANULOCYTES NFR BLD AUTO: 0.8 % (ref 0–0.5)
IRON SATN MFR SERPL: 11 % (ref 20–50)
IRON SERPL-MCNC: 33 UG/DL (ref 45–160)
LYMPHOCYTES # BLD AUTO: 0.52 K/UL (ref 1–4.8)
MCH RBC QN AUTO: 31.8 PG (ref 27–31)
MCHC RBC AUTO-ENTMCNC: 33.3 G/DL (ref 32–36)
MCV RBC AUTO: 95 FL (ref 82–98)
NUCLEATED RBC (/100WBC) (OHS): 0 /100 WBC
PLATELET # BLD AUTO: 120 K/UL (ref 150–450)
PMV BLD AUTO: 9 FL (ref 9.2–12.9)
RBC # BLD AUTO: 2.55 M/UL (ref 4.6–6.2)
RELATIVE EOSINOPHIL (OHS): 5 %
RELATIVE LYMPHOCYTE (OHS): 13.8 % (ref 18–48)
RELATIVE MONOCYTE (OHS): 13.8 % (ref 4–15)
RELATIVE NEUTROPHIL (OHS): 66.1 % (ref 38–73)
TIBC SERPL-MCNC: 300 UG/DL (ref 250–450)
TRANSFERRIN SERPL-MCNC: 203 MG/DL (ref 200–375)
WBC # BLD AUTO: 3.78 K/UL (ref 3.9–12.7)

## 2025-06-28 PROCEDURE — 25000003 PHARM REV CODE 250: Performed by: INTERNAL MEDICINE

## 2025-06-28 PROCEDURE — 25000003 PHARM REV CODE 250: Performed by: HOSPITALIST

## 2025-06-28 PROCEDURE — 36415 COLL VENOUS BLD VENIPUNCTURE: CPT | Performed by: HOSPITALIST

## 2025-06-28 PROCEDURE — 36415 COLL VENOUS BLD VENIPUNCTURE: CPT | Performed by: STUDENT IN AN ORGANIZED HEALTH CARE EDUCATION/TRAINING PROGRAM

## 2025-06-28 PROCEDURE — 25000003 PHARM REV CODE 250: Performed by: ANESTHESIOLOGY

## 2025-06-28 PROCEDURE — 94761 N-INVAS EAR/PLS OXIMETRY MLT: CPT

## 2025-06-28 PROCEDURE — A4216 STERILE WATER/SALINE, 10 ML: HCPCS | Performed by: ANESTHESIOLOGY

## 2025-06-28 PROCEDURE — 25000003 PHARM REV CODE 250: Performed by: STUDENT IN AN ORGANIZED HEALTH CARE EDUCATION/TRAINING PROGRAM

## 2025-06-28 PROCEDURE — 11000001 HC ACUTE MED/SURG PRIVATE ROOM

## 2025-06-28 PROCEDURE — 84466 ASSAY OF TRANSFERRIN: CPT | Performed by: STUDENT IN AN ORGANIZED HEALTH CARE EDUCATION/TRAINING PROGRAM

## 2025-06-28 PROCEDURE — 99900035 HC TECH TIME PER 15 MIN (STAT)

## 2025-06-28 PROCEDURE — 63600175 PHARM REV CODE 636 W HCPCS: Performed by: HOSPITALIST

## 2025-06-28 PROCEDURE — 82728 ASSAY OF FERRITIN: CPT | Performed by: STUDENT IN AN ORGANIZED HEALTH CARE EDUCATION/TRAINING PROGRAM

## 2025-06-28 PROCEDURE — 85025 COMPLETE CBC W/AUTO DIFF WBC: CPT | Performed by: HOSPITALIST

## 2025-06-28 RX ORDER — MUPIROCIN 20 MG/G
OINTMENT TOPICAL 2 TIMES DAILY
Status: DISCONTINUED | OUTPATIENT
Start: 2025-06-28 | End: 2025-06-29 | Stop reason: HOSPADM

## 2025-06-28 RX ORDER — PANTOPRAZOLE SODIUM 40 MG/1
40 TABLET, DELAYED RELEASE ORAL DAILY
Status: DISCONTINUED | OUTPATIENT
Start: 2025-06-29 | End: 2025-06-29 | Stop reason: HOSPADM

## 2025-06-28 RX ADMIN — MUPIROCIN: 20 OINTMENT TOPICAL at 09:06

## 2025-06-28 RX ADMIN — TACROLIMUS 0.5 MG: 0.5 CAPSULE ORAL at 05:06

## 2025-06-28 RX ADMIN — Medication 3 ML: at 05:06

## 2025-06-28 RX ADMIN — LEVOTHYROXINE SODIUM 88 MCG: 88 TABLET ORAL at 05:06

## 2025-06-28 RX ADMIN — TRAZODONE HYDROCHLORIDE 50 MG: 50 TABLET ORAL at 09:06

## 2025-06-28 RX ADMIN — Medication 3 ML: at 12:06

## 2025-06-28 RX ADMIN — TACROLIMUS 0.5 MG: 0.5 CAPSULE ORAL at 08:06

## 2025-06-28 RX ADMIN — PANTOPRAZOLE SODIUM 40 MG: 40 INJECTION, POWDER, LYOPHILIZED, FOR SOLUTION INTRAVENOUS at 09:06

## 2025-06-28 NOTE — PLAN OF CARE
Patient alert and oriented X4, nonverbal, d/t laryngectomy, uses communication board. Resp even and unlabored. Continent of bowel and bladder. Patient had scheduled colonoscopy today. Patient also rec'd 1 unit PRBC's. Right eye enucleation, left eye intact with adequate vision. Safety precautions in place, bed in low position, call light within reach.      Problem: Adult Inpatient Plan of Care  Goal: Plan of Care Review  Outcome: Progressing  Goal: Patient-Specific Goal (Individualized)  Outcome: Progressing  Goal: Absence of Hospital-Acquired Illness or Injury  Outcome: Progressing  Goal: Optimal Comfort and Wellbeing  Outcome: Progressing  Goal: Readiness for Transition of Care  Outcome: Progressing     Problem: Gastrointestinal Bleeding  Goal: Optimal Coping with Acute Illness  Outcome: Progressing  Goal: Hemostasis  Outcome: Progressing     Problem: Acute Kidney Injury/Impairment  Goal: Fluid and Electrolyte Balance  Outcome: Progressing  Goal: Improved Oral Intake  Outcome: Progressing  Goal: Effective Renal Function  Outcome: Progressing     Problem: Skin Injury Risk Increased  Goal: Skin Health and Integrity  Outcome: Progressing

## 2025-06-28 NOTE — RESPIRATORY THERAPY
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  Clinically Significant Surgical Hx: laryngectomy    24 Hours Vitals Range:  Temp:  [96.1 °F (35.6 °C)-98.2 °F (36.8 °C)]   Pulse:  [69-82]   Resp:  [17-18]   BP: (114-126)/(59-66)   SpO2:  [93 %-100 %]     Labs:    Recent Labs     25  0158      K 3.8      CO2 20*   BUN 47*   CREATININE 1.8*           No results for input(s): "PH", "PCO2", "PO2", "HCO3", "POCSATURATED", "BE" in the last 72 hours.    ASSESSMENT/INTERVENTIONS  Supplies at bedside    Last VS   Temp: 97.9 °F (36.6 °C) ( 1543)  Pulse: 74 ( 1543)  Resp: 18 ( 1543)  BP: 114/65 ( 1543)  SpO2: 99 % ( 1646)    Extra trachs/ETTs at bedside: y  Emergency Box number: L8  Level of Consciousness: Level of Consciousness (AVPU): alert  Respiratory Effort: Respiratory Effort: Unlabored Expansion/Accessory Muscle Usage: Expansion/Accessory Muscles/Retractions: expansion symmetric  All Lung Field Breath Sounds: All Lung Fields Breath Sounds: Anterior:, Lateral:, diminished  SHENG Breath Sounds: clear  O2 Device/Concentration: RA  Surgical airway: Yes, laryngectomy, stoma  Ambu at bedside: y     Active Orders   Respiratory Care    Oxygen " Continuous     Frequency: Continuous     Number of Occurrences: Until Specified     Order Comments: Discontinue when Sp02 is greater than or equal to 95% of equal to Preop Sp02       Order Questions:      Device type: Low flow      Device: Simple Face Mask      Titrate O2 per Oxygen Titration Protocol: Yes      Notify MD of: Inability to achieve desired SpO2    Pulse Oximetry Continuous     Frequency: Continuous     Number of Occurrences: Until Specified    Stoma Care by RT Q4H     Frequency: Q4H     Number of Occurrences: Until Specified       RECOMMENDATIONS    We recommend: RRT Recs: Continue POC per primary team.      FOLLOW-UP    Please call back the Rapid Response RT, Katie Serrato, RRT at x [phone] for any questions or concerns.

## 2025-06-28 NOTE — PLAN OF CARE
Alert and oriented x 4. Non verbal due to previous surgery. On room air. Vss. Safety measures in place. Whiteboard updated. Bed in low position. Call light in reach.      Problem: Adult Inpatient Plan of Care  Goal: Plan of Care Review  Outcome: Progressing  Goal: Patient-Specific Goal (Individualized)  Outcome: Progressing  Goal: Absence of Hospital-Acquired Illness or Injury  Outcome: Progressing  Goal: Optimal Comfort and Wellbeing  Outcome: Progressing  Goal: Readiness for Transition of Care  Outcome: Progressing     Problem: Gastrointestinal Bleeding  Goal: Optimal Coping with Acute Illness  Outcome: Progressing  Goal: Hemostasis  Outcome: Progressing     Problem: Acute Kidney Injury/Impairment  Goal: Fluid and Electrolyte Balance  Outcome: Progressing

## 2025-06-29 VITALS
HEART RATE: 78 BPM | SYSTOLIC BLOOD PRESSURE: 139 MMHG | OXYGEN SATURATION: 100 % | RESPIRATION RATE: 18 BRPM | TEMPERATURE: 98 F | WEIGHT: 134.94 LBS | BODY MASS INDEX: 20.45 KG/M2 | DIASTOLIC BLOOD PRESSURE: 84 MMHG | HEIGHT: 68 IN

## 2025-06-29 PROBLEM — K92.2 ACUTE GI BLEEDING: Status: RESOLVED | Noted: 2023-09-13 | Resolved: 2025-06-29

## 2025-06-29 PROBLEM — D62 ACUTE BLOOD LOSS ANEMIA: Status: RESOLVED | Noted: 2025-06-25 | Resolved: 2025-06-29

## 2025-06-29 LAB
ABSOLUTE EOSINOPHIL (OHS): 0.24 K/UL
ABSOLUTE MONOCYTE (OHS): 0.4 K/UL (ref 0.3–1)
ABSOLUTE NEUTROPHIL COUNT (OHS): 1.97 K/UL (ref 1.8–7.7)
ALBUMIN SERPL BCP-MCNC: 2.6 G/DL (ref 3.5–5.2)
ALP SERPL-CCNC: 75 UNIT/L (ref 40–150)
ALT SERPL W/O P-5'-P-CCNC: 15 UNIT/L (ref 10–44)
ANION GAP (OHS): 9 MMOL/L (ref 8–16)
AST SERPL-CCNC: 39 UNIT/L (ref 11–45)
BASOPHILS # BLD AUTO: 0.01 K/UL
BASOPHILS NFR BLD AUTO: 0.3 %
BILIRUB DIRECT SERPL-MCNC: 0.1 MG/DL (ref 0.1–0.3)
BILIRUB SERPL-MCNC: 0.3 MG/DL (ref 0.1–1)
BUN SERPL-MCNC: 15 MG/DL (ref 8–23)
CALCIUM SERPL-MCNC: 7.6 MG/DL (ref 8.7–10.5)
CHLORIDE SERPL-SCNC: 110 MMOL/L (ref 95–110)
CO2 SERPL-SCNC: 20 MMOL/L (ref 23–29)
CREAT SERPL-MCNC: 1.2 MG/DL (ref 0.5–1.4)
ERYTHROCYTE [DISTWIDTH] IN BLOOD BY AUTOMATED COUNT: 18.1 % (ref 11.5–14.5)
GFR SERPLBLD CREATININE-BSD FMLA CKD-EPI: >60 ML/MIN/1.73/M2
GLUCOSE SERPL-MCNC: 102 MG/DL (ref 70–110)
HCT VFR BLD AUTO: 24.1 % (ref 40–54)
HGB BLD-MCNC: 8.1 GM/DL (ref 14–18)
IMM GRANULOCYTES # BLD AUTO: 0.04 K/UL (ref 0–0.04)
IMM GRANULOCYTES NFR BLD AUTO: 1.3 % (ref 0–0.5)
LYMPHOCYTES # BLD AUTO: 0.42 K/UL (ref 1–4.8)
MAGNESIUM SERPL-MCNC: 1.6 MG/DL (ref 1.6–2.6)
MCH RBC QN AUTO: 31.5 PG (ref 27–31)
MCHC RBC AUTO-ENTMCNC: 33.6 G/DL (ref 32–36)
MCV RBC AUTO: 94 FL (ref 82–98)
NUCLEATED RBC (/100WBC) (OHS): 0 /100 WBC
PHOSPHATE SERPL-MCNC: 2.7 MG/DL (ref 2.7–4.5)
PLATELET # BLD AUTO: 128 K/UL (ref 150–450)
PMV BLD AUTO: 9 FL (ref 9.2–12.9)
POTASSIUM SERPL-SCNC: 3.2 MMOL/L (ref 3.5–5.1)
PROT SERPL-MCNC: 5 GM/DL (ref 6–8.4)
RBC # BLD AUTO: 2.57 M/UL (ref 4.6–6.2)
RBCS: NORMAL
RELATIVE EOSINOPHIL (OHS): 7.8 %
RELATIVE LYMPHOCYTE (OHS): 13.6 % (ref 18–48)
RELATIVE MONOCYTE (OHS): 13 % (ref 4–15)
RELATIVE NEUTROPHIL (OHS): 64 % (ref 38–73)
SODIUM SERPL-SCNC: 139 MMOL/L (ref 136–145)
WBC # BLD AUTO: 3.08 K/UL (ref 3.9–12.7)

## 2025-06-29 PROCEDURE — 63600175 PHARM REV CODE 636 W HCPCS: Performed by: HOSPITALIST

## 2025-06-29 PROCEDURE — 83735 ASSAY OF MAGNESIUM: CPT | Performed by: STUDENT IN AN ORGANIZED HEALTH CARE EDUCATION/TRAINING PROGRAM

## 2025-06-29 PROCEDURE — 25000003 PHARM REV CODE 250: Performed by: STUDENT IN AN ORGANIZED HEALTH CARE EDUCATION/TRAINING PROGRAM

## 2025-06-29 PROCEDURE — 63600175 PHARM REV CODE 636 W HCPCS: Performed by: STUDENT IN AN ORGANIZED HEALTH CARE EDUCATION/TRAINING PROGRAM

## 2025-06-29 PROCEDURE — 82310 ASSAY OF CALCIUM: CPT | Performed by: STUDENT IN AN ORGANIZED HEALTH CARE EDUCATION/TRAINING PROGRAM

## 2025-06-29 PROCEDURE — 82248 BILIRUBIN DIRECT: CPT

## 2025-06-29 PROCEDURE — 36415 COLL VENOUS BLD VENIPUNCTURE: CPT | Performed by: STUDENT IN AN ORGANIZED HEALTH CARE EDUCATION/TRAINING PROGRAM

## 2025-06-29 PROCEDURE — A4216 STERILE WATER/SALINE, 10 ML: HCPCS | Performed by: ANESTHESIOLOGY

## 2025-06-29 PROCEDURE — 99900035 HC TECH TIME PER 15 MIN (STAT)

## 2025-06-29 PROCEDURE — 85025 COMPLETE CBC W/AUTO DIFF WBC: CPT | Performed by: HOSPITALIST

## 2025-06-29 PROCEDURE — 25000003 PHARM REV CODE 250: Performed by: ANESTHESIOLOGY

## 2025-06-29 PROCEDURE — 94761 N-INVAS EAR/PLS OXIMETRY MLT: CPT

## 2025-06-29 PROCEDURE — 84100 ASSAY OF PHOSPHORUS: CPT | Performed by: STUDENT IN AN ORGANIZED HEALTH CARE EDUCATION/TRAINING PROGRAM

## 2025-06-29 RX ORDER — MAGNESIUM SULFATE HEPTAHYDRATE 40 MG/ML
2 INJECTION, SOLUTION INTRAVENOUS ONCE
Status: COMPLETED | OUTPATIENT
Start: 2025-06-29 | End: 2025-06-29

## 2025-06-29 RX ADMIN — POTASSIUM BICARBONATE 40 MEQ: 391 TABLET, EFFERVESCENT ORAL at 09:06

## 2025-06-29 RX ADMIN — TACROLIMUS 0.5 MG: 0.5 CAPSULE ORAL at 08:06

## 2025-06-29 RX ADMIN — POTASSIUM BICARBONATE 40 MEQ: 391 TABLET, EFFERVESCENT ORAL at 02:06

## 2025-06-29 RX ADMIN — MAGNESIUM SULFATE HEPTAHYDRATE 2 G: 40 INJECTION, SOLUTION INTRAVENOUS at 11:06

## 2025-06-29 RX ADMIN — Medication 3 ML: at 12:06

## 2025-06-29 RX ADMIN — FERUMOXYTOL 1020 MG: 510 INJECTION INTRAVENOUS at 09:06

## 2025-06-29 RX ADMIN — LEVOTHYROXINE SODIUM 88 MCG: 88 TABLET ORAL at 06:06

## 2025-06-29 RX ADMIN — PANTOPRAZOLE SODIUM 40 MG: 40 TABLET, DELAYED RELEASE ORAL at 08:06

## 2025-06-29 RX ADMIN — MUPIROCIN: 20 OINTMENT TOPICAL at 08:06

## 2025-06-29 RX ADMIN — Medication 3 ML: at 06:06

## 2025-06-29 NOTE — SUBJECTIVE & OBJECTIVE
Interval History:  H/H stable.  Patient has not had any bowel movements in the past day.  Denies any current complaints.    Review of Systems  Objective:     Vital Signs (Most Recent):  Temp: 97.9 °F (36.6 °C) (06/28/25 1543)  Pulse: 68 (06/28/25 1932)  Resp: 18 (06/28/25 1932)  BP: 114/65 (06/28/25 1543)  SpO2: 97 % (06/28/25 1932) Vital Signs (24h Range):  Temp:  [96.1 °F (35.6 °C)-98.2 °F (36.8 °C)] 97.9 °F (36.6 °C)  Pulse:  [68-77] 68  Resp:  [17-18] 18  SpO2:  [93 %-100 %] 97 %  BP: (114-126)/(59-66) 114/65     Weight: 61.2 kg (134 lb 14.7 oz)  Body mass index is 20.51 kg/m².  No intake or output data in the 24 hours ending 06/28/25 2127        Physical Exam  Vitals and nursing note reviewed.   Constitutional:       General: He is not in acute distress.     Appearance: He is well-developed.   HENT:      Head:      Comments: Status post enucleation of right eye with flap covering orbit.  Laryngostomy tube in place     Right Ear: External ear normal.      Left Ear: External ear normal.      Nose: Nose normal.   Eyes:      Conjunctiva/sclera: Conjunctivae normal.      Pupils: Pupils are equal, round, and reactive to light.   Cardiovascular:      Rate and Rhythm: Normal rate and regular rhythm.   Pulmonary:      Effort: Pulmonary effort is normal. No respiratory distress.      Breath sounds: Normal breath sounds. No wheezing or rales.   Abdominal:      General: Bowel sounds are normal. There is no distension.      Palpations: Abdomen is soft.      Tenderness: There is no abdominal tenderness.   Musculoskeletal:         General: No tenderness. Normal range of motion.      Cervical back: Normal range of motion and neck supple.   Skin:     General: Skin is warm and dry.   Neurological:      Mental Status: He is alert and oriented to person, place, and time.   Psychiatric:         Thought Content: Thought content normal.               Significant Labs: All pertinent labs within the past 24 hours have been  "reviewed.  CBC:   Recent Labs   Lab 06/27/25 2010 06/27/25  2137 06/28/25  0617   WBC 4.86 4.24 3.78*   HGB 8.9* 7.9* 8.1*   HCT 27.2* 23.9* 24.3*   * 119* 120*     CMP:   No results for input(s): "NA", "K", "CL", "CO2", "GLU", "BUN", "CREATININE", "CALCIUM", "PROT", "ALBUMIN", "BILITOT", "ALKPHOS", "AST", "ALT", "ANIONGAP", "EGFRNONAA" in the last 48 hours.    Invalid input(s): "ESTGFAFR[place]"      Significant Imaging: I have reviewed all pertinent imaging results/findings within the past 24 hours.  "

## 2025-06-29 NOTE — ASSESSMENT & PLAN NOTE
Anemia is likely due to acute blood loss which was from suspected GI bleed. Most recent hemoglobin and hematocrit are listed below.  Recent Labs     06/27/25 2010 06/27/25  2137 06/28/25  0617   HGB 8.9* 7.9* 8.1*   HCT 27.2* 23.9* 24.3*     Plan  - Monitor serial CBC: Daily  - Transfuse PRBC if patient becomes hemodynamically unstable, symptomatic or H/H drops below 7/21.  - Patient has received 2 units of PRBCs on 6/25, 1 unit of PRBCs on 06/27  - anemia stable  -associated with iron deficiency.  Providing 1 g of Feraheme

## 2025-06-29 NOTE — PLAN OF CARE
Patient alert and oriented X4, nonverbal, d/t laryngectomy, uses communication board. Resp even and unlabored. Continent of bowel and bladder.  Right eye enucleation, left eye intact with adequate vision. Safety precautions in place, bed in low position, call light within reach.      Problem: Adult Inpatient Plan of Care  Goal: Plan of Care Review  Outcome: Progressing  Goal: Patient-Specific Goal (Individualized)  Outcome: Progressing  Goal: Absence of Hospital-Acquired Illness or Injury  Outcome: Progressing  Goal: Optimal Comfort and Wellbeing  Outcome: Progressing  Goal: Readiness for Transition of Care  Outcome: Progressing     Problem: Gastrointestinal Bleeding  Goal: Optimal Coping with Acute Illness  Outcome: Progressing  Goal: Hemostasis  Outcome: Progressing     Problem: Acute Kidney Injury/Impairment  Goal: Fluid and Electrolyte Balance  Outcome: Progressing  Goal: Improved Oral Intake  Outcome: Progressing  Goal: Effective Renal Function  Outcome: Progressing     Problem: Skin Injury Risk Increased  Goal: Skin Health and Integrity  Outcome: Progressing

## 2025-06-29 NOTE — PLAN OF CARE
06/29/25 0955   Medicare Message   Important Message from Medicare regarding Discharge Appeal Rights Given to patient/caregiver;Explained to patient/caregiver;Signed/date by patient/caregiver   Date IMM was signed 06/29/25   Time IMM was signed 0955     IMM explained.  Pt verbalized understanding.    Namrata Escamilla RN, BSN  Case Management  [phone]

## 2025-06-29 NOTE — PLAN OF CARE
To be discharged home with brother in law. Alert. Vss. Oriented x 4. Safety measures in place. Discharge instructions given. Iv's dc'd.      Problem: Adult Inpatient Plan of Care  Goal: Plan of Care Review  Outcome: Progressing  Goal: Patient-Specific Goal (Individualized)  Outcome: Progressing  Goal: Absence of Hospital-Acquired Illness or Injury  Outcome: Progressing  Goal: Optimal Comfort and Wellbeing  Outcome: Progressing  Goal: Readiness for Transition of Care  Outcome: Progressing     Problem: Gastrointestinal Bleeding  Goal: Optimal Coping with Acute Illness  Outcome: Progressing  Goal: Hemostasis  Outcome: Progressing

## 2025-06-29 NOTE — PLAN OF CARE
[facility]  Discharge Final Note    Primary Care Provider: Leny Primary Doctor    Expected Discharge Date: 6/29/2025    Patient discharged home via private transportation.     Patient's bedside nurse notified of the above.    Discharge Plan A and Plan B have been determined by review of patient's clinical status, future medical and therapeutic needs, and coverage/benefits for post-acute care in coordination with multidisciplinary team members.        Final Discharge Note (most recent)       Final Note - 06/29/25 1834          Final Note    Assessment Type Final Discharge Note (P)      Anticipated Discharge Disposition Home or Self Care (P)      What phone number can be called within the next 1-3 days to see how you are doing after discharge? [phone] (P)         Post-Acute Status    Discharge Delays None known at this time (P)                      Important Message from Medicare  Important Message from Medicare regarding Discharge Appeal Rights: Given to patient/caregiver, Explained to patient/caregiver, Signed/date by patient/caregiver     Date IMM was signed: 06/29/25  Time IMM was signed: 0955        Future Appointments   Date Time Provider Department Center   7/8/2025 10:30 AM LAB, H[facility]ON[facility] B[facility]G [facility]   7/8/2025 11:00 AM Ct Garza, SAB[facility] HEMONC3 [facility]   [facility]  [facility]   8/21/2025  8:15 AM LAB, HEMONC SAME DAY [facility]   8/21/2025  8:30 AM N[facility] BCC CT1 [facility]   8/21/2025  9:00 AM [facility] BCC CT1 [facility]   8/21/2025  9:30 AM Oli Joyce Jr., MD [facility] RADONC2 [facility]   9/8/2025 11:00 AM Sarita Gamble MD [facility] PLMDBEN [facility]       No needs.    Namrata Escamilla RN, BSN  Case Management  [phone]

## 2025-06-29 NOTE — ASSESSMENT & PLAN NOTE
-Pt. With weakness, fatigue, hematochezia. Hgb 4.8 on admission  -CTA negative for bleed, notes many diverticuli  -GI bleed pathway initiated  -Transfuse for Hgb <7  -status post colonoscopy on 06/27.  Showed diverticulosis and patient had a polyp removed from sigmoid colon.  No active bleeding.  -continuing to monitor H&H

## 2025-06-29 NOTE — DISCHARGE SUMMARY
[facility] Medicine  Discharge Summary      Patient Name: Rocco Swain  MRN: [id number]  REYNA: [id number]  Patient Class: IP- Inpatient  Admission Date: 6/25/2025  Hospital Length of Stay: 4 days  Discharge Date and Time: 06/29/2025 3:51 PM  Attending Physician: Marvel Lopez MD   Discharging Provider: Marvel Lopez MD  Primary Care Provider: Leny Primary Doctor  [facility] Medicine Team: [facility] [place] [place] HOSP MED D Marvel Lopez MD  Primary Care Team: [facility] [place] [place] HOSP MED D    HPI:   74 yo M with PMHx Liver transplant (2008) and 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 Pt. Non-verbal due to prior ENT surgery hx, nods head and writes to communicate. He reports progressively worsening fatigue and lightheadedness x2-3 days. He did note one episode of bright red blood in his stool yesterday, but he denies any melena or other signs of bleeding. No reported associated abdominal pain. He does have hx of GI bleeds, last EGD 4/2024 showed showed severe erosive esophagitis. Pt. Denies NSAID use. He is no longer on protonix.    Procedure(s) (LRB):  COLONOSCOPY (N/A)      Hospital Course:   Mr. 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 He was admitted with acute GI bleed and severe symptomatic anemia after presenting with lightheadedness and fatigue associated with hematochezia. Hemoglobin found to be 4.8. Improved to 7.0 following transfusion of 2 units of PRBCs.  Required additional transfusion of 1 unit on 06/27.  Underwent colonoscopy on 06/27 with diverticulosis in  the sigmoid colon.  One 5 mm polyp was removed.  No active bleeding seen.  Patient resumed on regular diet.  H/H stable since that time.  Patient had acute renal failure at time of admission which resolved following blood and IV fluids.  He has severely elevated TSH but normal free T4.  However, patient reports that he missed nearly a week of levothyroxine.  Will resume previous dose and will need levels rechecked as an outpatient.  Patient found to have iron deficiency anemia here.  Given 1 g of Feraheme on 06/29.  Patient is stable for discharge with PCP, Oncology, hepatology follow-up.     Goals of Care Treatment Preferences:  Code Status: Full Code    Living Will: Yes                 Consults:   Consults (From admission, onward)          Status Ordering Provider     Inpatient consult to Hepatology  Once        Provider:  (Not yet assigned)    Completed ERNIE RAMIRES     Inpatient consult to Gastroenterology  Once        Provider:  (Not yet assigned)    Completed MARC MOORE     Inpatient consult to Critical Care Medicine  Once        Provider:  (Not yet assigned)    Completed MARCELINO IZAGUIRRE          Final Active Diagnoses:    Diagnosis Date Noted POA    Acute kidney injury superimposed on chronic kidney disease [N17.9, N18.9] 04/08/2024 Yes    Tracheostomy status [Z93.0] 03/02/2021 Not Applicable    Squamous cell carcinoma of base of tongue [C01] 09/28/2020 Yes    Status post liver transplantation - 2008 [Z94.4] 07/19/2020 Not Applicable    Postoperative hypothyroidism [E89.0] 01/27/2020 Yes      Problems Resolved During this Admission:    Diagnosis Date Noted Date Resolved POA    PRINCIPAL PROBLEM:  Acute GI bleeding [K92.2] 09/13/2023 06/29/2025 Yes    Acute blood loss anemia [D62] 06/25/2025 06/29/2025 Yes       Discharged Condition: good    Disposition: Home or Self Care    Follow Up:  PCP, hepatology, Oncology    Patient Instructions:      Diet Adult Regular     Activity as tolerated        Significant Diagnostic Studies: Labs: CMP   Recent Labs   Lab 06/29/25  0418      K 3.2*      CO2 20*      BUN 15   CREATININE 1.2   CALCIUM 7.6*   PROT 5.0*   ALBUMIN 2.6*   BILITOT 0.3   ALKPHOS 75   AST 39   ALT 15   ANIONGAP 9   , CBC   Recent Labs   Lab 06/27/25  2137 06/28/25  0617 06/29/25  0418   WBC 4.24 3.78* 3.08*   HGB 7.9* 8.1* 8.1*   HCT 23.9* 24.3* 24.1*   * 120* 128*   , and INR   Lab Results   Component Value Date    INR 1.1 08/29/2024    INR 1.0 09/02/2020       Pending Diagnostic Studies:       None           Medications:  Reconciled Home Medications:      Medication List        CONTINUE taking these medications      ERIVEDGE 150 mg Cap  Generic drug: vismodegib  Take 1 capsule (150 mg total) by mouth once daily.     levothyroxine 88 MCG tablet  Commonly known as: SYNTHROID  Take 88 mcg by mouth before breakfast.     ondansetron 8 MG Tbdl  Commonly known as: ZOFRAN-ODT  Take 1 tablet (8 mg total) by mouth every 8 (eight) hours as needed (nausea - first choice).     oxyCODONE 5 MG immediate release tablet  Commonly known as: ROXICODONE  Take 1 tablet (5 mg total) by mouth every 8 (eight) hours as needed for Pain.     prochlorperazine 10 MG tablet  Commonly known as: COMPAZINE  Take 1 tablet (10 mg total) by mouth every 6 (six) hours as needed (nausea/vomiting - second choice).     SENNA 8.6 mg tablet  Generic drug: senna  Take 2 tablets by mouth 2 (two) times daily as needed for Constipation.     tacrolimus 0.5 MG Cap  Commonly known as: PROGRAF  Take 1 capsule (0.5 mg total) by mouth every 12 (twelve) hours.     traZODone 50 MG tablet  Commonly known as: DESYREL  TAKE 1 TABLET BY MOUTH IN THE  EVENING              Indwelling Lines/Drains at time of discharge:   Lines/Drains/Airways       Central Venous Catheter Line  Duration                  PowerPort A Cath Single Lumen 03/08/21 1031 right internal jugular 1574 days              Airway  Duration                   Laryngectomy (Do Not Remove) 08/24/23 1145  675 days                        Time spent on the discharge of patient: 45 minutes         Marvel Lopez MD  Department of Hospital Medicine  [facility] - Acute Medical Stepdown

## 2025-06-29 NOTE — PROGRESS NOTES
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 Pt. Non-verbal due to prior ENT surgery hx, nods head and writes to communicate. He reports progressively worsening fatigue and lightheadedness x2-3 days. He did note one episode of bright red blood in his stool yesterday, but he denies any melena or other signs of bleeding. No reported associated abdominal pain. He does have hx of GI bleeds, last EGD 4/2024 showed showed severe erosive esophagitis. Pt. Denies NSAID use. He is no longer on protonix.    Overview/Hospital Course:  Mr. 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 He is admitted with acute GI bleed and severe symptomatic anemia after presenting with lightheadedness and fatigue. Hemoglobin found to be 4.8. Improved to 7.0 following transfusion of 2 units of PRBCs.  Required additional transfusion of 1 unit on 06/27.  Underwent colonoscopy on 06/27 with diverticulosis in the sigmoid colon.  One 5 mm polyp was removed.  No active bleeding seen.  Patient resumed on regular diet.  Continuing to monitor H/H.  Patient had acute renal failure which is now improving. He has severely elevated TSH but  normal free T4.  However, patient reports that he missed nearly a week of levothyroxine.  Will resume previous dose and will need levels rechecked as an outpatient.      Interval History:  H/H stable.  Patient has not had any bowel movements in the past day.  Denies any current complaints.    Review of Systems  Objective:     Vital Signs (Most Recent):  Temp: 97.9 °F (36.6 °C) (06/28/25 1543)  Pulse: 68 (06/28/25 1932)  Resp: 18 (06/28/25 1932)  BP: 114/65 (06/28/25 1543)  SpO2: 97 % (06/28/25 1932) Vital Signs (24h Range):  Temp:  [96.1 °F (35.6 °C)-98.2 °F (36.8 °C)] 97.9 °F (36.6 °C)  Pulse:  [68-77] 68  Resp:  [17-18] 18  SpO2:  [93 %-100 %] 97 %  BP: (114-126)/(59-66) 114/65     Weight: 61.2 kg (134 lb 14.7 oz)  Body mass index is 20.51 kg/m².  No intake or output data in the 24 hours ending 06/28/25 2127        Physical Exam  Vitals and nursing note reviewed.   Constitutional:       General: He is not in acute distress.     Appearance: He is well-developed.   HENT:      Head:      Comments: Status post enucleation of right eye with flap covering orbit.  Laryngostomy tube in place     Right Ear: External ear normal.      Left Ear: External ear normal.      Nose: Nose normal.   Eyes:      Conjunctiva/sclera: Conjunctivae normal.      Pupils: Pupils are equal, round, and reactive to light.   Cardiovascular:      Rate and Rhythm: Normal rate and regular rhythm.   Pulmonary:      Effort: Pulmonary effort is normal. No respiratory distress.      Breath sounds: Normal breath sounds. No wheezing or rales.   Abdominal:      General: Bowel sounds are normal. There is no distension.      Palpations: Abdomen is soft.      Tenderness: There is no abdominal tenderness.   Musculoskeletal:         General: No tenderness. Normal range of motion.      Cervical back: Normal range of motion and neck supple.   Skin:     General: Skin is warm and dry.   Neurological:      Mental Status: He is alert and oriented to person, place,  "and time.   Psychiatric:         Thought Content: Thought content normal.               Significant Labs: All pertinent labs within the past 24 hours have been reviewed.  CBC:   Recent Labs   Lab 06/27/25 2010 06/27/25 2137 06/28/25  0617   WBC 4.86 4.24 3.78*   HGB 8.9* 7.9* 8.1*   HCT 27.2* 23.9* 24.3*   * 119* 120*     CMP:   No results for input(s): "NA", "K", "CL", "CO2", "GLU", "BUN", "CREATININE", "CALCIUM", "PROT", "ALBUMIN", "BILITOT", "ALKPHOS", "AST", "ALT", "ANIONGAP", "EGFRNONAA" in the last 48 hours.    Invalid input(s): "ESTGFAFRICA"      Significant Imaging: I have reviewed all pertinent imaging results/findings within the past 24 hours.      Assessment & Plan  Acute GI bleeding  -Pt. With weakness, fatigue, hematochezia. Hgb 4.8 on admission  -CTA negative for bleed, notes many diverticuli  -GI bleed pathway initiated  -Transfuse for Hgb <7  -status post colonoscopy on 06/27.  Showed diverticulosis and patient had a polyp removed from sigmoid colon.  No active bleeding.  -continuing to monitor H&H    Status post liver transplantation - 2008  -No acute issues, liver function stable  -Continue home tacrolimus     Squamous cell carcinoma of base of tongue  Tracheostomy status  -Hx squamous cell HENT cancer s/p trach, R eye enuleation. No acute issues, can discuss with oncology when to resume med erivedge        Acute kidney injury superimposed on chronic kidney disease  -Cr 2.1 on presentation, baseline 1.4  Postoperative hypothyroidism  TSH levels were very elevated   Patient reports that he missed nearly a week of levothyroxine  Resume home Synthroid and patient has plan to recheck levels with PCP next month    Acute blood loss anemia  Anemia is likely due to acute blood loss which was from suspected GI bleed. Most recent hemoglobin and hematocrit are listed below.  Recent Labs     06/27/25 2010 06/27/25 2137 06/28/25  0617   HGB 8.9* 7.9* 8.1*   HCT 27.2* 23.9* 24.3*     Plan  - Monitor " serial CBC: Daily  - Transfuse PRBC if patient becomes hemodynamically unstable, symptomatic or H/H drops below 7/21.  - Patient has received 2 units of PRBCs on 6/25, 1 unit of PRBCs on 06/27  - anemia stable  -associated with iron deficiency.  Providing 1 g of Feraheme    VTE Risk Mitigation (From admission, onward)           Ordered     IP VTE HIGH RISK PATIENT  Once         06/25/25 1928     Place sequential compression device  Until discontinued         06/25/25 1928                    Discharge Planning   WAQAS: 6/28/2025     Code Status: Full Code   Medical Readiness for Discharge Date:   Discharge Plan A: Home                        Marvel Lopez MD  Department of Hospital Medicine   [facility] - Acute Medical Stepdown

## 2025-06-29 NOTE — RESPIRATORY THERAPY
RAPID RESPONSE RESPIRATORY THERAPY PROACTIVE NOTE           Time of visit: 1000     Code Status: Full Code   : 1950  Bed: 57079/49343 A:   MRN: [id number]  Time spent at the bedside: < 15 min    SITUATION    Evaluated patient for: LDA Check     BACKGROUND    Patient has a past medical history of Acute conjunctivitis of right eye, Atrial fibrillation, Basal cell carcinoma (BCC) in situ of skin, Basal cell carcinoma (BCC) of neck, Cataract, Conjunctival lesion, COPD (chronic obstructive pulmonary disease), Deep vein thrombosis, Disorder of kidney and ureter, Hepatitis C, Hepatitis C, chronic, Hypothyroidism, Larynx cancer, Left ear pain, Liver transplanted, Lumbar disc disease, 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  Clinically Significant Surgical Hx: laryngectomy    24 Hours Vitals Range:  Temp:  [96.1 °F (35.6 °C)-97.9 °F (36.6 °C)]   Pulse:  [68-82]   Resp:  [18]   BP: ()/(53-71)   SpO2:  [93 %-99 %]     Labs:    Recent Labs     25  0418      K 3.2*      CO2 20*   BUN 15   CREATININE 1.2      PHOS 2.7   MG 1.6            ASSESSMENT/INTERVENTIONS  Bedside LDA check completed    Last VS   Temp: 97 °F (36.1 °C) ( 0716)  Pulse: 82 ( 0716)  Resp: 18 ( 0716)  BP: 125/71 ( 0716)  SpO2: 97 % ( 0900)    Extra trachs/ETTs at bedside: ETT 6/7  Emergency Box number: L8  Level of Consciousness: Level of Consciousness (AVPU): alert  Respiratory Effort: Respiratory Effort: Normal, Unlabored Expansion/Accessory Muscle Usage: Expansion/Accessory Muscles/Retractions: no use of accessory muscles  All Lung Field Breath Sounds: All Lung Fields Breath Sounds: Anterior:, Lateral:, clear  SHENG Breath Sounds: clear  O2 Device/Concentration: room air/HME  Surgical airway: Yes, laryngectomy, with lizet tube and HME  Ambu at bedside: yes     Active Orders   Respiratory Care    Stoma Care by RT Q4H     Frequency: Q4H      Number of Occurrences: Until Specified       RECOMMENDATIONS    We recommend: RRT Recs: Continue POC per primary team.      FOLLOW-UP    Please call back the Rapid Response RT, Marine Flynn, RRT at x [phone] for any questions or concerns.

## 2025-06-29 NOTE — CONSULTS
[facility]  Hepatology  Consult Note    Patient Name: Rocco Swain  MRN: [id number]  Admission Date: 6/25/2025  Hospital Length of Stay: 4 days  Code Status: Full Code   Attending Provider: Marvel Lopez MD   Consulting Provider: Tiffanie Rivera MD  Primary Care Physician: No, Primary Doctor  Principal Problem:Acute GI bleeding    Inpatient consult to Hepatology  Consult performed by: Tiffanie Rivera MD  Consult ordered by: Marvel Lopez MD        Subjective:     HPI: Rocco Swain is a 75 y.o. 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 Pt. Non-verbal due to prior ENT surgery hx, nods head and writes to communicate. He reports progressively worsening fatigue and lightheadedness x2-3 days. He did note one episode of bright red blood in his stool yesterday, but he denies any melena or other signs of bleeding. No reported associated abdominal pain. He does have hx of GI bleeds, last EGD 4/2024 showed showed severe erosive esophagitis. Pt. Denies NSAID use. He is no longer on protonix at home. GI consulted and pt had colonoscopy 6/27, likely diverticular bleed. Hepatology consulted on hospital day 4 for immunosuppression management.    Patients hemoglobin is stable, he's eating a tray of food. He has no complaints from a liver standpoint. Restarted on levothyroxine as he'd been off for some time. His TSERING has also resolved.      Past Medical History:   Diagnosis Date    Acute conjunctivitis of right eye 09/12/2022    Atrial fibrillation     Basal cell carcinoma (BCC) in situ of skin 2012    3 on face, 2 on arm, removed by dermatology.     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 Underwent surgical resection of tongue and neck. s/p tracheostomy    Squamous cell carcinoma of skin of right temple 01/24/2024    right temple       Past Surgical History:   Procedure Laterality Date    COLONOSCOPY      COLONOSCOPY N/A 09/14/2023    Procedure: COLONOSCOPY;  Surgeon: Reyes Olivia MD;  Location: [facility] ENDO (2ND FLR);  Service: Endoscopy;  Laterality: N/A;    COLONOSCOPY N/A 6/27/2025    Procedure: COLONOSCOPY;  Surgeon: Carlton Bennett MD;  Location: [facility] ENDO (2ND FLR);  Service: Endoscopy;  Laterality: N/A;    CONJUNCTIVA BIOPSY Right 10/11/2022    Procedure: BIOPSY, CONJUNCTIVA;  Surgeon: Victor M Vasques MD;  Location: [facility];  Service: Ophthalmology;  Laterality: Right;    ESOPHAGOGASTRODUODENOSCOPY N/A 09/14/2023    Procedure: EGD (ESOPHAGOGASTRODUODENOSCOPY);  Surgeon: Reyes Olivia MD;  Location: [facility] (2ND FLR);  Service: Endoscopy;  Laterality: N/A;    ESOPHAGOGASTRODUODENOSCOPY N/A 04/10/2024    Procedure: EGD (ESOPHAGOGASTRODUODENOSCOPY);  Surgeon: Reyes Pagan MD;  Location: [facility] ENDO (2ND FLR);  Service: Endoscopy;  Laterality: N/A;    ESOPHAGOGASTRODUODENOSCOPY N/A 06/24/2024    Procedure: EGD (ESOPHAGOGASTRODUODENOSCOPY);  Surgeon: Reyes Pagan MD;  Location: [facility] ENDO (2ND FLR);  Service: Endoscopy;  Laterality: N/A;  Ref By: Dr. Hawkins   Procedure: egd  Diagnosis: esophagitis  Procedure Timing: 10 weeks  Prep: standard prep  6/21-pt r/s-inst to portal-2nd floor criteria-labs within 90 daysMS    EYE SURGERY Right  04/25/2024    exenteration of orbit    GLOSSECTOMY      INSERTION OF VENOUS ACCESS PORT Right 03/08/2021    Procedure: INSERTION, VENOUS ACCESS PORT;  Surgeon: Jesus Rendon MD;  Location: [facility] OR [address] [place] [address];  Service: General;  Laterality: Right;    LARYNGECTOMY      LIVER TRANSPLANT  11/2008    transplanted for biopsy proven hepatocellular carcinoma,     LYMPH NODE BIOPSY N/A 09/18/2020    Procedure: BIOPSY, LYMPH NODE;  Surgeon: Taz Diagnostic Provider;  Location: [facility] OR [address] [place] [address];  Service: General;  Laterality: N/A;  Rm 173    skin cancer removals      SURGICAL REMOVAL OF SCAR Right 08/24/2023    Procedure: EXCISION, SCAR;  Surgeon: Ting Brambila MD;  Location: [facility] OR;  Service: Ophthalmology;  Laterality: Right;    TRACHEOSTOMY         Family History   Problem Relation Name Age of Onset    Dementia Mother      Cataracts Neg Hx      Glaucoma Neg Hx      Macular degeneration Neg Hx         Social History[1]    Medications Ordered Prior to Encounter[2]    Review of patient's allergies indicates:   Allergen Reactions    Aspirin     Tylenol extended release        ROS     Objective:     Vitals:    06/29/25 1133   BP: 139/78   Pulse: 76   Resp: 18   Temp: 98.2 °F (36.8 °C)         Constitutional:  not in acute distress  HENT: right orbit surgically removed, trach in place  Eyes: conjunctiva clear and sclera nonicteric (left eye)  Respiratory: trach  GI: soft, nondistended, and nontender  Skin: normal color  Neurological: alert, oriented x3    Significant Labs:  Recent Labs   Lab 06/27/25  2137 06/28/25  0617 06/29/25  0418   HGB 7.9* 8.1* 8.1*       Lab Results   Component Value Date    WBC 3.08 (L) 06/29/2025    HGB 8.1 (L) 06/29/2025    HCT 24.1 (L) 06/29/2025    MCV 94 06/29/2025     (L) 06/29/2025       Lab Results   Component Value Date     06/29/2025    K 3.2 (L) 06/29/2025     06/29/2025    CO2 20 (L) 06/29/2025    BUN 15 06/29/2025    CREATININE 1.2 06/29/2025    CALCIUM 7.6 (L)  06/29/2025    ANIONGAP 9 06/29/2025    ESTGFRAFRICA 52.6 (A) 07/14/2022    EGFRNONAA 45.5 (A) 07/14/2022       Lab Results   Component Value Date    ALT 15 06/29/2025    AST 39 06/29/2025     (H) 07/09/2020    ALKPHOS 75 06/29/2025    BILITOT 0.3 06/29/2025       Lab Results   Component Value Date    INR 1.1 08/29/2024    INR 1.0 09/02/2020       Significant Imaging:  Reviewed pertinent radiology findings.       Assessment/Plan:       Assessment:  Rocco Swain is a 75 y.o. 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 Pt. Non-verbal due to prior ENT surgery hx, nods head and writes to communicate. He reports progressively worsening fatigue and lightheadedness x2-3 days. He did note one episode of bright red blood in his stool yesterday, but he denies any melena or other signs of bleeding. No reported associated abdominal pain. He does have hx of GI bleeds, last EGD 4/2024 showed showed severe erosive esophagitis. Pt. Denies NSAID use. He is no longer on protonix at home. GI consulted and pt had colonoscopy 6/27, likely diverticular bleed. Hepatology consulted on hospital day 4 for immunosuppression management.       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 Please call with any additional questions, concerns or changes in the patient's clinical status. We will continue to follow.       Tiffanie Kulkarni MD  Gastroenterology and Hepatology Fellow, PGY-4               [1]    Social History  Socioeconomic History    Marital status: Single   Occupational History    Occupation: Retired     Comment: , notes exposures to fumes etc.  Worked on [employer] for 4 years   Tobacco Use    Smoking status: Never    Smokeless tobacco: Never   Substance and Sexual Activity    Alcohol use: Yes     Comment: drinks wine, 1 glass day    Drug use: Not Currently    Sexual activity: Yes     Comment: No prior history of STD    Social History Narrative    Steps at house- 24     Social Drivers of Health     Financial Resource Strain: Low Risk  (6/26/2025)    Overall Financial Resource Strain (CARDIA)     Difficulty of Paying Living Expenses: Not hard at all   Food Insecurity: No Food Insecurity (6/26/2025)    Hunger Vital Sign     Worried About Running Out of Food in the Last Year: Never true     Ran Out of Food in the Last Year: Never true   Transportation Needs: No Transportation Needs (6/26/2025)    PRAPARE - Transportation     Lack of Transportation (Medical): No     Lack of Transportation (Non-Medical): No   Stress: No Stress Concern Present (6/26/2025)    [employer] - Occupational Stress Questionnaire     Feeling of Stress : Not at all   Housing Stability: Low Risk  (6/26/2025)    Housing Stability Vital Sign     Unable to Pay for Housing in the Last Year: No     Homeless in the Last Year: No   [2]   Current Facility-Administered Medications on File Prior to Encounter   Medication Dose Route Frequency Provider Last Rate Last Admin    ofloxacin 0.3 % ophthalmic solution 1 drop  1 drop Right Eye On Call Procedure Victor M Vasques MD   2 drop at 10/11/22 0745    sodium chloride 0.9% flush 10 mL  10 mL Intravenous PRN Ronald Berg MD        sodium chloride 0.9% flush 10 mL  10 mL Intravenous PRN Victor M Vasques MD         Current Outpatient Medications on File Prior to Encounter   Medication Sig Dispense Refill    levothyroxine (SYNTHROID)  88 MCG tablet Take 88 mcg by mouth before breakfast.      ondansetron (ZOFRAN-ODT) 8 MG TbDL Take 1 tablet (8 mg total) by mouth every 8 (eight) hours as needed (nausea - first choice). 60 tablet 4    oxyCODONE (ROXICODONE) 5 MG immediate release tablet Take 1 tablet (5 mg total) by mouth every 8 (eight) hours as needed for Pain. 90 tablet 0    prochlorperazine (COMPAZINE) 10 MG tablet Take 1 tablet (10 mg total) by mouth every 6 (six) hours as needed (nausea/vomiting - second choice). 30 tablet 1    senna (SENOKOT) 8.6 mg tablet Take 2 tablets by mouth 2 (two) times daily as needed for Constipation. 60 tablet 2    tacrolimus (PROGRAF) 0.5 MG Cap Take 1 capsule (0.5 mg total) by mouth every 12 (twelve) hours. 180 capsule 3    traZODone (DESYREL) 50 MG tablet TAKE 1 TABLET BY MOUTH IN THE  EVENING 90 tablet 3    vismodegib (ERIVEDGE) 150 mg Cap Take 1 capsule (150 mg total) by mouth once daily. 30 capsule 3

## 2025-06-30 ENCOUNTER — PATIENT MESSAGE (OUTPATIENT)
Dept: GASTROENTEROLOGY | Facility: HOSPITAL | Age: 75
End: 2025-06-30
Payer: MEDICARE

## 2025-06-30 LAB
BACTERIA BLD CULT: NORMAL
BACTERIA BLD CULT: NORMAL
ESTROGEN SERPL-MCNC: NORMAL PG/ML
INSULIN SERPL-ACNC: NORMAL U[IU]/ML
LAB AP CLINICAL INFORMATION: NORMAL
LAB AP GROSS DESCRIPTION: NORMAL
LAB AP PERFORMING LOCATION(S): NORMAL
LAB AP REPORT FOOTNOTES: NORMAL

## 2025-07-02 ENCOUNTER — PATIENT OUTREACH (OUTPATIENT)
Dept: ADMINISTRATIVE | Facility: CLINIC | Age: 75
End: 2025-07-02
Payer: MEDICARE

## 2025-07-02 NOTE — PROGRESS NOTES
C3 nurse spoke with patient who is unable to complete the call at this time. Patient Requested a Callback. Patient does not have a HOSFU. No PCP.

## 2025-07-07 ENCOUNTER — PATIENT MESSAGE (OUTPATIENT)
Dept: PALLIATIVE MEDICINE | Facility: CLINIC | Age: 75
End: 2025-07-07
Payer: MEDICARE

## 2025-07-07 ENCOUNTER — TELEPHONE (OUTPATIENT)
Dept: PALLIATIVE MEDICINE | Facility: CLINIC | Age: 75
End: 2025-07-07
Payer: MEDICARE

## 2025-07-07 NOTE — TELEPHONE ENCOUNTER
Provider will not be in on appt scheduled time reschedule 1 or 2 same day nodurft unable to leave vm

## 2025-07-08 ENCOUNTER — OFFICE VISIT (OUTPATIENT)
Dept: HEMATOLOGY/ONCOLOGY | Facility: CLINIC | Age: 75
End: 2025-07-08
Payer: MEDICARE

## 2025-07-08 ENCOUNTER — LAB VISIT (OUTPATIENT)
Dept: LAB | Facility: HOSPITAL | Age: 75
End: 2025-07-08
Attending: INTERNAL MEDICINE
Payer: MEDICARE

## 2025-07-08 VITALS
HEIGHT: 68 IN | WEIGHT: 126.13 LBS | BODY MASS INDEX: 19.12 KG/M2 | SYSTOLIC BLOOD PRESSURE: 103 MMHG | RESPIRATION RATE: 16 BRPM | DIASTOLIC BLOOD PRESSURE: 70 MMHG | OXYGEN SATURATION: 99 % | HEART RATE: 103 BPM

## 2025-07-08 DIAGNOSIS — C77.0 SECONDARY MALIGNANT NEOPLASM OF CERVICAL LYMPH NODE: ICD-10-CM

## 2025-07-08 DIAGNOSIS — Z94.4 STATUS POST LIVER TRANSPLANTATION: ICD-10-CM

## 2025-07-08 DIAGNOSIS — C44.329 SQUAMOUS CELL CARCINOMA OF SKIN OF ORBIT: ICD-10-CM

## 2025-07-08 DIAGNOSIS — C44.311 BASAL CELL CARCINOMA (BCC) OF SKIN OF NOSE: ICD-10-CM

## 2025-07-08 DIAGNOSIS — D84.821 IMMUNODEFICIENCY DUE TO DRUGS: ICD-10-CM

## 2025-07-08 DIAGNOSIS — Z79.899 IMMUNODEFICIENCY DUE TO DRUGS: ICD-10-CM

## 2025-07-08 DIAGNOSIS — C01 SQUAMOUS CELL CARCINOMA OF BASE OF TONGUE: ICD-10-CM

## 2025-07-08 DIAGNOSIS — R63.4 WEIGHT LOSS: ICD-10-CM

## 2025-07-08 DIAGNOSIS — C44.311 BASAL CELL CARCINOMA (BCC) OF SKIN OF NOSE: Primary | ICD-10-CM

## 2025-07-08 DIAGNOSIS — E03.2 HYPOTHYROIDISM DUE TO MEDICAMENTS AND OTHER EXOGENOUS SUBSTANCES: ICD-10-CM

## 2025-07-08 DIAGNOSIS — Z93.0 TRACHEOSTOMY STATUS: ICD-10-CM

## 2025-07-08 DIAGNOSIS — M79.10 MYALGIA: ICD-10-CM

## 2025-07-08 DIAGNOSIS — R43.2 ALTERED TASTE: ICD-10-CM

## 2025-07-08 LAB
ABSOLUTE NEUTROPHIL COUNT (OHS): 4.6 K/UL (ref 1.8–7.7)
ALBUMIN SERPL BCP-MCNC: 3.6 G/DL (ref 3.5–5.2)
ALP SERPL-CCNC: 80 UNIT/L (ref 40–150)
ALT SERPL W/O P-5'-P-CCNC: 16 UNIT/L (ref 10–44)
ANION GAP (OHS): 13 MMOL/L (ref 8–16)
AST SERPL-CCNC: 27 UNIT/L (ref 11–45)
BILIRUB SERPL-MCNC: 0.4 MG/DL (ref 0.1–1)
BUN SERPL-MCNC: 19 MG/DL (ref 8–23)
CALCIUM SERPL-MCNC: 9.5 MG/DL (ref 8.7–10.5)
CHLORIDE SERPL-SCNC: 105 MMOL/L (ref 95–110)
CK SERPL-CCNC: 48 U/L (ref 20–200)
CO2 SERPL-SCNC: 24 MMOL/L (ref 23–29)
CREAT SERPL-MCNC: 1.5 MG/DL (ref 0.5–1.4)
ERYTHROCYTE [DISTWIDTH] IN BLOOD BY AUTOMATED COUNT: 18.5 % (ref 11.5–14.5)
GFR SERPLBLD CREATININE-BSD FMLA CKD-EPI: 48 ML/MIN/1.73/M2
GLUCOSE SERPL-MCNC: 183 MG/DL (ref 70–110)
HCT VFR BLD AUTO: 32.7 % (ref 40–54)
HGB BLD-MCNC: 10.8 GM/DL (ref 14–18)
IMM GRANULOCYTES # BLD AUTO: 0.04 K/UL (ref 0–0.04)
MCH RBC QN AUTO: 32.4 PG (ref 27–31)
MCHC RBC AUTO-ENTMCNC: 33 G/DL (ref 32–36)
MCV RBC AUTO: 98 FL (ref 82–98)
PLATELET # BLD AUTO: 217 K/UL (ref 150–450)
PMV BLD AUTO: 9.6 FL (ref 9.2–12.9)
POTASSIUM SERPL-SCNC: 4.9 MMOL/L (ref 3.5–5.1)
PROT SERPL-MCNC: 7.2 GM/DL (ref 6–8.4)
RBC # BLD AUTO: 3.33 M/UL (ref 4.6–6.2)
SODIUM SERPL-SCNC: 142 MMOL/L (ref 136–145)
WBC # BLD AUTO: 6.66 K/UL (ref 3.9–12.7)

## 2025-07-08 PROCEDURE — G2211 COMPLEX E/M VISIT ADD ON: HCPCS | Mod: ,,, | Performed by: NURSE PRACTITIONER

## 2025-07-08 PROCEDURE — 82550 ASSAY OF CK (CPK): CPT

## 2025-07-08 PROCEDURE — 36415 COLL VENOUS BLD VENIPUNCTURE: CPT

## 2025-07-08 PROCEDURE — 82040 ASSAY OF SERUM ALBUMIN: CPT

## 2025-07-08 PROCEDURE — 85027 COMPLETE CBC AUTOMATED: CPT

## 2025-07-08 PROCEDURE — 99213 OFFICE O/P EST LOW 20 MIN: CPT | Mod: PBBFAC | Performed by: NURSE PRACTITIONER

## 2025-07-08 PROCEDURE — 99999 PR PBB SHADOW E&M-EST. PATIENT-LVL III: CPT | Mod: PBBFAC,,, | Performed by: NURSE PRACTITIONER

## 2025-07-08 PROCEDURE — 99215 OFFICE O/P EST HI 40 MIN: CPT | Mod: S$PBB,,, | Performed by: NURSE PRACTITIONER

## 2025-07-08 NOTE — Clinical Note
He is having increasing side effects from the Vismodegib so I am taking him off for now. Not sure if you want to re-visit radiation sooner. He is agreeable to go ahead with radiation.   Thanks, Ct

## 2025-07-08 NOTE — Clinical Note
Patient requesting refill of Tacrolimus. Does he need a f/u with you? For some reason, you are not showing up on his list of MyChart providers.   Thanks

## 2025-07-08 NOTE — PROGRESS NOTES
ONCOLOGY FOLLOW UP VISIT    Subjective:      Patient ID: Rocco Swain    Chief Complaint: Basal cell carcinoma (BCC) of skin of nose      HPI  Rocco Swain is a 75 y.o. male who returns to clinic for management of cSCC of the orbit. Patient had ANGELES on October PETCT, but then developed quickly progressing orbital lesion. He was seen at [facility] in [place]. Recommendation was to follow up at [facility] to initiate induction chemotherapy followed by possible surgery. Patient completed 2 cycles of Extreme regimen, though there were delays due to cytopenias and severe mucositis. He was able to go to [facility] to have exenteration and resection of cancer April 2024 with clear margins, but PNI on path.     Interval History  Patient reporting more side effects from Vismodegib. Reports having no taste and low appetite. Eats 2-3 bites of food a day and most nutrition is liquid form. ~5 lb weight loss since starting Vismodegib. Continues to have mild muscle cramping.     ECOG Performance status: 1 - Symptomatic but completely ambulatory Communication from him is 100% written.     Cancer Staging   Squamous cell carcinoma of base of tongue  Staging form: Pharynx - HPV-Mediated Oropharynx, AJCC 8th Edition  - Clinical stage from 9/18/2020: Stage III (rcT4, cN1, cM0, p16+) - Signed by Ronald Berg MD on 12/27/2022      Oncologic History:  Oncology History   Squamous cell carcinoma of base of tongue   9/18/2020 Cancer Staged    Staging form: Pharynx - HPV-Mediated Oropharynx, AJCC 8th Edition  - Clinical stage from 9/18/2020: Stage III (rcT4, cN1, cM0, p16+)     9/28/2020 Initial Diagnosis    Squamous cell carcinoma of base of tongue     10/6/2020 - 8/17/2021 Chemotherapy    Treatment Summary   Plan Name: OP HEAD NECK CARBOPLATIN PACLITAXEL C1-2 FOLLOWED BY CETUXIMAB CARBOPLATIN C3-6 FOLLOWED BY CETUXIMAB MAINTENANCE WEEKLY  Treatment Goal: Control  Status: Inactive  Start Date: 10/6/2020  End Date: 8/17/2021  Provider: Ronald  SHIRA Berg MD  Chemotherapy: cetuximab (ERBITUX) 100 mg/50 mL chemo infusion 684 mg, 400 mg/m2 = 684 mg (100 % of original dose 400 mg/m2), Intravenous, Clinic/[facility] 1 time, 29 of 38 cycles  Dose modification: 500 mg/m2 (original dose 400 mg/m2, Cycle 3), 250 mg/m2 (original dose 400 mg/m2, Cycle 3), 400 mg/m2 (original dose 400 mg/m2, Cycle 3), 250 mg/m2 (original dose 250 mg/m2, Cycle 7), 200 mg/m2 (original dose 250 mg/m2, Cycle 18), 200 mg/m2 (original dose 250 mg/m2, Cycle 19), 250 mg/m2 (original dose 250 mg/m2, Cycle 4), 200 mg/m2 (original dose 250 mg/m2, Cycle 25), 200 mg/m2 (original dose 250 mg/m2, Cycle 28)  Administration: 684 mg (11/17/2020), 400 mg (11/24/2020), 400 mg (2/9/2021), 400 mg (2/17/2021), 427.6 mg (3/9/2021), 400 mg (3/30/2021), 400 mg (3/23/2021), 400 mg (4/6/2021), 427.6 mg (4/13/2021), 427.6 mg (4/20/2021), 342 mg (5/11/2021), 342 mg (5/18/2021), 342 mg (5/25/2021), 400 mg (5/31/2021), 400 mg (6/7/2021), 400 mg (6/14/2021), 400 mg (12/8/2020), 427.6 mg (12/29/2020), 400 mg (12/1/2020), 400 mg (12/15/2020), 400 mg (12/22/2020), 427.6 mg (1/5/2021), 400 mg (1/12/2021), 400 mg (1/19/2021), 427.6 mg (1/26/2021), 400 mg (2/2/2021), 400 mg (2/23/2021), 400 mg (3/2/2021), 427.6 mg (3/16/2021), 400 mg (6/21/2021), 342 mg (6/28/2021), 342 mg (7/6/2021), 342 mg (7/13/2021), 342 mg (7/20/2021), 400 mg (7/27/2021), 400 mg (8/10/2021), 400 mg (8/17/2021)  CARBOplatin (PARAPLATIN) 310 mg in sodium chloride 0.9% 500 mL chemo infusion, 310 mg (92.2 % of original dose 334.5 mg), Intravenous, Clinic/[facility] 1 time, 6 of 6 cycles  Dose modification:   (original dose 334.5 mg, Cycle 1)  Administration: 310 mg (10/6/2020), 370 mg (11/17/2020), 300 mg (10/27/2020), 350 mg (12/8/2020), 335 mg (12/29/2020), 320 mg (1/19/2021)  PACLitaxeL (TAXOL) 175 mg/m2 = 300 mg in sodium chloride 0.9% 500 mL chemo infusion, 175 mg/m2 = 300 mg (100 % of original dose 175 mg/m2), Intravenous, Clinic/[facility] 1 time, 2 of 2 cycles  Dose  modification: 175 mg/m2 (original dose 175 mg/m2, Cycle 1)  Administration: 300 mg (10/6/2020), 300 mg (10/27/2020)     4/29/2021 Tumor Conference       His case was discussed at the Multidisciplinary Head and Neck Team Planning Meeting.    Representatives from Medical Oncology, Radiation Oncology, Head and Neck Surgical Oncology, Psychosocial Oncology, and Speech and Language Pathology discussed the case with the following recommendations:    1) biopsy         7/29/2021 Tumor Conference       His case was discussed at the Multidisciplinary Head and Neck Team Planning Meeting.    Representatives from Medical Oncology, Radiation Oncology, Head and Neck Surgical Oncology, Psychosocial Oncology, and Speech and Language Pathology discussed the case with the following recommendations:    1) Head and neck clinic follow up  2) consider Keytruda (discuss with transplant)  3) consider palliative referral         9/13/2021 - 12/29/2023 Chemotherapy    Treatment Summary   Plan Name: OP HEAD NECK PEMBROLIZUMAB CARBOPLATIN FLUOROURACIL (C1 ONLY RECEIVED) FOLLOWED BY PEMBROLIZUMAB MAINTENANCE  Treatment Goal: Palliative  Status: Inactive  Start Date: 9/13/2021  End Date: 12/29/2023  Provider: Ronald Berg MD  Chemotherapy: CARBOplatin (PARAPLATIN) 320 mg in sodium chloride 0.9% 500 mL chemo infusion, 320 mg (100 % of original dose 320.5 mg), Intravenous, [facility] 1 time, 6 of 6 cycles  Dose modification:   (original dose 320.5 mg, Cycle 1)  Administration: 320 mg (9/13/2021), 335 mg (12/23/2021), 325 mg (1/27/2022), 245 mg (3/3/2022), 255 mg (5/12/2022), 255 mg (4/7/2022)  fluorouraciL 1,000 mg/m2/day = 6,440 mg in sodium chloride 0.9% 240 mL chemo infusion, 1,000 mg/m2/day = 6,440 mg, Intravenous, Over 96 hours, 6 of 6 cycles  Dose modification: 800 mg/m2/day (original dose 1,000 mg/m2/day, Cycle 6), 5,000 mg (original dose 1,000 mg/m2/day, Cycle 8)  Administration: 6,440 mg (9/13/2021), 6,440 mg (12/23/2021), 6,480 mg  (1/27/2022), 5,000 mg (3/3/2022), 5,000 mg (4/7/2022), 5,000 mg (5/12/2022)     Secondary malignant neoplasm of cervical lymph node   2/9/2021 Initial Diagnosis    Secondary malignant neoplasm of cervical lymph node     9/13/2021 - 12/29/2023 Chemotherapy    Treatment Summary   Plan Name: OP HEAD NECK PEMBROLIZUMAB CARBOPLATIN FLUOROURACIL (C1 ONLY RECEIVED) FOLLOWED BY PEMBROLIZUMAB MAINTENANCE  Treatment Goal: Palliative  Status: Inactive  Start Date: 9/13/2021  End Date: 12/29/2023  Provider: Ronald Berg MD  Chemotherapy: CARBOplatin (PARAPLATIN) 320 mg in sodium chloride 0.9% 500 mL chemo infusion, 320 mg (100 % of original dose 320.5 mg), Intravenous, Clinic/[facility] 1 time, 6 of 6 cycles  Dose modification:   (original dose 320.5 mg, Cycle 1)  Administration: 320 mg (9/13/2021), 335 mg (12/23/2021), 325 mg (1/27/2022), 245 mg (3/3/2022), 255 mg (5/12/2022), 255 mg (4/7/2022)  fluorouraciL 1,000 mg/m2/day = 6,440 mg in sodium chloride 0.9% 240 mL chemo infusion, 1,000 mg/m2/day = 6,440 mg, Intravenous, Over 96 hours, 6 of 6 cycles  Dose modification: 800 mg/m2/day (original dose 1,000 mg/m2/day, Cycle 6), 5,000 mg (original dose 1,000 mg/m2/day, Cycle 8)  Administration: 6,440 mg (9/13/2021), 6,440 mg (12/23/2021), 6,480 mg (1/27/2022), 5,000 mg (3/3/2022), 5,000 mg (4/7/2022), 5,000 mg (5/12/2022)     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   1/29/2024 Initial Diagnosis    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     2/19/2024 - 3/25/2024 Chemotherapy    Treatment Summary   Plan Name: OP HEAD NECK CETUXIMAB CARBOPLATIN FLUOROURACIL Q3W  Treatment Goal: Curative  Status: Inactive  Start Date: 2/19/2024  End Date: 3/25/2024  Provider: Martín Hernandez MD  Chemotherapy: cetuximab (ERBITUX) 100 mg/50 mL chemo infusion 668 mg, 400 mg/m2 = 668 mg, Intravenous, Clinic/[facility] 1 time, 2 of 12 cycles  Administration: 668 mg (2/19/2024), 400 mg (2/26/2024), 400 mg (3/18/2024), 400 mg (3/25/2024), 400 mg  (3/11/2024)  CARBOplatin (PARAPLATIN) 295 mg in sodium chloride 0.9% 314.5 mL chemo infusion, 295 mg, Intravenous, [facility] 1 time, 2 of 6 cycles  Administration: 295 mg (2/19/2024), 300 mg (3/18/2024)     8/23/2024 - 8/23/2024 Chemotherapy    Treatment Summary   Plan Name: OP cetuximab (loading + maintenance) weekly  Treatment Goal: Curative  Status: Inactive  Start Date:   End Date:   Provider: Martín Hernandez MD  Chemotherapy: cetuximab (ERBITUX) 100 mg/50 mL chemo infusion 652 mg, 400 mg/m2 = 652 mg, Intravenous, [facility] 1 time, 0 of 6 cycles     9/17/2024 - 9/23/2024 Radiation Therapy    Treating physician: Tian Joyce  Treatment Summary  Course: C1 H&N 2024    Treatment Site Ref. ID Energy Dose/Fx (Gy) #Fx Dose Correction (Gy) Total Dose (Gy) Start Date End Date Elapsed Days   IM Orbit_R Orbit_R 6X 6.5 5 / 5 0 32.5 9/17/2024 9/23/2024 6            Review of Systems   Constitutional:  Negative for activity change, appetite change, chills, fatigue, fever and unexpected weight change.   HENT:  Negative for ear pain, facial swelling, hearing loss, mouth sores, nosebleeds, sore throat, trouble swallowing and voice change.         No verbal communication. Skin fixed and immobile from previous scaring post-neck dissection. Taste changes.   Eyes:  Negative for pain, discharge and visual disturbance.   Respiratory:  Negative for cough, choking, chest tightness and shortness of breath.    Cardiovascular:  Negative for chest pain, palpitations and leg swelling.   Gastrointestinal:  Negative for abdominal distention, abdominal pain, blood in stool, constipation, diarrhea, nausea and vomiting.   Endocrine: Negative for cold intolerance and heat intolerance.   Genitourinary:  Negative for difficulty urinating, dysuria, frequency and urgency.   Musculoskeletal:  Positive for back pain (lower - chronic). Negative for arthralgias, gait problem, leg pain and myalgias.   Integumentary:  Negative for pallor, rash, wound  and mole/lesion.   Allergic/Immunologic: Negative for frequent infections.   Neurological:  Negative for dizziness, tremors, weakness, light-headedness, numbness and headaches.   Hematological:  Negative for adenopathy. Does not bruise/bleed easily.   Psychiatric/Behavioral:  Negative for agitation, confusion, dysphoric mood and sleep disturbance. The patient is not nervous/anxious.         Allergies:  Review of patient's allergies indicates:   Allergen Reactions    Aspirin     Tylenol extended release        Medications:  Current Outpatient Medications   Medication Sig Dispense Refill    levothyroxine (SYNTHROID) 88 MCG tablet Take 88 mcg by mouth before breakfast.      oxyCODONE (ROXICODONE) 5 MG immediate release tablet Take 1 tablet (5 mg total) by mouth every 8 (eight) hours as needed for Pain. 90 tablet 0    tacrolimus (PROGRAF) 0.5 MG Cap Take 1 capsule (0.5 mg total) by mouth every 12 (twelve) hours. 180 capsule 3    traZODone (DESYREL) 50 MG tablet TAKE 1 TABLET BY MOUTH IN THE  EVENING 90 tablet 3    vismodegib (ERIVEDGE) 150 mg Cap Take 1 capsule (150 mg total) by mouth once daily. 30 capsule 3    ondansetron (ZOFRAN-ODT) 8 MG TbDL Take 1 tablet (8 mg total) by mouth every 8 (eight) hours as needed (nausea - first choice). (Patient not taking: Reported on 7/8/2025) 60 tablet 4    prochlorperazine (COMPAZINE) 10 MG tablet Take 1 tablet (10 mg total) by mouth every 6 (six) hours as needed (nausea/vomiting - second choice). (Patient not taking: Reported on 7/8/2025) 30 tablet 1    senna (SENOKOT) 8.6 mg tablet Take 2 tablets by mouth 2 (two) times daily as needed for Constipation. (Patient not taking: Reported on 7/8/2025) 60 tablet 2     No current facility-administered medications for this visit.     Facility-Administered Medications Ordered in Other Visits   Medication Dose Route Frequency Provider Last Rate Last Admin    ofloxacin 0.3 % ophthalmic solution 1 drop  1 drop Right Eye On Call Procedure  Victor M Vasques MD   2 drop at 10/11/22 0745    sodium chloride 0.9% flush 10 mL  10 mL Intravenous PRN Ronald Berg MD        sodium chloride 0.9% flush 10 mL  10 mL Intravenous PRN Victor M Vasques MD           PMH:  Past Medical History:   Diagnosis Date    Acute conjunctivitis of right eye 09/12/2022    Atrial fibrillation     Basal cell carcinoma (BCC) in situ of skin 2012    3 on face, 2 on arm, removed by dermatology.     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 Underwent surgical resection of tongue and neck. s/p tracheostomy    Squamous cell carcinoma of skin of right temple 01/24/2024    right temple       PSH:  Past Surgical History:   Procedure Laterality Date    COLONOSCOPY      COLONOSCOPY N/A 09/14/2023    Procedure: COLONOSCOPY;  Surgeon: Reyes Olivia MD;  Location: [address]);  Service: Endoscopy;  Laterality: N/A;    COLONOSCOPY N/A 6/27/2025    Procedure: COLONOSCOPY;  Surgeon: Carlton Bennett MD;  Location: [facility] ([address] [place] [address]);  Service: Endoscopy;  Laterality: N/A;    CONJUNCTIVA BIOPSY Right 10/11/2022    Procedure: BIOPSY, CONJUNCTIVA;  Surgeon: Victor M Vasques MD;  Location: [facility] OR;  Service: Ophthalmology;  Laterality: Right;    ESOPHAGOGASTRODUODENOSCOPY N/A 09/14/2023    Procedure: EGD (ESOPHAGOGASTRODUODENOSCOPY);  Surgeon: Reyes Olivia MD;  Location: [address]);  Service: Endoscopy;  Laterality: N/A;     ESOPHAGOGASTRODUODENOSCOPY N/A 04/10/2024    Procedure: EGD (ESOPHAGOGASTRODUODENOSCOPY);  Surgeon: Reyes Pagan MD;  Location: [facility] ENDO (2ND FLR);  Service: Endoscopy;  Laterality: N/A;    ESOPHAGOGASTRODUODENOSCOPY N/A 06/24/2024    Procedure: EGD (ESOPHAGOGASTRODUODENOSCOPY);  Surgeon: Reyes Pagan MD;  Location: [facility] ENDO (2ND FLR);  Service: Endoscopy;  Laterality: N/A;  Ref By: Dr. Hawkins   Procedure: egd  Diagnosis: esophagitis  Procedure Timing: 10 weeks  Prep: standard prep  6/21-pt r/s-inst to portal-2nd floor criteria-labs within 90 daysMS    EYE SURGERY Right 04/25/2024    exenteration of orbit    GLOSSECTOMY      INSERTION OF VENOUS ACCESS PORT Right 03/08/2021    Procedure: INSERTION, VENOUS ACCESS PORT;  Surgeon: Jesus Rendon MD;  [facility];  Service: General;  Laterality: Right;    LARYNGECTOMY      LIVER TRANSPLANT  11/2008    transplanted for biopsy proven hepatocellular carcinoma,     LYMPH NODE BIOPSY N/A 09/18/2020    Procedure: BIOPSY, LYMPH NODE;  Surgeon: Taz Diagnostic Provider;  Location: [facility] OR 2ND FLR;  Service: General;  Laterality: N/A;  Rm 173    skin cancer removals      SURGICAL REMOVAL OF SCAR Right 08/24/2023    Procedure: EXCISION, SCAR;  Surgeon: Ting Brambila MD;  Location: [facility];  Service: Ophthalmology;  Laterality: Right;    TRACHEOSTOMY         FamHx:  Family History   Problem Relation Name Age of Onset    Dementia Mother      Cataracts Neg Hx      Glaucoma Neg Hx      Macular degeneration Neg Hx         SocHx:  Social History     Socioeconomic History    Marital status: Single   Occupational History    Occupation: Retired     Comment: , notes exposures to fumes etc.  Worked on [employer] for 4 years   Tobacco Use    Smoking status: Never    Smokeless tobacco: Never   Substance and Sexual Activity    Alcohol use: Yes     Comment: drinks wine, 1 glass day    Drug use: Not Currently    Sexual activity: Yes      "Comment: No prior history of STD    Social History Narrative    Steps at house- 24     Social Drivers of Health     Financial Resource Strain: Low Risk  (6/26/2025)    Overall Financial Resource Strain (CARDIA)     Difficulty of Paying Living Expenses: Not hard at all   Food Insecurity: No Food Insecurity (6/26/2025)    Hunger Vital Sign     Worried About Running Out of Food in the Last Year: Never true     Ran Out of Food in the Last Year: Never true   Transportation Needs: No Transportation Needs (6/26/2025)    PRAPARE - Transportation     Lack of Transportation (Medical): No     Lack of Transportation (Non-Medical): No   Stress: No Stress Concern Present (6/26/2025)    [employer] - Occupational Stress Questionnaire     Feeling of Stress : Not at all   Housing Stability: Low Risk  (6/26/2025)    Housing Stability Vital Sign     Unable to Pay for Housing in the Last Year: No     Homeless in the Last Year: No       Distress Score             Objective:      Vitals:    07/08/25 1049   BP: 103/70   BP Location: Left arm   Patient Position: Sitting   Pulse: 103   Resp: 16   SpO2: 99%   Weight: 57.2 kg (126 lb 1.7 oz)   Height: 5' 8" (1.727 m)             Physical Exam  Vitals reviewed.   Constitutional:       General: He is not in acute distress.     Appearance: Normal appearance. He is well-developed and underweight.   HENT:      Head: Normocephalic and atraumatic.      Nose:      Comments: See picture below of BCC. Improved since last image.      Mouth/Throat:      Mouth: Mucous membranes are moist.      Dentition: Abnormal dentition. Dental caries present.   Eyes:      Comments: R eye enucleation   Neck:      Trachea: Tracheostomy present.   Pulmonary:      Effort: Pulmonary effort is normal. No respiratory distress.      Comments: Tracheostomy  Abdominal:      General: There is no distension.      Palpations: Abdomen is soft.   Musculoskeletal:         General: No swelling. Normal " range of motion.      Cervical back: Normal range of motion and neck supple.   Skin:     General: Skin is warm and dry.   Neurological:      General: No focal deficit present.      Mental Status: He is alert and oriented to person, place, and time.   Psychiatric:         Mood and Affect: Mood normal.         Behavior: Behavior normal.         Thought Content: Thought content normal.         Judgment: Judgment normal.             LABS:  WBC   Date Value Ref Range Status   07/08/2025 6.66 3.90 - 12.70 K/uL Final     Hemoglobin   Date Value Ref Range Status   02/11/2025 11.9 (L) 14.0 - 18.0 g/dL Final     HGB   Date Value Ref Range Status   07/08/2025 10.8 (L) 14.0 - 18.0 gm/dL Final     POC Hematocrit   Date Value Ref Range Status   06/25/2025 <15 (LL) 36 - 54 %PCV Final     HCT   Date Value Ref Range Status   07/08/2025 32.7 (L) 40.0 - 54.0 % Final     Platelet Count   Date Value Ref Range Status   07/08/2025 217 150 - 450 K/uL Final     Platelets   Date Value Ref Range Status   02/11/2025 168 150 - 450 K/uL Final       Chemistry        Component Value Date/Time     07/08/2025 1052     02/11/2025 0858    K 4.9 07/08/2025 1052    K 4.4 02/11/2025 0858     07/08/2025 1052     02/11/2025 0858    CO2 24 07/08/2025 1052    CO2 23 02/11/2025 0858    BUN 19 07/08/2025 1052    CREATININE 1.5 (H) 07/08/2025 1052     (H) 07/08/2025 1052    GLU 96 02/11/2025 0858        Component Value Date/Time    CALCIUM 9.5 07/08/2025 1052    CALCIUM 9.9 02/11/2025 0858    ALKPHOS 80 07/08/2025 1052    ALKPHOS 99 02/11/2025 0858    AST 27 07/08/2025 1052    AST 41 (H) 02/11/2025 0858    ALT 16 07/08/2025 1052    ALT 16 02/11/2025 0858    BILITOT 0.4 07/08/2025 1052    BILITOT 0.5 02/11/2025 0858    ESTGFRAFRICA 52.6 (A) 07/14/2022 1119    EGFRNONAA 45.5 (A) 07/14/2022 1119        Imaging  Results for orders placed or performed during the hospital encounter of 05/22/25 (from the past 2160 hours)   CT Chest  Without Contrast    Narrative    EXAMINATION:  CT CHEST WITHOUT CONTRAST    CLINICAL HISTORY:  Head/neck cancer, monitor; Squamous cell carcinoma of skin of other parts of face    TECHNIQUE:  Low dose axial images, sagittal and coronal reformations were obtained from the thoracic inlet to the lung bases. Contrast was not administered.    COMPARISON:  Multiple prior exams, most recent CT from 02/11/2025.    FINDINGS:  Base of Neck: No significant abnormality.    Thoracic soft tissues: Right-sided chest port with its tip in the SVC.    Aorta: Left-sided aortic arch.  No aneurysm and no significant atherosclerosis    Heart: Normal size. No effusion.  Moderate calcific atherosclerosis.    Hailey/Mediastinum: No pathologic tati enlargement.    Airways: Patent.    Lungs/Pleura: Lungs are well expanded.  0.5 x 0.3 cm nodule in the left lower lobe is stable in size when compared to prior exam previously measuring 0.5 x 0.3 cm.  No new pulmonary nodules.  Mild fibrotic changes of the lower lobes bilaterally.    Esophagus: Normal.    Upper Abdomen: Multiple calcifications of the pancreas consistent with chronic pancreatitis.  Right renal simple cyst.    Bones: No acute fracture. No suspicious lytic or sclerotic lesions.      Impression    No detrimental change compared to prior exam.  Stable linear nodule in the left lower lobe measuring up to 0.5 cm.  No new pulmonary nodules.      Electronically signed by: Joel Hernandez MD  Date:    05/28/2025  Time:    10:03     *Note: Due to a large number of results and/or encounters for the requested time period, some results have not been displayed. A complete set of results can be found in Results Review.             Assessment:       1. Basal cell carcinoma (BCC) of skin of nose    2. 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 Squamous cell carcinoma of base of tongue    4. Secondary malignant neoplasm of cervical lymph node    5. Tracheostomy status    6. 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 Started Vismodegib on 2/4/25 with improvement in pain and clinical response. Discussed RT with Dr. Joyce, but plan is to continue systemic therapy.   - Having worsening side effects and no longer tolerating. Will hold Vismodegib and discuss radiation with Dr. Joyce.     Orbital cSCC  Unfortunately patient has progressed while on immunotherapy for below diagnosis. For that reason systemic therapies are limited. Evaluated at [facility] and surgeon does think an exenteration sparing resection would be possible with good response to induction chemotherapy. Plan was for platinum doublet chemo + cetuximab x 2 cycles. Notified of this plan on 1/29 and treatment plan was entered.   - Initiated dose reduced C1 of 5-FU to 750 mg/m2 due to age/frailty and growth factor. Patient still had cytopenias and mucositis, but was able to finish 2 cycles with delays. Now s/p exenteration. With PNI on path, referred to radiation oncology but adjuvant radiation deemed not necessary after exenteration.   - MRI orbit on 8/12/24 showing evidence of recurrent disease, "Nodular enhancing soft tissue superficially at the superomedial orbit and along the medial wall of the orbit posteriorly at least concerning for underlying recurrent lesion."   - Has undergone palliative RT to the right orbit. CT showing reduction of right orbital mass. Will continue Q3 month surveillance.    3,4,5. Recurrent SCC of base of tongue, P16+ - Status post total laryngectomy, total glossectomy and anterolateral thigh free flap reconstruction roughly 2017 at [facility] in [place] for persistent/recurrent base of tongue sq.c.c. 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 He is " not a surgical or radiation candidate.  -Started on PCC 10/6/2020 followed by maintenance cetuximab until 8/24/21 (discontinued due to progression of disease; continued increase in SUV uptake, no new lesions).  - 9/2021 started on carbo/5-FU/pembrolizumab; due to toxicity went to pembrolizumab monotherapy starting with cycle 2.  Progression of disease noted after cycle 3 so added chemotherapy back in with cycle 4. Keytruda monotherapy starting with C9.   - Keytruda discontinued after 2 years.    6,7. S/p liver transplant and is currently on tacrolimus. Grade 1. Will continue to monitor.     8. Monitor TSH on synthroid dose of 100 mcg.    9-11. Holding Vismodegib for these reasons.       Patient is in agreement with the proposed treatment plan. All questions were answered to the patient's satisfaction. Pt knows to call clinic if anything is needed before the next clinic visit.    Ct Garza, MSN, APRN, FNP-C  Hematology and Medical Oncology  Nurse Practitioner to Dr. Martín Hernandez  Nurse Practitioner, [facility]          Route Chart for Scheduling    Med Onc Chart Routing      Follow up with physician    Follow up with [facility] 1 month. symptom check   Infusion scheduling note    Injection scheduling note    Labs CBC, CMP, free T4 and TSH   Scheduling:  Preferred lab:  Lab interval:  CK   Imaging    Pharmacy appointment    Other referrals              Supportive Plan Information  IV FLUIDS AND ELECTROLYTES Ct Francis NP   Associated Diagnosis: Acute kidney injury superimposed on chronic kidney disease   noted on 4/8/2024   Line of treatment: Supportive Care   Treatment goal: Supportive     Upcoming Treatment Dates - IV FLUIDS AND ELECTROLYTES    No upcoming days in selected categories.    Therapy Plan Information  PORT FLUSH for CKD (chronic kidney disease) stage 3, GFR 30-59 ml/min, noted on 10/5/2020  PORT FLUSH for Squamous cell carcinoma of base of tongue, noted on  9/28/2020  Flushes  heparin, porcine (PF) 100 unit/mL injection flush 500 Units  500 Units, Intravenous, Every visit  sodium chloride 0.9% flush 10 mL  10 mL, Intravenous, Every visit    PORT FLUSH for Squamous cell carcinoma of base of tongue, noted on 9/28/2020  PORT FLUSH for Secondary malignant neoplasm of cervical lymph node, noted on 2/9/2021  Flushes  heparin, porcine (PF) 100 unit/mL injection flush 500 Units  500 Units, Intravenous, Every visit  sodium chloride 0.9% flush 10 mL  10 mL, Intravenous, Every visit      No therapy plan of the specified type found.

## 2025-07-17 ENCOUNTER — TELEPHONE (OUTPATIENT)
Dept: HEMATOLOGY/ONCOLOGY | Facility: CLINIC | Age: 75
End: 2025-07-17
Payer: MEDICARE

## 2025-07-17 NOTE — TELEPHONE ENCOUNTER
Spoke w/ Mr. Ollie Swain the brother of Mr. Rocco Swain to schedule nutrition referral placed by Arabella Cuevas DNP. Pt approved to schedule in-person appt with Naveen Lozano RD for Thursday 08/07/2025 @9am. MA informed pt that clinic is on the 3rd floor of the [facility]. Pt acknowledged.         Scar Rodriguez MA ext [phone]

## 2025-07-18 ENCOUNTER — OFFICE VISIT (OUTPATIENT)
Dept: RADIATION ONCOLOGY | Facility: CLINIC | Age: 75
End: 2025-07-18
Payer: MEDICARE

## 2025-07-18 VITALS
DIASTOLIC BLOOD PRESSURE: 73 MMHG | OXYGEN SATURATION: 98 % | BODY MASS INDEX: 19.04 KG/M2 | SYSTOLIC BLOOD PRESSURE: 108 MMHG | HEART RATE: 97 BPM | TEMPERATURE: 98 F | WEIGHT: 125.25 LBS

## 2025-07-18 DIAGNOSIS — C44.311 BASAL CELL CARCINOMA (BCC) OF SKIN OF NOSE: Primary | ICD-10-CM

## 2025-07-18 DIAGNOSIS — R94.6 ABNORMAL RESULTS OF THYROID FUNCTION STUDIES: ICD-10-CM

## 2025-07-18 DIAGNOSIS — C44.329 SQUAMOUS CELL CARCINOMA OF SKIN OF ORBIT: ICD-10-CM

## 2025-07-18 DIAGNOSIS — C01 SQUAMOUS CELL CARCINOMA OF BASE OF TONGUE: ICD-10-CM

## 2025-07-18 PROCEDURE — 99213 OFFICE O/P EST LOW 20 MIN: CPT | Mod: PBBFAC | Performed by: STUDENT IN AN ORGANIZED HEALTH CARE EDUCATION/TRAINING PROGRAM

## 2025-07-18 PROCEDURE — 99999 PR PBB SHADOW E&M-EST. PATIENT-LVL III: CPT | Mod: PBBFAC,,, | Performed by: STUDENT IN AN ORGANIZED HEALTH CARE EDUCATION/TRAINING PROGRAM

## 2025-07-18 NOTE — PROGRESS NOTES
[facility] Follow Up Note      Assessment:  Rocco Swain is a 75 y.o. 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 He is s/p orbital exenteration 4/25/24 (1mm SM, +PNI) with no adjuvant therapy. He developed a local recurrence with rapid progression, s/p 32.5 Gy in 5 fractions to the right orbital mass completed 9/23/24.    ANGELES on surveillance imaging.  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 He developed an unresectable recurrence and was treated with multiple lines of systemic therapy. Last on pembro 2023.  Currently ANGELES on surveillance  Left nasal ala BCC. Previously on vismodegib but with poor tolerance, referred to discuss radiotherapy.  Liver transplant on tacrolimus. Following with transplant  ECOG: (1) Restricted in physically strenuous activity, ambulatory and able to do work of light nature        Plan:  Regarding his left nasal ala basal cell. Discussed referral to Mohs, definitive radiotherapy and obs.   After this discussion, he elected to proceed with radiation.   Planning for 55 Gy in 20 fractions, given location and suspect underlying cartilage involvement. Will plan en face electrons.   Consent obtained for radiotherapy. Discussed risk of RT, including possible overlap with prior RT to the OPx. Have requested OSH records in past.   CT sim 7/25/25.        Oncologic History:  h/o liver transplantation in 2008 due to hepatitis C, currently on Tacrolimus. He has two head and neck primaries:     1) R/M SCC of the BOT, HPV+  Diagnosis: [id number] with locally advanced disease (unknown stage)  2016, definitive CRT with presistent disease  Base of tongue CRT at [place], [place].   2016/2017, total laryngectomy + glossectomy in [place]   2020, developed local unresectable  recurrence (biopsy-proven p16+ SCC).   Oct 2020 - Aug 2021, started PCC followed by maintenance cetuximab with PD in August 2021  Sep 2021, started carboplatin, 5-FU, and pembrolizumab x 2 cycles with limiting toxicity attributed to chemo. Transitioned to pembrolizumab alone (no signs for liver rejection). Added chemotherapy again to the cycle 5  March 2022 to December 2023, pembrolizumab as single agent (no signs for liver rejection)    2) SCC of the conjunctivae   Initial diagnosis: April 2022  Eye lesions grew despite pembrolizumab  10/19/2022, local excision at [facility] (Dr Victor M Vasques)  08/31/2023, recurrence s/p local excision at [facility] (Dr Victor M Vasques)  October - December 2023, topical MMC without response  1/19/2024, he was seen as a consult at [facility]. As he progressed on IO in the past, he was recommended systemic therapy with chemotherapy (platinum doublet, such as carboplatin and 5-FU) and cetuximab. He started this at [facility].   4/24/24: MRI orbits ([facility]) with sight increase in size of the mass (2.6 cm) in the inferonasal right conjunctiva with extension into the post septal extraconal space. PPF, cavernous sinuses, and Meckel's caves are within normal limits. No adenopathy.  4/25/2024: right eyelid sparing orbital exenteration and Repair of defect right socket with adjacent tissue transfer using upper and lower eyelid skin at [facility].  Path: moderate to poorly differentiated squamous cell carcinoma involving conjunctiva of upper and lower eyelids, fibroconnective tissue and skeletal muscle. +PNI, 2 foci, largest nerve 0.06mm. margins negative but close at 1.2mm.  No adjuvant RT   8/12/24: MRI orbits s/p right orbital mass resection with orbital exenteration. Nodular enhancing soft tissue superficially at the superomedial orbit and along the medial wall of the orbit posteriorly at least concerning for underlying recurrent lesion.   8/27/24: PET/CT with FDG avid right orbital mass consistent with disease  recurrence. New pulmonary nodules concerning for metastasis  9/12/24: CT orbits with progression and new 1.1 cm round enhancing focus posterior to the right maxilla, concerning for metastasis  9/17/24 - 9/23/24: 25-32.5 Gy in 5 fractions to right orbital mass and lesion posterior to the right maxilla    BCC of L nasal ala  11/30/2023:   Biopsy right temple with superficially invasive squamous cell carcinoma  Left frontal scalp with atypical squamous proliferation transected at the base  Biopsy of the left nasal ala demonstrating basal cell carcinoma  Biopsy of lower mid neck, with basal cell carcinoma  1/20204: Mohs  Mohs to right temple, with no residual tumor identified  Mohs to lower mid neck, cleared in 2 stages. No PNI       Possibility of pregnancy: N/A  History of prior irradiation: Yes - as above  History of prior systemic anti-cancer therapy: Yes - as above  History of collagen vascular disease: No  Implanted electronic device (pacer/defib/nerve stimulator): No    Interval History:   Rocco Swain presents today for follow up.     Last visit:   Has some soreness in the right orbit but overall much improved compared to pre tx. Unchanged compared to last visit. No longer on pain meds for this. No new facial numbness, has right forehead and cheek numbness stable since his orbital exent at [facility].     Off vismodegib as of 7/8 due to intolerance. No changes in the orbit.     Since last visit, no oncologic events     This visit: no changes. No new issues. No orbital pain, new facial numbness or weakness. No neck masses, throat pain.     Review of Systems:  ROS  as above    Social History:  Social History     Tobacco Use    Smoking status: Never    Smokeless tobacco: Never   Substance Use Topics    Alcohol use: Yes     Comment: drinks wine, 1 glass day    Drug use: Not Currently         Medications:  Current Outpatient Medications on File Prior to Visit   Medication Sig Dispense Refill    levothyroxine (SYNTHROID)  88 MCG tablet Take 88 mcg by mouth before breakfast.      ondansetron (ZOFRAN-ODT) 8 MG TbDL Take 1 tablet (8 mg total) by mouth every 8 (eight) hours as needed (nausea - first choice). (Patient not taking: Reported on 2025) 60 tablet 4    [] oxyCODONE (ROXICODONE) 5 MG immediate release tablet Take 1 tablet (5 mg total) by mouth every 8 (eight) hours as needed for Pain. 90 tablet 0    prochlorperazine (COMPAZINE) 10 MG tablet Take 1 tablet (10 mg total) by mouth every 6 (six) hours as needed (nausea/vomiting - second choice). (Patient not taking: Reported on 2025) 30 tablet 1    senna (SENOKOT) 8.6 mg tablet Take 2 tablets by mouth 2 (two) times daily as needed for Constipation. (Patient not taking: Reported on 2025) 60 tablet 2    tacrolimus (PROGRAF) 0.5 MG Cap Take 1 capsule (0.5 mg total) by mouth every 12 (twelve) hours. 180 capsule 3    traZODone (DESYREL) 50 MG tablet TAKE 1 TABLET BY MOUTH IN THE  EVENING 90 tablet 3    vismodegib (ERIVEDGE) 150 mg Cap Take 1 capsule (150 mg total) by mouth once daily. 30 capsule 3     Current Facility-Administered Medications on File Prior to Visit   Medication Dose Route Frequency Provider Last Rate Last Admin    ofloxacin 0.3 % ophthalmic solution 1 drop  1 drop Right Eye On Call Procedure Victor M Vasques MD   2 drop at 10/11/22 0745    sodium chloride 0.9% flush 10 mL  10 mL Intravenous PRN Ronald Berg MD        sodium chloride 0.9% flush 10 mL  10 mL Intravenous PRN Victor M Vasques MD           Allergies:  Review of patient's allergies indicates:   Allergen Reactions    Aspirin     Tylenol extended release        Exam:  Vitals:    25 1014   BP: 108/73   Pulse: 97   Temp: 98.2 °F (36.8 °C)   SpO2: 98%   Weight: 56.8 kg (125 lb 3.5 oz)           Constitutional: Pleasant 75 y.o. male in no acute distress.  Well nourished. Well groomed.   HEENT: He is s/p exent with no palpable mass (one was previously seen in the ~1 o'clock  position) with overlying skin intact without erythema or ulceration. No appreciated residual fullness in the right orbit. Left nasal ala with ~1.5 cm lesion. No appreciated mucosal changes appreciated on direct exam of the nasal cavity  Lymph:  heavily pretreated neck with fibrosis. + helio tube  Cardiovascular: Upper extremities warm to touch  Lungs: No audible wheezing.  Normal effort.   Musculoskeletal: No gross MSK deformities. Ambulates well  Skin: No rashes appreciated.  Psych: Alert and oriented with appropriate mood and affect.  Neuro: as above, otherwise  Grossly normal.    Data Review:    Information obtained via chart review and discussion with  Brynn.       I spent a total of 32 minutes on the day of the visit.  This includes face to face time and non-face to face time preparing to see the patient (eg, review of tests), obtaining and/or reviewing separately obtained history, documenting clinical information in the electronic or other health record, independently interpreting results and communicating results to the patient/family/caregiver, or care coordinator.        Oli Joyce MD  Radiation Oncology

## 2025-07-25 ENCOUNTER — HOSPITAL ENCOUNTER (OUTPATIENT)
Dept: RADIATION THERAPY | Facility: HOSPITAL | Age: 75
Discharge: HOME | End: 2025-07-25
Payer: MEDICARE

## 2025-07-25 PROCEDURE — 77334 RADIATION TREATMENT AID(S): CPT | Mod: 26,,, | Performed by: STUDENT IN AN ORGANIZED HEALTH CARE EDUCATION/TRAINING PROGRAM

## 2025-07-25 PROCEDURE — 77290 THER RAD SIMULAJ FIELD CPLX: CPT | Mod: 26,,, | Performed by: STUDENT IN AN ORGANIZED HEALTH CARE EDUCATION/TRAINING PROGRAM

## 2025-07-25 PROCEDURE — 77014 HC CT GUIDANCE RADIATION THERAPY FLDS PLACEMENT: CPT | Mod: TC | Performed by: STUDENT IN AN ORGANIZED HEALTH CARE EDUCATION/TRAINING PROGRAM

## 2025-07-25 PROCEDURE — 77290 THER RAD SIMULAJ FIELD CPLX: CPT | Mod: TC | Performed by: STUDENT IN AN ORGANIZED HEALTH CARE EDUCATION/TRAINING PROGRAM

## 2025-07-25 PROCEDURE — 77334 RADIATION TREATMENT AID(S): CPT | Mod: TC | Performed by: STUDENT IN AN ORGANIZED HEALTH CARE EDUCATION/TRAINING PROGRAM

## 2025-07-25 PROCEDURE — 77263 THER RADIOLOGY TX PLNG CPLX: CPT | Mod: ,,, | Performed by: STUDENT IN AN ORGANIZED HEALTH CARE EDUCATION/TRAINING PROGRAM

## 2025-07-30 ENCOUNTER — PATIENT MESSAGE (OUTPATIENT)
Dept: PALLIATIVE MEDICINE | Facility: CLINIC | Age: 75
End: 2025-07-30
Payer: MEDICARE

## 2025-08-04 ENCOUNTER — LAB VISIT (OUTPATIENT)
Dept: LAB | Facility: HOSPITAL | Age: 75
End: 2025-08-04
Attending: INTERNAL MEDICINE
Payer: MEDICARE

## 2025-08-04 DIAGNOSIS — Z94.4 LIVER TRANSPLANTED: ICD-10-CM

## 2025-08-04 DIAGNOSIS — C44.311 BASAL CELL CARCINOMA (BCC) OF SKIN OF NOSE: ICD-10-CM

## 2025-08-04 DIAGNOSIS — R94.6 ABNORMAL RESULTS OF THYROID FUNCTION STUDIES: ICD-10-CM

## 2025-08-04 LAB
ABSOLUTE EOSINOPHIL (OHS): 0.29 K/UL
ABSOLUTE MONOCYTE (OHS): 0.5 K/UL (ref 0.3–1)
ABSOLUTE NEUTROPHIL COUNT (OHS): 2.11 K/UL (ref 1.8–7.7)
ALBUMIN SERPL BCP-MCNC: 3.4 G/DL (ref 3.5–5.2)
ALP SERPL-CCNC: 126 UNIT/L (ref 40–150)
ALT SERPL W/O P-5'-P-CCNC: 12 UNIT/L (ref 0–55)
ANION GAP (OHS): 15 MMOL/L (ref 8–16)
AST SERPL-CCNC: 29 UNIT/L (ref 0–50)
BASOPHILS # BLD AUTO: 0.03 K/UL
BASOPHILS NFR BLD AUTO: 0.8 %
BILIRUB SERPL-MCNC: 0.4 MG/DL (ref 0.1–1)
BUN SERPL-MCNC: 17 MG/DL (ref 8–23)
CALCIUM SERPL-MCNC: 9.5 MG/DL (ref 8.7–10.5)
CHLORIDE SERPL-SCNC: 103 MMOL/L (ref 95–110)
CK SERPL-CCNC: 40 U/L (ref 20–200)
CO2 SERPL-SCNC: 24 MMOL/L (ref 23–29)
CREAT SERPL-MCNC: 1.3 MG/DL (ref 0.5–1.4)
ERYTHROCYTE [DISTWIDTH] IN BLOOD BY AUTOMATED COUNT: 17.9 % (ref 11.5–14.5)
GFR SERPLBLD CREATININE-BSD FMLA CKD-EPI: 57 ML/MIN/1.73/M2
GLUCOSE SERPL-MCNC: 107 MG/DL (ref 70–110)
HCT VFR BLD AUTO: 33.8 % (ref 40–54)
HGB BLD-MCNC: 11.3 GM/DL (ref 14–18)
IMM GRANULOCYTES # BLD AUTO: 0.02 K/UL (ref 0–0.04)
IMM GRANULOCYTES NFR BLD AUTO: 0.5 % (ref 0–0.5)
LYMPHOCYTES # BLD AUTO: 0.87 K/UL (ref 1–4.8)
MCH RBC QN AUTO: 33.7 PG (ref 27–31)
MCHC RBC AUTO-ENTMCNC: 33.4 G/DL (ref 32–36)
MCV RBC AUTO: 101 FL (ref 82–98)
NUCLEATED RBC (/100WBC) (OHS): 0 /100 WBC
PLATELET # BLD AUTO: 201 K/UL (ref 150–450)
PMV BLD AUTO: 9.4 FL (ref 9.2–12.9)
POTASSIUM SERPL-SCNC: 4.5 MMOL/L (ref 3.5–5.1)
PROT SERPL-MCNC: 7.4 GM/DL (ref 6–8.4)
RBC # BLD AUTO: 3.35 M/UL (ref 4.6–6.2)
RELATIVE EOSINOPHIL (OHS): 7.6 %
RELATIVE LYMPHOCYTE (OHS): 22.8 % (ref 18–48)
RELATIVE MONOCYTE (OHS): 13.1 % (ref 4–15)
RELATIVE NEUTROPHIL (OHS): 55.2 % (ref 38–73)
SODIUM SERPL-SCNC: 142 MMOL/L (ref 136–145)
T4 FREE SERPL-MCNC: 1.23 NG/DL (ref 0.71–1.51)
TACROLIMUS BLD-MCNC: 3 NG/ML (ref 5–15)
TSH SERPL-ACNC: 6.14 UIU/ML (ref 0.4–4)
WBC # BLD AUTO: 3.82 K/UL (ref 3.9–12.7)

## 2025-08-04 PROCEDURE — 80053 COMPREHEN METABOLIC PANEL: CPT

## 2025-08-04 PROCEDURE — 80197 ASSAY OF TACROLIMUS: CPT

## 2025-08-04 PROCEDURE — 36415 COLL VENOUS BLD VENIPUNCTURE: CPT

## 2025-08-04 PROCEDURE — 84443 ASSAY THYROID STIM HORMONE: CPT

## 2025-08-04 PROCEDURE — 82550 ASSAY OF CK (CPK): CPT

## 2025-08-04 PROCEDURE — 84439 ASSAY OF FREE THYROXINE: CPT

## 2025-08-04 PROCEDURE — 85025 COMPLETE CBC W/AUTO DIFF WBC: CPT

## 2025-08-05 ENCOUNTER — OFFICE VISIT (OUTPATIENT)
Dept: HEMATOLOGY/ONCOLOGY | Facility: CLINIC | Age: 75
End: 2025-08-05
Payer: MEDICARE

## 2025-08-05 VITALS
OXYGEN SATURATION: 99 % | DIASTOLIC BLOOD PRESSURE: 72 MMHG | SYSTOLIC BLOOD PRESSURE: 119 MMHG | WEIGHT: 125.44 LBS | RESPIRATION RATE: 16 BRPM | HEIGHT: 68 IN | TEMPERATURE: 98 F | BODY MASS INDEX: 19.01 KG/M2 | HEART RATE: 102 BPM

## 2025-08-05 DIAGNOSIS — Z93.0 TRACHEOSTOMY STATUS: ICD-10-CM

## 2025-08-05 DIAGNOSIS — C01 SQUAMOUS CELL CARCINOMA OF BASE OF TONGUE: ICD-10-CM

## 2025-08-05 DIAGNOSIS — C44.311 BASAL CELL CARCINOMA (BCC) OF SKIN OF NOSE: Primary | ICD-10-CM

## 2025-08-05 DIAGNOSIS — C44.329 SQUAMOUS CELL CARCINOMA OF SKIN OF ORBIT: ICD-10-CM

## 2025-08-05 DIAGNOSIS — K21.9 GASTROESOPHAGEAL REFLUX DISEASE WITHOUT ESOPHAGITIS: ICD-10-CM

## 2025-08-05 DIAGNOSIS — Z79.899 IMMUNODEFICIENCY DUE TO DRUGS: ICD-10-CM

## 2025-08-05 DIAGNOSIS — C77.0 SECONDARY MALIGNANT NEOPLASM OF CERVICAL LYMPH NODE: ICD-10-CM

## 2025-08-05 DIAGNOSIS — E03.2 HYPOTHYROIDISM DUE TO MEDICAMENTS AND OTHER EXOGENOUS SUBSTANCES: ICD-10-CM

## 2025-08-05 DIAGNOSIS — L85.8 CUTANEOUS HORN: ICD-10-CM

## 2025-08-05 DIAGNOSIS — R63.0 DECREASED APPETITE: ICD-10-CM

## 2025-08-05 DIAGNOSIS — D84.821 IMMUNODEFICIENCY DUE TO DRUGS: ICD-10-CM

## 2025-08-05 DIAGNOSIS — Z94.4 STATUS POST LIVER TRANSPLANTATION: ICD-10-CM

## 2025-08-05 DIAGNOSIS — R43.2 ALTERED TASTE: ICD-10-CM

## 2025-08-05 PROCEDURE — 99215 OFFICE O/P EST HI 40 MIN: CPT | Mod: PBBFAC | Performed by: NURSE PRACTITIONER

## 2025-08-05 PROCEDURE — 99215 OFFICE O/P EST HI 40 MIN: CPT | Mod: S$PBB,,, | Performed by: NURSE PRACTITIONER

## 2025-08-05 PROCEDURE — 99999 PR PBB SHADOW E&M-EST. PATIENT-LVL V: CPT | Mod: PBBFAC,,, | Performed by: NURSE PRACTITIONER

## 2025-08-05 PROCEDURE — G2211 COMPLEX E/M VISIT ADD ON: HCPCS | Mod: ,,, | Performed by: NURSE PRACTITIONER

## 2025-08-05 RX ORDER — PANTOPRAZOLE SODIUM 40 MG/1
40 TABLET, DELAYED RELEASE ORAL DAILY
Qty: 30 TABLET | Refills: 0 | Status: SHIPPED | OUTPATIENT
Start: 2025-08-05 | End: 2026-08-05

## 2025-08-05 NOTE — PROGRESS NOTES
Oncology Nutrition Assessment   Medical Nutrition Therapy Follow-up Visit    Rocco Swain  1950    Referring Provider: Arabella Cuevas DNP   Reason for Referral: Anticipating PEG tube placement    Diagnosis: SCC of Skin of Orbit; History of SCC of Base of Tongue    Treatment: [No matching plan found] - Dr. Hernandez (completed in mid-April per patient)    PMHx:   Past Medical History:   Diagnosis Date    Acute conjunctivitis of right eye 09/12/2022    Atrial fibrillation     Basal cell carcinoma (BCC) in situ of skin 2012    3 on face, 2 on arm, removed by dermatology.     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 Underwent surgical resection of tongue and neck. s/p tracheostomy    Squamous cell carcinoma of skin of right temple 01/24/2024    right temple     Allergies: Aspirin and Tylenol extended release    Nutrition Assessment    Anthropometrics:   Weight:   Wt Readings from Last 10 Encounters:   08/05/25 56.9 kg (125 lb 7.1 oz)   07/18/25 56.8 kg (125 lb 3.5 oz)   07/08/25 57.2 kg (126 lb 1.7 oz)   06/26/25 61.2 kg (134 lb 14.7 oz)   06/16/25 58.6 kg (129 lb 3 oz)   05/23/25 58.2 kg (128 lb 4.9 oz)   05/22/25 57.6 kg (127 lb 1.5 oz)   04/24/25 58.6 kg (129 lb 3 oz)   04/15/25 59.1 kg (130 lb 4.7 oz)   03/27/25 59.2 kg (130 lb 8.2 oz)                              Usual BW: 115-119 lb. Timeframe: Ealry 2024  Weight Change: ~10lb weight loss in 1 month Ht Readings from Last 1 Encounters:  "  08/05/25 5' 8" (1.727 m)      BMI Readings from Last 1 Encounters:   08/05/25 19.07 kg/m²     Estimated body surface area is 1.65 meters squared as calculated from the following:    Height as of 8/5/25: 5' 8" (1.727 m).    Weight as of 8/5/25: 56.9 kg (125 lb 7.1 oz).        Appetite/Intake: Eating 2 meals daily, eating every 2-3 hours. Difficulty swallowing solid foods. Homemade smoothies, soup, blended oatmeal with maple sugar, Ensure Plus 3-6 cartons daily (self-pay), and Gatorade. History of PEG tube placement/removal in 2008 and 2015.     Encounter Notes:  Rocco Swain is a 75 y.o. male with SCC of Skin of Orbit and history of SCC of Base of Tongue referred by Ct Francis NP for nutrition assessment prior to PEG tube placement which was ordered on 4/17/24. Noted that Mr. Swain requested PEG tube placement for nutrition support while under active treatment. Noted that patient has a history of prior PEG tube placement/removal in 2008 and 2015 respectively. He reports that appetite and intake have declined over the last 2 weeks due to difficulty swallowing from a throat infection. Patient was treated with antibiotics for this which caused bowel changes (both diarrhea and constipation). Mr. Swain is aware of how to administer feedings, flushes, and medication in PEG tube as well as care and maintenance of tube. Anticipating PEG tube placement during the week of 4/29/24.   On, 5/7/24, Mr. Swain presents to clinic following removal of right eye at [facility]. Noted that patient has not had PEG tube scheduled or placed and now states that he no longer wants the tube. He reports having an increase in appetite, intake, and improved swallow function since completing treatment. Weight is up 2 lbs in 2 weeks. Denies N/V but continues to report intermittent diarrhea/constipation despite completed antibiotics. Only taking anti-diarrheal if liquid Bms are frequent.     Today, 08/07/25, Mr. Swain presents to " clinic alone. Reports stable weight between 124-126 lbs for the last year. Reports decreased ability to taste starting in Feb. Reports having stomach cramping/heartburn which he will be starting pantoprazole today.     Nutrition Impact Symptoms    [] No nutritional concerns at current  [] Poor Appetite   [] Weight loss   [] Nausea   [] Vomiting   [] Diarrhea   [] Constipation   [x] Early Satiety [] Change in smell   [x] Change in taste - able to taste acidic, spicy, sugar, salty.   [] Dry Mouth   [] Thick saliva   [] Dysphagia   [] Difficulty chewing  [] Mucositis  [] Indigestion  [] Gas/Bloating  [x] Reflux/Heartburn - Protonix starting 8/7/25     [] Fatigue     [] other, please specify- too much salvia     Current Medications:  Current Outpatient Medications   Medication Instructions    levothyroxine (SYNTHROID) 88 mcg, Before breakfast    ondansetron (ZOFRAN-ODT) 8 mg, Oral, Every 8 hours PRN    pantoprazole (PROTONIX) 40 mg, Oral, Daily    prochlorperazine (COMPAZINE) 10 mg, Oral, Every 6 hours PRN    senna (SENOKOT) 8.6 mg tablet 2 tablets, Oral, 2 times daily PRN    tacrolimus (PROGRAF) 0.5 mg, Oral, Every 12 hours    traZODone (DESYREL) 50 mg, Oral, Nightly    vismodegib (ERIVEDGE) 150 mg Cap Take 1 capsule (150 mg total) by mouth once daily.     Labs: Reviewed 08/04/25: alb 3.4    Nutrition Diagnosis    Nutrition Problem: Unintended weight loss  Etiology (related to): inadequate energy intake, cancer and associated treatment, and decreased ability to consume sufficient energy  Signs/Symptoms (as evidenced by): weight loss of 5 lbs in 5 months  - Resolving (5/7/24)    Nutrition Intervention    Nutrition Prescription  4796-4449 kcals/day 30-35 kcal/kg   57-68 g protein/day 1-1.2 gm/kg  5535-1144 mL fluid/day 1 ml/kcal    Recommendations:   Continue current intake of calorie dense liquids and Ensure Plus 4-6 bottles daily  Encouraged minimum of 64 oz of fluid daily for hydration.  Reviewed tips for taste  changes - [facility] handout.    Materials Provided/Reviewed: none    Nutrition Monitoring and Evaluation    Monitor: rate/formulation of tube feeding, weight, diet education needs , and adherence to nutrition recommendations     Follow up Patient will follow up via portal as needed with any questions/concerns     Communication to referring provider/care team: Note available in chart.     Consultation Time: 30 Minutes    Naveen COOPER, , LDN  Advanced Practice in Clinical Nutrition  Board Certified Specialist in Oncology Nutrition   [facility]  [phone]

## 2025-08-05 NOTE — Clinical Note
Bhanu saw Mr. Swain today. Reporting a new skin lesion on his scalp that looks like a developing cutaneous horn. Photo in media. Do you mind taking a look?  Thanks, Ct

## 2025-08-05 NOTE — PROGRESS NOTES
"ONCOLOGY FOLLOW UP VISIT    Subjective:      Patient ID: Rocco Swain    Chief Complaint: Basal cell carcinoma (BCC) of skin of nose      HPI  Rocco Swain is a 75 y.o. male who returns to clinic for management of cSCC of the orbit. Patient had ANGELES on October PETCT, but then developed quickly progressing orbital lesion. He was seen at [facility] in [place]. Recommendation was to follow up at [facility] to initiate induction chemotherapy followed by possible surgery. Patient completed 2 cycles of Extreme regimen, though there were delays due to cytopenias and severe mucositis. He was able to go to [facility] to have exenteration and resection of cancer April 2024 with clear margins, but PNI on path.     Interval History  Has remained off Vismodegib since last visit. Muscle cramping has resolved. Still suffering with poor appetite. Weight is stable. States his stomach feels "upset." Pepto-Bismol and Tums helps symptoms. Will begin radiation on 8/12. Has developed a raised lesion on his scalp over the last 2 months.     ECOG Performance status: 1 - Symptomatic but completely ambulatory Communication from him is 100% written.     Cancer Staging   Squamous cell carcinoma of base of tongue  Staging form: Pharynx - HPV-Mediated Oropharynx, AJCC 8th Edition  - Clinical stage from 9/18/2020: Stage III (rcT4, cN1, cM0, p16+) - Signed by Ronald Berg MD on 12/27/2022      Oncologic History:  Oncology History   Squamous cell carcinoma of base of tongue   9/18/2020 Cancer Staged    Staging form: Pharynx - HPV-Mediated Oropharynx, AJCC 8th Edition  - Clinical stage from 9/18/2020: Stage III (rcT4, cN1, cM0, p16+)     9/28/2020 Initial Diagnosis    Squamous cell carcinoma of base of tongue     10/6/2020 - 8/17/2021 Chemotherapy    Treatment Summary   Plan Name: OP HEAD NECK CARBOPLATIN PACLITAXEL C1-2 FOLLOWED BY CETUXIMAB CARBOPLATIN C3-6 FOLLOWED BY CETUXIMAB MAINTENANCE WEEKLY  Treatment Goal: Control  Status: " Inactive  Start Date: 10/6/2020  End Date: 8/17/2021  Provider: Ronald Berg MD  Chemotherapy: cetuximab (ERBITUX) 100 mg/50 mL chemo infusion 684 mg, 400 mg/m2 = 684 mg (100 % of original dose 400 mg/m2), Intravenous, Clinic/\[facility] [place] [facility]\"" 1 time, 29 of 38 cycles  Dose modification: 500 mg/m2 (original dose 400 mg/m2, Cycle 3), 250 mg/m2 (original dose 400 mg/m2, Cycle 3), 400 mg/m2 (original dose 400 mg/m2, Cycle 3), 250 mg/m2 (original dose 250 mg/m2, Cycle 7), 200 mg/m2 (original dose 250 mg/m2, Cycle 18), 200 mg/m2 (original dose 250 mg/m2, Cycle 19), 250 mg/m2 (original dose 250 mg/m2, Cycle 4), 200 mg/m2 (original dose 250 mg/m2, Cycle 25), 200 mg/m2 (original dose 250 mg/m2, Cycle 28)  Administration: 684 mg (11/17/2020), 400 mg (11/24/2020), 400 mg (2/9/2021), 400 mg (2/17/2021), 427.6 mg (3/9/2021), 400 mg (3/30/2021), 400 mg (3/23/2021), 400 mg (4/6/2021), 427.6 mg (4/13/2021), 427.6 mg (4/20/2021), 342 mg (5/11/2021), 342 mg (5/18/2021), 342 mg (5/25/2021), 400 mg (5/31/2021), 400 mg (6/7/2021), 400 mg (6/14/2021), 400 mg (12/8/2020), 427.6 mg (12/29/2020), 400 mg (12/1/2020), 400 mg (12/15/2020), 400 mg (12/22/2020), 427.6 mg (1/5/2021), 400 mg (1/12/2021), 400 mg (1/19/2021), 427.6 mg (1/26/2021), 400 mg (2/2/2021), 400 mg (2/23/2021), 400 mg (3/2/2021), 427.6 mg (3/16/2021), 400 mg (6/21/2021), 342 mg (6/28/2021), 342 mg (7/6/2021), 342 mg (7/13/2021), 342 mg (7/20/2021), 400 mg (7/27/2021), 400 mg (8/10/2021), 400 mg (8/17/2021)  CARBOplatin (PARAPLATIN) 310 mg in sodium chloride 0.9% 500 mL chemo infusion, 310 mg (92.2 % of original dose 334.5 mg), Intravenous, Clinic/\[facility]\"" 1 time, 6 of 6 cycles  Dose modification:   (original dose 334.5 mg, Cycle 1)  Administration: 310 mg (10/6/2020), 370 mg (11/17/2020), 300 mg (10/27/2020), 350 mg (12/8/2020), 335 mg (12/29/2020), 320 mg (1/19/2021)  PACLitaxeL (TAXOL) 175 mg/m2 = 300 mg in sodium chloride 0.9% 500 mL chemo infusion, 175 mg/m2 = 300 mg (100 % of original dose  175 mg/m2), Intravenous, Clinic/[facility] 1 time, 2 of 2 cycles  Dose modification: 175 mg/m2 (original dose 175 mg/m2, Cycle 1)  Administration: 300 mg (10/6/2020), 300 mg (10/27/2020)     4/29/2021 Tumor Conference       His case was discussed at the Multidisciplinary Head and Neck Team Planning Meeting.    Representatives from Medical Oncology, Radiation Oncology, Head and Neck Surgical Oncology, Psychosocial Oncology, and Speech and Language Pathology discussed the case with the following recommendations:    1) biopsy         7/29/2021 Tumor Conference       His case was discussed at the Multidisciplinary Head and Neck Team Planning Meeting.    Representatives from Medical Oncology, Radiation Oncology, Head and Neck Surgical Oncology, Psychosocial Oncology, and Speech and Language Pathology discussed the case with the following recommendations:    1) Head and neck clinic follow up  2) consider Keytruda (discuss with transplant)  3) consider palliative referral         9/13/2021 - 12/29/2023 Chemotherapy    Treatment Summary   Plan Name: OP HEAD NECK PEMBROLIZUMAB CARBOPLATIN FLUOROURACIL (C1 ONLY RECEIVED) FOLLOWED BY PEMBROLIZUMAB MAINTENANCE  Treatment Goal: Palliative  Status: Inactive  Start Date: 9/13/2021  End Date: 12/29/2023  Provider: Ronald Berg MD  Chemotherapy: CARBOplatin (PARAPLATIN) 320 mg in sodium chloride 0.9% 500 mL chemo infusion, 320 mg (100 % of original dose 320.5 mg), Intravenous, Clinic/[facility] 1 time, 6 of 6 cycles  Dose modification:   (original dose 320.5 mg, Cycle 1)  Administration: 320 mg (9/13/2021), 335 mg (12/23/2021), 325 mg (1/27/2022), 245 mg (3/3/2022), 255 mg (5/12/2022), 255 mg (4/7/2022)  fluorouraciL 1,000 mg/m2/day = 6,440 mg in sodium chloride 0.9% 240 mL chemo infusion, 1,000 mg/m2/day = 6,440 mg, Intravenous, Over 96 hours, 6 of 6 cycles  Dose modification: 800 mg/m2/day (original dose 1,000 mg/m2/day, Cycle 6), 5,000 mg (original dose 1,000 mg/m2/day, Cycle  8)  Administration: 6,440 mg (9/13/2021), 6,440 mg (12/23/2021), 6,480 mg (1/27/2022), 5,000 mg (3/3/2022), 5,000 mg (4/7/2022), 5,000 mg (5/12/2022)     Secondary malignant neoplasm of cervical lymph node   2/9/2021 Initial Diagnosis    Secondary malignant neoplasm of cervical lymph node     9/13/2021 - 12/29/2023 Chemotherapy    Treatment Summary   Plan Name: OP HEAD NECK PEMBROLIZUMAB CARBOPLATIN FLUOROURACIL (C1 ONLY RECEIVED) FOLLOWED BY PEMBROLIZUMAB MAINTENANCE  Treatment Goal: Palliative  Status: Inactive  Start Date: 9/13/2021  End Date: 12/29/2023  Provider: Ronald Berg MD  Chemotherapy: CARBOplatin (PARAPLATIN) 320 mg in sodium chloride 0.9% 500 mL chemo infusion, 320 mg (100 % of original dose 320.5 mg), Intravenous, Clinic/[facility] 1 time, 6 of 6 cycles  Dose modification:   (original dose 320.5 mg, Cycle 1)  Administration: 320 mg (9/13/2021), 335 mg (12/23/2021), 325 mg (1/27/2022), 245 mg (3/3/2022), 255 mg (5/12/2022), 255 mg (4/7/2022)  fluorouraciL 1,000 mg/m2/day = 6,440 mg in sodium chloride 0.9% 240 mL chemo infusion, 1,000 mg/m2/day = 6,440 mg, Intravenous, Over 96 hours, 6 of 6 cycles  Dose modification: 800 mg/m2/day (original dose 1,000 mg/m2/day, Cycle 6), 5,000 mg (original dose 1,000 mg/m2/day, Cycle 8)  Administration: 6,440 mg (9/13/2021), 6,440 mg (12/23/2021), 6,480 mg (1/27/2022), 5,000 mg (3/3/2022), 5,000 mg (4/7/2022), 5,000 mg (5/12/2022)     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   1/29/2024 Initial Diagnosis    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     2/19/2024 - 3/25/2024 Chemotherapy    Treatment Summary   Plan Name: OP HEAD NECK CETUXIMAB CARBOPLATIN FLUOROURACIL Q3W  Treatment Goal: Curative  Status: Inactive  Start Date: 2/19/2024  End Date: 3/25/2024  Provider: Martín Hernandez MD  Chemotherapy: cetuximab (ERBITUX) 100 mg/50 mL chemo infusion 668 mg, 400 mg/m2 = 668 mg, Intravenous, Clinic/[facility] 1 time, 2 of 12 cycles  Administration: 668 mg (2/19/2024), 400 mg  (2/26/2024), 400 mg (3/18/2024), 400 mg (3/25/2024), 400 mg (3/11/2024)  CARBOplatin (PARAPLATIN) 295 mg in sodium chloride 0.9% 314.5 mL chemo infusion, 295 mg, Intravenous, Clinic/[facility] 1 time, 2 of 6 cycles  Administration: 295 mg (2/19/2024), 300 mg (3/18/2024)     8/23/2024 - 8/23/2024 Chemotherapy    Treatment Summary   Plan Name: OP cetuximab (loading + maintenance) weekly  Treatment Goal: Curative  Status: Inactive  Start Date:   End Date:   Provider: Martín Hernandez MD  Chemotherapy: cetuximab (ERBITUX) 100 mg/50 mL chemo infusion 652 mg, 400 mg/m2 = 652 mg, Intravenous, Clinic/[facility] 1 time, 0 of 6 cycles     9/17/2024 - 9/23/2024 Radiation Therapy    Treating physician: Tian Joyce  Treatment Summary  Course: C1 H&N 2024    Treatment Site Ref. ID Energy Dose/Fx (Gy) #Fx Dose Correction (Gy) Total Dose (Gy) Start Date End Date Elapsed Days   IM Orbit_R Orbit_R 6X 6.5 5 / 5 0 32.5 9/17/2024 9/23/2024 6            Review of Systems   Constitutional:  Positive for appetite change (decreased). Negative for activity change, chills, fatigue, fever and unexpected weight change.   HENT:  Negative for ear pain, facial swelling, hearing loss, mouth sores, nosebleeds, sore throat, trouble swallowing and voice change.         No verbal communication. Skin fixed and immobile from previous scaring post-neck dissection. Taste changes.   Eyes:  Negative for pain, discharge and visual disturbance.   Respiratory:  Negative for cough, choking, chest tightness and shortness of breath.    Cardiovascular:  Negative for chest pain, palpitations and leg swelling.   Gastrointestinal:  Negative for abdominal distention, abdominal pain, blood in stool, constipation, diarrhea, nausea and vomiting.   Endocrine: Negative for cold intolerance and heat intolerance.   Genitourinary:  Negative for difficulty urinating, dysuria, frequency and urgency.   Musculoskeletal:  Positive for back pain (lower - chronic). Negative for arthralgias, gait  problem, leg pain and myalgias.   Integumentary:  Negative for pallor, rash, wound and mole/lesion.        Scalp - cutaneous horn     Allergic/Immunologic: Negative for frequent infections.   Neurological:  Negative for dizziness, tremors, weakness, light-headedness, numbness and headaches.   Hematological:  Negative for adenopathy. Does not bruise/bleed easily.   Psychiatric/Behavioral:  Negative for agitation, confusion, dysphoric mood and sleep disturbance. The patient is not nervous/anxious.         Allergies:  Review of patient's allergies indicates:   Allergen Reactions    Aspirin     Tylenol extended release        Medications:  Current Outpatient Medications   Medication Sig Dispense Refill    levothyroxine (SYNTHROID) 88 MCG tablet Take 88 mcg by mouth before breakfast.      ondansetron (ZOFRAN-ODT) 8 MG TbDL Take 1 tablet (8 mg total) by mouth every 8 (eight) hours as needed (nausea - first choice). 60 tablet 4    prochlorperazine (COMPAZINE) 10 MG tablet Take 1 tablet (10 mg total) by mouth every 6 (six) hours as needed (nausea/vomiting - second choice). 30 tablet 1    senna (SENOKOT) 8.6 mg tablet Take 2 tablets by mouth 2 (two) times daily as needed for Constipation. 60 tablet 2    tacrolimus (PROGRAF) 0.5 MG Cap Take 1 capsule (0.5 mg total) by mouth every 12 (twelve) hours. 180 capsule 3    traZODone (DESYREL) 50 MG tablet TAKE 1 TABLET BY MOUTH IN THE  EVENING 90 tablet 3    vismodegib (ERIVEDGE) 150 mg Cap Take 1 capsule (150 mg total) by mouth once daily. 30 capsule 3    pantoprazole (PROTONIX) 40 MG tablet Take 1 tablet (40 mg total) by mouth once daily. 30 tablet 0     No current facility-administered medications for this visit.     Facility-Administered Medications Ordered in Other Visits   Medication Dose Route Frequency Provider Last Rate Last Admin    ofloxacin 0.3 % ophthalmic solution 1 drop  1 drop Right Eye On Call Procedure Victor M Vasques MD   2 drop at 10/11/22 0745    sodium  chloride 0.9% flush 10 mL  10 mL Intravenous PRN Ronald Berg MD        sodium chloride 0.9% flush 10 mL  10 mL Intravenous PRN Victor M Vasques MD           PMH:  Past Medical History:   Diagnosis Date    Acute conjunctivitis of right eye 09/12/2022    Atrial fibrillation     Basal cell carcinoma (BCC) in situ of skin 2012    3 on face, 2 on arm, removed by dermatology.     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 Underwent surgical resection of tongue and neck. s/p tracheostomy    Squamous cell carcinoma of skin of right temple 01/24/2024    right temple       PSH:  Past Surgical History:   Procedure Laterality Date    COLONOSCOPY      COLONOSCOPY N/A 09/14/2023    Procedure: COLONOSCOPY;  Surgeon: Reyes Olivia MD;  Location: [address]);  Service: Endoscopy;  Laterality: N/A;    COLONOSCOPY N/A 6/27/2025    Procedure: COLONOSCOPY;  Surgeon: Carlton Bennett MD;  Location: [address]);  Service: Endoscopy;  Laterality: N/A;    CONJUNCTIVA BIOPSY Right 10/11/2022    Procedure: BIOPSY, CONJUNCTIVA;  Surgeon: Victor M Vasques MD;  Location: [facility];  Service: Ophthalmology;  Laterality: Right;    ESOPHAGOGASTRODUODENOSCOPY N/A 09/14/2023    Procedure: EGD (ESOPHAGOGASTRODUODENOSCOPY);  Surgeon: Reyes Olivia MD;  Location: [address]);  Service: Endoscopy;  Laterality: N/A;    ESOPHAGOGASTRODUODENOSCOPY N/A 04/10/2024    Procedure: EGD  (ESOPHAGOGASTRODUODENOSCOPY);  Surgeon: Reyes Pagan MD;  Location: [facility] ENDO (2ND FLR);  Service: Endoscopy;  Laterality: N/A;    ESOPHAGOGASTRODUODENOSCOPY N/A 06/24/2024    Procedure: EGD (ESOPHAGOGASTRODUODENOSCOPY);  Surgeon: Reyes Pagan MD;  Location: [facility] (2ND FLR);  Service: Endoscopy;  Laterality: N/A;  Ref By: Dr. Hawkins   Procedure: egd  Diagnosis: esophagitis  Procedure Timing: 10 weeks  Prep: standard prep  6/21-pt r/s-inst to portal-2nd floor criteria-labs within 90 daysMS    EYE SURGERY Right 04/25/2024    exenteration of orbit    GLOSSECTOMY      INSERTION OF VENOUS ACCESS PORT Right 03/08/2021    Procedure: INSERTION, VENOUS ACCESS PORT;  Surgeon: Jesus Rendon MD;  [facility];  Service: General;  Laterality: Right;    LARYNGECTOMY      LIVER TRANSPLANT  11/2008    transplanted for biopsy proven hepatocellular carcinoma,     LYMPH NODE BIOPSY N/A 09/18/2020    Procedure: BIOPSY, LYMPH NODE;  Surgeon: Taz Diagnostic Provider;  [facility];  Service: General;  Laterality: N/A;  Rm 173    skin cancer removals      SURGICAL REMOVAL OF SCAR Right 08/24/2023    Procedure: EXCISION, SCAR;  Surgeon: Ting Brambila MD;  [facility];  Service: Ophthalmology;  Laterality: Right;    TRACHEOSTOMY         FamHx:  Family History   Problem Relation Name Age of Onset    Dementia Mother      Cataracts Neg Hx      Glaucoma Neg Hx      Macular degeneration Neg Hx         SocHx:  Social History     Socioeconomic History    Marital status: Single   Occupational History    Occupation: Retired     Comment: , notes exposures to fumes etc.  Worked on [employer] for 4 years   Tobacco Use    Smoking status: Never    Smokeless tobacco: Never   Substance and Sexual Activity    Alcohol use: Yes     Comment: drinks wine, 1 glass day    Drug use: Not Currently    Sexual activity: Yes     Comment: No prior history of STD    Social History Narrative     "Steps at house- 24     Social Drivers of Health     Financial Resource Strain: Low Risk  (6/26/2025)    Overall Financial Resource Strain (CARDIA)     Difficulty of Paying Living Expenses: Not hard at all   Food Insecurity: No Food Insecurity (6/26/2025)    Hunger Vital Sign     Worried About Running Out of Food in the Last Year: Never true     Ran Out of Food in the Last Year: Never true   Transportation Needs: No Transportation Needs (6/26/2025)    PRAPARE - Transportation     Lack of Transportation (Medical): No     Lack of Transportation (Non-Medical): No   Stress: No Stress Concern Present (6/26/2025)    [employer] [place] [employer] - Occupational Stress Questionnaire     Feeling of Stress : Not at all   Housing Stability: Low Risk  (6/26/2025)    Housing Stability Vital Sign     Unable to Pay for Housing in the Last Year: No     Homeless in the Last Year: No       Distress Score    Distress Score: 0 - No Distress        Objective:      Vitals:    08/05/25 1122   BP: 119/72   BP Location: Right arm   Patient Position: Sitting   Pulse: 102   Resp: 16   Temp: 98 °F (36.7 °C)   TempSrc: Oral   SpO2: 99%   Weight: 56.9 kg (125 lb 7.1 oz)   Height: 5' 8" (1.727 m)               Physical Exam  Vitals reviewed.   Constitutional:       General: He is not in acute distress.     Appearance: Normal appearance. He is well-developed and underweight.   HENT:      Head: Normocephalic and atraumatic.        Nose:      Comments: See picture below of BCC. Improved since last image.      Mouth/Throat:      Mouth: Mucous membranes are moist.      Dentition: Abnormal dentition. Dental caries present.   Eyes:      Comments: R eye enucleation   Neck:      Trachea: Tracheostomy present.   Pulmonary:      Effort: Pulmonary effort is normal. No respiratory distress.      Comments: Tracheostomy  Abdominal:      General: There is no distension.      Palpations: Abdomen is soft.   Musculoskeletal:         General: No swelling. " Normal range of motion.      Cervical back: Normal range of motion and neck supple.   Skin:     General: Skin is warm and dry.   Neurological:      General: No focal deficit present.      Mental Status: He is alert and oriented to person, place, and time.   Psychiatric:         Mood and Affect: Mood normal.         Behavior: Behavior normal.         Thought Content: Thought content normal.         Judgment: Judgment normal.             LABS:  WBC   Date Value Ref Range Status   08/04/2025 3.82 (L) 3.90 - 12.70 K/uL Final     Hemoglobin   Date Value Ref Range Status   02/11/2025 11.9 (L) 14.0 - 18.0 g/dL Final     HGB   Date Value Ref Range Status   08/04/2025 11.3 (L) 14.0 - 18.0 gm/dL Final     POC Hematocrit   Date Value Ref Range Status   06/25/2025 <15 (LL) 36 - 54 %PCV Final     HCT   Date Value Ref Range Status   08/04/2025 33.8 (L) 40.0 - 54.0 % Final     Platelet Count   Date Value Ref Range Status   08/04/2025 201 150 - 450 K/uL Final     Platelets   Date Value Ref Range Status   02/11/2025 168 150 - 450 K/uL Final       Chemistry        Component Value Date/Time     08/04/2025 0947     02/11/2025 0858    K 4.5 08/04/2025 0947    K 4.4 02/11/2025 0858     08/04/2025 0947     02/11/2025 0858    CO2 24 08/04/2025 0947    CO2 23 02/11/2025 0858    BUN 17 08/04/2025 0947    CREATININE 1.3 08/04/2025 0947     08/04/2025 0947    GLU 96 02/11/2025 0858        Component Value Date/Time    CALCIUM 9.5 08/04/2025 0947    CALCIUM 9.9 02/11/2025 0858    ALKPHOS 126 08/04/2025 0947    ALKPHOS 99 02/11/2025 0858    AST 29 08/04/2025 0947    AST 41 (H) 02/11/2025 0858    ALT 12 08/04/2025 0947    ALT 16 02/11/2025 0858    BILITOT 0.4 08/04/2025 0947    BILITOT 0.5 02/11/2025 0858    ESTGFRAFRICA 52.6 (A) 07/14/2022 1119    EGFRNONAA 45.5 (A) 07/14/2022 1119        Imaging  Results for orders placed or performed during the hospital encounter of 05/22/25 (from the past 2160 hours)   CT Chest  Without Contrast    Narrative    EXAMINATION:  CT CHEST WITHOUT CONTRAST    CLINICAL HISTORY:  Head/neck cancer, monitor; Squamous cell carcinoma of skin of other parts of face    TECHNIQUE:  Low dose axial images, sagittal and coronal reformations were obtained from the thoracic inlet to the lung bases. Contrast was not administered.    COMPARISON:  Multiple prior exams, most recent CT from 02/11/2025.    FINDINGS:  Base of Neck: No significant abnormality.    Thoracic soft tissues: Right-sided chest port with its tip in the SVC.    Aorta: Left-sided aortic arch.  No aneurysm and no significant atherosclerosis    Heart: Normal size. No effusion.  Moderate calcific atherosclerosis.    Hailey/Mediastinum: No pathologic tati enlargement.    Airways: Patent.    Lungs/Pleura: Lungs are well expanded.  0.5 x 0.3 cm nodule in the left lower lobe is stable in size when compared to prior exam previously measuring 0.5 x 0.3 cm.  No new pulmonary nodules.  Mild fibrotic changes of the lower lobes bilaterally.    Esophagus: Normal.    Upper Abdomen: Multiple calcifications of the pancreas consistent with chronic pancreatitis.  Right renal simple cyst.    Bones: No acute fracture. No suspicious lytic or sclerotic lesions.      Impression    No detrimental change compared to prior exam.  Stable linear nodule in the left lower lobe measuring up to 0.5 cm.  No new pulmonary nodules.      Electronically signed by: Joel Hernandez MD  Date:    05/28/2025  Time:    10:03     *Note: Due to a large number of results and/or encounters for the requested time period, some results have not been displayed. A complete set of results can be found in Results Review.             Assessment:       1. Basal cell carcinoma (BCC) of skin of nose    2. 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 Squamous cell carcinoma of base of tongue    4. Secondary malignant neoplasm of cervical lymph node    5. Tracheostomy status    6. 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 Gastroesophageal reflux disease without esophagitis    10. Altered taste    11. Decreased appetite    12. Cutaneous horn      Plan:         BCC locally advanced, too large for RT or surgery now due to morbidity of the surgery. Started Vismodegib on 2/4/25 with improvement in pain and clinical response. Discussed RT with Dr. Joyce, but plan is to continue systemic therapy.   - Having worsening side effects and no longer tolerating. Will hold Vismodegib and proceed with radiation. Due to start on 8/12.    Orbital cSCC  Unfortunately patient has progressed while on immunotherapy for below diagnosis. For that reason systemic therapies are limited. Evaluated at [facility] and surgeon does think an exenteration sparing resection would be possible with good response to induction chemotherapy. Plan was for platinum doublet chemo + cetuximab x 2 cycles. Notified of this plan on 1/29 and treatment plan was entered.   - Initiated dose reduced C1 of 5-FU to 750 mg/m2 due to age/frailty and growth factor. Patient still had cytopenias and mucositis, but was able to finish 2 cycles with delays. Now s/p exenteration. With PNI on path, referred to radiation oncology but adjuvant radiation deemed not necessary after exenteration.   - MRI orbit on 8/12/24 showing evidence of recurrent disease, "Nodular enhancing soft tissue superficially at the superomedial orbit and along the medial wall of the orbit posteriorly at least concerning for underlying recurrent lesion."   - Has undergone palliative RT to the right orbit. CT showing reduction of right orbital mass. Will continue Q3 month surveillance.    3,4,5. Recurrent SCC of base of tongue, P16+ - Status post total laryngectomy, total glossectomy and anterolateral thigh free flap reconstruction roughly 2017 at [facility] in [place] for " persistent/recurrent base of tongue sq.c.c. 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 He is not a surgical or radiation candidate.  -Started on PCC 10/6/2020 followed by maintenance cetuximab until 8/24/21 (discontinued due to progression of disease; continued increase in SUV uptake, no new lesions).  - 9/2021 started on carbo/5-FU/pembrolizumab; due to toxicity went to pembrolizumab monotherapy starting with cycle 2.  Progression of disease noted after cycle 3 so added chemotherapy back in with cycle 4. Keytruda monotherapy starting with C9.   - Keytruda discontinued after 2 years.    6,7. S/p liver transplant and is currently on tacrolimus. Grade 1. Will continue to monitor.     8. Monitor TSH on synthroid dose of 100 mcg.    9-11. Holding Vismodegib for these reasons. Start PPI for gastric upset. Weight currently stable.     12. Message sent to dermatology.       Patient is in agreement with the proposed treatment plan. All questions were answered to the patient's satisfaction. Pt knows to call clinic if anything is needed before the next clinic visit.    Ct Garza, MSN, APRN, FNP-C  Hematology and Medical Oncology  Nurse Practitioner to Dr. Martín Hernandez  Nurse Practitioner, [facility]          Route Chart for Scheduling    Med Onc Chart Routing      Follow up with physician . RTC on 8/21 after other visits or on 8/22   Follow up with [facility]    Infusion scheduling note    Injection scheduling note    Labs CBC and CMP   Scheduling:  Preferred lab:  Lab interval:     Imaging    Pharmacy appointment    Other referrals              Supportive Plan Information  IV FLUIDS AND ELECTROLYTES Ct Francis NP   Associated Diagnosis: Acute kidney injury superimposed on chronic kidney disease   noted on 4/8/2024   Line of treatment: Supportive Care   Treatment goal: Supportive     Upcoming Treatment Dates - IV FLUIDS AND  ELECTROLYTES    No upcoming days in selected categories.    Therapy Plan Information  PORT FLUSH for CKD (chronic kidney disease) stage 3, GFR 30-59 ml/min, noted on 10/5/2020  PORT FLUSH for Squamous cell carcinoma of base of tongue, noted on 9/28/2020  Flushes  heparin, porcine (PF) 100 unit/mL injection flush 500 Units  500 Units, Intravenous, Every visit  sodium chloride 0.9% flush 10 mL  10 mL, Intravenous, Every visit    PORT FLUSH for Squamous cell carcinoma of base of tongue, noted on 9/28/2020  PORT FLUSH for Secondary malignant neoplasm of cervical lymph node, noted on 2/9/2021  Flushes  heparin, porcine (PF) 100 unit/mL injection flush 500 Units  500 Units, Intravenous, Every visit  sodium chloride 0.9% flush 10 mL  10 mL, Intravenous, Every visit      No therapy plan of the specified type found.

## 2025-08-07 ENCOUNTER — CLINICAL SUPPORT (OUTPATIENT)
Dept: HEMATOLOGY/ONCOLOGY | Facility: CLINIC | Age: 75
End: 2025-08-07
Payer: MEDICARE

## 2025-08-07 DIAGNOSIS — C01 SQUAMOUS CELL CARCINOMA OF BASE OF TONGUE: ICD-10-CM

## 2025-08-07 DIAGNOSIS — C44.329 SQUAMOUS CELL CARCINOMA OF SKIN OF ORBIT: ICD-10-CM

## 2025-08-07 DIAGNOSIS — Z71.3 NUTRITIONAL COUNSELING: Primary | ICD-10-CM

## 2025-08-07 DIAGNOSIS — C77.0 SECONDARY MALIGNANT NEOPLASM OF CERVICAL LYMPH NODE: ICD-10-CM

## 2025-08-07 DIAGNOSIS — R43.2 LOSS OF TASTE: ICD-10-CM

## 2025-08-07 PROCEDURE — 97803 MED NUTRITION INDIV SUBSEQ: CPT | Mod: PBBFAC

## 2025-08-07 PROCEDURE — 99999PBSHW PR PBB SHADOW TECHNICAL ONLY FILED TO HB: Mod: PBBFAC,,,

## 2025-08-08 ENCOUNTER — PATIENT MESSAGE (OUTPATIENT)
Dept: DERMATOLOGY | Facility: CLINIC | Age: 75
End: 2025-08-08
Payer: MEDICARE

## 2025-08-08 ENCOUNTER — PATIENT MESSAGE (OUTPATIENT)
Dept: RADIATION ONCOLOGY | Facility: CLINIC | Age: 75
End: 2025-08-08
Payer: MEDICARE

## 2025-08-08 DIAGNOSIS — Z94.4 LIVER TRANSPLANTED: ICD-10-CM

## 2025-08-08 NOTE — TELEPHONE ENCOUNTER
Copied from CRM #[id number]. Topic: Medications - Medication Refill  >> Aug 8, 2025  9:41 AM Sanjaydaisy wrote:  Type:  RX Refill Request    Who Called: pt's brother Ollie   Refill or New Rx:refill  RX Name and Strength:tacrolimus (PROGRAF) 0.5 MG Cap   How is the patient currently taking it? (ex. 1XDay):Sig - Route: Take 1 capsule (0.5 mg total) by mouth every 12 (twelve) hours. - Oral    Preferred Pharmacy with phone number:[facility] - [address]   Phone: [phone]  Fax: [phone]    Local or Mail Order:mail  Ordering Provider:Cornell Anton MD    Would the patient rather a call back or a response via [facility]? call  Best Call Back Number:[phone] (H)

## 2025-08-09 RX ORDER — TACROLIMUS 0.5 MG/1
0.5 CAPSULE ORAL EVERY 12 HOURS
Qty: 180 CAPSULE | Refills: 3 | Status: SHIPPED | OUTPATIENT
Start: 2025-08-09 | End: 2026-08-09

## 2025-08-10 ENCOUNTER — RESULTS FOLLOW-UP (OUTPATIENT)
Dept: TRANSPLANT | Facility: CLINIC | Age: 75
End: 2025-08-10
Payer: MEDICARE

## 2025-08-10 DIAGNOSIS — Z85.9 HISTORY OF CANCER: ICD-10-CM

## 2025-08-10 DIAGNOSIS — Z94.4 LIVER TRANSPLANTED: Primary | ICD-10-CM

## 2025-08-10 DIAGNOSIS — C22.0 HCC (HEPATOCELLULAR CARCINOMA): ICD-10-CM

## 2025-08-11 DIAGNOSIS — Z94.4 LIVER TRANSPLANTED: Primary | ICD-10-CM

## 2025-08-18 ENCOUNTER — PATIENT MESSAGE (OUTPATIENT)
Dept: HEMATOLOGY/ONCOLOGY | Facility: CLINIC | Age: 75
End: 2025-08-18
Payer: MEDICARE

## 2025-08-18 ENCOUNTER — TELEPHONE (OUTPATIENT)
Dept: DERMATOLOGY | Facility: CLINIC | Age: 75
End: 2025-08-18
Payer: MEDICARE

## 2025-08-19 ENCOUNTER — TELEPHONE (OUTPATIENT)
Dept: OTOLARYNGOLOGY | Facility: CLINIC | Age: 75
End: 2025-08-19
Payer: MEDICARE

## 2025-08-19 DIAGNOSIS — C44.311 BASAL CELL CARCINOMA OF SKIN OF NOSE: ICD-10-CM

## 2025-08-19 DIAGNOSIS — C44.311 BASAL CELL CARCINOMA (BCC) OF LEFT SIDE OF NOSE: Primary | ICD-10-CM

## 2025-08-21 ENCOUNTER — OFFICE VISIT (OUTPATIENT)
Dept: RADIATION ONCOLOGY | Facility: CLINIC | Age: 75
End: 2025-08-21
Payer: MEDICARE

## 2025-08-21 ENCOUNTER — HOSPITAL ENCOUNTER (OUTPATIENT)
Dept: RADIOLOGY | Facility: HOSPITAL | Age: 75
Discharge: HOME | End: 2025-08-21
Attending: STUDENT IN AN ORGANIZED HEALTH CARE EDUCATION/TRAINING PROGRAM
Payer: MEDICARE

## 2025-08-21 VITALS
DIASTOLIC BLOOD PRESSURE: 89 MMHG | BODY MASS INDEX: 19.27 KG/M2 | SYSTOLIC BLOOD PRESSURE: 134 MMHG | RESPIRATION RATE: 16 BRPM | WEIGHT: 127.13 LBS | HEART RATE: 103 BPM | OXYGEN SATURATION: 97 % | HEIGHT: 68 IN

## 2025-08-21 DIAGNOSIS — H05.9 UNSPECIFIED DISORDER OF ORBIT: ICD-10-CM

## 2025-08-21 DIAGNOSIS — C01 SQUAMOUS CELL CARCINOMA OF BASE OF TONGUE: ICD-10-CM

## 2025-08-21 DIAGNOSIS — C44.329 SQUAMOUS CELL CARCINOMA OF SKIN OF ORBIT: ICD-10-CM

## 2025-08-21 DIAGNOSIS — C44.329 SQUAMOUS CELL CARCINOMA OF SKIN OF ORBIT: Primary | ICD-10-CM

## 2025-08-21 DIAGNOSIS — C44.311 BASAL CELL CARCINOMA (BCC) OF SKIN OF NOSE: ICD-10-CM

## 2025-08-21 PROCEDURE — 70481 CT ORBIT/EAR/FOSSA W/DYE: CPT | Mod: TC

## 2025-08-21 PROCEDURE — 71260 CT THORAX DX C+: CPT | Mod: TC

## 2025-08-21 PROCEDURE — 25500020 PHARM REV CODE 255: Performed by: STUDENT IN AN ORGANIZED HEALTH CARE EDUCATION/TRAINING PROGRAM

## 2025-08-21 PROCEDURE — 99213 OFFICE O/P EST LOW 20 MIN: CPT | Mod: PBBFAC,25 | Performed by: STUDENT IN AN ORGANIZED HEALTH CARE EDUCATION/TRAINING PROGRAM

## 2025-08-21 PROCEDURE — 70481 CT ORBIT/EAR/FOSSA W/DYE: CPT | Mod: 26,,, | Performed by: RADIOLOGY

## 2025-08-21 PROCEDURE — 99999 PR PBB SHADOW E&M-EST. PATIENT-LVL III: CPT | Mod: PBBFAC,,, | Performed by: STUDENT IN AN ORGANIZED HEALTH CARE EDUCATION/TRAINING PROGRAM

## 2025-08-21 PROCEDURE — 70491 CT SOFT TISSUE NECK W/DYE: CPT | Mod: TC

## 2025-08-21 PROCEDURE — 70491 CT SOFT TISSUE NECK W/DYE: CPT | Mod: 26,,, | Performed by: RADIOLOGY

## 2025-08-21 PROCEDURE — 71260 CT THORAX DX C+: CPT | Mod: 26,,, | Performed by: RADIOLOGY

## 2025-08-21 RX ADMIN — IOHEXOL 150 ML: 350 INJECTION, SOLUTION INTRAVENOUS at 09:08

## 2025-08-22 ENCOUNTER — OFFICE VISIT (OUTPATIENT)
Dept: HEMATOLOGY/ONCOLOGY | Facility: CLINIC | Age: 75
End: 2025-08-22
Payer: MEDICARE

## 2025-08-22 VITALS
WEIGHT: 129.44 LBS | SYSTOLIC BLOOD PRESSURE: 129 MMHG | HEART RATE: 90 BPM | BODY MASS INDEX: 19.62 KG/M2 | RESPIRATION RATE: 16 BRPM | DIASTOLIC BLOOD PRESSURE: 88 MMHG | TEMPERATURE: 98 F | OXYGEN SATURATION: 98 % | HEIGHT: 68 IN

## 2025-08-22 DIAGNOSIS — Z93.0 TRACHEOSTOMY STATUS: ICD-10-CM

## 2025-08-22 DIAGNOSIS — K21.9 GASTROESOPHAGEAL REFLUX DISEASE WITHOUT ESOPHAGITIS: ICD-10-CM

## 2025-08-22 DIAGNOSIS — R63.0 DECREASED APPETITE: ICD-10-CM

## 2025-08-22 DIAGNOSIS — C01 SQUAMOUS CELL CARCINOMA OF BASE OF TONGUE: ICD-10-CM

## 2025-08-22 DIAGNOSIS — Z79.899 IMMUNODEFICIENCY DUE TO DRUGS: ICD-10-CM

## 2025-08-22 DIAGNOSIS — C44.311 BASAL CELL CARCINOMA (BCC) OF SKIN OF NOSE: Primary | ICD-10-CM

## 2025-08-22 DIAGNOSIS — E03.2 HYPOTHYROIDISM DUE TO MEDICAMENTS AND OTHER EXOGENOUS SUBSTANCES: ICD-10-CM

## 2025-08-22 DIAGNOSIS — C44.329 SQUAMOUS CELL CARCINOMA OF SKIN OF ORBIT: ICD-10-CM

## 2025-08-22 DIAGNOSIS — Z94.4 STATUS POST LIVER TRANSPLANTATION: ICD-10-CM

## 2025-08-22 DIAGNOSIS — L85.8 CUTANEOUS HORN: ICD-10-CM

## 2025-08-22 DIAGNOSIS — D84.821 IMMUNODEFICIENCY DUE TO DRUGS: ICD-10-CM

## 2025-08-22 DIAGNOSIS — C77.0 SECONDARY MALIGNANT NEOPLASM OF CERVICAL LYMPH NODE: ICD-10-CM

## 2025-08-22 DIAGNOSIS — R43.2 ALTERED TASTE: ICD-10-CM

## 2025-08-22 PROCEDURE — 99215 OFFICE O/P EST HI 40 MIN: CPT | Mod: S$PBB,,, | Performed by: INTERNAL MEDICINE

## 2025-08-22 PROCEDURE — 99999 PR PBB SHADOW E&M-EST. PATIENT-LVL III: CPT | Mod: PBBFAC,,, | Performed by: INTERNAL MEDICINE

## 2025-08-22 PROCEDURE — G2211 COMPLEX E/M VISIT ADD ON: HCPCS | Mod: ,,, | Performed by: INTERNAL MEDICINE

## 2025-08-22 PROCEDURE — 99213 OFFICE O/P EST LOW 20 MIN: CPT | Mod: PBBFAC | Performed by: INTERNAL MEDICINE

## (undated) DEVICE — GLOVE BIOGEL SKINSENSE PI 7.5

## (undated) DEVICE — Device

## (undated) DEVICE — ADHESIVE DERMABOND ADVANCED

## (undated) DEVICE — DRESSING TRANS 4X4 TEGADERM

## (undated) DEVICE — PENCIL BIPOLAR 18G STR 2 PIN

## (undated) DEVICE — COVERS PROBE NR-48 STERILE

## (undated) DEVICE — DRAPE THYROID WITH ARMBOARD

## (undated) DEVICE — BLADE SCALP OPHTL BEVEL STR

## (undated) DEVICE — SUT VICRYL PLUS 3-0 SH 18IN

## (undated) DEVICE — BURR 1.4MM CUT DIAMOND FLEX

## (undated) DEVICE — SPONGE DERMACEA 4X4IN 12PLY

## (undated) DEVICE — TRAY MINOR GEN SURG

## (undated) DEVICE — SET DECANTER MEDICHOICE

## (undated) DEVICE — SOL POVIDONE PREP IODINE 4 OZ

## (undated) DEVICE — SYR SLIP TIP 1CC

## (undated) DEVICE — APPLICATOR COTTON TIP 6IN STRL

## (undated) DEVICE — ELECTRODE BLADE INSULATED 1 IN

## (undated) DEVICE — DRESSING EYE OVAL LF

## (undated) DEVICE — SUT MONOCRYL 4-0 PS-2

## (undated) DEVICE — DRAPE C ARM 42 X 120 10/BX

## (undated) DEVICE — SOL WATER STRL IRR 1000ML

## (undated) DEVICE — SHIELD EYE PLASTIC

## (undated) DEVICE — SPEARS EYE 10/PK

## (undated) DEVICE — PAD EYE OVAL CNTOUR 1.62X2.62

## (undated) DEVICE — GLOVE BIOGEL SKINSENSE PI 6.5

## (undated) DEVICE — TAPE CURAD PAPER 1INX1.5YD

## (undated) DEVICE — NDL HYPO A BEVEL 30X1/2

## (undated) DEVICE — KNIFE ANGLED OPTH

## (undated) DEVICE — SUT SILK 6-0 TG140-8 18IN

## (undated) DEVICE — SOL BETADINE 5%

## (undated) DEVICE — SOL PVP-I SCRUB 7.5% 4OZ

## (undated) DEVICE — CONTAINER SPECIMEN OR STER 4OZ

## (undated) DEVICE — SUT 8-0 8 COATED VICRYL VI

## (undated) DEVICE — CORD FOR BIPOLAR FORCEPS 12

## (undated) DEVICE — ELECTRODE REM PLYHSV RETURN 9

## (undated) DEVICE — SOL POVIDONE SCRUB IODINE 4 OZ